# Patient Record
Sex: MALE | Race: WHITE | Employment: OTHER | URBAN - NONMETROPOLITAN AREA
[De-identification: names, ages, dates, MRNs, and addresses within clinical notes are randomized per-mention and may not be internally consistent; named-entity substitution may affect disease eponyms.]

---

## 2017-01-09 ENCOUNTER — ANTI-COAG VISIT (OUTPATIENT)
Dept: CARDIOLOGY | Age: 52
End: 2017-01-09
Payer: COMMERCIAL

## 2017-01-09 DIAGNOSIS — I48.0 PAROXYSMAL ATRIAL FIBRILLATION (HCC): Primary | ICD-10-CM

## 2017-01-09 LAB
INTERNATIONAL NORMALIZATION RATIO, POC: 3.6
PROTHROMBIN TIME, POC: NORMAL

## 2017-01-09 PROCEDURE — 85610 PROTHROMBIN TIME: CPT | Performed by: CLINICAL NURSE SPECIALIST

## 2017-01-23 ENCOUNTER — ANTI-COAG VISIT (OUTPATIENT)
Dept: CARDIOLOGY | Age: 52
End: 2017-01-23
Payer: COMMERCIAL

## 2017-01-23 DIAGNOSIS — I48.0 PAROXYSMAL ATRIAL FIBRILLATION (HCC): Primary | ICD-10-CM

## 2017-01-23 LAB
INTERNATIONAL NORMALIZATION RATIO, POC: 2.6
PROTHROMBIN TIME, POC: NORMAL

## 2017-01-23 PROCEDURE — 85610 PROTHROMBIN TIME: CPT | Performed by: CLINICAL NURSE SPECIALIST

## 2017-02-06 ENCOUNTER — ANTI-COAG VISIT (OUTPATIENT)
Dept: CARDIOLOGY | Age: 52
End: 2017-02-06
Payer: COMMERCIAL

## 2017-02-06 DIAGNOSIS — I48.0 PAROXYSMAL ATRIAL FIBRILLATION (HCC): Primary | ICD-10-CM

## 2017-02-06 LAB
INTERNATIONAL NORMALIZATION RATIO, POC: 2.2
PROTHROMBIN TIME, POC: NORMAL

## 2017-02-06 PROCEDURE — 85610 PROTHROMBIN TIME: CPT | Performed by: NURSE PRACTITIONER

## 2017-03-06 ENCOUNTER — ANTI-COAG VISIT (OUTPATIENT)
Dept: CARDIOLOGY | Age: 52
End: 2017-03-06
Payer: COMMERCIAL

## 2017-03-06 DIAGNOSIS — I48.0 PAROXYSMAL ATRIAL FIBRILLATION (HCC): Primary | ICD-10-CM

## 2017-03-06 LAB
INTERNATIONAL NORMALIZATION RATIO, POC: 1.6
PROTHROMBIN TIME, POC: NORMAL

## 2017-03-06 PROCEDURE — 85610 PROTHROMBIN TIME: CPT | Performed by: CLINICAL NURSE SPECIALIST

## 2017-03-13 ENCOUNTER — OFFICE VISIT (OUTPATIENT)
Dept: CARDIOLOGY | Age: 52
End: 2017-03-13
Payer: COMMERCIAL

## 2017-03-13 ENCOUNTER — SURG/PROC ORDERS (OUTPATIENT)
Dept: CARDIOLOGY | Age: 52
End: 2017-03-13

## 2017-03-13 VITALS
BODY MASS INDEX: 37.51 KG/M2 | WEIGHT: 262 LBS | SYSTOLIC BLOOD PRESSURE: 106 MMHG | DIASTOLIC BLOOD PRESSURE: 74 MMHG | HEIGHT: 70 IN | HEART RATE: 74 BPM

## 2017-03-13 DIAGNOSIS — Z01.810 PREOPERATIVE CARDIOVASCULAR EXAMINATION: ICD-10-CM

## 2017-03-13 DIAGNOSIS — I48.0 PAROXYSMAL ATRIAL FIBRILLATION (HCC): Primary | ICD-10-CM

## 2017-03-13 LAB
INTERNATIONAL NORMALIZATION RATIO, POC: 1.7
PROTHROMBIN TIME, POC: NORMAL

## 2017-03-13 PROCEDURE — 85610 PROTHROMBIN TIME: CPT | Performed by: INTERNAL MEDICINE

## 2017-03-13 PROCEDURE — 99214 OFFICE O/P EST MOD 30 MIN: CPT | Performed by: INTERNAL MEDICINE

## 2017-03-13 PROCEDURE — 93000 ELECTROCARDIOGRAM COMPLETE: CPT | Performed by: INTERNAL MEDICINE

## 2017-03-13 RX ORDER — FLECAINIDE ACETATE 100 MG/1
100 TABLET ORAL 2 TIMES DAILY
COMMUNITY
End: 2017-03-13 | Stop reason: SDUPTHER

## 2017-03-13 RX ORDER — SODIUM CHLORIDE 9 MG/ML
INJECTION, SOLUTION INTRAVENOUS CONTINUOUS
Status: CANCELLED | OUTPATIENT
Start: 2017-03-13

## 2017-03-14 RX ORDER — FLECAINIDE ACETATE 100 MG/1
100 TABLET ORAL 2 TIMES DAILY
Qty: 60 TABLET | Refills: 5 | Status: SHIPPED | OUTPATIENT
Start: 2017-03-14 | End: 2017-03-23 | Stop reason: SDUPTHER

## 2017-03-15 ENCOUNTER — HOSPITAL ENCOUNTER (OUTPATIENT)
Dept: CARDIAC CATH/INVASIVE PROCEDURES | Age: 52
Discharge: HOME | End: 2017-03-15
Payer: COMMERCIAL

## 2017-03-15 VITALS
HEART RATE: 70 BPM | BODY MASS INDEX: 37.51 KG/M2 | TEMPERATURE: 98.5 F | OXYGEN SATURATION: 96 % | SYSTOLIC BLOOD PRESSURE: 133 MMHG | WEIGHT: 262 LBS | HEIGHT: 70 IN | RESPIRATION RATE: 21 BRPM | DIASTOLIC BLOOD PRESSURE: 77 MMHG

## 2017-03-15 DIAGNOSIS — I48.0 PAROXYSMAL ATRIAL FIBRILLATION (HCC): ICD-10-CM

## 2017-03-15 LAB
INR BLD: 1.8 (ref 0.88–1.18)
PROTHROMBIN TIME: 20.5 SEC (ref 12–14.6)

## 2017-03-15 PROCEDURE — 92960 CARDIOVERSION ELECTRIC EXT: CPT | Performed by: INTERNAL MEDICINE

## 2017-03-15 PROCEDURE — 93005 ELECTROCARDIOGRAM TRACING: CPT

## 2017-03-15 PROCEDURE — 85610 PROTHROMBIN TIME: CPT

## 2017-03-15 PROCEDURE — 6360000002 HC RX W HCPCS

## 2017-03-15 PROCEDURE — 36415 COLL VENOUS BLD VENIPUNCTURE: CPT

## 2017-03-15 RX ORDER — SODIUM CHLORIDE 0.9 % (FLUSH) 0.9 %
10 SYRINGE (ML) INJECTION EVERY 12 HOURS SCHEDULED
Status: DISCONTINUED | OUTPATIENT
Start: 2017-03-15 | End: 2017-03-17 | Stop reason: HOSPADM

## 2017-03-15 RX ORDER — SODIUM CHLORIDE 0.9 % (FLUSH) 0.9 %
10 SYRINGE (ML) INJECTION PRN
Status: DISCONTINUED | OUTPATIENT
Start: 2017-03-15 | End: 2017-03-17 | Stop reason: HOSPADM

## 2017-03-15 RX ORDER — SODIUM CHLORIDE 9 MG/ML
INJECTION, SOLUTION INTRAVENOUS CONTINUOUS
Status: DISCONTINUED | OUTPATIENT
Start: 2017-03-15 | End: 2017-03-18 | Stop reason: HOSPADM

## 2017-03-16 LAB
EKG P AXIS: 62 DEGREES
EKG P AXIS: NORMAL DEGREES
EKG P-R INTERVAL: 214 MS
EKG P-R INTERVAL: NORMAL MS
EKG Q-T INTERVAL: 358 MS
EKG Q-T INTERVAL: 406 MS
EKG QRS DURATION: 100 MS
EKG QRS DURATION: 96 MS
EKG QTC CALCULATION (BAZETT): 412 MS
EKG QTC CALCULATION (BAZETT): 421 MS
EKG T AXIS: 19 DEGREES
EKG T AXIS: 31 DEGREES

## 2017-03-23 ENCOUNTER — OFFICE VISIT (OUTPATIENT)
Dept: CARDIOLOGY | Age: 52
End: 2017-03-23
Payer: COMMERCIAL

## 2017-03-23 VITALS
HEART RATE: 74 BPM | DIASTOLIC BLOOD PRESSURE: 84 MMHG | HEIGHT: 70 IN | BODY MASS INDEX: 38.37 KG/M2 | SYSTOLIC BLOOD PRESSURE: 114 MMHG | WEIGHT: 268 LBS

## 2017-03-23 DIAGNOSIS — I48.0 PAROXYSMAL ATRIAL FIBRILLATION (HCC): ICD-10-CM

## 2017-03-23 DIAGNOSIS — I48.0 PAROXYSMAL ATRIAL FIBRILLATION (HCC): Primary | ICD-10-CM

## 2017-03-23 DIAGNOSIS — I10 ESSENTIAL HYPERTENSION: ICD-10-CM

## 2017-03-23 LAB
ANION GAP SERPL CALCULATED.3IONS-SCNC: 17 MMOL/L (ref 7–19)
BUN BLDV-MCNC: 14 MG/DL (ref 6–20)
CALCIUM SERPL-MCNC: 9.1 MG/DL (ref 8.6–10)
CHLORIDE BLD-SCNC: 89 MMOL/L (ref 98–111)
CO2: 25 MMOL/L (ref 22–29)
CREAT SERPL-MCNC: 0.8 MG/DL (ref 0.5–1.2)
GFR NON-AFRICAN AMERICAN: >60
GLUCOSE BLD-MCNC: 490 MG/DL (ref 74–109)
INTERNATIONAL NORMALIZATION RATIO, POC: 3.2
POTASSIUM SERPL-SCNC: 4.5 MMOL/L (ref 3.5–5)
PROTHROMBIN TIME, POC: NORMAL
SODIUM BLD-SCNC: 131 MMOL/L (ref 136–145)

## 2017-03-23 PROCEDURE — 93000 ELECTROCARDIOGRAM COMPLETE: CPT | Performed by: CLINICAL NURSE SPECIALIST

## 2017-03-23 PROCEDURE — 99213 OFFICE O/P EST LOW 20 MIN: CPT | Performed by: CLINICAL NURSE SPECIALIST

## 2017-03-23 PROCEDURE — 85610 PROTHROMBIN TIME: CPT | Performed by: CLINICAL NURSE SPECIALIST

## 2017-03-23 RX ORDER — FLECAINIDE ACETATE 100 MG/1
150 TABLET ORAL 2 TIMES DAILY
Qty: 90 TABLET | Refills: 5 | Status: SHIPPED | OUTPATIENT
Start: 2017-03-23 | End: 2018-01-29 | Stop reason: SDUPTHER

## 2017-03-29 ENCOUNTER — HOSPITAL ENCOUNTER (OUTPATIENT)
Dept: CARDIAC CATH/INVASIVE PROCEDURES | Age: 52
Discharge: HOME | End: 2017-03-29
Payer: COMMERCIAL

## 2017-03-29 VITALS
BODY MASS INDEX: 38.37 KG/M2 | RESPIRATION RATE: 16 BRPM | HEART RATE: 64 BPM | DIASTOLIC BLOOD PRESSURE: 60 MMHG | SYSTOLIC BLOOD PRESSURE: 100 MMHG | TEMPERATURE: 98.1 F | OXYGEN SATURATION: 99 % | HEIGHT: 70 IN | WEIGHT: 268 LBS

## 2017-03-29 LAB
ANION GAP SERPL CALCULATED.3IONS-SCNC: 15 MMOL/L (ref 7–19)
BUN BLDV-MCNC: 15 MG/DL (ref 6–20)
CALCIUM SERPL-MCNC: 9.4 MG/DL (ref 8.6–10)
CHLORIDE BLD-SCNC: 96 MMOL/L (ref 98–111)
CO2: 24 MMOL/L (ref 22–29)
CREAT SERPL-MCNC: 0.8 MG/DL (ref 0.5–1.2)
GFR NON-AFRICAN AMERICAN: >60
GLUCOSE BLD-MCNC: 337 MG/DL (ref 74–109)
INR BLD: 2.74 (ref 0.88–1.18)
POTASSIUM SERPL-SCNC: 3.7 MMOL/L (ref 3.5–5)
PROTHROMBIN TIME: 28.2 SEC (ref 12–14.6)
SODIUM BLD-SCNC: 135 MMOL/L (ref 136–145)

## 2017-03-29 PROCEDURE — 85610 PROTHROMBIN TIME: CPT

## 2017-03-29 PROCEDURE — 80048 BASIC METABOLIC PNL TOTAL CA: CPT

## 2017-03-29 PROCEDURE — 92960 CARDIOVERSION ELECTRIC EXT: CPT | Performed by: INTERNAL MEDICINE

## 2017-03-29 PROCEDURE — 36415 COLL VENOUS BLD VENIPUNCTURE: CPT

## 2017-03-29 PROCEDURE — 93005 ELECTROCARDIOGRAM TRACING: CPT

## 2017-03-29 PROCEDURE — 2580000003 HC RX 258: Performed by: INTERNAL MEDICINE

## 2017-03-29 PROCEDURE — 6360000002 HC RX W HCPCS

## 2017-03-29 RX ORDER — SODIUM CHLORIDE 0.9 % (FLUSH) 0.9 %
10 SYRINGE (ML) INJECTION PRN
Status: DISCONTINUED | OUTPATIENT
Start: 2017-03-29 | End: 2017-03-31 | Stop reason: HOSPADM

## 2017-03-29 RX ORDER — SODIUM CHLORIDE 0.9 % (FLUSH) 0.9 %
10 SYRINGE (ML) INJECTION EVERY 12 HOURS SCHEDULED
Status: DISCONTINUED | OUTPATIENT
Start: 2017-03-29 | End: 2017-03-31 | Stop reason: HOSPADM

## 2017-03-29 RX ORDER — SODIUM CHLORIDE 9 MG/ML
INJECTION, SOLUTION INTRAVENOUS CONTINUOUS
Status: DISCONTINUED | OUTPATIENT
Start: 2017-03-29 | End: 2017-03-31 | Stop reason: HOSPADM

## 2017-03-29 RX ADMIN — SODIUM CHLORIDE: 9 INJECTION, SOLUTION INTRAVENOUS at 07:19

## 2017-04-10 ENCOUNTER — TELEPHONE (OUTPATIENT)
Dept: CARDIOLOGY | Age: 52
End: 2017-04-10

## 2017-04-11 LAB
EKG P AXIS: 66 DEGREES
EKG P AXIS: NORMAL DEGREES
EKG P-R INTERVAL: 244 MS
EKG P-R INTERVAL: NORMAL MS
EKG Q-T INTERVAL: 442 MS
EKG Q-T INTERVAL: 474 MS
EKG QRS DURATION: 108 MS
EKG QRS DURATION: 112 MS
EKG QTC CALCULATION (BAZETT): 442 MS
EKG QTC CALCULATION (BAZETT): 484 MS
EKG T AXIS: 29 DEGREES
EKG T AXIS: 47 DEGREES

## 2017-06-22 RX ORDER — WARFARIN SODIUM 10 MG/1
TABLET ORAL
Qty: 30 TABLET | Refills: 5 | Status: SHIPPED | OUTPATIENT
Start: 2017-06-22 | End: 2018-03-19 | Stop reason: SDUPTHER

## 2017-09-20 RX ORDER — LOSARTAN POTASSIUM 25 MG/1
TABLET ORAL
Qty: 90 TABLET | Refills: 2 | Status: SHIPPED | OUTPATIENT
Start: 2017-09-20 | End: 2018-06-19 | Stop reason: ALTCHOICE

## 2017-10-05 ENCOUNTER — TELEPHONE (OUTPATIENT)
Dept: CARDIOLOGY | Age: 52
End: 2017-10-05

## 2017-10-05 RX ORDER — WARFARIN SODIUM 10 MG/1
TABLET ORAL
Qty: 45 TABLET | Refills: 5 | Status: SHIPPED | OUTPATIENT
Start: 2017-10-05 | End: 2017-12-08 | Stop reason: ALTCHOICE

## 2017-10-06 ENCOUNTER — ANTI-COAG VISIT (OUTPATIENT)
Dept: CARDIOLOGY | Age: 52
End: 2017-10-06

## 2017-10-06 DIAGNOSIS — I48.0 PAROXYSMAL ATRIAL FIBRILLATION (HCC): Primary | ICD-10-CM

## 2017-10-06 LAB
INTERNATIONAL NORMALIZATION RATIO, POC: 1.1
INTERNATIONAL NORMALIZATION RATIO, POC: NORMAL
PROTHROMBIN TIME, POC: NORMAL
PROTHROMBIN TIME, POC: NORMAL

## 2017-10-06 PROCEDURE — 85610 PROTHROMBIN TIME: CPT | Performed by: CLINICAL NURSE SPECIALIST

## 2017-10-12 ENCOUNTER — TELEPHONE (OUTPATIENT)
Dept: CARDIOLOGY | Age: 52
End: 2017-10-12

## 2017-10-13 ENCOUNTER — ANTI-COAG VISIT (OUTPATIENT)
Dept: CARDIOLOGY | Age: 52
End: 2017-10-13

## 2017-10-13 DIAGNOSIS — I48.0 PAROXYSMAL ATRIAL FIBRILLATION (HCC): Primary | ICD-10-CM

## 2017-10-13 LAB
INTERNATIONAL NORMALIZATION RATIO, POC: 2.3
PROTHROMBIN TIME, POC: NORMAL

## 2017-10-13 PROCEDURE — 85610 PROTHROMBIN TIME: CPT | Performed by: CLINICAL NURSE SPECIALIST

## 2017-10-27 ENCOUNTER — ANTI-COAG VISIT (OUTPATIENT)
Dept: CARDIOLOGY | Age: 52
End: 2017-10-27

## 2017-10-27 DIAGNOSIS — I48.0 PAROXYSMAL ATRIAL FIBRILLATION (HCC): Primary | ICD-10-CM

## 2017-10-27 LAB
INTERNATIONAL NORMALIZATION RATIO, POC: 2.7
PROTHROMBIN TIME, POC: NORMAL

## 2017-10-27 PROCEDURE — 85610 PROTHROMBIN TIME: CPT | Performed by: CLINICAL NURSE SPECIALIST

## 2017-12-01 ENCOUNTER — ANTI-COAG VISIT (OUTPATIENT)
Dept: CARDIOLOGY | Age: 52
End: 2017-12-01

## 2017-12-01 DIAGNOSIS — I48.0 PAROXYSMAL ATRIAL FIBRILLATION (HCC): Primary | ICD-10-CM

## 2017-12-01 LAB
INTERNATIONAL NORMALIZATION RATIO, POC: 1.2
PROTHROMBIN TIME, POC: NORMAL

## 2017-12-01 PROCEDURE — 85610 PROTHROMBIN TIME: CPT | Performed by: CLINICAL NURSE SPECIALIST

## 2017-12-04 ENCOUNTER — ANTI-COAG VISIT (OUTPATIENT)
Dept: CARDIOLOGY | Age: 52
End: 2017-12-04

## 2017-12-04 DIAGNOSIS — I48.0 PAROXYSMAL ATRIAL FIBRILLATION (HCC): Primary | ICD-10-CM

## 2017-12-04 LAB
INTERNATIONAL NORMALIZATION RATIO, POC: 1.4
PROTHROMBIN TIME, POC: NORMAL

## 2017-12-04 PROCEDURE — 85610 PROTHROMBIN TIME: CPT | Performed by: CLINICAL NURSE SPECIALIST

## 2017-12-08 ENCOUNTER — ANTI-COAG VISIT (OUTPATIENT)
Dept: CARDIOLOGY | Age: 52
End: 2017-12-08

## 2017-12-08 DIAGNOSIS — I48.0 PAROXYSMAL ATRIAL FIBRILLATION (HCC): Primary | ICD-10-CM

## 2017-12-08 LAB
INTERNATIONAL NORMALIZATION RATIO, POC: 2.3
PROTHROMBIN TIME, POC: NORMAL

## 2017-12-08 PROCEDURE — 85610 PROTHROMBIN TIME: CPT | Performed by: CLINICAL NURSE SPECIALIST

## 2017-12-08 RX ORDER — WARFARIN SODIUM 5 MG/1
2.5 TABLET ORAL DAILY
Qty: 30 TABLET | Refills: 5 | Status: SHIPPED | OUTPATIENT
Start: 2017-12-08 | End: 2018-03-19 | Stop reason: SDUPTHER

## 2017-12-08 RX ORDER — WARFARIN SODIUM 5 MG/1
2.5 TABLET ORAL
COMMUNITY
End: 2017-12-08 | Stop reason: SDUPTHER

## 2017-12-14 ENCOUNTER — TELEPHONE (OUTPATIENT)
Dept: CARDIOLOGY | Age: 52
End: 2017-12-14

## 2017-12-15 ENCOUNTER — ANTI-COAG VISIT (OUTPATIENT)
Dept: CARDIOLOGY | Age: 52
End: 2017-12-15

## 2017-12-15 DIAGNOSIS — I48.0 PAROXYSMAL ATRIAL FIBRILLATION (HCC): Primary | ICD-10-CM

## 2017-12-15 LAB
INTERNATIONAL NORMALIZATION RATIO, POC: 3.1
PROTHROMBIN TIME, POC: NORMAL

## 2017-12-15 PROCEDURE — 85610 PROTHROMBIN TIME: CPT | Performed by: CLINICAL NURSE SPECIALIST

## 2017-12-19 ENCOUNTER — APPOINTMENT (OUTPATIENT)
Dept: CT IMAGING | Age: 52
End: 2017-12-19

## 2017-12-19 ENCOUNTER — HOSPITAL ENCOUNTER (EMERGENCY)
Age: 52
Discharge: ANOTHER HOSPITAL | End: 2017-12-19
Attending: EMERGENCY MEDICINE

## 2017-12-19 ENCOUNTER — HOSPITAL ENCOUNTER (INPATIENT)
Facility: HOSPITAL | Age: 52
LOS: 3 days | Discharge: HOME | End: 2017-12-22
Attending: FAMILY MEDICINE | Admitting: INTERNAL MEDICINE

## 2017-12-19 VITALS
OXYGEN SATURATION: 93 % | RESPIRATION RATE: 18 BRPM | WEIGHT: 235 LBS | DIASTOLIC BLOOD PRESSURE: 75 MMHG | SYSTOLIC BLOOD PRESSURE: 115 MMHG | HEIGHT: 70 IN | HEART RATE: 81 BPM | BODY MASS INDEX: 33.64 KG/M2 | TEMPERATURE: 98.1 F

## 2017-12-19 DIAGNOSIS — L02.01 FACIAL ABSCESS: Primary | ICD-10-CM

## 2017-12-19 DIAGNOSIS — L03.211 FACIAL CELLULITIS: ICD-10-CM

## 2017-12-19 DIAGNOSIS — E11.10 DIABETIC KETOACIDOSIS WITHOUT COMA ASSOCIATED WITH TYPE 2 DIABETES MELLITUS (HCC): Primary | ICD-10-CM

## 2017-12-19 LAB
ACETONE BLOOD: ABNORMAL
ALBUMIN SERPL-MCNC: 4.6 G/DL (ref 3.5–5.2)
ALP BLD-CCNC: 100 U/L (ref 40–130)
ALT SERPL-CCNC: 9 U/L (ref 5–41)
ANION GAP SERPL CALCULATED.3IONS-SCNC: 16 MMOL/L (ref 4–13)
ANION GAP SERPL CALCULATED.3IONS-SCNC: 24 MMOL/L (ref 7–19)
APTT: 47.1 SEC (ref 26–36.2)
ARTERIAL PATENCY WRIST A: POSITIVE
AST SERPL-CCNC: 9 U/L (ref 5–40)
ATMOSPHERIC PRESS: 752 MMHG
BASE EXCESS BLDA CALC-SCNC: -3.6 MMOL/L (ref 0–2)
BASE EXCESS VENOUS: -9 MMOL/L
BASOPHILS ABSOLUTE: 0.1 K/UL (ref 0–0.2)
BASOPHILS RELATIVE PERCENT: 0.5 % (ref 0–1)
BDY SITE: ABNORMAL
BILIRUB SERPL-MCNC: 0.6 MG/DL (ref 0.2–1.2)
BODY TEMPERATURE: 37 C
BUN BLD-MCNC: 16 MG/DL (ref 5–21)
BUN BLDV-MCNC: 16 MG/DL (ref 6–20)
BUN/CREAT SERPL: 17.6 (ref 7–25)
CALCIUM SERPL-MCNC: 9.2 MG/DL (ref 8.6–10)
CALCIUM SPEC-SCNC: 9 MG/DL (ref 8.4–10.4)
CARBOXYHEMOGLOBIN: 2.8 %
CHLORIDE BLD-SCNC: 87 MMOL/L (ref 98–111)
CHLORIDE SERPL-SCNC: 98 MMOL/L (ref 98–110)
CO2 SERPL-SCNC: 21 MMOL/L (ref 24–31)
CO2: 17 MMOL/L (ref 22–29)
COHGB MFR BLD: 1.3 % (ref 0–5)
CREAT BLD-MCNC: 0.91 MG/DL (ref 0.5–1.4)
CREAT SERPL-MCNC: 0.9 MG/DL (ref 0.5–1.2)
EOSINOPHILS ABSOLUTE: 0 K/UL (ref 0–0.6)
EOSINOPHILS RELATIVE PERCENT: 0.1 % (ref 0–5)
GFR NON-AFRICAN AMERICAN: >60
GFR SERPL CREATININE-BSD FRML MDRD: 87 ML/MIN/1.73
GLUCOSE BLD-MCNC: 279 MG/DL (ref 70–100)
GLUCOSE BLD-MCNC: 357 MG/DL (ref 70–99)
GLUCOSE BLD-MCNC: 384 MG/DL (ref 70–99)
GLUCOSE BLD-MCNC: 514 MG/DL
GLUCOSE BLD-MCNC: 514 MG/DL (ref 74–109)
GLUCOSE BLDC GLUCOMTR-MCNC: 180 MG/DL (ref 70–130)
GLUCOSE BLDC GLUCOMTR-MCNC: 194 MG/DL (ref 70–130)
GLUCOSE BLDC GLUCOMTR-MCNC: 211 MG/DL (ref 70–130)
GLUCOSE BLDC GLUCOMTR-MCNC: 315 MG/DL (ref 70–130)
GLUCOSE BLDC GLUCOMTR-MCNC: 77 MG/DL (ref 70–130)
GLUCOSE BLDC GLUCOMTR-MCNC: 95 MG/DL (ref 70–130)
HCO3 BLDA-SCNC: 21.1 MMOL/L (ref 20–26)
HCO3 VENOUS: 17 MMOL/L (ref 23–29)
HCT VFR BLD CALC: 45.1 % (ref 38–51)
HCT VFR BLD CALC: 45.6 % (ref 42–52)
HEMOGLOBIN: 16.1 G/DL (ref 14–18)
HGB BLDA-MCNC: 14.7 G/DL (ref 14–18)
INR BLD: 2.64 (ref 0.88–1.18)
INR PPP: 4.38 (ref 0.91–1.09)
LYMPHOCYTES ABSOLUTE: 1.3 K/UL (ref 1.1–4.5)
LYMPHOCYTES RELATIVE PERCENT: 9 % (ref 20–40)
Lab: ABNORMAL
MAGNESIUM SERPL-MCNC: 2.1 MG/DL (ref 1.4–2.2)
MCH RBC QN AUTO: 31.8 PG (ref 27–31)
MCHC RBC AUTO-ENTMCNC: 35.3 G/DL (ref 33–37)
MCV RBC AUTO: 89.9 FL (ref 80–94)
METHEMOGLOBIN VENOUS: 0.9 %
METHGB BLD QL: 0.9 % (ref 0–3)
MODALITY: ABNORMAL
MONOCYTES ABSOLUTE: 1.2 K/UL (ref 0–0.9)
MONOCYTES RELATIVE PERCENT: 8.2 % (ref 0–10)
NEUTROPHILS ABSOLUTE: 11.6 K/UL (ref 1.5–7.5)
NEUTROPHILS RELATIVE PERCENT: 81.7 % (ref 50–65)
O2 CONTENT, VEN: 17 ML/DL
O2 SAT, VEN: 73 %
OXYHGB MFR BLDV: 94.2 % (ref 94–99)
PCO2 BLDA: 36.7 MM HG (ref 35–45)
PCO2, VEN: 38 MMHG (ref 40–50)
PDW BLD-RTO: 12 % (ref 11.5–14.5)
PERFORMED ON: ABNORMAL
PERFORMED ON: ABNORMAL
PH BLDA: 7.37 PH UNITS (ref 7.35–7.45)
PH VENOUS: 7.26 (ref 7.35–7.45)
PHOSPHATE SERPL-MCNC: 3.9 MG/DL (ref 2.5–4.5)
PLATELET # BLD: 341 K/UL (ref 130–400)
PMV BLD AUTO: 10.7 FL (ref 9.4–12.4)
PO2 BLDA: 76.3 MM HG (ref 83–108)
PO2, VEN: 39 MMHG
POTASSIUM BLD-SCNC: 3.9 MMOL/L (ref 3.5–5.3)
POTASSIUM BLDA-SCNC: 3.8 MMOL/L (ref 3.5–5.2)
POTASSIUM SERPL-SCNC: 5.7 MMOL/L (ref 3.5–5)
PROTHROMBIN TIME: 28.5 SEC (ref 12–14.6)
PROTHROMBIN TIME: 43.6 SECONDS (ref 11.9–14.6)
RBC # BLD: 5.07 M/UL (ref 4.7–6.1)
SAO2 % BLDCOA: 96.4 % (ref 94–99)
SODIUM BLD-SCNC: 128 MMOL/L (ref 136–145)
SODIUM BLD-SCNC: 135 MMOL/L (ref 135–145)
SODIUM BLDA-SCNC: 134 MMOL/L (ref 136–145)
TOTAL PROTEIN: 8.2 G/DL (ref 6.6–8.7)
VENTILATOR MODE: ABNORMAL
WBC # BLD: 14.2 K/UL (ref 4.8–10.8)

## 2017-12-19 PROCEDURE — 87040 BLOOD CULTURE FOR BACTERIA: CPT

## 2017-12-19 PROCEDURE — 87186 SC STD MICRODIL/AGAR DIL: CPT | Performed by: OTOLARYNGOLOGY

## 2017-12-19 PROCEDURE — 36600 WITHDRAWAL OF ARTERIAL BLOOD: CPT

## 2017-12-19 PROCEDURE — 82948 REAGENT STRIP/BLOOD GLUCOSE: CPT

## 2017-12-19 PROCEDURE — 70487 CT MAXILLOFACIAL W/DYE: CPT | Performed by: EMERGENCY MEDICINE

## 2017-12-19 PROCEDURE — 25810000003 POTASSIUM CHLORIDE PER 2 MEQ: Performed by: INTERNAL MEDICINE

## 2017-12-19 PROCEDURE — 82375 ASSAY CARBOXYHB QUANT: CPT

## 2017-12-19 PROCEDURE — 25010000002 HYDROMORPHONE PER 4 MG: Performed by: INTERNAL MEDICINE

## 2017-12-19 PROCEDURE — 80048 BASIC METABOLIC PNL TOTAL CA: CPT | Performed by: INTERNAL MEDICINE

## 2017-12-19 PROCEDURE — 85025 COMPLETE CBC W/AUTO DIFF WBC: CPT

## 2017-12-19 PROCEDURE — 2580000003 HC RX 258: Performed by: NURSE PRACTITIONER

## 2017-12-19 PROCEDURE — 96365 THER/PROPH/DIAG IV INF INIT: CPT

## 2017-12-19 PROCEDURE — 83050 HGB METHEMOGLOBIN QUAN: CPT

## 2017-12-19 PROCEDURE — 6360000004 HC RX CONTRAST MEDICATION: Performed by: NURSE PRACTITIONER

## 2017-12-19 PROCEDURE — 96375 TX/PRO/DX INJ NEW DRUG ADDON: CPT

## 2017-12-19 PROCEDURE — 25010000002 INSULIN REGULAR HUMAN PER 5 UNITS: Performed by: INTERNAL MEDICINE

## 2017-12-19 PROCEDURE — 36415 COLL VENOUS BLD VENIPUNCTURE: CPT

## 2017-12-19 PROCEDURE — 85610 PROTHROMBIN TIME: CPT | Performed by: INTERNAL MEDICINE

## 2017-12-19 PROCEDURE — 2580000003 HC RX 258: Performed by: EMERGENCY MEDICINE

## 2017-12-19 PROCEDURE — 80053 COMPREHEN METABOLIC PANEL: CPT

## 2017-12-19 PROCEDURE — 87147 CULTURE TYPE IMMUNOLOGIC: CPT | Performed by: OTOLARYNGOLOGY

## 2017-12-19 PROCEDURE — 70487 CT MAXILLOFACIAL W/DYE: CPT

## 2017-12-19 PROCEDURE — 83735 ASSAY OF MAGNESIUM: CPT | Performed by: INTERNAL MEDICINE

## 2017-12-19 PROCEDURE — 84100 ASSAY OF PHOSPHORUS: CPT | Performed by: INTERNAL MEDICINE

## 2017-12-19 PROCEDURE — 99284 EMERGENCY DEPT VISIT MOD MDM: CPT

## 2017-12-19 PROCEDURE — 82803 BLOOD GASES ANY COMBINATION: CPT

## 2017-12-19 PROCEDURE — 6370000000 HC RX 637 (ALT 250 FOR IP): Performed by: EMERGENCY MEDICINE

## 2017-12-19 PROCEDURE — 87070 CULTURE OTHR SPECIMN AEROBIC: CPT | Performed by: OTOLARYNGOLOGY

## 2017-12-19 PROCEDURE — 6360000002 HC RX W HCPCS: Performed by: EMERGENCY MEDICINE

## 2017-12-19 PROCEDURE — 87205 SMEAR GRAM STAIN: CPT | Performed by: OTOLARYNGOLOGY

## 2017-12-19 PROCEDURE — 10061 I&D ABSCESS COMP/MULTIPLE: CPT | Performed by: OTOLARYNGOLOGY

## 2017-12-19 PROCEDURE — 99252 IP/OBS CONSLTJ NEW/EST SF 35: CPT | Performed by: OTOLARYNGOLOGY

## 2017-12-19 PROCEDURE — 87040 BLOOD CULTURE FOR BACTERIA: CPT | Performed by: INTERNAL MEDICINE

## 2017-12-19 PROCEDURE — 99285 EMERGENCY DEPT VISIT HI MDM: CPT | Performed by: EMERGENCY MEDICINE

## 2017-12-19 PROCEDURE — 85730 THROMBOPLASTIN TIME PARTIAL: CPT

## 2017-12-19 PROCEDURE — 82805 BLOOD GASES W/O2 SATURATION: CPT

## 2017-12-19 PROCEDURE — 82009 KETONE BODYS QUAL: CPT

## 2017-12-19 PROCEDURE — 82962 GLUCOSE BLOOD TEST: CPT

## 2017-12-19 PROCEDURE — 0W920ZZ DRAINAGE OF FACE, OPEN APPROACH: ICD-10-PCS | Performed by: OTOLARYNGOLOGY

## 2017-12-19 PROCEDURE — 85610 PROTHROMBIN TIME: CPT

## 2017-12-19 PROCEDURE — 6360000002 HC RX W HCPCS: Performed by: NURSE PRACTITIONER

## 2017-12-19 PROCEDURE — 87185 SC STD ENZYME DETCJ PER NZM: CPT | Performed by: OTOLARYNGOLOGY

## 2017-12-19 RX ORDER — NALOXONE HCL 0.4 MG/ML
0.4 VIAL (ML) INJECTION
Status: DISCONTINUED | OUTPATIENT
Start: 2017-12-19 | End: 2017-12-22 | Stop reason: HOSPADM

## 2017-12-19 RX ORDER — POTASSIUM CHLORIDE 750 MG/1
10 CAPSULE, EXTENDED RELEASE ORAL AS NEEDED
Status: DISCONTINUED | OUTPATIENT
Start: 2017-12-19 | End: 2017-12-20

## 2017-12-19 RX ORDER — MORPHINE SULFATE 10 MG/ML
4 INJECTION, SOLUTION INTRAMUSCULAR; INTRAVENOUS ONCE
Status: COMPLETED | OUTPATIENT
Start: 2017-12-19 | End: 2017-12-19

## 2017-12-19 RX ORDER — MORPHINE SULFATE 10 MG/ML
4 INJECTION, SOLUTION INTRAMUSCULAR; INTRAVENOUS ONCE
Status: DISCONTINUED | OUTPATIENT
Start: 2017-12-19 | End: 2017-12-19 | Stop reason: HOSPADM

## 2017-12-19 RX ORDER — POTASSIUM CHLORIDE 750 MG/1
20 CAPSULE, EXTENDED RELEASE ORAL AS NEEDED
Status: DISCONTINUED | OUTPATIENT
Start: 2017-12-19 | End: 2017-12-20

## 2017-12-19 RX ORDER — ONDANSETRON 2 MG/ML
4 INJECTION INTRAMUSCULAR; INTRAVENOUS EVERY 6 HOURS PRN
Status: DISCONTINUED | OUTPATIENT
Start: 2017-12-19 | End: 2017-12-22 | Stop reason: HOSPADM

## 2017-12-19 RX ORDER — GLIMEPIRIDE 4 MG/1
4 TABLET ORAL
COMMUNITY
End: 2017-12-22 | Stop reason: HOSPADM

## 2017-12-19 RX ORDER — WARFARIN SODIUM 10 MG/1
10 TABLET ORAL EVERY OTHER DAY
COMMUNITY

## 2017-12-19 RX ORDER — POTASSIUM CHLORIDE 1.5 G/1.77G
10 POWDER, FOR SOLUTION ORAL AS NEEDED
Status: DISCONTINUED | OUTPATIENT
Start: 2017-12-19 | End: 2017-12-20

## 2017-12-19 RX ORDER — HYDROMORPHONE HCL 110MG/55ML
0.5 PATIENT CONTROLLED ANALGESIA SYRINGE INTRAVENOUS
Status: DISCONTINUED | OUTPATIENT
Start: 2017-12-19 | End: 2017-12-22 | Stop reason: HOSPADM

## 2017-12-19 RX ORDER — CLINDAMYCIN PHOSPHATE 900 MG/50ML
900 INJECTION INTRAVENOUS EVERY 8 HOURS
Status: DISCONTINUED | OUTPATIENT
Start: 2017-12-19 | End: 2017-12-21

## 2017-12-19 RX ORDER — LOSARTAN POTASSIUM 25 MG/1
25 TABLET ORAL DAILY
COMMUNITY

## 2017-12-19 RX ORDER — POTASSIUM CHLORIDE 1.5 G/1.77G
20 POWDER, FOR SOLUTION ORAL AS NEEDED
Status: DISCONTINUED | OUTPATIENT
Start: 2017-12-19 | End: 2017-12-20

## 2017-12-19 RX ORDER — 0.9 % SODIUM CHLORIDE 0.9 %
1000 INTRAVENOUS SOLUTION INTRAVENOUS ONCE
Status: COMPLETED | OUTPATIENT
Start: 2017-12-19 | End: 2017-12-19

## 2017-12-19 RX ORDER — POTASSIUM CHLORIDE 7.46 G/1000ML
10 INJECTION, SOLUTION INTRAVENOUS
Status: DISCONTINUED | OUTPATIENT
Start: 2017-12-19 | End: 2017-12-20

## 2017-12-19 RX ORDER — SODIUM CHLORIDE AND POTASSIUM CHLORIDE 150; 450 MG/100ML; MG/100ML
250 INJECTION, SOLUTION INTRAVENOUS CONTINUOUS PRN
Status: DISCONTINUED | OUTPATIENT
Start: 2017-12-19 | End: 2017-12-20

## 2017-12-19 RX ORDER — HYDROCODONE BITARTRATE AND ACETAMINOPHEN 5; 325 MG/1; MG/1
1 TABLET ORAL EVERY 4 HOURS PRN
Status: DISCONTINUED | OUTPATIENT
Start: 2017-12-19 | End: 2017-12-22 | Stop reason: HOSPADM

## 2017-12-19 RX ORDER — VANCOMYCIN HYDROCHLORIDE 1 G/200ML
1000 INJECTION, SOLUTION INTRAVENOUS ONCE
Status: COMPLETED | OUTPATIENT
Start: 2017-12-19 | End: 2017-12-19

## 2017-12-19 RX ORDER — HYDROCODONE BITARTRATE AND ACETAMINOPHEN 10; 325 MG/1; MG/1
1 TABLET ORAL EVERY 4 HOURS PRN
Status: DISCONTINUED | OUTPATIENT
Start: 2017-12-19 | End: 2017-12-22 | Stop reason: HOSPADM

## 2017-12-19 RX ORDER — POTASSIUM CHLORIDE 750 MG/1
40 CAPSULE, EXTENDED RELEASE ORAL AS NEEDED
Status: DISCONTINUED | OUTPATIENT
Start: 2017-12-19 | End: 2017-12-20

## 2017-12-19 RX ORDER — SODIUM CHLORIDE 450 MG/100ML
250 INJECTION, SOLUTION INTRAVENOUS CONTINUOUS
Status: DISCONTINUED | OUTPATIENT
Start: 2017-12-19 | End: 2017-12-20

## 2017-12-19 RX ORDER — WARFARIN SODIUM 5 MG/1
12.5 TABLET ORAL EVERY OTHER DAY
COMMUNITY

## 2017-12-19 RX ORDER — DEXTROSE MONOHYDRATE 25 G/50ML
25-50 INJECTION, SOLUTION INTRAVENOUS
Status: DISCONTINUED | OUTPATIENT
Start: 2017-12-19 | End: 2017-12-22 | Stop reason: HOSPADM

## 2017-12-19 RX ORDER — POTASSIUM CHLORIDE 1.5 G/1.77G
40 POWDER, FOR SOLUTION ORAL AS NEEDED
Status: DISCONTINUED | OUTPATIENT
Start: 2017-12-19 | End: 2017-12-20

## 2017-12-19 RX ORDER — DEXTROSE MONOHYDRATE 25 G/50ML
12.5 INJECTION, SOLUTION INTRAVENOUS
Status: DISCONTINUED | OUTPATIENT
Start: 2017-12-19 | End: 2017-12-20

## 2017-12-19 RX ORDER — SODIUM CHLORIDE 0.9 % (FLUSH) 0.9 %
1-10 SYRINGE (ML) INJECTION AS NEEDED
Status: DISCONTINUED | OUTPATIENT
Start: 2017-12-19 | End: 2017-12-22 | Stop reason: HOSPADM

## 2017-12-19 RX ORDER — ONDANSETRON 2 MG/ML
4 INJECTION INTRAMUSCULAR; INTRAVENOUS ONCE
Status: COMPLETED | OUTPATIENT
Start: 2017-12-19 | End: 2017-12-19

## 2017-12-19 RX ORDER — ACETAMINOPHEN 325 MG/1
650 TABLET ORAL EVERY 4 HOURS PRN
Status: DISCONTINUED | OUTPATIENT
Start: 2017-12-19 | End: 2017-12-22 | Stop reason: HOSPADM

## 2017-12-19 RX ORDER — DEXTROSE, SODIUM CHLORIDE, AND POTASSIUM CHLORIDE 5; .45; .15 G/100ML; G/100ML; G/100ML
150 INJECTION INTRAVENOUS CONTINUOUS PRN
Status: DISCONTINUED | OUTPATIENT
Start: 2017-12-19 | End: 2017-12-20

## 2017-12-19 RX ORDER — HYDROCHLOROTHIAZIDE 25 MG/1
25 TABLET ORAL DAILY
COMMUNITY
End: 2021-04-29

## 2017-12-19 RX ORDER — DEXTROSE AND SODIUM CHLORIDE 5; .45 G/100ML; G/100ML
150 INJECTION, SOLUTION INTRAVENOUS CONTINUOUS PRN
Status: DISCONTINUED | OUTPATIENT
Start: 2017-12-19 | End: 2017-12-20

## 2017-12-19 RX ORDER — FLECAINIDE ACETATE 100 MG/1
150 TABLET ORAL EVERY 12 HOURS SCHEDULED
COMMUNITY
End: 2021-04-29

## 2017-12-19 RX ADMIN — VANCOMYCIN HYDROCHLORIDE 1000 MG: 1 INJECTION, SOLUTION INTRAVENOUS at 14:00

## 2017-12-19 RX ADMIN — IOPAMIDOL 90 ML: 755 INJECTION, SOLUTION INTRAVENOUS at 12:33

## 2017-12-19 RX ADMIN — HYDROMORPHONE HYDROCHLORIDE 0.5 MG: 2 INJECTION, SOLUTION INTRAMUSCULAR; INTRAVENOUS; SUBCUTANEOUS at 19:36

## 2017-12-19 RX ADMIN — SODIUM CHLORIDE 1000 ML: 9 INJECTION, SOLUTION INTRAVENOUS at 12:08

## 2017-12-19 RX ADMIN — SODIUM CHLORIDE 10 UNITS/HR: 9 INJECTION, SOLUTION INTRAVENOUS at 18:40

## 2017-12-19 RX ADMIN — CLINDAMYCIN PHOSPHATE 900 MG: 18 INJECTION, SOLUTION INTRAVENOUS at 18:34

## 2017-12-19 RX ADMIN — ONDANSETRON 4 MG: 2 INJECTION INTRAMUSCULAR; INTRAVENOUS at 12:09

## 2017-12-19 RX ADMIN — MUPIROCIN 1 APPLICATION: 20 OINTMENT TOPICAL at 22:36

## 2017-12-19 RX ADMIN — Medication 0.1 UNITS/KG/HR: at 14:00

## 2017-12-19 RX ADMIN — POTASSIUM CHLORIDE AND SODIUM CHLORIDE 250 ML/HR: 450; 150 INJECTION, SOLUTION INTRAVENOUS at 19:04

## 2017-12-19 RX ADMIN — SODIUM CHLORIDE 1000 ML: 9 INJECTION, SOLUTION INTRAVENOUS at 13:00

## 2017-12-19 RX ADMIN — MORPHINE SULFATE 4 MG: 10 INJECTION INTRAVENOUS at 12:09

## 2017-12-19 ASSESSMENT — ENCOUNTER SYMPTOMS
ABDOMINAL PAIN: 0
VOMITING: 0
NAUSEA: 0
FACIAL SWELLING: 1
SORE THROAT: 0
SHORTNESS OF BREATH: 0
EYE DISCHARGE: 0
DIARRHEA: 0
BACK PAIN: 0
COUGH: 0
WHEEZING: 0

## 2017-12-19 ASSESSMENT — PAIN DESCRIPTION - PAIN TYPE: TYPE: ACUTE PAIN

## 2017-12-19 ASSESSMENT — PAIN SCALES - GENERAL
PAINLEVEL_OUTOF10: 8
PAINLEVEL_OUTOF10: 7

## 2017-12-19 ASSESSMENT — PAIN DESCRIPTION - LOCATION: LOCATION: FACE

## 2017-12-19 NOTE — ED PROVIDER NOTES
backup. Contacted Dr. Lara Arndt who stated that he was out of town until next week and would be unable to provide surgical services. Decision made to transfer patient to [facility] for management of DKA and ENT surgical consultation and management. Accepting physician is Dr. Leena Llanes. FINAL IMPRESSION      1. Diabetic ketoacidosis without coma associated with type 2 diabetes mellitus ([facility] [address]    2.  Facial cellulitis          Angelica Lee MD  Attending Emergency Physician        Angelica Lee MD  12/19/17 1031

## 2017-12-19 NOTE — ED PROVIDER NOTES
Cardiovascular: Normal rate and regular rhythm. No murmur heard. Pulmonary/Chest: Effort normal and breath sounds normal. No respiratory distress. He has no wheezes. Abdominal: Soft. He exhibits no distension. There is no tenderness. Musculoskeletal: Normal range of motion. Neurological: He is alert and oriented to person, place, and time. No cranial nerve deficit. Coordination normal.   Skin: Skin is warm and dry. He is not diaphoretic. There is erythema. Psychiatric: He has a normal mood and affect. Nursing note and vitals reviewed. DIAGNOSTIC RESULTS     RADIOLOGY:   Non-plain film images such as CT, Ultrasound and MRI are read by the radiologist. Plain radiographic images are visualized and preliminarily interpreted by No att. providers found with the below findings:        Interpretation per the Radiologist below, if available at the time of this note:    CT FACIAL BONES W CONTRAST   Final Result   A poorly defined enhancing masslike area/density adjacent   and lateral to the alveolar process of the left side of the maxilla   and mandible measuring 3 cm x 2 cm x 3.5 cm. This may represent an   inflammatory process, a phlegmon/evolving abscess. The possibility of   a neoplastic process is not excluded. Further follow-up is   recommended. Bilateral maxillary sinusitis. The above findings are recorded on a digital voice clip in PACS.    Signed by Dr Travis Blanton on 12/19/2017 1:16 PM          LABS:  Labs Reviewed   CBC WITH AUTO DIFFERENTIAL - Abnormal; Notable for the following:        Result Value    WBC 14.2 (*)     MCH 31.8 (*)     Neutrophils % 81.7 (*)     Lymphocytes % 9.0 (*)     Neutrophils # 11.6 (*)     Monocytes # 1.20 (*)     All other components within normal limits   COMPREHENSIVE METABOLIC PANEL - Abnormal; Notable for the following:     Sodium 128 (*)     Potassium 5.7 (*)     Chloride 87 (*)     CO2 17 (*)     Anion Gap 24 (*)     Glucose 514 (*)     All other components within normal limits    Narrative:     CALL  Armas  KLED tel. ,  Chemistry results called to and read back by char contreras er, 12/19/2017 11:26,  by Conchita Najera - Abnormal; Notable for the following:     Protime 28.5 (*)     INR 2.64 (*)     All other components within normal limits   APTT - Abnormal; Notable for the following:     aPTT 47.1 (*)     All other components within normal limits   ACETONE - Abnormal; Notable for the following:     Acetone, Bld Small (*)     All other components within normal limits   VENOUS BLD GAS - Abnormal; Notable for the following:     pH, Swapnil 7.26 (*)     pCO2, Swapnil 38.0 (*)     HCO3, Venous 17 (*)     All other components within normal limits   POCT GLUCOSE - Abnormal; Notable for the following:     POC Glucose 384 (*)     All other components within normal limits   POCT GLUCOSE - Abnormal; Notable for the following:     POC Glucose 357 (*)     All other components within normal limits   POCT GLUCOSE - Normal   CULTURE BLOOD #1   CULTURE BLOOD #2       All other labs were within normal range or not returned as of this dictation. RE-ASSESSMENT          EMERGENCY DEPARTMENT COURSE and DIFFERENTIAL DIAGNOSIS/MDM:   Vitals:    Vitals:    12/19/17 0923 12/19/17 1415 12/19/17 1531 12/19/17 1532   BP: (!) 170/95 (!) 160/97 115/75    Pulse: 90 77 81 81   Resp: 18      Temp: 98.1 °F (36.7 °C)      TempSrc: Oral      SpO2: 93% 93% 92% 93%   Weight:       Height:               MDM    PROCEDURES:    Procedures      FINAL IMPRESSION      1. Diabetic ketoacidosis without coma associated with type 2 diabetes mellitus ([facility] [address]    2. Facial cellulitis          DISPOSITION/PLAN   DISPOSITION Decision to Transfer    PATIENT REFERRED TO:  No follow-up provider specified.     DISCHARGE MEDICATIONS:  Discharge Medication List as of 12/19/2017  4:04 PM          (Please note that portions of this note were completed with a voice recognition program.  Efforts were made to edit the dictations

## 2017-12-19 NOTE — H&P
[facility] Medicine Services  HISTORY AND PHYSICAL    Date of Admission: 12/19/2017  Primary Care Physician: Marlon Díaz DO    Subjective     Chief Complaint: facial swelling and pain     History of Present Illness  This is a 52-year-old  gentleman who resides in [place], [place].  He has is Dr. Díaz for primary care.  He follows with Dr. Castro at [facility] for history of atrial fibrillation.  He is chronically on flecainide and warfarin.  He also has a long-standing history of poorly controlled diabetes.    The patient began to notice pain and swelling accompanied by redness to the left side of his face on Saturday.  He believes he has also had some subjective fever.  No sweats or chills.  He gives a history of having an MRSA infection on his low back and also behind his left ear in the past that required I&D.  He tells me the one on his low back was done by Carolynn Basurto here.  The one behind his left ear was done in the emergency department at [facility].    This area continued to become more swollen and painful to the touch.  He also has had a poor appetite and unable to eat and drink very much secondary to pain with chewing and difficulty swallowing.  He went to the emergency department at [facility] today and was presumptively diagnosed with diabetic ketoacidosis.  He was referred for admission to their hospital service, however, the patient was declined there as there is no current ENT backup in case he needs surgical intervention.    Review of Systems   Otherwise complete ROS reviewed and negative except as mentioned in the HPI.    Past Medical History:   Past Medical History:   Diagnosis Date   • Atrial fibrillation    • Diabetes mellitus    • Hypertension      Past Surgical History:  1. T&A.   2. Cardioversion.    Social History:  reports that he has never smoked. He has never used smokeless tobacco. He reports that he does not drink  "alcohol.    Family History: Coronary disease, diabetes.     Allergies:  Allergies   Allergen Reactions   • Penicillins    • Neosporin [Neomycin-Bacitracin Zn-Polymyx] Rash     Medications:  1. Warfarin.   2. Losartan.   3. Flecainide.   4. Toujeo.   5. Lopressor.   6. Amaryl.   7. HCTZ.   8. Metformin.   9. Viagra.    Objective     Vital Signs: /69  Pulse 79  Temp 99.3 °F (37.4 °C) (Axillary)   Resp 20  Ht 177.8 cm (70\")  Wt 102 kg (224 lb 14.4 oz)  BMI 32.27 kg/m2  Physical Exam   Constitutional: He is oriented to person, place, and time. He appears well-developed and well-nourished.   Up in bed.  No distress.  Nontoxic.  Seen and discussed with his nurse, Ernestina.   HENT:   Head: Normocephalic and atraumatic.   He has a large area of induration and redness on his left cheek.  Significant pain with palpation.  No readily identified fluctuance.  This is also palpated on the buccal mucosa as well.  This does not appear to involve the left submandibular gland or the left parotid gland.  The patient believes that this area started as an infected hair follicle.   Eyes: Conjunctivae and EOM are normal. Pupils are equal, round, and reactive to light.   Neck: Neck supple. No JVD present.   Cardiovascular: Normal rate, regular rhythm, normal heart sounds and intact distal pulses.    Pulmonary/Chest: Effort normal.   Abdominal: Soft. Bowel sounds are normal. He exhibits no distension. There is no tenderness. There is no rebound and no guarding.   Musculoskeletal: Normal range of motion. He exhibits edema (trace). He exhibits no tenderness or deformity.   Neurological: He is alert and oriented to person, place, and time. He displays normal reflexes. No cranial nerve deficit. He exhibits normal muscle tone.   Skin: Skin is warm and dry. No rash noted.   Psychiatric: He has a normal mood and affect. His behavior is normal. Judgment and thought content normal.   Very pleasant and articulate.     Results Reviewed from " [facility]:  CBC Auto Differential (12/19/2017 10:50 AM)  CBC Auto Differential (12/19/2017 10:50 AM)   Component Value Ref Range   WBC 14.2 (H) 4.8 - 10.8 K/uL   RBC 5.07 4.70 - 6.10 M/uL   Hemoglobin 16.1 14.0 - 18.0 g/dL   Hematocrit 45.6 42.0 - 52.0 %   MCV 89.9 80.0 - 94.0 fL   MCH 31.8 (H) 27.0 - 31.0 pg   MCHC 35.3 33.0 - 37.0 g/dL   RDW 12.0 11.5 - 14.5 %   Platelets 341 130 - 400 K/uL   MPV 10.7 9.4 - 12.4 fL   Neutrophils % 81.7 (H) 50.0 - 65.0 %   Lymphocytes % 9.0 (L) 20.0 - 40.0 %   Monocytes % 8.2 0.0 - 10.0 %   Eosinophils % 0.1 0.0 - 5.0 %   Basophils % 0.5 0.0 - 1.0 %   Neutrophils # 11.6 (H) 1.5 - 7.5 K/uL   Lymphocytes # 1.3 1.1 - 4.5 K/uL   Monocytes # 1.20 (H) 0.00 - 0.90 K/uL   Eosinophils # 0.00 0.00 - 0.60 K/uL   Basophils # 0.10 0.00 - 0.20 K/uL     CBC Auto Differential (12/19/2017 10:50 AM)   Specimen Performing Laboratory   Blood [facility]    [address] [place] [address]     Back to top of Results  Acetone (12/19/2017 10:50 AM)  Acetone (12/19/2017 10:50 AM)   Component Value Ref Range   Acetone, Bld Small (A) Negative     Acetone (12/19/2017 10:50 AM)   Specimen Performing Laboratory   Blood [facility]    [address] [place] [address]     Back to top of Result    APTT (12/19/2017 10:50 AM)  APTT (12/19/2017 10:50 AM)   Component Value Ref Range   aPTT 47.1 (H) 26.0 - 36.2 sec     APTT (12/19/2017 10:50 AM)   Specimen Performing Laboratory   Blood [facility]    [address] [place] [address]     Back to top of Results    Protime-INR (12/19/2017 10:50 AM)  Protime-INR (12/19/2017 10:50 AM)   Component Value Ref Range   Protime 28.5 (H) 12.0 - 14.6 sec   INR 2.64 (H)  Comment:   INR  < or = 1.3  Normal  INR = 2.0 - 3.0  Therapeutic  INR = 2.5 - 3.5  Therapeutic for patients with mechanical  prosthetic heart valve & MI prophylaxis  INR  > or = 3.5  Abnormal/Elevated  INR  > or = 5.0  Critical  (requires immediate physician  notification)     0.88 - 1.18     Protime-INR (12/19/2017 10:50 AM)   Specimen Performing Laboratory   Blood [facility]    [address] [place] [address]     Back to top of Results    Comprehensive Metabolic Panel (12/19/2017 10:50 AM)  Comprehensive Metabolic Panel (12/19/2017 10:50 AM)   Component Value Ref Range   Sodium 128 (L) 136 - 145 mmol/L   Potassium 5.7 (H) 3.5 - 5.0 mmol/L   Chloride 87 (L) 98 - 111 mmol/L   CO2 17 (L) 22 - 29 mmol/L   Anion Gap 24 (H) 7 - 19 mmol/L   Glucose 514 (HH) 74 - 109 mg/dL   BUN 16 6 - 20 mg/dL   CREATININE 0.9 0.5 - 1.2 mg/dL   GFR Non-African American >60  Comment:   This calculation may be inaccurate for patients under the age of 18 years.  For ages 18 and older, a GFR >60 mL/min/1.73m2 (not corrected for weight) is  valid for stable renal function.     >60   Calcium 9.2 8.6 - 10.0 mg/dL   Total Protein 8.2 6.6 - 8.7 g/dL   Alb 4.6 3.5 - 5.2 g/dL   Total Bilirubin 0.6 0.2 - 1.2 mg/dL   Alkaline Phosphatase 100 40 - 130 U/L   ALT 9 5 - 41 U/L   AST 9 5 - 40 U/L     Comprehensive Metabolic Panel (12/19/2017 10:50 AM)   Specimen Performing Laboratory   Blood [facility]    [address] [place] [address]       CT Facial Bones with IV Contrast:   A poorly defined enhancing masslike area/density adjacent and lateral to the alveolar process of the left side of the maxilla and mandible measuring 3 cm x 2 cm x 3.5 cm. This may represent an inflammatory process, a phlegmon/evolving abscess. The possibility of  a neoplastic process is not excluded. Further follow-up is  recommended.  Bilateral maxillary sinusitis.      The above findings are recorded on a digital voice clip in PACS.    Signed by Dr Hu Garcia on 12/19/2017 1:16 PM        Results from last 7 days  Lab Units 12/19/17  1720   PH, ARTERIAL pH units 7.368   PO2 ART mm Hg 76.3*   PCO2, ARTERIAL mm Hg 36.7   HCO3 ART mmol/L 21.1      I have personally reviewed and interpreted the radiology studies and ECG obtained at time of admission.     Assessment / Plan     Assessment:   1.  Diabetic ketoacidosis versus hyperosmolar syndrome, either without coma.  2.  Uncontrolled type II diabetes mellitus.   3.  Pseudohyponatremia.  4.  Hyperkalemia.  5.  Left facial cellulitis and abscess (history of MRSA infection in the past).  6.  Leukocytosis.  7.  Atrial fibrillation on chronic anticoagulation with Coumadin.  8.  Systemic arterial hypertension.  9.  Hyperlipidemia.     Plan:   He is admitted to my service here at [facility].  He was transferred here from [facility] emergency department as they did not have otolaryngology on-call to assist surgically if needed for his facial cellulitis and abscess.  He is allergic to penicillins.  I would place him on clindamycin for now. He received 1000 mg of vancomycin at [facility] ER. Blood culture with OLY. NPO with ice chips and sips for now. NPO after MN in case ENT needs to do something tomorrow once his blood glucose is corrected. Pain control.     He was diagnosed with diabetic ketoacidosis and their facility.  An ABG was not done.  He had small ketones.  I will check an ABG here.  Treatment will basically be the same.  Continue insulin drip and hold nothing by mouth.  Continue IV fluids and add potassium later accordingly.  Change to D5 half-normal once blood sugar falls consistently below 250.  Transition back to his regular subcutaneous insulin and oral diabetic regimen when able.  Check hemoglobin A1c.  Diabetic and nutrition education.    Continue rate controlling medications.  Hold Coumadin for now.  Follow daily INR.    He is already anticoagulated and we will simply use SCDs for DVT prophylaxis in conjunction with his Coumadin.    Code Status: Full.      I discussed the patients findings and my recommendations with the patient and his nurse, Ernestina.     Estimated length of stay is  indeterminate at this point.     Francis Morejon,    12/19/17   5:07 PM

## 2017-12-19 NOTE — ED NOTES
[facility] called about a possible transfer to [facility]  12/19/17 3282

## 2017-12-20 LAB
ALBUMIN SERPL-MCNC: 3.5 G/DL (ref 3.5–5)
ALBUMIN/GLOB SERPL: 1.2 G/DL (ref 1.1–2.5)
ALP SERPL-CCNC: 67 U/L (ref 24–120)
ALT SERPL W P-5'-P-CCNC: 22 U/L (ref 0–54)
ANION GAP SERPL CALCULATED.3IONS-SCNC: 12 MMOL/L (ref 4–13)
ANION GAP SERPL CALCULATED.3IONS-SCNC: 12 MMOL/L (ref 4–13)
ARTICHOKE IGE QN: 148 MG/DL (ref 0–99)
AST SERPL-CCNC: 9 U/L (ref 7–45)
BILIRUB SERPL-MCNC: 0.5 MG/DL (ref 0.1–1)
BILIRUB UR QL STRIP: NEGATIVE
BUN BLD-MCNC: 17 MG/DL (ref 5–21)
BUN BLD-MCNC: 19 MG/DL (ref 5–21)
BUN/CREAT SERPL: 16.7 (ref 7–25)
BUN/CREAT SERPL: 19.5 (ref 7–25)
CALCIUM SPEC-SCNC: 8.6 MG/DL (ref 8.4–10.4)
CALCIUM SPEC-SCNC: 8.7 MG/DL (ref 8.4–10.4)
CHLORIDE SERPL-SCNC: 102 MMOL/L (ref 98–110)
CHLORIDE SERPL-SCNC: 103 MMOL/L (ref 98–110)
CHOLEST SERPL-MCNC: 192 MG/DL (ref 130–200)
CLARITY UR: CLEAR
CO2 SERPL-SCNC: 22 MMOL/L (ref 24–31)
CO2 SERPL-SCNC: 23 MMOL/L (ref 24–31)
COLOR UR: YELLOW
CREAT BLD-MCNC: 0.87 MG/DL (ref 0.5–1.4)
CREAT BLD-MCNC: 1.14 MG/DL (ref 0.5–1.4)
DEPRECATED RDW RBC AUTO: 38.7 FL (ref 40–54)
ERYTHROCYTE [DISTWIDTH] IN BLOOD BY AUTOMATED COUNT: 12.3 % (ref 12–15)
GFR SERPL CREATININE-BSD FRML MDRD: 67 ML/MIN/1.73
GFR SERPL CREATININE-BSD FRML MDRD: 91 ML/MIN/1.73
GLOBULIN UR ELPH-MCNC: 3 GM/DL
GLUCOSE BLD-MCNC: 203 MG/DL (ref 70–100)
GLUCOSE BLD-MCNC: 80 MG/DL (ref 70–100)
GLUCOSE BLDC GLUCOMTR-MCNC: 112 MG/DL (ref 70–130)
GLUCOSE BLDC GLUCOMTR-MCNC: 135 MG/DL (ref 70–130)
GLUCOSE BLDC GLUCOMTR-MCNC: 144 MG/DL (ref 70–130)
GLUCOSE BLDC GLUCOMTR-MCNC: 171 MG/DL (ref 70–130)
GLUCOSE BLDC GLUCOMTR-MCNC: 180 MG/DL (ref 70–130)
GLUCOSE BLDC GLUCOMTR-MCNC: 196 MG/DL (ref 70–130)
GLUCOSE BLDC GLUCOMTR-MCNC: 204 MG/DL (ref 70–130)
GLUCOSE BLDC GLUCOMTR-MCNC: 230 MG/DL (ref 70–130)
GLUCOSE BLDC GLUCOMTR-MCNC: 244 MG/DL (ref 70–130)
GLUCOSE BLDC GLUCOMTR-MCNC: 317 MG/DL (ref 70–130)
GLUCOSE BLDC GLUCOMTR-MCNC: 80 MG/DL (ref 70–130)
GLUCOSE UR STRIP-MCNC: ABNORMAL MG/DL
HBA1C MFR BLD: 14.4 %
HCT VFR BLD AUTO: 36.6 % (ref 40–52)
HDLC SERPL-MCNC: 36 MG/DL
HGB BLD-MCNC: 13.2 G/DL (ref 14–18)
HGB UR QL STRIP.AUTO: NEGATIVE
INR PPP: 2.51 (ref 0.91–1.09)
KETONES UR QL STRIP: ABNORMAL
LDLC/HDLC SERPL: 3.73 {RATIO}
LEUKOCYTE ESTERASE UR QL STRIP.AUTO: NEGATIVE
MAGNESIUM SERPL-MCNC: 1.9 MG/DL (ref 1.4–2.2)
MAGNESIUM SERPL-MCNC: 2 MG/DL (ref 1.4–2.2)
MCH RBC QN AUTO: 31.1 PG (ref 28–32)
MCHC RBC AUTO-ENTMCNC: 36.1 G/DL (ref 33–36)
MCV RBC AUTO: 86.3 FL (ref 82–95)
NITRITE UR QL STRIP: NEGATIVE
PH UR STRIP.AUTO: <=5 [PH] (ref 5–8)
PHOSPHATE SERPL-MCNC: 3.8 MG/DL (ref 2.5–4.5)
PHOSPHATE SERPL-MCNC: 4.1 MG/DL (ref 2.5–4.5)
PLATELET # BLD AUTO: 275 10*3/MM3 (ref 130–400)
PMV BLD AUTO: 10.6 FL (ref 6–12)
POTASSIUM BLD-SCNC: 4 MMOL/L (ref 3.5–5.3)
POTASSIUM BLD-SCNC: 4.1 MMOL/L (ref 3.5–5.3)
PROT SERPL-MCNC: 6.5 G/DL (ref 6.3–8.7)
PROT UR QL STRIP: NEGATIVE
PROTHROMBIN TIME: 28 SECONDS (ref 11.9–14.6)
RBC # BLD AUTO: 4.24 10*6/MM3 (ref 4.8–5.9)
SODIUM BLD-SCNC: 136 MMOL/L (ref 135–145)
SODIUM BLD-SCNC: 138 MMOL/L (ref 135–145)
SP GR UR STRIP: >1.03 (ref 1–1.03)
TRIGL SERPL-MCNC: 108 MG/DL (ref 0–149)
TSH SERPL DL<=0.05 MIU/L-ACNC: 0.87 MIU/ML (ref 0.47–4.68)
UROBILINOGEN UR QL STRIP: ABNORMAL
WBC NRBC COR # BLD: 9.98 10*3/MM3 (ref 4.8–10.8)

## 2017-12-20 PROCEDURE — 99024 POSTOP FOLLOW-UP VISIT: CPT | Performed by: OTOLARYNGOLOGY

## 2017-12-20 PROCEDURE — 63710000001 INSULIN DETEMIR PER 5 UNITS: Performed by: INTERNAL MEDICINE

## 2017-12-20 PROCEDURE — 25010000002 VANCOMYCIN 10 G RECONSTITUTED SOLUTION: Performed by: INTERNAL MEDICINE

## 2017-12-20 PROCEDURE — 25010000002 HYDROMORPHONE PER 4 MG: Performed by: INTERNAL MEDICINE

## 2017-12-20 PROCEDURE — 82962 GLUCOSE BLOOD TEST: CPT

## 2017-12-20 PROCEDURE — 80053 COMPREHEN METABOLIC PANEL: CPT | Performed by: INTERNAL MEDICINE

## 2017-12-20 PROCEDURE — 81003 URINALYSIS AUTO W/O SCOPE: CPT | Performed by: INTERNAL MEDICINE

## 2017-12-20 PROCEDURE — 84443 ASSAY THYROID STIM HORMONE: CPT | Performed by: INTERNAL MEDICINE

## 2017-12-20 PROCEDURE — 85027 COMPLETE CBC AUTOMATED: CPT | Performed by: INTERNAL MEDICINE

## 2017-12-20 PROCEDURE — 83735 ASSAY OF MAGNESIUM: CPT | Performed by: INTERNAL MEDICINE

## 2017-12-20 PROCEDURE — 83036 HEMOGLOBIN GLYCOSYLATED A1C: CPT | Performed by: INTERNAL MEDICINE

## 2017-12-20 PROCEDURE — 80061 LIPID PANEL: CPT | Performed by: INTERNAL MEDICINE

## 2017-12-20 PROCEDURE — 85610 PROTHROMBIN TIME: CPT | Performed by: INTERNAL MEDICINE

## 2017-12-20 PROCEDURE — 63710000001 INSULIN LISPRO (HUMAN) PER 5 UNITS: Performed by: INTERNAL MEDICINE

## 2017-12-20 PROCEDURE — 84100 ASSAY OF PHOSPHORUS: CPT | Performed by: INTERNAL MEDICINE

## 2017-12-20 RX ORDER — DEXTROSE MONOHYDRATE 25 G/50ML
25 INJECTION, SOLUTION INTRAVENOUS
Status: DISCONTINUED | OUTPATIENT
Start: 2017-12-20 | End: 2017-12-22 | Stop reason: HOSPADM

## 2017-12-20 RX ORDER — NICOTINE POLACRILEX 4 MG
15 LOZENGE BUCCAL
Status: DISCONTINUED | OUTPATIENT
Start: 2017-12-20 | End: 2017-12-22 | Stop reason: HOSPADM

## 2017-12-20 RX ORDER — LOSARTAN POTASSIUM 25 MG/1
25 TABLET ORAL DAILY
Status: DISCONTINUED | OUTPATIENT
Start: 2017-12-20 | End: 2017-12-22 | Stop reason: HOSPADM

## 2017-12-20 RX ORDER — WARFARIN SODIUM 10 MG/1
10 TABLET ORAL EVERY OTHER DAY
Status: DISCONTINUED | OUTPATIENT
Start: 2017-12-21 | End: 2017-12-22 | Stop reason: HOSPADM

## 2017-12-20 RX ORDER — HYDROCHLOROTHIAZIDE 25 MG/1
25 TABLET ORAL DAILY
Status: DISCONTINUED | OUTPATIENT
Start: 2017-12-20 | End: 2017-12-22 | Stop reason: HOSPADM

## 2017-12-20 RX ADMIN — CLINDAMYCIN PHOSPHATE 900 MG: 18 INJECTION, SOLUTION INTRAVENOUS at 17:23

## 2017-12-20 RX ADMIN — MUPIROCIN 1 APPLICATION: 20 OINTMENT TOPICAL at 22:05

## 2017-12-20 RX ADMIN — METOPROLOL TARTRATE 12.5 MG: 25 TABLET, FILM COATED ORAL at 22:02

## 2017-12-20 RX ADMIN — INSULIN LISPRO 5 UNITS: 100 INJECTION, SOLUTION INTRAVENOUS; SUBCUTANEOUS at 17:23

## 2017-12-20 RX ADMIN — CLINDAMYCIN PHOSPHATE 900 MG: 18 INJECTION, SOLUTION INTRAVENOUS at 10:26

## 2017-12-20 RX ADMIN — MUPIROCIN 1 APPLICATION: 20 OINTMENT TOPICAL at 08:04

## 2017-12-20 RX ADMIN — HYDROCODONE BITARTRATE AND ACETAMINOPHEN 1 TABLET: 10; 325 TABLET ORAL at 22:02

## 2017-12-20 RX ADMIN — INSULIN DETEMIR 40 UNITS: 100 INJECTION, SOLUTION SUBCUTANEOUS at 22:03

## 2017-12-20 RX ADMIN — INSULIN DETEMIR 30 UNITS: 100 INJECTION, SOLUTION SUBCUTANEOUS at 08:02

## 2017-12-20 RX ADMIN — POTASSIUM CHLORIDE, DEXTROSE MONOHYDRATE AND SODIUM CHLORIDE 150 ML/HR: 150; 5; 450 INJECTION, SOLUTION INTRAVENOUS at 00:10

## 2017-12-20 RX ADMIN — METOPROLOL TARTRATE 12.5 MG: 25 TABLET, FILM COATED ORAL at 08:20

## 2017-12-20 RX ADMIN — FLECAINIDE ACETATE 150 MG: 100 TABLET ORAL at 22:02

## 2017-12-20 RX ADMIN — POTASSIUM CHLORIDE, DEXTROSE MONOHYDRATE AND SODIUM CHLORIDE 150 ML/HR: 150; 5; 450 INJECTION, SOLUTION INTRAVENOUS at 06:53

## 2017-12-20 RX ADMIN — INSULIN LISPRO 5 UNITS: 100 INJECTION, SOLUTION INTRAVENOUS; SUBCUTANEOUS at 22:02

## 2017-12-20 RX ADMIN — WARFARIN 12.5 MG: 10 TABLET ORAL at 17:23

## 2017-12-20 RX ADMIN — HYDROMORPHONE HYDROCHLORIDE 0.5 MG: 2 INJECTION, SOLUTION INTRAMUSCULAR; INTRAVENOUS; SUBCUTANEOUS at 08:05

## 2017-12-20 RX ADMIN — CLINDAMYCIN PHOSPHATE 900 MG: 18 INJECTION, SOLUTION INTRAVENOUS at 02:05

## 2017-12-20 RX ADMIN — INSULIN LISPRO 10 UNITS: 100 INJECTION, SOLUTION INTRAVENOUS; SUBCUTANEOUS at 11:52

## 2017-12-20 RX ADMIN — VANCOMYCIN HYDROCHLORIDE 1500 MG: 100 INJECTION, POWDER, LYOPHILIZED, FOR SOLUTION INTRAVENOUS at 13:50

## 2017-12-20 RX ADMIN — HYDROCHLOROTHIAZIDE 25 MG: 25 TABLET ORAL at 08:17

## 2017-12-20 RX ADMIN — POTASSIUM CHLORIDE 20 MEQ: 750 CAPSULE, EXTENDED RELEASE ORAL at 04:00

## 2017-12-20 RX ADMIN — METFORMIN HYDROCHLORIDE 1000 MG: 500 TABLET ORAL at 08:03

## 2017-12-20 RX ADMIN — METFORMIN HYDROCHLORIDE 1000 MG: 500 TABLET ORAL at 17:23

## 2017-12-20 RX ADMIN — INSULIN LISPRO 3 UNITS: 100 INJECTION, SOLUTION INTRAVENOUS; SUBCUTANEOUS at 08:14

## 2017-12-20 RX ADMIN — LOSARTAN POTASSIUM 25 MG: 25 TABLET, FILM COATED ORAL at 08:03

## 2017-12-20 RX ADMIN — HYDROMORPHONE HYDROCHLORIDE 0.5 MG: 2 INJECTION, SOLUTION INTRAMUSCULAR; INTRAVENOUS; SUBCUTANEOUS at 23:04

## 2017-12-20 RX ADMIN — FLECAINIDE ACETATE 150 MG: 100 TABLET ORAL at 08:04

## 2017-12-20 NOTE — PROGRESS NOTES
LOS: 1 day   Patient Care Team:  Marlon Díaz DO as PCP - General (Internal Medicine)  Marlon Díaz DO as PCP - Family Medicine    Chief Complaint:  Facial pain    Subjective     Interval History:     Patient Complaints: left facial pain and swelling, maybe better from last night slightly  Patient Denies:  Airway issues  History taken from: patient    Review of Systems:    All systems were reviewed and negative except for:  Constitution:  positive for fevers  ENT:  positive for Facial pain    Objective     Vital Signs  Temp:  [97.3 °F (36.3 °C)-100.1 °F (37.8 °C)] 97.3 °F (36.3 °C)  Heart Rate:  [69-84] 80  Resp:  [12-24] 16  BP: ()/(57-82) 139/79    Physical Exam:   General Appearance alert, appears stated age and cooperative  Head Left facial swelling and erythema.  The induration may be slightly improved however continues.  It is  to the touch and the skin is erythematous  Nose nares normal, septum midline, mucosa normal and no drainage  Neck suppple and trachea midline     The packing was changed from the left facial wound.  There is appropriate drainage.  It was irrigated prior to placing fresh iodoform gauze packing     Results Review:     I reviewed the patient's new clinical results.  Cultures are still pending    Medication Review: The patient received a dose of vancomycin at [facility] prior to transfer.  He has been on clindamycin.    Assessment/Plan     Active Problems:    DKA (diabetic ketoacidoses)  Left facial abscess status post I&D    I would continue doing dressing changes twice a day.  I would continue the clindamycin and consider adding vancomycin.  I feel that he would improve sooner if steroids could be used but due to his diabetic ketoacidosis, I would not add them.      Francis Guido MD  12/20/17  8:28 AM

## 2017-12-20 NOTE — PROGRESS NOTES
"Pharmacy Dosing Service  Pharmacokinetics  Vancomycin Initial Evaluation    Assessment/Action/Plan:  Initiated Vancomycin 1500 mg IVPB every 12 hours. No levels ordered at this time.  Pharmacy will monitor renal function and adjust dose accordingly.     Subjective:  Monico Anne is a 52 y.o. male with a Vancomycin \"Pharmacy to Dose\" consult for the treatment of skin and soft tissue infection .    Objective:  Ht: 177.8 cm (70\"); Wt: 99.7 kg (219 lb 11.2 oz)  Estimated Creatinine Clearance: 114.9 mL/min (by C-G formula based on Cr of 0.87).   Lab Results   Component Value Date    CREATININE 0.87 12/20/2017    CREATININE 1.14 12/19/2017    CREATININE 0.91 12/19/2017      Lab Results   Component Value Date    WBC 9.98 12/20/2017      Baseline culture results:  Microbiology Results (last 10 days)       Procedure Component Value - Date/Time      Wound Culture - Drainage, Cheek, Left [[id number]]  (Abnormal) Collected:  12/19/17 1930    Lab Status:  Preliminary result Specimen:  Drainage from Cheek, Left Updated:  12/20/17 1136     Wound Culture --      Moderate growth (3+) Staphylococcus aureus (A)     BETA LACTAMASE Positive     Gram Stain Result Many (4+) WBCs seen      Many (4+) Gram positive cocci, intracellular and extracellular    Blood Culture With OLY - Blood, [[id number]]  (Normal) Collected:  12/19/17 1738    Lab Status:  Preliminary result Specimen:  Blood from Arm, Left Updated:  12/20/17 0601     Blood Culture No growth at less than 24 hours            Rony Jaffe [facility]  12/20/17 12:50 PM    "

## 2017-12-20 NOTE — PLAN OF CARE
Problem: Fall Risk (Adult)  Goal: Identify Related Risk Factors and Signs and Symptoms  Outcome: Ongoing (interventions implemented as appropriate)    Problem: Infection, Risk/Actual (Adult)  Goal: Identify Related Risk Factors and Signs and Symptoms  Outcome: Ongoing (interventions implemented as appropriate)    Problem: Patient Care Overview (Adult)  Goal: Plan of Care Review  Outcome: Ongoing (interventions implemented as appropriate)    12/19/17 1800   Coping/Psychosocial Response Interventions   Plan Of Care Reviewed With patient   Patient Care Overview   Progress improving   Outcome Evaluation   Outcome Summary/Follow up Plan Pt neuro intact, transfered from [facility] on insulin gtt for DKA, c/o pain from left cheek abcess that started few days ago. Abcess has been effecting his chewing abilities due to pain. Pt NSR and BP WNL. ENT consulted

## 2017-12-20 NOTE — PLAN OF CARE
Problem: Fall Risk (Adult)  Goal: Identify Related Risk Factors and Signs and Symptoms  Outcome: Ongoing (interventions implemented as appropriate)    Goal: Absence of Falls  Outcome: Ongoing (interventions implemented as appropriate)      Problem: Infection, Risk/Actual (Adult)  Goal: Identify Related Risk Factors and Signs and Symptoms  Outcome: Ongoing (interventions implemented as appropriate)    Goal: Infection Prevention/Resolution  Outcome: Ongoing (interventions implemented as appropriate)      Problem: Patient Care Overview (Adult)  Goal: Plan of Care Review  Outcome: Ongoing (interventions implemented as appropriate)   12/20/17 0703   Coping/Psychosocial Response Interventions   Plan Of Care Reviewed With patient   Patient Care Overview   Progress improving   Outcome Evaluation   Outcome Summary/Follow up Plan Neuro intact. Minimal complaints of discomfort after midnight. Rested well. VSS. Voided 1500 ml. No visible drainage from incision site.

## 2017-12-20 NOTE — PROGRESS NOTES
Discharge Planning Assessment  [facility]     Patient Name: Monico Anne  MRN: [id number]  Today's Date: 12/20/2017    Admit Date: 12/19/2017          Discharge Needs Assessment       12/20/17 1133    Living Environment    Lives With child(jason), adult    Living Arrangements house    Transportation Available car;family or friend will provide    Living Environment    Provides Primary Care For no one    Quality Of Family Relationships supportive    Able to Return to Prior Living Arrangements yes    Discharge Needs Assessment    Concerns To Be Addressed financial/insurance concerns    Readmission Within The Last 30 Days no previous admission in last 30 days    Anticipated Changes Related to Illness none    Current Discharge Risk financial support inadequate    Discharge Planning Comments Patient admitted from home.  Patient is independent and will return home at discharge.  Patient is self-pay.  Med Assist plans to see patient per Marylou Pringle [phone].  Will follow and assist with discharge needs/meds.            Discharge Plan     None        Discharge Placement     No information found                Demographic Summary     None            Functional Status     None            Psychosocial     None            Abuse/Neglect     None            Legal     None            Substance Abuse     None            Patient Forms     None          MONROE Huggins

## 2017-12-20 NOTE — PLAN OF CARE
Problem: Patient Care Overview (Adult)  Goal: Plan of Care Review  Outcome: Ongoing (interventions implemented as appropriate)   12/20/17 5349   Coping/Psychosocial Response Interventions   Plan Of Care Reviewed With patient   Patient Care Overview   Progress improving   Outcome Evaluation   Outcome Summary/Follow up Plan Soft diet, CCHO, oral intake 50% of one meals. Pt reports good appetite, cont to follow

## 2017-12-20 NOTE — PROGRESS NOTES
[facility] Medicine Services  INPATIENT PROGRESS NOTE    Length of Stay: 1  Date of Admission: 12/19/2017  Primary Care Physician: Marlon Díaz DO    Subjective   Chief Complaint: facial pain   HPI   He underwent a bedside I&D last night with Dr. Guido.  He feels much better.  His blood sugars improved on insulin drip and his labs normalized overnight.  He is hungry and wants to try and eat.    Review of Systems   All pertinent negatives and positives are as above. All other systems have been reviewed and are negative unless otherwise stated.     Objective    Temp:  [98.3 °F (36.8 °C)-100.1 °F (37.8 °C)] 98.7 °F (37.1 °C)  Heart Rate:  [69-84] 72  Resp:  [12-24] 15  BP: ()/(57-82) 134/75  Physical Exam  Constitutional: He is oriented to person, place, and time. He appears well-developed and well-nourished.   Up in bed.  No distress.  Nontoxic.  Seen and discussed with his nurse, Ernestina.   Head: Normocephalic and atraumatic.  The aftforementioned area on his left cheek has improved significantly after I&D.  Iodoform gauze packing in place.  Eyes: Conjunctivae and EOM are normal. Pupils are equal, round, and reactive to light.   Neck: Neck supple. No JVD present.   Cardiovascular: Normal rate, regular rhythm, normal heart sounds and intact distal pulses.    Pulmonary/Chest: Effort normal.   Abdominal: Soft. Bowel sounds are normal. He exhibits no distension. There is no tenderness. There is no rebound and no guarding.   Musculoskeletal: Normal range of motion. He exhibits edema (trace). He exhibits no tenderness or deformity.   Neurological: He is alert and oriented to person, place, and time. He displays normal reflexes. No cranial nerve deficit. He exhibits normal muscle tone.   Skin: Skin is warm and dry. No rash noted.   Psychiatric: He has a normal mood and affect. His behavior is normal. Judgment and thought content normal.   Very pleasant and articulate.      Results Review:  I have reviewed the labs, radiology results, and diagnostic studies.    Laboratory Data:     Results from last 7 days  Lab Units 12/20/17  0646   WBC 10*3/mm3 9.98   HEMOGLOBIN g/dL 13.2*   HEMATOCRIT % 36.6*   PLATELETS 10*3/mm3 275       Results from last 7 days  Lab Units 12/20/17  0646 12/19/17  1743   INR  2.51* 4.38*       Results from last 7 days  Lab Units 12/19/17  2356 12/19/17  1738 12/19/17  1720   SODIUM mmol/L 138 135  --    SODIUM, ARTERIAL mmol/L  --   --  134*   POTASSIUM mmol/L 4.0 3.9  --    CHLORIDE mmol/L 103 98  --    CO2 mmol/L 23.0* 21.0*  --    BUN mg/dL 19 16  --    CREATININE mg/dL 1.14 0.91  --    CALCIUM mg/dL 8.6 9.0  --    GLUCOSE mg/dL 80 279*  --      I have reviewed the patient current medications.     Assessment/Plan   Assessment:   1.  Diabetic ketoacidosis versus hyperosmolar syndrome, either without coma.  2.  Uncontrolled type II diabetes mellitus.   3.  Pseudohyponatremia, resolved.  4.  Hyperkalemia, resolved.  5.  Left facial cellulitis and abscess (history of MRSA infection in the past), status post I&D.  6.  Leukocytosis, resolved.  7.  Atrial fibrillation on chronic anticoagulation with Coumadin.  8.  Systemic arterial hypertension.  9.  Hyperlipidemia.     Plan:   I really appreciate Dr. Yip doing an I&D at bedside last night.  Cultures were sent.  Packing is in place.  Continue clindamycin.    I am still not convinced that he ever truly had a diabetic ketoacidosis.  This seemed more like hyperosmolar syndrome to me.  Nevertheless, he still has uncontrolled diabetes.  This was made worse by his infectious process.  New chemistries are pending, however, his gap had closed last night and his bicarbonate was normal.  We will work to transition to a diet and subcutaneous insulin.  Restart metformin, but hold glimepiride.    Restart rate controlling medications and Coumadin.  Follow daily INR.    Transfer to the floor later today.    Francis Morejon,  DO   12/20/17   7:23 AM

## 2017-12-20 NOTE — CONSULTS
Referring Provider: The hospitalists  Reason for Consultation: Facial abscess    Patient Care Team:  Marlon Díaz DO as PCP - General (Internal Medicine)  Marlon Díaz DO as PCP - Family Medicine    Chief complaint left facial pain    Subjective .     History of present illness:    I have been asked to consult on Monico Anne who is a 52 y.o. male with complaints of pain and swelling. The symptoms are localized to the left face. The patient has had moderate to severe symptoms. The symptoms have been increasing in amount for the last several days The symptoms are aggravated by  diabetes. He is admited in the ICU with the diagnosis of diabetic ketoacidosis. There have been no factors that have improved the symptoms.  He has a history of chronic atrial fibrillation is currently anticoagulated.  He sees Dr. Tyler over [facility].  He presented to the [facility] emergency room earlier this evening.  Evidently they do not have a surgeon that could drain this abscess so transfer was arranged through the hospitalist.    Review of Systems  Review of Systems   Constitutional: Positive for fever.   HENT: Negative for dental problem.         As per HPI   Eyes: Negative.    Respiratory: Negative.    Cardiovascular: Negative.    Gastrointestinal: Negative.    Skin: Negative.    Neurological: Positive for facial asymmetry (due to edema).   Hematological: Bruises/bleeds easily.   Psychiatric/Behavioral: Negative.        History  Past Medical History:   Diagnosis Date   • Atrial fibrillation    • Diabetes mellitus    • Hypertension      No past surgical history on file.  No family history on file.  Social History   Substance Use Topics   • Smoking status: Never Smoker   • Smokeless tobacco: Never Used   • Alcohol use No     Prescriptions Prior to Admission   Medication Sig Dispense Refill Last Dose   • flecainide (TAMBOCOR) 100 MG tablet Take 150 mg by mouth Every 12 (Twelve) Hours.   12/19/2017 at 0800   • glimepiride  (AMARYL) 4 MG tablet Take 4 mg by mouth 2 (Two) Times a Day Before Meals.   12/18/2017 at 0800   • hydrochlorothiazide (HYDRODIURIL) 25 MG tablet Take 25 mg by mouth Daily.   12/19/2017 at 0800   • losartan (COZAAR) 25 MG tablet Take 25 mg by mouth Daily.   12/19/2017 at 0800   • metFORMIN (GLUCOPHAGE) 850 MG tablet Take 850 mg by mouth Daily With Breakfast.   12/19/2017 at 0800   • metoprolol tartrate (LOPRESSOR) 25 MG tablet Take 12.5 mg by mouth Every 12 (Twelve) Hours.   12/19/2017 at 0800   • warfarin (COUMADIN) 10 MG tablet Take 10 mg by mouth Every Other Day. Alternate dose with 12.5mg every other day.   12/17/2017 at 2200   • warfarin (COUMADIN) 5 MG tablet Take 12.5 mg by mouth Every Other Day. Alternate dose with 10mg every other day.   12/18/2017 at 2200     Scheduled Meds:    clindamycin 900 mg Intravenous Q8H     Continuous Infusions:    dextrose 5 % and sodium chloride 0.45 % 150 mL/hr    dextrose 5 % and sodium chloride 0.45 % with KCl 20 mEq/L 150 mL/hr    insulin regular infusion 1 unit/mL 1-20 Units/hr Last Rate: 10 Units/hr (12/19/17 1840)   sodium chloride 250 mL/hr    sodium chloride 0.45 % with KCl 20 mEq 250 mL/hr Last Rate: 250 mL/hr (12/19/17 1904)     Allergies:  Penicillins and Neosporin [neomycin-bacitracin zn-polymyx]    Objective     Vital Signs   Temp:  [99.3 °F (37.4 °C)] 99.3 °F (37.4 °C)  Heart Rate:  [77-84] 84  Resp:  [14-24] 19  BP: (100-132)/(67-82) 117/80    Physical Exam:  CONSTITUTIONAL: well nourished, well-developed, alert, oriented, in no acute distress   COMMUNICATION AND VOICE: able to communicate normally, normal voice quality  HEAD: normocephalic, no lesions, atraumatic, no tenderness, no masses   FACE: moderate left lower cheek edema and induration with erythema of the overlying skin.  There is a small area of scabbing at the central portion of this process.  This is consistent with a cutaneous abscess, there is induration around the majority of the cheek for  approximately 3-4 cm around the area.  EYES: ocular motility normal, eyelids normal, orbits normal, no proptosis, conjunctiva normal , pupils equal, round  HEARING: response to conversational voice normal bilaterally   EXTERNAL EARS: auricles without lesions  EXTERNAL NOSE: structure normal, no tenderness on palpation, no nasal discharge, no lesions, no evidence of trauma, nostrils patent  LIPS: structure normal, no tenderness on palpation, no lesions, no evidence of trauma  TEETH: dentition within normal limits for age, no signs of dental abscess  GUMS: gingivae healthy  ORAL MUCOSA: oral mucosa normal, no mucosal lesions  FLOOR OF MOUTH: Warthin’s duct patent, mucosa normal  TONGUE: lingual mucosa normal without lesions, normal tongue mobility  PALATE: soft and hard palates with normal mucosa and structure  OROPHARYNX: oropharyngeal mucosa normal, tonsil fossa with normal appearance  NECK: neck appearance normal  LYMPH NODES: no lymphadenopathy  CHEST/RESPIRATORY: respiratory effort normal  CARDIOVASCULAR: extremities without cyanosis or edema, no overt jugulovenous distension present  NEUROLOGIC/PSYCHIATRIC: oriented appropriately for age, mood normal, affect appropriate, cranial nerves intact grossly unless specifically mentioned above     RESULTS REVIEW:    I have personally reviewed the patient's ct scan of the neck with contrast images.  There is findings consistent with a facial abscess       Assessment/Plan   ASSESSMENT:  1. Left facial abscess  2. Diabetes mellitus  3. Diabetic ketoacidosis  4. Anticoagulation  5. Atrial fibrillation (chronic)     PLAN:    Incision and drainage was performed at the bedside (see procedure note)  Will need IV antibiotics to cover staph species including MRSA.  Due to his diabetes and current ketoacidotic state, we will not be able to use steroids to improve the edema.  He will need to have iodoform gauze dressing changes twice a day.     I discussed the patients findings and  my recommendations and questions were answered    Francis Guido MD  12/19/17  7:49 PM

## 2017-12-21 LAB
ANION GAP SERPL CALCULATED.3IONS-SCNC: 11 MMOL/L (ref 4–13)
B-LACTAMASE USUAL SUSC ISLT: POSITIVE
BACTERIA SPEC AEROBE CULT: ABNORMAL
BACTERIA SPEC AEROBE CULT: ABNORMAL
BUN BLD-MCNC: 17 MG/DL (ref 5–21)
BUN/CREAT SERPL: 20 (ref 7–25)
CALCIUM SPEC-SCNC: 9 MG/DL (ref 8.4–10.4)
CHLORIDE SERPL-SCNC: 101 MMOL/L (ref 98–110)
CO2 SERPL-SCNC: 23 MMOL/L (ref 24–31)
CREAT BLD-MCNC: 0.85 MG/DL (ref 0.5–1.4)
DEPRECATED RDW RBC AUTO: 38.8 FL (ref 40–54)
ERYTHROCYTE [DISTWIDTH] IN BLOOD BY AUTOMATED COUNT: 12.1 % (ref 12–15)
GFR SERPL CREATININE-BSD FRML MDRD: 94 ML/MIN/1.73
GLUCOSE BLD-MCNC: 174 MG/DL (ref 70–100)
GLUCOSE BLDC GLUCOMTR-MCNC: 179 MG/DL (ref 70–130)
GLUCOSE BLDC GLUCOMTR-MCNC: 237 MG/DL (ref 70–130)
GLUCOSE BLDC GLUCOMTR-MCNC: 246 MG/DL (ref 70–130)
GLUCOSE BLDC GLUCOMTR-MCNC: 259 MG/DL (ref 70–130)
GRAM STN SPEC: ABNORMAL
GRAM STN SPEC: ABNORMAL
HCT VFR BLD AUTO: 35.8 % (ref 40–52)
HGB BLD-MCNC: 12.8 G/DL (ref 14–18)
INR PPP: 2.08 (ref 0.91–1.09)
MCH RBC QN AUTO: 31.3 PG (ref 28–32)
MCHC RBC AUTO-ENTMCNC: 35.8 G/DL (ref 33–36)
MCV RBC AUTO: 87.5 FL (ref 82–95)
PLATELET # BLD AUTO: 281 10*3/MM3 (ref 130–400)
PMV BLD AUTO: 10.6 FL (ref 6–12)
POTASSIUM BLD-SCNC: 4 MMOL/L (ref 3.5–5.3)
PROTHROMBIN TIME: 24.1 SECONDS (ref 11.9–14.6)
RBC # BLD AUTO: 4.09 10*6/MM3 (ref 4.8–5.9)
SODIUM BLD-SCNC: 135 MMOL/L (ref 135–145)
WBC NRBC COR # BLD: 6.58 10*3/MM3 (ref 4.8–10.8)

## 2017-12-21 PROCEDURE — 63710000001 INSULIN LISPRO (HUMAN) PER 5 UNITS: Performed by: INTERNAL MEDICINE

## 2017-12-21 PROCEDURE — 85610 PROTHROMBIN TIME: CPT | Performed by: INTERNAL MEDICINE

## 2017-12-21 PROCEDURE — 80048 BASIC METABOLIC PNL TOTAL CA: CPT | Performed by: INTERNAL MEDICINE

## 2017-12-21 PROCEDURE — 85027 COMPLETE CBC AUTOMATED: CPT | Performed by: INTERNAL MEDICINE

## 2017-12-21 PROCEDURE — 82962 GLUCOSE BLOOD TEST: CPT

## 2017-12-21 PROCEDURE — 63710000001 INSULIN ISOPHANE HUMAN PER 5 UNITS: Performed by: INTERNAL MEDICINE

## 2017-12-21 PROCEDURE — 99024 POSTOP FOLLOW-UP VISIT: CPT | Performed by: OTOLARYNGOLOGY

## 2017-12-21 PROCEDURE — 25010000002 VANCOMYCIN 10 G RECONSTITUTED SOLUTION: Performed by: INTERNAL MEDICINE

## 2017-12-21 PROCEDURE — 25010000002 HYDROMORPHONE PER 4 MG: Performed by: INTERNAL MEDICINE

## 2017-12-21 RX ORDER — DOXYCYCLINE 100 MG/1
100 TABLET ORAL EVERY 12 HOURS SCHEDULED
Status: DISCONTINUED | OUTPATIENT
Start: 2017-12-21 | End: 2017-12-21

## 2017-12-21 RX ORDER — CLINDAMYCIN PHOSPHATE 900 MG/50ML
900 INJECTION INTRAVENOUS EVERY 8 HOURS
Status: DISCONTINUED | OUTPATIENT
Start: 2017-12-21 | End: 2017-12-22 | Stop reason: HOSPADM

## 2017-12-21 RX ADMIN — INSULIN LISPRO 3 UNITS: 100 INJECTION, SOLUTION INTRAVENOUS; SUBCUTANEOUS at 10:08

## 2017-12-21 RX ADMIN — FLECAINIDE ACETATE 150 MG: 100 TABLET ORAL at 10:07

## 2017-12-21 RX ADMIN — INSULIN LISPRO 8 UNITS: 100 INJECTION, SOLUTION INTRAVENOUS; SUBCUTANEOUS at 22:01

## 2017-12-21 RX ADMIN — HYDROMORPHONE HYDROCHLORIDE 0.5 MG: 2 INJECTION, SOLUTION INTRAMUSCULAR; INTRAVENOUS; SUBCUTANEOUS at 06:18

## 2017-12-21 RX ADMIN — VANCOMYCIN HYDROCHLORIDE 1500 MG: 100 INJECTION, POWDER, LYOPHILIZED, FOR SOLUTION INTRAVENOUS at 01:25

## 2017-12-21 RX ADMIN — METFORMIN HYDROCHLORIDE 1000 MG: 500 TABLET ORAL at 17:44

## 2017-12-21 RX ADMIN — METOPROLOL TARTRATE 12.5 MG: 25 TABLET, FILM COATED ORAL at 10:07

## 2017-12-21 RX ADMIN — INSULIN HUMAN 30 UNITS: 100 INJECTION, SUSPENSION SUBCUTANEOUS at 17:45

## 2017-12-21 RX ADMIN — CLINDAMYCIN PHOSPHATE 900 MG: 18 INJECTION, SOLUTION INTRAVENOUS at 12:16

## 2017-12-21 RX ADMIN — CLINDAMYCIN PHOSPHATE 900 MG: 18 INJECTION, SOLUTION INTRAVENOUS at 03:29

## 2017-12-21 RX ADMIN — CLINDAMYCIN PHOSPHATE 900 MG: 18 INJECTION, SOLUTION INTRAVENOUS at 21:49

## 2017-12-21 RX ADMIN — METFORMIN HYDROCHLORIDE 1000 MG: 500 TABLET ORAL at 10:06

## 2017-12-21 RX ADMIN — WARFARIN 10 MG: 10 TABLET ORAL at 17:45

## 2017-12-21 RX ADMIN — FLECAINIDE ACETATE 150 MG: 100 TABLET ORAL at 21:50

## 2017-12-21 RX ADMIN — MUPIROCIN 1 APPLICATION: 20 OINTMENT TOPICAL at 21:52

## 2017-12-21 RX ADMIN — MUPIROCIN 1 APPLICATION: 20 OINTMENT TOPICAL at 12:04

## 2017-12-21 RX ADMIN — INSULIN LISPRO 5 UNITS: 100 INJECTION, SOLUTION INTRAVENOUS; SUBCUTANEOUS at 17:44

## 2017-12-21 RX ADMIN — INSULIN LISPRO 5 UNITS: 100 INJECTION, SOLUTION INTRAVENOUS; SUBCUTANEOUS at 12:16

## 2017-12-21 RX ADMIN — HYDROCHLOROTHIAZIDE 25 MG: 25 TABLET ORAL at 10:12

## 2017-12-21 RX ADMIN — LOSARTAN POTASSIUM 25 MG: 25 TABLET, FILM COATED ORAL at 10:08

## 2017-12-21 RX ADMIN — METOPROLOL TARTRATE 12.5 MG: 25 TABLET, FILM COATED ORAL at 21:50

## 2017-12-21 NOTE — PROGRESS NOTES
LOS: 2 days   Patient Care Team:  Marlon Díaz DO as PCP - General (Internal Medicine)  Marlon Díaz DO as PCP - Family Medicine    Chief Complaint:  Facial pain      Subjective     Interval History:     Patient Complaints: pain- improving left cheek  Patient Denies:  N&V, airway complaints  History taken from: patient    Review of Systems:    All systems were reviewed and negative except for:  ENT:  positive for facial pain and swelling    Objective     Vital Signs  Temp:  [97.5 °F (36.4 °C)-98.8 °F (37.1 °C)] 98.8 °F (37.1 °C)  Heart Rate:  [63-80] 67  Resp:  [15-22] 22  BP: (107-125)/(64-82) 124/70    Physical Exam:   General Appearance alert, appears stated age and cooperative  Head Left facial swelling and erythema is slowly improving.  The induration is  improved however continues to be tender especially with dressing change.  It is  to the touch and the skin is erythematous  Nose nares normal, septum midline, mucosa normal and no drainage  Neck suppple and trachea midline      Results Review:       Lab Results   Component Value Date    WOUNDCX Moderate growth (3+) Staphylococcus aureus (A) 12/19/2017      Lab Results   Component Value Date    BETALACTAMA Positive 12/19/2017        Medication Review: Clinda and Vanc    Assessment/Plan     Active Problems:    DKA (diabetic ketoacidoses)      Left facial abscess status post I&D    Continue IV antibiotics and dressing changes. I suspect he will need at least 24-48 hour more IV abx        Francis Guido MD  12/21/17  11:25 AM

## 2017-12-21 NOTE — PLAN OF CARE
Problem: Fall Risk (Adult)  Goal: Identify Related Risk Factors and Signs and Symptoms  Outcome: Ongoing (interventions implemented as appropriate)    Goal: Absence of Falls  Outcome: Ongoing (interventions implemented as appropriate)      Problem: Infection, Risk/Actual (Adult)  Goal: Identify Related Risk Factors and Signs and Symptoms  Outcome: Ongoing (interventions implemented as appropriate)   12/21/17 0452   Infection, Risk/Actual   Infection, Risk/Actual: Related Risk Factors skin integrity impairment;tissue perfusion altered   Signs and Symptoms (Infection, Risk/Actual) blood glucose changes;edema;pain

## 2017-12-21 NOTE — PROGRESS NOTES
Continued Stay Note   [facility]     Patient Name: Monico Anne  MRN: [id number]  Today's Date: 12/21/2017    Admit Date: 12/19/2017          Discharge Plan       12/21/17 1009    Case Management/Social Work Plan    Plan Home    Patient/Family In Agreement With Plan yes    Additional Comments Spoke with Marylou from [facility] and pt does not qualify for Medicaid. Informed Dr. Morejon.              Discharge Codes     None            MAYANK Perez

## 2017-12-21 NOTE — PROGRESS NOTES
Received request to speak with patient about [facility] transition visit. Patient is agreeable for a visit. Will follow for DC. Ashlie Mittal RN, [facility]

## 2017-12-21 NOTE — PROGRESS NOTES
[facility] Medicine Services  INPATIENT PROGRESS NOTE    Length of Stay: 2  Date of Admission: 12/19/2017  Primary Care Physician: Marlon Díaz DO    Subjective   Chief Complaint: facial discomfort  HPI   Continues to improve on a daily basis.  He does have less pain when he is just resting, however, significant pain when they have to pack his left cheek.    He does not qualify for [place] Medicaid according to case management.  He will be unable to afford basal insulin.  We will transition him to Novolin N.     Review of Systems   All pertinent negatives and positives are as above. All other systems have been reviewed and are negative unless otherwise stated.     Objective    Temp:  [97.5 °F (36.4 °C)-98.8 °F (37.1 °C)] 98.8 °F (37.1 °C)  Heart Rate:  [63-80] 67  Resp:  [13-22] 22  BP: ()/(60-82) 124/70  Physical Exam  Constitutional: He is oriented to person, place, and time. He appears well-developed and well-nourished.   Up in bed.  No distress.  Nontoxic.  Discussed with his nurse, Iqra.   Head: Normocephalic and atraumatic.  His left cheek has improved significantly after I&D.  Iodoform gauze packing in place.  Eyes: Conjunctivae and EOM are normal. Pupils are equal, round, and reactive to light.   Neck: Neck supple. No JVD present.   Cardiovascular: Normal rate, regular rhythm, normal heart sounds and intact distal pulses.    Pulmonary/Chest: Effort normal.   Abdominal: Soft. Bowel sounds are normal. He exhibits no distension. There is no tenderness. There is no rebound and no guarding.   Musculoskeletal: Normal range of motion. He exhibits edema (trace). He exhibits no tenderness or deformity.   Neurological: He is alert and oriented to person, place, and time. He displays normal reflexes. No cranial nerve deficit. He exhibits normal muscle tone.   Skin: Skin is warm and dry. No rash noted.   Psychiatric: He has a normal mood and affect. His behavior is normal.  Judgment and thought content normal.   Very pleasant and articulate.     Results Review:  I have reviewed the labs, radiology results, and diagnostic studies.    Laboratory Data:     Results from last 7 days  Lab Units 12/21/17  0552 12/20/17  0646   WBC 10*3/mm3 6.58 9.98   HEMOGLOBIN g/dL 12.8* 13.2*   HEMATOCRIT % 35.8* 36.6*   PLATELETS 10*3/mm3 281 275       Results from last 7 days  Lab Units 12/21/17  0552 12/20/17  0646 12/19/17  1743   INR  2.08* 2.51* 4.38*       Results from last 7 days  Lab Units 12/21/17  0552 12/20/17  0646 12/19/17  2356   SODIUM mmol/L 135 136 138   POTASSIUM mmol/L 4.0 4.1 4.0   CHLORIDE mmol/L 101 102 103   CO2 mmol/L 23.0* 22.0* 23.0*   BUN mg/dL 17 17 19   CREATININE mg/dL 0.85 0.87 1.14   CALCIUM mg/dL 9.0 8.7 8.6   BILIRUBIN mg/dL  --  0.5  --    ALK PHOS U/L  --  67  --    ALT (SGPT) U/L  --  22  --    AST (SGOT) U/L  --  9  --    GLUCOSE mg/dL 174* 203* 80     Culture Data:   Blood Culture   Date Value Ref Range Status   12/19/2017 No growth at 24 hours  Preliminary     Wound Culture   Date Value Ref Range Status   12/19/2017 Moderate growth (3+) Staphylococcus aureus (A)  Final     Susceptibility         Staphylococcus aureus         BRO         Clindamycin <=0.25  Susceptible         Erythromycin <=0.25  Susceptible         Gentamicin <=0.5  Susceptible         Inducible Clindamycin Resistance NEG  Negative         Levofloxacin 0.25  Susceptible 1         Oxacillin 0.5  Susceptible         Penicillin G >=0.5  Resistant         Tetracycline <=1  Susceptible         Trimethoprim + Sulfamethoxazole <=10  Susceptible         Vancomycin <=0.5  Susceptible                   1 Staphylococcus species may develop resistance during prolonged therapy with quinolones. Isolates that are initially susceptible may become resistant within three to four days after initiation of therapy. Testing of repeat isolates may be warranted.      I have reviewed the patient current medications.      Assessment/Plan   Assessment:   1.  Diabetic ketoacidosis versus hyperosmolar syndrome, either without coma.  2.  Uncontrolled type II diabetes mellitus with a hemoglobin A1c of 14.4.   3.  Pseudohyponatremia, resolved.  4.  Hyperkalemia, resolved.  5.  Left facial cellulitis and abscess, status post I&D, MSSA on culture.  6.  Leukocytosis, resolved.  7.  Atrial fibrillation on chronic anticoagulation with Coumadin.  8.  Systemic arterial hypertension.  9.  Hyperlipidemia.   10.  Medical noncompliance.     Plan:   Status post I&D with Dr. Guido on 12/19. Culture shows MSSA. Packing is in place.  Nursing to do teaching on packing and wound care today.  Continue IV clindamycin for now and finish a course of oral clindamycin as an outpatient.  Stop vancomycin.     I am still not convinced that he ever truly had a diabetic ketoacidosis.  This seemed more like hyperosmolar syndrome to me.  Nevertheless, he still has uncontrolled diabetes.  This was made worse by his infectious process.   I transitioned him to subcutaneous insulin from the drip on the morning of 12/20.  His blood sugars have improved with basal insulin thus far, however, he will not be able to afford this as an outpatient.  I will transition him to Novolin N now.  Continue metformin.  No plans to go back on glimepiride.     Restarted rate controlling medications and Coumadin.  Follow daily INR; therapeutic at present.    Discharge Planning: I expect the patient to be discharged home tomorrow.    Francis Morejon, DO   12/21/17   9:58 AM

## 2017-12-21 NOTE — PROGRESS NOTES
Continued Stay Note  [facility]     Patient Name: Monico Anne  MRN: [id number]  Today's Date: 12/21/2017    Admit Date: 12/19/2017          Discharge Plan       12/21/17 1140    Case Management/Social Work Plan    Plan Home    Patient/Family In Agreement With Plan yes    Additional Comments Pt would benefit from a transition visit from [facility]. Pt is okay with this and has spoken with Ashlie [phone] with [facility].      12/21/17 1009    Case Management/Social Work Plan    Plan Home    Patient/Family In Agreement With Plan yes    Additional Comments Spoke with Marylou from [phone] and pt does not qualify for Medicaid. Informed Dr. Morejon.              Discharge Codes     None            MAYANK Perez

## 2017-12-21 NOTE — PLAN OF CARE
Problem: Fall Risk (Adult)  Goal: Identify Related Risk Factors and Signs and Symptoms  Outcome: Ongoing (interventions implemented as appropriate)      Problem: Infection, Risk/Actual (Adult)  Goal: Identify Related Risk Factors and Signs and Symptoms  Outcome: Ongoing (interventions implemented as appropriate)    Goal: Infection Prevention/Resolution  Outcome: Ongoing (interventions implemented as appropriate)      Problem: Patient Care Overview (Adult)  Goal: Plan of Care Review  Outcome: Ongoing (interventions implemented as appropriate)   12/20/17 2005   Coping/Psychosocial Response Interventions   Plan Of Care Reviewed With patient   Patient Care Overview   Progress improving   Outcome Evaluation   Outcome Summary/Follow up Plan Pt transfered to the floor in stable condition per stretcher. Wound dressing BID @ 0800 and 2000. VSS, will continue to monitor.

## 2017-12-21 NOTE — NURSING NOTE
"Pt awake and alert.  A1c is 14.4% this admit.  Dx of DKA.  Pt states he has no insurance and no money.  He quit taking his Insulin before because \"I become immune to it and it stops working.  He tells me he takes Metformin and glipizide but has not been to a physician because he doesn't have a job or Insurance coverage. Explained in depth the action of Insulin and so on.  He did ask questions but is waiting on decision concerning his coverage.  Time left for questions.  "

## 2017-12-22 VITALS
RESPIRATION RATE: 17 BRPM | OXYGEN SATURATION: 97 % | SYSTOLIC BLOOD PRESSURE: 131 MMHG | TEMPERATURE: 97.4 F | WEIGHT: 219.8 LBS | BODY MASS INDEX: 31.47 KG/M2 | HEART RATE: 68 BPM | DIASTOLIC BLOOD PRESSURE: 75 MMHG | HEIGHT: 70 IN

## 2017-12-22 LAB
ANION GAP SERPL CALCULATED.3IONS-SCNC: 12 MMOL/L (ref 4–13)
BUN BLD-MCNC: 15 MG/DL (ref 5–21)
BUN/CREAT SERPL: 18.3 (ref 7–25)
CALCIUM SPEC-SCNC: 9.1 MG/DL (ref 8.4–10.4)
CHLORIDE SERPL-SCNC: 96 MMOL/L (ref 98–110)
CO2 SERPL-SCNC: 28 MMOL/L (ref 24–31)
CREAT BLD-MCNC: 0.82 MG/DL (ref 0.5–1.4)
GFR SERPL CREATININE-BSD FRML MDRD: 98 ML/MIN/1.73
GLUCOSE BLD-MCNC: 146 MG/DL (ref 70–100)
GLUCOSE BLDC GLUCOMTR-MCNC: 250 MG/DL (ref 70–130)
INR PPP: 1.92 (ref 0.91–1.09)
POTASSIUM BLD-SCNC: 3.6 MMOL/L (ref 3.5–5.3)
PROTHROMBIN TIME: 22.7 SECONDS (ref 11.9–14.6)
SODIUM BLD-SCNC: 136 MMOL/L (ref 135–145)

## 2017-12-22 PROCEDURE — 85610 PROTHROMBIN TIME: CPT | Performed by: INTERNAL MEDICINE

## 2017-12-22 PROCEDURE — 82962 GLUCOSE BLOOD TEST: CPT

## 2017-12-22 PROCEDURE — 99024 POSTOP FOLLOW-UP VISIT: CPT | Performed by: OTOLARYNGOLOGY

## 2017-12-22 PROCEDURE — 80048 BASIC METABOLIC PNL TOTAL CA: CPT | Performed by: INTERNAL MEDICINE

## 2017-12-22 PROCEDURE — 63710000001 INSULIN LISPRO (HUMAN) PER 5 UNITS: Performed by: INTERNAL MEDICINE

## 2017-12-22 RX ORDER — CLINDAMYCIN HYDROCHLORIDE 300 MG/1
300 CAPSULE ORAL 3 TIMES DAILY
Qty: 30 CAPSULE | Refills: 0 | Status: SHIPPED | OUTPATIENT
Start: 2017-12-22 | End: 2021-04-29

## 2017-12-22 RX ORDER — HYDROCODONE BITARTRATE AND ACETAMINOPHEN 10; 325 MG/1; MG/1
1 TABLET ORAL EVERY 6 HOURS PRN
Qty: 12 TABLET | Refills: 0 | Status: SHIPPED | OUTPATIENT
Start: 2017-12-22 | End: 2017-12-29

## 2017-12-22 RX ADMIN — INSULIN LISPRO 8 UNITS: 100 INJECTION, SOLUTION INTRAVENOUS; SUBCUTANEOUS at 09:40

## 2017-12-22 RX ADMIN — METFORMIN HYDROCHLORIDE 1000 MG: 500 TABLET ORAL at 09:17

## 2017-12-22 RX ADMIN — MUPIROCIN 1 APPLICATION: 20 OINTMENT TOPICAL at 09:19

## 2017-12-22 RX ADMIN — CLINDAMYCIN PHOSPHATE 900 MG: 18 INJECTION, SOLUTION INTRAVENOUS at 05:02

## 2017-12-22 RX ADMIN — METOPROLOL TARTRATE 12.5 MG: 25 TABLET, FILM COATED ORAL at 09:20

## 2017-12-22 RX ADMIN — HYDROCHLOROTHIAZIDE 25 MG: 25 TABLET ORAL at 09:19

## 2017-12-22 RX ADMIN — FLECAINIDE ACETATE 150 MG: 100 TABLET ORAL at 09:17

## 2017-12-22 RX ADMIN — INSULIN HUMAN 30 UNITS: 100 INJECTION, SUSPENSION SUBCUTANEOUS at 09:25

## 2017-12-22 RX ADMIN — LOSARTAN POTASSIUM 25 MG: 25 TABLET, FILM COATED ORAL at 09:21

## 2017-12-22 NOTE — DISCHARGE SUMMARY
[facility] Medicine Services  DISCHARGE SUMMARY       Date of Admission: 12/19/2017  Date of Discharge:  12/22/2017  Primary Care Physician: Marlon Díaz DO    Presenting Problem/History of Present Illness:  Facial pain and swelling, high blood sugar.     Final Discharge Diagnoses:  1.  Diabetic ketoacidosis versus hyperosmolar syndrome, either without coma.  2.  Uncontrolled type II diabetes mellitus with a hemoglobin A1c of 14.4.   3.  Pseudohyponatremia, resolved.  4.  Hyperkalemia, resolved.  5.  Left facial cellulitis and abscess, status post I&D, MSSA on culture.  6.  Leukocytosis, resolved.  7.  Atrial fibrillation on chronic anticoagulation with Coumadin.  8.  Systemic arterial hypertension.  9.  Hyperlipidemia.   10.  Medical noncompliance.      Consults: Dr. Guido with ENT.     Procedures Performed: Bedside I&D by Dr. Guido.     Pertinent Test Results:   CT Facial Bones with IV Contrast:   A poorly defined enhancing masslike area/density adjacent and lateral to the alveolar process of the left side of the maxilla and mandible measuring 3 cm x 2 cm x 3.5 cm. This may represent an inflammatory process, a phlegmon/evolving abscess. The possibility of  a neoplastic process is not excluded. Further follow-up is  recommended.  Bilateral maxillary sinusitis.       The above findings are recorded on a digital voice clip in PACS.    Signed by Dr Hu Garcia on 12/19/2017 1:16 PM       Lab Results (last 7 days)     Procedure Component Value Units Date/Time    Blood Gas, Arterial With Co-Ox [[id number]]  (Abnormal) Collected:  12/19/17 1720    Specimen:  Arterial Blood Updated:  12/19/17 1730     Site Right Radial     Kody's Test Positive     pH, Arterial 7.368 pH units      pCO2, Arterial 36.7 mm Hg      pO2, Arterial 76.3 (L) mm Hg      HCO3, Arterial 21.1 mmol/L      Base Excess, Arterial -3.6 (L) mmol/L      O2 Saturation, Arterial 96.4 %      Hemoglobin, Blood Gas  14.7 g/dL      Hematocrit, Blood Gas 45.1 %      Oxyhemoglobin 94.2 %      Methemoglobin 0.90 %      Carboxyhemoglobin 1.3 %      Temperature 37.0 C      Sodium, Arterial 134 (L) mmol/L      Potassium, Arterial 3.8 mmol/L      Barometric Pressure for Blood Gas 752 mmHg      Modality Room Air     Ventilator Mode NA     Collected by [id number]    Protime-INR [[id number]]  (Abnormal) Collected:  12/19/17 1743    Specimen:  Blood Updated:  12/19/17 1806     Protime 43.6 (H) Seconds      INR 4.38 (H)    Basic Metabolic Panel [[id number]]  (Abnormal) Collected:  12/19/17 1738    Specimen:  Blood Updated:  12/19/17 1807     Glucose 279 (H) mg/dL      BUN 16 mg/dL      Creatinine 0.91 mg/dL      Sodium 135 mmol/L      Potassium 3.9 mmol/L      Chloride 98 mmol/L      CO2 21.0 (L) mmol/L      Calcium 9.0 mg/dL      eGFR Non African Amer 87 mL/min/1.73      BUN/Creatinine Ratio 17.6     Anion Gap 16.0 (H) mmol/L     Narrative:       GFR Normal >60  Chronic Kidney Disease <60  Kidney Failure <15    Magnesium [[id number]]  (Normal) Collected:  12/19/17 1738    Specimen:  Blood Updated:  12/19/17 1810     Magnesium 2.1 mg/dL     Phosphorus [[id number]]  (Normal) Collected:  12/19/17 1738    Specimen:  Blood Updated:  12/19/17 1810     Phosphorus 3.9 mg/dL     Basic Metabolic Panel [[id number]]  (Abnormal) Collected:  12/19/17 2356    Specimen:  Blood Updated:  12/20/17 0028     Glucose 80 mg/dL      BUN 19 mg/dL      Creatinine 1.14 mg/dL      Sodium 138 mmol/L      Potassium 4.0 mmol/L      Chloride 103 mmol/L      CO2 23.0 (L) mmol/L      Calcium 8.6 mg/dL      eGFR Non African Amer 67 mL/min/1.73      BUN/Creatinine Ratio 16.7     Anion Gap 12.0 mmol/L     Narrative:       GFR Normal >60  Chronic Kidney Disease <60  Kidney Failure <15    Phosphorus [[id number]]  (Normal) Collected:  12/19/17 2356    Specimen:  Blood Updated:  12/20/17 0028     Phosphorus 4.1 mg/dL     Magnesium [[id number]]  (Normal) Collected:  12/19/17 0680     Specimen:  Blood Updated:  12/20/17 0028     Magnesium 2.0 mg/dL     Urinalysis With / Culture If Indicated - Urine, Clean Catch [[id number]]  (Abnormal) Collected:  12/20/17 0425    Specimen:  Urine from Urine, Clean Catch Updated:  12/20/17 0452     Color, UA Yellow     Appearance, UA Clear     pH, UA <=5.0     Specific Gravity, UA >1.030 (H)     Glucose, UA >=1000 mg/dL (3+) (A)     Ketones, UA 15 mg/dL (1+) (A)     Bilirubin, UA Negative     Blood, UA Negative     Protein, UA Negative     Leuk Esterase, UA Negative     Nitrite, UA Negative     Urobilinogen, UA 0.2 E.U./dL    Narrative:       Urine microscopic not indicated.    CBC (No Diff) [[id number]]  (Abnormal) Collected:  12/20/17 0646    Specimen:  Blood Updated:  12/20/17 0706     WBC 9.98 10*3/mm3      RBC 4.24 (L) 10*6/mm3      Hemoglobin 13.2 (L) g/dL      Hematocrit 36.6 (L) %      MCV 86.3 fL      MCH 31.1 pg      MCHC 36.1 (H) g/dL      RDW 12.3 %      RDW-SD 38.7 (L) fl      MPV 10.6 fL      Platelets 275 10*3/mm3     Protime-INR [[id number]]  (Abnormal) Collected:  12/20/17 0646    Specimen:  Blood Updated:  12/20/17 0724     Protime 28.0 (H) Seconds      INR 2.51 (H)    Phosphorus [[id number]]  (Normal) Collected:  12/20/17 0646    Specimen:  Blood Updated:  12/20/17 0726     Phosphorus 3.8 mg/dL     Comprehensive Metabolic Panel [[id number]]  (Abnormal) Collected:  12/20/17 0646    Specimen:  Blood Updated:  12/20/17 0726     Glucose 203 (H) mg/dL      BUN 17 mg/dL      Creatinine 0.87 mg/dL      Sodium 136 mmol/L      Potassium 4.1 mmol/L      Chloride 102 mmol/L      CO2 22.0 (L) mmol/L      Calcium 8.7 mg/dL      Total Protein 6.5 g/dL      Albumin 3.50 g/dL      ALT (SGPT) 22 U/L      AST (SGOT) 9 U/L      Alkaline Phosphatase 67 U/L      Total Bilirubin 0.5 mg/dL      eGFR Non African Amer 91 mL/min/1.73      Globulin 3.0 gm/dL      A/G Ratio 1.2 g/dL      BUN/Creatinine Ratio 19.5     Anion Gap 12.0 mmol/L     Magnesium [[id number]]  (Normal)  Collected:  12/20/17 0646    Specimen:  Blood Updated:  12/20/17 0726     Magnesium 1.9 mg/dL     Lipid Panel [[id number]]  (Abnormal) Collected:  12/20/17 0646    Specimen:  Blood Updated:  12/20/17 0737     Total Cholesterol 192 mg/dL      Triglycerides 108 mg/dL      HDL Cholesterol 36 (L) mg/dL      LDL Cholesterol  148 (H) mg/dL      LDL/HDL Ratio 3.73    Hemoglobin A1c [[id number]] Collected:  12/20/17 0646    Specimen:  Blood Updated:  12/20/17 0749     Hemoglobin A1C 14.4 %     Narrative:       Less than 6.0           Non-Diabetic Range  6.0-7.0                 ADA Therapeutic Target  Greater than 7.0        Action Suggested    TSH [[id number]]  (Normal) Collected:  12/20/17 0646    Specimen:  Blood Updated:  12/20/17 0905     TSH 0.873 mIU/mL     CBC (No Diff) [[id number]]  (Abnormal) Collected:  12/21/17 0552    Specimen:  Blood Updated:  12/21/17 0610     WBC 6.58 10*3/mm3      RBC 4.09 (L) 10*6/mm3      Hemoglobin 12.8 (L) g/dL      Hematocrit 35.8 (L) %      MCV 87.5 fL      MCH 31.3 pg      MCHC 35.8 g/dL      RDW 12.1 %      RDW-SD 38.8 (L) fl      MPV 10.6 fL      Platelets 281 10*3/mm3     Protime-INR [[id number]]  (Abnormal) Collected:  12/21/17 0552    Specimen:  Blood Updated:  12/21/17 0613     Protime 24.1 (H) Seconds      INR 2.08 (H)    Basic Metabolic Panel [[id number]]  (Abnormal) Collected:  12/21/17 0552    Specimen:  Blood Updated:  12/21/17 0620     Glucose 174 (H) mg/dL      BUN 17 mg/dL      Creatinine 0.85 mg/dL      Sodium 135 mmol/L      Potassium 4.0 mmol/L      Chloride 101 mmol/L      CO2 23.0 (L) mmol/L      Calcium 9.0 mg/dL      eGFR Non African Amer 94 mL/min/1.73      BUN/Creatinine Ratio 20.0     Anion Gap 11.0 mmol/L     Narrative:       GFR Normal >60  Chronic Kidney Disease <60  Kidney Failure <15    Wound Culture - Drainage, Cheek, Left [[id number]]  (Abnormal)  (Susceptibility) Collected:  12/19/17 1930    Specimen:  Drainage from Cheek, Left Updated:  12/21/17 0716      Wound Culture --      Moderate growth (3+) Staphylococcus aureus (A)     BETA LACTAMASE Positive     Gram Stain Result Many (4+) WBCs seen      Many (4+) Gram positive cocci, intracellular and extracellular    Susceptibility      Staphylococcus aureus     BRO     Clindamycin <=0.25 ug/ml Susceptible     Erythromycin <=0.25 ug/ml Susceptible     Gentamicin <=0.5 ug/ml Susceptible     Inducible Clindamycin Resistance NEG  Negative     Levofloxacin 0.25 ug/ml Susceptible  [1]      Oxacillin 0.5 ug/ml Susceptible     Penicillin G >=0.5 ug/ml Resistant     Tetracycline <=1 ug/ml Susceptible     Trimethoprim + Sulfamethoxazole <=10 ug/ml Susceptible     Vancomycin <=0.5 ug/ml Susceptible            [1]   Staphylococcus species may develop resistance during prolonged therapy with quinolones. Isolates that are initially susceptible may become resistant within three to four days after initiation of therapy. Testing of repeat isolates may be warranted.              Susceptibility Comments     Staphylococcus aureus      This isolate does not demonstrate inducible clindamycin resistance in vitro.               Protime-INR [[id number]]  (Abnormal) Collected:  12/22/17 0426    Specimen:  Blood Updated:  12/22/17 0502     Protime 22.7 (H) Seconds      INR 1.92 (H)    Basic Metabolic Panel [[id number]]  (Abnormal) Collected:  12/22/17 0426    Specimen:  Blood Updated:  12/22/17 0506     Glucose 146 (H) mg/dL      BUN 15 mg/dL      Creatinine 0.82 mg/dL      Sodium 136 mmol/L      Potassium 3.6 mmol/L      Chloride 96 (L) mmol/L      CO2 28.0 mmol/L      Calcium 9.1 mg/dL      eGFR Non African Amer 98 mL/min/1.73      BUN/Creatinine Ratio 18.3     Anion Gap 12.0 mmol/L     Narrative:       GFR Normal >60  Chronic Kidney Disease <60  Kidney Failure <15     Hospital Course:  Initial Visit:   This is a 52-year-old  gentleman who resides in [place], [place].  He has is Dr. Díaz for primary care.  He follows with Dr. Castro at  [facility] for history of atrial fibrillation.  He is chronically on flecainide and warfarin.  He also has a long-standing history of poorly controlled diabetes.     The patient began to notice pain and swelling accompanied by redness to the left side of his face on Saturday.  He believes he has also had some subjective fever.  No sweats or chills.  He gives a history of having an MRSA infection on his low back and also behind his left ear in the past that required I&D.  He tells me the one on his low back was done by Carolynn Basurto here.  The one behind his left ear was done in the emergency department at [facility].     This area continued to become more swollen and painful to the touch.  He also has had a poor appetite and unable to eat and drink very much secondary to pain with chewing and difficulty swallowing.  He went to the emergency department at [facility] today and was presumptively diagnosed with diabetic ketoacidosis.  He was referred for admission to their hospital service, however, the patient was declined there as there is no current ENT backup in case he needs surgical intervention.    Subsequently:   Status post I&D with Dr. Guido on 12/19. Culture shows MSSA. Packing is in place.  Nursing did teaching on packing and wound care yesterday and today.  Continued IV clindamycin in the hospital and finish a course of oral clindamycin as an outpatient.  Stopped vancomycin on 12/21. Bactroban.       I am still not convinced that he ever truly had a diabetic ketoacidosis.  This seemed more like hyperosmolar syndrome to me.  Nevertheless, he still has uncontrolled diabetes.  This was made worse by his infectious process.   I transitioned him to subcutaneous insulin from the drip on the morning of 12/20.  His blood sugars have improved with basal insulin thus far, however, he will not be able to afford this as an outpatient.  I transitioned him to Novolin N for now.   This will need to be titrated as an  "outpatient by primary care.  The patient has a glucometer at home but does not have stress strips.  I have written a prescription for these as well as needles and syringes.  Continue metformin.  No plans to go back on glimepiride.      Restarted rate controlling medications and Coumadin.  Followed daily INR; therapeutic at present.  He will manage his Coumadin and have INR checks as he has done in the past as an outpatient.    I discussed the case with Dr. Guido this morning.  He believes the patient has improved enough to go home.  He wants to see him back in clinic in one week.  He will see Dr. Díaz in his clinic next week.    Physical Exam on Discharge:  /75 (BP Location: Right arm, Patient Position: Lying)  Pulse 68  Temp 97.4 °F (36.3 °C) (Temporal Artery )   Resp 17  Ht 177.8 cm (70\")  Wt 99.7 kg (219 lb 12.8 oz)  SpO2 97%  BMI 31.54 kg/m2  Physical Exam  Constitutional: He is oriented to person, place, and time. He appears well-developed and well-nourished.   Up in bed.  No distress.  Nontoxic.  Seen and discussed with his nurse, Montse.   Head: Normocephalic and atraumatic.  His left cheek has improved significantly after I&D.  Iodoform gauze packing in place.  Eyes: Conjunctivae and EOM are normal. Pupils are equal, round, and reactive to light.   Neck: Neck supple. No JVD present.   Cardiovascular: Normal rate, regular rhythm, normal heart sounds and intact distal pulses.    Pulmonary/Chest: Effort normal.   Abdominal: Soft. Bowel sounds are normal. He exhibits no distension. There is no tenderness. There is no rebound and no guarding.   Musculoskeletal: Normal range of motion. He exhibits edema (trace). He exhibits no tenderness or deformity.   Neurological: He is alert and oriented to person, place, and time. He displays normal reflexes. No cranial nerve deficit. He exhibits normal muscle tone.   Skin: Skin is warm and dry. No rash noted.   Psychiatric: He has a normal mood and affect. His " "behavior is normal. Judgment and thought content normal.   Very pleasant and articulate.      Condition on Discharge: Good.     Discharge Disposition:  Home or Self Care    Discharge Medications:   Monico Anne   Home Medication Instructions ANGEL:[id number]    Printed on:12/22/17 5289   Medication Information                      clindamycin (CLEOCIN) 300 MG capsule  Take 1 capsule by mouth 3 (Three) Times a Day.             flecainide (TAMBOCOR) 100 MG tablet  Take 150 mg by mouth Every 12 (Twelve) Hours.             glucose blood test strip  Use as instructed, testing 3 times daily.             hydrochlorothiazide (HYDRODIURIL) 25 MG tablet  Take 25 mg by mouth Daily.             HYDROcodone-acetaminophen (NORCO)  MG per tablet  Take 1 tablet by mouth Every 6 (Six) Hours As Needed for Severe Pain  for up to 7 days.             insulin NPH (humuLIN N,novoLIN N) 100 UNIT/ML injection  Inject 35 Units under the skin 2 (Two) Times a Day Before Meals.             losartan (COZAAR) 25 MG tablet  Take 25 mg by mouth Daily.             metFORMIN (GLUCOPHAGE) 1000 MG tablet  Take 1 tablet by mouth 2 (Two) Times a Day With Meals.             metoprolol tartrate (LOPRESSOR) 25 MG tablet  Take 12.5 mg by mouth Every 12 (Twelve) Hours.             mupirocin (BACTROBAN) 2 % ointment  Apply 1 application topically Every 12 (Twelve) Hours. To the left face.             Needle, Disp, (BD DISP NEEDLES) 25G X 7/8\" misc  35 Units 2 (Two) Times a Day.             syringe, disposable, 5 ML  1 syringe 2 (Two) Times a Day.             warfarin (COUMADIN) 10 MG tablet  Take 10 mg by mouth Every Other Day. Alternate dose with 12.5mg every other day.             warfarin (COUMADIN) 5 MG tablet  Take 12.5 mg by mouth Every Other Day. Alternate dose with 10mg every other day.               Discharge Diet:   Diet Instructions     Diet: Consistent Carbohydrate; Thin       Discharge Diet:  Consistent Carbohydrate   Fluid Consistency:  " Thin               Activity at Discharge:   Activity Instructions     Activity as Tolerated                     Follow-up Appointments:   Marlon Díaz DO in 1 week.   Dr. Guido's office in 1 week.     Test Results Pending at Discharge: None.     Francis Morejon DO  12/22/17  9:44 AM    Time: 35 minutes.

## 2017-12-22 NOTE — PROGRESS NOTES
Continued Stay Note  [facility]     Patient Name: Monico Anne  MRN: [id number]  Today's Date: 12/22/2017    Admit Date: 12/19/2017          Discharge Plan       12/22/17 1105    Case Management/Social Work Plan    Plan Home    Final Note    Final Note Patient is being discharged home today. Dietician has provided information to patient for Meals on Wheels assistance for [employer], [place]. Patient will be seen by [facility] for transition visit as well.               Discharge Codes       12/22/17 1105    Discharge Codes    Discharge Codes 01  Discharge to home        Expected Discharge Date and Time     Expected Discharge Date Expected Discharge Time    Dec 22, 2017             MONROE Carlos

## 2017-12-22 NOTE — DISCHARGE INSTRUCTIONS
Continue iodoform gauze packing to the wound lightly twice a day.  Placed a light coating of Bactroban ointment around the wound before packing the wound.  You can clean the wound with peroxide as needed.  If there is any worsening of the pain or swelling around the wound call for sooner appointment were presented to the emergency room for reevaluation.

## 2017-12-22 NOTE — PROGRESS NOTES
"   LOS: 3 days   Patient Care Team:  Marlon Díaz DO as PCP - General (Internal Medicine)  Marlon Díaz DO as PCP - Family Medicine    Chief Complaint: Left facial pain    Subjective     History of Present Illness    Subjective:  Symptoms:  Improved.  No shortness of breath, malaise or chest pain.    Diet:  Adequate intake.    Activity level: Normal.    Pain:  He complains of pain that is mild.  He reports pain is improving.  Pain is well controlled.     he reports improvement of his symptoms of the left cheek.      History taken from: patient    Objective     Vital Signs  Temp:  [96.7 °F (35.9 °C)-98.6 °F (37 °C)] 97.3 °F (36.3 °C)  Heart Rate:  [60-72] 60  Resp:  [16-22] 16  BP: (110-136)/(60-82) 110/60    Objective:  General Appearance:  Comfortable.    Vital signs: (most recent): Blood pressure 110/60, pulse 60, temperature 97.3 °F (36.3 °C), temperature source Oral, resp. rate 16, height 177.8 cm (70\"), weight 99.7 kg (219 lb 12.8 oz), SpO2 97 %.  Vital signs are normal.    HEENT: (The induration and erythema of the left cheek is much improved.  There is still approximately a 1 cm to 1-1/2 cm area of induration around the wound.  The wound has been packed appropriately and has mild purulence on the gauze at the time of the dressing change.  Overall this is improving.)    Neurological: Patient is alert and oriented to person, place and time.    Skin:  Warm.              Results Review:     I reviewed the patient's new clinical results.     Lab Results   Component Value Date    WOUNDCX Moderate growth (3+) Staphylococcus aureus (A) 12/19/2017        Medication Review: On Vanc and clindamycin    Assessment/Plan     Active Problems:    DKA (diabetic ketoacidoses)      Assessment:    Condition: In stable condition.  Improving.   (Date of discharge to home with twice a day iodoform gauze packing changes).     Plan:   Discharge home.  Encourage ambulation.  (He came to be converted to oral antibiotics.  This is a " relatively pansensitive staff.  He needs to continue with twice a day iodoform gauze packing changes to have this heal from the inside out.  We need to make sure he is able to do this himself or we need to arrange for home health do it for him.  I will arrange for follow-up in 1 week with my practitioner.).       Francis Guido MD  12/22/17  8:49 AM

## 2017-12-22 NOTE — PLAN OF CARE
Problem: Patient Care Overview (Adult)  Goal: Plan of Care Review  Outcome: Ongoing (interventions implemented as appropriate)   12/22/17 1052   Coping/Psychosocial Response Interventions   Plan Of Care Reviewed With patient   Patient Care Overview   Progress improving   Outcome Evaluation   Outcome Summary/Follow up Plan Pt states his appetite is good. State has been told his wound is healing well. He is on a Soft texture/Whole foods diet (he denies difficulty chewing any of the foods so far) with consistent CHO diet restriction. RD provided Consistent CHO diet education, as well as provided Pt with phone numbers for [place] food pantries/food delacruz/food resources. Will continue to follow.

## 2017-12-22 NOTE — PLAN OF CARE
Problem: Fall Risk (Adult)  Goal: Identify Related Risk Factors and Signs and Symptoms  Outcome: Ongoing (interventions implemented as appropriate)      Problem: Infection, Risk/Actual (Adult)  Goal: Identify Related Risk Factors and Signs and Symptoms  Outcome: Ongoing (interventions implemented as appropriate)    Goal: Infection Prevention/Resolution  Outcome: Ongoing (interventions implemented as appropriate)      Problem: Patient Care Overview (Adult)  Goal: Plan of Care Review  Outcome: Ongoing (interventions implemented as appropriate)   12/20/17 2005 12/20/17 2010 12/21/17 7940   Coping/Psychosocial Response Interventions   Plan Of Care Reviewed With --  patient --    Patient Care Overview   Progress improving --  --    Outcome Evaluation   Outcome Summary/Follow up Plan --  --  Pt instructed on dressing change and allowed to perform with Rn supervision and instruction. Dressing changed successfully and tolerated well. VSS, IV ABX cont, will cont to monitor.

## 2017-12-24 LAB
BACTERIA SPEC AEROBE CULT: NORMAL
BLOOD CULTURE, ROUTINE: NORMAL
CULTURE, BLOOD 2: NORMAL

## 2017-12-28 ENCOUNTER — OFFICE VISIT (OUTPATIENT)
Dept: OTOLARYNGOLOGY | Facility: CLINIC | Age: 52
End: 2017-12-28

## 2017-12-28 VITALS
DIASTOLIC BLOOD PRESSURE: 96 MMHG | WEIGHT: 243 LBS | HEIGHT: 70 IN | TEMPERATURE: 98.7 F | HEART RATE: 98 BPM | SYSTOLIC BLOOD PRESSURE: 168 MMHG | RESPIRATION RATE: 18 BRPM | BODY MASS INDEX: 34.79 KG/M2

## 2017-12-28 DIAGNOSIS — Z09 POSTOP CHECK: ICD-10-CM

## 2017-12-28 DIAGNOSIS — L02.01 FACIAL ABSCESS: Primary | ICD-10-CM

## 2017-12-28 PROCEDURE — 99024 POSTOP FOLLOW-UP VISIT: CPT | Performed by: NURSE PRACTITIONER

## 2017-12-28 NOTE — PROGRESS NOTES
YOB: 1965  Location: [facility]  Location Address: [address] [place] [address], [facility], [address] [place] [address]  Location Phone: [phone]    Chief Complaint   Patient presents with   • Abscess     Facial       History of Present Illness  Monico Anne is a 52 y.o. male.  Monico Anne is here for follow up of ENT complaints. The patient has had problems with facial abscess.   The symptoms are localized to the left side. The patient has had improving symptoms. The symptoms have been present for the last several weeks The symptoms are aggravated by  no identifiable factors. The symptoms are improved by antibiotics.       Past Medical History:   Diagnosis Date   • Atrial fibrillation    • Diabetes mellitus    • Diabetic ketoacidosis    • Facial abscess    • Hypertension    • Inadequate anticoagulation        History reviewed. No pertinent surgical history.      Current Outpatient Prescriptions:   •  clindamycin (CLEOCIN) 300 MG capsule, Take 1 capsule by mouth 3 (Three) Times a Day., Disp: 30 capsule, Rfl: 0  •  flecainide (TAMBOCOR) 100 MG tablet, Take 150 mg by mouth Every 12 (Twelve) Hours., Disp: , Rfl:   •  glucose blood test strip, Use as instructed, testing 3 times daily., Disp: 100 each, Rfl: 1  •  hydrochlorothiazide (HYDRODIURIL) 25 MG tablet, Take 25 mg by mouth Daily., Disp: , Rfl:   •  losartan (COZAAR) 25 MG tablet, Take 25 mg by mouth Daily., Disp: , Rfl:   •  metFORMIN (GLUCOPHAGE) 1000 MG tablet, Take 1 tablet by mouth 2 (Two) Times a Day With Meals., Disp: 60 tablet, Rfl: 1  •  metoprolol tartrate (LOPRESSOR) 25 MG tablet, Take 12.5 mg by mouth Every 12 (Twelve) Hours., Disp: , Rfl:   •  mupirocin (BACTROBAN) 2 % ointment, Apply 1 application topically Every 12 (Twelve) Hours. To the left face., Disp: 30 g, Rfl: 0  •  warfarin (COUMADIN) 10 MG tablet, Take 10 mg by mouth Every Other Day. Alternate dose with 12.5mg every other day., Disp: , Rfl:   •  warfarin (COUMADIN) 5 MG tablet, Take  "12.5 mg by mouth Every Other Day. Alternate dose with 10mg every other day., Disp: , Rfl:   •  HYDROcodone-acetaminophen (NORCO)  MG per tablet, Take 1 tablet by mouth Every 6 (Six) Hours As Needed for Severe Pain  for up to 7 days., Disp: 12 tablet, Rfl: 0  •  insulin NPH (humuLIN N,novoLIN N) 100 UNIT/ML injection, Inject 35 Units under the skin 2 (Two) Times a Day Before Meals., Disp: 10 mL, Rfl: 1  •  Needle, Disp, (BD DISP NEEDLES) 25G X 7/8\" misc, 35 Units 2 (Two) Times a Day., Disp: 100 each, Rfl: 1  •  syringe, disposable, 5 ML, 1 syringe 2 (Two) Times a Day., Disp: 100 each, Rfl: 1    Penicillins and Neosporin [neomycin-bacitracin zn-polymyx]    History reviewed. No pertinent family history.    Social History     Social History   • Marital status: Single     Spouse name: N/A   • Number of children: N/A   • Years of education: N/A     Occupational History   • Not on file.     Social History Main Topics   • Smoking status: Never Smoker   • Smokeless tobacco: Never Used   • Alcohol use No   • Drug use: Not on file   • Sexual activity: Not on file     Other Topics Concern   • Not on file     Social History Narrative       Review of Systems   Constitutional: Negative.    HENT:        SEE HPI   Eyes: Negative.    Respiratory: Negative.    Cardiovascular: Negative.    Gastrointestinal: Negative.    Endocrine: Negative.    Genitourinary: Negative.    Musculoskeletal: Negative.    Skin: Negative.    Allergic/Immunologic: Negative.    Neurological: Negative.    Hematological: Negative.    Psychiatric/Behavioral: Negative.        Vitals:    12/28/17 1359   BP: 168/96   Pulse: 98   Resp: 18   Temp: 98.7 °F (37.1 °C)       Objective     Physical Exam  CONSTITUTIONAL: well nourished, alert, oriented, in no acute distress     COMMUNICATION AND VOICE: able to communicate normally, normal voice quality    HEAD: normocephalic, no lesions, atraumatic, no tenderness, no masses     FACE: left cheek wound approx 3 mm " opening packing removed - no purulence - healing well  No induration or fluctuance    SALIVARY GLANDS: parotid glands with no tenderness, no swelling, no masses, submandibular glands with normal size, nontender    EYES: ocular motility normal, eyelids normal, orbits normal, no proptosis, conjunctiva normal , pupils equal, round     EARS:  Hearing: response to conversational voice normal bilaterally   External Ears: auricles without lesions  Otoscopic: tympanic membrane appearance normal, no lesions, no perforation, normal mobility, no fluid    NOSE:  External Nose: structure normal, no tenderness on palpation, no nasal discharge, no lesions, no evidence of trauma, nostrils patent   Intranasal Exam: nasal mucosa normal, vestibule within normal limits, inferior turbinate normal, nasal septum midline     ORAL:  Lips: upper and lower lips without lesion   Teeth: dentition within normal limits for age   Gums: gingivae healthy   Oral Mucosa: oral mucosa normal, no mucosal lesions   Floor of Mouth: Warthin’s duct patent, mucosa normal  Tongue: lingual mucosa normal without lesions, normal tongue mobility   Palate: soft and hard palates with normal mucosa and structure  Oropharynx: oropharyngeal mucosa normal     NECK: neck appearance normal, no mass,  noted without erythema or tenderness    LYMPH NODES: no lymphadenopathy    CHEST/RESPIRATORY: respiratory effort normal,    CARDIOVASCULAR: extremities without cyanosis or edema      NEUROLOGIC/PSYCHIATRIC: oriented to time, place and person, mood normal, affect appropriate, CN II-XII intact grossly    Assessment/Plan   Monico was seen today for abscess.    Diagnoses and all orders for this visit:    Facial abscess    Postop check      * Surgery not found *  No orders of the defined types were placed in this encounter.    Return in about 3 weeks (around 1/18/2018).       Patient Instructions   Call for problems or worsening symptoms    Continue local wound  - pack less iodoform  daily

## 2017-12-29 ENCOUNTER — ANTI-COAG VISIT (OUTPATIENT)
Dept: CARDIOLOGY | Age: 52
End: 2017-12-29

## 2017-12-29 DIAGNOSIS — I48.0 PAROXYSMAL ATRIAL FIBRILLATION (HCC): Primary | ICD-10-CM

## 2017-12-29 LAB
INTERNATIONAL NORMALIZATION RATIO, POC: 3.4
PROTHROMBIN TIME, POC: NORMAL

## 2017-12-29 PROCEDURE — 85610 PROTHROMBIN TIME: CPT | Performed by: CLINICAL NURSE SPECIALIST

## 2018-01-05 ENCOUNTER — ANTI-COAG VISIT (OUTPATIENT)
Dept: CARDIOLOGY | Age: 53
End: 2018-01-05

## 2018-01-05 DIAGNOSIS — I48.0 PAROXYSMAL ATRIAL FIBRILLATION (HCC): Primary | ICD-10-CM

## 2018-01-05 LAB
INTERNATIONAL NORMALIZATION RATIO, POC: 2.7
PROTHROMBIN TIME, POC: NORMAL

## 2018-01-05 PROCEDURE — 85610 PROTHROMBIN TIME: CPT | Performed by: CLINICAL NURSE SPECIALIST

## 2018-01-19 ENCOUNTER — ANTI-COAG VISIT (OUTPATIENT)
Dept: CARDIOLOGY | Age: 53
End: 2018-01-19

## 2018-01-19 DIAGNOSIS — I48.0 PAROXYSMAL ATRIAL FIBRILLATION (HCC): Primary | ICD-10-CM

## 2018-01-19 LAB
INTERNATIONAL NORMALIZATION RATIO, POC: 2.9
PROTHROMBIN TIME, POC: NORMAL

## 2018-01-19 PROCEDURE — 85610 PROTHROMBIN TIME: CPT | Performed by: CLINICAL NURSE SPECIALIST

## 2018-01-29 DIAGNOSIS — I48.0 PAROXYSMAL ATRIAL FIBRILLATION (HCC): ICD-10-CM

## 2018-01-29 RX ORDER — FLECAINIDE ACETATE 100 MG/1
TABLET ORAL
Qty: 90 TABLET | Refills: 5 | Status: SHIPPED | OUTPATIENT
Start: 2018-01-29 | End: 2018-09-13 | Stop reason: SDUPTHER

## 2018-02-23 ENCOUNTER — ANTI-COAG VISIT (OUTPATIENT)
Dept: CARDIOLOGY | Age: 53
End: 2018-02-23

## 2018-02-23 DIAGNOSIS — I48.0 PAROXYSMAL ATRIAL FIBRILLATION (HCC): Primary | ICD-10-CM

## 2018-02-23 LAB
INTERNATIONAL NORMALIZATION RATIO, POC: 1.5
PROTHROMBIN TIME, POC: NORMAL

## 2018-02-23 PROCEDURE — 85610 PROTHROMBIN TIME: CPT | Performed by: CLINICAL NURSE SPECIALIST

## 2018-03-02 ENCOUNTER — ANTI-COAG VISIT (OUTPATIENT)
Dept: CARDIOLOGY | Age: 53
End: 2018-03-02

## 2018-03-02 DIAGNOSIS — I48.0 PAROXYSMAL ATRIAL FIBRILLATION (HCC): Primary | ICD-10-CM

## 2018-03-02 LAB
INTERNATIONAL NORMALIZATION RATIO, POC: 2.4
PROTHROMBIN TIME, POC: NORMAL

## 2018-03-02 PROCEDURE — 85610 PROTHROMBIN TIME: CPT | Performed by: CLINICAL NURSE SPECIALIST

## 2018-03-19 RX ORDER — WARFARIN SODIUM 10 MG/1
TABLET ORAL
Qty: 30 TABLET | Refills: 5 | Status: SHIPPED | OUTPATIENT
Start: 2018-03-19 | End: 2018-12-07 | Stop reason: SDUPTHER

## 2018-03-22 ENCOUNTER — OFFICE VISIT (OUTPATIENT)
Dept: CARDIOLOGY | Age: 53
End: 2018-03-22

## 2018-03-22 VITALS
SYSTOLIC BLOOD PRESSURE: 138 MMHG | HEIGHT: 70 IN | BODY MASS INDEX: 38.08 KG/M2 | DIASTOLIC BLOOD PRESSURE: 82 MMHG | HEART RATE: 72 BPM | WEIGHT: 266 LBS

## 2018-03-22 DIAGNOSIS — I48.0 PAROXYSMAL ATRIAL FIBRILLATION (HCC): ICD-10-CM

## 2018-03-22 DIAGNOSIS — I10 ESSENTIAL HYPERTENSION: Primary | ICD-10-CM

## 2018-03-22 LAB
INTERNATIONAL NORMALIZATION RATIO, POC: 1.9
PROTHROMBIN TIME, POC: ABNORMAL

## 2018-03-22 PROCEDURE — 99213 OFFICE O/P EST LOW 20 MIN: CPT | Performed by: CLINICAL NURSE SPECIALIST

## 2018-03-22 PROCEDURE — 85610 PROTHROMBIN TIME: CPT | Performed by: CLINICAL NURSE SPECIALIST

## 2018-03-22 PROCEDURE — 93000 ELECTROCARDIOGRAM COMPLETE: CPT | Performed by: CLINICAL NURSE SPECIALIST

## 2018-03-22 RX ORDER — HYDROCODONE BITARTRATE AND ACETAMINOPHEN 10; 325 MG/1; MG/1
1 TABLET ORAL EVERY 6 HOURS PRN
COMMUNITY
End: 2019-07-24

## 2018-03-22 RX ORDER — FEXOFENADINE HCL 180 MG/1
180 TABLET ORAL DAILY
Status: ON HOLD | COMMUNITY
End: 2019-03-29 | Stop reason: HOSPADM

## 2018-03-22 RX ORDER — ALBUTEROL SULFATE 90 UG/1
2 AEROSOL, METERED RESPIRATORY (INHALATION) EVERY 6 HOURS PRN
COMMUNITY
End: 2019-07-24

## 2018-03-22 NOTE — PROGRESS NOTES
[facility], [address] [place] [address], [address] [id number]  Phone: [phone]  Fax: [phone]    OFFICE VISIT:  3/22/2018    [address]: 1965    Reason For Visit:  Maine Bridges is a 45 y.o. male who is here for Follow-up (No complaints) and Atrial Fibrillation      Subjective  Maine Bridges denies exertional chest pain, shortness of breath, orthopnea, paroxysmal nocturnal dyspnea, syncope, presyncope, arrhythmia  Has stable BLE edema and fatigue. The patient denies numbness or weakness to suggest cerebrovascular accident or transient ischemic attack. His only concern is foot pain in trying to get on disability due to this. Follows with Dr. Frankie Holt for his foot   Patrick Magdaleno, DO is PCP and follows labs including lipids.   Alana Noe has the following history as recorded in [facility]:    Patient Active Problem List    Diagnosis Date Noted    Acalculous cholecystitis 12/29/2015    Essential hypertension 12/29/2015    Paroxysmal atrial fibrillation (HCC)     Shortness of breath 12/18/2014    Fatigue 12/18/2014    Atrial fibrillation ([address] 12/18/2014    HTN (hypertension)     Hyperlipidemia      Past Medical History:   Diagnosis Date    Allergic rhinitis, cause unspecified     Charcot's joint of right foot     Chicken pox     HTN (hypertension)     Hyperlipidemia     Impotence of organic origin     MRSA infection     Other dyspnea and respiratory abnormality     Other specified erythematous condition(695.89)     Paroxysmal atrial fibrillation ([address]     Personal history of other infectious and parasitic disease     Type II or unspecified type diabetes mellitus without mention of complication, uncontrolled 1994     Past Surgical History:   Procedure Laterality Date    CARDIOVERSION  03/2017    MALIGNANT SKIN LESION EXCISION      X 2    TONSILLECTOMY       Family History   Problem Relation Age of Onset    Stroke Maternal Grandmother     Cancer Maternal

## 2018-03-24 NOTE — PLAN OF CARE
Problem: Fall Risk (Adult)  Goal: Identify Related Risk Factors and Signs and Symptoms  Outcome: Ongoing (interventions implemented as appropriate)    Goal: Absence of Falls  Outcome: Ongoing (interventions implemented as appropriate)      Problem: Infection, Risk/Actual (Adult)  Goal: Identify Related Risk Factors and Signs and Symptoms  Outcome: Ongoing (interventions implemented as appropriate)    Goal: Infection Prevention/Resolution  Outcome: Ongoing (interventions implemented as appropriate)      Problem: Patient Care Overview (Adult)  Goal: Plan of Care Review  Outcome: Ongoing (interventions implemented as appropriate)   12/21/17 1854 12/22/17 0538   Coping/Psychosocial Response Interventions   Plan Of Care Reviewed With --  patient   Patient Care Overview   Progress --  improving   Outcome Evaluation   Outcome Summary/Follow up Plan Pt instructed on dressing change and allowed to perform with Rn supervision and instruction. Dressing changed successfully and tolerated well. VSS, IV ABX cont, will cont to monitor.  --      Goal: Adult Individualization and Mutuality  Outcome: Ongoing (interventions implemented as appropriate)    Goal: Discharge Needs Assessment  Outcome: Ongoing (interventions implemented as appropriate)         present and normal in 4 extremities

## 2018-04-20 ENCOUNTER — ANTI-COAG VISIT (OUTPATIENT)
Dept: CARDIOLOGY | Age: 53
End: 2018-04-20

## 2018-04-20 DIAGNOSIS — I48.0 PAROXYSMAL ATRIAL FIBRILLATION (HCC): Primary | ICD-10-CM

## 2018-04-20 LAB
INTERNATIONAL NORMALIZATION RATIO, POC: 1.8
PROTHROMBIN TIME, POC: ABNORMAL

## 2018-04-20 PROCEDURE — 85610 PROTHROMBIN TIME: CPT | Performed by: CLINICAL NURSE SPECIALIST

## 2018-05-25 ENCOUNTER — ANTI-COAG VISIT (OUTPATIENT)
Dept: CARDIOLOGY | Age: 53
End: 2018-05-25

## 2018-05-25 DIAGNOSIS — I48.0 PAROXYSMAL ATRIAL FIBRILLATION (HCC): Primary | ICD-10-CM

## 2018-05-25 LAB
INTERNATIONAL NORMALIZATION RATIO, POC: 1.5
PROTHROMBIN TIME, POC: ABNORMAL

## 2018-05-25 PROCEDURE — 85610 PROTHROMBIN TIME: CPT | Performed by: CLINICAL NURSE SPECIALIST

## 2018-06-01 ENCOUNTER — ANTI-COAG VISIT (OUTPATIENT)
Dept: CARDIOLOGY | Age: 53
End: 2018-06-01

## 2018-06-01 DIAGNOSIS — I48.0 PAROXYSMAL ATRIAL FIBRILLATION (HCC): Primary | ICD-10-CM

## 2018-06-01 LAB
INTERNATIONAL NORMALIZATION RATIO, POC: 3.3
PROTHROMBIN TIME, POC: NORMAL

## 2018-06-01 PROCEDURE — 85610 PROTHROMBIN TIME: CPT | Performed by: CLINICAL NURSE SPECIALIST

## 2018-06-19 ENCOUNTER — OFFICE VISIT (OUTPATIENT)
Dept: CARDIOLOGY | Age: 53
End: 2018-06-19

## 2018-06-19 VITALS
BODY MASS INDEX: 37.65 KG/M2 | HEIGHT: 70 IN | SYSTOLIC BLOOD PRESSURE: 140 MMHG | HEART RATE: 64 BPM | WEIGHT: 263 LBS | DIASTOLIC BLOOD PRESSURE: 90 MMHG

## 2018-06-19 DIAGNOSIS — I48.0 PAROXYSMAL ATRIAL FIBRILLATION (HCC): ICD-10-CM

## 2018-06-19 DIAGNOSIS — I10 ESSENTIAL HYPERTENSION: Primary | ICD-10-CM

## 2018-06-19 LAB
INTERNATIONAL NORMALIZATION RATIO, POC: 3.8
PROTHROMBIN TIME, POC: NORMAL

## 2018-06-19 PROCEDURE — 99213 OFFICE O/P EST LOW 20 MIN: CPT | Performed by: CLINICAL NURSE SPECIALIST

## 2018-06-19 PROCEDURE — 85610 PROTHROMBIN TIME: CPT | Performed by: CLINICAL NURSE SPECIALIST

## 2018-06-19 RX ORDER — LOSARTAN POTASSIUM 100 MG/1
100 TABLET ORAL DAILY
Qty: 30 TABLET | Refills: 5 | Status: SHIPPED | OUTPATIENT
Start: 2018-06-19 | End: 2018-12-27 | Stop reason: SDUPTHER

## 2018-06-19 ASSESSMENT — ENCOUNTER SYMPTOMS
BLURRED VISION: 0
VOMITING: 0
HEARTBURN: 0
ORTHOPNEA: 0
SHORTNESS OF BREATH: 0
NAUSEA: 0
COUGH: 0

## 2018-07-06 ENCOUNTER — ANTI-COAG VISIT (OUTPATIENT)
Dept: CARDIOLOGY | Age: 53
End: 2018-07-06

## 2018-07-06 DIAGNOSIS — I48.0 PAROXYSMAL ATRIAL FIBRILLATION (HCC): Primary | ICD-10-CM

## 2018-07-06 LAB
INTERNATIONAL NORMALIZATION RATIO, POC: 3.9
PROTHROMBIN TIME, POC: NORMAL

## 2018-07-06 PROCEDURE — 85610 PROTHROMBIN TIME: CPT | Performed by: CLINICAL NURSE SPECIALIST

## 2018-08-10 ENCOUNTER — ANTI-COAG VISIT (OUTPATIENT)
Dept: CARDIOLOGY | Age: 53
End: 2018-08-10

## 2018-08-10 DIAGNOSIS — I48.0 PAROXYSMAL ATRIAL FIBRILLATION (HCC): Primary | ICD-10-CM

## 2018-08-10 LAB
INTERNATIONAL NORMALIZATION RATIO, POC: 4.5
PROTHROMBIN TIME, POC: NORMAL

## 2018-08-10 PROCEDURE — 85610 PROTHROMBIN TIME: CPT | Performed by: CLINICAL NURSE SPECIALIST

## 2018-08-24 ENCOUNTER — ANTI-COAG VISIT (OUTPATIENT)
Dept: CARDIOLOGY | Age: 53
End: 2018-08-24

## 2018-08-24 DIAGNOSIS — I48.0 PAROXYSMAL ATRIAL FIBRILLATION (HCC): Primary | ICD-10-CM

## 2018-08-24 LAB
INTERNATIONAL NORMALIZATION RATIO, POC: 1.1
PROTHROMBIN TIME, POC: NORMAL

## 2018-08-24 PROCEDURE — 85610 PROTHROMBIN TIME: CPT | Performed by: CLINICAL NURSE SPECIALIST

## 2018-08-27 ENCOUNTER — ANTI-COAG VISIT (OUTPATIENT)
Dept: CARDIOLOGY | Age: 53
End: 2018-08-27

## 2018-08-27 DIAGNOSIS — I48.0 PAROXYSMAL ATRIAL FIBRILLATION (HCC): Primary | ICD-10-CM

## 2018-08-27 LAB
INTERNATIONAL NORMALIZATION RATIO, POC: 1.7
PROTHROMBIN TIME, POC: NORMAL

## 2018-08-27 PROCEDURE — 85610 PROTHROMBIN TIME: CPT | Performed by: NURSE PRACTITIONER

## 2018-09-13 ENCOUNTER — ANTI-COAG VISIT (OUTPATIENT)
Dept: CARDIOLOGY | Age: 53
End: 2018-09-13

## 2018-09-13 DIAGNOSIS — I48.0 PAROXYSMAL ATRIAL FIBRILLATION (HCC): ICD-10-CM

## 2018-09-13 DIAGNOSIS — I48.0 PAROXYSMAL ATRIAL FIBRILLATION (HCC): Primary | ICD-10-CM

## 2018-09-13 DIAGNOSIS — I10 ESSENTIAL HYPERTENSION: ICD-10-CM

## 2018-09-13 LAB
INTERNATIONAL NORMALIZATION RATIO, POC: 7.2
PROTHROMBIN TIME, POC: NORMAL

## 2018-09-13 PROCEDURE — 85610 PROTHROMBIN TIME: CPT | Performed by: CLINICAL NURSE SPECIALIST

## 2018-09-14 ENCOUNTER — ANTI-COAG VISIT (OUTPATIENT)
Dept: CARDIOLOGY | Age: 53
End: 2018-09-14

## 2018-09-14 DIAGNOSIS — I48.0 PAROXYSMAL ATRIAL FIBRILLATION (HCC): Primary | ICD-10-CM

## 2018-09-14 LAB
INTERNATIONAL NORMALIZATION RATIO, POC: 5.7
PROTHROMBIN TIME, POC: NORMAL

## 2018-09-14 PROCEDURE — 85610 PROTHROMBIN TIME: CPT | Performed by: CLINICAL NURSE SPECIALIST

## 2018-09-14 RX ORDER — FLECAINIDE ACETATE 100 MG/1
TABLET ORAL
Qty: 90 TABLET | Refills: 5 | Status: SHIPPED | OUTPATIENT
Start: 2018-09-14 | End: 2018-11-09 | Stop reason: ALTCHOICE

## 2018-09-14 RX ORDER — HYDROCHLOROTHIAZIDE 25 MG/1
25 TABLET ORAL DAILY
Qty: 30 TABLET | Refills: 5 | Status: ON HOLD | OUTPATIENT
Start: 2018-09-14 | End: 2019-03-29 | Stop reason: HOSPADM

## 2018-09-17 ENCOUNTER — ANTI-COAG VISIT (OUTPATIENT)
Dept: CARDIOLOGY | Age: 53
End: 2018-09-17

## 2018-09-17 DIAGNOSIS — I48.0 PAROXYSMAL ATRIAL FIBRILLATION (HCC): Primary | ICD-10-CM

## 2018-09-17 LAB
INTERNATIONAL NORMALIZATION RATIO, POC: 1.3
PROTHROMBIN TIME, POC: NORMAL

## 2018-09-17 PROCEDURE — 85610 PROTHROMBIN TIME: CPT | Performed by: INTERNAL MEDICINE

## 2018-09-24 DIAGNOSIS — I48.0 PAROXYSMAL ATRIAL FIBRILLATION (HCC): ICD-10-CM

## 2018-09-24 RX ORDER — FLECAINIDE ACETATE 100 MG/1
TABLET ORAL
Qty: 90 TABLET | Refills: 3 | Status: SHIPPED | OUTPATIENT
Start: 2018-09-24 | End: 2018-09-25 | Stop reason: SDUPTHER

## 2018-09-25 ENCOUNTER — OFFICE VISIT (OUTPATIENT)
Dept: CARDIOLOGY | Age: 53
End: 2018-09-25

## 2018-09-25 VITALS
HEART RATE: 78 BPM | HEIGHT: 70 IN | BODY MASS INDEX: 36.22 KG/M2 | WEIGHT: 253 LBS | SYSTOLIC BLOOD PRESSURE: 110 MMHG | DIASTOLIC BLOOD PRESSURE: 74 MMHG

## 2018-09-25 DIAGNOSIS — E11.8 TYPE 2 DIABETES MELLITUS WITH COMPLICATION, WITH LONG-TERM CURRENT USE OF INSULIN (HCC): ICD-10-CM

## 2018-09-25 DIAGNOSIS — R06.02 SHORTNESS OF BREATH: ICD-10-CM

## 2018-09-25 DIAGNOSIS — Z79.4 TYPE 2 DIABETES MELLITUS WITH COMPLICATION, WITH LONG-TERM CURRENT USE OF INSULIN (HCC): ICD-10-CM

## 2018-09-25 DIAGNOSIS — I48.0 PAROXYSMAL ATRIAL FIBRILLATION (HCC): Primary | ICD-10-CM

## 2018-09-25 DIAGNOSIS — I10 ESSENTIAL HYPERTENSION: ICD-10-CM

## 2018-09-25 LAB
INTERNATIONAL NORMALIZATION RATIO, POC: 2.5
PROTHROMBIN TIME, POC: NORMAL

## 2018-09-25 PROCEDURE — 99214 OFFICE O/P EST MOD 30 MIN: CPT | Performed by: CLINICAL NURSE SPECIALIST

## 2018-09-25 PROCEDURE — 85610 PROTHROMBIN TIME: CPT | Performed by: CLINICAL NURSE SPECIALIST

## 2018-09-25 PROCEDURE — 93000 ELECTROCARDIOGRAM COMPLETE: CPT | Performed by: CLINICAL NURSE SPECIALIST

## 2018-09-25 RX ORDER — WARFARIN SODIUM 5 MG/1
5 TABLET ORAL EVERY OTHER DAY
COMMUNITY
End: 2019-05-23

## 2018-09-25 NOTE — PROGRESS NOTES
[facility], [address], [address]  [address]  Phone: [phone]  Fax: [phone]    OFFICE VISIT:  2018    Felipe Oakes - : 1965    Reason For Visit:  All Oliveira is a 45 y.o. male who is here for 6 Month Follow-Up; Atrial Fibrillation; and Hypertension  Found to have paroxysmal atrial fibrillation in . At that time had negative stress echo. Has been anticoagulated on Coumadin. Rhythm controlled on flecainide. Subjective  All Oliveira denies exertional chest pain. Has some mild HSU but no resting shortness of breath, orthopnea, paroxysmal nocturnal dyspnea, syncope, presyncope. He states he does feel some fatigue and heart flutters from time to time. Usually resolve with rest. States he feels like he has arrhythmia much of the time    The patient denies numbness or weakness to suggest cerebrovascular accident or transient ischemic attack. Activity is limited due to charcot's joint on right foot. Lashonda Rand DO is PCP.   Felipe Oakes has the following history as recorded in [facility]:    Patient Active Problem List    Diagnosis Date Noted    Acalculous cholecystitis 2015    Essential hypertension 2015    Paroxysmal atrial fibrillation (HCC)     Shortness of breath 2014    Fatigue 2014    Atrial fibrillation [facility] 2014    HTN (hypertension)     Hyperlipidemia      Past Medical History:   Diagnosis Date    Allergic rhinitis, cause unspecified     Charcot's joint of right foot     Chicken pox     HTN (hypertension)     Hyperlipidemia     Impotence of organic origin     MRSA infection     Other dyspnea and respiratory abnormality     Other specified erythematous condition(095.89)     Paroxysmal atrial fibrillation ([address]     Personal history of other infectious and parasitic disease     Type II or unspecified type diabetes mellitus without mention of complication, uncontrolled      Past Surgical History: tablet Take 4 mg by mouth every morning (before breakfast)      ONE TOUCH ULTRA TEST strip 1 each daily. No current facility-administered medications for this visit. Allergies: Pcn [penicillins]; Neomycin-bacitracin zn-polymyx; and Neosporin [neomycin-polymyxin-gramicidin]    Review of Systems  Constitutional  no significant activity change, appetite change, or unexpected weight change. No fever, chills or diaphoresis. No fatigue. HEENT  no significant rhinorrhea or epistaxis. No tinnitus or significant hearing loss. Eyes  no sudden vision change or amaurosis. Respiratory  no significant wheezing, stridor, apnea or cough. No dyspnea on exertion or shortness of breath. Cardiovascular  no exertional chest pain, orthopnea or PND. +sensation of arrhythmia. No slow heart rate. No claudication or leg edema. Gastrointestinal  no abdominal swelling or pain. No blood in stool. No severe constipation, diarrhea, nausea, or vomiting. Genitourinary  no difficulty urinating, dysuria, frequency, or urgency. No flank pain or hematuria. Musculoskeletal + gait disturbance- uses cane. Skin  no color change or rash. No pallor. No new surgical incision. Neurologic  no speech difficulty, facial asymmetry or lateralizing weakness. No seizures, presyncope, syncope, or significant dizziness. Hematologic  no easy bruising or excessive bleeding. Psychiatric  no severe anxiety or insomnia. No confusion. All other review of systems are negative. Objective  Vital Signs - /74   Pulse 78   Ht 5' 10\" (1.778 m)   Wt 253 lb (114.8 kg)   BMI 36.30 kg/m²   General - Wilton Juarez is alert, cooperative, and pleasant. Well groomed. No acute distress. Body habitus is obese. HEENT  The head is normocephalic. No circumoral cyanosis. Dentition is normal.   EYES -  No Xanthelasma, no arcus senilis, no conjunctival hemorrhages or discharge.    Neck - Supple, without increased jugular venous

## 2018-11-07 ENCOUNTER — HOSPITAL ENCOUNTER (OUTPATIENT)
Dept: NON INVASIVE DIAGNOSTICS | Age: 53
Discharge: HOME | End: 2018-11-07

## 2018-11-07 VITALS
WEIGHT: 250 LBS | BODY MASS INDEX: 35.87 KG/M2 | DIASTOLIC BLOOD PRESSURE: 96 MMHG | HEART RATE: 88 BPM | SYSTOLIC BLOOD PRESSURE: 116 MMHG

## 2018-11-07 DIAGNOSIS — I10 ESSENTIAL HYPERTENSION: ICD-10-CM

## 2018-11-07 DIAGNOSIS — Z79.4 TYPE 2 DIABETES MELLITUS WITH COMPLICATION, WITH LONG-TERM CURRENT USE OF INSULIN (HCC): ICD-10-CM

## 2018-11-07 DIAGNOSIS — R06.02 SHORTNESS OF BREATH: ICD-10-CM

## 2018-11-07 DIAGNOSIS — E11.8 TYPE 2 DIABETES MELLITUS WITH COMPLICATION, WITH LONG-TERM CURRENT USE OF INSULIN (HCC): ICD-10-CM

## 2018-11-07 LAB
LV EF: 69 %
LVEF MODALITY: NORMAL

## 2018-11-07 PROCEDURE — 93306 TTE W/DOPPLER COMPLETE: CPT

## 2018-11-07 PROCEDURE — C8928 TTE W OR W/O FOL W/CON,STRES: HCPCS

## 2018-11-07 PROCEDURE — 2580000003 HC RX 258: Performed by: INTERNAL MEDICINE

## 2018-11-07 PROCEDURE — 6360000002 HC RX W HCPCS: Performed by: INTERNAL MEDICINE

## 2018-11-07 PROCEDURE — 6360000004 HC RX CONTRAST MEDICATION: Performed by: INTERNAL MEDICINE

## 2018-11-07 PROCEDURE — 2500000003 HC RX 250 WO HCPCS: Performed by: INTERNAL MEDICINE

## 2018-11-07 RX ORDER — METOPROLOL TARTRATE 5 MG/5ML
5 INJECTION INTRAVENOUS PRN
Status: ACTIVE | OUTPATIENT
Start: 2018-11-07 | End: 2018-11-08

## 2018-11-07 RX ORDER — DOBUTAMINE HYDROCHLORIDE 200 MG/100ML
10 INJECTION INTRAVENOUS CONTINUOUS PRN
Status: ACTIVE | OUTPATIENT
Start: 2018-11-07 | End: 2018-11-08

## 2018-11-07 RX ORDER — SODIUM CHLORIDE 9 MG/ML
INJECTION, SOLUTION INTRAVENOUS
Status: COMPLETED | OUTPATIENT
Start: 2018-11-07 | End: 2018-11-07

## 2018-11-07 RX ADMIN — SODIUM CHLORIDE: 9 INJECTION, SOLUTION INTRAVENOUS at 12:00

## 2018-11-07 RX ADMIN — PERFLUTREN 1.65 MG: 6.52 INJECTION, SUSPENSION INTRAVENOUS at 11:45

## 2018-11-07 RX ADMIN — METOPROLOL TARTRATE 1 MG: 1 INJECTION, SOLUTION INTRAVENOUS at 12:05

## 2018-11-07 RX ADMIN — DOBUTAMINE HYDROCHLORIDE 10 MCG/KG/MIN: 200 INJECTION INTRAVENOUS at 12:00

## 2018-11-09 ENCOUNTER — OFFICE VISIT (OUTPATIENT)
Dept: CARDIOLOGY | Age: 53
End: 2018-11-09

## 2018-11-09 VITALS
BODY MASS INDEX: 36.08 KG/M2 | DIASTOLIC BLOOD PRESSURE: 60 MMHG | HEIGHT: 70 IN | WEIGHT: 252 LBS | HEART RATE: 56 BPM | SYSTOLIC BLOOD PRESSURE: 122 MMHG

## 2018-11-09 DIAGNOSIS — I10 ESSENTIAL HYPERTENSION: ICD-10-CM

## 2018-11-09 DIAGNOSIS — I48.0 PAROXYSMAL ATRIAL FIBRILLATION (HCC): Primary | ICD-10-CM

## 2018-11-09 LAB
INTERNATIONAL NORMALIZATION RATIO, POC: 1.7
PROTHROMBIN TIME, POC: NORMAL

## 2018-11-09 PROCEDURE — 99214 OFFICE O/P EST MOD 30 MIN: CPT | Performed by: CLINICAL NURSE SPECIALIST

## 2018-11-09 PROCEDURE — 85610 PROTHROMBIN TIME: CPT | Performed by: CLINICAL NURSE SPECIALIST

## 2018-11-09 RX ORDER — PROPAFENONE HYDROCHLORIDE 150 MG/1
150 TABLET, FILM COATED ORAL EVERY 8 HOURS
Qty: 90 TABLET | Refills: 3 | Status: SHIPPED | OUTPATIENT
Start: 2018-11-09 | End: 2019-04-25 | Stop reason: ALTCHOICE

## 2018-11-09 NOTE — PATIENT INSTRUCTIONS
Plan  Coumadin- take extra today as discussed then continue regular dosing  Stop flecainide today  Start Rythmol 150 mg every 8 hours on Monday  Follow up Wednesday or Thursday of next week for EKG and INR  If still in A. fib will set up for outpatient cardioversion  Call with any questions or concerns  Follow up with Vivian Ped, DO for non cardiac problems  Report any new problems  Cardiovascular Fitness-Exercise as tolerated. Strive for 15 minutes of exercise most days of the week. Cardiac / Healthy Diet  Continue current medications as directed  Continue plan of treatment  It is always recommended that you bring your medications bottles with you to each visit - this is for your safety!

## 2018-11-09 NOTE — PROGRESS NOTES
 Personal history of other infectious and parasitic disease     Type II or unspecified type diabetes mellitus without mention of complication, uncontrolled 1994     Past Surgical History:   Procedure Laterality Date    CARDIOVERSION  03/2017    MALIGNANT SKIN LESION EXCISION      X 2    TONSILLECTOMY       Family History   Problem Relation Age of Onset    Stroke Maternal Grandmother     Cancer Maternal Grandmother     Stroke Paternal Grandmother     Diabetes Paternal Grandmother     Cancer Father     Heart Disease Father     Asthma Paternal Grandfather     Heart Disease Paternal Grandfather     Heart Disease Maternal Grandfather     Colon Cancer Neg Hx     Colon Polyps Neg Hx     Esophageal Cancer Neg Hx     Liver Cancer Neg Hx     Liver Disease Neg Hx     Rectal Cancer Neg Hx     Stomach Cancer Neg Hx      Social History   Substance Use Topics    Smoking status: Never Smoker    Smokeless tobacco: Never Used    Alcohol use No      Current Outpatient Prescriptions   Medication Sig Dispense Refill    propafenone (RYTHMOL) 150 MG tablet Take 1 tablet by mouth every 8 hours Stop flecainide today, start this medicine on Monday, November 12 90 tablet 3    warfarin (COUMADIN) 5 MG tablet Take 5 mg by mouth      metoprolol tartrate (LOPRESSOR) 25 MG tablet Take 0.5 tablets by mouth 2 times daily 30 tablet 5    hydrochlorothiazide (HYDRODIURIL) 25 MG tablet Take 1 tablet by mouth daily 30 tablet 5    losartan (COZAAR) 100 MG tablet Take 1 tablet by mouth daily 30 tablet 5    metFORMIN (GLUCOPHAGE) 1000 MG tablet Take 1,000 mg by mouth 2 times daily (with meals)      fexofenadine (ALLEGRA) 180 MG tablet Take 180 mg by mouth daily      HYDROcodone-acetaminophen (NORCO)  MG per tablet Take 1 tablet by mouth every 6 hours as needed for Pain.       albuterol sulfate  (90 Base) MCG/ACT inhaler Inhale 2 puffs into the lungs every 6 hours as needed for Wheezing      warfarin (COUMADIN)

## 2018-11-14 ENCOUNTER — TELEPHONE (OUTPATIENT)
Dept: CARDIOLOGY | Age: 53
End: 2018-11-14

## 2018-12-10 RX ORDER — WARFARIN SODIUM 10 MG/1
TABLET ORAL
Qty: 30 TABLET | Refills: 5 | Status: SHIPPED | OUTPATIENT
Start: 2018-12-10 | End: 2019-07-10 | Stop reason: SDUPTHER

## 2018-12-10 RX ORDER — WARFARIN SODIUM 5 MG/1
TABLET ORAL
Qty: 30 TABLET | Refills: 5 | Status: SHIPPED | OUTPATIENT
Start: 2018-12-10 | End: 2019-12-20

## 2018-12-27 RX ORDER — LOSARTAN POTASSIUM 100 MG/1
TABLET ORAL
Qty: 30 TABLET | Refills: 5 | Status: ON HOLD | OUTPATIENT
Start: 2018-12-27 | End: 2019-03-29 | Stop reason: HOSPADM

## 2019-03-21 ENCOUNTER — APPOINTMENT (OUTPATIENT)
Dept: GENERAL RADIOLOGY | Age: 54
DRG: 638 | End: 2019-03-21
Payer: MEDICARE

## 2019-03-21 ENCOUNTER — HOSPITAL ENCOUNTER (INPATIENT)
Age: 54
LOS: 11 days | Discharge: HOME | DRG: 638 | End: 2019-04-01
Attending: EMERGENCY MEDICINE | Admitting: HOSPITALIST
Payer: MEDICARE

## 2019-03-21 DIAGNOSIS — E11.10 DIABETIC KETOACIDOSIS WITHOUT COMA ASSOCIATED WITH TYPE 2 DIABETES MELLITUS (HCC): Primary | ICD-10-CM

## 2019-03-21 PROBLEM — N17.9 ACUTE KIDNEY INJURY (HCC): Status: ACTIVE | Noted: 2019-03-21

## 2019-03-21 PROBLEM — R77.8 ELEVATED TROPONIN: Status: ACTIVE | Noted: 2019-03-21

## 2019-03-21 PROBLEM — E87.1 HYPONATREMIA: Status: ACTIVE | Noted: 2019-03-21

## 2019-03-21 PROBLEM — I95.89 HYPOTENSION DUE TO HYPOVOLEMIA: Status: ACTIVE | Noted: 2019-03-21

## 2019-03-21 PROBLEM — E11.42 TYPE 2 DIABETES MELLITUS WITH DIABETIC POLYNEUROPATHY, WITH LONG-TERM CURRENT USE OF INSULIN (HCC): Status: ACTIVE | Noted: 2019-03-21

## 2019-03-21 PROBLEM — Z79.4 TYPE 2 DIABETES MELLITUS WITH DIABETIC POLYNEUROPATHY, WITH LONG-TERM CURRENT USE OF INSULIN (HCC): Status: ACTIVE | Noted: 2019-03-21

## 2019-03-21 PROBLEM — D68.9 COAGULOPATHY (HCC): Status: ACTIVE | Noted: 2019-03-21

## 2019-03-21 PROBLEM — R79.89 ELEVATED TROPONIN: Status: ACTIVE | Noted: 2019-03-21

## 2019-03-21 PROBLEM — E86.1 HYPOTENSION DUE TO HYPOVOLEMIA: Status: ACTIVE | Noted: 2019-03-21

## 2019-03-21 LAB
ACETONE BLOOD: ABNORMAL
ALBUMIN SERPL-MCNC: 3.8 G/DL (ref 3.5–5.2)
ALP BLD-CCNC: 97 U/L (ref 40–130)
ALT SERPL-CCNC: 8 U/L (ref 5–41)
ANION GAP SERPL CALCULATED.3IONS-SCNC: 16 MMOL/L (ref 7–19)
ANION GAP SERPL CALCULATED.3IONS-SCNC: 25 MMOL/L (ref 7–19)
ANION GAP SERPL CALCULATED.3IONS-SCNC: 28 MMOL/L (ref 7–19)
AST SERPL-CCNC: 11 U/L (ref 5–40)
BASE EXCESS ARTERIAL: -17.1 MMOL/L (ref -2–2)
BASOPHILS ABSOLUTE: 0 K/UL (ref 0–0.2)
BASOPHILS RELATIVE PERCENT: 0.2 % (ref 0–1)
BILIRUB SERPL-MCNC: 0.3 MG/DL (ref 0.2–1.2)
BILIRUBIN URINE: ABNORMAL
BLOOD, URINE: NEGATIVE
BUN BLDV-MCNC: 22 MG/DL (ref 6–20)
BUN BLDV-MCNC: 24 MG/DL (ref 6–20)
BUN BLDV-MCNC: 25 MG/DL (ref 6–20)
CALCIUM SERPL-MCNC: 8.3 MG/DL (ref 8.6–10)
CALCIUM SERPL-MCNC: 8.5 MG/DL (ref 8.6–10)
CALCIUM SERPL-MCNC: 9.3 MG/DL (ref 8.6–10)
CARBOXYHEMOGLOBIN ARTERIAL: 1.8 % (ref 0–5)
CASTS: ABNORMAL /LPF
CHLORIDE BLD-SCNC: 102 MMOL/L (ref 98–111)
CHLORIDE BLD-SCNC: 87 MMOL/L (ref 98–111)
CHLORIDE BLD-SCNC: 96 MMOL/L (ref 98–111)
CLARITY: ABNORMAL
CO2: 10 MMOL/L (ref 22–29)
CO2: 11 MMOL/L (ref 22–29)
CO2: 15 MMOL/L (ref 22–29)
COLOR: YELLOW
CREAT SERPL-MCNC: 1.5 MG/DL (ref 0.5–1.2)
CREAT SERPL-MCNC: 1.6 MG/DL (ref 0.5–1.2)
CREAT SERPL-MCNC: 1.8 MG/DL (ref 0.5–1.2)
EOSINOPHILS ABSOLUTE: 0 K/UL (ref 0–0.6)
EOSINOPHILS RELATIVE PERCENT: 0 % (ref 0–5)
GFR NON-AFRICAN AMERICAN: 39
GFR NON-AFRICAN AMERICAN: 45
GFR NON-AFRICAN AMERICAN: 49
GLUCOSE BLD-MCNC: 181 MG/DL (ref 74–109)
GLUCOSE BLD-MCNC: 183 MG/DL (ref 70–99)
GLUCOSE BLD-MCNC: 184 MG/DL (ref 70–99)
GLUCOSE BLD-MCNC: 186 MG/DL (ref 70–99)
GLUCOSE BLD-MCNC: 230 MG/DL (ref 70–99)
GLUCOSE BLD-MCNC: 263 MG/DL (ref 70–99)
GLUCOSE BLD-MCNC: 267 MG/DL (ref 70–99)
GLUCOSE BLD-MCNC: 297 MG/DL (ref 70–99)
GLUCOSE BLD-MCNC: 328 MG/DL
GLUCOSE BLD-MCNC: 328 MG/DL (ref 70–99)
GLUCOSE BLD-MCNC: 336 MG/DL (ref 70–99)
GLUCOSE BLD-MCNC: 338 MG/DL (ref 70–99)
GLUCOSE BLD-MCNC: 341 MG/DL (ref 74–109)
GLUCOSE BLD-MCNC: 349 MG/DL (ref 70–99)
GLUCOSE BLD-MCNC: 426 MG/DL (ref 70–99)
GLUCOSE BLD-MCNC: 478 MG/DL
GLUCOSE BLD-MCNC: 478 MG/DL (ref 74–109)
GLUCOSE URINE: >=1000 MG/DL
HBA1C MFR BLD: 17.1 % (ref 4–6)
HCO3 ARTERIAL: 7.5 MMOL/L (ref 22–26)
HCT VFR BLD CALC: 45.1 % (ref 42–52)
HEMOGLOBIN, ART, EXTENDED: 15.5 G/DL (ref 14–18)
HEMOGLOBIN: 15.4 G/DL (ref 14–18)
INR BLD: 4.34 (ref 0.88–1.18)
KETONES, URINE: 80 MG/DL
LEUKOCYTE ESTERASE, URINE: NEGATIVE
LYMPHOCYTES ABSOLUTE: 0.8 K/UL (ref 1.1–4.5)
LYMPHOCYTES RELATIVE PERCENT: 6.3 % (ref 20–40)
MAGNESIUM: 1.8 MG/DL (ref 1.6–2.6)
MAGNESIUM: 1.9 MG/DL (ref 1.6–2.6)
MCH RBC QN AUTO: 31.2 PG (ref 27–31)
MCHC RBC AUTO-ENTMCNC: 34.1 G/DL (ref 33–37)
MCV RBC AUTO: 91.3 FL (ref 80–94)
METHEMOGLOBIN ARTERIAL: 1.4 %
MONOCYTES ABSOLUTE: 1 K/UL (ref 0–0.9)
MONOCYTES RELATIVE PERCENT: 7.9 % (ref 0–10)
NEUTROPHILS ABSOLUTE: 10.6 K/UL (ref 1.5–7.5)
NEUTROPHILS RELATIVE PERCENT: 85 % (ref 50–65)
NITRITE, URINE: NEGATIVE
O2 CONTENT ARTERIAL: 21.1 ML/DL
O2 SAT, ARTERIAL: 96.2 %
O2 THERAPY: ABNORMAL
PCO2 ARTERIAL: 17 MMHG (ref 35–45)
PDW BLD-RTO: 11.9 % (ref 11.5–14.5)
PERFORMED ON: ABNORMAL
PH ARTERIAL: 7.25 (ref 7.35–7.45)
PH UA: 6 (ref 5–8)
PHOSPHORUS: 2.2 MG/DL (ref 2.5–4.5)
PHOSPHORUS: 3.7 MG/DL (ref 2.5–4.5)
PLATELET # BLD: 335 K/UL (ref 130–400)
PMV BLD AUTO: 11.7 FL (ref 9.4–12.4)
PO2 ARTERIAL: 108 MMHG (ref 80–100)
POTASSIUM REFLEX MAGNESIUM: 4.5 MMOL/L (ref 3.5–5)
POTASSIUM SERPL-SCNC: 3.4 MMOL/L (ref 3.5–5)
POTASSIUM SERPL-SCNC: 3.6 MMOL/L (ref 3.5–5)
POTASSIUM, WHOLE BLOOD: 4.3
PROTEIN UA: 30 MG/DL
PROTHROMBIN TIME: 40.8 SEC (ref 12–14.6)
RBC # BLD: 4.94 M/UL (ref 4.7–6.1)
RBC UA: ABNORMAL /HPF (ref 0–2)
SODIUM BLD-SCNC: 126 MMOL/L (ref 136–145)
SODIUM BLD-SCNC: 131 MMOL/L (ref 136–145)
SODIUM BLD-SCNC: 133 MMOL/L (ref 136–145)
SPECIFIC GRAVITY UA: 1.03 (ref 1–1.03)
TOTAL PROTEIN: 7.7 G/DL (ref 6.6–8.7)
TROPONIN: 0.08 NG/ML (ref 0–0.03)
URINE REFLEX TO CULTURE: ABNORMAL
UROBILINOGEN, URINE: 0.2 E.U./DL
WBC # BLD: 12.5 K/UL (ref 4.8–10.8)
YEAST: ABNORMAL /HPF

## 2019-03-21 PROCEDURE — 71045 X-RAY EXAM CHEST 1 VIEW: CPT

## 2019-03-21 PROCEDURE — 94640 AIRWAY INHALATION TREATMENT: CPT

## 2019-03-21 PROCEDURE — 51702 INSERT TEMP BLADDER CATH: CPT

## 2019-03-21 PROCEDURE — 2580000003 HC RX 258: Performed by: EMERGENCY MEDICINE

## 2019-03-21 PROCEDURE — 51798 US URINE CAPACITY MEASURE: CPT

## 2019-03-21 PROCEDURE — 82803 BLOOD GASES ANY COMBINATION: CPT

## 2019-03-21 PROCEDURE — 84100 ASSAY OF PHOSPHORUS: CPT

## 2019-03-21 PROCEDURE — 83036 HEMOGLOBIN GLYCOSYLATED A1C: CPT

## 2019-03-21 PROCEDURE — 94664 DEMO&/EVAL PT USE INHALER: CPT

## 2019-03-21 PROCEDURE — 2000000000 HC ICU R&B

## 2019-03-21 PROCEDURE — 85025 COMPLETE CBC W/AUTO DIFF WBC: CPT

## 2019-03-21 PROCEDURE — 6370000000 HC RX 637 (ALT 250 FOR IP): Performed by: FAMILY MEDICINE

## 2019-03-21 PROCEDURE — 96374 THER/PROPH/DIAG INJ IV PUSH: CPT

## 2019-03-21 PROCEDURE — 82009 KETONE BODYS QUAL: CPT

## 2019-03-21 PROCEDURE — 6370000000 HC RX 637 (ALT 250 FOR IP): Performed by: EMERGENCY MEDICINE

## 2019-03-21 PROCEDURE — 83735 ASSAY OF MAGNESIUM: CPT

## 2019-03-21 PROCEDURE — 82948 REAGENT STRIP/BLOOD GLUCOSE: CPT

## 2019-03-21 PROCEDURE — 81001 URINALYSIS AUTO W/SCOPE: CPT

## 2019-03-21 PROCEDURE — 36600 WITHDRAWAL OF ARTERIAL BLOOD: CPT

## 2019-03-21 PROCEDURE — 80053 COMPREHEN METABOLIC PANEL: CPT

## 2019-03-21 PROCEDURE — 93005 ELECTROCARDIOGRAM TRACING: CPT

## 2019-03-21 PROCEDURE — 36415 COLL VENOUS BLD VENIPUNCTURE: CPT

## 2019-03-21 PROCEDURE — 84484 ASSAY OF TROPONIN QUANT: CPT

## 2019-03-21 PROCEDURE — 2500000003 HC RX 250 WO HCPCS: Performed by: FAMILY MEDICINE

## 2019-03-21 PROCEDURE — 2580000003 HC RX 258: Performed by: FAMILY MEDICINE

## 2019-03-21 PROCEDURE — 99223 1ST HOSP IP/OBS HIGH 75: CPT | Performed by: FAMILY MEDICINE

## 2019-03-21 PROCEDURE — 84132 ASSAY OF SERUM POTASSIUM: CPT

## 2019-03-21 PROCEDURE — 6360000002 HC RX W HCPCS: Performed by: FAMILY MEDICINE

## 2019-03-21 PROCEDURE — 85610 PROTHROMBIN TIME: CPT

## 2019-03-21 PROCEDURE — 99285 EMERGENCY DEPT VISIT HI MDM: CPT | Performed by: EMERGENCY MEDICINE

## 2019-03-21 PROCEDURE — 99285 EMERGENCY DEPT VISIT HI MDM: CPT

## 2019-03-21 RX ORDER — SODIUM CHLORIDE 9 MG/ML
INJECTION, SOLUTION INTRAVENOUS CONTINUOUS
Status: DISCONTINUED | OUTPATIENT
Start: 2019-03-21 | End: 2019-03-22

## 2019-03-21 RX ORDER — DEXTROSE AND SODIUM CHLORIDE 5; .45 G/100ML; G/100ML
INJECTION, SOLUTION INTRAVENOUS CONTINUOUS PRN
Status: DISCONTINUED | OUTPATIENT
Start: 2019-03-21 | End: 2019-03-23

## 2019-03-21 RX ORDER — NICOTINE POLACRILEX 4 MG
15 LOZENGE BUCCAL PRN
Status: DISCONTINUED | OUTPATIENT
Start: 2019-03-21 | End: 2019-04-01 | Stop reason: HOSPADM

## 2019-03-21 RX ORDER — PROPAFENONE HYDROCHLORIDE 150 MG/1
150 TABLET, FILM COATED ORAL EVERY 8 HOURS
Status: DISCONTINUED | OUTPATIENT
Start: 2019-03-21 | End: 2019-04-01 | Stop reason: HOSPADM

## 2019-03-21 RX ORDER — 0.9 % SODIUM CHLORIDE 0.9 %
15 INTRAVENOUS SOLUTION INTRAVENOUS ONCE
Status: DISCONTINUED | OUTPATIENT
Start: 2019-03-21 | End: 2019-03-22

## 2019-03-21 RX ORDER — DEXTROSE MONOHYDRATE 50 MG/ML
100 INJECTION, SOLUTION INTRAVENOUS PRN
Status: DISCONTINUED | OUTPATIENT
Start: 2019-03-21 | End: 2019-04-01 | Stop reason: HOSPADM

## 2019-03-21 RX ORDER — DEXTROSE MONOHYDRATE 25 G/50ML
12.5 INJECTION, SOLUTION INTRAVENOUS PRN
Status: DISCONTINUED | OUTPATIENT
Start: 2019-03-21 | End: 2019-04-01 | Stop reason: HOSPADM

## 2019-03-21 RX ORDER — DEXTROSE MONOHYDRATE 25 G/50ML
12.5 INJECTION, SOLUTION INTRAVENOUS PRN
Status: DISCONTINUED | OUTPATIENT
Start: 2019-03-21 | End: 2019-03-26

## 2019-03-21 RX ORDER — MAGNESIUM SULFATE 1 G/100ML
1 INJECTION INTRAVENOUS PRN
Status: DISCONTINUED | OUTPATIENT
Start: 2019-03-21 | End: 2019-03-23 | Stop reason: SDUPTHER

## 2019-03-21 RX ORDER — 0.9 % SODIUM CHLORIDE 0.9 %
1000 INTRAVENOUS SOLUTION INTRAVENOUS ONCE
Status: COMPLETED | OUTPATIENT
Start: 2019-03-21 | End: 2019-03-21

## 2019-03-21 RX ORDER — HYDROCODONE BITARTRATE AND ACETAMINOPHEN 10; 325 MG/1; MG/1
1 TABLET ORAL EVERY 6 HOURS PRN
Status: DISCONTINUED | OUTPATIENT
Start: 2019-03-21 | End: 2019-04-01 | Stop reason: HOSPADM

## 2019-03-21 RX ORDER — IPRATROPIUM BROMIDE AND ALBUTEROL SULFATE 2.5; .5 MG/3ML; MG/3ML
1 SOLUTION RESPIRATORY (INHALATION)
Status: DISCONTINUED | OUTPATIENT
Start: 2019-03-21 | End: 2019-03-26

## 2019-03-21 RX ORDER — POTASSIUM CHLORIDE 7.45 MG/ML
10 INJECTION INTRAVENOUS PRN
Status: DISCONTINUED | OUTPATIENT
Start: 2019-03-21 | End: 2019-03-25

## 2019-03-21 RX ADMIN — SODIUM PHOSPHATE, MONOBASIC, MONOHYDRATE 15 MMOL: 276; 142 INJECTION, SOLUTION INTRAVENOUS at 23:00

## 2019-03-21 RX ADMIN — POTASSIUM CHLORIDE 10 MEQ: 7.46 INJECTION, SOLUTION INTRAVENOUS at 23:01

## 2019-03-21 RX ADMIN — SODIUM CHLORIDE 3.7 UNITS/HR: 9 INJECTION, SOLUTION INTRAVENOUS at 20:16

## 2019-03-21 RX ADMIN — IPRATROPIUM BROMIDE AND ALBUTEROL SULFATE 1 AMPULE: .5; 3 SOLUTION RESPIRATORY (INHALATION) at 18:57

## 2019-03-21 RX ADMIN — IPRATROPIUM BROMIDE AND ALBUTEROL SULFATE 1 AMPULE: .5; 3 SOLUTION RESPIRATORY (INHALATION) at 14:36

## 2019-03-21 RX ADMIN — INSULIN HUMAN 10 UNITS: 100 INJECTION, SOLUTION PARENTERAL at 11:52

## 2019-03-21 RX ADMIN — POTASSIUM CHLORIDE 10 MEQ: 7.46 INJECTION, SOLUTION INTRAVENOUS at 20:52

## 2019-03-21 RX ADMIN — POTASSIUM CHLORIDE 10 MEQ: 7.46 INJECTION, SOLUTION INTRAVENOUS at 18:41

## 2019-03-21 RX ADMIN — SODIUM CHLORIDE 0.5 UNITS: 9 INJECTION, SOLUTION INTRAVENOUS at 13:27

## 2019-03-21 RX ADMIN — POTASSIUM CHLORIDE 10 MEQ: 7.46 INJECTION, SOLUTION INTRAVENOUS at 21:57

## 2019-03-21 RX ADMIN — POTASSIUM CHLORIDE 10 MEQ: 7.46 INJECTION, SOLUTION INTRAVENOUS at 19:47

## 2019-03-21 RX ADMIN — SODIUM CHLORIDE 1000 ML: 9 INJECTION, SOLUTION INTRAVENOUS at 11:07

## 2019-03-21 RX ADMIN — PROPAFENONE HYDROCHLORIDE 150 MG: 150 TABLET, FILM COATED ORAL at 21:58

## 2019-03-21 RX ADMIN — DEXTROSE AND SODIUM CHLORIDE: 5; 450 INJECTION, SOLUTION INTRAVENOUS at 18:18

## 2019-03-21 ASSESSMENT — ENCOUNTER SYMPTOMS
SHORTNESS OF BREATH: 0
NAUSEA: 0
DIARRHEA: 0
COUGH: 0
VOMITING: 0
ABDOMINAL PAIN: 0

## 2019-03-21 NOTE — CARE COORDINATION
PT is now being admitted, will give him packets to take upstairs.  Electronically signed by Antony Cisneros on 3/21/2019 at 11:49 AM

## 2019-03-21 NOTE — PROGRESS NOTES
4 Eyes Skin Assessment    Nerissa Manning is being assessed upon: Admission    I agree that I, Rc Carter, along with Olena Morocho (either 2 RN's or 1 LPN and 1 RN) have performed a thorough Head to Toe Skin Assessment on the patient. ALL assessment sites listed below have been assessed. Areas assessed by both nurses:     [x]   Head, Face, and Ears   [x]   Shoulders, Back, and Chest  [x]   Arms, Elbows, and Hands   [x]   Coccyx, Sacrum, and Ischium  [x]   Legs, Feet, and Heels    Does the Patient have Skin Breakdown?  No  Redness on coccyx    Mark Prevention initiated: NA  Wound Care Orders initiated: NA    [facility] nurse consulted for Pressure Injury (Stage 3,4, Unstageable, DTI, NWPT, and Complex wounds) and New or Established Ostomies: NA        Primary Nurse eSignature: Rc Carter RN on 3/21/2019 at 6:22 PM      Co-Signer eSignature: Electronically signed by Vignesh Pedro RN on 3/21/19 at 6:23 PM

## 2019-03-21 NOTE — LETTER
Beneficiary Notification Letter     This [facility] Provider is Participating in an Innovative Payment and [address] [place] from Scot Bennett:   ETRESA LAKHANI is participating in a Medicare initiative called the [facility] for [facility]. You are receiving this letter because your health care provider has identified you as a patient who is receiving care through this initiative. Health care providers participating in the [facility], including TERESA LAKHANI, will work with Medicare to improve care for patients. Your Medicare rights have not been changed. You still have all the same Medicare rights and protections, including the right to choose which hospital, doctor, or other health care provider you see. However, because TERESA LAKHANI chose to participate in the [facility], all Medicare beneficiaries who meet the eligibility criteria of this initiative will receive care under the initiative. If you do not wish to receive care under the Bundled Payments  [facility], you must choose a health care provider that does not participate in this initiative for your care. Regardless of which health care provider you see, Medicare will continue to cover all of your medically necessary services. Bundled Payments for Care Improvement Advanced aims to help improve your care     The Bundled Payments  [facility] is an innovative Medicare initiative that encourages your doctors, hospitals, and other health care providers to work more closely together so you get better care during and following certain hospital stays.  In this initiative, doctors and hospitals may work closely with certain health care providers and suppliers that help patients recover after discharge from the hospital, · To find a different doctor, visit Medicare's Physician Compare website, HDTapes.co.nz, or call 1-800-MEDICARE (751 9954). TTY users should call 5-842.816.1601. · To find a different skilled nursing facility, visit [facility] Medico website, https://www.MightyText.myEDmatch/, or call 1-800-MEDICARE (1- 873.519.6664). TTY users should call 8-207.608.1876. · To find a different long term care hospital, visit [facility] Compare website, 9YoulogTEAM INTERVAL.be, or call 1-800- MEDICARE (516 4102). TTY users should call 1-964.336.4496. · To find a different inpatient rehabilitation facility, visit [facility] Compare website, www.medicare.gov/ inpatientrehabilitation facilitycompare, or call 1-800-MEDICARE (8-973.435.5038). TTY users should call 4- 461.503.1698. · To find a different home health agency, visit [address] website, www.medicare.gov/homehealthcompare, or call 1-800-MEDICARE (4-020- 106-2831). TTY users should call 9-633.241.2323.

## 2019-03-21 NOTE — PROGRESS NOTES
Contains critical data Blood Gas, Arterial   Status:  Final result    Ref Range & Units 03/21/19 1117   pH, Arterial 7.350 - 7.450 7.250Low Panic     pCO2, Arterial 35.0 - 45.0 mmHg 17. 0Low Panic     pO2, Arterial 80.0 - 100.0 mmHg 108. 0High     HCO3, Arterial 22.0 - 26.0 mmol/L 7.5Low     Base Excess, Arterial -2.0 - 2.0 mmol/L -17.1Low     Hemoglobin, Art, Extended 14.0 - 18.0 g/dL 15.5    O2 Sat, Arterial >92 % 96.2    Carboxyhgb, Arterial 0.0 - 5.0 % 1.8    Comment:      0.0-1.5   (Smokers 1.5-5.0)    Methemoglobin, Arterial <1.5 % 1.4    O2 Content, Arterial Not Established mL/dL 21.1    O2 Therapy  Unknown    Resulting Agency  [facility]   Narrative     CALL  [place] Trevor Link RN ER, 03/21/2019 11:19, by AICHA        Specimen Collected: 03/21/19 1117 Last Resulted: 03/21/19 1119 View Other Order Details        Pt on room air, RR 24 site RR at+

## 2019-03-21 NOTE — CARE COORDINATION
PT unable to afford insulin. Will attempt to get PMF and then have also collected information on [address] and [address]  Electronically signed by Carol Jimenez on 3/21/2019 at 11:48 AM

## 2019-03-21 NOTE — PROGRESS NOTES
Clinical Pharmacy Note    Brenda Tapia is a 52 y.o. male for whom pharmacy has been asked to manage warfarin therapy. Reason for Admission: diabetic ketoacidosis    Consulting Physician: Armas  Warfarin dose prior to admission: 10 mg alt. With 12.5 mg daily  Warfarin indication: atrial fibrillation  Target INR range: 2-3   Outpatient warfarin provider: Sean MOSHER    Past Medical History:   Diagnosis Date    Allergic rhinitis, cause unspecified     Charcot's joint of right foot     Chicken pox     HTN (hypertension)     Hyperlipidemia     Impotence of organic origin     MRSA infection     Other dyspnea and respiratory abnormality     Other specified erythematous condition(695.89)     Paroxysmal atrial fibrillation (HCC)     Personal history of other infectious and parasitic disease     Type II or unspecified type diabetes mellitus without mention of complication, uncontrolled 1994                Recent Labs     03/21/19  1105   INR 4.34*     Recent Labs     03/21/19  1105   HGB 15.4   HCT 45.1          Current warfarin drug-drug interactions: PROPAFENONE, SULFONYLUREAS, APAP        Date INR Warfarin Dose   03/21/19 4.34 Pt took 12.5 mg at home                                     Daily PT/INR until stable within therapeutic range. Thank you for the consult.      Electronically signed by Marcus Murillo [facility] on 3/21/2019 at 4:51 PM

## 2019-03-21 NOTE — ED PROVIDER NOTES
[facility] ICU  eMERGENCY dEPARTMENT eNCOUnter      Pt Name: Radha Berry  MRN: [id number]  [place] 1965  Date of evaluation: 3/21/2019  Provider: Stephanie Muro MD    CHIEF COMPLAINT       Chief Complaint   Patient presents with    Diabetic Ketoacidosis     pt presents to ED with c/o weakness and hyperglycemia         HISTORY OF PRESENT ILLNESS   (Location/Symptom, Timing/Onset,Context/Setting, Quality, Duration, Modifying Factors, Severity)  Note limiting factors. Radha Berry is a 52 y.o. male who presents to the emergency department      The history is provided by the patient and a relative. Fatigue   Severity:  Severe  Onset quality:  Sudden  Duration:  1 day  Timing:  Constant  Progression:  Unchanged  Chronicity:  Recurrent (\"DKA\", pt is Type 2)  Relieved by:  Nothing  Worsened by:  Nothing  Ineffective treatments:  None tried  Associated symptoms: anorexia, difficulty walking and lethargy    Associated symptoms: no abdominal pain, no arthralgias, no chest pain, no cough, no diarrhea, no dysuria, no fever, no headaches, no loss of consciousness, no myalgias, no nausea, no seizures, no shortness of breath, no stroke symptoms, no syncope and no vomiting    Associated symptoms comment:  No pain c/o  Risk factors comment:  No diabetes over 2 weeks      NursingNotes were reviewed. REVIEW OF SYSTEMS    (2-9 systems for level 4, 10 or more for level 5)     Review of Systems   Constitutional: Positive for fatigue. Negative for fever. Respiratory: Negative for cough and shortness of breath. Cardiovascular: Negative for chest pain and syncope. Gastrointestinal: Positive for anorexia. Negative for abdominal pain, diarrhea, nausea and vomiting. Genitourinary: Negative for dysuria. Musculoskeletal: Negative for arthralgias and myalgias. Neurological: Negative for seizures, loss of consciousness and headaches. All other systems reviewed and are negative.       Except as noted above the remainder of the review of systems was reviewed and negative.        PAST MEDICAL HISTORY     Past Medical History:   Diagnosis Date    Allergic rhinitis, cause unspecified     Charcot's joint of right foot     Chicken pox     HTN (hypertension)     Hyperlipidemia     Impotence of organic origin     MRSA infection     Other dyspnea and respiratory abnormality     Other specified erythematous condition(015.89)     Paroxysmal atrial fibrillation (HCC)     Personal history of other infectious and parasitic disease     Type II or unspecified type diabetes mellitus without mention of complication, uncontrolled 1994         SURGICALHISTORY       Past Surgical History:   Procedure Laterality Date    CARDIOVERSION  03/2017    MALIGNANT SKIN LESION EXCISION      X 2    TONSILLECTOMY           CURRENT MEDICATIONS       Current Discharge Medication List      CONTINUE these medications which have NOT CHANGED    Details   losartan (COZAAR) 100 MG tablet TAKE ONE TABLET DAILY  Qty: 30 tablet, Refills: 5      !! warfarin (COUMADIN) 10 MG tablet TAKE ONE TABLET DAILY AS DIRECTED  Qty: 30 tablet, Refills: 5      !! warfarin (COUMADIN) 5 MG tablet TAKE 1/2 TABLET DAILY AS DIRECTED BY YOUR DOCTOR  Qty: 30 tablet, Refills: 5      propafenone (RYTHMOL) 150 MG tablet Take 1 tablet by mouth every 8 hours Stop flecainide today, start this medicine on Monday, November 12  Qty: 90 tablet, Refills: 3      !! warfarin (COUMADIN) 5 MG tablet Take 5 mg by mouth      metoprolol tartrate (LOPRESSOR) 25 MG tablet Take 0.5 tablets by mouth 2 times daily  Qty: 30 tablet, Refills: 5    Associated Diagnoses: Essential hypertension      hydrochlorothiazide (HYDRODIURIL) 25 MG tablet Take 1 tablet by mouth daily  Qty: 30 tablet, Refills: 5      metFORMIN (GLUCOPHAGE) 1000 MG tablet Take 1,000 mg by mouth 2 times daily (with meals)      fexofenadine (ALLEGRA) 180 MG tablet Take 180 mg by mouth daily      HYDROcodone-acetaminophen (NORCO)  MG per week: Not on file     Minutes per session: Not on file    Stress: Not on file   Relationships    Social connections:     Talks on phone: Not on file     Gets together: Not on file     Attends Judaism service: Not on file     Active member of club or organization: Not on file     Attends meetings of clubs or organizations: Not on file     Relationship status: Not on file    Intimate partner violence:     Fear of current or ex partner: Not on file     Emotionally abused: Not on file     Physically abused: Not on file     Forced sexual activity: Not on file   Other Topics Concern    Not on file   Social History Narrative    Not on file       SCREENINGS   NIH Stroke Scale  Interval: Baseline  Level of Consciousness (1a. ): Alert  LOC Questions (1b. ): Answers both correctly  LOC Commands (1c. ): Obeys both correctly  Best Gaze (2. ): Normal  Visual (3. ): No visual loss  Facial Palsy (4. ): Normal  Motor Arm, Left (5a. ): No drift  Motor Arm, Right (5b. ): No drift  Motor Leg, Left (6a. ): No drift  Motor Leg, Right (6b. ): No drift  Limb Ataxia (7. ): Absent  Sensory (8. ): Normal  Best Language (9. ): No aphasia  Dysarthria (10. ): Normal  Extinction and Inattention (11): No neglect  Total: 0Glasgow Coma Scale  Eye Opening: Spontaneous  Best Verbal Response: Oriented  Best Motor Response: Obeys commands  [place] Coma Scale Score: 15 @FLOW([id number])@      PHYSICAL EXAM    (up to 7 for level 4, 8 or more for level 5)     ED Triage Vitals   BP Temp Temp src Pulse Resp SpO2 Height Weight   03/21/19 1059 03/21/19 1059 -- 03/21/19 1102 03/21/19 1059 03/21/19 1059 03/21/19 1059 03/21/19 1059   94/78 98.6 °F (37 °C)  91 24 98 % 5' 10\" (1.778 m) 258 lb (117 kg)       Physical Exam   Constitutional: He is oriented to person, place, and time. He appears well-developed and well-nourished. No distress. Keeps eyes closed unless asked to open, but answers questions appropriately   HENT:   Head: Normocephalic and atraumatic. within normal limits   TROPONIN - Abnormal; Notable for the following components:    Troponin 0.08 (*)     All other components within normal limits   PROTIME-INR - Abnormal; Notable for the following components:    Protime 40.8 (*)     INR 4.34 (*)     All other components within normal limits   ACETONE - Abnormal; Notable for the following components:    Acetone, Bld Moderate (*)     All other components within normal limits   BLOOD GAS, ARTERIAL - Abnormal; Notable for the following components:    pH, Arterial 7.250 (*)     pCO2, Arterial 17.0 (*)     pO2, Arterial 108.0 (*)     HCO3, Arterial 7.5 (*)     Base Excess, Arterial -17.1 (*)     All other components within normal limits    Narrative:     CALL  Ward Morton Homans RN ER, 03/21/2019 11:19, by Kimmy Dan   HEMOGLOBIN A1C - Abnormal; Notable for the following components:    Hemoglobin A1C 17.1 (*)     All other components within normal limits   POCT GLUCOSE - Abnormal; Notable for the following components:    POC Glucose 426 (*)     All other components within normal limits   POCT GLUCOSE - Abnormal; Notable for the following components:    POC Glucose 349 (*)     All other components within normal limits   POCT GLUCOSE - Abnormal; Notable for the following components:    POC Glucose 328 (*)     All other components within normal limits   POCT GLUCOSE - Abnormal; Notable for the following components:    POC Glucose 338 (*)     All other components within normal limits   POCT GLUCOSE - Normal   POCT GLUCOSE - Normal   POTASSIUM, WHOLE BLOOD   URINE RT REFLEX TO CULTURE   BASIC METABOLIC PANEL   BASIC METABOLIC PANEL   BASIC METABOLIC PANEL   MAGNESIUM   MAGNESIUM   MAGNESIUM   PHOSPHORUS   PHOSPHORUS   PHOSPHORUS   POCT GLUCOSE   POCT GLUCOSE   POCT GLUCOSE   POCT GLUCOSE   POCT GLUCOSE       All other labs were within normal range or not returned as of this dictation.     EMERGENCY DEPARTMENT COURSE and DIFFERENTIAL DIAGNOSIS/MDM:   Vitals:    Vitals: 03/21/19 1232 03/21/19 1300 03/21/19 1332 03/21/19 1402   BP: 88/67 101/79 90/65 93/61   Pulse: 93 97 86 84   Resp:       Temp:    98.1 °F (36.7 °C)   SpO2:  95% 95% 93%   Weight:       Height:               MDM  Number of Diagnoses or Management Options  Diagnosis management comments: Discussed with Dr. Efren Johnson - admit. Start insulin gtt. CRITICAL CARE TIME   Total Critical Care time was 0 minutes, excluding separately reportable procedures. There was a high probability of clinically significant/lifethreatening deterioration in the patient's condition which required my urgent intervention. CONSULTS:  PHARMACY TO DOSE WARFARIN  IP CONSULT TO SOCIAL WORK    PROCEDURES:  Unless otherwise noted below, none     Procedures    FINAL IMPRESSION      1.  Diabetic ketoacidosis without coma associated with type 2 diabetes mellitus ([facility] [address]          DISPOSITION/PLAN   DISPOSITION        PATIENT REFERRED TO:  Leticia Limon, DO  [address] [facility] [address] [facility] [address] [facility] [address]  [phone]            DISCHARGE MEDICATIONS:  Current Discharge Medication List             (Please note that portions of this note were completed with a voice recognition program.  Efforts were made to edit the dictations but occasionally words are mis-transcribed.)    Xiao Villegas MD (electronically signed)  Attending Emergency Physician          Xiao Villegas MD  03/21/19 4482

## 2019-03-21 NOTE — H&P
Hospital Medicine  History and Physical    Patient:  Jerod Dahl  MRN: [id number]    CHIEF COMPLAINT:  Fatigue, chills    History Obtained From: patient, chart    PCP: Raisa Olmos DO    HISTORY OF PRESENT ILLNESS:  52year old white male presents to the emergency department with complaint of fatigue, weakness, shaking chills for about 3 days, worse today. Patient is a diabetic dependent on insulin but states he has been unable to afford his insulin \"for several weeks. \" States he generally felt well prior to onset of symptoms without any localizing symptoms. Complains of chronic burning in his feet due to neuropathy and of polyuria and polydipsia for the past few days. The patient has atrial fibrillation, is on warfarin. INR is supratherapeutic. Patient denies any visible bleeding, nausea or vomiting, fever, cough, or congestion. REVIEW OF SYSTEMS:  14 systems reviewed and negative except as noted above. Past Medical History:      Diagnosis Date    Allergic rhinitis, cause unspecified     Charcot's joint of right foot     Chicken pox     HTN (hypertension)     Hyperlipidemia     Impotence of organic origin     MRSA infection     Other dyspnea and respiratory abnormality     Other specified erythematous condition(695.89)     Paroxysmal atrial fibrillation (HCC)     Personal history of other infectious and parasitic disease     Type II or unspecified type diabetes mellitus without mention of complication, uncontrolled 1994       Past Surgical History:      Procedure Laterality Date    CARDIOVERSION  03/2017    MALIGNANT SKIN LESION EXCISION      X 2    TONSILLECTOMY         Medications Prior to Admission:    Prior to Admission medications    Medication Sig Start Date End Date Taking?  Authorizing Provider   losartan (COZAAR) 100 MG tablet TAKE ONE TABLET DAILY 12/27/18   NOMI Loja   warfarin (COUMADIN) 10 MG tablet TAKE ONE TABLET DAILY AS DIRECTED 12/10/18   NOMI Loja   warfarin (COUMADIN) 5 MG tablet TAKE 1/2 TABLET DAILY AS DIRECTED BY YOUR DOCTOR 12/10/18   NOMI Jeronimo   propafenone (RYTHMOL) 150 MG tablet Take 1 tablet by mouth every 8 hours Stop flecainide today, start this medicine on Monday, November 12 11/9/18   NOMI Tracy   warfarin (COUMADIN) 5 MG tablet Take 5 mg by mouth    Historical Provider, MD   metoprolol tartrate (LOPRESSOR) 25 MG tablet Take 0.5 tablets by mouth 2 times daily 9/14/18   NOMI Tracy   hydrochlorothiazide (HYDRODIURIL) 25 MG tablet Take 1 tablet by mouth daily 9/14/18   NOMI Tracy   metFORMIN (GLUCOPHAGE) 1000 MG tablet Take 1,000 mg by mouth 2 times daily (with meals)    Historical Provider, MD   fexofenadine (ALLEGRA) 180 MG tablet Take 180 mg by mouth daily    Historical Provider, MD   HYDROcodone-acetaminophen (NORCO)  MG per tablet Take 1 tablet by mouth every 6 hours as needed for Pain. Historical Provider, MD   albuterol sulfate  (90 Base) MCG/ACT inhaler Inhale 2 puffs into the lungs every 6 hours as needed for Wheezing    Historical Provider, MD   insulin glargine (TOUJEO SOLOSTAR) 300 UNIT/ML injection pen Inject 120 Units into the skin nightly    Historical Provider, MD SANTOS ULTRAFINE III SHORT PEN 31G X 8 MM MISC  11/4/15   Historical Provider, MD   glimepiride (AMARYL) 4 MG tablet Take 4 mg by mouth every morning (before breakfast)    Historical Provider, MD   ONE TOUCH ULTRA TEST strip 1 each daily. 12/26/14   Historical Provider, MD       Allergies:  Pcn [penicillins]; Neomycin-bacitracin zn-polymyx; and Neosporin [neomycin-polymyxin-gramicidin]    Social History:   TOBACCO:   reports that he has never smoked. He has never used smokeless tobacco.  ETOH:   reports that he does not drink alcohol.     Family History:       Problem Relation Age of Onset    Stroke Maternal Grandmother     Cancer Maternal Grandmother     Stroke Paternal Grandmother     Diabetes Paternal Grandmother     Cancer hours.    URINALYSIS: pending  -----------------------------------------------------------------  PA/lat CXR: no acute process    EKG: a-fib, old RBBB with partial LAFB, no acute ischemia or infarction    Assessment and Plan   Principal Problem:    Diabetic ketoacidosis without coma associated with type 2 diabetes mellitus (HCC)  Active Problems:    Fatigue    Atrial fibrillation (HCC)    Hyponatremia    Hypotension due to hypovolemia    Acute kidney injury ([facility] [address]    Type 2 diabetes mellitus with diabetic polyneuropathy, with long-term current use of insulin ([facility])    Elevated troponin    Coagulopathy ([facility])  Resolved Problems:    * No resolved hospital problems. *    Admit to ICU. Ketoacidosis orders including weight-based insulin drip, IVF resuscitation, close monitoring of potassium and other serum electrolytes. Home medications reconciled. Will continue metoprolol for rate control, will hold other antihypertensives until hypotension resolves with fluid resuscitation. Pharmacy consult for warfarin, hold for now.     Rea Ziegler DO  Admitting Hospitalist

## 2019-03-22 LAB
ANION GAP SERPL CALCULATED.3IONS-SCNC: 10 MMOL/L (ref 7–19)
ANION GAP SERPL CALCULATED.3IONS-SCNC: 11 MMOL/L (ref 7–19)
ANION GAP SERPL CALCULATED.3IONS-SCNC: 12 MMOL/L (ref 7–19)
ANION GAP SERPL CALCULATED.3IONS-SCNC: 14 MMOL/L (ref 7–19)
BASE EXCESS ARTERIAL: -5.7 MMOL/L (ref -2–2)
BUN BLDV-MCNC: 14 MG/DL (ref 6–20)
BUN BLDV-MCNC: 14 MG/DL (ref 6–20)
BUN BLDV-MCNC: 15 MG/DL (ref 6–20)
BUN BLDV-MCNC: 16 MG/DL (ref 6–20)
BUN BLDV-MCNC: 19 MG/DL (ref 6–20)
BUN BLDV-MCNC: 20 MG/DL (ref 6–20)
C DIFFICILE TOXIN, EIA: NORMAL
CALCIUM SERPL-MCNC: 8.1 MG/DL (ref 8.6–10)
CALCIUM SERPL-MCNC: 8.2 MG/DL (ref 8.6–10)
CALCIUM SERPL-MCNC: 8.4 MG/DL (ref 8.6–10)
CARBOXYHEMOGLOBIN ARTERIAL: 1.9 % (ref 0–5)
CHLORIDE BLD-SCNC: 101 MMOL/L (ref 98–111)
CHLORIDE BLD-SCNC: 101 MMOL/L (ref 98–111)
CHLORIDE BLD-SCNC: 102 MMOL/L (ref 98–111)
CHLORIDE BLD-SCNC: 97 MMOL/L (ref 98–111)
CHLORIDE BLD-SCNC: 98 MMOL/L (ref 98–111)
CHLORIDE BLD-SCNC: 99 MMOL/L (ref 98–111)
CO2: 15 MMOL/L (ref 22–29)
CO2: 16 MMOL/L (ref 22–29)
CO2: 16 MMOL/L (ref 22–29)
CO2: 18 MMOL/L (ref 22–29)
CREAT SERPL-MCNC: 1.3 MG/DL (ref 0.5–1.2)
CREAT SERPL-MCNC: 1.4 MG/DL (ref 0.5–1.2)
CREAT SERPL-MCNC: 1.5 MG/DL (ref 0.5–1.2)
CREAT SERPL-MCNC: 1.6 MG/DL (ref 0.5–1.2)
EKG P AXIS: NORMAL DEGREES
EKG P-R INTERVAL: NORMAL MS
EKG Q-T INTERVAL: 352 MS
EKG QRS DURATION: 104 MS
EKG QTC CALCULATION (BAZETT): 399 MS
EKG T AXIS: 16 DEGREES
GFR NON-AFRICAN AMERICAN: 45
GFR NON-AFRICAN AMERICAN: 49
GFR NON-AFRICAN AMERICAN: 53
GFR NON-AFRICAN AMERICAN: 57
GLUCOSE BLD-MCNC: 105 MG/DL (ref 70–99)
GLUCOSE BLD-MCNC: 105 MG/DL (ref 74–109)
GLUCOSE BLD-MCNC: 106 MG/DL (ref 74–109)
GLUCOSE BLD-MCNC: 163 MG/DL (ref 70–99)
GLUCOSE BLD-MCNC: 164 MG/DL (ref 70–99)
GLUCOSE BLD-MCNC: 166 MG/DL (ref 70–99)
GLUCOSE BLD-MCNC: 176 MG/DL (ref 70–99)
GLUCOSE BLD-MCNC: 179 MG/DL (ref 70–99)
GLUCOSE BLD-MCNC: 180 MG/DL (ref 70–99)
GLUCOSE BLD-MCNC: 181 MG/DL (ref 70–99)
GLUCOSE BLD-MCNC: 186 MG/DL (ref 70–99)
GLUCOSE BLD-MCNC: 187 MG/DL (ref 70–99)
GLUCOSE BLD-MCNC: 193 MG/DL (ref 70–99)
GLUCOSE BLD-MCNC: 193 MG/DL (ref 70–99)
GLUCOSE BLD-MCNC: 196 MG/DL (ref 74–109)
GLUCOSE BLD-MCNC: 202 MG/DL (ref 74–109)
GLUCOSE BLD-MCNC: 216 MG/DL (ref 70–99)
GLUCOSE BLD-MCNC: 227 MG/DL (ref 70–99)
GLUCOSE BLD-MCNC: 230 MG/DL (ref 74–109)
GLUCOSE BLD-MCNC: 261 MG/DL (ref 70–99)
GLUCOSE BLD-MCNC: 284 MG/DL (ref 70–99)
GLUCOSE BLD-MCNC: 304 MG/DL (ref 74–109)
GLUCOSE BLD-MCNC: 86 MG/DL (ref 70–99)
HCO3 ARTERIAL: 18.3 MMOL/L (ref 22–26)
HCT VFR BLD CALC: 39 % (ref 42–52)
HEMOGLOBIN, ART, EXTENDED: 13.8 G/DL (ref 14–18)
HEMOGLOBIN: 13.9 G/DL (ref 14–18)
INR BLD: 4.77 (ref 0.88–1.18)
MAGNESIUM: 1.6 MG/DL (ref 1.6–2.6)
MAGNESIUM: 1.7 MG/DL (ref 1.6–2.6)
MAGNESIUM: 1.8 MG/DL (ref 1.6–2.6)
MAGNESIUM: 1.9 MG/DL (ref 1.6–2.6)
MAGNESIUM: 1.9 MG/DL (ref 1.6–2.6)
MAGNESIUM: 2 MG/DL (ref 1.6–2.6)
MCH RBC QN AUTO: 31.4 PG (ref 27–31)
MCHC RBC AUTO-ENTMCNC: 35.6 G/DL (ref 33–37)
MCV RBC AUTO: 88 FL (ref 80–94)
METHEMOGLOBIN ARTERIAL: 1.4 %
O2 CONTENT ARTERIAL: 18.1 ML/DL
O2 SAT, ARTERIAL: 93.3 %
O2 THERAPY: ABNORMAL
PCO2 ARTERIAL: 31 MMHG (ref 35–45)
PDW BLD-RTO: 11.7 % (ref 11.5–14.5)
PERFORMED ON: ABNORMAL
PERFORMED ON: NORMAL
PH ARTERIAL: 7.38 (ref 7.35–7.45)
PHOSPHORUS: 1.7 MG/DL (ref 2.5–4.5)
PHOSPHORUS: 2 MG/DL (ref 2.5–4.5)
PHOSPHORUS: 3.3 MG/DL (ref 2.5–4.5)
PHOSPHORUS: 3.9 MG/DL (ref 2.5–4.5)
PLATELET # BLD: 263 K/UL (ref 130–400)
PMV BLD AUTO: 11.3 FL (ref 9.4–12.4)
PO2 ARTERIAL: 63 MMHG (ref 80–100)
POTASSIUM SERPL-SCNC: 3.2 MMOL/L (ref 3.5–5)
POTASSIUM SERPL-SCNC: 3.4 MMOL/L (ref 3.5–5)
POTASSIUM SERPL-SCNC: 3.6 MMOL/L (ref 3.5–5)
POTASSIUM SERPL-SCNC: 3.7 MMOL/L (ref 3.5–5)
POTASSIUM SERPL-SCNC: 3.8 MMOL/L (ref 3.5–5)
POTASSIUM SERPL-SCNC: 4 MMOL/L (ref 3.5–5)
POTASSIUM, WHOLE BLOOD: 3.5
PROTHROMBIN TIME: 44 SEC (ref 12–14.6)
RBC # BLD: 4.43 M/UL (ref 4.7–6.1)
SODIUM BLD-SCNC: 124 MMOL/L (ref 136–145)
SODIUM BLD-SCNC: 128 MMOL/L (ref 136–145)
SODIUM BLD-SCNC: 129 MMOL/L (ref 136–145)
SODIUM BLD-SCNC: 131 MMOL/L (ref 136–145)
WBC # BLD: 7.8 K/UL (ref 4.8–10.8)

## 2019-03-22 PROCEDURE — 82803 BLOOD GASES ANY COMBINATION: CPT

## 2019-03-22 PROCEDURE — 90686 IIV4 VACC NO PRSV 0.5 ML IM: CPT | Performed by: HOSPITALIST

## 2019-03-22 PROCEDURE — 6370000000 HC RX 637 (ALT 250 FOR IP): Performed by: HOSPITALIST

## 2019-03-22 PROCEDURE — 2580000003 HC RX 258: Performed by: HOSPITALIST

## 2019-03-22 PROCEDURE — 87324 CLOSTRIDIUM AG IA: CPT

## 2019-03-22 PROCEDURE — 84100 ASSAY OF PHOSPHORUS: CPT

## 2019-03-22 PROCEDURE — 82948 REAGENT STRIP/BLOOD GLUCOSE: CPT

## 2019-03-22 PROCEDURE — 83735 ASSAY OF MAGNESIUM: CPT

## 2019-03-22 PROCEDURE — 6360000002 HC RX W HCPCS: Performed by: HOSPITALIST

## 2019-03-22 PROCEDURE — G0008 ADMIN INFLUENZA VIRUS VAC: HCPCS | Performed by: HOSPITALIST

## 2019-03-22 PROCEDURE — 80048 BASIC METABOLIC PNL TOTAL CA: CPT

## 2019-03-22 PROCEDURE — 2580000003 HC RX 258: Performed by: FAMILY MEDICINE

## 2019-03-22 PROCEDURE — 87040 BLOOD CULTURE FOR BACTERIA: CPT

## 2019-03-22 PROCEDURE — 6360000002 HC RX W HCPCS: Performed by: FAMILY MEDICINE

## 2019-03-22 PROCEDURE — 99233 SBSQ HOSP IP/OBS HIGH 50: CPT | Performed by: HOSPITALIST

## 2019-03-22 PROCEDURE — 94640 AIRWAY INHALATION TREATMENT: CPT

## 2019-03-22 PROCEDURE — 2500000003 HC RX 250 WO HCPCS: Performed by: FAMILY MEDICINE

## 2019-03-22 PROCEDURE — 87449 NOS EACH ORGANISM AG IA: CPT

## 2019-03-22 PROCEDURE — 6370000000 HC RX 637 (ALT 250 FOR IP): Performed by: FAMILY MEDICINE

## 2019-03-22 PROCEDURE — 2000000000 HC ICU R&B

## 2019-03-22 PROCEDURE — 85610 PROTHROMBIN TIME: CPT

## 2019-03-22 PROCEDURE — 85027 COMPLETE CBC AUTOMATED: CPT

## 2019-03-22 PROCEDURE — 82010 KETONE BODYS QUAN: CPT

## 2019-03-22 PROCEDURE — 84132 ASSAY OF SERUM POTASSIUM: CPT

## 2019-03-22 PROCEDURE — 36600 WITHDRAWAL OF ARTERIAL BLOOD: CPT

## 2019-03-22 PROCEDURE — 36415 COLL VENOUS BLD VENIPUNCTURE: CPT

## 2019-03-22 RX ORDER — ACETAMINOPHEN 325 MG/1
650 TABLET ORAL EVERY 6 HOURS PRN
Status: DISCONTINUED | OUTPATIENT
Start: 2019-03-22 | End: 2019-04-01 | Stop reason: HOSPADM

## 2019-03-22 RX ORDER — 0.9 % SODIUM CHLORIDE 0.9 %
1000 INTRAVENOUS SOLUTION INTRAVENOUS ONCE
Status: COMPLETED | OUTPATIENT
Start: 2019-03-22 | End: 2019-03-22

## 2019-03-22 RX ORDER — LANOLIN ALCOHOL/MO/W.PET/CERES
3 CREAM (GRAM) TOPICAL NIGHTLY PRN
Status: DISCONTINUED | OUTPATIENT
Start: 2019-03-22 | End: 2019-04-01 | Stop reason: HOSPADM

## 2019-03-22 RX ORDER — CETIRIZINE HYDROCHLORIDE 10 MG/1
10 TABLET ORAL NIGHTLY
Status: DISCONTINUED | OUTPATIENT
Start: 2019-03-23 | End: 2019-04-01 | Stop reason: HOSPADM

## 2019-03-22 RX ORDER — SODIUM CHLORIDE, SODIUM LACTATE, POTASSIUM CHLORIDE, CALCIUM CHLORIDE 600; 310; 30; 20 MG/100ML; MG/100ML; MG/100ML; MG/100ML
INJECTION, SOLUTION INTRAVENOUS CONTINUOUS
Status: DISCONTINUED | OUTPATIENT
Start: 2019-03-22 | End: 2019-03-23

## 2019-03-22 RX ORDER — INSULIN GLARGINE 100 [IU]/ML
96 INJECTION, SOLUTION SUBCUTANEOUS NIGHTLY
Status: DISCONTINUED | OUTPATIENT
Start: 2019-03-22 | End: 2019-03-23

## 2019-03-22 RX ADMIN — DEXTROSE AND SODIUM CHLORIDE: 5; 450 INJECTION, SOLUTION INTRAVENOUS at 01:39

## 2019-03-22 RX ADMIN — INSULIN LISPRO 4 UNITS: 100 INJECTION, SOLUTION INTRAVENOUS; SUBCUTANEOUS at 17:33

## 2019-03-22 RX ADMIN — IPRATROPIUM BROMIDE AND ALBUTEROL SULFATE 1 AMPULE: .5; 3 SOLUTION RESPIRATORY (INHALATION) at 10:43

## 2019-03-22 RX ADMIN — POTASSIUM CHLORIDE 10 MEQ: 7.46 INJECTION, SOLUTION INTRAVENOUS at 03:02

## 2019-03-22 RX ADMIN — CETIRIZINE HYDROCHLORIDE 10 MG: 10 TABLET, FILM COATED ORAL at 23:44

## 2019-03-22 RX ADMIN — POTASSIUM CHLORIDE 10 MEQ: 7.46 INJECTION, SOLUTION INTRAVENOUS at 14:52

## 2019-03-22 RX ADMIN — POTASSIUM CHLORIDE 10 MEQ: 7.46 INJECTION, SOLUTION INTRAVENOUS at 13:34

## 2019-03-22 RX ADMIN — SODIUM CHLORIDE 1000 ML: 9 INJECTION, SOLUTION INTRAVENOUS at 16:16

## 2019-03-22 RX ADMIN — PROPAFENONE HYDROCHLORIDE 150 MG: 150 TABLET, FILM COATED ORAL at 17:33

## 2019-03-22 RX ADMIN — POTASSIUM CHLORIDE 10 MEQ: 7.46 INJECTION, SOLUTION INTRAVENOUS at 00:02

## 2019-03-22 RX ADMIN — PROPAFENONE HYDROCHLORIDE 150 MG: 150 TABLET, FILM COATED ORAL at 07:09

## 2019-03-22 RX ADMIN — POTASSIUM CHLORIDE 10 MEQ: 7.46 INJECTION, SOLUTION INTRAVENOUS at 04:11

## 2019-03-22 RX ADMIN — METOPROLOL TARTRATE 12.5 MG: 25 TABLET ORAL at 08:16

## 2019-03-22 RX ADMIN — SODIUM PHOSPHATE, MONOBASIC, MONOHYDRATE 15 MMOL: 276; 142 INJECTION, SOLUTION INTRAVENOUS at 12:08

## 2019-03-22 RX ADMIN — POTASSIUM CHLORIDE 10 MEQ: 7.46 INJECTION, SOLUTION INTRAVENOUS at 08:29

## 2019-03-22 RX ADMIN — SODIUM PHOSPHATE, MONOBASIC, MONOHYDRATE 15 MMOL: 276; 142 INJECTION, SOLUTION INTRAVENOUS at 03:21

## 2019-03-22 RX ADMIN — DEXTROSE AND SODIUM CHLORIDE: 5; 450 INJECTION, SOLUTION INTRAVENOUS at 07:51

## 2019-03-22 RX ADMIN — INFLUENZA A VIRUS A/MICHIGAN/45/2015 X-275 (H1N1) ANTIGEN (FORMALDEHYDE INACTIVATED), INFLUENZA A VIRUS A/SINGAPORE/INFIMH-16-0019/2016 IVR-186 (H3N2) ANTIGEN (FORMALDEHYDE INACTIVATED), INFLUENZA B VIRUS B/PHUKET/3073/2013 ANTIGEN (FORMALDEHYDE INACTIVATED), AND INFLUENZA B VIRUS B/MARYLAND/15/2016 BX-69A ANTIGEN (FORMALDEHYDE INACTIVATED) 0.5 ML: 15; 15; 15; 15 INJECTION, SUSPENSION INTRAMUSCULAR at 08:18

## 2019-03-22 RX ADMIN — MAGNESIUM SULFATE HEPTAHYDRATE 1 G: 1 INJECTION, SOLUTION INTRAVENOUS at 07:08

## 2019-03-22 RX ADMIN — POTASSIUM CHLORIDE 10 MEQ: 7.46 INJECTION, SOLUTION INTRAVENOUS at 10:21

## 2019-03-22 RX ADMIN — METOPROLOL TARTRATE 12.5 MG: 25 TABLET ORAL at 21:03

## 2019-03-22 RX ADMIN — POTASSIUM CHLORIDE 10 MEQ: 7.46 INJECTION, SOLUTION INTRAVENOUS at 07:03

## 2019-03-22 RX ADMIN — IPRATROPIUM BROMIDE AND ALBUTEROL SULFATE 1 AMPULE: .5; 3 SOLUTION RESPIRATORY (INHALATION) at 06:43

## 2019-03-22 RX ADMIN — INSULIN LISPRO 2 UNITS: 100 INJECTION, SOLUTION INTRAVENOUS; SUBCUTANEOUS at 11:02

## 2019-03-22 RX ADMIN — MAGNESIUM SULFATE HEPTAHYDRATE 1 G: 1 INJECTION, SOLUTION INTRAVENOUS at 08:30

## 2019-03-22 RX ADMIN — PROPAFENONE HYDROCHLORIDE 150 MG: 150 TABLET, FILM COATED ORAL at 23:27

## 2019-03-22 RX ADMIN — IPRATROPIUM BROMIDE AND ALBUTEROL SULFATE 1 AMPULE: .5; 3 SOLUTION RESPIRATORY (INHALATION) at 19:22

## 2019-03-22 RX ADMIN — POTASSIUM CHLORIDE 10 MEQ: 7.46 INJECTION, SOLUTION INTRAVENOUS at 02:07

## 2019-03-22 RX ADMIN — POTASSIUM CHLORIDE 10 MEQ: 7.46 INJECTION, SOLUTION INTRAVENOUS at 16:05

## 2019-03-22 RX ADMIN — INSULIN LISPRO 3 UNITS: 100 INJECTION, SOLUTION INTRAVENOUS; SUBCUTANEOUS at 21:03

## 2019-03-22 RX ADMIN — INSULIN GLARGINE 96 UNITS: 100 INJECTION, SOLUTION SUBCUTANEOUS at 11:06

## 2019-03-22 RX ADMIN — SODIUM CHLORIDE, POTASSIUM CHLORIDE, SODIUM LACTATE AND CALCIUM CHLORIDE: 600; 310; 30; 20 INJECTION, SOLUTION INTRAVENOUS at 21:04

## 2019-03-22 ASSESSMENT — PAIN SCALES - GENERAL: PAINLEVEL_OUTOF10: 0

## 2019-03-22 NOTE — PROGRESS NOTES
Clinical Pharmacy Note    Warfarin consult follow-up    Recent Labs     03/22/19  0315   INR 4.77*     Recent Labs     03/21/19  1105 03/22/19  0315   HGB 15.4 13.9*   HCT 45.1 39.0*    263       Significant Drug-Drug Interactions:  New warfarin drug-drug interactions: none  Discontinued drug-drug interactions: none    Date INR Warfarin Dose   03/21/19 4.34 Pt took 12.5 mg at home    03/22/19 4.77  Held                                               Notes: Will hold warfarin today. Daily PT/INR until stable within therapeutic range.      Electronically signed by Alie Gutierrez, [address] [place] [address] on 3/22/2019 at 7:17 AM

## 2019-03-22 NOTE — PROGRESS NOTES
3/22/2019  4:46 AM - Bogdan Shell Incoming Lab Results From [employer] Value Ref Range & Units Status Collected Lab   pH, Arterial 7.380  7.350 - 7.450 Final 03/22/2019  4:46 AM [facility]   pCO2, Arterial 31. 0Low   35.0 - 45.0 mmHg Final 03/22/2019  4:46 AM [facility]   pO2, Arterial 63. 0Low   80.0 - 100.0 mmHg Final 03/22/2019  4:46 AM [facility]   HCO3, Arterial 18.3Low   22.0 - 26.0 mmol/L Final 03/22/2019  4:46 AM [facility] Excess, Arterial -5.7Low   -2.0 - 2.0 mmol/L Final 03/22/2019  4:46 AM [facility]   Hemoglobin, Art, Extended 13.8Low   14.0 - 18.0 g/dL Final 03/22/2019  4:46 AM [facility]   O2 Sat, Arterial 93.3  >92 % Final 03/22/2019  4:46 AM [facility]   Carboxyhgb, Arterial 1.9  0.0 - 5.0 % Final 03/22/2019  4:46 AM [facility]        0.0-1.5   (Smokers 1.5-5.0)    Methemoglobin, Arterial 1.4  <1.5 % Final 03/22/2019  4:46 AM [facility]   O2 Content, Arterial 18.1  Not Established mL/dL Final 03/22/2019  4:46 AM [facility]   O2 Therapy Unknown   Final 03/22/2019  4:46 AM [facility]   Testing Performed By     Adrian Gonzales Name Director Address Valid Date Range   [facility] - [address] LAB Sandra Muhammad  [place],[address]  [address] [address] 08/30/17 0733-Present       Right Radial, +MAT, ROOM AIR

## 2019-03-22 NOTE — PROGRESS NOTES
[facility]      Patient:  Elif Reyes  YOB: 1965  Date of Service: 3/22/2019  MRN: [id number]   Acct: [de-identified]   Primary Care Physician: Shad Parker DO  Advance Directive: Full Code  Admit Date: 3/21/2019       Hospital Day: 1    Chief Complaint:   Chief Complaint   Patient presents with    Diabetic Ketoacidosis     pt presents to ED with c/o weakness and hyperglycemia       Subjective: febrile    Objective:   VITALS:  /62   Pulse 140   Temp 100.8 °F (38.2 °C)   Resp 8   Ht 5' 10\" (1.778 m)   Wt 238 lb 4.8 oz (108.1 kg)   SpO2 94%   BMI 34.19 kg/m²   24HR INTAKE/OUTPUT:      Intake/Output Summary (Last 24 hours) at 3/22/2019 1104  Last data filed at 3/22/2019 0600  Gross per 24 hour   Intake 3262.45 ml   Output 1835 ml   Net 1427.45 ml     Constitutional: Oriented to person, place, and time. Well-developed and well-nourished. No distress. HENT:    Head: Normocephalic and atraumatic. Mouth/Throat: Oropharynx is clear and moist. No oropharyngeal exudate. Eyes: Conjunctivae and EOM are normal. Pupils are equal, round, and reactive to light. No scleral icterus. Neck: Normal range of motion. Neck supple. No JVD present. No tracheal deviation present. No thyromegaly present. Cardiovascular: tachycardic, irregularly irregular  Pulmonary/Chest: Effort normal and breath sounds normal. No stridor. No respiratory distress. No wheezes. No rales. Abdominal: Soft. Bowel sounds are normal. No distension and no mass. There is no tenderness. There is no rebound and no guarding. Musculoskeletal: Normal range of motion. There is no edema or tenderness. Extremities: No edema  Neurological: Alert and oriented to person, place, and time. No cranial nerve deficit. Skin: Skin is warm and dry. No rash noted. Not diaphoretic. No erythema. No pallor. Psychiatric: Normal mood and affect.  Behavior is normal. Judgment and thought content normal.     Medications:      dextrose      sodium chloride Stopped (03/21/19 1818)    dextrose 5 % and 0.45 % NaCl 150 mL/hr at 03/22/19 0751    insulin (HUMAN R) non-weight based infusion 9.54 Units/hr (03/22/19 1016)      insulin glargine  96 Units Subcutaneous Nightly    insulin lispro  0-12 Units Subcutaneous TID WC    insulin lispro  0-6 Units Subcutaneous Nightly    metoprolol tartrate  12.5 mg Oral BID    propafenone  150 mg Oral Q8H    sodium chloride  15 mL/kg Intravenous Once    ipratropium-albuterol  1 ampule Inhalation Q4H WA    warfarin (COUMADIN) daily dosing (placeholder)   Other RX Placeholder     acetaminophen, glucose, dextrose, glucagon (rDNA), dextrose, HYDROcodone-acetaminophen, dextrose, potassium chloride, magnesium sulfate, sodium phosphate IVPB **OR** sodium phosphate IVPB **OR** sodium phosphate IVPB, dextrose 5 % and 0.45 % NaCl  Diet NPO Effective Now         Lab and other Data:     Recent Labs     03/21/19  1105 03/22/19  0315   WBC 12.5* 7.8   HGB 15.4 13.9*    263     Recent Labs     03/21/19  2358 03/22/19  0315 03/22/19  0446 03/22/19  0838   * 129*  --  128*   K 3.7 3.6 3.5 3.4*    99  --  98   CO2 16* 16*  --  18*   BUN 20 19  --  15   CREATININE 1.4* 1.4*  --  1.3*   GLUCOSE 202* 230*  --  196*     Recent Labs     03/21/19  1105   AST 11   ALT 8   BILITOT 0.3   ALKPHOS 97     Troponin T:   Recent Labs     03/21/19  1105   TROPONINI 0.08*     Pro-BNP: No results for input(s): BNP in the last 72 hours.   INR:   Recent Labs     03/21/19  1105 03/22/19  0315   INR 4.34* 4.77*     ABGs:   Lab Results   Component Value Date    PHART 7.380 03/22/2019    PO2ART 63.0 03/22/2019    CTD6JHY 31.0 03/22/2019     UA:  Recent Labs     03/21/19  2135   COLORU YELLOW   PHUR 6.0   LABCAST 0-1 Hyaline*   RBCUA 0-1   YEAST Rare*   CLARITYU CLOUDY*   SPECGRAV 1.029   LEUKOCYTESUR Negative   UROBILINOGEN 0.2   BILIRUBINUR MODERATE*   BLOODU Negative   GLUCOSEU >=1000*       Imaging:   XR CHEST PORTABLE   Final Result 1. No acute disease. Signed by Dr Nydia Shrestha on 3/21/2019 11:47 AM        Micro:   Blood Culture, Routine   Date Value Ref Range Status   12/19/2017 No growth after 5 days of incubation. Final   , No components found for: LABURINE  Patient Active Problem List    Diagnosis Date Noted    Diabetic ketoacidosis without coma associated with type 2 diabetes mellitus ([address] 03/21/2019    Hyponatremia 03/21/2019    Hypotension due to hypovolemia 03/21/2019    Acute kidney injury ([address] 03/21/2019    Type 2 diabetes mellitus with diabetic polyneuropathy, with long-term current use of insulin ([address] 03/21/2019    Elevated troponin 03/21/2019    Coagulopathy ([address] 03/21/2019    Acalculous cholecystitis 12/29/2015    Essential hypertension 12/29/2015    Paroxysmal atrial fibrillation (HCC)     Shortness of breath 12/18/2014    Fatigue 12/18/2014    Atrial fibrillation ([address] 12/18/2014    HTN (hypertension)     Hyperlipidemia        Assessment/plan:   Principal Problem:    Diabetic ketoacidosis without coma associated with type 2 diabetes mellitus (HCC)  Active Problems:    Fatigue    Atrial fibrillation (HCC)    Hyponatremia    Hypotension due to hypovolemia    Acute kidney injury ([address]    Type 2 diabetes mellitus with diabetic polyneuropathy, with long-term current use of insulin (HCC)    Elevated troponin    Coagulopathy (HCC)  Resolved Problems:    * No resolved hospital problems. *      Plan:    On DKA protocol, will transition to lantus and SSI when CO2 hits 18 or above  Cont IVF  Currently in afib, cont home meds  Trend labs  Hold coumadin, check INR daily  Check blood cultures currently febrile, tylenol prn  Possibly transfer to floor today once weaned off of drip, await BMP results and clinical course    DVT Prophylaxis: on coumadin currently held      Anusha Johnson MD  Hospitalist Service  3/22/2019  11:04 AM

## 2019-03-23 LAB
ANION GAP SERPL CALCULATED.3IONS-SCNC: 11 MMOL/L (ref 7–19)
ANION GAP SERPL CALCULATED.3IONS-SCNC: 11 MMOL/L (ref 7–19)
ANION GAP SERPL CALCULATED.3IONS-SCNC: 12 MMOL/L (ref 7–19)
ANION GAP SERPL CALCULATED.3IONS-SCNC: 13 MMOL/L (ref 7–19)
ANION GAP SERPL CALCULATED.3IONS-SCNC: 14 MMOL/L (ref 7–19)
ANION GAP SERPL CALCULATED.3IONS-SCNC: 17 MMOL/L (ref 7–19)
BASE EXCESS ARTERIAL: -10 MMOL/L (ref -2–2)
BUN BLDV-MCNC: 17 MG/DL (ref 6–20)
BUN BLDV-MCNC: 18 MG/DL (ref 6–20)
CALCIUM SERPL-MCNC: 8.3 MG/DL (ref 8.6–10)
CALCIUM SERPL-MCNC: 8.5 MG/DL (ref 8.6–10)
CALCIUM SERPL-MCNC: 8.6 MG/DL (ref 8.6–10)
CALCIUM SERPL-MCNC: 8.8 MG/DL (ref 8.6–10)
CARBOXYHEMOGLOBIN ARTERIAL: 2.2 % (ref 0–5)
CHLORIDE BLD-SCNC: 100 MMOL/L (ref 98–111)
CHLORIDE BLD-SCNC: 101 MMOL/L (ref 98–111)
CHLORIDE BLD-SCNC: 102 MMOL/L (ref 98–111)
CHLORIDE BLD-SCNC: 105 MMOL/L (ref 98–111)
CHLORIDE BLD-SCNC: 97 MMOL/L (ref 98–111)
CHLORIDE BLD-SCNC: 98 MMOL/L (ref 98–111)
CO2: 14 MMOL/L (ref 22–29)
CO2: 15 MMOL/L (ref 22–29)
CO2: 15 MMOL/L (ref 22–29)
CO2: 16 MMOL/L (ref 22–29)
CO2: 16 MMOL/L (ref 22–29)
CO2: 18 MMOL/L (ref 22–29)
CREAT SERPL-MCNC: 1.8 MG/DL (ref 0.5–1.2)
CREAT SERPL-MCNC: 1.9 MG/DL (ref 0.5–1.2)
CREAT SERPL-MCNC: 2 MG/DL (ref 0.5–1.2)
CREAT SERPL-MCNC: 2.1 MG/DL (ref 0.5–1.2)
GFR NON-AFRICAN AMERICAN: 33
GFR NON-AFRICAN AMERICAN: 35
GFR NON-AFRICAN AMERICAN: 37
GFR NON-AFRICAN AMERICAN: 39
GLUCOSE BLD-MCNC: 143 MG/DL (ref 70–99)
GLUCOSE BLD-MCNC: 161 MG/DL (ref 74–109)
GLUCOSE BLD-MCNC: 166 MG/DL (ref 70–99)
GLUCOSE BLD-MCNC: 170 MG/DL (ref 70–99)
GLUCOSE BLD-MCNC: 180 MG/DL (ref 70–99)
GLUCOSE BLD-MCNC: 181 MG/DL (ref 70–99)
GLUCOSE BLD-MCNC: 194 MG/DL (ref 70–99)
GLUCOSE BLD-MCNC: 198 MG/DL (ref 70–99)
GLUCOSE BLD-MCNC: 199 MG/DL (ref 74–109)
GLUCOSE BLD-MCNC: 209 MG/DL (ref 70–99)
GLUCOSE BLD-MCNC: 209 MG/DL (ref 70–99)
GLUCOSE BLD-MCNC: 212 MG/DL (ref 70–99)
GLUCOSE BLD-MCNC: 218 MG/DL (ref 74–109)
GLUCOSE BLD-MCNC: 227 MG/DL (ref 70–99)
GLUCOSE BLD-MCNC: 239 MG/DL (ref 70–99)
GLUCOSE BLD-MCNC: 254 MG/DL (ref 70–99)
GLUCOSE BLD-MCNC: 269 MG/DL (ref 70–99)
GLUCOSE BLD-MCNC: 272 MG/DL (ref 74–109)
GLUCOSE BLD-MCNC: 274 MG/DL (ref 74–109)
GLUCOSE BLD-MCNC: 274 MG/DL (ref 74–109)
HCO3 ARTERIAL: 13.3 MMOL/L (ref 22–26)
HCT VFR BLD CALC: 36.8 % (ref 42–52)
HEMOGLOBIN, ART, EXTENDED: 13.7 G/DL (ref 14–18)
HEMOGLOBIN: 12.5 G/DL (ref 14–18)
INR BLD: 3.01 (ref 0.88–1.18)
MAGNESIUM: 1.8 MG/DL (ref 1.6–2.6)
MAGNESIUM: 1.8 MG/DL (ref 1.6–2.6)
MAGNESIUM: 1.9 MG/DL (ref 1.6–2.6)
MAGNESIUM: 1.9 MG/DL (ref 1.6–2.6)
MAGNESIUM: 2 MG/DL (ref 1.6–2.6)
MAGNESIUM: 2 MG/DL (ref 1.6–2.6)
MCH RBC QN AUTO: 31 PG (ref 27–31)
MCHC RBC AUTO-ENTMCNC: 34 G/DL (ref 33–37)
MCV RBC AUTO: 91.3 FL (ref 80–94)
METHEMOGLOBIN ARTERIAL: 0.7 %
O2 CONTENT ARTERIAL: 18.8 ML/DL
O2 SAT, ARTERIAL: 97 %
O2 THERAPY: ABNORMAL
PCO2 ARTERIAL: 23 MMHG (ref 35–45)
PDW BLD-RTO: 12.1 % (ref 11.5–14.5)
PERFORMED ON: ABNORMAL
PH ARTERIAL: 7.37 (ref 7.35–7.45)
PHOSPHORUS: 2.9 MG/DL (ref 2.5–4.5)
PHOSPHORUS: 3.1 MG/DL (ref 2.5–4.5)
PHOSPHORUS: 3.7 MG/DL (ref 2.5–4.5)
PHOSPHORUS: 3.7 MG/DL (ref 2.5–4.5)
PHOSPHORUS: 3.9 MG/DL (ref 2.5–4.5)
PHOSPHORUS: 4.1 MG/DL (ref 2.5–4.5)
PLATELET # BLD: 215 K/UL (ref 130–400)
PMV BLD AUTO: 10.8 FL (ref 9.4–12.4)
PO2 ARTERIAL: 111 MMHG (ref 80–100)
POTASSIUM SERPL-SCNC: 3.8 MMOL/L (ref 3.5–5)
POTASSIUM SERPL-SCNC: 4 MMOL/L (ref 3.5–5)
POTASSIUM SERPL-SCNC: 4 MMOL/L (ref 3.5–5)
POTASSIUM SERPL-SCNC: 4.2 MMOL/L (ref 3.5–5)
POTASSIUM SERPL-SCNC: 4.2 MMOL/L (ref 3.5–5)
POTASSIUM SERPL-SCNC: 4.3 MMOL/L (ref 3.5–5)
POTASSIUM, WHOLE BLOOD: 3.9
PROTHROMBIN TIME: 30.4 SEC (ref 12–14.6)
RBC # BLD: 4.03 M/UL (ref 4.7–6.1)
SODIUM BLD-SCNC: 126 MMOL/L (ref 136–145)
SODIUM BLD-SCNC: 128 MMOL/L (ref 136–145)
SODIUM BLD-SCNC: 129 MMOL/L (ref 136–145)
SODIUM BLD-SCNC: 130 MMOL/L (ref 136–145)
SODIUM BLD-SCNC: 131 MMOL/L (ref 136–145)
SODIUM BLD-SCNC: 131 MMOL/L (ref 136–145)
WBC # BLD: 6.1 K/UL (ref 4.8–10.8)

## 2019-03-23 PROCEDURE — 2000000000 HC ICU R&B

## 2019-03-23 PROCEDURE — 82803 BLOOD GASES ANY COMBINATION: CPT

## 2019-03-23 PROCEDURE — 83735 ASSAY OF MAGNESIUM: CPT

## 2019-03-23 PROCEDURE — 2580000003 HC RX 258: Performed by: HOSPITALIST

## 2019-03-23 PROCEDURE — 99233 SBSQ HOSP IP/OBS HIGH 50: CPT | Performed by: HOSPITALIST

## 2019-03-23 PROCEDURE — 6370000000 HC RX 637 (ALT 250 FOR IP): Performed by: HOSPITALIST

## 2019-03-23 PROCEDURE — 6370000000 HC RX 637 (ALT 250 FOR IP): Performed by: FAMILY MEDICINE

## 2019-03-23 PROCEDURE — 84100 ASSAY OF PHOSPHORUS: CPT

## 2019-03-23 PROCEDURE — 51702 INSERT TEMP BLADDER CATH: CPT

## 2019-03-23 PROCEDURE — 36415 COLL VENOUS BLD VENIPUNCTURE: CPT

## 2019-03-23 PROCEDURE — 82948 REAGENT STRIP/BLOOD GLUCOSE: CPT

## 2019-03-23 PROCEDURE — 85610 PROTHROMBIN TIME: CPT

## 2019-03-23 PROCEDURE — 80048 BASIC METABOLIC PNL TOTAL CA: CPT

## 2019-03-23 PROCEDURE — 94640 AIRWAY INHALATION TREATMENT: CPT

## 2019-03-23 PROCEDURE — 36600 WITHDRAWAL OF ARTERIAL BLOOD: CPT

## 2019-03-23 PROCEDURE — 2500000003 HC RX 250 WO HCPCS: Performed by: HOSPITALIST

## 2019-03-23 PROCEDURE — 85027 COMPLETE CBC AUTOMATED: CPT

## 2019-03-23 PROCEDURE — 6360000002 HC RX W HCPCS: Performed by: FAMILY MEDICINE

## 2019-03-23 PROCEDURE — 84132 ASSAY OF SERUM POTASSIUM: CPT

## 2019-03-23 RX ORDER — POTASSIUM CHLORIDE 7.45 MG/ML
10 INJECTION INTRAVENOUS PRN
Status: DISCONTINUED | OUTPATIENT
Start: 2019-03-23 | End: 2019-03-23

## 2019-03-23 RX ORDER — MAGNESIUM SULFATE 1 G/100ML
1 INJECTION INTRAVENOUS PRN
Status: DISCONTINUED | OUTPATIENT
Start: 2019-03-23 | End: 2019-04-01 | Stop reason: HOSPADM

## 2019-03-23 RX ORDER — SODIUM CHLORIDE 9 MG/ML
INJECTION, SOLUTION INTRAVENOUS CONTINUOUS
Status: DISCONTINUED | OUTPATIENT
Start: 2019-03-23 | End: 2019-03-25

## 2019-03-23 RX ORDER — DEXTROSE MONOHYDRATE 25 G/50ML
12.5 INJECTION, SOLUTION INTRAVENOUS PRN
Status: DISCONTINUED | OUTPATIENT
Start: 2019-03-23 | End: 2019-03-23

## 2019-03-23 RX ORDER — DEXTROSE, SODIUM CHLORIDE, AND POTASSIUM CHLORIDE 5; .45; .15 G/100ML; G/100ML; G/100ML
INJECTION INTRAVENOUS CONTINUOUS PRN
Status: DISCONTINUED | OUTPATIENT
Start: 2019-03-23 | End: 2019-03-25

## 2019-03-23 RX ADMIN — PROPAFENONE HYDROCHLORIDE 150 MG: 150 TABLET, FILM COATED ORAL at 05:58

## 2019-03-23 RX ADMIN — SODIUM CHLORIDE 0.1 UNITS/KG/HR: 9 INJECTION, SOLUTION INTRAVENOUS at 11:45

## 2019-03-23 RX ADMIN — Medication 3 MG: at 23:06

## 2019-03-23 RX ADMIN — ACETAMINOPHEN 650 MG: 325 TABLET, FILM COATED ORAL at 20:01

## 2019-03-23 RX ADMIN — IPRATROPIUM BROMIDE AND ALBUTEROL SULFATE 1 AMPULE: .5; 3 SOLUTION RESPIRATORY (INHALATION) at 18:25

## 2019-03-23 RX ADMIN — SODIUM CHLORIDE: 9 INJECTION, SOLUTION INTRAVENOUS at 10:03

## 2019-03-23 RX ADMIN — POTASSIUM CHLORIDE 10 MEQ: 7.46 INJECTION, SOLUTION INTRAVENOUS at 18:28

## 2019-03-23 RX ADMIN — SODIUM CHLORIDE 4.15 UNITS/HR: 9 INJECTION, SOLUTION INTRAVENOUS at 23:04

## 2019-03-23 RX ADMIN — PROPAFENONE HYDROCHLORIDE 150 MG: 150 TABLET, FILM COATED ORAL at 15:11

## 2019-03-23 RX ADMIN — DEXTROSE, SODIUM CHLORIDE, AND POTASSIUM CHLORIDE: 5; .45; .15 INJECTION INTRAVENOUS at 17:31

## 2019-03-23 RX ADMIN — POTASSIUM CHLORIDE 10 MEQ: 7.46 INJECTION, SOLUTION INTRAVENOUS at 21:06

## 2019-03-23 RX ADMIN — IPRATROPIUM BROMIDE AND ALBUTEROL SULFATE 1 AMPULE: .5; 3 SOLUTION RESPIRATORY (INHALATION) at 14:53

## 2019-03-23 RX ADMIN — IPRATROPIUM BROMIDE AND ALBUTEROL SULFATE 1 AMPULE: .5; 3 SOLUTION RESPIRATORY (INHALATION) at 07:21

## 2019-03-23 RX ADMIN — POTASSIUM CHLORIDE 10 MEQ: 7.46 INJECTION, SOLUTION INTRAVENOUS at 19:58

## 2019-03-23 RX ADMIN — DEXTROSE, SODIUM CHLORIDE, AND POTASSIUM CHLORIDE: 5; .45; .15 INJECTION INTRAVENOUS at 11:05

## 2019-03-23 RX ADMIN — SODIUM CHLORIDE, POTASSIUM CHLORIDE, SODIUM LACTATE AND CALCIUM CHLORIDE: 600; 310; 30; 20 INJECTION, SOLUTION INTRAVENOUS at 05:58

## 2019-03-23 RX ADMIN — ACETAMINOPHEN 650 MG: 325 TABLET, FILM COATED ORAL at 03:54

## 2019-03-23 RX ADMIN — METOPROLOL TARTRATE 12.5 MG: 25 TABLET ORAL at 20:01

## 2019-03-23 RX ADMIN — PROPAFENONE HYDROCHLORIDE 150 MG: 150 TABLET, FILM COATED ORAL at 22:59

## 2019-03-23 RX ADMIN — INSULIN LISPRO 6 UNITS: 100 INJECTION, SOLUTION INTRAVENOUS; SUBCUTANEOUS at 07:26

## 2019-03-23 RX ADMIN — WARFARIN SODIUM 7.5 MG: 5 TABLET ORAL at 17:59

## 2019-03-23 RX ADMIN — METOPROLOL TARTRATE 12.5 MG: 25 TABLET ORAL at 07:29

## 2019-03-23 RX ADMIN — CETIRIZINE HYDROCHLORIDE 10 MG: 10 TABLET, FILM COATED ORAL at 20:01

## 2019-03-23 ASSESSMENT — PAIN SCALES - GENERAL
PAINLEVEL_OUTOF10: 0
PAINLEVEL_OUTOF10: 3
PAINLEVEL_OUTOF10: 0

## 2019-03-23 NOTE — PROGRESS NOTES
Ref Range & Units Status Collected Lab    pH, Arterial 7.370  7.350 - 7.450 Final 03/23/2019  9:53 AM [facility]   pCO2, Arterial 23. 0Low   35.0 - 45.0 mmHg Final 03/23/2019  9:53 AM [facility]   pO2, Arterial 111.0High   80.0 - 100.0 mmHg Final 03/23/2019  9:53 AM [facility]   HCO3, Arterial 13.3Low   22.0 - 26.0 mmol/L Final 03/23/2019  9:53 AM [facility] Excess, Arterial -10.0Low   -2.0 - 2.0 mmol/L Final 03/23/2019  9:53 AM [facility]   Hemoglobin, Art, Extended 13.7Low   14.0 - 18.0 g/dL Final 03/23/2019  9:53 AM [facility]   O2 Sat, Arterial 97.0  >92 % Final 03/23/2019  9:53 AM [facility]   Carboxyhgb, Arterial 2.2  0.0 - 5.0 % Final 03/23/2019  9:53 AM  - Stephan Marisol 55   0.0-1.5   (Smokers 1.5-5.0)    Methemoglobin, Arterial 0.7  <1.5 % Final 03/23/2019  9:53 AM [facility]   O2 Content, Arterial 18.8  Not Established mL/dL Final 03/23/2019  9:53 AM [facility]   O2 Therapy Unknown   Final 03/23/2019  9:53 AM  - [address] Performed By     Adrian Gonzales Name Director Address Valid Date Range   [address] LAB Sandra Muhammad  [facility],[address]  [address] [facility] [place] [address] 08/30/17 0733-Present   Lab and Collection     Blood gas, arterial - 3/23/2019   Result History     Blood gas, arterial on 3/23/2019   Result Information     Flag: Abnormal Status: Final result (Collected: 3/23/2019 09:53) Provider Status: Ordered   Routing History     Priority Sent On From To Message Type    3/21/2019  2:09 PM Suleman, Kyin Incoming Lab Results From [facility], DO    Click to JORGE Bowles [employer] Info     Blood gas, arterial (Order #[id number]) on 3/23/19   Order Report   Pt on RA   RR+  Order Details

## 2019-03-23 NOTE — PROGRESS NOTES
Clinical Pharmacy Note    Warfarin consult follow-up    Recent Labs     03/23/19  0423   INR 3.01*     Recent Labs     03/21/19  1105 03/22/19  0315 03/23/19  0423   HGB 15.4 13.9* 12.5*   HCT 45.1 39.0* 36.8*    263 215       Significant Drug-Drug Interactions:  New warfarin drug-drug interactions: none  Discontinued drug-drug interactions: none    Date INR Warfarin Dose   03/21/19 4.34 Pt took 12.5 mg at home    03/22/19 4.77  Held    03/23/19 3.01  7.5 mg                                     Notes: Will give warfarin 7.5 mg tonight. Daily PT/INR until stable within therapeutic range.      Electronically signed by LISA Hagan [facility] on 3/23/2019 at 6:30 AM

## 2019-03-23 NOTE — PROGRESS NOTES
[facility]      Patient:  Og Torres  YOB: 1965  Date of Service: 3/23/2019  MRN: [id number]   Acct: [de-identified]   Primary Care Physician: Mahnaz Anderson DO  Advance Directive: Full Code  Admit Date: 3/21/2019       Hospital Day: 2    Chief Complaint:   Chief Complaint   Patient presents with    Diabetic Ketoacidosis     pt presents to ED with c/o weakness and hyperglycemia       Subjective: more awake today, looks better    Objective:   VITALS:  BP 99/78   Pulse 98   Temp 98.1 °F (36.7 °C) (Temporal)   Resp 16   Ht 5' 10\" (1.778 m)   Wt 243 lb 14.4 oz (110.6 kg)   SpO2 (!) 89%   BMI 35.00 kg/m²   24HR INTAKE/OUTPUT:      Intake/Output Summary (Last 24 hours) at 3/23/2019 1057  Last data filed at 3/23/2019 1000  Gross per 24 hour   Intake 6516.3 ml   Output 1185 ml   Net 5331.3 ml     Constitutional: Oriented to person, place, and time. Well-developed and well-nourished. No distress. HENT:    Head: Normocephalic and atraumatic. Mouth/Throat: Oropharynx is clear and moist. No oropharyngeal exudate. Eyes: Conjunctivae and EOM are normal. Pupils are equal, round, and reactive to light. No scleral icterus. Neck: Normal range of motion. Neck supple. No JVD present. No tracheal deviation present. No thyromegaly present. Cardiovascular: tachycardic, irregularly irregular  Pulmonary/Chest: Effort normal and breath sounds normal. No stridor. No respiratory distress. No wheezes. No rales. Abdominal: Soft. Bowel sounds are normal. No distension and no mass. There is no tenderness. There is no rebound and no guarding. Musculoskeletal: Normal range of motion. There is no edema or tenderness. Extremities: No edema  Neurological: Alert and oriented to person, place, and time. No cranial nerve deficit. Skin: Skin is warm and dry. No rash noted. Not diaphoretic. No erythema. No pallor. Psychiatric: Normal mood and affect.  Behavior is normal. Judgment and thought content normal. MODERATE*   BLOODU Negative   GLUCOSEU >=1000*       Imaging:   XR CHEST PORTABLE   Final Result   1. No acute disease. Signed by Dr Nydia Shrestha on 3/21/2019 11:47 AM        Micro:   Blood Culture, Routine   Date Value Ref Range Status   12/19/2017 No growth after 5 days of incubation. Final   , No components found for: LABURINE  Patient Active Problem List    Diagnosis Date Noted    Diabetic ketoacidosis without coma associated with type 2 diabetes mellitus ([address] 03/21/2019    Hyponatremia 03/21/2019    Hypotension due to hypovolemia 03/21/2019    Acute kidney injury ([address] 03/21/2019    Type 2 diabetes mellitus with diabetic polyneuropathy, with long-term current use of insulin ([address] 03/21/2019    Elevated troponin 03/21/2019    Coagulopathy ([address] 03/21/2019    Acalculous cholecystitis 12/29/2015    Essential hypertension 12/29/2015    Paroxysmal atrial fibrillation (HCC)     Shortness of breath 12/18/2014    Fatigue 12/18/2014    Atrial fibrillation ([address] 12/18/2014    HTN (hypertension)     Hyperlipidemia        Assessment/plan:   Principal Problem:    Diabetic ketoacidosis without coma associated with type 2 diabetes mellitus (HCC)  Active Problems:    Fatigue    Atrial fibrillation (HCC)    Hyponatremia    Hypotension due to hypovolemia    Acute kidney injury ([address]    Type 2 diabetes mellitus with diabetic polyneuropathy, with long-term current use of insulin (HCC)    Elevated troponin    Coagulopathy (HCC)  Resolved Problems:    * No resolved hospital problems.  *      Plan:   Went back into DKA, CO2 14 will place back on DKA protocol, Cont IVF  Currently in afib, cont home meds  Trend labs  Hold coumadin, check INR daily, trending down  F/U blood cultures currently febrile, tylenol prn    DVT Prophylaxis: on coumadin currently held      Anusha Johnson MD  Hospitalist Service  3/23/2019  10:57 AM

## 2019-03-23 NOTE — PLAN OF CARE
Problem: Falls - Risk of:  Goal: Will remain free from falls  Description  Will remain free from falls  Outcome: Ongoing  Goal: Absence of physical injury  Description  Absence of physical injury  Outcome: Ongoing     Problem: Discharge Planning:  Goal: Discharged to appropriate level of care  Description  Discharged to appropriate level of care  Outcome: Ongoing     Problem: Serum Glucose Level - Abnormal:  Goal: Ability to maintain appropriate glucose levels will improve  Description  Ability to maintain appropriate glucose levels will improve  Outcome: Ongoing     Problem: Sensory Perception - Impaired:  Goal: Ability to maintain a stable neurologic state will improve  Description  Ability to maintain a stable neurologic state will improve  Outcome: Ongoing

## 2019-03-24 LAB
ANION GAP SERPL CALCULATED.3IONS-SCNC: 10 MMOL/L (ref 7–19)
ANION GAP SERPL CALCULATED.3IONS-SCNC: 11 MMOL/L (ref 7–19)
ANION GAP SERPL CALCULATED.3IONS-SCNC: 12 MMOL/L (ref 7–19)
ANION GAP SERPL CALCULATED.3IONS-SCNC: 13 MMOL/L (ref 7–19)
ANION GAP SERPL CALCULATED.3IONS-SCNC: 13 MMOL/L (ref 7–19)
BETA-HYDROXYBUTYRATE: 11.5 MG/DL (ref 0–3)
BUN BLDV-MCNC: 16 MG/DL (ref 6–20)
BUN BLDV-MCNC: 16 MG/DL (ref 6–20)
BUN BLDV-MCNC: 17 MG/DL (ref 6–20)
BUN BLDV-MCNC: 18 MG/DL (ref 6–20)
BUN BLDV-MCNC: 18 MG/DL (ref 6–20)
CALCIUM SERPL-MCNC: 8.3 MG/DL (ref 8.6–10)
CALCIUM SERPL-MCNC: 8.3 MG/DL (ref 8.6–10)
CALCIUM SERPL-MCNC: 8.4 MG/DL (ref 8.6–10)
CALCIUM SERPL-MCNC: 8.6 MG/DL (ref 8.6–10)
CALCIUM SERPL-MCNC: 8.6 MG/DL (ref 8.6–10)
CHLORIDE BLD-SCNC: 100 MMOL/L (ref 98–111)
CHLORIDE BLD-SCNC: 100 MMOL/L (ref 98–111)
CHLORIDE BLD-SCNC: 101 MMOL/L (ref 98–111)
CHLORIDE BLD-SCNC: 101 MMOL/L (ref 98–111)
CHLORIDE BLD-SCNC: 104 MMOL/L (ref 98–111)
CO2: 14 MMOL/L (ref 22–29)
CREAT SERPL-MCNC: 1.7 MG/DL (ref 0.5–1.2)
CREAT SERPL-MCNC: 1.7 MG/DL (ref 0.5–1.2)
CREAT SERPL-MCNC: 1.8 MG/DL (ref 0.5–1.2)
CREAT SERPL-MCNC: 2.1 MG/DL (ref 0.5–1.2)
CREAT SERPL-MCNC: 2.1 MG/DL (ref 0.5–1.2)
GFR NON-AFRICAN AMERICAN: 33
GFR NON-AFRICAN AMERICAN: 33
GFR NON-AFRICAN AMERICAN: 39
GFR NON-AFRICAN AMERICAN: 42
GFR NON-AFRICAN AMERICAN: 42
GLUCOSE BLD-MCNC: 127 MG/DL (ref 70–99)
GLUCOSE BLD-MCNC: 139 MG/DL (ref 70–99)
GLUCOSE BLD-MCNC: 139 MG/DL (ref 70–99)
GLUCOSE BLD-MCNC: 140 MG/DL (ref 70–99)
GLUCOSE BLD-MCNC: 148 MG/DL (ref 70–99)
GLUCOSE BLD-MCNC: 149 MG/DL (ref 70–99)
GLUCOSE BLD-MCNC: 155 MG/DL (ref 74–109)
GLUCOSE BLD-MCNC: 160 MG/DL (ref 70–99)
GLUCOSE BLD-MCNC: 164 MG/DL (ref 70–99)
GLUCOSE BLD-MCNC: 164 MG/DL (ref 74–109)
GLUCOSE BLD-MCNC: 165 MG/DL (ref 70–99)
GLUCOSE BLD-MCNC: 167 MG/DL (ref 70–99)
GLUCOSE BLD-MCNC: 169 MG/DL (ref 70–99)
GLUCOSE BLD-MCNC: 172 MG/DL (ref 70–99)
GLUCOSE BLD-MCNC: 174 MG/DL (ref 70–99)
GLUCOSE BLD-MCNC: 177 MG/DL (ref 70–99)
GLUCOSE BLD-MCNC: 184 MG/DL (ref 70–99)
GLUCOSE BLD-MCNC: 189 MG/DL (ref 70–99)
GLUCOSE BLD-MCNC: 189 MG/DL (ref 70–99)
GLUCOSE BLD-MCNC: 189 MG/DL (ref 74–109)
GLUCOSE BLD-MCNC: 191 MG/DL (ref 70–99)
GLUCOSE BLD-MCNC: 198 MG/DL (ref 70–99)
GLUCOSE BLD-MCNC: 206 MG/DL (ref 70–99)
GLUCOSE BLD-MCNC: 209 MG/DL (ref 74–109)
GLUCOSE BLD-MCNC: 211 MG/DL (ref 70–99)
GLUCOSE BLD-MCNC: 216 MG/DL (ref 70–99)
GLUCOSE BLD-MCNC: 217 MG/DL (ref 70–99)
GLUCOSE BLD-MCNC: 223 MG/DL (ref 70–99)
GLUCOSE BLD-MCNC: 231 MG/DL (ref 74–109)
HCT VFR BLD CALC: 37.7 % (ref 42–52)
HEMOGLOBIN: 12.8 G/DL (ref 14–18)
INR BLD: 3.13 (ref 0.88–1.18)
MAGNESIUM: 1.7 MG/DL (ref 1.6–2.6)
MAGNESIUM: 1.7 MG/DL (ref 1.6–2.6)
MAGNESIUM: 1.8 MG/DL (ref 1.6–2.6)
MCH RBC QN AUTO: 31.2 PG (ref 27–31)
MCHC RBC AUTO-ENTMCNC: 34 G/DL (ref 33–37)
MCV RBC AUTO: 92 FL (ref 80–94)
PDW BLD-RTO: 12.2 % (ref 11.5–14.5)
PERFORMED ON: ABNORMAL
PHOSPHORUS: 2.8 MG/DL (ref 2.5–4.5)
PHOSPHORUS: 2.9 MG/DL (ref 2.5–4.5)
PHOSPHORUS: 3 MG/DL (ref 2.5–4.5)
PHOSPHORUS: 3.3 MG/DL (ref 2.5–4.5)
PHOSPHORUS: 3.5 MG/DL (ref 2.5–4.5)
PLATELET # BLD: 222 K/UL (ref 130–400)
PMV BLD AUTO: 11.5 FL (ref 9.4–12.4)
POTASSIUM SERPL-SCNC: 4.4 MMOL/L (ref 3.5–5)
POTASSIUM SERPL-SCNC: 4.7 MMOL/L (ref 3.5–5)
POTASSIUM SERPL-SCNC: 4.7 MMOL/L (ref 3.5–5)
POTASSIUM SERPL-SCNC: 4.8 MMOL/L (ref 3.5–5)
POTASSIUM SERPL-SCNC: 5 MMOL/L (ref 3.5–5)
PROTHROMBIN TIME: 31.4 SEC (ref 12–14.6)
RBC # BLD: 4.1 M/UL (ref 4.7–6.1)
SODIUM BLD-SCNC: 125 MMOL/L (ref 136–145)
SODIUM BLD-SCNC: 125 MMOL/L (ref 136–145)
SODIUM BLD-SCNC: 127 MMOL/L (ref 136–145)
SODIUM BLD-SCNC: 127 MMOL/L (ref 136–145)
SODIUM BLD-SCNC: 131 MMOL/L (ref 136–145)
WBC # BLD: 6.8 K/UL (ref 4.8–10.8)

## 2019-03-24 PROCEDURE — 2500000003 HC RX 250 WO HCPCS: Performed by: HOSPITALIST

## 2019-03-24 PROCEDURE — 6370000000 HC RX 637 (ALT 250 FOR IP): Performed by: HOSPITALIST

## 2019-03-24 PROCEDURE — 99233 SBSQ HOSP IP/OBS HIGH 50: CPT | Performed by: HOSPITALIST

## 2019-03-24 PROCEDURE — 82948 REAGENT STRIP/BLOOD GLUCOSE: CPT

## 2019-03-24 PROCEDURE — 6360000002 HC RX W HCPCS: Performed by: HOSPITALIST

## 2019-03-24 PROCEDURE — 83735 ASSAY OF MAGNESIUM: CPT

## 2019-03-24 PROCEDURE — 6370000000 HC RX 637 (ALT 250 FOR IP): Performed by: FAMILY MEDICINE

## 2019-03-24 PROCEDURE — 2000000000 HC ICU R&B

## 2019-03-24 PROCEDURE — 80048 BASIC METABOLIC PNL TOTAL CA: CPT

## 2019-03-24 PROCEDURE — 84100 ASSAY OF PHOSPHORUS: CPT

## 2019-03-24 PROCEDURE — 2580000003 HC RX 258: Performed by: HOSPITALIST

## 2019-03-24 PROCEDURE — 94640 AIRWAY INHALATION TREATMENT: CPT

## 2019-03-24 PROCEDURE — 85610 PROTHROMBIN TIME: CPT

## 2019-03-24 PROCEDURE — 85027 COMPLETE CBC AUTOMATED: CPT

## 2019-03-24 PROCEDURE — 36415 COLL VENOUS BLD VENIPUNCTURE: CPT

## 2019-03-24 PROCEDURE — 51702 INSERT TEMP BLADDER CATH: CPT

## 2019-03-24 PROCEDURE — 6360000002 HC RX W HCPCS: Performed by: FAMILY MEDICINE

## 2019-03-24 RX ORDER — ONDANSETRON 2 MG/ML
4 INJECTION INTRAMUSCULAR; INTRAVENOUS EVERY 6 HOURS PRN
Status: DISCONTINUED | OUTPATIENT
Start: 2019-03-24 | End: 2019-03-26

## 2019-03-24 RX ADMIN — ACETAMINOPHEN 650 MG: 325 TABLET, FILM COATED ORAL at 08:35

## 2019-03-24 RX ADMIN — DEXTROSE, SODIUM CHLORIDE, AND POTASSIUM CHLORIDE: 5; .45; .15 INJECTION INTRAVENOUS at 20:56

## 2019-03-24 RX ADMIN — POTASSIUM CHLORIDE 10 MEQ: 7.46 INJECTION, SOLUTION INTRAVENOUS at 21:39

## 2019-03-24 RX ADMIN — ONDANSETRON 4 MG: 2 INJECTION INTRAMUSCULAR; INTRAVENOUS at 18:11

## 2019-03-24 RX ADMIN — POTASSIUM CHLORIDE 10 MEQ: 7.46 INJECTION, SOLUTION INTRAVENOUS at 01:13

## 2019-03-24 RX ADMIN — METOPROLOL TARTRATE 12.5 MG: 25 TABLET ORAL at 20:48

## 2019-03-24 RX ADMIN — POTASSIUM CHLORIDE 10 MEQ: 7.46 INJECTION, SOLUTION INTRAVENOUS at 00:11

## 2019-03-24 RX ADMIN — POTASSIUM CHLORIDE 10 MEQ: 7.46 INJECTION, SOLUTION INTRAVENOUS at 22:39

## 2019-03-24 RX ADMIN — FAMOTIDINE 20 MG: 10 INJECTION INTRAVENOUS at 20:48

## 2019-03-24 RX ADMIN — METOPROLOL TARTRATE 12.5 MG: 25 TABLET ORAL at 08:28

## 2019-03-24 RX ADMIN — DEXTROSE, SODIUM CHLORIDE, AND POTASSIUM CHLORIDE: 5; .45; .15 INJECTION INTRAVENOUS at 00:20

## 2019-03-24 RX ADMIN — PROPAFENONE HYDROCHLORIDE 150 MG: 150 TABLET, FILM COATED ORAL at 06:13

## 2019-03-24 RX ADMIN — FAMOTIDINE 20 MG: 10 INJECTION INTRAVENOUS at 11:19

## 2019-03-24 RX ADMIN — IPRATROPIUM BROMIDE AND ALBUTEROL SULFATE 1 AMPULE: .5; 3 SOLUTION RESPIRATORY (INHALATION) at 06:35

## 2019-03-24 RX ADMIN — PROPAFENONE HYDROCHLORIDE 150 MG: 150 TABLET, FILM COATED ORAL at 13:11

## 2019-03-24 RX ADMIN — POTASSIUM CHLORIDE 10 MEQ: 7.46 INJECTION, SOLUTION INTRAVENOUS at 04:59

## 2019-03-24 RX ADMIN — CETIRIZINE HYDROCHLORIDE 10 MG: 10 TABLET, FILM COATED ORAL at 20:48

## 2019-03-24 RX ADMIN — IPRATROPIUM BROMIDE AND ALBUTEROL SULFATE 1 AMPULE: .5; 3 SOLUTION RESPIRATORY (INHALATION) at 18:44

## 2019-03-24 RX ADMIN — DEXTROSE, SODIUM CHLORIDE, AND POTASSIUM CHLORIDE: 5; .45; .15 INJECTION INTRAVENOUS at 07:07

## 2019-03-24 RX ADMIN — IPRATROPIUM BROMIDE AND ALBUTEROL SULFATE 1 AMPULE: .5; 3 SOLUTION RESPIRATORY (INHALATION) at 23:48

## 2019-03-24 RX ADMIN — POTASSIUM CHLORIDE 10 MEQ: 7.46 INJECTION, SOLUTION INTRAVENOUS at 18:36

## 2019-03-24 RX ADMIN — POTASSIUM CHLORIDE 10 MEQ: 7.46 INJECTION, SOLUTION INTRAVENOUS at 05:59

## 2019-03-24 RX ADMIN — POTASSIUM CHLORIDE 10 MEQ: 7.46 INJECTION, SOLUTION INTRAVENOUS at 18:35

## 2019-03-24 RX ADMIN — POTASSIUM CHLORIDE 10 MEQ: 7.46 INJECTION, SOLUTION INTRAVENOUS at 15:21

## 2019-03-24 RX ADMIN — POTASSIUM CHLORIDE 10 MEQ: 7.46 INJECTION, SOLUTION INTRAVENOUS at 15:19

## 2019-03-24 RX ADMIN — SODIUM CHLORIDE 3.16 UNITS/HR: 9 INJECTION, SOLUTION INTRAVENOUS at 17:24

## 2019-03-24 RX ADMIN — IPRATROPIUM BROMIDE AND ALBUTEROL SULFATE 1 AMPULE: .5; 3 SOLUTION RESPIRATORY (INHALATION) at 10:34

## 2019-03-24 RX ADMIN — POTASSIUM CHLORIDE 10 MEQ: 7.46 INJECTION, SOLUTION INTRAVENOUS at 11:25

## 2019-03-24 RX ADMIN — DEXTROSE, SODIUM CHLORIDE, AND POTASSIUM CHLORIDE: 5; .45; .15 INJECTION INTRAVENOUS at 13:11

## 2019-03-24 RX ADMIN — PROPAFENONE HYDROCHLORIDE 150 MG: 150 TABLET, FILM COATED ORAL at 20:47

## 2019-03-24 RX ADMIN — ACETAMINOPHEN 650 MG: 325 TABLET, FILM COATED ORAL at 03:09

## 2019-03-24 RX ADMIN — POTASSIUM CHLORIDE 10 MEQ: 7.46 INJECTION, SOLUTION INTRAVENOUS at 11:16

## 2019-03-24 ASSESSMENT — PAIN SCALES - GENERAL
PAINLEVEL_OUTOF10: 0
PAINLEVEL_OUTOF10: 0
PAINLEVEL_OUTOF10: 1
PAINLEVEL_OUTOF10: 0

## 2019-03-24 NOTE — PROGRESS NOTES
[id number] [facility]      Patient:  Hans Arnold  YOB: 1965  Date of Service: 3/24/2019  MRN: [id number]   Acct: [de-identified]   Primary Care Physician: Consuelo Huggins DO  Advance Directive: Full Code  Admit Date: 3/21/2019       Hospital Day: 3    Chief Complaint:   Chief Complaint   Patient presents with    Diabetic Ketoacidosis     pt presents to ED with c/o weakness and hyperglycemia       Subjective: feeling better    Objective:   VITALS:  /77   Pulse 107   Temp 100 °F (37.8 °C) (Temporal)   Resp 19   Ht 5' 10\" (1.778 m)   Wt 251 lb 12.8 oz (114.2 kg)   SpO2 95%   BMI 36.13 kg/m²   24HR INTAKE/OUTPUT:      Intake/Output Summary (Last 24 hours) at 3/24/2019 1037  Last data filed at 3/24/2019 0800  Gross per 24 hour   Intake 4232.09 ml   Output 1170 ml   Net 3062.09 ml     Constitutional: Oriented to person, place, and time. Well-developed and well-nourished. No distress. HENT:    Head: Normocephalic and atraumatic. Mouth/Throat: Oropharynx is clear and moist. No oropharyngeal exudate. Eyes: Conjunctivae and EOM are normal. Pupils are equal, round, and reactive to light. No scleral icterus. Neck: Normal range of motion. Neck supple. No JVD present. No tracheal deviation present. No thyromegaly present. Cardiovascular: tachycardic, irregularly irregular  Pulmonary/Chest: Effort normal and breath sounds normal. No stridor. No respiratory distress. No wheezes. No rales. Abdominal: Soft. Bowel sounds are normal. No distension and no mass. There is no tenderness. There is no rebound and no guarding. Musculoskeletal: Normal range of motion. There is no edema or tenderness. Extremities: No edema  Neurological: Alert and oriented to person, place, and time. No cranial nerve deficit. Skin: Skin is warm and dry. No rash noted. Not diaphoretic. No erythema. No pallor. Psychiatric: Normal mood and affect.  Behavior is normal. Judgment and thought content normal.     Medications:  dextrose 5% and 0.45% NaCl with KCl 20 mEq 150 mL/hr at 03/24/19 0707    sodium chloride Stopped (03/23/19 1107)    insulin (HUMAN R) non-weight based infusion 7.55 Units/hr (03/24/19 1011)    dextrose        cetirizine  10 mg Oral Nightly    metoprolol tartrate  12.5 mg Oral BID    propafenone  150 mg Oral Q8H    ipratropium-albuterol  1 ampule Inhalation Q4H WA    warfarin (COUMADIN) daily dosing (placeholder)   Other RX Placeholder     magnesium sulfate, sodium phosphate IVPB **OR** sodium phosphate IVPB **OR** sodium phosphate IVPB, dextrose 5% and 0.45% NaCl with KCl 20 mEq, acetaminophen, melatonin, glucose, dextrose, glucagon (rDNA), dextrose, HYDROcodone-acetaminophen, dextrose, potassium chloride  Diet NPO Effective Now         Lab and other Data:     Recent Labs     03/22/19  0315 03/23/19  0423 03/24/19  0303   WBC 7.8 6.1 6.8   HGB 13.9* 12.5* 12.8*    215 222     Recent Labs     03/23/19  2320 03/24/19  0303 03/24/19  0735   * 131* 127*   K 4.3 4.7 4.7    104 100   CO2 15* 14* 14*   BUN 17 18 18   CREATININE 2.1* 2.1* 2.1*   GLUCOSE 161* 164* 209*     Recent Labs     03/21/19  1105   AST 11   ALT 8   BILITOT 0.3   ALKPHOS 97     Troponin T:   Recent Labs     03/21/19  1105   TROPONINI 0.08*     Pro-BNP: No results for input(s): BNP in the last 72 hours. INR:   Recent Labs     03/22/19  0315 03/23/19  0423 03/24/19  0303   INR 4.77* 3.01* 3.13*     ABGs:   Lab Results   Component Value Date    PHART 7.370 03/23/2019    PO2ART 111.0 03/23/2019    BAP5DGY 23.0 03/23/2019     UA:  Recent Labs     03/21/19  2135   COLORU YELLOW   PHUR 6.0   LABCAST 0-1 Hyaline*   RBCUA 0-1   YEAST Rare*   CLARITYU CLOUDY*   SPECGRAV 1.029   LEUKOCYTESUR Negative   UROBILINOGEN 0.2   BILIRUBINUR MODERATE*   BLOODU Negative   GLUCOSEU >=1000*       Imaging:   XR CHEST PORTABLE   Final Result   1. No acute disease.    Signed by Dr Amy Riley on 3/21/2019 11:47 AM        Micro:   Blood Culture, Routine   Date Value Ref Range Status   03/22/2019   Preliminary    No Growth to date. Any change in status will be called.   , No components found for: LABURINE  Patient Active Problem List    Diagnosis Date Noted    Diabetic ketoacidosis without coma associated with type 2 diabetes mellitus ([address] 03/21/2019    Hyponatremia 03/21/2019    Hypotension due to hypovolemia 03/21/2019    Acute kidney injury ([address] 03/21/2019    Type 2 diabetes mellitus with diabetic polyneuropathy, with long-term current use of insulin ([address] 03/21/2019    Elevated troponin 03/21/2019    Coagulopathy ([address] 03/21/2019    Acalculous cholecystitis 12/29/2015    Essential hypertension 12/29/2015    Paroxysmal atrial fibrillation (HCC)     Shortness of breath 12/18/2014    Fatigue 12/18/2014    Atrial fibrillation ([address] 12/18/2014    HTN (hypertension)     Hyperlipidemia        Assessment/plan:   Principal Problem:    Diabetic ketoacidosis without coma associated with type 2 diabetes mellitus (HCC)  Active Problems:    Fatigue    Atrial fibrillation (HCC)    Hyponatremia    Hypotension due to hypovolemia    Acute kidney injury ([address]    Type 2 diabetes mellitus with diabetic polyneuropathy, with long-term current use of insulin (HCC)    Elevated troponin    Coagulopathy (HCC)  Resolved Problems:    * No resolved hospital problems.  *      Plan:   Cont with DKA protocol with IVF and insulin gtt  Was off of insulin for many months before admission  Replete electrolytes  Currently in afib, cont home meds  Trend labs  Hold coumadin, check INR daily, trending down   blood cultures NG, currently afebrile, tylenol prn    DVT Prophylaxis: on coumadin currently held      Anastacio Reed MD  Hospitalist Service  3/24/2019  10:37 AM

## 2019-03-24 NOTE — PROGRESS NOTES
Clinical Pharmacy Note    Warfarin consult follow-up    Recent Labs     03/24/19  0303   INR 3.13*     Recent Labs     03/22/19  0315 03/23/19  0423 03/24/19  0303   HGB 13.9* 12.5* 12.8*   HCT 39.0* 36.8* 37.7*    215 222       Significant Drug-Drug Interactions:  New warfarin drug-drug interactions: none  Discontinued drug-drug interactions: none    Date INR Warfarin Dose   03/21/19 4.34 Pt took 12.5 mg at home    03/22/19 4.77  Held    03/23/19 3.01  7.5 mg    03/24/19 3.13  Held                              Notes: Will hold warfarin today. Daily PT/INR until stable within therapeutic range.      Electronically signed by Makayla Tafoya, [facility] on 3/24/2019 at 6:36 AM

## 2019-03-24 NOTE — PLAN OF CARE
Problem: Falls - Risk of:  Goal: Will remain free from falls  Description  Will remain free from falls  Outcome: Not Met This Shift  Goal: Absence of physical injury  Description  Absence of physical injury  Outcome: Not Met This Shift     Problem: Discharge Planning:  Goal: Discharged to appropriate level of care  Description  Discharged to appropriate level of care  Outcome: Not Met This Shift     Problem: Serum Glucose Level - Abnormal:  Goal: Ability to maintain appropriate glucose levels will improve  Description  Ability to maintain appropriate glucose levels will improve  Outcome: Not Met This Shift     Problem: Sensory Perception - Impaired:  Goal: Ability to maintain a stable neurologic state will improve  Description  Ability to maintain a stable neurologic state will improve  Outcome: Not Met This Shift

## 2019-03-25 ENCOUNTER — APPOINTMENT (OUTPATIENT)
Dept: ULTRASOUND IMAGING | Age: 54
DRG: 638 | End: 2019-03-25
Payer: MEDICARE

## 2019-03-25 ENCOUNTER — APPOINTMENT (OUTPATIENT)
Dept: GENERAL RADIOLOGY | Age: 54
DRG: 638 | End: 2019-03-25
Payer: MEDICARE

## 2019-03-25 LAB
ANION GAP SERPL CALCULATED.3IONS-SCNC: 10 MMOL/L (ref 7–19)
ANION GAP SERPL CALCULATED.3IONS-SCNC: 10 MMOL/L (ref 7–19)
ANION GAP SERPL CALCULATED.3IONS-SCNC: 11 MMOL/L (ref 7–19)
ANION GAP SERPL CALCULATED.3IONS-SCNC: 18 MMOL/L (ref 7–19)
ANION GAP SERPL CALCULATED.3IONS-SCNC: 8 MMOL/L (ref 7–19)
BASE EXCESS ARTERIAL: -14.8 MMOL/L (ref -2–2)
BUN BLDV-MCNC: 16 MG/DL (ref 6–20)
BUN BLDV-MCNC: 17 MG/DL (ref 6–20)
BUN BLDV-MCNC: 17 MG/DL (ref 6–20)
BUN BLDV-MCNC: 18 MG/DL (ref 6–20)
BUN BLDV-MCNC: 19 MG/DL (ref 6–20)
CALCIUM SERPL-MCNC: 8.1 MG/DL (ref 8.6–10)
CALCIUM SERPL-MCNC: 8.2 MG/DL (ref 8.6–10)
CALCIUM SERPL-MCNC: 8.3 MG/DL (ref 8.6–10)
CALCIUM SERPL-MCNC: 8.5 MG/DL (ref 8.6–10)
CALCIUM SERPL-MCNC: 8.7 MG/DL (ref 8.6–10)
CARBOXYHEMOGLOBIN ARTERIAL: 1.7 % (ref 0–5)
CHLORIDE BLD-SCNC: 100 MMOL/L (ref 98–111)
CHLORIDE BLD-SCNC: 101 MMOL/L (ref 98–111)
CHLORIDE BLD-SCNC: 101 MMOL/L (ref 98–111)
CHLORIDE BLD-SCNC: 105 MMOL/L (ref 98–111)
CHLORIDE BLD-SCNC: 99 MMOL/L (ref 98–111)
CO2: 14 MMOL/L (ref 22–29)
CO2: 15 MMOL/L (ref 22–29)
CO2: 17 MMOL/L (ref 22–29)
CO2: 20 MMOL/L (ref 22–29)
CO2: 8 MMOL/L (ref 22–29)
CREAT SERPL-MCNC: 1.6 MG/DL (ref 0.5–1.2)
CREAT SERPL-MCNC: 1.7 MG/DL (ref 0.5–1.2)
CREATININE URINE: 66 MG/DL (ref 4.2–622)
GFR NON-AFRICAN AMERICAN: 42
GFR NON-AFRICAN AMERICAN: 45
GLUCOSE BLD-MCNC: 123 MG/DL (ref 70–99)
GLUCOSE BLD-MCNC: 128 MG/DL (ref 70–99)
GLUCOSE BLD-MCNC: 130 MG/DL (ref 74–109)
GLUCOSE BLD-MCNC: 141 MG/DL (ref 70–99)
GLUCOSE BLD-MCNC: 141 MG/DL (ref 70–99)
GLUCOSE BLD-MCNC: 149 MG/DL (ref 70–99)
GLUCOSE BLD-MCNC: 161 MG/DL (ref 70–99)
GLUCOSE BLD-MCNC: 186 MG/DL (ref 70–99)
GLUCOSE BLD-MCNC: 194 MG/DL (ref 70–99)
GLUCOSE BLD-MCNC: 198 MG/DL (ref 70–99)
GLUCOSE BLD-MCNC: 208 MG/DL (ref 70–99)
GLUCOSE BLD-MCNC: 209 MG/DL (ref 70–99)
GLUCOSE BLD-MCNC: 211 MG/DL (ref 74–109)
GLUCOSE BLD-MCNC: 216 MG/DL (ref 70–99)
GLUCOSE BLD-MCNC: 221 MG/DL (ref 74–109)
GLUCOSE BLD-MCNC: 227 MG/DL (ref 70–99)
GLUCOSE BLD-MCNC: 231 MG/DL (ref 70–99)
GLUCOSE BLD-MCNC: 232 MG/DL (ref 74–109)
GLUCOSE BLD-MCNC: 260 MG/DL (ref 74–109)
HCO3 ARTERIAL: 9.6 MMOL/L (ref 22–26)
HCT VFR BLD CALC: 38.2 % (ref 42–52)
HEMOGLOBIN, ART, EXTENDED: 13 G/DL (ref 14–18)
HEMOGLOBIN: 13.2 G/DL (ref 14–18)
INR BLD: 4.88 (ref 0.88–1.18)
LACTIC ACID: 1.6 MMOL/L (ref 0.5–1.9)
LACTOFERRIN, FECAL: POSITIVE
MAGNESIUM: 1.6 MG/DL (ref 1.6–2.6)
MAGNESIUM: 1.6 MG/DL (ref 1.6–2.6)
MAGNESIUM: 1.7 MG/DL (ref 1.6–2.6)
MAGNESIUM: 1.8 MG/DL (ref 1.6–2.6)
MAGNESIUM: 2.1 MG/DL (ref 1.6–2.6)
MCH RBC QN AUTO: 31.3 PG (ref 27–31)
MCHC RBC AUTO-ENTMCNC: 34.6 G/DL (ref 33–37)
MCV RBC AUTO: 90.5 FL (ref 80–94)
METHEMOGLOBIN ARTERIAL: 1.1 %
MICROALBUMIN UR-MCNC: 5.2 MG/DL (ref 0–19)
MICROALBUMIN/CREAT UR-RTO: 78.8 MG/G
O2 CONTENT ARTERIAL: 17.4 ML/DL
O2 SAT, ARTERIAL: 95.1 %
O2 THERAPY: ABNORMAL
PCO2 ARTERIAL: 20 MMHG (ref 35–45)
PDW BLD-RTO: 12.4 % (ref 11.5–14.5)
PERFORMED ON: ABNORMAL
PH ARTERIAL: 7.29 (ref 7.35–7.45)
PHOSPHORUS: 2.3 MG/DL (ref 2.5–4.5)
PHOSPHORUS: 3 MG/DL (ref 2.5–4.5)
PHOSPHORUS: 3.2 MG/DL (ref 2.5–4.5)
PHOSPHORUS: 3.3 MG/DL (ref 2.5–4.5)
PHOSPHORUS: 3.7 MG/DL (ref 2.5–4.5)
PLATELET # BLD: 205 K/UL (ref 130–400)
PMV BLD AUTO: 11.7 FL (ref 9.4–12.4)
PO2 ARTERIAL: 79 MMHG (ref 80–100)
POTASSIUM SERPL-SCNC: 4.3 MMOL/L (ref 3.5–5)
POTASSIUM SERPL-SCNC: 4.5 MMOL/L (ref 3.5–5)
POTASSIUM SERPL-SCNC: 5.4 MMOL/L (ref 3.5–5)
POTASSIUM SERPL-SCNC: 5.4 MMOL/L (ref 3.5–5)
POTASSIUM SERPL-SCNC: 6.4 MMOL/L (ref 3.5–5)
POTASSIUM, WHOLE BLOOD: 5.5
PROTHROMBIN TIME: 44.8 SEC (ref 12–14.6)
RBC # BLD: 4.22 M/UL (ref 4.7–6.1)
SODIUM BLD-SCNC: 126 MMOL/L (ref 136–145)
SODIUM BLD-SCNC: 127 MMOL/L (ref 136–145)
SODIUM BLD-SCNC: 127 MMOL/L (ref 136–145)
SODIUM BLD-SCNC: 128 MMOL/L (ref 136–145)
SODIUM BLD-SCNC: 129 MMOL/L (ref 136–145)
SODIUM URINE: 29 MMOL/L
WBC # BLD: 7.1 K/UL (ref 4.8–10.8)

## 2019-03-25 PROCEDURE — 85027 COMPLETE CBC AUTOMATED: CPT

## 2019-03-25 PROCEDURE — 36415 COLL VENOUS BLD VENIPUNCTURE: CPT

## 2019-03-25 PROCEDURE — 87427 SHIGA-LIKE TOXIN AG IA: CPT

## 2019-03-25 PROCEDURE — 2500000003 HC RX 250 WO HCPCS: Performed by: INTERNAL MEDICINE

## 2019-03-25 PROCEDURE — 84100 ASSAY OF PHOSPHORUS: CPT

## 2019-03-25 PROCEDURE — 82803 BLOOD GASES ANY COMBINATION: CPT

## 2019-03-25 PROCEDURE — 6370000000 HC RX 637 (ALT 250 FOR IP): Performed by: FAMILY MEDICINE

## 2019-03-25 PROCEDURE — 82043 UR ALBUMIN QUANTITATIVE: CPT

## 2019-03-25 PROCEDURE — 76937 US GUIDE VASCULAR ACCESS: CPT

## 2019-03-25 PROCEDURE — 02HV33Z INSERTION OF INFUSION DEVICE INTO SUPERIOR VENA CAVA, PERCUTANEOUS APPROACH: ICD-10-PCS | Performed by: HOSPITALIST

## 2019-03-25 PROCEDURE — 71045 X-RAY EXAM CHEST 1 VIEW: CPT

## 2019-03-25 PROCEDURE — 85610 PROTHROMBIN TIME: CPT

## 2019-03-25 PROCEDURE — 80048 BASIC METABOLIC PNL TOTAL CA: CPT

## 2019-03-25 PROCEDURE — 2500000003 HC RX 250 WO HCPCS: Performed by: HOSPITALIST

## 2019-03-25 PROCEDURE — 82948 REAGENT STRIP/BLOOD GLUCOSE: CPT

## 2019-03-25 PROCEDURE — 83735 ASSAY OF MAGNESIUM: CPT

## 2019-03-25 PROCEDURE — 83605 ASSAY OF LACTIC ACID: CPT

## 2019-03-25 PROCEDURE — 2580000003 HC RX 258: Performed by: HOSPITALIST

## 2019-03-25 PROCEDURE — 87045 FECES CULTURE AEROBIC BACT: CPT

## 2019-03-25 PROCEDURE — 87186 SC STD MICRODIL/AGAR DIL: CPT

## 2019-03-25 PROCEDURE — 97530 THERAPEUTIC ACTIVITIES: CPT

## 2019-03-25 PROCEDURE — 6370000000 HC RX 637 (ALT 250 FOR IP): Performed by: INTERNAL MEDICINE

## 2019-03-25 PROCEDURE — 99291 CRITICAL CARE FIRST HOUR: CPT | Performed by: INTERNAL MEDICINE

## 2019-03-25 PROCEDURE — 36600 WITHDRAWAL OF ARTERIAL BLOOD: CPT

## 2019-03-25 PROCEDURE — 87046 STOOL CULTR AEROBIC BACT EA: CPT

## 2019-03-25 PROCEDURE — 6360000002 HC RX W HCPCS: Performed by: HOSPITALIST

## 2019-03-25 PROCEDURE — 82570 ASSAY OF URINE CREATININE: CPT

## 2019-03-25 PROCEDURE — 76770 US EXAM ABDO BACK WALL COMP: CPT

## 2019-03-25 PROCEDURE — 6370000000 HC RX 637 (ALT 250 FOR IP): Performed by: HOSPITALIST

## 2019-03-25 PROCEDURE — C1751 CATH, INF, PER/CENT/MIDLINE: HCPCS

## 2019-03-25 PROCEDURE — 84300 ASSAY OF URINE SODIUM: CPT

## 2019-03-25 PROCEDURE — 83630 LACTOFERRIN FECAL (QUAL): CPT

## 2019-03-25 PROCEDURE — 87899 AGENT NOS ASSAY W/OPTIC: CPT

## 2019-03-25 PROCEDURE — 6360000002 HC RX W HCPCS: Performed by: INTERNAL MEDICINE

## 2019-03-25 PROCEDURE — 97162 PT EVAL MOD COMPLEX 30 MIN: CPT

## 2019-03-25 PROCEDURE — 93005 ELECTROCARDIOGRAM TRACING: CPT

## 2019-03-25 PROCEDURE — 2580000003 HC RX 258: Performed by: INTERNAL MEDICINE

## 2019-03-25 PROCEDURE — 87015 SPECIMEN INFECT AGNT CONCNTJ: CPT

## 2019-03-25 PROCEDURE — 84132 ASSAY OF SERUM POTASSIUM: CPT

## 2019-03-25 PROCEDURE — 36569 INSJ PICC 5 YR+ W/O IMAGING: CPT

## 2019-03-25 PROCEDURE — 2000000000 HC ICU R&B

## 2019-03-25 PROCEDURE — 94640 AIRWAY INHALATION TREATMENT: CPT

## 2019-03-25 RX ORDER — FUROSEMIDE 10 MG/ML
40 INJECTION INTRAMUSCULAR; INTRAVENOUS ONCE
Status: COMPLETED | OUTPATIENT
Start: 2019-03-25 | End: 2019-03-25

## 2019-03-25 RX ORDER — CIPROFLOXACIN 2 MG/ML
400 INJECTION, SOLUTION INTRAVENOUS EVERY 12 HOURS
Status: DISCONTINUED | OUTPATIENT
Start: 2019-03-25 | End: 2019-03-27

## 2019-03-25 RX ORDER — DEXTROSE, SODIUM CHLORIDE, SODIUM LACTATE, POTASSIUM CHLORIDE, AND CALCIUM CHLORIDE 5; .6; .31; .03; .02 G/100ML; G/100ML; G/100ML; G/100ML; G/100ML
INJECTION, SOLUTION INTRAVENOUS CONTINUOUS
Status: DISCONTINUED | OUTPATIENT
Start: 2019-03-25 | End: 2019-03-25

## 2019-03-25 RX ORDER — DEXTROSE AND SODIUM CHLORIDE 5; .9 G/100ML; G/100ML
INJECTION, SOLUTION INTRAVENOUS CONTINUOUS
Status: DISCONTINUED | OUTPATIENT
Start: 2019-03-25 | End: 2019-03-25

## 2019-03-25 RX ORDER — LIDOCAINE HYDROCHLORIDE 10 MG/ML
5 INJECTION, SOLUTION EPIDURAL; INFILTRATION; INTRACAUDAL; PERINEURAL ONCE
Status: DISCONTINUED | OUTPATIENT
Start: 2019-03-25 | End: 2019-04-01 | Stop reason: HOSPADM

## 2019-03-25 RX ORDER — SODIUM CHLORIDE 0.9 % (FLUSH) 0.9 %
10 SYRINGE (ML) INJECTION PRN
Status: DISCONTINUED | OUTPATIENT
Start: 2019-03-25 | End: 2019-04-01 | Stop reason: HOSPADM

## 2019-03-25 RX ORDER — SODIUM CHLORIDE, SODIUM LACTATE, POTASSIUM CHLORIDE, CALCIUM CHLORIDE 600; 310; 30; 20 MG/100ML; MG/100ML; MG/100ML; MG/100ML
INJECTION, SOLUTION INTRAVENOUS CONTINUOUS
Status: DISCONTINUED | OUTPATIENT
Start: 2019-03-25 | End: 2019-03-25

## 2019-03-25 RX ORDER — LOPERAMIDE HYDROCHLORIDE 2 MG/1
2 CAPSULE ORAL 4 TIMES DAILY PRN
Status: DISCONTINUED | OUTPATIENT
Start: 2019-03-25 | End: 2019-04-01 | Stop reason: HOSPADM

## 2019-03-25 RX ORDER — INSULIN GLARGINE 100 [IU]/ML
20 INJECTION, SOLUTION SUBCUTANEOUS NIGHTLY
Status: DISCONTINUED | OUTPATIENT
Start: 2019-03-25 | End: 2019-03-25

## 2019-03-25 RX ORDER — SODIUM CHLORIDE 0.9 % (FLUSH) 0.9 %
10 SYRINGE (ML) INJECTION EVERY 12 HOURS SCHEDULED
Status: DISCONTINUED | OUTPATIENT
Start: 2019-03-25 | End: 2019-04-01 | Stop reason: HOSPADM

## 2019-03-25 RX ORDER — BENZONATATE 100 MG/1
100 CAPSULE ORAL 3 TIMES DAILY PRN
Status: DISCONTINUED | OUTPATIENT
Start: 2019-03-25 | End: 2019-04-01 | Stop reason: HOSPADM

## 2019-03-25 RX ADMIN — SODIUM CHLORIDE 11.69 UNITS/HR: 9 INJECTION, SOLUTION INTRAVENOUS at 16:43

## 2019-03-25 RX ADMIN — IPRATROPIUM BROMIDE AND ALBUTEROL SULFATE 1 AMPULE: .5; 3 SOLUTION RESPIRATORY (INHALATION) at 19:21

## 2019-03-25 RX ADMIN — LOPERAMIDE HYDROCHLORIDE 2 MG: 2 CAPSULE ORAL at 09:46

## 2019-03-25 RX ADMIN — HYDROCODONE BITARTRATE AND ACETAMINOPHEN 1 TABLET: 10; 325 TABLET ORAL at 09:46

## 2019-03-25 RX ADMIN — CIPROFLOXACIN 400 MG: 2 INJECTION, SOLUTION INTRAVENOUS at 12:49

## 2019-03-25 RX ADMIN — Medication 10 ML: at 21:08

## 2019-03-25 RX ADMIN — PROPAFENONE HYDROCHLORIDE 150 MG: 150 TABLET, FILM COATED ORAL at 07:15

## 2019-03-25 RX ADMIN — SODIUM BICARBONATE 50 MEQ: 84 INJECTION INTRAVENOUS at 09:43

## 2019-03-25 RX ADMIN — SODIUM BICARBONATE: 84 INJECTION, SOLUTION INTRAVENOUS at 23:10

## 2019-03-25 RX ADMIN — IPRATROPIUM BROMIDE AND ALBUTEROL SULFATE 1 AMPULE: .5; 3 SOLUTION RESPIRATORY (INHALATION) at 10:55

## 2019-03-25 RX ADMIN — IPRATROPIUM BROMIDE AND ALBUTEROL SULFATE 1 AMPULE: .5; 3 SOLUTION RESPIRATORY (INHALATION) at 06:54

## 2019-03-25 RX ADMIN — METOPROLOL TARTRATE 12.5 MG: 25 TABLET ORAL at 07:15

## 2019-03-25 RX ADMIN — BENZONATATE 100 MG: 100 CAPSULE ORAL at 09:46

## 2019-03-25 RX ADMIN — METRONIDAZOLE 500 MG: 500 INJECTION, SOLUTION INTRAVENOUS at 14:05

## 2019-03-25 RX ADMIN — LOPERAMIDE HYDROCHLORIDE 2 MG: 2 CAPSULE ORAL at 18:27

## 2019-03-25 RX ADMIN — METOPROLOL TARTRATE 25 MG: 25 TABLET ORAL at 21:07

## 2019-03-25 RX ADMIN — MAGNESIUM SULFATE HEPTAHYDRATE 1 G: 1 INJECTION, SOLUTION INTRAVENOUS at 18:20

## 2019-03-25 RX ADMIN — METRONIDAZOLE 500 MG: 500 INJECTION, SOLUTION INTRAVENOUS at 21:07

## 2019-03-25 RX ADMIN — FAMOTIDINE 20 MG: 10 INJECTION INTRAVENOUS at 07:16

## 2019-03-25 RX ADMIN — DEXTROSE, SODIUM CHLORIDE, AND POTASSIUM CHLORIDE: 5; .45; .15 INJECTION INTRAVENOUS at 02:37

## 2019-03-25 RX ADMIN — FAMOTIDINE 20 MG: 10 INJECTION INTRAVENOUS at 21:07

## 2019-03-25 RX ADMIN — PROPAFENONE HYDROCHLORIDE 150 MG: 150 TABLET, FILM COATED ORAL at 21:07

## 2019-03-25 RX ADMIN — CETIRIZINE HYDROCHLORIDE 10 MG: 10 TABLET, FILM COATED ORAL at 21:07

## 2019-03-25 RX ADMIN — SODIUM PHOSPHATE, MONOBASIC, MONOHYDRATE 10 MMOL: 276; 142 INJECTION, SOLUTION INTRAVENOUS at 05:18

## 2019-03-25 RX ADMIN — FUROSEMIDE 40 MG: 10 INJECTION, SOLUTION INTRAMUSCULAR; INTRAVENOUS at 09:46

## 2019-03-25 RX ADMIN — SODIUM BICARBONATE: 84 INJECTION, SOLUTION INTRAVENOUS at 10:32

## 2019-03-25 RX ADMIN — IPRATROPIUM BROMIDE AND ALBUTEROL SULFATE 1 AMPULE: .5; 3 SOLUTION RESPIRATORY (INHALATION) at 14:47

## 2019-03-25 RX ADMIN — PROPAFENONE HYDROCHLORIDE 150 MG: 150 TABLET, FILM COATED ORAL at 15:39

## 2019-03-25 RX ADMIN — SODIUM BICARBONATE: 84 INJECTION, SOLUTION INTRAVENOUS at 22:20

## 2019-03-25 ASSESSMENT — PAIN SCALES - GENERAL
PAINLEVEL_OUTOF10: 0
PAINLEVEL_OUTOF10: 9
PAINLEVEL_OUTOF10: 0

## 2019-03-25 NOTE — PROGRESS NOTES
3/25/2019  9:33 AM - Bogdan Shell Incoming Lab Results From [facility]     Component Value Ref Range & Units Status Collected Lab   pH, Arterial 7.290Low Panic   7.350 - 7.450 Final 03/25/2019  9:31 AM [facility]   pCO2, Arterial 20. 0Low Panic   35.0 - 45.0 mmHg Final 03/25/2019  9:31 AM [facility]   pO2, Arterial 79. 0Low   80.0 - 100.0 mmHg Final 03/25/2019  9:31 AM [facility]   HCO3, Arterial 9.6Low   22.0 - 26.0 mmol/L Final 03/25/2019  9:31 AM [facility] Excess, Arterial -14. 8Low   -2.0 - 2.0 mmol/L Final 03/25/2019  9:31 AM [facility]   Hemoglobin, Art, Extended 13. 0Low   14.0 - 18.0 g/dL Final 03/25/2019  9:31 AM [facility]   O2 Sat, Arterial 95.1  >92 % Final 03/25/2019  9:31 AM [facility]   Carboxyhgb, Arterial 1.7  0.0 - 5.0 % Final 03/25/2019  9:31 AM [facility]        0.0-1.5   (Smokers 1.5-5.0)    Methemoglobin, Arterial 1.1  <1.5 % Final 03/25/2019  9:31 AM [facility]   O2 Content, Arterial 17.4  Not Established mL/dL Final 03/25/2019  9:31 AM [facility]   O2 Therapy Unknown   Final 03/25/2019  9:31 AM  - [address] Performed By     [id number]ra Gonzales Name Director Address Valid Date Range   [address] LAB Anjana Maria  [place],[address]  [address] [facility] [place] [address] 08/[birth year] 0733-Present   Narrative   Performed by: [facility]   CALL  Armas  [facility] ,  april catie contreras, 03/25/2019 09:33, by DELL     Pt on room air, rr, AT+

## 2019-03-25 NOTE — PROGRESS NOTES
Hospitalist Progress Note  3/25/2019 9:27 AM  Subjective:   Admit Date: 3/21/2019  PCP: Pablito Westfall DO    Chief Complaint: Fatigue    Subjective: Patient seen and examined. C/o general malaise and CP. States the pain is not new. No acute events reported by nursing. ROS: 14 point review of systems is negative except as specifically addressed above. DIET CARB CONTROL;     Intake/Output Summary (Last 24 hours) at 3/25/2019 1970  Last data filed at 3/25/2019 0600  Gross per 24 hour   Intake 4265.43 ml   Output 375 ml   Net 3890.43 ml     Medications:   lactated ringers      dextrose       Current Facility-Administered Medications   Medication Dose Route Frequency Provider Last Rate Last Dose    loperamide (IMODIUM) capsule 2 mg  2 mg Oral 4x Daily PRN Marilou Starkey MD        insulin glargine (LANTUS) injection vial 20 Units  20 Units Subcutaneous Nightly Marilou Starkey MD        insulin lispro (HUMALOG) injection vial 0-12 Units  0-12 Units Subcutaneous Q4H Marilou Starkey MD        benzonatate (TESSALON) capsule 100 mg  100 mg Oral TID PRN Marilou Starkey MD        lidocaine PF 1 % injection 5 mL  5 mL Intradermal Once Marilou Starkey MD        sodium chloride flush 0.9 % injection 10 mL  10 mL Intravenous 2 times per day Marilou Starkey MD        sodium chloride flush 0.9 % injection 10 mL  10 mL Intravenous PRN Marilou Starkey MD        furosemide (LASIX) injection 40 mg  40 mg Intravenous Once Marilou Starkey MD        lactated ringers infusion   Intravenous Continuous Marilou Starkey MD        sodium bicarbonate 8.4 % injection 50 mEq  50 mEq Intravenous Once Marilou Starkey MD        famotidine (PEPCID) injection 20 mg  20 mg Intravenous BID Bret Arciniega MD   20 mg at 03/25/19 0716    ondansetron (ZOFRAN) injection 4 mg  4 mg Intravenous Q6H PRN Bret Arciniega MD   4 mg at 03/24/19 1811    magnesium sulfate 1 g in dextrose 5% 100 mL IVPB  1 g Intravenous PRN Yohannes Rogers MD        sodium phosphate 10 mmol in dextrose 5 % 250 mL IVPB  10 mmol Intravenous PRN Yohannes Rogers MD   Stopped at 03/25/19 4469    Or    sodium phosphate 15 mmol in dextrose 5 % 250 mL IVPB  15 mmol Intravenous PRN Bret Smith MD        Or    sodium phosphate 20 mmol in dextrose 5 % 500 mL IVPB  20 mmol Intravenous PRN Bret Smith MD        acetaminophen (TYLENOL) tablet 650 mg  650 mg Oral Q6H PRN Yohannes Rogers MD   650 mg at 03/24/19 0835    melatonin tablet 3 mg  3 mg Oral Nightly PRN Terence Borrero MD   3 mg at 03/23/19 2306    cetirizine (ZYRTEC) tablet 10 mg  10 mg Oral Nightly Terence Borrero MD   10 mg at 03/24/19 2048    glucose (GLUTOSE) 40 % oral gel 15 g  15 g Oral PRN Juan Ruffin MD        dextrose 50 % solution 12.5 g  12.5 g Intravenous PRN Juan Ruffin MD        glucagon (rDNA) injection 1 mg  1 mg Intramuscular PRN Juan Ruffni MD        dextrose 5 % solution  100 mL/hr Intravenous PRN Juan Ruffin MD        HYDROcodone-acetaminophen [facility])  MG per tablet 1 tablet  1 tablet Oral Q6H PRN Jeffrey Beebe DO        metoprolol tartrate (LOPRESSOR) tablet 12.5 mg  12.5 mg Oral BID Jeffrey Beebe DO   12.5 mg at 03/25/19 0715    propafenone (RYTHMOL) tablet 150 mg  150 mg Oral Q8H [facility]   150 mg at 03/25/19 0715    dextrose 50 % solution 12.5 g  12.5 g Intravenous PRN Stephanie Lutz Armas, DO        potassium chloride 10 mEq/100 mL IVPB (Peripheral Line)  10 mEq Intravenous PRN Jeffrey Beebe  mL/hr at 03/24/19 2239 10 mEq at 03/24/19 2239    ipratropium-albuterol (DUONEB) nebulizer solution 1 ampule  1 ampule Inhalation Q4H WA Guevara Armas, DO   1 ampule at 03/25/19 0654    warfarin (COUMADIN) daily dosing (placeholder)   Other RX Placeholder Jeffrey Beebe DO            Labs:     Recent Labs     03/23/19  0423 03/24/19  0303 03/25/19  0820   WBC 6.1 6.8 7.1   RBC 4.03* 4.10* 4.22*   HGB 12.5* 12.8* 13.2*   HCT 36.8* 37.7* 38.2*   MCV 91.3 92.0 90.5   MCH 31.0 31.2* 31.3*   MCHC 34.0 34.0 34.6    222 205     Recent Labs     03/24/19 2014 03/25/19  0300 03/25/19  0820   * 127* 127*   K 5.0 5.4* 6.4*   ANIONGAP 11 8 18    105 101   CO2 14* 14* 8*   BUN 16 16 17   CREATININE 1.7* 1.7* 1.7*   GLUCOSE 231* 130* 232*   CALCIUM 8.6 8.5* 8.7     Recent Labs     03/24/19 2014 03/25/19  0300 03/25/19  0820   MG 1.7 1.7 1.8   PHOS 2.9 2.3* 3.7     No results for input(s): AST, ALT, ALB, BILITOT, ALKPHOS, ALB in the last 72 hours. ABGs:  Recent Labs     03/23/19  0953   PHART 7.370   HCZ9XRL 23.0*   PO2ART 111.0*   AGB8LBR 13.3*   BEART -10.0*   HGBAE 13.7*   B2CBFURC 97.0   CARBOXHGBART 2.2   02THERAPY Unknown     HgBA1c: No results for input(s): LABA1C in the last 72 hours. FLP:  No results found for: TRIG, HDL, LDLCALC, LDLDIRECT, LABVLDL  TSH:  No results found for: TSH  Troponin T: No results for input(s): TROPONINI in the last 72 hours.   INR:   Recent Labs     03/23/19  0423 03/24/19  0303   INR 3.01* 3.13*       Objective:   Vitals: /73   Pulse 129   Temp 98.8 °F (37.1 °C) (Temporal)   Resp 26   Ht 5' 10\" (1.778 m)   Wt 251 lb 12.8 oz (114.2 kg)   SpO2 97%   BMI 36.13 kg/m²   24HR INTAKE/OUTPUT:      Intake/Output Summary (Last 24 hours) at 3/25/2019 2196  Last data filed at 3/25/2019 0600  Gross per 24 hour   Intake 4265.43 ml   Output 375 ml   Net 3890.43 ml     General appearance: alert and cooperative with exam  HEENT: atraumatic, eyes with clear conjunctiva and normal lids, pupils and irises normal, external ears and nose are normal, lips normal.  Neck without masses, lympadenopathy, bruit, thyroid normal  Lungs: no increased work of breathing, diminished breath sounds bilaterally  Heart: irregularly irregular rhythm and S1, S2 normal  Abdomen: soft, non-tender; bowel sounds normal; no masses,  no organomegaly  Extremities: extremities normal, atraumatic, no cyanosis or edema  Neurologic: No focal neurologic

## 2019-03-25 NOTE — PROGRESS NOTES
Clinical Pharmacy Note    Warfarin consult follow-up    Recent Labs     03/25/19  1054   INR 4.88*     Recent Labs     03/23/19  0423 03/24/19  0303 03/25/19  0820   HGB 12.5* 12.8* 13.2*   HCT 36.8* 37.7* 38.2*    222 205       Significant Drug-Drug Interactions: Cipro, Flagyl and Rythmol    Warfarin Dose     03/21/19 4.34 Pt took 12.5 mg at home    03/22/19 4.77  Held    03/23/19 3.01  7.5 mg    03/24/19 3.13  Held     03/25/19  4.88  Held                      Notes: Will hold coumadin today due to elevated INR    Daily PT/INR until stable within therapeutic range.      Electronically signed by Isaiah Calvin RPh, BCPS, 3/25/2019,1:49 PM

## 2019-03-25 NOTE — PLAN OF CARE
Problem: Falls - Risk of:  Goal: Will remain free from falls  Description  Will remain free from falls  Outcome: Met This Shift  Goal: Absence of physical injury  Description  Absence of physical injury  Outcome: Met This Shift     Problem: Discharge Planning:  Goal: Discharged to appropriate level of care  Description  Discharged to appropriate level of care  Outcome: Met This Shift     Problem: Serum Glucose Level - Abnormal:  Goal: Ability to maintain appropriate glucose levels will improve  Description  Ability to maintain appropriate glucose levels will improve  Outcome: Met This Shift     Problem: Sensory Perception - Impaired:  Goal: Ability to maintain a stable neurologic state will improve  Description  Ability to maintain a stable neurologic state will improve  Outcome: Met This Shift     Problem: Risk for Impaired Skin Integrity  Goal: Tissue integrity - skin and mucous membranes  Description  Structural intactness and normal physiological function of skin and  mucous membranes.   Outcome: Met This Shift

## 2019-03-25 NOTE — PROCEDURES
PICC Line Insertion Procedure Note    Procedure: Insertion of #5 FR/18G PICC- Dual lumen    Indications:  Ins. Gtt/NaHco3/lab draws    Procedure Details   Informed consent was obtained for the procedure, including local anesthetic. Risks and benefits were discussed. #5 FR/18G PICC inserted to the R Basilic vein per hospital protocol. Blood return:  yes    Findings:  Catheter inserted to 46 cm, with 0 cm exposed. Catheter was flushed with 20 cc NS. Patient did tolerate procedure well. Recommendations:  CXR ordered to verify placement due to Afib. Tip confirmed at the cavoatrial junction. Okay to use. PICC Brochure given to patient with teaching instruction.

## 2019-03-25 NOTE — CONSULTS
Inpatient consult to Nephrology  Consult performed by: Phil Muse MD  Consult ordered by: Liam Foote MD  Assessment/Recommendations: Nephrology [facility]) Consult Note      Patient:  Brenda Tapia  YOB: 1965  Date of Service: 3/25/2019  MRN: [id number]   Acct: [de-identified]   Primary Care Physician: Shelley Barrow DO  Advance Directive: Full Code  Admit Date: 3/21/2019       Hospital Day: 4  Referring Provider: Liam Foote MD    Patient independently seen and examined, Chart, Consults, Notes, Operative notes, Labs, Cardiology, and Radiology studies reviewed as able. Chief complaint: Abnormal labs. Subjective:  Brenda Tapia is a 52 y.o. male  whom we were consulted for acute kidney injury/metabolic acidosis/chronic kidney disease. Patient denies any history of chronic kidney disease. He follows a primary care doctor in [facility]. He presented with increasing weakness, lack of energy and tiredness. He was diagnosed with diabetic ketoacidosis with high blood sugar and severe metabolic acidosis. Patient was treated with insulin drip as well as IV fluid, ketoacidosis has resolved. He still has metabolic acidosis which is non-anion gap, hyponatremia and chronic kidney disease. Nephrology is consulted. He is currently in ICU, receiving fluid with sodium bicarbonate. Allergies:  Pcn (penicillins);  Neomycin-bacitracin zn-polymyx; and Neosporin (neomycin-polymyxin-gramicidin)    Medicines:  Current Facility-Administered Medications:  loperamide (IMODIUM) capsule 2 mg, 2 mg, Oral, 4x Daily PRN, Liam Foote MD, 2 mg at 03/25/19 0946  benzonatate (TESSALON) capsule 100 mg, 100 mg, Oral, TID PRN, Liam Foote MD, 100 mg at 03/25/19 0946  lidocaine PF 1 % injection 5 mL, 5 mL, Intradermal, Once, Liam Foote MD  sodium chloride flush 0.9 % injection 10 mL, 10 mL, Intravenous, 2 times per day, Liam Foote MD  sodium chloride flush 0.9 % injection 10 mL, 10 mL, Intravenous, PRN, Raudel Saab MD  insulin regular (HUMULIN R;NOVOLIN R) 100 Units in sodium chloride 0.9 % 100 mL infusion, 0.5 Units/hr, Intravenous, Continuous, Raudel Saab MD, Last Rate: 8.55 mL/hr at 03/25/19 1240, 8.55 Units/hr at 03/25/19 1240  sodium bicarbonate 150 mEq in dextrose 5 % 1,000 mL infusion, , Intravenous, Continuous, Raudel Saab MD, Last Rate: 100 mL/hr at 03/25/19 1032  metoprolol tartrate (LOPRESSOR) tablet 25 mg, 25 mg, Oral, BID, Raudel Saab MD  ciprofloxacin (CIPRO) IVPB 400 mg, 400 mg, Intravenous, Q12H, Raudel Saab MD, Last Rate: 200 mL/hr at 03/25/19 1249, 400 mg at 03/25/19 1249  metronidazole (FLAGYL) 500 mg in NaCl 100 mL IVPB premix, 500 mg, Intravenous, Q8H, Raudel Saab MD  famotidine (PEPCID) injection 20 mg, 20 mg, Intravenous, BID, Nikky Nevarez MD, 20 mg at 03/25/19 0716  ondansetron (ZOFRAN) injection 4 mg, 4 mg, Intravenous, Q6H PRN, Nikky Nevarez MD, 4 mg at 03/24/19 1811  magnesium sulfate 1 g in dextrose 5% 100 mL IVPB, 1 g, Intravenous, PRN, Nikky Nevarez MD  sodium phosphate 10 mmol in dextrose 5 % 250 mL IVPB, 10 mmol, Intravenous, PRN, Nikky Nevarez MD, Stopped at 03/25/19 7099    Or  sodium phosphate 15 mmol in dextrose 5 % 250 mL IVPB, 15 mmol, Intravenous, PRN, Nikky Nevarez MD    Or  sodium phosphate 20 mmol in dextrose 5 % 500 mL IVPB, 20 mmol, Intravenous, PRN, Nikky Nevarez MD  acetaminophen (TYLENOL) tablet 650 mg, 650 mg, Oral, Q6H PRN, Nikky Nevarez MD, 650 mg at 03/24/19 0835  melatonin tablet 3 mg, 3 mg, Oral, Nightly PRN, Pooja Garcia MD, 3 mg at 03/23/19 2306  cetirizine (ZYRTEC) tablet 10 mg, 10 mg, Oral, Nightly, Pooja Garcia MD, 10 mg at 03/24/19 2048  glucose (GLUTOSE) 40 % oral gel 15 g, 15 g, Oral, PRN, Enoc Lorenzana MD  dextrose 50 % solution 12.5 g, 12.5 g, Intravenous, PRN, Enoc Lorenzana MD  glucagon (rDNA) injection 1 mg, 1 mg, Intramuscular, PRN, Enoc Lorenzana, MD  dextrose 5 % solution, 100 mL/hr, Intravenous, PRN, Lynda Machado MD  HYDROcodone-acetaminophen [facility])  MG per tablet 1 tablet, 1 tablet, Oral, Q6H PRN, Wu Sultana DO, 1 tablet at 03/25/19 0946  propafenone (RYTHMOL) tablet 150 mg, 150 mg, Oral, Q8H, Guevara Armas, DO, 150 mg at 03/25/19 0715  dextrose 50 % solution 12.5 g, 12.5 g, Intravenous, PRN, Wu Sultana DO  potassium chloride 10 mEq/100 mL IVPB (Peripheral Line), 10 mEq, Intravenous, PRN, Wu Sultana DO, Last Rate: 100 mL/hr at 03/24/19 2239, 10 mEq at 03/24/19 2239  ipratropium-albuterol (DUONEB) nebulizer solution 1 ampule, 1 ampule, Inhalation, Q4H WA, Wu Sultana DO, 1 ampule at 03/25/19 1055  warfarin (COUMADIN) daily dosing (placeholder), , Other, RX Placeholder, Wu Sultana DO        Past Medical History:  Past Medical History:  No date: Allergic rhinitis, cause unspecified  No date: Charcot's joint of right foot  No date: Chicken pox  No date: HTN (hypertension)  No date: Hyperlipidemia  No date: Impotence of organic origin  No date: MRSA infection  No date: Other dyspnea and respiratory abnormality  No date:  Other specified erythematous condition(281.56)  No date: Paroxysmal atrial fibrillation (HCC)  No date: Personal history of other infectious and parasitic disease  1994: Type II or unspecified type diabetes mellitus without mention   of complication, uncontrolled    Past Surgical History:  Past Surgical History:  03/2017: CARDIOVERSION  No date: MALIGNANT SKIN LESION EXCISION      Comment:  X 2  No date: TONSILLECTOMY    Family History  Review of patient's family history indicates:  Problem: Stroke      Relation: Maternal Grandmother          Age of Onset: (Not Specified)  Problem: Cancer      Relation: Maternal Grandmother          Age of Onset: (Not Specified)  Problem: Stroke      Relation: Paternal Grandmother          Age of Onset: (Not Specified)  Problem: Diabetes      Relation: Paternal Grandmother          Age of Onset: (Not phone: Not on file        Gets together: Not on file        Attends [employer] service: Not on file        Active member of club or organization: Not on file        Attends meetings of clubs or organizations: Not on file        Relationship status: Not on file      Intimate partner violence:        Fear of current or ex partner: Not on file        Emotionally abused: Not on file        Physically abused: Not on file        Forced sexual activity: Not on file    Other Topics      Concerns:        Not on file    Social History Narrative      Not on file        Review of Systems:  History obtained from chart review and the patient  General ROS: No fever or chills  Respiratory ROS: No cough, shortness of breath, wheezing  Cardiovascular ROS: No chest pain or palpitations  Gastrointestinal ROS: No abdominal pain or melena  Genito-Urinary ROS: positive for - dysuria  Musculoskeletal ROS: No joint pain or swelling   14 point ROS reviewed with the patient and negative except as noted above and in the HPI unless unable to obtain.     Objective:  Patient Vitals in the past 24 hrs:  03/25/19 1300, BP:91/64, Pulse:88, Resp:25, SpO2:98 %  03/25/19 1200, BP:(!) 90/59, Pulse:118, Resp:24, SpO2:98 %  03/25/19 1100, BP:108/88, Pulse:110, Resp:22, SpO2:100 %  03/25/19 1000, BP:95/75, Pulse:106, Resp:17, SpO2:98 %  03/25/19 0900, BP:101/76, Pulse:109, Resp:20, SpO2:97 %  03/25/19 0800, BP:109/73, Temp:98.8 °F (37.1 °C), Temp src:Temporal, Pulse:129, Resp:26, SpO2:97 %  03/25/19 0715, BP:114/69, Pulse:118  03/25/19 0700, BP:114/69, Temp:98.6 °F (37 °C), Pulse:101, Resp:23, SpO2:99 %  03/25/19 0627, Pulse:103, Resp:22, SpO2:97 %  03/25/19 0600, BP:118/77, Pulse:111, Resp:19, SpO2:98 %  03/25/19 0520, Pulse:120, Resp:19, SpO2:94 %  03/25/19 0502, BP:112/78, Pulse:121, Resp:20, SpO2:95 %  03/25/19 0500, Pulse:114, Resp:19, SpO2:94 %  03/25/19 0440, Pulse:109, Resp:20, SpO2:94 %  03/25/19 0402, BP:109/64, Pulse:123, Resp:23, SpO2:95 %  03/25/19 0400, Temp:98.5 °F (36.9 °C), Pulse:127, Resp:22, SpO2:95 %  03/25/19 0342, Pulse:125, Resp:22, SpO2:96 %  03/25/19 0300, BP:118/86, Pulse:114, Resp:19, SpO2:96 %  03/25/19 0234, Pulse:118, Resp:16, SpO2:95 %  03/25/19 0204, BP:103/76, Pulse:111, Resp:24, SpO2:94 %  03/25/19 0200, Pulse:117, Resp:23, SpO2:96 %  03/25/19 0130, SpO2:94 %  03/25/19 0105, BP:103/78, Pulse:135, Resp:19, SpO2:94 %  03/25/19 0100, Pulse:102, Resp:25, SpO2:94 %  03/25/19 0000, BP:102/74, Temp:100.1 °F (37.8 °C), Pulse:114, Resp:24, SpO2:94 %  03/24/19 2348, Resp:22, SpO2:97 %  03/24/19 2339, Pulse:107, Resp:28, SpO2:96 %  03/24/19 2338, Pulse:114, Resp:25, SpO2:96 %  03/24/19 2302, BP:109/70, Pulse:110, Resp:25, SpO2:95 %  03/24/19 2300, Pulse:107, Resp:20, SpO2:97 %  03/24/19 2240, Pulse:115, Resp:21, SpO2:95 %  03/24/19 2230, Pulse:122, Resp:20, SpO2:96 %  03/24/19 2202, BP:116/79, Pulse:121, Resp:(!) 33, SpO2:95 %  03/24/19 2200, Pulse:112, Resp:30, SpO2:93 %  03/24/19 2126, Pulse:119, Resp:30, SpO2:92 %  03/24/19 2102, BP:120/77, Pulse:128, Resp:22, SpO2:94 %  03/24/19 2100, Pulse:118, Resp:20, SpO2:94 %  03/24/19 2031, Pulse:109, Resp:25, SpO2:95 %  03/24/19 2000, BP:130/78, Temp:99.3 °F (37.4 °C), Temp src:Temporal, Pulse:105, Resp:20, SpO2:93 %  03/24/19 1900, BP:(!) 141/81, Pulse:120, Resp:22, SpO2:96 %  03/24/19 1846, Resp:19, SpO2:93 %  03/24/19 1800, BP:(!) 142/85, Pulse:112, Resp:15, SpO2:95 %  03/24/19 1700, BP:129/76, Pulse:98, Resp:25, SpO2:96 %  03/24/19 1600, BP:116/74, Temp:98.9 °F (37.2 °C), Temp src:Temporal, Pulse:106, Resp:24, SpO2:96 %  03/24/19 1502, BP:111/74, Pulse:104, Resp:18, SpO2:97 %  03/24/19 1400, BP:115/84, Pulse:101, Resp:26, SpO2:95 %  Intake/Output Summary (Last 24 hours) at 03/25/19 1338  Last data filed at 03/25/19 1200          Gross per 24 hour  Intake:          3905.29 ml  Output:              375 ml  Net   :          3530.29 ml  General: awake/alert   HN ENT: Normocephalic atraumatic head  Neck: Supple with no JVD or carotid bruits. Chest:  clear to auscultation bilaterally without respiratory distress  CVS: regular rate and rhythm  Abdominal: soft, nontender, normal bowel sounds  Extremities: no cyanosis or edema  Skin: warm and dry without rash      Labs:  BMP:                 03/25/19 03/25/19 03/25/19 03/25/19                       0300          0820          0931          1054          NA           127*         127*          --          126*          K            5.4*         6.4*         5.5          5.4*          CL           105          101           --          100           CO2          14*          8*            --          15*           PHOS         2.3*         3.7           --          3.3           BUN          16           17            --          17            CREATININE   1.7*         1.7*          --          1.7*          CALCIUM      8.5*         8.7           --          8.3*          CBC:                 03/23/19 03/24/19 03/25/19                       0423          0303          0820          WBC          6.1          6.8          7.1           HGB          12.5*        12.8*        13.2*         HCT          36.8*        37.7*        38.2*         MCV          91.3         92.0         90.5          PLT          215          222          205           LIVER PROFILE: No results for input(s): AST, ALT, LIPASE, BILIDIR, BILITOT, ALKPHOS in the last 72 hours. Invalid input(s): AMYLASE,  ALB  PT/INR:                 03/23/19 03/24/19 03/25/19                       0423          0303          1054          PROTIME      30.4*        31.4*        44.8*         INR          3.01*        3.13*        4.88*         APTT: No results for input(s): APTT in the last 72 hours. BNP:  No results for input(s): BNP in the last 72 hours. Ionized Calcium:No results for input(s): IONCA in the last 72 hours.   Magnesium:                03/25/19 03/25/19 opportunity to provide care to your patients. Feel free to contact me if I can be of any further assistance.       Lyndsay Carey MD  03/25/19  1:38 PM

## 2019-03-25 NOTE — PROGRESS NOTES
Physical Therapy    Facility/Department: [facility] ICU  Initial Assessment    NAME: Candice Love  : 1965  MRN: [id number]    Date of Service: 3/25/2019    Assessment   Body structures, Functions, Activity limitations: Decreased functional mobility ; Decreased endurance;Decreased strength;Decreased balance;Decreased sensation  Assessment: pt is at an increased risk for fall and would benefit from skilled PT services to address deficits listed in evaluation to improve pt safety. Treatment Diagnosis: difficulty ambulating  Prognosis: Good  Decision Making: Medium Complexity  REQUIRES PT FOLLOW UP: Yes  Activity Tolerance  Activity Tolerance: Patient limited by endurance       Patient Diagnosis(es): The encounter diagnosis was Diabetic ketoacidosis without coma associated with type 2 diabetes mellitus ([facility] [address] has a past medical history of Allergic rhinitis, cause unspecified, Charcot's joint of right foot, Chicken pox, HTN (hypertension), Hyperlipidemia, Impotence of organic origin, MRSA infection, Other dyspnea and respiratory abnormality, Other specified erythematous condition(695.89), Paroxysmal atrial fibrillation ([facility] [address] Personal history of other infectious and parasitic disease, and Type II or unspecified type diabetes mellitus without mention of complication, uncontrolled. has a past surgical history that includes Tonsillectomy; malignant skin lesion excision; and Cardioversion (2017).     Restrictions  Restrictions/Precautions  Restrictions/Precautions: Fall Risk  Vision/Hearing        Subjective  General  Chart Reviewed: Yes  Patient assessed for rehabilitation services?: Yes  Additional Pertinent Hx: L Charcot foot  Family / Caregiver Present: No  Diagnosis: Diabetic ketoacidosis with DM2  General Comment  Comments: pt just had a R PICC line put in  Subjective  Subjective: pt agreeable and willing to participate  Pain Screening  Patient Currently in Pain: Denies  Vital Signs  Patient Currently in Pain: Denies       Orientation  Orientation  Overall Orientation Status: Within Functional Limits  Social/Functional History  Social/Functional History  Lives With: Son  Type of Home: House  Home Equipment: Cane  ADL Assistance: Independent  Homemaking Assistance: Independent  Ambulation Assistance: Independent  Transfer Assistance: Independent  Cognition        Objective     AROM RLE (degrees)  RLE AROM: WFL  PROM LLE (degrees)  LLE PROM: WFL  AROM LLE (degrees)  LLE AROM : Exceptions  LLE General AROM: pt unable to flex right hip in sitting against gravity; pt knee/ankle AROM WFL  Strength RLE  Strength RLE: Exception  R Hip Flexion: 3+/5  R Knee Extension: 3+/5  R Ankle Dorsiflexion: 3+/5  Strength LLE  Strength LLE: Exception  L Hip Flexion: 2+/5  L Knee Extension: 3/5  L Ankle Dorsiflexion: 3/5     Sensation  Overall Sensation Status: Impaired  Light Touch: Severe deficits in the LLE;Partial deficits in the RLE  Bed mobility  Supine to Sit: Minimal assistance(pt need Jaswinder with raising trunk from bed and increased time getting BLE to EOB)  Transfers  Sit to Stand: Contact guard assistance(pt slightly unsteady coming to stand)  Stand to sit: Contact guard assistance  Ambulation  Ambulation?: Yes  WB Status: FWB  Ambulation 1  Surface: level tile  Device: Single point cane  Assistance: Contact guard assistance  Quality of Gait: wide CHARAN, decreased B foot clearance, slight lateral sway  Distance: 3' to bedside chair  Comments: pt demonstrated slight unsteady gait with increased fatigue.       Balance  Sitting - Static: Fair;+  Standing - Static: Fair;+        Plan   Plan  Times per week: 6-7  Times per day: Daily  Plan weeks: 2  Current Treatment Recommendations: Strengthening, Balance Training, Endurance Training, Transfer Training, Functional Mobility Training, Gait Training, Safety Education & Training, Patient/Caregiver Education & Training  Safety Devices  Type of devices: Left in chair, Nurse notified, Gait belt, Patient at risk for falls      Goals  Short term goals  Time Frame for Short term goals: 14 days  Short term goal 1: pt will improve supine<>sit to supervision  Short term goal 2: pt with improve sit<>stands to supervision  Short term goal 3: pt will ambulate 80' with [facility] and supervision       Nohemy Edmondson       Electronically signed by Nohemy Edmondson on 3/25/2019 at 12:14 PM

## 2019-03-26 LAB
ALBUMIN SERPL-MCNC: 2.7 G/DL (ref 3.5–5.2)
ALP BLD-CCNC: 183 U/L (ref 40–130)
ALT SERPL-CCNC: 31 U/L (ref 5–41)
ANION GAP SERPL CALCULATED.3IONS-SCNC: 11 MMOL/L (ref 7–19)
ANION GAP SERPL CALCULATED.3IONS-SCNC: 12 MMOL/L (ref 7–19)
AST SERPL-CCNC: 39 U/L (ref 5–40)
BILIRUB SERPL-MCNC: 0.6 MG/DL (ref 0.2–1.2)
BUN BLDV-MCNC: 19 MG/DL (ref 6–20)
BUN BLDV-MCNC: 20 MG/DL (ref 6–20)
CALCIUM SERPL-MCNC: 8.2 MG/DL (ref 8.6–10)
CALCIUM SERPL-MCNC: 8.3 MG/DL (ref 8.6–10)
CHLORIDE BLD-SCNC: 102 MMOL/L (ref 98–111)
CHLORIDE BLD-SCNC: 98 MMOL/L (ref 98–111)
CO2: 20 MMOL/L (ref 22–29)
CO2: 20 MMOL/L (ref 22–29)
CREAT SERPL-MCNC: 1.7 MG/DL (ref 0.5–1.2)
CREAT SERPL-MCNC: 1.9 MG/DL (ref 0.5–1.2)
EKG P AXIS: NORMAL DEGREES
EKG P-R INTERVAL: NORMAL MS
EKG Q-T INTERVAL: 318 MS
EKG QRS DURATION: 102 MS
EKG QTC CALCULATION (BAZETT): 393 MS
EKG T AXIS: 5 DEGREES
GFR NON-AFRICAN AMERICAN: 37
GFR NON-AFRICAN AMERICAN: 42
GLUCOSE BLD-MCNC: 111 MG/DL (ref 70–99)
GLUCOSE BLD-MCNC: 117 MG/DL (ref 74–109)
GLUCOSE BLD-MCNC: 119 MG/DL (ref 70–99)
GLUCOSE BLD-MCNC: 134 MG/DL (ref 70–99)
GLUCOSE BLD-MCNC: 178 MG/DL (ref 70–99)
GLUCOSE BLD-MCNC: 217 MG/DL (ref 74–109)
GLUCOSE BLD-MCNC: 246 MG/DL (ref 70–99)
GLUCOSE BLD-MCNC: 259 MG/DL (ref 70–99)
GLUCOSE BLD-MCNC: 303 MG/DL (ref 70–99)
HCT VFR BLD CALC: 33 % (ref 42–52)
HEMOGLOBIN: 11.2 G/DL (ref 14–18)
INR BLD: 2.86 (ref 0.88–1.18)
MCH RBC QN AUTO: 31.5 PG (ref 27–31)
MCHC RBC AUTO-ENTMCNC: 33.9 G/DL (ref 33–37)
MCV RBC AUTO: 93 FL (ref 80–94)
PARATHYROID HORMONE INTACT: 91.9 PG/ML (ref 15–65)
PDW BLD-RTO: 12.4 % (ref 11.5–14.5)
PERFORMED ON: ABNORMAL
PLATELET # BLD: 215 K/UL (ref 130–400)
PMV BLD AUTO: 10.7 FL (ref 9.4–12.4)
POTASSIUM REFLEX MAGNESIUM: 4.3 MMOL/L (ref 3.5–5)
POTASSIUM REFLEX MAGNESIUM: 4.8 MMOL/L (ref 3.5–5)
PROTHROMBIN TIME: 29.2 SEC (ref 12–14.6)
RBC # BLD: 3.55 M/UL (ref 4.7–6.1)
SODIUM BLD-SCNC: 130 MMOL/L (ref 136–145)
SODIUM BLD-SCNC: 133 MMOL/L (ref 136–145)
TOTAL PROTEIN: 5.8 G/DL (ref 6.6–8.7)
WBC # BLD: 5.9 K/UL (ref 4.8–10.8)

## 2019-03-26 PROCEDURE — 2500000003 HC RX 250 WO HCPCS: Performed by: HOSPITALIST

## 2019-03-26 PROCEDURE — 6370000000 HC RX 637 (ALT 250 FOR IP): Performed by: FAMILY MEDICINE

## 2019-03-26 PROCEDURE — 1210000000 HC MED SURG R&B

## 2019-03-26 PROCEDURE — 2580000003 HC RX 258: Performed by: INTERNAL MEDICINE

## 2019-03-26 PROCEDURE — 94640 AIRWAY INHALATION TREATMENT: CPT

## 2019-03-26 PROCEDURE — 97116 GAIT TRAINING THERAPY: CPT

## 2019-03-26 PROCEDURE — 83970 ASSAY OF PARATHORMONE: CPT

## 2019-03-26 PROCEDURE — 2500000003 HC RX 250 WO HCPCS: Performed by: INTERNAL MEDICINE

## 2019-03-26 PROCEDURE — 6360000002 HC RX W HCPCS: Performed by: INTERNAL MEDICINE

## 2019-03-26 PROCEDURE — 6360000002 HC RX W HCPCS: Performed by: HOSPITALIST

## 2019-03-26 PROCEDURE — 2580000003 HC RX 258: Performed by: HOSPITALIST

## 2019-03-26 PROCEDURE — 82948 REAGENT STRIP/BLOOD GLUCOSE: CPT

## 2019-03-26 PROCEDURE — 6370000000 HC RX 637 (ALT 250 FOR IP): Performed by: HOSPITALIST

## 2019-03-26 PROCEDURE — 85027 COMPLETE CBC AUTOMATED: CPT

## 2019-03-26 PROCEDURE — 85610 PROTHROMBIN TIME: CPT

## 2019-03-26 PROCEDURE — 6370000000 HC RX 637 (ALT 250 FOR IP): Performed by: INTERNAL MEDICINE

## 2019-03-26 PROCEDURE — 80053 COMPREHEN METABOLIC PANEL: CPT

## 2019-03-26 PROCEDURE — 99233 SBSQ HOSP IP/OBS HIGH 50: CPT | Performed by: HOSPITALIST

## 2019-03-26 PROCEDURE — 99291 CRITICAL CARE FIRST HOUR: CPT | Performed by: HOSPITALIST

## 2019-03-26 RX ORDER — INSULIN GLARGINE 100 [IU]/ML
15 INJECTION, SOLUTION SUBCUTANEOUS ONCE
Status: COMPLETED | OUTPATIENT
Start: 2019-03-26 | End: 2019-03-26

## 2019-03-26 RX ORDER — DEXTROSE MONOHYDRATE 25 G/50ML
12.5 INJECTION, SOLUTION INTRAVENOUS PRN
Status: DISCONTINUED | OUTPATIENT
Start: 2019-03-26 | End: 2019-03-26

## 2019-03-26 RX ORDER — NICOTINE POLACRILEX 4 MG
15 LOZENGE BUCCAL PRN
Status: DISCONTINUED | OUTPATIENT
Start: 2019-03-26 | End: 2019-03-26

## 2019-03-26 RX ORDER — DEXTROSE MONOHYDRATE 50 MG/ML
100 INJECTION, SOLUTION INTRAVENOUS PRN
Status: DISCONTINUED | OUTPATIENT
Start: 2019-03-26 | End: 2019-03-26

## 2019-03-26 RX ORDER — IPRATROPIUM BROMIDE AND ALBUTEROL SULFATE 2.5; .5 MG/3ML; MG/3ML
1 SOLUTION RESPIRATORY (INHALATION) EVERY 4 HOURS PRN
Status: DISCONTINUED | OUTPATIENT
Start: 2019-03-26 | End: 2019-04-01 | Stop reason: HOSPADM

## 2019-03-26 RX ORDER — 0.9 % SODIUM CHLORIDE 0.9 %
250 INTRAVENOUS SOLUTION INTRAVENOUS ONCE
Status: COMPLETED | OUTPATIENT
Start: 2019-03-26 | End: 2019-03-26

## 2019-03-26 RX ORDER — POTASSIUM CHLORIDE 20 MEQ/1
40 TABLET, EXTENDED RELEASE ORAL PRN
Status: DISCONTINUED | OUTPATIENT
Start: 2019-03-26 | End: 2019-04-01 | Stop reason: HOSPADM

## 2019-03-26 RX ORDER — WARFARIN SODIUM 2.5 MG/1
2.5 TABLET ORAL
Status: COMPLETED | OUTPATIENT
Start: 2019-03-26 | End: 2019-03-26

## 2019-03-26 RX ORDER — INSULIN GLARGINE 100 [IU]/ML
30 INJECTION, SOLUTION SUBCUTANEOUS NIGHTLY
Status: DISCONTINUED | OUTPATIENT
Start: 2019-03-26 | End: 2019-04-01 | Stop reason: HOSPADM

## 2019-03-26 RX ORDER — POTASSIUM CHLORIDE 7.45 MG/ML
10 INJECTION INTRAVENOUS PRN
Status: DISCONTINUED | OUTPATIENT
Start: 2019-03-26 | End: 2019-04-01 | Stop reason: HOSPADM

## 2019-03-26 RX ADMIN — BENZONATATE 100 MG: 100 CAPSULE ORAL at 23:10

## 2019-03-26 RX ADMIN — INSULIN LISPRO 4 UNITS: 100 INJECTION, SOLUTION INTRAVENOUS; SUBCUTANEOUS at 08:04

## 2019-03-26 RX ADMIN — INSULIN GLARGINE 30 UNITS: 100 INJECTION, SOLUTION SUBCUTANEOUS at 01:52

## 2019-03-26 RX ADMIN — METOPROLOL TARTRATE 25 MG: 25 TABLET ORAL at 23:11

## 2019-03-26 RX ADMIN — CIPROFLOXACIN 400 MG: 2 INJECTION, SOLUTION INTRAVENOUS at 11:07

## 2019-03-26 RX ADMIN — PROPAFENONE HYDROCHLORIDE 150 MG: 150 TABLET, FILM COATED ORAL at 06:41

## 2019-03-26 RX ADMIN — CIPROFLOXACIN 400 MG: 2 INJECTION, SOLUTION INTRAVENOUS at 23:12

## 2019-03-26 RX ADMIN — LOPERAMIDE HYDROCHLORIDE 2 MG: 2 CAPSULE ORAL at 23:11

## 2019-03-26 RX ADMIN — FAMOTIDINE 20 MG: 10 INJECTION INTRAVENOUS at 23:12

## 2019-03-26 RX ADMIN — IPRATROPIUM BROMIDE AND ALBUTEROL SULFATE 1 AMPULE: .5; 3 SOLUTION RESPIRATORY (INHALATION) at 10:55

## 2019-03-26 RX ADMIN — SODIUM BICARBONATE: 84 INJECTION, SOLUTION INTRAVENOUS at 11:07

## 2019-03-26 RX ADMIN — INSULIN LISPRO 2 UNITS: 100 INJECTION, SOLUTION INTRAVENOUS; SUBCUTANEOUS at 17:27

## 2019-03-26 RX ADMIN — INSULIN LISPRO 6 UNITS: 100 INJECTION, SOLUTION INTRAVENOUS; SUBCUTANEOUS at 11:08

## 2019-03-26 RX ADMIN — METRONIDAZOLE 500 MG: 500 INJECTION, SOLUTION INTRAVENOUS at 14:24

## 2019-03-26 RX ADMIN — METRONIDAZOLE 500 MG: 500 INJECTION, SOLUTION INTRAVENOUS at 23:12

## 2019-03-26 RX ADMIN — FAMOTIDINE 20 MG: 10 INJECTION INTRAVENOUS at 07:58

## 2019-03-26 RX ADMIN — METRONIDAZOLE 500 MG: 500 INJECTION, SOLUTION INTRAVENOUS at 06:41

## 2019-03-26 RX ADMIN — Medication 10 ML: at 23:12

## 2019-03-26 RX ADMIN — IPRATROPIUM BROMIDE AND ALBUTEROL SULFATE 1 AMPULE: .5; 3 SOLUTION RESPIRATORY (INHALATION) at 15:49

## 2019-03-26 RX ADMIN — BENZONATATE 100 MG: 100 CAPSULE ORAL at 07:58

## 2019-03-26 RX ADMIN — HYDROCODONE BITARTRATE AND ACETAMINOPHEN 1 TABLET: 10; 325 TABLET ORAL at 23:10

## 2019-03-26 RX ADMIN — INSULIN LISPRO 2 UNITS: 100 INJECTION, SOLUTION INTRAVENOUS; SUBCUTANEOUS at 23:11

## 2019-03-26 RX ADMIN — WARFARIN SODIUM 2.5 MG: 2.5 TABLET ORAL at 17:26

## 2019-03-26 RX ADMIN — CIPROFLOXACIN 400 MG: 2 INJECTION, SOLUTION INTRAVENOUS at 00:46

## 2019-03-26 RX ADMIN — PROPAFENONE HYDROCHLORIDE 150 MG: 150 TABLET, FILM COATED ORAL at 23:11

## 2019-03-26 RX ADMIN — INSULIN LISPRO 6 UNITS: 100 INJECTION, SOLUTION INTRAVENOUS; SUBCUTANEOUS at 14:22

## 2019-03-26 RX ADMIN — INSULIN GLARGINE 30 UNITS: 100 INJECTION, SOLUTION SUBCUTANEOUS at 23:11

## 2019-03-26 RX ADMIN — SODIUM BICARBONATE: 84 INJECTION, SOLUTION INTRAVENOUS at 23:12

## 2019-03-26 RX ADMIN — SODIUM CHLORIDE 5.92 UNITS/HR: 9 INJECTION, SOLUTION INTRAVENOUS at 00:45

## 2019-03-26 RX ADMIN — IPRATROPIUM BROMIDE AND ALBUTEROL SULFATE 1 AMPULE: .5; 3 SOLUTION RESPIRATORY (INHALATION) at 20:25

## 2019-03-26 RX ADMIN — METOPROLOL TARTRATE 25 MG: 25 TABLET ORAL at 07:57

## 2019-03-26 RX ADMIN — IPRATROPIUM BROMIDE AND ALBUTEROL SULFATE 1 AMPULE: .5; 3 SOLUTION RESPIRATORY (INHALATION) at 06:47

## 2019-03-26 RX ADMIN — LOPERAMIDE HYDROCHLORIDE 2 MG: 2 CAPSULE ORAL at 07:58

## 2019-03-26 RX ADMIN — CETIRIZINE HYDROCHLORIDE 10 MG: 10 TABLET, FILM COATED ORAL at 23:11

## 2019-03-26 RX ADMIN — PROPAFENONE HYDROCHLORIDE 150 MG: 150 TABLET, FILM COATED ORAL at 14:23

## 2019-03-26 RX ADMIN — INSULIN GLARGINE 15 UNITS: 100 INJECTION, SOLUTION SUBCUTANEOUS at 11:38

## 2019-03-26 RX ADMIN — HYDROCODONE BITARTRATE AND ACETAMINOPHEN 1 TABLET: 10; 325 TABLET ORAL at 07:57

## 2019-03-26 RX ADMIN — SODIUM CHLORIDE 250 ML: 9 INJECTION, SOLUTION INTRAVENOUS at 06:42

## 2019-03-26 ASSESSMENT — PAIN SCALES - GENERAL
PAINLEVEL_OUTOF10: 0
PAINLEVEL_OUTOF10: 0
PAINLEVEL_OUTOF10: 6
PAINLEVEL_OUTOF10: 0
PAINLEVEL_OUTOF10: 0
PAINLEVEL_OUTOF10: 7
PAINLEVEL_OUTOF10: 0
PAINLEVEL_OUTOF10: 7
PAINLEVEL_OUTOF10: 3

## 2019-03-26 ASSESSMENT — PAIN DESCRIPTION - PAIN TYPE: TYPE: CHRONIC PAIN

## 2019-03-26 ASSESSMENT — PAIN DESCRIPTION - LOCATION: LOCATION: BACK

## 2019-03-26 NOTE — PROGRESS NOTES
Maria Isabel Rae received from ICU to room # 316 . Mental Status: Patient is oriented and alert. Vitals:    03/26/19 1307   BP: 110/79   Pulse: 87   Resp: 18   Temp: 97.4 °F (36.3 °C)   SpO2: 99%     Placed on cardiac monitor: No.  Belongings: None location is not applicable . Family at bedside No.  Oriented Patient to room. Call light within reach. Yes. Transfer was: Well tolerated by patient. .    Electronically signed by Lauren Rogers RN on 3/26/2019 at 1:08 PM

## 2019-03-26 NOTE — PROGRESS NOTES
Physical Therapy  Talha Marcus  [id number]     03/26/19 1520   Subjective   Subjective Pt agrees to get up and try and walk    Bed Mobility   Supine to Sit Independent   Sit to Supine Independent   Transfers   Sit to Stand Stand by assistance;Contact guard assistance   Stand to sit Stand by assistance   Ambulation   Ambulation? Yes   WB Status FWB   Ambulation 1   Surface level tile   Device Single point cane   Assistance Contact guard assistance   Quality of Gait wide CHARAN, some lateral sway, wants to try RW tomorrow   Distance 80'   Comments pt fatiqued following amb. Short term goals   Time Frame for Short term goals 14 days   Short term goal 1 pt will improve supine<>sit to supervision   Short term goal 2 pt with improve sit<>stands to supervision   Short term goal 3 pt will ambulate 80' with [facility] and supervision   Conditions Requiring Skilled Therapeutic Intervention   Body structures, Functions, Activity limitations Decreased functional mobility ; Decreased endurance;Decreased strength;Decreased balance;Decreased sensation   Assessment Pt was able to increased amb distance    Activity Tolerance   Activity Tolerance Patient limited by fatigue;Patient limited by endurance   Electronically signed by Shaun Buckley PTA on 3/26/2019 at 3:23 PM

## 2019-03-26 NOTE — PROGRESS NOTES
Clinical Pharmacy Note    Warfarin consult follow-up    Recent Labs     03/26/19  0628   INR 2.86*     Recent Labs     03/24/19  0303 03/25/19  0820 03/26/19  0628   HGB 12.8* 13.2* 11.2*   HCT 37.7* 38.2* 33.0*    205 215       Significant Drug-Drug Interactions: Cipro, Flagyl and Rythmol    Date INR Warfarin Dose   03/21/19 4.34 Pt took 12.5 mg at home    03/22/19 4.77  Held    03/23/19 3.01  7.5 mg    03/24/19 3.13  Held     03/25/19 4.88   Held    03/26/19  2.86  2.5                      Notes: Will give Coumadin 2.5 mg po x 1 dose today         Daily PT/INR until stable within therapeutic range.      Electronically signed by Kj Bella RPh, BCPS, 3/26/2019,11:36 AM

## 2019-03-26 NOTE — PROGRESS NOTES
4 Eyes Skin Assessment    Sara Tsai is being assessed upon: Transfer to New Unit    I agree that Aden Arias, along with Cristina Rodriguez (either 2 RN's or 1 LPN and 1 RN) have performed a thorough Head to Toe Skin Assessment on the patient. ALL assessment sites listed below have been assessed. Areas assessed by both nurses:     [x]   Head, Face, and Ears   [x]   Shoulders, Back, and Chest  [x]   Arms, Elbows, and Hands   [x]   Coccyx, Sacrum, and Ischium  [x]   Legs, Feet, and Heels    Does the Patient have Skin Breakdown?  No    Mark Prevention initiated: Yes  Wound Care Orders initiated: No    WOC nurse consulted for Pressure Injury (Stage 3,4, Unstageable, DTI, NWPT, and Complex wounds) and New or Established Ostomies: No        Primary Nurse eSignature: Concha Iyer RN on 3/26/2019 at 2:36 PM      Co-Signer eSignature: Electronically signed by Cristina Rodriguez RN on 3/26/19 at 2:50 PM

## 2019-03-26 NOTE — PROGRESS NOTES
Nephrology ([address]) progress Note      Patient:  Almita Crowley  YOB: 1965  Date of Service: 3/26/2019  MRN: [id number]   Acct: [de-identified]   Primary Care Physician: Itzel Mcrae DO  Advance Directive: Full Code  Admit Date: 3/21/2019       Hospital Day: 5  Referring Provider: Shane Dimas MD    Patient independently seen and examined, Chart, Consults, Notes, Operative notes, Labs, Cardiology, and Radiology studies reviewed as able. Chief complaint: Abnormal lab. Subjective:  Almita Crowley is a 52 y.o. male  whom we were consulted for acute kidney injury/chronic kidney disease/metabolic acidosis. Patient has long-term history of insulin-dependent diabetes. He presented initially with weakness and was found to have diabetic ketoacidosis. He was initially treated with insulin and IV fluid. Ketones were resolved in his bloodstream but he still had metabolic acidosis. Meanwhile patient has developed severe diarrhea which contributed towards non-anion gap metabolic acidosis. He is currently being treated with IV fluid with sodium bicarbonate. This morning he is still complaining of diarrhea. Allergies:  Pcn [penicillins];  Neomycin-bacitracin zn-polymyx; and Neosporin [neomycin-polymyxin-gramicidin]    Medicines:  Current Facility-Administered Medications   Medication Dose Route Frequency Provider Last Rate Last Dose    insulin glargine (LANTUS) injection vial 30 Units  30 Units Subcutaneous Nightly Jalil Padilla MD   30 Units at 03/26/19 0152    glucose (GLUTOSE) 40 % oral gel 15 g  15 g Oral PRN Jalil Padilla MD        dextrose 50 % solution 12.5 g  12.5 g Intravenous PRN Jalil Padilla MD        glucagon (rDNA) injection 1 mg  1 mg Intramuscular PRN Jalil Padilla MD        dextrose 5 % solution  100 mL/hr Intravenous PRN Jalil Padilla MD        insulin lispro (HUMALOG) injection vial 0-12 Units  0-12 Units Subcutaneous Q4H Nathaniel Quezada MD 4 Units at 03/26/19 0804    loperamide (IMODIUM) capsule 2 mg  2 mg Oral 4x Daily PRN Jan Rawls MD   2 mg at 03/26/19 0596    benzonatate (TESSALON) capsule 100 mg  100 mg Oral TID PRN Jan Rawls MD   100 mg at 03/26/19 0758    lidocaine PF 1 % injection 5 mL  5 mL Intradermal Once Jan Rawls MD        sodium chloride flush 0.9 % injection 10 mL  10 mL Intravenous 2 times per day Jan Rawls MD   10 mL at 03/25/19 2108    sodium chloride flush 0.9 % injection 10 mL  10 mL Intravenous PRN Jan Rawls MD        metoprolol tartrate (LOPRESSOR) tablet 25 mg  25 mg Oral BID Nathaniel Quezada MD   25 mg at 03/26/19 0757    ciprofloxacin (CIPRO) IVPB 400 mg  400 mg Intravenous Q12H Jan Rawls MD   Stopped at 03/26/19 0146    metronidazole (FLAGYL) 500 mg in NaCl 100 mL IVPB premix  500 mg Intravenous Q8H Jan Rawls MD   Stopped at 03/26/19 0741    sodium bicarbonate 150 mEq in sterile water infusion   Intravenous Continuous Nathaniel Quezada MD 50 mL/hr at 03/25/19 2310      famotidine (PEPCID) injection 20 mg  20 mg Intravenous BID Bret Pace MD   20 mg at 03/26/19 0758    magnesium sulfate 1 g in dextrose 5% 100 mL IVPB  1 g Intravenous PRN Sommer Blackburn MD   Stopped at 03/25/19 1920    sodium phosphate 10 mmol in dextrose 5 % 250 mL IVPB  10 mmol Intravenous PRN Sommer Blackburn MD   Stopped at 03/25/19 1541    Or    sodium phosphate 15 mmol in dextrose 5 % 250 mL IVPB  15 mmol Intravenous PRN Bret Pace MD        Or    sodium phosphate 20 mmol in dextrose 5 % 500 mL IVPB  20 mmol Intravenous PRN Bret Pace MD        acetaminophen (TYLENOL) tablet 650 mg  650 mg Oral Q6H PRN Sommer Blackburn MD   650 mg at 03/24/19 0835    melatonin tablet 3 mg  3 mg Oral Nightly PRN Shane Dimas MD   3 mg at 03/23/19 2306    cetirizine (ZYRTEC) tablet 10 mg  10 mg Oral Nightly Shane Dimas MD   10 mg at 03/25/19 2107    glucose (GLUTOSE) 40 % oral gel 15 g  15 g Oral PRN Carine Augustine MD        dextrose 50 % solution 12.5 g  12.5 g Intravenous PRN Carine Augustine MD        glucagon (rDNA) injection 1 mg  1 mg Intramuscular PRN Carine Augustine MD        dextrose 5 % solution  100 mL/hr Intravenous PRN Carine Augustine MD        HYDROcodone-acetaminophen [facility])  MG per tablet 1 tablet  1 tablet Oral Q6H PRN Jacinta Crocker, DO   1 tablet at 03/26/19 0757    propafenone (RYTHMOL) tablet 150 mg  150 mg Oral Q8H Geuvara Armas, DO   150 mg at 03/26/19 0641    dextrose 50 % solution 12.5 g  12.5 g Intravenous PRN Jacinta Crocker DO        ipratropium-albuterol (DUONEB) nebulizer solution 1 ampule  1 ampule Inhalation Q4H [place] Guevara Armas, DO   1 ampule at 03/26/19 4310    warfarin (COUMADIN) daily dosing (placeholder)   Other RX Placeholder Jacinta Crocker DO           Past Medical History:  Past Medical History:   Diagnosis Date    Allergic rhinitis, cause unspecified     Charcot's joint of right foot     Chicken pox     HTN (hypertension)     Hyperlipidemia     Impotence of organic origin     MRSA infection     Other dyspnea and respiratory abnormality     Other specified erythematous condition(125.89)     Paroxysmal atrial fibrillation (HCC)     Personal history of other infectious and parasitic disease     Type II or unspecified type diabetes mellitus without mention of complication, uncontrolled 1994       Past Surgical History:  Past Surgical History:   Procedure Laterality Date    CARDIOVERSION  03/2017    MALIGNANT SKIN LESION EXCISION      X 2    TONSILLECTOMY         Family History  Family History   Problem Relation Age of Onset    Stroke Maternal Grandmother     Cancer Maternal Grandmother     Stroke Paternal Grandmother     Diabetes Paternal Grandmother     Cancer Father     Heart Disease Father     Asthma Paternal Grandfather     Heart Disease Paternal Grandfather     Heart Disease Maternal Grandfather     Colon Cancer Neg Hx     Colon Polyps Neg Hx     Esophageal Cancer Neg Hx     Liver Cancer Neg Hx     Liver Disease Neg Hx     Rectal Cancer Neg Hx     Stomach Cancer Neg Hx        Social History  Social History     Socioeconomic History    Marital status:      Spouse name: Not on file    Number of children: Not on file    Years of education: Not on file    Highest education level: Not on file   Occupational History    Not on file   Social Needs    Financial resource strain: Not on file    Food insecurity:     Worry: Not on file     Inability: Not on file    Transportation needs:     Medical: Not on file     Non-medical: Not on file   Tobacco Use    Smoking status: Never Smoker    Smokeless tobacco: Never Used   Substance and Sexual Activity    Alcohol use: No    Drug use: No    Sexual activity: Not on file   Lifestyle    Physical activity:     Days per week: Not on file     Minutes per session: Not on file    Stress: Not on file   Relationships    Social connections:     Talks on phone: Not on file     Gets together: Not on file     Attends Mu-ism service: Not on file     Active member of club or organization: Not on file     Attends meetings of clubs or organizations: Not on file     Relationship status: Not on file    Intimate partner violence:     Fear of current or ex partner: Not on file     Emotionally abused: Not on file     Physically abused: Not on file     Forced sexual activity: Not on file   Other Topics Concern    Not on file   Social History Narrative    Not on file         Review of Systems:  History obtained from chart review and the patient  General ROS: No fever or chills  Respiratory ROS: No cough, shortness of breath, wheezing  Cardiovascular ROS: No chest pain or palpitations  Gastrointestinal ROS: positive for - diarrhea  Genito-Urinary ROS: No dysuria or hematuria  Musculoskeletal ROS: No joint pain or swelling   14 point ROS reviewed with the patient and negative except as noted above and in the HPI unless unable to obtain. Objective:  Patient Vitals for the past 24 hrs:   BP Temp Temp src Pulse Resp SpO2 Height Weight   03/26/19 0800 119/82 99 °F (37.2 °C) -- 122 16 97 % -- --   03/26/19 0700 109/88 -- -- 103 16 99 % -- --   03/26/19 0600 115/76 -- -- 96 15 96 % -- --   03/26/19 0500 96/64 -- -- 101 17 93 % -- --   03/26/19 0400 104/71 99.1 °F (37.3 °C) -- 117 15 96 % 5' 10\" (1.778 m) 264 lb (119.7 kg)   03/26/19 0300 102/72 -- -- 103 16 98 % -- --   03/26/19 0200 105/74 -- -- 94 19 97 % -- --   03/26/19 0100 -- -- -- 119 16 97 % -- --   03/26/19 0000 99/69 99.1 °F (37.3 °C) -- 83 12 98 % -- --   03/25/19 2301 92/71 -- -- 94 15 97 % -- --   03/25/19 2300 -- -- -- 90 14 97 % -- --   03/25/19 2211 -- -- -- 100 12 97 % -- --   03/25/19 2200 94/67 -- -- 107 13 97 % -- --   03/25/19 2107 109/82 -- -- 102 (!) 32 97 % -- --   03/25/19 2100 -- -- -- 103 13 96 % -- --   03/25/19 2000 110/71 98.3 °F (36.8 °C) Temporal 105 13 97 % -- --   03/25/19 1943 -- -- -- 99 15 98 % -- --   03/25/19 1900 105/73 -- -- 102 15 96 % -- --   03/25/19 1800 (!) 132/91 -- -- 119 23 95 % -- --   03/25/19 1700 (!) 121/98 -- -- 118 16 96 % -- --   03/25/19 1600 98/63 98.7 °F (37.1 °C) Temporal 109 13 95 % -- --   03/25/19 1500 103/76 -- -- 100 18 100 % -- --   03/25/19 1400 112/79 -- -- 113 18 97 % -- --   03/25/19 1300 91/64 -- -- 88 25 98 % -- --   03/25/19 1200 (!) 90/59 99 °F (37.2 °C) Temporal 118 24 98 % -- --   03/25/19 1100 108/88 -- -- 110 22 100 % -- --   03/25/19 1000 95/75 -- -- 106 17 98 % -- --       Intake/Output Summary (Last 24 hours) at 3/26/2019 0920  Last data filed at 3/26/2019 0741  Gross per 24 hour   Intake 3385.94 ml   Output 1775 ml   Net 1610.94 ml     General: awake/alert   HEENT: Normocephalic atraumatic head  Neck: Supple with no JVD or goiter bruits.   Chest:  clear to auscultation bilaterally without respiratory distress  CVS: regular rate and rhythm  Abdominal: soft, nontender, reports as per the Radiologist  Radiology: Xr Chest Portable    Result Date: 3/21/2019  XR CHEST PORTABLE 3/21/2019 11:47 AM History: Generalized weakness. Portable chest x-ray compared with 5/6/2015. The heart size is normal. The mediastinum is within normal limits. The lungs are normally expanded with no pneumonia or pneumothorax. No congestive failure changes. 1. No acute disease. Signed by Dr Hunter Ro on 3/21/2019 11:47 AM       Assessment   1. Acute kidney injury/volume depletion. 2. Possible chronic kidney disease baseline. 3. Diabetic ketoacidosis now resolved. 4. Non-anion gap metabolic acidosis related to diarrhea. 5. Poorly controlled type II diabetes  6. Right renal nodule. 7. Severe diarrhea    Plan:  1. Change IV fluid  2. Increase fluid rate. 3. Baseline iron studies.   4. CT scan of the kidney once renal function stable       Arun Huerta MD  03/26/19  9:20 AM

## 2019-03-26 NOTE — H&P
ADMITTED: 21MAR19    PCP:  Yuli Mack DO    CODE STATUS: Full Code        SUBJECTIVE:    [id number]:   He states that he feels well. He states that he has had no fever or chills. He has been having diarrhea. [id number]:   \"Patient seen and examined. C/o general malaise and CP. States the pain is not new. No acute events reported by nursing. \"  88PEQ12:   \"feeing better\"  [id number]:   \"more awake today, looks better\"  [id number]: (copied from Dr. Mel Valerio note)  \"febrile\"  [id number]: (copied from Dr. Andrew Bowers note)  \"52 year old white male presents to the emergency department with complaint of fatigue, weakness, shaking chills for about 3 days, worse today. Patient is a diabetic dependent on insulin but states he has been unable to afford his insulin \"for several weeks. \" States he generally felt well prior to onset of symptoms without any localizing symptoms. Complains of chronic burning in his feet due to neuropathy and of polyuria and polydipsia for the past few days. The patient has atrial fibrillation, is on warfarin. INR is supratherapeutic. Patient denies any visible bleeding, nausea or vomiting, fever, cough, or congestion. \"    Allergies   Allergen Reactions    Pcn [Penicillins]     Neomycin-Bacitracin Zn-Polymyx Rash    Neosporin [Neomycin-Polymyxin-Gramicidin] Rash     Current Facility-Administered Medications   Medication Dose Route Frequency Provider Last Rate Last Dose    insulin glargine (LANTUS) injection vial 30 Units  30 Units Subcutaneous Nightly Jalil Padilla MD   30 Units at 03/26/19 0152    glucose (GLUTOSE) 40 % oral gel 15 g  15 g Oral PRN Jalil Padilla MD        dextrose 50 % solution 12.5 g  12.5 g Intravenous PRN Jalil Padilla MD        glucagon (rDNA) injection 1 mg  1 mg Intramuscular PRN Jalil Padilla MD        dextrose 5 % solution  100 mL/hr Intravenous PRN Jalil Padilla MD        insulin lispro (HUMALOG) injection vial 0-12 Units  0-12 Units Subcutaneous Q4H Jalil SUMMERS MD Valerie   4 Units at 03/26/19 0804    sodium bicarbonate 75 mEq in sodium chloride 0.45 % 1,000 mL infusion   Intravenous Continuous Ronna Harris MD        loperamide (IMODIUM) capsule 2 mg  2 mg Oral 4x Daily PRN Raudel Saab MD   2 mg at 03/26/19 0758    benzonatate (TESSALON) capsule 100 mg  100 mg Oral TID PRN Raudel Saab MD   100 mg at 03/26/19 0758    lidocaine PF 1 % injection 5 mL  5 mL Intradermal Once Raudel Saab MD        sodium chloride flush 0.9 % injection 10 mL  10 mL Intravenous 2 times per day Raudel Saab MD   10 mL at 03/25/19 2108    sodium chloride flush 0.9 % injection 10 mL  10 mL Intravenous PRN Raudel Saab MD        metoprolol tartrate (LOPRESSOR) tablet 25 mg  25 mg Oral BID Iveth Crandall MD   25 mg at 03/26/19 0757    ciprofloxacin (CIPRO) IVPB 400 mg  400 mg Intravenous Q12H Raudel Saab MD   Stopped at 03/26/19 0146    metronidazole (FLAGYL) 500 mg in NaCl 100 mL IVPB premix  500 mg Intravenous Q8H Raudel Saab MD   Stopped at 03/26/19 0741    famotidine (PEPCID) injection 20 mg  20 mg Intravenous BID Bret Pierce MD   20 mg at 03/26/19 0758    magnesium sulfate 1 g in dextrose 5% 100 mL IVPB  1 g Intravenous PRN Nikky Nevarez MD   Stopped at 03/25/19 1920    sodium phosphate 10 mmol in dextrose 5 % 250 mL IVPB  10 mmol Intravenous PRN Nikky Nevarez MD   Stopped at 03/25/19 1486    Or    sodium phosphate 15 mmol in dextrose 5 % 250 mL IVPB  15 mmol Intravenous PRN Bret Pierce MD        Or    sodium phosphate 20 mmol in dextrose 5 % 500 mL IVPB  20 mmol Intravenous PRN Bret Pierce MD        acetaminophen (TYLENOL) tablet 650 mg  650 mg Oral Q6H PRN Nikky Nevarez MD   650 mg at 03/24/19 0835    melatonin tablet 3 mg  3 mg Oral Nightly PRN Pooja Garcia MD   3 mg at 03/23/19 2306    cetirizine (ZYRTEC) tablet 10 mg  10 mg Oral Nightly Pooja Garcia MD   10 mg at 03/25/19 2107    glucose (GLUTOSE) 40 % oral gel 15 g  15 g Oral PRN Negro Rivera MD        dextrose 50 % solution 12.5 g  12.5 g Intravenous PRN Negro Rivera MD        glucagon (rDNA) injection 1 mg  1 mg Intramuscular PRN Negro Rivera MD        dextrose 5 % solution  100 mL/hr Intravenous PRN Negro Rivera MD        HYDROcodone-acetaminophen [facility])  MG per tablet 1 tablet  1 tablet Oral Q6H PRN Kassy Hidalgo, DO   1 tablet at 03/26/19 0757    propafenone (RYTHMOL) tablet 150 mg  150 mg Oral Q8H [place] Armas, DO   150 mg at 03/26/19 0641    dextrose 50 % solution 12.5 g  12.5 g Intravenous PRN Kassy Hidalgo, DO        ipratropium-albuterol (DUONEB) nebulizer solution 1 ampule  1 ampule Inhalation Q4H [facility], DO   1 ampule at 03/26/19 6580    warfarin (COUMADIN) daily dosing (placeholder)   Other RX Placeholder [facility], DO             OBJECTIVE:    Vitals:    03/26/19 0900   BP: 111/74   Pulse: 99   Resp: 24   Temp:    SpO2: 96%     Physical Exam   Constitutional: He is oriented to person, place, and time. He appears well-developed and well-nourished. No distress. HENT:   Head: Normocephalic and atraumatic. Eyes: Right eye exhibits no discharge. Left eye exhibits no discharge. No scleral icterus. Neck: Normal range of motion. Neck supple. No tracheal deviation present. Cardiovascular: Normal rate, regular rhythm, normal heart sounds and intact distal pulses. Exam reveals no gallop and no friction rub. No murmur heard. Pulmonary/Chest: Effort normal and breath sounds normal. No stridor. No respiratory distress. He has no wheezes. He has no rales. Abdominal: Soft. Bowel sounds are normal. He exhibits no distension. There is no tenderness. There is no rebound and no guarding. Musculoskeletal: He exhibits no edema, tenderness or deformity. Lymphadenopathy:     He has no cervical adenopathy. Neurological: He is alert and oriented to person, place, and time. Skin: Skin is warm and dry. He is not diaphoretic. Psychiatric: He has a normal mood and affect. His behavior is normal.     LABS:  Results for Rea Romero (MRN [id number]) as of 3/26/2019 10:52   Ref. Range 3/26/2019 06:28   Sodium Latest Ref Range: 136 - 145 mmol/L 130 (L)   Potassium Latest Ref Range: 3.5 - 5.0 mmol/L 4.8   Chloride Latest Ref Range: 98 - 111 mmol/L 98   CO2 Latest Ref Range: 22 - 29 mmol/L 20 (L)   BUN Latest Ref Range: 6 - 20 mg/dL 20   Creatinine Latest Ref Range: 0.5 - 1.2 mg/dL 1.9 (H)   Anion Gap Latest Ref Range: 7 - 19 mmol/L 12   GFR Non- Latest Ref Range: >60  37 (A)   Glucose Latest Ref Range: 74 - 109 mg/dL 217 (H)   Calcium Latest Ref Range: 8.6 - 10.0 mg/dL 8.2 (L)   Total Protein Latest Ref Range: 6.6 - 8.7 g/dL 5.8 (L)         ASESSMENTS & PLANS:    LIAN on CKD 3A versus all LIAN:  0.8 in 2017  1.3-1.4 apparent baseline in March of this year  Increased from 1.7 just after midnight ot 1.9 on last labs  Nephrology following case  On NaBicarb GGT at 50cc/h    Diarrhea:   On cipro and flagyl empirically since 25MAR19  C Diff was ruled out     A Fib rate controlled:  continue coumadin  daily INR    Supportive and Prophylactic Txx:  DVT prophylaxis: Coumadin  GI (PUD) PPx: not indicated  PT: would be indicated if he were going to remain in the MICU      Diabetic ketoacidosis without coma, in underlying DM type 2: (DKA RESOLVED)  On SQ insulin  Starting back on ISS medium dose  Long acting as ordered from tonight  Needs long acting dose of insulin before leaving for floor  hypoglycemia protocol       Hyperkalemia: (RESOLVED)     [place] metabolic acidosis (RESOLVED)      Patient Active Problem List   Diagnosis    Shortness of breath    HTN (hypertension)    Hyperlipidemia    Fatigue    Atrial fibrillation (HCC)    Paroxysmal atrial fibrillation ([address]    Acalculous cholecystitis    Essential hypertension    Diabetic ketoacidosis without coma associated with type 2 diabetes mellitus ([address]    Hyponatremia    Hypotension due to hypovolemia    Acute kidney [facility] ([facility]    Type 2 diabetes mellitus with diabetic polyneuropathy, with long-term current use of insulin (HCC)    Elevated troponin    Coagulopathy (HCC)       CC time of >30 minutes

## 2019-03-27 LAB
ALBUMIN SERPL-MCNC: 2.8 G/DL (ref 3.5–5.2)
ALP BLD-CCNC: 338 U/L (ref 40–130)
ALT SERPL-CCNC: 60 U/L (ref 5–41)
ANION GAP SERPL CALCULATED.3IONS-SCNC: 9 MMOL/L (ref 7–19)
AST SERPL-CCNC: 110 U/L (ref 5–40)
BILIRUB SERPL-MCNC: 0.6 MG/DL (ref 0.2–1.2)
BLOOD CULTURE, ROUTINE: NORMAL
BUN BLDV-MCNC: 24 MG/DL (ref 6–20)
CALCIUM SERPL-MCNC: 8.1 MG/DL (ref 8.6–10)
CHLORIDE BLD-SCNC: 99 MMOL/L (ref 98–111)
CO2: 22 MMOL/L (ref 22–29)
CREAT SERPL-MCNC: 1.9 MG/DL (ref 0.5–1.2)
CULTURE, BLOOD 2: NORMAL
GFR NON-AFRICAN AMERICAN: 37
GLUCOSE BLD-MCNC: 189 MG/DL (ref 70–99)
GLUCOSE BLD-MCNC: 248 MG/DL (ref 70–99)
GLUCOSE BLD-MCNC: 253 MG/DL (ref 70–99)
GLUCOSE BLD-MCNC: 273 MG/DL (ref 74–109)
GLUCOSE BLD-MCNC: 279 MG/DL (ref 70–99)
HCT VFR BLD CALC: 31.4 % (ref 42–52)
HEMOGLOBIN: 10.3 G/DL (ref 14–18)
INR BLD: 1.66 (ref 0.88–1.18)
MAGNESIUM: 1.8 MG/DL (ref 1.6–2.6)
MCH RBC QN AUTO: 30.5 PG (ref 27–31)
MCHC RBC AUTO-ENTMCNC: 32.8 G/DL (ref 33–37)
MCV RBC AUTO: 92.9 FL (ref 80–94)
PDW BLD-RTO: 12.4 % (ref 11.5–14.5)
PERFORMED ON: ABNORMAL
PLATELET # BLD: 216 K/UL (ref 130–400)
PMV BLD AUTO: 11 FL (ref 9.4–12.4)
POTASSIUM SERPL-SCNC: 5 MMOL/L (ref 3.5–5)
PROTHROMBIN TIME: 18.9 SEC (ref 12–14.6)
RBC # BLD: 3.38 M/UL (ref 4.7–6.1)
SODIUM BLD-SCNC: 130 MMOL/L (ref 136–145)
TOTAL PROTEIN: 5.7 G/DL (ref 6.6–8.7)
WBC # BLD: 5 K/UL (ref 4.8–10.8)

## 2019-03-27 PROCEDURE — 6370000000 HC RX 637 (ALT 250 FOR IP): Performed by: HOSPITALIST

## 2019-03-27 PROCEDURE — 6360000002 HC RX W HCPCS: Performed by: HOSPITALIST

## 2019-03-27 PROCEDURE — 2580000003 HC RX 258: Performed by: INTERNAL MEDICINE

## 2019-03-27 PROCEDURE — 83735 ASSAY OF MAGNESIUM: CPT

## 2019-03-27 PROCEDURE — 85027 COMPLETE CBC AUTOMATED: CPT

## 2019-03-27 PROCEDURE — 99233 SBSQ HOSP IP/OBS HIGH 50: CPT | Performed by: HOSPITALIST

## 2019-03-27 PROCEDURE — 2580000003 HC RX 258: Performed by: HOSPITALIST

## 2019-03-27 PROCEDURE — 6370000000 HC RX 637 (ALT 250 FOR IP): Performed by: FAMILY MEDICINE

## 2019-03-27 PROCEDURE — 80053 COMPREHEN METABOLIC PANEL: CPT

## 2019-03-27 PROCEDURE — 1210000000 HC MED SURG R&B

## 2019-03-27 PROCEDURE — 82948 REAGENT STRIP/BLOOD GLUCOSE: CPT

## 2019-03-27 PROCEDURE — 6370000000 HC RX 637 (ALT 250 FOR IP): Performed by: INTERNAL MEDICINE

## 2019-03-27 PROCEDURE — 2500000003 HC RX 250 WO HCPCS: Performed by: HOSPITALIST

## 2019-03-27 PROCEDURE — 97116 GAIT TRAINING THERAPY: CPT

## 2019-03-27 PROCEDURE — 36592 COLLECT BLOOD FROM PICC: CPT

## 2019-03-27 PROCEDURE — 85610 PROTHROMBIN TIME: CPT

## 2019-03-27 RX ORDER — SODIUM CHLORIDE 9 MG/ML
INJECTION, SOLUTION INTRAVENOUS CONTINUOUS
Status: DISCONTINUED | OUTPATIENT
Start: 2019-03-27 | End: 2019-03-29

## 2019-03-27 RX ORDER — CALCIUM CARBONATE 200(500)MG
500 TABLET,CHEWABLE ORAL 3 TIMES DAILY PRN
Status: DISCONTINUED | OUTPATIENT
Start: 2019-03-27 | End: 2019-04-01 | Stop reason: HOSPADM

## 2019-03-27 RX ORDER — WARFARIN SODIUM 5 MG/1
10 TABLET ORAL
Status: COMPLETED | OUTPATIENT
Start: 2019-03-27 | End: 2019-03-27

## 2019-03-27 RX ADMIN — SODIUM CHLORIDE: 9 INJECTION, SOLUTION INTRAVENOUS at 09:51

## 2019-03-27 RX ADMIN — Medication 10 ML: at 09:05

## 2019-03-27 RX ADMIN — Medication 10 ML: at 19:11

## 2019-03-27 RX ADMIN — METRONIDAZOLE 500 MG: 500 INJECTION, SOLUTION INTRAVENOUS at 13:30

## 2019-03-27 RX ADMIN — LOPERAMIDE HYDROCHLORIDE 2 MG: 2 CAPSULE ORAL at 19:10

## 2019-03-27 RX ADMIN — SODIUM CHLORIDE: 9 INJECTION, SOLUTION INTRAVENOUS at 22:47

## 2019-03-27 RX ADMIN — PROPAFENONE HYDROCHLORIDE 150 MG: 150 TABLET, FILM COATED ORAL at 22:47

## 2019-03-27 RX ADMIN — METOPROLOL TARTRATE 25 MG: 25 TABLET ORAL at 19:10

## 2019-03-27 RX ADMIN — WARFARIN SODIUM 10 MG: 5 TABLET ORAL at 17:37

## 2019-03-27 RX ADMIN — METRONIDAZOLE 500 MG: 500 INJECTION, SOLUTION INTRAVENOUS at 06:30

## 2019-03-27 RX ADMIN — CIPROFLOXACIN 400 MG: 2 INJECTION, SOLUTION INTRAVENOUS at 11:44

## 2019-03-27 RX ADMIN — CALCIUM CARBONATE 500 MG: 500 TABLET, CHEWABLE ORAL at 19:10

## 2019-03-27 RX ADMIN — INSULIN LISPRO 6 UNITS: 100 INJECTION, SOLUTION INTRAVENOUS; SUBCUTANEOUS at 17:37

## 2019-03-27 RX ADMIN — INSULIN GLARGINE 30 UNITS: 100 INJECTION, SOLUTION SUBCUTANEOUS at 19:11

## 2019-03-27 RX ADMIN — CETIRIZINE HYDROCHLORIDE 10 MG: 10 TABLET, FILM COATED ORAL at 19:10

## 2019-03-27 RX ADMIN — FAMOTIDINE 20 MG: 10 INJECTION INTRAVENOUS at 09:01

## 2019-03-27 RX ADMIN — INSULIN LISPRO 3 UNITS: 100 INJECTION, SOLUTION INTRAVENOUS; SUBCUTANEOUS at 22:47

## 2019-03-27 RX ADMIN — PROPAFENONE HYDROCHLORIDE 150 MG: 150 TABLET, FILM COATED ORAL at 14:51

## 2019-03-27 RX ADMIN — INSULIN LISPRO 4 UNITS: 100 INJECTION, SOLUTION INTRAVENOUS; SUBCUTANEOUS at 11:44

## 2019-03-27 RX ADMIN — INSULIN LISPRO 2 UNITS: 100 INJECTION, SOLUTION INTRAVENOUS; SUBCUTANEOUS at 09:02

## 2019-03-27 RX ADMIN — METOPROLOL TARTRATE 25 MG: 25 TABLET ORAL at 09:01

## 2019-03-27 RX ADMIN — FAMOTIDINE 20 MG: 10 INJECTION INTRAVENOUS at 19:10

## 2019-03-27 RX ADMIN — PROPAFENONE HYDROCHLORIDE 150 MG: 150 TABLET, FILM COATED ORAL at 06:30

## 2019-03-27 ASSESSMENT — PAIN SCALES - GENERAL: PAINLEVEL_OUTOF10: 0

## 2019-03-27 NOTE — PROGRESS NOTES
Clinical Pharmacy Note    Warfarin consult follow-up    Recent Labs     03/27/19  0141   INR 1.66*     Recent Labs     03/25/19  0820 03/26/19  0628 03/27/19  0141   HGB 13.2* 11.2* 10.3*   HCT 38.2* 33.0* 31.4*    215 216       Significant Drug-Drug Interactions:  Current warfarin drug-drug interactions: Cipro / Flagyl  Discontinued drug-drug interactions: Rythmol    Date INR Warfarin Dose   03/21/19 4.34 Pt took 12.5 mg at home    03/22/19 4.77  Held    03/23/19 3.01  7.5 mg    03/24/19 3.13  Held     03/25/19 4.88   Held    03/26/19  2.86  2.5   03/27/19   1.66                       Notes: Give coumadin 10 mg po x 1 tonight                     Daily PT/INR until stable within therapeutic range.      Electronically signed by LISA Burger [facility] on 3/27/2019 at 11:40 AM

## 2019-03-27 NOTE — PROGRESS NOTES
Nephrology ([facility]) progress Note      Patient:  Radha Berry  YOB: 1965  Date of Service: 3/27/2019  MRN: [id number]   Acct: [de-identified]   Primary Care Physician: Yuli Mack DO  Advance Directive: Full Code  Admit Date: 3/21/2019       Hospital Day: 6  Referring Provider: Kirstie Singletary MD    Patient independently seen and examined, Chart, Consults, Notes, Operative notes, Labs, Cardiology, and Radiology studies reviewed as able. Chief complaint: Abnormal lab. Subjective:  Radha Berry is a 52 y.o. male  whom we were consulted for acute kidney injury/chronic kidney disease/metabolic acidosis. Patient has long-term history of insulin-dependent diabetes. He presented initially with weakness and was found to have diabetic ketoacidosis. He was initially treated with insulin and IV fluid. Ketones were resolved in his bloodstream but he still had metabolic acidosis. Meanwhile patient has developed severe diarrhea which contributed towards non-anion gap metabolic acidosis. He is currently being treated with IV fluid with sodium bicarbonate. This morning he is sitting up and eating breakfast and feeling much better. He denies any diarrhea now. Allergies:  Pcn [penicillins];  Neomycin-bacitracin zn-polymyx; and Neosporin [neomycin-polymyxin-gramicidin]    Medicines:  Current Facility-Administered Medications   Medication Dose Route Frequency Provider Last Rate Last Dose    insulin glargine (LANTUS) injection vial 30 Units  30 Units Subcutaneous Nightly Sonia Malagon MD   30 Units at 03/26/19 2311    sodium bicarbonate 75 mEq in sodium chloride 0.45 % 1,000 mL infusion   Intravenous Continuous Sonia Malagon  mL/hr at 03/26/19 2312      insulin lispro (HUMALOG) injection vial 0-12 Units  0-12 Units Subcutaneous TID WC Sonia Malagon MD   2 Units at 03/27/19 0902    insulin lispro (HUMALOG) injection vial 0-6 Units  0-6 Units Subcutaneous Nightly Payton Bass Cierra Santana MD   2 Units at 03/26/19 2311    potassium chloride (KLOR-CON M) extended release tablet 40 mEq  40 mEq Oral PRN Tristan Chris MD        Or    potassium bicarb-citric acid (EFFER-K) effervescent tablet 40 mEq  40 mEq Oral PRN Tristan Chris MD        Or    potassium chloride 10 mEq/100 mL IVPB (Peripheral Line)  10 mEq Intravenous PRN Tristan Chris MD        ipratropium-albuterol (DUONEB) nebulizer solution 1 ampule  1 ampule Inhalation Q4H PRN Howard Clinton MD        loperamide (IMODIUM) capsule 2 mg  2 mg Oral 4x Daily PRN Tristan Chris MD   2 mg at 03/26/19 2311    benzonatate (TESSALON) capsule 100 mg  100 mg Oral TID PRN Tristan Chris MD   100 mg at 03/26/19 2310    lidocaine PF 1 % injection 5 mL  5 mL Intradermal Once Tristan Chris MD        sodium chloride flush 0.9 % injection 10 mL  10 mL Intravenous 2 times per day Tristan Chris MD   10 mL at 03/27/19 0905    sodium chloride flush 0.9 % injection 10 mL  10 mL Intravenous PRN Tristan Chris MD        metoprolol tartrate (LOPRESSOR) tablet 25 mg  25 mg Oral BID Tristan Chris MD   25 mg at 03/27/19 0901    ciprofloxacin (CIPRO) IVPB 400 mg  400 mg Intravenous Q12H Tristan Chris MD   Stopped at 03/27/19 0012    metronidazole (FLAGYL) 500 mg in NaCl 100 mL IVPB premix  500 mg Intravenous Q8H Tristan Chris MD   Stopped at 03/27/19 0730    famotidine (PEPCID) injection 20 mg  20 mg Intravenous BID Tristan Chris MD   20 mg at 03/27/19 0901    magnesium sulfate 1 g in dextrose 5% 100 mL IVPB  1 g Intravenous PRN Tristan Chris MD   Stopped at 03/25/19 1920    sodium phosphate 10 mmol in dextrose 5 % 250 mL IVPB  10 mmol Intravenous PRN Tristan Chris MD   Stopped at 03/25/19 5105    Or    sodium phosphate 15 mmol in dextrose 5 % 250 mL IVPB  15 mmol Intravenous PRN Tristan Chris MD        Or    sodium phosphate 20 mmol in dextrose 5 % 500 mL IVPB  20 mmol Intravenous PRN Tristan Chris MD        acetaminophen (TYLENOL) tablet 650 mg  650 mg Oral Q6H PRN Trace Alston MD   650 mg at 03/24/19 0835    melatonin tablet 3 mg  3 mg Oral Nightly PRN Trace Alston MD   3 mg at 03/23/19 2306    cetirizine (ZYRTEC) tablet 10 mg  10 mg Oral Nightly Trace Alston MD   10 mg at 03/26/19 2311    glucose (GLUTOSE) 40 % oral gel 15 g  15 g Oral PRN Trace Alston MD        dextrose 50 % solution 12.5 g  12.5 g Intravenous PRN Trace Alston MD        glucagon (rDNA) injection 1 mg  1 mg Intramuscular PRN Trace Alston MD        dextrose 5 % solution  100 mL/hr Intravenous DANYELL Alston MD        HY[place]one[place]acetaminophen [facility]  MG per tablet 1 tablet  1 tablet Oral Q6H PRN Trace Alston MD   1 tablet at 03/26/19 2310    propafenone (RYTHMOL) tablet 150 mg  150 mg Oral Q8H Trace Alston MD   150 mg at 03/27/19 0630    warfarin (COUMADIN) daily dosing (placeholder)   Other Chandana Dc MD           Past Medical History:  Past Medical History:   Diagnosis Date    Allergic rhinitis, cause unspecified     Charcot's joint of right foot     Chicken pox     HTN (hypertension)     Hyperlipidemia     Impotence of organic origin     MRSA infection     Other dyspnea and respiratory abnormality     Other specified erythematous condition(695.89)     Paroxysmal atrial fibrillation ([facility] [address]     Personal history of other infectious and parasitic disease     Type II or unspecified type diabetes mellitus without mention of complication, uncontrolled 1994       Past Surgical History:  Past Surgical History:   Procedure Laterality Date    CARDIOVERSION  03/2017    MALIGNANT SKIN LESION EXCISION      X 2    TONSILLECTOMY         Family History  Family History   Problem Relation Age of Onset    Stroke Maternal Grandmother     Cancer Maternal Grandmother     Stroke Paternal Grandmother     Diabetes Paternal Grandmother     Cancer Father     Heart Disease Father     Asthma Paternal Grandfather     Heart Disease Paternal Grandfather     Heart Disease Maternal Grandfather     Colon Cancer Neg Hx     Colon Polyps Neg Hx     Esophageal Cancer Neg Hx     Liver Cancer Neg Hx     Liver Disease Neg Hx     Rectal Cancer Neg Hx     Stomach Cancer Neg Hx        Social History  Social History     Socioeconomic History    Marital status:      Spouse name: Not on file    Number of children: Not on file    Years of education: Not on file    Highest education level: Not on file   Occupational History    Not on file   Social Needs    Financial resource strain: Not on file    Food insecurity:     Worry: Not on file     Inability: Not on file    Transportation needs:     Medical: Not on file     Non-medical: Not on file   Tobacco Use    Smoking status: Never Smoker    Smokeless tobacco: Never Used   Substance and Sexual Activity    Alcohol use: No    Drug use: No    Sexual activity: Not on file   Lifestyle    Physical activity:     Days per week: Not on file     Minutes per session: Not on file    Stress: Not on file   Relationships    Social connections:     Talks on phone: Not on file     Gets together: Not on file     Attends Baptism service: Not on file     Active member of club or organization: Not on file     Attends meetings of clubs or organizations: Not on file     Relationship status: Not on file    Intimate partner violence:     Fear of current or ex partner: Not on file     Emotionally abused: Not on file     Physically abused: Not on file     Forced sexual activity: Not on file   Other Topics Concern    Not on file   Social History Narrative    Not on file         Review of Systems:  History obtained from chart review and the patient  General ROS: No fever or chills  Respiratory ROS: No cough, shortness of breath, wheezing  Cardiovascular ROS: No chest pain or palpitations  Gastrointestinal ROS: positive for - diarrhea  Genito-Urinary ROS: No dysuria or hematuria  Musculoskeletal ROS: No joint pain or swelling   14 point ROS reviewed with the patient and negative except as noted above and in the HPI unless unable to obtain. Objective:  Patient Vitals for the past 24 hrs:   BP Temp Temp src Pulse Resp SpO2 Height Weight   03/27/19 0701 100/68 99.5 °F (37.5 °C) Temporal 96 16 97 % -- --   03/27/19 0514 -- -- -- -- -- -- -- 266 lb 8 oz (120.9 kg)   03/26/19 2026 -- -- -- -- -- 96 % -- --   03/26/19 1850 113/78 98 °F (36.7 °C) Temporal 80 16 98 % -- --   03/26/19 1311 -- -- -- -- -- -- 5' 10\" (1.778 m) 265 lb 2 oz (120.3 kg)   03/26/19 1307 110/79 97.4 °F (36.3 °C) Temporal 87 18 99 % -- --   03/26/19 1200 97/68 99.1 °F (37.3 °C) Temporal 89 21 99 % -- --   03/26/19 1100 98/70 -- -- 89 18 100 % -- --   03/26/19 1000 107/72 -- -- 93 17 97 % -- --       Intake/Output Summary (Last 24 hours) at 3/27/2019 0930  Last data filed at 3/27/2019 5348  Gross per 24 hour   Intake 6175.05 ml   Output 850 ml   Net 5325.05 ml     General: awake/alert   HEENT: Normocephalic atraumatic head  Neck: Supple with no JVD or goiter bruits.   Chest:  clear to auscultation bilaterally without respiratory distress  CVS: regular rate and rhythm  Abdominal: soft, nontender, normal bowel sounds  Extremities: no cyanosis or edema  Skin: warm and dry without rash      Labs:  BMP:   Recent Labs     03/25/19  1054 03/25/19  1545 03/25/19  2030 03/26/19  0040 03/26/19  0628 03/27/19  0141   * 128* 129* 133* 130* 130*   K 5.4* 4.3 4.5 4.3 4.8 5.0    101 99 102 98 99   CO2 15* 17* 20* 20* 20* 22   PHOS 3.3 3.0 3.2  --   --   --    BUN 17 18 19 19 20 24*   CREATININE 1.7* 1.6* 1.7* 1.7* 1.9* 1.9*   CALCIUM 8.3* 8.2* 8.1* 8.3* 8.2* 8.1*     CBC:   Recent Labs     03/25/19  0820 03/26/19  0628 03/27/19  0141   WBC 7.1 5.9 5.0   HGB 13.2* 11.2* 10.3*   HCT 38.2* 33.0* 31.4*   MCV 90.5 93.0 92.9    215 216     LIVER PROFILE:   Recent Labs     03/26/19 0628 03/27/19  0141   AST 39 110*   ALT 31 60*   BILITOT 0.6 0.6 ALKPHOS 183* 338*     PT/INR:   Recent Labs     03/25/19  1054 03/26/19  0628 03/27/19  0141   PROTIME 44.8* 29.2* 18.9*   INR 4.88* 2.86* 1.66*     APTT: No results for input(s): APTT in the last 72 hours. BNP:  No results for input(s): BNP in the last 72 hours. Ionized Calcium:No results for input(s): IONCA in the last 72 hours. Magnesium:  Recent Labs     03/25/19  1545 03/25/19 2030 03/27/19  0141   MG 1.6 2.1 1.8     Phosphorus:  Recent Labs     03/25/19  1054 03/25/19  1545 03/25/19 2030   PHOS 3.3 3.0 3.2     HgbA1C: No results for input(s): LABA1C in the last 72 hours. Hepatic:   Recent Labs     03/26/19  0628 03/27/19  0141   ALKPHOS 183* 338*   ALT 31 60*   AST 39 110*   PROT 5.8* 5.7*   BILITOT 0.6 0.6   LABALBU 2.7* 2.8*     Lactic Acid:   Recent Labs     03/25/19  1054   LACTA 1.6     Troponin: No results for input(s): CKTOTAL, CKMB, TROPONINT in the last 72 hours. ABGs: No results for input(s): PH, PCO2, PO2, HCO3, O2SAT in the last 72 hours. CRP:  No results for input(s): CRP in the last 72 hours. Sed Rate:  No results for input(s): SEDRATE in the last 72 hours. Cultures:   No results for input(s): CULTURE in the last 72 hours. No results for input(s): BC, Horald Emmanuel in the last 72 hours. No results for input(s): CXSURG in the last 72 hours. Radiology reports as per the Radiologist  Radiology: Xr Chest Portable    Result Date: 3/21/2019  XR CHEST PORTABLE 3/21/2019 11:47 AM History: Generalized weakness. Portable chest x-ray compared with 5/6/2015. The heart size is normal. The mediastinum is within normal limits. The lungs are normally expanded with no pneumonia or pneumothorax. No congestive failure changes. 1. No acute disease. Signed by Dr Nas Mart on 3/21/2019 11:47 AM       Assessment   1. Acute kidney injury/volume depletion. 2. Possible chronic kidney disease baseline.   3. Diabetic ketoacidosis now resolved. 4. Non-anion gap metabolic acidosis related to diarrhea. 5. Poorly controlled type II diabetes  6. Right renal nodule. 7. Severe diarrhea    Plan:  1. Continue IV fluid. 2. Continue to monitor renal recovery. 3. Baseline iron studies.   4. CT scan of the kidney once renal function stable       Rosa Roldan MD  03/27/19  9:30 AM

## 2019-03-27 NOTE — PROGRESS NOTES
ADMITTED: 21MAR19    PCP:  Mathew Jordan DO    CODE STATUS: Full Code        SUBJECTIVE:  Pt seen and examined  Doing well in no acute distress, BS seems to better controlled. . Denies CP or SOB    \"febrile\"  21MAR19: (copied from Dr. Deandre Harrison note)  \"52 year old white male presents to the emergency department with complaint of fatigue, weakness, shaking chills for about 3 days, worse today. Patient is a diabetic dependent on insulin but states he has been unable to afford his insulin \"for several weeks. \" States he generally felt well prior to onset of symptoms without any localizing symptoms. Complains of chronic burning in his feet due to neuropathy and of polyuria and polydipsia for the past few days. The patient has atrial fibrillation, is on warfarin. INR is supratherapeutic. Patient denies any visible bleeding, nausea or vomiting, fever, cough, or congestion. \"    Allergies   Allergen Reactions    Pcn [Penicillins]     Neomycin-Bacitracin Zn-Polymyx Rash    Neosporin [Neomycin-Polymyxin-Gramicidin] Rash     Current Facility-Administered Medications   Medication Dose Route Frequency Provider Last Rate Last Dose    0.9 % sodium chloride infusion   Intravenous Continuous Juma Zee  mL/hr at 03/27/19 0951      warfarin (COUMADIN) tablet 10 mg  10 mg Oral Once Dearl Dave, DO        insulin glargine (LANTUS) injection vial 30 Units  30 Units Subcutaneous Nightly Cornelio Concepcion MD   30 Units at 03/26/19 2311    insulin lispro (HUMALOG) injection vial 0-12 Units  0-12 Units Subcutaneous TID WC Cornelio Concepcion MD   4 Units at 03/27/19 1144    insulin lispro (HUMALOG) injection vial 0-6 Units  0-6 Units Subcutaneous Nightly Cornelio Concepcion MD   2 Units at 03/26/19 2311    potassium chloride (KLOR-CON M) extended release tablet 40 mEq  40 mEq Oral PRN Cornelio Concepcion MD        Or    potassium bicarb-citric acid (EFFER-K) effervescent tablet 40 mEq  40 mEq Oral PRN Cornelio Concepcion MD        Or    potassium chloride 10 mEq/100 mL IVPB (Peripheral Line)  10 mEq Intravenous PRN Macie Anaya MD        ipratropium-albuterol (DUONEB) nebulizer solution 1 ampule  1 ampule Inhalation Q4H PRN Marilou Starkey MD        loperamide (IMODIUM) capsule 2 mg  2 mg Oral 4x Daily PRN Macie Anaya MD   2 mg at 03/26/19 2311    benzonatate (TESSALON) capsule 100 mg  100 mg Oral TID PRN Macie Anaya MD   100 mg at 03/26/19 2310    lidocaine PF 1 % injection 5 mL  5 mL Intradermal Once Macie Anaya MD        sodium chloride flush 0.9 % injection 10 mL  10 mL Intravenous 2 times per day Macie Anaya MD   10 mL at 03/27/19 0905    sodium chloride flush 0.9 % injection 10 mL  10 mL Intravenous PRN Macie Anaya MD        metoprolol tartrate (LOPRESSOR) tablet 25 mg  25 mg Oral BID Macie Anaya MD   25 mg at 03/27/19 0901    ciprofloxacin (CIPRO) IVPB 400 mg  400 mg Intravenous Q12H Macie Anaya MD   Stopped at 03/27/19 1244    metronidazole (FLAGYL) 500 mg in NaCl 100 mL IVPB premix  500 mg Intravenous Q8H Macie Anaya MD   Stopped at 03/27/19 1430    famotidine (PEPCID) injection 20 mg  20 mg Intravenous BID Macie Anaya MD   20 mg at 03/27/19 0901    magnesium sulfate 1 g in dextrose 5% 100 mL IVPB  1 g Intravenous PRN Macie Anaya MD   Stopped at 03/25/19 1920    sodium phosphate 10 mmol in dextrose 5 % 250 mL IVPB  10 mmol Intravenous PRN Macie Anaya MD   Stopped at 03/25/19 5737    Or    sodium phosphate 15 mmol in dextrose 5 % 250 mL IVPB  15 mmol Intravenous PRN Macie Anaya MD        Or    sodium phosphate 20 mmol in dextrose 5 % 500 mL IVPB  20 mmol Intravenous PRN Macie Anaya MD        acetaminophen (TYLENOL) tablet 650 mg  650 mg Oral Q6H PRN Macie Anaya MD   650 mg at 03/24/19 0835    melatonin tablet 3 mg  3 mg Oral Nightly PRN Macie Anaya MD   3 mg at 03/23/19 2306    cetirizine (ZYRTEC) tablet 10 mg  10 mg Oral Nightly Macie Anaya MD   10 mg at 03/26/19 2311    glucose NaBicarb GGT at 50cc/h  1. Indeterminate 17 mm hypoechoic right renal mass. Follow-up  Suggested. Will need CT kidneys once renal function improves  2. Otherwise negative bilateral renal ultrasound. Diarrhea:   On cipro and flagyl empirically since [id number]  C Diff was ruled out  Stool cx + salmonella- will continue with cipro     A Fib rate controlled:  continue coumadin  daily INR    Diabetic ketoacidosis without coma, in underlying DM type 2: (DKA RESOLVED)  On SQ insulin  Starting back on ISS medium dose  Long acting as ordered from tonight  Needs long acting dose of insulin before leaving for floor  hypoglycemia protocol  a1c : 16     Hyperkalemia: (RESOLVED)     [place] metabolic acidosis (RESOLVED)          Patient Active Problem List   Diagnosis    Shortness of breath    HTN (hypertension)    Hyperlipidemia    Fatigue    Atrial fibrillation (HCC)    Paroxysmal atrial fibrillation (HCC)    Acalculous cholecystitis    Essential hypertension    Diabetic ketoacidosis without coma associated with type 2 diabetes mellitus (HCC)    Hyponatremia    Hypotension due to hypovolemia    Acute kidney injury ([address]    Type 2 diabetes mellitus with diabetic polyneuropathy, with long-term current use of insulin (HCC)    Elevated troponin    Coagulopathy (HCC)

## 2019-03-27 NOTE — PROGRESS NOTES
Nutrition Assessment    Type and Reason for Visit: Consult    Nutrition Recommendations: complete diet education    Nutrition Assessment: Received consult for DM and Coumadin education. Pt states \"even just a little bit of vit K throws off my Coumadin. \"  Educational materal for foods containing vit K+ and diabetic exchange list modified to avoid all foods with vitamin K given to pt. Pt very sleepy at time of visit. Will read over tonight and will check back tomorrow with explanations etc.    Malnutrition Assessment:  · Malnutrition Status: No malnutrition  · Context: Acute illness or injury  · Findings of the 6 clinical characteristics of malnutrition (Minimum of 2 out of 6 clinical characteristics is required to make the diagnosis of moderate or severe Protein Calorie Malnutrition based on AND/ASPEN Guidelines):  1. Energy Intake- , Unable to assess    2. Weight Loss-No significant weight loss,    3. Fat Loss-No significant subcutaneous fat loss,    4. Muscle Loss-No significant muscle mass loss,    5. Fluid Accumulation-No significant fluid accumulation,    6.  Strength-Not measured    Nutrition Risk Level:  Moderate    Nutrition Diagnosis:   · Problem: Altered nutrition-related lab values  · Etiology: related to (related to dietary noncompliance, misinformation)     Signs and symptoms:  as evidenced by Patient report of, Lab values    Objective Information:  · Nutrition-Focused Physical Findings: well nourished appearing gentleman  · Wound Type: None  · Current Nutrition Therapies:  · Oral Diet Orders: Carb Control 4 Carbs/Meal, Fluid Restriction   · Oral Diet intake: %, 51-75%  · Oral Nutrition Supplement (ONS) Orders: None    · Anthropometric Measures:  · Ht: 5' 10\" (177.8 cm)   · Current Body Wt: 266 lb 8 oz (120.9 kg)  · Admission Body Wt: 258 lb (117 kg)(stated)  · Ideal Body Wt: 166 lb (75.3 kg),   · BMI Classification: BMI 35.0 - 39.9 Obese Class II    Nutrition Interventions:   Continue current diet  Continued Inpatient Monitoring, Education Initiated    Nutrition Evaluation:   · Evaluation: Goals set   · Goals: po intake 50% or greater. Weight stable.   Accuchek's 150 or less    · Monitoring: Meal Intake, Diet Tolerance, Skin Integrity, Weight, Pertinent Labs, Patient/Family Education      Electronically signed by Sage Islas MS, RD, LD on 3/27/19 at 2:30 PM    Contact Number: [phone]

## 2019-03-28 ENCOUNTER — APPOINTMENT (OUTPATIENT)
Dept: CT IMAGING | Age: 54
DRG: 638 | End: 2019-03-28
Payer: MEDICARE

## 2019-03-28 LAB
ALBUMIN SERPL-MCNC: 3 G/DL (ref 3.5–5.2)
ALP BLD-CCNC: 447 U/L (ref 40–130)
ALT SERPL-CCNC: 71 U/L (ref 5–41)
ANION GAP SERPL CALCULATED.3IONS-SCNC: 9 MMOL/L (ref 7–19)
AST SERPL-CCNC: 63 U/L (ref 5–40)
BILIRUB SERPL-MCNC: 0.6 MG/DL (ref 0.2–1.2)
BUN BLDV-MCNC: 21 MG/DL (ref 6–20)
CALCIUM SERPL-MCNC: 8.4 MG/DL (ref 8.6–10)
CHLORIDE BLD-SCNC: 98 MMOL/L (ref 98–111)
CO2: 24 MMOL/L (ref 22–29)
CREAT SERPL-MCNC: 1.3 MG/DL (ref 0.5–1.2)
GFR NON-AFRICAN AMERICAN: 57
GLUCOSE BLD-MCNC: 179 MG/DL (ref 70–99)
GLUCOSE BLD-MCNC: 193 MG/DL (ref 74–109)
GLUCOSE BLD-MCNC: 233 MG/DL (ref 70–99)
GLUCOSE BLD-MCNC: 249 MG/DL (ref 70–99)
GLUCOSE BLD-MCNC: 280 MG/DL (ref 70–99)
HCT VFR BLD CALC: 33.6 % (ref 42–52)
HEMOGLOBIN: 10.9 G/DL (ref 14–18)
INR BLD: 1.43 (ref 0.88–1.18)
MCH RBC QN AUTO: 30.7 PG (ref 27–31)
MCHC RBC AUTO-ENTMCNC: 32.4 G/DL (ref 33–37)
MCV RBC AUTO: 94.6 FL (ref 80–94)
PDW BLD-RTO: 12.3 % (ref 11.5–14.5)
PERFORMED ON: ABNORMAL
PLATELET # BLD: 282 K/UL (ref 130–400)
PMV BLD AUTO: 10.6 FL (ref 9.4–12.4)
POTASSIUM SERPL-SCNC: 4.3 MMOL/L (ref 3.5–5)
PROTHROMBIN TIME: 16.8 SEC (ref 12–14.6)
RBC # BLD: 3.55 M/UL (ref 4.7–6.1)
SODIUM BLD-SCNC: 131 MMOL/L (ref 136–145)
TOTAL PROTEIN: 5.9 G/DL (ref 6.6–8.7)
WBC # BLD: 5 K/UL (ref 4.8–10.8)

## 2019-03-28 PROCEDURE — 6370000000 HC RX 637 (ALT 250 FOR IP): Performed by: HOSPITALIST

## 2019-03-28 PROCEDURE — 1210000000 HC MED SURG R&B

## 2019-03-28 PROCEDURE — 82948 REAGENT STRIP/BLOOD GLUCOSE: CPT

## 2019-03-28 PROCEDURE — 74177 CT ABD & PELVIS W/CONTRAST: CPT

## 2019-03-28 PROCEDURE — 85610 PROTHROMBIN TIME: CPT

## 2019-03-28 PROCEDURE — 99233 SBSQ HOSP IP/OBS HIGH 50: CPT | Performed by: HOSPITALIST

## 2019-03-28 PROCEDURE — 6360000002 HC RX W HCPCS: Performed by: HOSPITALIST

## 2019-03-28 PROCEDURE — 2500000003 HC RX 250 WO HCPCS: Performed by: HOSPITALIST

## 2019-03-28 PROCEDURE — 85027 COMPLETE CBC AUTOMATED: CPT

## 2019-03-28 PROCEDURE — 80053 COMPREHEN METABOLIC PANEL: CPT

## 2019-03-28 PROCEDURE — 6370000000 HC RX 637 (ALT 250 FOR IP): Performed by: FAMILY MEDICINE

## 2019-03-28 PROCEDURE — 6360000004 HC RX CONTRAST MEDICATION: Performed by: INTERNAL MEDICINE

## 2019-03-28 PROCEDURE — 2580000003 HC RX 258: Performed by: HOSPITALIST

## 2019-03-28 RX ORDER — CIPROFLOXACIN 2 MG/ML
400 INJECTION, SOLUTION INTRAVENOUS EVERY 12 HOURS
Status: DISCONTINUED | OUTPATIENT
Start: 2019-03-28 | End: 2019-04-01 | Stop reason: HOSPADM

## 2019-03-28 RX ORDER — FLUCONAZOLE 100 MG/1
200 TABLET ORAL DAILY
Status: DISCONTINUED | OUTPATIENT
Start: 2019-03-28 | End: 2019-03-30

## 2019-03-28 RX ADMIN — METOPROLOL TARTRATE 25 MG: 25 TABLET ORAL at 08:46

## 2019-03-28 RX ADMIN — Medication 10 ML: at 08:49

## 2019-03-28 RX ADMIN — INSULIN LISPRO 6 UNITS: 100 INJECTION, SOLUTION INTRAVENOUS; SUBCUTANEOUS at 18:02

## 2019-03-28 RX ADMIN — FLUCONAZOLE 200 MG: 100 TABLET ORAL at 20:23

## 2019-03-28 RX ADMIN — METOPROLOL TARTRATE 25 MG: 25 TABLET ORAL at 20:24

## 2019-03-28 RX ADMIN — INSULIN GLARGINE 30 UNITS: 100 INJECTION, SOLUTION SUBCUTANEOUS at 20:24

## 2019-03-28 RX ADMIN — PROPAFENONE HYDROCHLORIDE 150 MG: 150 TABLET, FILM COATED ORAL at 05:42

## 2019-03-28 RX ADMIN — INSULIN LISPRO 2 UNITS: 100 INJECTION, SOLUTION INTRAVENOUS; SUBCUTANEOUS at 09:01

## 2019-03-28 RX ADMIN — FAMOTIDINE 20 MG: 10 INJECTION INTRAVENOUS at 20:24

## 2019-03-28 RX ADMIN — WARFARIN SODIUM 12.5 MG: 7.5 TABLET ORAL at 18:01

## 2019-03-28 RX ADMIN — CETIRIZINE HYDROCHLORIDE 10 MG: 10 TABLET, FILM COATED ORAL at 20:24

## 2019-03-28 RX ADMIN — FAMOTIDINE 20 MG: 10 INJECTION INTRAVENOUS at 08:48

## 2019-03-28 RX ADMIN — CIPROFLOXACIN 400 MG: 2 INJECTION, SOLUTION INTRAVENOUS at 20:23

## 2019-03-28 RX ADMIN — IOPAMIDOL 90 ML: 755 INJECTION, SOLUTION INTRAVENOUS at 13:25

## 2019-03-28 RX ADMIN — INSULIN LISPRO 2 UNITS: 100 INJECTION, SOLUTION INTRAVENOUS; SUBCUTANEOUS at 23:04

## 2019-03-28 RX ADMIN — PROPAFENONE HYDROCHLORIDE 150 MG: 150 TABLET, FILM COATED ORAL at 18:01

## 2019-03-28 RX ADMIN — Medication 10 ML: at 20:24

## 2019-03-28 RX ADMIN — CIPROFLOXACIN 400 MG: 2 INJECTION, SOLUTION INTRAVENOUS at 08:45

## 2019-03-28 RX ADMIN — INSULIN LISPRO 4 UNITS: 100 INJECTION, SOLUTION INTRAVENOUS; SUBCUTANEOUS at 12:06

## 2019-03-28 NOTE — PROGRESS NOTES
Patient c/o feeling like he needed to urinate but not being able to void. Bladder scan showed 704ml. Spoke with Dr. John Larsen, ordered I&O cath. Educated patient that he needed to start using his urinal so we could keep up with his output. Patient stated it was difficult for him to use urinal. Patient stated he would try. 600ml steph urine returned from cath.

## 2019-03-28 NOTE — PROGRESS NOTES
Clinical Pharmacy Note    Warfarin consult follow-up    Recent Labs     03/28/19  0625   INR 1.43*     Recent Labs     03/26/19  0628 03/27/19  0141 03/28/19  0625   HGB 11.2* 10.3* 10.9*   HCT 33.0* 31.4* 33.6*    216 282       Significant Drug-Drug Interactions:  Current  warfarin drug-drug interactions: Rythmol,Cipro  Discontinued drug-drug interactions: Flagyl    Date INR Warfarin Dose   03/21/19 4.34 Pt took 12.5 mg at home    03/22/19 4.77  Held    03/23/19 3.01  7.5 mg    03/24/19 3.13  Held     03/25/19 4.88   Held    03/26/19  2.86  2.5   03/27/19   1.66 10 mg    03/28/19  1.43  12.5 mg              Notes: Coumadin 12.5 mg po X1 tonight. Daily PT/INR until stable within therapeutic range.      Electronically signed by Asher Kramer, [address] [facility] on 3/28/2019 at 11:16 AM

## 2019-03-28 NOTE — PROGRESS NOTES
Nutrition Assessment    Type and Reason for Visit: Reassess    Nutrition Recommendations: continue to follow for questions and encouragement to adhear to the meal plan    Nutrition Assessment: Pt had not read any of information left esterday. Still went over all the information, explaining all parts of diabetic diet and why foods where cross off because of vit K level. Pt still sleepy this a.m. but did ask questions and participate in education    Malnutrition Assessment:  · Malnutrition Status: No malnutrition  · Context: Acute illness or injury  · Findings of the 6 clinical characteristics of malnutrition (Minimum of 2 out of 6 clinical characteristics is required to make the diagnosis of moderate or severe Protein Calorie Malnutrition based on AND/ASPEN Guidelines):  1. Energy Intake- , Unable to assess    2. Weight Loss-No significant weight loss,    3. Fat Loss-No significant subcutaneous fat loss,    4. Muscle Loss-No significant muscle mass loss,    5. Fluid Accumulation-No significant fluid accumulation, Generalized  6.   Strength-Not measured    Nutrition Risk Level: Low    Nutrition Diagnosis:   · Problem: Altered nutrition-related lab values  · Etiology: related to (related to dietary noncompliance, misinformation)     Signs and symptoms:  as evidenced by Patient report of, Lab values    Objective Information:  · Nutrition-Focused Physical Findings: well nourished appearing gentleman  · Wound Type: None  · Current Nutrition Therapies:  · Oral Diet Orders: Carb Control 4 Carbs/Meal, Fluid Restriction   · Oral Diet intake: %  · Oral Nutrition Supplement (ONS) Orders: None     · Anthropometric Measures:  · Ht: 5' 10\" (177.8 cm)   · Current Body Wt: 265 lb (120.2 kg)  · Admission Body Wt: 258 lb (117 kg)(stated)  · Ideal Body Wt: 166 lb (75.3 kg),   · BMI Classification: BMI 35.0 - 39.9 Obese Class II    Nutrition Interventions:   Continue current diet  Continued Inpatient Monitoring, Education Initiated, Education Completed    Nutrition Evaluation:   · Evaluation: Progressing toward goals   · Goals: po intake 50% or greater. Weight stable.   Accuchek's 150 or less    · Monitoring: Meal Intake, Diet Tolerance, Skin Integrity, Weight, Pertinent Labs, Patient/Family Education      Electronically signed by Mario Alberto Lazar MS, RD, LD on 3/28/19 at 12:44 PM    Contact Number: [phone]

## 2019-03-28 NOTE — CONSULTS
CONSULTATION NOTE :ID       Patient - Oliver Haro,  Age - 52 y.o.    - 1965      Room Number - 0316/316-01   N -  [id number]   Acct # - [de-identified]  Date of Admission -  3/21/2019 10:58 AM  Patient's PCP: ePtrona Sims DO     Requesting Physician: Geovani Broussard MD    REASON FOR CONSULTATION      Salmonella    HISTORY OF PRESENT ILLNESS       This is a very pleasant 52 y.o. male who was admitted to the hospital with a chief complaints of  \"diabetic ketoacidosis\". Per the admission history and physical he chief complaint was that of \"fatigue and chills\". When asked the patient if he was having any illness prior to admission he became somewhat frustrated and said he had \"diabetic ketoacidosis because I didn't have insulin\"    On further questioning he states he did not have diarrhea until he came to the hospital. He reports she did have a bowel movement today that was loose. He does not recall having any abdominal pain and thinks he may have had some fevers. Reviewing the temperature curve since the . He had a 2 days with temperatures to 101.8 and 102. Temperatures have improved since then. Stool culture obtained on  was positive for salmonella species this was sent to the [facility] lab for confirmation. The patient has been on ciprofloxacin. Blood cultures have been negative.      PAST MEDICAL AND SURGICAL HISTORY       Past Medical History:   Diagnosis Date    Allergic rhinitis, cause unspecified     Charcot's joint of right foot     Chicken pox     HTN (hypertension)     Hyperlipidemia     Impotence of organic origin     MRSA infection     Other dyspnea and respiratory abnormality     Other specified erythematous condition(695.89)     Paroxysmal atrial fibrillation (HCC)     Personal history of other infectious and parasitic disease     Type II or unspecified type diabetes mellitus without mention of complication, uncontrolled  Past Surgical History:   Procedure Laterality Date    CARDIOVERSION  03/2017    MALIGNANT SKIN LESION EXCISION      X 2    TONSILLECTOMY           MEDICATIONS :       Scheduled Meds:   ciprofloxacin  400 mg Intravenous Q12H    warfarin  12.5 mg Oral Once    insulin glargine  30 Units Subcutaneous Nightly    insulin lispro  0-12 Units Subcutaneous TID WC    insulin lispro  0-6 Units Subcutaneous Nightly    lidocaine 1 % injection  5 mL Intradermal Once    sodium chloride flush  10 mL Intravenous 2 times per day    metoprolol tartrate  25 mg Oral BID    famotidine (PEPCID) injection  20 mg Intravenous BID    cetirizine  10 mg Oral Nightly    propafenone  150 mg Oral Q8H    warfarin (COUMADIN) daily dosing (placeholder)   Other RX Placeholder     Continuous Infusions:   sodium chloride 75 mL/hr at 03/28/19 1101    dextrose       PRN Meds:calcium carbonate, potassium chloride **OR** potassium alternative oral replacement **OR** potassium chloride, ipratropium-albuterol, loperamide, benzonatate, sodium chloride flush, magnesium sulfate, sodium phosphate IVPB **OR** sodium phosphate IVPB **OR** sodium phosphate IVPB, acetaminophen, melatonin, glucose, dextrose, glucagon (rDNA), dextrose, HYDROcodone-acetaminophen    ALLERGIES:      Pcn [penicillins]; Neomycin-bacitracin zn-polymyx; and Neosporin [neomycin-polymyxin-gramicidin]      SOCIAL HISTORY:     TOBACCO:   reports that he has never smoked. He has never used smokeless tobacco.     ETOH:   reports that he does not drink alcohol.   Patient currently lives at home with his son    FAMILY HISTORY:         Problem Relation Age of Onset    Stroke Maternal Grandmother     Cancer Maternal Grandmother     Stroke Paternal Grandmother     Diabetes Paternal Grandmother     Cancer Father     Heart Disease Father     Asthma Paternal Grandfather     Heart Disease Paternal Grandfather     Heart Disease Maternal Grandfather     Colon Cancer Neg Hx     Colon Polyps Neg Hx     Esophageal Cancer Neg Hx     Liver Cancer Neg Hx     Liver Disease Neg Hx     Rectal Cancer Neg Hx     Stomach Cancer Neg Hx        REVIEW OF SYSTEMS:     Constitutional:Fever, fatigue  Mouth/throat: no dysphagia  Lungs: no cough  no shortness of breath  CVS: no palpitation, no chest pain, Known A. fib   GI: no abdominal pain, no nausea, had  diarrhea  RISHI:Denied  dysuria, frequency and urgency  Musculoskeletal: nofocal  joint  swelling   Neuro: no headache, no numbness, no tingling  Endocrine: no polyuria, polydipsia  Hematology: easy brusing or bleeding-On warfarin for A. fib         PHYSICAL EXAM:     BP (!) 155/113   Pulse 83   Temp 98.7 °F (37.1 °C) (Temporal)   Resp 16   Ht 5' 10\" (1.778 m)   Wt 265 lb 7 oz (120.4 kg)   SpO2 97%   BMI 38.09 kg/m²  Temp (24hrs), Av.4 °F (36.9 °C), Min:98.2 °F (36.8 °C), Max:98.7 °F (37.1 °C)    General:  Awake, alert, not in distress, lying in bed   HEENT: pink conjunctiva, anicteric sclera   Neck: Supplet:  Lungs without crackles, ronchi or wheezing  Cardiovascular: Irregularly irregular  Abdomen: Obese  Soft, non tender to palpation, BS positive  Extremities:Trace  pretibial edema  Skin:  Warm and dry. CNS: Moves all extremities, speech clear, follows commands  PSYCH: alert,initially agitated with line of questioning that subsequently cooperative.   IV access:  PICC upper arm right, condition patent and no redness         LABS:     CBC with DIFF:   Recent Labs     19  0628 19  0141 19  0625   WBC 5.9 5.0 5.0   RBC 3.55* 3.38* 3.55*   HGB 11.2* 10.3* 10.9*   HCT 33.0* 31.4* 33.6*   MCV 93.0 92.9 94.6*   MCH 31.5* 30.5 30.7   MCHC 33.9 32.8* 32.4*   RDW 12.4 12.4 12.3    216 282   MPV 10.7 11.0 10.6       CMP/BMP:  Recent Labs     19  0628 19  0141 19  0625   * 130* 131*   K 4.8 5.0 4.3   CL 98 99 98   CO2 20* 22 24   ANIONGAP 12 9 9   GLUCOSE 217* 273* 193*   BUN 20 24* 21*   CREATININE 1. 9* 1.9* 1.3*   LABGLOM 37* 37* 57*   CALCIUM 8.2* 8.1* 8.4*   PROT 5.8* 5.7* 5.9*   LABALBU 2.7* 2.8* 3.0*   BILITOT 0.6 0.6 0.6   ALKPHOS 183* 338* 447*   ALT 31 60* 71*   AST 39 110* 63*          Culture: No results for input(s): BC, BLOODCULT2, ORG in the last 72 hours. Culture Stool [[id number]] (Abnormal) Collected: 03/25/19 1040 Order Status: Completed Specimen: Stool Updated: 03/28/19 0755 Culture, Stool --Normal enteric samantha   No Shigella, Campylobacter, E. coli 0157 isolated   E coli, Shiga toxin Assay -- Negative- No Shiga toxins 1 and 2 detected   Normal Range: Not detected   Organism Salmonella speciesCulture, Stool -- Moderate growth   Sending to [address] [place] [address] for confirmation   Narrative: ORDER#: [id number] ORDERED BY: Dorothy Buckley  SOURCE: Stool COLLECTED: 03/25/19 10:40  ANTIBIOTICS AT DEMI.: RECEIVED : 03/25/19 11:12  CALL Karmen Calvin tel. [phone],  Microbiology results called to and read back by jourdan pelayo rn 3rd,  03/28/2019 07:54, by Jyothi Hanley        IMAGING:   Xr Chest Portable    Result Date: 3/21/2019  XR CHEST PORTABLE 3/21/2019 11:47 AM History: Generalized weakness. Portable chest x-ray compared with 5/6/2015. The heart size is normal. The mediastinum is within normal limits. The lungs are normally expanded with no pneumonia or pneumothorax. No congestive failure changes. 1. No acute disease.  Signed by Dr Nas Mart on 3/21/2019 11:47 AM          ASSESSMENT AND PLAN     Patient Active Problem List   Diagnosis    Shortness of breath    HTN (hypertension)    Hyperlipidemia    Fatigue    Atrial fibrillation (HCC)    Paroxysmal atrial fibrillation (HCC)    Acalculous cholecystitis    Essential hypertension    Diabetic ketoacidosis without coma associated with type 2 diabetes mellitus ([facility]    Hyponatremia    Hypotension due to hypovolemia    Acute kidney injury ([facility]    Type 2 diabetes mellitus with diabetic polyneuropathy, with long-term current use of insulin (HCC)    Elevated troponin    Coagulopathy (HCC)     Salmonella enteritis with no bacteremia based on blood cultures obtained March 22    Continue ciprofloxacin.  Will need to follow for improvement in his symptoms however I would consider this patient immunocompromised given his poorly controlled diabetes and would expect him to need 14 days of oral antifungal therapy if not longer    Diabetes with diabetic ketoacidosis-ketoacidosis resolved    Atrial fibrillation    Acute kidney injury-improving    Thank you Cheri Adam MD for allowing me to participate in this patient's care  Electronically signed by Jena Shannon MD on 3/28/2019 at 2:27 PM

## 2019-03-28 NOTE — PROGRESS NOTES
ADMITTED: 21MAR19    PCP:  Neha Gonsalez DO    CODE STATUS: Full Code        SUBJECTIVE:  Pt seen and examined  Doing well in no acute distress, BS seems to better controlled. . Denies CP or SOB    \"febrile\"  21MAR19: (copied from Dr. Braulio Strickland note)  \"52 year old white male presents to the emergency department with complaint of fatigue, weakness, shaking chills for about 3 days, worse today. Patient is a diabetic dependent on insulin but states he has been unable to afford his insulin \"for several weeks. \" States he generally felt well prior to onset of symptoms without any localizing symptoms. Complains of chronic burning in his feet due to neuropathy and of polyuria and polydipsia for the past few days. The patient has atrial fibrillation, is on warfarin. INR is supratherapeutic. Patient denies any visible bleeding, nausea or vomiting, fever, cough, or congestion. \"    Allergies   Allergen Reactions    Pcn [Penicillins]     Neomycin-Bacitracin Zn-Polymyx Rash    Neosporin [Neomycin-Polymyxin-Gramicidin] Rash     Current Facility-Administered Medications   Medication Dose Route Frequency Provider Last Rate Last Dose    ciprofloxacin (CIPRO) IVPB 400 mg  400 mg Intravenous Q12H Jessie Duong MD   Stopped at 03/28/19 0945    warfarin (COUMADIN) tablet 12.5 mg  12.5 mg Oral Once Jeffrey Beebe DO        0.9 % sodium chloride infusion   Intravenous Continuous Lianet Reddy MD 75 mL/hr at 03/28/19 1101      calcium carbonate (TUMS) chewable tablet 500 mg  500 mg Oral TID PRN Cornelio Chacko MD   500 mg at 03/27/19 1910    insulin glargine (LANTUS) injection vial 30 Units  30 Units Subcutaneous Nightly Terence Borrero MD   30 Units at 03/27/19 1911    insulin lispro (HUMALOG) injection vial 0-12 Units  0-12 Units Subcutaneous TID  Terence Borrero MD   4 Units at 03/28/19 1206    insulin lispro (HUMALOG) injection vial 0-6 Units  0-6 Units Subcutaneous Nightly Terence Borrero MD   3 Units at 03/27/19 2247    potassium right renal mass. Follow-up  Suggested. Will need CT kidneys once renal function improves  2. Otherwise negative bilateral renal ultrasound. Diarrhea:   On cipro and flagyl empirically since 17DEH27  C Diff was ruled out  Stool cx + salmonella- will continue with cipro  ID consulted reccomends c/w cipro and + fluconazole x 14 days     A Fib rate controlled:  continue coumadin  daily INR    Diabetic ketoacidosis without coma, in underlying DM type 2: (DKA RESOLVED)  On SQ insulin  Starting back on ISS medium dose  Long acting as ordered from tonight  Needs long acting dose of insulin before leaving for floor  hypoglycemia protocol  a1c : 16     Hyperkalemia: (RESOLVED)     [place] metabolic acidosis (RESOLVED)          Patient Active Problem List   Diagnosis    Shortness of breath    HTN (hypertension)    Hyperlipidemia    Fatigue    Atrial fibrillation (HCC)    Paroxysmal atrial fibrillation (HCC)    Acalculous cholecystitis    Essential hypertension    Diabetic ketoacidosis without coma associated with type 2 diabetes mellitus (HCC)    Hyponatremia    Hypotension due to hypovolemia    Acute kidney injury ([address]    Type 2 diabetes mellitus with diabetic polyneuropathy, with long-term current use of insulin (HCC)    Elevated troponin    Coagulopathy (HCC)

## 2019-03-28 NOTE — CARE COORDINATION
[address] Transitions Interview     3/28/2019    Patient: Jerod Dahl Patient : 1965   MRN: [id number]  Reason for Admission: renal failure  RARS: Readmission Risk Score: 32         Spoke with: Jerod Dahl and Mom      Readmission Risk  Patient Active Problem List   Diagnosis    Shortness of breath    HTN (hypertension)    Hyperlipidemia    Fatigue    Atrial fibrillation ([address]    Paroxysmal atrial fibrillation ([address]    Acalculous cholecystitis    Essential hypertension    Diabetic ketoacidosis without coma associated with type 2 diabetes mellitus ([address]    Hyponatremia    Hypotension due to hypovolemia    Acute kidney injury ([facility] [address] [facility]    Type 2 diabetes mellitus with diabetic polyneuropathy, with long-term current use of insulin (HCC)    Elevated troponin    Coagulopathy [facility])       Inpatient Assessment  Care Transitions Summary    Care Transitions Inpatient Review  Medication Review  Are you able to afford your medications?:  No  How often do you have difficulty taking your medications?:  Sometimes I take them as prescribed. Housing Review  Who do you live with?:  Child  Are you an active caregiver in your home?:  No  Social Support  Do you have a ?:  No  Do you have a [address] [place] [address]?:  No  Durable Medical Equipment  Patient DME:  Straight cane  Functional Review  Ability to seek help/take action for Emergent/Urgent situations i.e. fire, crime, inclement weather or health crisis. :  Independent  Ability handle personal hygiene needs (bathing/dressing/grooming): Independent  Ability to manage medications: Independent  Ability to prepare food:  Needs Assistance  Ability to maintain home (clean home, laundry):  Needs Assistance  Ability to drive and/or has transportation:  Independent  Ability to do shopping:  Independent  Ability to manage finances:   Independent  Is patient able to live independently?:  Yes  Hearing and Vision  Visual Impairment: None  Hearing Impairment:  None  Care Transitions Interventions         Follow Up : Met at bedside with Mr. Goldstein, discussed BPCI-A process and presented the CMS Beneficiary Letter. Contact information given. hBavna Hart is agreeable with appropriate follow up after discharge. Patient states he lives at home [place] in [place]. He says his son is moving out in the spring. He has trouble affording his insulins, counseled about [facility] [place]. Patient has a cane in the home but no other DME. He does drive, but is unable to cook or clean his home. He is able to shower and dress. Did speak to SW on 3rd floor regarding patient request for home care, and if he qualified. She states they will look into that for patient. Will follow as inpatient and at discharge. No future appointments.     Health Maintenance  Health Maintenance Due   Topic Date Due    Lipid screen  11/03/1973       Timothy Wakefeild RN

## 2019-03-28 NOTE — PROGRESS NOTES
Nephrology ([facility]) progress Note      Patient:  Jerod Dahl  YOB: 1965  Date of Service: 3/28/2019  MRN: [id number]   Acct: [de-identified]   Primary Care Physician: Raisa Olmos DO  Advance Directive: Full Code  Admit Date: 3/21/2019       Hospital Day: 7  Referring Provider: Steven Luna MD    Patient independently seen and examined, Chart, Consults, Notes, Operative notes, Labs, Cardiology, and Radiology studies reviewed as able. Chief complaint: Abnormal lab. Subjective:  Jerod Dahl is a 52 y.o. male  whom we were consulted for acute kidney injury/chronic kidney disease/metabolic acidosis. Patient has long-term history of insulin-dependent diabetes. He presented initially with weakness and was found to have diabetic ketoacidosis. He was initially treated with insulin and IV fluid. Ketones were resolved in his bloodstream but he still had metabolic acidosis. Meanwhile patient has developed severe diarrhea which contributed towards non-anion gap metabolic acidosis. He is currently being treated with IV fluid with sodium bicarbonate. Patient has responded very well with IV fluid and his diarrhea has resolved. He has shown some improvement in renal function. Allergies:  Pcn [penicillins];  Neomycin-bacitracin zn-polymyx; and Neosporin [neomycin-polymyxin-gramicidin]    Medicines:  Current Facility-Administered Medications   Medication Dose Route Frequency Provider Last Rate Last Dose    ciprofloxacin (CIPRO) IVPB 400 mg  400 mg Intravenous Q12H Steven Luna MD   Stopped at 03/28/19 0945    0.9 % sodium chloride infusion   Intravenous Continuous Rosalina Mancera  mL/hr at 03/27/19 2247      calcium carbonate (TUMS) chewable tablet 500 mg  500 mg Oral TID PRN Bo Aldana MD   500 mg at 03/27/19 1910    insulin glargine (LANTUS) injection vial 30 Units  30 Units Subcutaneous Nightly Trace Alston MD   30 Units at 03/27/19 1911    insulin lispro Shlomo Lyons MD   650 mg at 03/24/19 0835    melatonin tablet 3 mg  3 mg Oral Nightly PRN Shlomo Lyons MD   3 mg at 03/23/19 2306    cetirizine (ZYRTEC) tablet 10 mg  10 mg Oral Nightly Shlomo Lyons MD   10 mg at 03/27/19 1910    glucose (GLUTOSE) 40 % oral gel 15 g  15 g Oral PRN Shlomo Lyons MD        dextrose 50 % solution 12.5 g  12.5 g Intravenous PRN Shlomo Lyons MD        glucagon (rDNA) injection 1 mg  1 mg Intramuscular PRN Shlomo Lyons MD        dextrose 5 % solution  100 mL/hr Intravenous PRN Shlomo Lyons MD        HYDROcodone-acetaminophen [facility])  MG per tablet 1 tablet  1 tablet Oral Q6H PRN Shlomo Lyons MD   1 tablet at 03/26/19 2310    propafenone (RYTHMOL) tablet 150 mg  150 mg Oral Q8H Shlomo Lyons MD   150 mg at 03/28/19 0542    warfarin (COUMADIN) daily dosing (placeholder)   Other Alina Orona MD           Past Medical History:  Past Medical History:   Diagnosis Date    Allergic rhinitis, cause unspecified     Charcot's joint of right foot     Chicken pox     HTN (hypertension)     Hyperlipidemia     Impotence of organic origin     MRSA infection     Other dyspnea and respiratory abnormality     Other specified erythematous condition(695.89)     Paroxysmal [facility] fibrillation ([facility]     Personal history of other infectious and parasitic disease     Type II or unspecified type diabetes mellitus without mention of complication, uncontrolled 1994       Past Surgical History:  Past Surgical History:   Procedure Laterality Date    CARDIOVERSION  03/2017    MALIGNANT SKIN LESION EXCISION      X 2    TONSILLECTOMY         Family History  Family History   Problem Relation Age of Onset    Stroke Maternal Grandmother     Cancer Maternal Grandmother     Stroke Paternal Grandmother     Diabetes Paternal Grandmother     Cancer Father     Heart Disease Father     Asthma Paternal Grandfather     Heart Disease Paternal Elif Fuentes Heart Disease Maternal Grandfather     Colon Cancer Neg Hx     Colon Polyps Neg Hx     Esophageal Cancer Neg Hx     Liver Cancer Neg Hx     Liver Disease Neg Hx     Rectal Cancer Neg Hx     Stomach Cancer Neg Hx        Social History  Social History     Socioeconomic History    Marital status:      Spouse name: Not on file    Number of children: Not on file    Years of education: Not on file    Highest education level: Not on file   Occupational History    Not on file   Social Needs    Financial resource strain: Not on file    Food insecurity:     Worry: Not on file     Inability: Not on file    Transportation needs:     Medical: Not on file     Non-medical: Not on file   Tobacco Use    Smoking status: Never Smoker    Smokeless tobacco: Never Used   Substance and Sexual Activity    Alcohol use: No    Drug use: No    Sexual activity: Not on file   Lifestyle    Physical activity:     Days per week: Not on file     Minutes per session: Not on file    Stress: Not on file   Relationships    Social connections:     Talks on phone: Not on file     Gets together: Not on file     Attends [employer] service: Not on file     Active member of club or organization: Not on file     Attends meetings of clubs or organizations: Not on file     Relationship status: Not on file    Intimate partner violence:     Fear of current or ex partner: Not on file     Emotionally abused: Not on file     Physically abused: Not on file     Forced sexual activity: Not on file   Other Topics Concern    Not on file   Social History Narrative    Not on file         Review of Systems:  History obtained from chart review and the patient  General ROS: No fever or chills  Respiratory ROS: No cough, shortness of breath, wheezing  Cardiovascular ROS: No chest pain or palpitations  Gastrointestinal ROS: positive for - diarrhea  Genito-Urinary ROS: No dysuria or hematuria  Musculoskeletal ROS: No joint pain or swelling   14 point ROS reviewed with the patient and negative except as noted above and in the HPI unless unable to obtain. Objective:  Patient Vitals for the past 24 hrs:   BP Temp Temp src Pulse Resp SpO2 Weight   03/28/19 0815 (!) 155/113 98.7 °F (37.1 °C) Temporal 83 16 97 % --   03/28/19 0721 (!) 155/96 98.3 °F (36.8 °C) Temporal 77 18 97 % --   03/28/19 0214 -- -- -- -- -- -- 265 lb 7 oz (120.4 kg)   03/27/19 1843 122/85 98.2 °F (36.8 °C) Temporal 82 16 99 % --       Intake/Output Summary (Last 24 hours) at 3/28/2019 1023  Last data filed at 3/28/2019 8427  Gross per 24 hour   Intake 2144 ml   Output --   Net 2144 ml     General: awake/alert   HEENT: Normocephalic atraumatic head  Neck: Supple with no JVD or goiter bruits. Chest:  clear to auscultation bilaterally without respiratory distress  CVS: regular rate and rhythm  Abdominal: soft, nontender, normal bowel sounds  Extremities: no cyanosis or edema  Skin: warm and dry without rash      Labs:  BMP:   Recent Labs     03/25/19  1054 03/25/19  1545 03/25/19  2030  03/26/19 0628 03/27/19  0141 03/28/19  0625   * 128* 129*   < > 130* 130* 131*   K 5.4* 4.3 4.5   < > 4.8 5.0 4.3    101 99   < > 98 99 98   CO2 15* 17* 20*   < > 20* 22 24   PHOS 3.3 3.0 3.2  --   --   --   --    BUN 17 18 19   < > 20 24* 21*   CREATININE 1.7* 1.6* 1.7*   < > 1.9* 1.9* 1.3*   CALCIUM 8.3* 8.2* 8.1*   < > 8.2* 8.1* 8.4*    < > = values in this interval not displayed. CBC:   Recent Labs     03/26/19 0628 03/27/19  0141 03/28/19  0625   WBC 5.9 5.0 5.0   HGB 11.2* 10.3* 10.9*   HCT 33.0* 31.4* 33.6*   MCV 93.0 92.9 94.6*    216 282     LIVER PROFILE:   Recent Labs     03/26/19  0628 03/27/19  0141 03/28/19  0625   AST 39 110* 63*   ALT 31 60* 71*   BILITOT 0.6 0.6 0.6   ALKPHOS 183* 338* 447*     PT/INR:   Recent Labs     03/26/19  0628 03/27/19  0141 03/28/19  0625   PROTIME 29.2* 18.9* 16.8*   INR 2.86* 1.66* 1.43*     APTT: No results for input(s):  APTT in the last diarrhea    Plan:  1. Decrease IV fluid. 2. Continue to monitor renal recovery. 3. CT scan of the abdomen with IV contrast for right renal nodule.       Lyndsay Carey MD  03/28/19  10:23 AM

## 2019-03-29 LAB
ALBUMIN SERPL-MCNC: 2.7 G/DL (ref 3.5–5.2)
ALP BLD-CCNC: 425 U/L (ref 40–130)
ALT SERPL-CCNC: 54 U/L (ref 5–41)
ANION GAP SERPL CALCULATED.3IONS-SCNC: 9 MMOL/L (ref 7–19)
AST SERPL-CCNC: 32 U/L (ref 5–40)
BILIRUB SERPL-MCNC: 0.4 MG/DL (ref 0.2–1.2)
BUN BLDV-MCNC: 14 MG/DL (ref 6–20)
CALCIUM SERPL-MCNC: 8.3 MG/DL (ref 8.6–10)
CHLORIDE BLD-SCNC: 101 MMOL/L (ref 98–111)
CO2: 27 MMOL/L (ref 22–29)
CREAT SERPL-MCNC: 1.1 MG/DL (ref 0.5–1.2)
GFR NON-AFRICAN AMERICAN: >60
GLUCOSE BLD-MCNC: 142 MG/DL (ref 70–99)
GLUCOSE BLD-MCNC: 147 MG/DL (ref 74–109)
GLUCOSE BLD-MCNC: 169 MG/DL (ref 70–99)
GLUCOSE BLD-MCNC: 293 MG/DL (ref 70–99)
HCT VFR BLD CALC: 32.8 % (ref 42–52)
HEMOGLOBIN: 10.8 G/DL (ref 14–18)
INR BLD: 1.73 (ref 0.88–1.18)
MCH RBC QN AUTO: 31.7 PG (ref 27–31)
MCHC RBC AUTO-ENTMCNC: 32.9 G/DL (ref 33–37)
MCV RBC AUTO: 96.2 FL (ref 80–94)
PDW BLD-RTO: 12.3 % (ref 11.5–14.5)
PERFORMED ON: ABNORMAL
PLATELET # BLD: 313 K/UL (ref 130–400)
PMV BLD AUTO: 10.3 FL (ref 9.4–12.4)
POTASSIUM SERPL-SCNC: 3.9 MMOL/L (ref 3.5–5)
PROTHROMBIN TIME: 19.5 SEC (ref 12–14.6)
RBC # BLD: 3.41 M/UL (ref 4.7–6.1)
SODIUM BLD-SCNC: 137 MMOL/L (ref 136–145)
TOTAL PROTEIN: 5.8 G/DL (ref 6.6–8.7)
WBC # BLD: 6.2 K/UL (ref 4.8–10.8)

## 2019-03-29 PROCEDURE — 1210000000 HC MED SURG R&B

## 2019-03-29 PROCEDURE — 85027 COMPLETE CBC AUTOMATED: CPT

## 2019-03-29 PROCEDURE — 99233 SBSQ HOSP IP/OBS HIGH 50: CPT | Performed by: HOSPITALIST

## 2019-03-29 PROCEDURE — 85610 PROTHROMBIN TIME: CPT

## 2019-03-29 PROCEDURE — 2580000003 HC RX 258: Performed by: HOSPITALIST

## 2019-03-29 PROCEDURE — 2500000003 HC RX 250 WO HCPCS: Performed by: HOSPITALIST

## 2019-03-29 PROCEDURE — 6370000000 HC RX 637 (ALT 250 FOR IP): Performed by: INTERNAL MEDICINE

## 2019-03-29 PROCEDURE — 6370000000 HC RX 637 (ALT 250 FOR IP): Performed by: HOSPITALIST

## 2019-03-29 PROCEDURE — 80053 COMPREHEN METABOLIC PANEL: CPT

## 2019-03-29 PROCEDURE — 82948 REAGENT STRIP/BLOOD GLUCOSE: CPT

## 2019-03-29 PROCEDURE — 6360000002 HC RX W HCPCS: Performed by: HOSPITALIST

## 2019-03-29 RX ORDER — WARFARIN SODIUM 5 MG/1
5 TABLET ORAL
Status: COMPLETED | OUTPATIENT
Start: 2019-03-29 | End: 2019-03-29

## 2019-03-29 RX ORDER — BUMETANIDE 1 MG/1
1 TABLET ORAL 2 TIMES DAILY
Status: DISCONTINUED | OUTPATIENT
Start: 2019-03-29 | End: 2019-04-01 | Stop reason: HOSPADM

## 2019-03-29 RX ORDER — INSULIN GLARGINE 100 [IU]/ML
30 INJECTION, SOLUTION SUBCUTANEOUS NIGHTLY
Qty: 1 VIAL | Refills: 0 | Status: SHIPPED | OUTPATIENT
Start: 2019-03-29 | End: 2019-05-09 | Stop reason: SDUPTHER

## 2019-03-29 RX ORDER — BUMETANIDE 1 MG/1
1 TABLET ORAL 2 TIMES DAILY
Qty: 30 TABLET | Refills: 0 | Status: SHIPPED | OUTPATIENT
Start: 2019-03-29 | End: 2019-05-22 | Stop reason: SDUPTHER

## 2019-03-29 RX ORDER — FLUCONAZOLE 200 MG/1
200 TABLET ORAL DAILY
Qty: 12 TABLET | Refills: 0 | Status: SHIPPED | OUTPATIENT
Start: 2019-03-30 | End: 2019-03-30 | Stop reason: HOSPADM

## 2019-03-29 RX ADMIN — BUMETANIDE 1 MG: 1 TABLET ORAL at 12:36

## 2019-03-29 RX ADMIN — CETIRIZINE HYDROCHLORIDE 10 MG: 10 TABLET, FILM COATED ORAL at 21:20

## 2019-03-29 RX ADMIN — Medication 10 ML: at 08:33

## 2019-03-29 RX ADMIN — CIPROFLOXACIN 400 MG: 2 INJECTION, SOLUTION INTRAVENOUS at 21:20

## 2019-03-29 RX ADMIN — PROPAFENONE HYDROCHLORIDE 150 MG: 150 TABLET, FILM COATED ORAL at 15:36

## 2019-03-29 RX ADMIN — PROPAFENONE HYDROCHLORIDE 150 MG: 150 TABLET, FILM COATED ORAL at 21:20

## 2019-03-29 RX ADMIN — CIPROFLOXACIN 400 MG: 2 INJECTION, SOLUTION INTRAVENOUS at 08:31

## 2019-03-29 RX ADMIN — METOPROLOL TARTRATE 25 MG: 25 TABLET ORAL at 21:20

## 2019-03-29 RX ADMIN — INSULIN LISPRO 3 UNITS: 100 INJECTION, SOLUTION INTRAVENOUS; SUBCUTANEOUS at 21:21

## 2019-03-29 RX ADMIN — FAMOTIDINE 20 MG: 10 INJECTION INTRAVENOUS at 08:31

## 2019-03-29 RX ADMIN — PROPAFENONE HYDROCHLORIDE 150 MG: 150 TABLET, FILM COATED ORAL at 07:17

## 2019-03-29 RX ADMIN — BUMETANIDE 1 MG: 1 TABLET ORAL at 21:20

## 2019-03-29 RX ADMIN — METOPROLOL TARTRATE 25 MG: 25 TABLET ORAL at 08:30

## 2019-03-29 RX ADMIN — WARFARIN SODIUM 5 MG: 5 TABLET ORAL at 18:20

## 2019-03-29 RX ADMIN — Medication 10 ML: at 21:22

## 2019-03-29 RX ADMIN — FLUCONAZOLE 200 MG: 100 TABLET ORAL at 08:31

## 2019-03-29 RX ADMIN — PROPAFENONE HYDROCHLORIDE 150 MG: 150 TABLET, FILM COATED ORAL at 00:53

## 2019-03-29 RX ADMIN — FAMOTIDINE 20 MG: 10 INJECTION INTRAVENOUS at 21:21

## 2019-03-29 RX ADMIN — INSULIN LISPRO 2 UNITS: 100 INJECTION, SOLUTION INTRAVENOUS; SUBCUTANEOUS at 12:37

## 2019-03-29 RX ADMIN — INSULIN LISPRO 2 UNITS: 100 INJECTION, SOLUTION INTRAVENOUS; SUBCUTANEOUS at 08:33

## 2019-03-29 RX ADMIN — INSULIN GLARGINE 30 UNITS: 100 INJECTION, SOLUTION SUBCUTANEOUS at 21:25

## 2019-03-29 NOTE — PROGRESS NOTES
mg at 03/27/19 1910    insulin glargine (LANTUS) injection vial 30 Units  30 Units Subcutaneous Nightly Catherine Lux MD   30 Units at 03/28/19 2024    insulin lispro (HUMALOG) injection vial 0-12 Units  0-12 Units Subcutaneous TID WC Catherine Lux MD   2 Units at 03/29/19 8513    insulin lispro (HUMALOG) injection vial 0-6 Units  0-6 Units Subcutaneous Nightly Catherine Lux MD   2 Units at 03/28/19 2304    potassium chloride (KLOR-CON M) extended release tablet 40 mEq  40 mEq Oral PRN Catherine Lux MD        Or    potassium bicarb-citric acid (EFFER-K) effervescent tablet 40 mEq  40 mEq Oral PRN Catherine Lux MD        Or    potassium chloride 10 mEq/100 mL IVPB (Peripheral Line)  10 mEq Intravenous PRN Catherine Lux MD        ipratropium-albuterol (DUONEB) nebulizer solution 1 ampule  1 ampule Inhalation Q4H PRN Jeramy Romero MD        loperamide (IMODIUM) capsule 2 mg  2 mg Oral 4x Daily PRN Catherine Lux MD   2 mg at 03/27/19 1910    benzonatate (TESSALON) capsule 100 mg  100 mg Oral TID PRN Catehrine Lux MD   100 mg at 03/26/19 2310    lidocaine PF 1 % injection 5 mL  5 mL Intradermal Once Catherine Lux MD        sodium chloride flush 0.9 % injection 10 mL  10 mL Intravenous 2 times per day Catherine Lux MD   10 mL at 03/29/19 0833    sodium chloride flush 0.9 % injection 10 mL  10 mL Intravenous PRN Catherine Lux MD        metoprolol tartrate (LOPRESSOR) tablet 25 mg  25 mg Oral BID Catherine Lux MD   25 mg at 03/29/19 0830    famotidine (PEPCID) injection 20 mg  20 mg Intravenous BID Catherine Lux MD   20 mg at 03/29/19 0831    magnesium sulfate 1 g in dextrose 5% 100 mL IVPB  1 g Intravenous PRN Catherine Lux MD   Stopped at 03/25/19 1920    sodium phosphate 10 mmol in dextrose 5 % 250 mL IVPB  10 mmol Intravenous PRN Catherine Lux MD   Stopped at 03/25/19 6826    Or    sodium phosphate 15 mmol in dextrose 5 % 250 mL IVPB  15 mmol Intravenous PRN Catherine Lux MD Or    sodium phosphate 20 mmol in dextrose 5 % 500 mL IVPB  20 mmol Intravenous PRN Tabitha Galeazzi, MD        acetaminophen (TYLENOL) tablet 650 mg  650 mg Oral Q6H PRN Tabitha Galeazzi, MD   650 mg at 03/24/19 0835    melatonin tablet 3 mg  3 mg Oral Nightly PRN Tabitha Galeazzi, MD   3 mg at 03/23/19 2306    cetirizine (ZYRTEC) tablet 10 mg  10 mg Oral Nightly Tabitha Galeazzi, MD   10 mg at 03/28/19 2024    glucose (GLUTOSE) 40 % oral gel 15 g  15 g Oral PRN Tabitha Galeazzi, MD        dextrose 50 % solution 12.5 g  12.5 g Intravenous PRN Tabitha Galeazzi, MD        glucagon (rDNA) injection 1 mg  1 mg Intramuscular PRN Tabitha Galeazzi, MD        dextrose 5 % solution  100 mL/hr Intravenous PRN Tabitha Galeazzi, MD        HYDROcodone-acetaminophen [facility])  MG per tablet 1 tablet  1 tablet Oral Q6H PRN Tabitha Galeazzi, MD   1 tablet at 03/26/19 2310    propafenone (RYTHMOL) tablet 150 mg  150 mg Oral Q8H Tabitha Galeazzi, MD   150 mg at 03/29/19 7056    warfarin (COUMADIN) daily dosing (placeholder)   Other Arsen Medrano MD           Past Medical History:  Past Medical History:   Diagnosis Date    Allergic rhinitis, cause unspecified     Charcot's joint of right foot     Chicken pox     HTN (hypertension)     Hyperlipidemia     Impotence of organic origin     MRSA infection     Other dyspnea and respiratory abnormality     Other specified erythematous condition(275.89)     Paroxysmal atrial fibrillation (HCC)     Personal history of other infectious and parasitic disease     Type II or unspecified type diabetes mellitus without mention of complication, uncontrolled 1994       Past Surgical History:  Past Surgical History:   Procedure Laterality Date    CARDIOVERSION  03/2017    MALIGNANT SKIN LESION EXCISION      X 2    TONSILLECTOMY         Family History  Family History   Problem Relation Age of Onset    Stroke Maternal Grandmother     Cancer Maternal Grandmother     Stroke Paternal Grandmother     Diabetes Paternal Grandmother     Cancer Father     Heart Disease Father     Asthma Paternal Grandfather     Heart Disease Paternal Grandfather     Heart Disease Maternal Grandfather     Colon Cancer Neg Hx     Colon Polyps Neg Hx     Esophageal Cancer Neg Hx     Liver Cancer Neg Hx     Liver Disease Neg Hx     Rectal Cancer Neg Hx     Stomach Cancer Neg Hx        Social History  Social History     Socioeconomic History    Marital status:      Spouse name: Not on file    Number of children: Not on file    Years of education: Not on file    Highest education level: Not on file   Occupational History    Not on file   Social Needs    Financial resource strain: Not on file    Food insecurity:     Worry: Not on file     Inability: Not on file    Transportation needs:     Medical: Not on file     Non-medical: Not on file   Tobacco Use    Smoking status: Never Smoker    Smokeless tobacco: Never Used   Substance and Sexual Activity    Alcohol use: No    Drug use: No    Sexual activity: Not on file   Lifestyle    Physical activity:     Days per week: Not on file     Minutes per session: Not on file    Stress: Not on file   Relationships    Social connections:     Talks on phone: Not on file     Gets together: Not on file     Attends Voodoo service: Not on file     Active member of club or organization: Not on file     Attends meetings of clubs or organizations: Not on file     Relationship status: Not on file    Intimate partner violence:     Fear of current or ex partner: Not on file     Emotionally abused: Not on file     Physically abused: Not on file     Forced sexual activity: Not on file   Other Topics Concern    Not on file   Social History Narrative    Not on file         Review of Systems:  History obtained from chart review and the patient  General ROS: No fever or chills  Respiratory ROS: No cough, shortness of breath, wheezing  Cardiovascular ROS: No chest pain or palpitations  Gastrointestinal ROS: positive for - diarrhea  Genito-Urinary ROS: No dysuria or hematuria  Musculoskeletal ROS: No joint pain or swelling   14 point ROS reviewed with the patient and negative except as noted above and in the HPI unless unable to obtain. Objective:  Patient Vitals for the past 24 hrs:   BP Temp Temp src Pulse Resp SpO2 Weight   03/29/19 0740 (!) 142/94 98.1 °F (36.7 °C) Temporal 76 16 96 % --   03/29/19 0319 -- -- -- -- -- -- 269 lb 6 oz (122.2 kg)   03/28/19 2351 (!) 143/97 98.3 °F (36.8 °C) Temporal 79 16 97 % --   03/28/19 1804 (!) 160/96 -- -- -- -- -- --   03/28/19 1801 (!) 160/106 98.2 °F (36.8 °C) Temporal 82 16 96 % --       Intake/Output Summary (Last 24 hours) at 3/29/2019 1001  Last data filed at 3/29/2019 6975  Gross per 24 hour   Intake 100 ml   Output 1575 ml   Net -1475 ml     General: awake/alert   HEENT: Normocephalic atraumatic head  Neck: Supple with no JVD or goiter bruits. Chest:  clear to auscultation bilaterally without respiratory distress  CVS: regular rate and rhythm  Abdominal: soft, nontender, normal bowel sounds  Extremities: 2+ pedal edema  Skin: warm and dry without rash      Labs:  BMP:   Recent Labs     03/27/19 0141 03/28/19 0625 03/29/19 0722   * 131* 137   K 5.0 4.3 3.9   CL 99 98 101   CO2 22 24 27   BUN 24* 21* 14   CREATININE 1.9* 1.3* 1.1   CALCIUM 8.1* 8.4* 8.3*     CBC:   Recent Labs     03/27/19 0141 03/28/19 0625 03/29/19 0722   WBC 5.0 5.0 6.2   HGB 10.3* 10.9* 10.8*   HCT 31.4* 33.6* 32.8*   MCV 92.9 94.6* 96.2*    282 313     LIVER PROFILE:   Recent Labs     03/27/19 0141 03/28/19  0625 03/29/19  0722   * 63* 32   ALT 60* 71* 54*   BILITOT 0.6 0.6 0.4   ALKPHOS 338* 447* 425*     PT/INR:   Recent Labs     03/27/19  0141 03/28/19  0625 03/29/19  0722   PROTIME 18.9* 16.8* 19.5*   INR 1.66* 1.43* 1.73*     APTT: No results for input(s): APTT in the last 72 hours.   BNP:  No results for input(s): BNP in the last 72 hours. Ionized Calcium:No results for input(s): IONCA in the last 72 hours. Magnesium:  Recent Labs     03/27/19  0141   MG 1.8     Phosphorus:  No results for input(s): PHOS in the last 72 hours. HgbA1C: No results for input(s): LABA1C in the last 72 hours. Hepatic:   Recent Labs     03/27/19  0141 03/28/19  0625 03/29/19  0722   ALKPHOS 338* 447* 425*   ALT 60* 71* 54*   * 63* 32   PROT 5.7* 5.9* 5.8*   BILITOT 0.6 0.6 0.4   LABALBU 2.8* 3.0* 2.7*     Lactic Acid:   No results for input(s): LACTA in the last 72 hours. Troponin: No results for input(s): CKTOTAL, CKMB, TROPONINT in the last 72 hours. ABGs: No results for input(s): PH, PCO2, PO2, HCO3, O2SAT in the last 72 hours. CRP:  No results for input(s): CRP in the last 72 hours. Sed Rate:  No results for input(s): SEDRATE in the last 72 hours. Cultures:   No results for input(s): CULTURE in the last 72 hours. No results for input(s): BC, Larry Mires in the last 72 hours. No results for input(s): CXSURG in the last 72 hours. Radiology reports as per the Radiologist  Radiology: Xr Chest Portable    Result Date: 3/21/2019  XR CHEST PORTABLE 3/21/2019 11:47 AM History: Generalized weakness. Portable chest x-ray compared with 5/6/2015. The heart size is normal. The mediastinum is within normal limits. The lungs are normally expanded with no pneumonia or pneumothorax. No congestive failure changes. 1. No acute disease. Signed by Dr Sherry Marshall on 3/21/2019 11:47 AM       Assessment   1. Acute kidney injury/volume depletion/resolved. 2. Baseline mild renal insufficiency. 3. Diabetic ketoacidosis now resolved. 4. Non-anion gap metabolic acidosis related to diarrhea. 5. Poorly controlled type II diabetes  6. Right renal nodule/renal cyst as appeared on CT scan. 7. Increasing edema. Plan:  1. Discontinue IV fluid.   2. Resume by mouth diuretics. 3. Plan was discussed with the patient.       Jena Galan MD  03/29/19  10:01 AM

## 2019-03-29 NOTE — PROGRESS NOTES
Clinical Pharmacy Note    Warfarin consult follow-up    Recent Labs     03/29/19  0722   INR 1.73*     Recent Labs     03/27/19  0141 03/28/19  0625 03/29/19  0722   HGB 10.3* 10.9* 10.8*   HCT 31.4* 33.6* 32.8*    282 313       Significant Drug-Drug Interactions:  New warfarin drug-drug interactions: Cipro  Discontinued drug-drug interactions: None    Date INR Warfarin Dose   03/21/19 4.34 Pt took 12.5 mg at home    03/22/19 4.77  Held    03/23/19 3.01  7.5 mg    03/24/19 3.13  Held     03/25/19 4.88   Held    03/26/19  2.86  2.5   03/27/19   1.66 10 mg    03/28/19  1.43  12.5 mg     03/29/19 1.73  5 mg     Notes:  Give Coumadin 5 mg x 1 dose tonight                 Daily PT/INR until stable within therapeutic range.      Electronically signed by LISA [facility] on 3/29/2019 at 2:57 PM

## 2019-03-29 NOTE — PROGRESS NOTES
ADMITTED: 21MAR19    PCP:  Shelley Barrow DO    CODE STATUS: Full Code        SUBJECTIVE:  Pt seen and examined  Doing well in no acute distress, BS seems to better controlled. . Denies CP or SOB  C/o Scrotal swelling  Otherwise doing better, had diarreha last night but none this morning. \"febrile\"  [id number]: (copied from Dr. Miramontes Mask note)  \"52 year old white male presents to the emergency department with complaint of fatigue, weakness, shaking chills for about 3 days, worse today. Patient is a diabetic dependent on insulin but states he has been unable to afford his insulin \"for several weeks. \" States he generally felt well prior to onset of symptoms without any localizing symptoms. Complains of chronic burning in his feet due to neuropathy and of polyuria and polydipsia for the past few days. The patient has atrial fibrillation, is on warfarin. INR is supratherapeutic. Patient denies any visible bleeding, nausea or vomiting, fever, cough, or congestion. \"    Allergies   Allergen Reactions    Pcn [Penicillins]     Neomycin-Bacitracin Zn-Polymyx Rash    Neosporin [Neomycin-Polymyxin-Gramicidin] Rash     Current Facility-Administered Medications   Medication Dose Route Frequency Provider Last Rate Last Dose    bumetanide (BUMEX) tablet 1 mg  1 mg Oral BID Phil Muse MD        ciprofloxacin (CIPRO) IVPB 400 mg  400 mg Intravenous Q12H Crystal Bush MD   Stopped at 03/29/19 0931    fluconazole (DIFLUCAN) tablet 200 mg  200 mg Oral Daily Crystal Bush MD   200 mg at 03/29/19 0831    calcium carbonate (TUMS) chewable tablet 500 mg  500 mg Oral TID PRN Liam Foote MD   500 mg at 03/27/19 1910    insulin glargine (LANTUS) injection vial 30 Units  30 Units Subcutaneous Nightly Eulalio Solano MD   30 Units at 03/28/19 2024    insulin lispro (HUMALOG) injection vial 0-12 Units  0-12 Units Subcutaneous TID WC Eulalio Solano MD   2 Units at 03/29/19 0833    insulin lispro (HUMALOG) injection vial 0-6 Units 0-6 Units Subcutaneous Nightly Eulalio Solano MD   2 Units at 03/28/19 2304    potassium chloride (KLOR-CON M) extended release tablet 40 mEq  40 mEq Oral PRN Eulalio Solano MD        Or    potassium bicarb-citric acid (EFFER-K) effervescent tablet 40 mEq  40 mEq Oral PRN Eulalio Solano MD        Or    potassium chloride 10 mEq/100 mL IVPB (Peripheral Line)  10 mEq Intravenous PRN Eulalio Solano MD        ipratropium-albuterol (DUONEB) nebulizer solution 1 ampule  1 ampule Inhalation Q4H PRN Liam Foote MD        loperamide (IMODIUM) capsule 2 mg  2 mg Oral 4x Daily PRN Eulalio Solano MD   2 mg at 03/27/19 1910    benzonatate (TESSALON) capsule 100 mg  100 mg Oral TID PRN Eulalio Solano MD   100 mg at 03/26/19 2310    lidocaine PF 1 % injection 5 mL  5 mL Intradermal Once Eulalio Solano MD        sodium chloride flush 0.9 % injection 10 mL  10 mL Intravenous 2 times per day Eulalio Solano MD   10 mL at 03/29/19 0833    sodium chloride flush 0.9 % injection 10 mL  10 mL Intravenous PRN Eulalio Solano MD        metoprolol tartrate (LOPRESSOR) tablet 25 mg  25 mg Oral BID Eulalio Solano MD   25 mg at 03/29/19 0830    famotidine (PEPCID) injection 20 mg  20 mg Intravenous BID Eulalio Solano MD   20 mg at 03/29/19 0831    magnesium sulfate 1 g in dextrose 5% 100 mL IVPB  1 g Intravenous PRN Eulalio Solano MD   Stopped at 03/25/19 1920    sodium phosphate 10 mmol in dextrose 5 % 250 mL IVPB  10 mmol Intravenous PRN Eulalio Solano MD   Stopped at 03/25/19 1507    Or    sodium phosphate 15 mmol in dextrose 5 % 250 mL IVPB  15 mmol Intravenous PRN Eulalio Solano MD        Or    sodium phosphate 20 mmol in dextrose 5 % 500 mL IVPB  20 mmol Intravenous PRN Eulalio Solano MD        acetaminophen (TYLENOL) tablet 650 mg  650 mg Oral Q6H PRN Eulalio Solano MD   650 mg at 03/24/19 0835    melatonin tablet 3 mg  3 mg Oral Nightly PRN Eulalio Solano MD   3 mg at 03/23/19 2306    cetirizine (ZYRTEC) tablet 10 mg  10 mg Oral Nightly Trace Alston MD   10 mg at 03/28/19 2024    glucose (GLUTOSE) 40 % oral gel 15 g  15 g Oral PRN Trace Alston MD        dextrose 50 % solution 12.5 g  12.5 g Intravenous PRN Trace Alston MD        glucagon (rDNA) injection 1 mg  1 mg Intramuscular PRN Tarce Alston MD        dextrose 5 % solution  100 mL/hr Intravenous PRN Trace Alston MD        HYDROcodone-acetaminophen [facility])  MG per tablet 1 tablet  1 tablet Oral Q6H PRN Trace Alston MD   1 tablet at 03/26/19 2310    propafenone (RYTHMOL) tablet 150 mg  150 mg Oral Q8H Trace Alston MD   150 mg at 03/29/19 0502    warfarin (COUMADIN) daily dosing (placeholder)   Other Chandana Dc MD         General: awake/alert   HEENT: Normocephalic atraumatic head  Neck: Supple with no JVD or goiter bruits. Chest:  clear to auscultation bilaterally without respiratory distress  CVS: regular rate and rhythm  Abdominal: soft, nontender, normal bowel sounds  Extremities: no cyanosis or edema  Skin: warm and dry without rash          OBJECTIVE:    Vitals:    03/29/19 0740   BP: (!) 142/94   Pulse: 76   Resp: 16   Temp: 98.1 °F (36.7 °C)   SpO2: 96%       LABS:  Results for Chey Mayer (MRN [id number]) as of 3/26/2019 10:52   Ref.  Range 3/26/2019 06:28   Sodium Latest Ref Range: 136 - 145 mmol/L 130 (L)   Potassium Latest Ref Range: 3.5 - 5.0 mmol/L 4.8   Chloride Latest Ref Range: 98 - 111 mmol/L 98   CO2 Latest Ref Range: 22 - 29 mmol/L 20 (L)   BUN Latest Ref Range: 6 - 20 mg/dL 20   Creatinine Latest Ref Range: 0.5 - 1.2 mg/dL 1.9 (H)   Anion Gap Latest Ref Range: 7 - 19 mmol/L 12   GFR Non- Latest Ref Range: >60  37 (A)   Glucose Latest Ref Range: 74 - 109 mg/dL 217 (H)   Calcium Latest Ref Range: 8.6 - 10.0 mg/dL 8.2 (L)   Total Protein Latest Ref Range: 6.6 - 8.7 g/dL 5.8 (L)         ASESSMENTS & PLANS:    LIAN on CKD 3  0.8 in 2017  1.3-1.4 apparent baseline in March of this year currently 1.9  Nephrology following case  On NaBicarb GGT at 50cc/h  1. Indeterminate 17 mm hypoechoic right renal mass. Follow-up  Suggested. Will need CT kidneys once renal function improves  2. Otherwise negative bilateral renal ultrasound. Diarrhea: On cipro and flagyl empirically since [id number]  C Diff was ruled out  Stool cx + salmonella- will continue with cipro  ID consulted reccomends c/w cipro and + fluconazole x 14 days  D#2     A Fib rate controlled:  continue coumadin  daily INR    Diabetic ketoacidosis without coma, in underlying DM type 2: (DKA RESOLVED)  On SQ insulin  Starting back on ISS medium dose  Long acting as ordered from tonight  Needs long acting dose of insulin before leaving for floor  hypoglycemia protocol  a1c : 16        Hyperkalemia: (RESOLVED)     [place] metabolic acidosis (RESOLVED)    DISPO: SW consulted to help setup insulin for discharge; C/w  ID reccomendations. ,       Patient Active Problem List   Diagnosis    Shortness of breath    HTN (hypertension)    Hyperlipidemia    Fatigue    Atrial fibrillation (HCC)    Paroxysmal atrial fibrillation (HCC)    Acalculous cholecystitis    Essential hypertension    Diabetic ketoacidosis without coma associated with type 2 diabetes mellitus ([address]    Hyponatremia    Hypotension due to hypovolemia    Acute kidney injury ([address]    Type 2 diabetes mellitus with diabetic polyneuropathy, with long-term current use of insulin (HCC)    Elevated troponin    Coagulopathy ([address]

## 2019-03-29 NOTE — PROGRESS NOTES
Nutrition Assessment    Type and Reason for Visit: Reassess    Nutrition Recommendations: continue to follow for questions on diet    Nutrition Assessment: No questions re: diet. Appetite remains good %    Malnutrition Assessment:  · Malnutrition Status: No malnutrition  · Context: Acute illness or injury  · Findings of the 6 clinical characteristics of malnutrition (Minimum of 2 out of 6 clinical characteristics is required to make the diagnosis of moderate or severe Protein Calorie Malnutrition based on AND/ASPEN Guidelines):  1. Energy Intake- , Unable to assess    2. Weight Loss-No significant weight loss,    3. Fat Loss-No significant subcutaneous fat loss,    4. Muscle Loss-No significant muscle mass loss,    5. Fluid Accumulation-Mild fluid accumulation, Generalized  6.  Strength-Not measured    Nutrition Risk Level: Low    Nutrition Diagnosis:   · Problem: Altered nutrition-related lab values  · Etiology: related to (related to dietary noncompliance, misinformation)     Signs and symptoms:  as evidenced by Patient report of, Lab values    Objective Information:  · Nutrition-Focused Physical Findings: well nourished appearing gentleman  · Wound Type: None  · Current Nutrition Therapies:  · Oral Diet Orders: Carb Control 4 Carbs/Meal, Fluid Restriction   · Oral Diet intake: %  · Oral Nutrition Supplement (ONS) Orders: None    · Anthropometric Measures:  · Ht: 5' 10\" (177.8 cm)   · Current Body Wt: 269 lb 6 oz (122.2 kg)  · Admission Body Wt: 258 lb (117 kg)(stated)  · Ideal Body Wt: 166 lb (75.3 kg),   · BMI Classification: BMI 35.0 - 39.9 Obese Class II    Nutrition Interventions:   Continue current diet  Continued Inpatient Monitoring, Education Initiated, Education Completed    Nutrition Evaluation:   · Evaluation: Progressing toward goals   · Goals: po intake 50% or greater. Weight stable.   Accuchek's 150 or less    · Monitoring: Meal Intake, Diet Tolerance, Skin Integrity, Weight, Pertinent Labs, Patient/Family Education      Electronically signed by Pedrito Ashton MS, RD, LD on 3/29/19 at 2:19 PM    Contact Number: [phone]

## 2019-03-29 NOTE — CARE COORDINATION
TYLER placed call to [facility] pharmacy to request pricing through [employer] as an option for Pt's diabetic medications; lantus (1 vial) 96.98 and will last 33d; humalog (1 vial) 64.98 and will last 23d; the glucagon as PRN would be a kit which would be approx 280.00-TYLER will attempt finding an option with [facility] or [facility] [place];     TYLER placed call to Dr. Saundra Alejandre office ([phone]) and left VM for his nurse to see if they have any way to give samples or assist with Pt's medications. TYLER placed call to [facility] pharmacy ([phone]) spoke with pharmacist, he indicated that if Pt sees one of their physicians at their clinic location, he would be eligible to receive lower pricing on his diabetic medications. Ie: lantus for 12.00/box; etc.,     TYLER placed call to [facility] , Loreto Grewal ([phone]) and scheduled Pt a f/u appt re: DM and his medications; they will also notify their DM educator to see him as well. Pt will only be d/c'd on Lantus, thus will only need 1 vial through [employer] at d/c for 96.98 which will last 33d. Additionally, according to [employer] in financial services, Pt has been screened for Medicaid and is over the income limit. Reji Jacobo MD  Go on 4/5/2019  re: DM medications; 4/5/19 at 2:15PM with Dr. Ivelisse Tolentino at [address]  [phone]     TYLER relayed info to Pt.      Electronically signed by JERAMY Amador on 3/29/2019 at 12:13 PM

## 2019-03-29 NOTE — PROGRESS NOTES
03/29/19 1100   Subjective   Subjective I just don't feel like walking now   Physical Therapy    Electronically signed by Jt Ospina PTA on 3/29/2019 at 11:02 AM

## 2019-03-30 LAB
ALBUMIN SERPL-MCNC: 3.1 G/DL (ref 3.5–5.2)
ALP BLD-CCNC: 444 U/L (ref 40–130)
ALT SERPL-CCNC: 45 U/L (ref 5–41)
ANION GAP SERPL CALCULATED.3IONS-SCNC: 13 MMOL/L (ref 7–19)
AST SERPL-CCNC: 21 U/L (ref 5–40)
BILIRUB SERPL-MCNC: 0.4 MG/DL (ref 0.2–1.2)
BUN BLDV-MCNC: 12 MG/DL (ref 6–20)
CALCIUM SERPL-MCNC: 8.7 MG/DL (ref 8.6–10)
CHLORIDE BLD-SCNC: 93 MMOL/L (ref 98–111)
CO2: 28 MMOL/L (ref 22–29)
CREAT SERPL-MCNC: 1.1 MG/DL (ref 0.5–1.2)
GFR NON-AFRICAN AMERICAN: >60
GLUCOSE BLD-MCNC: 180 MG/DL (ref 70–99)
GLUCOSE BLD-MCNC: 207 MG/DL (ref 74–109)
GLUCOSE BLD-MCNC: 225 MG/DL (ref 70–99)
GLUCOSE BLD-MCNC: 239 MG/DL (ref 70–99)
GLUCOSE BLD-MCNC: 254 MG/DL (ref 70–99)
HCT VFR BLD CALC: 34.3 % (ref 42–52)
HEMOGLOBIN: 11.2 G/DL (ref 14–18)
INR BLD: 2.06 (ref 0.88–1.18)
MCH RBC QN AUTO: 31.1 PG (ref 27–31)
MCHC RBC AUTO-ENTMCNC: 32.7 G/DL (ref 33–37)
MCV RBC AUTO: 95.3 FL (ref 80–94)
PDW BLD-RTO: 11.9 % (ref 11.5–14.5)
PERFORMED ON: ABNORMAL
PLATELET # BLD: 280 K/UL (ref 130–400)
PMV BLD AUTO: 10.9 FL (ref 9.4–12.4)
POTASSIUM SERPL-SCNC: 3.7 MMOL/L (ref 3.5–5)
PROTHROMBIN TIME: 22.5 SEC (ref 12–14.6)
RBC # BLD: 3.6 M/UL (ref 4.7–6.1)
SODIUM BLD-SCNC: 134 MMOL/L (ref 136–145)
TOTAL PROTEIN: 6.3 G/DL (ref 6.6–8.7)
WBC # BLD: 7.3 K/UL (ref 4.8–10.8)

## 2019-03-30 PROCEDURE — 2500000003 HC RX 250 WO HCPCS: Performed by: HOSPITALIST

## 2019-03-30 PROCEDURE — 6370000000 HC RX 637 (ALT 250 FOR IP): Performed by: HOSPITALIST

## 2019-03-30 PROCEDURE — 80053 COMPREHEN METABOLIC PANEL: CPT

## 2019-03-30 PROCEDURE — 99232 SBSQ HOSP IP/OBS MODERATE 35: CPT | Performed by: HOSPITALIST

## 2019-03-30 PROCEDURE — 85610 PROTHROMBIN TIME: CPT

## 2019-03-30 PROCEDURE — 6360000002 HC RX W HCPCS: Performed by: HOSPITALIST

## 2019-03-30 PROCEDURE — 82948 REAGENT STRIP/BLOOD GLUCOSE: CPT

## 2019-03-30 PROCEDURE — 85027 COMPLETE CBC AUTOMATED: CPT

## 2019-03-30 PROCEDURE — 2580000003 HC RX 258: Performed by: HOSPITALIST

## 2019-03-30 PROCEDURE — 1210000000 HC MED SURG R&B

## 2019-03-30 PROCEDURE — 97116 GAIT TRAINING THERAPY: CPT

## 2019-03-30 PROCEDURE — 6370000000 HC RX 637 (ALT 250 FOR IP): Performed by: FAMILY MEDICINE

## 2019-03-30 PROCEDURE — 6370000000 HC RX 637 (ALT 250 FOR IP): Performed by: INTERNAL MEDICINE

## 2019-03-30 RX ORDER — WARFARIN SODIUM 5 MG/1
5 TABLET ORAL
Status: COMPLETED | OUTPATIENT
Start: 2019-03-30 | End: 2019-03-30

## 2019-03-30 RX ORDER — CIPROFLOXACIN 500 MG/1
500 TABLET, FILM COATED ORAL 2 TIMES DAILY
Qty: 28 TABLET | Refills: 0 | Status: SHIPPED | OUTPATIENT
Start: 2019-03-30 | End: 2019-04-13

## 2019-03-30 RX ADMIN — FLUCONAZOLE 200 MG: 100 TABLET ORAL at 08:51

## 2019-03-30 RX ADMIN — INSULIN LISPRO 3 UNITS: 100 INJECTION, SOLUTION INTRAVENOUS; SUBCUTANEOUS at 20:37

## 2019-03-30 RX ADMIN — Medication 10 ML: at 08:51

## 2019-03-30 RX ADMIN — BUMETANIDE 1 MG: 1 TABLET ORAL at 20:36

## 2019-03-30 RX ADMIN — INSULIN LISPRO 2 UNITS: 100 INJECTION, SOLUTION INTRAVENOUS; SUBCUTANEOUS at 12:52

## 2019-03-30 RX ADMIN — CIPROFLOXACIN 400 MG: 2 INJECTION, SOLUTION INTRAVENOUS at 08:50

## 2019-03-30 RX ADMIN — PROPAFENONE HYDROCHLORIDE 150 MG: 150 TABLET, FILM COATED ORAL at 06:28

## 2019-03-30 RX ADMIN — Medication 10 ML: at 20:38

## 2019-03-30 RX ADMIN — CETIRIZINE HYDROCHLORIDE 10 MG: 10 TABLET, FILM COATED ORAL at 20:36

## 2019-03-30 RX ADMIN — PROPAFENONE HYDROCHLORIDE 150 MG: 150 TABLET, FILM COATED ORAL at 23:42

## 2019-03-30 RX ADMIN — WARFARIN SODIUM 5 MG: 5 TABLET ORAL at 17:09

## 2019-03-30 RX ADMIN — METOPROLOL TARTRATE 25 MG: 25 TABLET ORAL at 20:36

## 2019-03-30 RX ADMIN — INSULIN LISPRO 4 UNITS: 100 INJECTION, SOLUTION INTRAVENOUS; SUBCUTANEOUS at 17:09

## 2019-03-30 RX ADMIN — FAMOTIDINE 20 MG: 10 INJECTION INTRAVENOUS at 20:36

## 2019-03-30 RX ADMIN — CIPROFLOXACIN 400 MG: 2 INJECTION, SOLUTION INTRAVENOUS at 20:36

## 2019-03-30 RX ADMIN — INSULIN GLARGINE 30 UNITS: 100 INJECTION, SOLUTION SUBCUTANEOUS at 20:37

## 2019-03-30 RX ADMIN — BUMETANIDE 1 MG: 1 TABLET ORAL at 08:51

## 2019-03-30 RX ADMIN — PROPAFENONE HYDROCHLORIDE 150 MG: 150 TABLET, FILM COATED ORAL at 15:58

## 2019-03-30 RX ADMIN — METOPROLOL TARTRATE 25 MG: 25 TABLET ORAL at 08:51

## 2019-03-30 RX ADMIN — BENZONATATE 100 MG: 100 CAPSULE ORAL at 12:58

## 2019-03-30 RX ADMIN — INSULIN LISPRO 2 UNITS: 100 INJECTION, SOLUTION INTRAVENOUS; SUBCUTANEOUS at 08:51

## 2019-03-30 RX ADMIN — FAMOTIDINE 20 MG: 10 INJECTION INTRAVENOUS at 08:51

## 2019-03-30 ASSESSMENT — PAIN SCALES - GENERAL
PAINLEVEL_OUTOF10: 0

## 2019-03-30 NOTE — PROGRESS NOTES
PROGRESS NOTE :ID       Patient - Silvia Fischer,  Age - 52 y.o.    - 1965      Room Number - 0316/316-01   N -  [id number]   Acct # - [de-identified]  Date of Admission -  3/21/2019 10:58 AM  Patient's PCP: Bird Gayle DO     Requesting Physician: Kena Champion MD    REASON FOR CONSULTATION      Salmonella    HISTORY      Stool culture obtained on  was positive for salmonella species that was sent to the [facility] lab for confirmation. The patient has been on ciprofloxacin. Blood cultures have been negative. 2019  Patient reports his kidney function better. States 2 very small stools with \" sediment\"      MEDICATIONS :       Scheduled Meds:   bumetanide  1 mg Oral BID    ciprofloxacin  400 mg Intravenous Q12H    fluconazole  200 mg Oral Daily    insulin glargine  30 Units Subcutaneous Nightly    insulin lispro  0-12 Units Subcutaneous TID WC    insulin lispro  0-6 Units Subcutaneous Nightly    lidocaine 1 % injection  5 mL Intradermal Once    sodium chloride flush  10 mL Intravenous 2 times per day    metoprolol tartrate  25 mg Oral BID    famotidine (PEPCID) injection  20 mg Intravenous BID    cetirizine  10 mg Oral Nightly    propafenone  150 mg Oral Q8H    warfarin (COUMADIN) daily dosing (placeholder)   Other RX Placeholder     Continuous Infusions:   dextrose       PRN Meds:calcium carbonate, potassium chloride **OR** potassium alternative oral replacement **OR** potassium chloride, ipratropium-albuterol, loperamide, benzonatate, sodium chloride flush, magnesium sulfate, sodium phosphate IVPB **OR** sodium phosphate IVPB **OR** sodium phosphate IVPB, acetaminophen, melatonin, glucose, dextrose, glucagon (rDNA), dextrose, HYDROcodone-acetaminophen    ALLERGIES:      Pcn [penicillins]; Neomycin-bacitracin zn-polymyx; and Neosporin [neomycin-polymyxin-gramicidin]      SOCIAL HISTORY:     TOBACCO:   reports that he has never smoked.  He has never used smokeless tobacco.     ETOH:   reports that he does not drink alcohol. Patient currently lives at home with his son    FAMILY HISTORY:         Problem Relation Age of Onset    Stroke Maternal Grandmother     Cancer Maternal Grandmother     Stroke Paternal Grandmother     Diabetes Paternal Grandmother     Cancer Father     Heart Disease Father     Asthma Paternal Grandfather     Heart Disease Paternal Grandfather     Heart Disease Maternal Grandfather     Colon Cancer Neg Hx     Colon Polyps Neg Hx     Esophageal Cancer Neg Hx     Liver Cancer Neg Hx     Liver Disease Neg Hx     Rectal Cancer Neg Hx     Stomach Cancer Neg Hx        REVIEW OF SYSTEMS:     Constitutional: no fever , appetite better   GI: per HPI      PHYSICAL EXAM:     BP (!) 145/90   Pulse 76   Temp 97.4 °F (36.3 °C) (Temporal)   Resp 16   Ht 5' 10\" (1.778 m)   Wt 269 lb 6 oz (122.2 kg)   SpO2 97%   BMI 38.65 kg/m²  Temp (24hrs), Av.9 °F (36.6 °C), Min:97.4 °F (36.3 °C), Max:98.3 °F (36.8 °C)    General:  Awake, alert, not in distress, lying in bed   HEENT: pink conjunctiva, anicteric sclera   Neck: Supple:  RESP: effort even and unlabored  Abdomen: Obese  Soft, non tender to palpation, BS positive  Extremities:Trace  pretibial edema  Skin:  Warm and dry. CNS: Moves all extremities, speech clear, follows commands  PSYCH: pleasant, cooperative.   IV access:  PICC upper arm right, condition patent and no redness         LABS:     CBC with DIFF:   Recent Labs     19  [id number] 19  0722   WBC 5.0 5.0 6.2   RBC 3.38* 3.55* 3.41*   HGB 10.3* 10.9* 10.8*   HCT 31.4* 33.6* 32.8*   MCV 92.9 94.6* 96.2*   MCH 30.5 30.7 31.7*   MCHC 32.8* 32.4* 32.9*   RDW 12.4 12.3 12.3    282 313   MPV 11.0 10.6 10.3       CMP/BMP:  Recent Labs     19  01419  0625 19  0722   * 131* 137   K 5.0 4.3 3.9   CL 99 98 101   CO2 22 24 27   ANIONGAP 9 9 9   GLUCOSE 273* 193* 147*   BUN 24* 21* 14   CREATININE 1.9* 1.3* 1.1   LABGLOM 37* 57* >60   CALCIUM 8.1* 8.4* 8.3*   PROT 5.7* 5.9* 5.8*   LABALBU 2.8* 3.0* 2.7*   BILITOT 0.6 0.6 0.4   ALKPHOS 338* 447* 425*   ALT 60* 71* 54*   * 63* 32          Culture: No results for input(s): BC, BLOODCULT2, ORG in the last 72 hours. Culture Stool [[id number]] (Abnormal) Collected: 03/25/19 1040 Order Status: Completed Specimen: Stool Updated: 03/28/19 6965 Culture, Stool --Normal enteric samantha   No Shigella, Campylobacter, E. coli 0157 isolated   E coli, Shiga toxin Assay -- Negative- No Shiga toxins 1 and 2 detected   Normal Range: Not detected   Organism Salmonella speciesCulture, Stool -- Moderate growth   Sending to [facility] for confirmation     Salmonella species (1)     Antibiotic Interpretation ADRI Status    ampicillin Sensitive <=2 mcg/mL     trimethoprim-sulfamethoxazole Sensitive <=20 mcg/mL             IMAGING:   Xr Chest Portable    Result Date: 3/21/2019  XR CHEST PORTABLE 3/21/2019 11:47 AM History: Generalized weakness. Portable chest x-ray compared with 5/6/2015. The heart size is normal. The mediastinum is within normal limits. The lungs are normally expanded with no pneumonia or pneumothorax. No congestive failure changes. 1. No acute disease.  Signed by Dr Hunter Ro on 3/21/2019 11:47 AM          ASSESSMENT AND PLAN     Patient Active Problem List   Diagnosis    Shortness of breath    HTN (hypertension)    Hyperlipidemia    Fatigue    Atrial fibrillation (HCC)    Paroxysmal atrial fibrillation (HCC)    Acalculous cholecystitis    Essential hypertension    Diabetic ketoacidosis without coma associated with type 2 diabetes mellitus ([address]    Hyponatremia    Hypotension due to hypovolemia    Acute kidney injury ([address]    Type 2 diabetes mellitus with diabetic polyneuropathy, with long-term current use of insulin (HCC)    Elevated troponin    Coagulopathy (HCC)     Salmonella enteritis with no bacteremia based on blood cultures obtained March 22      Continue ciprofloxacin. I would consider this patient immunocompromised given his poorly controlled diabetes and would expect him to need 14 days of oral fluoroquinolone  therapy if not longer - can switch to po upn dc    DOES NOT NEED fluconazole from my standpoint - dictation error as it transcribed as antifungal instead of fluorquinolone.      I will dc fluconazole    Diabetes with diabetic ketoacidosis-ketoacidosis resolved    Atrial fibrillation    Acute kidney injury-improving    Thank you Mike Madera MD for allowing me to participate in this patient's care  Electronically signed by Yudi Goff MD on 3/29/2019 at 7:26 PM

## 2019-03-30 NOTE — PROGRESS NOTES
Physical Therapy  Name: Dez Stone  MRN:  [id number]  Date of service:  3/30/2019     03/30/19 1012   Subjective   Subjective patient willing to walk   Bed Mobility   Rolling Independent   Supine to Sit Independent   Sit to Supine Independent   Transfers   Sit to Stand Stand by assistance;Supervision   Stand to sit Stand by assistance;Supervision   Ambulation   Ambulation? Yes   WB Status FWB   Ambulation 1   Surface level tile   Device Single point cane   Other Apparatus   (iv)   Assistance Stand by assistance   Distance 175'   Short term goals   Time Frame for Short term goals 14 days   Short term goal 1 pt will improve supine<>sit to supervision   Short term goal 2 pt with improve sit<>stands to supervision   Short term goal 3 pt will ambulate 80' with [address] and supervision   Conditions Requiring Skilled Therapeutic Intervention   Body structures, Functions, Activity limitations Decreased functional mobility ; Decreased endurance;Decreased strength;Decreased balance;Decreased sensation   Assessment Pt was able to increase amb distance    Treatment Diagnosis difficulty ambulating   Activity Tolerance   Activity Tolerance Patient Tolerated treatment well   Plan   Times per week 6-7   Times per day Daily   Plan weeks 2   Current Treatment Recommendations Strengthening;Balance Training; Endurance Training;Transfer Training;Functional Mobility Training;Gait Training; Safety Education & Training;Patient/Caregiver Education & Training   Safety Devices   Type of devices Left in bed;Call light within reach         Electronically signed by Paulino Connors PTA on 3/30/2019 at 10:15 AM

## 2019-03-30 NOTE — DISCHARGE SUMMARY
Hospitalist   Discharge Summary    Ofelia Cardenas  :  1965  MRN:  [id number]    Admit date:  3/21/2019  Discharge date:  2019    Admitting Physician:  Jeanna Arreola MD    Advance Directive: Full Code    Consults: ID and nephrology    Primary Care Physician:  Mathew Jordan DO    Discharge Diagnoses:  Principal Problem:    Diabetic ketoacidosis without coma associated with type 2 diabetes mellitus ([facility]  Active Problems:    Fatigue    Atrial fibrillation ([facility]    Hyponatremia    Hypotension due to hypovolemia    Acute kidney injury ([facility]    Type 2 diabetes mellitus with diabetic polyneuropathy, with long-term current use of insulin (HCC)    Elevated troponin    Coagulopathy (HCC)  Resolved Problems:    * No resolved hospital problems. *      Significant Diagnostic Studies:   Xr Chest Portable    Result Date: 3/21/2019  XR CHEST PORTABLE 3/21/2019 11:47 AM History: Generalized weakness. Portable chest x-ray compared with 2015. The heart size is normal. The mediastinum is within normal limits. The lungs are normally expanded with no pneumonia or pneumothorax. No congestive failure changes. 1. No acute disease. Signed by Dr Kathy David on 3/21/2019 11:47 AM      Labs:    CBC:   Recent Labs     19  034   WBC 7.3   HGB 11.2*        BMP:    Recent Labs     19  0345 19  1610 19  0550   * 134* 137   K 3.7 4.2 3.7   CL 93* 91* 93*   CO2 28 33* 32*   BUN 12 10 11   CREATININE 1.1 1.4* 1.2   GLUCOSE 207* 300* 246*     Hepatic:   Recent Labs     19  0345   AST 21   ALT 45*   BILITOT 0.4   ALKPHOS 444*     Troponin: No results for input(s): TROPONINI in the last 72 hours. BNP: No results for input(s): BNP in the last 72 hours. Lipids: No results for input(s): CHOL, HDL in the last 72 hours.     Invalid input(s): LDLCALCU  INR:   Recent Labs     19  0345 19  0338 19  0550   INR 2.06* 2.19* 2.26*     ABG: No results for input(s): PHART, IME4HSH, PO2ART, DHR2XBZ, O9ANISXK, BEART in the last 72 hours. 30 Day lookback of cultures:    Blood Culture Recent:   Recent Labs     03/22/19  0914   BC No growth after 5 days of incubation. Gram Stain Recent: No results for input(s): LABGRAM in the last 720 hours. Resp Culture Recent: No results for input(s): CULTRESP in the last 720 hours. Body Fluid Recent : No results for input(s): BFCX in the last 720 hours. MRSA Recent : No results for input(s): [address] in the last 720 hours. Urine Culture Recent : No results for input(s): LABURIN in the last 720 hours. Organism Recent :   Recent Labs     03/25/19  1040   ORG Salmonella species*        Hospital Course: Lucio Rodriguez is a 52 y.o. male whom we were consulted for acute kidney injury/metabolic acidosis/chronic kidney disease. Patient denies any history of chronic kidney disease. He follows a primary care doctor in [facility]. He presented with increasing weakness, lack of energy and tiredness. He was diagnosed with diabetic ketoacidosis with high blood sugar and severe metabolic acidosis. Patient was treated with insulin drip as well as IV fluid, ketoacidosis has resolved. He still has metabolic acidosis which is non-anion gap, hyponatremia and chronic kidney disease. Nephrology is consulted. He is currently in ICU, receiving fluid with sodium bicarbonate. ID was consulted:     He does not recall having any abdominal pain and thinks he may have had some fevers. Reviewing the temperature curve since the 21st. He had a 2 days with temperatures to 101.8 and 102. Temperatures have improved since then. Stool culture obtained on March 25 was positive for salmonella species this was sent to the [facility] state lab for confirmation. The patient has been on ciprofloxacin. Blood cultures have been negative.    His stool Cx grew  Salmonella enteritis with no bacteremia based on blood cultures obtained March 22    Continue ciprofloxacin. Will need to follow for improvement in his symptoms however I would consider this patient immunocompromised given his poorly controlled diabetes and would expect him to need 14 days of oral cipro. Physical Exam:    Vitals: /84   Pulse 77   Temp 98 °F (36.7 °C) (Temporal)   Resp 16   Ht 5' 10\" (1.778 m)   Wt 254 lb 6 oz (115.4 kg)   SpO2 97%   BMI 36.50 kg/m²   General:  Awake, alert, not in distress, lying in bed   HEENT: pink conjunctiva, anicteric sclera   Neck: Supple:  RESP: effort even and unlabored  Abdomen: Obese  Soft, non tender to palpation, BS positive  Extremities:Trace  pretibial edema  Skin:  Warm and dry. CNS: Moves all extremities, speech clear, follows commands  PSYCH: pleasant, cooperative. Discharge Medications:       Chasity Fuentes   Home Medication Instructions MDC:[id number]    Printed on:04/01/19 0970   Medication Information                      albuterol sulfate  (90 Base) MCG/ACT inhaler  Inhale 2 puffs into the lungs every 6 hours as needed for Wheezing             B-D ULTRAFINE III SHORT PEN 31G X 8 MM MISC               bumetanide (BUMEX) 1 MG tablet  Take 1 tablet by mouth 2 times daily             ciprofloxacin (CIPRO) 500 MG tablet  Take 1 tablet by mouth 2 times daily for 14 days             glimepiride (AMARYL) 4 MG tablet  Take 4 mg by mouth every morning (before breakfast)             HYDROcodone-acetaminophen (NORCO)  MG per tablet  Take 1 tablet by mouth every 6 hours as needed for Pain.              insulin glargine (LANTUS) 100 UNIT/ML injection vial  Inject 30 Units into the skin nightly             insulin lispro (HUMALOG) 100 UNIT/ML injection vial  Inject 0-12 Units into the skin 3 times daily (with meals) **Medium Dose Corrective Algorithm**  Glucose: Dose:  If <139             No Insulin  140-199 2 Units  200-249 4 Units  250-299 6 Units  300-349 8 Units  350-400 10 Units  Above 400

## 2019-03-30 NOTE — PROGRESS NOTES
ADMITTED: 21MAR19    PCP:  Patrick Gupta DO    CODE STATUS: Full Code        SUBJECTIVE:  Pt seen and examined  Doing well in no acute distress, BS seems to better controlled. . Denies CP or SOB  Diarreahea improved. \"febrile\"  [id number]: (copied from Dr. Emma Fiore note)  \"52 year old white male presents to the emergency department with complaint of fatigue, weakness, shaking chills for about 3 days, worse today. Patient is a diabetic dependent on insulin but states he has been unable to afford his insulin \"for several weeks. \" States he generally felt well prior to onset of symptoms without any localizing symptoms. Complains of chronic burning in his feet due to neuropathy and of polyuria and polydipsia for the past few days. The patient has atrial fibrillation, is on warfarin. INR is supratherapeutic. Patient denies any visible bleeding, nausea or vomiting, fever, cough, or congestion. \"    Allergies   Allergen Reactions    Pcn [Penicillins]     Neomycin-Bacitracin Zn-Polymyx Rash    Neosporin [Neomycin-Polymyxin-Gramicidin] Rash     Current Facility-Administered Medications   Medication Dose Route Frequency Provider Last Rate Last Dose    warfarin (COUMADIN) tablet 5 mg  5 mg Oral Once Edman Denver Ward,         bumetanide (BUMEX) tablet 1 mg  1 mg Oral BID Lester Lea MD   1 mg at 03/30/19 0851    ciprofloxacin (CIPRO) IVPB 400 mg  400 mg Intravenous Q12H Piyush Otero MD   Stopped at 03/30/19 1026    calcium carbonate (TUMS) chewable tablet 500 mg  500 mg Oral TID PRN Howard Clinton MD   500 mg at 03/27/19 1910    insulin glargine (LANTUS) injection vial 30 Units  30 Units Subcutaneous Nightly Tristan Chris MD   30 Units at 03/29/19 2125    insulin lispro (HUMALOG) injection vial 0-12 Units  0-12 Units Subcutaneous TID  Tristan Chris MD   2 Units at 03/30/19 1252    insulin lispro (HUMALOG) injection vial 0-6 Units  0-6 Units Subcutaneous Nightly Tristan Chris MD   3 Units at 03/29/19 2121    potassium chloride (KLOR-CON M) extended release tablet 40 mEq  40 mEq Oral PRN Promise Berg MD        Or    potassium bicarb-citric acid (EFFER-K) effervescent tablet 40 mEq  40 mEq Oral PRN Promise Berg MD        Or    potassium chloride 10 mEq/100 mL IVPB (Peripheral Line)  10 mEq Intravenous PRN Promise Berg MD        ipratropium-albuterol (DUONEB) nebulizer solution 1 ampule  1 ampule Inhalation Q4H PRN Lynda Santiago MD        loperamide (IMODIUM) capsule 2 mg  2 mg Oral 4x Daily PRN Promise Berg MD   2 mg at 03/27/19 1910    benzonatate (TESSALON) capsule 100 mg  100 mg Oral TID PRN Promise Berg MD   100 mg at 03/30/19 1258    lidocaine PF 1 % injection 5 mL  5 mL Intradermal Once Promise Berg MD        sodium chloride flush 0.9 % injection 10 mL  10 mL Intravenous 2 times per day Promise Berg MD   10 mL at 03/30/19 0851    sodium chloride flush 0.9 % injection 10 mL  10 mL Intravenous PRN Promise Berg MD        metoprolol tartrate (LOPRESSOR) tablet 25 mg  25 mg Oral BID Promise Berg MD   25 mg at 03/30/19 0851    famotidine (PEPCID) injection 20 mg  20 mg Intravenous BID Promise Berg MD   20 mg at 03/30/19 0851    magnesium sulfate 1 g in dextrose 5% 100 mL IVPB  1 g Intravenous PRN Promise Berg MD   Stopped at 03/25/19 1920    sodium phosphate 10 mmol in dextrose 5 % 250 mL IVPB  10 mmol Intravenous PRN Promise Berg MD   Stopped at 03/25/19 1970    Or    sodium phosphate 15 mmol in dextrose 5 % 250 mL IVPB  15 mmol Intravenous PRN Promise Berg MD        Or    sodium phosphate 20 mmol in dextrose 5 % 500 mL IVPB  20 mmol Intravenous PRN Promise Berg MD        acetaminophen (TYLENOL) tablet 650 mg  650 mg Oral Q6H PRN Promise Berg MD   650 mg at 03/24/19 0835    melatonin tablet 3 mg  3 mg Oral Nightly PRN Promise Berg MD   3 mg at 03/23/19 2306    cetirizine (ZYRTEC) tablet 10 mg  10 mg Oral Nightly Promise Berg MD   10 mg at 03/29/19 2120    glucose (GLUTOSE) 40 % oral gel 15 g  15 g Oral PRN Mary Chaudhari MD        dextrose 50 % solution 12.5 g  12.5 g Intravenous PRN Mary Chaudhari MD        glucagon (rDNA) injection 1 mg  1 mg Intramuscular PRN Mary Chaudhari MD        dextrose 5 % solution  100 mL/hr Intravenous PRN Mary Chaudhari MD        HYDROcodone-acetaminophen [facility])  MG per tablet 1 tablet  1 tablet Oral Q6H PRN Mary Chaudhari MD   1 tablet at 03/26/19 2310    propafenone (RYTHMOL) tablet 150 mg  150 mg Oral Q8H Mary Chaudhari MD   150 mg at 03/30/19 1708    warfarin (COUMADIN) daily dosing (placeholder)   Other Vernon Ochoa MD         General: awake/alert   HEENT: Normocephalic atraumatic head  Neck: Supple with no JVD or goiter bruits. Chest:  clear to auscultation bilaterally without respiratory distress  CVS: regular rate and rhythm  Abdominal: soft, nontender, normal bowel sounds  Extremities: no cyanosis or edema  Skin: warm and dry without rash          OBJECTIVE:    Vitals:    03/29/19 1854   BP: (!) 145/90   Pulse: 76   Resp: 16   Temp: 97.4 °F (36.3 °C)   SpO2: 97%       LABS:  Results for Bal Montenegro (MRN [id number]) as of 3/26/2019 10:52   Ref. Range 3/26/2019 06:28   Sodium Latest Ref Range: 136 - 145 mmol/L 130 (L)   Potassium Latest Ref Range: 3.5 - 5.0 mmol/L 4.8   Chloride Latest Ref Range: 98 - 111 mmol/L 98   CO2 Latest Ref Range: 22 - 29 mmol/L 20 (L)   BUN Latest Ref Range: 6 - 20 mg/dL 20   Creatinine Latest Ref Range: 0.5 - 1.2 mg/dL 1.9 (H)   Anion Gap Latest Ref Range: 7 - 19 mmol/L 12   GFR Non- Latest Ref Range: >60  37 (A)   Glucose Latest Ref Range: 74 - 109 mg/dL 217 (H)   Calcium Latest Ref Range: 8.6 - 10.0 mg/dL 8.2 (L)   Total Protein Latest Ref Range: 6.6 - 8.7 g/dL 5.8 (L)         ASESSMENTS & PLANS:    LIAN on CKD 3  0.8 in 2017  1.3-1.4 apparent baseline in March of this year currently 1.9  Nephrology following case  On NaBicarb GGT at 50cc/h  1.  Indeterminate 17 mm hypoechoic right renal mass. Follow-up  Suggested. Will need CT kidneys once renal function improves  2. Otherwise negative bilateral renal ultrasound. Diarrhea: On cipro and flagyl empirically since [id number]  C Diff was ruled out  Stool cx + salmonella- will continue with cipro  ID consulted reccomends c/w cipro and + fluconazole x 14 days  D#2     A Fib rate controlled:  continue coumadin  daily INR    Diabetic ketoacidosis without coma, in underlying DM type 2: (DKA RESOLVED)  On SQ insulin  Starting back on ISS medium dose  Long acting as ordered from tonight  Needs long acting dose of insulin before leaving for floor  hypoglycemia protocol  a1c : 16        Hyperkalemia: (RESOLVED)     [place] metabolic acidosis (RESOLVED)    DISPO: SW consulted to help setup insulin for discharge; C/w  ID reccomendations. , Likely in 1-2 days      Patient Active Problem List   Diagnosis    Shortness of breath    HTN (hypertension)    Hyperlipidemia    Fatigue    Atrial fibrillation (HCC)    Paroxysmal atrial fibrillation (HCC)    Acalculous cholecystitis    Essential hypertension    Diabetic ketoacidosis without coma associated with type 2 diabetes mellitus (HCC)    Hyponatremia    Hypotension due to hypovolemia    Acute kidney injury ([address]    Type 2 diabetes mellitus with diabetic polyneuropathy, with long-term current use of insulin (HCC)    Elevated troponin    Coagulopathy (HCC)

## 2019-03-30 NOTE — PLAN OF CARE
Problem: Falls - Risk of:  Goal: Will remain free from falls  Description  Will remain free from falls  3/30/2019 0223 by Keely Miller RN  Outcome: Ongoing  3/29/2019 1626 by Lino Gottron, RN  Outcome: Ongoing  Goal: Absence of physical injury  Description  Absence of physical injury  3/30/2019 0223 by Keely Miller RN  Outcome: Ongoing  3/29/2019 1626 by Lino Gottron, RN  Outcome: Ongoing     Problem: Discharge Planning:  Goal: Discharged to appropriate level of care  Description  Discharged to appropriate level of care  3/30/2019 0223 by Keely Miller RN  Outcome: Ongoing  3/29/2019 1626 by Lino Gottron, RN  Outcome: Ongoing     Problem: Serum Glucose Level - Abnormal:  Goal: Ability to maintain appropriate glucose levels will improve  Description  Ability to maintain appropriate glucose levels will improve  3/30/2019 0223 by Keely Miller RN  Outcome: Ongoing  3/29/2019 1626 by Lino Gottron, RN  Outcome: Ongoing     Problem: Sensory Perception - Impaired:  Goal: Ability to maintain a stable neurologic state will improve  Description  Ability to maintain a stable neurologic state will improve  3/30/2019 0223 by Keely Miller RN  Outcome: Met This Shift  3/29/2019 1626 by Lino Gottron, RN  Outcome: Ongoing

## 2019-03-30 NOTE — PROGRESS NOTES
[facility] returned call:[place] does not have access to the [facility] Dr Summer Galan requesting. Out lab will call the [facility] on Monday, they are closed over weekend.

## 2019-03-30 NOTE — PROGRESS NOTES
Spoke with Eddie Desai in [employer] about order to report Cipro /Levofloxin ADRI for Salmonella . States unable to see order on the lab side and that normally day shift would be the one to complete this . Will pass on to the on coming nurse on dayshift here to follow up with lab .

## 2019-03-30 NOTE — PROGRESS NOTES
PROGRESS NOTE :ID       Patient - Anusha Newton,  Age - 52 y.o.    - 1965      Room Number - 0316/316-01   N -  [id number]   Acct # - [de-identified]  Date of Admission -  3/21/2019 10:58 AM  Patient's PCP: Darya Clayton DO     Requesting Physician: Chasidy Calabrese MD    REASON FOR CONSULTATION      Salmonella    HISTORY      Stool culture obtained on  was positive for salmonella species that was sent to the [facility] lab for confirmation. The patient has been on ciprofloxacin. Blood cultures have been negative. 2019  Patient reports his kidney function better. States 2 very small stools with \" sediment\"    2019  Patient reports he is having semisolid stools now. Karlee Mohr RN states plans are for the patient be discharged home today.  She states  is going to work on getting the patient ciprofloxacin as she has no insurance    MEDICATIONS :       Scheduled Meds:   warfarin  5 mg Oral Once    bumetanide  1 mg Oral BID    ciprofloxacin  400 mg Intravenous Q12H    fluconazole  200 mg Oral Daily    insulin glargine  30 Units Subcutaneous Nightly    insulin lispro  0-12 Units Subcutaneous TID WC    insulin lispro  0-6 Units Subcutaneous Nightly    lidocaine 1 % injection  5 mL Intradermal Once    sodium chloride flush  10 mL Intravenous 2 times per day    metoprolol tartrate  25 mg Oral BID    famotidine (PEPCID) injection  20 mg Intravenous BID    cetirizine  10 mg Oral Nightly    propafenone  150 mg Oral Q8H    warfarin (COUMADIN) daily dosing (placeholder)   Other RX Placeholder     Continuous Infusions:   dextrose       PRN Meds:calcium carbonate, potassium chloride **OR** potassium alternative oral replacement **OR** potassium chloride, ipratropium-albuterol, loperamide, benzonatate, sodium chloride flush, magnesium sulfate, sodium phosphate IVPB **OR** sodium phosphate IVPB **OR** sodium phosphate IVPB, acetaminophen, melatonin, glucose, dextrose, glucagon (rDNA), dextrose, HYDROcodone-acetaminophen    ALLERGIES:      Pcn [penicillins]; Neomycin-bacitracin zn-polymyx; and Neosporin [neomycin-polymyxin-gramicidin]            REVIEW OF SYSTEMS:     Constitutional: no fever , appetite better   GI: Still is now semisolid      PHYSICAL EXAM:     BP (!) 145/90   Pulse 76   Temp 97.4 °F (36.3 °C) (Temporal)   Resp 16   Ht 5' 10\" (1.778 m)   Wt 264 lb 1 oz (119.8 kg)   SpO2 97%   BMI 37.89 kg/m²  Temp (24hrs), Av.4 °F (36.3 °C), Min:97.4 °F (36.3 °C), Max:97.4 °F (36.3 °C)    General:  Awake, alert, not in distress, sitting upright in bed  HEENT: pink conjunctiva, anicteric sclera   Neck: Supple:  RESP: effort even and unlabored  Abdomen: Obese  Soft, non tender to palpation, BS positive  Extremities:Trace  pretibial edema  Skin:  Warm and dry. CNS: Moves all extremities, speech clear, follows commands  PSYCH: pleasant, cooperative. IV access:  PICC upper arm right, condition patent and no redness         LABS:     CBC with DIFF:   Recent Labs     19  0345   WBC 5.0 6.2 7.3   RBC 3.55* 3.41* 3.60*   HGB 10.9* 10.8* 11.2*   HCT 33.6* 32.8* 34.3*   MCV 94.6* 96.2* 95.3*   MCH 30.7 31.7* 31.1*   MCHC 32.4* 32.9* 32.7*   RDW 12.3 12.3 11.9    313 280   MPV 10.6 10.3 10.9       CMP/BMP:  Recent Labs     19  0345   * 137 134*   K 4.3 3.9 3.7   CL 98 101 93*   CO2 24 27 28   ANIONGAP 9 9 13   GLUCOSE 193* 147* 207*   BUN 21* 14 12   CREATININE 1.3* 1.1 1.1   LABGLOM 57* >60 >60   CALCIUM 8.4* 8.3* 8.7   PROT 5.9* 5.8* 6.3*   LABALBU 3.0* 2.7* 3.1*   BILITOT 0.6 0.4 0.4   ALKPHOS 447* 425* 444*   ALT 71* 54* 45*   AST 63* 32 21          Culture: No results for input(s): BC, BLOODCULT2, ORG in the last 72 hours.     Culture Stool [[id number]] (Abnormal) Collected: 19 1040 Order Status: Completed Specimen: Stool Updated: 19 0755 Culture, Stool --Normal enteric samantha   No Shigella, Campylobacter, E. coli 0157 isolated   E coli, Shiga toxin Assay -- Negative- No Shiga toxins 1 and 2 detected   Normal Range: Not detected   Organism Salmonella speciesCulture, Stool -- Moderate growth   Sending to [facility] for confirmation     Salmonella species (1)     Antibiotic Interpretation ADRI Status    ampicillin Sensitive <=2 mcg/mL     trimethoprim-sulfamethoxazole Sensitive <=20 mcg/mL             IMAGING:   Xr Chest Portable    Result Date: 3/21/2019  XR CHEST PORTABLE 3/21/2019 11:47 AM History: Generalized weakness. Portable chest x-ray compared with 5/6/2015. The heart size is normal. The mediastinum is within normal limits. The lungs are normally expanded with no pneumonia or pneumothorax. No congestive failure changes. 1. No acute disease. Signed by Dr Susie Sarmiento on 3/21/2019 11:47 AM          ASSESSMENT AND PLAN     Patient Active Problem List   Diagnosis    Shortness of breath    HTN (hypertension)    Hyperlipidemia    Fatigue    Atrial fibrillation (HCC)    Paroxysmal atrial fibrillation (HCC)    Acalculous cholecystitis    Essential hypertension    Diabetic ketoacidosis without coma associated with type 2 diabetes mellitus ([address]    Hyponatremia    Hypotension due to hypovolemia    Acute kidney injury ([facility] [address]    Type 2 diabetes mellitus with diabetic polyneuropathy, with long-term current use of insulin (HCC)    Elevated troponin    Coagulopathy (HCC)     Salmonella enteritis with no bacteremia based on blood cultures obtained March 22     Continue ciprofloxacin. I would consider this patient immunocompromised given his poorly controlled diabetes and would expect him to need 14 days of oral fluoroquinolone therapy . He is improved significantly as having semisolid stools will do not expect him to need any prolonged therapy.  - can switch to po upn dc    DOES NOT NEED fluconazole from my standpoint - dictation error as it transcribed as antifungal instead of fluorquinolone.      I will dc fluconazole    Diabetes with diabetic ketoacidosis-ketoacidosis resolved    Atrial fibrillation    Acute kidney injury-improving    Thank you Amos Simms MD for allowing me to participate in this patient's care  Electronically signed by Dara Miranda MD on 3/30/2019 at 12:20 PM

## 2019-03-31 LAB
ANION GAP SERPL CALCULATED.3IONS-SCNC: 10 MMOL/L (ref 7–19)
BUN BLDV-MCNC: 10 MG/DL (ref 6–20)
CALCIUM SERPL-MCNC: 8.8 MG/DL (ref 8.6–10)
CHLORIDE BLD-SCNC: 91 MMOL/L (ref 98–111)
CO2: 33 MMOL/L (ref 22–29)
CREAT SERPL-MCNC: 1.4 MG/DL (ref 0.5–1.2)
GFR NON-AFRICAN AMERICAN: 53
GLUCOSE BLD-MCNC: 196 MG/DL (ref 70–99)
GLUCOSE BLD-MCNC: 220 MG/DL (ref 70–99)
GLUCOSE BLD-MCNC: 288 MG/DL (ref 70–99)
GLUCOSE BLD-MCNC: 300 MG/DL (ref 74–109)
GLUCOSE BLD-MCNC: 360 MG/DL (ref 70–99)
INR BLD: 2.19 (ref 0.88–1.18)
PERFORMED ON: ABNORMAL
POTASSIUM SERPL-SCNC: 4.2 MMOL/L (ref 3.5–5)
PROTHROMBIN TIME: 23.6 SEC (ref 12–14.6)
SODIUM BLD-SCNC: 134 MMOL/L (ref 136–145)

## 2019-03-31 PROCEDURE — 6370000000 HC RX 637 (ALT 250 FOR IP): Performed by: FAMILY MEDICINE

## 2019-03-31 PROCEDURE — 1210000000 HC MED SURG R&B

## 2019-03-31 PROCEDURE — 2500000003 HC RX 250 WO HCPCS: Performed by: HOSPITALIST

## 2019-03-31 PROCEDURE — 6360000002 HC RX W HCPCS: Performed by: HOSPITALIST

## 2019-03-31 PROCEDURE — 6370000000 HC RX 637 (ALT 250 FOR IP): Performed by: INTERNAL MEDICINE

## 2019-03-31 PROCEDURE — 82948 REAGENT STRIP/BLOOD GLUCOSE: CPT

## 2019-03-31 PROCEDURE — 6370000000 HC RX 637 (ALT 250 FOR IP): Performed by: HOSPITALIST

## 2019-03-31 PROCEDURE — 80048 BASIC METABOLIC PNL TOTAL CA: CPT

## 2019-03-31 PROCEDURE — 85610 PROTHROMBIN TIME: CPT

## 2019-03-31 PROCEDURE — 99232 SBSQ HOSP IP/OBS MODERATE 35: CPT | Performed by: HOSPITALIST

## 2019-03-31 PROCEDURE — 97116 GAIT TRAINING THERAPY: CPT

## 2019-03-31 PROCEDURE — 2580000003 HC RX 258: Performed by: HOSPITALIST

## 2019-03-31 RX ORDER — WARFARIN SODIUM 5 MG/1
5 TABLET ORAL
Status: COMPLETED | OUTPATIENT
Start: 2019-03-31 | End: 2019-03-31

## 2019-03-31 RX ADMIN — INSULIN LISPRO 5 UNITS: 100 INJECTION, SOLUTION INTRAVENOUS; SUBCUTANEOUS at 22:29

## 2019-03-31 RX ADMIN — INSULIN LISPRO 2 UNITS: 100 INJECTION, SOLUTION INTRAVENOUS; SUBCUTANEOUS at 09:51

## 2019-03-31 RX ADMIN — FAMOTIDINE 20 MG: 10 INJECTION INTRAVENOUS at 22:29

## 2019-03-31 RX ADMIN — CETIRIZINE HYDROCHLORIDE 10 MG: 10 TABLET, FILM COATED ORAL at 22:30

## 2019-03-31 RX ADMIN — INSULIN LISPRO 6 UNITS: 100 INJECTION, SOLUTION INTRAVENOUS; SUBCUTANEOUS at 17:13

## 2019-03-31 RX ADMIN — PROPAFENONE HYDROCHLORIDE 150 MG: 150 TABLET, FILM COATED ORAL at 22:30

## 2019-03-31 RX ADMIN — PROPAFENONE HYDROCHLORIDE 150 MG: 150 TABLET, FILM COATED ORAL at 13:33

## 2019-03-31 RX ADMIN — METOPROLOL TARTRATE 25 MG: 25 TABLET ORAL at 08:45

## 2019-03-31 RX ADMIN — WARFARIN SODIUM 5 MG: 5 TABLET ORAL at 18:34

## 2019-03-31 RX ADMIN — Medication 10 ML: at 22:31

## 2019-03-31 RX ADMIN — BUMETANIDE 1 MG: 1 TABLET ORAL at 22:30

## 2019-03-31 RX ADMIN — CIPROFLOXACIN 400 MG: 2 INJECTION, SOLUTION INTRAVENOUS at 08:45

## 2019-03-31 RX ADMIN — INSULIN GLARGINE 30 UNITS: 100 INJECTION, SOLUTION SUBCUTANEOUS at 22:29

## 2019-03-31 RX ADMIN — PROPAFENONE HYDROCHLORIDE 150 MG: 150 TABLET, FILM COATED ORAL at 06:24

## 2019-03-31 RX ADMIN — CIPROFLOXACIN 400 MG: 2 INJECTION, SOLUTION INTRAVENOUS at 22:29

## 2019-03-31 RX ADMIN — METOPROLOL TARTRATE 25 MG: 25 TABLET ORAL at 22:30

## 2019-03-31 RX ADMIN — BUMETANIDE 1 MG: 1 TABLET ORAL at 08:45

## 2019-03-31 RX ADMIN — FAMOTIDINE 20 MG: 10 INJECTION INTRAVENOUS at 08:45

## 2019-03-31 RX ADMIN — Medication 10 ML: at 08:45

## 2019-03-31 RX ADMIN — INSULIN LISPRO 4 UNITS: 100 INJECTION, SOLUTION INTRAVENOUS; SUBCUTANEOUS at 13:33

## 2019-03-31 ASSESSMENT — PAIN SCALES - GENERAL
PAINLEVEL_OUTOF10: 0

## 2019-03-31 NOTE — PROGRESS NOTES
Spoke with Dr. Viola Ortiz regarding D/C. Patient to be D/C tomorrow not today. Still pending SW (Insulins Long/Short acting), and ID to determine ABX therapy for home use.

## 2019-03-31 NOTE — PROGRESS NOTES
Physical Therapy  Name: Maria Isabel Rae  MRN:  [id number]  Date of service:  3/31/2019     03/31/19 0959   Subjective   Subjective patient willing to walk   Bed Mobility   Rolling Independent   Supine to Sit Independent   Sit to Supine Independent   Transfers   Sit to Stand Stand by assistance;Supervision   Stand to sit Stand by assistance;Supervision   Ambulation   Ambulation? Yes   WB Status FWB   Ambulation 1   Surface level tile   Device Single point cane   Other Apparatus   (iv)   Assistance Stand by assistance   Distance 175'   Short term goals   Time Frame for Short term goals 14 days   Short term goal 1 pt will improve supine<>sit to supervision   Short term goal 2 pt with improve sit<>stands to supervision   Short term goal 3 pt will ambulate 80' with [facility] and supervision   Conditions Requiring Skilled Therapeutic Intervention   Body structures, Functions, Activity limitations Decreased functional mobility ; Decreased endurance;Decreased strength;Decreased balance;Decreased sensation   Treatment Diagnosis difficulty ambulating   Activity Tolerance   Activity Tolerance Patient Tolerated treatment well   Plan   Times per week 6-7   Times per day Daily   Plan weeks 2   Current Treatment Recommendations Strengthening;Balance Training; Endurance Training;Transfer Training;Functional Mobility Training;Gait Training; Safety Education & Training;Patient/Caregiver Education & Training   Safety Devices   Type of devices Left in bed;Call light within reach         Electronically signed by Poonam Metzger PTA on 3/31/2019 at 10:01 AM

## 2019-03-31 NOTE — PROGRESS NOTES
Nephrology ([address] [facility]) Progress Note    Patient:  Joie Bonner  YOB: 1965  Date of Service: 3/30/2019  MRN: [id number]   Acct: [de-identified]   Primary Care Physician: Arlen Machado DO  Advance Directive: Full Code  Admit Date: 3/21/2019       Hospital Day: 9  Referring Provider: Cheri Adam MD    Patient independently seen and examined, Chart, Consults, Notes, Operative notes, Labs, Cardiology, and Radiology studies reviewed as able. Subjective:  Joie Bonner is a 52 y.o. male  whom we were consulted for acute kidney injury/chronic kidney disease/metabolic acidosis. Patient has long-term history of insulin-dependent diabetes. He presented initially with weakness and was found to have diabetic ketoacidosis. He was initially treated with insulin and IV fluid. Ketones were resolved in his bloodstream but he still had metabolic acidosis. Meanwhile patient had developed severe diarrhea which contributed towards non-anion gap metabolic acidosis. He is currently being treated with IV fluid with sodium bicarbonate.     Today, no new events. +edema. No n/v/d. Allergies:  Pcn [penicillins];  Neomycin-bacitracin zn-polymyx; and Neosporin [neomycin-polymyxin-gramicidin]    Medicines:  Current Facility-Administered Medications   Medication Dose Route Frequency Provider Last Rate Last Dose    bumetanide (BUMEX) tablet 1 mg  1 mg Oral BID Shaun Olivier MD   1 mg at 03/30/19 2036    ciprofloxacin (CIPRO) IVPB 400 mg  400 mg Intravenous Q12H Cheri Adam MD   Stopped at 03/30/19 2136    calcium carbonate (TUMS) chewable tablet 500 mg  500 mg Oral TID PRN Bakari Smith MD   500 mg at 03/27/19 1910    insulin glargine (LANTUS) injection vial 30 Units  30 Units Subcutaneous Nightly Marry Garcia MD   30 Units at 03/30/19 2037    insulin lispro (HUMALOG) injection vial 0-12 Units  0-12 Units Subcutaneous TID WC Marry Garcia MD   4 Units at 03/30/19 1709    insulin lispro (HUMALOG) injection vial 0-6 Units  0-6 Units Subcutaneous Nightly Khari Ferrer MD   3 Units at 03/30/19 2037    potassium chloride (KLOR-CON M) extended release tablet 40 mEq  40 mEq Oral PRN Khari Ferrer MD        Or    potassium bicarb-citric acid (EFFER-K) effervescent tablet 40 mEq  40 mEq Oral PRN Khari Ferrer MD        Or    potassium chloride 10 mEq/100 mL IVPB (Peripheral Line)  10 mEq Intravenous PRN Khari Ferrer MD        ipratropium-albuterol (DUONEB) nebulizer solution 1 ampule  1 ampule Inhalation Q4H PRN Janusz Choudhary MD        loperamide (IMODIUM) capsule 2 mg  2 mg Oral 4x Daily PRN Khari Ferrer MD   2 mg at 03/27/19 1910    benzonatate (TESSALON) capsule 100 mg  100 mg Oral TID PRN Khari Ferrer MD   100 mg at 03/30/19 1258    lidocaine PF 1 % injection 5 mL  5 mL Intradermal Once Khari Ferrer MD        sodium chloride flush 0.9 % injection 10 mL  10 mL Intravenous 2 times per day Khari Ferrer MD   10 mL at 03/30/19 2038    sodium chloride flush 0.9 % injection 10 mL  10 mL Intravenous PRN Khari Ferrer MD        metoprolol tartrate (LOPRESSOR) tablet 25 mg  25 mg Oral BID Khari Ferrer MD   25 mg at 03/30/19 2036    famotidine (PEPCID) injection 20 mg  20 mg Intravenous BID Khari Ferrer MD   20 mg at 03/30/19 2036    magnesium sulfate 1 g in dextrose 5% 100 mL IVPB  1 g Intravenous PRN Khari Ferrer MD   Stopped at 03/25/19 1920    sodium phosphate 10 mmol in dextrose 5 % 250 mL IVPB  10 mmol Intravenous PRN Khari Ferrer MD   Stopped at 03/25/19 7061    Or    sodium phosphate 15 mmol in dextrose 5 % 250 mL IVPB  15 mmol Intravenous PRN Khari Ferrer MD        Or    sodium phosphate 20 mmol in dextrose 5 % 500 mL IVPB  20 mmol Intravenous PRN Khari Ferrer MD        acetaminophen (TYLENOL) tablet 650 mg  650 mg Oral Q6H PRN Khari Ferrer MD   650 mg at 03/24/19 0835    melatonin tablet 3 mg  3 mg Oral Nightly PRN Khari Ferrer MD   3 mg at 03/23/19 2306    cetirizine (ZYRTEC) tablet 10 mg  10 mg Oral Nightly Gene Pierre MD   10 mg at 03/30/19 2036    glucose (GLUTOSE) 40 % oral gel 15 g  15 g Oral PRN Gene Pierre MD        dextrose 50 % solution 12.5 g  12.5 g Intravenous PRN Gene Pierre MD        glucagon (rDNA) injection 1 mg  1 mg Intramuscular PRN Gene Pierre MD        dextrose 5 % solution  100 mL/hr Intravenous PRN Gene Pierre MD        HYDROcodone-acetaminophen [facility])  MG per tablet 1 tablet  1 tablet Oral Q6H PRN Gene Pierre MD   1 tablet at 03/26/19 2310    propafenone (RYTHMOL) tablet 150 mg  150 mg Oral Q8H Gene Pierre MD   150 mg at 03/30/19 1558    warfarin (COUMADIN) daily dosing (placeholder)   Other Stephanie Kaye MD           Past Medical History:  Past Medical History:   Diagnosis Date    Allergic rhinitis, cause unspecified     Charcot's joint of right foot     Chicken pox     HTN (hypertension)     Hyperlipidemia     Impotence of organic origin     MRSA infection     Other dyspnea and respiratory abnormality     Other specified erythematous condition(695.89)     Paroxysmal atrial fibrillation (HCC)     Personal history of other infectious and parasitic disease     Type II or unspecified type diabetes mellitus without mention of complication, uncontrolled 1994       Past Surgical History:  Past Surgical History:   Procedure Laterality Date    CARDIOVERSION  03/2017    MALIGNANT SKIN LESION EXCISION      X 2    TONSILLECTOMY         Family History  Family History   Problem Relation Age of Onset    Stroke Maternal Grandmother     Cancer Maternal Grandmother     Stroke Paternal Grandmother     Diabetes Paternal Grandmother     Cancer Father     Heart Disease Father     Asthma Paternal Grandfather     Heart Disease Paternal Grandfather     Heart Disease Maternal Grandfather     Colon Cancer Neg Hx     Colon Polyps Neg Hx     Esophageal Cancer Neg Hx     Liver Cancer Neg Hx     Liver Disease Neg Hx     Rectal Cancer Neg Hx     Stomach Cancer Neg Hx        Social History  Social History     Socioeconomic History    Marital status:      Spouse name: Not on file    Number of children: Not on file    Years of education: Not on file    Highest education level: Not on file   Occupational History    Not on file   Social Needs    Financial resource strain: Not on file    Food insecurity:     Worry: Not on file     Inability: Not on file    Transportation needs:     Medical: Not on file     Non-medical: Not on file   Tobacco Use    Smoking status: Never Smoker    Smokeless tobacco: Never Used   Substance and Sexual Activity    Alcohol use: No    Drug use: No    Sexual activity: Not on file   Lifestyle    Physical activity:     Days per week: Not on file     Minutes per session: Not on file    Stress: Not on file   Relationships    Social connections:     Talks on phone: Not on file     Gets together: Not on file     Attends Islam service: Not on file     Active member of club or organization: Not on file     Attends meetings of clubs or organizations: Not on file     Relationship status: Not on file    Intimate partner violence:     Fear of current or ex partner: Not on file     Emotionally abused: Not on file     Physically abused: Not on file     Forced sexual activity: Not on file   Other Topics Concern    Not on file   Social History Narrative    Not on file         Review of Systems:  History obtained from chart review and the patient  General ROS: No fever or chills  Respiratory ROS: No cough, shortness of breath, wheezing  Cardiovascular ROS: No chest pain or palpitations  Gastrointestinal ROS: No abdominal pain or melena  Genito-Urinary ROS: No dysuria or hematuria  Musculoskeletal ROS: No joint pain or swelling         Objective:  Patient Vitals for the past 24 hrs:   BP Temp Temp src Pulse Resp SpO2 Weight   03/30/19 1922 134/81 97.2 °F (36.2 °C) Temporal 69 18 98 % --   03/30/19 0535 -- -- -- -- -- -- 264 lb 1 oz (119.8 kg)       Intake/Output Summary (Last 24 hours) at 3/30/2019 2241  Last data filed at 3/30/2019 1106  Gross per 24 hour   Intake --   Output 1950 ml   Net -1950 ml     General: awake/alert   Chest:  clear to auscultation bilaterally without respiratory distress  CVS: regular rate and rhythm  Abdominal: soft, nontender, normal bowel sounds  Extremities: no cyanosis, ble edema  Skin: warm and dry without rash    Labs:  BMP:   Recent Labs     03/28/19  0625 03/29/19  0722 03/30/19  0345   * 137 134*   K 4.3 3.9 3.7   CL 98 101 93*   CO2 24 27 28   BUN 21* 14 12   CREATININE 1.3* 1.1 1.1   CALCIUM 8.4* 8.3* 8.7     CBC:   Recent Labs     03/28/19  0625 03/29/19  0722 03/30/19  0345   WBC 5.0 6.2 7.3   HGB 10.9* 10.8* 11.2*   HCT 33.6* 32.8* 34.3*   MCV 94.6* 96.2* 95.3*    313 280     LIVER PROFILE:   Recent Labs     03/28/19  0625 03/29/19  0722 03/30/19  0345   AST 63* 32 21   ALT 71* 54* 45*   BILITOT 0.6 0.4 0.4   ALKPHOS 447* 425* 444*     PT/INR:   Recent Labs     03/28/19 0625 03/29/19  0722 03/30/19  0345   PROTIME 16.8* 19.5* 22.5*   INR 1.43* 1.73* 2.06*     APTT: No results for input(s): APTT in the last 72 hours. BNP:  No results for input(s): BNP in the last 72 hours. Ionized Calcium:No results for input(s): IONCA in the last 72 hours. Magnesium:No results for input(s): MG in the last 72 hours. Phosphorus:No results for input(s): PHOS in the last 72 hours. HgbA1C: No results for input(s): LABA1C in the last 72 hours. Hepatic:   Recent Labs     03/28/19  0625 03/29/19  0722 03/30/19  0345   ALKPHOS 447* 425* 444*   ALT 71* 54* 45*   AST 63* 32 21   PROT 5.9* 5.8* 6.3*   BILITOT 0.6 0.4 0.4   LABALBU 3.0* 2.7* 3.1*     Lactic Acid: No results for input(s): LACTA in the last 72 hours. Troponin: No results for input(s): CKTOTAL, CKMB, TROPONINT in the last 72 hours.   ABGs:   Lab Results   Component Value Date this CT exam, one or more of the following dose reduction techniques was employed: -automated exposure control -mA and/or kVp adjustment for patient size -iterative reconstruction DLP 2879 mGy-cm Findings Chest Incidental scanning through the lower chest demonstrates a very small dependent right pleural effusion. Heart is mildly enlarged. Catheter tip in the right atrium. Abdomen Liver is mildly heterogeneous but no focal masses. No cirrhotic morphology. Hepatic vasculature is patent. There is slight gallbladder wall thickening likely related to the small amount of ascites in the upper abdomen. Normal caliber abdominal aorta. There is a tiny 5 mm cyst of the posterior cortex left kidney. There is a 15 mm cyst of the lateral cortex upper right renal pole (well circumscribed and water attenuating). This correlates with ultrasound finding. No solid renal masses. No hydronephrosis. Normal caliber abdominal aorta. Few tiny calcifications throughout the pancreas are sequela of prior episodes of pancreatitis. No pancreatic mass or main ductal dilatation. No biliary dilatation. No bowel obstruction or wall thickening. Moderate to large amount of stool throughout the colon. Normal appendix. No suspicious lymphadenopathy. Pelvis Urinary bladder is distended at time of scan. There is a small amount of gas in the bladder lumen presumably from recent catheterization. There is trace pelvic ascites. Diffuse body wall edema indicates anasarca. 1. Small right renal cortical cyst. Tiny left renal cortical cyst. No solid renal mass. 2. Small volume ascites, anasarca, and small right pleural effusion. Signed by Dr Checo Whitehead on 3/28/2019 1:40 PM    Xr Chest Portable    Result Date: 3/25/2019  XR CHEST PORTABLE 3/25/2019 10:46 AM History: Atrial fibrillation. PICC line placement. Portable chest x-ray compared with 3/21/2019. The right PICC line is in good position with the tip in the upper atrium or at the cavoatrial junction.  The lungs are fully expanded and clear. Heart size is normal.    1. Well-positioned right PICC line Signed by Dr Blas Kelly on 3/25/2019 10:46 AM    Xr Chest Portable    Result Date: 3/21/2019  XR CHEST PORTABLE 3/21/2019 11:47 AM History: Generalized weakness. Portable chest x-ray compared with 5/6/2015. The heart size is normal. The mediastinum is within normal limits. The lungs are normally expanded with no pneumonia or pneumothorax. No congestive failure changes. 1. No acute disease. Signed by Dr Blas Kelly on 3/21/2019 11:47 AM       Assessment   Acute kidney injury/volume depletion/resolved. CKD 2  Diabetic ketoacidosis - resolved. Non-anion gap metabolic acidosis related to diarrhea. Poorly controlled type II diabetes  Right renal nodule/renal cyst as appeared on CT scan. Increasing edema.     Plan:  Continue diuretics  Monitor labs  abx per ID      Felice Anderson MD  03/30/19  10:41 PM

## 2019-03-31 NOTE — PROGRESS NOTES
Clinical Pharmacy Note    Warfarin consult follow-up    Recent Labs     03/31/19  0338   INR 2.19*     Recent Labs     03/29/19  0722 03/30/19  0345   HGB 10.8* 11.2*   HCT 32.8* 34.3*    280       Significant warfarin drug-drug interactions: cipro     Date INR Warfarin Dose   03/21/19 4.34 Pt took 12.5 mg at home    03/22/19 4.77  Held    03/23/19 3.01  7.5 mg    03/24/19 3.13  Held     03/25/19 4.88   Held    03/26/19  2.86  2.5   03/27/19   1.66 10 mg    03/28/19  1.43  12.5 mg     03/29/19 1.73  5 mg   03/30/19 2.06 5 mg   03/31/19  2.19  5 mg                     Notes:                   Give Coumadin 5mg x 1 dose today. Daily PT/INR until stable within therapeutic range.      SHUBHAM GOODE D, 3/31/2019, 9:21 AM

## 2019-03-31 NOTE — PROGRESS NOTES
BMP sent to lab. Patient pending discharged tomorrow. Lantus stored in pharmacy for patients home use.    working on short acting insulin

## 2019-03-31 NOTE — PROGRESS NOTES
Subcutaneous TID WC Tod Gallego MD   2 Units at 03/31/19 8432    insulin lispro (HUMALOG) injection vial 0-6 Units  0-6 Units Subcutaneous Nightly Tod Gallego MD   3 Units at 03/30/19 2037    potassium chloride (KLOR-CON M) extended release tablet 40 mEq  40 mEq Oral PRN Tod Gallego MD        Or    potassium bicarb-citric acid (EFFER-K) effervescent tablet 40 mEq  40 mEq Oral PRN Tod Gallego MD        Or    potassium chloride 10 mEq/100 mL IVPB (Peripheral Line)  10 mEq Intravenous PRN Tod Gallego MD        ipratropium-albuterol (DUONEB) nebulizer solution 1 ampule  1 ampule Inhalation Q4H PRN Oma Gillespie MD        loperamide (IMODIUM) capsule 2 mg  2 mg Oral 4x Daily PRN Tod Gallego MD   2 mg at 03/27/19 1910    benzonatate (TESSALON) capsule 100 mg  100 mg Oral TID PRN Tod Gallego MD   100 mg at 03/30/19 1258    lidocaine PF 1 % injection 5 mL  5 mL Intradermal Once Tod Gallego MD        sodium chloride flush 0.9 % injection 10 mL  10 mL Intravenous 2 times per day Tod Gallego MD   10 mL at 03/31/19 0845    sodium chloride flush 0.9 % injection 10 mL  10 mL Intravenous PRN Tod Gallego MD        metoprolol tartrate (LOPRESSOR) tablet 25 mg  25 mg Oral BID Tod Gallego MD   25 mg at 03/31/19 0845    famotidine (PEPCID) injection 20 mg  20 mg Intravenous BID Tod Gallego MD   20 mg at 03/31/19 0845    magnesium sulfate 1 g in dextrose 5% 100 mL IVPB  1 g Intravenous PRN Tod Gallego MD   Stopped at 03/25/19 1920    sodium phosphate 10 mmol in dextrose 5 % 250 mL IVPB  10 mmol Intravenous PRN Tod Gallego MD   Stopped at 03/25/19 8351    Or    sodium phosphate 15 mmol in dextrose 5 % 250 mL IVPB  15 mmol Intravenous PRN Tod Gallego MD        Or    sodium phosphate 20 mmol in dextrose 5 % 500 mL IVPB  20 mmol Intravenous PRN Tod Gallego MD        acetaminophen (TYLENOL) tablet 650 mg  650 mg Oral Q6H PRN Tod Gallego MD   650 mg at 03/24/19 4738    melatonin tablet 3 mg  3 mg Oral Nightly PRN Marry Garcia MD   3 mg at 03/23/19 2306    cetirizine (ZYRTEC) tablet 10 mg  10 mg Oral Nightly Marry Garcia MD   10 mg at 03/30/19 2036    glucose (GLUTOSE) 40 % oral gel 15 g  15 g Oral PRN Marry Garcia MD        dextrose 50 % solution 12.5 g  12.5 g Intravenous PRN Marry Garcia MD        glucagon (rDNA) injection 1 mg  1 mg Intramuscular PRN Marry Garcia MD        dextrose 5 % solution  100 mL/hr Intravenous PRN Marry Garcia MD        HYDROcodone-acetaminophen [facility])  MG per tablet 1 tablet  1 tablet Oral Q6H PRN Marry Garcia MD   1 tablet at 03/26/19 2310    propafenone (RYTHMOL) tablet 150 mg  150 mg Oral Q8H Marry Garcia MD   150 mg at 03/31/19 1634    warfarin (COUMADIN) daily dosing (placeholder)   Other Latonia Saleem MD           Past Medical History:  Past Medical History:   Diagnosis Date    Allergic rhinitis, cause unspecified     Charcot's joint of right foot     Chicken pox     HTN (hypertension)     Hyperlipidemia     Impotence of organic origin     MRSA infection     Other dyspnea and respiratory abnormality     Other specified erythematous condition(695.89)     Paroxysmal atrial fibrillation ([facility] [address]     Personal history of other infectious and parasitic disease     Type II or unspecified type diabetes mellitus without mention of complication, uncontrolled 1994       Past Surgical History:  Past Surgical History:   Procedure Laterality Date    CARDIOVERSION  03/2017    MALIGNANT SKIN LESION EXCISION      X 2    TONSILLECTOMY         Family History  Family History   Problem Relation Age of Onset    Stroke Maternal Grandmother     Cancer Maternal Grandmother     Stroke Paternal Grandmother     Diabetes Paternal Grandmother     Cancer Father     Heart Disease Father     Asthma Paternal Grandfather     Heart Disease Paternal Grandfather     Heart Disease Maternal Grandfather     Colon Cancer Neg Hx     Colon Polyps Neg Hx     Esophageal Cancer Neg Hx     Liver Cancer Neg Hx     Liver Disease Neg Hx     Rectal Cancer Neg Hx     Stomach Cancer Neg Hx        Social History  Social History     Socioeconomic History    Marital status:      Spouse name: Not on file    Number of children: Not on file    Years of education: Not on file    Highest education level: Not on file   Occupational History    Not on file   Social Needs    Financial resource strain: Not on file    Food insecurity:     Worry: Not on file     Inability: Not on file    Transportation needs:     Medical: Not on file     Non-medical: Not on file   Tobacco Use    Smoking status: Never Smoker    Smokeless tobacco: Never Used   Substance and Sexual Activity    Alcohol use: No    Drug use: No    Sexual activity: Not on file   Lifestyle    Physical activity:     Days per week: Not on file     Minutes per session: Not on file    Stress: Not on file   Relationships    Social connections:     Talks on phone: Not on file     Gets together: Not on file     Attends Jewish service: Not on file     Active member of club or organization: Not on file     Attends meetings of clubs or organizations: Not on file     Relationship status: Not on file    Intimate partner violence:     Fear of current or ex partner: Not on file     Emotionally abused: Not on file     Physically abused: Not on file     Forced sexual activity: Not on file   Other Topics Concern    Not on file   Social History Narrative    Not on file         Review of Systems:  History obtained from chart review and the patient  General ROS: No fever or chills  Respiratory ROS: No cough, shortness of breath, wheezing  Cardiovascular ROS: No chest pain or palpitations  Gastrointestinal ROS: No abdominal pain or melena  Genito-Urinary ROS: No dysuria or hematuria  Musculoskeletal ROS: No joint pain or swelling         Objective:  Patient Vitals for the past 24 hrs:   BP Temp Temp src Pulse Resp SpO2 Weight   03/31/19 1009 -- 98.7 °F (37.1 °C) -- 80 17 -- --   03/31/19 0700 134/84 98.7 °F (37.1 °C) Temporal 80 17 95 % --   03/31/19 0538 -- -- -- -- -- -- 260 lb 3 oz (118 kg)   03/30/19 1922 134/81 97.2 °F (36.2 °C) Temporal 69 18 98 % --       Intake/Output Summary (Last 24 hours) at 3/31/2019 1156  Last data filed at 3/31/2019 0835  Gross per 24 hour   Intake 300 ml   Output 700 ml   Net -400 ml     General: awake/alert   Chest:  clear to auscultation bilaterally without respiratory distress  CVS: regular rate and rhythm  Abdominal: soft, nontender, normal bowel sounds  Extremities: no cyanosis, ble edema  Skin: warm and dry without rash    Labs:  BMP:   Recent Labs     03/29/19  0722 03/30/19 0345    134*   K 3.9 3.7    93*   CO2 27 28   BUN 14 12   CREATININE 1.1 1.1   CALCIUM 8.3* 8.7     CBC:   Recent Labs     03/29/19  0722 03/30/19  0345   WBC 6.2 7.3   HGB 10.8* 11.2*   HCT 32.8* 34.3*   MCV 96.2* 95.3*    280     LIVER PROFILE:   Recent Labs     03/29/19  0722 03/30/19  0345   AST 32 21   ALT 54* 45*   BILITOT 0.4 0.4   ALKPHOS 425* 444*     PT/INR:   Recent Labs     03/29/19  0722 03/30/19  0345 03/31/19  0338   PROTIME 19.5* 22.5* 23.6*   INR 1.73* 2.06* 2.19*     APTT: No results for input(s): APTT in the last 72 hours. BNP:  No results for input(s): BNP in the last 72 hours. Ionized Calcium:No results for input(s): IONCA in the last 72 hours. Magnesium:No results for input(s): MG in the last 72 hours. Phosphorus:No results for input(s): PHOS in the last 72 hours. HgbA1C: No results for input(s): LABA1C in the last 72 hours. Hepatic:   Recent Labs     03/29/19  0722 03/30/19  0345   ALKPHOS 425* 444*   ALT 54* 45*   AST 32 21   PROT 5.8* 6.3*   BILITOT 0.4 0.4   LABALBU 2.7* 3.1*     Lactic Acid: No results for input(s): LACTA in the last 72 hours.   Troponin: No results for input(s): CKTOTAL, CKMB, TROPONINT in the last 72 hours.  ABGs:   Lab Results   Component Value Date    PHART 7.290 03/25/2019    PO2ART 79.0 03/25/2019    KJG1TTC 20.0 03/25/2019     CRP:  No results for input(s): CRP in the last 72 hours. Sed Rate:  No results for input(s): SEDRATE in the last 72 hours. Culture Results:   Blood Culture Recent:   Recent Labs     03/22/19  0914   BC No growth after 5 days of incubation. Urine Culture Recent : No results for input(s): LABURIN in the last 720 hours. Radiology reports as per the Radiologist  Radiology: Us Renal Complete    Result Date: 3/25/2019  EXAMINATION:  US RENAL COMPLETE  3/25/2019 2:57 PM HISTORY: Renal failure, acute kidney injury COMPARISON: Right upper quadrant ultrasound dated 12/21/2015 TECHNIQUE: Bilateral renal ultrasound was performed. FINDINGS: The right kidney measures 11.6 cm in evlu-ji-bhob length. Left kidney measures 12.4 cm in amfj-kr-nnnv length. There is a pedunculated 17 mm hypoechoic lesion arising from the right kidney interpolar region medially. Ultrasound characteristics are not compatible simple cyst. Complex cyst considered or a simple cyst poorly characterized in this larger patient. A solid mass not excluded. Further imaging characterization with CT renal mass protocol suggested when clinically feasible. Normal echogenicity renal cortex is observed without additional masses. There is no pelvic caliectasis to indicate hydronephrosis or obstruction. There are no perinephric abnormalities. Urinary bladder is mildly distended without focal bladder wall abnormality. 1. Indeterminate 17 mm hypoechoic right renal mass. Follow-up suggested. 2. Otherwise negative bilateral renal ultrasound. Signed by Dr Edith Doe on 3/25/2019 3:02 PM    Ct Abdomen Pelvis W Iv Contrast Additional Contrast? Oral    Result Date: 3/28/2019  History: 57-year-old with right renal nodule. Reference: Renal ultrasound March 25, 2019.  Technique Contrast-enhanced CT abdomen/pelvis was performed with coronal and sagittal reformatted images provided. For this CT exam, one or more of the following dose reduction techniques was employed: -automated exposure control -mA and/or kVp adjustment for patient size -iterative reconstruction DLP 2879 mGy-cm Findings Chest Incidental scanning through the lower chest demonstrates a very small dependent right pleural effusion. Heart is mildly enlarged. Catheter tip in the right atrium. Abdomen Liver is mildly heterogeneous but no focal masses. No cirrhotic morphology. Hepatic vasculature is patent. There is slight gallbladder wall thickening likely related to the small amount of ascites in the upper abdomen. Normal caliber abdominal aorta. There is a tiny 5 mm cyst of the posterior cortex left kidney. There is a 15 mm cyst of the lateral cortex upper right renal pole (well circumscribed and water attenuating). This correlates with ultrasound finding. No solid renal masses. No hydronephrosis. Normal caliber abdominal aorta. Few tiny calcifications throughout the pancreas are sequela of prior episodes of pancreatitis. No pancreatic mass or main ductal dilatation. No biliary dilatation. No bowel obstruction or wall thickening. Moderate to large amount of stool throughout the colon. Normal appendix. No suspicious lymphadenopathy. Pelvis Urinary bladder is distended at time of scan. There is a small amount of gas in the bladder lumen presumably from recent catheterization. There is trace pelvic ascites. Diffuse body wall edema indicates anasarca. 1. Small right renal cortical cyst. Tiny left renal cortical cyst. No solid renal mass. 2. Small volume ascites, anasarca, and small right pleural effusion. Signed by Dr Dawood Whitehead on 3/28/2019 1:40 PM    Xr Chest Portable    Result Date: 3/25/2019  XR CHEST PORTABLE 3/25/2019 10:46 AM History: Atrial fibrillation. PICC line placement. Portable chest x-ray compared with 3/21/2019.  The right PICC line is in good position with the tip in

## 2019-03-31 NOTE — PLAN OF CARE
Ongoing     Problem: Health Behavior:  Goal: Ability to identify and utilize available resources and services will improve  Description  Ability to identify and utilize available resources and services will improve  Outcome: Ongoing  Goal: Ability to manage health-related needs will improve  Description  Ability to manage health-related needs will improve  Outcome: Ongoing     Problem: Metabolic:  Goal: Ability to maintain appropriate glucose levels will improve  Description  Ability to maintain appropriate glucose levels will improve  Outcome: Ongoing     Problem: Nutritional:  Goal: Maintenance of adequate nutrition will improve  Description  Maintenance of adequate nutrition will improve  Outcome: Ongoing  Goal: Progress toward achieving an optimal weight will improve  Description  Progress toward achieving an optimal weight will improve  Outcome: Ongoing     Problem: Physical Regulation:  Goal: Complications related to the disease process, condition or treatment will be avoided or minimized  Description  Complications related to the disease process, condition or treatment will be avoided or minimized  Outcome: Ongoing  Goal: Diagnostic test results will improve  Description  Diagnostic test results will improve  Outcome: Ongoing  Goal: Ability to maintain clinical measurements within normal limits will improve  Description  Ability to maintain clinical measurements within normal limits will improve  Outcome: Ongoing  Goal: Will show no signs and symptoms of electrolyte imbalance  Description  Will show no signs and symptoms of electrolyte imbalance  Outcome: Ongoing     Problem: Skin Integrity:  Goal: Risk for impaired skin integrity will decrease  Description  Risk for impaired skin integrity will decrease  Outcome: Ongoing  Goal: Will show no infection signs and symptoms  Description  Will show no infection signs and symptoms  Outcome: Ongoing  Goal: Absence of new skin breakdown  Description  Absence of new skin breakdown  Outcome: Ongoing     Problem: Tissue Perfusion:  Goal: Adequacy of tissue perfusion will improve  Description  Adequacy of tissue perfusion will improve  Outcome: Ongoing     Problem: Infection:  Goal: Will remain free from infection  Description  Will remain free from infection  Outcome: Ongoing     Problem: Safety:  Goal: Free from accidental physical injury  Description  Free from accidental physical injury  Outcome: Ongoing  Goal: Free from intentional harm  Description  Free from intentional harm  Outcome: Ongoing     Problem: Daily Care:  Goal: Daily care needs are met  Description  Daily care needs are met  Outcome: Ongoing     Problem: Pain:  Goal: Patient's pain/discomfort is manageable  Description  Patient's pain/discomfort is manageable  Outcome: Ongoing     Problem: Skin Integrity:  Goal: Skin integrity will stabilize  Description  Skin integrity will stabilize  Outcome: Ongoing     Problem: Discharge Planning:  Goal: Patients continuum of care needs are met  Description  Patients continuum of care needs are met  Outcome: Ongoing

## 2019-04-01 VITALS
HEIGHT: 70 IN | RESPIRATION RATE: 17 BRPM | HEART RATE: 74 BPM | WEIGHT: 254.38 LBS | BODY MASS INDEX: 36.42 KG/M2 | TEMPERATURE: 98 F | SYSTOLIC BLOOD PRESSURE: 132 MMHG | DIASTOLIC BLOOD PRESSURE: 84 MMHG | OXYGEN SATURATION: 97 %

## 2019-04-01 LAB
ANION GAP SERPL CALCULATED.3IONS-SCNC: 12 MMOL/L (ref 7–19)
BUN BLDV-MCNC: 11 MG/DL (ref 6–20)
CALCIUM SERPL-MCNC: 8.6 MG/DL (ref 8.6–10)
CHLORIDE BLD-SCNC: 93 MMOL/L (ref 98–111)
CO2: 32 MMOL/L (ref 22–29)
CREAT SERPL-MCNC: 1.2 MG/DL (ref 0.5–1.2)
GFR NON-AFRICAN AMERICAN: >60
GLUCOSE BLD-MCNC: 198 MG/DL (ref 70–99)
GLUCOSE BLD-MCNC: 233 MG/DL (ref 70–99)
GLUCOSE BLD-MCNC: 246 MG/DL (ref 74–109)
GLUCOSE BLD-MCNC: 261 MG/DL (ref 70–99)
INR BLD: 2.26 (ref 0.88–1.18)
PERFORMED ON: ABNORMAL
POTASSIUM SERPL-SCNC: 3.7 MMOL/L (ref 3.5–5)
PROTHROMBIN TIME: 24.2 SEC (ref 12–14.6)
SODIUM BLD-SCNC: 137 MMOL/L (ref 136–145)

## 2019-04-01 PROCEDURE — 85610 PROTHROMBIN TIME: CPT

## 2019-04-01 PROCEDURE — 6370000000 HC RX 637 (ALT 250 FOR IP): Performed by: HOSPITALIST

## 2019-04-01 PROCEDURE — 2500000003 HC RX 250 WO HCPCS: Performed by: HOSPITALIST

## 2019-04-01 PROCEDURE — 2580000003 HC RX 258: Performed by: HOSPITALIST

## 2019-04-01 PROCEDURE — 6370000000 HC RX 637 (ALT 250 FOR IP): Performed by: INTERNAL MEDICINE

## 2019-04-01 PROCEDURE — 80048 BASIC METABOLIC PNL TOTAL CA: CPT

## 2019-04-01 PROCEDURE — 82948 REAGENT STRIP/BLOOD GLUCOSE: CPT

## 2019-04-01 PROCEDURE — 6360000002 HC RX W HCPCS: Performed by: HOSPITALIST

## 2019-04-01 RX ORDER — WARFARIN SODIUM 5 MG/1
5 TABLET ORAL
Status: DISCONTINUED | OUTPATIENT
Start: 2019-04-01 | End: 2019-04-01 | Stop reason: HOSPADM

## 2019-04-01 RX ADMIN — CIPROFLOXACIN 400 MG: 2 INJECTION, SOLUTION INTRAVENOUS at 09:27

## 2019-04-01 RX ADMIN — INSULIN LISPRO 6 UNITS: 100 INJECTION, SOLUTION INTRAVENOUS; SUBCUTANEOUS at 12:37

## 2019-04-01 RX ADMIN — FAMOTIDINE 20 MG: 10 INJECTION INTRAVENOUS at 09:21

## 2019-04-01 RX ADMIN — Medication 10 ML: at 09:30

## 2019-04-01 RX ADMIN — BUMETANIDE 1 MG: 1 TABLET ORAL at 09:17

## 2019-04-01 RX ADMIN — METOPROLOL TARTRATE 25 MG: 25 TABLET ORAL at 09:17

## 2019-04-01 RX ADMIN — PROPAFENONE HYDROCHLORIDE 150 MG: 150 TABLET, FILM COATED ORAL at 09:16

## 2019-04-01 RX ADMIN — INSULIN LISPRO 2 UNITS: 100 INJECTION, SOLUTION INTRAVENOUS; SUBCUTANEOUS at 09:19

## 2019-04-01 NOTE — CARE COORDINATION
SW placed call to employee pharmacy; spoke with Ramon Reilly; re: Humalog vial and Cipro 500mg BID x14d, plus half a box of syringes. Total cost would be 96.94    Will print voucher for [facility] and take scripts to pharmacy for filling.     Electronically signed by JERAMY Vivar on 4/1/2019 at 1:21 PM

## 2019-04-01 NOTE — PROGRESS NOTES
Clinical Pharmacy Note    Warfarin consult follow-up    Recent Labs     04/01/19  0550   INR 2.26*     Recent Labs     03/30/19  0345   HGB 11.2*   HCT 34.3*          Significant Drug-Drug Interactions:  Cipro    Date INR Warfarin Dose   03/21/19 4.34 Pt took 12.5 mg at home    03/22/19 4.77  Held    03/23/19 3.01  7.5 mg    03/24/19 3.13  Held     03/25/19 4.88   Held    03/26/19  2.86  2.5   03/27/19   1.66 10 mg    03/28/19  1.43  12.5 mg     03/29/19 1.73  5 mg   03/30/19 2.06 5 mg   03/31/19  2.19  5 mg     04/01/19   2.26  5 mg       Notes:    Give Coumadin 5 mg x 1 today                 Daily PT/INR until stable within therapeutic range.      Electronically signed by Partha Esquivel, [address] [facility] on 4/1/2019 at 10:44 AM

## 2019-04-01 NOTE — PROGRESS NOTES
Nephrology ([facility]) Progress Note    Patient:  Ofelia Cardenas  YOB: 1965  Date of Service: 4/1/2019  MRN: [id number]   Acct: [de-identified]   Primary Care Physician: Mathew Jordan DO  Advance Directive: Full Code  Admit Date: 3/21/2019       Hospital Day: 11  Referring Provider: Jeanna Arreola MD    Patient independently seen and examined, Chart, Consults, Notes, Operative notes, Labs, Cardiology, and Radiology studies reviewed as able. Subjective:  Ofelia Cardenas is a 52 y.o. male  whom we were consulted for acute kidney injury/chronic kidney disease/metabolic acidosis. Patient has long-term history of insulin-dependent diabetes. He presented initially with weakness and was found to have diabetic ketoacidosis. He was initially treated with insulin and IV fluid. Ketones were resolved in his bloodstream but he still had metabolic acidosis. Meanwhile patient had developed severe diarrhea which contributed towards non-anion gap metabolic acidosis. He is currently being treated with IV fluid with sodium bicarbonate.     Today, no new events. +improving edema. No n/v/d. Allergies:  Pcn [penicillins];  Neomycin-bacitracin zn-polymyx; and Neosporin [neomycin-polymyxin-gramicidin]    Medicines:  Current Facility-Administered Medications   Medication Dose Route Frequency Provider Last Rate Last Dose    warfarin (COUMADIN) tablet 5 mg  5 mg Oral Once Kirill Armas DO        bumetanide (BUMEX) tablet 1 mg  1 mg Oral BID Juma Zee MD   1 mg at 04/01/19 0917    ciprofloxacin (CIPRO) IVPB 400 mg  400 mg Intravenous Q12H Jeanna Arreola MD   Stopped at 04/01/19 1027    calcium carbonate (TUMS) chewable tablet 500 mg  500 mg Oral TID PRN Marce Lauren MD   500 mg at 03/27/19 1910    insulin glargine (LANTUS) injection vial 30 Units  30 Units Subcutaneous Nightly Cornelio Concepcion MD   30 Units at 03/31/19 2229    insulin lispro (HUMALOG) injection vial 0-12 Units  0-12 Units Subcutaneous TID WC Nghia Jesus MD   6 Units at 04/01/19 1237    insulin lispro (HUMALOG) injection vial 0-6 Units  0-6 Units Subcutaneous Nightly Nghia Jesus MD   5 Units at 03/31/19 2229    potassium chloride (KLOR-CON M) extended release tablet 40 mEq  40 mEq Oral PRN Nghai Jesus MD        Or    potassium bicarb-citric acid (EFFER-K) effervescent tablet 40 mEq  40 mEq Oral PRN Nghia Jesus MD        Or    potassium chloride 10 mEq/100 mL IVPB (Peripheral Line)  10 mEq Intravenous PRN Nghia Jesus MD        ipratropium-albuterol (DUONEB) nebulizer solution 1 ampule  1 ampule Inhalation Q4H PRN Wesley Bauer MD        loperamide (IMODIUM) capsule 2 mg  2 mg Oral 4x Daily PRN Nghia Jesus MD   2 mg at 03/27/19 1910    benzonatate (TESSALON) capsule 100 mg  100 mg Oral TID PRN Nghia Jesus MD   100 mg at 03/30/19 1258    lidocaine PF 1 % injection 5 mL  5 mL Intradermal Once Nghia Jesus MD        sodium chloride flush 0.9 % injection 10 mL  10 mL Intravenous 2 times per day Nghia Jesus MD   10 mL at 04/01/19 0930    sodium chloride flush 0.9 % injection 10 mL  10 mL Intravenous PRN Nghia Jesus MD        metoprolol tartrate (LOPRESSOR) tablet 25 mg  25 mg Oral BID Nghia Jesus MD   25 mg at 04/01/19 0917    famotidine (PEPCID) injection 20 mg  20 mg Intravenous BID Nghia Jesus MD   20 mg at 04/01/19 8426    magnesium sulfate 1 g in dextrose 5% 100 mL IVPB  1 g Intravenous PRN Nghia Jesus MD   Stopped at 03/25/19 1920    sodium phosphate 10 mmol in dextrose 5 % 250 mL IVPB  10 mmol Intravenous PRN Nghia Jesus MD   Stopped at 03/25/19 5714    Or    sodium phosphate 15 mmol in dextrose 5 % 250 mL IVPB  15 mmol Intravenous PRN Nghia Jesus MD        Or    sodium phosphate 20 mmol in dextrose 5 % 500 mL IVPB  20 mmol Intravenous PRN Nghia Jesus MD        acetaminophen (TYLENOL) tablet 650 mg  650 mg Oral Q6H PRN Nghia Jesus MD   650 mg at 03/24/19 9161    melatonin tablet 3 mg  3 mg Oral Nightly PRN Trace Alston MD   3 mg at 03/23/19 2306    cetirizine (ZYRTEC) tablet 10 mg  10 mg Oral Nightly Trace Alston MD   10 mg at 03/31/19 2230    glucose (GLUTOSE) 40 % oral gel 15 g  15 g Oral PRN Trace Alston MD        dextrose 50 % solution 12.5 g  12.5 g Intravenous PRN Trace Alston MD        glucagon (rDNA) injection 1 mg  1 mg Intramuscular PRN Trace Alston MD        dextrose 5 % solution  100 mL/hr Intravenous PRN Trace Alston MD        HYDROcodone-acetaminophen [facility])  MG per tablet 1 tablet  1 tablet Oral Q6H PRN Trace Alston MD   1 tablet at 03/26/19 2310    propafenone (RYTHMOL) tablet 150 mg  150 mg Oral Q8H Trace Alston MD   150 mg at 04/01/19 0674    warfarin (COUMADIN) daily dosing (placeholder)   Other Chandana Dc MD           Past Medical History:  Past Medical History:   Diagnosis Date    Allergic rhinitis, cause unspecified     Charcot's joint of right foot     Chicken pox     HTN (hypertension)     Hyperlipidemia     Impotence of organic origin     MRSA infection     Other dyspnea and respiratory abnormality     Other specified erythematous condition(695.89)     Paroxysmal atrial fibrillation ([address]     Personal history of other infectious and parasitic disease     Type II or unspecified type diabetes mellitus without mention of complication, uncontrolled 1994       Past Surgical History:  Past Surgical History:   Procedure Laterality Date    CARDIOVERSION  03/2017    MALIGNANT SKIN LESION EXCISION      X 2    TONSILLECTOMY         Family History  Family History   Problem Relation Age of Onset    Stroke Maternal Grandmother     Cancer Maternal Grandmother     Stroke Paternal Grandmother     Diabetes Paternal Grandmother     Cancer Father     Heart Disease Father     Asthma Paternal Grandfather     Heart Disease Paternal Grandfather     Heart Disease Maternal Grandfather     Colon Cancer Neg Hx     Colon Polyps Neg Hx     Esophageal Cancer Neg Hx     Liver Cancer Neg Hx     Liver Disease Neg Hx     Rectal Cancer Neg Hx     Stomach Cancer Neg Hx        Social History  Social History     Socioeconomic History    Marital status:      Spouse name: Not on file    Number of children: Not on file    Years of education: Not on file    Highest education level: Not on file   Occupational History    Not on file   Social Needs    Financial resource strain: Not on file    Food insecurity:     Worry: Not on file     Inability: Not on file    Transportation needs:     Medical: Not on file     Non-medical: Not on file   Tobacco Use    Smoking status: Never Smoker    Smokeless tobacco: Never Used   Substance and Sexual Activity    Alcohol use: No    Drug use: No    Sexual activity: Not on file   Lifestyle    Physical activity:     Days per week: Not on file     Minutes per session: Not on file    Stress: Not on file   Relationships    Social connections:     Talks on phone: Not on file     Gets together: Not on file     Attends Sikh service: Not on file     Active member of club or organization: Not on file     Attends meetings of clubs or organizations: Not on file     Relationship status: Not on file    Intimate partner violence:     Fear of current or ex partner: Not on file     Emotionally abused: Not on file     Physically abused: Not on file     Forced sexual activity: Not on file   Other Topics Concern    Not on file   Social History Narrative    Not on file         Review of Systems:  History obtained from chart review and the patient  General ROS: No fever or chills  Respiratory ROS: No cough, shortness of breath, wheezing  Cardiovascular ROS: No chest pain or palpitations  Gastrointestinal ROS: No abdominal pain or melena  Genito-Urinary ROS: No dysuria or hematuria  Musculoskeletal ROS: No joint pain or swelling         Objective:  Patient Vitals for the past 24 hrs:   BP Temp Temp src Pulse Resp SpO2 Weight   04/01/19 1046 -- -- -- 74 17 -- --   04/01/19 0700 132/84 98 °F (36.7 °C) Temporal 77 16 97 % --   04/01/19 0454 -- -- -- -- -- -- 254 lb 6 oz (115.4 kg)   03/31/19 1924 (!) 149/88 97.4 °F (36.3 °C) Temporal 72 16 96 % --       Intake/Output Summary (Last 24 hours) at 4/1/2019 1720  Last data filed at 4/1/2019 0848  Gross per 24 hour   Intake 710 ml   Output 800 ml   Net -90 ml     General: awake/alert   Chest:  clear to auscultation bilaterally without respiratory distress  CVS: regular rate and rhythm  Abdominal: soft, nontender, normal bowel sounds  Extremities: no cyanosis, ble edema  Skin: warm and dry without rash    Labs:  BMP:   Recent Labs     03/30/19  0345 03/31/19  1610 04/01/19  0550   * 134* 137   K 3.7 4.2 3.7   CL 93* 91* 93*   CO2 28 33* 32*   BUN 12 10 11   CREATININE 1.1 1.4* 1.2   CALCIUM 8.7 8.8 8.6     CBC:   Recent Labs     03/30/19  0345   WBC 7.3   HGB 11.2*   HCT 34.3*   MCV 95.3*        LIVER PROFILE:   Recent Labs     03/30/19  0345   AST 21   ALT 45*   BILITOT 0.4   ALKPHOS 444*     PT/INR:   Recent Labs     03/30/19  0345 03/31/19  0338 04/01/19  0550   PROTIME 22.5* 23.6* 24.2*   INR 2.06* 2.19* 2.26*     APTT: No results for input(s): APTT in the last 72 hours. BNP:  No results for input(s): BNP in the last 72 hours. Ionized Calcium:No results for input(s): IONCA in the last 72 hours. Magnesium:No results for input(s): MG in the last 72 hours. Phosphorus:No results for input(s): PHOS in the last 72 hours. HgbA1C: No results for input(s): LABA1C in the last 72 hours. Hepatic:   Recent Labs     03/30/19  0345   ALKPHOS 444*   ALT 45*   AST 21   PROT 6.3*   BILITOT 0.4   LABALBU 3.1*     Lactic Acid: No results for input(s): LACTA in the last 72 hours. Troponin: No results for input(s): CKTOTAL, CKMB, TROPONINT in the last 72 hours.   ABGs:   Lab Results   Component Value Date    PHART 7.290 03/25/2019    PO2ART 79.0 03/25/2019    BAZ4MLV 20.0 03/25/2019     CRP:  No results for input(s): CRP in the last 72 hours. Sed Rate:  No results for input(s): SEDRATE in the last 72 hours. Culture Results:   Blood Culture Recent:   Recent Labs     03/22/19  0914   BC No growth after 5 days of incubation. Urine Culture Recent : No results for input(s): LABURIN in the last 720 hours. Radiology reports as per the Radiologist  Radiology: Us Renal Complete    Result Date: 3/25/2019  EXAMINATION:  US RENAL COMPLETE  3/25/2019 2:57 PM HISTORY: Renal failure, acute kidney injury COMPARISON: Right upper quadrant ultrasound dated 12/21/2015 TECHNIQUE: Bilateral renal ultrasound was performed. FINDINGS: The right kidney measures 11.6 cm in cnkk-gr-iwve length. Left kidney measures 12.4 cm in ncvb-yt-kmuz length. There is a pedunculated 17 mm hypoechoic lesion arising from the right kidney interpolar region medially. Ultrasound characteristics are not compatible simple cyst. Complex cyst considered or a simple cyst poorly characterized in this larger patient. A solid mass not excluded. Further imaging characterization with CT renal mass protocol suggested when clinically feasible. Normal echogenicity renal cortex is observed without additional masses. There is no pelvic caliectasis to indicate hydronephrosis or obstruction. There are no perinephric abnormalities. Urinary bladder is mildly distended without focal bladder wall abnormality. 1. Indeterminate 17 mm hypoechoic right renal mass. Follow-up suggested. 2. Otherwise negative bilateral renal ultrasound. Signed by Dr Bacilio Lemos on 3/25/2019 3:02 PM    Ct Abdomen Pelvis W Iv Contrast Additional Contrast? Oral    Result Date: 3/28/2019  History: 47-year-old with right renal nodule. Reference: Renal ultrasound March 25, 2019. Technique Contrast-enhanced CT abdomen/pelvis was performed with coronal and sagittal reformatted images provided.  For this CT exam, one or more of the following dose reduction techniques was employed: -automated exposure control -mA and/or kVp adjustment for patient size -iterative reconstruction DLP 7803 mGy-cm Findings Chest Incidental scanning through the lower chest demonstrates a very small dependent right pleural effusion. Heart is mildly enlarged. Catheter tip in the right atrium. Abdomen Liver is mildly heterogeneous but no focal masses. No cirrhotic morphology. Hepatic vasculature is patent. There is slight gallbladder wall thickening likely related to the small amount of ascites in the upper abdomen. Normal caliber abdominal aorta. There is a tiny 5 mm cyst of the posterior cortex left kidney. There is a 15 mm cyst of the lateral cortex upper right renal pole (well circumscribed and water attenuating). This correlates with ultrasound finding. No solid renal masses. No hydronephrosis. Normal caliber abdominal aorta. Few tiny calcifications throughout the pancreas are sequela of prior episodes of pancreatitis. No pancreatic mass or main ductal dilatation. No biliary dilatation. No bowel obstruction or wall thickening. Moderate to large amount of stool throughout the colon. Normal appendix. No suspicious lymphadenopathy. Pelvis Urinary bladder is distended at time of scan. There is a small amount of gas in the bladder lumen presumably from recent catheterization. There is trace pelvic ascites. Diffuse body wall edema indicates anasarca. 1. Small right renal cortical cyst. Tiny left renal cortical cyst. No solid renal mass. 2. Small volume ascites, anasarca, and small right pleural effusion. Signed by Dr Rima Turcios on 3/28/2019 1:40 PM    Xr Chest Portable    Result Date: 3/25/2019  XR CHEST PORTABLE 3/25/2019 10:46 AM History: Atrial fibrillation. PICC line placement. Portable chest x-ray compared with 3/21/2019. The right PICC line is in good position with the tip in the upper atrium or at the cavoatrial junction. The lungs are fully expanded and clear. Heart size is normal.    1. Well-positioned right PICC line Signed by Dr Joselito Fabian on 3/25/2019 10:46 AM    Xr Chest Portable    Result Date: 3/21/2019  XR CHEST PORTABLE 3/21/2019 11:47 AM History: Generalized weakness. Portable chest x-ray compared with 5/6/2015. The heart size is normal. The mediastinum is within normal limits. The lungs are normally expanded with no pneumonia or pneumothorax. No congestive failure changes. 1. No acute disease. Signed by Dr Joselito Fabian on 3/21/2019 11:47 AM       Assessment   Acute kidney injury/volume depletion/resolved. CKD 2  Diabetic ketoacidosis - resolved. Non-anion gap metabolic acidosis related to diarrhea. Poorly controlled type II diabetes  Right renal nodule/renal cyst as appeared on CT scan. Increasing edema.     Plan:  Continue diuretics  Labs improved  bp controlled  abx per SURI Dooley MD  04/01/19  5:20 PM

## 2019-04-10 LAB
CULTURE, STOOL: ABNORMAL
CULTURE, STOOL: ABNORMAL
E COLI SHIGA TOXIN ASSAY: ABNORMAL
ORGANISM: ABNORMAL

## 2019-04-20 PROBLEM — R79.89 ELEVATED TROPONIN: Status: RESOLVED | Noted: 2019-03-21 | Resolved: 2019-04-20

## 2019-04-20 PROBLEM — R77.8 ELEVATED TROPONIN: Status: RESOLVED | Noted: 2019-03-21 | Resolved: 2019-04-20

## 2019-04-25 ENCOUNTER — OFFICE VISIT (OUTPATIENT)
Dept: CARDIOLOGY | Age: 54
End: 2019-04-25
Payer: MEDICARE

## 2019-04-25 VITALS
SYSTOLIC BLOOD PRESSURE: 138 MMHG | DIASTOLIC BLOOD PRESSURE: 82 MMHG | HEIGHT: 70 IN | WEIGHT: 257 LBS | BODY MASS INDEX: 36.79 KG/M2

## 2019-04-25 DIAGNOSIS — I10 ESSENTIAL HYPERTENSION: ICD-10-CM

## 2019-04-25 DIAGNOSIS — I48.20 CHRONIC ATRIAL FIBRILLATION (HCC): Primary | ICD-10-CM

## 2019-04-25 DIAGNOSIS — Z91.199 NON-COMPLIANCE: ICD-10-CM

## 2019-04-25 DIAGNOSIS — R33.9 URINARY RETENTION: ICD-10-CM

## 2019-04-25 LAB
INTERNATIONAL NORMALIZATION RATIO, POC: 1.7
PROTHROMBIN TIME, POC: ABNORMAL

## 2019-04-25 PROCEDURE — 85610 PROTHROMBIN TIME: CPT | Performed by: CLINICAL NURSE SPECIALIST

## 2019-04-25 PROCEDURE — 1036F TOBACCO NON-USER: CPT | Performed by: CLINICAL NURSE SPECIALIST

## 2019-04-25 PROCEDURE — 1111F DSCHRG MED/CURRENT MED MERGE: CPT | Performed by: CLINICAL NURSE SPECIALIST

## 2019-04-25 PROCEDURE — 3017F COLORECTAL CA SCREEN DOC REV: CPT | Performed by: CLINICAL NURSE SPECIALIST

## 2019-04-25 PROCEDURE — 99214 OFFICE O/P EST MOD 30 MIN: CPT | Performed by: CLINICAL NURSE SPECIALIST

## 2019-04-25 PROCEDURE — G8417 CALC BMI ABV UP PARAM F/U: HCPCS | Performed by: CLINICAL NURSE SPECIALIST

## 2019-04-25 PROCEDURE — G8427 DOCREV CUR MEDS BY ELIG CLIN: HCPCS | Performed by: CLINICAL NURSE SPECIALIST

## 2019-04-25 NOTE — PROGRESS NOTES
[address] [facility]  [facility] [address]  [address]  Phone: [phone]  Fax: [phone]    OFFICE VISIT:  2019    Hugo Xiong - : 1965    Reason For Visit:  Jose Mcneil is a 52 y.o. male who is here for 6 Month Follow-Up (patient has soa  was in the hospital for diabetes keto acidosis and salmonella); Atrial Fibrillation; and Hypertension  History of paroxysmal atrial fibrillation. Was seen in office in November with recurrent arrhythmia. Antiarrhythmic switched to Rythmol as an outpatient. Patient was instructed to follow-up the following week for EKG. Patient has not been seen since that time. He is anticoagulated on Coumadin but does not check his INRs    Returns today for routine follow-up. He was recently admitted last month for diabetic ketoacidosis with, along with acute kidney injury  Tested positive for Salmonella- treated with Cipro  He has multiple complaints. States his heart is always out of rhtyhm  New medicaiton did not help  States did not come back to office due to bad weather and noone called him to reschedule. He has cough- non productive but cannot lay down without coughing. Has been going on for a few days  No fever or sinus drainage. He states he has swelling  Not been taking his fluid pill because when he takes his he cannot urinate. His stomach will bloat and he is miserable. He states he only urinate 3 times a day. Had to have a catheter in the hospital because of this. Subjective    Jose Mcneil denies exertional chest pain  He has HSU. Sometimes resting  shortness of breath  No orthopnea but coughs when lying flat. No  paroxysmal nocturnal dyspnea, syncope, presyncope  He has unchanged arrhythmia  He has generalized edema and fatigue. The patient denies numbness or weakness to suggest cerebrovascular accident or transient ischemic attack. Daniela Robin DO has been PCP  He states that he will not see him anymore.   He needs new PCP  He went to [facility] yesterday and he may go there.   .  Hall Burkitt has the following history as recorded in [facility]:    Patient Active Problem List    Diagnosis Date Noted    Diabetic ketoacidosis without coma associated with type 2 diabetes mellitus ([facility] 03/21/2019    Hyponatremia 03/21/2019    Hypotension due to hypovolemia 03/21/2019    Acute kidney injury ([address] 03/21/2019    Type 2 diabetes mellitus with diabetic polyneuropathy, with long-term current use of insulin ([address] 03/21/2019    Coagulopathy ([address] 03/21/2019    Acalculous cholecystitis 12/29/2015    Essential hypertension 12/29/2015    Paroxysmal atrial fibrillation (HCC)     Shortness of breath 12/18/2014    Fatigue 12/18/2014    Atrial fibrillation ([facility] 12/18/2014    HTN (hypertension)     Hyperlipidemia      Past Medical History:   Diagnosis Date    Allergic rhinitis, cause unspecified     Charcot's joint of right foot     Chicken pox     HTN (hypertension)     Hyperlipidemia     Impotence of organic origin     MRSA infection     Other dyspnea and respiratory abnormality     Other specified erythematous condition(695.89)     Paroxysmal atrial fibrillation (HCC)     Personal history of other infectious and parasitic disease     Type II or unspecified type diabetes mellitus without mention of complication, uncontrolled 1994     Past Surgical History:   Procedure Laterality Date    CARDIOVERSION  03/2017    MALIGNANT SKIN LESION EXCISION      X 2    TONSILLECTOMY       Family History   Problem Relation Age of Onset    Stroke Maternal Grandmother     Cancer Maternal Grandmother     Stroke Paternal Grandmother     Diabetes Paternal Grandmother     Cancer Father     Heart Disease Father     Asthma Paternal Grandfather     Heart Disease Paternal Grandfather     Heart Disease Maternal Grandfather     Colon Cancer Neg Hx     Colon Polyps Neg Hx     Esophageal Cancer Neg Hx     Liver Cancer Neg Hx     Liver Disease Neg Hx     Rectal Cancer Neg Hx     Stomach Cancer Neg Hx      Social History     Tobacco Use    Smoking status: Never Smoker    Smokeless tobacco: Never Used   Substance Use Topics    Alcohol use: No      Current Outpatient Medications   Medication Sig Dispense Refill    insulin lispro (HUMALOG) 100 UNIT/ML injection vial Inject 0-12 Units into the skin 3 times daily (with meals) **Medium Dose Corrective Algorithm**  Glucose: Dose:  If <139             No Insulin  140-199 2 Units  200-249 4 Units  250-299 6 Units  300-349 8 Units  350-400 10 Units  Above 400       12 Units 1 vial 0    metoprolol tartrate (LOPRESSOR) 25 MG tablet Take 1 tablet by mouth 2 times daily 60 tablet 0    warfarin (COUMADIN) 10 MG tablet TAKE ONE TABLET DAILY AS DIRECTED (Patient taking differently: TAKE EVERY OTHER DAY) 30 tablet 5    warfarin (COUMADIN) 5 MG tablet TAKE 1/2 TABLET DAILY AS DIRECTED BY YOUR DOCTOR 30 tablet 5    warfarin (COUMADIN) 5 MG tablet Take 5 mg by mouth every other day       B-D ULTRAFINE III SHORT PEN 31G X 8 MM MISC       glimepiride (AMARYL) 4 MG tablet Take 4 mg by mouth every morning (before breakfast)      ONE TOUCH ULTRA TEST strip 1 each daily.  bumetanide (BUMEX) 1 MG tablet Take 1 tablet by mouth 2 times daily 30 tablet 0    insulin glargine (LANTUS) 100 UNIT/ML injection vial Inject 30 Units into the skin nightly 1 vial 0    HYDROcodone-acetaminophen (NORCO)  MG per tablet Take 1 tablet by mouth every 6 hours as needed for Pain.  albuterol sulfate  (90 Base) MCG/ACT inhaler Inhale 2 puffs into the lungs every 6 hours as needed for Wheezing       No current facility-administered medications for this visit. Allergies: Pcn [penicillins]; Neomycin-bacitracin zn-polymyx; and Neosporin [neomycin-polymyxin-gramicidin]    Review of Systems  Constitutional - no significant activity change, appetite change, or unexpected weight change. No fever, chills or diaphoresis.   + clubbing . No Osler's nodes. Gait normal .  No kyphosis or scoliosis. Skin -  no statis ulcers or dermatitis. Neurological - No focal signs are identified. Oriented to person, place and time. Psychiatric -  Appropriate affect and mood. Assessment:     Diagnosis Orders   1. Chronic atrial fibrillation ([address]     2. Urinary retention  [facility], [place]   3. Essential hypertension     4. Non-compliance       Data:  BP Readings from Last 3 Encounters:   04/25/19 138/82   04/01/19 132/84   11/09/18 122/60    Pulse Readings from Last 3 Encounters:   04/01/19 74   11/09/18 56   11/07/18 88        Wt Readings from Last 3 Encounters:   04/25/19 257 lb (116.6 kg)   04/01/19 254 lb 6 oz (115.4 kg)   11/09/18 252 lb (114.3 kg)     Negative dobutamine stress echo in November  2-D echo showed what appeared to be normal systolic function but difficult to assess due to abnormal rhythm. LV size is normal. No significant valve disease    Appears to have had some recurrent atrial fibrillation with recent hospitalization- he never came back to office after switching him to [address] [place] [address]. He has no interest in [employer]. Will stoop his rythmol and maintain rate control. Unlikely to obtain NSR with length of afib. History of non compliance  Has been noncompliant with his INRs today 1.7 in office    Will have him take extra today and recheck in 2 weeks. Stressed importance of maintaining therapeutic INRs with his afib. May switch him to NOAC in near future if agreeable    He is not taking diuretic regularly due to difficulty urinating  Will refer to nephology. Reportedly had same trouble in hospital.      Encouraged him to get established with PCP for his DM. Having some heart burn and headaches- may take OTC and further discuss with PCP     Plan    Can take Tylenol for headaches  Can take OTC zantac or prilosec for heart burn  Take 10mg today only of your coumadin then recheck in 2 weeks.    Referred to urology for urinary complaints  Stop the Rhythmol   Continue other med  Take your bumex daily  Follow up in 4 weeks   Call with any questions or concerns  Follow up with Daniela Robin, DO for non cardiac problems  Report any new problems  Cardiovascular Fitness-Exercise as tolerated. Strive for 30 minutes of exercise most days of the week. Cardiac / Healthy Diet  Continue current medications as directed  Continue plan of treatment  It is always recommended that you bring your medications bottles with you to each visit - this is for your safety! NOMI Reynaga dragon/transcription disclaimer: Much of this encounter note is electronic transcription/translation of spoken language to printed tach. Electronic translation of spoken language may be erroneous, or at times, nonsensical words or phrases may be inadvertently transcribed.  Although, I have reviewed the note for such errors, some may still exist.

## 2019-04-25 NOTE — PATIENT INSTRUCTIONS
Can take Tylenol for headaches  Can take OTC zantac or prilosec for heart burn  Take 10mg today only of your coumadin then recheck in 2 weeks. Referred to urology for urinary complaints  Stop the Rhythmol   Continue other med  Take your bumex daily  Follow up in 4 weeks   Call with any questions or concerns  Follow up with Marilyn Flores DO for non cardiac problems  Report any new problems  Cardiovascular Fitness-Exercise as tolerated. Strive for 30 minutes of exercise most days of the week. Cardiac / Healthy Diet  Continue current medications as directed  Continue plan of treatment  It is always recommended that you bring your medications bottles with you to each visit - this is for your safety!

## 2019-04-29 ENCOUNTER — OFFICE VISIT (OUTPATIENT)
Dept: UROLOGY | Age: 54
End: 2019-04-29
Payer: MEDICARE

## 2019-04-29 VITALS
BODY MASS INDEX: 34.36 KG/M2 | RESPIRATION RATE: 18 BRPM | TEMPERATURE: 98.4 F | SYSTOLIC BLOOD PRESSURE: 166 MMHG | HEIGHT: 70 IN | WEIGHT: 240 LBS | DIASTOLIC BLOOD PRESSURE: 103 MMHG

## 2019-04-29 DIAGNOSIS — N13.8 BPH WITH URINARY OBSTRUCTION: Primary | ICD-10-CM

## 2019-04-29 DIAGNOSIS — N40.1 BPH WITH URINARY OBSTRUCTION: Primary | ICD-10-CM

## 2019-04-29 DIAGNOSIS — Z12.5 SCREENING FOR PROSTATE CANCER: ICD-10-CM

## 2019-04-29 DIAGNOSIS — R33.9 RETENTION OF URINE: ICD-10-CM

## 2019-04-29 LAB
BILIRUBIN, POC: 0
BLOOD URINE, POC: NORMAL
CLARITY, POC: CLEAR
COLOR, POC: YELLOW
GLUCOSE URINE, POC: POSITIVE
KETONES, POC: POSITIVE
LEUKOCYTE EST, POC: NORMAL
NITRITE, POC: NORMAL
PH, POC: 5.5
PROTEIN, POC: POSITIVE
SPECIFIC GRAVITY, POC: 1.01
UROBILINOGEN, POC: 0.2

## 2019-04-29 PROCEDURE — 81003 URINALYSIS AUTO W/O SCOPE: CPT | Performed by: NURSE PRACTITIONER

## 2019-04-29 PROCEDURE — G8417 CALC BMI ABV UP PARAM F/U: HCPCS | Performed by: NURSE PRACTITIONER

## 2019-04-29 PROCEDURE — 51798 US URINE CAPACITY MEASURE: CPT | Performed by: NURSE PRACTITIONER

## 2019-04-29 PROCEDURE — G8427 DOCREV CUR MEDS BY ELIG CLIN: HCPCS | Performed by: NURSE PRACTITIONER

## 2019-04-29 PROCEDURE — 1111F DSCHRG MED/CURRENT MED MERGE: CPT | Performed by: NURSE PRACTITIONER

## 2019-04-29 PROCEDURE — 99213 OFFICE O/P EST LOW 20 MIN: CPT | Performed by: NURSE PRACTITIONER

## 2019-04-29 PROCEDURE — 1036F TOBACCO NON-USER: CPT | Performed by: NURSE PRACTITIONER

## 2019-04-29 PROCEDURE — 3017F COLORECTAL CA SCREEN DOC REV: CPT | Performed by: NURSE PRACTITIONER

## 2019-04-29 RX ORDER — LOSARTAN POTASSIUM 100 MG/1
100 TABLET ORAL DAILY
COMMUNITY
End: 2020-01-28

## 2019-04-29 RX ORDER — TAMSULOSIN HYDROCHLORIDE 0.4 MG/1
0.4 CAPSULE ORAL DAILY
Qty: 30 CAPSULE | Refills: 3 | Status: SHIPPED | OUTPATIENT
Start: 2019-04-29 | End: 2019-07-24

## 2019-04-29 RX ORDER — FAMOTIDINE 20 MG/1
20 TABLET, FILM COATED ORAL 2 TIMES DAILY
COMMUNITY
End: 2020-07-30

## 2019-04-29 NOTE — PROGRESS NOTES
Lee Terry is a 52 y.o. male who presents today   Chief Complaint   Patient presents with    New Patient     ref for urinary retention           HPI:       Lee Terry presents for evaluation of urinary retention. He explains that he was hospitalized around 3 weeks ago for E. Coli and DKA. He states that last week he was only urinating around one ounce per day, at this time he admits to severe constipation. After having a large a BM he has urinated close to normally. He explains that he has been experiencing a weak stream and some straining, he experiences occasional hesitancy and intermittency. He does get up 2-3 times per night to urinate. He is unsure of how long these symptoms have been present. He has never had a rectal exam and has no PSA on file in [facility] [employer].     Past Medical History:   Diagnosis Date    Allergic rhinitis, cause unspecified     Charcot's joint of right foot     Chicken pox     HTN (hypertension)     Hyperlipidemia     Impotence of organic origin     MRSA infection     Other dyspnea and respiratory abnormality     Other specified erythematous condition(695.89)     Paroxysmal atrial fibrillation (HCC)     Personal history of other infectious and parasitic disease     Type II or unspecified type diabetes mellitus without mention of complication, uncontrolled 1994      Past Surgical History:   Procedure Laterality Date    CARDIOVERSION  03/2017    MALIGNANT SKIN LESION EXCISION      X 2    TONSILLECTOMY         Family History   Problem Relation Age of Onset    Stroke Maternal Grandmother     Cancer Maternal Grandmother     Stroke Paternal Grandmother     Diabetes Paternal Grandmother     Cancer Father     Heart Disease Father     Asthma Paternal Grandfather     Heart Disease Paternal Grandfather     Heart Disease Maternal Grandfather     Colon Cancer Neg Hx     Colon Polyps Neg Hx     Esophageal Cancer Neg Hx     Liver Cancer Neg Hx     Liver Disease Neg Hx     Rectal Cancer Neg Hx     Stomach Cancer Neg Hx        Social History     Tobacco Use    Smoking status: Never Smoker    Smokeless tobacco: Never Used   Substance Use Topics    Alcohol use: No      Current Outpatient Medications   Medication Sig Dispense Refill    famotidine (PEPCID) 20 MG tablet Take 20 mg by mouth 2 times daily      metoprolol tartrate (LOPRESSOR) 25 MG tablet Take 12.5 mg by mouth      losartan (COZAAR) 100 MG tablet Take 100 mg by mouth daily      tamsulosin (FLOMAX) 0.4 MG capsule Take 1 capsule by mouth daily 30 capsule 3    insulin lispro (HUMALOG) 100 UNIT/ML injection vial Inject 0-12 Units into the skin 3 times daily (with meals) **Medium Dose Corrective Algorithm**  Glucose: Dose:  If <139             No Insulin  140-199 2 Units  200-249 4 Units  250-299 6 Units  300-349 8 Units  350-400 10 Units  Above 400       12 Units 1 vial 0    bumetanide (BUMEX) 1 MG tablet Take 1 tablet by mouth 2 times daily 30 tablet 0    metoprolol tartrate (LOPRESSOR) 25 MG tablet Take 1 tablet by mouth 2 times daily 60 tablet 0    insulin glargine (LANTUS) 100 UNIT/ML injection vial Inject 30 Units into the skin nightly 1 vial 0    warfarin (COUMADIN) 10 MG tablet TAKE ONE TABLET DAILY AS DIRECTED (Patient taking differently: TAKE EVERY OTHER DAY) 30 tablet 5    warfarin (COUMADIN) 5 MG tablet TAKE 1/2 TABLET DAILY AS DIRECTED BY YOUR DOCTOR 30 tablet 5    warfarin (COUMADIN) 5 MG tablet Take 5 mg by mouth every other day       HYDROcodone-acetaminophen (NORCO)  MG per tablet Take 1 tablet by mouth every 6 hours as needed for Pain.  B-D ULTRAFINE III SHORT PEN 31G X 8 MM MISC       glimepiride (AMARYL) 4 MG tablet Take 4 mg by mouth every morning (before breakfast)      ONE TOUCH ULTRA TEST strip 1 each daily.       albuterol sulfate  (90 Base) MCG/ACT inhaler Inhale 2 puffs into the lungs every 6 hours as needed for Wheezing       No current facility-administered medications for this visit. Allergies   Allergen Reactions    Pcn [Penicillins]     Neomycin-Bacitracin Zn-Polymyx Rash    Neosporin [Neomycin-Polymyxin-Gramicidin] Rash         Subjective:      Review of Systems   Constitutional: Negative for chills and fever. Genitourinary: Positive for difficulty urinating and frequency. Negative for discharge, dysuria, flank pain, hematuria, penile pain, penile swelling and scrotal swelling. All other systems reviewed and are negative. Objective:     Physical Exam   Constitutional: He is oriented to person, place, and time. He appears well-developed and well-nourished. No distress. HENT:   Head: Normocephalic and atraumatic. Eyes: Pupils are equal, round, and reactive to light. Conjunctivae are normal.   Neck: Normal range of motion. Neck supple. Cardiovascular: Normal rate. Pulmonary/Chest: Effort normal. No respiratory distress. Abdominal: Soft. Genitourinary: Prostate is not tender. Genitourinary Comments: Prostate soft, around 30 g, no nodules, nontender. Musculoskeletal: Normal range of motion. Neurological: He is alert and oriented to person, place, and time. Skin: Skin is warm and dry. Psychiatric: He has a normal mood and affect. His behavior is normal. Judgment and thought content normal.   Vitals reviewed. BP (!) 166/103   Temp 98.4 °F (36.9 °C) (Temporal)   Resp 18   Ht 5' 10\" (1.778 m)   Wt 240 lb (108.9 kg)   BMI 34.44 kg/m²       DATA:    Results for orders placed or performed in visit on 04/29/19   POCT Urinalysis No Micro (Auto)   Result Value Ref Range    Color, UA yellow     Clarity, UA clear     Glucose, UA POC positive     Bilirubin, UA 0     Ketones, UA positive     Spec Grav, UA 1.010     Blood, UA POC neg     pH, UA 5.5     Protein, UA POC positive     Urobilinogen, UA 0.2     Leukocytes, UA neg     Nitrite, UA neg        Assessment/ Plan       Diagnosis Orders   1.  Retention of urine  POCT Urinalysis No Micro (Auto)    MD Measure, post-void residual, US, non-imaging   2. Screening for prostate cancer  PSA Screening   Postvoid residual in office is around 300 mL. Discussed with patient the options for Coyle catheter placement oriented and out catheterization. Patient does not wish to try either at this time he wishes to be placed on Flomax and follow-up in around 1 week. Risks of increased postvoid residual discussed with patient. Patient will be placed on Flomax today, CRISTOFER is benign, he will follow-up in around 1 week for recheck of postvoid residual.  PSA prior to appointment. Discussed use, benefit, and side effects of prescribed medications. All patient questions answered. Pt voiced understanding. Patient agreedwith treatment plan.        Electronically signed by NOMI Machado on 4/29/2019 at 11:11 AM

## 2019-05-09 ENCOUNTER — ANTI-COAG VISIT (OUTPATIENT)
Dept: CARDIOLOGY | Age: 54
End: 2019-05-09
Payer: MEDICARE

## 2019-05-09 ENCOUNTER — TELEPHONE (OUTPATIENT)
Dept: INTERNAL MEDICINE | Age: 54
End: 2019-05-09

## 2019-05-09 DIAGNOSIS — Z12.5 SCREENING FOR PROSTATE CANCER: ICD-10-CM

## 2019-05-09 DIAGNOSIS — I48.20 CHRONIC ATRIAL FIBRILLATION (HCC): Primary | ICD-10-CM

## 2019-05-09 DIAGNOSIS — E11.10 DIABETIC KETOACIDOSIS WITHOUT COMA ASSOCIATED WITH TYPE 2 DIABETES MELLITUS (HCC): Primary | ICD-10-CM

## 2019-05-09 LAB
INTERNATIONAL NORMALIZATION RATIO, POC: 1.8
PROSTATE SPECIFIC ANTIGEN: 0.47 NG/ML (ref 0–4)
PROTHROMBIN TIME, POC: NORMAL

## 2019-05-09 PROCEDURE — 85610 PROTHROMBIN TIME: CPT | Performed by: NURSE PRACTITIONER

## 2019-05-09 PROCEDURE — 99999 PR OFFICE/OUTPT VISIT,PROCEDURE ONLY: CPT | Performed by: NURSE PRACTITIONER

## 2019-05-09 RX ORDER — INSULIN GLARGINE 100 [IU]/ML
30 INJECTION, SOLUTION SUBCUTANEOUS NIGHTLY
Qty: 1 VIAL | Refills: 0 | Status: SHIPPED | OUTPATIENT
Start: 2019-05-09 | End: 2019-07-29 | Stop reason: SDUPTHER

## 2019-05-09 NOTE — TELEPHONE ENCOUNTER
Patient called he is going to  the insulin because he is out but he said the cost was $170 and he wanted to know if there was a way we can find something cheaper. He is on medicare.

## 2019-05-09 NOTE — TELEPHONE ENCOUNTER
Spoke with pt, he is set to establish care with Dr. Adwoa Rainey on the 23rd. He is out of Humalog and Lantus, can this be sent to the pharmacy in the mean time?

## 2019-05-13 ENCOUNTER — OFFICE VISIT (OUTPATIENT)
Dept: UROLOGY | Age: 54
End: 2019-05-13
Payer: MEDICARE

## 2019-05-13 ENCOUNTER — TELEPHONE (OUTPATIENT)
Dept: UROLOGY | Age: 54
End: 2019-05-13

## 2019-05-13 VITALS
BODY MASS INDEX: 33.18 KG/M2 | DIASTOLIC BLOOD PRESSURE: 85 MMHG | HEIGHT: 72 IN | TEMPERATURE: 96.2 F | WEIGHT: 245 LBS | SYSTOLIC BLOOD PRESSURE: 137 MMHG

## 2019-05-13 DIAGNOSIS — R33.9 INCOMPLETE BLADDER EMPTYING: Primary | ICD-10-CM

## 2019-05-13 PROCEDURE — 99214 OFFICE O/P EST MOD 30 MIN: CPT | Performed by: PHYSICIAN ASSISTANT

## 2019-05-13 PROCEDURE — 51798 US URINE CAPACITY MEASURE: CPT | Performed by: PHYSICIAN ASSISTANT

## 2019-05-13 PROCEDURE — G8417 CALC BMI ABV UP PARAM F/U: HCPCS | Performed by: PHYSICIAN ASSISTANT

## 2019-05-13 PROCEDURE — G8427 DOCREV CUR MEDS BY ELIG CLIN: HCPCS | Performed by: PHYSICIAN ASSISTANT

## 2019-05-13 PROCEDURE — 1036F TOBACCO NON-USER: CPT | Performed by: PHYSICIAN ASSISTANT

## 2019-05-13 PROCEDURE — 3017F COLORECTAL CA SCREEN DOC REV: CPT | Performed by: PHYSICIAN ASSISTANT

## 2019-05-13 RX ORDER — TAMSULOSIN HYDROCHLORIDE 0.4 MG/1
CAPSULE ORAL
Qty: 60 CAPSULE | Refills: 1 | Status: SHIPPED | OUTPATIENT
Start: 2019-05-13 | End: 2019-05-23

## 2019-05-13 ASSESSMENT — ENCOUNTER SYMPTOMS
BACK PAIN: 0
SHORTNESS OF BREATH: 0
SINUS PAIN: 0
ABDOMINAL PAIN: 0
WHEEZING: 0
VOMITING: 0
EYE PAIN: 0

## 2019-05-13 NOTE — PROGRESS NOTES
Jessica Mayorga is a 52 y.o. male who presents today   Chief Complaint   Patient presents with    Follow-up     11 day follow up   pvr recheck      Retention/incomplete bladder emptying:  Patient here for 2 week follow-up for history of urinary retention. Hospitalized for E. coli and DKA. He also had severe constipation which had improved. He was placed on tamsulosin his previous postvoid residual when he saw Eliza Burkett 04/29/19 was 303 ml. His urine is clear. He stets he feels some pressure in the bladder area and voided prior to his appointment today. Past Medical History:   Diagnosis Date    Allergic rhinitis, cause unspecified     Charcot's joint of right foot     Chicken pox     HTN (hypertension)     Hyperlipidemia     Impotence of organic origin     MRSA infection     Other dyspnea and respiratory abnormality     Other specified erythematous condition(285.89)     Paroxysmal atrial fibrillation (HCC)     Personal history of other infectious and parasitic disease     Type II or unspecified type diabetes mellitus without mention of complication, uncontrolled 1994       Past Surgical History:   Procedure Laterality Date    CARDIOVERSION  03/2017    MALIGNANT SKIN LESION EXCISION      X 2    TONSILLECTOMY         Current Outpatient Medications   Medication Sig Dispense Refill    tamsulosin (FLOMAX) 0.4 MG capsule Take two 0.4 mg capsules daily.  60 capsule 1    insulin glargine (LANTUS) 100 UNIT/ML injection vial Inject 30 Units into the skin nightly 1 vial 0    insulin lispro (HUMALOG) 100 UNIT/ML injection vial Inject 0-12 Units into the skin 3 times daily (with meals) **Medium Dose Corrective Algorithm**  Glucose: Dose:  If <139             No Insulin  140-199 2 Units  200-249 4 Units  250-299 6 Units  300-349 8 Units  350-400 10 Units  Above 400       12 Units 1 vial 0    famotidine (PEPCID) 20 MG tablet Take 20 mg by mouth 2 times daily      losartan (COZAAR) 100 MG tablet Take 100 mg by mouth daily      tamsulosin (FLOMAX) 0.4 MG capsule Take 1 capsule by mouth daily 30 capsule 3    bumetanide (BUMEX) 1 MG tablet Take 1 tablet by mouth 2 times daily 30 tablet 0    metoprolol tartrate (LOPRESSOR) 25 MG tablet Take 1 tablet by mouth 2 times daily 60 tablet 0    warfarin (COUMADIN) 10 MG tablet TAKE ONE TABLET DAILY AS DIRECTED (Patient taking differently: TAKE EVERY OTHER DAY) 30 tablet 5    HYDROcodone-acetaminophen (NORCO)  MG per tablet Take 1 tablet by mouth every 6 hours as needed for Pain.  albuterol sulfate  (90 Base) MCG/ACT inhaler Inhale 2 puffs into the lungs every 6 hours as needed for Wheezing      B-D ULTRAFINE III SHORT PEN 31G X 8 MM MISC       glimepiride (AMARYL) 4 MG tablet Take 4 mg by mouth every morning (before breakfast)      ONE TOUCH ULTRA TEST strip 1 each daily.  metoprolol tartrate (LOPRESSOR) 25 MG tablet Take 12.5 mg by mouth      warfarin (COUMADIN) 5 MG tablet TAKE 1/2 TABLET DAILY AS DIRECTED BY YOUR DOCTOR 30 tablet 5    warfarin (COUMADIN) 5 MG tablet Take 5 mg by mouth every other day        No current facility-administered medications for this visit.         Allergies   Allergen Reactions    Pcn [Penicillins]     Neomycin-Bacitracin Zn-Polymyx Rash    Neosporin [Neomycin-Polymyxin-Gramicidin] Rash          Social History     Socioeconomic History    Marital status:      Spouse name: None    Number of children: None    Years of education: None    Highest education level: None   Occupational History    None   Social Needs    Financial resource strain: None    Food insecurity:     Worry: None     Inability: None    Transportation needs:     Medical: None     Non-medical: None   Tobacco Use    Smoking status: Never Smoker    Smokeless tobacco: Never Used   Substance and Sexual Activity    Alcohol use: No    Drug use: No    Sexual activity: None   Lifestyle    Physical activity:     Days per week: None

## 2019-05-13 NOTE — TELEPHONE ENCOUNTER
Reminded PT of past due balance. He paid $25 on balance. He was given a financial help form and a card with information to call for payments along with balance due. PT understands he owes the balance.

## 2019-05-15 DIAGNOSIS — I10 ESSENTIAL HYPERTENSION: ICD-10-CM

## 2019-05-21 ENCOUNTER — TELEPHONE (OUTPATIENT)
Dept: CARDIOLOGY | Age: 54
End: 2019-05-21

## 2019-05-22 ENCOUNTER — OFFICE VISIT (OUTPATIENT)
Dept: CARDIOLOGY | Age: 54
End: 2019-05-22
Payer: MEDICARE

## 2019-05-22 VITALS
WEIGHT: 254 LBS | DIASTOLIC BLOOD PRESSURE: 74 MMHG | BODY MASS INDEX: 34.4 KG/M2 | HEART RATE: 68 BPM | SYSTOLIC BLOOD PRESSURE: 132 MMHG | HEIGHT: 72 IN

## 2019-05-22 DIAGNOSIS — R33.9 URINARY RETENTION: ICD-10-CM

## 2019-05-22 DIAGNOSIS — I48.0 PAROXYSMAL ATRIAL FIBRILLATION (HCC): ICD-10-CM

## 2019-05-22 DIAGNOSIS — I10 ESSENTIAL HYPERTENSION: Primary | ICD-10-CM

## 2019-05-22 DIAGNOSIS — R60.0 LOCALIZED EDEMA: ICD-10-CM

## 2019-05-22 DIAGNOSIS — R06.02 SHORTNESS OF BREATH: ICD-10-CM

## 2019-05-22 LAB
INTERNATIONAL NORMALIZATION RATIO, POC: 1.6
PROTHROMBIN TIME, POC: ABNORMAL

## 2019-05-22 PROCEDURE — 85610 PROTHROMBIN TIME: CPT | Performed by: CLINICAL NURSE SPECIALIST

## 2019-05-22 PROCEDURE — 99214 OFFICE O/P EST MOD 30 MIN: CPT | Performed by: CLINICAL NURSE SPECIALIST

## 2019-05-22 PROCEDURE — G8427 DOCREV CUR MEDS BY ELIG CLIN: HCPCS | Performed by: CLINICAL NURSE SPECIALIST

## 2019-05-22 PROCEDURE — 3017F COLORECTAL CA SCREEN DOC REV: CPT | Performed by: CLINICAL NURSE SPECIALIST

## 2019-05-22 PROCEDURE — 1036F TOBACCO NON-USER: CPT | Performed by: CLINICAL NURSE SPECIALIST

## 2019-05-22 PROCEDURE — G8417 CALC BMI ABV UP PARAM F/U: HCPCS | Performed by: CLINICAL NURSE SPECIALIST

## 2019-05-22 RX ORDER — BUMETANIDE 1 MG/1
1 TABLET ORAL DAILY
Qty: 60 TABLET | Refills: 5 | Status: SHIPPED | OUTPATIENT
Start: 2019-05-22 | End: 2019-12-06 | Stop reason: SDUPTHER

## 2019-05-22 NOTE — PROGRESS NOTES
injury ([facility] 03/21/2019    Type 2 diabetes mellitus with diabetic polyneuropathy, with long-term current use of insulin ([facility] 03/21/2019    Coagulopathy ([facility] 03/21/2019    Acalculous cholecystitis 12/29/2015    Essential hypertension 12/29/2015    Paroxysmal atrial fibrillation (HCC)     Shortness of breath 12/18/2014    Fatigue 12/18/2014    Atrial fibrillation ([facility] 12/18/2014    HTN (hypertension)     Hyperlipidemia      Past Medical History:   Diagnosis Date    Allergic rhinitis, cause unspecified     Charcot's joint of right foot     Chicken pox     HTN (hypertension)     Hyperlipidemia     Impotence of organic origin     MRSA infection     Other dyspnea and respiratory abnormality     Other specified erythematous condition(695.89)     Paroxysmal atrial fibrillation (HCC)     Personal history of other infectious and parasitic disease     Type II or unspecified type diabetes mellitus without mention of complication, uncontrolled 1994     Past Surgical History:   Procedure Laterality Date    CARDIOVERSION  03/2017    MALIGNANT SKIN LESION EXCISION      X 2    TONSILLECTOMY       Family History   Problem Relation Age of Onset    Stroke Maternal Grandmother     Cancer Maternal Grandmother     Stroke Paternal Grandmother     Diabetes Paternal Grandmother     Cancer Father     Heart Disease Father     Asthma Paternal Grandfather     Heart Disease Paternal Grandfather     Heart Disease Maternal Grandfather     Colon Cancer Neg Hx     Colon Polyps Neg Hx     Esophageal Cancer Neg Hx     Liver Cancer Neg Hx     Liver Disease Neg Hx     Rectal Cancer Neg Hx     Stomach Cancer Neg Hx      Social History     Tobacco Use    Smoking status: Never Smoker    Smokeless tobacco: Never Used   Substance Use Topics    Alcohol use: No      Current Outpatient Medications   Medication Sig Dispense Refill    bumetanide (BUMEX) 1 MG tablet Take 1 tablet by mouth daily Can take extra as + pedal pulses and no varicosities. Abdominal -  Soft, nontender, nondistended. Bowel sounds are intact. Extremities - No clubbing, cyanosis, or  edema. Musculoskeletal -  No clubbing . No Osler's nodes. Gait normal .  No kyphosis or scoliosis. Skin -  no statis ulcers or dermatitis. Neurological - No focal signs are identified. Oriented to person, place and time. Psychiatric -  Appropriate affect and mood. Assessment:     Diagnosis Orders   1. Essential hypertension     2. Paroxysmal atrial fibrillation (HCC)  POCT INR   3. Shortness of breath     4. Localized edema     5. Urinary retention       Data:  BP Readings from Last 3 Encounters:   05/22/19 132/74   05/13/19 137/85   04/29/19 (!) 166/103    Pulse Readings from Last 3 Encounters:   05/22/19 68   04/01/19 74   11/09/18 56        Wt Readings from Last 3 Encounters:   05/22/19 254 lb (115.2 kg)   05/13/19 245 lb (111.1 kg)   04/29/19 240 lb (108.9 kg)       Anticoagulated on Coumadin- remain subtherapeutic  Lab Results   Component Value Date    INR 1.6 05/22/2019    INR 1.8 05/09/2019    INR 1.7 04/25/2019    PROTIME 24.2 (H) 04/01/2019    PROTIME 23.6 (H) 03/31/2019    PROTIME 22.5 (H) 03/30/2019     Will increase weekly Coumadin dose and recheck in 2 weeks    10 pound weight gain- has been out  of his diuretic for a few weeks. We'll get that restarted some of his orthopnea and cough may be very fluid related    Establishing care with new PCP tomorrow. Discussed further workup with her. He does have complaints of constipation. Only has bowel movement every few days. This may be continued intra-abdominal bleeding and urinary retention. Recommend him taking regular fiber or stool softener to help with regularity    Has persistent atrial fibrillation. Rate is well controlled. History of noncompliance and difficulty trying to obtain normal sinus rhythm.  We'll continue with rate control    Plan  Increase Coumadin to 7.5 mg every day

## 2019-05-22 NOTE — PATIENT INSTRUCTIONS
Increase Coumadin to 7.5 mg every day except 5 mg on Tuesday and Thursday. Recheck in 2 weeks  Restart your bumex daily - if you gain extra fluid weight can take extra  Take Fibercon or metamucil daily for constipation  Follow up in 2 weeks for INR  Follow up in 2-3 mos  Call with any questions or concerns  Follow up with Lazarus Ruddle, MD for non cardiac problems and labs   Report any new problems  Cardiovascular Fitness-Exercise as tolerated. Strive for 30 minutes of exercise most days of the week. Cardiac / Healthy Diet  Continue current medications as directed  Continue plan of treatment  It is always recommended that you bring your medications bottles with you to each visit - this is for your safety!

## 2019-05-23 ENCOUNTER — OFFICE VISIT (OUTPATIENT)
Dept: INTERNAL MEDICINE | Age: 54
End: 2019-05-23
Payer: MEDICARE

## 2019-05-23 ENCOUNTER — CARE COORDINATION (OUTPATIENT)
Dept: CARE COORDINATION | Age: 54
End: 2019-05-23

## 2019-05-23 ENCOUNTER — TELEPHONE (OUTPATIENT)
Dept: INTERNAL MEDICINE | Age: 54
End: 2019-05-23

## 2019-05-23 VITALS
BODY MASS INDEX: 33.56 KG/M2 | DIASTOLIC BLOOD PRESSURE: 90 MMHG | WEIGHT: 247.8 LBS | OXYGEN SATURATION: 98 % | SYSTOLIC BLOOD PRESSURE: 130 MMHG | HEART RATE: 76 BPM | HEIGHT: 72 IN

## 2019-05-23 DIAGNOSIS — N13.8 BPH WITH URINARY OBSTRUCTION: ICD-10-CM

## 2019-05-23 DIAGNOSIS — N40.1 BPH WITH URINARY OBSTRUCTION: ICD-10-CM

## 2019-05-23 DIAGNOSIS — R33.9 RETENTION OF URINE: ICD-10-CM

## 2019-05-23 DIAGNOSIS — E11.610 CHARCOT FOOT DUE TO DIABETES MELLITUS (HCC): ICD-10-CM

## 2019-05-23 DIAGNOSIS — I10 ESSENTIAL HYPERTENSION: Primary | ICD-10-CM

## 2019-05-23 DIAGNOSIS — E11.42 TYPE 2 DIABETES MELLITUS WITH DIABETIC POLYNEUROPATHY, WITH LONG-TERM CURRENT USE OF INSULIN (HCC): ICD-10-CM

## 2019-05-23 DIAGNOSIS — Z79.4 TYPE 2 DIABETES MELLITUS WITH DIABETIC POLYNEUROPATHY, WITH LONG-TERM CURRENT USE OF INSULIN (HCC): ICD-10-CM

## 2019-05-23 DIAGNOSIS — I48.0 PAROXYSMAL ATRIAL FIBRILLATION (HCC): ICD-10-CM

## 2019-05-23 PROCEDURE — 99214 OFFICE O/P EST MOD 30 MIN: CPT | Performed by: INTERNAL MEDICINE

## 2019-05-23 SDOH — HEALTH STABILITY: PHYSICAL HEALTH: ON AVERAGE, HOW MANY DAYS PER WEEK DO YOU ENGAGE IN MODERATE TO STRENUOUS EXERCISE (LIKE A BRISK WALK)?: 0 DAYS

## 2019-05-23 SDOH — HEALTH STABILITY: PHYSICAL HEALTH: ON AVERAGE, HOW MANY MINUTES DO YOU ENGAGE IN EXERCISE AT THIS LEVEL?: 0 MIN

## 2019-05-23 SDOH — HEALTH STABILITY: MENTAL HEALTH
STRESS IS WHEN SOMEONE FEELS TENSE, NERVOUS, ANXIOUS, OR CAN'T SLEEP AT NIGHT BECAUSE THEIR MIND IS TROUBLED. HOW STRESSED ARE YOU?: TO SOME EXTENT

## 2019-05-23 ASSESSMENT — PATIENT HEALTH QUESTIONNAIRE - PHQ9
SUM OF ALL RESPONSES TO PHQ9 QUESTIONS 1 & 2: 0
SUM OF ALL RESPONSES TO PHQ QUESTIONS 1-9: 0
SUM OF ALL RESPONSES TO PHQ QUESTIONS 1-9: 0
1. LITTLE INTEREST OR PLEASURE IN DOING THINGS: 0
2. FEELING DOWN, DEPRESSED OR HOPELESS: 0

## 2019-05-23 ASSESSMENT — ENCOUNTER SYMPTOMS
SHORTNESS OF BREATH: 1
RHINORRHEA: 0
BLOOD IN STOOL: 0
SINUS PAIN: 0
COUGH: 1
ABDOMINAL DISTENTION: 1
TROUBLE SWALLOWING: 0
SINUS PRESSURE: 0
ABDOMINAL PAIN: 0

## 2019-05-23 NOTE — CARE COORDINATION
Met with Lolita Hernández RN [facility]. Patient stated he recently received coverage through Medicare. He told Lolita Hernández he was told he could not sign-up for Medicare Part D. As of today, he does not have prescription coverage. Patient cannot afford meds. Lolita Hernández stated she told the patient this CHW will call him next week to address his need. This CHW added patient to her [place] Calendar to call on Tuesday, 05.28.2019. During call, discuss:    - Monthly income (per Lolita Hernández, patient told her he is receiving $1,100 per month. He has been taking money out of his [facility]. He had worked for [employer] for 22 years. They let him go due to his disability. )     - Apply for Extra Help. - Refer patient to RITIKA Carty.       Submitted by Aedlso/MARILEE

## 2019-05-23 NOTE — PROGRESS NOTES
2019    Sara Tsai (:  1965) is a 52 y.o. male, here for evaluation of the following medical concerns:    HPI:  See below subjective  DM X 20 years, having financial issues, not up-to-date with ophthalmology evaluation and having issues with cost of health care as he has no part D coverage  RRcently seen for Salmonelosis due to improperly cooked chicken, dveloped acidosis, patient was discharged and had improved  Patient was recently seen by vascular and urology he had been having urinary problems and was noted to have ascites and was given Bumex since 1 dose of Bumex patient has lost 7 pounds  Patient has paroxysmal atrial fibrillation and currently is on warfarin which is managed by cardiology  Patient has Charcot foot was seeing vascular no podiatry exams  Patient has fasting sugars of 200 Form the Lantus      Review of Systems   Constitutional: Positive for activity change and fatigue. Negative for chills, diaphoresis and fever. HENT: Negative for dental problem, postnasal drip, rhinorrhea, sinus pressure, sinus pain and trouble swallowing. Hearing loss: ringing. Eyes: Negative for visual disturbance. Respiratory: Positive for cough and shortness of breath. Cardiovascular: Negative for chest pain, palpitations and leg swelling. Gastrointestinal: Positive for abdominal distention (noted 1 month). Negative for abdominal pain and blood in stool. Endocrine: Negative for polydipsia, polyphagia and polyuria. Musculoskeletal: Negative for joint swelling. Allergic/Immunologic: Negative for environmental allergies. Neurological: Positive for numbness. Seizures: 4 years. Hematological: Negative. Psychiatric/Behavioral: Negative for decreased concentration. Prior to Visit Medications    Medication Sig Taking?  Authorizing Provider   Insulin Degludec 100 UNIT/ML SOLN Inject 32 Units into the skin daily Yes Gabbi Bess MD   bumetanide (BUMEX) 1 MG tablet Take 1 unspecified     Charcot's joint of right foot     Chicken pox     HTN (hypertension)     Hyperlipidemia     Impotence of organic origin     MRSA infection     Other dyspnea and respiratory abnormality     Other specified erythematous condition(695.89)     Paroxysmal atrial fibrillation (HCC)     Personal history of other infectious and parasitic disease     Type II or unspecified type diabetes mellitus without mention of complication, uncontrolled 1994       Past Surgical History:   Procedure Laterality Date    CARDIOVERSION  03/2017    MALIGNANT SKIN LESION EXCISION      X 2    TONSILLECTOMY         Social History     Socioeconomic History    Marital status:      Spouse name: Not on file    Number of children: Not on file    Years of education: Not on file    Highest education level: Not on file   Occupational History    Not on file   Social Needs    Financial resource strain: Not on file    Food insecurity:     Worry: Not on file     Inability: Not on file    Transportation needs:     Medical: Not on file     Non-medical: Not on file   Tobacco Use    Smoking status: Never Smoker    Smokeless tobacco: Never Used   Substance and Sexual Activity    Alcohol use: No    Drug use: No    Sexual activity: Not on file   Lifestyle    Physical activity:     Days per week: 0 days     Minutes per session: 0 min    Stress:  To some extent   Relationships    Social connections:     Talks on phone: Not on file     Gets together: Not on file     Attends Hinduism service: Not on file     Active member of club or organization: Not on file     Attends meetings of clubs or organizations: Not on file     Relationship status: Not on file    Intimate partner violence:     Fear of current or ex partner: Not on file     Emotionally abused: Not on file     Physically abused: Not on file     Forced sexual activity: Not on file   Other Topics Concern    Not on file   Social History Narrative    Not on file        Family History   Problem Relation Age of Onset    Stroke Maternal Grandmother     Cancer Maternal Grandmother     Stroke Paternal Grandmother     Diabetes Paternal Grandmother     Cancer Father     Heart Disease Father     Lung Cancer Father     Asthma Paternal Grandfather     Heart Disease Paternal Grandfather     Heart Disease Maternal Grandfather     Colon Cancer Neg Hx     Colon Polyps Neg Hx     Esophageal Cancer Neg Hx     Liver Cancer Neg Hx     Liver Disease Neg Hx     Rectal Cancer Neg Hx     Stomach Cancer Neg Hx        Vitals:    05/23/19 0755 05/23/19 0758 05/23/19 0828   BP: (!) 150/90 (!) 150/90 (!) 130/90   Site: Right Upper Arm  Left Upper Arm   Position: Sitting  Sitting   Cuff Size: Medium Adult     Pulse: 76     SpO2: 98%     Weight: 247 lb 12.8 oz (112.4 kg)     Height: 6' (1.829 m)       Estimated body mass index is 33.61 kg/m² as calculated from the following:    Height as of this encounter: 6' (1.829 m). Weight as of this encounter: 247 lb 12.8 oz (112.4 kg). Physical Exam   Constitutional: He appears well-developed and well-nourished. HENT:   Head: Normocephalic. Right Ear: External ear normal.   Left Ear: External ear normal.   Nose: Nose normal.   Mouth/Throat: Oropharynx is clear and moist.   Eyes: Pupils are equal, round, and reactive to light. Conjunctivae and EOM are normal.   Neck: Normal range of motion. Neck supple. No JVD present. No tracheal deviation present. No thyromegaly present. Cardiovascular: An irregularly irregular rhythm present. Exam reveals decreased pulses. Murmur: LUSB. Pulses:       Dorsalis pedis pulses are 0 on the right side, and 0 on the left side. Posterior tibial pulses are 0 on the right side, and 0 on the left side. Pulmonary/Chest: Effort normal and breath sounds normal. No respiratory distress. Abdominal: Soft. Normal appearance and bowel sounds are normal. There is no hepatosplenomegaly.  There is no tenderness. No hernia. Musculoskeletal: Normal range of motion. Right foot: There is normal range of motion and no deformity. Left foot: There is normal range of motion and no deformity. Feet:   Right Foot:   Protective Sensation: 7 sites tested. 7 sites sensed. Skin Integrity: Negative for ulcer, blister, skin breakdown, erythema, warmth, callus or dry skin. Left Foot:   Protective Sensation: 7 sites tested. 7 sites sensed. Skin Integrity: Negative for ulcer, blister, skin breakdown, erythema, warmth, callus or dry skin. Neurological: He is alert. A sensory deficit is present. No cranial nerve deficit. Skin: Skin is warm. Psychiatric: He has a normal mood and affect. His speech is normal and behavior is normal. Judgment and thought content normal. Cognition and memory are normal.       ASSESSMENT/PLAN:   Diagnosis Orders   1. Essential hypertension   Patient did not take medications today since he was rushing advice will recheck during follow-up visit     2. Paroxysmal atrial fibrillation (HCC)   Compliant with Coumadin denies any palpitations and he is managed by cardiology     3. Retention of urine   Patient was seen by urology was started on Flomax 0.8 daily and was given bumetanide and patient is diuresing and has lost 7 pounds   Patient advised to decrease Bumex to 0.5 mg's of 0.5 mg daily monitor weight and a repeat chem 8 will be performed in 1 week     4. BPH with urinary obstruction     5. Type 2 diabetes mellitus with diabetic polyneuropathy, with long-term current use of insulin (HCC)   Patient has peripheral neuropathy Charcot foot and has onychomycosis would benefit from podiatry due to cost issues patient was informed and given information about foot care advised not to wear me barefoot good  Basic Metabolic Panel    CBC    Amb External Referral To Ophthalmology    Hemoglobin A1C    Hepatic Function Panel    Lipid Panel   6.  Charcot foot due to diabetes mellitus ([address] Return in about 1 month (around 6/23/2019). An  electronic signature was used to authenticate this note. --Tea Ga MD on 5/23/2019 at 11:58 AM         Subjective:      Markus Mauro is here for follow up of elevated cholesterol, elevated blood pressure, and diabetes. Compliance with treatment has been poor. Patient denies muscle pain associated with his medications. He is not exercising and is not adherent to a low-salt diet. Blood pressure is not well controlled at home. Cardiac symptoms: irregular heart beat. Patient denies: irregular heart beat. Cardiovascular risk factors: diabetes mellitus. Use of agents associated with hypertension: none. History of target organ damage: none. Current diabetic symptoms include: paresthesia of the feet. Patient denies paresthesia of the feet. Evaluation to date has included: fasting blood sugar and hemoglobin A1C. Home sugars: BGs consistently in an acceptable range. Current treatments: more intensive attention to diet which has been No. Last dilated eye exam never. Objective:         Lab Review  No results found for: CHOL, HDL, LDLDIRECT      Assessment:      Dyslipidemia under inadequate control. Hypertension, stage 1 . Evidence of target organ damage: none. Diabetes related complications: autonomic neuropathy and peripheral neuropathy        Plan:      1. Continue dietary measures. 2. Continue regular exercise. 3. Lipid-lowering medications: None at present. Will consider if LDL > 70 on recheck. 4. [employer] Activation Code sent to patient. 5. Follow up in 1 month. 1. Small right renal cortical cyst. Tiny left renal cortical cyst. No   solid renal mass. 2. Small volume ascites, anasarca, and small right pleural effusion.      Lab Results   Component Value Date    LABA1C 17.1 (H) 03/21/2019     Lab Results   Component Value Date    LABMICR 5.20 03/25/2019    CREATININE 1.2 04/01/2019     Lab Results   Component Value Date    ALT 45 (H) 03/30/2019    AST 21 03/30/2019     No results found for: CHOL, TRIG, HDL, LDLCALC, LDLDIRECT

## 2019-05-23 NOTE — PATIENT INSTRUCTIONS
The medication list included in this document is our record of what you are currently taking, including any changes that were made at today's visit.  If you find any differences when compared to your medications at home, or have any questions that were not answered at your visit, please contact the office. Patient Education        Charcot Foot: Care Instructions  Your Care Instructions    Charcot foot (pronounced \"antonio-KO\") is a problem that can happen in people who have nerve damage from diabetes. In rare cases, it is caused by other health problems. Nerve damage in the foot or ankle leads to numbness, pain, redness, and swelling. The sore foot may feel hotter than the other foot. This increases the chance that you could break bones in your foot or have another injury and not feel it. If your foot gets injured a lot, the joints can break down, and the foot can become deformed. If you have severe Charcot foot, you may not be able to walk normally. The problem also increases the risk of foot ulcers and infections. You can protect your feet as much as possible to keep them from being injured. If you already have Charcot foot, you may wear a cast--or a series of casts--for several months to help your foot heal.  Follow-up care is a key part of your treatment and safety. Be sure to make and go to all appointments, and call your doctor if you are having problems. It's also a good idea to know your test results and keep a list of the medicines you take. How can you care for yourself at home? · Follow your doctor's directions if you are told to wear a plaster or fiberglass cast. The cast can help ease pressure and prevent further damage to your foot. · Keep your blood sugar in your target range by eating healthy foods, getting regular exercise, and taking insulin or other medicine as prescribed. Check your blood sugar as often as your doctor recommends.   Taking care of your feet  · Inspect your feet daily for blisters, cuts, cracks, or sores. If you cannot see well, use a mirror or have someone help you. · Take care of your feet:  ? Wash your feet every day. Use warm (not hot) water. Check the water temperature with your wrists, not your feet. ? Dry your feet well. Pat them dry; do not rub the skin on your feet too hard. Dry well between your toes. If the skin on your feet stays moist, bacteria or a fungus can grow, which can lead to infection. ? Keep your skin soft. Use skin cream to prevent calluses and cracks. However, do not put the cream between your toes, and stop using any cream that causes a rash. ? Clean underneath your toenails carefully. Do not use a sharp object to clean underneath your toenails. Use the blunt end of a nail file or other rounded tool. ? Trim and file your toenails straight across to prevent ingrown toenails. Use a nail clipper, not scissors. Use an emery board to smooth the edges. · Change socks daily. Socks should be thick and cushioned and fit loosely around your feet. Socks without seams are best because seams often rub the feet. Do not wear stockings, socks, or garters that come up to the thigh or knee unless your doctor tells you to. These can decrease blood flow. You can find socks from specialty catalogs for people with diabetes who have problem feet. · Look inside your shoes every day for things such as gravel or torn linings, which could cause blisters or sores. · Buy shoes that fit well. Shop for shoes in the evening when your feet are more likely to be swollen. Look for shoes that have plenty of space around the toes. This helps prevent bunions and blisters. Try on shoes with the kind of socks you will usually wear with those shoes. Break in new shoes slowly by wearing them for no more than an hour a day for several days. Avoid plastic shoes. They may rub your feet and cause blisters.  Good shoes should be made of materials that are flexible and breathable, such as leather or cloth. · Do not go barefoot. Do not wear sandals, and do not wear shoes with very thin soles. Thin soles are easy to puncture. They also do not protect your feet from hot pavement or cold weather. · Have your doctor check your feet during each visit. If you have a foot problem, see your doctor. Do not try to treat an early foot problem at home. Home remedies or treatments that you can buy without a prescription (such as corn removers) can be harmful. · Always get early treatment for foot problems. A minor irritation can lead to a major problem if not properly cared for early. · Do not smoke. Smoking affects blood flow and can make foot problems worse. If you need help quitting, talk to your doctor about stop-smoking programs and medicines. These can increase your chances of quitting for good. When should you call for help? Call your doctor now or seek immediate medical care if:    · You have symptoms of infection, such as:  ? Increased pain, swelling, warmth, or redness. ? Red streaks leading from the area. ? Pus draining from the area. ? A fever.    Watch closely for changes in your health, and be sure to contact your doctor if:    · You have a new problem with your feet, such as:  ? A new sore or ulcer. ? A break in the skin that is not healing after several days. ? Bleeding corns or calluses. ? An ingrown toenail.     · You do not get better as expected. Where can you learn more? Go to https://AB GrouppeZANK.mobi.NumberPicture. org and sign in to your [facility] account. Enter C247 in the [facility] box to learn more about \"Charcot Foot: Care Instructions. \"     If you do not have an account, please click on the \"Sign Up Now\" link. Current as of: July 25, 2018  Content Version: 12.0  © 5799-9616 Healthwise, Incorporated. Care instructions adapted under license by [facility] [employer] [facility] ([facility]).  If you have questions about a medical condition or this instruction, always ask your healthcare professional. [employer] [place] disclaims any warranty or liability for your use of this information. Patient Education        Diabetes Foot Health: Care Instructions  Your Care Instructions    When you have diabetes, your feet need extra care and attention. Diabetes can damage the nerve endings and blood vessels in your feet, making you less likely to notice when your feet are injured. Diabetes also limits your body's ability to fight infection and get blood to areas that need it. If you get a minor foot injury, it could become an ulcer or a serious infection. With good foot care, you can prevent most of these problems. Caring for your feet can be quick and easy. Most of the care can be done when you are bathing or getting ready for bed. Follow-up care is a key part of your treatment and safety. Be sure to make and go to all appointments, and call your doctor if you are having problems. It's also a good idea to know your test results and keep a list of the medicines you take. How can you care for yourself at home? · Keep your blood sugar close to normal by watching what and how much you eat, monitoring blood sugar, taking medicines if prescribed, and getting regular exercise. · Do not smoke. Smoking affects blood flow and can make foot problems worse. If you need help quitting, talk to your doctor about stop-smoking programs and medicines. These can increase your chances of quitting for good. · Eat a diet that is low in fats. High fat intake can cause fat to build up in your blood vessels and decrease blood flow. · Inspect your feet daily for blisters, cuts, cracks, or sores. If you cannot see well, use a mirror or have someone help you. · Take care of your feet:  ? Wash your feet every day. Use warm (not hot) water. Check the water temperature with your wrists or other part of your body, not your feet. ? Dry your feet well. Pat them dry. Do not rub the skin on your feet too hard.  Dry well between your toes. If the skin on your feet stays moist, bacteria or a fungus can grow, which can lead to infection. ? Keep your skin soft. Use moisturizing skin cream to keep the skin on your feet soft and prevent calluses and cracks. But do not put the cream between your toes, and stop using any cream that causes a rash. ? Clean underneath your toenails carefully. Do not use a sharp object to clean underneath your toenails. Use the blunt end of a nail file or other rounded tool. ? Trim and file your toenails straight across to prevent ingrown toenails. Use a nail clipper, not scissors. Use an emery board to smooth the edges. · Change socks daily. Socks without seams are best, because seams often rub the feet. You can find socks for people with diabetes from specialty catalogs. · Look inside your shoes every day for things like gravel or torn linings, which could cause blisters or sores. · Buy shoes that fit well:  ? Look for shoes that have plenty of space around the toes. This helps prevent bunions and blisters. ? Try on shoes while wearing the kind of socks you will usually wear with the shoes. ? Avoid plastic shoes. They may rub your feet and cause blisters. Good shoes should be made of materials that are flexible and breathable, such as leather or cloth. ? Break in new shoes slowly by wearing them for no more than an hour a day for several days. Take extra time to check your feet for red areas, blisters, or other problems after you wear new shoes. · Do not go barefoot. Do not wear sandals, and do not wear shoes with very thin soles. Thin soles are easy to puncture. They also do not protect your feet from hot pavement or cold weather. · Have your doctor check your feet during each visit. If you have a foot problem, see your doctor. Do not try to treat an early foot problem at home. Home remedies or treatments that you can buy without a prescription (such as corn removers) can be harmful.   · Always get early treatment for foot problems. A minor irritation can lead to a major problem if not properly cared for early. When should you call for help? Call your doctor now or seek immediate medical care if:    · You have a foot sore, an ulcer or break in the skin that is not healing after 4 days, bleeding corns or calluses, or an ingrown toenail.     · You have blue or black areas, which can mean bruising or blood flow problems.     · You have peeling skin or tiny blisters between your toes or cracking or oozing of the skin.     · You have a fever for more than 24 hours and a foot sore.     · You have new numbness or tingling in your feet that does not go away after you move your feet or change positions.     · You have unexplained or unusual swelling of the foot or ankle.    Watch closely for changes in your health, and be sure to contact your doctor if:    · You cannot do proper foot care. Where can you learn more? Go to https://Body Central.MitrAssist. org and sign in to your [employer] account. Enter A739 in the [employer] box to learn more about \"Diabetes Foot Health: Care Instructions. \"     If you do not have an account, please click on the \"Sign Up Now\" link. Current as of: July 25, 2018  Content Version: 12.0  © 3140-0085 Healthwise, Incorporated. Care instructions adapted under license by [facility] [employer] [facility] ([facility]). If you have questions about a medical condition or this instruction, always ask your healthcare professional. [place] [address] any warranty or liability for your use of this information.

## 2019-05-29 ENCOUNTER — CARE COORDINATION (OUTPATIENT)
Dept: CARE COORDINATION | Age: 54
End: 2019-05-29

## 2019-05-29 ENCOUNTER — HOSPITAL ENCOUNTER (OUTPATIENT)
Dept: GENERAL RADIOLOGY | Age: 54
Discharge: HOME | End: 2019-05-29
Payer: MEDICARE

## 2019-05-29 ENCOUNTER — OFFICE VISIT (OUTPATIENT)
Dept: URGENT CARE | Age: 54
End: 2019-05-29
Payer: MEDICARE

## 2019-05-29 VITALS
TEMPERATURE: 99.4 F | OXYGEN SATURATION: 97 % | HEIGHT: 70 IN | WEIGHT: 247.6 LBS | BODY MASS INDEX: 35.45 KG/M2 | DIASTOLIC BLOOD PRESSURE: 86 MMHG | HEART RATE: 91 BPM | SYSTOLIC BLOOD PRESSURE: 150 MMHG | RESPIRATION RATE: 18 BRPM

## 2019-05-29 DIAGNOSIS — E11.42 TYPE 2 DIABETES MELLITUS WITH DIABETIC POLYNEUROPATHY, WITH LONG-TERM CURRENT USE OF INSULIN (HCC): ICD-10-CM

## 2019-05-29 DIAGNOSIS — R11.2 NAUSEA AND VOMITING, INTRACTABILITY OF VOMITING NOT SPECIFIED, UNSPECIFIED VOMITING TYPE: Primary | ICD-10-CM

## 2019-05-29 DIAGNOSIS — Z79.4 TYPE 2 DIABETES MELLITUS WITH DIABETIC POLYNEUROPATHY, WITH LONG-TERM CURRENT USE OF INSULIN (HCC): ICD-10-CM

## 2019-05-29 DIAGNOSIS — R11.2 NAUSEA AND VOMITING, INTRACTABILITY OF VOMITING NOT SPECIFIED, UNSPECIFIED VOMITING TYPE: ICD-10-CM

## 2019-05-29 PROCEDURE — 99213 OFFICE O/P EST LOW 20 MIN: CPT | Performed by: NURSE PRACTITIONER

## 2019-05-29 PROCEDURE — 1036F TOBACCO NON-USER: CPT | Performed by: NURSE PRACTITIONER

## 2019-05-29 PROCEDURE — 3017F COLORECTAL CA SCREEN DOC REV: CPT | Performed by: NURSE PRACTITIONER

## 2019-05-29 PROCEDURE — 74018 RADEX ABDOMEN 1 VIEW: CPT

## 2019-05-29 PROCEDURE — 3046F HEMOGLOBIN A1C LEVEL >9.0%: CPT | Performed by: NURSE PRACTITIONER

## 2019-05-29 PROCEDURE — 2022F DILAT RTA XM EVC RTNOPTHY: CPT | Performed by: NURSE PRACTITIONER

## 2019-05-29 PROCEDURE — G8427 DOCREV CUR MEDS BY ELIG CLIN: HCPCS | Performed by: NURSE PRACTITIONER

## 2019-05-29 PROCEDURE — G8417 CALC BMI ABV UP PARAM F/U: HCPCS | Performed by: NURSE PRACTITIONER

## 2019-05-29 PROCEDURE — 82962 GLUCOSE BLOOD TEST: CPT | Performed by: NURSE PRACTITIONER

## 2019-05-29 RX ORDER — ONDANSETRON 4 MG/1
4 TABLET, ORALLY DISINTEGRATING ORAL EVERY 8 HOURS PRN
Qty: 15 TABLET | Refills: 0 | Status: SHIPPED | OUTPATIENT
Start: 2019-05-29 | End: 2019-07-24

## 2019-05-29 ASSESSMENT — ENCOUNTER SYMPTOMS
VOMITING: 1
ABDOMINAL PAIN: 1
SHORTNESS OF BREATH: 0
SORE THROAT: 0
CONSTIPATION: 0
DIARRHEA: 0
COUGH: 0
RHINORRHEA: 0
NAUSEA: 0

## 2019-05-29 NOTE — LETTER
[address], [address] [place] [address] Meera Gonzalez    Phone [phone]        [address] Worker for Emelia Khanna MD        May 29, 2019    [address] [place] [address]      Dear Negro Lopez:    It was a pleasure speaking with you this afternoon. I have enclosed a copy of the Medicare Extra Help application we completed. As I stated, you will receive a letter in, approximately, three weeks indicating whether you have qualified or not for the program.  Please call me when you receive the letter. In the meantime, please call [facility] for medication assistance. I have enclosed one of their brochures for your review. If you have any questions or concerns, please don't hesitate to call. My direct office number is [phone].      Sincerely,        Chalo Becerra

## 2019-05-29 NOTE — PROGRESS NOTES
[address] [facility] E CARE  [address] [facility] [place] Nascimento [id number]  Dept: [phone]  Loc: [phone]    Annabella Felty is a 52 y.o. male who presents today for his medical conditions/complaintsas noted below. Annabella Felty is c/o of Emesis (Started this morning at 3 am)        HPI:     Emesis    This is a new problem. The current episode started today. Episode frequency: twice. The problem has been gradually improving. The emesis has an appearance of stomach contents. There has been no fever. Associated symptoms include abdominal pain. Pertinent negatives include no chills, coughing, diarrhea, dizziness, fever, headaches, myalgias or sweats. Risk factors include suspect food intake. Treatments tried: pepto. The treatment provided no relief.        Past Medical History:   Diagnosis Date    Allergic rhinitis, cause unspecified     Charcot's joint of right foot     Chicken pox     HTN (hypertension)     Hyperlipidemia     Impotence of organic origin     MRSA infection     Other dyspnea and respiratory abnormality     Other specified erythematous condition(945.89)     Paroxysmal atrial fibrillation (HCC)     Personal history of other infectious and parasitic disease     Type II or unspecified type diabetes mellitus without mention of complication, uncontrolled 1994     Past Surgical History:   Procedure Laterality Date    CARDIOVERSION  03/2017    MALIGNANT SKIN LESION EXCISION      X 2    TONSILLECTOMY         Family History   Problem Relation Age of Onset    Stroke Maternal Grandmother     Cancer Maternal Grandmother     Stroke Paternal Grandmother     Diabetes Paternal Grandmother     Cancer Father     Heart Disease Father     Lung Cancer Father     Asthma Paternal Grandfather     Heart Disease Paternal Grandfather     Heart Disease Maternal Grandfather     Colon Cancer Neg Hx     Colon Polyps Neg Hx     Esophageal Cancer Neg Hx     Liver Cancer Neg Hx     Liver Disease Neg Hx     Rectal Cancer Neg Hx     Stomach Cancer Neg Hx        Social History     Tobacco Use    Smoking status: Never Smoker    Smokeless tobacco: Never Used   Substance Use Topics    Alcohol use: No      Current Outpatient Medications   Medication Sig Dispense Refill    ondansetron (ZOFRAN ODT) 4 MG disintegrating tablet Take 1 tablet by mouth every 8 hours as needed for Nausea or Vomiting 15 tablet 0    Insulin Degludec 100 UNIT/ML SOLN Inject 32 Units into the skin daily      bumetanide (BUMEX) 1 MG tablet Take 1 tablet by mouth daily Can take extra as directed 60 tablet 5    metoprolol tartrate (LOPRESSOR) 25 MG tablet Take 1 tablet by mouth 2 times daily 60 tablet 5    insulin glargine (LANTUS) 100 UNIT/ML injection vial Inject 30 Units into the skin nightly 1 vial 0    insulin lispro (HUMALOG) 100 UNIT/ML injection vial Inject 0-12 Units into the skin 3 times daily (with meals) **Medium Dose Corrective Algorithm**  Glucose: Dose:  If <139             No Insulin  140-199 2 Units  200-249 4 Units  250-299 6 Units  300-349 8 Units  350-400 10 Units  Above 400       12 Units 1 vial 0    famotidine (PEPCID) 20 MG tablet Take 20 mg by mouth 2 times daily      losartan (COZAAR) 100 MG tablet Take 100 mg by mouth daily      tamsulosin (FLOMAX) 0.4 MG capsule Take 1 capsule by mouth daily 30 capsule 3    warfarin (COUMADIN) 10 MG tablet TAKE ONE TABLET DAILY AS DIRECTED (Patient taking differently: TAKE EVERY OTHER DAY) 30 tablet 5    warfarin (COUMADIN) 5 MG tablet TAKE 1/2 TABLET DAILY AS DIRECTED BY YOUR DOCTOR 30 tablet 5    B-D ULTRAFINE III SHORT PEN 31G X 8 MM MISC       glimepiride (AMARYL) 4 MG tablet Take 4 mg by mouth every morning (before breakfast)      ONE TOUCH ULTRA TEST strip 1 each daily.  HYDROcodone-acetaminophen (NORCO)  MG per tablet Take 1 tablet by mouth every 6 hours as needed for Pain.       albuterol sulfate  (90 Base) MCG/ACT inhaler Inhale 2 puffs into the lungs every 6 hours as needed for Wheezing       No current facility-administered medications for this visit. Allergies   Allergen Reactions    Pcn [Penicillins]     Neomycin-Bacitracin Zn-Polymyx Rash    Neosporin [Neomycin-Polymyxin-Gramicidin] Rash       Health Maintenance   Topic Date Due    Hepatitis C screen  1965    Pneumococcal 0-64 years Vaccine (1 of 1 - PPSV23) 11/03/1969    Diabetic foot exam  11/03/1973    Diabetic retinal exam  11/03/1973    Lipid screen  11/03/1973    HIV screen  11/03/1978    Hepatitis B Vaccine (1 of 3 - Risk 3-dose series) 11/03/1982    DTaP/Tdap/Td vaccine (1 - Tdap) 11/03/1982    Shingles Vaccine (1 of 2) 11/03/2013    Colon cancer screen colonoscopy  11/03/2013    A1C test (Diabetic or Prediabetic)  06/21/2019    Diabetic microalbuminuria test  03/25/2020    Potassium monitoring  04/01/2020    Creatinine monitoring  04/01/2020    Flu vaccine  Completed       Subjective:     Review of Systems   Constitutional: Negative for chills and fever. HENT: Negative for congestion, rhinorrhea and sore throat. Respiratory: Negative for cough and shortness of breath. Gastrointestinal: Positive for abdominal pain and vomiting. Negative for constipation (has history. Last BM Monday. ), diarrhea and nausea. Musculoskeletal: Negative for myalgias. Skin: Negative for rash. Neurological: Negative for dizziness and headaches. All other systems reviewed and are negative.      :Objective      Physical Exam   Constitutional: He is oriented to person, place, and time. He appears well-developed and well-nourished. No distress. HENT:   Head: Normocephalic and atraumatic.    Right Ear: Hearing, tympanic membrane, external ear and ear canal normal.   Left Ear: Hearing, tympanic membrane, external ear and ear canal normal.   Nose: Nose normal.   Mouth/Throat: Mucous membranes are normal.   Eyes: Pupils are equal, round, and reactive to light. Cardiovascular: Normal rate, regular rhythm and normal heart sounds. No murmur heard. Pulmonary/Chest: Effort normal and breath sounds normal. No respiratory distress. He has no wheezes. Abdominal: Soft. Normal appearance. Bowel sounds are increased. There is tenderness in the right lower quadrant, suprapubic area, left upper quadrant and left lower quadrant. Neurological: He is alert and oriented to person, place, and time. Skin: Skin is warm and dry. No rash noted. He is not diaphoretic. Psychiatric: He has a normal mood and affect. His behavior is normal.   Nursing note and vitals reviewed. BP (!) 150/86   Pulse 91   Temp 99.4 °F (37.4 °C) (Temporal)   Resp 18   Ht 5' 10\" (1.778 m)   Wt 247 lb 9.6 oz (112.3 kg)   SpO2 97%   BMI 35.53 kg/m²     :Assessment       Diagnosis Orders   1. Nausea and vomiting, intractability of vomiting not specified, unspecified vomiting type  XR ABDOMEN (KUB) (SINGLE AP VIEW)    ondansetron (ZOFRAN ODT) 4 MG disintegrating tablet   2. Type 2 diabetes mellitus with diabetic polyneuropathy, with long-term current use of insulin ([facility])  POCT Glucose       :Plan   KUB:    No acute findings. 1. Take zofran as needed for vomiting  2. Take clear liquids until no more vomiting for 4-6 hours  3. Advance to BRAT (bananas, rice, applesauce and toast) as tolerated. 4. If patient is not improving or developing any new/worsening symptoms then return to clinic as needed or follow up with PCP. If severe symptoms go to ER. Orders Placed This Encounter   Procedures    XR ABDOMEN (KUB) (SINGLE AP VIEW)     Standing Status:   Future     Number of Occurrences:   1     Standing Expiration Date:   5/29/2020     Order Specific Question:   Reason for exam:     Answer:   vomiting and abdominal pain. Has hx of constipation.  POCT Glucose       No follow-ups on file.     Orders Placed This Encounter   Medications    ondansetron (ZOFRAN ODT) 4 MG disintegrating tablet     Sig: Take 1 tablet by mouth every 8 hours as needed for Nausea or Vomiting     Dispense:  15 tablet     Refill:  0       Patient given educational materials- see patient instructions. Discussed use, benefit, and side effects of prescribedmedications. All patient questions answered. Pt voiced understanding. Patient Instructions       Patient Education        Nausea and Vomiting: Care Instructions  Your Care Instructions    When you are nauseated, you may feel weak and sweaty and notice a lot of saliva in your mouth. Nausea often leads to vomiting. Most of the time you do not need to worry about nausea and vomiting, but they can be signs of other illnesses. Two common causes of nausea and vomiting are stomach flu and food poisoning. Nausea and vomiting from viral stomach flu will usually start to improve within 24 hours. Nausea and vomiting from food poisoning may last from 12 to 48 hours. The doctor has checked you carefully, but problems can develop later. If you notice any problems or new symptoms, get medical treatment right away. Follow-up care is a key part of your treatment and safety. Be sure to make and go to all appointments, and call your doctor if you are having problems. It's also a good idea to know your test results and keep a list of the medicines you take. How can you care for yourself at home? · To prevent dehydration, drink plenty of fluids, enough so that your urine is light yellow or clear like water. Choose water and other caffeine-free clear liquids until you feel better. If you have kidney, heart, or liver disease and have to limit fluids, talk with your doctor before you increase the amount of fluids you drink. · Rest in bed until you feel better. · When you are able to eat, try clear soups, mild foods, and liquids until all symptoms are gone for 12 to 48 hours. Other good choices include dry toast, crackers, cooked cereal, and gelatin dessert, such as Jell-O.   When should you call for help? Call 911 anytime you think you may need emergency care. For example, call if:    · You passed out (lost consciousness).    Call your doctor now or seek immediate medical care if:    · You have symptoms of dehydration, such as:  ? Dry eyes and a dry mouth. ? Passing only a little dark urine. ? Feeling thirstier than usual.     · You have new or worsening belly pain.     · You have a new or higher fever.     · You vomit blood or what looks like coffee grounds.    Watch closely for changes in your health, and be sure to contact your doctor if:    · You have ongoing nausea and vomiting.     · Your vomiting is getting worse.     · Your vomiting lasts longer than 2 days.     · You are not getting better as expected. Where can you learn more? Go to [email or web address]://[email or web address]. org and sign in to your Punctil account. Enter 25 [id number] in the [employer] box to learn more about \"Nausea and Vomiting: Care Instructions. \"     If you do not have an account, please click on the \"Sign Up Now\" link. Current as of: September 23, 2018  Content Version: 12.0  © 8460-8724 Healthwise, Incorporated. Care instructions adapted under license by [facility] ([facility]). If you have questions about a medical condition or this instruction, always ask your healthcare professional. [address] any warranty or liability for your use of this information. 1. Take zofran as needed for vomiting  2. Take clear liquids until no more vomiting for 4-6 hours  3. Advance to BRAT (bananas, rice, applesauce and toast) as tolerated. 4. If patient is not improving or developing any new/worsening symptoms then return to clinic as needed or follow up with PCP. If severe symptoms go to ER.              Electronically signed by NOMI Augustin on 5/29/2019 at 12:35 PM

## 2019-05-29 NOTE — PATIENT INSTRUCTIONS
Patient Education        Nausea and Vomiting: Care Instructions  Your Care Instructions    When you are nauseated, you may feel weak and sweaty and notice a lot of saliva in your mouth. Nausea often leads to vomiting. Most of the time you do not need to worry about nausea and vomiting, but they can be signs of other illnesses. Two common causes of nausea and vomiting are stomach flu and food poisoning. Nausea and vomiting from viral stomach flu will usually start to improve within 24 hours. Nausea and vomiting from food poisoning may last from 12 to 48 hours. The doctor has checked you carefully, but problems can develop later. If you notice any problems or new symptoms, get medical treatment right away. Follow-up care is a key part of your treatment and safety. Be sure to make and go to all appointments, and call your doctor if you are having problems. It's also a good idea to know your test results and keep a list of the medicines you take. How can you care for yourself at home? · To prevent dehydration, drink plenty of fluids, enough so that your urine is light yellow or clear like water. Choose water and other caffeine-free clear liquids until you feel better. If you have kidney, heart, or liver disease and have to limit fluids, talk with your doctor before you increase the amount of fluids you drink. · Rest in bed until you feel better. · When you are able to eat, try clear soups, mild foods, and liquids until all symptoms are gone for 12 to 48 hours. Other good choices include dry toast, crackers, cooked cereal, and gelatin dessert, such as Jell-O. When should you call for help? Call 911 anytime you think you may need emergency care. For example, call if:    · You passed out (lost consciousness).    Call your doctor now or seek immediate medical care if:    · You have symptoms of dehydration, such as:  ? Dry eyes and a dry mouth. ? Passing only a little dark urine. ?  Feeling thirstier than usual.   · You have new or worsening belly pain.     · You have a new or higher fever.     · You vomit blood or what looks like coffee grounds.    Watch closely for changes in your health, and be sure to contact your doctor if:    · You have ongoing nausea and vomiting.     · Your vomiting is getting worse.     · Your vomiting lasts longer than 2 days.     · You are not getting better as expected. Where can you learn more? Go to https://[employer]. org and sign in to your interspireSubmit account. Enter 25 [id number] in the [employer] box to learn more about \"Nausea and Vomiting: Care Instructions. \"     If you do not have an account, please click on the \"Sign Up Now\" link. Current as of: September 23, 2018  Content Version: 12.0  © 4059-5638 Healthwise, Incorporated. Care instructions adapted under license by [facility] ([facility]). If you have questions about a medical condition or this instruction, always ask your healthcare professional. [address] any warranty or liability for your use of this information. 1. Take zofran as needed for vomiting  2. Take clear liquids until no more vomiting for 4-6 hours  3. Advance to BRAT (bananas, rice, applesauce and toast) as tolerated. 4. If patient is not improving or developing any new/worsening symptoms then return to clinic as needed or follow up with PCP. If severe symptoms go to ER.

## 2019-05-30 LAB
CHP ED QC CHECK: ABNORMAL
GLUCOSE BLD-MCNC: 320 MG/DL

## 2019-06-06 ENCOUNTER — ANTI-COAG VISIT (OUTPATIENT)
Dept: CARDIOLOGY | Age: 54
End: 2019-06-06
Payer: MEDICARE

## 2019-06-06 DIAGNOSIS — I48.0 PAROXYSMAL ATRIAL FIBRILLATION (HCC): Primary | ICD-10-CM

## 2019-06-06 LAB
INTERNATIONAL NORMALIZATION RATIO, POC: 1.5
PROTHROMBIN TIME, POC: NORMAL

## 2019-06-06 PROCEDURE — 85610 PROTHROMBIN TIME: CPT | Performed by: NURSE PRACTITIONER

## 2019-06-12 ENCOUNTER — TELEPHONE (OUTPATIENT)
Dept: CARDIOLOGY | Age: 54
End: 2019-06-12

## 2019-06-13 ENCOUNTER — ANTI-COAG VISIT (OUTPATIENT)
Dept: CARDIOLOGY | Age: 54
End: 2019-06-13
Payer: MEDICARE

## 2019-06-13 ENCOUNTER — OFFICE VISIT (OUTPATIENT)
Dept: UROLOGY | Age: 54
End: 2019-06-13
Payer: MEDICARE

## 2019-06-13 VITALS — WEIGHT: 238 LBS | HEIGHT: 70 IN | TEMPERATURE: 97 F | BODY MASS INDEX: 34.07 KG/M2

## 2019-06-13 DIAGNOSIS — R33.9 INCOMPLETE BLADDER EMPTYING: Primary | ICD-10-CM

## 2019-06-13 DIAGNOSIS — I48.0 PAROXYSMAL ATRIAL FIBRILLATION (HCC): Primary | ICD-10-CM

## 2019-06-13 LAB
INTERNATIONAL NORMALIZATION RATIO, POC: 1.7
PROTHROMBIN TIME, POC: NORMAL

## 2019-06-13 PROCEDURE — 1036F TOBACCO NON-USER: CPT | Performed by: PHYSICIAN ASSISTANT

## 2019-06-13 PROCEDURE — 3017F COLORECTAL CA SCREEN DOC REV: CPT | Performed by: PHYSICIAN ASSISTANT

## 2019-06-13 PROCEDURE — 51798 US URINE CAPACITY MEASURE: CPT | Performed by: PHYSICIAN ASSISTANT

## 2019-06-13 PROCEDURE — 99214 OFFICE O/P EST MOD 30 MIN: CPT | Performed by: PHYSICIAN ASSISTANT

## 2019-06-13 PROCEDURE — G8417 CALC BMI ABV UP PARAM F/U: HCPCS | Performed by: PHYSICIAN ASSISTANT

## 2019-06-13 PROCEDURE — G8427 DOCREV CUR MEDS BY ELIG CLIN: HCPCS | Performed by: PHYSICIAN ASSISTANT

## 2019-06-13 PROCEDURE — 85610 PROTHROMBIN TIME: CPT | Performed by: NURSE PRACTITIONER

## 2019-06-13 ASSESSMENT — ENCOUNTER SYMPTOMS
SINUS PAIN: 0
VOMITING: 0
BACK PAIN: 0
SHORTNESS OF BREATH: 0
ABDOMINAL PAIN: 0
WHEEZING: 0
EYE PAIN: 0

## 2019-06-13 NOTE — PROGRESS NOTES
Nabil Limon is a 52 y.o. male who presents today   Chief Complaint   Patient presents with    Follow-up     1 month follow up  incomplete emptying      Incomplete bladder emptying:  Patient is a 64-year-old gentleman with history of urinary retention when he was hospitalized. Catheter was removed he is now on 2 Flomax daily started 05/13/2019. Time he had a residual of 457 mL. He states feels he is emptying better and his symptoms overall have improved.      Past Medical History:   Diagnosis Date    Allergic rhinitis, cause unspecified     Charcot's joint of right foot     Chicken pox     HTN (hypertension)     Hyperlipidemia     Impotence of organic origin     MRSA infection     Other dyspnea and respiratory abnormality     Other specified erythematous condition(695.89)     Paroxysmal atrial fibrillation (HCC)     Personal history of other infectious and parasitic disease     Type II or unspecified type diabetes mellitus without mention of complication, uncontrolled 1994       Past Surgical History:   Procedure Laterality Date    CARDIOVERSION  03/2017    MALIGNANT SKIN LESION EXCISION      X 2    TONSILLECTOMY         Current Outpatient Medications   Medication Sig Dispense Refill    ondansetron (ZOFRAN ODT) 4 MG disintegrating tablet Take 1 tablet by mouth every 8 hours as needed for Nausea or Vomiting 15 tablet 0    Insulin Degludec 100 UNIT/ML SOLN Inject 32 Units into the skin daily      bumetanide (BUMEX) 1 MG tablet Take 1 tablet by mouth daily Can take extra as directed 60 tablet 5    metoprolol tartrate (LOPRESSOR) 25 MG tablet Take 1 tablet by mouth 2 times daily 60 tablet 5    insulin glargine (LANTUS) 100 UNIT/ML injection vial Inject 30 Units into the skin nightly 1 vial 0    insulin lispro (HUMALOG) 100 UNIT/ML injection vial Inject 0-12 Units into the skin 3 times daily (with meals) **Medium Dose Corrective Algorithm**  Glucose: Dose:  If <139             No Insulin  140-199 2 Units  200-249 4 Units  250-299 6 Units  300-349 8 Units  350-400 10 Units  Above 400       12 Units 1 vial 0    famotidine (PEPCID) 20 MG tablet Take 20 mg by mouth 2 times daily      losartan (COZAAR) 100 MG tablet Take 100 mg by mouth daily      tamsulosin (FLOMAX) 0.4 MG capsule Take 1 capsule by mouth daily 30 capsule 3    warfarin (COUMADIN) 10 MG tablet TAKE ONE TABLET DAILY AS DIRECTED (Patient taking differently: TAKE EVERY OTHER DAY) 30 tablet 5    warfarin (COUMADIN) 5 MG tablet TAKE 1/2 TABLET DAILY AS DIRECTED BY YOUR DOCTOR 30 tablet 5    HYDROcodone-acetaminophen (NORCO)  MG per tablet Take 1 tablet by mouth every 6 hours as needed for Pain.  albuterol sulfate  (90 Base) MCG/ACT inhaler Inhale 2 puffs into the lungs every 6 hours as needed for Wheezing      B-D ULTRAFINE III SHORT PEN 31G X 8 MM MISC       glimepiride (AMARYL) 4 MG tablet Take 4 mg by mouth every morning (before breakfast)      ONE TOUCH ULTRA TEST strip 1 each daily. No current facility-administered medications for this visit. Allergies   Allergen Reactions    Pcn [Penicillins]     Neomycin-Bacitracin Zn-Polymyx Rash    Neosporin [Neomycin-Polymyxin-Gramicidin] Rash          Social History     Socioeconomic History    Marital status:      Spouse name: None    Number of children: None    Years of education: None    Highest education level: None   Occupational History    None   Social Needs    Financial resource strain: None    Food insecurity:     Worry: None     Inability: None    Transportation needs:     Medical: None     Non-medical: None   Tobacco Use    Smoking status: Never Smoker    Smokeless tobacco: Never Used   Substance and Sexual Activity    Alcohol use: No    Drug use: No    Sexual activity: None   Lifestyle    Physical activity:     Days per week: 0 days     Minutes per session: 0 min    Stress:  To some extent   Relationships    Social connections:     Talks on phone: None     Gets together: None     Attends Yazidi service: None     Active member of club or organization: None     Attends meetings of clubs or organizations: None     Relationship status: None    Intimate partner violence:     Fear of current or ex partner: None     Emotionally abused: None     Physically abused: None     Forced sexual activity: None   Other Topics Concern    None   Social History Narrative    None       Family History   Problem Relation Age of Onset    Stroke Maternal Grandmother     Cancer Maternal Grandmother     Stroke Paternal Grandmother     Diabetes Paternal Grandmother     Cancer Father     Heart Disease Father     Lung Cancer Father     Asthma Paternal Grandfather     Heart Disease Paternal Grandfather     Heart Disease Maternal Grandfather     Colon Cancer Neg Hx     Colon Polyps Neg Hx     Esophageal Cancer Neg Hx     Liver Cancer Neg Hx     Liver Disease Neg Hx     Rectal Cancer Neg Hx     Stomach Cancer Neg Hx        REVIEW OF SYSTEMS:  Review of Systems   HENT: Negative for nosebleeds and sinus pain. Eyes: Negative for pain. Respiratory: Negative for shortness of breath and wheezing. Cardiovascular: Negative for palpitations and leg swelling. Gastrointestinal: Negative for abdominal pain and vomiting. Endocrine: Negative for polydipsia. Genitourinary: Positive for frequency. Negative for dysuria, flank pain, hematuria and urgency. Musculoskeletal: Negative for back pain and myalgias. Skin: Negative for rash. Allergic/Immunologic: Negative for environmental allergies. Neurological: Negative for tremors. Psychiatric/Behavioral: Negative for hallucinations. The patient is not nervous/anxious. PHYSICAL EXAM:  Temp 97 °F (36.1 °C) (Temporal)   Ht 5' 10\" (1.778 m)   Wt 238 lb (108 kg)   BMI 34.15 kg/m²   Physical Exam   Constitutional: No distress. HENT:   Mouth/Throat: No oropharyngeal exudate. Eyes: Left eye exhibits no discharge. No scleral icterus. Neck: No JVD present. No tracheal deviation present. No thyromegaly present. Cardiovascular: Exam reveals no gallop and no friction rub. Pulmonary/Chest: No stridor. He has no rales. Abdominal: He exhibits no distension and no mass. There is no rebound and no guarding. Musculoskeletal: He exhibits no edema. Neurological: No cranial nerve deficit. He exhibits normal muscle tone. Coordination normal.   Skin: He is not diaphoretic. No pallor. Imaging:  Bladder scan 218 ml.     1. Incomplete bladder emptying  Since taking the 2 Flomax patient can tell a improvement in symptoms he is also emptying better his residual today was 218 mL compared to 457 last visit. Had discussed if his residual do not improve to that he would probably require in and out catheterization and/or cystoscopy. For now we will hold off on this plan and follow-up in 3 months. - SD MEASUREMENT,POST-VOID RESIDUAL VOLUME BY US,NON-IMAGING      Orders Placed This Encounter   Procedures    SD MEASUREMENT,POST-VOID RESIDUAL VOLUME BY US,NON-IMAGING     No orders of the defined types were placed in this encounter.     Plan:  Follow up in 3 months

## 2019-06-23 NOTE — PROGRESS NOTES
2019    Izabella Del Cid (:  1965) is a 52 y.o. male, here for evaluation of the following medical concerns:    HPI:  52 t o male,  HX of Type 2 Diabetes Mellitus hemoglobin A1c of greater than 17 patient could not afford his medications and has been referred to care management for assistance  Patient was seen recently in urgent care due to recurrent vomiting he was given antiemetic and discharge  Having urinary retention and was seen by urology was advised to use Flomax and discharged to follow-up  Started using Tresiba recently, fasting  this morning, using Tresiba 40 units, if still elevated will increase by 3 units , continue coverage      ROS:  Negative to all systems    Prior to Visit Medications    Medication Sig Taking?  Authorizing Provider   Insulin Degludec 100 UNIT/ML SOLN Inject 40 Units into the skin daily  Yes Gabbi Bess MD   bumetanide (BUMEX) 1 MG tablet Take 1 tablet by mouth daily Can take extra as directed Yes NOMI Alegria   metoprolol tartrate (LOPRESSOR) 25 MG tablet Take 1 tablet by mouth 2 times daily Yes NOMI Suggs   insulin lispro (HUMALOG) 100 UNIT/ML injection vial Inject 0-12 Units into the skin 3 times daily (with meals) **Medium Dose Corrective Algorithm**  Glucose: Dose:  If <139             No Insulin  140-199 2 Units  200-249 4 Units  250-299 6 Units  300-349 8 Units  350-400 10 Units  Above 400       12 Units Yes Gabbi Bess MD   famotidine (PEPCID) 20 MG tablet Take 20 mg by mouth 2 times daily Yes Historical Provider, MD   losartan (COZAAR) 100 MG tablet Take 100 mg by mouth daily Yes Historical Provider, MD   tamsulosin (FLOMAX) 0.4 MG capsule Take 1 capsule by mouth daily Yes NOMI Phan   warfarin (COUMADIN) 10 MG tablet TAKE ONE TABLET DAILY AS DIRECTED  Patient taking differently: TAKE EVERY OTHER DAY Yes NOMI Lorenzo   warfarin (COUMADIN) 5 MG tablet TAKE 1/2 TABLET DAILY AS DIRECTED BY YOUR DOCTOR Yes NOMI Gonzalez   B-D ULTRAFINE III SHORT PEN 31G X 8 MM MISC  Yes Historical Provider, MD   glimepiride (AMARYL) 4 MG tablet Take 4 mg by mouth every morning (before breakfast) Yes Historical Provider, MD   ONE TOUCH ULTRA TEST strip 1 each daily. Yes Historical Provider, MD   ondansetron (ZOFRAN ODT) 4 MG disintegrating tablet Take 1 tablet by mouth every 8 hours as needed for Nausea or Vomiting  NOMI Guillaume   insulin glargine (LANTUS) 100 UNIT/ML injection vial Inject 30 Units into the skin nightly  Gabbi Bess MD   HYDROcodone-acetaminophen (NORCO)  MG per tablet Take 1 tablet by mouth every 6 hours as needed for Pain.   Historical Provider, MD   albuterol sulfate  (90 Base) MCG/ACT inhaler Inhale 2 puffs into the lungs every 6 hours as needed for Wheezing  Historical Provider, MD        Allergies   Allergen Reactions    Pcn [Penicillins]     Neomycin-Bacitracin Zn-Polymyx Rash    Neosporin [Neomycin-Polymyxin-Gramicidin] Rash       Past Medical History:   Diagnosis Date    Allergic rhinitis, cause unspecified     Charcot's joint of right foot     Chicken pox     HTN (hypertension)     Hyperlipidemia     Impotence of organic origin     MRSA infection     Other dyspnea and respiratory abnormality     Other specified erythematous condition(695.89)     Paroxysmal atrial fibrillation ([facility] [address]     Personal history of other infectious and parasitic disease     Type II or unspecified type diabetes mellitus without mention of complication, uncontrolled 1994       Past Surgical History:   Procedure Laterality Date    CARDIOVERSION  03/2017    MALIGNANT SKIN LESION EXCISION      X 2    TONSILLECTOMY         Social History     Socioeconomic History    Marital status:      Spouse name: Not on file    Number of children: Not on file    Years of education: Not on file    Highest education level: Not on file   Occupational History    Not on file   Social Needs    Financial resource strain: Not on file    Food insecurity:     Worry: Not on file     Inability: Not on file    Transportation needs:     Medical: Not on file     Non-medical: Not on file   Tobacco Use    Smoking status: Never Smoker    Smokeless tobacco: Never Used   Substance and Sexual Activity    Alcohol use: No    Drug use: No    Sexual activity: Not on file   Lifestyle    Physical activity:     Days per week: 0 days     Minutes per session: 0 min    Stress: To some extent   Relationships    Social connections:     Talks on phone: Not on file     Gets together: Not on file     Attends Sabianism service: Not on file     Active member of club or organization: Not on file     Attends meetings of clubs or organizations: Not on file     Relationship status: Not on file    Intimate partner violence:     Fear of current or ex partner: Not on file     Emotionally abused: Not on file     Physically abused: Not on file     Forced sexual activity: Not on file   Other Topics Concern    Not on file   Social History Narrative    Not on file        Family History   Problem Relation Age of Onset    Stroke Maternal Grandmother     Cancer Maternal Grandmother     Stroke Paternal Grandmother     Diabetes Paternal Grandmother     Cancer Father     Heart Disease Father     Lung Cancer Father     Asthma Paternal Grandfather     Heart Disease Paternal Grandfather     Heart Disease Maternal Grandfather     Colon Cancer Neg Hx     Colon Polyps Neg Hx     Esophageal Cancer Neg Hx     Liver Cancer Neg Hx     Liver Disease Neg Hx     Rectal Cancer Neg Hx     Stomach Cancer Neg Hx        Vitals:    06/24/19 0929   BP: 138/88   Site: Right Upper Arm   Position: Sitting   Cuff Size: Medium Adult   Pulse: 78   SpO2: 94%   Weight: 247 lb 6.4 oz (112.2 kg)   Height: 5' 10\" (1.778 m)     Estimated body mass index is 35.5 kg/m² as calculated from the following:    Height as of this encounter: 5' 10\" (1.778 m). Weight as of this encounter: 247 lb 6.4 oz (112.2 kg). Physical Exam   Constitutional: He appears well-developed and well-nourished. HENT:   Mouth/Throat: Oropharynx is clear and moist and mucous membranes are normal.   Cardiovascular: An irregularly irregular rhythm present. Pulmonary/Chest: Effort normal and breath sounds normal.   Abdominal: Soft. Normal appearance and normal aorta. There is no tenderness. Neurological: He is alert. He has normal strength. No cranial nerve deficit. ASSESSMENT/PLAN:   Diagnosis Orders   1. Shortness of breath   Much improved, cont Bumex CBC   2. Essential hypertension   BP in good control CBC   3. Mixed hyperlipidemia   LDL pending labs CBC    Hepatic Function Panel    Lipid Panel   4. Chronic fatigue  CBC   5. Chronic atrial fibrillation (HCC)  CBC   6. Type 2 diabetes mellitus with diabetic polyneuropathy, with long-term current use of insulin (HCC)   Last A1c 17, , will continue to increase Tresiba 3 units every days, also discussed using [employer] [facility] which is cheaper Hemoglobin A1C    CBC    Basic Metabolic Panel           Return in about 3 months (around 9/24/2019). An  electronic signature was used to authenticate this note.     --Sarah Beth Maldonado MD on 6/24/2019 at 12:47 PM

## 2019-06-24 ENCOUNTER — OFFICE VISIT (OUTPATIENT)
Dept: INTERNAL MEDICINE | Age: 54
End: 2019-06-24
Payer: MEDICARE

## 2019-06-24 VITALS
OXYGEN SATURATION: 94 % | BODY MASS INDEX: 35.42 KG/M2 | HEART RATE: 78 BPM | SYSTOLIC BLOOD PRESSURE: 138 MMHG | DIASTOLIC BLOOD PRESSURE: 88 MMHG | WEIGHT: 247.4 LBS | HEIGHT: 70 IN

## 2019-06-24 DIAGNOSIS — E78.2 MIXED HYPERLIPIDEMIA: ICD-10-CM

## 2019-06-24 DIAGNOSIS — I10 ESSENTIAL HYPERTENSION: ICD-10-CM

## 2019-06-24 DIAGNOSIS — R53.82 CHRONIC FATIGUE: ICD-10-CM

## 2019-06-24 DIAGNOSIS — E11.42 TYPE 2 DIABETES MELLITUS WITH DIABETIC POLYNEUROPATHY, WITH LONG-TERM CURRENT USE OF INSULIN (HCC): ICD-10-CM

## 2019-06-24 DIAGNOSIS — R06.02 SHORTNESS OF BREATH: Primary | ICD-10-CM

## 2019-06-24 DIAGNOSIS — Z79.4 TYPE 2 DIABETES MELLITUS WITH DIABETIC POLYNEUROPATHY, WITH LONG-TERM CURRENT USE OF INSULIN (HCC): ICD-10-CM

## 2019-06-24 DIAGNOSIS — I48.20 CHRONIC ATRIAL FIBRILLATION (HCC): ICD-10-CM

## 2019-06-24 PROCEDURE — 99213 OFFICE O/P EST LOW 20 MIN: CPT | Performed by: INTERNAL MEDICINE

## 2019-06-27 ENCOUNTER — CARE COORDINATION (OUTPATIENT)
Dept: CARE COORDINATION | Age: 54
End: 2019-06-27

## 2019-06-27 NOTE — CARE COORDINATION
Telephoned patient to follow-upon Extra Help application. Phone busy.       Submitted by Adelso/MARILEE

## 2019-06-27 NOTE — LETTER
[address], [address] [place] [address]  Meera Rincon    Phone [phone]      [address] Worker for [place] MD        July 1, 2019    Ángela Mcnally [address]  [address]      Dear Fatou Model:    I have tried to contact you several times, but have been unable to reach you. I would like to know if you have received feedback from the [address] [place] [address] about the Extra Help Application we completed in May. I am worried your savings of $11,000 may have stopped you from being approved. Will you please call me to let me know? My direct line is [phone]. Thank you.         Rose Patino

## 2019-06-28 ENCOUNTER — ANTI-COAG VISIT (OUTPATIENT)
Dept: CARDIOLOGY | Age: 54
End: 2019-06-28
Payer: MEDICARE

## 2019-06-28 DIAGNOSIS — I48.0 PAROXYSMAL ATRIAL FIBRILLATION (HCC): Primary | ICD-10-CM

## 2019-06-28 LAB
INTERNATIONAL NORMALIZATION RATIO, POC: 2.4
PROTHROMBIN TIME, POC: NORMAL

## 2019-06-28 PROCEDURE — 85610 PROTHROMBIN TIME: CPT | Performed by: CLINICAL NURSE SPECIALIST

## 2019-07-01 NOTE — CARE COORDINATION
Telephoned the patient. No answer. No voice messaging. Submitted by Adelso/MARILEE    Mailing patient a letter asking him to call this CHW with outcome of Medicare Extra Help application.     Submitted by Adelso/MARILEE

## 2019-07-02 NOTE — TELEPHONE ENCOUNTER
Spoke with the patient. He asked to visit me tomorrow (Wednesday, 07.03.2019) to discuss Extra Help application. Asked him to come between 0800 and 1430.   Yuliana Lee

## 2019-07-02 NOTE — CARE COORDINATION
Telephoned the patient. Asked if he received letter from [employer] about his Extra Help application. He said he received it but could not tell me what it said. He was currently driving. Letter is at home. This CHW asked if the patient could call her back when he gets home. Patient stated he would prefer to bring the letter to this CHW's office for review. Patient asked if he could bring the letter to the office tomorrow (Wednesday, 07.03.2019). This CHW told him he could between 08:00 to 14:30. This CHW stated she is located in [address]. He verbalized understanding.      Submitted by Adelso/MARILEE

## 2019-07-03 ENCOUNTER — CARE COORDINATION (OUTPATIENT)
Dept: CARE COORDINATION | Age: 54
End: 2019-07-03

## 2019-07-10 ENCOUNTER — CARE COORDINATION (OUTPATIENT)
Dept: CARE COORDINATION | Age: 54
End: 2019-07-10

## 2019-07-10 DIAGNOSIS — R33.9 INCOMPLETE BLADDER EMPTYING: ICD-10-CM

## 2019-07-10 RX ORDER — TAMSULOSIN HYDROCHLORIDE 0.4 MG/1
CAPSULE ORAL
Qty: 60 CAPSULE | Refills: 1 | Status: SHIPPED | OUTPATIENT
Start: 2019-07-10 | End: 2019-07-24

## 2019-07-10 NOTE — CARE COORDINATION
NOMI Gamboa [facility]   9/13/2019 11:00 AM Elizabeth Lacy PA-C [facility]   9/24/2019 10:30 AM Gabbi Licea MD [facility]

## 2019-07-11 RX ORDER — LOSARTAN POTASSIUM 100 MG/1
TABLET ORAL
Qty: 30 TABLET | Refills: 5 | Status: SHIPPED | OUTPATIENT
Start: 2019-07-11 | End: 2019-07-24 | Stop reason: SDUPTHER

## 2019-07-11 RX ORDER — WARFARIN SODIUM 10 MG/1
TABLET ORAL
Qty: 30 TABLET | Refills: 5 | Status: SHIPPED | OUTPATIENT
Start: 2019-07-11 | End: 2020-03-30

## 2019-07-24 ENCOUNTER — OFFICE VISIT (OUTPATIENT)
Dept: CARDIOLOGY | Age: 54
End: 2019-07-24
Payer: MEDICARE

## 2019-07-24 VITALS
WEIGHT: 250 LBS | SYSTOLIC BLOOD PRESSURE: 122 MMHG | BODY MASS INDEX: 35.79 KG/M2 | DIASTOLIC BLOOD PRESSURE: 78 MMHG | HEIGHT: 70 IN

## 2019-07-24 DIAGNOSIS — I48.20 CHRONIC ATRIAL FIBRILLATION (HCC): Primary | ICD-10-CM

## 2019-07-24 DIAGNOSIS — I10 ESSENTIAL HYPERTENSION: ICD-10-CM

## 2019-07-24 LAB
INTERNATIONAL NORMALIZATION RATIO, POC: 2.8
PROTHROMBIN TIME, POC: ABNORMAL

## 2019-07-24 PROCEDURE — G8417 CALC BMI ABV UP PARAM F/U: HCPCS | Performed by: CLINICAL NURSE SPECIALIST

## 2019-07-24 PROCEDURE — 1036F TOBACCO NON-USER: CPT | Performed by: CLINICAL NURSE SPECIALIST

## 2019-07-24 PROCEDURE — 85610 PROTHROMBIN TIME: CPT | Performed by: CLINICAL NURSE SPECIALIST

## 2019-07-24 PROCEDURE — G8427 DOCREV CUR MEDS BY ELIG CLIN: HCPCS | Performed by: CLINICAL NURSE SPECIALIST

## 2019-07-24 PROCEDURE — 3017F COLORECTAL CA SCREEN DOC REV: CPT | Performed by: CLINICAL NURSE SPECIALIST

## 2019-07-24 PROCEDURE — 99213 OFFICE O/P EST LOW 20 MIN: CPT | Performed by: CLINICAL NURSE SPECIALIST

## 2019-07-24 RX ORDER — TAMSULOSIN HYDROCHLORIDE 0.4 MG/1
0.8 CAPSULE ORAL DAILY
COMMUNITY
End: 2019-09-19 | Stop reason: ALTCHOICE

## 2019-07-29 ENCOUNTER — TELEPHONE (OUTPATIENT)
Dept: INTERNAL MEDICINE | Age: 54
End: 2019-07-29

## 2019-07-29 DIAGNOSIS — E11.10 DIABETIC KETOACIDOSIS WITHOUT COMA ASSOCIATED WITH TYPE 2 DIABETES MELLITUS (HCC): ICD-10-CM

## 2019-07-29 RX ORDER — INSULIN GLARGINE 100 [IU]/ML
30 INJECTION, SOLUTION SUBCUTANEOUS NIGHTLY
Qty: 1 VIAL | Refills: 0 | Status: SHIPPED | OUTPATIENT
Start: 2019-07-29 | End: 2019-09-16 | Stop reason: SDUPTHER

## 2019-07-29 NOTE — TELEPHONE ENCOUNTER
Elizabeth Davalos called requesting a refill of the below medication which has been pended for you:     Requested Prescriptions     Pending Prescriptions Disp Refills    insulin glargine (LANTUS) 100 UNIT/ML injection vial 1 vial 0     Sig: Inject 30 Units into the skin nightly    insulin lispro (HUMALOG) 100 UNIT/ML injection vial 1 vial 0     Sig: Inject 0-12 Units into the skin 3 times daily (with meals) **Medium Dose Corrective Algorithm**  Glucose: Dose:  If <139             No Insulin  140-199 2 Units  200-249 4 Units  250-299 6 Units  300-349 8 Units  350-400 10 Units  Above 400       12 Units       Last Appointment Date: 6/24/2019  Next Appointment Date: 9/24/2019    Allergies   Allergen Reactions    Pcn [Penicillins]     Neomycin-Bacitracin Zn-Polymyx Rash    Neosporin [Neomycin-Polymyxin-Gramicidin] Rash

## 2019-08-14 ENCOUNTER — TELEPHONE (OUTPATIENT)
Dept: INTERNAL MEDICINE | Age: 54
End: 2019-08-14

## 2019-08-15 DIAGNOSIS — E11.42 TYPE 2 DIABETES MELLITUS WITH DIABETIC POLYNEUROPATHY, WITH LONG-TERM CURRENT USE OF INSULIN (HCC): ICD-10-CM

## 2019-08-15 DIAGNOSIS — Z79.4 TYPE 2 DIABETES MELLITUS WITH DIABETIC POLYNEUROPATHY, WITH LONG-TERM CURRENT USE OF INSULIN (HCC): ICD-10-CM

## 2019-08-15 LAB
ALBUMIN SERPL-MCNC: 4.4 G/DL (ref 3.5–5.2)
ALP BLD-CCNC: 103 U/L (ref 40–130)
ALT SERPL-CCNC: 18 U/L (ref 5–41)
ANION GAP SERPL CALCULATED.3IONS-SCNC: 13 MMOL/L (ref 7–19)
AST SERPL-CCNC: 14 U/L (ref 5–40)
BILIRUB SERPL-MCNC: 0.6 MG/DL (ref 0.2–1.2)
BILIRUBIN DIRECT: 0.2 MG/DL (ref 0–0.3)
BILIRUBIN, INDIRECT: 0.4 MG/DL (ref 0.1–1)
BUN BLDV-MCNC: 17 MG/DL (ref 6–20)
CALCIUM SERPL-MCNC: 9.6 MG/DL (ref 8.6–10)
CHLORIDE BLD-SCNC: 97 MMOL/L (ref 98–111)
CHOLESTEROL, TOTAL: 202 MG/DL (ref 160–199)
CO2: 29 MMOL/L (ref 22–29)
CREAT SERPL-MCNC: 1 MG/DL (ref 0.5–1.2)
GFR NON-AFRICAN AMERICAN: >60
GLUCOSE BLD-MCNC: 165 MG/DL (ref 74–109)
HBA1C MFR BLD: 13.8 % (ref 4–6)
HCT VFR BLD CALC: 45.1 % (ref 42–52)
HDLC SERPL-MCNC: 34 MG/DL (ref 55–121)
HEMOGLOBIN: 15.7 G/DL (ref 14–18)
LDL CHOLESTEROL CALCULATED: 143 MG/DL
MCH RBC QN AUTO: 30.3 PG (ref 27–31)
MCHC RBC AUTO-ENTMCNC: 34.8 G/DL (ref 33–37)
MCV RBC AUTO: 87.1 FL (ref 80–94)
PDW BLD-RTO: 12.2 % (ref 11.5–14.5)
PLATELET # BLD: 291 K/UL (ref 130–400)
PMV BLD AUTO: 11.3 FL (ref 9.4–12.4)
POTASSIUM SERPL-SCNC: 3.8 MMOL/L (ref 3.5–5)
RBC # BLD: 5.18 M/UL (ref 4.7–6.1)
SODIUM BLD-SCNC: 139 MMOL/L (ref 136–145)
TOTAL PROTEIN: 8 G/DL (ref 6.6–8.7)
TRIGL SERPL-MCNC: 124 MG/DL (ref 0–149)
WBC # BLD: 7 K/UL (ref 4.8–10.8)

## 2019-08-20 ENCOUNTER — CARE COORDINATION (OUTPATIENT)
Dept: CARE COORDINATION | Age: 54
End: 2019-08-20

## 2019-08-20 NOTE — CARE COORDINATION
Received a copy of the patient's Social Security Administration's Extra Help acceptance letter. Scanned it in the patient's chart. Patient left a message with the [employer] that he does not understand what the letter states. Telephoned the patient. No answer. No voice messaging. Mailing the patient a letter of explanation.     Submitted by Adelso/MARILEE

## 2019-08-22 ENCOUNTER — TELEPHONE (OUTPATIENT)
Dept: INTERNAL MEDICINE | Age: 54
End: 2019-08-22

## 2019-08-22 NOTE — TELEPHONE ENCOUNTER
----- Message from Jared Berry sent at 8/22/2019  4:02 PM CDT -----  Patient has been approved for Partial Extra Help. He can choose a Part D plan at this time. I have been trying to contact him to get him enrolled. Eli.

## 2019-08-23 ENCOUNTER — ANTI-COAG VISIT (OUTPATIENT)
Dept: CARDIOLOGY | Age: 54
End: 2019-08-23
Payer: MEDICARE

## 2019-08-23 DIAGNOSIS — I48.20 CHRONIC ATRIAL FIBRILLATION (HCC): Primary | ICD-10-CM

## 2019-08-23 LAB
INTERNATIONAL NORMALIZATION RATIO, POC: 2.4
PROTHROMBIN TIME, POC: NORMAL

## 2019-08-23 PROCEDURE — 85610 PROTHROMBIN TIME: CPT | Performed by: NURSE PRACTITIONER

## 2019-09-04 ENCOUNTER — CARE COORDINATION (OUTPATIENT)
Dept: CARE COORDINATION | Age: 54
End: 2019-09-04

## 2019-09-13 ENCOUNTER — OFFICE VISIT (OUTPATIENT)
Dept: UROLOGY | Age: 54
End: 2019-09-13
Payer: MEDICARE

## 2019-09-13 VITALS — HEIGHT: 70 IN | TEMPERATURE: 97.2 F | WEIGHT: 259 LBS | BODY MASS INDEX: 37.08 KG/M2

## 2019-09-13 DIAGNOSIS — R33.9 INCOMPLETE BLADDER EMPTYING: Primary | ICD-10-CM

## 2019-09-13 PROCEDURE — 1036F TOBACCO NON-USER: CPT | Performed by: PHYSICIAN ASSISTANT

## 2019-09-13 PROCEDURE — G8427 DOCREV CUR MEDS BY ELIG CLIN: HCPCS | Performed by: PHYSICIAN ASSISTANT

## 2019-09-13 PROCEDURE — 3017F COLORECTAL CA SCREEN DOC REV: CPT | Performed by: PHYSICIAN ASSISTANT

## 2019-09-13 PROCEDURE — G8417 CALC BMI ABV UP PARAM F/U: HCPCS | Performed by: PHYSICIAN ASSISTANT

## 2019-09-13 PROCEDURE — 99214 OFFICE O/P EST MOD 30 MIN: CPT | Performed by: PHYSICIAN ASSISTANT

## 2019-09-13 RX ORDER — TAMSULOSIN HYDROCHLORIDE 0.4 MG/1
CAPSULE ORAL
Qty: 180 CAPSULE | Refills: 3 | Status: SHIPPED | OUTPATIENT
Start: 2019-09-13 | End: 2020-03-02

## 2019-09-13 ASSESSMENT — ENCOUNTER SYMPTOMS
SINUS PAIN: 0
ABDOMINAL PAIN: 0
BACK PAIN: 0
WHEEZING: 0
VOMITING: 0
EYE PAIN: 0
SHORTNESS OF BREATH: 0

## 2019-09-16 DIAGNOSIS — E11.10 DIABETIC KETOACIDOSIS WITHOUT COMA ASSOCIATED WITH TYPE 2 DIABETES MELLITUS (HCC): ICD-10-CM

## 2019-09-16 RX ORDER — PEN NEEDLE, DIABETIC 31 GX5/16"
1 NEEDLE, DISPOSABLE MISCELLANEOUS 3 TIMES DAILY
Qty: 100 EACH | Refills: 5 | Status: ON HOLD | OUTPATIENT
Start: 2019-09-16 | End: 2021-04-19 | Stop reason: HOSPADM

## 2019-09-16 RX ORDER — INSULIN GLARGINE 100 [IU]/ML
30 INJECTION, SOLUTION SUBCUTANEOUS NIGHTLY
Qty: 1 VIAL | Refills: 0 | Status: SHIPPED | OUTPATIENT
Start: 2019-09-16 | End: 2019-10-11 | Stop reason: SDUPTHER

## 2019-09-17 ENCOUNTER — TELEPHONE (OUTPATIENT)
Dept: UROLOGY | Age: 54
End: 2019-09-17

## 2019-09-20 ENCOUNTER — ANTI-COAG VISIT (OUTPATIENT)
Dept: CARDIOLOGY | Age: 54
End: 2019-09-20
Payer: MEDICARE

## 2019-09-20 DIAGNOSIS — I48.20 CHRONIC ATRIAL FIBRILLATION (HCC): Primary | ICD-10-CM

## 2019-09-20 LAB
INTERNATIONAL NORMALIZATION RATIO, POC: 2.7
PROTHROMBIN TIME, POC: NORMAL

## 2019-09-20 PROCEDURE — 85610 PROTHROMBIN TIME: CPT | Performed by: CLINICAL NURSE SPECIALIST

## 2019-09-23 PROBLEM — E11.10 DIABETIC KETOACIDOSIS WITHOUT COMA ASSOCIATED WITH TYPE 2 DIABETES MELLITUS (HCC): Status: RESOLVED | Noted: 2019-03-21 | Resolved: 2019-09-23

## 2019-09-23 PROBLEM — N17.9 ACUTE KIDNEY INJURY (HCC): Status: RESOLVED | Noted: 2019-03-21 | Resolved: 2019-09-23

## 2019-09-24 ENCOUNTER — OFFICE VISIT (OUTPATIENT)
Dept: INTERNAL MEDICINE | Age: 54
End: 2019-09-24
Payer: MEDICARE

## 2019-09-24 VITALS
BODY MASS INDEX: 36.48 KG/M2 | DIASTOLIC BLOOD PRESSURE: 70 MMHG | WEIGHT: 254.8 LBS | OXYGEN SATURATION: 98 % | SYSTOLIC BLOOD PRESSURE: 124 MMHG | HEIGHT: 70 IN | HEART RATE: 102 BPM

## 2019-09-24 DIAGNOSIS — I10 HYPERTENSION, UNSPECIFIED TYPE: ICD-10-CM

## 2019-09-24 DIAGNOSIS — I48.0 PAROXYSMAL ATRIAL FIBRILLATION (HCC): Primary | ICD-10-CM

## 2019-09-24 DIAGNOSIS — Z79.4 TYPE 2 DIABETES MELLITUS WITH DIABETIC POLYNEUROPATHY, WITH LONG-TERM CURRENT USE OF INSULIN (HCC): ICD-10-CM

## 2019-09-24 DIAGNOSIS — Z23 NEEDS FLU SHOT: ICD-10-CM

## 2019-09-24 DIAGNOSIS — E78.2 MIXED HYPERLIPIDEMIA: ICD-10-CM

## 2019-09-24 DIAGNOSIS — E11.42 TYPE 2 DIABETES MELLITUS WITH DIABETIC POLYNEUROPATHY, WITH LONG-TERM CURRENT USE OF INSULIN (HCC): ICD-10-CM

## 2019-09-24 PROCEDURE — 90756 CCIIV4 VACC ABX FREE IM: CPT | Performed by: INTERNAL MEDICINE

## 2019-09-24 PROCEDURE — 99214 OFFICE O/P EST MOD 30 MIN: CPT | Performed by: INTERNAL MEDICINE

## 2019-09-24 PROCEDURE — G0008 ADMIN INFLUENZA VIRUS VAC: HCPCS | Performed by: INTERNAL MEDICINE

## 2019-09-24 RX ORDER — LORATADINE 10 MG/1
10 TABLET ORAL DAILY
COMMUNITY
End: 2020-02-11

## 2019-09-24 RX ORDER — FEXOFENADINE HCL 180 MG/1
180 TABLET ORAL DAILY
COMMUNITY
End: 2020-08-18

## 2019-09-24 RX ORDER — ONDANSETRON 4 MG/1
4 TABLET, FILM COATED ORAL EVERY 8 HOURS PRN
COMMUNITY
End: 2020-07-30

## 2019-09-24 RX ORDER — ATORVASTATIN CALCIUM 20 MG/1
20 TABLET, FILM COATED ORAL DAILY
Qty: 90 TABLET | Refills: 3 | Status: SHIPPED | OUTPATIENT
Start: 2019-09-24 | End: 2020-07-09

## 2019-09-24 ASSESSMENT — ENCOUNTER SYMPTOMS
WHEEZING: 0
SORE THROAT: 0
BLOOD IN STOOL: 0
BACK PAIN: 0
ABDOMINAL PAIN: 0
TROUBLE SWALLOWING: 0
COUGH: 0
CONSTIPATION: 0
STRIDOR: 0
DIARRHEA: 0
COLOR CHANGE: 0
SHORTNESS OF BREATH: 0

## 2019-09-24 NOTE — PROGRESS NOTES
After obtaining consent, and per orders of Dr. Christiano Benson, injection of Flu Quad given in Left deltoid by Fidelia Gonzalez. Patient instructed to remain in clinic for 20 minutes afterwards, and to report any adverse reaction to me immediately.

## 2019-10-11 ENCOUNTER — TELEPHONE (OUTPATIENT)
Dept: INTERNAL MEDICINE | Age: 54
End: 2019-10-11

## 2019-10-11 DIAGNOSIS — E11.10 DIABETIC KETOACIDOSIS WITHOUT COMA ASSOCIATED WITH TYPE 2 DIABETES MELLITUS (HCC): ICD-10-CM

## 2019-10-11 RX ORDER — INSULIN GLARGINE 100 [IU]/ML
70 INJECTION, SOLUTION SUBCUTANEOUS NIGHTLY
Qty: 6300 UNITS | Refills: 1 | Status: SHIPPED | OUTPATIENT
Start: 2019-10-11 | End: 2019-10-14 | Stop reason: SDUPTHER

## 2019-10-14 ENCOUNTER — TELEPHONE (OUTPATIENT)
Dept: INTERNAL MEDICINE | Age: 54
End: 2019-10-14

## 2019-10-14 DIAGNOSIS — E11.10 DIABETIC KETOACIDOSIS WITHOUT COMA ASSOCIATED WITH TYPE 2 DIABETES MELLITUS (HCC): ICD-10-CM

## 2019-10-14 RX ORDER — INSULIN GLARGINE 100 [IU]/ML
70 INJECTION, SOLUTION SUBCUTANEOUS NIGHTLY
Qty: 6300 UNITS | Refills: 1 | Status: SHIPPED | OUTPATIENT
Start: 2019-10-14 | End: 2020-03-10

## 2019-10-16 RX ORDER — GLIMEPIRIDE 4 MG/1
4 TABLET ORAL
Qty: 30 TABLET | Refills: 3 | Status: SHIPPED | OUTPATIENT
Start: 2019-10-16 | End: 2020-03-24

## 2019-10-18 ENCOUNTER — ANTI-COAG VISIT (OUTPATIENT)
Dept: CARDIOLOGY | Age: 54
End: 2019-10-18
Payer: MEDICARE

## 2019-10-18 DIAGNOSIS — I48.20 CHRONIC ATRIAL FIBRILLATION (HCC): Primary | ICD-10-CM

## 2019-10-18 LAB
INTERNATIONAL NORMALIZATION RATIO, POC: 4.3
PROTHROMBIN TIME, POC: NORMAL

## 2019-10-18 PROCEDURE — 85610 PROTHROMBIN TIME: CPT | Performed by: CLINICAL NURSE SPECIALIST

## 2019-10-23 ENCOUNTER — TELEPHONE (OUTPATIENT)
Dept: INTERNAL MEDICINE | Age: 54
End: 2019-10-23

## 2019-10-30 ENCOUNTER — CARE COORDINATION (OUTPATIENT)
Dept: CARE COORDINATION | Age: 54
End: 2019-10-30

## 2019-11-01 ENCOUNTER — ANTI-COAG VISIT (OUTPATIENT)
Dept: CARDIOLOGY | Age: 54
End: 2019-11-01
Payer: MEDICARE

## 2019-11-01 DIAGNOSIS — I48.20 CHRONIC ATRIAL FIBRILLATION (HCC): Primary | ICD-10-CM

## 2019-11-01 LAB
INTERNATIONAL NORMALIZATION RATIO, POC: 2.7
PROTHROMBIN TIME, POC: NORMAL

## 2019-11-01 PROCEDURE — 85610 PROTHROMBIN TIME: CPT | Performed by: CLINICAL NURSE SPECIALIST

## 2019-11-15 DIAGNOSIS — Z79.4 TYPE 2 DIABETES MELLITUS WITH DIABETIC POLYNEUROPATHY, WITH LONG-TERM CURRENT USE OF INSULIN (HCC): ICD-10-CM

## 2019-11-15 DIAGNOSIS — I10 HYPERTENSION, UNSPECIFIED TYPE: ICD-10-CM

## 2019-11-15 DIAGNOSIS — E78.2 MIXED HYPERLIPIDEMIA: ICD-10-CM

## 2019-11-15 DIAGNOSIS — E11.42 TYPE 2 DIABETES MELLITUS WITH DIABETIC POLYNEUROPATHY, WITH LONG-TERM CURRENT USE OF INSULIN (HCC): ICD-10-CM

## 2019-11-15 LAB
ALBUMIN SERPL-MCNC: 4.6 G/DL (ref 3.5–5.2)
ALP BLD-CCNC: 140 U/L (ref 40–130)
ALT SERPL-CCNC: 25 U/L (ref 5–41)
ANION GAP SERPL CALCULATED.3IONS-SCNC: 14 MMOL/L (ref 7–19)
AST SERPL-CCNC: 22 U/L (ref 5–40)
BILIRUB SERPL-MCNC: 0.5 MG/DL (ref 0.2–1.2)
BILIRUBIN DIRECT: 0.2 MG/DL (ref 0–0.3)
BILIRUBIN, INDIRECT: 0.3 MG/DL (ref 0.1–1)
BUN BLDV-MCNC: 14 MG/DL (ref 6–20)
CALCIUM SERPL-MCNC: 9.8 MG/DL (ref 8.6–10)
CHLORIDE BLD-SCNC: 100 MMOL/L (ref 98–111)
CHOLESTEROL, TOTAL: 132 MG/DL (ref 160–199)
CO2: 27 MMOL/L (ref 22–29)
CREAT SERPL-MCNC: 1 MG/DL (ref 0.5–1.2)
GFR NON-AFRICAN AMERICAN: >60
GLUCOSE BLD-MCNC: 200 MG/DL (ref 74–109)
HBA1C MFR BLD: 10.3 % (ref 4–6)
HCT VFR BLD CALC: 47.8 % (ref 42–52)
HDLC SERPL-MCNC: 37 MG/DL (ref 55–121)
HEMOGLOBIN: 16.1 G/DL (ref 14–18)
LDL CHOLESTEROL CALCULATED: 69 MG/DL
MCH RBC QN AUTO: 31.4 PG (ref 27–31)
MCHC RBC AUTO-ENTMCNC: 33.7 G/DL (ref 33–37)
MCV RBC AUTO: 93.4 FL (ref 80–94)
PDW BLD-RTO: 13.2 % (ref 11.5–14.5)
PLATELET # BLD: 439 K/UL (ref 130–400)
PMV BLD AUTO: 10.4 FL (ref 9.4–12.4)
POTASSIUM SERPL-SCNC: 3.8 MMOL/L (ref 3.5–5)
RBC # BLD: 5.12 M/UL (ref 4.7–6.1)
SODIUM BLD-SCNC: 141 MMOL/L (ref 136–145)
TOTAL PROTEIN: 8.4 G/DL (ref 6.6–8.7)
TRIGL SERPL-MCNC: 129 MG/DL (ref 0–149)
WBC # BLD: 7.6 K/UL (ref 4.8–10.8)

## 2019-11-18 ENCOUNTER — TELEPHONE (OUTPATIENT)
Dept: INTERNAL MEDICINE | Age: 54
End: 2019-11-18

## 2019-11-18 DIAGNOSIS — E11.42 TYPE 2 DIABETES MELLITUS WITH DIABETIC POLYNEUROPATHY, WITH LONG-TERM CURRENT USE OF INSULIN (HCC): Primary | ICD-10-CM

## 2019-11-18 DIAGNOSIS — Z79.4 TYPE 2 DIABETES MELLITUS WITH DIABETIC POLYNEUROPATHY, WITH LONG-TERM CURRENT USE OF INSULIN (HCC): Primary | ICD-10-CM

## 2019-11-21 ENCOUNTER — OFFICE VISIT (OUTPATIENT)
Dept: INTERNAL MEDICINE | Age: 54
End: 2019-11-21
Payer: MEDICARE

## 2019-11-21 VITALS
DIASTOLIC BLOOD PRESSURE: 80 MMHG | SYSTOLIC BLOOD PRESSURE: 122 MMHG | WEIGHT: 258 LBS | OXYGEN SATURATION: 98 % | BODY MASS INDEX: 37.02 KG/M2 | HEART RATE: 93 BPM

## 2019-11-21 DIAGNOSIS — E11.42 TYPE 2 DIABETES MELLITUS WITH DIABETIC POLYNEUROPATHY, WITH LONG-TERM CURRENT USE OF INSULIN (HCC): Primary | ICD-10-CM

## 2019-11-21 DIAGNOSIS — L01.00 IMPETIGO: Primary | ICD-10-CM

## 2019-11-21 DIAGNOSIS — Z79.4 TYPE 2 DIABETES MELLITUS WITH DIABETIC POLYNEUROPATHY, WITH LONG-TERM CURRENT USE OF INSULIN (HCC): Primary | ICD-10-CM

## 2019-11-21 PROCEDURE — 99213 OFFICE O/P EST LOW 20 MIN: CPT | Performed by: INTERNAL MEDICINE

## 2019-11-21 RX ORDER — DOXYCYCLINE HYCLATE 100 MG
100 TABLET ORAL 2 TIMES DAILY
Qty: 20 TABLET | Refills: 0 | Status: SHIPPED | OUTPATIENT
Start: 2019-11-21 | End: 2019-12-01

## 2019-11-21 RX ORDER — LANCETS 30 GAUGE
1 EACH MISCELLANEOUS 4 TIMES DAILY
Qty: 300 EACH | Refills: 1 | Status: ON HOLD | OUTPATIENT
Start: 2019-11-21 | End: 2021-04-19 | Stop reason: HOSPADM

## 2019-11-21 ASSESSMENT — ENCOUNTER SYMPTOMS
EYE PAIN: 0
DIARRHEA: 0
SORE THROAT: 0
SHORTNESS OF BREATH: 0
RHINORRHEA: 0
NAIL CHANGES: 0
VOMITING: 0
COUGH: 0

## 2019-12-06 RX ORDER — BUMETANIDE 1 MG/1
TABLET ORAL
Qty: 60 TABLET | Refills: 5 | Status: SHIPPED | OUTPATIENT
Start: 2019-12-06 | End: 2020-05-27

## 2019-12-20 DIAGNOSIS — I10 ESSENTIAL HYPERTENSION: ICD-10-CM

## 2019-12-20 RX ORDER — WARFARIN SODIUM 5 MG/1
TABLET ORAL
Qty: 30 TABLET | Refills: 5 | Status: SHIPPED | OUTPATIENT
Start: 2019-12-20 | End: 2020-01-29 | Stop reason: SDUPTHER

## 2020-01-17 ENCOUNTER — CARE COORDINATION (OUTPATIENT)
Dept: CARE COORDINATION | Age: 55
End: 2020-01-17

## 2020-01-28 RX ORDER — LOSARTAN POTASSIUM 100 MG/1
TABLET ORAL
Qty: 30 TABLET | Refills: 5 | Status: SHIPPED | OUTPATIENT
Start: 2020-01-28 | End: 2020-01-29 | Stop reason: SDUPTHER

## 2020-01-29 ENCOUNTER — OFFICE VISIT (OUTPATIENT)
Dept: CARDIOLOGY | Age: 55
End: 2020-01-29
Payer: MEDICARE

## 2020-01-29 VITALS
HEART RATE: 91 BPM | BODY MASS INDEX: 36.22 KG/M2 | DIASTOLIC BLOOD PRESSURE: 92 MMHG | SYSTOLIC BLOOD PRESSURE: 142 MMHG | HEIGHT: 70 IN | WEIGHT: 253 LBS

## 2020-01-29 LAB
INTERNATIONAL NORMALIZATION RATIO, POC: 1.9
PROTHROMBIN TIME, POC: NORMAL

## 2020-01-29 PROCEDURE — 2022F DILAT RTA XM EVC RTNOPTHY: CPT | Performed by: CLINICAL NURSE SPECIALIST

## 2020-01-29 PROCEDURE — 1036F TOBACCO NON-USER: CPT | Performed by: CLINICAL NURSE SPECIALIST

## 2020-01-29 PROCEDURE — 85610 PROTHROMBIN TIME: CPT | Performed by: CLINICAL NURSE SPECIALIST

## 2020-01-29 PROCEDURE — G8427 DOCREV CUR MEDS BY ELIG CLIN: HCPCS | Performed by: CLINICAL NURSE SPECIALIST

## 2020-01-29 PROCEDURE — 3046F HEMOGLOBIN A1C LEVEL >9.0%: CPT | Performed by: CLINICAL NURSE SPECIALIST

## 2020-01-29 PROCEDURE — 93000 ELECTROCARDIOGRAM COMPLETE: CPT | Performed by: CLINICAL NURSE SPECIALIST

## 2020-01-29 PROCEDURE — G8417 CALC BMI ABV UP PARAM F/U: HCPCS | Performed by: CLINICAL NURSE SPECIALIST

## 2020-01-29 PROCEDURE — 99213 OFFICE O/P EST LOW 20 MIN: CPT | Performed by: CLINICAL NURSE SPECIALIST

## 2020-01-29 PROCEDURE — 3017F COLORECTAL CA SCREEN DOC REV: CPT | Performed by: CLINICAL NURSE SPECIALIST

## 2020-01-29 PROCEDURE — G8482 FLU IMMUNIZE ORDER/ADMIN: HCPCS | Performed by: CLINICAL NURSE SPECIALIST

## 2020-01-29 RX ORDER — DIPHENHYDRAMINE HCL 25 MG
25 TABLET ORAL EVERY 6 HOURS PRN
COMMUNITY

## 2020-01-29 RX ORDER — LOSARTAN POTASSIUM 100 MG/1
TABLET ORAL
Qty: 30 TABLET | Refills: 5 | Status: SHIPPED
Start: 2020-01-29 | End: 2020-02-11

## 2020-01-29 RX ORDER — WARFARIN SODIUM 5 MG/1
TABLET ORAL
Qty: 30 TABLET | Refills: 5 | Status: ON HOLD | OUTPATIENT
Start: 2020-01-29 | End: 2021-04-19 | Stop reason: HOSPADM

## 2020-01-29 NOTE — PATIENT INSTRUCTIONS
Is to Coumadin or Xarelto 20 mg once a day or Eliquis 5 mg twice a day. Please check with your pharmacist what your out-of-pocket cost would be    Follow up in 6 mos   Call with any questions or concerns  Follow up with Lilia Duvall MD for non cardiac problems  Report any new problems  Cardiovascular Fitness-Exercise as tolerated. Strive for 30 minutes of exercise most days of the week. Cardiac / Healthy Diet  Continue current medications as directed  Continue plan of treatment  It is always recommended that you bring your medications bottles with you to each visit - this is for your safety!

## 2020-01-29 NOTE — PROGRESS NOTES
cause unspecified     Charcot's joint of right foot     Chicken pox     HTN (hypertension)     Hyperlipidemia     Impotence of organic origin     MRSA infection     Other dyspnea and respiratory abnormality     Other specified erythematous condition(695.89)     Paroxysmal atrial fibrillation (HCC)     Personal history of other infectious and parasitic disease     Type II or unspecified type diabetes mellitus without mention of complication, uncontrolled 1994     Past Surgical History:   Procedure Laterality Date    CARDIOVERSION  03/2017    MALIGNANT SKIN LESION EXCISION      X 2    TONSILLECTOMY       Family History   Problem Relation Age of Onset    Stroke Maternal Grandmother     Cancer Maternal Grandmother     Stroke Paternal Grandmother     Diabetes Paternal Grandmother     Cancer Father     Heart Disease Father     Lung Cancer Father     Asthma Paternal Grandfather     Heart Disease Paternal Grandfather     Heart Disease Maternal Grandfather     Colon Cancer Neg Hx     Colon Polyps Neg Hx     Esophageal Cancer Neg Hx     Liver Cancer Neg Hx     Liver Disease Neg Hx     Rectal Cancer Neg Hx     Stomach Cancer Neg Hx      Social History     Tobacco Use    Smoking status: Never Smoker    Smokeless tobacco: Never Used   Substance Use Topics    Alcohol use: No      Current Outpatient Medications   Medication Sig Dispense Refill    warfarin (COUMADIN) 5 MG tablet TAKE AS DIRECTED BY YOUR DOCTOR 30 tablet 5    diphenhydrAMINE (BENADRYL) 25 MG tablet Take 25 mg by mouth every 6 hours as needed for Itching      losartan (COZAAR) 100 MG tablet TAKE ONE TABLET DAILY 30 tablet 5    metoprolol tartrate (LOPRESSOR) 25 MG tablet TAKE ONE TABLET TWICE DAILY 60 tablet 5    bumetanide (BUMEX) 1 MG tablet TAKE ONE TABLET DAILY CAN TAKE EXTRA  AS DIRECTED 60 tablet 5    blood glucose test strips (CONTOUR NEXT TEST) strip 1 each by In Vitro route 4 times daily As needed.  200 each 3    Lancets MISC 1 each by Does not apply route 4 times daily 300 each 1    glimepiride (AMARYL) 4 MG tablet Take 1 tablet by mouth every morning (before breakfast) 30 tablet 3    atorvastatin (LIPITOR) 20 MG tablet Take 1 tablet by mouth daily 90 tablet 3    ondansetron (ZOFRAN) 4 MG tablet Take 4 mg by mouth every 8 hours as needed for Nausea or Vomiting      fexofenadine (ALLEGRA) 180 MG tablet Take 180 mg by mouth daily      loratadine (CLARITIN) 10 MG tablet Take 10 mg by mouth daily      Insulin Syringe-Needle U-100 31G X 15/64\" 1 ML MISC 1 Syringe by Does not apply route 4 times daily 100 each 3    insulin lispro (HUMALOG) 100 UNIT/ML injection vial Inject 0-12 Units into the skin 3 times daily (with meals) **Medium Dose Corrective Algorithm**  Glucose: Dose:  If <139             No Insulin  140-199 2 Units  200-249 4 Units  250-299 6 Units  300-349 8 Units  350-400 10 Units  Above 400       12 Units 1 vial 0    B-D ULTRAFINE III SHORT PEN 31G X 8 MM MISC Inject 1 each as directed 3 times daily 100 each 5    tamsulosin (FLOMAX) 0.4 MG capsule Take two 0.4 mg tamsulosin daily. 180 capsule 3    warfarin (COUMADIN) 10 MG tablet TAKE EVERY OTHER DAY 30 tablet 5    famotidine (PEPCID) 20 MG tablet Take 20 mg by mouth 2 times daily      insulin glargine (LANTUS) 100 UNIT/ML injection vial Inject 70 Units into the skin nightly 6300 Units 1    empagliflozin (JARDIANCE) 25 MG tablet Take 25 mg by mouth daily (Patient not taking: Reported on 1/29/2020) 30 tablet 3     No current facility-administered medications for this visit. Allergies: Pcn [penicillins]; Neomycin-bacitracin zn-polymyx; and Neosporin [neomycin-polymyxin-gramicidin]    Review of Systems  Constitutional - no significant activity change, appetite change, or unexpected weight change. No fever, chills or diaphoresis. No fatigue. HEENT - no significant rhinorrhea or epistaxis. No tinnitus or significant hearing loss.    Eyes - no sudden ulcers or dermatitis. Neurological - No focal signs are identified. Oriented to person, place and time. Psychiatric -  Appropriate affect and mood. Assessment:     Diagnosis Orders   1. Chronic atrial fibrillation  POCT INR    EKG 12 lead   2. Essential hypertension     3. Type 2 diabetes mellitus with diabetic polyneuropathy, with long-term current use of insulin ([facility] [address]       Data:  BP Readings from Last 3 Encounters:   01/29/20 (!) 142/92   11/21/19 122/80   09/24/19 124/70    Pulse Readings from Last 3 Encounters:   01/29/20 91   11/21/19 93   09/24/19 102        Wt Readings from Last 3 Encounters:   01/29/20 253 lb (114.8 kg)   11/21/19 258 lb (117 kg)   09/24/19 254 lb 12.8 oz (115.6 kg)     Lab Results   Component Value Date    INR 1.9 01/29/2020    INR 2.7 11/01/2019    INR 4.3 10/18/2019    PROTIME 24.2 (H) 04/01/2019    PROTIME 23.6 (H) 03/31/2019    PROTIME 22.5 (H) 03/30/2019     EKG today shows chronic atrial fibrillation with a rate of 89. Old anterior infarct. Unchanged EKG  INR subtherapeutic. Will adjust dosing. Blood pressure is elevated today but is been out of his losartan for couple days. We will get that refilled. Doing well on beta-blocker and statin. Is working getting his blood sugars better controlled. Has follow-up with primary care soon    We had a long discussion regarding NOAC's. We will have him check on his out-of-pocket cost for either Xarelto or Eliquis. That way, he would be more open to healthy diet choices. He says he feels like he is very limited due to his Coumadin      Stable cardiovascular status. No evidence of overt heart failure, angina or dysrhythmia. Plan  Check on out-of-pocket cost for Eliquis or Xarelto. Follow up in 6 mos   Call with any questions or concerns  Follow up with Deyanira Zamorano MD for non cardiac problems  Report any new problems  Cardiovascular Fitness-Exercise as tolerated.   Strive for 30 minutes of exercise most days of the week. Cardiac / Healthy Diet  Continue current medications as directed  Continue plan of treatment  It is always recommended that you bring your medications bottles with you to each visit - this is for your safety! NOMI Bruno dragon/transcription disclaimer: Much of this encounter note is electronic transcription/translation of spoken language to printed tach. Electronic translation of spoken language may be erroneous, or at times, nonsensical words or phrases may be inadvertently transcribed.  Although, I have reviewed the note for such errors, some may still exist.

## 2020-02-06 DIAGNOSIS — I48.20 CHRONIC ATRIAL FIBRILLATION (HCC): ICD-10-CM

## 2020-02-06 DIAGNOSIS — R53.82 CHRONIC FATIGUE: ICD-10-CM

## 2020-02-06 DIAGNOSIS — I10 ESSENTIAL HYPERTENSION: ICD-10-CM

## 2020-02-06 DIAGNOSIS — R06.02 SHORTNESS OF BREATH: ICD-10-CM

## 2020-02-06 DIAGNOSIS — E11.42 TYPE 2 DIABETES MELLITUS WITH DIABETIC POLYNEUROPATHY, WITH LONG-TERM CURRENT USE OF INSULIN (HCC): ICD-10-CM

## 2020-02-06 DIAGNOSIS — Z79.4 TYPE 2 DIABETES MELLITUS WITH DIABETIC POLYNEUROPATHY, WITH LONG-TERM CURRENT USE OF INSULIN (HCC): ICD-10-CM

## 2020-02-06 DIAGNOSIS — E78.2 MIXED HYPERLIPIDEMIA: ICD-10-CM

## 2020-02-06 LAB
ALBUMIN SERPL-MCNC: 4.4 G/DL (ref 3.5–5.2)
ALP BLD-CCNC: 137 U/L (ref 40–130)
ALT SERPL-CCNC: 20 U/L (ref 5–41)
ANION GAP SERPL CALCULATED.3IONS-SCNC: 19 MMOL/L (ref 7–19)
AST SERPL-CCNC: 16 U/L (ref 5–40)
BILIRUB SERPL-MCNC: 0.4 MG/DL (ref 0.2–1.2)
BILIRUBIN DIRECT: 0.1 MG/DL (ref 0–0.3)
BILIRUBIN, INDIRECT: 0.3 MG/DL (ref 0.1–1)
BUN BLDV-MCNC: 18 MG/DL (ref 6–20)
CALCIUM SERPL-MCNC: 9.8 MG/DL (ref 8.6–10)
CHLORIDE BLD-SCNC: 96 MMOL/L (ref 98–111)
CHOLESTEROL, TOTAL: 155 MG/DL (ref 160–199)
CO2: 24 MMOL/L (ref 22–29)
CREAT SERPL-MCNC: 1 MG/DL (ref 0.5–1.2)
GFR NON-AFRICAN AMERICAN: >60
GLUCOSE BLD-MCNC: 337 MG/DL (ref 74–109)
HBA1C MFR BLD: 12.2 % (ref 4–6)
HCT VFR BLD CALC: 44.6 % (ref 42–52)
HDLC SERPL-MCNC: 32 MG/DL (ref 55–121)
HEMOGLOBIN: 15.7 G/DL (ref 14–18)
LDL CHOLESTEROL CALCULATED: 90 MG/DL
MCH RBC QN AUTO: 30.9 PG (ref 27–31)
MCHC RBC AUTO-ENTMCNC: 35.2 G/DL (ref 33–37)
MCV RBC AUTO: 87.8 FL (ref 80–94)
PDW BLD-RTO: 11.9 % (ref 11.5–14.5)
PLATELET # BLD: 298 K/UL (ref 130–400)
PMV BLD AUTO: 12.3 FL (ref 9.4–12.4)
POTASSIUM SERPL-SCNC: 4.3 MMOL/L (ref 3.5–5)
RBC # BLD: 5.08 M/UL (ref 4.7–6.1)
SODIUM BLD-SCNC: 139 MMOL/L (ref 136–145)
TOTAL PROTEIN: 7.6 G/DL (ref 6.6–8.7)
TRIGL SERPL-MCNC: 166 MG/DL (ref 0–149)
WBC # BLD: 7.2 K/UL (ref 4.8–10.8)

## 2020-02-09 NOTE — PROGRESS NOTES
30 tablet 5    diphenhydrAMINE (BENADRYL) 25 MG tablet Take 25 mg by mouth every 6 hours as needed for Itching      losartan (COZAAR) 100 MG tablet TAKE ONE TABLET DAILY 30 tablet 5    metoprolol tartrate (LOPRESSOR) 25 MG tablet TAKE ONE TABLET TWICE DAILY 60 tablet 5    bumetanide (BUMEX) 1 MG tablet TAKE ONE TABLET DAILY CAN TAKE EXTRA  AS DIRECTED 60 tablet 5    Lancets MISC 1 each by Does not apply route 4 times daily 300 each 1    glimepiride (AMARYL) 4 MG tablet Take 1 tablet by mouth every morning (before breakfast) 30 tablet 3    insulin glargine (LANTUS) 100 UNIT/ML injection vial Inject 70 Units into the skin nightly 6300 Units 1    ondansetron (ZOFRAN) 4 MG tablet Take 4 mg by mouth every 8 hours as needed for Nausea or Vomiting      fexofenadine (ALLEGRA) 180 MG tablet Take 180 mg by mouth daily      loratadine (CLARITIN) 10 MG tablet Take 10 mg by mouth daily      Insulin Syringe-Needle U-100 31G X 15/64\" 1 ML MISC 1 Syringe by Does not apply route 4 times daily 100 each 3    insulin lispro (HUMALOG) 100 UNIT/ML injection vial Inject 0-12 Units into the skin 3 times daily (with meals) **Medium Dose Corrective Algorithm**  Glucose: Dose:  If <139             No Insulin  140-199 2 Units  200-249 4 Units  250-299 6 Units  300-349 8 Units  350-400 10 Units  Above 400       12 Units 1 vial 0    tamsulosin (FLOMAX) 0.4 MG capsule Take two 0.4 mg tamsulosin daily. 180 capsule 3    warfarin (COUMADIN) 10 MG tablet TAKE EVERY OTHER DAY 30 tablet 5    famotidine (PEPCID) 20 MG tablet Take 20 mg by mouth 2 times daily       No current facility-administered medications for this visit.         Patient Active Problem List   Diagnosis    Shortness of breath    HTN (hypertension)    Hyperlipidemia    Fatigue    Paroxysmal atrial fibrillation (HCC)    Acalculous cholecystitis    Hyponatremia    Hypotension due to hypovolemia    Type 2 diabetes mellitus with diabetic polyneuropathy, with long-term MG/DL=OPITIMAL    100-129 MG/DL=DESIRABLE    130-159 MG/DL BORDERLINE=INCREASED RISK OF ATHEROSCLEROTIC  CARDIOVASCULAR DISEASE    > OR = 160 MG/DL=ASSOCIATED WITH AN INCREASE RISK OF  ATHEROSCLEROTIC CARDIOVASCULAR DISEASE      Total Protein 02/06/2020 7.6  6.6 - 8.7 g/dL Final    Alb 02/06/2020 4.4  3.5 - 5.2 g/dL Final    Alkaline Phosphatase 02/06/2020 137* 40 - 130 U/L Final    ALT 02/06/2020 20  5 - 41 U/L Final    AST 02/06/2020 16  5 - 40 U/L Final    Total Bilirubin 02/06/2020 0.4  0.2 - 1.2 mg/dL Final    Bilirubin, Direct 02/06/2020 0.1  0.0 - 0.3 mg/dL Final    Bilirubin, Indirect 02/06/2020 0.3  0.1 - 1.0 mg/dL Final    Sodium 02/06/2020 139  136 - 145 mmol/L Final    Potassium 02/06/2020 4.3  3.5 - 5.0 mmol/L Final    Chloride 02/06/2020 96* 98 - 111 mmol/L Final    CO2 02/06/2020 24  22 - 29 mmol/L Final    Anion Gap 02/06/2020 19  7 - 19 mmol/L Final    Glucose 02/06/2020 337* 74 - 109 mg/dL Final    BUN 02/06/2020 18  6 - 20 mg/dL Final    CREATININE 02/06/2020 1.0  0.5 - 1.2 mg/dL Final    GFR Non- 02/06/2020 >60  >60 Final    Comment: This calculation may be inaccurate for patients under the age of 25 years. For ages 25 and older, a GFR >60 mL/min/1.73m2 (not corrected for weight) is  valid for stable renal function.       Calcium 02/06/2020 9.8  8.6 - 10.0 mg/dL Final    WBC 02/06/2020 7.2  4.8 - 10.8 K/uL Final    RBC 02/06/2020 5.08  4.70 - 6.10 M/uL Final    Hemoglobin 02/06/2020 15.7  14.0 - 18.0 g/dL Final    Hematocrit 02/06/2020 44.6  42.0 - 52.0 % Final    MCV 02/06/2020 87.8  80.0 - 94.0 fL Final    MCH 02/06/2020 30.9  27.0 - 31.0 pg Final    MCHC 02/06/2020 35.2  33.0 - 37.0 g/dL Final    RDW 02/06/2020 11.9  11.5 - 14.5 % Final    Platelets 55/04/9451 298  130 - 400 K/uL Final    MPV 02/06/2020 12.3  9.4 - 12.4 fL Final    Hemoglobin A1C 02/06/2020 12.2* 4.0 - 6.0 % Final    Comment: HbA1c levels >6% are an indication of hyperglycemia

## 2020-02-10 ENCOUNTER — OFFICE VISIT (OUTPATIENT)
Dept: INTERNAL MEDICINE | Age: 55
End: 2020-02-10
Payer: MEDICARE

## 2020-02-10 VITALS
SYSTOLIC BLOOD PRESSURE: 136 MMHG | WEIGHT: 253.4 LBS | DIASTOLIC BLOOD PRESSURE: 70 MMHG | HEART RATE: 80 BPM | HEIGHT: 70 IN | BODY MASS INDEX: 36.28 KG/M2 | OXYGEN SATURATION: 96 %

## 2020-02-10 PROCEDURE — 1036F TOBACCO NON-USER: CPT | Performed by: INTERNAL MEDICINE

## 2020-02-10 PROCEDURE — 3046F HEMOGLOBIN A1C LEVEL >9.0%: CPT | Performed by: INTERNAL MEDICINE

## 2020-02-10 PROCEDURE — 3017F COLORECTAL CA SCREEN DOC REV: CPT | Performed by: INTERNAL MEDICINE

## 2020-02-10 PROCEDURE — G8417 CALC BMI ABV UP PARAM F/U: HCPCS | Performed by: INTERNAL MEDICINE

## 2020-02-10 PROCEDURE — 2022F DILAT RTA XM EVC RTNOPTHY: CPT | Performed by: INTERNAL MEDICINE

## 2020-02-10 PROCEDURE — G8427 DOCREV CUR MEDS BY ELIG CLIN: HCPCS | Performed by: INTERNAL MEDICINE

## 2020-02-10 PROCEDURE — 99214 OFFICE O/P EST MOD 30 MIN: CPT | Performed by: INTERNAL MEDICINE

## 2020-02-10 PROCEDURE — G8482 FLU IMMUNIZE ORDER/ADMIN: HCPCS | Performed by: INTERNAL MEDICINE

## 2020-02-10 ASSESSMENT — PATIENT HEALTH QUESTIONNAIRE - PHQ9
1. LITTLE INTEREST OR PLEASURE IN DOING THINGS: 0
SUM OF ALL RESPONSES TO PHQ9 QUESTIONS 1 & 2: 0
2. FEELING DOWN, DEPRESSED OR HOPELESS: 0
SUM OF ALL RESPONSES TO PHQ QUESTIONS 1-9: 0
SUM OF ALL RESPONSES TO PHQ QUESTIONS 1-9: 0

## 2020-02-10 ASSESSMENT — ENCOUNTER SYMPTOMS
CONSTIPATION: 0
COUGH: 0
SORE THROAT: 0
TROUBLE SWALLOWING: 0
WHEEZING: 0
DIARRHEA: 0
COLOR CHANGE: 0
STRIDOR: 0
ABDOMINAL PAIN: 0
SHORTNESS OF BREATH: 0
BLOOD IN STOOL: 0
BACK PAIN: 0

## 2020-02-10 NOTE — PATIENT INSTRUCTIONS
include spinach, carrots, peaches, and berries. · Keep carrots, celery, and other veggies handy for snacks. Buy fruit that is in season and store it where you can see it so that you will be tempted to eat it. · Cook dishes that have a lot of veggies in them, such as stir-fries and soups. Limit saturated and trans fat  · Read food labels, and try to avoid saturated and trans fats. They increase your risk of heart disease. Trans fat is found in many processed foods such as cookies and crackers. · Use olive or canola oil when you cook. Try cholesterol-lowering spreads, such as Benecol or Take Control. · Bake, broil, grill, or steam foods instead of frying them. · Choose lean meats instead of high-fat meats such as hot dogs and sausages. Cut off all visible fat when you prepare meat. · Eat fish, skinless poultry, and meat alternatives such as soy products instead of high-fat meats. Soy products, such as tofu, may be especially good for your heart. · Choose low-fat or fat-free milk and dairy products. Eat foods high in fiber  · Eat a variety of grain products every day. Include whole-grain foods that have lots of fiber and nutrients. Examples of whole-grain foods include oats, whole wheat bread, and brown rice. · Buy whole-grain breads and cereals, instead of white bread or pastries. Limit salt and sodium  · Limit how much salt and sodium you eat to help lower your blood pressure. · Taste food before you salt it. Add only a little salt when you think you need it. With time, your taste buds will adjust to less salt. · Eat fewer snack items, fast foods, and other high-salt, processed foods. Check food labels for the amount of sodium in packaged foods. · Choose low-sodium versions of canned goods (such as soups, vegetables, and beans). Limit sugar  · Limit drinks and foods with added sugar. These include candy, desserts, and soda pop.   Limit alcohol  · Limit alcohol to no more than 2 drinks a day for men and vegetables. ? Eat a healthy amount of food. A 3-ounce serving of meat, for example, is about the size of a deck of cards. Fill the rest of your plate with vegetables and whole grains. ? Limit the amount of soda and sports drinks you have every day. Drink more water when you are thirsty. ? Eat at least 5 servings of fruits and vegetables every day. It may seem like a lot, but it is not hard to reach this goal. A serving or helping is 1 piece of fruit, 1 cup of vegetables, or 2 cups of leafy, raw vegetables. Have an apple or some carrot sticks as an afternoon snack instead of a candy bar. Try to have fruits and/or vegetables at every meal.  · Make exercise part of your daily routine. You may want to start with simple activities, such as walking, bicycling, or slow swimming. Try to be active 30 to 60 minutes every day. You do not need to do all 30 to 60 minutes all at once. For example, you can exercise 3 times a day for 10 or 20 minutes. Moderate exercise is safe for most people, but it is always a good idea to talk to your doctor before starting an exercise program.  · Keep moving. Ethyl Massiel the lawn, work in the garden, or viblast. Take the stairs instead of the elevator at work. · If you smoke, quit. People who smoke have an increased risk for heart attack, stroke, cancer, and other lung illnesses. Quitting is hard, but there are ways to boost your chance of quitting tobacco for good. ? Use nicotine gum, patches, or lozenges. ? Ask your doctor about stop-smoking programs and medicines. ? Keep trying. In addition to reducing your risk of diseases in the future, you will notice some benefits soon after you stop using tobacco. If you have shortness of breath or asthma symptoms, they will likely get better within a few weeks after you quit. · Limit how much alcohol you drink. Moderate amounts of alcohol (up to 2 drinks a day for men, 1 drink a day for women) are okay.  But drinking too much can lead to liver problems, high blood pressure, and other health problems. Family health  If you have a family, there are many things you can do together to improve your health. · Eat meals together as a family as often as possible. · Eat healthy foods. This includes fruits, vegetables, lean meats and dairy, and whole grains. · Include your family in your fitness plan. Most people think of activities such as jogging or tennis as the way to fitness, but there are many ways you and your family can be more active. Anything that makes you breathe hard and gets your heart pumping is exercise. Here are some tips:  ? Walk to do errands or to take your child to school or the bus.  ? Go for a family bike ride after dinner instead of watching TV. Where can you learn more? Go to https://[employer]. org and sign in to your [employer] account. Enter L227 in the [employer] box to learn more about \"A Healthy Lifestyle: Care Instructions. \"     If you do not have an account, please click on the \"Sign Up Now\" link. Current as of: May 28, 2019  Content Version: 12.3  © 2286-5770 Healthwise, Incorporated. Care instructions adapted under license by [facility] ([facility]). If you have questions about a medical condition or this instruction, always ask your healthcare professional. [address] any warranty or liability for your use of this information.

## 2020-02-11 ENCOUNTER — OFFICE VISIT (OUTPATIENT)
Dept: GASTROENTEROLOGY | Age: 55
End: 2020-02-11

## 2020-02-11 VITALS
BODY MASS INDEX: 36.36 KG/M2 | SYSTOLIC BLOOD PRESSURE: 125 MMHG | OXYGEN SATURATION: 98 % | HEART RATE: 108 BPM | WEIGHT: 254 LBS | HEIGHT: 70 IN | DIASTOLIC BLOOD PRESSURE: 80 MMHG

## 2020-02-11 PROCEDURE — G8427 DOCREV CUR MEDS BY ELIG CLIN: HCPCS | Performed by: NURSE PRACTITIONER

## 2020-02-11 PROCEDURE — 3017F COLORECTAL CA SCREEN DOC REV: CPT | Performed by: NURSE PRACTITIONER

## 2020-02-11 PROCEDURE — G8482 FLU IMMUNIZE ORDER/ADMIN: HCPCS | Performed by: NURSE PRACTITIONER

## 2020-02-11 PROCEDURE — G8417 CALC BMI ABV UP PARAM F/U: HCPCS | Performed by: NURSE PRACTITIONER

## 2020-02-11 PROCEDURE — 99999 PR OFFICE/OUTPT VISIT,PROCEDURE ONLY: CPT | Performed by: NURSE PRACTITIONER

## 2020-02-11 PROCEDURE — 1036F TOBACCO NON-USER: CPT | Performed by: NURSE PRACTITIONER

## 2020-02-11 ASSESSMENT — ENCOUNTER SYMPTOMS
CONSTIPATION: 0
RECTAL PAIN: 0
VOICE CHANGE: 0
CHEST TIGHTNESS: 0
VOMITING: 0
SHORTNESS OF BREATH: 0
ABDOMINAL PAIN: 0
ABDOMINAL DISTENTION: 0
NAUSEA: 0
COUGH: 0
DIARRHEA: 0
BACK PAIN: 0
BLOOD IN STOOL: 0
SORE THROAT: 0

## 2020-02-11 NOTE — PROGRESS NOTES
Subjective:      Patient ID: Aaron Jones is a 62 y.o. male  MD Brenna Moore MD    [facility]  Chief Complaint   Patient presents with    Colonoscopy         Patient referred for screening colonoscopy. No previous c-scope. The patient denies abdominal or flank pain, anorexia, nausea or vomiting, dysphagia, change in bowel habits or black or bloody stools or weight loss. Assessment:      1. Screening for colon cancer      Co-morbidities: HTN, a-fib. Stable  T2DM, uncontrolled. Plan:      Schedule colonoscopy    Clearance for discontinuing anticoagulants needed before scheduling procedure. Increased r isks may be associated with discontinuation of this medication including cva, tia, cardiac event. I have discussed this with patient. Patient voices understanding and agrees to proceed with scheduling. Instruct on bowel prep. Nothing to eat or drink after midnight the day of the exam.  Unable to drive for 24 hours after the procedure. No aspirin or nonsteroidal anti-inflammatories for 5 days before procedure. I have discussed the benefits, alternatives, and risks (including bleeding, perforation and death)  for pursuing Endoscopy (EGD/Colonscopy/EUS/ERCP) with the patient and they are willing to continue. We also discussed the need for anesthesia, IV access, proper dietary changes, medication changes if necessary, and need for bowel prep (if ordered) prior to their Endoscopic procedure. They are aware they must have someone accompany them to their scheduled procedure to drive them home - they agree to the above and are willing to continue. Plan for anesthesia: MAC    Consider cologuard if unable to stop coumadin.          Family History   Problem Relation Age of Onset    Stroke Maternal Grandmother     Cancer Maternal Grandmother     Stroke Paternal Grandmother     Diabetes Paternal Grandmother     Cancer Father     Heart Disease Father     Lung Cancer Father     Asthma Paternal Grandfather     Heart Disease Paternal Grandfather     Heart Disease Maternal Grandfather     Colon Cancer Neg Hx     Colon Polyps Neg Hx     Esophageal Cancer Neg Hx     Liver Cancer Neg Hx     Liver Disease Neg Hx     Rectal Cancer Neg Hx     Stomach Cancer Neg Hx        Past Medical History:   Diagnosis Date    Allergic rhinitis, cause unspecified     Atrial fibrillation, chronic     Charcot's joint of right foot     Chicken pox     History of MRSA infection     HTN (hypertension)     Hyperlipidemia     Impotence of organic origin     Other dyspnea and respiratory abnormality     Other specified erythematous condition(695.89)     Personal history of other infectious and parasitic disease     Type II or unspecified type diabetes mellitus without mention of complication, uncontrolled 1994       Past Surgical History:   Procedure Laterality Date    CARDIOVERSION  03/2017    MALIGNANT SKIN LESION EXCISION      X 2    TONSILLECTOMY         Current Outpatient Medications   Medication Sig Dispense Refill    warfarin (COUMADIN) 5 MG tablet TAKE AS DIRECTED BY YOUR DOCTOR 30 tablet 5    diphenhydrAMINE (BENADRYL) 25 MG tablet Take 25 mg by mouth every 6 hours as needed for Itching      metoprolol tartrate (LOPRESSOR) 25 MG tablet TAKE ONE TABLET TWICE DAILY 60 tablet 5    bumetanide (BUMEX) 1 MG tablet TAKE ONE TABLET DAILY CAN TAKE EXTRA  AS DIRECTED 60 tablet 5    blood glucose test strips (CONTOUR NEXT TEST) strip 1 each by In Vitro route 4 times daily As needed.  200 each 3    Lancets MISC 1 each by Does not apply route 4 times daily 300 each 1    glimepiride (AMARYL) 4 MG tablet Take 1 tablet by mouth every morning (before breakfast) 30 tablet 3    insulin glargine (LANTUS) 100 UNIT/ML injection vial Inject 70 Units into the skin nightly 6300 Units 1    atorvastatin (LIPITOR) 20 MG tablet Take 1 tablet by mouth daily 90 tablet 3    ondansetron (ZOFRAN) 4 MG tablet Take 4 mg by mouth every 8 hours as needed for Nausea or Vomiting      fexofenadine (ALLEGRA) 180 MG tablet Take 180 mg by mouth daily      Insulin Syringe-Needle U-100 31G X 15/64\" 1 ML MISC 1 Syringe by Does not apply route 4 times daily 100 each 3    insulin lispro (HUMALOG) 100 UNIT/ML injection vial Inject 0-12 Units into the skin 3 times daily (with meals) **Medium Dose Corrective Algorithm**  Glucose: Dose:  If <139             No Insulin  140-199 2 Units  200-249 4 Units  250-299 6 Units  300-349 8 Units  350-400 10 Units  Above 400       12 Units 1 vial 0    B-D ULTRAFINE III SHORT PEN 31G X 8 MM MISC Inject 1 each as directed 3 times daily 100 each 5    tamsulosin (FLOMAX) 0.4 MG capsule Take two 0.4 mg tamsulosin daily. 180 capsule 3    warfarin (COUMADIN) 10 MG tablet TAKE EVERY OTHER DAY 30 tablet 5    famotidine (PEPCID) 20 MG tablet Take 20 mg by mouth 2 times daily       No current facility-administered medications for this visit. Allergies   Allergen Reactions    Pcn [Penicillins]     Neomycin-Bacitracin Zn-Polymyx Rash    Neosporin [Neomycin-Polymyxin-Gramicidin] Rash        reports that he has never smoked. He has never used smokeless tobacco. He reports that he does not drink alcohol or use drugs. Review of Systems   Constitutional: Negative for appetite change, fever and unexpected weight change. HENT: Negative for sore throat and voice change. Respiratory: Negative for cough, chest tightness and shortness of breath. Cardiovascular: Positive for palpitations. Negative for chest pain and leg swelling. Gastrointestinal: Negative for abdominal distention, abdominal pain, blood in stool, constipation, diarrhea, nausea, rectal pain and vomiting. Musculoskeletal: Positive for arthralgias. Negative for back pain and gait problem. Skin: Negative for pallor, rash and wound. Neurological: Negative for dizziness, weakness and light-headedness.    Hematological: Negative for adenopathy. Does not bruise/bleed easily. All other systems reviewed and are negative. Objective:   Physical Exam  Constitutional:       General: He is not in acute distress. Appearance: Normal appearance. He is well-developed. Comments: /80   Pulse 108   Ht 5' 10\" (1.778 m)   Wt 254 lb (115.2 kg)   SpO2 98%   BMI 36.45 kg/m²      Eyes:      General: No scleral icterus. Conjunctiva/sclera: Conjunctivae normal.   Neck:      Trachea: No tracheal deviation. Cardiovascular:      Rate and Rhythm: Normal rate and regular rhythm. Heart sounds: No murmur. No friction rub. No gallop. Pulmonary:      Effort: Pulmonary effort is normal. No respiratory distress. Breath sounds: Normal breath sounds. No wheezing, rhonchi or rales. Abdominal:      General: Bowel sounds are normal. There is no distension. Palpations: Abdomen is soft. There is no hepatomegaly or mass. Tenderness: There is no abdominal tenderness. There is no guarding or rebound. Skin:     General: Skin is warm and dry. Coloration: Skin is not pale. Neurological:      Mental Status: He is alert and oriented to person, place, and time. Psychiatric:         Behavior: Behavior normal.         Thought Content:  Thought content normal.

## 2020-02-11 NOTE — PATIENT INSTRUCTIONS
Schedule colonoscopy. No aspirin, ibuprofen, naproxen, fish oil or vitamin E for 5 days before procedure. Do not eat or drink after midnight the day of the procedure. Allowed medications can be taken with a small sip of water. Please review your prep instructions for allowed medications. You will not be able to drive for 24 hours after the procedure due to sedation. Bring a  with you the day of the procedure. If you are on blood thinners, clearance from the prescribing physician will be obtained before your procedure is scheduled. If it is determined it is not safe to hold these medications for a short time an alternative procedure for evaluation may be recommended. Risks of colonoscopy include, but are not limited to, perforation, bleeding, and infection, Risk of perforation and bleeding are increased if there is a polyp removed. Anesthesia risks will be reviewed with you before the procedure by a member of the anesthesia department. Your physician may also schedule a follow up appointment with the nurse practitioner to discuss pathology, symptoms or to check if you have had any problems related to your procedure. If you prefer not to return to the office after your procedure please discuss this with your physician on the day of your colonoscopy. The physician will talk with you and/or your family after the procedure is completed. Final recommendations are based on the pathologist report if biopsies or specimens are taken. For Colonoscopy: You will be given specific directions regarding restrictions to diet and bowel prep instructions including laxatives. Please read these instructions one week prior to your scheduled procedure to ensure that you are prepared. If you have any questions regarding these instructions please call our office Mon through Fri from 8:00 am to 4:00 pm.     Follow prep instructions provided for bowel prep. Take all of the bowel prep as directed.  If you are having problems with nausea, stop your prep for 30-45 min to allow the nausea to subside before resuming your prep. It is important to drink plenty of fluids throughout the day before taking your laxatives. This will help to protect your kidneys, prevent dehydration and maximize the effect of the bowel prep. If polyps are removed during the procedure they will be sent to a pathologist for analysis. Unless you have a follow up appointment scheduled, you will be notified by mail of the pathology results within 4 weeks. If you have not received results after 4 weeks you may call the office to obtain this information. Your diet before a colonoscopy bowel preparation is very important to ensure a successful colon exam. It is recommended to consider certain changes to your diet three to four days prior to the procedure. Remember that your bowels need to be empty for the exam.    What foods are good to eat? Cut down on heavy solid foods three to four days before the procedure and start introducing lighter meals to your diet. The following food suggestions are a good part of your diet before a colonoscopy bowel preparation. Light meat that is easily digestible such as chicken (without the skin)   Potatoes without skin   Cheese   Eggs   A light meal of steamed white fish   Light clear soups    Foods and drinks to avoid  Avoid foods that contain too much fiber. Stay clear of dark colored beverages. They can stick to the walls of the digestive tract and make it difficult to differentiate from blood. Some of these foods are:  Red meat, rice, nuts and vegetables   Milk, other milk based fluids and cream   Most fruit and puddings   Whole grain pasta   Cereals, bran and seeds   Colored beverages, especially those that are red or purple in color   Red colored Jell-O   On the day before the colonoscopy, continue to drink plenty of clear fluids.  It is important   to keep yourself hydrated before the exam. Please follow all instructions as provided for cleansing the bowel. Failure to have an adequately prepped colon may cause you to have incomplete exam with further testing required.      [email or web address]

## 2020-02-12 ENCOUNTER — TELEPHONE (OUTPATIENT)
Dept: CARDIOLOGY | Age: 55
End: 2020-02-12

## 2020-02-12 NOTE — TELEPHONE ENCOUNTER
Date of Surgery: TBD    Cardiologist: Duane MOSHER    Procedure: colonoscopy    Surgeon: Dr. Marco Dolan    Last Office Visit: 1-29-20  Reason for office visit and medical concerns addressed at this office visit: A-Fib, HTN, DM    Testing Performed and Date of Service:  Ekg-1-29-20 11-7-18 Stress  Had a negative dobutamine stress echo November 2018. 2D echo shows normal LV systolic function with EF 60%. Mild concentric LVH. Mild MR    Does the patient have a stent? If so, what type? Not within in last year. Current Medications: Coumadin, lopressor, bumex, amaryl, insulin, lipitor, allegra, flomax, pepcid    Is the patient currently taking an anticoagulant? If so, what is the diagnosis the patient has been given to warrant the need for the anticoagulant? Coumadin --A-FIB      Additional Notes: Med Hold for coumadin x 5 days.

## 2020-02-14 ENCOUNTER — TELEPHONE (OUTPATIENT)
Dept: GASTROENTEROLOGY | Age: 55
End: 2020-02-14

## 2020-02-14 NOTE — TELEPHONE ENCOUNTER
Patient: Ana Irizarry    YOB: 1965      Clearance for Endoscopy / Colonoscopy was received on February 14, 2020 . Patient may discontinue the use of COUMADIN for five days prior to the procedure. AFTER CLN DOUBLE FIRST DOSE OF COUMADIN & RESUME COUMADIN NORMAL DOSE AFTER 1ST DOSE. PER CLEARANCE PT NEEDS INR THE FOLLOWING WEEK    There is NOT a Lovenox requirement.      CLEARANCE SCANNED INTO CHART & PATIENT NOTIFIED

## 2020-02-24 ENCOUNTER — ANESTHESIA (OUTPATIENT)
Dept: OPERATING ROOM | Age: 55
End: 2020-02-24

## 2020-02-24 ENCOUNTER — HOSPITAL ENCOUNTER (OUTPATIENT)
Age: 55
Setting detail: SPECIMEN
Discharge: HOME | End: 2020-02-24
Payer: MEDICARE

## 2020-02-24 ENCOUNTER — APPOINTMENT (OUTPATIENT)
Dept: OPERATING ROOM | Age: 55
End: 2020-02-24

## 2020-02-24 ENCOUNTER — HOSPITAL ENCOUNTER (OUTPATIENT)
Age: 55
Setting detail: OUTPATIENT SURGERY
Discharge: HOME | End: 2020-02-24
Attending: INTERNAL MEDICINE | Admitting: INTERNAL MEDICINE
Payer: MEDICARE

## 2020-02-24 ENCOUNTER — ANESTHESIA EVENT (OUTPATIENT)
Dept: OPERATING ROOM | Age: 55
End: 2020-02-24

## 2020-02-24 VITALS — OXYGEN SATURATION: 98 % | DIASTOLIC BLOOD PRESSURE: 80 MMHG | SYSTOLIC BLOOD PRESSURE: 114 MMHG

## 2020-02-24 VITALS
HEIGHT: 70 IN | RESPIRATION RATE: 18 BRPM | BODY MASS INDEX: 35.79 KG/M2 | HEART RATE: 72 BPM | DIASTOLIC BLOOD PRESSURE: 70 MMHG | OXYGEN SATURATION: 97 % | SYSTOLIC BLOOD PRESSURE: 109 MMHG | WEIGHT: 250 LBS

## 2020-02-24 PROCEDURE — 88305 TISSUE EXAM BY PATHOLOGIST: CPT

## 2020-02-24 PROCEDURE — 45385 COLONOSCOPY W/LESION REMOVAL: CPT

## 2020-02-24 PROCEDURE — G8918 PT W/O PREOP ORDER IV AB PRO: HCPCS

## 2020-02-24 PROCEDURE — G8907 PT DOC NO EVENTS ON DISCHARG: HCPCS

## 2020-02-24 PROCEDURE — 45385 COLONOSCOPY W/LESION REMOVAL: CPT | Performed by: INTERNAL MEDICINE

## 2020-02-24 RX ORDER — PROPOFOL 10 MG/ML
INJECTION, EMULSION INTRAVENOUS PRN
Status: DISCONTINUED | OUTPATIENT
Start: 2020-02-24 | End: 2020-02-24 | Stop reason: SDUPTHER

## 2020-02-24 RX ORDER — SODIUM CHLORIDE 9 MG/ML
INJECTION, SOLUTION INTRAVENOUS CONTINUOUS
Status: DISCONTINUED | OUTPATIENT
Start: 2020-02-24 | End: 2020-02-24 | Stop reason: HOSPADM

## 2020-02-24 RX ADMIN — PROPOFOL 120 MG: 10 INJECTION, EMULSION INTRAVENOUS at 08:39

## 2020-02-24 RX ADMIN — PROPOFOL 50 MG: 10 INJECTION, EMULSION INTRAVENOUS at 08:45

## 2020-02-24 RX ADMIN — SODIUM CHLORIDE: 9 INJECTION, SOLUTION INTRAVENOUS at 08:27

## 2020-02-24 RX ADMIN — PROPOFOL 30 MG: 10 INJECTION, EMULSION INTRAVENOUS at 08:43

## 2020-02-24 NOTE — OP NOTE
possible from the bowel. The colonoscope was removed from the patient, and the procedure was terminated. Findings are listed below. Findings: The mucosa appeared normal throughout the entire examined colon. There was melanosis coli throughout the entire colon. In the sigmoid, a 6 mm sessile polyp was removed completely with cold snare polypectomy. Retroflexion in the rectum was normal and revealed no further abnormalities. Recommendations:  1. Repeat colonoscopy: pending pathology - 5 years  2. Await biopsy results-you will receive a letter with your results. Findings and recommendations were discussed w/ the patient. A copy of the images was provided. Sarahi Hughes am scribing for and in the presence of Dr. Kofi Ortiz MD.  Electronically signed by Emmie West RN on 2/24/2020 at 8:10 AM    I personally performed the services described in this documentation as scribed by Lu Ashton, and it appears accurate and complete.      Kofi Ortiz MD  2/24/2020

## 2020-02-24 NOTE — H&P
Patient Name: Bonnetta Cheadle  : 1965  MRN: [id number]  DATE: 20    Allergies: Allergies   Allergen Reactions    Pcn [Penicillins]     Neomycin-Bacitracin Zn-Polymyx Rash    Neosporin [Neomycin-Polymyxin-Gramicidin] Rash        ENDOSCOPY  History and Physical    Procedure:    [] Diagnostic Colonoscopy       [x] Screening Colonoscopy  [] EGD      [] ERCP      [] EUS       [] Other    [x] Previous office notes/History and Physical reviewed from the patients chart. Please see EMR for further details of HPI. I have examined the patient's status immediately prior to the procedure and:      Indications/HPI:       [x] Screening              [] History of Polyps      []Fhx of colon CA/polyps       Anesthesia:   [x] MAC [] Moderate Sedation   [] General   [] None     ROS: 12 pt review of systems was negative unless stated above    Medications:   Prior to Admission medications    Medication Sig Start Date End Date Taking? Authorizing Provider   warfarin (COUMADIN) 5 MG tablet TAKE AS DIRECTED BY YOUR DOCTOR 20   NOMI Verduzco   diphenhydrAMINE (BENADRYL) 25 MG tablet Take 25 mg by mouth every 6 hours as needed for Itching    Historical Provider, MD   metoprolol tartrate (LOPRESSOR) 25 MG tablet TAKE ONE TABLET TWICE DAILY 19   NOMI Verduzco   bumetanide (BUMEX) 1 MG tablet TAKE ONE TABLET DAILY CAN TAKE EXTRA  AS DIRECTED 19   NOMI Cardozo - CNP   blood glucose test strips (CONTOUR NEXT TEST) strip 1 each by In Vitro route 4 times daily As needed.  19   Gabbi Bess MD   Lancets MISC 1 each by Does not apply route 4 times daily 19   Gabbi Bess MD   glimepiride (AMARYL) 4 MG tablet Take 1 tablet by mouth every morning (before breakfast) 10/16/19   Gabbi Bess MD   insulin glargine (LANTUS) 100 UNIT/ML injection vial Inject 70 Units into the skin nightly 10/14/19 2/11/20  Gabbi Bess MD   atorvastatin (LIPITOR) 20 MG tablet

## 2020-02-26 PROCEDURE — 45385 COLONOSCOPY W/LESION REMOVAL: CPT

## 2020-03-02 ENCOUNTER — ANTI-COAG VISIT (OUTPATIENT)
Dept: CARDIOLOGY | Age: 55
End: 2020-03-02
Payer: MEDICARE

## 2020-03-02 LAB
INTERNATIONAL NORMALIZATION RATIO, POC: 2
PROTHROMBIN TIME, POC: NORMAL

## 2020-03-02 PROCEDURE — 85610 PROTHROMBIN TIME: CPT | Performed by: NURSE PRACTITIONER

## 2020-03-02 RX ORDER — TAMSULOSIN HYDROCHLORIDE 0.4 MG/1
CAPSULE ORAL
Qty: 60 CAPSULE | Refills: 3 | Status: SHIPPED | OUTPATIENT
Start: 2020-03-02 | End: 2020-06-23

## 2020-03-10 RX ORDER — INSULIN GLARGINE 100 [IU]/ML
INJECTION, SOLUTION SUBCUTANEOUS
Qty: 1 VIAL | Refills: 3 | Status: SHIPPED | OUTPATIENT
Start: 2020-03-10 | End: 2020-07-30

## 2020-03-12 ENCOUNTER — OFFICE VISIT (OUTPATIENT)
Dept: UROLOGY | Age: 55
End: 2020-03-12
Payer: MEDICARE

## 2020-03-12 VITALS — HEIGHT: 70 IN | TEMPERATURE: 96.3 F | BODY MASS INDEX: 37.94 KG/M2 | WEIGHT: 265 LBS

## 2020-03-12 LAB
BACTERIA URINE, POC: ABNORMAL
BILIRUBIN URINE: 0 MG/DL
BLOOD, URINE: POSITIVE
CASTS URINE, POC: ABNORMAL
CLARITY: CLEAR
COLOR: YELLOW
CRYSTALS URINE, POC: ABNORMAL
EPI CELLS URINE, POC: ABNORMAL
GLUCOSE URINE: ABNORMAL
KETONES, URINE: NEGATIVE
LEUKOCYTE EST, POC: ABNORMAL
NITRITE, URINE: NEGATIVE
PH UA: 5.5 (ref 4.5–8)
PROTEIN UA: POSITIVE
RBC URINE, POC: ABNORMAL
SPECIFIC GRAVITY UA: 1.01 (ref 1–1.03)
UROBILINOGEN, URINE: NORMAL
WBC URINE, POC: ABNORMAL
YEAST URINE, POC: ABNORMAL

## 2020-03-12 PROCEDURE — 51798 US URINE CAPACITY MEASURE: CPT | Performed by: PHYSICIAN ASSISTANT

## 2020-03-12 PROCEDURE — G8417 CALC BMI ABV UP PARAM F/U: HCPCS | Performed by: PHYSICIAN ASSISTANT

## 2020-03-12 PROCEDURE — G8427 DOCREV CUR MEDS BY ELIG CLIN: HCPCS | Performed by: PHYSICIAN ASSISTANT

## 2020-03-12 PROCEDURE — 3017F COLORECTAL CA SCREEN DOC REV: CPT | Performed by: PHYSICIAN ASSISTANT

## 2020-03-12 PROCEDURE — G8482 FLU IMMUNIZE ORDER/ADMIN: HCPCS | Performed by: PHYSICIAN ASSISTANT

## 2020-03-12 PROCEDURE — 81001 URINALYSIS AUTO W/SCOPE: CPT | Performed by: PHYSICIAN ASSISTANT

## 2020-03-12 PROCEDURE — 99213 OFFICE O/P EST LOW 20 MIN: CPT | Performed by: PHYSICIAN ASSISTANT

## 2020-03-12 PROCEDURE — 1036F TOBACCO NON-USER: CPT | Performed by: PHYSICIAN ASSISTANT

## 2020-03-12 RX ORDER — LOSARTAN POTASSIUM 100 MG/1
TABLET ORAL
COMMUNITY
Start: 2020-02-29 | End: 2020-07-09

## 2020-03-12 ASSESSMENT — ENCOUNTER SYMPTOMS
COUGH: 0
BACK PAIN: 0
EYE DISCHARGE: 0
EYE REDNESS: 0
WHEEZING: 0
SHORTNESS OF BREATH: 0
CONSTIPATION: 0
DIARRHEA: 0
ABDOMINAL PAIN: 0

## 2020-03-12 NOTE — PROGRESS NOTES
Gerry Starkey is a 62 y.o. male who presents today   Chief Complaint   Patient presents with    Follow-up     I am here today for my 6 month follow up for incomplete bladder emptying      Incomplete bladder emptying:  Patient with history of urinary retention and history of incomplete bladder emptying. When I saw patient 09/13/2019 after we increased him to 2 tamsulosin daily he was doing much better and his overall urine symptoms had improved. Patient continues to have good response to taking 2 tamsulosin daily. He states that if he runs out for a day or 2 of the medication he notices his symptoms worsening. Denies any recent history of gross hematuria urinary tract infections or any history of kidney stones. Patient has not had to have a catheter placed since he was last seen.       Past Medical History:   Diagnosis Date    Allergic rhinitis, cause unspecified     Atrial fibrillation, chronic     Charcot's joint of right foot     Chicken pox     History of MRSA infection     HTN (hypertension)     Hyperlipidemia     Impotence of organic origin     Other dyspnea and respiratory abnormality     Other specified erythematous condition(695.89)     Personal history of other infectious and parasitic disease     Type II or unspecified type diabetes mellitus without mention of complication, uncontrolled 1994       Past Surgical History:   Procedure Laterality Date    CARDIOVERSION  03/2017    COLONOSCOPY  02/24/2020    Dr Johana Fraire-Melanosis coli throughout the entire colon-AP, 5 yr recall    MALIGNANT SKIN LESION EXCISION      X 2    TONSILLECTOMY         Current Outpatient Medications   Medication Sig Dispense Refill    losartan (COZAAR) 100 MG tablet       LANTUS 100 UNIT/ML injection vial INJECT 30 UNITS INTO THE SKIN NIGHTLY 1 vial 3    tamsulosin (FLOMAX) 0.4 MG capsule TAKE TWO CAPSULES DAILY 60 capsule 3    warfarin (COUMADIN) 5 MG tablet TAKE AS DIRECTED BY YOUR DOCTOR 30 tablet 5    None   Social Needs    Financial resource strain: None    Food insecurity     Worry: None     Inability: None    Transportation needs     Medical: None     Non-medical: None   Tobacco Use    Smoking status: Never Smoker    Smokeless tobacco: Never Used   Substance and Sexual Activity    Alcohol use: No    Drug use: No    Sexual activity: None   Lifestyle    Physical activity     Days per week: 0 days     Minutes per session: 0 min    Stress: To some extent   Relationships    Social connections     Talks on phone: None     Gets together: None     Attends Hinduism service: None     Active member of club or organization: None     Attends meetings of clubs or organizations: None     Relationship status: None    Intimate partner violence     Fear of current or ex partner: None     Emotionally abused: None     Physically abused: None     Forced sexual activity: None   Other Topics Concern    None   Social History Narrative    None       Family History   Problem Relation Age of Onset    Stroke Maternal Grandmother     Cancer Maternal Grandmother     Stroke Paternal Grandmother     Diabetes Paternal Grandmother     Cancer Father     Heart Disease Father     Lung Cancer Father     Asthma Paternal Grandfather     Heart Disease Paternal Grandfather     Heart Disease Maternal Grandfather     Colon Cancer Neg Hx     Colon Polyps Neg Hx     Esophageal Cancer Neg Hx     Liver Cancer Neg Hx     Liver Disease Neg Hx     Rectal Cancer Neg Hx     Stomach Cancer Neg Hx        REVIEW OF SYSTEMS:  Review of Systems   Constitutional: Negative for chills and fever. HENT: Negative for congestion and hearing loss. Eyes: Negative for discharge and redness. Respiratory: Negative for cough, shortness of breath and wheezing. Cardiovascular: Negative for leg swelling. Gastrointestinal: Negative for abdominal pain, constipation and diarrhea.    Endocrine: Negative for cold intolerance and heat intolerance. Genitourinary: Negative for decreased urine volume, difficulty urinating, dysuria, enuresis, flank pain, frequency, genital sores, hematuria and urgency. Musculoskeletal: Negative for back pain and gait problem. Skin: Negative for rash. Allergic/Immunologic: Negative for environmental allergies. Neurological: Negative for dizziness, weakness and headaches. Hematological: Does not bruise/bleed easily. Psychiatric/Behavioral: Negative for behavioral problems, confusion and sleep disturbance. I have reviewed and agree with the documentation provided by the medical assistant on this patient for today's visit. PHYSICAL EXAM:  Temp 96.3 °F (35.7 °C) (Temporal)   Ht 5' 10\" (1.778 m)   Wt 265 lb (120.2 kg)   BMI 38.02 kg/m²   Physical Exam  Vitals signs and nursing note reviewed. Constitutional:       General: He is not in acute distress. Appearance: Normal appearance. He is not ill-appearing. HENT:      Head: Normocephalic. Nose: Nose normal.   Eyes:      Conjunctiva/sclera: Conjunctivae normal.   Pulmonary:      Effort: Pulmonary effort is normal.   Abdominal:      General: There is no distension. Palpations: Abdomen is soft. There is no mass. Tenderness: There is abdominal tenderness. Comments: Some mild left lower quadrant tenderness. Skin:     General: Skin is warm and dry. Neurological:      Mental Status: He is alert and oriented to person, place, and time.    Psychiatric:         Mood and Affect: Mood normal.         Behavior: Behavior normal.             DATA:  U/A:    Lab Results   Component Value Date    NITRITE neg 04/29/2019    COLORU Yellow 03/12/2020    PROTEINU Positive 03/12/2020    PHUR 5.5 03/12/2020    LABCAST 0-1 Hyaline 03/21/2019    RBCUA 0-1 03/21/2019    YEAST Rare 03/21/2019    CLARITYU Clear 03/12/2020    SPECGRAV 1.015 03/12/2020    LEUKOCYTESUR neg 03/12/2020    LEUKOCYTESUR Negative 03/21/2019    UROBILINOGEN Normal 03/12/2020 BILIRUBINUR 0 03/12/2020    BILIRUBINUR 0 04/29/2019    BLOODU Positive 03/12/2020    GLUCOSEU 1000mg/dL 03/12/2020          Imaging:  His postvoid residual bladder scan was 32 mL. 1. Incomplete bladder emptying  Patient continues to do well on 2 tamsulosin daily which she will continue bladder scan today was 32 mL follow-up 1 year. - WV Measure, post-void residual, US, non-imaging  - POCT Urinalysis Dipstick w/ Micro (Auto)      Orders Placed This Encounter   Procedures    POCT Urinalysis Dipstick w/ Micro (Auto)    WV Measure, post-void residual, US, non-imaging     32ml     No orders of the defined types were placed in this encounter. Plan:  Patient will follow-up in 1 year.

## 2020-03-24 RX ORDER — GLIMEPIRIDE 4 MG/1
TABLET ORAL
Qty: 90 TABLET | Refills: 3 | Status: SHIPPED | OUTPATIENT
Start: 2020-03-24 | End: 2021-04-06

## 2020-03-24 NOTE — TELEPHONE ENCOUNTER
George Speaks called requesting a refill of the below medication which has been pended for you:     Requested Prescriptions     Pending Prescriptions Disp Refills    glimepiride (AMARYL) 4 MG tablet [Pharmacy Med Name: GLIMEPIRIDE 4MG TABLET] 90 tablet 3     Sig: TAKE ONE TABLET EVERY MORNING BEFORE BREAKFAST       Last Appointment Date: 2/10/2020  Next Appointment Date: 6/11/2020    Allergies   Allergen Reactions    Pcn [Penicillins]     Neomycin-Bacitracin Zn-Polymyx Rash    Neosporin [Neomycin-Polymyxin-Gramicidin] Rash

## 2020-03-30 ENCOUNTER — TELEPHONE (OUTPATIENT)
Dept: INTERNAL MEDICINE | Age: 55
End: 2020-03-30

## 2020-03-30 RX ORDER — WARFARIN SODIUM 10 MG/1
TABLET ORAL
Qty: 30 TABLET | Refills: 5 | Status: SHIPPED | OUTPATIENT
Start: 2020-03-30 | End: 2020-05-01 | Stop reason: SDUPTHER

## 2020-04-16 ENCOUNTER — TELEPHONE (OUTPATIENT)
Dept: INTERNAL MEDICINE | Age: 55
End: 2020-04-16

## 2020-04-16 NOTE — TELEPHONE ENCOUNTER
Spoke to the patient he did cancel the apt with endocrinology in [facility] and will go to the one in [address] [place] [address] instead.

## 2020-05-01 RX ORDER — WARFARIN SODIUM 10 MG/1
10 TABLET ORAL DAILY
Qty: 30 TABLET | Refills: 5 | Status: SHIPPED | OUTPATIENT
Start: 2020-05-01 | End: 2020-05-07 | Stop reason: SDUPTHER

## 2020-05-07 ENCOUNTER — ANTI-COAG VISIT (OUTPATIENT)
Dept: CARDIOLOGY | Age: 55
End: 2020-05-07
Payer: MEDICARE

## 2020-05-07 LAB
INTERNATIONAL NORMALIZATION RATIO, POC: 1.8
PROTHROMBIN TIME, POC: NORMAL

## 2020-05-07 PROCEDURE — 85610 PROTHROMBIN TIME: CPT | Performed by: CLINICAL NURSE SPECIALIST

## 2020-05-07 RX ORDER — WARFARIN SODIUM 10 MG/1
10 TABLET ORAL DAILY
Qty: 45 TABLET | Refills: 5 | Status: SHIPPED | OUTPATIENT
Start: 2020-05-07 | End: 2021-04-06

## 2020-05-15 RX ORDER — PEN NEEDLE, DIABETIC 29 G X1/2"
NEEDLE, DISPOSABLE MISCELLANEOUS
Qty: 100 EACH | Refills: 3 | Status: ON HOLD | OUTPATIENT
Start: 2020-05-15 | End: 2021-04-19 | Stop reason: HOSPADM

## 2020-05-27 RX ORDER — BUMETANIDE 1 MG/1
TABLET ORAL
Qty: 60 TABLET | Refills: 5 | Status: SHIPPED | OUTPATIENT
Start: 2020-05-27 | End: 2020-08-18

## 2020-06-04 ENCOUNTER — TELEPHONE (OUTPATIENT)
Dept: INTERNAL MEDICINE | Age: 55
End: 2020-06-04

## 2020-06-04 DIAGNOSIS — Z11.4 SCREENING FOR HIV WITHOUT PRESENCE OF RISK FACTORS: ICD-10-CM

## 2020-06-04 DIAGNOSIS — E11.42 TYPE 2 DIABETES MELLITUS WITH DIABETIC POLYNEUROPATHY, WITH LONG-TERM CURRENT USE OF INSULIN (HCC): ICD-10-CM

## 2020-06-04 DIAGNOSIS — Z79.4 TYPE 2 DIABETES MELLITUS WITH DIABETIC POLYNEUROPATHY, WITH LONG-TERM CURRENT USE OF INSULIN (HCC): ICD-10-CM

## 2020-06-04 DIAGNOSIS — Z11.59 ENCOUNTER FOR HEPATITIS C SCREENING TEST FOR LOW RISK PATIENT: ICD-10-CM

## 2020-06-04 LAB
ALBUMIN SERPL-MCNC: 4.3 G/DL (ref 3.5–5.2)
ALP BLD-CCNC: 110 U/L (ref 40–130)
ALT SERPL-CCNC: 27 U/L (ref 5–41)
ANION GAP SERPL CALCULATED.3IONS-SCNC: 14 MMOL/L (ref 7–19)
AST SERPL-CCNC: 21 U/L (ref 5–40)
BILIRUB SERPL-MCNC: 0.5 MG/DL (ref 0.2–1.2)
BILIRUBIN DIRECT: 0.2 MG/DL (ref 0–0.3)
BILIRUBIN, INDIRECT: 0.3 MG/DL (ref 0.1–1)
BUN BLDV-MCNC: 11 MG/DL (ref 6–20)
CALCIUM SERPL-MCNC: 9.2 MG/DL (ref 8.6–10)
CHLORIDE BLD-SCNC: 103 MMOL/L (ref 98–111)
CHOLESTEROL, TOTAL: 124 MG/DL (ref 160–199)
CO2: 26 MMOL/L (ref 22–29)
CREAT SERPL-MCNC: 1.1 MG/DL (ref 0.5–1.2)
GFR NON-AFRICAN AMERICAN: >60
GLUCOSE BLD-MCNC: 104 MG/DL (ref 74–109)
HBA1C MFR BLD: 12.3 % (ref 4–6)
HCT VFR BLD CALC: 42.1 % (ref 42–52)
HDLC SERPL-MCNC: 50 MG/DL (ref 55–121)
HEMOGLOBIN: 14.6 G/DL (ref 14–18)
HEPATITIS C ANTIBODY INTERPRETATION: NORMAL
HIV-1 P24 AG: NORMAL
LDL CHOLESTEROL CALCULATED: 63 MG/DL
MCH RBC QN AUTO: 31.7 PG (ref 27–31)
MCHC RBC AUTO-ENTMCNC: 34.7 G/DL (ref 33–37)
MCV RBC AUTO: 91.5 FL (ref 80–94)
PDW BLD-RTO: 12.5 % (ref 11.5–14.5)
PLATELET # BLD: 250 K/UL (ref 130–400)
PMV BLD AUTO: 11.4 FL (ref 9.4–12.4)
POTASSIUM SERPL-SCNC: 3.5 MMOL/L (ref 3.5–5)
RAPID HIV 1&2: NORMAL
RBC # BLD: 4.6 M/UL (ref 4.7–6.1)
SODIUM BLD-SCNC: 143 MMOL/L (ref 136–145)
TOTAL PROTEIN: 7.1 G/DL (ref 6.6–8.7)
TRIGL SERPL-MCNC: 53 MG/DL (ref 0–149)
WBC # BLD: 6.3 K/UL (ref 4.8–10.8)

## 2020-06-04 NOTE — TELEPHONE ENCOUNTER
----- Message from Pura Lynn MD sent at 6/4/2020  2:06 PM CDT -----  Regarding: Rapid acting Insulin use  Please ask the patient if he has been covering his sugars before meals as we agreed, He may need to start looking for another provider, he is not adherent to his plan of his care

## 2020-06-11 ENCOUNTER — OFFICE VISIT (OUTPATIENT)
Dept: INTERNAL MEDICINE | Age: 55
End: 2020-06-11
Payer: MEDICARE

## 2020-06-11 ENCOUNTER — ANTI-COAG VISIT (OUTPATIENT)
Dept: CARDIOLOGY | Age: 55
End: 2020-06-11
Payer: MEDICARE

## 2020-06-11 VITALS
HEIGHT: 70 IN | SYSTOLIC BLOOD PRESSURE: 130 MMHG | BODY MASS INDEX: 38.37 KG/M2 | HEART RATE: 71 BPM | WEIGHT: 268 LBS | OXYGEN SATURATION: 97 % | DIASTOLIC BLOOD PRESSURE: 70 MMHG

## 2020-06-11 LAB
INTERNATIONAL NORMALIZATION RATIO, POC: 3.8
PROTHROMBIN TIME, POC: NORMAL

## 2020-06-11 PROCEDURE — 3017F COLORECTAL CA SCREEN DOC REV: CPT | Performed by: INTERNAL MEDICINE

## 2020-06-11 PROCEDURE — 99214 OFFICE O/P EST MOD 30 MIN: CPT | Performed by: INTERNAL MEDICINE

## 2020-06-11 PROCEDURE — 1036F TOBACCO NON-USER: CPT | Performed by: INTERNAL MEDICINE

## 2020-06-11 PROCEDURE — G8417 CALC BMI ABV UP PARAM F/U: HCPCS | Performed by: INTERNAL MEDICINE

## 2020-06-11 PROCEDURE — 85610 PROTHROMBIN TIME: CPT | Performed by: NURSE PRACTITIONER

## 2020-06-11 PROCEDURE — 3046F HEMOGLOBIN A1C LEVEL >9.0%: CPT | Performed by: INTERNAL MEDICINE

## 2020-06-11 PROCEDURE — G8427 DOCREV CUR MEDS BY ELIG CLIN: HCPCS | Performed by: INTERNAL MEDICINE

## 2020-06-11 PROCEDURE — 2022F DILAT RTA XM EVC RTNOPTHY: CPT | Performed by: INTERNAL MEDICINE

## 2020-06-11 NOTE — PATIENT INSTRUCTIONS
Patient Education        Learning About the Mediterranean Diet  What is the [address]? The Mediterranean diet is a style of eating rather than a diet plan. It features foods eaten in [place], [place], [place] and [place], and other countries along the [facility]. It emphasizes eating foods like fish, fruits, vegetables, beans, high-fiber breads and whole grains, nuts, and olive oil. This style of eating includes limited red meat, cheese, and sweets. Why choose the Mediterranean diet? A Mediterranean-style diet may improve heart health. It contains more fat than other heart-healthy diets. But the fats are mainly from nuts, unsaturated oils (such as fish oils and olive oil), and certain nut or seed oils (such as canola, soybean, or flaxseed oil). These fats may help protect the heart and blood vessels. How can you get started on the Mediterranean diet? Here are some things you can do to switch to a more Mediterranean way of eating. What to eat  · Eat a variety of fruits and vegetables each day, such as grapes, blueberries, tomatoes, broccoli, peppers, figs, olives, spinach, eggplant, beans, lentils, and chickpeas. · Eat a variety of whole-grain foods each day, such as oats, brown rice, and whole wheat bread, pasta, and couscous. · Eat fish at least 2 times a week. Try tuna, salmon, mackerel, lake trout, herring, or sardines. · Eat moderate amounts of low-fat dairy products, such as milk, cheese, or yogurt. · Eat moderate amounts of poultry and eggs. · Choose healthy (unsaturated) fats, such as nuts, olive oil, and certain nut or seed oils like canola, soybean, and flaxseed. · Limit unhealthy (saturated) fats, such as butter, palm oil, and coconut oil. And limit fats found in animal products, such as meat and dairy products made with whole milk. Try to eat red meat only a few times a month in very small amounts. · Limit sweets and desserts to only a few times a week.  This includes sugar-sweetened drinks like soda. The Mediterranean diet may also include red wine with your meal--1 glass each day for women and up to 2 glasses a day for men. Tips for eating at home  · Use herbs, spices, garlic, lemon zest, and citrus juice instead of salt to add flavor to foods. · Add avocado slices to your sandwich instead of pillai. · Have fish for lunch or dinner instead of red meat. Brush the fish with olive oil, and broil or grill it. · Sprinkle your salad with seeds or nuts instead of cheese. · Cook with olive or canola oil instead of butter or oils that are high in saturated fat. · Switch from 2% milk or whole milk to 1% or fat-free milk. · Dip raw vegetables in a vinaigrette dressing or hummus instead of dips made from mayonnaise or sour cream.  · Have a piece of fruit for dessert instead of a piece of cake. Try baked apples, or have some dried fruit. Tips for eating out  · Try broiled, grilled, baked, or poached fish instead of having it fried or breaded. · Ask your  to have your meals prepared with olive oil instead of butter. · Order dishes made with marinara sauce or sauces made from olive oil. Avoid sauces made from cream or mayonnaise. · Choose whole-grain breads, whole wheat pasta and pizza crust, brown rice, beans, and lentils. · Cut back on butter or margarine on bread. Instead, you can dip your bread in a small amount of olive oil. · Ask for a side salad or grilled vegetables instead of french fries or chips. Where can you learn more? Go to [email or web address] and sign in to your [employer] account. Enter [phone] in the [facility] box to learn more about \"Learning About the Mediterranean Diet. \"     If you do not have an account, please click on the \"Sign Up Now\" link. Current as of: August 22, 2019               Content Version: 12.5  © 3987-8750 Healthwise, Incorporated. Care instructions adapted under license by [facility] ([facility]).  If you have

## 2020-06-12 PROBLEM — E66.01 MORBIDLY OBESE (HCC): Status: ACTIVE | Noted: 2020-06-12

## 2020-06-12 ASSESSMENT — ENCOUNTER SYMPTOMS
DIARRHEA: 0
SORE THROAT: 0
BACK PAIN: 0
SHORTNESS OF BREATH: 0
WHEEZING: 0
CONSTIPATION: 0
COUGH: 0
COLOR CHANGE: 0
STRIDOR: 0
BLOOD IN STOOL: 0
TROUBLE SWALLOWING: 0
ABDOMINAL PAIN: 0

## 2020-06-12 NOTE — PROGRESS NOTES
[facility]  [address] [address] [place] [address]  Phone: [phone]  Fax: [phone]    Visit Date:  6/11/2020    SUBJECTIVE:     Ana Russell is a 62 y.o. male presents today in the office for:    Chief Complaint   Patient presents with    Diabetes       HPI  62year old male   HX of atrial Fibrillation,managed by Cardiology  HTN, faily well controlled, Echo showed normal EF, diastollic dysfunction, remain asymptomatic  HL, LDL 63, no myalgias, eats a lot of meat, because of his Coumadin  Poorly Controlled DM, non compliant to insulin regimen, will be seeing an Endocrinologist in [facility]    Past Medical History:   Diagnosis Date    Allergic rhinitis, cause unspecified     Atrial fibrillation, chronic     Charcot's joint of right foot     Chicken pox     History of MRSA infection     HTN (hypertension)     Hyperlipidemia     Impotence of organic origin     Other dyspnea and respiratory abnormality     Other specified erythematous condition(635.89)     Personal history of other infectious and parasitic disease     Type II or unspecified type diabetes mellitus without mention of complication, uncontrolled 1994       Past Surgical History:   Procedure Laterality Date    CARDIOVERSION  03/2017    COLONOSCOPY  02/24/2020    Dr Shemar Fraire-Melanosis coli throughout the entire colon-AP, 5 yr recall    MALIGNANT SKIN LESION EXCISION      X 2    TONSILLECTOMY         Social History     Socioeconomic History    Marital status:      Spouse name: Not on file    Number of children: Not on file    Years of education: Not on file    Highest education level: Not on file   Occupational History    Not on file   Social Needs    Financial resource strain: Not on file    Food insecurity     Worry: Not on file     Inability: Not on file    Transportation needs     Medical: Not on file     Non-medical: Not on file   Tobacco Use    Smoking status: Never Smoker    Smokeless tobacco: Never Used   Substance and Sexual Activity    Alcohol use: No    Drug use: No    Sexual activity: Not on file   Lifestyle    Physical activity     Days per week: 0 days     Minutes per session: 0 min    Stress:  To some extent   Relationships    Social connections     Talks on phone: Not on file     Gets together: Not on file     Attends Anabaptist service: Not on file     Active member of club or organization: Not on file     Attends meetings of clubs or organizations: Not on file     Relationship status: Not on file    Intimate partner violence     Fear of current or ex partner: Not on file     Emotionally abused: Not on file     Physically abused: Not on file     Forced sexual activity: Not on file   Other Topics Concern    Not on file   Social History Narrative    Not on file       Family History   Problem Relation Age of Onset    Stroke Maternal Grandmother     Cancer Maternal Grandmother     Stroke Paternal Grandmother     Diabetes Paternal Grandmother     Cancer Father     Heart Disease Father     Lung Cancer Father     Asthma Paternal Grandfather     Heart Disease Paternal Grandfather     Heart Disease Maternal Grandfather     Colon Cancer Neg Hx     Colon Polyps Neg Hx     Esophageal Cancer Neg Hx     Liver Cancer Neg Hx     Liver Disease Neg Hx     Rectal Cancer Neg Hx     Stomach Cancer Neg Hx        Allergies   Allergen Reactions    Pcn [Penicillins]     Neomycin-Bacitracin Zn-Polymyx Rash    Neosporin [Neomycin-Polymyxin-Gramicidin] Rash       Current Outpatient Medications   Medication Sig Dispense Refill    bumetanide (BUMEX) 1 MG tablet TAKE ONE TABLET DAILY CAN TAKE EXTRA  AS DIRECTED 60 tablet 5    BD INSULIN SYRINGE U/F 31G X 5/16\" 1 ML MISC USE FOUR TIMES DAILY 100 each 3    warfarin (COUMADIN) 10 MG tablet Take 1 tablet by mouth daily 45 tablet 5    glimepiride (AMARYL) 4 MG tablet TAKE ONE TABLET EVERY MORNING BEFORE BREAKFAST 90 tablet 3    for activity change, appetite change, chills, diaphoresis, fatigue and fever. HENT: Negative for congestion, hearing loss, postnasal drip, sore throat, tinnitus and trouble swallowing. Eyes: Negative for visual disturbance. Respiratory: Negative for cough, shortness of breath, wheezing and stridor. Cardiovascular: Negative for chest pain, palpitations and leg swelling. Gastrointestinal: Negative for abdominal pain, blood in stool, constipation and diarrhea. Endocrine: Negative for cold intolerance. Genitourinary: Negative for frequency. Musculoskeletal: Negative for arthralgias, back pain and joint swelling. Skin: Negative for color change and wound. Allergic/Immunologic: Negative for environmental allergies. Neurological: Negative for dizziness, light-headedness and headaches. Hematological: Negative for adenopathy. Does not bruise/bleed easily. Psychiatric/Behavioral: Negative for decreased concentration, dysphoric mood and sleep disturbance. The patient is not nervous/anxious. OBJECTIVE:     Physical Exam:    Physical Exam  Constitutional:       Appearance: He is obese. HENT:      Head: Normocephalic. Right Ear: Hearing normal.      Left Ear: Hearing normal.      Mouth/Throat:      Mouth: Mucous membranes are moist.      Pharynx: Oropharynx is clear. Uvula midline. Eyes:      General: Lids are normal.      Extraocular Movements: Extraocular movements intact. Neck:      Musculoskeletal: Full passive range of motion without pain. Cardiovascular:      Rate and Rhythm: Normal rate. Rhythm irregular. Pulses: Decreased pulses. Pulmonary:      Effort: Pulmonary effort is normal.      Breath sounds: Normal breath sounds and air entry. Abdominal:      General: Abdomen is protuberant. Bowel sounds are normal. There is no distension. Palpations: Abdomen is soft. Musculoskeletal:      Right ankle: He exhibits deformity. Right lower leg: No edema.       Left lower leg: No edema. Skin:     General: Skin is warm. Neurological:      General: No focal deficit present. Mental Status: He is alert and oriented to person, place, and time. Cranial Nerves: Cranial nerves are intact. Sensory: Sensation is intact. Motor: Motor function is intact. Coordination: Coordination is intact. Psychiatric:         Attention and Perception: Attention normal.         Mood and Affect: Mood normal.         Speech: Speech normal.         Behavior: Behavior normal. Behavior is cooperative. Thought Content:  Thought content normal.         Cognition and Memory: Cognition normal.       Orders Only on 06/04/2020   Component Date Value Ref Range Status    Hep C Ab Interp 06/04/2020 Non-Reactive   Final    Cholesterol, Total 06/04/2020 124* 160 - 199 mg/dL Final    Comment: <160 MG/DL=OPTIMAL    160-199 MG/DL= DESIRABLE    200-239 MG/DL=BORDERLINE-INCREASED RISK OF ATHEROSCLEROTIC  CARDIOVASCULAR DISEASE    > OR = 240 MG/DL-ASSOCIATED WITH AN INCREASED RISK OF  ATHROSCLEROTIC CARDIOVASCULAR DISEASE      Triglycerides 06/04/2020 53  0 - 149 mg/dL Final    HDL 06/04/2020 50* 55 - 121 mg/dL Final    Comment: VALUES>60 MG/DL ARE ASSOCIATED WITH A DECREASED RISK OF  ATHEROSCLEROTIC CARDIOVASCULAR DISEASE      LDL Calculated 06/04/2020 63  <100 mg/dL Final    Comment: <100 MG/DL=OPITIMAL    100-129 MG/DL=DESIRABLE    130-159 MG/DL BORDERLINE=INCREASED RISK OF ATHEROSCLEROTIC  CARDIOVASCULAR DISEASE    > OR = 160 MG/DL=ASSOCIATED WITH AN INCREASE RISK OF  ATHEROSCLEROTIC CARDIOVASCULAR DISEASE      Total Protein 06/04/2020 7.1  6.6 - 8.7 g/dL Final    Alb 06/04/2020 4.3  3.5 - 5.2 g/dL Final    Alkaline Phosphatase 06/04/2020 110  40 - 130 U/L Final    ALT 06/04/2020 27  5 - 41 U/L Final    AST 06/04/2020 21  5 - 40 U/L Final    Total Bilirubin 06/04/2020 0.5  0.2 - 1.2 mg/dL Final    Bilirubin, Direct 06/04/2020 0.2  0.0 - 0.3 mg/dL Final    Bilirubin,

## 2020-07-09 RX ORDER — ATORVASTATIN CALCIUM 20 MG/1
TABLET, FILM COATED ORAL
Qty: 90 TABLET | Refills: 3 | Status: SHIPPED | OUTPATIENT
Start: 2020-07-09 | End: 2021-09-29

## 2020-07-09 RX ORDER — LOSARTAN POTASSIUM 100 MG/1
TABLET ORAL
Qty: 30 TABLET | Refills: 3 | Status: SHIPPED | OUTPATIENT
Start: 2020-07-09 | End: 2021-01-11

## 2020-07-09 NOTE — TELEPHONE ENCOUNTER
Doris Sinha called requesting a refill of the below medication which has been pended for you:     Requested Prescriptions     Pending Prescriptions Disp Refills    atorvastatin (LIPITOR) 20 MG tablet [Pharmacy Med Name: ATORVASTATIN 20MG GEN/LIPITOR * TABLET] 90 tablet 3     Sig: TAKE ONE TABLET DAILY       Last Appointment Date: 6/11/2020  Next Appointment Date: 12/11/2020    Allergies   Allergen Reactions    Pcn [Penicillins]     Neomycin-Bacitracin Zn-Polymyx Rash    Neosporin [Neomycin-Polymyxin-Gramicidin] Rash

## 2020-07-30 ENCOUNTER — OFFICE VISIT (OUTPATIENT)
Dept: CARDIOLOGY | Age: 55
End: 2020-07-30
Payer: MEDICARE

## 2020-07-30 VITALS — HEIGHT: 70 IN | WEIGHT: 271 LBS | BODY MASS INDEX: 38.8 KG/M2

## 2020-07-30 LAB
INTERNATIONAL NORMALIZATION RATIO, POC: 2.8
PROTHROMBIN TIME, POC: NORMAL

## 2020-07-30 PROCEDURE — 1036F TOBACCO NON-USER: CPT | Performed by: CLINICAL NURSE SPECIALIST

## 2020-07-30 PROCEDURE — 3017F COLORECTAL CA SCREEN DOC REV: CPT | Performed by: CLINICAL NURSE SPECIALIST

## 2020-07-30 PROCEDURE — 85610 PROTHROMBIN TIME: CPT | Performed by: CLINICAL NURSE SPECIALIST

## 2020-07-30 PROCEDURE — 99214 OFFICE O/P EST MOD 30 MIN: CPT | Performed by: CLINICAL NURSE SPECIALIST

## 2020-07-30 PROCEDURE — 2022F DILAT RTA XM EVC RTNOPTHY: CPT | Performed by: CLINICAL NURSE SPECIALIST

## 2020-07-30 PROCEDURE — G8417 CALC BMI ABV UP PARAM F/U: HCPCS | Performed by: CLINICAL NURSE SPECIALIST

## 2020-07-30 PROCEDURE — 3046F HEMOGLOBIN A1C LEVEL >9.0%: CPT | Performed by: CLINICAL NURSE SPECIALIST

## 2020-07-30 PROCEDURE — G8427 DOCREV CUR MEDS BY ELIG CLIN: HCPCS | Performed by: CLINICAL NURSE SPECIALIST

## 2020-07-30 RX ORDER — DAPAGLIFLOZIN AND METFORMIN HYDROCHLORIDE 10; 1000 MG/1; MG/1
10 TABLET, FILM COATED, EXTENDED RELEASE ORAL DAILY
COMMUNITY
Start: 2020-06-30 | End: 2020-08-18

## 2020-07-30 NOTE — PROGRESS NOTES
12/29/2015    Shortness of breath 12/18/2014    Fatigue 12/18/2014    Paroxysmal atrial fibrillation (HCC) 12/18/2014    HTN (hypertension)     Hyperlipidemia      Past Medical History:   Diagnosis Date    Allergic rhinitis, cause unspecified     Atrial fibrillation, chronic     Charcot's joint of right foot     Chicken pox     History of MRSA infection     HTN (hypertension)     Hyperlipidemia     Impotence of organic origin     Other dyspnea and respiratory abnormality     Other specified erythematous condition(695.89)     Personal history of other infectious and parasitic disease     Type II or unspecified type diabetes mellitus without mention of complication, uncontrolled 1994     Past Surgical History:   Procedure Laterality Date    CARDIOVERSION  03/2017    COLONOSCOPY  02/24/2020    Dr Mort Essex Jones-Melanosis coli throughout the entire colon-AP, 5 yr recall    MALIGNANT SKIN LESION EXCISION      X 2    TONSILLECTOMY       Family History   Problem Relation Age of Onset    Stroke Maternal Grandmother     Cancer Maternal Grandmother     Stroke Paternal Grandmother     Diabetes Paternal Grandmother     Cancer Father     Heart Disease Father     Lung Cancer Father     Asthma Paternal Grandfather     Heart Disease Paternal Grandfather     Heart Disease Maternal Grandfather     Colon Cancer Neg Hx     Colon Polyps Neg Hx     Esophageal Cancer Neg Hx     Liver Cancer Neg Hx     Liver Disease Neg Hx     Rectal Cancer Neg Hx     Stomach Cancer Neg Hx      Social History     Tobacco Use    Smoking status: Never Smoker    Smokeless tobacco: Never Used   Substance Use Topics    Alcohol use: No      Current Outpatient Medications   Medication Sig Dispense Refill    XIGDUO XR  MG TB24 10 mg daily      losartan (COZAAR) 100 MG tablet TAKE ONE TABLET DAILY 30 tablet 3    atorvastatin (LIPITOR) 20 MG tablet TAKE ONE TABLET DAILY 90 tablet 3    tamsulosin (FLOMAX) 0.4 MG capsule TAKE TWO CAPSULES DAILY 60 capsule 11    BD INSULIN SYRINGE U/F 31G X 5/16\" 1 ML MISC USE FOUR TIMES DAILY 100 each 3    warfarin (COUMADIN) 10 MG tablet Take 1 tablet by mouth daily 45 tablet 5    glimepiride (AMARYL) 4 MG tablet TAKE ONE TABLET EVERY MORNING BEFORE BREAKFAST 90 tablet 3    warfarin (COUMADIN) 5 MG tablet TAKE AS DIRECTED BY YOUR DOCTOR 30 tablet 5    diphenhydrAMINE (BENADRYL) 25 MG tablet Take 25 mg by mouth every 6 hours as needed for Itching      metoprolol tartrate (LOPRESSOR) 25 MG tablet TAKE ONE TABLET TWICE DAILY 60 tablet 5    blood glucose test strips (CONTOUR NEXT TEST) strip 1 each by In Vitro route 4 times daily As needed. 200 each 3    Lancets MISC 1 each by Does not apply route 4 times daily 300 each 1    B-D ULTRAFINE III SHORT PEN 31G X 8 MM MISC Inject 1 each as directed 3 times daily 100 each 5    bumetanide (BUMEX) 1 MG tablet TAKE ONE TABLET DAILY CAN TAKE EXTRA  AS DIRECTED (Patient not taking: Reported on 7/30/2020) 60 tablet 5    fexofenadine (ALLEGRA) 180 MG tablet Take 180 mg by mouth daily       No current facility-administered medications for this visit. Allergies: Pcn [penicillins]; Neomycin-bacitracin zn-polymyx; and Neosporin [neomycin-polymyxin-gramicidin]    Review of Systems  Constitutional - no significant activity change, appetite change, or unexpected weight change. No fever, chills or diaphoresis. No fatigue. HEENT - no significant rhinorrhea or epistaxis. No tinnitus or significant hearing loss. Eyes - no sudden vision change or amaurosis. Respiratory - no significant wheezing, stridor, apnea or cough. + dyspnea on exertion  No resting  shortness of breath. Cardiovascular - no exertional chest pain, orthopnea or PND. No sensation of arrhythmia or slow heart rate. No claudication + leg edema. Gastrointestinal - no abdominal swelling or pain. No blood in stool. No severe constipation, diarrhea, nausea, or vomiting.    Genitourinary - no difficulty urinating, dysuria, frequency, or urgency. No flank pain or hematuria. Musculoskeletal - no back pain, gait- uses cane for unsteady gain. No myalgia. Skin - no color change or rash. No pallor. No new surgical incision. Neurologic - no speech difficulty, facial asymmetry or lateralizing weakness. No seizures, presyncope, syncope, or significant dizziness. Hematologic - no easy bruising or excessive bleeding. Psychiatric - no severe anxiety or insomnia. No confusion. All other review of systems are negative. Objective  Vital Signs - Ht 5' 10\" (1.778 m)   Wt 271 lb (122.9 kg)   BMI 38.88 kg/m²   General - Fadia Damon is alert, cooperative, and pleasant. Well groomed. No acute distress. Body habitus is . HEENT - The head is normocephalic. No circumoral cyanosis. Dentition is normal.   EYES -  No Xanthelasma, no arcus senilis, no conjunctival hemorrhages or discharge. Neck - Supple, without increased jugular venous pressures. No carotid bruits. No mass. Respiratory - Lungs are clear bilaterally. No wheezes or rales. Normal effort without use of accessory muscles. Cardiovascular - Heart has irregular rhythm and controlled rate. No murmurs, rubs or gallops. + pedal pulses and no varicosities. Abdominal -  Soft, nontender, nondistended. Bowel sounds are intact. Extremities - No clubbing, cyanosis, 1 + BLE  edema. Musculoskeletal -  No clubbing . No Osler's nodes. Gait -using cane . No kyphosis or scoliosis. Skin -  no statis ulcers or dermatitis. Neurological - No focal signs are identified. Oriented to person, place and time. Psychiatric -  Appropriate affect and mood. Assessment:     Diagnosis Orders   1. Chronic atrial fibrillation  POCT INR   2. Essential hypertension     3.  Type 2 diabetes mellitus with diabetic polyneuropathy, with long-term current use of insulin ([facility] [address]       Data:  BP Readings from Last 3 Encounters:   06/11/20 130/70 02/24/20 109/70   02/24/20 114/80    Pulse Readings from Last 3 Encounters:   06/11/20 71   02/24/20 72   02/11/20 108        Wt Readings from Last 3 Encounters:   07/30/20 271 lb (122.9 kg)   06/11/20 268 lb (121.6 kg)   03/12/20 265 lb (120.2 kg)     Lab Results   Component Value Date    INR 2.8 07/30/2020    INR 3.8 06/11/2020    INR 1.8 05/07/2020    PROTIME 24.2 (H) 04/01/2019    PROTIME 23.6 (H) 03/31/2019    PROTIME 22.5 (H) 03/30/2019   INR therapeutic. Chronic atrial fibrillation. On beta-blocker for rate control. Blood pressures controlled also on ARB. He is a diabetic and following with endocrinology  Viewed recent labs. Stable with the exception of very elevated hemoglobin A1c at 12.3  Lipids controlled  Had stable echo and negative stress test in 2018. We discussed the importance of getting blood sugar better controlled. Encouraged him to increase activity. He states he is limited due to his balance issues. Encouraged him to find some kind of activity he can participate in. We discussed diet briefly. He is working closely with his endocrinologist     States taking medications as prescribed  Stable cardiovascular status. No evidence of overt heart failure, angina     Plan  Continue same coumadin dose and recheck in 1 mos  Follow up in 6 months   Call with any questions or concerns  Follow up with Franki Hollingsworth MD for non cardiac problems  Continue to work with endocrinologist to get blood sugar better controlled  Report any new problems  Cardiovascular Fitness-Exercise as tolerated. Strive for 30 minutes of exercise most days of the week. Cardiac / Healthy Diet  Continue current medications as directed  Continue plan of treatment  It is always recommended that you bring your medications bottles with you to each visit - this is for your safety!        NOMI Salinas    EMR dragon/transcription disclaimer: Much of this encounter note is electronic transcription/translation of spoken language to printed tach. Electronic translation of spoken language may be erroneous, or at times, nonsensical words or phrases may be inadvertently transcribed.  Although, I have reviewed the note for such errors, some may still exist.

## 2020-08-17 PROCEDURE — 99285 EMERGENCY DEPT VISIT HI MDM: CPT

## 2020-08-17 PROCEDURE — 96374 THER/PROPH/DIAG INJ IV PUSH: CPT

## 2020-08-17 PROCEDURE — 93005 ELECTROCARDIOGRAM TRACING: CPT | Performed by: PEDIATRICS

## 2020-08-17 PROCEDURE — 99284 EMERGENCY DEPT VISIT MOD MDM: CPT

## 2020-08-18 ENCOUNTER — APPOINTMENT (OUTPATIENT)
Dept: GENERAL RADIOLOGY | Age: 55
End: 2020-08-18
Payer: MEDICARE

## 2020-08-18 ENCOUNTER — HOSPITAL ENCOUNTER (EMERGENCY)
Age: 55
Discharge: HOME | End: 2020-08-18
Attending: PEDIATRICS
Payer: MEDICARE

## 2020-08-18 VITALS
BODY MASS INDEX: 38.65 KG/M2 | RESPIRATION RATE: 16 BRPM | WEIGHT: 270 LBS | HEART RATE: 85 BPM | TEMPERATURE: 98.3 F | DIASTOLIC BLOOD PRESSURE: 100 MMHG | SYSTOLIC BLOOD PRESSURE: 172 MMHG | HEIGHT: 70 IN | OXYGEN SATURATION: 96 %

## 2020-08-18 LAB
ALBUMIN SERPL-MCNC: 4.1 G/DL (ref 3.5–5.2)
ALP BLD-CCNC: 163 U/L (ref 40–130)
ALT SERPL-CCNC: 39 U/L (ref 5–41)
ANION GAP SERPL CALCULATED.3IONS-SCNC: 11 MMOL/L (ref 7–19)
AST SERPL-CCNC: 23 U/L (ref 5–40)
BASOPHILS ABSOLUTE: 0.1 K/UL (ref 0–0.2)
BASOPHILS RELATIVE PERCENT: 0.9 % (ref 0–1)
BILIRUB SERPL-MCNC: 0.9 MG/DL (ref 0.2–1.2)
BUN BLDV-MCNC: 12 MG/DL (ref 6–20)
CALCIUM SERPL-MCNC: 9 MG/DL (ref 8.6–10)
CHLORIDE BLD-SCNC: 98 MMOL/L (ref 98–111)
CO2: 24 MMOL/L (ref 22–29)
CREAT SERPL-MCNC: 0.8 MG/DL (ref 0.5–1.2)
EKG P AXIS: NORMAL DEGREES
EKG P AXIS: NORMAL DEGREES
EKG P-R INTERVAL: NORMAL MS
EKG P-R INTERVAL: NORMAL MS
EKG Q-T INTERVAL: 348 MS
EKG Q-T INTERVAL: 366 MS
EKG QRS DURATION: 100 MS
EKG QRS DURATION: 98 MS
EKG QTC CALCULATION (BAZETT): 404 MS
EKG QTC CALCULATION (BAZETT): 414 MS
EKG T AXIS: 34 DEGREES
EKG T AXIS: 45 DEGREES
EOSINOPHILS ABSOLUTE: 0.1 K/UL (ref 0–0.6)
EOSINOPHILS RELATIVE PERCENT: 0.7 % (ref 0–5)
GFR AFRICAN AMERICAN: >59
GFR NON-AFRICAN AMERICAN: >60
GLUCOSE BLD-MCNC: 218 MG/DL (ref 74–109)
HCT VFR BLD CALC: 40.3 % (ref 42–52)
HEMOGLOBIN: 13.9 G/DL (ref 14–18)
IMMATURE GRANULOCYTES #: 0 K/UL
INR BLD: 1.82 (ref 0.88–1.18)
LACTIC ACID: 1.2 MMOL/L (ref 0.5–1.9)
LV EF: 58 %
LVEF MODALITY: NORMAL
LYMPHOCYTES ABSOLUTE: 1 K/UL (ref 1.1–4.5)
LYMPHOCYTES RELATIVE PERCENT: 12.1 % (ref 20–40)
MCH RBC QN AUTO: 31.2 PG (ref 27–31)
MCHC RBC AUTO-ENTMCNC: 34.5 G/DL (ref 33–37)
MCV RBC AUTO: 90.6 FL (ref 80–94)
MONOCYTES ABSOLUTE: 0.9 K/UL (ref 0–0.9)
MONOCYTES RELATIVE PERCENT: 10.8 % (ref 0–10)
NEUTROPHILS ABSOLUTE: 6.1 K/UL (ref 1.5–7.5)
NEUTROPHILS RELATIVE PERCENT: 75.4 % (ref 50–65)
PDW BLD-RTO: 13.1 % (ref 11.5–14.5)
PLATELET # BLD: 258 K/UL (ref 130–400)
PMV BLD AUTO: 11.6 FL (ref 9.4–12.4)
POTASSIUM REFLEX MAGNESIUM: 4 MMOL/L (ref 3.5–5)
PRO-BNP: 1581 PG/ML (ref 0–900)
PROTHROMBIN TIME: 21.3 SEC (ref 12–14.6)
RBC # BLD: 4.45 M/UL (ref 4.7–6.1)
SARS-COV-2, PCR: NOT DETECTED
SODIUM BLD-SCNC: 133 MMOL/L (ref 136–145)
TOTAL PROTEIN: 7.5 G/DL (ref 6.6–8.7)
TROPONIN: <0.01 NG/ML (ref 0–0.03)
TROPONIN: <0.01 NG/ML (ref 0–0.03)
WBC # BLD: 8 K/UL (ref 4.8–10.8)

## 2020-08-18 PROCEDURE — 87040 BLOOD CULTURE FOR BACTERIA: CPT

## 2020-08-18 PROCEDURE — 83605 ASSAY OF LACTIC ACID: CPT

## 2020-08-18 PROCEDURE — 85025 COMPLETE CBC W/AUTO DIFF WBC: CPT

## 2020-08-18 PROCEDURE — 93010 ELECTROCARDIOGRAM REPORT: CPT | Performed by: INTERNAL MEDICINE

## 2020-08-18 PROCEDURE — 6360000004 HC RX CONTRAST MEDICATION: Performed by: PEDIATRICS

## 2020-08-18 PROCEDURE — 80053 COMPREHEN METABOLIC PANEL: CPT

## 2020-08-18 PROCEDURE — 85610 PROTHROMBIN TIME: CPT

## 2020-08-18 PROCEDURE — 93306 TTE W/DOPPLER COMPLETE: CPT

## 2020-08-18 PROCEDURE — 36415 COLL VENOUS BLD VENIPUNCTURE: CPT

## 2020-08-18 PROCEDURE — 83880 ASSAY OF NATRIURETIC PEPTIDE: CPT

## 2020-08-18 PROCEDURE — 6370000000 HC RX 637 (ALT 250 FOR IP): Performed by: PEDIATRICS

## 2020-08-18 PROCEDURE — 84484 ASSAY OF TROPONIN QUANT: CPT

## 2020-08-18 PROCEDURE — 93005 ELECTROCARDIOGRAM TRACING: CPT | Performed by: PEDIATRICS

## 2020-08-18 PROCEDURE — 2580000003 HC RX 258: Performed by: PEDIATRICS

## 2020-08-18 PROCEDURE — U0003 INFECTIOUS AGENT DETECTION BY NUCLEIC ACID (DNA OR RNA); SEVERE ACUTE RESPIRATORY SYNDROME CORONAVIRUS 2 (SARS-COV-2) (CORONAVIRUS DISEASE [COVID-19]), AMPLIFIED PROBE TECHNIQUE, MAKING USE OF HIGH THROUGHPUT TECHNOLOGIES AS DESCRIBED BY CMS-2020-01-R: HCPCS

## 2020-08-18 PROCEDURE — 71045 X-RAY EXAM CHEST 1 VIEW: CPT

## 2020-08-18 RX ORDER — CLONIDINE HYDROCHLORIDE 0.1 MG/1
0.1 TABLET ORAL ONCE
Status: COMPLETED | OUTPATIENT
Start: 2020-08-18 | End: 2020-08-18

## 2020-08-18 RX ORDER — 0.9 % SODIUM CHLORIDE 0.9 %
1000 INTRAVENOUS SOLUTION INTRAVENOUS ONCE
Status: COMPLETED | OUTPATIENT
Start: 2020-08-18 | End: 2020-08-18

## 2020-08-18 RX ORDER — AZITHROMYCIN 250 MG/1
TABLET, FILM COATED ORAL
Qty: 1 PACKET | Refills: 0 | Status: SHIPPED | OUTPATIENT
Start: 2020-08-18 | End: 2020-08-22

## 2020-08-18 RX ORDER — BENZONATATE 100 MG/1
100 CAPSULE ORAL 3 TIMES DAILY PRN
Qty: 30 CAPSULE | Refills: 0 | Status: SHIPPED | OUTPATIENT
Start: 2020-08-18 | End: 2020-08-25

## 2020-08-18 RX ORDER — METHYLPREDNISOLONE 4 MG/1
TABLET ORAL
Qty: 1 KIT | Refills: 0 | Status: SHIPPED | OUTPATIENT
Start: 2020-08-18 | End: 2020-08-18 | Stop reason: SINTOL

## 2020-08-18 RX ORDER — ALBUTEROL SULFATE 90 UG/1
2 AEROSOL, METERED RESPIRATORY (INHALATION) 4 TIMES DAILY PRN
Qty: 1 INHALER | Refills: 0 | Status: SHIPPED | OUTPATIENT
Start: 2020-08-18 | End: 2020-08-25

## 2020-08-18 RX ORDER — INSULIN DEGLUDEC INJECTION 100 U/ML
80 INJECTION, SOLUTION SUBCUTANEOUS DAILY
COMMUNITY
End: 2020-11-12 | Stop reason: CLARIF

## 2020-08-18 RX ADMIN — PERFLUTREN 2.2 MG: 6.52 INJECTION, SUSPENSION INTRAVENOUS at 11:00

## 2020-08-18 RX ADMIN — CLONIDINE HYDROCHLORIDE 0.1 MG: 0.1 TABLET ORAL at 07:12

## 2020-08-18 RX ADMIN — SODIUM CHLORIDE 1000 ML: 9 INJECTION, SOLUTION INTRAVENOUS at 04:15

## 2020-08-18 ASSESSMENT — ENCOUNTER SYMPTOMS
BACK PAIN: 0
RHINORRHEA: 0
COLOR CHANGE: 0
EYE DISCHARGE: 0
SHORTNESS OF BREATH: 1
ABDOMINAL PAIN: 0
COUGH: 1
NAUSEA: 0
VOMITING: 0

## 2020-08-18 ASSESSMENT — PAIN SCALES - GENERAL: PAINLEVEL_OUTOF10: 3

## 2020-08-18 ASSESSMENT — PAIN DESCRIPTION - LOCATION: LOCATION: CHEST

## 2020-08-18 NOTE — ED PROVIDER NOTES
Negative for color change and pallor. Neurological: Negative for syncope and light-headedness. Psychiatric/Behavioral: Negative for agitation and confusion. All other systems reviewed and are negative. PAST MEDICALHISTORY     Past Medical History:   Diagnosis Date    Allergic rhinitis, cause unspecified     Atrial fibrillation, chronic     Charcot's joint of right foot     Chicken pox     History of MRSA infection     HTN (hypertension)     Hyperlipidemia     Impotence of organic origin     Other dyspnea and respiratory abnormality     Other specified erythematous condition(695.89)     Personal history of other infectious and parasitic disease     Type II or unspecified type diabetes mellitus without mention of complication, uncontrolled 1994         SURGICAL HISTORY       Past Surgical History:   Procedure Laterality Date    CARDIOVERSION  03/2017    COLONOSCOPY  02/24/2020    Dr Jermaine Fraire-Melanosis coli throughout the entire colon-AP, 5 yr recall    MALIGNANT SKIN LESION EXCISION      X 2    TONSILLECTOMY           CURRENT MEDICATIONS     Previous Medications    ATORVASTATIN (LIPITOR) 20 MG TABLET    TAKE ONE TABLET DAILY    B-D ULTRAFINE III SHORT PEN 31G X 8 MM MISC    Inject 1 each as directed 3 times daily    BD INSULIN SYRINGE U/F 31G X 5/16\" 1 ML MISC    USE FOUR TIMES DAILY    BLOOD GLUCOSE TEST STRIPS (CONTOUR NEXT TEST) STRIP    1 each by In Vitro route 4 times daily As needed.     DIPHENHYDRAMINE (BENADRYL) 25 MG TABLET    Take 25 mg by mouth every 6 hours as needed for Itching    GLIMEPIRIDE (AMARYL) 4 MG TABLET    TAKE ONE TABLET EVERY MORNING BEFORE BREAKFAST    INSULIN DEGLUDEC (TRESIBA) 100 UNIT/ML SOLN    Inject 80 Units/day into the skin    LANCETS MISC    1 each by Does not apply route 4 times daily    LOSARTAN (COZAAR) 100 MG TABLET    TAKE ONE TABLET DAILY    METOPROLOL TARTRATE (LOPRESSOR) 25 MG TABLET    TAKE ONE TABLET TWICE DAILY    TAMSULOSIN (FLOMAX) 0.4 MG CAPSULE    TAKE TWO CAPSULES DAILY    WARFARIN (COUMADIN) 10 MG TABLET    Take 1 tablet by mouth daily    WARFARIN (COUMADIN) 5 MG TABLET    TAKE AS DIRECTED BY YOUR DOCTOR       ALLERGIES     Pcn [penicillins]; Neomycin-bacitracin zn-polymyx; and Neosporin [neomycin-polymyxin-gramicidin]    FAMILY HISTORY       Family History   Problem Relation Age of Onset    Stroke Maternal Grandmother     Cancer Maternal Grandmother     Stroke Paternal Grandmother     Diabetes Paternal Grandmother     Cancer Father     Heart Disease Father     Lung Cancer Father     Asthma Paternal Grandfather     Heart Disease Paternal Grandfather     Heart Disease Maternal Grandfather     Colon Cancer Neg Hx     Colon Polyps Neg Hx     Esophageal Cancer Neg Hx     Liver Cancer Neg Hx     Liver Disease Neg Hx     Rectal Cancer Neg Hx     Stomach Cancer Neg Hx           SOCIAL HISTORY       Social History     Socioeconomic History    Marital status:      Spouse name: Not on file    Number of children: Not on file    Years of education: Not on file    Highest education level: Not on file   Occupational History    Not on file   Social Needs    Financial resource strain: Not on file    Food insecurity     Worry: Not on file     Inability: Not on file    Transportation needs     Medical: Not on file     Non-medical: Not on file   Tobacco Use    Smoking status: Never Smoker    Smokeless tobacco: Never Used   Substance and Sexual Activity    Alcohol use: No    Drug use: No    Sexual activity: Not on file   Lifestyle    Physical activity     Days per week: 0 days     Minutes per session: 0 min    Stress:  To some extent   Relationships    Social connections     Talks on phone: Not on file     Gets together: Not on file     Attends Mandaen service: Not on file     Active member of club or organization: Not on file     Attends meetings of clubs or organizations: Not on file     Relationship status: Not on file  Intimate partner violence     Fear of current or ex partner: Not on file     Emotionally abused: Not on file     Physically abused: Not on file     Forced sexual activity: Not on file   Other Topics Concern    Not on file   Social History Narrative    Not on file       SCREENINGS    Rossy Coma Scale  Eye Opening: Spontaneous  Best Verbal Response: Oriented  Best Motor Response: Obeys commands  Rossy Coma Scale Score: 15        PHYSICAL EXAM    (up to 7 for level 4, 8 or more for level 5)     ED Triage Vitals [08/17/20 2248]   BP Temp Temp src Pulse Resp SpO2 Height Weight   (!) 179/114 98.7 °F (37.1 °C) -- 88 19 96 % 5' 10\" (1.778 m) 270 lb (122.5 kg)       Physical Exam  Vitals signs and nursing note reviewed. Constitutional:       General: He is not in acute distress. Appearance: Normal appearance. He is obese. HENT:      Head: Normocephalic and atraumatic. Right Ear: External ear normal.      Left Ear: External ear normal.      Nose: Nose normal.      Mouth/Throat:      Mouth: Mucous membranes are moist.      Pharynx: Oropharynx is clear. No oropharyngeal exudate. Eyes:      General: No scleral icterus. Conjunctiva/sclera: Conjunctivae normal.      Pupils: Pupils are equal, round, and reactive to light. Neck:      Musculoskeletal: Neck supple. No neck rigidity. Cardiovascular:      Rate and Rhythm: Tachycardia present. Rhythm irregular. Pulses: Normal pulses. Heart sounds: Normal heart sounds. Pulmonary:      Effort: Pulmonary effort is normal.      Breath sounds: Normal breath sounds. Abdominal:      General: Bowel sounds are normal.      Palpations: Abdomen is soft. Tenderness: There is no abdominal tenderness. There is no guarding. Musculoskeletal:         General: No tenderness or deformity. Right lower leg: Edema present. Left lower leg: Edema present. Skin:     General: Skin is warm and dry.       Capillary Refill: Capillary refill takes less than 2 seconds. Coloration: Skin is not jaundiced. Neurological:      General: No focal deficit present. Mental Status: He is alert and oriented to person, place, and time. Mental status is at baseline. Coordination: Coordination normal.   Psychiatric:         Mood and Affect: Mood normal.         Behavior: Behavior normal.         DIAGNOSTIC RESULTS     EKG: All EKG's areinterpreted by the Emergency Department Physician who either signs or Co-signs this chart in the absence of a cardiologist.    EKG dated 8/18/2020 at 0 4:59 AM: Atrial fibrillation, rate 100. Left axis deviation. RSR prime in V2.    RADIOLOGY:  Non-plain film images such as CT, Ultrasound and MRI are read by the radiologist. Plain radiographic images are visualized and preliminarily interpreted bythe emergency physician with the below findings:      XR CHEST PORTABLE   Final Result   The poor lung expansion but no active cardiopulmonary   disease. A moderate cardiomegaly.    Signed by Dr Bert Todd on 8/18/2020 7:46 AM              LABS:  Labs Reviewed   CBC WITH AUTO DIFFERENTIAL - Abnormal; Notable for the following components:       Result Value    RBC 4.45 (*)     Hemoglobin 13.9 (*)     Hematocrit 40.3 (*)     MCH 31.2 (*)     Neutrophils % 75.4 (*)     Lymphocytes % 12.1 (*)     Monocytes % 10.8 (*)     Lymphocytes Absolute 1.0 (*)     All other components within normal limits   COMPREHENSIVE METABOLIC PANEL W/ REFLEX TO MG FOR LOW K - Abnormal; Notable for the following components:    Sodium 133 (*)     Glucose 218 (*)     Alkaline Phosphatase 163 (*)     All other components within normal limits   BRAIN NATRIURETIC PEPTIDE - Abnormal; Notable for the following components:    Pro-BNP 1,581 (*)     All other components within normal limits   CULTURE, BLOOD 1   CULTURE, BLOOD 2   TROPONIN   LACTIC ACID, PLASMA   COVID-19   URINE RT REFLEX TO CULTURE   TROPONIN       All other labs were within normal range or not returned as of this dictation. EMERGENCY DEPARTMENT COURSE and DIFFERENTIAL DIAGNOSIS/MDM:   Vitals:    Vitals:    08/18/20 0433 08/18/20 0533 08/18/20 0633 08/18/20 0732   BP: (!) 175/94 (!) 182/109 (!) 184/103 (!) 171/81   Pulse: 91 98 90 102   Resp: 21 22 19 16   Temp:       SpO2: 94% 94% 95% 94%   Weight:       Height:           MDM  78-year-old male presents with chest pain and subjective fever at home. Patient's temperature here at the emergency department is 98.7 degrees. Patient has intermittent cough that is nonproductive. Patient has pitting pedal edema. COVID test is negative. Discussed chest pain with Dr. Sanchez Morrow, on-call cardiologist.  He recommends  Obtaining an echocardiogram.  Discussed/signed out with Dr. Charanjit Sarmiento, emergency physician, due to end of shift. CONSULTS:  None    PROCEDURES:  Unless otherwise noted below, none     Procedures    FINAL IMPRESSION      1. Chest pain, unspecified type    2. Congestive heart failure, unspecified HF chronicity, unspecified heart failure type ([facility] [address]          DISPOSITION/PLAN   DISPOSITION pending      PATIENT REFERRED TO:  No follow-up provider specified.     DISCHARGE MEDICATIONS:  New Prescriptions    No medications on file          (Please note that portions of this note were completed with a voice recognition program.  Efforts were made to edit thedictations but occasionally words are mis-transcribed.)    Garrett Alvarenga MD (electronically signed)  Attending Emergency Physician          Garrett Alvarenga MD  08/18/20 0720

## 2020-08-18 NOTE — ED NOTES
Bed: 20  Expected date:   Expected time:   Means of arrival:   Comments:     Meliton Merlos RN  08/18/20 7866

## 2020-08-18 NOTE — ED NOTES
PT is still unable to provide a urine sample, Dr. Baez Flair notified.       Brianna Heaton RN  08/18/20 4620

## 2020-08-18 NOTE — ED PROVIDER NOTES
[facility] EMERGENCY DEPT  eMERGENCYdEPARTMENT eNCOUnter      Pt Name: Arsalan Hewitt  MRN: [id number]  [place] 1965  Date of evaluation: 8/17/2020  Lotus Zamorano MD    Emergency Department care of this patient was assumed at 0900 from Dr. Ladan French. We have discussed the case and the plan of care. I have seen and evaluated patient and reviewed ED course. CHIEF COMPLAINT       Chief Complaint   Patient presents with    Fever     pt reports temp of 100.4 at home; temp here 98.7    Chest Pain     center of chest \"all day\"         PHYSICAL EXAM    (up to 7 for level 4, 8 or more for level 5)     ED Triage Vitals [08/17/20 2248]   BP Temp Temp src Pulse Resp SpO2 Height Weight   (!) 179/114 98.7 °F (37.1 °C) -- 88 19 96 % 5' 10\" (1.778 m) 270 lb (122.5 kg)       Physical Exam  Vitals signs and nursing note reviewed. Constitutional:       Appearance: Normal appearance. He is not ill-appearing or diaphoretic. Comments: Intermittently coughing   HENT:      Head: Normocephalic and atraumatic. Nose: Nose normal.      Mouth/Throat:      Mouth: Mucous membranes are moist.   Eyes:      Extraocular Movements: Extraocular movements intact. Conjunctiva/sclera: Conjunctivae normal.   Cardiovascular:      Rate and Rhythm: Normal rate. Pulses: Normal pulses. Pulmonary:      Effort: Pulmonary effort is normal. No respiratory distress. Breath sounds: Examination of the right-lower field reveals decreased breath sounds. Examination of the left-lower field reveals decreased breath sounds. Decreased breath sounds present. Abdominal:      General: Abdomen is flat. Neurological:      Mental Status: He is alert and oriented to person, place, and time.          DIAGNOSTIC RESULTS     EKG: All EKG's are interpreted by the Emergency Department Physician who either signs or Co-signs this chart inthe absence of a cardiologist.      RADIOLOGY:   Non-plain film imagessuch as CT, Ultrasound and MRI are read by the radiologist. Mojgan Bevel radiographic images are visualized and preliminarily interpreted by the emergency physician with the below findings:        XR CHEST PORTABLE   Final Result   The poor lung expansion but no active cardiopulmonary   disease. A moderate cardiomegaly. Signed by Dr Mateusz Preciado on 8/18/2020 7:46 AM              LABS:  Labs Reviewed   CBC WITH AUTO DIFFERENTIAL - Abnormal; Notable for the following components:       Result Value    RBC 4.45 (*)     Hemoglobin 13.9 (*)     Hematocrit 40.3 (*)     MCH 31.2 (*)     Neutrophils % 75.4 (*)     Lymphocytes % 12.1 (*)     Monocytes % 10.8 (*)     Lymphocytes Absolute 1.0 (*)     All other components within normal limits   COMPREHENSIVE METABOLIC PANEL W/ REFLEX TO MG FOR LOW K - Abnormal; Notable for the following components:    Sodium 133 (*)     Glucose 218 (*)     Alkaline Phosphatase 163 (*)     All other components within normal limits   BRAIN NATRIURETIC PEPTIDE - Abnormal; Notable for the following components:    Pro-BNP 1,581 (*)     All other components within normal limits   CULTURE, BLOOD 1   CULTURE, BLOOD 2   TROPONIN   LACTIC ACID, PLASMA   COVID-19   TROPONIN   URINE RT REFLEX TO CULTURE   PROTIME-INR       All other labs were within normal range or not returned as of this dictation.     EMERGENCY DEPARTMENT COURSE and DIFFERENTIAL DIAGNOSIS/MDM:   Vitals:    Vitals:    08/18/20 0732 08/18/20 0902 08/18/20 1002 08/18/20 1345   BP: (!) 171/81 (!) 155/99 (!) 158/85 (!) 172/100   Pulse: 102 87 83 85   Resp: 16      Temp:    98.3 °F (36.8 °C)   SpO2: 94% 96% 95% 96%   Weight:       Height:           MDM    Labs completed, Dr. Paulette Dennis spoke with Dr. Jovana Romero request 2d echo and then can plan to follow up, pt had some chest pain and seems atypical trop negx2,  but his main complaint to me is his cough, pt requesting something to help him with cough, no steroids given on coumadin and DM, spoke with Dr. Jovana Romero in regards to echo and given verbal report over phone and ok to DC, pt understands follow up and return precautions, pt on coumadin and no INR drawn, have sent blood and will follow up, pt ready to leave and understandably after long ER stay    CONSULTS:  None    PROCEDURES:  Unless otherwise noted below, none     Procedures    FINAL IMPRESSION      1. Chest pain, unspecified type    2. Congestive heart failure, unspecified HF chronicity, unspecified heart failure type ([address]    3. Cough          DISPOSITION/PLAN   DISPOSITION Decision To Discharge    PATIENT REFERRED TO:  Emili Gramajo MD  [facility] [address] [id number] [phone]    Schedule an appointment as soon as possible for a visit in 1 week        DISCHARGE MEDICATIONS:  New Prescriptions    ALBUTEROL SULFATE HFA (VENTOLIN HFA) 108 (90 BASE) MCG/ACT INHALER    Inhale 2 puffs into the lungs 4 times daily as needed for Wheezing    AZITHROMYCIN (ZITHROMAX Z-JIMMIE) 250 MG TABLET    Take 2 tablets (500 mg) on Day 1, and then take 1 tablet (250 mg) on days 2 through 5.     BENZONATATE (TESSALON) 100 MG CAPSULE    Take 1 capsule by mouth 3 times daily as needed for Cough          (Please note that portions ofthis note were completed with a voice recognition program.  Efforts were made to edit the dictations but occasionally words are mis-transcribed.)    Fanta Pope MD(electronically signed)  Attending Emergency Physician         Crispin Sparks MD  08/18/20 [address], [place]  08/18/20 0580

## 2020-08-19 ENCOUNTER — CARE COORDINATION (OUTPATIENT)
Dept: CARE COORDINATION | Age: 55
End: 2020-08-19

## 2020-08-19 NOTE — CARE COORDINATION
Patient contacted regarding recent discharge and COVID-19 risk. Discussed COVID-19 related testing which was available at this time. Test results were negative. Patient informed of results, if available? Yes     Care Transition Nurse/ Ambulatory Care Manager contacted the patient by telephone to perform post discharge assessment. Verified name and  with patient as identifiers. Patient has following risk factors of: heart failure and diabetes. CTN/ACM reviewed discharge instructions, medical action plan and red flags related to discharge diagnosis. Reviewed and educated them on any new and changed medications related to discharge diagnosis. Advised obtaining a 90-day supply of all daily and as-needed medications. Angel Singh said he is not coughing as much today and is taking his Zpak, using the [employer]. He could not afford the inhaler. Pt has his routine medications and is compliant. ACM offered to work on getting an inhaler for pt but he declined, stated he doesn't think he needs it. Pt checked his temperature and has no fever per home check during call. ACM contacted [facility] while on call with patient - Veldon Goldmann assisted patient to schedule a follow up appt: Future Appointments   Date Time Provider Chris Camarena   2020  8:00 AM NOMI Kasper [facility]   2020  9:45 AM SCHEDULE, [facility] CARDIOLOGY PRO TIME [facility] Card[facility]   2020  9:45 AM Milagros Elgin Sandifer, MD [facility]   2021  1:30 PM Karie Musa MD [facility] Cardio Yuliene Gitelman was encouraged to drink plenty of fluids and stay well hydrated. He is aware of COVID precautions and is practicing CDC recommended steps to reduce exposure risk. He agreed to f/u call with ACM as indicated. Education provided regarding infection prevention, and signs and symptoms of COVID-19 and when to seek medical attention with patient who verbalized understanding.  Discussed exposure protocols and quarantine from [facility] Guidelines Are you at higher risk for severe illness 2019 and given an opportunity for questions and concerns. The patient agrees to contact the COVID-19 hotline 819-586-6068 or PCP office for questions related to their healthcare. CTN/ACM provided contact information for future reference. From CDC: Are you at higher risk for severe illness?  Wash your hands often.  Avoid close contact (6 feet, which is about two arm lengths) with people who are sick.  Put distance between yourself and other people if COVID-19 is spreading in your community.  Clean and disinfect frequently touched surfaces.  Avoid all cruise travel and non-essential air travel.  Call your healthcare professional if you have concerns about COVID-19 and your underlying condition or if you are sick. For more information on steps you can take to protect yourself, see CDC's How to Stacie for follow up call in 3 weeks based on severity of symptoms and risk factors.

## 2020-08-23 LAB
BLOOD CULTURE, ROUTINE: NORMAL
CULTURE, BLOOD 2: NORMAL

## 2020-08-25 ENCOUNTER — OFFICE VISIT (OUTPATIENT)
Dept: CARDIOLOGY | Age: 55
End: 2020-08-25
Payer: MEDICARE

## 2020-08-25 VITALS
DIASTOLIC BLOOD PRESSURE: 84 MMHG | SYSTOLIC BLOOD PRESSURE: 142 MMHG | WEIGHT: 275 LBS | BODY MASS INDEX: 39.37 KG/M2 | HEART RATE: 105 BPM | HEIGHT: 70 IN

## 2020-08-25 LAB
INTERNATIONAL NORMALIZATION RATIO, POC: 2.6
PROTHROMBIN TIME, POC: NORMAL

## 2020-08-25 PROCEDURE — 99214 OFFICE O/P EST MOD 30 MIN: CPT | Performed by: CLINICAL NURSE SPECIALIST

## 2020-08-25 PROCEDURE — G8427 DOCREV CUR MEDS BY ELIG CLIN: HCPCS | Performed by: CLINICAL NURSE SPECIALIST

## 2020-08-25 PROCEDURE — 93000 ELECTROCARDIOGRAM COMPLETE: CPT | Performed by: CLINICAL NURSE SPECIALIST

## 2020-08-25 PROCEDURE — 85610 PROTHROMBIN TIME: CPT | Performed by: CLINICAL NURSE SPECIALIST

## 2020-08-25 PROCEDURE — G8417 CALC BMI ABV UP PARAM F/U: HCPCS | Performed by: CLINICAL NURSE SPECIALIST

## 2020-08-25 PROCEDURE — 3017F COLORECTAL CA SCREEN DOC REV: CPT | Performed by: CLINICAL NURSE SPECIALIST

## 2020-08-25 PROCEDURE — 1036F TOBACCO NON-USER: CPT | Performed by: CLINICAL NURSE SPECIALIST

## 2020-08-25 RX ORDER — FUROSEMIDE 20 MG/1
20 TABLET ORAL DAILY PRN
Qty: 30 TABLET | Refills: 1 | Status: SHIPPED | OUTPATIENT
Start: 2020-08-25 | End: 2020-12-11 | Stop reason: SDUPTHER

## 2020-08-25 NOTE — PROGRESS NOTES
[address] [place], [address] [address]  Phone: [phone]  Fax: [phone]    OFFICE VISIT:  2020    Nanette Colette - : 1965    Reason For Visit:  Stephane Salinas is a 62 y.o. male who is here for Follow-up (patient has edema  ); Atrial Fibrillation; and Hypertension  History of atrial fibrillation that has become persistent. Historically had been noncompliant with medications but has been doing well over the last year. Remains anticoagulated on Coumadin. Had negative dobutamine stress echo 2018 and 2D echo   2D echo shows normal LV systolic function with EF 60%.  Mild concentric LVH. [address] MR  Patient was seen for routine visit 2020. Stable HSU but uncontrolled diabetic. States he is working with endocrinologist we discussed the importance of increasing activity. This is limited due to balance issues. Patient presented to the emergency room 2020 with persistent chest pain, intermittent coughing and fever. Chest pain was thought to be atypical  Negative troponins. Elevated BNP 1581  2D echo 2020   Structurally normal mitral valve. Mild mitral regurgitation is present. Mitral annular calcification is present. Structurally normal aortic valve. Tricuspid valve is structurally normal.   Mild tricuspid regurgitation. Normal size left atrium. Normal intact intra-atrial septum was noted with no evidence of   significant intra-atrial communications by color flow Doppler or by   agitated saline study. Normal left ventricular size with preserved LV function and an estimated   ejection fraction of approximately 55-60%. Moderate concentric left ventricular hypertrophy. No regional wall motion abnormalities. no    Is here today in follow-up. He states he urinated a lot after receiving antibiotic. He thinks he needs another antibiotic. He finished the Z-Tyrese.   Continues to have a dry productive cough but is taking the cough medicine  Swelling in legs. No improvement but significant with elevation. Subjective  Yevoawildae Diony denies exertional chest pain, has HSU but no resting shortness of breath, orthopnea, paroxysmal nocturnal dyspnea, syncope, presyncope, arrhythmia, edema and fatigue. The patient denies numbness or weakness to suggest cerebrovascular accident or transient ischemic attack. Jake Yates MD is PCP and follows labs.   Following with endocrinologist  Spencer Mendoza has the following history as recorded in [facility]:    Patient Active Problem List    Diagnosis Date Noted    Morbidly obese ([address] 06/12/2020    Hyponatremia 03/21/2019    Hypotension due to hypovolemia 03/21/2019    Type 2 diabetes mellitus with diabetic polyneuropathy, with long-term current use of insulin ([address] 03/21/2019    Coagulopathy ([facility] [address] 03/21/2019    Acalculous cholecystitis 12/29/2015    Shortness of breath 12/18/2014    Fatigue 12/18/2014    Paroxysmal atrial fibrillation ([address] 12/18/2014    HTN (hypertension)     Hyperlipidemia      Past Medical History:   Diagnosis Date    Allergic rhinitis, cause unspecified     Atrial fibrillation, chronic     Charcot's joint of right foot     Chicken pox     History of MRSA infection     HTN (hypertension)     Hyperlipidemia     Impotence of organic origin     Other dyspnea and respiratory abnormality     Other specified erythematous condition(695.89)     Personal history of other infectious and parasitic disease     Type II or unspecified type diabetes mellitus without mention of complication, uncontrolled 1994     Past Surgical History:   Procedure Laterality Date    CARDIOVERSION  03/2017    COLONOSCOPY  02/24/2020    Dr Melanie Fraire-Melanosis coli throughout the entire colon-AP, 5 yr recall    MALIGNANT SKIN LESION EXCISION      X 2    TONSILLECTOMY       Family History   Problem Relation Age of Onset    Stroke Maternal Grandmother     Cancer Maternal Grandmother     Stroke Paternal Grandmother     Diabetes Paternal Grandmother     Cancer Father     Heart Disease Father     Lung Cancer Father     Asthma Paternal Grandfather     Heart Disease Paternal Grandfather     Heart Disease Maternal Grandfather     Colon Cancer Neg Hx     Colon Polyps Neg Hx     Esophageal Cancer Neg Hx     Liver Cancer Neg Hx     Liver Disease Neg Hx     Rectal Cancer Neg Hx     Stomach Cancer Neg Hx      Social History     Tobacco Use    Smoking status: Never Smoker    Smokeless tobacco: Never Used   Substance Use Topics    Alcohol use: No      Current Outpatient Medications   Medication Sig Dispense Refill    furosemide (LASIX) 20 MG tablet Take 1 tablet by mouth daily as needed (edema) 30 tablet 1    Insulin Degludec (TRESIBA) 100 UNIT/ML SOLN Inject 80 Units/day into the skin      benzonatate (TESSALON) 100 MG capsule Take 1 capsule by mouth 3 times daily as needed for Cough 30 capsule 0    losartan (COZAAR) 100 MG tablet TAKE ONE TABLET DAILY 30 tablet 3    atorvastatin (LIPITOR) 20 MG tablet TAKE ONE TABLET DAILY 90 tablet 3    tamsulosin (FLOMAX) 0.4 MG capsule TAKE TWO CAPSULES DAILY 60 capsule 11    BD INSULIN SYRINGE U/F 31G X 5/16\" 1 ML MISC USE FOUR TIMES DAILY 100 each 3    warfarin (COUMADIN) 10 MG tablet Take 1 tablet by mouth daily 45 tablet 5    glimepiride (AMARYL) 4 MG tablet TAKE ONE TABLET EVERY MORNING BEFORE BREAKFAST 90 tablet 3    warfarin (COUMADIN) 5 MG tablet TAKE AS DIRECTED BY YOUR DOCTOR 30 tablet 5    diphenhydrAMINE (BENADRYL) 25 MG tablet Take 25 mg by mouth every 6 hours as needed for Itching      metoprolol tartrate (LOPRESSOR) 25 MG tablet TAKE ONE TABLET TWICE DAILY (Patient taking differently: daily ) 60 tablet 5    blood glucose test strips (CONTOUR NEXT TEST) strip 1 each by In Vitro route 4 times daily As needed.  200 each 3    Lancets MISC 1 each by Does not apply route 4 times daily 300 each 1    B-D ULTRAFINE III SHORT PEN 31G X 8 MM MISC Inject 1 each as directed 3 times daily 100 each 5     No current facility-administered medications for this visit. Allergies: Pcn [penicillins]; Neomycin-bacitracin zn-polymyx; and Neosporin [neomycin-polymyxin-gramicidin]    Review of Systems  Constitutional - no significant activity change, appetite change, or unexpected weight change. No fever, chills or diaphoresis. + fatigue. HEENT - no significant rhinorrhea or epistaxis. No tinnitus or significant hearing loss. Eyes - no sudden vision change or amaurosis. Respiratory - no significant wheezing, stridor, apnea + cough. + dyspnea on exertion + shortness of breath. Cardiovascular - no exertional chest pain, orthopnea or PND. + sensation of arrhythmia  No  slow heart rate. No claudication + leg edema. Gastrointestinal - no abdominal swelling or pain. No blood in stool. No severe constipation, diarrhea, nausea, or vomiting. Genitourinary - +difficulty urinating, no  dysuria, frequency, or urgency. No flank pain or hematuria. Musculoskeletal -+ unsteady gait, uses cane no recent falls  Skin - no color change or rash. No pallor. No new surgical incision. Neurologic - no speech difficulty, facial asymmetry or lateralizing weakness. No seizures, presyncope, syncope, or significant dizziness. Hematologic - no easy bruising or excessive bleeding. Psychiatric - no severe anxiety or insomnia. No confusion. All other review of systems are negative. Objective  Vital Signs - BP (!) 142/84   Pulse 105   Ht 5' 10\" (1.778 m)   Wt 275 lb (124.7 kg)   BMI 39.46 kg/m²   General - Yevonne Ion is alert, cooperative, and pleasant. Well groomed. No acute distress. Body habitus is obese. HEENT - The head is normocephalic. No circumoral cyanosis. Dentition is normal.   EYES -  No Xanthelasma, no arcus senilis, no conjunctival hemorrhages or discharge. Neck - Supple, without increased jugular venous pressures. No carotid bruits. No mass. Respiratory - Lungs are clear bilaterally. No wheezes or rales. Normal effort without use of accessory muscles. Cardiovascular - Heart has irregular rhythm and tachycardic rate. No murmurs, rubs or gallops. + pedal pulses and no varicosities. Abdominal -  Soft, nontender, nondistended. Bowel sounds are intact. Extremities - No clubbing, cyanosis, 2+ pitting BLE edema. Musculoskeletal -  No clubbing . No Osler's nodes. Gait -uses cane. No kyphosis or scoliosis. Skin -  no statis ulcers or dermatitis. Neurological - No focal signs are identified. Oriented to person, place and time. Psychiatric -  Appropriate affect and mood. Assessment:     Diagnosis Orders   1. Chronic atrial fibrillation  EKG 12 lead    POCT INR   2. Essential hypertension  EKG 12 lead   3. Localized edema     4. Shortness of breath       Data:  BP Readings from Last 3 Encounters:   08/25/20 (!) 142/84   08/18/20 (!) 172/100   06/11/20 130/70    Pulse Readings from Last 3 Encounters:   08/25/20 105   08/18/20 85   06/11/20 71        Wt Readings from Last 3 Encounters:   08/25/20 275 lb (124.7 kg)   08/17/20 270 lb (122.5 kg)   07/30/20 271 lb (122.9 kg)     Lab Results   Component Value Date    INR 2.6 08/25/2020    INR 1.82 (H) 08/18/2020    INR 2.8 07/30/2020    PROTIME 21.3 (H) 08/18/2020    PROTIME 24.2 (H) 04/01/2019    PROTIME 23.6 (H) 03/31/2019   EKG today shows atrial fibrillation with a rate of 109  Blood pressure and heart rate elevated. Recent bronchitis  Has only been taking his Lopressor once a day. We will make sure he starts taking it twice a day-should help blood pressure and heart rate  Recent echo was stable  He is holding on some fluid in his legs with slightly elevated BNP. We will give him some Lasix to take for couple days. Has had history of difficulty with urination.   Has followed with urology in the past, is on Flomax    Overall reassurance given that symptoms most likely from bronchitis. Will take a while for cough to resolve. Discussed monitoring for fluid retention and using diuretic as needed      Plan    Take your Lopressor twice a day  Continue same coumadin dose and recheck in 1 mo  Take lasix 20mg for 2-3 days for swelling then just take as needed  Elevated feet when siting  Follow up in Feb as planned  Call with any questions or concerns  Follow up with Paxton Sanchez MD for non cardiac problems  Report any new problems  Cardiovascular Fitness-Exercise as tolerated. Strive for 30 minutes of exercise most days of the week. Cardiac / Healthy Diet- low salt diet   Continue current medications as directed  Continue plan of treatment  It is always recommended that you bring your medications bottles with you to each visit - this is for your safety! NOMI Astorga dragon/transcription disclaimer: Much of this encounter note is electronic transcription/translation of spoken language to printed tach. Electronic translation of spoken language may be erroneous, or at times, nonsensical words or phrases may be inadvertently transcribed.  Although, I have reviewed the note for such errors, some may still exist.

## 2020-08-25 NOTE — PATIENT INSTRUCTIONS
Take your Lopressor twice a day  Continue same coumadin dose and recheck in 1 mo  Take lasix 20mg for 2-3 days for swelling then just take as needed  Elevated feet when siting  Follow up in Feb as planned  Call with any questions or concerns  Follow up with Shama Rodriguez MD for non cardiac problems  Report any new problems  Cardiovascular Fitness-Exercise as tolerated. Strive for 30 minutes of exercise most days of the week. Cardiac / Healthy Diet- low salt diet   Continue current medications as directed  Continue plan of treatment  It is always recommended that you bring your medications bottles with you to each visit - this is for your safety!

## 2020-09-06 ENCOUNTER — APPOINTMENT (OUTPATIENT)
Dept: GENERAL RADIOLOGY | Age: 55
End: 2020-09-06
Payer: MEDICARE

## 2020-09-06 ENCOUNTER — APPOINTMENT (OUTPATIENT)
Dept: CT IMAGING | Age: 55
End: 2020-09-06
Payer: MEDICARE

## 2020-09-06 ENCOUNTER — HOSPITAL ENCOUNTER (EMERGENCY)
Age: 55
Discharge: HOME | End: 2020-09-06
Payer: MEDICARE

## 2020-09-06 VITALS
DIASTOLIC BLOOD PRESSURE: 88 MMHG | WEIGHT: 271 LBS | OXYGEN SATURATION: 95 % | TEMPERATURE: 98.5 F | BODY MASS INDEX: 38.8 KG/M2 | HEIGHT: 70 IN | RESPIRATION RATE: 18 BRPM | SYSTOLIC BLOOD PRESSURE: 150 MMHG | HEART RATE: 85 BPM

## 2020-09-06 LAB
ALBUMIN SERPL-MCNC: 3.9 G/DL (ref 3.5–5.2)
ALP BLD-CCNC: 210 U/L (ref 40–130)
ALT SERPL-CCNC: 43 U/L (ref 5–41)
ANION GAP SERPL CALCULATED.3IONS-SCNC: 12 MMOL/L (ref 7–19)
APTT: 36.2 SEC (ref 26–36.2)
AST SERPL-CCNC: 28 U/L (ref 5–40)
BASOPHILS ABSOLUTE: 0.1 K/UL (ref 0–0.2)
BASOPHILS RELATIVE PERCENT: 0.4 % (ref 0–1)
BILIRUB SERPL-MCNC: 1.4 MG/DL (ref 0.2–1.2)
BUN BLDV-MCNC: 14 MG/DL (ref 6–20)
CALCIUM SERPL-MCNC: 8.8 MG/DL (ref 8.6–10)
CHLORIDE BLD-SCNC: 95 MMOL/L (ref 98–111)
CO2: 24 MMOL/L (ref 22–29)
CREAT SERPL-MCNC: 0.7 MG/DL (ref 0.5–1.2)
EOSINOPHILS ABSOLUTE: 0 K/UL (ref 0–0.6)
EOSINOPHILS RELATIVE PERCENT: 0.1 % (ref 0–5)
GFR AFRICAN AMERICAN: >59
GFR NON-AFRICAN AMERICAN: >60
GLUCOSE BLD-MCNC: 335 MG/DL (ref 74–109)
HCT VFR BLD CALC: 39.4 % (ref 42–52)
HEMOGLOBIN: 13.3 G/DL (ref 14–18)
IMMATURE GRANULOCYTES #: 0 K/UL
INR BLD: 2.14 (ref 0.88–1.18)
LYMPHOCYTES ABSOLUTE: 1.3 K/UL (ref 1.1–4.5)
LYMPHOCYTES RELATIVE PERCENT: 10.6 % (ref 20–40)
MCH RBC QN AUTO: 31.1 PG (ref 27–31)
MCHC RBC AUTO-ENTMCNC: 33.8 G/DL (ref 33–37)
MCV RBC AUTO: 92.3 FL (ref 80–94)
MONOCYTES ABSOLUTE: 0.9 K/UL (ref 0–0.9)
MONOCYTES RELATIVE PERCENT: 7.5 % (ref 0–10)
NEUTROPHILS ABSOLUTE: 10 K/UL (ref 1.5–7.5)
NEUTROPHILS RELATIVE PERCENT: 81.2 % (ref 50–65)
PDW BLD-RTO: 13.1 % (ref 11.5–14.5)
PLATELET # BLD: 311 K/UL (ref 130–400)
PMV BLD AUTO: 11.1 FL (ref 9.4–12.4)
POTASSIUM REFLEX MAGNESIUM: 4.6 MMOL/L (ref 3.5–5)
PROTHROMBIN TIME: 24.3 SEC (ref 12–14.6)
RBC # BLD: 4.27 M/UL (ref 4.7–6.1)
SODIUM BLD-SCNC: 131 MMOL/L (ref 136–145)
TOTAL PROTEIN: 6.9 G/DL (ref 6.6–8.7)
WBC # BLD: 12.3 K/UL (ref 4.8–10.8)

## 2020-09-06 PROCEDURE — 29105 APPLICATION LONG ARM SPLINT: CPT

## 2020-09-06 PROCEDURE — 73080 X-RAY EXAM OF ELBOW: CPT

## 2020-09-06 PROCEDURE — 96375 TX/PRO/DX INJ NEW DRUG ADDON: CPT

## 2020-09-06 PROCEDURE — 99283 EMERGENCY DEPT VISIT LOW MDM: CPT

## 2020-09-06 PROCEDURE — 36415 COLL VENOUS BLD VENIPUNCTURE: CPT

## 2020-09-06 PROCEDURE — 96374 THER/PROPH/DIAG INJ IV PUSH: CPT

## 2020-09-06 PROCEDURE — 12011 RPR F/E/E/N/L/M 2.5 CM/<: CPT

## 2020-09-06 PROCEDURE — 70486 CT MAXILLOFACIAL W/O DYE: CPT

## 2020-09-06 PROCEDURE — 2500000003 HC RX 250 WO HCPCS: Performed by: PHYSICIAN ASSISTANT

## 2020-09-06 PROCEDURE — 85730 THROMBOPLASTIN TIME PARTIAL: CPT

## 2020-09-06 PROCEDURE — 96376 TX/PRO/DX INJ SAME DRUG ADON: CPT

## 2020-09-06 PROCEDURE — 73060 X-RAY EXAM OF HUMERUS: CPT

## 2020-09-06 PROCEDURE — 73200 CT UPPER EXTREMITY W/O DYE: CPT

## 2020-09-06 PROCEDURE — 85025 COMPLETE CBC W/AUTO DIFF WBC: CPT

## 2020-09-06 PROCEDURE — 80053 COMPREHEN METABOLIC PANEL: CPT

## 2020-09-06 PROCEDURE — 6360000002 HC RX W HCPCS: Performed by: PHYSICIAN ASSISTANT

## 2020-09-06 PROCEDURE — 72125 CT NECK SPINE W/O DYE: CPT

## 2020-09-06 PROCEDURE — 93005 ELECTROCARDIOGRAM TRACING: CPT | Performed by: PHYSICIAN ASSISTANT

## 2020-09-06 PROCEDURE — 70450 CT HEAD/BRAIN W/O DYE: CPT

## 2020-09-06 PROCEDURE — 85610 PROTHROMBIN TIME: CPT

## 2020-09-06 PROCEDURE — 99282 EMERGENCY DEPT VISIT SF MDM: CPT

## 2020-09-06 PROCEDURE — 71045 X-RAY EXAM CHEST 1 VIEW: CPT

## 2020-09-06 RX ORDER — MORPHINE SULFATE 4 MG/ML
4 INJECTION, SOLUTION INTRAMUSCULAR; INTRAVENOUS ONCE
Status: COMPLETED | OUTPATIENT
Start: 2020-09-06 | End: 2020-09-06

## 2020-09-06 RX ORDER — HYDROCODONE BITARTRATE AND ACETAMINOPHEN 5; 325 MG/1; MG/1
1 TABLET ORAL EVERY 6 HOURS PRN
Qty: 10 TABLET | Refills: 0 | Status: SHIPPED | OUTPATIENT
Start: 2020-09-06 | End: 2020-09-09

## 2020-09-06 RX ORDER — MORPHINE SULFATE 10 MG/ML
6 INJECTION, SOLUTION INTRAMUSCULAR; INTRAVENOUS ONCE
Status: COMPLETED | OUTPATIENT
Start: 2020-09-06 | End: 2020-09-06

## 2020-09-06 RX ORDER — BUPIVACAINE HYDROCHLORIDE 5 MG/ML
30 INJECTION, SOLUTION EPIDURAL; INTRACAUDAL ONCE
Status: COMPLETED | OUTPATIENT
Start: 2020-09-06 | End: 2020-09-06

## 2020-09-06 RX ORDER — ONDANSETRON 2 MG/ML
4 INJECTION INTRAMUSCULAR; INTRAVENOUS ONCE
Status: COMPLETED | OUTPATIENT
Start: 2020-09-06 | End: 2020-09-06

## 2020-09-06 RX ADMIN — ONDANSETRON 4 MG: 2 INJECTION INTRAMUSCULAR; INTRAVENOUS at 13:29

## 2020-09-06 RX ADMIN — MORPHINE SULFATE 6 MG: 10 INJECTION INTRAVENOUS at 15:00

## 2020-09-06 RX ADMIN — BUPIVACAINE HYDROCHLORIDE 150 MG: 5 INJECTION, SOLUTION EPIDURAL; INTRACAUDAL; PERINEURAL at 16:41

## 2020-09-06 RX ADMIN — MORPHINE SULFATE 4 MG: 4 INJECTION, SOLUTION INTRAMUSCULAR; INTRAVENOUS at 13:29

## 2020-09-06 ASSESSMENT — PAIN SCALES - GENERAL
PAINLEVEL_OUTOF10: 6
PAINLEVEL_OUTOF10: 6

## 2020-09-07 ASSESSMENT — ENCOUNTER SYMPTOMS
EYE ITCHING: 0
BACK PAIN: 0
APNEA: 0
COLOR CHANGE: 0
SHORTNESS OF BREATH: 0
PHOTOPHOBIA: 0
COUGH: 0
EYE DISCHARGE: 0

## 2020-09-07 NOTE — ED PROVIDER NOTES
[facility] [place] [facility] EMERGENCY DEPT  eMERGENCYdEPARTMENT eNCOUnter      Pt Name: Richard Acuna  MRN: [id number]  [place] 1965  Date of evaluation: 9/6/2020  Provider:MANOLO Cannon    CHIEF COMPLAINT       Chief Complaint   Patient presents with    Arm Injury     Zoey Romero this morning 0130 this AM         HISTORY OF PRESENT ILLNESS  (Location/Symptom, Timing/Onset, Context/Setting, Quality, Duration, Modifying Factors, Severity.)   Richard Acuna is a 62 y.o. male who presents to the emergency department with complaints of syncope collapse this a.m. going to get a drink he admits to starting to cough walking to his kitchen unsure if he vasovagal he states that he was only out for a few seconds his son is with him. His son states that he heard his dad fall. He came to and has acted normal as far as mentation is concerned. The patient's main concern at this time is swelling to his lower lip there is concern for laceration internally. Patient is on blood thinners. Has history of paroxysmal A. fib. Patient has significant swelling and pain to his left elbow he is holding it denies pain going to his shoulder but has pain about his humeral shaft as well into his forearm. He has no neurovascular deficits going past the elbow into his left wrist or hand no other arthralgias complaints he is ambulating here in the ER without issue. He denies any headache or vision changes. He denies any cervical pain or any pain in T,L spine. HPI    Nursing Notes were reviewed and I agree. REVIEW OF SYSTEMS    (2-9 systems for level 4, 10 or more for level 5)     Review of Systems   Constitutional: Negative for activity change, appetite change, chills and fever. HENT: Negative for congestion and dental problem. Eyes: Negative for photophobia, discharge and itching. Respiratory: Negative for apnea, cough and shortness of breath. Cardiovascular: Negative for chest pain. Musculoskeletal: Positive for arthralgias and joint swelling. Negative for back pain, gait problem, myalgias and neck pain. Skin: Positive for wound. Negative for color change, pallor and rash. Neurological: Positive for syncope. Negative for dizziness and seizures. Psychiatric/Behavioral: Negative for agitation. The patient is not nervous/anxious. Except as noted above the remainder of the review of systems was reviewed and negative.        PAST MEDICAL HISTORY     Past Medical History:   Diagnosis Date    Allergic rhinitis, cause unspecified     Atrial fibrillation, chronic     Charcot's joint of right foot     Chicken pox     History of MRSA infection     HTN (hypertension)     Hyperlipidemia     Impotence of organic origin     Other dyspnea and respiratory abnormality     Other specified erythematous condition(695.89)     Personal history of other infectious and parasitic disease     Type II or unspecified type diabetes mellitus without mention of complication, uncontrolled 1994         SURGICAL HISTORY       Past Surgical History:   Procedure Laterality Date    CARDIOVERSION  03/2017    COLONOSCOPY  02/24/2020    Dr Sunita Fraire-Melanosis coli throughout the entire colon-AP, 5 yr recall    MALIGNANT SKIN LESION EXCISION      X 2    TONSILLECTOMY           CURRENT MEDICATIONS       Discharge Medication List as of 9/6/2020  5:27 PM      CONTINUE these medications which have NOT CHANGED    Details   furosemide (LASIX) 20 MG tablet Take 1 tablet by mouth daily as needed (edema), Disp-30 tablet,R-1Normal      Insulin Degludec (TRESIBA) 100 UNIT/ML SOLN Inject 80 Units/day into the skinHistorical Med      losartan (COZAAR) 100 MG tablet TAKE ONE TABLET DAILY, Disp-30 tablet,R-3Normal      atorvastatin (LIPITOR) 20 MG tablet TAKE ONE TABLET DAILY, Disp-90 tablet,R-3Normal      tamsulosin (FLOMAX) 0.4 MG capsule TAKE TWO CAPSULES DAILY, Disp-60 capsule,R-11Normal      !! warfarin (COUMADIN) 10 MG tablet Take 1 tablet by mouth daily, Disp-45 tablet, Food insecurity     Worry: None     Inability: None    Transportation needs     Medical: None     Non-medical: None   Tobacco Use    Smoking status: Never Smoker    Smokeless tobacco: Never Used   Substance and Sexual Activity    Alcohol use: No    Drug use: No    Sexual activity: None   Lifestyle    Physical activity     Days per week: 0 days     Minutes per session: 0 min    Stress: To some extent   Relationships    Social connections     Talks on phone: None     Gets together: None     Attends Druze service: None     Active member of club or organization: None     Attends meetings of clubs or organizations: None     Relationship status: None    Intimate partner violence     Fear of current or ex partner: None     Emotionally abused: None     Physically abused: None     Forced sexual activity: None   Other Topics Concern    None   Social History Narrative    None       SCREENINGS           PHYSICAL EXAM    (up to 7 forlevel 4, 8 or more for level 5)     ED Triage Vitals [09/06/20 1247]   BP Temp Temp Source Pulse Resp SpO2 Height Weight   (!) 186/107 98.5 °F (36.9 °C) Oral 85 18 94 % 5' 10\" (1.778 m) 271 lb (122.9 kg)       Physical Exam  Vitals signs and nursing note reviewed. Constitutional:       General: He is not in acute distress. Appearance: Normal appearance. He is well-developed. He is not diaphoretic. HENT:      Head: Normocephalic. Right Ear: Tympanic membrane, ear canal and external ear normal.      Left Ear: Tympanic membrane, ear canal and external ear normal.      Nose: Nose normal.      Mouth/Throat:      Mouth: Mucous membranes are moist.     Eyes:      Pupils: Pupils are equal, round, and reactive to light. Neck:      Musculoskeletal: Normal range of motion and neck supple. Trachea: No tracheal deviation. Cardiovascular:      Rate and Rhythm: Normal rate and regular rhythm. Pulses: Normal pulses. Heart sounds: Normal heart sounds. No murmur. Signed by Dr Saul Sevilla on 9/6/2020 2:25 PM      XR CHEST PORTABLE   Final Result   Impression:   No acute cardiopulmonary disease. Signed by Dr Saul Sevilla on 9/6/2020 1:22 PM      XR ELBOW LEFT (MIN 3 VIEWS)   Final Result   Findings and impression:   1. Lucency and cortical disruption involving the distal femur, most   concerning for acute mildly comminuted displaced intercondylar   fracture. 2.  Irregularity of the radial head, concerning for a nondisplaced   fracture. Signed by Dr Saul Sevilla on 9/6/2020 1:31 PM  ADDENDUM #1      Intercondylar fracture involves the distal left humerus. Signed by Dr Rajiv Ramirez on 9/6/2020 7:41 PM        LABS:  Labs Reviewed   PROTIME-INR - Abnormal; Notable for the following components:       Result Value    Protime 24.3 (*)     INR 2.14 (*)     All other components within normal limits   CBC WITH AUTO DIFFERENTIAL - Abnormal; Notable for the following components:    WBC 12.3 (*)     RBC 4.27 (*)     Hemoglobin 13.3 (*)     Hematocrit 39.4 (*)     MCH 31.1 (*)     Neutrophils % 81.2 (*)     Lymphocytes % 10.6 (*)     Neutrophils Absolute 10.0 (*)     All other components within normal limits   COMPREHENSIVE METABOLIC PANEL W/ REFLEX TO MG FOR LOW K - Abnormal; Notable for the following components:    Sodium 131 (*)     Chloride 95 (*)     Glucose 335 (*)     Total Bilirubin 1.4 (*)     Alkaline Phosphatase 210 (*)     ALT 43 (*)     All other components within normal limits   APTT       All other labs were within normal range or notreturned as of this dictation.     RE-ASSESSMENT        EMERGENCY DEPARTMENT COURSE and DIFFERENTIAL DIAGNOSIS/MDM:   Vitals:    Vitals:    09/06/20 1247 09/06/20 1336 09/06/20 1600 09/06/20 1742   BP: (!) 186/107 (!) 169/92  (!) 150/88   Pulse: 85      Resp: 18      Temp: 98.5 °F (36.9 °C)      TempSrc: Oral      SpO2: 94% 94% 95%    Weight: 271 lb (122.9 kg)      Height: 5' 10\" (1.778 m)          MDM  Per Dr. Dania Bateman on call ortho he requests CT elbow. There was concern for possible humeral shaft fracture per Ct tech I have ordered humerus imaging which is benign. No findings on his forearm there is a subtle radial head irregularity which Ortho is made aware of. I placed him in oral a posterior splint and a sling. Patient to go home with narcotics follow-up closely with orthopedic for surgical intervention. He tolerates the suturing of his lip well there is no vermilion involvement and does not fully penetrate. All other imaging is negative today. PROCEDURES:    Splint Application    Date/Time: 9/7/2020 4:18 PM  Performed by: MANOLO Up  Authorized by: MANOLO Up     Consent:     Consent obtained:  Verbal    Consent given by:  Patient    Risks discussed:  Pain  Pre-procedure details:     Sensation:  Normal  Procedure details:     Laterality:  Left    Location:  Elbow    Elbow:  L elbow    Strapping: yes      Cast type:  Long arm    Splint type: posterior. Supplies:  Ortho-Glass  Post-procedure details:     Pain:  Improved    Sensation:  Normal    Patient tolerance of procedure: Tolerated well, no immediate complications  Lac Repair    Date/Time: 9/7/2020 4:18 PM  Performed by: MANOLO Up  Authorized by: MANOLO Up     Consent:     Consent obtained:  Verbal    Consent given by:  Patient    Risks discussed:  Pain and infection  Anesthesia (see MAR for exact dosages):      Anesthesia method:  Local infiltration    Local anesthetic:  Bupivacaine 0.5% w/o epi  Laceration details:     Location:  Lip    Lip location:  Lower interior lip    Length (cm):  1  Repair type:     Repair type:  Simple  Exploration:     Wound exploration: wound explored through full range of motion      Contaminated: no    Treatment:     Area cleansed with:  Saline    Amount of cleaning:  Standard    Visualized foreign bodies/material removed: no    Skin repair:     Repair method:  Sutures    Suture material:  Fast-absorbing gut    Suture

## 2020-09-08 LAB
EKG P AXIS: NORMAL DEGREES
EKG P-R INTERVAL: NORMAL MS
EKG Q-T INTERVAL: 400 MS
EKG QRS DURATION: 96 MS
EKG QTC CALCULATION (BAZETT): 417 MS
EKG T AXIS: 24 DEGREES

## 2020-09-08 PROCEDURE — 93010 ELECTROCARDIOGRAM REPORT: CPT | Performed by: INTERNAL MEDICINE

## 2020-09-09 ENCOUNTER — CARE COORDINATION (OUTPATIENT)
Dept: CARE COORDINATION | Age: 55
End: 2020-09-09

## 2020-09-09 NOTE — CARE COORDINATION
COVID-19 Screening Follow-up Note    Patient contacted regarding COVID-19 risk and screening. Care Transition Nurse/ Ambulatory Care Manager contacted the patient by telephone to perform follow-up assessment. Verified name and  with patient as identifiers. Patient has following risk factors of: Diabetes and heart failure        Symptoms reviewed with patient who verbalized the following symptoms: Pt reported arm pain secondary to a fall and fracture. Due to recent fall and arm fracture encounter has already been routed to provider for escalation. Fadia Damon has recovered from his ER visit on . No further CP or fever. Pt did return to ER on  after a fall at home. He has a fractured arm and is scheduled to see an orthopedic provider on 9/10. Pt has Norco for pain mgmt and is using this. He said he has his routine medications and needs to get a refill of his [place]. Pt said his OOP prescription cost is difficult to meet on his income. He is interested in any support that may be available. [facility] placed call to RITIKA Allred [address], left [facility] with Sancho Buckley to call back regarding whether [employer] resources are available to him as an [facility] [facility] resident. [facility] will notify pt after call back from [facility]. He vu.     Education provided regarding infection prevention, and signs and symptoms of COVID-19 and when to seek medical attention with patient who verbalized understanding. Discussed exposure protocols and quarantine from [address] you at higher risk for severe illness  and given an opportunity for questions and concerns. The patient agrees to contact the COVID-19 hotline [phone] or PCP office for questions related to their healthcare. CTN/ACM provided contact information for future reference. From CDC: Are you at higher risk for severe illness?  Wash your hands often.  Avoid close contact (6 feet, which is about two arm lengths) with people who are sick.    Put distance between yourself and other people if COVID-19 is spreading in your community.  Clean and disinfect frequently touched surfaces.  Avoid all cruise travel and non-essential air travel.  Call your healthcare professional if you have concerns about COVID-19 and your underlying condition or if you are sick.     For more information on steps you can take to protect yourself, see CDC's How to [place] for follow-up call in 1-2 days based on severity of symptoms and risk factors

## 2020-09-10 ENCOUNTER — CARE COORDINATION (OUTPATIENT)
Dept: CARE COORDINATION | Age: 55
End: 2020-09-10

## 2020-09-10 ENCOUNTER — TELEPHONE (OUTPATIENT)
Dept: CARDIOLOGY | Age: 55
End: 2020-09-10

## 2020-09-10 NOTE — TELEPHONE ENCOUNTER
Tahir Foote [place] with [facility] called to report that patient took himself off his coumadin on 9/5. He was in ED on 9/6/20 for lip laceration. They have patient schedule for surgery on the 17th. Advised they need to send a clearance request but I would let you know that patient has stopped his coumadin on his own. He voiced understanding.

## 2020-09-11 ENCOUNTER — TELEPHONE (OUTPATIENT)
Dept: INTERNAL MEDICINE | Age: 55
End: 2020-09-11

## 2020-09-14 ENCOUNTER — HOSPITAL ENCOUNTER (OUTPATIENT)
Dept: WOUND CARE | Age: 55
Discharge: HOME | End: 2020-09-14
Payer: MEDICARE

## 2020-09-14 ENCOUNTER — TELEPHONE (OUTPATIENT)
Dept: CARDIOLOGY | Age: 55
End: 2020-09-14

## 2020-09-14 ENCOUNTER — HOSPITAL ENCOUNTER (OUTPATIENT)
Dept: PREADMISSION TESTING | Age: 55
Discharge: HOME | End: 2020-09-18
Payer: MEDICARE

## 2020-09-14 VITALS — BODY MASS INDEX: 38.65 KG/M2 | WEIGHT: 270 LBS | HEIGHT: 70 IN

## 2020-09-14 VITALS
WEIGHT: 270 LBS | TEMPERATURE: 98.4 F | SYSTOLIC BLOOD PRESSURE: 154 MMHG | RESPIRATION RATE: 18 BRPM | HEIGHT: 70 IN | BODY MASS INDEX: 38.65 KG/M2 | DIASTOLIC BLOOD PRESSURE: 90 MMHG | HEART RATE: 80 BPM

## 2020-09-14 PROBLEM — E11.621 DIABETIC ULCER OF RIGHT MIDFOOT ASSOCIATED WITH TYPE 2 DIABETES MELLITUS, WITH FAT LAYER EXPOSED (HCC): Status: ACTIVE | Noted: 2020-09-14

## 2020-09-14 PROBLEM — L97.412 DIABETIC ULCER OF RIGHT MIDFOOT ASSOCIATED WITH TYPE 2 DIABETES MELLITUS, WITH FAT LAYER EXPOSED (HCC): Status: ACTIVE | Noted: 2020-09-14

## 2020-09-14 PROCEDURE — 99213 OFFICE O/P EST LOW 20 MIN: CPT | Performed by: NURSE PRACTITIONER

## 2020-09-14 PROCEDURE — 99213 OFFICE O/P EST LOW 20 MIN: CPT

## 2020-09-14 PROCEDURE — U0003 INFECTIOUS AGENT DETECTION BY NUCLEIC ACID (DNA OR RNA); SEVERE ACUTE RESPIRATORY SYNDROME CORONAVIRUS 2 (SARS-COV-2) (CORONAVIRUS DISEASE [COVID-19]), AMPLIFIED PROBE TECHNIQUE, MAKING USE OF HIGH THROUGHPUT TECHNOLOGIES AS DESCRIBED BY CMS-2020-01-R: HCPCS

## 2020-09-14 PROCEDURE — 97597 DBRDMT OPN WND 1ST 20 CM/<: CPT

## 2020-09-14 PROCEDURE — 87081 CULTURE SCREEN ONLY: CPT

## 2020-09-14 PROCEDURE — 97597 DBRDMT OPN WND 1ST 20 CM/<: CPT | Performed by: NURSE PRACTITIONER

## 2020-09-14 ASSESSMENT — ENCOUNTER SYMPTOMS: COLOR CHANGE: 1

## 2020-09-14 ASSESSMENT — PAIN SCALES - GENERAL: PAINLEVEL_OUTOF10: 6

## 2020-09-14 ASSESSMENT — PAIN DESCRIPTION - LOCATION: LOCATION: ARM

## 2020-09-14 ASSESSMENT — VISUAL ACUITY: OU: 1

## 2020-09-14 NOTE — TELEPHONE ENCOUNTER
Date: 9-17-20    Cardiologist: Jason Bowers    Procedure: left elbow ORIF    Surgeon: Dr. David Harrison    Last Office Visit: 8-25-20  Reason for office visit and medical concerns addressed at this office visit: PAF, HTN, DM, Hyperlipidemia    Testing Performed and Date of Service:echo 8-18-20  EKG-9-6-20      RCRI = 1 pt, low, 0.9%  METs 4    Current Medications: Warfarin, lasix, insulin, cozaar, lipitor, flomax, amaryl, lopressor,     Is the patient currently taking an anticoagulant? If so, what is the diagnosis the patient has been given to warrant the need for the anticoagulant?  Warfarin for PAF    Additional Notes: Cardiac Risk Request and med hold on Warfarin x 3 days

## 2020-09-14 NOTE — PROGRESS NOTES
taking differently: Take 100 mg by mouth daily ) 30 tablet 3    atorvastatin (LIPITOR) 20 MG tablet TAKE ONE TABLET DAILY (Patient taking differently: Take 20 mg by mouth daily ) 90 tablet 3    tamsulosin (FLOMAX) 0.4 MG capsule TAKE TWO CAPSULES DAILY (Patient taking differently: Take 0.8 mg by mouth daily TAKE TWO CAPSULES DAILY) 60 capsule 11    BD INSULIN SYRINGE U/F 31G X 5/16\" 1 ML MISC USE FOUR TIMES DAILY 100 each 3    warfarin (COUMADIN) 10 MG tablet Take 1 tablet by mouth daily 45 tablet 5    glimepiride (AMARYL) 4 MG tablet TAKE ONE TABLET EVERY MORNING BEFORE BREAKFAST (Patient taking differently: Take 4 mg by mouth every morning (before breakfast) ) 90 tablet 3    warfarin (COUMADIN) 5 MG tablet TAKE AS DIRECTED BY YOUR DOCTOR (Patient taking differently: Take by mouth daily TAKE AS DIRECTED BY YOUR DOCTOR-keeps 5mg on hand if dose varies) 30 tablet 5    diphenhydrAMINE (BENADRYL) 25 MG tablet Take 25 mg by mouth every 6 hours as needed for Itching      metoprolol tartrate (LOPRESSOR) 25 MG tablet TAKE ONE TABLET TWICE DAILY (Patient taking differently: Take 25 mg by mouth 2 times daily ) 60 tablet 5    blood glucose test strips (CONTOUR NEXT TEST) strip 1 each by In Vitro route 4 times daily As needed. 200 each 3    Lancets MISC 1 each by Does not apply route 4 times daily 300 each 1    B-D ULTRAFINE III SHORT PEN 31G X 8 MM MISC Inject 1 each as directed 3 times daily 100 each 5     No current facility-administered medications for this encounter. Allergies: Pcn [penicillins];  Neomycin-bacitracin zn-polymyx; and Neosporin [neomycin-polymyxin-gramicidin]  Past Medical History:   Diagnosis Date    Allergic rhinitis, cause unspecified     Atrial fibrillation, chronic     [facility]    Charcot's joint of right foot     Chicken pox     Closed supracondylar fracture of elbow     fall    History of MRSA infection     scalp/facial and on back    HTN (hypertension)     Hyperlipidemia     Impotence of organic origin     MDRO (multiple drug resistant organisms) resistance     Other dyspnea and respiratory abnormality     Other specified erythematous condition(695.89)     Personal history of other infectious and parasitic disease     Type II or unspecified type diabetes mellitus without mention of complication, uncontrolled 1994       Past Surgical History:   Procedure Laterality Date    CARDIOVERSION  03/2017    COLONOSCOPY  02/24/2020    Dr Ivan Fraire-Melanosis coli throughout the entire colon-AP, 5 yr recall    MALIGNANT SKIN LESION EXCISION      X 2    TONSILLECTOMY       Family History   Problem Relation Age of Onset    Stroke Maternal Grandmother     Cancer Maternal Grandmother     Stroke Paternal Grandmother     Diabetes Paternal Grandmother     Cancer Father     Heart Disease Father     Lung Cancer Father     Asthma Paternal Grandfather     Heart Disease Paternal Grandfather     Heart Disease Maternal Grandfather     Colon Cancer Neg Hx     Colon Polyps Neg Hx     Esophageal Cancer Neg Hx     Liver Cancer Neg Hx     Liver Disease Neg Hx     Rectal Cancer Neg Hx     Stomach Cancer Neg Hx      Social History     Tobacco Use    Smoking status: Never Smoker    Smokeless tobacco: Never Used   Substance Use Topics    Alcohol use: No         Review of Systems    Review of Systems   Skin: Positive for color change and wound. All other systems reviewed and are negative. All other review of systems are negative. Physical Exam    BP (!) 154/90   Pulse 80   Temp 98.4 °F (36.9 °C) (Temporal)   Resp 18   Ht 5' 10\" (1.778 m)   Wt 270 lb (122.5 kg)   BMI 38.74 kg/m²     Physical Exam  Vitals signs reviewed. Constitutional:       Appearance: Normal appearance. He is obese. HENT:      Head: Normocephalic and atraumatic.       Right Ear: External ear normal.      Left Ear: External ear normal.   Eyes:      General: Lids are normal. Lids are everted, no foreign bodies appreciated. Vision grossly intact. Gaze aligned appropriately. Neck:      Musculoskeletal: Normal range of motion. Cardiovascular:      Rate and Rhythm: Normal rate and regular rhythm. Pulses: Normal pulses. Heart sounds: Normal heart sounds. Pulmonary:      Effort: Pulmonary effort is normal.      Breath sounds: Normal breath sounds. Abdominal:      General: Bowel sounds are normal.   Musculoskeletal:         General: Swelling present. Right lower leg: Edema present. Left lower leg: Edema present. Feet:    Skin:     General: Skin is warm and dry. Capillary Refill: Capillary refill takes 2 to 3 seconds. Neurological:      Mental Status: He is alert and oriented to person, place, and time. Post Debridement Measurements and Assessment:    The patientspain isPain Level: 6  . Wound is has improved. Please refer to nursing measurements and assessment regarding wound pre and postdebridement. Wound 09/14/20 Foot Right;Plantar WOUND 1 RIGHT PLANTAR (Active)   Wound Image    09/14/20 1436   Wound Diabetic Kat 1 09/14/20 1436   Dressing Status Old drainage; Intact 09/14/20 1436   Wound Cleansed Rinsed/Irrigated with saline 09/14/20 1436   Wound Length (cm) 1.2 cm 09/14/20 1436   Wound Width (cm) 1.5 cm 09/14/20 1436   Wound Depth (cm) 0.7 cm 09/14/20 1436   Wound Surface Area (cm^2) 1.8 cm^2 09/14/20 1436   Wound Volume (cm^3) 1.26 cm^3 09/14/20 1436   Post-Procedure Length (cm) 1.5 cm 09/14/20 1436   Post-Procedure Width (cm) 1.8 cm 09/14/20 1436   Post-Procedure Depth (cm) 1 cm 09/14/20 1436   Post-Procedure Surface Area (cm^2) 2.7 cm^2 09/14/20 1436   Post-Procedure Volume (cm^3) 2.7 cm^3 09/14/20 1436   Wound Assessment Pink;Red;Slough; Yellow 09/14/20 1436   Drainage Amount Moderate 09/14/20 1436   Drainage Description Serosanguinous 09/14/20 1436   Odor None 09/14/20 1436   Margins Attached edges 09/14/20 1436   Yolis-wound Assessment answered, and the patient understands the discharge plan. Discussed appropriate home care of this wound. Wound redressed. Patient instructions were given.   Recommend no smoking  Offloading instructions given

## 2020-09-14 NOTE — HOME CARE
[address]:     [address], [place] p: [phone] f: [phone]     Ordering Center:     [address] [place] [address],[address]  [address], [place] [address] [id number] [phone]  WOUND CARE Dept: [address] [facility] [phone]    Patient Information:      Case Snow  [address]   [place] [address]   [phone]   : 1965  AGE: 62 y.o. GENDER: male   EPISODE DATE: 2020    Insurance:      PRIMARY INSURANCE:  Plan: MEDICARE PART A AND B  Coverage: MEDICARE  Effective Date: 2019  Group Number: [unfilled]  Subscriber Number: [id number] - (Medicare)    SECONDARY INSURANCE:  Plan:   Coverage:   Effective Date:   [unfilled]    [unfilled]   [unfilled]     Patient Wound Information:      Problem List Items Addressed This Visit     * (Principal) Diabetic ulcer of right midfoot associated with type 2 diabetes mellitus, with fat layer exposed ([address] - Primary    Relevant Medications    Lidocaine 2 % GEL (Start on 2020  4:00 PM)    Other Relevant Orders    VL LOWER EXTREMITY ARTERIAL SEGMENTAL PRESSURES W PPG    C-Reactive Protein    Prealbumin    Sedimentation Rate    Supply: Wound Cleanser    Supply: Yolis Wound    Supply: Wound Dressings    Supply: Pack Wound    Supply: Cover and Secure          WOUNDS REQUIRING DRESSING SUPPLIES:     Wound 20 Foot Right;Plantar WOUND 1 RIGHT PLANTAR (Active)   Wound Image    20 1436   Wound Diabetic Kat 1 20 1436   Dressing Status Old drainage; Intact 20 1436   Wound Cleansed Rinsed/Irrigated with saline 20 1436   Wound Length (cm) 1.2 cm 20 1436   Wound Width (cm) 1.5 cm 20 1436   Wound Depth (cm) 0.7 cm 20 1436   Wound Surface Area (cm^2) 1.8 cm^2 20 1436   Wound Volume (cm^3) 1.26 cm^3 20 1436   Post-Procedure Length (cm) 1.5 cm 20 1436   Post-Procedure Width (cm) 1.8 cm 20 1436 Post-Procedure Depth (cm) 1 cm 09/14/20 1436   Post-Procedure Surface Area (cm^2) 2.7 cm^2 09/14/20 1436   Post-Procedure Volume (cm^3) 2.7 cm^3 09/14/20 1436   Wound Assessment Pink;Red;Slough; Yellow 09/14/20 1436   Drainage Amount Moderate 09/14/20 1436   Drainage Description Serosanguinous 09/14/20 1436   Odor None 09/14/20 1436   Margins Attached edges 09/14/20 1436   Yolis-wound Assessment Dry; Intact 09/14/20 1436   Non-staged Wound Description Not applicable 87/15/82 0852   [place]%Wound Bed 45 09/14/20 1436   Red%Wound Bed 45 09/14/20 1436   Yellow%Wound Bed 10 09/14/20 1436   Number of days: 0          Supplies Requested :      WOUND #: 1   PRIMARY DRESSING:  Other: xeroform   Cover and Secure with: 2X2 gauze pad  Cast padding      FREQUENCY OF DRESSING CHANGES:  Daily       ADDITIONAL ITEMS:  [] Gloves Small  [] Gloves Medium [x] Gloves Large [] Gloves XLarge  [] Tape 1\" [] Tape 2\" [] Tape 3\"  [x] Medipore Tape  [x] Saline  [] Skin Prep   [] Adhesive Remover   [] Cotton Tip Applicators   [] Other:    Patient Wound(s) Debrided: [x] Yes if yes please add date 9/14/20   [] No    Debribement Type: Excisional/Sharp    Patient currently being seen by Home Health: [] Yes   [x] No    Duration for needed supplies:  []15  [x]30  []60  []90 Days    Electronically signed by NOMI Escobedo CNP on 9/14/2020 at 3:53 PM     Provider Information:      PROVIDER'S NAME: Dominique MOSHER    NPI: [id number]

## 2020-09-15 ENCOUNTER — TELEPHONE (OUTPATIENT)
Dept: CARDIOLOGY | Age: 55
End: 2020-09-15

## 2020-09-15 LAB
MRSA CULTURE ONLY: ABNORMAL
ORGANISM: ABNORMAL
REPORT: NORMAL
SARS-COV-2: NOT DETECTED
THIS TEST SENT TO: NORMAL

## 2020-09-15 NOTE — TELEPHONE ENCOUNTER
Pt called in to state he is having left arm (elbow)surgery thursday 09/17, here at [facility].

## 2020-09-16 ENCOUNTER — CARE COORDINATION (OUTPATIENT)
Dept: CARE COORDINATION | Age: 55
End: 2020-09-16

## 2020-09-16 PROBLEM — S42.422A: Status: ACTIVE | Noted: 2020-09-16

## 2020-09-16 NOTE — OP NOTE
Patient Name: Ellen Valentin  MRN: [id number]  : 1965      [address] of SURGERY: 2020    SURGEON: Dylon Lu. MD Jose    ASSISTANT: NONE     PREOPERATIVE DIAGNOSIS  Acute traumatic displaced supracondylar fracture of the left humerus with intra-articular extension, initial encounter for closed fracture. POSTOPERATIVE DIAGNOSIS  Acute traumatic displaced supracondylar fracture of the left humerus with intra-articular extension, initial encounter for closed fracture. PROCEDURE  Open reduction and internal fixation left supracondylar humerus fracture with intercondylar extension     IMPLANT  Synthes 2.7 mm locking medial and lateral distal humerus plates     ANESTHESIA  General endotracheal anesthesia. OPERATIVE INDICATIONS    The patient is a 62 y.o. right-hand dominant male who fell at home after passing out on 20. The patient was seen in the clinic where radiographs confirmed a displaced supracondylar fracture for which I recommended surgical treatment. Risks included, but are not limited to, that of anesthesia, bleeding, infection, pain, damage to local structures, need for further surgery, malunion, nonunion, prominent hardware, loss of range of motion, loss of function. The patient is morbidly obese, severe diabetic, and has a history of MRSA infections - all of these increase his risk of postoperative complications. He has a history of A-fib and stopped his coumadin on his own a week ago. ESTIMATED BLOOD LOSS  Less than 20 mL. SPECIMENS  None. DRAINS  None. COMPLICATIONS  None. PROCEDURE IN DETAIL  The patient was seen in the preoperative holding room. Once again the informed consent form was reviewed with the patient and the family, and signed. The site of surgery was marked with their agreement. The patient was transported to the operating room where a time-out was performed, identifying the correct patient, as well as the operative site.  Kefzol 1 g IV was given as perioperative antibiotics. The patient was placed in the prone position, and all bony prominences were well-padded. Tourniquet was placed about his left brachium, inflated to 200 mmHg, and total tourniquet time was less than 1 hour. A slightly curved incision was made along the posterior aspect of the elbow, and soft tissue was dissected to the medial and lateral aspects of the distal humerus. A triceps-sparing approach was performed. The ulnar nerve was isolated and protected throughout the surgery. A soft tissue plane was created laterally and the fracture was identified, clear of soft tissue, and reduced. A Synthes posterolateral locking plate was used and multiple locking screws were placed distally and proximally. Soft tissue was carefully dissected medially. The ulnar nerve was protected. A direct medial plate was placed surrounding the medial epicondyle and multiple locking and cortical screws were placed to solidify fixation. Due to the location of the plate, an ulnar nerve transposition was not needed. C-arm images were used in multiple planes showing safe hardware placement, a near anatomic reduction of his fracture with stable range of motion. The incision was irrigated and followed by closure in layers. The skin was closed with nylon sutures. Sterile dressing was placed, followed by a well-padded long-arm splint with his elbow in approximately 90 degrees of flexion and neutral form rotation. The patient was awakened from anesthesia, transported to the recovery room in stable condition.      POSTOPERATIVE PLAN  Admit for observation given his medical history, d/c home once pain is controlled, follow up in 2 weeks for a clinical check, to begin range of motion activities as well as suture removal.      Electronically signed by Wesly Dyer MD on 9/17/2020 at 2:48 PM

## 2020-09-17 ENCOUNTER — HOSPITAL ENCOUNTER (OUTPATIENT)
Age: 55
Setting detail: OBSERVATION
Discharge: HOME | End: 2020-09-18
Attending: ORTHOPAEDIC SURGERY | Admitting: ORTHOPAEDIC SURGERY
Payer: MEDICARE

## 2020-09-17 ENCOUNTER — ANESTHESIA (OUTPATIENT)
Dept: OPERATING ROOM | Age: 55
End: 2020-09-17
Payer: MEDICARE

## 2020-09-17 ENCOUNTER — ANESTHESIA EVENT (OUTPATIENT)
Dept: OPERATING ROOM | Age: 55
End: 2020-09-17
Payer: MEDICARE

## 2020-09-17 ENCOUNTER — APPOINTMENT (OUTPATIENT)
Dept: GENERAL RADIOLOGY | Age: 55
End: 2020-09-17
Attending: ORTHOPAEDIC SURGERY
Payer: MEDICARE

## 2020-09-17 VITALS
OXYGEN SATURATION: 98 % | SYSTOLIC BLOOD PRESSURE: 108 MMHG | TEMPERATURE: 98.8 F | RESPIRATION RATE: 12 BRPM | DIASTOLIC BLOOD PRESSURE: 71 MMHG

## 2020-09-17 PROBLEM — S42.412A LEFT SUPRACONDYLAR HUMERUS FRACTURE, CLOSED, INITIAL ENCOUNTER: Status: ACTIVE | Noted: 2020-09-17

## 2020-09-17 LAB
GLUCOSE BLD-MCNC: 171 MG/DL (ref 70–99)
GLUCOSE BLD-MCNC: 392 MG/DL (ref 70–99)
HBA1C MFR BLD: 10.1 % (ref 4–6)
INR BLD: 1.1 (ref 0.88–1.18)
PERFORMED ON: ABNORMAL
PERFORMED ON: ABNORMAL
PROTHROMBIN TIME: 14.2 SEC (ref 12–14.6)

## 2020-09-17 PROCEDURE — 2709999900 HC NON-CHARGEABLE SUPPLY: Performed by: ORTHOPAEDIC SURGERY

## 2020-09-17 PROCEDURE — 83036 HEMOGLOBIN GLYCOSYLATED A1C: CPT

## 2020-09-17 PROCEDURE — 6360000002 HC RX W HCPCS: Performed by: ORTHOPAEDIC SURGERY

## 2020-09-17 PROCEDURE — 2500000003 HC RX 250 WO HCPCS: Performed by: NURSE ANESTHETIST, CERTIFIED REGISTERED

## 2020-09-17 PROCEDURE — 6360000002 HC RX W HCPCS: Performed by: ANESTHESIOLOGY

## 2020-09-17 PROCEDURE — 82947 ASSAY GLUCOSE BLOOD QUANT: CPT

## 2020-09-17 PROCEDURE — 7100000000 HC PACU RECOVERY - FIRST 15 MIN: Performed by: ORTHOPAEDIC SURGERY

## 2020-09-17 PROCEDURE — 6360000002 HC RX W HCPCS: Performed by: NURSE ANESTHETIST, CERTIFIED REGISTERED

## 2020-09-17 PROCEDURE — 3600000014 HC SURGERY LEVEL 4 ADDTL 15MIN: Performed by: ORTHOPAEDIC SURGERY

## 2020-09-17 PROCEDURE — 3700000001 HC ADD 15 MINUTES (ANESTHESIA): Performed by: ORTHOPAEDIC SURGERY

## 2020-09-17 PROCEDURE — G0378 HOSPITAL OBSERVATION PER HR: HCPCS

## 2020-09-17 PROCEDURE — 36415 COLL VENOUS BLD VENIPUNCTURE: CPT

## 2020-09-17 PROCEDURE — C1713 ANCHOR/SCREW BN/BN,TIS/BN: HCPCS | Performed by: ORTHOPAEDIC SURGERY

## 2020-09-17 PROCEDURE — 85610 PROTHROMBIN TIME: CPT

## 2020-09-17 PROCEDURE — 2700000000 HC OXYGEN THERAPY PER DAY

## 2020-09-17 PROCEDURE — 64415 NJX AA&/STRD BRCH PLXS IMG: CPT | Performed by: NURSE ANESTHETIST, CERTIFIED REGISTERED

## 2020-09-17 PROCEDURE — 3209999900 FLUORO FOR SURGICAL PROCEDURES

## 2020-09-17 PROCEDURE — 2580000003 HC RX 258: Performed by: ANESTHESIOLOGY

## 2020-09-17 PROCEDURE — 73070 X-RAY EXAM OF ELBOW: CPT

## 2020-09-17 PROCEDURE — 7100000001 HC PACU RECOVERY - ADDTL 15 MIN: Performed by: ORTHOPAEDIC SURGERY

## 2020-09-17 PROCEDURE — 6370000000 HC RX 637 (ALT 250 FOR IP): Performed by: ORTHOPAEDIC SURGERY

## 2020-09-17 PROCEDURE — 2580000003 HC RX 258: Performed by: ORTHOPAEDIC SURGERY

## 2020-09-17 PROCEDURE — 3700000000 HC ANESTHESIA ATTENDED CARE: Performed by: ORTHOPAEDIC SURGERY

## 2020-09-17 PROCEDURE — 3600000004 HC SURGERY LEVEL 4 BASE: Performed by: ORTHOPAEDIC SURGERY

## 2020-09-17 PROCEDURE — 2720000010 HC SURG SUPPLY STERILE: Performed by: ORTHOPAEDIC SURGERY

## 2020-09-17 DEVICE — SCREW BNE L22MM DIA3.5MM CORT S STL ST NONCANNULATED LOK: Type: IMPLANTABLE DEVICE | Site: ELBOW | Status: FUNCTIONAL

## 2020-09-17 DEVICE — SCREW BNE L44MM DIA2.7MM ANK S STL ST VAR ANG LOK FULL THRD: Type: IMPLANTABLE DEVICE | Site: ELBOW | Status: FUNCTIONAL

## 2020-09-17 DEVICE — SCREW BNE L20MM DIA3.5MM CORT S STL ST LOK FULL THRD: Type: IMPLANTABLE DEVICE | Site: ELBOW | Status: FUNCTIONAL

## 2020-09-17 DEVICE — SCREW BNE L22MM DIA2.7MM ANK S STL ST VAR ANG LOK FULL THRD: Type: IMPLANTABLE DEVICE | Site: ELBOW | Status: FUNCTIONAL

## 2020-09-17 DEVICE — IMPLANTABLE DEVICE: Type: IMPLANTABLE DEVICE | Site: ELBOW | Status: FUNCTIONAL

## 2020-09-17 DEVICE — SCREW BNE L24MM DIA2.7MM ANK S STL ST VAR ANG LOK FULL THRD: Type: IMPLANTABLE DEVICE | Site: ELBOW | Status: FUNCTIONAL

## 2020-09-17 DEVICE — SCREW BNE L12MM DIA2.7MM ANK S STL ST VAR ANG LOK FULL THRD: Type: IMPLANTABLE DEVICE | Site: ELBOW | Status: FUNCTIONAL

## 2020-09-17 DEVICE — SCREW BNE L26MM DIA2.7MM CORT S STL ST FULL THRD FOR SM: Type: IMPLANTABLE DEVICE | Site: ELBOW | Status: FUNCTIONAL

## 2020-09-17 DEVICE — SCREW BNE L16MM DIA2.7MM ANK S STL ST VAR ANG LOK FULL THRD: Type: IMPLANTABLE DEVICE | Site: ELBOW | Status: FUNCTIONAL

## 2020-09-17 DEVICE — SCREW BNE L20MM DIA2.7MM ANK S STL ST VAR ANG LOK FULL THRD: Type: IMPLANTABLE DEVICE | Site: ELBOW | Status: FUNCTIONAL

## 2020-09-17 RX ORDER — FENTANYL CITRATE 50 UG/ML
50 INJECTION, SOLUTION INTRAMUSCULAR; INTRAVENOUS
Status: DISCONTINUED | OUTPATIENT
Start: 2020-09-17 | End: 2020-09-17 | Stop reason: HOSPADM

## 2020-09-17 RX ORDER — MORPHINE SULFATE 4 MG/ML
4 INJECTION, SOLUTION INTRAMUSCULAR; INTRAVENOUS
Status: DISCONTINUED | OUTPATIENT
Start: 2020-09-17 | End: 2020-09-18 | Stop reason: HOSPADM

## 2020-09-17 RX ORDER — DEXTROSE MONOHYDRATE 50 MG/ML
100 INJECTION, SOLUTION INTRAVENOUS PRN
Status: DISCONTINUED | OUTPATIENT
Start: 2020-09-17 | End: 2020-09-18 | Stop reason: HOSPADM

## 2020-09-17 RX ORDER — NALOXONE HYDROCHLORIDE 0.4 MG/ML
0.4 INJECTION, SOLUTION INTRAMUSCULAR; INTRAVENOUS; SUBCUTANEOUS PRN
Status: DISCONTINUED | OUTPATIENT
Start: 2020-09-17 | End: 2020-09-18 | Stop reason: HOSPADM

## 2020-09-17 RX ORDER — HYDROMORPHONE HYDROCHLORIDE 1 MG/ML
0.5 INJECTION, SOLUTION INTRAMUSCULAR; INTRAVENOUS; SUBCUTANEOUS EVERY 5 MIN PRN
Status: DISCONTINUED | OUTPATIENT
Start: 2020-09-17 | End: 2020-09-17 | Stop reason: HOSPADM

## 2020-09-17 RX ORDER — MIDAZOLAM HYDROCHLORIDE 1 MG/ML
2 INJECTION INTRAMUSCULAR; INTRAVENOUS
Status: COMPLETED | OUTPATIENT
Start: 2020-09-17 | End: 2020-09-17

## 2020-09-17 RX ORDER — PROMETHAZINE HYDROCHLORIDE 25 MG/ML
6.25 INJECTION, SOLUTION INTRAMUSCULAR; INTRAVENOUS
Status: DISCONTINUED | OUTPATIENT
Start: 2020-09-17 | End: 2020-09-17 | Stop reason: HOSPADM

## 2020-09-17 RX ORDER — SODIUM CHLORIDE 0.9 % (FLUSH) 0.9 %
10 SYRINGE (ML) INJECTION EVERY 12 HOURS SCHEDULED
Status: DISCONTINUED | OUTPATIENT
Start: 2020-09-17 | End: 2020-09-17 | Stop reason: HOSPADM

## 2020-09-17 RX ORDER — ONDANSETRON 2 MG/ML
4 INJECTION INTRAMUSCULAR; INTRAVENOUS EVERY 6 HOURS PRN
Status: DISCONTINUED | OUTPATIENT
Start: 2020-09-17 | End: 2020-09-18 | Stop reason: HOSPADM

## 2020-09-17 RX ORDER — DIPHENHYDRAMINE HCL 25 MG
25 TABLET ORAL EVERY 6 HOURS PRN
Status: DISCONTINUED | OUTPATIENT
Start: 2020-09-17 | End: 2020-09-18 | Stop reason: HOSPADM

## 2020-09-17 RX ORDER — HYDRALAZINE HYDROCHLORIDE 20 MG/ML
5 INJECTION INTRAMUSCULAR; INTRAVENOUS EVERY 10 MIN PRN
Status: DISCONTINUED | OUTPATIENT
Start: 2020-09-17 | End: 2020-09-17 | Stop reason: HOSPADM

## 2020-09-17 RX ORDER — ONDANSETRON 2 MG/ML
INJECTION INTRAMUSCULAR; INTRAVENOUS PRN
Status: DISCONTINUED | OUTPATIENT
Start: 2020-09-17 | End: 2020-09-17 | Stop reason: SDUPTHER

## 2020-09-17 RX ORDER — MORPHINE SULFATE 4 MG/ML
2 INJECTION, SOLUTION INTRAMUSCULAR; INTRAVENOUS
Status: DISCONTINUED | OUTPATIENT
Start: 2020-09-17 | End: 2020-09-18 | Stop reason: HOSPADM

## 2020-09-17 RX ORDER — TAMSULOSIN HYDROCHLORIDE 0.4 MG/1
0.8 CAPSULE ORAL DAILY
Status: DISCONTINUED | OUTPATIENT
Start: 2020-09-17 | End: 2020-09-18 | Stop reason: HOSPADM

## 2020-09-17 RX ORDER — LIDOCAINE HYDROCHLORIDE 10 MG/ML
1 INJECTION, SOLUTION EPIDURAL; INFILTRATION; INTRACAUDAL; PERINEURAL
Status: DISCONTINUED | OUTPATIENT
Start: 2020-09-17 | End: 2020-09-17 | Stop reason: HOSPADM

## 2020-09-17 RX ORDER — SCOLOPAMINE TRANSDERMAL SYSTEM 1 MG/1
1 PATCH, EXTENDED RELEASE TRANSDERMAL ONCE
Status: DISCONTINUED | OUTPATIENT
Start: 2020-09-17 | End: 2020-09-17 | Stop reason: HOSPADM

## 2020-09-17 RX ORDER — DIPHENHYDRAMINE HYDROCHLORIDE 50 MG/ML
12.5 INJECTION INTRAMUSCULAR; INTRAVENOUS
Status: DISCONTINUED | OUTPATIENT
Start: 2020-09-17 | End: 2020-09-17 | Stop reason: HOSPADM

## 2020-09-17 RX ORDER — LABETALOL HYDROCHLORIDE 5 MG/ML
5 INJECTION, SOLUTION INTRAVENOUS EVERY 10 MIN PRN
Status: DISCONTINUED | OUTPATIENT
Start: 2020-09-17 | End: 2020-09-17 | Stop reason: HOSPADM

## 2020-09-17 RX ORDER — ATORVASTATIN CALCIUM 20 MG/1
20 TABLET, FILM COATED ORAL DAILY
Status: DISCONTINUED | OUTPATIENT
Start: 2020-09-17 | End: 2020-09-18 | Stop reason: HOSPADM

## 2020-09-17 RX ORDER — PROPOFOL 10 MG/ML
INJECTION, EMULSION INTRAVENOUS PRN
Status: DISCONTINUED | OUTPATIENT
Start: 2020-09-17 | End: 2020-09-17 | Stop reason: SDUPTHER

## 2020-09-17 RX ORDER — LOSARTAN POTASSIUM 100 MG/1
100 TABLET ORAL DAILY
Status: DISCONTINUED | OUTPATIENT
Start: 2020-09-17 | End: 2020-09-18 | Stop reason: HOSPADM

## 2020-09-17 RX ORDER — SODIUM CHLORIDE 0.9 % (FLUSH) 0.9 %
10 SYRINGE (ML) INJECTION EVERY 12 HOURS SCHEDULED
Status: DISCONTINUED | OUTPATIENT
Start: 2020-09-17 | End: 2020-09-18 | Stop reason: HOSPADM

## 2020-09-17 RX ORDER — ROCURONIUM BROMIDE 10 MG/ML
INJECTION, SOLUTION INTRAVENOUS PRN
Status: DISCONTINUED | OUTPATIENT
Start: 2020-09-17 | End: 2020-09-17 | Stop reason: SDUPTHER

## 2020-09-17 RX ORDER — WARFARIN SODIUM 5 MG/1
5 TABLET ORAL DAILY
Status: DISCONTINUED | OUTPATIENT
Start: 2020-09-17 | End: 2020-09-17 | Stop reason: DRUGHIGH

## 2020-09-17 RX ORDER — HYDROMORPHONE HYDROCHLORIDE 1 MG/ML
0.25 INJECTION, SOLUTION INTRAMUSCULAR; INTRAVENOUS; SUBCUTANEOUS EVERY 5 MIN PRN
Status: DISCONTINUED | OUTPATIENT
Start: 2020-09-17 | End: 2020-09-17 | Stop reason: HOSPADM

## 2020-09-17 RX ORDER — OXYCODONE HCL 20 MG/1
20 TABLET, FILM COATED, EXTENDED RELEASE ORAL EVERY 12 HOURS PRN
Status: DISCONTINUED | OUTPATIENT
Start: 2020-09-17 | End: 2020-09-18 | Stop reason: HOSPADM

## 2020-09-17 RX ORDER — OXYCODONE HYDROCHLORIDE 5 MG/1
5 TABLET ORAL EVERY 4 HOURS PRN
Status: DISCONTINUED | OUTPATIENT
Start: 2020-09-17 | End: 2020-09-18 | Stop reason: HOSPADM

## 2020-09-17 RX ORDER — SODIUM CHLORIDE, SODIUM LACTATE, POTASSIUM CHLORIDE, CALCIUM CHLORIDE 600; 310; 30; 20 MG/100ML; MG/100ML; MG/100ML; MG/100ML
INJECTION, SOLUTION INTRAVENOUS CONTINUOUS
Status: DISCONTINUED | OUTPATIENT
Start: 2020-09-17 | End: 2020-09-17

## 2020-09-17 RX ORDER — SODIUM CHLORIDE, SODIUM LACTATE, POTASSIUM CHLORIDE, CALCIUM CHLORIDE 600; 310; 30; 20 MG/100ML; MG/100ML; MG/100ML; MG/100ML
INJECTION, SOLUTION INTRAVENOUS CONTINUOUS
Status: DISCONTINUED | OUTPATIENT
Start: 2020-09-17 | End: 2020-09-18 | Stop reason: HOSPADM

## 2020-09-17 RX ORDER — GLIMEPIRIDE 4 MG/1
4 TABLET ORAL
Status: DISCONTINUED | OUTPATIENT
Start: 2020-09-18 | End: 2020-09-18 | Stop reason: HOSPADM

## 2020-09-17 RX ORDER — MEPERIDINE HYDROCHLORIDE 50 MG/ML
12.5 INJECTION INTRAMUSCULAR; INTRAVENOUS; SUBCUTANEOUS EVERY 5 MIN PRN
Status: DISCONTINUED | OUTPATIENT
Start: 2020-09-17 | End: 2020-09-17 | Stop reason: HOSPADM

## 2020-09-17 RX ORDER — DIAZEPAM 5 MG/1
5 TABLET ORAL EVERY 6 HOURS PRN
Status: DISCONTINUED | OUTPATIENT
Start: 2020-09-17 | End: 2020-09-18 | Stop reason: HOSPADM

## 2020-09-17 RX ORDER — PROMETHAZINE HYDROCHLORIDE 12.5 MG/1
12.5 TABLET ORAL EVERY 6 HOURS PRN
Status: DISCONTINUED | OUTPATIENT
Start: 2020-09-17 | End: 2020-09-18 | Stop reason: HOSPADM

## 2020-09-17 RX ORDER — MORPHINE SULFATE 4 MG/ML
2 INJECTION, SOLUTION INTRAMUSCULAR; INTRAVENOUS EVERY 5 MIN PRN
Status: DISCONTINUED | OUTPATIENT
Start: 2020-09-17 | End: 2020-09-17 | Stop reason: HOSPADM

## 2020-09-17 RX ORDER — FUROSEMIDE 20 MG/1
20 TABLET ORAL DAILY PRN
Status: DISCONTINUED | OUTPATIENT
Start: 2020-09-17 | End: 2020-09-18 | Stop reason: HOSPADM

## 2020-09-17 RX ORDER — OXYCODONE HYDROCHLORIDE 5 MG/1
10 TABLET ORAL EVERY 4 HOURS PRN
Status: DISCONTINUED | OUTPATIENT
Start: 2020-09-17 | End: 2020-09-18 | Stop reason: HOSPADM

## 2020-09-17 RX ORDER — CYCLOBENZAPRINE HCL 10 MG
10 TABLET ORAL 3 TIMES DAILY PRN
Status: DISCONTINUED | OUTPATIENT
Start: 2020-09-17 | End: 2020-09-18 | Stop reason: HOSPADM

## 2020-09-17 RX ORDER — WARFARIN SODIUM 5 MG/1
10 TABLET ORAL DAILY
Status: DISCONTINUED | OUTPATIENT
Start: 2020-09-17 | End: 2020-09-17 | Stop reason: DRUGHIGH

## 2020-09-17 RX ORDER — DEXAMETHASONE SODIUM PHOSPHATE 10 MG/ML
INJECTION, SOLUTION INTRAMUSCULAR; INTRAVENOUS PRN
Status: DISCONTINUED | OUTPATIENT
Start: 2020-09-17 | End: 2020-09-17 | Stop reason: SDUPTHER

## 2020-09-17 RX ORDER — MORPHINE SULFATE 4 MG/ML
4 INJECTION, SOLUTION INTRAMUSCULAR; INTRAVENOUS EVERY 5 MIN PRN
Status: DISCONTINUED | OUTPATIENT
Start: 2020-09-17 | End: 2020-09-17 | Stop reason: HOSPADM

## 2020-09-17 RX ORDER — SODIUM CHLORIDE 0.9 % (FLUSH) 0.9 %
10 SYRINGE (ML) INJECTION PRN
Status: DISCONTINUED | OUTPATIENT
Start: 2020-09-17 | End: 2020-09-18 | Stop reason: HOSPADM

## 2020-09-17 RX ORDER — METOCLOPRAMIDE HYDROCHLORIDE 5 MG/ML
10 INJECTION INTRAMUSCULAR; INTRAVENOUS
Status: DISCONTINUED | OUTPATIENT
Start: 2020-09-17 | End: 2020-09-17 | Stop reason: HOSPADM

## 2020-09-17 RX ORDER — MORPHINE SULFATE 4 MG/ML
4 INJECTION, SOLUTION INTRAMUSCULAR; INTRAVENOUS
Status: DISCONTINUED | OUTPATIENT
Start: 2020-09-17 | End: 2020-09-17 | Stop reason: HOSPADM

## 2020-09-17 RX ORDER — INSULIN GLARGINE 100 [IU]/ML
0.25 INJECTION, SOLUTION SUBCUTANEOUS NIGHTLY
Status: DISCONTINUED | OUTPATIENT
Start: 2020-09-17 | End: 2020-09-18 | Stop reason: HOSPADM

## 2020-09-17 RX ORDER — VANCOMYCIN HYDROCHLORIDE 1 G/20ML
INJECTION, POWDER, LYOPHILIZED, FOR SOLUTION INTRAVENOUS PRN
Status: DISCONTINUED | OUTPATIENT
Start: 2020-09-17 | End: 2020-09-17 | Stop reason: SDUPTHER

## 2020-09-17 RX ORDER — SODIUM CHLORIDE 0.9 % (FLUSH) 0.9 %
10 SYRINGE (ML) INJECTION PRN
Status: DISCONTINUED | OUTPATIENT
Start: 2020-09-17 | End: 2020-09-17 | Stop reason: HOSPADM

## 2020-09-17 RX ORDER — DEXTROSE MONOHYDRATE 25 G/50ML
12.5 INJECTION, SOLUTION INTRAVENOUS PRN
Status: DISCONTINUED | OUTPATIENT
Start: 2020-09-17 | End: 2020-09-18 | Stop reason: HOSPADM

## 2020-09-17 RX ORDER — NICOTINE POLACRILEX 4 MG
15 LOZENGE BUCCAL PRN
Status: DISCONTINUED | OUTPATIENT
Start: 2020-09-17 | End: 2020-09-18 | Stop reason: HOSPADM

## 2020-09-17 RX ORDER — ROPIVACAINE HYDROCHLORIDE 5 MG/ML
INJECTION, SOLUTION EPIDURAL; INFILTRATION; PERINEURAL
Status: COMPLETED | OUTPATIENT
Start: 2020-09-17 | End: 2020-09-17

## 2020-09-17 RX ORDER — SENNA AND DOCUSATE SODIUM 50; 8.6 MG/1; MG/1
1 TABLET, FILM COATED ORAL 2 TIMES DAILY
Status: DISCONTINUED | OUTPATIENT
Start: 2020-09-17 | End: 2020-09-18 | Stop reason: HOSPADM

## 2020-09-17 RX ORDER — LIDOCAINE HYDROCHLORIDE 10 MG/ML
INJECTION, SOLUTION INFILTRATION; PERINEURAL PRN
Status: DISCONTINUED | OUTPATIENT
Start: 2020-09-17 | End: 2020-09-17 | Stop reason: SDUPTHER

## 2020-09-17 RX ORDER — MIDAZOLAM HYDROCHLORIDE 1 MG/ML
2 INJECTION INTRAMUSCULAR; INTRAVENOUS
Status: DISCONTINUED | OUTPATIENT
Start: 2020-09-17 | End: 2020-09-17 | Stop reason: HOSPADM

## 2020-09-17 RX ADMIN — LOSARTAN POTASSIUM 100 MG: 100 TABLET, FILM COATED ORAL at 16:42

## 2020-09-17 RX ADMIN — SODIUM CHLORIDE, SODIUM LACTATE, POTASSIUM CHLORIDE, AND CALCIUM CHLORIDE: 600; 310; 30; 20 INJECTION, SOLUTION INTRAVENOUS at 11:49

## 2020-09-17 RX ADMIN — DOCUSATE SODIUM 50MG AND SENNOSIDES 8.6MG 1 TABLET: 8.6; 5 TABLET, FILM COATED ORAL at 20:41

## 2020-09-17 RX ADMIN — TAMSULOSIN HYDROCHLORIDE 0.8 MG: 0.4 CAPSULE ORAL at 18:11

## 2020-09-17 RX ADMIN — VANCOMYCIN HYDROCHLORIDE 1000 MG: 1 INJECTION, POWDER, LYOPHILIZED, FOR SOLUTION INTRAVENOUS at 12:42

## 2020-09-17 RX ADMIN — INSULIN GLARGINE 31 UNITS: 100 INJECTION, SOLUTION SUBCUTANEOUS at 20:44

## 2020-09-17 RX ADMIN — ONDANSETRON HYDROCHLORIDE 4 MG: 2 INJECTION, SOLUTION INTRAMUSCULAR; INTRAVENOUS at 14:07

## 2020-09-17 RX ADMIN — FUROSEMIDE 20 MG: 20 TABLET ORAL at 18:11

## 2020-09-17 RX ADMIN — PROPOFOL 150 MG: 10 INJECTION, EMULSION INTRAVENOUS at 12:11

## 2020-09-17 RX ADMIN — ROCURONIUM BROMIDE 50 MG: 10 INJECTION, SOLUTION INTRAVENOUS at 12:11

## 2020-09-17 RX ADMIN — WARFARIN SODIUM 12.5 MG: 7.5 TABLET ORAL at 20:41

## 2020-09-17 RX ADMIN — ATORVASTATIN CALCIUM 20 MG: 20 TABLET, FILM COATED ORAL at 18:11

## 2020-09-17 RX ADMIN — SUGAMMADEX 250 MG: 100 INJECTION, SOLUTION INTRAVENOUS at 14:14

## 2020-09-17 RX ADMIN — ROPIVACAINE HYDROCHLORIDE 30 ML: 5 INJECTION, SOLUTION EPIDURAL; INFILTRATION; PERINEURAL at 11:46

## 2020-09-17 RX ADMIN — LIDOCAINE HYDROCHLORIDE 50 MG: 10 INJECTION, SOLUTION INFILTRATION; PERINEURAL at 12:11

## 2020-09-17 RX ADMIN — CEFAZOLIN SODIUM 3 G: 10 INJECTION, POWDER, FOR SOLUTION INTRAVENOUS at 12:17

## 2020-09-17 RX ADMIN — HYDROMORPHONE HYDROCHLORIDE 1 MG: 1 INJECTION, SOLUTION INTRAMUSCULAR; INTRAVENOUS; SUBCUTANEOUS at 12:11

## 2020-09-17 RX ADMIN — MIDAZOLAM HYDROCHLORIDE 2 MG: 2 INJECTION, SOLUTION INTRAMUSCULAR; INTRAVENOUS at 11:43

## 2020-09-17 RX ADMIN — METOPROLOL TARTRATE 25 MG: 25 TABLET, FILM COATED ORAL at 20:42

## 2020-09-17 RX ADMIN — DEXAMETHASONE SODIUM PHOSPHATE 10 MG: 10 INJECTION, SOLUTION INTRAMUSCULAR; INTRAVENOUS at 12:24

## 2020-09-17 RX ADMIN — INSULIN LISPRO 7 UNITS: 100 INJECTION, SOLUTION INTRAVENOUS; SUBCUTANEOUS at 20:45

## 2020-09-17 ASSESSMENT — ENCOUNTER SYMPTOMS: SHORTNESS OF BREATH: 0

## 2020-09-17 ASSESSMENT — LIFESTYLE VARIABLES: SMOKING_STATUS: 0

## 2020-09-17 ASSESSMENT — PAIN SCALES - GENERAL: PAINLEVEL_OUTOF10: 0

## 2020-09-17 NOTE — ANESTHESIA POSTPROCEDURE EVALUATION
Department of Anesthesiology  Postprocedure Note    Patient: Nathaly Colon  MRN: [id number]  YOB: 1965  Date of evaluation: 9/17/2020  Time:  2:38 PM     Procedure Summary     Date:  09/17/20 Room / Location:  [address]    Anesthesia Start:  8089 Anesthesia Stop:  6948    Procedure:  OPEN REDUCTION INTERNAL FIXATION LEFT SUPRACONDYLAR FRACTURE (Left Elbow) Diagnosis:  (N67.830O)    Surgeon:  Coleman Lee MD Responsible Provider:  NOMI Hamilton CRNA    Anesthesia Type:  general, regional ASA Status:  3          Anesthesia Type: general, regional    Jessica Phase I: Jessica Score: 10    Jessica Phase II:      Last vitals: Reviewed and per EMR flowsheets.        Anesthesia Post Evaluation    Patient location during evaluation: PACU  Patient participation: complete - patient participated  Level of consciousness: awake and alert  Pain score: 0  Airway patency: patent  Nausea & Vomiting: no nausea and no vomiting  Complications: no  Cardiovascular status: hemodynamically stable  Respiratory status: acceptable  Hydration status: euvolemic

## 2020-09-17 NOTE — ANESTHESIA PRE PROCEDURE
Department of Anesthesiology  Preprocedure Note       Name:  Bharti Wilkerson   Age:  62 y.o.  :  1965                                          MRN:  [id number]         Date:  2020      Surgeon: Marino Bonilla):  Sarah Lopez MD    Procedure: Procedure(s):  OPEN REDUCTION INTERNAL FIXATION LEFT SUPRACONDYLAR FRACTURE    Medications prior to admission:   Prior to Admission medications    Medication Sig Start Date End Date Taking? Authorizing Provider   furosemide (LASIX) 20 MG tablet Take 1 tablet by mouth daily as needed (edema) 20  Yes NOMI Gallego   Insulin Degludec (TRESIBA) 100 UNIT/ML SOLN Inject 80 Units/day into the skin daily    Yes Historical Provider, MD   losartan (COZAAR) 100 MG tablet TAKE ONE TABLET DAILY  Patient taking differently: Take 100 mg by mouth daily  20  Yes NOMI Quintero - CNP   atorvastatin (LIPITOR) 20 MG tablet TAKE ONE TABLET DAILY  Patient taking differently: Take 20 mg by mouth daily  20  Yes Gabbi Bess MD   tamsulosin (FLOMAX) 0.4 MG capsule TAKE TWO CAPSULES DAILY  Patient taking differently: Take 0.8 mg by mouth daily TAKE TWO CAPSULES DAILY 20  Yes Renu Betancur PA-C   BD INSULIN SYRINGE U/F 31G X \" 1 ML MISC USE FOUR TIMES DAILY 5/15/20  Yes Agapito Bess MD   glimepiride (AMARYL) 4 MG tablet [facility] BREAKFAST  Patient taking differently: Take 4 mg by mouth every morning (before breakfast)  3/24/20  Yes Gabbi Bess MD   metoprolol tartrate (LOPRESSOR) 25 MG tablet TAKE ONE TABLET TWICE DAILY  Patient taking differently: Take 25 mg by mouth 2 times daily  19  Yes NOMI Gallego   blood glucose test strips (CONTOUR NEXT TEST) strip 1 each by In Vitro route 4 times daily As needed.  19  Yes Gabbi Bess MD   Lancets MISC 1 each by Does not apply route 4 times daily 19  Yes Gabbi Bess MD   B-D ULTRAFINE III SHORT PEN 31G X 8 MM MISC Inject 1 each as directed 3 times daily 9/16/19  Yes Gabbi Bess MD   warfarin (COUMADIN) 10 MG tablet Take 1 tablet by mouth daily 5/7/20   NOMI Ospina   warfarin (COUMADIN) 5 MG tablet TAKE AS DIRECTED BY YOUR DOCTOR  Patient taking differently: Take by mouth daily TAKE AS DIRECTED BY YOUR DOCTOR-keeps 5mg on hand if dose varies 1/29/20   NOMI Ponce   diphenhydrAMINE (BENADRYL) 25 MG tablet Take 25 mg by mouth every 6 hours as needed for Itching    Historical Provider, MD       Current medications:    Current Facility-Administered Medications   Medication Dose Route Frequency Provider Last Rate Last Dose    ceFAZolin (ANCEF) 3 g in dextrose 5 % 100 mL IVPB  3 g Intravenous Once Maria Isabel Scott MD        midazolam (VERSED) injection 2 mg  2 mg Intravenous Once PRN Devin Gonzalez MD           Allergies:     Allergies   Allergen Reactions    Pcn [Penicillins]     Neomycin-Bacitracin Zn-Polymyx Rash    Neosporin [Neomycin-Polymyxin-Gramicidin] Rash       Problem List:    Patient Active Problem List   Diagnosis Code    Shortness of breath R06.02    HTN (hypertension) I10    Hyperlipidemia E78.5    Fatigue R53.83    Paroxysmal atrial fibrillation (HCC) I48.0    Acalculous cholecystitis K81.9    Hyponatremia E87.1    Hypotension due to hypovolemia I95.89, E86.1    Type 2 diabetes mellitus with diabetic polyneuropathy, with long-term current use of insulin (HCC) E11.42, Z79.4    Coagulopathy ([facility] [address] D68.9    Morbidly obese ([facility]) E66.01    Diabetic ulcer of right midfoot associated with type 2 diabetes mellitus, with fat layer exposed ([address] E11.621, L97.412    Displaced comminuted supracondylar fracture without intercondylar fracture of left humerus, initial encounter for closed fracture L46.076K       Past Medical History:        Diagnosis Date    Allergic rhinitis, cause unspecified     Atrial fibrillation, chronic     sees [facility]ology    Charcot's joint of right foot      09/06/2020       CMP:   Lab Results   Component Value Date     09/06/2020    K 4.6 09/06/2020    CL 95 09/06/2020    CO2 24 09/06/2020    BUN 14 09/06/2020    CREATININE 0.7 09/06/2020    GFRAA >59 09/06/2020    LABGLOM >60 09/06/2020    GLUCOSE 335 09/06/2020    PROT 6.9 09/06/2020    CALCIUM 8.8 09/06/2020    BILITOT 1.4 09/06/2020    ALKPHOS 210 09/06/2020    AST 28 09/06/2020    ALT 43 09/06/2020       POC Tests: No results for input(s): POCGLU, POCNA, POCK, POCCL, POCBUN, POCHEMO, POCHCT in the last 72 hours. Coags:   Lab Results   Component Value Date    PROTIME 24.3 09/06/2020    INR 2.14 09/06/2020    APTT 36.2 09/06/2020       HCG (If Applicable): No results found for: PREGTESTUR, PREGSERUM, HCG, HCGQUANT     ABGs:   Lab Results   Component Value Date    PHART 7.290 03/25/2019    PO2ART 79.0 03/25/2019    AAE7PPP 20.0 03/25/2019    JOO9XYV 9.6 03/25/2019    BEART -14.8 03/25/2019    W1ICSUJK 95.1 03/25/2019        Type & Screen (If Applicable):  No results found for: LABABO, [address]    Drug/Infectious Status (If Applicable):  No results found for: HIV, HEPCAB    COVID-19 Screening (If Applicable):   Lab Results   Component Value Date    COVID19 Not Detected 09/14/2020    COVID19 Not Detected 08/18/2020         Anesthesia Evaluation  Patient summary reviewed and Nursing notes reviewed no history of anesthetic complications:   Airway: Mallampati: II  TM distance: >3 FB   Neck ROM: full  Mouth opening: > = 3 FB Dental: normal exam         Pulmonary:Negative Pulmonary ROS and normal exam  breath sounds clear to auscultation      (-) shortness of breath and not a current smoker          Patient did not smoke on day of surgery.                  Cardiovascular:    (+) hypertension:, dysrhythmias: atrial fibrillation,     (-) CAD,  angina and  CHF    NYHA Classification: I  ECG reviewed  Rhythm: irregular  Rate: normal           Beta Blocker:  Not on Beta Blocker      ROS comment: Summary Structurally normal mitral valve. Mild mitral regurgitation is present. Mitral annular calcification is present. Structurally normal aortic valve. Tricuspid valve is structurally normal.   Mild tricuspid regurgitation. Normal size left atrium. Normal intact intra-atrial septum was noted with no evidence of   significant intra-atrial communications by color flow Doppler or by   agitated saline study. Normal left ventricular size with preserved LV function and an estimated   ejection fraction of approximately 55-60%. Moderate concentric left ventricular hypertrophy. No regional wall motion abnormalities. no      Signature      ----------------------------------------------------------------   Electronically signed by Hever Winslow MD(Interpreting   physician) on 08/18/2020 07:52      Neuro/Psych:   Negative Neuro/Psych ROS     (-) seizures, CVA and depression/anxiety            GI/Hepatic/Renal: Neg GI/Hepatic/Renal ROS  (+) morbid obesity     (-) hiatal hernia and GERD       Endo/Other: Negative Endo/Other ROS   (+) DiabetesType II DM, , blood dyscrasia: anticoagulation therapy:., .          Pt had PAT visit. Abdominal:   (+) obese,     Abdomen: soft. Vascular:                                      Anesthesia Plan      general and regional     ASA 3     (Iv zofran within 30 min of closing   Last dose coumadin 1.5 week ago )  Induction: intravenous. BIS  MIPS: Postoperative opioids intended and Prophylactic antiemetics administered. Anesthetic plan and risks discussed with patient. Use of blood products discussed with patient whom. Plan discussed with CRNA.     Attending anesthesiologist reviewed and agrees with Pre Eval content            Devin Gonzalez MD   9/17/2020

## 2020-09-17 NOTE — BRIEF OP NOTE
Brief Postoperative Note      Patient: Melba Emerson  YOB: 1965  MRN: [id number]    Date of Procedure: 9/17/2020    Pre-Op Diagnosis: S42.412A    Post-Op Diagnosis: Same       Procedure(s):  OPEN REDUCTION INTERNAL FIXATION LEFT SUPRACONDYLAR FRACTURE    Surgeon(s):  Kierra Starr MD    Assistant:  * No surgical staff found *    Anesthesia: General    Estimated Blood Loss (mL): less than 50     Complications: None    Specimens:   * No specimens in log *    Implants:  Implant Name Type Inv. Item Serial No.  Lot No. LRB No. Used Action   PLATE DIST HUM 4.0[facility]8. [facility]ate/Nail/Jossue PLATE DIST HUM 0.8/8. 5MM [facility]  [employer]TH[employer]  Left 1 Implanted   PLATE HUM 9.7/0.3DP EXT T ST Screw/Plate/Nail/Jossue PLATE HUM 8.5/7.1FB EXT T ST  SYNTHES  Left 1 Implanted   SCREW VARI-ANGL LK 2.7X12MM Screw/Plate/Nail/Jossue SCREW VARI-ANGL LK 2.7X12MM  [employer]  Left 3 Implanted   SCREW LK 2.7X20MM VA Screw/Plate/Nail/Jossue SCREW LK 2.7X20MM VA  [employer]  Left 1 Implanted   SCREW LK SLFT W/STARDRIVE RECESS [id number] Screw/Plate/Nail/Jossue SCREW LK SLFT W/STARDRIVE RECESS 9.0N95AI  [employer]  Left 3 Implanted   SCREW 2.7MM X 24MM VA Screw/Plate/Nail/Jossue SCREW 2.7MM X 24MM VA  [employer]  Left 1 Implanted   SCREW VARI-ANGL LK 2.7X22MM Screw/Plate/Nail/Jossue SCREW VARI-ANGL LK 2.7X22MM  [employer]  Left 1 Implanted   SCREW VARI-ANGL LK 2.7X44MM Screw/Plate/Nail/Jossue SCREW VARI-ANGL LK 2.7X44MM  [employer]  Left 1 Implanted   SCREW CORTCL SLFTP FTHRD 2.7X26MM Screw/Plate/Nail/Jossue SCREW CORTCL SLFTP FTHRD 2.7X26MM  [facility]  Left 1 Implanted   SCREW CORTX SLFTP FTHRD 3.5X22MM Screw/Plate/Nail/Jossue SCREW CORTX SLFTP FTHRD 3.5X22MM  [facility]  Left 1 Implanted   SCREW LK SLFTP W/ 3.5X20MM Screw/Plate/Nail/Jossue SCREW LK SLFTP W/ 3.5X20MM  [employer]  Left 2 Implanted         Drains: * No LDAs found *    Findings: see op note    Electronically signed by Kierra Starr MD on 9/17/2020 at 2:43 PM

## 2020-09-17 NOTE — ANESTHESIA PROCEDURE NOTES
Peripheral Block    Patient location during procedure: holding area  Start time: 9/17/2020 11:46 AM  End time: 9/17/2020 11:46 AM  Staffing  Anesthesiologist: Milton Banegas MD  Performed: anesthesiologist   Preanesthetic Checklist  Completed: patient identified, site marked, surgical consent, pre-op evaluation, timeout performed, IV checked, risks and benefits discussed, monitors and equipment checked, anesthesia consent given, oxygen available and patient being monitored  Peripheral Block  Patient position: supine  Prep: ChloraPrep  Patient monitoring: cardiac monitor, continuous pulse ox and frequent blood pressure checks  Block type: Brachial plexus  Laterality: left  Injection technique: single-shot  Procedures: ultrasound guided  Supraclavicular  Provider prep: mask and sterile gloves  Needle  Needle type: short-bevel   Needle gauge: 20 G  Needle length: 5 cm  Needle localization: anatomical landmarks and ultrasound guidance  Test dose: negative  Assessment  Injection assessment: negative aspiration for heme, no paresthesia on injection and local visualized surrounding nerve on ultrasound  Paresthesia pain: immediately resolved  Slow fractionated injection: yes  Hemodynamics: stable  Additional Notes  The needle was introduce above The mid- clavicle and imaged in a inplane technique. The needle tip was advanced under direct vision under the plexus coming to rest on the first rib. The plexus was then \"peeled\" off the first rib by hydrodissection with local anesthetic. Care was given to avoid the vascular structures as well as the pleura. A total dose of 30cc of 0.5%Ropivicaine was used in divided doses.    Medications Administered  Ropivacaine (NAROPIN) injection 0.5%, 30 mL  Reason for block: post-op pain management

## 2020-09-18 VITALS
BODY MASS INDEX: 38.65 KG/M2 | RESPIRATION RATE: 20 BRPM | SYSTOLIC BLOOD PRESSURE: 140 MMHG | WEIGHT: 270 LBS | DIASTOLIC BLOOD PRESSURE: 85 MMHG | TEMPERATURE: 97.2 F | OXYGEN SATURATION: 97 % | HEIGHT: 70 IN | HEART RATE: 67 BPM

## 2020-09-18 LAB
ANION GAP SERPL CALCULATED.3IONS-SCNC: 14 MMOL/L (ref 7–19)
BUN BLDV-MCNC: 19 MG/DL (ref 6–20)
CALCIUM SERPL-MCNC: 9.3 MG/DL (ref 8.6–10)
CHLORIDE BLD-SCNC: 98 MMOL/L (ref 98–111)
CO2: 24 MMOL/L (ref 22–29)
CREAT SERPL-MCNC: 1 MG/DL (ref 0.5–1.2)
GFR AFRICAN AMERICAN: >59
GFR NON-AFRICAN AMERICAN: >60
GLUCOSE BLD-MCNC: 333 MG/DL (ref 70–99)
GLUCOSE BLD-MCNC: 365 MG/DL (ref 74–109)
HCT VFR BLD CALC: 40.6 % (ref 42–52)
HEMOGLOBIN: 13.9 G/DL (ref 14–18)
INR BLD: 1.15 (ref 0.88–1.18)
MCH RBC QN AUTO: 31.3 PG (ref 27–31)
MCHC RBC AUTO-ENTMCNC: 34.2 G/DL (ref 33–37)
MCV RBC AUTO: 91.4 FL (ref 80–94)
PDW BLD-RTO: 12.9 % (ref 11.5–14.5)
PERFORMED ON: ABNORMAL
PLATELET # BLD: 348 K/UL (ref 130–400)
PMV BLD AUTO: 10.5 FL (ref 9.4–12.4)
POTASSIUM SERPL-SCNC: 4.7 MMOL/L (ref 3.5–5)
PROTHROMBIN TIME: 14.6 SEC (ref 12–14.6)
RBC # BLD: 4.44 M/UL (ref 4.7–6.1)
SODIUM BLD-SCNC: 136 MMOL/L (ref 136–145)
WBC # BLD: 10.6 K/UL (ref 4.8–10.8)

## 2020-09-18 PROCEDURE — 2580000003 HC RX 258: Performed by: ORTHOPAEDIC SURGERY

## 2020-09-18 PROCEDURE — 97161 PT EVAL LOW COMPLEX 20 MIN: CPT

## 2020-09-18 PROCEDURE — 97110 THERAPEUTIC EXERCISES: CPT

## 2020-09-18 PROCEDURE — G0378 HOSPITAL OBSERVATION PER HR: HCPCS

## 2020-09-18 PROCEDURE — 80048 BASIC METABOLIC PNL TOTAL CA: CPT

## 2020-09-18 PROCEDURE — 6360000002 HC RX W HCPCS: Performed by: ORTHOPAEDIC SURGERY

## 2020-09-18 PROCEDURE — 6370000000 HC RX 637 (ALT 250 FOR IP): Performed by: ORTHOPAEDIC SURGERY

## 2020-09-18 PROCEDURE — 82947 ASSAY GLUCOSE BLOOD QUANT: CPT

## 2020-09-18 PROCEDURE — 36415 COLL VENOUS BLD VENIPUNCTURE: CPT

## 2020-09-18 PROCEDURE — 85027 COMPLETE CBC AUTOMATED: CPT

## 2020-09-18 PROCEDURE — 97165 OT EVAL LOW COMPLEX 30 MIN: CPT

## 2020-09-18 PROCEDURE — 85610 PROTHROMBIN TIME: CPT

## 2020-09-18 RX ORDER — DOCUSATE SODIUM 250 MG
250 CAPSULE ORAL 2 TIMES DAILY
Qty: 60 CAPSULE | Refills: 0 | Status: SHIPPED | OUTPATIENT
Start: 2020-09-18 | End: 2021-05-11 | Stop reason: ALTCHOICE

## 2020-09-18 RX ORDER — OXYCODONE AND ACETAMINOPHEN 10; 325 MG/1; MG/1
1 TABLET ORAL EVERY 6 HOURS PRN
Qty: 25 TABLET | Refills: 0 | Status: SHIPPED | OUTPATIENT
Start: 2020-09-18 | End: 2020-09-23

## 2020-09-18 RX ADMIN — INSULIN LISPRO 12 UNITS: 100 INJECTION, SOLUTION INTRAVENOUS; SUBCUTANEOUS at 09:05

## 2020-09-18 RX ADMIN — DOCUSATE SODIUM 50MG AND SENNOSIDES 8.6MG 1 TABLET: 8.6; 5 TABLET, FILM COATED ORAL at 09:06

## 2020-09-18 RX ADMIN — VANCOMYCIN HYDROCHLORIDE 2000 MG: 1 INJECTION, POWDER, LYOPHILIZED, FOR SOLUTION INTRAVENOUS at 00:25

## 2020-09-18 RX ADMIN — GLIMEPIRIDE 4 MG: 4 TABLET ORAL at 06:07

## 2020-09-18 RX ADMIN — TAMSULOSIN HYDROCHLORIDE 0.8 MG: 0.4 CAPSULE ORAL at 09:06

## 2020-09-18 RX ADMIN — METOPROLOL TARTRATE 25 MG: 25 TABLET, FILM COATED ORAL at 09:06

## 2020-09-18 RX ADMIN — ATORVASTATIN CALCIUM 20 MG: 20 TABLET, FILM COATED ORAL at 09:06

## 2020-09-18 RX ADMIN — LOSARTAN POTASSIUM 100 MG: 100 TABLET, FILM COATED ORAL at 09:06

## 2020-09-18 NOTE — PROGRESS NOTES
Occupational Therapy   Occupational Therapy Initial Assessment  Date: 2020   Patient Name: Kit Zazueta  MRN: [id number]     : 1965    Date of Service: 2020    Discharge Recommendations:          Assessment   Performance deficits / Impairments: Decreased ROM; Decreased strength  Assessment: Pt. has experience in doing ADLS with only RUE for 2 weeks prior to surgery. OT eval only  Treatment Diagnosis: L humerus s/p ORIF  Prognosis: Good  Decision Making: Low Complexity  REQUIRES OT FOLLOW UP: No  Activity Tolerance  Activity Tolerance: Patient Tolerated treatment well           Patient Diagnosis(es): The encounter diagnosis was Left supracondylar humerus fracture, closed, initial encounter. has a past medical history of Allergic rhinitis, cause unspecified, Atrial fibrillation, chronic, Charcot's joint of right foot, Chicken pox, Closed supracondylar fracture of elbow, History of MRSA infection, HTN (hypertension), Hyperlipidemia, Impotence of organic origin, MDRO (multiple drug resistant organisms) resistance, Other dyspnea and respiratory abnormality, Other specified erythematous condition(695.89), Personal history of other infectious and parasitic disease, and Type II or unspecified type diabetes mellitus without mention of complication, uncontrolled. has a past surgical history that includes Tonsillectomy; malignant skin lesion excision; Cardioversion (2017); Colonoscopy (2020); and Humerus fracture surgery (Left, 2020).     Treatment Diagnosis: L humerus s/p ORIF      Restrictions  Restrictions/Precautions  Restrictions/Precautions: Weight Bearing, Surgical Protocols, Contact Precautions  Required Braces or Orthoses?: Yes  Upper Extremity Weight Bearing Restrictions  Left Upper Extremity Weight Bearing: Non Weight Bearing  Required Braces or Orthoses  Left Upper Extremity Brace/Splint: Sling  Position Activity Restriction  Other position/activity restrictions: R charcot foot, MRSA    Subjective   General  Chart Reviewed: Yes  Patient assessed for rehabilitation services?: Yes  Family / Caregiver Present: No  Diagnosis: L humerus fx. s/p ORIF  Patient Currently in Pain: Denies  Vital Signs  Level of Consciousness: Alert  Patient Currently in Pain: Denies  Social/Functional History  Social/Functional History  Lives With: Alone(Simultaneous filing. User may not have seen previous data.)  Type of Home: House(Simultaneous filing. User may not have seen previous data.)  Home Layout: One level(Simultaneous filing. User may not have seen previous data.)  Bathroom Shower/Tub: Tub/Shower unit(States he will likely sponge bathe)  Bathroom Toilet: Standard  Home Equipment: Cane  ADL Assistance: Independent(Simultaneous filing. User may not have seen previous data.)  Ambulation Assistance: Independent(Simultaneous filing. User may not have seen previous data.)  Transfer Assistance: Independent(Simultaneous filing. User may not have seen previous data.)  Active : (can drive, but not now)  Additional Comments: goes to wound care for wound on R foot       Objective        Orientation  Overall Orientation Status: Within Normal Limits  Observation/Palpation  Posture: Good  Observation: sling LUE, R foot wrapped with kerlix and with surgical booty over it. ADL  Feeding: Modified independent   Grooming: Modified independent   UE Bathing: Supervision  LE Bathing: Supervision  UE Dressing: Supervision  LE Dressing: Supervision  Toileting: Supervision  Additional Comments: Can only use RUE as LUE in sling and above elbow soft cast.        Bed mobility  Supine to Sit: Independent  Transfers  Stand Step Transfers: Independent  Sit to stand: Independent     Cognition  Overall Cognitive Status: WFL        Sensation  Overall Sensation Status: Impaired(neuropathy B feet)        LUE AROM (degrees)  LUE AROM : Exceptions  LUE General AROM: elbow immobilized.   Shoulder AROM to 90 deg elevation  RUE AROM

## 2020-09-18 NOTE — DISCHARGE SUMMARY
NAME: Mike Espinoza  : 1965  MRN: [id number]      Admission Date: 2020    Discharge Date: 2020    Final Diagnoses: S42.412ALeft supracondylar humerus fracture, closed, initial encounter [S42.412A]    Procedures: ORIF L suprcondylar    Consultations: Dr. Veronika Cronin    Reason for Admission: The patient is a 62 y.o. right-hand dominant male who fell at home after passing out on 20. The patient was seen in the clinic where radiographs confirmed a displaced supracondylar fracture for which I recommended surgical treatment. Risks included, but are not limited to, that of anesthesia, bleeding, infection, pain, damage to local structures, need for further surgery, malunion, nonunion, prominent hardware, loss of range of motion, loss of function. The patient is morbidly obese, severe diabetic, and has a history of MRSA infections - all of these increase his risk of postoperative complications. He has a history of A-fib and stopped his coumadin on his own a week ago. Hospital Course: The patient was admitted with the above named diagnosis, surgery was performed and tolerated well. At the time of discharge, the patient was afebrile, vitals stable, pain was controlled with oral medication, they were tolerating a by mouth diet, and voiding appropriately. Physical therapy and occupational therapy were consulted. Given these findings they were deemed suitable to be discharged. Disposition: Home    Activity: Non-weight bearing left upper    Wound Instructions: see postop instructions    Diet: regular diet    Resume home meds:   Prior to Admission medications    Medication Sig Start Date End Date Taking? Authorizing Provider   oxyCODONE-acetaminophen (PERCOCET)  MG per tablet Take 1 tablet by mouth every 6 hours as needed for Pain for up to 5 days.  20 Yes Saul Espino MD   docusate sodium (COLACE) 250 MG capsule Take 1 capsule by mouth 2 times daily 20  Yes Saul Espino MD furosemide (LASIX) 20 MG tablet Take 1 tablet by mouth daily as needed (edema) 8/25/20  Yes NOMI Sanders   Insulin Degludec (TRESIBA) 100 UNIT/ML SOLN Inject 80 Units/day into the skin daily    Yes Historical Provider, MD   losartan (COZAAR) 100 MG tablet TAKE ONE TABLET DAILY  Patient taking differently: Take 100 mg by mouth daily  7/9/20  Yes NOMI Qiuntero - CNP   atorvastatin (LIPITOR) 20 MG tablet TAKE ONE TABLET DAILY  Patient taking differently: Take 20 mg by mouth daily  7/9/20  Yes Gabbi Bess MD   tamsulosin (FLOMAX) 0.4 MG capsule TAKE TWO CAPSULES DAILY  Patient taking differently: Take 0.8 mg by mouth daily TAKE TWO CAPSULES DAILY 6/23/20  Yes Neo Pritchard PA-C   BD INSULIN SYRINGE U/F 31G X 5/16\" 1 ML MISC USE FOUR TIMES DAILY 5/15/20  Yes Narayan Bess MD   glimepiride (AMARYL) 4 MG tablet [facility] BREAKFAST  Patient taking differently: Take 4 mg by mouth every morning (before breakfast)  3/24/20  Yes Gabbi Bess MD   metoprolol tartrate (LOPRESSOR) 25 MG tablet TAKE ONE TABLET TWICE DAILY  Patient taking differently: Take 25 mg by mouth 2 times daily  12/20/19  Yes NOMI Sanders   blood glucose test strips (CONTOUR NEXT TEST) strip 1 each by In Vitro route 4 times daily As needed.  11/21/19  Yes Gabbi Bess MD   Lancets MISC 1 each by Does not apply route 4 times daily 11/21/19  Yes Gabbi Bess MD   B-D ULTRAFINE III SHORT PEN 31G X 8 MM MISC Inject 1 each as directed 3 times daily 9/16/19  Yes Gabbi Bess MD   warfarin (COUMADIN) 10 MG tablet Take 1 tablet by mouth daily 5/7/20   NOMI Hooks   warfarin (COUMADIN) 5 MG tablet TAKE AS DIRECTED BY YOUR DOCTOR  Patient taking differently: Take by mouth daily TAKE AS DIRECTED BY YOUR DOCTOR-keeps 5mg on hand if dose varies 1/29/20   Shelby Concha, APRN   diphenhydrAMINE (BENADRYL) 25 MG tablet Take 25 mg by mouth every 6 hours as needed

## 2020-09-18 NOTE — CONSULTS
Referring Provider:  Dr. Michael Huang  Reason for Consultation: Medical management    Patient Care Team:  Franki Hollingsworth MD as PCP - General (Internal Medicine)  Franki Hollingsworth MD as PCP - [facility]ILIT[facility]  Shae Vanegas as Referring Physician (Physician Assistant)  NOMI Mcintyre as Nurse Practitioner (Family Nurse Practitioner)  NOMI Ponce as Nurse Practitioner (Certified Nurse Specialist)    Chief complaint left humeral fracture    Subjective . History of present illness: The patient presents to the orthopedic service for Open reduction and internal fixation left supracondylar humerus fracture with intercondylar extension. They have failed outpatient conservative treatment of NSAIDS, muscle relaxer, physical therapy and opioid pain meds. The pain is affecting their activities of daily living and they have chosen to undergo surgical correction. We have been asked to provide medical consultation by the attending physician since their primary care provider does not attend here at [address] [place] [address] in their healthcare during the perioperative phase was discussed with the patient. All questions were encouraged and answered to the best of our ability. The postoperative pain is as expected. There are no other participating or relieving factors noted. Patient is followed by endocrinology in [place] [address].. States he is inconsistent with taking his insulin. He is on chronic Coumadin. He fell down fracturing his distal humerus after a coughing fit. Postoperative pain is controlled. I reviewed his elevated blood glucose levels and need for tight glycemic control for postop healing and also long-term goals of diabetes mellitus with A1c goals of less than or equal to 7. Prevention of micro-and macrovascular changes associated with poorly controlled diabetes.     REVIEW OF SYSTEMS:    CONSTITUTIONAL:  Negative for anorexia, chills, fevers, night sweats and weight loss  EYES:  negative for eye dryness, icterus and redness  HEENT:   negative for dental problems, epistaxis, facial trauma and thrush  RESPIRATORY:  negative for chest tightness, cough, dyspnea on exertion, pneumonia and sputum  CARDIOVASCULAR: negative for chest pain, dyspnea, exertional chest pressure/discomfort, irregular heart beat, palpitations, paroxysmal nocturnal dyspnea and syncope  GASTROINTESTINAL:  negative for abdominal pain, hematemesis, jaundice, melena and rectal bleeding  MUSCULOSKELETAL:  negative for muscle weakness, myalgias and neck pain, chronic joint pain noted  NEUROLOGICAL:   negative for dizziness, headaches, seizures, speech problems, tremors and vertigo  INTEGUMENT: negative for pruritus, rash, skin color change and skin lesion(s)   A Full 14 point review of systems is negative outside those listed above and in the HPI      History    Past Medical History:   Diagnosis Date    Allergic rhinitis, cause unspecified     Atrial fibrillation, chronic     se[facility]    Charcot's joint of right foot     Chicken pox     Closed supracondylar fracture of elbow     fall    History of MRSA infection     scalp/facial and on back    HTN (hypertension)     Hyperlipidemia     Impotence of organic origin     MDRO (multiple drug resistant organisms) resistance     Other dyspnea and respiratory abnormality     Other specified erythematous condition(695.89)     Personal history of other infectious and parasitic disease     Type II or unspecified type diabetes mellitus without mention of complication, uncontrolled 1994     Past Surgical History:   Procedure Laterality Date    CARDIOVERSION  03/2017    COLONOSCOPY  02/24/2020    Dr Gilmer Fraire-Melanosis coli throughout the entire colon-AP, 5 yr recall    MALIGNANT SKIN LESION EXCISION      X 2    TONSILLECTOMY       Family History   Problem Relation Age of Onset    Stroke Maternal Grandmother     Cancer Maternal Grandmother     Stroke Paternal Grandmother     Diabetes Paternal Grandmother     Cancer Father     Heart Disease Father     Lung Cancer Father     Asthma Paternal Grandfather     Heart Disease Paternal Grandfather     Heart Disease Maternal Grandfather     Colon Cancer Neg Hx     Colon Polyps Neg Hx     Esophageal Cancer Neg Hx     Liver Cancer Neg Hx     Liver Disease Neg Hx     Rectal Cancer Neg Hx     Stomach Cancer Neg Hx      Social History     Tobacco Use    Smoking status: Never Smoker    Smokeless tobacco: Never Used   Substance Use Topics    Alcohol use: No    Drug use: No     Medications Prior to Admission: furosemide (LASIX) 20 MG tablet, Take 1 tablet by mouth daily as needed (edema)  Insulin Degludec (TRESIBA) 100 UNIT/ML SOLN, Inject 80 Units/day into the skin daily   losartan (COZAAR) 100 MG tablet, TAKE ONE TABLET DAILY (Patient taking differently: Take 100 mg by mouth daily )  atorvastatin (LIPITOR) 20 MG tablet, TAKE ONE TABLET DAILY (Patient taking differently: Take 20 mg by mouth daily )  tamsulosin (FLOMAX) 0.4 MG capsule, TAKE TWO CAPSULES DAILY (Patient taking differently: Take 0.8 mg by mouth daily TAKE TWO CAPSULES DAILY)  BD INSULIN SYRINGE U/F 31G X 5/16\" 1 ML MISC, USE FOUR TIMES DAILY  glimepiride (AMARYL) 4 MG tablet, TAKE ONE TABLET EVERY MORNING BEFORE BREAKFAST (Patient taking differently: Take 4 mg by mouth every morning (before breakfast) )  metoprolol tartrate (LOPRESSOR) 25 MG tablet, TAKE ONE TABLET TWICE DAILY (Patient taking differently: Take 25 mg by mouth 2 times daily )  blood glucose test strips (CONTOUR NEXT TEST) strip, 1 each by In Vitro route 4 times daily As needed.   Lancets MISC, 1 each by Does not apply route 4 times daily  B-D ULTRAFINE III SHORT PEN 31G X 8 MM MISC, Inject 1 each as directed 3 times daily  warfarin (COUMADIN) 10 MG tablet, Take 1 tablet by mouth daily  warfarin (COUMADIN) 5 MG tablet, TAKE AS DIRECTED BY YOUR DOCTOR (Patient taking differently: Take by mouth daily TAKE AS DIRECTED BY YOUR DOCTOR-keeps 5mg on hand if dose varies)  diphenhydrAMINE (BENADRYL) 25 MG tablet, Take 25 mg by mouth every 6 hours as needed for Itching  Pcn [penicillins]; Neomycin-bacitracin zn-polymyx; and Neosporin [neomycin-polymyxin-gramicidin]  Objective     Vital Signs   All pre-op and post op vitals reviewed           Physical Exam:  Constitutional: oriented to person, place, and time. appears well-developed. HEENT:   Head: Normocephalic and atraumatic. Eyes: Pupils are equal, round, and reactive to light. Neck: Neck supple. Cardiovascular regular irregular  Pulmonary/Chest: Effort normal and breath sounds normal. CTAB. No labored breating. Abdominal: Soft. Bowel sounds are normal. There is no appreciable  distension. There is no point tenderness. no rebounding or guarding. Musculoskeletal: Normal range of motion on other than surgically repaired joint . no edema or tenderness other than surgical area. Post-op changes noted  Neurological:  alert and oriented to person, place, and time. normal reflexes. No focal deficits  Skin: Diabetic foot ulcer right plantar aspect    Results Review:   I reviewed the patient's new imaging results and agree with the interpretation. Principal Problem:    Displaced comminuted supracondylar fracture without intercondylar fracture of left humerus, initial encounter for closed fracture  Active Problems:    Left supracondylar humerus fracture, closed, initial encounter  Diabetic ulcer right midfoot  Diabetes mellitus type 2 with hyperglycemia  Morbid obesity  Paroxysmal atrial fibrillation  Essential hypertension with hypertensive urgency    Postop day #1 ORIF left supracondylar fracture    Anemia post-op expected-check iron, B12,and folate if indicated. Replenish substrates as needed. Transfuse at acceptable levels depending on clinical judgement and comorbidities. Recent hospital policy recommends 1 unit at a time.  Recheck

## 2020-09-18 NOTE — PROGRESS NOTES
Physical Therapy    Facility/Department: [facility] SURG SERVICES  Initial Assessment    NAME: Richard Acuna  : 1965  MRN: [id number]    Date of Service: 2020    Discharge Recommendations:  Home with assist PRN        Assessment   Assessment: Pt. tolerated mobility well, no need for skilled PT at this time. Pt. safe to d/c home on his own. Treatment Diagnosis: SBA/I mobility  Prognosis: Good  Decision Making: Low Complexity  PT Education: PT Role;Weight-bearing Education  Barriers to Learning: none noted  No Skilled PT: Safe to return home  REQUIRES PT FOLLOW UP: Yes  Activity Tolerance  Activity Tolerance: Patient Tolerated treatment well       Patient Diagnosis(es): The encounter diagnosis was Left supracondylar humerus fracture, closed, initial encounter. has a past medical history of Allergic rhinitis, cause unspecified, Atrial fibrillation, chronic, Charcot's joint of right foot, Chicken pox, Closed supracondylar fracture of elbow, History of MRSA infection, HTN (hypertension), Hyperlipidemia, Impotence of organic origin, MDRO (multiple drug resistant organisms) resistance, Other dyspnea and respiratory abnormality, Other specified erythematous condition(695.89), Personal history of other infectious and parasitic disease, and Type II or unspecified type diabetes mellitus without mention of complication, uncontrolled. has a past surgical history that includes Tonsillectomy; malignant skin lesion excision; Cardioversion (2017); Colonoscopy (2020); and Humerus fracture surgery (Left, 2020).     Restrictions  Restrictions/Precautions  Restrictions/Precautions: Weight Bearing, Surgical Protocols, Contact Precautions  Required Braces or Orthoses?: Yes  Upper Extremity Weight Bearing Restrictions  Left Upper Extremity Weight Bearing: Non Weight Bearing  Required Braces or Orthoses  Left Upper Extremity Brace/Splint: Sling  Position Activity Restriction  Other position/activity restrictions: R charcot foot, MRSA  Vision/Hearing  Vision: Within Functional Limits  Hearing: Within functional limits     Subjective  General  Chart Reviewed: Yes  Patient assessed for rehabilitation services?: Yes  Response To Previous Treatment: Not applicable  Family / Caregiver Present: No  Referring Practitioner: Dr. Roxanne Gonzalez  Referral Date : 09/17/20  Diagnosis: Acute traumatic displaced supracondylar fracture of the left humerus with intra-articular extension  General Comment  Comments: 9/17 ORIF left supracondylar humerus fracture with intercondylar extension  Subjective  Subjective: Pt. willing to work with therapy. Thinks he will go home today. Reports multiple injuries this year. Pain Screening  Patient Currently in Pain: Denies  Vital Signs  Patient Currently in Pain: Denies  Pre Treatment Pain Screening  Intervention List: Patient able to continue with treatment    Orientation  Orientation  Overall Orientation Status: Within Functional Limits  Social/Functional History  Social/Functional History  Lives With: Alone(Simultaneous filing. User may not have seen previous data.)  Type of Home: House(Simultaneous filing. User may not have seen previous data.)  Home Layout: One level(Simultaneous filing. User may not have seen previous data.)  Bathroom Shower/Tub: Tub/Shower unit(States he will likely sponge bathe)  Bathroom Toilet: Standard  Home Equipment: Cane  ADL Assistance: Independent(Simultaneous filing. User may not have seen previous data.)  Ambulation Assistance: Independent(Simultaneous filing. User may not have seen previous data.)  Transfer Assistance: Independent(Simultaneous filing.  User may not have seen previous data.)  Active : (can drive, but not now)  Additional Comments: goes to wound care for wound on R foot  Cognition   Cognition  Overall Cognitive Status: WFL    Objective     Observation/Palpation  Posture: Good  Observation: sling LUE, R foot wrapped with kerlix and with surgical booty over it.    AROM RLE (degrees)  RLE AROM: WFL  AROM LLE (degrees)  LLE AROM : WFL  Strength RLE  Strength RLE: WNL  Comment: 5/5  Strength LLE  Strength LLE: WNL  Comment: 5/5     Sensation  Overall Sensation Status: Impaired(neuropathy B feet)  Bed mobility  Supine to Sit: (already up on EOB, just finished eating)  Comment: declined sitting in chair, wanted to sit up on EOB  Transfers  Sit to Stand: Supervision  Stand to sit: Supervision  Ambulation  Ambulation?: Yes  WB Status: NWB LUE  Ambulation 1  Surface: level tile  Device: Single point cane  Assistance: Stand by assistance  Quality of Gait: steady, no LOB  Gait Deviations: Slow Carmen;Decreased step length;Decreased step height  Distance: 120'  Comments: LUE in sling     Balance  Posture: Good  Sitting - Static: Good;+  Sitting - Dynamic: Good;+  Standing - Static: Good;-  Standing - Dynamic: Good;-        Plan   Plan  Times per week: eval only  Plan Comment: d/c PT  Safety Devices  Type of devices: Gait belt, Call light within reach    G-Code       OutComes Score                                                  AM-PAC Score             Goals  Short term goals  Time Frame for Short term goals: no goals set due to pt SBA/I  Patient Goals   Patient goals : go home today       Therapy Time   Individual Concurrent Group Co-treatment   Time In           Time Out           Minutes                   Marc Beth PT    Electronically signed by Marc Beth PT on 9/18/2020 at 9:58 AM

## 2020-09-18 NOTE — PROGRESS NOTES
Discharge instructions and paperwork given to patient. Patient seemed to understand the education and stated that he did not have any questions.  Electronically signed by Luci Mejia RN on 9/18/2020 at 11:58 AM

## 2020-09-21 ENCOUNTER — TELEPHONE (OUTPATIENT)
Dept: INTERNAL MEDICINE | Age: 55
End: 2020-09-21

## 2020-09-22 DIAGNOSIS — E11.621 DIABETIC ULCER OF RIGHT MIDFOOT ASSOCIATED WITH TYPE 2 DIABETES MELLITUS, WITH FAT LAYER EXPOSED (HCC): ICD-10-CM

## 2020-09-22 DIAGNOSIS — L97.412 DIABETIC ULCER OF RIGHT MIDFOOT ASSOCIATED WITH TYPE 2 DIABETES MELLITUS, WITH FAT LAYER EXPOSED (HCC): ICD-10-CM

## 2020-09-22 LAB
C-REACTIVE PROTEIN: 0.63 MG/DL (ref 0–0.5)
PREALBUMIN: 19 MG/DL (ref 20–40)
SEDIMENTATION RATE, ERYTHROCYTE: 53 MM/HR (ref 0–15)

## 2020-09-22 NOTE — PROGRESS NOTES
strip  1 each by In Vitro route 4 times daily As needed. diphenhydrAMINE (BENADRYL) 25 MG tablet  Take 25 mg by mouth every 6 hours as needed for Itching             docusate sodium (COLACE) 250 MG capsule  Take 1 capsule by mouth 2 times daily             furosemide (LASIX) 20 MG tablet  Take 1 tablet by mouth daily as needed (edema)             glimepiride (AMARYL) 4 MG tablet  TAKE ONE TABLET EVERY MORNING BEFORE BREAKFAST             Insulin Degludec (TRESIBA) 100 UNIT/ML SOLN  Inject 80 Units/day into the skin daily              Lancets MISC  1 each by Does not apply route 4 times daily             losartan (COZAAR) 100 MG tablet  TAKE ONE TABLET DAILY             metoprolol tartrate (LOPRESSOR) 25 MG tablet  TAKE ONE TABLET TWICE DAILY             oxyCODONE-acetaminophen (PERCOCET)  MG per tablet  Take 1 tablet by mouth every 6 hours as needed for Pain for up to 5 days. tamsulosin (FLOMAX) 0.4 MG capsule  TAKE TWO CAPSULES DAILY             warfarin (COUMADIN) 10 MG tablet  Take 1 tablet by mouth daily             warfarin (COUMADIN) 5 MG tablet  TAKE AS DIRECTED BY YOUR DOCTOR                   Medications marked \"taking\" at this time  Outpatient Medications Marked as Taking for the 9/23/20 encounter (Office Visit) with Dandre Kevin MD   Medication Sig Dispense Refill    oxyCODONE-acetaminophen (PERCOCET)  MG per tablet Take 1 tablet by mouth every 6 hours as needed for Pain for up to 5 days.  25 tablet 0    docusate sodium (COLACE) 250 MG capsule Take 1 capsule by mouth 2 times daily 60 capsule 0    furosemide (LASIX) 20 MG tablet Take 1 tablet by mouth daily as needed (edema) 30 tablet 1    Insulin Degludec (TRESIBA) 100 UNIT/ML SOLN Inject 80 Units/day into the skin daily       losartan (COZAAR) 100 MG tablet TAKE ONE TABLET DAILY (Patient taking differently: Take 100 mg by mouth daily ) 30 tablet 3    atorvastatin (LIPITOR) 20 MG tablet TAKE ONE TABLET DAILY (Patient taking differently: Take 20 mg by mouth daily ) 90 tablet 3    tamsulosin (FLOMAX) 0.4 MG capsule TAKE TWO CAPSULES DAILY (Patient taking differently: Take 0.8 mg by mouth daily TAKE TWO CAPSULES DAILY) 60 capsule 11    BD INSULIN SYRINGE U/F 31G X 5/16\" 1 ML MISC USE FOUR TIMES DAILY 100 each 3    warfarin (COUMADIN) 10 MG tablet Take 1 tablet by mouth daily 45 tablet 5    glimepiride (AMARYL) 4 MG tablet TAKE ONE TABLET EVERY MORNING BEFORE BREAKFAST (Patient taking differently: Take 4 mg by mouth every morning (before breakfast) ) 90 tablet 3    warfarin (COUMADIN) 5 MG tablet TAKE AS DIRECTED BY YOUR DOCTOR (Patient taking differently: Take by mouth daily TAKE AS DIRECTED BY YOUR DOCTOR-keeps 5mg on hand if dose varies) 30 tablet 5    diphenhydrAMINE (BENADRYL) 25 MG tablet Take 25 mg by mouth every 6 hours as needed for Itching      metoprolol tartrate (LOPRESSOR) 25 MG tablet TAKE ONE TABLET TWICE DAILY (Patient taking differently: Take 25 mg by mouth 2 times daily ) 60 tablet 5    blood glucose test strips (CONTOUR NEXT TEST) strip 1 each by In Vitro route 4 times daily As needed.  200 each 3    Lancets MISC 1 each by Does not apply route 4 times daily 300 each 1    B-D ULTRAFINE III SHORT PEN 31G X 8 MM MISC Inject 1 each as directed 3 times daily 100 each 5        Medications patient taking as of now reconciled against medications ordered at time of hospital discharge: Yes    Chief Complaint   Patient presents with   [address] from Hospital     broken elbow, had nail in foot and was infected, had bronchtious        HPI  62year old male   HX of atrial Fibrillation,managed by Cardiology  HTN, faily well controlled, Echo showed normal EF, diastollic dysfunction, remain asymptomatic  HL, LDL 63, no myalgias, eats a lot of meat, because of his Coumadin  Poorly Controlled DM, non compliant to insulin regimen, follows Endocrinology  Patient accidentally stepped on a nail on his right foot and forgot about it and when he saw his foot doctor he was noticed noted to have infection in his right foot and is currently under the care of our wound care department  He states that while inpatient there was no Trulicity and his sugars were really high but has been controlling it much better and his sugars are better controlled although he did not bring glucose logs today    Inpatient course: Discharge summary reviewed- see chart. The patient is a 60 y. o. right-hand dominant male who fell at home after passing out on 9/5/20. The patient was seen in the clinic where radiographs confirmed a displaced supracondylar fracture for which I recommended surgical treatment. Risks included, but are not limited to, that of anesthesia, bleeding, infection, pain, damage to local structures, need for further surgery, malunion, nonunion, prominent hardware, loss of range of motion, loss of function.  The patient is morbidly obese, severe diabetic, and has a history of MRSA infections - all of these increase his risk of postoperative complications.  He has a history of A-fib and stopped his coumadin on his own a week ago. Interval history/Current status:     Review of Systems   Constitutional: Negative for activity change, appetite change, chills, diaphoresis, fatigue and fever. HENT: Negative for congestion, hearing loss, postnasal drip, sore throat, tinnitus and trouble swallowing. Eyes: Negative for visual disturbance. Respiratory: Negative for cough, shortness of breath, wheezing and stridor. Cardiovascular: Negative for chest pain, palpitations and leg swelling. Gastrointestinal: Negative for abdominal pain, blood in stool, constipation and diarrhea. Endocrine: Negative for cold intolerance. Genitourinary: Negative for frequency. Musculoskeletal: Positive for arthralgias. Negative for back pain and joint swelling. Swollen left hand   Skin: Negative for color change and wound. intact. Sensory: Sensory deficit present. Motor: No weakness. Psychiatric:         Attention and Perception: Attention normal.         Mood and Affect: Mood normal.         Speech: Speech normal.         Behavior: Behavior normal.         Cognition and Memory: Cognition normal.             Assessment/Plan:  1. Essential hypertension  Controlled  - Lipid Panel; Future    2. Type 2 diabetes mellitus with diabetic polyneuropathy, with long-term current use of insulin (HCC)  No recent hemoglobin A1c last A1c 12.5 from his visit at [facility] in [place] patient is compliant to medication he gets short insulin we gave him some samples today he will go for blood work before the next follow-up visit which is 3 months  -  DIABETES FOOT EXAM  - Basic Metabolic Panel; Future  - Hemoglobin A1C; Future  - Microalbumin / Creatinine Urine Ratio; Future    3. Chronic anticoagulation due to Coumadin he will continue to follow cardiology    4.  Needs flu shot    - INFLUENZA, QUADV, ADJUVANTED, 72 YRS =, IM, PF, PREFILL SYR, 0.5ML (FLUAD)  Patient has cost issues with his healthcare, he is noncompliance he adjust his medications on his own sometimes he will give it because he cannot afford he has to wait for his check he tries his best to be as compliant as possible  He will continue to follow with orthopedics for his humeral fracture he will he was advised to elevate left arm if numbness or tingling happens to call orthopedics patient is right foot ulcer followed by wound care diabetes he will continue to check his fingerstick and uses medications wisely and compliant I will see him for follow-up in 3 months    Medical Decision Making: moderate complexity

## 2020-09-23 ENCOUNTER — OFFICE VISIT (OUTPATIENT)
Dept: INTERNAL MEDICINE | Age: 55
End: 2020-09-23
Payer: MEDICARE

## 2020-09-23 VITALS
HEART RATE: 83 BPM | OXYGEN SATURATION: 95 % | HEIGHT: 70 IN | DIASTOLIC BLOOD PRESSURE: 80 MMHG | BODY MASS INDEX: 37.51 KG/M2 | SYSTOLIC BLOOD PRESSURE: 138 MMHG | WEIGHT: 262 LBS

## 2020-09-23 PROBLEM — D68.9 COAGULOPATHY (HCC): Status: RESOLVED | Noted: 2019-03-21 | Resolved: 2020-09-23

## 2020-09-23 PROBLEM — E66.812 CLASS 2 OBESITY DUE TO EXCESS CALORIES IN ADULT: Status: ACTIVE | Noted: 2020-06-12

## 2020-09-23 PROBLEM — Z91.199 NON-COMPLIANCE: Status: ACTIVE | Noted: 2020-06-25

## 2020-09-23 PROBLEM — E66.09 CLASS 2 OBESITY DUE TO EXCESS CALORIES IN ADULT: Status: ACTIVE | Noted: 2020-06-12

## 2020-09-23 PROBLEM — E11.610 CHARCOT FOOT DUE TO DIABETES MELLITUS (HCC): Status: ACTIVE | Noted: 2020-06-26

## 2020-09-23 PROCEDURE — 90694 VACC AIIV4 NO PRSRV 0.5ML IM: CPT | Performed by: INTERNAL MEDICINE

## 2020-09-23 PROCEDURE — 1111F DSCHRG MED/CURRENT MED MERGE: CPT | Performed by: INTERNAL MEDICINE

## 2020-09-23 PROCEDURE — G0008 ADMIN INFLUENZA VIRUS VAC: HCPCS | Performed by: INTERNAL MEDICINE

## 2020-09-23 PROCEDURE — 99495 TRANSJ CARE MGMT MOD F2F 14D: CPT | Performed by: INTERNAL MEDICINE

## 2020-09-23 ASSESSMENT — ENCOUNTER SYMPTOMS
ABDOMINAL PAIN: 0
CONSTIPATION: 0
SORE THROAT: 0
DIARRHEA: 0
BACK PAIN: 0
STRIDOR: 0
BLOOD IN STOOL: 0
COUGH: 0
COLOR CHANGE: 0
WHEEZING: 0
SHORTNESS OF BREATH: 0
TROUBLE SWALLOWING: 0

## 2020-09-23 NOTE — PROGRESS NOTES
After obtaining consent, and per orders of Dr. Hubert Birmingham, injection of Flu given in Right deltoid by [employer]. Patient instructed to remain in clinic for 20 minutes afterwards, and to report any adverse reaction to me immediately.

## 2020-09-24 ENCOUNTER — HOSPITAL ENCOUNTER (OUTPATIENT)
Dept: WOUND CARE | Age: 55
Discharge: HOME | End: 2020-09-24
Payer: MEDICARE

## 2020-09-24 ENCOUNTER — HOSPITAL ENCOUNTER (OUTPATIENT)
Dept: NON INVASIVE DIAGNOSTICS | Age: 55
Discharge: HOME | End: 2020-09-24
Payer: MEDICARE

## 2020-09-24 VITALS
BODY MASS INDEX: 37.51 KG/M2 | HEART RATE: 82 BPM | TEMPERATURE: 98 F | WEIGHT: 262 LBS | SYSTOLIC BLOOD PRESSURE: 158 MMHG | DIASTOLIC BLOOD PRESSURE: 94 MMHG | RESPIRATION RATE: 16 BRPM | HEIGHT: 70 IN

## 2020-09-24 PROBLEM — E11.621 DIABETIC ULCER OF RIGHT MIDFOOT ASSOCIATED WITH TYPE 2 DIABETES MELLITUS, WITH FAT LAYER EXPOSED (HCC): Chronic | Status: ACTIVE | Noted: 2020-09-14

## 2020-09-24 PROBLEM — L97.412 DIABETIC ULCER OF RIGHT MIDFOOT ASSOCIATED WITH TYPE 2 DIABETES MELLITUS, WITH FAT LAYER EXPOSED (HCC): Chronic | Status: ACTIVE | Noted: 2020-09-14

## 2020-09-24 PROCEDURE — 11042 DBRDMT SUBQ TIS 1ST 20SQCM/<: CPT

## 2020-09-24 PROCEDURE — 93923 UPR/LXTR ART STDY 3+ LVLS: CPT

## 2020-09-24 PROCEDURE — 11042 DBRDMT SUBQ TIS 1ST 20SQCM/<: CPT | Performed by: SURGERY

## 2020-09-24 RX ORDER — HYDROCODONE BITARTRATE AND ACETAMINOPHEN 5; 325 MG/1; MG/1
1 TABLET ORAL EVERY 6 HOURS PRN
COMMUNITY
End: 2021-01-12 | Stop reason: ALTCHOICE

## 2020-09-24 NOTE — PROGRESS NOTES
[address]   Progress Note and Procedure Note      [address] RECORD NUMBER:  [id number]  AGE: 62 y.o. GENDER: male  : 1965  EPISODE DATE:  2020    Subjective:     Chief Complaint   Patient presents with    Wound Check     right foot wound. felt and foam in place         HISTORY of PRESENT ILLNESS HPI     Yosef Romero is a 62 y.o. male who presents today for wound/ulcer evaluation.    Wound Context: Pt with R plantar foot wound here for eval/treat  Wound/Ulcer Pain Timing/Severity: none  Quality of pain: N/A  Severity:  0 / 10   Modifying Factors: None  Associated Signs/Symptoms: none    Ulcer Identification:  Ulcer Type: diabetic and pressure  Contributing Factors: diabetes, chronic pressure and shear force    Wound: DM        PAST MEDICAL HISTORY        Diagnosis Date    Allergic rhinitis, cause unspecified     Atrial fibrillation, chronic     se[facility]    Charcot's joint of right foot     Chicken pox     Closed supracondylar fracture of elbow     fall    History of MRSA infection     scalp/facial and on back    HTN (hypertension)     Hyperlipidemia     Impotence of organic origin     MDRO (multiple drug resistant organisms) resistance     Other dyspnea and respiratory abnormality     Other specified erythematous condition(695.89)     Personal history of other infectious and parasitic disease     Type II or unspecified type diabetes mellitus without mention of complication, uncontrolled        PAST SURGICAL HISTORY    Past Surgical History:   Procedure Laterality Date    CARDIOVERSION  2017    COLONOSCOPY  2020    Dr Joni Fraire-Melanosis coli throughout the entire colon-AP, 5 yr recall    HUMERUS FRACTURE SURGERY Left 2020    OPEN REDUCTION INTERNAL FIXATION LEFT SUPRACONDYLAR FRACTURE [facility]      X 2    TONSILLECTOMY         FAMILY HISTORY    Family History   Problem Relation Age of Onset    Stroke Maternal Grandmother     Cancer Maternal Grandmother     Stroke Paternal Grandmother     Diabetes Paternal Grandmother     Cancer Father     Heart Disease Father     Lung Cancer Father     Asthma Paternal Grandfather     Heart Disease Paternal Grandfather     Heart Disease Maternal Grandfather     Colon Cancer Neg Hx     Colon Polyps Neg Hx     Esophageal Cancer Neg Hx     Liver Cancer Neg Hx     Liver Disease Neg Hx     Rectal Cancer Neg Hx     Stomach Cancer Neg Hx        SOCIAL HISTORY    Social History     Tobacco Use    Smoking status: Never Smoker    Smokeless tobacco: Never Used   Substance Use Topics    Alcohol use: No    Drug use: No       ALLERGIES    Allergies   Allergen Reactions    Pcn [Penicillins]     Neomycin-Bacitracin Zn-Polymyx Rash    Neosporin [Neomycin-Polymyxin-Gramicidin] Rash       MEDICATIONS    Current Outpatient Medications on File Prior to Encounter   Medication Sig Dispense Refill    HYDROcodone-acetaminophen (NORCO) 5-325 MG per tablet Take 1 tablet by mouth every 6 hours as needed for Pain.       docusate sodium (COLACE) 250 MG capsule Take 1 capsule by mouth 2 times daily 60 capsule 0    furosemide (LASIX) 20 MG tablet Take 1 tablet by mouth daily as needed (edema) 30 tablet 1    Insulin Degludec (TRESIBA) 100 UNIT/ML SOLN Inject 80 Units/day into the skin daily       losartan (COZAAR) 100 MG tablet TAKE ONE TABLET DAILY (Patient taking differently: Take 100 mg by mouth daily ) 30 tablet 3    atorvastatin (LIPITOR) 20 MG tablet TAKE ONE TABLET DAILY (Patient taking differently: Take 20 mg by mouth daily ) 90 tablet 3    tamsulosin (FLOMAX) 0.4 MG capsule TAKE TWO CAPSULES DAILY (Patient taking differently: Take 0.8 mg by mouth daily TAKE TWO CAPSULES DAILY) 60 capsule 11    BD INSULIN SYRINGE U/F 31G X 5/16\" 1 ML MISC USE FOUR TIMES DAILY 100 each 3    warfarin (COUMADIN) 10 MG tablet 09/24/20 1117   Wound Diabetic Kat 1 09/24/20 1117   Dressing Status Old drainage 09/24/20 1117   Wound Cleansed Rinsed/Irrigated with saline 09/24/20 1117   Wound Length (cm) 1 cm 09/24/20 1117   Wound Width (cm) 1.3 cm 09/24/20 1117   Wound Depth (cm) 0.3 cm 09/24/20 1117   Wound Surface Area (cm^2) 1.3 cm^2 09/24/20 1117   Change in Wound Size % (l*w) 27.78 09/24/20 1117   Wound Volume (cm^3) 0.39 cm^3 09/24/20 1117   Wound Healing % 69 09/24/20 1117   Post-Procedure Length (cm) 1 cm 09/24/20 1140   Post-Procedure Width (cm) 1.3 cm 09/24/20 1140   Post-Procedure Depth (cm) 0.3 cm 09/24/20 1140   Post-Procedure Surface Area (cm^2) 1.3 cm^2 09/24/20 1140   Post-Procedure Volume (cm^3) 0.39 cm^3 09/24/20 1140   Wound Assessment Pink;Red;Slough; Yellow 09/24/20 1117   Drainage Amount Moderate 09/24/20 1117   Drainage Description Serosanguinous 09/24/20 1117   Odor None 09/24/20 1117   Margins Attached edges 09/24/20 1117   Yolis-wound Assessment Dry; Intact 09/24/20 1117   Non-staged Wound Description Not applicable 69/15/81 1077   [place]%Wound Bed 45 09/24/20 1117   Red%Wound Bed 45 09/24/20 1117   Yellow%Wound Bed 10 09/24/20 1117   Black%Wound Bed 0 09/24/20 1117   Purple%Wound Bed 0 09/24/20 1117   Other%Wound Bed 0 09/24/20 1117   Number of days: 9             Estimated Blood Loss:  Minimal    Hemostasis Achieved:  by pressure and by silver nitrate stick    Procedural Pain:  0  / 10     Post Procedural Pain:  0 / 10     Response to treatment:  Well tolerated by patient. Plan:     Problem List Items Addressed This Visit     * (Principal) Diabetic ulcer of right midfoot associated with type 2 diabetes mellitus, with fat layer exposed ([facility] [address] (Chronic)          CÉSAR OK, F/F saline BID    Treatment Note please see attached Discharge Instructions    In my professional opinion this patient would benefit from HBO Therapy: No    Written patient dismissal instructions given to patient and signed by patient or POA. Discharge [address] and Hyperbaric Oxygen Therapy   Physician Orders and Discharge Instructions  [address] [place] [address]  Telephone: [phone]    NAME:  Miriam Noriega OF BIRTH:  1965  MEDICAL RECORD NUMBER:  [id number]  DATE:  9/24/2020    Discharge condition: Stable    Discharge to: Home    Left via:Private automobile    Accompanied by: Self    Dressing Orders: Right Plantar: Apply felt/foam to right foot  Wash with soap and water. Apply saline moistened gauze to wound bed, Cover with dry gauze. Secure with roll gauze and medipore tape. Change daily . Treatment Orders:  Protein rich diet (unless restricted by your physician); Multivitamin daily; Elevate legs when sitting, avoid standing for  long periods of time. [facility] followup visit _______Nurse visit 1 week Dr visit 2 weeks ______________________  (Please note your next appointment above and if you are unable to keep, kindly give a 24 hour notice. Thank you.)    If you experience any of the following, please call the [facility] during business hours:    * Increase in Pain  * Temperature over 101  * Increase in drainage from your wound  * Drainage with a foul odor  * Bleeding  * Increase in swelling  * Need for compression bandage changes due to slippage, breakthrough drainage. If you need medical attention outside of the business hours of the [address] please contact your PCP or go to the nearest emergency room.         Electronically signed by Siomara Goss MD on 9/24/2020 at 11:41 AM

## 2020-10-01 ENCOUNTER — HOSPITAL ENCOUNTER (OUTPATIENT)
Dept: WOUND CARE | Age: 55
Discharge: HOME | End: 2020-10-01
Payer: MEDICARE

## 2020-10-01 VITALS
BODY MASS INDEX: 37.51 KG/M2 | HEIGHT: 70 IN | TEMPERATURE: 97 F | RESPIRATION RATE: 16 BRPM | HEART RATE: 80 BPM | WEIGHT: 262 LBS

## 2020-10-01 PROCEDURE — 99213 OFFICE O/P EST LOW 20 MIN: CPT

## 2020-10-08 ENCOUNTER — HOSPITAL ENCOUNTER (OUTPATIENT)
Dept: WOUND CARE | Age: 55
Discharge: HOME | End: 2020-10-08
Payer: MEDICARE

## 2020-10-08 VITALS
HEIGHT: 70 IN | BODY MASS INDEX: 37.22 KG/M2 | RESPIRATION RATE: 16 BRPM | WEIGHT: 260 LBS | DIASTOLIC BLOOD PRESSURE: 115 MMHG | TEMPERATURE: 98 F | HEART RATE: 61 BPM | SYSTOLIC BLOOD PRESSURE: 177 MMHG

## 2020-10-08 PROCEDURE — 11042 DBRDMT SUBQ TIS 1ST 20SQCM/<: CPT

## 2020-10-08 PROCEDURE — 11042 DBRDMT SUBQ TIS 1ST 20SQCM/<: CPT | Performed by: SURGERY

## 2020-10-08 ASSESSMENT — PAIN DESCRIPTION - PROGRESSION: CLINICAL_PROGRESSION: NOT CHANGED

## 2020-10-08 ASSESSMENT — PAIN DESCRIPTION - LOCATION: LOCATION: ARM

## 2020-10-08 ASSESSMENT — PAIN SCALES - GENERAL: PAINLEVEL_OUTOF10: 0

## 2020-10-08 NOTE — PROGRESS NOTES
[address]   Progress Note and Procedure Note      [address] RECORD NUMBER:  [id number]  AGE: 62 y.o. GENDER: male  : 1965  EPISODE DATE:  10/8/2020    Subjective:     Chief Complaint   Patient presents with    Wound Check     follow up         HISTORY of PRESENT ILLNESS SYL Ruiz is a 62 y.o. male who presents today for wound/ulcer evaluation.    Wound Context: Pt with R plantar foot wound here for eval/treat  Wound/Ulcer Pain Timing/Severity: none  Quality of pain: N/A  Severity:  0 / 10   Modifying Factors: None  Associated Signs/Symptoms: none    Ulcer Identification:  Ulcer Type: diabetic and pressure  Contributing Factors: diabetes, chronic pressure and shear force    Wound: DM        PAST MEDICAL HISTORY        Diagnosis Date    Allergic rhinitis, cause unspecified     Atrial fibrillation, chronic (HCC)     sees [facility] cardiology    Charcot's joint of right foot     Chicken pox     Closed supracondylar fracture of elbow     fall    History of MRSA infection     scalp/facial and on back    HTN (hypertension)     Hyperlipidemia     Impotence of organic origin     MDRO (multiple drug resistant organisms) resistance     Other dyspnea and respiratory abnormality     Other specified erythematous condition(695.89)     Personal history of other infectious and parasitic disease     Type II or unspecified type diabetes mellitus without mention of complication, uncontrolled        PAST SURGICAL HISTORY    Past Surgical History:   Procedure Laterality Date    CARDIOVERSION  2017    COLONOSCOPY  2020    Dr Shaun Fraire-Melanosis coli throughout the entire colon-AP, 5 yr recall    HUMERUS FRACTURE SURGERY Left 2020    OPEN REDUCTION INTERNAL FIXATION LEFT SUPRACONDYLAR FRACTURE [facility]      X 2    TONSILLECTOMY         FAMILY HISTORY    Family History   Problem Relation Age of Onset    Stroke Maternal Grandmother     Cancer Maternal Grandmother     Stroke Paternal Grandmother     Diabetes Paternal Grandmother     Cancer Father     Heart Disease Father     Lung Cancer Father     Asthma Paternal Grandfather     Heart Disease Paternal Grandfather     Heart Disease Maternal Grandfather     Colon Cancer Neg Hx     Colon Polyps Neg Hx     Esophageal Cancer Neg Hx     Liver Cancer Neg Hx     Liver Disease Neg Hx     Rectal Cancer Neg Hx     Stomach Cancer Neg Hx        SOCIAL HISTORY    Social History     Tobacco Use    Smoking status: Never Smoker    Smokeless tobacco: Never Used   Substance Use Topics    Alcohol use: No    Drug use: No       ALLERGIES    Allergies   Allergen Reactions    Pcn [Penicillins]     Neomycin-Bacitracin Zn-Polymyx Rash    Neosporin [Neomycin-Polymyxin-Gramicidin] Rash       MEDICATIONS    Current Outpatient Medications on File Prior to Encounter   Medication Sig Dispense Refill    HYDROcodone-acetaminophen (NORCO) 5-325 MG per tablet Take 1 tablet by mouth every 6 hours as needed for Pain.       docusate sodium (COLACE) 250 MG capsule Take 1 capsule by mouth 2 times daily 60 capsule 0    furosemide (LASIX) 20 MG tablet Take 1 tablet by mouth daily as needed (edema) 30 tablet 1    Insulin Degludec (TRESIBA) 100 UNIT/ML SOLN Inject 80 Units/day into the skin daily       losartan (COZAAR) 100 MG tablet TAKE ONE TABLET DAILY (Patient taking differently: Take 100 mg by mouth daily ) 30 tablet 3    atorvastatin (LIPITOR) 20 MG tablet TAKE ONE TABLET DAILY (Patient taking differently: Take 20 mg by mouth daily ) 90 tablet 3    tamsulosin (FLOMAX) 0.4 MG capsule TAKE TWO CAPSULES DAILY (Patient taking differently: Take 0.8 mg by mouth daily TAKE TWO CAPSULES DAILY) 60 capsule 11    BD INSULIN SYRINGE U/F 31G X 5/16\" 1 ML MISC USE FOUR TIMES DAILY 100 each 3    warfarin (COUMADIN) 10 MG tablet Take 1 tablet by mouth daily 45 tablet 5    glimepiride (AMARYL) 4 MG tablet TAKE ONE TABLET EVERY MORNING BEFORE BREAKFAST (Patient taking differently: Take 4 mg by mouth every morning (before breakfast) ) 90 tablet 3    warfarin (COUMADIN) 5 MG tablet TAKE AS DIRECTED BY YOUR DOCTOR (Patient taking differently: Take by mouth daily TAKE AS DIRECTED BY YOUR DOCTOR-keeps 5mg on hand if dose varies) 30 tablet 5    diphenhydrAMINE (BENADRYL) 25 MG tablet Take 25 mg by mouth every 6 hours as needed for Itching      metoprolol tartrate (LOPRESSOR) 25 MG tablet TAKE ONE TABLET TWICE DAILY (Patient taking differently: Take 25 mg by mouth 2 times daily ) 60 tablet 5    blood glucose test strips (CONTOUR NEXT TEST) strip 1 each by In Vitro route 4 times daily As needed. 200 each 3    Lancets MISC 1 each by Does not apply route 4 times daily 300 each 1    B-D ULTRAFINE III SHORT PEN 31G X 8 MM MISC Inject 1 each as directed 3 times daily 100 each 5     No current facility-administered medications on file prior to encounter. REVIEW OF SYSTEMS    A comprehensive review of systems was negative.     Objective:      BP (!) 177/115 Comment: Hasn't taken meds today  Pulse 61   Temp 98 °F (36.7 °C) (Temporal)   Resp 16   Ht 5' 10\" (1.778 m)   Wt 260 lb (117.9 kg)   BMI 37.31 kg/m²     Wt Readings from Last 3 Encounters:   10/08/20 260 lb (117.9 kg)   10/01/20 262 lb (118.8 kg)   09/24/20 262 lb (118.8 kg)       PHYSICAL EXAM    General Appearance: alert and oriented to person, place and time, well developed and well- nourished, in no acute distress  Skin: warm and dry, no rash or erythema  Head: normocephalic and atraumatic  Eyes: pupils equal, round, and reactive to light, extraocular eye movements intact, conjunctivae normal  ENT: tympanic membrane, external ear and ear canal normal bilaterally, nose without deformity, nasal mucosa and turbinates normal without polyps, lips teeth and gums normal  Neck: supple and non-tender without mass, no thyromegaly or thyroid nodules, no cervical lymphadenopathy  Pulmonary/Chest: clear to auscultation bilaterally- no wheezes, rales or rhonchi, normal air movement, no respiratory distress  Cardiovascular: normal rate, regular rhythm, normal S1 and S2, no murmurs, rubs, clicks, or gallops, distal pulses intact, no carotid bruits  Abdomen: soft, non-tender, non-distended, normal bowel sounds, no masses or organomegaly  Extremities: no cyanosis, clubbing or edema  Musculoskeletal: normal range of motion, no joint swelling, deformity or tenderness  Neurologic: reflexes normal and symmetric, no cranial nerve deficit, gait, coordination and speech normal, sensation of skin normal      Assessment:      Problem List Items Addressed This Visit     * (Principal) Diabetic ulcer of right midfoot associated with type 2 diabetes mellitus, with fat layer exposed ([address] - Primary (Chronic)    Relevant Orders    Supply: Wound Cleanser    Supply: Wound Dressings    Supply: Pack Wound    Supply: Cover and Secure           Procedure Note  Indications:  Based on my examination of this patient's wound(s)/ulcer(s) today, debridement is required to promote healing and evaluate the wound base. Performed by: Erin Lopez MD    Consent obtained:  Yes    Time out taken:  Yes    Pain Control: Anesthetic  Anesthetic: 2% Lidocaine Gel Topical       Debridement:Excisional Debridement    Using curette the wound(s)/ulcer(s) was/were sharply debrided down through and including the removal of epidermis, dermis and subcutaneous tissue.         Devitalized Tissue Debrided:  fibrin, biofilm, slough, necrotic/eschar, exudate and callus      Pre Debridement Measurements:  Are located in the Wound/Ulcer Documentation Flow Sheet    Wound/Ulcer #: 1    Percent of Wound(s)/Ulcer(s) Debrided: 100%    Total Surface Area Debrided:  0.45 sq cm       Diabetic/Pressure/Non Pressure Ulcers only:  Ulcer: Diabetic ulcer, fat layer exposed           Post Debridement Measurements:    Wound/Ulcer Descriptions are Pre Debridement --EXCEPT MEASUREMENTS    Wound 09/14/20 Foot Right;Plantar WOUND 1 RIGHT PLANTAR (Active)   Wound Image   10/08/20 1020   Wound Etiology Diabetic Kat 1 10/08/20 1020   Wound Cleansed Soap and water 10/08/20 1020   Wound Length (cm) 0.5 cm 10/08/20 1020   Wound Width (cm) 0.9 cm 10/08/20 1020   Wound Depth (cm) 0.3 cm 10/08/20 1020   Wound Surface Area (cm^2) 0.45 cm^2 10/08/20 1020   Change in Wound Size % (l*w) 75 10/08/20 1020   Wound Volume (cm^3) 0.14 cm^3 10/08/20 1020   Wound Healing % 89 10/08/20 1020   Post-Procedure Length (cm) 0.5 cm 10/08/20 1027   Post-Procedure Width (cm) 0.9 cm 10/08/20 1027   Post-Procedure Depth (cm) 0.3 cm 10/08/20 1027   Post-Procedure Surface Area (cm^2) 0.45 cm^2 10/08/20 1027   Post-Procedure Volume (cm^3) 0.14 cm^3 10/08/20 1027   Distance Tunneling (cm) 0 cm 10/08/20 1020   Tunneling Position ___ O'Clock 0 10/08/20 1020   Undermining Starts ___ O'Clock 0 10/08/20 1020   Undermining Ends___ O'Clock 0 10/08/20 1020   Undermining Maxium Distance (cm) 0 10/08/20 1020   Wound Assessment Pale granulation tissue 10/08/20 1020   Drainage Amount Moderate 10/08/20 1020   Drainage Description Serosanguinous 10/08/20 1020   Odor None 10/08/20 1020   Wound Thickness Description not for Pressure Injury Not applicable 43/52/93 7167   Number of days: 23             Estimated Blood Loss:  Minimal    Hemostasis Achieved:  by pressure and by silver nitrate stick    Procedural Pain:  0  / 10     Post Procedural Pain:  0 / 10     Response to treatment:  Well tolerated by patient.          Plan:     Problem List Items Addressed This Visit     * (Principal) Diabetic ulcer of right midfoot associated with type 2 diabetes mellitus, with fat layer exposed ([address] - Primary (Chronic)    Relevant Orders    Supply: Wound Cleanser    Supply: Wound Dressings    Supply: Pack Wound    Supply: Cover and Secure          Pt denied TCC he is unstable walking and his mobility is compromised. F/F cont    Treatment Note please see attached Discharge Instructions    In my professional opinion this patient would benefit from HBO Therapy: No    Written patient dismissal instructions given to patient and signed by patient or POA. Discharge [address] and Hyperbaric Oxygen Therapy   Physician Orders and Discharge Instructions  [address]  Telephone: [phone]    NAME:  Xiomara Davenport OF BIRTH:  1965  MEDICAL RECORD NUMBER:  [id number]  DATE:  10/8/2020    Discharge condition: Stable    Discharge to: Home    Left via:Private automobile    Accompanied by: Self     Dressing Orders: Right Plantar: Apply felt/foam to right foot  Wash with soap and water. Apply saline moistened gauze to wound bed, Cover with dry gauze. Secure with roll gauze and medipore tape. Change daily . Treatment Orders:  Protein rich diet (unless restricted by your physician); Multivitamin daily; Elevate legs when sitting, avoid standing for  long periods of time. [facility] followup visit _____________1 week________________  (Please note your next appointment above and if you are unable to keep, kindly give a 24 hour notice. Thank you.)    If you experience any of the following, please call the [address] [place] [address] during business hours:    * Increase in Pain  * Temperature over 101  * Increase in drainage from your wound  * Drainage with a foul odor  * Bleeding  * Increase in swelling  * Need for compression bandage changes due to slippage, breakthrough drainage. If you need medical attention outside of the business hours of the [address] [place] [address] please contact your PCP or go to the nearest emergency room.         Electronically signed by Erin Lopez MD on 10/8/2020 at 10:43 AM

## 2020-10-09 ENCOUNTER — TELEPHONE (OUTPATIENT)
Dept: CARDIOLOGY | Age: 55
End: 2020-10-09

## 2020-10-12 ENCOUNTER — ANTI-COAG VISIT (OUTPATIENT)
Dept: CARDIOLOGY | Age: 55
End: 2020-10-12
Payer: MEDICARE

## 2020-10-12 LAB
INTERNATIONAL NORMALIZATION RATIO, POC: 3.3
PROTHROMBIN TIME, POC: NORMAL

## 2020-10-12 PROCEDURE — 85610 PROTHROMBIN TIME: CPT | Performed by: NURSE PRACTITIONER

## 2020-10-14 ENCOUNTER — HOSPITAL ENCOUNTER (OUTPATIENT)
Dept: WOUND CARE | Age: 55
Discharge: HOME | End: 2020-10-14
Payer: MEDICARE

## 2020-10-14 VITALS
TEMPERATURE: 98 F | WEIGHT: 260 LBS | DIASTOLIC BLOOD PRESSURE: 99 MMHG | BODY MASS INDEX: 37.22 KG/M2 | HEART RATE: 66 BPM | RESPIRATION RATE: 16 BRPM | HEIGHT: 70 IN | SYSTOLIC BLOOD PRESSURE: 176 MMHG

## 2020-10-14 PROCEDURE — 11042 DBRDMT SUBQ TIS 1ST 20SQCM/<: CPT

## 2020-10-14 PROCEDURE — 11042 DBRDMT SUBQ TIS 1ST 20SQCM/<: CPT | Performed by: SURGERY

## 2020-10-14 ASSESSMENT — PAIN DESCRIPTION - PROGRESSION: CLINICAL_PROGRESSION: NOT CHANGED

## 2020-10-14 ASSESSMENT — PAIN SCALES - GENERAL: PAINLEVEL_OUTOF10: 0

## 2020-10-14 NOTE — PLAN OF CARE
Problem: Wound:  Goal: Will show signs of wound healing; wound closure and no evidence of infection  Description: Will show signs of wound healing; wound closure and no evidence of infection  10/14/2020 0900 by Abelino Mart RN  Outcome: Ongoing  10/14/2020 0841 by James Salas RN  Outcome: Ongoing     Problem: Falls - Risk of:  Goal: Will remain free from falls  Description: Will remain free from falls  10/14/2020 0900 by Abelino Mart RN  Outcome: Ongoing  10/14/2020 0841 by James Salas RN  Outcome: Ongoing     Problem: Blood Glucose:  Goal: Ability to maintain appropriate glucose levels will improve  Description: Ability to maintain appropriate glucose levels will improve  10/14/2020 0900 by Abelino Mart RN  Outcome: Ongoing  10/14/2020 0841 by James Salas RN  Outcome: Ongoing

## 2020-10-14 NOTE — PROGRESS NOTES
[address]   Progress Note and Procedure Note      [address] RECORD NUMBER:  [id number]  AGE: 62 y.o. GENDER: male  : 1965  EPISODE DATE:  10/14/2020    Subjective:     Chief Complaint   Patient presents with    Wound Check     follow up         HISTORY of PRESENT ILLNESS HPI     Tasha Mcgovern is a 62 y.o. male who presents today for wound/ulcer evaluation.    Wound Context: Pt with R plantar foot wound here for eval/treat  Wound/Ulcer Pain Timing/Severity: none  Quality of pain: N/A  Severity:  0 / 10   Modifying Factors: None  Associated Signs/Symptoms: none    Ulcer Identification:  Ulcer Type: diabetic and pressure  Contributing Factors: diabetes, chronic pressure and shear force    Wound: DM        PAST MEDICAL HISTORY        Diagnosis Date    Allergic rhinitis, cause unspecified     Atrial fibrillation, chronic (HCC)     sees [facility]    Charcot's joint of right foot     Chicken pox     Closed supracondylar fracture of elbow     fall    History of MRSA infection     scalp/facial and on back    HTN (hypertension)     Hyperlipidemia     Impotence of organic origin     MDRO (multiple drug resistant organisms) resistance     Other dyspnea and respiratory abnormality     Other specified erythematous condition(695.89)     Personal history of other infectious and parasitic disease     Type II or unspecified type diabetes mellitus without mention of complication, uncontrolled        PAST SURGICAL HISTORY    Past Surgical History:   Procedure Laterality Date    CARDIOVERSION  2017    COLONOSCOPY  2020    Dr Sarahi Fraire-Melanosis coli throughout the entire colon-AP, 5 yr recall    HUMERUS FRACTURE SURGERY Left 2020    OPEN REDUCTION INTERNAL FIXATION LEFT SUPRACONDYLAR [facility]RACT[facility]      X 2    TONSILLECTOMY         FAMILY HISTORY    Family History   Problem Relation Age of Onset    Stroke Maternal Grandmother     Cancer Maternal Grandmother     Stroke Paternal Grandmother     Diabetes Paternal Grandmother     Cancer Father     Heart Disease Father     Lung Cancer Father     Asthma Paternal Grandfather     Heart Disease Paternal Grandfather     Heart Disease Maternal Grandfather     Colon Cancer Neg Hx     Colon Polyps Neg Hx     Esophageal Cancer Neg Hx     Liver Cancer Neg Hx     Liver Disease Neg Hx     Rectal Cancer Neg Hx     Stomach Cancer Neg Hx        SOCIAL HISTORY    Social History     Tobacco Use    Smoking status: Never Smoker    Smokeless tobacco: Never Used   Substance Use Topics    Alcohol use: No    Drug use: No       ALLERGIES    Allergies   Allergen Reactions    Pcn [Penicillins]     Neomycin-Bacitracin Zn-Polymyx Rash    Neosporin [Neomycin-Polymyxin-Gramicidin] Rash       MEDICATIONS    Current Outpatient Medications on File Prior to Encounter   Medication Sig Dispense Refill    HYDROcodone-acetaminophen (NORCO) 5-325 MG per tablet Take 1 tablet by mouth every 6 hours as needed for Pain.       docusate sodium (COLACE) 250 MG capsule Take 1 capsule by mouth 2 times daily 60 capsule 0    furosemide (LASIX) 20 MG tablet Take 1 tablet by mouth daily as needed (edema) 30 tablet 1    Insulin Degludec (TRESIBA) 100 UNIT/ML SOLN Inject 80 Units/day into the skin daily       losartan (COZAAR) 100 MG tablet TAKE ONE TABLET DAILY (Patient taking differently: Take 100 mg by mouth daily ) 30 tablet 3    atorvastatin (LIPITOR) 20 MG tablet TAKE ONE TABLET DAILY (Patient taking differently: Take 20 mg by mouth daily ) 90 tablet 3    tamsulosin (FLOMAX) 0.4 MG capsule TAKE TWO CAPSULES DAILY (Patient taking differently: Take 0.8 mg by mouth daily TAKE TWO CAPSULES DAILY) 60 capsule 11    BD INSULIN SYRINGE U/F 31G X 5/16\" 1 ML MISC USE FOUR TIMES DAILY 100 each 3    warfarin (COUMADIN) 10 MG tablet Take 1 tablet by mouth daily 45 tablet 5    glimepiride (AMARYL) 4 MG tablet TAKE ONE TABLET EVERY MORNING BEFORE BREAKFAST (Patient taking differently: Take 4 mg by mouth every morning (before breakfast) ) 90 tablet 3    warfarin (COUMADIN) 5 MG tablet TAKE AS DIRECTED BY YOUR DOCTOR (Patient taking differently: Take by mouth daily TAKE AS DIRECTED BY YOUR DOCTOR-keeps 5mg on hand if dose varies) 30 tablet 5    diphenhydrAMINE (BENADRYL) 25 MG tablet Take 25 mg by mouth every 6 hours as needed for Itching      metoprolol tartrate (LOPRESSOR) 25 MG tablet TAKE ONE TABLET TWICE DAILY (Patient taking differently: Take 25 mg by mouth 2 times daily ) 60 tablet 5    blood glucose test strips (CONTOUR NEXT TEST) strip 1 each by In Vitro route 4 times daily As needed. 200 each 3    Lancets MISC 1 each by Does not apply route 4 times daily 300 each 1    B-D ULTRAFINE III SHORT PEN 31G X 8 MM MISC Inject 1 each as directed 3 times daily 100 each 5     No current facility-administered medications on file prior to encounter. REVIEW OF SYSTEMS    A comprehensive review of systems was negative.     Objective:      BP (!) 176/99   Pulse 66   Temp 98 °F (36.7 °C) (Temporal)   Resp 16   Ht 5' 10\" (1.778 m)   Wt 260 lb (117.9 kg)   BMI 37.31 kg/m²     Wt Readings from Last 3 Encounters:   10/14/20 260 lb (117.9 kg)   10/08/20 260 lb (117.9 kg)   10/01/20 262 lb (118.8 kg)       PHYSICAL EXAM    General Appearance: alert and oriented to person, place and time, well developed and well- nourished, in no acute distress  Skin: warm and dry, no rash or erythema  Head: normocephalic and atraumatic  Eyes: pupils equal, round, and reactive to light, extraocular eye movements intact, conjunctivae normal  ENT: tympanic membrane, external ear and ear canal normal bilaterally, nose without deformity, nasal mucosa and turbinates normal without polyps, lips teeth and gums normal  Neck: supple and non-tender without mass, no thyromegaly or thyroid nodules, no cervical lymphadenopathy  Pulmonary/Chest: clear to auscultation bilaterally- no wheezes, rales or rhonchi, normal air movement, no respiratory distress  Cardiovascular: normal rate, regular rhythm, normal S1 and S2, no murmurs, rubs, clicks, or gallops, distal pulses intact, no carotid bruits  Abdomen: soft, non-tender, non-distended, normal bowel sounds, no masses or organomegaly  Extremities: no cyanosis, clubbing or edema  Musculoskeletal: normal range of motion, no joint swelling, deformity or tenderness  Neurologic: reflexes normal and symmetric, no cranial nerve deficit, gait, coordination and speech normal, sensation of skin normal      Assessment:      Problem List Items Addressed This Visit     * (Principal) Diabetic ulcer of right midfoot associated with type 2 diabetes mellitus, with fat layer exposed ([address] - Primary (Chronic)    Relevant Orders    Supply: Wound Cleanser    Supply: Wound Dressings    Supply: Pack Wound    Supply: Cover and Secure           Procedure Note  Indications:  Based on my examination of this patient's wound(s)/ulcer(s) today, debridement is required to promote healing and evaluate the wound base. Performed by: Jane Deluca MD    Consent obtained:  Yes    Time out taken:  Yes    Pain Control: Anesthetic  Anesthetic: 2% Lidocaine Gel Topical       Debridement:Excisional Debridement    Using curette the wound(s)/ulcer(s) was/were sharply debrided down through and including the removal of epidermis, dermis and subcutaneous tissue.         Devitalized Tissue Debrided:  fibrin, biofilm, slough, necrotic/eschar, exudate and callus      Pre Debridement Measurements:  Are located in the Wound/Ulcer Documentation Flow Sheet    Wound/Ulcer #: 1    Percent of Wound(s)/Ulcer(s) Debrided: 100%    Total Surface Area Debrided:  0.54 sq cm       Diabetic/Pressure/Non Pressure Ulcers only:  Ulcer: Diabetic ulcer, fat layer exposed           Post Debridement Measurements:    Wound/Ulcer layer exposed ([address] - Primary (Chronic)    Relevant Orders    Supply: Wound Cleanser    Supply: Wound Dressings    Supply: Pack Wound    Supply: Cover and Secure          Cont F/F    Treatment Note please see attached Discharge Instructions    In my professional opinion this patient would benefit from HBO Therapy: No    Written patient dismissal instructions given to patient and signed by patient or POA. Discharge [address] and Hyperbaric Oxygen Therapy   Physician Orders and Discharge Instructions  [address] [place] [address]  Telephone: [phone]    NAME:  Tisha Ramirez OF BIRTH:  1965  MEDICAL RECORD NUMBER:  [id number]  DATE:  10/14/2020    Discharge condition: Stable    Discharge to: Home    Left via:Private automobile    Accompanied by: Self     Dressing Orders: Right Plantar: Apply felt/foam to right foot  Wash with soap and water. Apply saline moistened gauze to wound bed, Cover with dry gauze. Secure with roll gauze and medipore tape. Change daily .     Treatment Orders:  Protein rich diet (unless restricted by your physician); Multivitamin daily; Elevate legs when sitting, avoid standing for  long periods of time. [address] [place] [address],[address] followup visit _____________1 week________________  (Please note your next appointment above and if you are unable to keep, kindly give a 24 hour notice. Thank you.)    If you experience any of the following, please call the [address] [place] [address] [address] during business hours:    * Increase in Pain  * Temperature over 101  * Increase in drainage from your wound  * Drainage with a foul odor  * Bleeding  * Increase in swelling  * Need for compression bandage changes due to slippage, breakthrough drainage. If you need medical attention outside of the business hours of the [address] [place] [address] [address] please contact your PCP or go to the nearest emergency room.         Electronically signed by Nancy Tali Ratliff MD on 10/14/2020 at 9:06 AM

## 2020-10-21 ENCOUNTER — HOSPITAL ENCOUNTER (OUTPATIENT)
Dept: WOUND CARE | Age: 55
Discharge: HOME | End: 2020-10-21
Payer: MEDICARE

## 2020-10-21 VITALS
DIASTOLIC BLOOD PRESSURE: 76 MMHG | HEART RATE: 76 BPM | SYSTOLIC BLOOD PRESSURE: 126 MMHG | TEMPERATURE: 98.1 F | BODY MASS INDEX: 37.22 KG/M2 | RESPIRATION RATE: 16 BRPM | WEIGHT: 260 LBS | HEIGHT: 70 IN

## 2020-10-21 PROCEDURE — 11042 DBRDMT SUBQ TIS 1ST 20SQCM/<: CPT | Performed by: SURGERY

## 2020-10-21 PROCEDURE — 11042 DBRDMT SUBQ TIS 1ST 20SQCM/<: CPT

## 2020-10-21 PROCEDURE — 97597 DBRDMT OPN WND 1ST 20 CM/<: CPT

## 2020-10-21 NOTE — PLAN OF CARE
Problem: Wound:  Goal: Will show signs of wound healing; wound closure and no evidence of infection  Description: Will show signs of wound healing; wound closure and no evidence of infection  10/21/2020 0850 by Sarah Ornelas RN  Outcome: Ongoing  10/21/2020 0850 by Sarah Ornelas RN  Outcome: Ongoing  10/21/2020 0849 by Sarah Ornelas RN  Outcome: Ongoing     Problem: Falls - Risk of:  Goal: Will remain free from falls  Description: Will remain free from falls  10/21/2020 0850 by Sarah Ornelas RN  Outcome: Ongoing  10/21/2020 0850 by Sarah Ornelas RN  Outcome: Ongoing  10/21/2020 0849 by Sarah Ornelas RN  Outcome: Ongoing     Problem: Blood Glucose:  Goal: Ability to maintain appropriate glucose levels will improve  Description: Ability to maintain appropriate glucose levels will improve  10/21/2020 0850 by Sarah Ornelas RN  Outcome: Ongoing  10/21/2020 0850 by Sarah Ornelas RN  Outcome: Ongoing  10/21/2020 0849 by Sarah Ornelas RN  Outcome: Ongoing

## 2020-10-21 NOTE — PROGRESS NOTES
[address]   Progress Note and Procedure Note      [address] RECORD NUMBER:  [id number]  AGE: 62 y.o. GENDER: male  : 1965  EPISODE DATE:  10/21/2020    Subjective:     Chief Complaint   Patient presents with    Wound Check     right plantar foot         HISTORY of PRESENT ILLNESS HPI     Marjorie Perla is a 62 y.o. male who presents today for wound/ulcer evaluation.    Wound Context: Pt with R plantar foot wound here for eval/treat  Wound/Ulcer Pain Timing/Severity: none  Quality of pain: N/A  Severity:  0 / 10   Modifying Factors: None  Associated Signs/Symptoms: none    Ulcer Identification:  Ulcer Type: diabetic and pressure  Contributing Factors: diabetes, chronic pressure and shear force    Wound: DM        PAST MEDICAL HISTORY        Diagnosis Date    Allergic rhinitis, cause unspecified     Atrial fibrillation, chronic (HCC)     sees [facility]ology    Charcot's joint of right foot     Chicken pox     Closed supracondylar fracture of elbow     fall    History of MRSA infection     scalp/facial and on back    HTN (hypertension)     Hyperlipidemia     Impotence of organic origin     MDRO (multiple drug resistant organisms) resistance     Other dyspnea and respiratory abnormality     Other specified erythematous condition(695.89)     Personal history of other infectious and parasitic disease     Type II or unspecified type diabetes mellitus without mention of complication, uncontrolled        PAST SURGICAL HISTORY    Past Surgical History:   Procedure Laterality Date    CARDIOVERSION  2017    COLONOSCOPY  2020    Dr Apurva Fraire-Melanosis coli throughout the entire colon-AP, 5 yr recall    HUMERUS FRACTURE SURGERY Left 2020    OPEN REDUCTION INTERNAL FIXATION LEFT SUPRACONDYLAR FRACTURE performed by Elida Rai MD at [facility] OR    MALIGNANT SKIN LESION EXCISION      X 2    TONSILLECTOMY         FAMILY HISTORY    Family History daily 45 tablet 5    glimepiride (AMARYL) 4 MG tablet TAKE ONE TABLET EVERY MORNING BEFORE BREAKFAST (Patient taking differently: Take 4 mg by mouth every morning (before breakfast) ) 90 tablet 3    warfarin (COUMADIN) 5 MG tablet TAKE AS DIRECTED BY YOUR DOCTOR (Patient taking differently: Take by mouth daily TAKE AS DIRECTED BY YOUR DOCTOR-keeps 5mg on hand if dose varies) 30 tablet 5    diphenhydrAMINE (BENADRYL) 25 MG tablet Take 25 mg by mouth every 6 hours as needed for Itching      metoprolol tartrate (LOPRESSOR) 25 MG tablet TAKE ONE TABLET TWICE DAILY (Patient taking differently: Take 25 mg by mouth 2 times daily ) 60 tablet 5    blood glucose test strips (CONTOUR NEXT TEST) strip 1 each by In Vitro route 4 times daily As needed. 200 each 3    Lancets MISC 1 each by Does not apply route 4 times daily 300 each 1    B-D ULTRAFINE III SHORT PEN 31G X 8 MM MISC Inject 1 each as directed 3 times daily 100 each 5     No current facility-administered medications on file prior to encounter. REVIEW OF SYSTEMS    A comprehensive review of systems was negative.     Objective:      /76   Pulse 76   Temp 98.1 °F (36.7 °C) (Temporal)   Resp 16   Ht 5' 10\" (1.778 m)   Wt 260 lb (117.9 kg)   BMI 37.31 kg/m²     Wt Readings from Last 3 Encounters:   10/21/20 260 lb (117.9 kg)   10/14/20 260 lb (117.9 kg)   10/08/20 260 lb (117.9 kg)       PHYSICAL EXAM    General Appearance: alert and oriented to person, place and time, well developed and well- nourished, in no acute distress  Skin: warm and dry, no rash or erythema  Head: normocephalic and atraumatic  Eyes: pupils equal, round, and reactive to light, extraocular eye movements intact, conjunctivae normal  ENT: tympanic membrane, external ear and ear canal normal bilaterally, nose without deformity, nasal mucosa and turbinates normal without polyps, lips teeth and gums normal  Neck: supple and non-tender without mass, no thyromegaly or thyroid nodules, no cervical lymphadenopathy  Pulmonary/Chest: clear to auscultation bilaterally- no wheezes, rales or rhonchi, normal air movement, no respiratory distress  Cardiovascular: normal rate, regular rhythm, normal S1 and S2, no murmurs, rubs, clicks, or gallops, distal pulses intact, no carotid bruits  Abdomen: soft, non-tender, non-distended, normal bowel sounds, no masses or organomegaly  Extremities: no cyanosis, clubbing or edema  Musculoskeletal: normal range of motion, no joint swelling, deformity or tenderness  Neurologic: reflexes normal and symmetric, no cranial nerve deficit, gait, coordination and speech normal, sensation of skin normal      Assessment:      Problem List Items Addressed This Visit     * (Principal) Diabetic ulcer of right midfoot associated with type 2 diabetes mellitus, with fat layer exposed ([address] (Chronic)           Procedure Note  Indications:  Based on my examination of this patient's wound(s)/ulcer(s) today, debridement is required to promote healing and evaluate the wound base. Performed by: Sarah Mena MD    Consent obtained:  Yes    Time out taken:  Yes    Pain Control:         Debridement:Excisional Debridement    Using curette the wound(s)/ulcer(s) was/were sharply debrided down through and including the removal of epidermis, dermis and subcutaneous tissue.         Devitalized Tissue Debrided:  fibrin, biofilm, slough, necrotic/eschar, exudate and callus      Pre Debridement Measurements:  Are located in the Wound/Ulcer Documentation Flow Sheet    Wound/Ulcer #: 1    Percent of Wound(s)/Ulcer(s) Debrided: 100%    Total Surface Area Debrided:  0.25 sq cm       Diabetic/Pressure/Non Pressure Ulcers only:  Ulcer: Diabetic ulcer, fat layer exposed           Post Debridement Measurements:    Wound/Ulcer Descriptions are Pre Debridement --EXCEPT MEASUREMENTS    Wound 09/14/20 Foot Right;Plantar WOUND 1 RIGHT PLANTAR (Active)   Wound Image   10/21/20 0827   Wound Etiology Diabetic Kat 1 10/21/20 0827   Dressing Status Old drainage noted 10/21/20 0827   Wound Cleansed Soap and water 10/21/20 0827   Dressing/Treatment Moist to moist;Moisten with saline;Gauze dressing/dressing sponge;Roll gauze;Tape/Soft cloth adhesive tape 10/08/20 1138   Offloading for Diabetic Foot Ulcers Felt or foam 10/21/20 0827   Wound Length (cm) 0.5 cm 10/21/20 0827   Wound Width (cm) 0.5 cm 10/21/20 0827   Wound Depth (cm) 0.5 cm 10/21/20 0827   Wound Surface Area (cm^2) 0.25 cm^2 10/21/20 0827   Change in Wound Size % (l*w) 86.11 10/21/20 0827   Wound Volume (cm^3) 0.12 cm^3 10/21/20 0827   Wound Healing % 90 10/21/20 0827   Post-Procedure Length (cm) 0.5 cm 10/21/20 0847   Post-Procedure Width (cm) 0.5 cm 10/21/20 0847   Post-Procedure Depth (cm) 0.5 cm 10/21/20 0847   Post-Procedure Surface Area (cm^2) 0.25 cm^2 10/21/20 0847   Post-Procedure Volume (cm^3) 0.12 cm^3 10/21/20 0847   Distance Tunneling (cm) 0 cm 10/21/20 0827   Tunneling Position ___ O'Clock 0 10/21/20 0827   Undermining Starts ___ O'Clock 0 10/21/20 0827   Undermining Ends___ O'Clock 0 10/21/20 0827   Undermining Maxium Distance (cm) 0 10/21/20 0827   Wound Assessment Granulation tissue 10/21/20 0827   Drainage Amount Moderate 10/21/20 0827   Drainage Description Serosanguinous 10/21/20 0827   Odor None 10/21/20 0827   Yolis-wound Assessment Intact 10/21/20 0827   Margins Attached edges 10/21/20 0827   Wound Thickness Description not for Pressure Injury Not applicable 40/39/83 8594   Number of days: 36             Estimated Blood Loss:  Minimal    Hemostasis Achieved:  by pressure and by silver nitrate stick    Procedural Pain:  0  / 10     Post Procedural Pain:  0 / 10     Response to treatment:  Well tolerated by patient.          Plan:     Problem List Items Addressed This Visit     * (Principal) Diabetic ulcer of right midfoot associated with type 2 diabetes mellitus, with fat layer exposed ([address] (Chronic)          Cont F/F    Treatment

## 2020-10-21 NOTE — PLAN OF CARE
Problem: Wound:  Goal: Will show signs of wound healing; wound closure and no evidence of infection  Description: Will show signs of wound healing; wound closure and no evidence of infection  10/21/2020 0850 by Jonel Dillard RN  Outcome: Ongoing  10/21/2020 0849 by Jonel Dillard RN  Outcome: Ongoing     Problem: Blood Glucose:  Goal: Ability to maintain appropriate glucose levels will improve  Description: Ability to maintain appropriate glucose levels will improve  10/21/2020 0850 by Jonel Dillard RN  Outcome: Ongoing  10/21/2020 0849 by Jonel Dillard RN  Outcome: Ongoing     Problem: Falls - Risk of:  Goal: Will remain free from falls  Description: Will remain free from falls  10/21/2020 0850 by Jonel Dillard RN  Outcome: Ongoing  10/21/2020 0849 by Jonel Dillard RN  Outcome: Ongoing

## 2020-10-28 ENCOUNTER — HOSPITAL ENCOUNTER (OUTPATIENT)
Dept: WOUND CARE | Age: 55
Discharge: HOME | End: 2020-10-28
Payer: MEDICARE

## 2020-10-28 ENCOUNTER — HOSPITAL ENCOUNTER (OUTPATIENT)
Dept: GENERAL RADIOLOGY | Age: 55
Discharge: HOME | End: 2020-10-28
Payer: MEDICARE

## 2020-10-28 VITALS
SYSTOLIC BLOOD PRESSURE: 183 MMHG | HEART RATE: 89 BPM | TEMPERATURE: 97.5 F | DIASTOLIC BLOOD PRESSURE: 98 MMHG | WEIGHT: 260 LBS | HEIGHT: 70 IN | RESPIRATION RATE: 18 BRPM | BODY MASS INDEX: 37.22 KG/M2

## 2020-10-28 PROCEDURE — 73630 X-RAY EXAM OF FOOT: CPT

## 2020-10-28 PROCEDURE — 97597 DBRDMT OPN WND 1ST 20 CM/<: CPT

## 2020-10-28 PROCEDURE — 11042 DBRDMT SUBQ TIS 1ST 20SQCM/<: CPT | Performed by: SURGERY

## 2020-10-28 NOTE — PROGRESS NOTES
[address]   Progress Note and Procedure Note      [address] RECORD NUMBER:  [id number]  AGE: 62 y.o. GENDER: male  : 1965  EPISODE DATE:  10/28/2020    Subjective:     Chief Complaint   Patient presents with    Wound Check     Pt mom is doing wound care for patient, doing the wound care one time daily         HISTORY of PRESENT ILLNESS HPI     Bill Batista is a 62 y.o. male who presents today for wound/ulcer evaluation.    Wound Context: Pt with L plantar foot here for eval/treat  Wound/Ulcer Pain Timing/Severity: none  Quality of pain: N/A  Severity:  0 / 10   Modifying Factors: None  Associated Signs/Symptoms: none    Ulcer Identification:  Ulcer Type: diabetic and pressure  Contributing Factors: diabetes, chronic pressure and shear force    Wound: DM         PAST MEDICAL HISTORY        Diagnosis Date    Allergic rhinitis, cause unspecified     Atrial fibrillation, chronic (HCC)     sees [facility] cardiology    Charcot's joint of right foot     Chicken pox     Closed supracondylar fracture of elbow     fall    History of MRSA infection     scalp/facial and on back    HTN (hypertension)     Hyperlipidemia     Impotence of organic origin     MDRO (multiple drug resistant organisms) resistance     Other dyspnea and respiratory abnormality     Other specified erythematous condition(695.89)     Personal history of other infectious and parasitic disease     Type II or unspecified type diabetes mellitus without mention of complication, uncontrolled        PAST SURGICAL HISTORY    Past Surgical History:   Procedure Laterality Date    CARDIOVERSION  2017    COLONOSCOPY  2020    Dr Monet Fraire-Melanosis coli throughout the entire colon-AP, 5 yr recall    HUMERUS FRACTURE SURGERY Left 2020    OPEN REDUCTION INTERNAL FIXATION LEFT SUPRACONDYLAR FRACTURE performed by Nicole Lane MD at [facility] OR    MALIGNANT SKIN LESION EXCISION      X 2    TONSILLECTOMY         FAMILY HISTORY    Family History   Problem Relation Age of Onset    Stroke Maternal Grandmother     Cancer Maternal Grandmother     Stroke Paternal Grandmother     Diabetes Paternal Grandmother     Cancer Father     Heart Disease Father     Lung Cancer Father     Asthma Paternal Grandfather     Heart Disease Paternal Grandfather     Heart Disease Maternal Grandfather     Colon Cancer Neg Hx     Colon Polyps Neg Hx     Esophageal Cancer Neg Hx     Liver Cancer Neg Hx     Liver Disease Neg Hx     Rectal Cancer Neg Hx     Stomach Cancer Neg Hx        SOCIAL HISTORY    Social History     Tobacco Use    Smoking status: Never Smoker    Smokeless tobacco: Never Used   Substance Use Topics    Alcohol use: No    Drug use: No       ALLERGIES    Allergies   Allergen Reactions    Pcn [Penicillins]     Neomycin-Bacitracin Zn-Polymyx Rash    Neosporin [Neomycin-Polymyxin-Gramicidin] Rash       MEDICATIONS    Current Outpatient Medications on File Prior to Encounter   Medication Sig Dispense Refill    VITAMIN D PO Take 1 tablet by mouth daily      docusate sodium (COLACE) 250 MG capsule Take 1 capsule by mouth 2 times daily 60 capsule 0    losartan (COZAAR) 100 MG tablet TAKE ONE TABLET DAILY (Patient taking differently: Take 100 mg by mouth daily ) 30 tablet 3    atorvastatin (LIPITOR) 20 MG tablet TAKE ONE TABLET DAILY (Patient taking differently: Take 20 mg by mouth daily ) 90 tablet 3    tamsulosin (FLOMAX) 0.4 MG capsule TAKE TWO CAPSULES DAILY (Patient taking differently: Take 0.8 mg by mouth daily TAKE TWO CAPSULES DAILY) 60 capsule 11    warfarin (COUMADIN) 10 MG tablet Take 1 tablet by mouth daily 45 tablet 5    glimepiride (AMARYL) 4 MG tablet TAKE ONE TABLET EVERY MORNING BEFORE BREAKFAST (Patient taking differently: Take 4 mg by mouth every morning (before breakfast) ) 90 tablet 3    metoprolol tartrate (LOPRESSOR) 25 MG tablet TAKE ONE TABLET TWICE DAILY (Patient taking differently: Take 25 mg by mouth 2 times daily ) 60 tablet 5    HYDROcodone-acetaminophen (NORCO) 5-325 MG per tablet Take 1 tablet by mouth every 6 hours as needed for Pain.  furosemide (LASIX) 20 MG tablet Take 1 tablet by mouth daily as needed (edema) 30 tablet 1    Insulin Degludec (TRESIBA) 100 UNIT/ML SOLN Inject 80 Units/day into the skin daily       BD INSULIN SYRINGE U/F 31G X 5/16\" 1 ML MISC USE FOUR TIMES DAILY 100 each 3    warfarin (COUMADIN) 5 MG tablet TAKE AS DIRECTED BY YOUR DOCTOR (Patient taking differently: Take by mouth daily TAKE AS DIRECTED BY YOUR DOCTOR-keeps 5mg on hand if dose varies) 30 tablet 5    diphenhydrAMINE (BENADRYL) 25 MG tablet Take 25 mg by mouth every 6 hours as needed for Itching      blood glucose test strips (CONTOUR NEXT TEST) strip 1 each by In Vitro route 4 times daily As needed. 200 each 3    Lancets MISC 1 each by Does not apply route 4 times daily 300 each 1    B-D ULTRAFINE III SHORT PEN 31G X 8 MM MISC Inject 1 each as directed 3 times daily 100 each 5     No current facility-administered medications on file prior to encounter. REVIEW OF SYSTEMS    A comprehensive review of systems was negative.     Objective:      BP (!) 183/98 Comment: Just took am meds 10 minutes ago  Pulse 89   Temp 97.5 °F (36.4 °C) (Temporal)   Resp 18   Ht 5' 10\" (1.778 m)   Wt 260 lb (117.9 kg)   BMI 37.31 kg/m²     Wt Readings from Last 3 Encounters:   10/28/20 260 lb (117.9 kg)   10/21/20 260 lb (117.9 kg)   10/14/20 260 lb (117.9 kg)       PHYSICAL EXAM    General Appearance: alert and oriented to person, place and time, well developed and well- nourished, in no acute distress  Skin: warm and dry, no rash or erythema  Head: normocephalic and atraumatic  Eyes: pupils equal, round, and reactive to light, extraocular eye movements intact, conjunctivae normal  ENT: tympanic membrane, external ear and ear canal normal bilaterally, nose without deformity, nasal mucosa and turbinates normal without polyps, lips teeth and gums normal  Neck: supple and non-tender without mass, no thyromegaly or thyroid nodules, no cervical lymphadenopathy  Pulmonary/Chest: clear to auscultation bilaterally- no wheezes, rales or rhonchi, normal air movement, no respiratory distress  Cardiovascular: normal rate, regular rhythm, normal S1 and S2, no murmurs, rubs, clicks, or gallops, distal pulses intact, no carotid bruits  Abdomen: soft, non-tender, non-distended, normal bowel sounds, no masses or organomegaly  Extremities: no cyanosis, clubbing or edema  Musculoskeletal: normal range of motion, no joint swelling, deformity or tenderness  Neurologic: reflexes normal and symmetric, no cranial nerve deficit, gait, coordination and speech normal, sensation of skin normal      Assessment:      Problem List Items Addressed This Visit     * (Principal) Diabetic ulcer of right midfoot associated with type 2 diabetes mellitus, with fat layer exposed ([address] - Primary (Chronic)    Relevant Orders    Supply: Wound Cleanser    Supply: Yolis Wound    Supply: Wound Dressings    Supply: Pack Wound    Supply: Cover and Secure    X-RAY FOOT 3+VW           Procedure Note  Indications:  Based on my examination of this patient's wound(s)/ulcer(s) today, debridement is required to promote healing and evaluate the wound base. Performed by: Toshia Bravo MD    Consent obtained:  Yes    Time out taken:  Yes    Pain Control:         Debridement:Excisional Debridement    Using curette and forceps the wound(s)/ulcer(s) was/were sharply debrided down through and including the removal of epidermis, dermis and subcutaneous tissue.         Devitalized Tissue Debrided:  fibrin, biofilm, slough, necrotic/eschar, exudate and callus      Pre Debridement Measurements:  Are located in the Wound/Ulcer Documentation Flow Sheet    Wound/Ulcer #: 1    Percent of Wound(s)/Ulcer(s) Debrided: 100%    Total Surface Area Debrided: 0.12 sq cm       Diabetic/Pressure/Non Pressure Ulcers only:  Ulcer: Diabetic ulcer, fat layer exposed           Post Debridement Measurements:    Wound/Ulcer Descriptions are Pre Debridement --EXCEPT MEASUREMENTS    Wound 09/14/20 Foot Right;Plantar WOUND 1 RIGHT PLANTAR (Active)   Wound Image    10/28/20 0816   Wound Etiology Diabetic Kat 1 10/28/20 0816   Dressing Status Old drainage noted 10/28/20 0816   Wound Cleansed Soap and water 10/28/20 0816   Dressing/Treatment Moist to moist;Moisten with saline;Gauze dressing/dressing sponge;Roll gauze;Tape/Soft cloth adhesive tape 10/21/20 0912   Offloading for Diabetic Foot Ulcers Felt or foam 10/28/20 0816   Wound Length (cm) 0.2 cm 10/28/20 0816   Wound Width (cm) 0.6 cm 10/28/20 0816   Wound Depth (cm) 0.4 cm 10/28/20 0816   Wound Surface Area (cm^2) 0.12 cm^2 10/28/20 0816   Change in Wound Size % (l*w) 93.33 10/28/20 0816   Wound Volume (cm^3) 0.05 cm^3 10/28/20 0816   Wound Healing % 96 10/28/20 0816   Post-Procedure Length (cm) 0.2 cm 10/28/20 0828   Post-Procedure Width (cm) 0.6 cm 10/28/20 0828   Post-Procedure Depth (cm) 0.4 cm 10/28/20 0828   Post-Procedure Surface Area (cm^2) 0.12 cm^2 10/28/20 0828   Post-Procedure Volume (cm^3) 0.05 cm^3 10/28/20 0828   Distance Tunneling (cm) 0 cm 10/28/20 0816   Tunneling Position ___ O'Clock 0 10/28/20 0816   Undermining Starts ___ O'Clock 0 10/28/20 0816   Undermining Ends___ O'Clock 0 10/28/20 0816   Undermining Maxium Distance (cm) 0 10/28/20 0816   Wound Assessment Granulation tissue;Slough 10/28/20 0816   Drainage Amount Small 10/28/20 0816   Drainage Description Serosanguinous 10/28/20 0816   Odor None 10/28/20 0816   Yolis-wound Assessment Hyperkeratosis (callous) 10/28/20 0816   Margins Attached edges 10/28/20 0816   Wound Thickness Description not for Pressure Injury Not applicable 80/75/38 0337   Number of days: 43             Estimated Blood Loss:  Minimal    Hemostasis Achieved:  by pressure and by silver nitrate stick    Procedural Pain:  0  / 10     Post Procedural Pain:  0 / 10     Response to treatment:  Well tolerated by patient. Plan:     Problem List Items Addressed This Visit     * (Principal) Diabetic ulcer of right midfoot associated with type 2 diabetes mellitus, with fat layer exposed ([address] - Primary (Chronic)    Relevant Orders    Supply: Wound Cleanser    Supply: Yolis Wound    Supply: Wound Dressings    Supply: Pack Wound    Supply: Cover and Secure    X-RAY FOOT 3+VW          Aquacel Ag+    Treatment Note please see attached Discharge Instructions    In my professional opinion this patient would benefit from HBO Therapy: No    Written patient dismissal instructions given to patient and signed by patient or POA. Discharge [address] and Hyperbaric Oxygen Therapy   Physician Orders and Discharge Instructions  [address]  Telephone: [phone]    NAME:  Hermelinda Heading OF BIRTH:  1965  MEDICAL RECORD NUMBER:  [id number]  DATE:  10/28/2020    Discharge condition: Stable    Discharge to: Home    Left via:Private automobile    Accompanied by: Self     Dressing Orders: Right Plantar: Apply felt/foam to right foot  Wash with soap and water. Apply saline moistened gauze to wound bed, Cover with dry gauze. Secure with roll gauze and medipore tape. Change daily . Repeat x-ray today in [address]    Treatment Orders:  Protein rich diet (unless restricted by your physician); Multivitamin daily; Elevate legs when sitting, avoid standing for  long periods of time. [address] [facility] followup visit _____________1 week________________  (Please note your next appointment above and if you are unable to keep, kindly give a 24 hour notice.  Thank you.)    If you experience any of the following, please call the [facility] during business hours:    * Increase in Pain  * Temperature over 101  * Increase in drainage from your wound  * Drainage with a foul odor  * Bleeding  * Increase in swelling  * Need for compression bandage changes due to slippage, breakthrough drainage. If you need medical attention outside of the business hours of the [address] [place] [address] Road please contact your PCP or go to the nearest emergency room.         Electronically signed by Anne Marie Machado MD on 10/28/2020 at 8:38 AM

## 2020-11-04 ENCOUNTER — HOSPITAL ENCOUNTER (OUTPATIENT)
Dept: WOUND CARE | Age: 55
Discharge: HOME | End: 2020-11-04
Payer: MEDICARE

## 2020-11-04 VITALS
HEART RATE: 79 BPM | SYSTOLIC BLOOD PRESSURE: 170 MMHG | BODY MASS INDEX: 37.22 KG/M2 | TEMPERATURE: 97.7 F | HEIGHT: 70 IN | WEIGHT: 260 LBS | DIASTOLIC BLOOD PRESSURE: 104 MMHG

## 2020-11-04 PROCEDURE — 29445 APPL RIGID TOT CNTC LEG CAST: CPT | Performed by: SURGERY

## 2020-11-04 PROCEDURE — 29445 APPL RIGID TOT CNTC LEG CAST: CPT

## 2020-11-04 NOTE — PROGRESS NOTES
[address]   Progress Note and Procedure Note      [address] RECORD NUMBER:  [id number]  AGE: 60 y.o. GENDER: male  : 1965  EPISODE DATE:  2020    Subjective:     No chief complaint on file. HISTORY of PRESENT ILLNESS HPI     Eb Neil is a 60 y.o. male who presents today for wound/ulcer evaluation.    History of Wound Context: Pt with R plantar foot wound here for eval/treat  Wound/Ulcer Pain Timing/Severity: none  Quality of pain: N/A  Severity:  0 / 10   Modifying Factors: None  Associated Signs/Symptoms: none    Ulcer Identification:  Ulcer Type: diabetic and pressure  Contributing Factors: diabetes, chronic pressure and shear force    Wound: DM        PAST MEDICAL HISTORY        Diagnosis Date    Allergic rhinitis, cause unspecified     Atrial fibrillation, chronic (HCC)     se[facility] cardiology    Charcot's joint of right foot     Chicken pox     Closed supracondylar fracture of elbow     fall    History of MRSA infection     scalp/facial and on back    HTN (hypertension)     Hyperlipidemia     Impotence of organic origin     MDRO (multiple drug resistant organisms) resistance     Other dyspnea and respiratory abnormality     Other specified erythematous condition(695.89)     Personal history of other infectious and parasitic disease     Type II or unspecified type diabetes mellitus without mention of complication, uncontrolled        PAST SURGICAL HISTORY    Past Surgical History:   Procedure Laterality Date    CARDIOVERSION  2017    COLONOSCOPY  2020    Dr Jojo Fraire-Melanosis coli throughout the entire colon-AP, 5 yr recall    HUMERUS FRACTURE SURGERY Left 2020    OPEN REDUCTION INTERNAL FIXATION LEFT SUPRACONDYLAR FRACTURE performed by [facility]ame [facility] at [facility]    MALIGNANT SKIN LESION EXCISION      X 2    TONSILLECTOMY         FAMILY HISTORY    Family History   Problem Relation Age of Onset    Stroke Maternal Grandmother     Cancer Maternal Grandmother     Stroke Paternal Grandmother     Diabetes Paternal Grandmother     Cancer Father     Heart Disease Father     Lung Cancer Father     Asthma Paternal Grandfather     Heart Disease Paternal Grandfather     Heart Disease Maternal Grandfather     Colon Cancer Neg Hx     Colon Polyps Neg Hx     Esophageal Cancer Neg Hx     Liver Cancer Neg Hx     Liver Disease Neg Hx     Rectal Cancer Neg Hx     Stomach Cancer Neg Hx        SOCIAL HISTORY    Social History     Tobacco Use    Smoking status: Never Smoker    Smokeless tobacco: Never Used   Substance Use Topics    Alcohol use: No    Drug use: No       ALLERGIES    Allergies   Allergen Reactions    Pcn [Penicillins]     Neomycin-Bacitracin Zn-Polymyx Rash    Neosporin [Neomycin-Polymyxin-Gramicidin] Rash       MEDICATIONS    Current Outpatient Medications on File Prior to Encounter   Medication Sig Dispense Refill    VITAMIN D PO Take 1 tablet by mouth daily      HYDROcodone-acetaminophen (NORCO) 5-325 MG per tablet Take 1 tablet by mouth every 6 hours as needed for Pain.       docusate sodium (COLACE) 250 MG capsule Take 1 capsule by mouth 2 times daily 60 capsule 0    Insulin Degludec (TRESIBA) 100 UNIT/ML SOLN Inject 80 Units/day into the skin daily       losartan (COZAAR) 100 MG tablet TAKE ONE TABLET DAILY (Patient taking differently: Take 100 mg by mouth daily ) 30 tablet 3    atorvastatin (LIPITOR) 20 MG tablet TAKE ONE TABLET DAILY (Patient taking differently: Take 20 mg by mouth daily ) 90 tablet 3    tamsulosin (FLOMAX) 0.4 MG capsule TAKE TWO CAPSULES DAILY (Patient taking differently: Take 0.8 mg by mouth daily TAKE TWO CAPSULES DAILY) 60 capsule 11    warfarin (COUMADIN) 10 MG tablet Take 1 tablet by mouth daily 45 tablet 5    glimepiride (AMARYL) 4 MG tablet TAKE ONE TABLET EVERY MORNING BEFORE BREAKFAST (Patient taking differently: Take 4 mg by mouth every morning (before breakfast) ) 90 tablet 3    metoprolol tartrate (LOPRESSOR) 25 MG tablet TAKE ONE TABLET TWICE DAILY (Patient taking differently: Take 25 mg by mouth 2 times daily ) 60 tablet 5    furosemide (LASIX) 20 MG tablet Take 1 tablet by mouth daily as needed (edema) 30 tablet 1    BD INSULIN SYRINGE U/F 31G X 5/16\" 1 ML MISC USE FOUR TIMES DAILY 100 each 3    warfarin (COUMADIN) 5 MG tablet TAKE AS DIRECTED BY YOUR DOCTOR (Patient taking differently: Take by mouth daily TAKE AS DIRECTED BY YOUR DOCTOR-keeps 5mg on hand if dose varies) 30 tablet 5    diphenhydrAMINE (BENADRYL) 25 MG tablet Take 25 mg by mouth every 6 hours as needed for Itching      blood glucose test strips (CONTOUR NEXT TEST) strip 1 each by In Vitro route 4 times daily As needed. 200 each 3    Lancets MISC 1 each by Does not apply route 4 times daily 300 each 1    B-D ULTRAFINE III SHORT PEN 31G X 8 MM MISC Inject 1 each as directed 3 times daily 100 each 5     No current facility-administered medications on file prior to encounter. REVIEW OF SYSTEMS    A comprehensive review of systems was negative.     Objective:      BP (!) 170/104   Pulse 79   Temp 97.7 °F (36.5 °C) (Temporal)   Ht 5' 10\" (1.778 m)   Wt 260 lb (117.9 kg)   BMI 37.31 kg/m²     Wt Readings from Last 3 Encounters:   11/04/20 260 lb (117.9 kg)   10/28/20 260 lb (117.9 kg)   10/21/20 260 lb (117.9 kg)       PHYSICAL EXAM    General Appearance: alert and oriented to person, place and time, well developed and well- nourished, in no acute distress  Skin: warm and dry, no rash or erythema  Head: normocephalic and atraumatic  Eyes: pupils equal, round, and reactive to light, extraocular eye movements intact, conjunctivae normal  ENT: tympanic membrane, external ear and ear canal normal bilaterally, nose without deformity, nasal mucosa and turbinates normal without polyps, lips teeth and gums normal  Neck: supple and non-tender without mass, no thyromegaly or thyroid nodules, no cervical lymphadenopathy  Pulmonary/Chest: clear to auscultation bilaterally- no wheezes, rales or rhonchi, normal air movement, no respiratory distress  Cardiovascular: normal rate, regular rhythm, normal S1 and S2, no murmurs, rubs, clicks, or gallops, distal pulses intact, no carotid bruits  Abdomen: soft, non-tender, non-distended, normal bowel sounds, no masses or organomegaly  Extremities: no cyanosis, clubbing or edema  Musculoskeletal: normal range of motion, no joint swelling, deformity or tenderness  Neurologic: reflexes normal and symmetric, no cranial nerve deficit, gait, coordination and speech normal, sensation of skin normal      Assessment:      Patient Active Problem List   Diagnosis Code    Shortness of breath R06.02    HTN (hypertension) I10    Hyperlipidemia E78.5    Fatigue R53.83    Paroxysmal atrial fibrillation (HCC) I48.0    Acalculous cholecystitis K81.9    Hyponatremia E87.1    Hypotension due to hypovolemia I95.89, E86.1    Type 2 diabetes mellitus with diabetic polyneuropathy, with long-term current use of insulin ([facility]) E11.42, Z79.4    Class 2 obesity due to excess calories in adult E66.09    Diabetic ulcer of right midfoot associated with type 2 diabetes mellitus, with fat layer exposed ([address] E11.621, L97.412    Displaced comminuted supracondylar fracture without intercondylar fracture of left humerus, initial encounter for closed fracture S42.422A    Left supracondylar humerus fracture, closed, initial encounter S39.56A    Charcot foot due to diabetes mellitus ([facility] [address] E11.610    Non-compliance Z91.19             Wound 09/14/20 Foot Right;Plantar WOUND 1 RIGHT PLANTAR (Active)   Wound Image    11/04/20 0809   Wound Etiology Diabetic Kat 1 11/04/20 0809   Dressing Status Old drainage noted 11/04/20 0809   Wound Cleansed Soap and water 11/04/20 0809   Dressing/Treatment Alginate with Ag;Gauze dressing/dressing sponge;Roll gauze;Tape/Soft cloth adhesive tape 10/28/20 6021   Offloading for Diabetic Foot Ulcers Felt or foam 20 0809   Wound Length (cm) 0.3 cm 20 08   Wound Width (cm) 0.5 cm 20 08   Wound Depth (cm) 0.2 cm 20 08   Wound Surface Area (cm^2) 0.15 cm^2 20 08   Change in Wound Size % (l*w) 91.67 20 08   Wound Volume (cm^3) 0.03 cm^3 20 08   Wound Healing % 98 20 08   Post-Procedure Length (cm) 0.3 cm 20 08   Post-Procedure Width (cm) 0.5 cm 20 08   Post-Procedure Depth (cm) 0.2 cm 20 08   Post-Procedure Surface Area (cm^2) 0.15 cm^2 20 0853   Post-Procedure Volume (cm^3) 0.03 cm^3 20 0853   Distance Tunneling (cm) 0 cm 20 08   Tunneling Position ___ O'Clock 0 20 0809   Undermining Starts ___ O'Clock 0 20 0809   Undermining Ends___ O'Clock 0 20 0809   Undermining Maxium Distance (cm) 0 20 08   Wound Assessment Granulation tissue; Subcutaneous 20 08   Drainage Amount Moderate 20 08   Drainage Description Serosanguinous 20 0809   Odor None 20 08   Yolis-wound Assessment Hyperkeratosis (callous) 20 0809   Margins Attached edges 20 08   Wound Thickness Description not for Pressure Injury Not applicable 98//58 [id number]   Number of days: 50             Plan:     Problem List Items Addressed This Visit     * (Principal) Diabetic ulcer of right midfoot associated with type 2 diabetes mellitus, with fat layer exposed ([address] - Primary (Chronic)    Relevant Orders    Supply: Wound Cleanser    Supply: Yolis Wound    Supply: Wound Dressings    Supply: Pack Wound    Supply: Cover and Secure        TCC APPLICATION NOTE    Thiago Goldstein  AGE: 60 y.o. GENDER: male  : 1965  TODAY'S DATE:  2020    Subjective:        HISTORY of PRESENT ILLNESS:     Kenyatta Perez is a 60 y.o. male who presents today for wound evaluation.     Wound Type:diabetic and pressure  Wound Location:right foot/feet: plantar  Modifying factors:edema, diabetes and chronic pressure    Patient Active Problem List   Diagnosis Code    Shortness of breath R06.02    HTN (hypertension) I10    Hyperlipidemia E78.5    Fatigue R53.83    Paroxysmal atrial fibrillation (HCC) I48.0    Acalculous cholecystitis K81.9    Hyponatremia E87.1    Hypotension due to hypovolemia I95.89, E86.1    Type 2 diabetes mellitus with diabetic polyneuropathy, with long-term current use of insulin ([facility]) E11.42, Z79.4    Class 2 obesity due to excess calories in adult E66.09    Diabetic ulcer of right midfoot associated with type 2 diabetes mellitus, with fat layer exposed ([facility] [address] E11.621, L97.412    Displaced comminuted supracondylar fracture without intercondylar fracture of left humerus, initial encounter for closed fracture S42.422A    Left supracondylar humerus fracture, closed, initial encounter S39.56A    Charcot foot due to diabetes mellitus ([facility]) E11.610    Non-compliance Z91.19       ALLERGIES    Allergies   Allergen Reactions    Pcn [Penicillins]     Neomycin-Bacitracin Zn-Polymyx Rash    Neosporin [Neomycin-Polymyxin-Gramicidin] Rash         Objective:      BP (!) 170/104   Pulse 79   Temp 97.7 °F (36.5 °C) (Temporal)   Ht 5' 10\" (1.778 m)   Wt 260 lb (117.9 kg)   BMI 37.31 kg/m²     Procedure Note:    After risks benefits were discussed with the patient and consent was obtained total contact casting was performed. The patients wound was prepared for casting. The total contact cast was applied according to the manufacture recommendations. After the allotted drying time was given, the patient cast walking boot was applied and the patient was allowed to be discharged from the office. Response to treatment:  Well tolerated by patient. Plan:     Plan for wound - Dress per physician order  Treatment:     Compression : No   Offloading : Yes   Dressing : TCC#1   Additional Therapy : 0     1. Discussed appropriate home care of this wound.  Wound redressed. 2. Patient instructions were given in AVS.  3. Follow up: Friday for TCC#2. Discharge [address] and Hyperbaric Oxygen Therapy   Physician Orders and Discharge Instructions  [address] [place]  [place]  Telephone: [phone]    NAME:  Jessie Casas OF BIRTH:  1965  MEDICAL RECORD NUMBER:  [id number]  DATE:  11/4/2020    Discharge condition: Stable    Discharge to: Home    Left via:Private automobile    Accompanied by: Self     Dressing Orders: Right Plantar: Apply felt/foam to right foot    Contact Cast      NAME:  Jessie Casas OF BIRTH:  1965  MEDICAL RECORD NUMBER:  [id number]  DATE:  11/4/2020    Goal:  Patient will maintain integrity of cast, avoid mobility hazards, and report complications that may occur (foul odor, pain, numbness, cracked cast).  [] Removed old contact cast if indicated and wash extremity with soap and water.  [x] Applied ordered dressing. Promogra, Aquacel Ag     Applied per Policy     Applied to Right extremity   [x] Allow cast to dry.  [x] Instructed patient to report to health care provider, including wound care center, any back pain, hip pain, or leg pain, numbness of toes, or any odor  coming from the cast.    [x] Instructed patient not to stick any foreign objects down into cast.    [x] Instructed patient to utilize assistive devices(crutches, cane or walker) as ordered    [x] Instructed patient to continue offloading as directed. Applied cast per  Guidelines    Electronically signed by Arnulfo Blizzard, RN on 11/4/2020 at 8:53 AM       Treatment Orders:  Protein rich diet (unless restricted by your physician); Multivitamin daily; Elevate legs when sitting, avoid standing for  long periods of time.     [facility] followup visit _____________Friday________________  (Please note your next appointment above and if you are unable to keep, kindly give a 24 hour notice. Thank you.)    If you experience any of the following, please call the [facility] during business hours:    * Increase in Pain  * Temperature over 101  * Increase in drainage from your wound  * Drainage with a foul odor  * Bleeding  * Increase in swelling  * Need for compression bandage changes due to slippage, breakthrough drainage. If you need medical attention outside of the business hours of the [facility] please contact your PCP or go to the nearest emergency room. Electronically signed by Erin Lopez MD on 11/4/2020 at 8:55 AM        Treatment Note please see attached Discharge Instructions    In my professional opinion this patient would benefit from HBO Therapy: No    Written patient dismissal instructions given to patient and signed by patient or POA. Discharge [address] and Hyperbaric Oxygen Therapy   Physician Orders and Discharge Instructions  [address]  Telephone: [phone]    NAME:  Xiomara Davenport OF BIRTH:  1965  MEDICAL RECORD NUMBER:  [id number]  DATE:  11/4/2020    Discharge condition: Stable    Discharge to: Home    Left via:Private automobile    Accompanied by: Self     Dressing Orders: Right Plantar: Apply felt/foam to right foot  Wash with soap and water. Apply saline moistened gauze to wound bed, Cover with dry gauze. Secure with roll gauze and medipore tape. Change daily . Repeat Labs today in [address]     Treatment Orders:  Protein rich diet (unless restricted by your physician); Multivitamin daily; Elevate legs when sitting, avoid standing for  long periods of time. [facility] followup visit _____________1 week________________  (Please note your next appointment above and if you are unable to keep, kindly give a 24 hour notice.  Thank you.)    If you experience any of the following, please call the [address] [facility] during business hours:    * Increase in Pain  * Temperature over 101  * Increase in drainage from your wound  * Drainage with a foul odor  * Bleeding  * Increase in swelling  * Need for compression bandage changes due to slippage, breakthrough drainage. If you need medical attention outside of the business hours of the [address] [facility] please contact your PCP or go to the nearest emergency room.         Electronically signed by Joey Espinoza MD on 11/4/2020 at 8:54 AM

## 2020-11-06 ENCOUNTER — HOSPITAL ENCOUNTER (OUTPATIENT)
Dept: WOUND CARE | Age: 55
Discharge: HOME | End: 2020-11-06
Payer: MEDICARE

## 2020-11-06 VITALS
RESPIRATION RATE: 18 BRPM | DIASTOLIC BLOOD PRESSURE: 94 MMHG | TEMPERATURE: 97.9 F | SYSTOLIC BLOOD PRESSURE: 167 MMHG | HEIGHT: 70 IN | HEART RATE: 85 BPM | WEIGHT: 260 LBS | BODY MASS INDEX: 37.22 KG/M2

## 2020-11-06 PROCEDURE — 29445 APPL RIGID TOT CNTC LEG CAST: CPT

## 2020-11-06 PROCEDURE — 29445 APPL RIGID TOT CNTC LEG CAST: CPT | Performed by: SURGERY

## 2020-11-06 ASSESSMENT — PAIN SCALES - GENERAL: PAINLEVEL_OUTOF10: 0

## 2020-11-06 NOTE — PROGRESS NOTES
[address]   Progress Note and Procedure Note      [address] RECORD NUMBER:  [id number]  AGE: 60 y.o. GENDER: male  : 1965  EPISODE DATE:  2020    Subjective:     Chief Complaint   Patient presents with    Wound Check     Right Foot wound; Patient denies increased pain at wound site         HISTORY of PRESENT ILLNESS HPI     Konrad Randle is a 60 y.o. male who presents today for wound/ulcer evaluation.    History of Wound Context: Pt with R foot wound here for eval/treat  Wound/Ulcer Pain Timing/Severity: none  Quality of pain: N/A  Severity:  0 / 10   Modifying Factors: None  Associated Signs/Symptoms: none    Ulcer Identification:  Ulcer Type: diabetic and pressure  Contributing Factors: diabetes and chronic pressure    Wound: DM        PAST MEDICAL HISTORY        Diagnosis Date    Allergic rhinitis, cause unspecified     Atrial fibrillation, chronic (HCC)     sees [facility] cardiology    Charcot's joint of right foot     Chicken pox     Closed supracondylar fracture of elbow     fall    History of MRSA infection     scalp/facial and on back    HTN (hypertension)     Hyperlipidemia     Impotence of organic origin     MDRO (multiple drug resistant organisms) resistance     Other dyspnea and respiratory abnormality     Other specified erythematous condition(695.89)     Personal history of other infectious and parasitic disease     Type II or unspecified type diabetes mellitus without mention of complication, uncontrolled        PAST SURGICAL HISTORY    Past Surgical History:   Procedure Laterality Date    CARDIOVERSION  2017    COLONOSCOPY  2020    Dr Lazarus Kempf Jones-Melanosis coli throughout the entire colon-AP, 5 yr recall    HUMERUS FRACTURE SURGERY Left 2020    OPEN REDUCTION INTERNAL FIXATION LEFT SUPRACONDYLAR FRACTURE performed by Naeem Frye MD at [address]      X 2    TONSILLECTOMY FAMILY HISTORY    Family History   Problem Relation Age of Onset    Stroke Maternal Grandmother     Cancer Maternal Grandmother     Stroke Paternal Grandmother     Diabetes Paternal Grandmother     Cancer Father     Heart Disease Father     Lung Cancer Father     Asthma Paternal Grandfather     Heart Disease Paternal Grandfather     Heart Disease Maternal Grandfather     Colon Cancer Neg Hx     Colon Polyps Neg Hx     Esophageal Cancer Neg Hx     Liver Cancer Neg Hx     Liver Disease Neg Hx     Rectal Cancer Neg Hx     Stomach Cancer Neg Hx        SOCIAL HISTORY    Social History     Tobacco Use    Smoking status: Never Smoker    Smokeless tobacco: Never Used   Substance Use Topics    Alcohol use: No    Drug use: No       ALLERGIES    Allergies   Allergen Reactions    Pcn [Penicillins]     Neomycin-Bacitracin Zn-Polymyx Rash    Neosporin [Neomycin-Polymyxin-Gramicidin] Rash       MEDICATIONS    Current Outpatient Medications on File Prior to Encounter   Medication Sig Dispense Refill    VITAMIN D PO Take 1 tablet by mouth daily      HYDROcodone-acetaminophen (NORCO) 5-325 MG per tablet Take 1 tablet by mouth every 6 hours as needed for Pain.       docusate sodium (COLACE) 250 MG capsule Take 1 capsule by mouth 2 times daily 60 capsule 0    Insulin Degludec (TRESIBA) 100 UNIT/ML SOLN Inject 80 Units/day into the skin daily       losartan (COZAAR) 100 MG tablet TAKE ONE TABLET DAILY (Patient taking differently: Take 100 mg by mouth daily ) 30 tablet 3    atorvastatin (LIPITOR) 20 MG tablet TAKE ONE TABLET DAILY (Patient taking differently: Take 20 mg by mouth daily ) 90 tablet 3    tamsulosin (FLOMAX) 0.4 MG capsule TAKE TWO CAPSULES DAILY (Patient taking differently: Take 0.8 mg by mouth daily TAKE TWO CAPSULES DAILY) 60 capsule 11    BD INSULIN SYRINGE U/F 31G X 5/16\" 1 ML MISC USE FOUR TIMES DAILY 100 each 3    warfarin (COUMADIN) 10 MG tablet Take 1 tablet by mouth daily 45 tablet 5    glimepiride (AMARYL) 4 MG tablet TAKE ONE TABLET EVERY MORNING BEFORE BREAKFAST (Patient taking differently: Take 4 mg by mouth every morning (before breakfast) ) 90 tablet 3    warfarin (COUMADIN) 5 MG tablet TAKE AS DIRECTED BY YOUR DOCTOR (Patient taking differently: Take by mouth daily TAKE AS DIRECTED BY YOUR DOCTOR-keeps 5mg on hand if dose varies) 30 tablet 5    diphenhydrAMINE (BENADRYL) 25 MG tablet Take 25 mg by mouth every 6 hours as needed for Itching      metoprolol tartrate (LOPRESSOR) 25 MG tablet TAKE ONE TABLET TWICE DAILY (Patient taking differently: Take 25 mg by mouth 2 times daily ) 60 tablet 5    blood glucose test strips (CONTOUR NEXT TEST) strip 1 each by In Vitro route 4 times daily As needed. 200 each 3    Lancets MISC 1 each by Does not apply route 4 times daily 300 each 1    B-D ULTRAFINE III SHORT PEN 31G X 8 MM MISC Inject 1 each as directed 3 times daily 100 each 5    furosemide (LASIX) 20 MG tablet Take 1 tablet by mouth daily as needed (edema) 30 tablet 1     No current facility-administered medications on file prior to encounter. REVIEW OF SYSTEMS    A comprehensive review of systems was negative.     Objective:      BP (!) 167/94   Pulse 85   Temp 97.9 °F (36.6 °C) (Temporal)   Resp 18   Ht 5' 10\" (1.778 m)   Wt 260 lb (117.9 kg)   BMI 37.31 kg/m²     Wt Readings from Last 3 Encounters:   11/06/20 260 lb (117.9 kg)   11/04/20 260 lb (117.9 kg)   10/28/20 260 lb (117.9 kg)       PHYSICAL EXAM    General Appearance: alert and oriented to person, place and time, well developed and well- nourished, in no acute distress  Skin: warm and dry, no rash or erythema  Head: normocephalic and atraumatic  Eyes: pupils equal, round, and reactive to light, extraocular eye movements intact, conjunctivae normal  ENT: tympanic membrane, external ear and ear canal normal bilaterally, nose without deformity, nasal mucosa and turbinates normal without polyps, lips teeth Hyperlipidemia E78.5    Fatigue R53.83    Paroxysmal atrial fibrillation (HCC) I48.0    Acalculous cholecystitis K81.9    Hyponatremia E87.1    Hypotension due to hypovolemia I95.89, E86.1    Type 2 diabetes mellitus with diabetic polyneuropathy, with long-term current use of insulin ([facility]) E11.42, Z79.4    Class 2 obesity due to excess calories in adult E66.09    Diabetic ulcer of right midfoot associated with type 2 diabetes mellitus, with fat layer exposed ([facility] [address] E11.621, L97.412    Displaced comminuted supracondylar fracture without intercondylar fracture of left humerus, initial encounter for closed fracture S42.422A    Left supracondylar humerus fracture, closed, initial encounter S39.56A    Charcot foot due to diabetes mellitus (HCC) E11.610    Non-compliance Z91.19       ALLERGIES    Allergies   Allergen Reactions    Pcn [Penicillins]     Neomycin-Bacitracin Zn-Polymyx Rash    Neosporin [Neomycin-Polymyxin-Gramicidin] Rash         Objective:      BP (!) 167/94   Pulse 85   Temp 97.9 °F (36.6 °C) (Temporal)   Resp 18   Ht 5' 10\" (1.778 m)   Wt 260 lb (117.9 kg)   BMI 37.31 kg/m²     Procedure Note:    After risks benefits were discussed with the patient and consent was obtained total contact casting was performed. The patients wound was prepared for casting. The total contact cast was applied according to the manufacture recommendations. After the allotted drying time was given, the patient cast walking boot was applied and the patient was allowed to be discharged from the office. Response to treatment:  Well tolerated by patient. Plan:     Plan for wound - Dress per physician order  Treatment:     Compression : No   Offloading : Yes   Dressing : TCC#2   Additional Therapy : 0     1. Discussed appropriate home care of this wound. Wound redressed.   2. Patient instructions were given in AVS.  3. Follow up: TCC#3 in 1 week     Discharge [address] [place] [address] Wound Care and Hyperbaric Oxygen Therapy   Physician Orders and Discharge Instructions  [address] [facility]  [place], [address]  Telephone: [phone]    NAME:  Ariane Benoit  YOB: 1965  MEDICAL RECORD NUMBER:  [id number]  DATE:  11/6/2020    Discharge condition: Stable    Discharge to: Home    Left via:Private automobile    Accompanied by: Self    Contact Cast    NAME:  Damon Calzada OF BIRTH:  1965  MEDICAL RECORD NUMBER:  [id number]  DATE:  11/6/2020    Goal:  Patient will maintain integrity of cast, avoid mobility hazards, and report complications that may occur (foul odor, pain, numbness, cracked cast).  [x] Removed old contact cast if indicated and wash extremity with soap and water.  [x] Applied ordered dressing.    Applied per [employer] to Right extremity   [x] Allow cast to dry.  [x] Instructed patient to report to health care provider, including wound care center, any back pain, hip pain, or leg pain, numbness of toes, or any odor  coming from the cast.    [x] Instructed patient not to stick any foreign objects down into cast.    [x] Instructed patient to utilize assistive devices(crutches, cane or walker) as ordered    [x] Instructed patient to continue offloading as directed. Applied cast per  Guidelines    Electronically signed by Prasanna Latham RN on 11/6/2020 at 10:36 AM      [facility] follow up visit ____________1 week_________________  (Please note your next appointment above and if you are unable to keep, kindly give a 24 hour notice. Thank you.)    If you experience any of the following, please call the [facility] [address] during business hours:    * Increase in Pain  * Temperature over 101  * Increase in drainage from your wound  * Drainage with a foul odor  * Bleeding  * Increase in swelling  * Need for compression bandage changes due to slippage, breakthrough drainage.     If you need medical

## 2020-11-12 ENCOUNTER — HOSPITAL ENCOUNTER (OUTPATIENT)
Dept: WOUND CARE | Age: 55
Discharge: HOME | End: 2020-11-12
Payer: MEDICARE

## 2020-11-12 ENCOUNTER — ANTI-COAG VISIT (OUTPATIENT)
Dept: CARDIOLOGY | Age: 55
End: 2020-11-12
Payer: MEDICARE

## 2020-11-12 VITALS
DIASTOLIC BLOOD PRESSURE: 95 MMHG | SYSTOLIC BLOOD PRESSURE: 160 MMHG | HEIGHT: 70 IN | TEMPERATURE: 97.7 F | HEART RATE: 75 BPM | BODY MASS INDEX: 37.22 KG/M2 | WEIGHT: 260 LBS | RESPIRATION RATE: 18 BRPM

## 2020-11-12 LAB
INTERNATIONAL NORMALIZATION RATIO, POC: 1.6
PROTHROMBIN TIME, POC: NORMAL

## 2020-11-12 PROCEDURE — 85610 PROTHROMBIN TIME: CPT | Performed by: CLINICAL NURSE SPECIALIST

## 2020-11-12 PROCEDURE — 29445 APPL RIGID TOT CNTC LEG CAST: CPT | Performed by: SURGERY

## 2020-11-12 PROCEDURE — 29445 APPL RIGID TOT CNTC LEG CAST: CPT

## 2020-11-12 ASSESSMENT — PAIN SCALES - GENERAL: PAINLEVEL_OUTOF10: 0

## 2020-11-12 NOTE — PROGRESS NOTES
Maternal Grandmother     Cancer Maternal Grandmother     Stroke Paternal Grandmother     Diabetes Paternal Grandmother     Cancer Father     Heart Disease Father     Lung Cancer Father     Asthma Paternal Grandfather     Heart Disease Paternal Grandfather     Heart Disease Maternal Grandfather     Colon Cancer Neg Hx     Colon Polyps Neg Hx     Esophageal Cancer Neg Hx     Liver Cancer Neg Hx     Liver Disease Neg Hx     Rectal Cancer Neg Hx     Stomach Cancer Neg Hx        SOCIAL HISTORY    Social History     Tobacco Use    Smoking status: Never Smoker    Smokeless tobacco: Never Used   Substance Use Topics    Alcohol use: No    Drug use: No       ALLERGIES    Allergies   Allergen Reactions    Pcn [Penicillins]     Neomycin-Bacitracin Zn-Polymyx Rash    Neosporin [Neomycin-Polymyxin-Gramicidin] Rash       MEDICATIONS    Current Outpatient Medications on File Prior to Encounter   Medication Sig Dispense Refill    Insulin Glargine (TOUJEO SOLOSTAR SC) Inject 80 Units into the skin daily      VITAMIN D PO Take 1 tablet by mouth daily      docusate sodium (COLACE) 250 MG capsule Take 1 capsule by mouth 2 times daily 60 capsule 0    losartan (COZAAR) 100 MG tablet TAKE ONE TABLET DAILY (Patient taking differently: Take 100 mg by mouth daily ) 30 tablet 3    atorvastatin (LIPITOR) 20 MG tablet TAKE ONE TABLET DAILY (Patient taking differently: Take 20 mg by mouth daily ) 90 tablet 3    tamsulosin (FLOMAX) 0.4 MG capsule TAKE TWO CAPSULES DAILY (Patient taking differently: Take 0.8 mg by mouth daily TAKE TWO CAPSULES DAILY) 60 capsule 11    warfarin (COUMADIN) 10 MG tablet Take 1 tablet by mouth daily 45 tablet 5    glimepiride (AMARYL) 4 MG tablet TAKE ONE TABLET EVERY MORNING BEFORE BREAKFAST (Patient taking differently: Take 4 mg by mouth every morning (before breakfast) ) 90 tablet 3    metoprolol tartrate (LOPRESSOR) 25 MG tablet TAKE ONE TABLET TWICE DAILY (Patient taking differently: Take 25 mg by mouth 2 times daily ) 60 tablet 5    HYDROcodone-acetaminophen (NORCO) 5-325 MG per tablet Take 1 tablet by mouth every 6 hours as needed for Pain.  furosemide (LASIX) 20 MG tablet Take 1 tablet by mouth daily as needed (edema) 30 tablet 1    BD INSULIN SYRINGE U/F 31G X 5/16\" 1 ML MISC USE FOUR TIMES DAILY 100 each 3    warfarin (COUMADIN) 5 MG tablet TAKE AS DIRECTED BY YOUR DOCTOR (Patient taking differently: Take by mouth daily TAKE AS DIRECTED BY YOUR DOCTOR-keeps 5mg on hand if dose varies) 30 tablet 5    diphenhydrAMINE (BENADRYL) 25 MG tablet Take 25 mg by mouth every 6 hours as needed for Itching      blood glucose test strips (CONTOUR NEXT TEST) strip 1 each by In Vitro route 4 times daily As needed. 200 each 3    Lancets MISC 1 each by Does not apply route 4 times daily 300 each 1    B-D ULTRAFINE III SHORT PEN 31G X 8 MM MISC Inject 1 each as directed 3 times daily 100 each 5     No current facility-administered medications on file prior to encounter. REVIEW OF SYSTEMS    A comprehensive review of systems was negative.     Objective:      BP (!) 160/95   Pulse 75   Temp 97.7 °F (36.5 °C) (Temporal)   Resp 18   Ht 5' 10\" (1.778 m)   Wt 260 lb (117.9 kg)   BMI 37.31 kg/m²     Wt Readings from Last 3 Encounters:   11/12/20 260 lb (117.9 kg)   11/06/20 260 lb (117.9 kg)   11/04/20 260 lb (117.9 kg)       PHYSICAL EXAM    General Appearance: alert and oriented to person, place and time, well developed and well- nourished, in no acute distress  Skin: warm and dry, no rash or erythema  Head: normocephalic and atraumatic  Eyes: pupils equal, round, and reactive to light, extraocular eye movements intact, conjunctivae normal  ENT: tympanic membrane, external ear and ear canal normal bilaterally, nose without deformity, nasal mucosa and turbinates normal without polyps, lips teeth and gums normal  Neck: supple and non-tender without mass, no thyromegaly or thyroid nodules, no cervical lymphadenopathy  Pulmonary/Chest: clear to auscultation bilaterally- no wheezes, rales or rhonchi, normal air movement, no respiratory distress  Cardiovascular: normal rate, regular rhythm, normal S1 and S2, no murmurs, rubs, clicks, or gallops, distal pulses intact, no carotid bruits  Abdomen: soft, non-tender, non-distended, normal bowel sounds, no masses or organomegaly  Extremities: no cyanosis, clubbing or edema  Musculoskeletal: normal range of motion, no joint swelling, deformity or tenderness  Neurologic: reflexes normal and symmetric, no cranial nerve deficit, gait, coordination and speech normal, sensation of skin normal      Assessment:      Patient Active Problem List   Diagnosis Code    Shortness of breath R06.02    HTN (hypertension) I10    Hyperlipidemia E78.5    Fatigue R53.83    Paroxysmal atrial fibrillation (HCC) I48.0    Acalculous cholecystitis K81.9    Hyponatremia E87.1    Hypotension due to hypovolemia I95.89, E86.1    Type 2 diabetes mellitus with diabetic polyneuropathy, with long-term current use of insulin ([facility]) E11.42, Z79.4    Class 2 obesity due to excess calories in adult E66.09    Diabetic ulcer of right midfoot associated with type 2 diabetes mellitus, with fat layer exposed ([address] E11.621, L97.412    Displaced comminuted supracondylar fracture without intercondylar fracture of left humerus, initial encounter for closed fracture S42.422A    Left supracondylar humerus fracture, closed, initial encounter S39.56A    Charcot foot due to diabetes mellitus ([facility] [address] E11.610    Non-compliance Z91.19             Wound 09/14/20 Foot Right;Plantar WOUND 1 RIGHT PLANTAR (Active)   Wound Image     11/12/20 0810   Wound Etiology Diabetic Kat 1 11/12/20 0810   Dressing Status Dry 11/12/20 0810   Wound Cleansed Soap and water 11/06/20 0957   Dressing/Treatment [place] with Ag 11/06/20 1038   Offloading for Diabetic Foot Ulcers Total contact cast 11/12/20 0810   Wound Length force    Patient Active Problem List   Diagnosis Code    Shortness of breath R06.02    HTN (hypertension) I10    Hyperlipidemia E78.5    Fatigue R53.83    Paroxysmal atrial fibrillation (HCC) I48.0    Acalculous cholecystitis K81.9    Hyponatremia E87.1    Hypotension due to hypovolemia I95.89, E86.1    Type 2 diabetes mellitus with diabetic polyneuropathy, with long-term current use of insulin ([facility]) E11.42, Z79.4    Class 2 obesity due to excess calories in adult E66.09    Diabetic ulcer of right midfoot associated with type 2 diabetes mellitus, with fat layer exposed ([facility] [address] E11.621, L97.412    Displaced comminuted supracondylar fracture without intercondylar fracture of left humerus, initial encounter for closed fracture S42.422A    Left supracondylar humerus fracture, closed, initial encounter S39.56A    Charcot foot due to diabetes mellitus (HCC) E11.610    Non-compliance Z91.19       ALLERGIES    Allergies   Allergen Reactions    Pcn [Penicillins]     Neomycin-Bacitracin Zn-Polymyx Rash    Neosporin [Neomycin-Polymyxin-Gramicidin] Rash         Objective:      BP (!) 160/95   Pulse 75   Temp 97.7 °F (36.5 °C) (Temporal)   Resp 18   Ht 5' 10\" (1.778 m)   Wt 260 lb (117.9 kg)   BMI 37.31 kg/m²     Procedure Note:    After risks benefits were discussed with the patient and consent was obtained total contact casting was performed. The patients wound was prepared for casting. The total contact cast was applied according to the manufacture recommendations. After the allotted drying time was given, the patient cast walking boot was applied and the patient was allowed to be discharged from the office. Response to treatment:  Well tolerated by patient. Plan:     Plan for wound - Dress per physician order  Treatment:     Compression : No   Offloading : Yes   Dressing : TCC#3   Additional Therapy : 0     1. Discussed appropriate home care of this wound. Wound redressed.   2. Patient odor  * Bleeding  * Increase in swelling  * Need for compression bandage changes due to slippage, breakthrough drainage. If you need medical attention outside of the business hours of the [address] [place] [address] Road please contact your PCP or go to the nearest emergency room. Electronically signed by Kirill Ross MD on 11/12/2020 at 8:47 AM        Treatment Note please see attached Discharge Instructions    In my professional opinion this patient would benefit from HBO Therapy: No    Written patient dismissal instructions given to patient and signed by patient or POA. Discharge [address] and Hyperbaric Oxygen Therapy   Physician Orders and Discharge Instructions  [address] [address]  Telephone: [phone]    NAME:  Ling Gibbs OF BIRTH:  1965  MEDICAL RECORD NUMBER:  [id number]  DATE:  11/12/2020    Discharge condition: Stable    Discharge to: Home    Left via:Private automobile    Accompanied by: Self    Right Plantar Foot Wound      Contact Cast    NAME:  Marjorie Perla  YOB: 1965  MEDICAL RECORD NUMBER:  [id number]  DATE:  11/12/2020    Goal:  Patient will maintain integrity of cast, avoid mobility hazards, and report complications that may occur (foul odor, pain, numbness, cracked cast).  [x] Removed old contact cast if indicated and wash extremity with soap and water.  [x] Applied ordered dressing.    Applied per [employer] to Right extremity   [x] Allow cast to dry.     [x] Instructed patient to report to health care provider, including wound care center, any back pain, hip pain, or leg pain, numbness of toes, or any odor coming from the cast.    [x] Instructed patient not to stick any foreign objects down into cast.    [x] Instructed patient to utilize assistive devices(crutches, cane or walker) as ordered    [x] Instructed patient to continue

## 2020-11-12 NOTE — PLAN OF CARE
Problem: Wound:  Goal: Will show signs of wound healing; wound closure and no evidence of infection  Description: Will show signs of wound healing; wound closure and no evidence of infection  11/12/2020 0840 by Alissa Purvis RN  Outcome: Ongoing  11/12/2020 0815 by Alissa Purvis RN  Outcome: Ongoing     Problem: Falls - Risk of:  Goal: Will remain free from falls  Description: Will remain free from falls  11/12/2020 0840 by Alissa Purvis RN  Outcome: Ongoing  11/12/2020 0815 by Alissa Purvis RN  Outcome: Ongoing     Problem: Blood Glucose:  Goal: Ability to maintain appropriate glucose levels will improve  Description: Ability to maintain appropriate glucose levels will improve  11/12/2020 0840 by Alissa Purvis RN  Outcome: Ongoing  11/12/2020 0815 by Alissa Purvis RN  Outcome: Ongoing

## 2020-11-19 ENCOUNTER — HOSPITAL ENCOUNTER (OUTPATIENT)
Dept: WOUND CARE | Age: 55
Discharge: HOME | End: 2020-11-19
Payer: MEDICARE

## 2020-11-19 VITALS — HEIGHT: 70 IN | BODY MASS INDEX: 37.22 KG/M2 | WEIGHT: 260 LBS

## 2020-11-19 PROCEDURE — 99212 OFFICE O/P EST SF 10 MIN: CPT

## 2020-11-19 PROCEDURE — 99212 OFFICE O/P EST SF 10 MIN: CPT | Performed by: SURGERY

## 2020-11-19 ASSESSMENT — PAIN SCALES - GENERAL: PAINLEVEL_OUTOF10: 0

## 2020-11-19 ASSESSMENT — PAIN DESCRIPTION - PROGRESSION: CLINICAL_PROGRESSION: NOT CHANGED

## 2020-11-19 NOTE — PROGRESS NOTES
[address]   Progress Note and Procedure Note      [address] RECORD NUMBER:  [id number]  AGE: 60 y.o. GENDER: male  : 1965  EPISODE DATE:  2020    Subjective:     Chief Complaint   Patient presents with    Wound Check     follow up         HISTORY of PRESENT ILLNESS HPI     Brazos Care is a 60 y.o. male who presents today for wound/ulcer evaluation.    History of Wound Context: Pt with R foot wound here for eval/treat  Wound/Ulcer Pain Timing/Severity: none  Quality of pain: N/A  Severity:  0 / 10   Modifying Factors: None  Associated Signs/Symptoms: none    Ulcer Identification:  Ulcer Type: diabetic and pressure  Contributing Factors: diabetes and chronic pressure    Wound: DM        PAST MEDICAL HISTORY        Diagnosis Date    Allergic rhinitis, cause unspecified     Atrial fibrillation, chronic (HCC)     sees [facility] cardiology    Charcot's joint of right foot     Chicken pox     Closed supracondylar fracture of elbow     fall    History of MRSA infection     scalp/facial and on back    HTN (hypertension)     Hyperlipidemia     Impotence of organic origin     MDRO (multiple drug resistant organisms) resistance     Other dyspnea and respiratory abnormality     Other specified erythematous condition(695.89)     Personal history of other infectious and parasitic disease     Type II or unspecified type diabetes mellitus without mention of complication, uncontrolled        PAST SURGICAL HISTORY    Past Surgical History:   Procedure Laterality Date    CARDIOVERSION  2017    COLONOSCOPY  2020    Dr Cipriano Fraire-Melanosis coli throughout the entire colon-AP, 5 yr recall    HUMERUS FRACTURE SURGERY Left 2020    OPEN REDUCTION INTERNAL FIXATION LEFT SUPRACONDYLAR FRACTURE performed by Christie Phillips [facility]    MALIGNANT SKIN LESION EXCISION      X 2    TONSILLECTOMY         FAMILY HISTORY    Family History   Problem Relation Age of Onset    Stroke Maternal Grandmother     Cancer Maternal Grandmother     Stroke Paternal Grandmother     Diabetes Paternal Grandmother     Cancer Father     Heart Disease Father     Lung Cancer Father     Asthma Paternal Grandfather     Heart Disease Paternal Grandfather     Heart Disease Maternal Grandfather     Colon Cancer Neg Hx     Colon Polyps Neg Hx     Esophageal Cancer Neg Hx     Liver Cancer Neg Hx     Liver Disease Neg Hx     Rectal Cancer Neg Hx     Stomach Cancer Neg Hx        SOCIAL HISTORY    Social History     Tobacco Use    Smoking status: Never Smoker    Smokeless tobacco: Never Used   Substance Use Topics    Alcohol use: No    Drug use: No       ALLERGIES    Allergies   Allergen Reactions    Pcn [Penicillins]     Neomycin-Bacitracin Zn-Polymyx Rash    Neosporin [Neomycin-Polymyxin-Gramicidin] Rash       MEDICATIONS    Current Outpatient Medications on File Prior to Encounter   Medication Sig Dispense Refill    metoprolol tartrate (LOPRESSOR) 25 MG tablet TAKE ONE TABLET TWICE DAILY 60 tablet 2    Insulin Glargine (TOUJEO SOLOSTAR SC) Inject 80 Units into the skin daily      VITAMIN D PO Take 1 tablet by mouth daily      HYDROcodone-acetaminophen (NORCO) 5-325 MG per tablet Take 1 tablet by mouth every 6 hours as needed for Pain.       docusate sodium (COLACE) 250 MG capsule Take 1 capsule by mouth 2 times daily 60 capsule 0    furosemide (LASIX) 20 MG tablet Take 1 tablet by mouth daily as needed (edema) 30 tablet 1    losartan (COZAAR) 100 MG tablet TAKE ONE TABLET DAILY (Patient taking differently: Take 100 mg by mouth daily ) 30 tablet 3    atorvastatin (LIPITOR) 20 MG tablet TAKE ONE TABLET DAILY (Patient taking differently: Take 20 mg by mouth daily ) 90 tablet 3    tamsulosin (FLOMAX) 0.4 MG capsule TAKE TWO CAPSULES DAILY (Patient taking differently: Take 0.8 mg by mouth daily TAKE TWO CAPSULES DAILY) 60 capsule 11    BD INSULIN SYRINGE U/F 31G X 5/16\" 1 ML MISC USE FOUR TIMES DAILY 100 each 3    warfarin (COUMADIN) 10 MG tablet Take 1 tablet by mouth daily 45 tablet 5    glimepiride (AMARYL) 4 MG tablet TAKE ONE TABLET EVERY MORNING BEFORE BREAKFAST (Patient taking differently: Take 4 mg by mouth every morning (before breakfast) ) 90 tablet 3    warfarin (COUMADIN) 5 MG tablet TAKE AS DIRECTED BY YOUR DOCTOR (Patient taking differently: Take by mouth daily TAKE AS DIRECTED BY YOUR DOCTOR-keeps 5mg on hand if dose varies) 30 tablet 5    diphenhydrAMINE (BENADRYL) 25 MG tablet Take 25 mg by mouth every 6 hours as needed for Itching      blood glucose test strips (CONTOUR NEXT TEST) strip 1 each by In Vitro route 4 times daily As needed. 200 each 3    Lancets MISC 1 each by Does not apply route 4 times daily 300 each 1    B-D ULTRAFINE III SHORT PEN 31G X 8 MM MISC Inject 1 each as directed 3 times daily 100 each 5     No current facility-administered medications on file prior to encounter. REVIEW OF SYSTEMS    A comprehensive review of systems was negative.     Objective:      Ht 5' 10\" (1.778 m)   Wt 260 lb (117.9 kg)   BMI 37.31 kg/m²     Wt Readings from Last 3 Encounters:   11/19/20 260 lb (117.9 kg)   11/12/20 260 lb (117.9 kg)   11/06/20 260 lb (117.9 kg)       PHYSICAL EXAM    General Appearance: alert and oriented to person, place and time, well developed and well- nourished, in no acute distress  Skin: warm and dry, no rash or erythema  Head: normocephalic and atraumatic  Eyes: pupils equal, round, and reactive to light, extraocular eye movements intact, conjunctivae normal  ENT: tympanic membrane, external ear and ear canal normal bilaterally, nose without deformity, nasal mucosa and turbinates normal without polyps, lips teeth and gums normal  Neck: supple and non-tender without mass, no thyromegaly or thyroid nodules, no cervical lymphadenopathy  Pulmonary/Chest: clear to auscultation bilaterally- no wheezes, rales or rhonchi, normal air movement, no respiratory distress  Cardiovascular: normal rate, regular rhythm, normal S1 and S2, no murmurs, rubs, clicks, or gallops, distal pulses intact, no carotid bruits  Abdomen: soft, non-tender, non-distended, normal bowel sounds, no masses or organomegaly  Extremities: no cyanosis, clubbing or edema  Musculoskeletal: normal range of motion, no joint swelling, deformity or tenderness  Neurologic: reflexes normal and symmetric, no cranial nerve deficit, gait, coordination and speech normal, sensation of skin normal      Assessment:      Patient Active Problem List   Diagnosis Code    Shortness of breath R06.02    HTN (hypertension) I10    Hyperlipidemia E78.5    Fatigue R53.83    Paroxysmal atrial fibrillation (HCC) I48.0    Acalculous cholecystitis K81.9    Hyponatremia E87.1    Hypotension due to hypovolemia I95.89, E86.1    Type 2 diabetes mellitus with diabetic polyneuropathy, with long-term current use of insulin ([facility]) E11.42, Z79.4    Class 2 obesity due to excess calories in adult E66.09    Diabetic ulcer of right midfoot associated with type 2 diabetes mellitus, with fat layer exposed ([facility] [address] E11.621, L97.412    Displaced comminuted supracondylar fracture without intercondylar fracture of left humerus, initial encounter for closed fracture S42.422A    Left supracondylar humerus fracture, closed, initial encounter S39.56A    Charcot foot due to diabetes mellitus ([facility] [address] E11.610    Non-compliance Z91.19             Wound 09/14/20 Foot Right;Plantar WOUND 1 RIGHT PLANTAR (Active)   Wound Image   11/19/20 0934   Wound Etiology Diabetic Kat 1 11/19/20 0934   Dressing Status Dry 11/19/20 0934   Wound Cleansed Cleansed with saline 11/19/20 0934   Dressing/Treatment Alginate with Ag 11/06/20 1038   Offloading for Diabetic Foot Ulcers Total contact cast 11/12/20 0810   Wound Length (cm) 0 cm 11/19/20 0934   Wound Width (cm) 0 cm 11/19/20 0934   Wound Depth (cm) 0 cm 11/19/20 0934   Wound Surface Area (cm^2) 0 cm^2 11/19/20 0934   Change in Wound Size % (l*w) 100 11/19/20 0934   Wound Volume (cm^3) 0 cm^3 11/19/20 0934   Wound Healing % 100 11/19/20 0934   Post-Procedure Length (cm) 0.3 cm 11/06/20 1032   Post-Procedure Width (cm) 0.5 cm 11/06/20 1032   Post-Procedure Depth (cm) 0.2 cm 11/06/20 1032   Post-Procedure Surface Area (cm^2) 0.15 cm^2 11/06/20 1032   Post-Procedure Volume (cm^3) 0.03 cm^3 11/06/20 1032   Distance Tunneling (cm) 0 cm 11/19/20 0934   Tunneling Position ___ O'Clock 0 11/19/20 0934   Undermining Starts ___ O'Clock 0 11/19/20 0934   Undermining Ends___ O'Clock 0 11/19/20 0934   Undermining Maxium Distance (cm) 0 11/19/20 0934   Wound Assessment Dry 11/19/20 0934   Drainage Amount None 11/19/20 0934   Drainage Description Serosanguinous 11/06/20 0957   Odor None 11/19/20 0934   Yolis-wound Assessment Hyperkeratosis (callous) 11/12/20 0810   Margins Attached edges 11/06/20 0957   Wound Thickness Description not for Pressure Injury Full thickness 11/06/20 0957   Number of days: 65             Plan:     Problem List Items Addressed This Visit     * (Principal) Diabetic ulcer of right midfoot associated with type 2 diabetes mellitus, with fat layer exposed ([address] (Chronic)        Wound healed !!  Script for shoes given. RTO 3 weeks for eval      Treatment Note please see attached Discharge Instructions    In my professional opinion this patient would benefit from HBO Therapy: No    Written patient dismissal instructions given to patient and signed by patient or POA.          Discharge [address] and Hyperbaric Oxygen Therapy   Physician Orders and Discharge Instructions  [address] [facility] [address]  Telephone: [phone]    NAME:  Damon Calzada OF BIRTH:  1965  MEDICAL RECORD NUMBER:  [id number]  DATE:  11/19/2020    Discharge condition: Stable    Discharge to: Home    Left via:Private automobile    Accompanied by: Self    Dressing Orders  Right Plantar Foot Wound-Healed, Moisturize and Protect    Diabetic Foot Care    Diabetes can lead to many different types of foot complications, including athlete's foot (a fungal infection), calluses, bunions and other foot deformities, or ulcers that can range from a surface wound to a deep infection. You will need to check your feet twice daily and give them special care and attention. 1) Wash with lukewarm water and a mild soap. Dry well between your toes. Do not soak your feet unless instructed to do so by a physician. Moisturize your feet with a good thick cream like Eucerin Cream, Aquaphor or Cocoa Butter (in a jar) at least twice daily. Do not apply moisturizer between toes. 2)  Protect your feet and never go barefoot. Wear slippers around the house. Treat  even minor wounds and skin cracks as an urgent matter when you have diabetes. Remember early treatment is essential to avoid more advanced problems. 3) Check your feet daily or have someone else check them if your eyesight is limited. If you live alone try using a handheld mirror. Watch for a red spot that persists or callus formation. 4)  Look and feel inside your shoes before putting them on. Inspect the soles for nails or screws. 5)  If you form callus or thickened skin on your feet use Flexitol Heel Balm once daily alternating with your regular moisturizer. Hernan Quinteromillieana maria is available at [employer] ($12) and other pharmacies. 6) If you form callus this is  a sign that this area is getting too much pressure or rubbing in your shoe. You may use a pumice stone gently to any callus after a shower 3 times weekly. Be very careful as you will not feel it if you rub too hard. 7) Try wearing two pairs of white cotton socks. Wear a thin pair inside out so the seam is not on your toes. Wear the other pair over the thin pair.   This will sometimes stop the friction and rubbing. 8)  If you get a callus you will need your inserts or your shoe adjusted by the orthotist where your shoes were made. Take them back immediately, do not wait. It is best to get your shoes from a company that specializes in [facility] [employer]. These companies have an Orthotist on staff who can work on your shoes when needed. Just because they are Diabetic shoes does not mean they will be perfect when you get them. They may need to be stretched or need the insoles reworked and you need a company that offers this (on site) after the sale of your shoe. 9) Callus is never a good thing on a diabetic or neuropathic foot. This is where wounds come from. The callus can dry and crack letting bacteria enter your foot leading to an infection of the area and ulceration. 10) Diabetics should have new shoes yearly. (This prescription can be obtained from your primary care physician who does your foot exams)  You will also get 3 pairs of insoles with your shoes. As your feet change through the course of the year you may need to get your insoles or even your shoe adjusted by the orthotist.  Please take advantage of their expertise and keep your footwear in good shape. Break in new shoes slowly, just a few hours a day. Watch for red spots, areas that rub or callus. 11) As a diabetic you also need to see a Podiatrist every two months for a toenail trim. Your insurance should pay for this. Avoid trimming your toenails yourself. If you must, they should be trimmed straight across. 12)  Keeping your blood sugar in control will help prevent future ulcerations. If you smoke, STOP. Diabetes and smoking are a bad combination that increases your risk of complications from Diabetes.      13)  Avoid activities that can injure the feet -- Some activities increase the risk of foot injury and are not recommended, including walking barefoot, using a heating pad or hot water bottle on the feet, and stepping into the bathtub before testing the temperature. 14)  Ask for foot exams -- Screening for foot complications should be a routine part of most medical visits but is sometimes overlooked. Don't hesitate to ask the health care provider for a foot check at least once a year and more frequently if there are foot changes. [address] [place] [address],[address] follow up visit _____________3 weeks______________  (Please note your next appointment above and if you are unable to keep, kindly give a 24 hour notice. Thank you.)    If you experience any of the following, please call the [address] during business hours:    * Increase in Pain  * Temperature over 101  * Increase in drainage from your wound  * Drainage with a foul odor  * Bleeding  * Increase in swelling  * Need for compression bandage changes due to slippage, breakthrough drainage. If you need medical attention outside of the business hours of the [address] please contact your PCP or go to the nearest emergency room.         Electronically signed by Chalino Castelan MD on 11/19/2020 at 10:00 AM

## 2020-11-20 ENCOUNTER — ANTI-COAG VISIT (OUTPATIENT)
Dept: CARDIOLOGY | Age: 55
End: 2020-11-20
Payer: MEDICARE

## 2020-11-20 LAB
INTERNATIONAL NORMALIZATION RATIO, POC: 2.1
PROTHROMBIN TIME, POC: NORMAL

## 2020-11-20 PROCEDURE — 85610 PROTHROMBIN TIME: CPT | Performed by: NURSE PRACTITIONER

## 2020-12-03 DIAGNOSIS — Z79.4 TYPE 2 DIABETES MELLITUS WITH DIABETIC POLYNEUROPATHY, WITH LONG-TERM CURRENT USE OF INSULIN (HCC): ICD-10-CM

## 2020-12-03 DIAGNOSIS — I48.0 PAROXYSMAL ATRIAL FIBRILLATION (HCC): ICD-10-CM

## 2020-12-03 DIAGNOSIS — I10 ESSENTIAL HYPERTENSION: ICD-10-CM

## 2020-12-03 DIAGNOSIS — E11.42 TYPE 2 DIABETES MELLITUS WITH DIABETIC POLYNEUROPATHY, WITH LONG-TERM CURRENT USE OF INSULIN (HCC): ICD-10-CM

## 2020-12-03 DIAGNOSIS — E78.2 MIXED HYPERLIPIDEMIA: ICD-10-CM

## 2020-12-03 DIAGNOSIS — I10 HYPERTENSION, UNSPECIFIED TYPE: ICD-10-CM

## 2020-12-03 LAB
ALBUMIN SERPL-MCNC: 4.1 G/DL (ref 3.5–5.2)
ALP BLD-CCNC: 157 U/L (ref 40–130)
ALT SERPL-CCNC: 31 U/L (ref 5–41)
ANION GAP SERPL CALCULATED.3IONS-SCNC: 14 MMOL/L (ref 7–19)
AST SERPL-CCNC: 19 U/L (ref 5–40)
BASOPHILS ABSOLUTE: 0.1 K/UL (ref 0–0.2)
BASOPHILS RELATIVE PERCENT: 1 % (ref 0–1)
BILIRUB SERPL-MCNC: 1.1 MG/DL (ref 0.2–1.2)
BUN BLDV-MCNC: 12 MG/DL (ref 6–20)
CALCIUM SERPL-MCNC: 9.6 MG/DL (ref 8.6–10)
CHLORIDE BLD-SCNC: 100 MMOL/L (ref 98–111)
CHOLESTEROL, TOTAL: 148 MG/DL (ref 160–199)
CO2: 22 MMOL/L (ref 22–29)
CREAT SERPL-MCNC: 0.8 MG/DL (ref 0.5–1.2)
EOSINOPHILS ABSOLUTE: 0.2 K/UL (ref 0–0.6)
EOSINOPHILS RELATIVE PERCENT: 2.1 % (ref 0–5)
GFR AFRICAN AMERICAN: >59
GFR NON-AFRICAN AMERICAN: >60
GLUCOSE BLD-MCNC: 255 MG/DL (ref 74–109)
HBA1C MFR BLD: 11.4 % (ref 4–6)
HCT VFR BLD CALC: 45 % (ref 42–52)
HDLC SERPL-MCNC: 49 MG/DL (ref 55–121)
HEMOGLOBIN: 15.2 G/DL (ref 14–18)
IMMATURE GRANULOCYTES #: 0 K/UL
LDL CHOLESTEROL CALCULATED: 86 MG/DL
LYMPHOCYTES ABSOLUTE: 1.6 K/UL (ref 1.1–4.5)
LYMPHOCYTES RELATIVE PERCENT: 17.5 % (ref 20–40)
MCH RBC QN AUTO: 30.8 PG (ref 27–31)
MCHC RBC AUTO-ENTMCNC: 33.8 G/DL (ref 33–37)
MCV RBC AUTO: 91.1 FL (ref 80–94)
MONOCYTES ABSOLUTE: 0.8 K/UL (ref 0–0.9)
MONOCYTES RELATIVE PERCENT: 8.6 % (ref 0–10)
NEUTROPHILS ABSOLUTE: 6.4 K/UL (ref 1.5–7.5)
NEUTROPHILS RELATIVE PERCENT: 70.6 % (ref 50–65)
PDW BLD-RTO: 13.1 % (ref 11.5–14.5)
PLATELET # BLD: 274 K/UL (ref 130–400)
PMV BLD AUTO: 11.9 FL (ref 9.4–12.4)
POTASSIUM SERPL-SCNC: 4.1 MMOL/L (ref 3.5–5)
RBC # BLD: 4.94 M/UL (ref 4.7–6.1)
SODIUM BLD-SCNC: 136 MMOL/L (ref 136–145)
T4 FREE: 1.17 NG/DL (ref 0.93–1.7)
TOTAL PROTEIN: 7.7 G/DL (ref 6.6–8.7)
TRIGL SERPL-MCNC: 64 MG/DL (ref 0–149)
TSH SERPL DL<=0.05 MIU/L-ACNC: 2.29 UIU/ML (ref 0.27–4.2)
WBC # BLD: 9 K/UL (ref 4.8–10.8)

## 2020-12-10 ENCOUNTER — HOSPITAL ENCOUNTER (OUTPATIENT)
Dept: WOUND CARE | Age: 55
Discharge: HOME | End: 2020-12-10
Payer: MEDICARE

## 2020-12-10 ENCOUNTER — TELEPHONE (OUTPATIENT)
Dept: CARDIOLOGY | Age: 55
End: 2020-12-10

## 2020-12-10 VITALS
WEIGHT: 260 LBS | HEIGHT: 70 IN | SYSTOLIC BLOOD PRESSURE: 175 MMHG | RESPIRATION RATE: 18 BRPM | DIASTOLIC BLOOD PRESSURE: 102 MMHG | HEART RATE: 72 BPM | BODY MASS INDEX: 37.22 KG/M2 | TEMPERATURE: 98 F

## 2020-12-10 PROBLEM — I95.89 HYPOTENSION DUE TO HYPOVOLEMIA: Status: RESOLVED | Noted: 2019-03-21 | Resolved: 2020-12-10

## 2020-12-10 PROBLEM — E86.1 HYPOTENSION DUE TO HYPOVOLEMIA: Status: RESOLVED | Noted: 2019-03-21 | Resolved: 2020-12-10

## 2020-12-10 PROCEDURE — 11042 DBRDMT SUBQ TIS 1ST 20SQCM/<: CPT | Performed by: SURGERY

## 2020-12-10 PROCEDURE — 11042 DBRDMT SUBQ TIS 1ST 20SQCM/<: CPT

## 2020-12-10 PROCEDURE — 97597 DBRDMT OPN WND 1ST 20 CM/<: CPT

## 2020-12-10 RX ORDER — INSULIN DEGLUDEC 200 U/ML
84 INJECTION, SOLUTION SUBCUTANEOUS DAILY
Status: ON HOLD | COMMUNITY
Start: 2020-10-01 | End: 2021-04-19 | Stop reason: HOSPADM

## 2020-12-10 ASSESSMENT — PAIN DESCRIPTION - PROGRESSION: CLINICAL_PROGRESSION: GRADUALLY IMPROVING

## 2020-12-10 ASSESSMENT — PAIN SCALES - GENERAL: PAINLEVEL_OUTOF10: 0

## 2020-12-10 NOTE — PLAN OF CARE
Problem: Wound:  Goal: Will show signs of wound healing; wound closure and no evidence of infection  Description: Will show signs of wound healing; wound closure and no evidence of infection  12/10/2020 1009 by Lynne Wagoner RN  Outcome: Ongoing  12/10/2020 0956 by Emma Boone RN  Outcome: Ongoing     Problem: Falls - Risk of:  Goal: Will remain free from falls  Description: Will remain free from falls  12/10/2020 1009 by Lynne Wagoner RN  Outcome: Ongoing  12/10/2020 0956 by Emma Boone RN  Outcome: Ongoing     Problem: Blood Glucose:  Goal: Ability to maintain appropriate glucose levels will improve  Description: Ability to maintain appropriate glucose levels will improve  12/10/2020 1009 by Lynne Wagoner RN  Outcome: Ongoing  12/10/2020 0956 by Emma Boone RN  Outcome: Ongoing

## 2020-12-10 NOTE — PROGRESS NOTES
[address]   Progress Note and Procedure Note      [address] RECORD NUMBER:  [id number]  AGE: 60 y.o. GENDER: male  : 1965  EPISODE DATE:  12/10/2020    Subjective:     Chief Complaint   Patient presents with    Wound Check     follow up         HISTORY of PRESENT ILLNESS HPI     Citrus Care is a 60 y.o. male who presents today for wound/ulcer evaluation. Wound Context: Pt with R plantar wound here for eval/treat.   Wound/Ulcer Pain Timing/Severity: none  Quality of pain: N/A  Severity:  0 / 10   Modifying Factors: None  Associated Signs/Symptoms: none    Ulcer Identification:  Ulcer Type: diabetic and pressure  Contributing Factors: diabetes, chronic pressure and shear force    Wound: DM        PAST MEDICAL HISTORY        Diagnosis Date    Allergic rhinitis, cause unspecified     Atrial fibrillation, chronic (HCC)     sees [facility] cardiology    Charcot's joint of right foot     Chicken pox     Closed supracondylar fracture of elbow     fall    History of MRSA infection     scalp/facial and on back    HTN (hypertension)     Hyperlipidemia     Impotence of organic origin     MDRO (multiple drug resistant organisms) resistance     Other dyspnea and respiratory abnormality     Other specified erythematous condition(695.89)     Personal history of other infectious and parasitic disease     Type II or unspecified type diabetes mellitus without mention of complication, uncontrolled        PAST SURGICAL HISTORY    Past Surgical History:   Procedure Laterality Date    CARDIOVERSION  2017    COLONOSCOPY  2020    Dr Cipriano Fraire-Melanosis coli throughout the entire colon-AP, 5 yr recall    HUMERUS FRACTURE SURGERY Left 2020    OPEN REDUCTION INTERNAL FIXATION LEFT SUPRACONDYLAR FRACTURE performed by Christie Phillips MD at [facility] MALIGNANT SKIN LESION EXCISION      X 2    TONSILLECTOMY         FAMILY HISTORY    Family History   Problem Relation Age of Onset    Stroke Maternal Grandmother     Cancer Maternal Grandmother     Stroke Paternal Grandmother     Diabetes Paternal Grandmother     Cancer Father     Heart Disease Father     Lung Cancer Father     Asthma Paternal Grandfather     Heart Disease Paternal Grandfather     Heart Disease Maternal Grandfather     Colon Cancer Neg Hx     Colon Polyps Neg Hx     Esophageal Cancer Neg Hx     Liver Cancer Neg Hx     Liver Disease Neg Hx     Rectal Cancer Neg Hx     Stomach Cancer Neg Hx        SOCIAL HISTORY    Social History     Tobacco Use    Smoking status: Never Smoker    Smokeless tobacco: Never Used   Substance Use Topics    Alcohol use: No    Drug use: No       ALLERGIES    Allergies   Allergen Reactions    Pcn [Penicillins]     Neomycin-Bacitracin Zn-Polymyx Rash    Neosporin [Neomycin-Polymyxin-Gramicidin] Rash       MEDICATIONS    Current Outpatient Medications on File Prior to Encounter   Medication Sig Dispense Refill    metoprolol tartrate (LOPRESSOR) 25 MG tablet TAKE ONE TABLET TWICE DAILY 60 tablet 2    Insulin Glargine (TOUJEO SOLOSTAR SC) Inject 80 Units into the skin daily      VITAMIN D PO Take 1 tablet by mouth daily      HYDROcodone-acetaminophen (NORCO) 5-325 MG per tablet Take 1 tablet by mouth every 6 hours as needed for Pain.       docusate sodium (COLACE) 250 MG capsule Take 1 capsule by mouth 2 times daily 60 capsule 0    furosemide (LASIX) 20 MG tablet Take 1 tablet by mouth daily as needed (edema) 30 tablet 1    losartan (COZAAR) 100 MG tablet TAKE ONE TABLET DAILY (Patient taking differently: Take 100 mg by mouth daily ) 30 tablet 3    atorvastatin (LIPITOR) 20 MG tablet TAKE ONE TABLET DAILY (Patient taking differently: Take 20 mg by mouth daily ) 90 tablet 3    tamsulosin (FLOMAX) 0.4 MG capsule TAKE TWO CAPSULES DAILY (Patient taking differently: Take 0.8 mg by mouth daily TAKE TWO CAPSULES DAILY) 60 capsule 11    BD INSULIN SYRINGE U/F 31G X 5/16\" 1 ML MISC USE FOUR TIMES DAILY 100 each 3    warfarin (COUMADIN) 10 MG tablet Take 1 tablet by mouth daily 45 tablet 5    glimepiride (AMARYL) 4 MG tablet TAKE ONE TABLET EVERY MORNING BEFORE BREAKFAST (Patient taking differently: Take 4 mg by mouth every morning (before breakfast) ) 90 tablet 3    warfarin (COUMADIN) 5 MG tablet TAKE AS DIRECTED BY YOUR DOCTOR (Patient taking differently: Take by mouth daily TAKE AS DIRECTED BY YOUR DOCTOR-keeps 5mg on hand if dose varies) 30 tablet 5    diphenhydrAMINE (BENADRYL) 25 MG tablet Take 25 mg by mouth every 6 hours as needed for Itching      blood glucose test strips (CONTOUR NEXT TEST) strip 1 each by In Vitro route 4 times daily As needed. 200 each 3    Lancets MISC 1 each by Does not apply route 4 times daily 300 each 1    B-D ULTRAFINE III SHORT PEN 31G X 8 MM MISC Inject 1 each as directed 3 times daily 100 each 5     No current facility-administered medications on file prior to encounter. REVIEW OF SYSTEMS    A comprehensive review of systems was negative.     Objective:      BP (!) 175/102 Comment: Hasn't taken B/P medications today  Pulse 72   Temp 98 °F (36.7 °C) (Temporal)   Resp 18   Ht 5' 10\" (1.778 m)   Wt 260 lb (117.9 kg)   BMI 37.31 kg/m²     Wt Readings from Last 3 Encounters:   12/10/20 260 lb (117.9 kg)   11/19/20 260 lb (117.9 kg)   11/12/20 260 lb (117.9 kg)       PHYSICAL EXAM    General Appearance: alert and oriented to person, place and time, well developed and well- nourished, in no acute distress  Skin: warm and dry, no rash or erythema  Head: normocephalic and atraumatic  Eyes: pupils equal, round, and reactive to light, extraocular eye movements intact, conjunctivae normal  ENT: tympanic membrane, external ear and ear canal normal bilaterally, nose without deformity, nasal mucosa and turbinates normal without polyps, lips teeth and gums normal  Neck: supple and non-tender without mass, no thyromegaly or thyroid nodules, no cervical lymphadenopathy  Pulmonary/Chest: clear to auscultation bilaterally- no wheezes, rales or rhonchi, normal air movement, no respiratory distress  Cardiovascular: normal rate, regular rhythm, normal S1 and S2, no murmurs, rubs, clicks, or gallops, distal pulses intact, no carotid bruits  Abdomen: soft, non-tender, non-distended, normal bowel sounds, no masses or organomegaly  Extremities: no cyanosis, clubbing or edema  Musculoskeletal: normal range of motion, no joint swelling, deformity or tenderness  Neurologic: reflexes normal and symmetric, no cranial nerve deficit, gait, coordination and speech normal, sensation of skin normal      Assessment:      Problem List Items Addressed This Visit     * (Principal) Diabetic ulcer of right midfoot associated with type 2 diabetes mellitus, with fat layer exposed ([address] - Primary (Chronic)    Relevant Orders    Supply: Wound Cleanser    Supply: Yolis Wound    Supply: Wound Dressings    Supply: Pack Wound    Supply: Cover and Secure    Supply: Edema Control           Procedure Note  Indications:  Based on my examination of this patient's wound(s)/ulcer(s) today, debridement is required to promote healing and evaluate the wound base. Performed by: Taniya Lock MD    Consent obtained:  Yes    Time out taken:  Yes    Pain Control:         Debridement:Excisional Debridement    Using curette the wound(s)/ulcer(s) was/were sharply debrided down through and including the removal of epidermis, dermis and subcutaneous tissue.         Devitalized Tissue Debrided:  fibrin, biofilm, slough, necrotic/eschar, exudate and callus      Pre Debridement Measurements:  Are located in the Wound/Ulcer Documentation Flow Sheet    Wound/Ulcer #: 1    Percent of Wound(s)/Ulcer(s) Debrided: 100%    Total Surface Area Debrided:  0.05 sq cm       Diabetic/Pressure/Non Pressure Ulcers only:  Ulcer: Diabetic ulcer, fat layer exposed           Post Debridement Measurements:    Wound/Ulcer Descriptions are Pre Debridement --EXCEPT MEASUREMENTS    Wound 09/14/20 Foot Right;Plantar WOUND 1 RIGHT PLANTAR (Active)   Wound Image   12/10/20 0958   Wound Etiology Diabetic Kat 1 12/10/20 0958   Dressing Status Old drainage noted 12/10/20 0958   Wound Cleansed Cleansed with saline 12/10/20 0958   Dressing/Treatment Alginate with Ag 11/06/20 1038   Offloading for Diabetic Foot Ulcers Total contact cast 11/12/20 0810   Wound Length (cm) 0.1 cm 12/10/20 0958   Wound Width (cm) 0.5 cm 12/10/20 0958   Wound Depth (cm) 0.1 cm 12/10/20 0958   Wound Surface Area (cm^2) 0.05 cm^2 12/10/20 0958   Change in Wound Size % (l*w) 97.22 12/10/20 0958   Wound Volume (cm^3) 0 cm^3 12/10/20 0958   Wound Healing % 100 12/10/20 0958   Post-Procedure Length (cm) 0.1 cm 12/10/20 1005   Post-Procedure Width (cm) 0.5 cm 12/10/20 1005   Post-Procedure Depth (cm) 0.1 cm 12/10/20 1005   Post-Procedure Surface Area (cm^2) 0.05 cm^2 12/10/20 1005   Post-Procedure Volume (cm^3) 0 cm^3 12/10/20 1005   Distance Tunneling (cm) 0 cm 12/10/20 0958   Tunneling Position ___ O'Clock 0 12/10/20 0958   Undermining Starts ___ O'Clock 0 12/10/20 0958   Undermining Ends___ O'Clock 0 12/10/20 0958   Undermining Maxium Distance (cm) 0 12/10/20 0958   Wound Assessment Pink/red 12/10/20 0958   Drainage Amount Scant 12/10/20 0958   Drainage Description Serosanguinous 11/06/20 0957   Odor None 12/10/20 0958   Yolis-wound Assessment Hyperkeratosis (callous) 12/10/20 0958   Margins Attached edges 11/06/20 0957   Wound Thickness Description not for Pressure Injury Full thickness 11/06/20 0957   Number of days: 86             Estimated Blood Loss:  Minimal    Hemostasis Achieved:  by pressure    Procedural Pain:  0  / 10     Post Procedural Pain:  0 / 10     Response to treatment:  Well tolerated by patient.          Plan:     Problem List Items Addressed This Visit     * (Principal) Diabetic ulcer of right midfoot associated with type 2 diabetes mellitus, with fat layer exposed ([facility] [address] - Primary (Chronic)    Relevant Orders    Supply: Wound Cleanser    Supply: Yolis Wound    Supply: Wound Dressings    Supply: Pack Wound    Supply: Cover and Secure    Supply: Edema Control          F/F xeroform. Appropriate shoe wear. Pt was healed but was walking in NON-OFFLOADING SHOES/BOOTS    Treatment Note please see attached Discharge Instructions    In my professional opinion this patient would benefit from HBO Therapy: No    Written patient dismissal instructions given to patient and signed by patient or POA. Discharge [address] and Hyperbaric Oxygen Therapy   Physician Orders and Discharge Instructions  [address] [address]  Telephone: [phone]    NAME:  Elli Roberson OF BIRTH:  1965  MEDICAL RECORD NUMBER:  [id number]  DATE:  12/10/2020    Discharge condition: Stable    Discharge to: Home    Left via:Private automobile    Accompanied by: Self     Dressing Orders: Right Plantar: Apply felt/foam to right foot  Wash with soap and water. Xeroform to open area, Cover with dry gauze. Secure with roll gauze and medipore tape. Change daily . Felt and Foam with cutout for the wound    Treatment Orders:  Protein rich diet (unless restricted by your physician); Multivitamin daily; Elevate legs when sitting, avoid standing for  long periods of time.     [facility] follow up visit ______________1 week_______________  (Please note your next appointment above and if you are unable to keep, kindly give a 24 hour notice.  Thank you.)    If you experience any of the following, please call the [address] [place] [address] Road during business hours:    * Increase in Pain  * Temperature over 101  * Increase in drainage from your wound  * Drainage with a foul odor  * Bleeding  * Increase in swelling  * Need for compression bandage changes due to slippage, breakthrough drainage. If you need medical attention outside of the business hours of the [address] [place] [address] Road please contact your PCP or go to the nearest emergency room.         Electronically signed by Taniya Lock MD on 12/10/2020 at 10:17 AM

## 2020-12-11 ENCOUNTER — CARE COORDINATION (OUTPATIENT)
Dept: CARE COORDINATION | Age: 55
End: 2020-12-11

## 2020-12-11 ENCOUNTER — OFFICE VISIT (OUTPATIENT)
Dept: INTERNAL MEDICINE | Age: 55
End: 2020-12-11
Payer: MEDICARE

## 2020-12-11 ENCOUNTER — ANTI-COAG VISIT (OUTPATIENT)
Dept: CARDIOLOGY | Age: 55
End: 2020-12-11
Payer: MEDICARE

## 2020-12-11 VITALS
WEIGHT: 263.8 LBS | SYSTOLIC BLOOD PRESSURE: 138 MMHG | DIASTOLIC BLOOD PRESSURE: 80 MMHG | OXYGEN SATURATION: 98 % | HEIGHT: 70 IN | HEART RATE: 95 BPM | BODY MASS INDEX: 37.77 KG/M2

## 2020-12-11 LAB
INTERNATIONAL NORMALIZATION RATIO, POC: 1.8
PROTHROMBIN TIME, POC: NORMAL

## 2020-12-11 PROCEDURE — 1036F TOBACCO NON-USER: CPT | Performed by: INTERNAL MEDICINE

## 2020-12-11 PROCEDURE — G0439 PPPS, SUBSEQ VISIT: HCPCS | Performed by: INTERNAL MEDICINE

## 2020-12-11 PROCEDURE — G8427 DOCREV CUR MEDS BY ELIG CLIN: HCPCS | Performed by: INTERNAL MEDICINE

## 2020-12-11 PROCEDURE — 99214 OFFICE O/P EST MOD 30 MIN: CPT | Performed by: INTERNAL MEDICINE

## 2020-12-11 PROCEDURE — 2022F DILAT RTA XM EVC RTNOPTHY: CPT | Performed by: INTERNAL MEDICINE

## 2020-12-11 PROCEDURE — 3046F HEMOGLOBIN A1C LEVEL >9.0%: CPT | Performed by: INTERNAL MEDICINE

## 2020-12-11 PROCEDURE — G8482 FLU IMMUNIZE ORDER/ADMIN: HCPCS | Performed by: INTERNAL MEDICINE

## 2020-12-11 PROCEDURE — 85610 PROTHROMBIN TIME: CPT | Performed by: NURSE PRACTITIONER

## 2020-12-11 PROCEDURE — 3017F COLORECTAL CA SCREEN DOC REV: CPT | Performed by: INTERNAL MEDICINE

## 2020-12-11 PROCEDURE — G8417 CALC BMI ABV UP PARAM F/U: HCPCS | Performed by: INTERNAL MEDICINE

## 2020-12-11 PROCEDURE — G0447 BEHAVIOR COUNSEL OBESITY 15M: HCPCS | Performed by: INTERNAL MEDICINE

## 2020-12-11 PROCEDURE — G0446 INTENS BEHAVE THER CARDIO DX: HCPCS | Performed by: INTERNAL MEDICINE

## 2020-12-11 RX ORDER — FUROSEMIDE 20 MG/1
20 TABLET ORAL DAILY PRN
Qty: 30 TABLET | Refills: 1 | Status: SHIPPED | OUTPATIENT
Start: 2020-12-11 | End: 2021-03-05

## 2020-12-11 SDOH — ECONOMIC STABILITY: TRANSPORTATION INSECURITY
IN THE PAST 12 MONTHS, HAS THE LACK OF TRANSPORTATION KEPT YOU FROM MEDICAL APPOINTMENTS OR FROM GETTING MEDICATIONS?: NO

## 2020-12-11 SDOH — ECONOMIC STABILITY: FOOD INSECURITY: WITHIN THE PAST 12 MONTHS, YOU WORRIED THAT YOUR FOOD WOULD RUN OUT BEFORE YOU GOT MONEY TO BUY MORE.: SOMETIMES TRUE

## 2020-12-11 SDOH — ECONOMIC STABILITY: INCOME INSECURITY: HOW HARD IS IT FOR YOU TO PAY FOR THE VERY BASICS LIKE FOOD, HOUSING, MEDICAL CARE, AND HEATING?: HARD

## 2020-12-11 SDOH — ECONOMIC STABILITY: FOOD INSECURITY: WITHIN THE PAST 12 MONTHS, THE FOOD YOU BOUGHT JUST DIDN'T LAST AND YOU DIDN'T HAVE MONEY TO GET MORE.: SOMETIMES TRUE

## 2020-12-11 SDOH — ECONOMIC STABILITY: TRANSPORTATION INSECURITY
IN THE PAST 12 MONTHS, HAS LACK OF TRANSPORTATION KEPT YOU FROM MEETINGS, WORK, OR FROM GETTING THINGS NEEDED FOR DAILY LIVING?: NO

## 2020-12-11 ASSESSMENT — ENCOUNTER SYMPTOMS
SORE THROAT: 0
SHORTNESS OF BREATH: 0
BLOOD IN STOOL: 0
DIARRHEA: 0
TROUBLE SWALLOWING: 0
COLOR CHANGE: 0
CONSTIPATION: 0
BACK PAIN: 0
COUGH: 0
ABDOMINAL PAIN: 0
STRIDOR: 0
WHEEZING: 0

## 2020-12-11 ASSESSMENT — PATIENT HEALTH QUESTIONNAIRE - PHQ9
SUM OF ALL RESPONSES TO PHQ QUESTIONS 1-9: 0
1. LITTLE INTEREST OR PLEASURE IN DOING THINGS: 0
SUM OF ALL RESPONSES TO PHQ9 QUESTIONS 1 & 2: 0
SUM OF ALL RESPONSES TO PHQ QUESTIONS 1-9: 0
SUM OF ALL RESPONSES TO PHQ QUESTIONS 1-9: 0
2. FEELING DOWN, DEPRESSED OR HOPELESS: 0

## 2020-12-11 ASSESSMENT — LIFESTYLE VARIABLES: HOW OFTEN DO YOU HAVE A DRINK CONTAINING ALCOHOL: 0

## 2020-12-11 NOTE — CARE COORDINATION
Telephoned Joanna Steve to share some resources with him. Told him is he is too young for [employer] ([employer]). Kimberly Saldana I was able to collect some possible nutritional resources in his region. I asked if he could write them down. He said he could not. I asked if he had email. He said he does not. Kimberly Saldana I will mail him resources in a letter. He verbalized understanding. Submitted by Adelso/MARILEE    Creating a letter to have DIANA ReyesR for Dr Adri Alejandro, to mail to him certified with returned receipt.     Submitted by Adelso/MARILEE

## 2020-12-11 NOTE — PROGRESS NOTES
[facility] INTERNAL MEDICINE  [address] [facility], [place] [address]  Phone: [phone]  Fax: [phone]    Visit Date:  12/11/2020    SUBJECTIVE:     Constance Beth is a 60 y.o. male presents today in the office for:    Chief Complaint   Patient presents with    Medicare AW       HPI  66-year-old male who presents for follow-up visit/annual wellness visit  Disabled male who lives alone  and has financial issues sometimes not able to eat 3 meals a day  History of a calculus cholecystitis  History of a fall with injury to the left elbow and doing well at this time chronic atrial fibrillation on anticoagulation and rate controlled managed by cardiology denies any palpitations or loss of consciousness chest pain  Diabetes poorly controlled patient is not compliant to insulin and diet he does not get enough money to get insulin and he does not cover with short acting insulin  Hyperlipidemia compliant with atorvastatin no myalgias  Due for diabetic eye exam but states he cannot afford the ophthalmology bill  Currently managed by [facility] wound clinic due to a chronic ulcer in the base of his right foot, this healed but patient has been ambulating too much and and it reopened patient has Charcot foot of both feet due to chronic diabetic neuropathy          Past Medical History:   Diagnosis Date    Acalculous cholecystitis 12/29/2015    Allergic rhinitis, cause unspecified     Atrial fibrillation, chronic ([address]     sees [facility]ology    Charcot's joint of right foot     Chicken pox     Closed supracondylar fracture of elbow     fall    History of MRSA infection     scalp/facial and on back    HTN (hypertension)     Hyperlipidemia     Impotence of organic origin     MDRO (multiple drug resistant organisms) resistance     Other dyspnea and respiratory abnormality     Other specified erythematous condition(999.89)     Personal history of other infectious and parasitic disease  Type II or unspecified type diabetes mellitus without mention of complication, uncontrolled 1994       Past Surgical History:   Procedure Laterality Date    CARDIOVERSION  03/2017    COLONOSCOPY  02/24/2020    Dr Cipriano Fraire-Melanosis coli throughout the entire colon-AP, 5 yr recall    HUMERUS FRACTURE SURGERY Left 9/17/2020    OPEN REDUCTION INTERNAL FIXATION LEFT SUPRACONDYLAR FRACTURE performed by Christie Phillips MD at [facility]      X 2    TONSILLECTOMY         Social History     Socioeconomic History    Marital status:      Spouse name: Not on file    Number of children: Not on file    Years of education: Not on file    Highest education level: Not on file   Occupational History    Not on file   Social Needs    Financial resource strain: Hard    Food insecurity     Worry: Sometimes true     Inability: Sometimes true    Transportation needs     Medical: No     Non-medical: No   Tobacco Use    Smoking status: Never Smoker    Smokeless tobacco: Never Used   Substance and Sexual Activity    Alcohol use: No    Drug use: No    Sexual activity: Not on file   Lifestyle    Physical activity     Days per week: 0 days     Minutes per session: 0 min    Stress:  To some extent   Relationships    Social connections     Talks on phone: Not on file     Gets together: Not on file     Attends Muslim service: Not on file     Active member of club or organization: Not on file     Attends meetings of clubs or organizations: Not on file     Relationship status: Not on file    Intimate partner violence     Fear of current or ex partner: Not on file     Emotionally abused: Not on file     Physically abused: Not on file     Forced sexual activity: Not on file   Other Topics Concern    Not on file   Social History Narrative    Referred to  for assistance with Meals on Wheels       Family History   Problem Relation Age of Onset    Stroke Maternal Grandmother  Cancer Maternal Grandmother     Stroke Paternal Grandmother     Diabetes Paternal Grandmother     Cancer Father     Heart Disease Father     Lung Cancer Father     Asthma Paternal Grandfather     Heart Disease Paternal Grandfather     Heart Disease Maternal Grandfather     Colon Cancer Neg Hx     Colon Polyps Neg Hx     Esophageal Cancer Neg Hx     Liver Cancer Neg Hx     Liver Disease Neg Hx     Rectal Cancer Neg Hx     Stomach Cancer Neg Hx        Allergies   Allergen Reactions    Pcn [Penicillins]     Neomycin-Bacitracin Zn-Polymyx Rash    Neosporin [Neomycin-Polymyxin-Gramicidin] Rash       Current Outpatient Medications   Medication Sig Dispense Refill    furosemide (LASIX) 20 MG tablet Take 1 tablet by mouth daily as needed (edema) 30 tablet 1    Insulin Degludec (TRESIBA FLEXTOUCH) 200 UNIT/ML SOPN Inject 80 Units into the skin daily      metoprolol tartrate (LOPRESSOR) 25 MG tablet TAKE ONE TABLET TWICE DAILY 60 tablet 2    Insulin Glargine (TOUJEO SOLOSTAR SC) Inject 80 Units into the skin daily      VITAMIN D PO Take 1 tablet by mouth daily      HYDROcodone-acetaminophen (NORCO) 5-325 MG per tablet Take 1 tablet by mouth every 6 hours as needed for Pain.       docusate sodium (COLACE) 250 MG capsule Take 1 capsule by mouth 2 times daily 60 capsule 0    losartan (COZAAR) 100 MG tablet TAKE ONE TABLET DAILY (Patient taking differently: Take 100 mg by mouth daily ) 30 tablet 3    atorvastatin (LIPITOR) 20 MG tablet TAKE ONE TABLET DAILY (Patient taking differently: Take 20 mg by mouth daily ) 90 tablet 3    tamsulosin (FLOMAX) 0.4 MG capsule TAKE TWO CAPSULES DAILY (Patient taking differently: Take 0.8 mg by mouth daily TAKE TWO CAPSULES DAILY) 60 capsule 11    BD INSULIN SYRINGE U/F 31G X 5/16\" 1 ML MISC USE FOUR TIMES DAILY 100 each 3    warfarin (COUMADIN) 10 MG tablet Take 1 tablet by mouth daily 45 tablet 5    glimepiride (AMARYL) 4 MG tablet TAKE ONE TABLET EVERY MORNING BEFORE BREAKFAST (Patient taking differently: Take 4 mg by mouth every morning (before breakfast) ) 90 tablet 3    warfarin (COUMADIN) 5 MG tablet TAKE AS DIRECTED BY YOUR DOCTOR (Patient taking differently: Take by mouth daily TAKE AS DIRECTED BY YOUR DOCTOR-keeps 5mg on hand if dose varies) 30 tablet 5    diphenhydrAMINE (BENADRYL) 25 MG tablet Take 25 mg by mouth every 6 hours as needed for Itching      blood glucose test strips (CONTOUR NEXT TEST) strip 1 each by In Vitro route 4 times daily As needed. 200 each 3    Lancets MISC 1 each by Does not apply route 4 times daily 300 each 1    B-D ULTRAFINE III SHORT PEN 31G X 8 MM MISC Inject 1 each as directed 3 times daily 100 each 5     No current facility-administered medications for this visit. Patient Active Problem List   Diagnosis    Shortness of breath    HTN (hypertension)    Hyperlipidemia    Fatigue    Paroxysmal atrial fibrillation (HCC)    Hyponatremia    Type 2 diabetes mellitus with diabetic polyneuropathy, with long-term current use of insulin (HCC)    Class 2 obesity due to excess calories in adult    Diabetic ulcer of right midfoot associated with type 2 diabetes mellitus, with fat layer exposed ([address]    Displaced comminuted supracondylar fracture without intercondylar fracture of left humerus, initial encounter for closed fracture    Left supracondylar humerus fracture, closed, initial encounter    Charcot foot due to diabetes mellitus ([address]    Non-compliance             Review of Systems:   Review of Systems   Constitutional: Negative for activity change, appetite change, chills, diaphoresis, fatigue and fever. HENT: Negative for congestion, hearing loss, postnasal drip, sore throat, tinnitus and trouble swallowing. Eyes: Negative for visual disturbance. Respiratory: Negative for cough, shortness of breath, wheezing and stridor. Cardiovascular: Positive for leg swelling.  Negative for chest pain and palpitations. Gastrointestinal: Negative for abdominal pain, blood in stool, constipation and diarrhea. Endocrine: Negative for cold intolerance. Genitourinary: Negative for frequency. Musculoskeletal: Negative for arthralgias, back pain and joint swelling. Skin: Positive for wound. Negative for color change. Allergic/Immunologic: Negative for environmental allergies. Neurological: Positive for numbness. Negative for dizziness, light-headedness and headaches. Hematological: Negative for adenopathy. Does not bruise/bleed easily. Psychiatric/Behavioral: Negative for decreased concentration, dysphoric mood and sleep disturbance. The patient is not nervous/anxious. OBJECTIVE:   @  BP Readings from Last 3 Encounters:   12/11/20 138/80   12/10/20 (!) 175/102   11/12/20 (!) 160/95        @  Lab Results   Component Value Date    LABA1C 11.4 (H) 12/03/2020    LABA1C 10.1 (H) 09/17/2020    LABA1C 12.3 (H) 06/04/2020     Lab Results   Component Value Date    LABMICR 5.20 03/25/2019    LDLCALC 86 12/03/2020    CREATININE 0.8 12/03/2020        Physical Exam:    Physical Exam  Abdominal:      General: Abdomen is protuberant. Bowel sounds are normal.      Palpations: Abdomen is soft. Tenderness: There is no abdominal tenderness. Musculoskeletal:        Feet:    Skin:     Findings: Abrasion present. Neurological:      General: No focal deficit present. Mental Status: He is oriented to person, place, and time. Cranial Nerves: Cranial nerves are intact. Sensory: Sensory deficit present. Motor: No weakness or tremor. Coordination: Coordination is intact. Gait: Gait is intact. Psychiatric:         Attention and Perception: Attention normal.         Mood and Affect: Mood normal.         Speech: Speech normal.         Behavior: Behavior normal.         Thought Content:  Thought content normal.       Orders Only on 12/03/2020   Component Date Value Ref Range Status Continue Losartan 100mg daily  Lasix 20 mg daily prn         Relevant Medications    metoprolol tartrate (LOPRESSOR) 25 MG tablet    Other Relevant Orders    Basic Metabolic Panel    OR Behavior  obesity 15m [] (Completed)    OR Intens behave ther cardio dx, 15 minutes [] (Completed)    OR OFFICE OUTPATIENT VISIT 25 MINUTES (Completed)    Paroxysmal atrial fibrillation (HCC)     Continue Coumadin 10 mg daily  Metoprolol Tartarte 25 mg BID         Relevant Orders    OR Behavior  obesity 15m [] (Completed)    OR Intens behave ther cardio dx, 15 minutes [] (Completed)    OR OFFICE OUTPATIENT VISIT 25 MINUTES (Completed)       Endocrine    Charcot foot due to diabetes mellitus ([facility] [address] - Primary    Relevant Medications    glimepiride (AMARYL) 4 MG tablet    HYDROcodone-acetaminophen (NORCO) 5-325 MG per tablet    Insulin Glargine (TOUJEO SOLOSTAR SC)    Insulin Degludec (TRESIBA FLEXTOUCH) 200 UNIT/ML SOPN    Other Relevant Orders    CBC    OR Behavior  obesity 15m [] (Completed)    OR Intens behave ther cardio dx, 15 minutes [] (Completed)    OR OFFICE OUTPATIENT VISIT 25 MINUTES (Completed)    Diabetic ulcer of right midfoot associated with type 2 diabetes mellitus, with fat layer exposed (HCC) (Chronic)     Managed by wound care, he has Charcot foot, and didn't feel anything and he stepped on a sharp object         Relevant Medications    glimepiride (AMARYL) 4 MG tablet    Insulin Glargine (TOUJEO SOLOSTAR SC)    Insulin Degludec (TRESIBA FLEXTOUCH) 200 UNIT/ML SOPN    Other Relevant Orders    Amb External Referral To Ophthalmology    Hemoglobin A1C    Microalbumin / Creatinine Urine Ratio    Basic Metabolic Panel    [facility]    OR Behavior  obesity 15m [] (Completed)    OR Intens behave ther cardio dx, 15 minutes [] (Completed)    OR OFFICE OUTPATIENT VISIT 25 MINUTES (Completed)    Type 2 diabetes mellitus with diabetic polyneuropathy, with long-term current use of insulin [facility])     Patient was attending Endocrinology clinic at [facility] is on Glimepiride 4 mg daily  Toujeo 80 units daily         Relevant Medications    blood glucose test strips (CONTOUR NEXT TEST) strip    Lancets MISC    glimepiride (AMARYL) 4 MG tablet    Insulin Glargine (TOUJEO SOLOSTAR SC)    Insulin Degludec (TRESIBA FLEXTOUCH) 200 UNIT/ML SOPN    Other Relevant Orders    Amb External Referral To Ophthalmology    Hemoglobin A1C    Microalbumin / Creatinine Urine Ratio    Basic Metabolic Panel    NV Behavior  obesity 15m [] (Completed)    NV Intens behave ther cardio dx, 15 minutes [] (Completed)    NV OFFICE OUTPATIENT VISIT 25 MINUTES (Completed)       Other    Hyperlipidemia     CONtinue Atorvastatin 20 mg daily         Relevant Medications    warfarin (COUMADIN) 5 MG tablet    warfarin (COUMADIN) 10 MG tablet    losartan (COZAAR) 100 MG tablet    atorvastatin (LIPITOR) 20 MG tablet    metoprolol tartrate (LOPRESSOR) 25 MG tablet    furosemide (LASIX) 20 MG tablet    Other Relevant Orders    Lipid Panel    Hepatic Function Panel    NV Behavior  obesity 15m [] (Completed)    NV Intens behave ther cardio dx, 15 minutes [] (Completed)    NV OFFICE OUTPATIENT VISIT 25 MINUTES (Completed)           PLAN:      Food insecurity is referred to  for possible assistance with Meals on Wheels  Medications Ordered:  Orders Placed This Encounter   Medications    furosemide (LASIX) 20 MG tablet     Sig: Take 1 tablet by mouth daily as needed (edema)     Dispense:  30 tablet     Refill:  1       Other Orders Placed:  Orders Placed This Encounter   Procedures    [facility]     Order Specific Question:   Enter the notification frequency in days     Answer:   7     Order Specific Question:   What is the lowest normal glucose value for this patient?      Answer:   61     Order Specific Question:   What is the highest normal glucose value for this patient? Answer:   300    Hemoglobin A1C     Standing Status:   Future     Standing Expiration Date:   6/10/2022    Microalbumin / Creatinine Urine Ratio     Standing Status:   Future     Standing Expiration Date:   6/10/2022    Lipid Panel     Standing Status:   Future     Standing Expiration Date:   6/10/2022     Order Specific Question:   Is Patient Fasting?/# of Hours     Answer:   10    Hepatic Function Panel     Standing Status:   Future     Standing Expiration Date:   6/10/2022    Basic Metabolic Panel     Standing Status:   Future     Standing Expiration Date:   6/10/2022    CBC     Standing Status:   Future     Standing Expiration Date:   6/10/2022    Amb External Referral To Ophthalmology     Referral Priority:   Routine     Referral Type:   Consult for Advice and Opinion     Referral Reason:   Specialty Services Required     Requested Specialty:   Ophthalmology     Number of Visits Requested:   1    OK Behavior  obesity 15m []    OK Intens behave ther cardio dx, 15 minutes []    OK OFFICE OUTPATIENT VISIT 25 MINUTES       Return in 4 months (on 2021) for Medicare Annual Wellness Visit in 1 year. Electronically signed by Enoc Urias MD on 2020 at 10:20 AM  Medicare Annual Wellness Visit  Name: Aung Johnston Date: 2020   MRN: [id number] Sex: Male   Age: 60 y.o. Ethnicity: Non-/Non    : 1965 Race: Nate Kingston is here for Medicare AWV    Screenings for behavioral, psychosocial and functional/safety risks, and cognitive dysfunction are all negative except as indicated below. These results, as well as other patient data from the [address] form, are documented in Flowsheets linked to this Encounter.     Allergies   Allergen Reactions    Pcn [Penicillins]     Neomycin-Bacitracin Zn-Polymyx Rash    Neosporin [Neomycin-Polymyxin-Gramicidin] Rash         Prior to Visit Medications    Medication Sig Taking? Authorizing Provider   furosemide (LASIX) 20 MG tablet Take 1 tablet by mouth daily as needed (edema) Yes Gabbi Bess MD   Insulin Degludec (TRESIBA FLEXTOUCH) 200 UNIT/ML SOPN Inject 80 Units into the skin daily Yes Historical Provider, MD   metoprolol tartrate (LOPRESSOR) 25 MG tablet TAKE ONE TABLET TWICE DAILY Yes NOMI Quintero CNP   Insulin Glargine (TOUJEO SOLOSTAR SC) Inject 80 Units into the skin daily Yes Historical Provider, MD   VITAMIN D PO Take 1 tablet by mouth daily Yes Historical Provider, MD   HYDROcodone-acetaminophen (NORCO) 5-325 MG per tablet Take 1 tablet by mouth every 6 hours as needed for Pain.  Yes Historical Provider, MD   docusate sodium (COLACE) 250 MG capsule Take 1 capsule by mouth 2 times daily Yes Christie Phillips MD   losartan (COZAAR) 100 MG tablet TAKE ONE TABLET DAILY  Patient taking differently: Take 100 mg by mouth daily  Yes NOMI Quintero CNP   atorvastatin (LIPITOR) 20 MG tablet TAKE ONE TABLET DAILY  Patient taking differently: Take 20 mg by mouth daily  Yes Gabbi Bess MD   tamsulosin (FLOMAX) 0.4 MG capsule TAKE TWO CAPSULES DAILY  Patient taking differently: Take 0.8 mg by mouth daily TAKE TWO CAPSULES DAILY Yes Sim Gomez PA-C   BD INSULIN SYRINGE U/F 31G X 5/16\" 1 ML MISC USE FOUR TIMES DAILY Yes Jairon Bess MD   warfarin (COUMADIN) 10 MG tablet Take 1 tablet by mouth daily Yes NOMI Bonilla   glimepiride (AMARYL) 4 MG tablet TAKE ONE TABLET EVERY MORNING BEFORE BREAKFAST  Patient taking differently: Take 4 mg by mouth every morning (before breakfast)  Yes Gabbi Bess MD   warfarin (COUMADIN) 5 MG tablet TAKE AS DIRECTED BY YOUR DOCTOR  Patient taking differently: Take by mouth daily TAKE AS DIRECTED BY YOUR DOCTOR-keeps 5mg on hand if dose varies Yes NOMI Centeno   diphenhydrAMINE (BENADRYL) 25 MG tablet Take 25 mg by mouth every 6 hours as Neg Hx     Esophageal Cancer Neg Hx     Liver Cancer Neg Hx     Liver Disease Neg Hx     Rectal Cancer Neg Hx     Stomach Cancer Neg Hx        CareTeam (Including outside providers/suppliers regularly involved in providing care):   Patient Care Team:  Ronal Vargas MD as PCP - General (Internal Medicine)  Ronal Vargas [facility] as PCP - [facility] [place] [place] as Referring Physician (Physician Assistant)  NOMI Biswas as Nurse Practitioner (Family Nurse Practitioner)  NOMI Boswell as Nurse Practitioner (Certified Nurse Specialist)    Wt Readings from Last 3 Encounters:   12/11/20 263 lb 12.8 oz (119.7 kg)   12/10/20 260 lb (117.9 kg)   11/19/20 260 lb (117.9 kg)     Vitals:    12/11/20 0947   BP: 138/80   Site: Left Upper Arm   Position: Sitting   Cuff Size: Medium Adult   Pulse: 95   SpO2: 98%   Weight: 263 lb 12.8 oz (119.7 kg)   Height: 5' 10\" (1.778 m)     Body mass index is 37.85 kg/m². Based upon direct observation of the patient, evaluation of cognition reveals recent and remote memory intact. Patient's complete Health Risk Assessment and screening values have been reviewed and are found in Flowsheets. The following problems were reviewed today and where indicated follow up appointments were made and/or referrals ordered. Positive Risk Factor Screenings with Interventions:     Fall Risk:  Timed Up and Go Test > 12 seconds? (Complete if either Fall Risk answers are Yes): no  2 or more falls in past year?: (!) yes  Fall with injury in past year?: (!) yes  Fall Risk Interventions:    · Home safety tips provided    General Health and ACP:  General  In general, how would you say your health is?: (!) Poor  In the past 7 days, have you experienced any of the following?  New or Increased Pain, New or Increased Fatigue, Loneliness, Social Isolation, Stress or Anger?: None of These  Do you get the social and emotional support that you need?: Yes  Do you have a Living Will?: Yes  Advance Directives     Power of [address] ACP-Advance Directive ACP-Power of     Not on File Not on File Filed [address] [place] Risk Interventions:  · Poor self-assessment of health status: patient declines any further evaluation/treatment for this issue    Health Habits/Nutrition:  Health Habits/Nutrition  Do you exercise for at least 20 minutes 2-3 times per week?: (!) No  Have you lost any weight without trying in the past 3 months?: No  Do you eat fewer than 2 meals per day?: (!) Yes  Have you seen a dentist within the past year?: (!) No  Body mass index: (!) 37.85  Health Habits/Nutrition Interventions:  · Inadequate physical activity:  patient is not ready to increase his/her physical activity level at this time    Hearing/Vision:  No exam data present  Hearing/Vision  Do you or your family notice any trouble with your hearing?: No  Do you have difficulty driving, watching TV, or doing any of your daily activities because of your eyesight?: No  Have you had an eye exam within the past year?: (!) No  Hearing/Vision Interventions:  · none    Safety:  Safety  Do you have working smoke detectors?: Yes  Have all throw rugs been removed or fastened?: Yes  Do you have non-slip mats or surfaces in all bathtubs/showers?: Yes  Do all of your stairways have a railing or banister?: (!) No  Are your doorways, halls and stairs free of clutter?: Yes  Do you always fasten your seatbelt when you are in a car?: Yes  Safety Interventions:  · Home safety tips provided    Personalized Preventive Plan   Current Health Maintenance Status  Immunization History   Administered Date(s) Administered    Hepatitis A/Hepatitis B (Twinrix) 06/01/2012    Influenza Whole 10/14/2015    Influenza, High Dose (Fluzone 65 yrs and older) 09/23/2020    Influenza, MDCK Quadv, with preserv IM (Flucelvax 4 yrs and older) 09/24/2019    Influenza, Quadv, 6 mo and older, IM, PF (Flulaval, Fluarix) 03/22/2019    Influenza, Quadv, adjuvanted, 65 yrs +, IM, PF (Fluad) 09/23/2020        Health Maintenance   Topic Date Due    Annual Wellness Visit (AWV)  05/29/2019    Diabetic microalbuminuria test  03/25/2020    Diabetic retinal exam  09/30/2020    Hepatitis B vaccine (2 of 3 - Hep B Twinrix risk 3-dose series) 09/23/2021 (Originally 6/29/2012)    DTaP/Tdap/Td vaccine (1 - Tdap) 12/11/2021 (Originally 11/3/1982)    Shingles Vaccine (1 of 2) 12/11/2021 (Originally 11/3/2013)    A1C test (Diabetic or Prediabetic)  03/03/2021    Diabetic foot exam  09/23/2021    Lipid screen  12/03/2021    Potassium monitoring  12/03/2021    Creatinine monitoring  12/03/2021    Colon cancer screen colonoscopy  02/24/2025    Flu vaccine  Completed    Hepatitis C screen  Completed    HIV screen  Completed    Hepatitis A vaccine  Aged Out    Hib vaccine  Aged Out    Meningococcal (ACWY) vaccine  Aged Out    Pneumococcal 0-64 years Vaccine  Aged Out     Recommendations for [employer] Due: see orders and patient instructions/AVS.  . Recommended screening schedule for the next 5-10 years is provided to the patient in written form: see Patient Lena Nava was seen today for medicare awv. Diagnoses and all orders for this visit:    Charcot foot due to diabetes mellitus ([facility] [address]  -     CBC; Future  -     NM Behavior  obesity 15m []  -     NM Intens behave ther cardio dx, 15 minutes []  -     NM OFFICE OUTPATIENT VISIT 25 MINUTES    Diabetic ulcer of right midfoot associated with type 2 diabetes mellitus, with fat layer exposed ([facility] [address]  -     Amb External Referral To Ophthalmology  -     Hemoglobin A1C; Future  -     Microalbumin / Creatinine Urine Ratio; Future  -     Basic Metabolic Panel;  Future  -     MyChart Glucose Flowsheet  -     NM Behavior  obesity 15m []  -     NM Intens behave ther cardio dx, 15 minutes []  -     NM OFFICE OUTPATIENT VISIT 25 MINUTES    Mixed hyperlipidemia  -     Lipid Panel; Future  -     Hepatic Function Panel; Future  -     NM Behavior  obesity 15m []  -     NM Intens behave ther cardio dx, 15 minutes []  -     NM OFFICE OUTPATIENT VISIT 25 MINUTES    Paroxysmal atrial fibrillation (HCC)  -     NM Behavior  obesity 15m []  -     NM Intens behave ther cardio dx, 15 minutes []  -     NM OFFICE OUTPATIENT VISIT 25 MINUTES    Essential hypertension  -     Basic Metabolic Panel; Future  -     NM Behavior  obesity 15m []  -     NM Intens behave ther cardio dx, 15 minutes []  -     NM OFFICE OUTPATIENT VISIT 25 MINUTES    Type 2 diabetes mellitus with diabetic polyneuropathy, with long-term current use of insulin (HCC)  -     Amb External Referral To Ophthalmology  -     Hemoglobin A1C; Future  -     Microalbumin / Creatinine Urine Ratio; Future  -     Basic Metabolic Panel; Future  -     NM Behavior  obesity 15m []  -     NM Intens behave ther cardio dx, 15 minutes []  -     NM OFFICE OUTPATIENT VISIT 22 MINUTES    Screening for cardiovascular condition  -     NM Behavior  obesity 15m []  -     NM Intens behave ther cardio dx, 15 minutes []  -     NM OFFICE OUTPATIENT VISIT 25 MINUTES    Routine general medical examination at a health care facility  -     NM Behavior  obesity 15m []  -     NM Intens behave ther cardio dx, 15 minutes []  -     NM OFFICE OUTPATIENT VISIT 25 MINUTES    Other orders  -     furosemide (LASIX) 20 MG tablet; Take 1 tablet by mouth daily as needed (edema)                  Obesity Counseling: Assessed behavioral health risks and factors affecting choice of behavior. Suggested weight control approaches, including dietary changes behavioral modification and follow up plan. Provided educational and support documentation.   Time spent (minutes): 5    Obesity Counseling: Patient completed 6 months of weight loss consulting. We discussed readiness to continue with the program for additional 6 months. Time spent (minutes): 5    Cardiovascular Disease Risk Counseling: Assessed the patient's risk to develop cardiovascular disease and reviewed main risk factors. Reviewed steps to reduce disease risk including:   · Quitting tobacco use, reducing amount smoked, or not starting the habit  · Making healthy food choices  · Being physically active and gradualy increasing activity levels   · Reduce weight and determine a healthy BMI goal  · Monitor blood pressure and treat if higher than 140/90 mmHg  · Maintain blood total cholesterol levels under 5 mmol/l or 190 mg/dl  · Maintain LDL cholesterol levels under 3.0 mmol/l or 115 mg/dl   · Control blood glucose levels  · Consider taking aspirin (75 mg daily), once blood pressure is controlled   Provided a follow up plan.   Time spent (minutes): 5

## 2020-12-11 NOTE — ASSESSMENT & PLAN NOTE
Continue Coumadin 10 mg daily  Metoprolol Tartarte 25 mg BID
Continue Losartan 100mg daily  Lasix 20 mg daily prn
Managed by wound care, he has Charcot foot, and didn't feel anything and he stepped on a sharp object
Patient was attending Endocrinology clinic at [facility] is on Glimepiride 4 mg daily  Toujeo 80 units daily
Patient

## 2020-12-11 NOTE — LETTER
[address], [address] [place] [address] Meera Chavez     Phone 570 [facility] Worker for Bert Stewrat MD        December 11, 2020    [address] [place] [address]      Dear Sugey Schmitt:    Per our conversation, I am providing you with possible nutritional support resources. Please call these organizations to ask what nutritional services they can provide you:    1. [facility]    2. [address]. [phone]    3. [facility]. [phone]    Also, please ask about your local Meals-on-Wheels program to see if they make an exception to the general rule of needing to be 60 years and older. If you have any questions or concerns, please don't hesitate to call. I am working from home during the pandemic. My work cell is [phone].     Sincerely,    Amparo Caldera (Electronically Signed)

## 2020-12-11 NOTE — PATIENT INSTRUCTIONS
Eating Healthy Foods: Care Instructions  Your Care Instructions     Eating healthy foods can help lower your risk for disease. Healthy food gives you energy and keeps your heart strong, your brain active, your muscles working, and your bones strong. A healthy diet includes a variety of foods from the basic food groups: grains, vegetables, fruits, milk and milk products, and meat and beans. Some people may eat more of their favorite foods from only one food group and, as a result, miss getting the nutrients they need. So, it is important to pay attention not only to what you eat but also to what you are missing from your diet. You can eat a healthy, balanced diet by making a few small changes. Follow-up care is a key part of your treatment and safety. Be sure to make and go to all appointments, and call your doctor if you are having problems. It's also a good idea to know your test results and keep a list of the medicines you take. How can you care for yourself at home? Look at what you eat  · Keep a food diary for a week or two and record everything you eat or drink. Track the number of servings you eat from each food group. · For a balanced diet every day, eat a variety of:  ? 6 or more ounce-equivalents of grains, such as cereals, breads, crackers, rice, or pasta, every day. An ounce-equivalent is 1 slice of bread, 1 cup of ready-to-eat cereal, or ½ cup of cooked rice, cooked pasta, or cooked cereal.  ? 2½ cups of vegetables, especially:  § Dark-green vegetables such as broccoli and spinach. § Orange vegetables such as carrots and sweet potatoes. § Dry beans (such as liao and kidney beans) and peas (such as lentils). ? 2 cups of fresh, frozen, or canned fruit. A small apple or 1 banana or orange equals 1 cup. ? 3 cups of nonfat or low-fat milk, yogurt, or other milk products. ? 5½ ounces of meat and beans, such as chicken, fish, lean meat, beans, nuts, and seeds.  One egg, 1 tablespoon of peanut butter, ½ ounce nuts or seeds, or ¼ cup of cooked beans equals 1 ounce of meat. · Learn how to read food labels for serving sizes and ingredients. Fast-food and convenience-food meals often contain few or no fruits or vegetables. Make sure you eat some fruits and vegetables to make the meal more nutritious. · Look at your food diary. For each food group, add up what you have eaten and then divide the total by the number of days. This will give you an idea of how much you are eating from each food group. See if you can find some ways to change your diet to make it more healthy. Start small  · Do not try to make dramatic changes to your diet all at once. You might feel that you are missing out on your favorite foods and then be more likely to fail. · Start slowly, and gradually change your habits. Try some of the following:  ? Use whole wheat bread instead of white bread. ? Use nonfat or low-fat milk instead of whole milk. ? Eat brown rice instead of white rice, and eat whole wheat pasta instead of white-flour pasta. ? Try low-fat cheeses and low-fat yogurt. ? Add more fruits and vegetables to meals and have them for snacks. ? Add lettuce, tomato, cucumber, and onion to sandwiches. ? Add fruit to yogurt and cereal.  Enjoy food  · You can still eat your favorite foods. You just may need to eat less of them. If your favorite foods are high in fat, salt, and sugar, limit how often you eat them, but do not cut them out entirely. · Eat a wide variety of foods. Make healthy choices when eating out  · The type of restaurant you choose can help you make healthy choices. Even fast-food chains are now offering more low-fat or healthier choices on the menu. · Choose smaller portions, or take half of your meal home. · When eating out, try:  ? A veggie pizza with a whole wheat crust or grilled chicken (instead of sausage or pepperoni).   ? Pasta with roasted vegetables, grilled chicken, or marinara sauce instead of cream sauce. ? A vegetable wrap or grilled chicken wrap. ? Broiled or poached food instead of fried or breaded items. Make healthy choices easy  · Buy packaged, prewashed, ready-to-eat fresh vegetables and fruits, such as baby carrots, salad mixes, and chopped or shredded broccoli and cauliflower. · Buy packaged, presliced fruits, such as melon or pineapple. · Choose 100% fruit or vegetable juice instead of soda. Limit juice intake to 4 to 6 oz (½ to ¾ cup) a day. · Blend low-fat yogurt, fruit juice, and canned or frozen fruit to make a smoothie for breakfast or a snack. Where can you learn more? Go to https://[employer].Tattoodo. org and sign in to your [employer] account. Enter B548 in the [employer] box to learn more about \"Eating Healthy Foods: Care Instructions. \"     If you do not have an account, please click on the \"Sign Up Now\" link. Current as of: August 22, 2019               Content Version: 12.6  © 3254-9054 [employer]. Care instructions adapted under license by [facility] ([facility]). If you have questions about a medical condition or this instruction, always ask your healthcare professional. [address] any warranty or liability for your use of this information. Personalized Preventive Plan for Marielena Curiel - 12/11/2020  Medicare offers a range of preventive health benefits. Some of the tests and screenings are paid in full while other may be subject to a deductible, co-insurance, and/or copay. Some of these benefits include a comprehensive review of your medical history including lifestyle, illnesses that may run in your family, and various assessments and screenings as appropriate. After reviewing your medical record and screening and assessments performed today your provider may have ordered immunizations, labs, imaging, and/or referrals for you.   A list of these orders (if applicable) as well as your Preventive Care list are included within your After Visit Summary for your review. Other Preventive Recommendations:    · A preventive eye exam performed by an eye specialist is recommended every 1-2 years to screen for glaucoma; cataracts, macular degeneration, and other eye disorders. · A preventive dental visit is recommended every 6 months. · Try to get at least 150 minutes of exercise per week or 10,000 steps per day on a pedometer . · Order or download the FREE \"Exercise & Physical Activity: Your Everyday Guide\" from [employer]. Call 7-505.709.6531 or search [employer] online. · You need 8649-2880 mg of calcium and 4401-6064 IU of vitamin D per day. It is possible to meet your calcium requirement with diet alone, but a vitamin D supplement is usually necessary to meet this goal.  · When exposed to the sun, use a sunscreen that protects against both UVA and UVB radiation with an SPF of 30 or greater. Reapply every 2 to 3 hours or after sweating, drying off with a towel, or swimming. · Always wear a seat belt when traveling in a car. Always wear a helmet when riding a bicycle or motorcycle.

## 2020-12-11 NOTE — CARE COORDINATION
RE: meals on wheels   Received: Today   Message Contents   MD Sara Mares               Thank you, he is a [employer] and can only afford to eat 2X/day, I appreciate your help, Thi Denson    Previous Messages     ----- Message -----   From: Sara Brower   Sent: 12/11/2020  11:11 AM CST   To: Francisca Wellington MD   Subject: RE: meals on wheels                               [facility] - He is too young for meals on wheels.  Has to be 60 years or older. Mnoet Trimble will call him to discuss his situation. Mallika Francoors you.   ----- Message -----   From: Francisca Wellington MD   Sent: 12/11/2020  10:09 AM CST   To: Иван Jasso MA   Subject: meals on wheels                                   Patient needs help with meals on wheels, TY           Submitted by Adelso/AMANW    Telephoned Traci Pickett with the [facility] regarding their Meals-on-Wheels (MOW) program.  She stated I needed to call [facility] in [address] [place] [address] to address. Submitted by NSMiiron/W    Telephoned Taniya at [facility]. She stated I need to speak with [facility]. Submitted by Adelso/W    Telephoned [facility] (tiara Darling I). No answer. Left a voice message asking for a call back. - What is criteria for their MOW program?  - What Soup Mozella Rena are available in the [facility]? - What food pantries are available?     Submitted by Adelso/CHW

## 2020-12-16 ENCOUNTER — HOSPITAL ENCOUNTER (OUTPATIENT)
Dept: WOUND CARE | Age: 55
Discharge: HOME | End: 2020-12-16
Payer: MEDICARE

## 2020-12-16 ENCOUNTER — HOSPITAL ENCOUNTER (OUTPATIENT)
Dept: GENERAL RADIOLOGY | Age: 55
Discharge: HOME | End: 2020-12-16
Payer: MEDICARE

## 2020-12-16 VITALS
HEART RATE: 87 BPM | WEIGHT: 264 LBS | DIASTOLIC BLOOD PRESSURE: 97 MMHG | HEIGHT: 70 IN | TEMPERATURE: 97.4 F | SYSTOLIC BLOOD PRESSURE: 167 MMHG | RESPIRATION RATE: 18 BRPM | BODY MASS INDEX: 37.8 KG/M2

## 2020-12-16 PROCEDURE — 73620 X-RAY EXAM OF FOOT: CPT

## 2020-12-16 PROCEDURE — 11042 DBRDMT SUBQ TIS 1ST 20SQCM/<: CPT | Performed by: SURGERY

## 2020-12-16 PROCEDURE — 11042 DBRDMT SUBQ TIS 1ST 20SQCM/<: CPT

## 2020-12-16 ASSESSMENT — PAIN DESCRIPTION - PROGRESSION: CLINICAL_PROGRESSION: GRADUALLY IMPROVING

## 2020-12-16 ASSESSMENT — PAIN DESCRIPTION - LOCATION: LOCATION: ARM

## 2020-12-16 ASSESSMENT — PAIN SCALES - GENERAL: PAINLEVEL_OUTOF10: 0

## 2020-12-16 NOTE — PROGRESS NOTES
FAMILY HISTORY    Family History   Problem Relation Age of Onset    Stroke Maternal Grandmother     Cancer Maternal Grandmother     Stroke Paternal Grandmother     Diabetes Paternal Grandmother     Cancer Father     Heart Disease Father     Lung Cancer Father     Asthma Paternal Grandfather     Heart Disease Paternal Grandfather     Heart Disease Maternal Grandfather     Colon Cancer Neg Hx     Colon Polyps Neg Hx     Esophageal Cancer Neg Hx     Liver Cancer Neg Hx     Liver Disease Neg Hx     Rectal Cancer Neg Hx     Stomach Cancer Neg Hx        SOCIAL HISTORY    Social History     Tobacco Use    Smoking status: Never Smoker    Smokeless tobacco: Never Used   Substance Use Topics    Alcohol use: No    Drug use: No       ALLERGIES    Allergies   Allergen Reactions    Pcn [Penicillins]     Neomycin-Bacitracin Zn-Polymyx Rash    Neosporin [Neomycin-Polymyxin-Gramicidin] Rash       MEDICATIONS    Current Outpatient Medications on File Prior to Encounter   Medication Sig Dispense Refill    furosemide (LASIX) 20 MG tablet Take 1 tablet by mouth daily as needed (edema) 30 tablet 1    Insulin Degludec (TRESIBA FLEXTOUCH) 200 UNIT/ML SOPN Inject 80 Units into the skin daily      metoprolol tartrate (LOPRESSOR) 25 MG tablet TAKE ONE TABLET TWICE DAILY 60 tablet 2    Insulin Glargine (TOUJEO SOLOSTAR SC) Inject 80 Units into the skin daily      VITAMIN D PO Take 1 tablet by mouth daily      HYDROcodone-acetaminophen (NORCO) 5-325 MG per tablet Take 1 tablet by mouth every 6 hours as needed for Pain.       docusate sodium (COLACE) 250 MG capsule Take 1 capsule by mouth 2 times daily 60 capsule 0    losartan (COZAAR) 100 MG tablet TAKE ONE TABLET DAILY (Patient taking differently: Take 100 mg by mouth daily ) 30 tablet 3    atorvastatin (LIPITOR) 20 MG tablet TAKE ONE TABLET DAILY (Patient taking differently: Take 20 mg by mouth daily ) 90 tablet 3 ENT: tympanic membrane, external ear and ear canal normal bilaterally, nose without deformity, nasal mucosa and turbinates normal without polyps, lips teeth and gums normal  Neck: supple and non-tender without mass, no thyromegaly or thyroid nodules, no cervical lymphadenopathy  Pulmonary/Chest: clear to auscultation bilaterally- no wheezes, rales or rhonchi, normal air movement, no respiratory distress  Cardiovascular: normal rate, regular rhythm, normal S1 and S2, no murmurs, rubs, clicks, or gallops, distal pulses intact, no carotid bruits  Abdomen: soft, non-tender, non-distended, normal bowel sounds, no masses or organomegaly  Extremities: no cyanosis, clubbing or edema  Musculoskeletal: normal range of motion, no joint swelling, deformity or tenderness  Neurologic: reflexes normal and symmetric, no cranial nerve deficit, gait, coordination and speech normal, sensation of skin normal      Assessment:      Problem List Items Addressed This Visit     * (Principal) Diabetic ulcer of right midfoot associated with type 2 diabetes mellitus, with fat layer exposed ([address] - Primary (Chronic)    Relevant Orders    Supply: Wound Cleanser    Supply: Yolis Wound    Supply: Wound Dressings    Supply: Pack Wound    Supply: Cover and Secure    Supply: Edema Control    XR FOOT RIGHT (2 VIEWS)           Procedure Note  Indications:  Based on my examination of this patient's wound(s)/ulcer(s) today, debridement is required to promote healing and evaluate the wound base. Performed by: Scott Olea MD    Consent obtained:  Yes    Time out taken:  Yes    Pain Control: Anesthetic  Anesthetic: 2% Lidocaine Gel Topical       Debridement:Excisional Debridement    Using curette the wound(s)/ulcer(s) was/were sharply debrided down through and including the removal of epidermis, dermis and subcutaneous tissue.         Devitalized Tissue Debrided:  fibrin, biofilm, slough, necrotic/eschar, exudate and callus Number of days: 92             Estimated Blood Loss:  Minimal    Hemostasis Achieved:  by pressure    Procedural Pain:  0  / 10     Post Procedural Pain:  0 / 10     Response to treatment:  Well tolerated by patient. Plan:     Problem List Items Addressed This Visit     * (Principal) Diabetic ulcer of right midfoot associated with type 2 diabetes mellitus, with fat layer exposed ([address] - Primary (Chronic)    Relevant Orders    Supply: Wound Cleanser    Supply: Yolis Wound    Supply: Wound Dressings    Supply: Pack Wound    Supply: Cover and Secure    Supply: Edema Control    XR FOOT RIGHT (2 VIEWS)          Cont F/F possible apligraf ? TCC    Treatment Note please see attached Discharge Instructions    In my professional opinion this patient would benefit from HBO Therapy: No    Written patient dismissal instructions given to patient and signed by patient or POA. Discharge [address] and Hyperbaric Oxygen Therapy   Physician Orders and Discharge Instructions  [address] [place] [address]  Telephone: [phone]    NAME:  Filomena Hammans OF BIRTH:  1965  MEDICAL RECORD NUMBER:  [id number]  DATE:  12/16/2020    Discharge condition: Stable    Discharge to: Home    Left via:Private automobile    Accompanied by: Self     Dressing Orders: Right Plantar: Apply felt/foam to right foot  Wash with soap and water. Xeroform to open area, Cover with dry gauze. Secure with roll gauze and medipore tape. Change daily . Felt and Foam with cutout for the wound  X-ray of Right foot today    Treatment Orders:  Protein rich diet (unless restricted by your physician); Multivitamin daily;  Elevate legs when sitting, avoid standing for  long periods of time.     [facility] follow up visit ____________1 week_________________ (Please note your next appointment above and if you are unable to keep, kindly give a 24 hour notice. Thank you.)    If you experience any of the following, please call the [address] during business hours:    * Increase in Pain  * Temperature over 101  * Increase in drainage from your wound  * Drainage with a foul odor  * Bleeding  * Increase in swelling  * Need for compression bandage changes due to slippage, breakthrough drainage. If you need medical attention outside of the business hours of the [address] please contact your PCP or go to the nearest emergency room.         Electronically signed by Mark Conklin MD on 12/16/2020 at 9:59 AM

## 2020-12-16 NOTE — PLAN OF CARE
Problem: Wound:  Goal: Will show signs of wound healing; wound closure and no evidence of infection  Description: Will show signs of wound healing; wound closure and no evidence of infection  12/16/2020 0958 by Loli Perez RN  Outcome: Ongoing  12/16/2020 0943 by Hernesto Christy RN  Outcome: Ongoing     Problem: Falls - Risk of:  Goal: Will remain free from falls  Description: Will remain free from falls  12/16/2020 0958 by Loli Perez RN  Outcome: Ongoing  12/16/2020 0943 by Hernesto Christy RN  Outcome: Ongoing     Problem: Blood Glucose:  Goal: Ability to maintain appropriate glucose levels will improve  Description: Ability to maintain appropriate glucose levels will improve  12/16/2020 0958 by Loli Perez RN  Outcome: Ongoing  12/16/2020 0943 by Hernesto Christy RN  Outcome: Ongoing

## 2020-12-17 ENCOUNTER — CARE COORDINATION (OUTPATIENT)
Dept: CARE COORDINATION | Age: 55
End: 2020-12-17

## 2020-12-17 NOTE — CARE COORDINATION
Allison Kaur with [employer] in [facility], returned my call. She stated Reed Fairchild is too young for [address] ([address] [facility]) but she thinks she can get food delivered to him if he is able to cook. I told her I thought he was. She is going to speak with Kaylynn Todd with [employer] in [facility]. [employer] delivers food boxes to Seniors. She thinks Kaylynn Todd will be able to provide Reed Fairchild with a food box if he is disabled and his monthly income meets their guidelines. Provided Allison Kaur with Thiago's phone number. She is going to follow-up with him. Submitted by dAelso/MARILEE    "[employer] Told him Allison Kaur will be calling. He verbalized understanding.      Submitted by Adelso/MARILEE

## 2020-12-18 ENCOUNTER — ANTI-COAG VISIT (OUTPATIENT)
Dept: CARDIOLOGY | Age: 55
End: 2020-12-18
Payer: MEDICARE

## 2020-12-18 LAB
INTERNATIONAL NORMALIZATION RATIO, POC: 1.3
PROTHROMBIN TIME, POC: NORMAL

## 2020-12-18 PROCEDURE — 85610 PROTHROMBIN TIME: CPT | Performed by: CLINICAL NURSE SPECIALIST

## 2020-12-23 ENCOUNTER — HOSPITAL ENCOUNTER (OUTPATIENT)
Dept: WOUND CARE | Age: 55
Discharge: HOME | End: 2020-12-23
Payer: MEDICARE

## 2020-12-23 ENCOUNTER — ANTI-COAG VISIT (OUTPATIENT)
Dept: CARDIOLOGY | Age: 55
End: 2020-12-23
Payer: MEDICARE

## 2020-12-23 VITALS
SYSTOLIC BLOOD PRESSURE: 143 MMHG | HEIGHT: 70 IN | BODY MASS INDEX: 37.8 KG/M2 | DIASTOLIC BLOOD PRESSURE: 76 MMHG | HEART RATE: 74 BPM | RESPIRATION RATE: 18 BRPM | TEMPERATURE: 98 F | WEIGHT: 264 LBS

## 2020-12-23 PROBLEM — Z79.4 TYPE 2 DIABETES MELLITUS WITH DIABETIC POLYNEUROPATHY, WITH LONG-TERM CURRENT USE OF INSULIN (HCC): Chronic | Status: ACTIVE | Noted: 2019-03-21

## 2020-12-23 PROBLEM — E11.42 TYPE 2 DIABETES MELLITUS WITH DIABETIC POLYNEUROPATHY, WITH LONG-TERM CURRENT USE OF INSULIN (HCC): Chronic | Status: ACTIVE | Noted: 2019-03-21

## 2020-12-23 LAB
INTERNATIONAL NORMALIZATION RATIO, POC: 1.1
PROTHROMBIN TIME, POC: NORMAL

## 2020-12-23 PROCEDURE — 97597 DBRDMT OPN WND 1ST 20 CM/<: CPT

## 2020-12-23 PROCEDURE — 97597 DBRDMT OPN WND 1ST 20 CM/<: CPT | Performed by: SURGERY

## 2020-12-23 PROCEDURE — 85610 PROTHROMBIN TIME: CPT | Performed by: NURSE PRACTITIONER

## 2020-12-23 ASSESSMENT — PAIN DESCRIPTION - LOCATION: LOCATION: ARM

## 2020-12-23 ASSESSMENT — PAIN DESCRIPTION - PROGRESSION: CLINICAL_PROGRESSION: GRADUALLY IMPROVING

## 2020-12-23 ASSESSMENT — PAIN SCALES - GENERAL: PAINLEVEL_OUTOF10: 0

## 2020-12-23 NOTE — PROGRESS NOTES
[address]   Progress Note and Procedure Note      [address] RECORD NUMBER:  [id number]  AGE: 60 y.o. GENDER: male  : 1965  EPISODE DATE:  2020    Subjective:     Chief Complaint   Patient presents with    Wound Check     follow up         HISTORY of PRESENT ILLNESS HPI     Tasha Mcgovern is a 60 y.o. male who presents today for wound/ulcer evaluation.    Wound Context: Pt with R foot wound here for eval/treat  Wound/Ulcer Pain Timing/Severity: none  Quality of pain: N/A  Severity:  0 / 10   Modifying Factors: None  Associated Signs/Symptoms: none    Ulcer Identification:  Ulcer Type: diabetic and pressure  Contributing Factors: diabetes, chronic pressure and shear force    Wound: DM        PAST MEDICAL HISTORY        Diagnosis Date    Acalculous cholecystitis 2015    Allergic rhinitis, cause unspecified     Atrial fibrillation, chronic (HCC)     sees [facility] cardiology    Charcot's joint of right foot     Chicken pox     Closed supracondylar fracture of elbow     fall    History of MRSA infection     scalp/facial and on back    HTN (hypertension)     Hyperlipidemia     Impotence of organic origin     MDRO (multiple drug resistant organisms) resistance     Other dyspnea and respiratory abnormality     Other specified erythematous condition(695.89)     Personal history of other infectious and parasitic disease     Type II or unspecified type diabetes mellitus without mention of complication, uncontrolled        PAST SURGICAL HISTORY    Past Surgical History:   Procedure Laterality Date    CARDIOVERSION  2017    COLONOSCOPY  2020    Dr Sarahi Fraire-Melanosis coli throughout the entire colon-AP, 5 yr recall    HUMERUS FRACTURE SURGERY Left 2020    OPEN REDUCTION INTERNAL FIXATION LEFT SUPR[facility]AR [facility]RA[facility]      [facility]    TONSILLECTOMY FAMILY HISTORY    Family History   Problem Relation Age of Onset    Stroke Maternal Grandmother     Cancer Maternal Grandmother     Stroke Paternal Grandmother     Diabetes Paternal Grandmother     Cancer Father     Heart Disease Father     Lung Cancer Father     Asthma Paternal Grandfather     Heart Disease Paternal Grandfather     Heart Disease Maternal Grandfather     Colon Cancer Neg Hx     Colon Polyps Neg Hx     Esophageal Cancer Neg Hx     Liver Cancer Neg Hx     Liver Disease Neg Hx     Rectal Cancer Neg Hx     Stomach Cancer Neg Hx        SOCIAL HISTORY    Social History     Tobacco Use    Smoking status: Never Smoker    Smokeless tobacco: Never Used   Substance Use Topics    Alcohol use: No    Drug use: No       ALLERGIES    Allergies   Allergen Reactions    Pcn [Penicillins]     Neomycin-Bacitracin Zn-Polymyx Rash    Neosporin [Neomycin-Polymyxin-Gramicidin] Rash       MEDICATIONS    Current Outpatient Medications on File Prior to Encounter   Medication Sig Dispense Refill    furosemide (LASIX) 20 MG tablet Take 1 tablet by mouth daily as needed (edema) 30 tablet 1    Insulin Degludec (TRESIBA FLEXTOUCH) 200 UNIT/ML SOPN Inject 80 Units into the skin daily      metoprolol tartrate (LOPRESSOR) 25 MG tablet TAKE ONE TABLET TWICE DAILY 60 tablet 2    Insulin Glargine (TOUJEO SOLOSTAR SC) Inject 80 Units into the skin daily      VITAMIN D PO Take 1 tablet by mouth daily      HYDROcodone-acetaminophen (NORCO) 5-325 MG per tablet Take 1 tablet by mouth every 6 hours as needed for Pain.       docusate sodium (COLACE) 250 MG capsule Take 1 capsule by mouth 2 times daily 60 capsule 0    losartan (COZAAR) 100 MG tablet TAKE ONE TABLET DAILY (Patient taking differently: Take 100 mg by mouth daily ) 30 tablet 3    atorvastatin (LIPITOR) 20 MG tablet TAKE ONE TABLET DAILY (Patient taking differently: Take 20 mg by mouth daily ) 90 tablet 3  tamsulosin (FLOMAX) 0.4 MG capsule TAKE TWO CAPSULES DAILY (Patient taking differently: Take 0.8 mg by mouth daily TAKE TWO CAPSULES DAILY) 60 capsule 11    warfarin (COUMADIN) 10 MG tablet Take 1 tablet by mouth daily 45 tablet 5    glimepiride (AMARYL) 4 MG tablet TAKE ONE TABLET EVERY MORNING BEFORE BREAKFAST (Patient taking differently: Take 4 mg by mouth every morning (before breakfast) ) 90 tablet 3    warfarin (COUMADIN) 5 MG tablet TAKE AS DIRECTED BY YOUR DOCTOR (Patient taking differently: Take by mouth daily TAKE AS DIRECTED BY YOUR DOCTOR-keeps 5mg on hand if dose varies) 30 tablet 5    diphenhydrAMINE (BENADRYL) 25 MG tablet Take 25 mg by mouth every 6 hours as needed for Itching      BD INSULIN SYRINGE U/F 31G X 5/16\" 1 ML MISC USE FOUR TIMES DAILY 100 each 3    blood glucose test strips (CONTOUR NEXT TEST) strip 1 each by In Vitro route 4 times daily As needed. 200 each 3    Lancets MISC 1 each by Does not apply route 4 times daily 300 each 1    B-D ULTRAFINE III SHORT PEN 31G X 8 MM MISC Inject 1 each as directed 3 times daily 100 each 5     No current facility-administered medications on file prior to encounter. REVIEW OF SYSTEMS    A comprehensive review of systems was negative.     Objective:      BP (!) 143/76   Pulse 74   Temp 98 °F (36.7 °C) (Temporal)   Resp 18   Ht 5' 10\" (1.778 m)   Wt 264 lb (119.7 kg)   BMI 37.88 kg/m²     Wt Readings from Last 3 Encounters:   12/23/20 264 lb (119.7 kg)   12/16/20 264 lb (119.7 kg)   12/11/20 263 lb 12.8 oz (119.7 kg)       PHYSICAL EXAM    General Appearance: alert and oriented to person, place and time, well developed and well- nourished, in no acute distress  Skin: warm and dry, no rash or erythema  Head: normocephalic and atraumatic  Eyes: pupils equal, round, and reactive to light, extraocular eye movements intact, conjunctivae normal Pre Debridement Measurements:  Are located in the Wound/Ulcer Documentation Flow Sheet    Wound/Ulcer #: 1    Percent of Wound(s)/Ulcer(s) Debrided: 100%    Total Surface Area Debrided:  0.01 sq cm       Diabetic/Pressure/Non Pressure Ulcers only:  Ulcer: Diabetic ulcer, fat layer exposed           Post Debridement Measurements:    Wound/Ulcer Descriptions are Pre Debridement --EXCEPT MEASUREMENTS    Wound 09/14/20 Foot Right;Plantar WOUND 1 RIGHT PLANTAR (Active)   Wound Image   12/23/20 0949   Wound Etiology Diabetic Kat 1 12/23/20 0949   Dressing Status Old drainage noted 12/23/20 0949   Wound Cleansed Cleansed with saline 12/23/20 0949   Dressing/Treatment Xeroform 12/23/20 1005   Offloading for Diabetic Foot Ulcers Felt or foam 12/23/20 0949   Wound Length (cm) 0.1 cm 12/23/20 0949   Wound Width (cm) 0.1 cm 12/23/20 0949   Wound Depth (cm) 0.1 cm 12/23/20 0949   Wound Surface Area (cm^2) 0.01 cm^2 12/23/20 0949   Change in Wound Size % (l*w) 99.44 12/23/20 0949   Wound Volume (cm^3) 0 cm^3 12/23/20 0949   Wound Healing % 100 12/23/20 0949   Post-Procedure Length (cm) 0.1 cm 12/23/20 0952   Post-Procedure Width (cm) 0.1 cm 12/23/20 0952   Post-Procedure Depth (cm) 0.1 cm 12/23/20 0952   Post-Procedure Surface Area (cm^2) 0.01 cm^2 12/23/20 0952   Post-Procedure Volume (cm^3) 0 cm^3 12/23/20 0952   Distance Tunneling (cm) 0 cm 12/23/20 0949   Tunneling Position ___ O'Clock 0 12/23/20 0949   Undermining Starts ___ O'Clock 0 12/23/20 0949   Undermining Ends___ O'Clock 0 12/23/20 0949   Undermining Maxium Distance (cm) 0 12/23/20 0949   Wound Assessment Bleeding 12/23/20 0949   Drainage Amount Scant 12/23/20 0949   Drainage Description Serosanguinous 11/06/20 0957   Odor None 12/23/20 0949   Yolis-wound Assessment Hyperkeratosis (callous) 12/23/20 0949   Margins Attached edges 11/06/20 0957   Wound Thickness Description not for Pressure Injury Full thickness 11/06/20 0957   Number of days: 99 Estimated Blood Loss:  Minimal    Hemostasis Achieved:  by pressure    Procedural Pain:  0  / 10     Post Procedural Pain:  0 / 10     Response to treatment:  Well tolerated by patient. Plan:     Problem List Items Addressed This Visit     Type 2 diabetes mellitus with diabetic polyneuropathy, with long-term current use of insulin (HCC) (Chronic)    * (Principal) Diabetic ulcer of right midfoot associated with type 2 diabetes mellitus, with fat layer exposed ([address] - Primary (Chronic)    Relevant Orders    Supply: Wound Cleanser    Supply: Yolis Wound    Supply: Wound Dressings    Supply: Cover and Secure    Supply: Edema Control          Cont metatarsal pad    Treatment Note please see attached Discharge Instructions    In my professional opinion this patient would benefit from HBO Therapy: No    Written patient dismissal instructions given to patient and signed by patient or POA. Discharge [address] and Hyperbaric Oxygen Therapy   Physician Orders and Discharge Instructions  [address] [address]  Telephone: [phone]    NAME:  Destini Lewis OF BIRTH:  1965  MEDICAL RECORD NUMBER:  [id number]  DATE:  12/23/2020    Discharge condition: Stable    Discharge to: Home    Left via:Private automobile    Accompanied by: Self     Dressing Orders: Right Plantar: Apply felt/foam to right foot  Wash with soap and water. Xeroform to open area, Cover with dry gauze. Secure with roll gauze and medipore tape. Change daily . Wear off load shoe with Met pad      Treatment Orders:  Protein rich diet (unless restricted by your physician); Multivitamin daily; Elevate legs when sitting, avoid standing for  long periods of time. [facility] follow up visit _____________2 weeks________________  (Please note your next appointment above and if you are unable to keep, kindly give a 24 hour notice.  Thank you.)

## 2021-01-06 ENCOUNTER — HOSPITAL ENCOUNTER (OUTPATIENT)
Dept: WOUND CARE | Age: 56
Discharge: HOME | End: 2021-01-06
Payer: MEDICARE

## 2021-01-06 VITALS
HEART RATE: 76 BPM | BODY MASS INDEX: 37.8 KG/M2 | HEIGHT: 70 IN | WEIGHT: 264 LBS | TEMPERATURE: 98.1 F | RESPIRATION RATE: 18 BRPM | SYSTOLIC BLOOD PRESSURE: 152 MMHG | DIASTOLIC BLOOD PRESSURE: 82 MMHG

## 2021-01-06 DIAGNOSIS — E11.621 DIABETIC ULCER OF RIGHT MIDFOOT ASSOCIATED WITH TYPE 2 DIABETES MELLITUS, WITH FAT LAYER EXPOSED (HCC): ICD-10-CM

## 2021-01-06 DIAGNOSIS — E11.42 TYPE 2 DIABETES MELLITUS WITH DIABETIC POLYNEUROPATHY, WITH LONG-TERM CURRENT USE OF INSULIN (HCC): Primary | Chronic | ICD-10-CM

## 2021-01-06 DIAGNOSIS — Z79.4 TYPE 2 DIABETES MELLITUS WITH DIABETIC POLYNEUROPATHY, WITH LONG-TERM CURRENT USE OF INSULIN (HCC): Primary | Chronic | ICD-10-CM

## 2021-01-06 DIAGNOSIS — L97.412 DIABETIC ULCER OF RIGHT MIDFOOT ASSOCIATED WITH TYPE 2 DIABETES MELLITUS, WITH FAT LAYER EXPOSED (HCC): ICD-10-CM

## 2021-01-06 PROCEDURE — 11042 DBRDMT SUBQ TIS 1ST 20SQCM/<: CPT | Performed by: SURGERY

## 2021-01-06 PROCEDURE — 11042 DBRDMT SUBQ TIS 1ST 20SQCM/<: CPT

## 2021-01-06 NOTE — PROGRESS NOTES
[address]   Progress Note and Procedure Note      [address] RECORD NUMBER:  [id number]  AGE: 60 y.o. GENDER: male  : 1965  EPISODE DATE:  2021    Subjective:     Chief Complaint   Patient presents with    Wound Check     Pt hopes it is better not sure he states         HISTORY of PRESENT ILLNESS HPI     Tereso Hansen is a 60 y.o. male who presents today for wound/ulcer evaluation.    Wound Context: Pt with R foot wound here for eval/treat  Wound/Ulcer Pain Timing/Severity: none  Quality of pain: N/A  Severity:  0 / 10   Modifying Factors: None  Associated Signs/Symptoms: none    Ulcer Identification:  Ulcer Type: diabetic and pressure  Contributing Factors: diabetes, chronic pressure and shear force    Wound: DM        PAST MEDICAL HISTORY        Diagnosis Date    Acalculous cholecystitis 2015    Allergic rhinitis, cause unspecified     Atrial fibrillation, chronic (HCC)     se[facility]    Charcot's joint of right foot     Chicken pox     Closed supracondylar fracture of elbow     fall    History of MRSA infection     scalp/facial and on back    HTN (hypertension)     Hyperlipidemia     Impotence of organic origin     MDRO (multiple drug resistant organisms) resistance     Other dyspnea and respiratory abnormality     Other specified erythematous condition(695.89)     Personal history of other infectious and parasitic disease     Type II or unspecified type diabetes mellitus without mention of complication, uncontrolled        PAST SURGICAL HISTORY    Past Surgical History:   Procedure Laterality Date    CARDIOVERSION  2017    COLONOSCOPY  2020    Dr Ivonne Fraire-Melanosis coli throughout the entire colon-AP, 5 yr recall    HUMERUS FRACTURE SURGERY Left 2020    OPEN REDUCTION INTERNAL FIXATION LEFT SUPRACONDYLAR FRACTURE performed by Rachel Sotomayor MD at [address]      X 2  TONSILLECTOMY         FAMILY HISTORY    Family History   Problem Relation Age of Onset    Stroke Maternal Grandmother     Cancer Maternal Grandmother     Stroke Paternal Grandmother     Diabetes Paternal Grandmother     Cancer Father     Heart Disease Father     Lung Cancer Father     Asthma Paternal Grandfather     Heart Disease Paternal Grandfather     Heart Disease Maternal Grandfather     Colon Cancer Neg Hx     Colon Polyps Neg Hx     Esophageal Cancer Neg Hx     Liver Cancer Neg Hx     Liver Disease Neg Hx     Rectal Cancer Neg Hx     Stomach Cancer Neg Hx        SOCIAL HISTORY    Social History     Tobacco Use    Smoking status: Never Smoker    Smokeless tobacco: Never Used   Substance Use Topics    Alcohol use: No    Drug use: No       ALLERGIES    Allergies   Allergen Reactions    Pcn [Penicillins]     Neomycin-Bacitracin Zn-Polymyx Rash    Neosporin [Neomycin-Polymyxin-Gramicidin] Rash       MEDICATIONS    Current Outpatient Medications on File Prior to Encounter   Medication Sig Dispense Refill    furosemide (LASIX) 20 MG tablet Take 1 tablet by mouth daily as needed (edema) 30 tablet 1    Insulin Degludec (TRESIBA FLEXTOUCH) 200 UNIT/ML SOPN Inject 80 Units into the skin daily      metoprolol tartrate (LOPRESSOR) 25 MG tablet TAKE ONE TABLET TWICE DAILY 60 tablet 2    VITAMIN D PO Take 1 tablet by mouth daily      docusate sodium (COLACE) 250 MG capsule Take 1 capsule by mouth 2 times daily 60 capsule 0    atorvastatin (LIPITOR) 20 MG tablet TAKE ONE TABLET DAILY (Patient taking differently: Take 20 mg by mouth daily ) 90 tablet 3    tamsulosin (FLOMAX) 0.4 MG capsule TAKE TWO CAPSULES DAILY (Patient taking differently: Take 0.8 mg by mouth daily TAKE TWO CAPSULES DAILY) 60 capsule 11    warfarin (COUMADIN) 10 MG tablet Take 1 tablet by mouth daily 45 tablet 5  glimepiride (AMARYL) 4 MG tablet TAKE ONE TABLET EVERY MORNING BEFORE BREAKFAST (Patient taking differently: Take 4 mg by mouth every morning (before breakfast) ) 90 tablet 3    HYDROcodone-acetaminophen (NORCO) 5-325 MG per tablet Take 1 tablet by mouth every 6 hours as needed for Pain.  losartan (COZAAR) 100 MG tablet TAKE ONE TABLET DAILY (Patient taking differently: Take 100 mg by mouth daily ) 30 tablet 3    BD INSULIN SYRINGE U/F 31G X 5/16\" 1 ML MISC USE FOUR TIMES DAILY 100 each 3    warfarin (COUMADIN) 5 MG tablet TAKE AS DIRECTED BY YOUR DOCTOR (Patient taking differently: Take by mouth daily TAKE AS DIRECTED BY YOUR DOCTOR-keeps 5mg on hand if dose varies) 30 tablet 5    diphenhydrAMINE (BENADRYL) 25 MG tablet Take 25 mg by mouth every 6 hours as needed for Itching      blood glucose test strips (CONTOUR NEXT TEST) strip 1 each by In Vitro route 4 times daily As needed. 200 each 3    Lancets MISC 1 each by Does not apply route 4 times daily 300 each 1    B-D ULTRAFINE III SHORT PEN 31G X 8 MM MISC Inject 1 each as directed 3 times daily 100 each 5     No current facility-administered medications on file prior to encounter. REVIEW OF SYSTEMS    A comprehensive review of systems was negative.     Objective:      BP (!) 152/82   Pulse 76   Temp 98.1 °F (36.7 °C) (Temporal)   Resp 18   Ht 5' 10\" (1.778 m)   Wt 264 lb (119.7 kg)   BMI 37.88 kg/m²     Wt Readings from Last 3 Encounters:   01/06/21 264 lb (119.7 kg)   12/23/20 264 lb (119.7 kg)   12/16/20 264 lb (119.7 kg)       PHYSICAL EXAM    General Appearance: alert and oriented to person, place and time, well developed and well- nourished, in no acute distress  Skin: warm and dry, no rash or erythema  Head: normocephalic and atraumatic  Eyes: pupils equal, round, and reactive to light, extraocular eye movements intact, conjunctivae normal ENT: tympanic membrane, external ear and ear canal normal bilaterally, nose without deformity, nasal mucosa and turbinates normal without polyps, lips teeth and gums normal  Neck: supple and non-tender without mass, no thyromegaly or thyroid nodules, no cervical lymphadenopathy  Pulmonary/Chest: clear to auscultation bilaterally- no wheezes, rales or rhonchi, normal air movement, no respiratory distress  Cardiovascular: normal rate, regular rhythm, normal S1 and S2, no murmurs, rubs, clicks, or gallops, distal pulses intact, no carotid bruits  Abdomen: soft, non-tender, non-distended, normal bowel sounds, no masses or organomegaly  Extremities: no cyanosis, clubbing or edema  Musculoskeletal: normal range of motion, no joint swelling, deformity or tenderness  Neurologic: reflexes normal and symmetric, no cranial nerve deficit, gait, coordination and speech normal, sensation of skin normal      Assessment:      Problem List Items Addressed This Visit     Type 2 diabetes mellitus with diabetic polyneuropathy, with long-term current use of insulin (HCC) - Primary (Chronic)    * (Principal) Diabetic ulcer of right midfoot associated with type 2 diabetes mellitus, with fat layer exposed (HCC) (Chronic)           Procedure Note  Indications:  Based on my examination of this patient's wound(s)/ulcer(s) today, debridement is required to promote healing and evaluate the wound base. Performed by: Luma Belcher MD    Consent obtained:  Yes    Time out taken:  Yes    Pain Control:         Debridement:Excisional Debridement    Using curette, #15 blade scalpel and forceps the wound(s)/ulcer(s) was/were sharply debrided down through and including the removal of epidermis, dermis and subcutaneous tissue.         Devitalized Tissue Debrided:  fibrin, biofilm, slough, necrotic/eschar, exudate and callus      Pre Debridement Measurements:  Are located in the [place]  Documentation Flow Sheet    Wound/Ulcer #: 1 Percent of Wound(s)/Ulcer(s) Debrided: 100%    Total Surface Area Debrided:  2 sq cm       Diabetic/Pressure/Non Pressure Ulcers only:  Ulcer: Diabetic ulcer, fat layer exposed           Post Debridement Measurements:    Wound/Ulcer Descriptions are Pre Debridement --EXCEPT MEASUREMENTS    Wound 09/14/20 Foot Right;Plantar WOUND 1 RIGHT PLANTAR (Active)   Wound Image    01/06/21 0935   Wound Etiology Diabetic Kat 1 01/06/21 0935   Dressing Status Old drainage noted 01/06/21 0935   Wound Cleansed Soap and water 01/06/21 0935   Dressing/Treatment Xeroform 12/23/20 1005   Offloading for Diabetic Foot Ulcers Felt or foam 01/06/21 0935   Wound Length (cm) 0.2 cm 01/06/21 0935   Wound Width (cm) 0.4 cm 01/06/21 0935   Wound Depth (cm) 0.5 cm 01/06/21 0935   Wound Surface Area (cm^2) 0.08 cm^2 01/06/21 0935   Change in Wound Size % (l*w) 95.56 01/06/21 0935   Wound Volume (cm^3) 0.04 cm^3 01/06/21 0935   Wound Healing % 97 01/06/21 0935   Post-Procedure Length (cm) 2 cm 01/06/21 1008   Post-Procedure Width (cm) 1 cm 01/06/21 1008   Post-Procedure Depth (cm) 0.5 cm 01/06/21 1008   Post-Procedure Surface Area (cm^2) 2 cm^2 01/06/21 1008   Post-Procedure Volume (cm^3) 1 cm^3 01/06/21 1008   Distance Tunneling (cm) 0 cm 01/06/21 0935   Tunneling Position ___ O'Clock 0 01/06/21 0935   Undermining Starts ___ O'Clock 12 01/06/21 0935   Undermining Ends___ O'Clock 12 01/06/21 0935   Undermining Maxium Distance (cm) 1.0 01/06/21 0935   Wound Assessment Pink/red;Slough 01/06/21 0935   Drainage Amount Moderate 01/06/21 0935   Drainage Description Serosanguinous 01/06/21 0935   Odor None 01/06/21 0935   Yolis-wound Assessment Maceration 01/06/21 0935   Margins Unattached edges 01/06/21 0935   Wound Thickness Description not for Pressure Injury Full thickness 11/06/20 0957   Number of days: 113             Estimated Blood Loss:  Minimal    Hemostasis Achieved:  by pressure    Procedural Pain:  0  / 10 Post Procedural Pain:  0 / 10     Response to treatment:  Well tolerated by patient. Plan:     Problem List Items Addressed This Visit     Type 2 diabetes mellitus with diabetic polyneuropathy, with long-term current use of insulin ([address] - Primary (Chronic)    * (Principal) Diabetic ulcer of right midfoot associated with type 2 diabetes mellitus, with fat layer exposed ([address] (Chronic)          Possible TCC if doesn't improve    Treatment Note please see attached Discharge Instructions    In my professional opinion this patient would benefit from HBO Therapy: No    Written patient dismissal instructions given to patient and signed by patient or POA. Discharge [address] and Hyperbaric Oxygen Therapy   Physician Orders and Discharge Instructions  [address] [place] [address]  Telephone: [phone]    NAME:  Primus Elders OF BIRTH:  1965  MEDICAL RECORD NUMBER:  [id number]  DATE:  1/6/2021    Discharge condition: Stable    Discharge to: Home    Left via:Private automobile    Accompanied by: Self     Dressing Orders: Right Plantar: Apply felt/foam to right foot  Wash with soap and water. Aquacel Ag to open area, Cover with dry gauze. Secure with roll gauze and medipore tape. Change once daily . Wear off load shoe at all times   Possible TCC next week      Treatment Orders:  Protein rich diet (unless restricted by your physician); Multivitamin daily; Elevate legs when sitting, avoid standing for  long periods of time. [address] [facility] follow up visit ______________1 week_______________  (Please note your next appointment above and if you are unable to keep, kindly give a 24 hour notice.  Thank you.)    If you experience any of the following, please call the [address] [facility] [address] during business hours:    * Increase in Pain  * Temperature over 101  * Increase in drainage from your wound  * Drainage with a foul odor * Bleeding  * Increase in swelling  * Need for compression bandage changes due to slippage, breakthrough drainage. If you need medical attention outside of the business hours of the [address] [place] [address] Road please contact your PCP or go to the nearest emergency room.         Electronically signed by Amy Singleton MD on 1/6/2021 at 10:10 AM

## 2021-01-11 RX ORDER — LOSARTAN POTASSIUM 100 MG/1
TABLET ORAL
Qty: 90 TABLET | Refills: 3 | Status: SHIPPED | OUTPATIENT
Start: 2021-01-11 | End: 2021-11-03

## 2021-01-12 ENCOUNTER — HOSPITAL ENCOUNTER (OUTPATIENT)
Dept: WOUND CARE | Age: 56
Discharge: HOME | End: 2021-01-12
Payer: MEDICARE

## 2021-01-12 VITALS
WEIGHT: 264 LBS | DIASTOLIC BLOOD PRESSURE: 114 MMHG | HEART RATE: 74 BPM | HEIGHT: 70 IN | SYSTOLIC BLOOD PRESSURE: 188 MMHG | BODY MASS INDEX: 37.8 KG/M2 | RESPIRATION RATE: 20 BRPM | TEMPERATURE: 97.9 F

## 2021-01-12 DIAGNOSIS — E11.621 DIABETIC ULCER OF RIGHT MIDFOOT ASSOCIATED WITH TYPE 2 DIABETES MELLITUS, WITH FAT LAYER EXPOSED (HCC): Primary | ICD-10-CM

## 2021-01-12 DIAGNOSIS — Z79.4 TYPE 2 DIABETES MELLITUS WITH DIABETIC POLYNEUROPATHY, WITH LONG-TERM CURRENT USE OF INSULIN (HCC): Chronic | ICD-10-CM

## 2021-01-12 DIAGNOSIS — L97.412 DIABETIC ULCER OF RIGHT MIDFOOT ASSOCIATED WITH TYPE 2 DIABETES MELLITUS, WITH FAT LAYER EXPOSED (HCC): Primary | ICD-10-CM

## 2021-01-12 DIAGNOSIS — E11.42 TYPE 2 DIABETES MELLITUS WITH DIABETIC POLYNEUROPATHY, WITH LONG-TERM CURRENT USE OF INSULIN (HCC): Chronic | ICD-10-CM

## 2021-01-12 PROCEDURE — 11042 DBRDMT SUBQ TIS 1ST 20SQCM/<: CPT | Performed by: SURGERY

## 2021-01-12 PROCEDURE — 11042 DBRDMT SUBQ TIS 1ST 20SQCM/<: CPT

## 2021-01-12 NOTE — PROGRESS NOTES
[address]   Progress Note and Procedure Note      [address] RECORD NUMBER:  [id number]  AGE: 60 y.o. GENDER: male  : 1965  EPISODE DATE:  2021    Subjective:     Chief Complaint   Patient presents with    Wound Check     Patient unsure if wound is better         HISTORY of PRESENT ILLNESS HPI     Angelica Matters is a 60 y.o. male who presents today for wound/ulcer evaluation.    Wound Context: Pt with R plntar foot wound here for eval/treat  Wound/Ulcer Pain Timing/Severity: none  Quality of pain: N/A  Severity:  0 / 10   Modifying Factors: None  Associated Signs/Symptoms: none    Ulcer Identification:  Ulcer Type: diabetic and pressure  Contributing Factors: diabetes, chronic pressure and shear force    Wound: DM        PAST MEDICAL HISTORY        Diagnosis Date    Acalculous cholecystitis 2015    Allergic rhinitis, cause unspecified     Atrial fibrillation, chronic (HCC)     sees [facility] cardiology    Charcot's joint of right foot     Chicken pox     Closed supracondylar fracture of elbow     fall    History of MRSA infection     scalp/facial and on back    HTN (hypertension)     Hyperlipidemia     Impotence of organic origin     MDRO (multiple drug resistant organisms) resistance     Other dyspnea and respiratory abnormality     Other specified erythematous condition(695.89)     Personal history of other infectious and parasitic disease     Type II or unspecified type diabetes mellitus without mention of complication, uncontrolled        PAST SURGICAL HISTORY    Past Surgical History:   Procedure Laterality Date    CARDIOVERSION  2017    COLONOSCOPY  2020    Dr Stiven Fraire-Melanosis coli throughout the entire colon-AP, 5 yr recall    HUMERUS FRACTURE SURGERY Left 2020    OPEN REDUCTION INTERNAL FIXATION LEFT SUPRACONDYLAR FRACTURE performed by Sharda Olivarez MD at [address]      X 2  TONSILLECTOMY         FAMILY HISTORY    Family History   Problem Relation Age of Onset    Stroke Maternal Grandmother     Cancer Maternal Grandmother     Stroke Paternal Grandmother     Diabetes Paternal Grandmother     Cancer Father     Heart Disease Father     Lung Cancer Father     Asthma Paternal Grandfather     Heart Disease Paternal Grandfather     Heart Disease Maternal Grandfather     Colon Cancer Neg Hx     Colon Polyps Neg Hx     Esophageal Cancer Neg Hx     Liver Cancer Neg Hx     Liver Disease Neg Hx     Rectal Cancer Neg Hx     Stomach Cancer Neg Hx        SOCIAL HISTORY    Social History     Tobacco Use    Smoking status: Never Smoker    Smokeless tobacco: Never Used   Substance Use Topics    Alcohol use: No    Drug use: No       ALLERGIES    Allergies   Allergen Reactions    Pcn [Penicillins]     Neomycin-Bacitracin Zn-Polymyx Rash    Neosporin [Neomycin-Polymyxin-Gramicidin] Rash       MEDICATIONS    Current Outpatient Medications on File Prior to Encounter   Medication Sig Dispense Refill    losartan (COZAAR) 100 MG tablet TAKE ONE TABLET DAILY 90 tablet 3    furosemide (LASIX) 20 MG tablet Take 1 tablet by mouth daily as needed (edema) 30 tablet 1    Insulin Degludec (TRESIBA FLEXTOUCH) 200 UNIT/ML SOPN Inject 80 Units into the skin daily      metoprolol tartrate (LOPRESSOR) 25 MG tablet TAKE ONE TABLET TWICE DAILY 60 tablet 2    VITAMIN D PO Take 1 tablet by mouth daily      docusate sodium (COLACE) 250 MG capsule Take 1 capsule by mouth 2 times daily 60 capsule 0    atorvastatin (LIPITOR) 20 MG tablet TAKE ONE TABLET DAILY (Patient taking differently: Take 20 mg by mouth daily ) 90 tablet 3    tamsulosin (FLOMAX) 0.4 MG capsule TAKE TWO CAPSULES DAILY (Patient taking differently: Take 0.8 mg by mouth daily TAKE TWO CAPSULES DAILY) 60 capsule 11    warfarin (COUMADIN) 10 MG tablet Take 1 tablet by mouth daily 45 tablet 5  glimepiride (AMARYL) 4 MG tablet TAKE ONE TABLET EVERY MORNING BEFORE BREAKFAST (Patient taking differently: Take 4 mg by mouth every morning (before breakfast) ) 90 tablet 3    diphenhydrAMINE (BENADRYL) 25 MG tablet Take 25 mg by mouth every 6 hours as needed for Itching      BD INSULIN SYRINGE U/F 31G X 5/16\" 1 ML MISC USE FOUR TIMES DAILY 100 each 3    warfarin (COUMADIN) 5 MG tablet TAKE AS DIRECTED BY YOUR DOCTOR (Patient taking differently: Take by mouth daily TAKE AS DIRECTED BY YOUR DOCTOR-keeps 5mg on hand if dose varies) 30 tablet 5    blood glucose test strips (CONTOUR NEXT TEST) strip 1 each by In Vitro route 4 times daily As needed. 200 each 3    Lancets MISC 1 each by Does not apply route 4 times daily 300 each 1    B-D ULTRAFINE III SHORT PEN 31G X 8 MM MISC Inject 1 each as directed 3 times daily 100 each 5     No current facility-administered medications on file prior to encounter. REVIEW OF SYSTEMS    A comprehensive review of systems was negative.     Objective:      BP (!) 188/114 Comment: ran out of medication 3 days ago, plans to get today  Pulse 74   Temp 97.9 °F (36.6 °C) (Temporal)   Resp 20   Ht 5' 10\" (1.778 m)   Wt 264 lb (119.7 kg)   BMI 37.88 kg/m²     Wt Readings from Last 3 Encounters:   01/12/21 264 lb (119.7 kg)   01/06/21 264 lb (119.7 kg)   12/23/20 264 lb (119.7 kg)       PHYSICAL EXAM    General Appearance: alert and oriented to person, place and time, well developed and well- nourished, in no acute distress  Skin: warm and dry, no rash or erythema  Head: normocephalic and atraumatic  Eyes: pupils equal, round, and reactive to light, extraocular eye movements intact, conjunctivae normal  ENT: tympanic membrane, external ear and ear canal normal bilaterally, nose without deformity, nasal mucosa and turbinates normal without polyps, lips teeth and gums normal Neck: supple and non-tender without mass, no thyromegaly or thyroid nodules, no cervical lymphadenopathy  Pulmonary/Chest: clear to auscultation bilaterally- no wheezes, rales or rhonchi, normal air movement, no respiratory distress  Cardiovascular: normal rate, regular rhythm, normal S1 and S2, no murmurs, rubs, clicks, or gallops, distal pulses intact, no carotid bruits  Abdomen: soft, non-tender, non-distended, normal bowel sounds, no masses or organomegaly  Extremities: no cyanosis, clubbing or edema  Musculoskeletal: normal range of motion, no joint swelling, deformity or tenderness  Neurologic: reflexes normal and symmetric, no cranial nerve deficit, gait, coordination and speech normal, sensation of skin normal      Assessment:      Problem List Items Addressed This Visit     Type 2 diabetes mellitus with diabetic polyneuropathy, with long-term current use of insulin (HCC) (Chronic)    * (Principal) Diabetic ulcer of right midfoot associated with type 2 diabetes mellitus, with fat layer exposed (HCC) - Primary (Chronic)    Relevant Orders    Supply: Wound Cleanser           Procedure Note  Indications:  Based on my examination of this patient's wound(s)/ulcer(s) today, debridement is required to promote healing and evaluate the wound base. Performed by: Ivanna Dan MD    Consent obtained:  Yes    Time out taken:  Yes    Pain Control: Anesthetic  Anesthetic: 2% Lidocaine Gel Topical       Debridement:Excisional Debridement    Using curette the wound(s)/ulcer(s) was/were sharply debrided down through and including the removal of epidermis, dermis and subcutaneous tissue.         Devitalized Tissue Debrided:  fibrin, biofilm, slough, necrotic/eschar and exudate      Pre Debridement Measurements:  Are located in the Wound/Ulcer Documentation Flow Sheet    Wound/Ulcer #: 1    Percent of Wound(s)/Ulcer(s) Debrided: 100%    Total Surface Area Debrided:  0.6 sq cm Diabetic/Pressure/Non Pressure Ulcers only:  Ulcer: Diabetic ulcer, fat layer exposed           Post Debridement Measurements:    Wound/Ulcer Descriptions are Pre Debridement --EXCEPT MEASUREMENTS    Wound 09/14/20 Foot Right;Plantar WOUND 1 RIGHT PLANTAR (Active)   Wound Image    01/12/21 0953   Wound Etiology Diabetic Kat 1 01/12/21 0953   Dressing Status Old drainage noted 01/12/21 0953   Wound Cleansed Soap and water 01/12/21 0953   Dressing/Treatment Alginate with Ag 01/06/21 1008   Offloading for Diabetic Foot Ulcers Forefoot offloading shoe 01/12/21 0953   Wound Length (cm) 0.6 cm 01/12/21 0953   Wound Width (cm) 1 cm 01/12/21 0953   Wound Depth (cm) 0.3 cm 01/12/21 0953   Wound Surface Area (cm^2) 0.6 cm^2 01/12/21 0953   Change in Wound Size % (l*w) 66.67 01/12/21 0953   Wound Volume (cm^3) 0.18 cm^3 01/12/21 0953   Wound Healing % 86 01/12/21 0953   Post-Procedure Length (cm) 0.6 cm 01/12/21 1012   Post-Procedure Width (cm) 1 cm 01/12/21 1012   Post-Procedure Depth (cm) 0.3 cm 01/12/21 1012   Post-Procedure Surface Area (cm^2) 0.6 cm^2 01/12/21 1012   Post-Procedure Volume (cm^3) 0.18 cm^3 01/12/21 1012   Distance Tunneling (cm) 0 cm 01/12/21 0953   Tunneling Position ___ O'Clock 0 01/12/21 0953   Undermining Starts ___ O'Clock 0 01/12/21 0953   Undermining Ends___ O'Clock 0 01/12/21 0953   Undermining Maxium Distance (cm) 0 01/12/21 0953   Wound Assessment Pink/red;Slough 01/12/21 0953   Drainage Amount Small 01/12/21 0953   Drainage Description Serosanguinous 01/12/21 0953   Odor None 01/12/21 0953   Yolis-wound Assessment Hyperkeratosis (callous) 01/12/21 0953   Margins Attached edges 01/12/21 0953   Wound Thickness Description not for Pressure Injury Full thickness 11/06/20 0957   Number of days: 119             Estimated Blood Loss:  Minimal    Hemostasis Achieved:  by pressure    Procedural Pain:  0  / 10     Post Procedural Pain:  0 / 10     Response to treatment:  Well tolerated by patient. Plan:     Problem List Items Addressed This Visit     Type 2 diabetes mellitus with diabetic polyneuropathy, with long-term current use of insulin (HCC) (Chronic)    * (Principal) Diabetic ulcer of right midfoot associated with type 2 diabetes mellitus, with fat layer exposed ([address] - Primary (Chronic)    Relevant Orders    Supply: Wound Cleanser          Cont shoe and current care. RTO 1 week    Treatment Note please see attached Discharge Instructions    In my professional opinion this patient would benefit from HBO Therapy: No    Written patient dismissal instructions given to patient and signed by patient or POA. Discharge [address] and Hyperbaric Oxygen Therapy   Physician Orders and Discharge Instructions  [address] [facility] [place]  [place], [address]  Telephone: [phone]    NAME:  Hedy Johnson OF BIRTH:  1965  MEDICAL RECORD NUMBER:  [id number]  DATE:  1/12/2021    Discharge condition: Stable    Discharge to: Home    Left via:Private automobile    Accompanied by: Self     Dressing Orders: Right Plantar: Apply felt/foam to right foot  Wash with soap and water. Aquacel Ag to open area, Cover with dry gauze. Secure with roll gauze and medipore tape. Change once daily . Wear off load shoe at all times       Treatment Orders:  Protein rich diet (unless restricted by your physician); Multivitamin daily; Elevate legs when sitting, avoid standing for  long periods of time. [address] [place] [address],[address] follow up visit _____________1 week________________  (Please note your next appointment above and if you are unable to keep, kindly give a 24 hour notice.  Thank you.)    If you experience any of the following, please call the [address] [place] [address] Road during business hours:    * Increase in Pain  * Temperature over 101  * Increase in drainage from your wound  * Drainage with a foul odor  * Bleeding  * Increase in swelling * Need for compression bandage changes due to slippage, breakthrough drainage. If you need medical attention outside of the business hours of the [address] [place] [address] Road please contact your PCP or go to the nearest emergency room.         Electronically signed by Luma Belcher MD on 1/12/2021 at 10:13 AM

## 2021-01-19 ENCOUNTER — HOSPITAL ENCOUNTER (OUTPATIENT)
Dept: WOUND CARE | Age: 56
Discharge: HOME | End: 2021-01-19
Payer: MEDICARE

## 2021-01-19 VITALS
WEIGHT: 264 LBS | HEIGHT: 70 IN | HEART RATE: 81 BPM | SYSTOLIC BLOOD PRESSURE: 184 MMHG | BODY MASS INDEX: 37.8 KG/M2 | TEMPERATURE: 96.8 F | RESPIRATION RATE: 18 BRPM | DIASTOLIC BLOOD PRESSURE: 91 MMHG

## 2021-01-19 DIAGNOSIS — E11.42 TYPE 2 DIABETES MELLITUS WITH DIABETIC POLYNEUROPATHY, WITH LONG-TERM CURRENT USE OF INSULIN (HCC): Primary | Chronic | ICD-10-CM

## 2021-01-19 DIAGNOSIS — Z79.4 TYPE 2 DIABETES MELLITUS WITH DIABETIC POLYNEUROPATHY, WITH LONG-TERM CURRENT USE OF INSULIN (HCC): Primary | Chronic | ICD-10-CM

## 2021-01-19 DIAGNOSIS — L97.412 DIABETIC ULCER OF RIGHT MIDFOOT ASSOCIATED WITH TYPE 2 DIABETES MELLITUS, WITH FAT LAYER EXPOSED (HCC): ICD-10-CM

## 2021-01-19 DIAGNOSIS — E11.621 DIABETIC ULCER OF RIGHT MIDFOOT ASSOCIATED WITH TYPE 2 DIABETES MELLITUS, WITH FAT LAYER EXPOSED (HCC): ICD-10-CM

## 2021-01-19 PROCEDURE — 11042 DBRDMT SUBQ TIS 1ST 20SQCM/<: CPT

## 2021-01-19 PROCEDURE — 11042 DBRDMT SUBQ TIS 1ST 20SQCM/<: CPT | Performed by: SURGERY

## 2021-01-19 NOTE — PROGRESS NOTES
[address]   Progress Note and Procedure Note      [address] RECORD NUMBER:  [id number]  AGE: 60 y.o. GENDER: male  : 1965  EPISODE DATE:  2021    Subjective:     Chief Complaint   Patient presents with    Wound Check     Right foot wound; Patient denies increased pain at wound site         HISTORY of PRESENT ILLNESS HPI     Lianna Bermudez is a 60 y.o. male who presents today for wound/ulcer evaluation.    Wound Context: Pt with R plantar foot wound here for eval/treat  Wound/Ulcer Pain Timing/Severity: none  Quality of pain: N/A  Severity:  0 / 10   Modifying Factors: None  Associated Signs/Symptoms: none    Ulcer Identification:  Ulcer Type: diabetic and pressure  Contributing Factors: diabetes, chronic pressure and shear force    Wound: DM        PAST MEDICAL HISTORY        Diagnosis Date    Acalculous cholecystitis 2015    Allergic rhinitis, cause unspecified     Atrial fibrillation, chronic (HCC)     sees [facility] cardiology    Charcot's joint of right foot     Chicken pox     Closed supracondylar fracture of elbow     fall    History of MRSA infection     scalp/facial and on back    HTN (hypertension)     Hyperlipidemia     Impotence of organic origin     MDRO (multiple drug resistant organisms) resistance     Other dyspnea and respiratory abnormality     Other specified erythematous condition(935.89)     Personal history of other infectious and parasitic disease     Type II or unspecified type diabetes mellitus without mention of complication, uncontrolled        PAST SURGICAL HISTORY    Past Surgical History:   Procedure Laterality Date    CARDIOVERSION  2017    COLONOSCOPY  2020    Dr Brisa Fraire-Melanosis coli throughout the entire colon-AP, 5 yr recall    HUMERUS FRACTURE SURGERY Left 2020    OPEN REDUCTION INTERNAL FIXATION LEFT SUPRACONDYLAR FRACTURE performed by Arie Gomes MD at [address] [place] [address]  BD INSULIN SYRINGE U/F 31G X 5/16\" 1 ML MISC USE FOUR TIMES DAILY 100 each 3    warfarin (COUMADIN) 10 MG tablet Take 1 tablet by mouth daily 45 tablet 5    glimepiride (AMARYL) 4 MG tablet TAKE ONE TABLET EVERY MORNING BEFORE BREAKFAST (Patient taking differently: Take 4 mg by mouth every morning (before breakfast) ) 90 tablet 3    warfarin (COUMADIN) 5 MG tablet TAKE AS DIRECTED BY YOUR DOCTOR (Patient taking differently: Take by mouth daily TAKE AS DIRECTED BY YOUR DOCTOR-keeps 5mg on hand if dose varies) 30 tablet 5    diphenhydrAMINE (BENADRYL) 25 MG tablet Take 25 mg by mouth every 6 hours as needed for Itching      blood glucose test strips (CONTOUR NEXT TEST) strip 1 each by In Vitro route 4 times daily As needed. 200 each 3    Lancets MISC 1 each by Does not apply route 4 times daily 300 each 1    B-D ULTRAFINE III SHORT PEN 31G X 8 MM MISC Inject 1 each as directed 3 times daily 100 each 5     No current facility-administered medications on file prior to encounter. REVIEW OF SYSTEMS    A comprehensive review of systems was negative.     Objective:      BP (!) 184/91   Pulse 81   Temp 96.8 °F (36 °C) (Temporal)   Resp 18   Ht 5' 10\" (1.778 m)   Wt 264 lb (119.7 kg)   BMI 37.88 kg/m²     Wt Readings from Last 3 Encounters:   01/19/21 264 lb (119.7 kg)   01/12/21 264 lb (119.7 kg)   01/06/21 264 lb (119.7 kg)       PHYSICAL EXAM    General Appearance: alert and oriented to person, place and time, well developed and well- nourished, in no acute distress  Skin: warm and dry, no rash or erythema  Head: normocephalic and atraumatic  Eyes: pupils equal, round, and reactive to light, extraocular eye movements intact, conjunctivae normal  ENT: tympanic membrane, external ear and ear canal normal bilaterally, nose without deformity, nasal mucosa and turbinates normal without polyps, lips teeth and gums normal Neck: supple and non-tender without mass, no thyromegaly or thyroid nodules, no cervical lymphadenopathy  Pulmonary/Chest: clear to auscultation bilaterally- no wheezes, rales or rhonchi, normal air movement, no respiratory distress  Cardiovascular: normal rate, regular rhythm, normal S1 and S2, no murmurs, rubs, clicks, or gallops, distal pulses intact, no carotid bruits  Abdomen: soft, non-tender, non-distended, normal bowel sounds, no masses or organomegaly  Extremities: no cyanosis, clubbing or edema  Musculoskeletal: normal range of motion, no joint swelling, deformity or tenderness  Neurologic: reflexes normal and symmetric, no cranial nerve deficit, gait, coordination and speech normal, sensation of skin normal      Assessment:      Problem List Items Addressed This Visit     Type 2 diabetes mellitus with diabetic polyneuropathy, with long-term current use of insulin (HCC) - Primary (Chronic)    * (Principal) Diabetic ulcer of right midfoot associated with type 2 diabetes mellitus, with fat layer exposed (HCC) (Chronic)           Procedure Note  Indications:  Based on my examination of this patient's wound(s)/ulcer(s) today, debridement is required to promote healing and evaluate the wound base. Performed by: Chinedu Valles MD    Consent obtained:  Yes    Time out taken:  Yes    Pain Control:         Debridement:Excisional Debridement    Using curette the wound(s)/ulcer(s) was/were sharply debrided down through and including the removal of epidermis, dermis and subcutaneous tissue.         Devitalized Tissue Debrided:  fibrin, biofilm, slough, necrotic/eschar and exudate      Pre Debridement Measurements:  Are located in the Wound/Ulcer Documentation Flow Sheet    Wound/Ulcer #: 1    Percent of Wound(s)/Ulcer(s) Debrided: 100%    Total Surface Area Debrided:  0.35 sq cm       Diabetic/Pressure/Non Pressure Ulcers only:  Ulcer: Diabetic ulcer, fat layer exposed           Post Debridement Measurements: Problem List Items Addressed This Visit     Type 2 diabetes mellitus with diabetic polyneuropathy, with long-term current use of insulin ([address] - Primary (Chronic)    * (Principal) Diabetic ulcer of right midfoot associated with type 2 diabetes mellitus, with fat layer exposed ([address] (Chronic)          Cont offload shoe and current care. RTO 1 week    Treatment Note please see attached Discharge Instructions    In my professional opinion this patient would benefit from HBO Therapy: No    Written patient dismissal instructions given to patient and signed by patient or POA. Discharge [address] and Hyperbaric Oxygen Therapy   Physician Orders and Discharge Instructions  [address] [facility]  [place]  Telephone: [phone]    NAME:  Wendy Hewitt OF BIRTH:  1965  MEDICAL RECORD NUMBER:  [id number]  DATE:  1/19/2021    Discharge condition: Stable    Discharge to: Home    Left via:Private automobile    Accompanied by: Self     Dressing Orders: Right Plantar:  Wash with soap and water. Aquacel Ag to open area, Cover with dry gauze. Secure with roll gauze and medipore tape. Change once daily . Wear off load shoe at all times       Treatment Orders:  Protein rich diet (unless restricted by your physician); Multivitamin daily; Elevate legs when sitting, avoid standing for  long periods of time.     [facility] follow up visit ____________1 week_________________  (Please note your next appointment above and if you are unable to keep, kindly give a 24 hour notice. Thank you.)    If you experience any of the following, please call the [address] [place] [address] Road during business hours:    * Increase in Pain  * Temperature over 101  * Increase in drainage from your wound  * Drainage with a foul odor  * Bleeding  * Increase in swelling  * Need for compression bandage changes due to slippage, breakthrough drainage. If you need medical attention outside of the business hours of the [address] [place] [address] Road please contact your PCP or go to the nearest emergency room.         Electronically signed by Eduardo Olivarez MD on 1/19/2021 at 11:58 AM

## 2021-01-22 LAB
ALBUMIN SERPL-MCNC: 3.8 G/DL
ALP BLD-CCNC: 119 U/L
ALT SERPL-CCNC: 24 U/L
ANION GAP SERPL CALCULATED.3IONS-SCNC: 10 MMOL/L
AST SERPL-CCNC: 22 U/L
BILIRUB SERPL-MCNC: 0.6 MG/DL (ref 0.1–1.4)
BUN BLDV-MCNC: 14 MG/DL
CALCIUM SERPL-MCNC: 9.7 MG/DL
CHLORIDE BLD-SCNC: 97 MMOL/L
CHOLESTEROL, TOTAL: 169 MG/DL
CHOLESTEROL/HDL RATIO: 4.33
CO2: 26 MMOL/L
CREAT SERPL-MCNC: 1.01 MG/DL
CREATININE, URINE: 1960
GFR CALCULATED: 76
GLUCOSE BLD-MCNC: 253 MG/DL
HDLC SERPL-MCNC: 39 MG/DL (ref 35–70)
LDL CHOLESTEROL CALCULATED: 103 MG/DL (ref 0–160)
MICROALBUMIN/CREAT 24H UR: 1791.8 MG/G{CREAT}
MICROALBUMIN/CREAT UR-RTO: 91.4
NONHDLC SERPL-MCNC: 130 MG/DL
POTASSIUM SERPL-SCNC: 4.1 MMOL/L
SODIUM BLD-SCNC: 133 MMOL/L
TOTAL PROTEIN: 7.4
TRIGL SERPL-MCNC: 136 MG/DL
VLDLC SERPL CALC-MCNC: 27 MG/DL

## 2021-01-28 ENCOUNTER — CARE COORDINATION (OUTPATIENT)
Dept: CARE COORDINATION | Age: 56
End: 2021-01-28

## 2021-01-28 ENCOUNTER — TELEPHONE (OUTPATIENT)
Dept: INTERNAL MEDICINE | Age: 56
End: 2021-01-28

## 2021-01-28 ENCOUNTER — HOSPITAL ENCOUNTER (OUTPATIENT)
Dept: WOUND CARE | Age: 56
Discharge: HOME | End: 2021-01-28
Payer: MEDICARE

## 2021-01-28 VITALS
SYSTOLIC BLOOD PRESSURE: 173 MMHG | DIASTOLIC BLOOD PRESSURE: 101 MMHG | HEART RATE: 80 BPM | BODY MASS INDEX: 37.8 KG/M2 | HEIGHT: 70 IN | TEMPERATURE: 98 F | WEIGHT: 264 LBS | RESPIRATION RATE: 18 BRPM

## 2021-01-28 DIAGNOSIS — E11.42 TYPE 2 DIABETES MELLITUS WITH DIABETIC POLYNEUROPATHY, WITH LONG-TERM CURRENT USE OF INSULIN (HCC): Primary | Chronic | ICD-10-CM

## 2021-01-28 DIAGNOSIS — L97.412 DIABETIC ULCER OF RIGHT MIDFOOT ASSOCIATED WITH TYPE 2 DIABETES MELLITUS, WITH FAT LAYER EXPOSED (HCC): ICD-10-CM

## 2021-01-28 DIAGNOSIS — E11.621 DIABETIC ULCER OF RIGHT MIDFOOT ASSOCIATED WITH TYPE 2 DIABETES MELLITUS, WITH FAT LAYER EXPOSED (HCC): ICD-10-CM

## 2021-01-28 DIAGNOSIS — Z79.4 TYPE 2 DIABETES MELLITUS WITH DIABETIC POLYNEUROPATHY, WITH LONG-TERM CURRENT USE OF INSULIN (HCC): Primary | Chronic | ICD-10-CM

## 2021-01-28 PROCEDURE — 11042 DBRDMT SUBQ TIS 1ST 20SQCM/<: CPT

## 2021-01-28 PROCEDURE — 11042 DBRDMT SUBQ TIS 1ST 20SQCM/<: CPT | Performed by: SURGERY

## 2021-01-28 RX ORDER — INSULIN LISPRO 100 [IU]/ML
10 INJECTION, SOLUTION INTRAVENOUS; SUBCUTANEOUS
Qty: 4 PEN | Refills: 0 | Status: ON HOLD | COMMUNITY
Start: 2021-01-28 | End: 2021-04-19 | Stop reason: HOSPADM

## 2021-01-28 ASSESSMENT — PAIN SCALES - GENERAL: PAINLEVEL_OUTOF10: 0

## 2021-01-28 NOTE — PLAN OF CARE
Problem: Wound:  Goal: Will show signs of wound healing; wound closure and no evidence of infection  Description: Will show signs of wound healing; wound closure and no evidence of infection  1/28/2021 1048 by José Miguel Patel RN  Outcome: Ongoing  1/28/2021 1029 by José Miguel Patel RN  Outcome: Ongoing     Problem: Falls - Risk of:  Goal: Will remain free from falls  Description: Will remain free from falls  1/28/2021 1048 by José Miguel Patel RN  Outcome: Ongoing  1/28/2021 1029 by José Miguel Patel RN  Outcome: Ongoing     Problem: Blood Glucose:  Goal: Ability to maintain appropriate glucose levels will improve  Description: Ability to maintain appropriate glucose levels will improve  1/28/2021 1048 by José Miguel aPtel RN  Outcome: Ongoing  1/28/2021 1029 by José Miguel Patel RN  Outcome: Ongoing

## 2021-01-28 NOTE — PROGRESS NOTES
[address]   Progress Note and Procedure Note      [address] RECORD NUMBER:  [id number]  AGE: 60 y.o. GENDER: male  : 1965  EPISODE DATE:  2021    Subjective:     No chief complaint on file. HISTORY of PRESENT ILLNESS HPI     Linda Cummins is a 60 y.o. male who presents today for wound/ulcer evaluation.    Wound Context: Pt with R plantar foot wound here for eval/treat  Wound/Ulcer Pain Timing/Severity: none  Quality of pain: N/A  Severity:  0 / 10   Modifying Factors: None  Associated Signs/Symptoms: none    Ulcer Identification:  Ulcer Type: diabetic and pressure  Contributing Factors: diabetes, chronic pressure and shear force    Wound: DM        PAST MEDICAL HISTORY        Diagnosis Date    Acalculous cholecystitis 2015    Allergic rhinitis, cause unspecified     Atrial fibrillation, chronic (HCC)     sees [facility] cardiology    Charcot's joint of right foot     Chicken pox     Closed supracondylar fracture of elbow     fall    History of MRSA infection     scalp/facial and on back    HTN (hypertension)     Hyperlipidemia     Impotence of organic origin     MDRO (multiple drug resistant organisms) resistance     Other dyspnea and respiratory abnormality     Other specified erythematous condition(695.89)     Personal history of other infectious and parasitic disease     Type II or unspecified type diabetes mellitus without mention of complication, uncontrolled        PAST SURGICAL HISTORY    Past Surgical History:   Procedure Laterality Date    CARDIOVERSION  2017    COLONOSCOPY  2020    Dr Dominique Fraire-Melanosis coli throughout the entire colon-AP, 5 yr recall    HUMERUS FRACTURE SURGERY Left 2020    OPEN REDUCTION INTERNAL FIXATION LEFT SUPRACONDYLAR FRACTURE performed by Gabriela Bowden MD at [facility] MALIGNANT SKIN LESION EXCISION      X 2    TONSILLECTOMY         FAMILY HISTORY    Family History Problem Relation Age of Onset    Stroke Maternal Grandmother     Cancer Maternal Grandmother     Stroke Paternal Grandmother     Diabetes Paternal Grandmother     Cancer Father     Heart Disease Father     Lung Cancer Father     Asthma Paternal Grandfather     Heart Disease Paternal Grandfather     Heart Disease Maternal Grandfather     Colon Cancer Neg Hx     Colon Polyps Neg Hx     Esophageal Cancer Neg Hx     Liver Cancer Neg Hx     Liver Disease Neg Hx     Rectal Cancer Neg Hx     Stomach Cancer Neg Hx        SOCIAL HISTORY    Social History     Tobacco Use    Smoking status: Never Smoker    Smokeless tobacco: Never Used   Substance Use Topics    Alcohol use: No    Drug use: No       ALLERGIES    Allergies   Allergen Reactions    Pcn [Penicillins]     Neomycin-Bacitracin Zn-Polymyx Rash    Neosporin [Neomycin-Polymyxin-Gramicidin] Rash       MEDICATIONS    Current Outpatient Medications on File Prior to Encounter   Medication Sig Dispense Refill    losartan (COZAAR) 100 MG tablet TAKE ONE TABLET DAILY 90 tablet 3    furosemide (LASIX) 20 MG tablet Take 1 tablet by mouth daily as needed (edema) 30 tablet 1    Insulin Degludec (TRESIBA FLEXTOUCH) 200 UNIT/ML SOPN Inject 80 Units into the skin daily      metoprolol tartrate (LOPRESSOR) 25 MG tablet TAKE ONE TABLET TWICE DAILY 60 tablet 2    VITAMIN D PO Take 1 tablet by mouth daily      atorvastatin (LIPITOR) 20 MG tablet TAKE ONE TABLET DAILY (Patient taking differently: Take 20 mg by mouth daily ) 90 tablet 3    tamsulosin (FLOMAX) 0.4 MG capsule TAKE TWO CAPSULES DAILY (Patient taking differently: Take 0.8 mg by mouth daily TAKE TWO CAPSULES DAILY) 60 capsule 11    warfarin (COUMADIN) 10 MG tablet Take 1 tablet by mouth daily 45 tablet 5    glimepiride (AMARYL) 4 MG tablet TAKE ONE TABLET EVERY MORNING BEFORE BREAKFAST (Patient taking differently: Take 4 mg by mouth every morning (before breakfast) ) 90 tablet 3  docusate sodium (COLACE) 250 MG capsule Take 1 capsule by mouth 2 times daily 60 capsule 0    BD INSULIN SYRINGE U/F 31G X 5/16\" 1 ML MISC USE FOUR TIMES DAILY 100 each 3    warfarin (COUMADIN) 5 MG tablet TAKE AS DIRECTED BY YOUR DOCTOR (Patient taking differently: Take by mouth daily TAKE AS DIRECTED BY YOUR DOCTOR-keeps 5mg on hand if dose varies) 30 tablet 5    diphenhydrAMINE (BENADRYL) 25 MG tablet Take 25 mg by mouth every 6 hours as needed for Itching      blood glucose test strips (CONTOUR NEXT TEST) strip 1 each by In Vitro route 4 times daily As needed. 200 each 3    Lancets MISC 1 each by Does not apply route 4 times daily 300 each 1    B-D ULTRAFINE III SHORT PEN 31G X 8 MM MISC Inject 1 each as directed 3 times daily 100 each 5     No current facility-administered medications on file prior to encounter. REVIEW OF SYSTEMS    A comprehensive review of systems was negative.     Objective:      BP (!) 173/101   Pulse 80   Temp 98 °F (36.7 °C) (Temporal)   Resp 18   Ht 5' 10\" (1.778 m)   Wt 264 lb (119.7 kg)   BMI 37.88 kg/m²     Wt Readings from Last 3 Encounters:   01/28/21 264 lb (119.7 kg)   01/19/21 264 lb (119.7 kg)   01/12/21 264 lb (119.7 kg)       PHYSICAL EXAM    General Appearance: alert and oriented to person, place and time, well developed and well- nourished, in no acute distress  Skin: warm and dry, no rash or erythema  Head: normocephalic and atraumatic  Eyes: pupils equal, round, and reactive to light, extraocular eye movements intact, conjunctivae normal  ENT: tympanic membrane, external ear and ear canal normal bilaterally, nose without deformity, nasal mucosa and turbinates normal without polyps, lips teeth and gums normal  Neck: supple and non-tender without mass, no thyromegaly or thyroid nodules, no cervical lymphadenopathy  Pulmonary/Chest: clear to auscultation bilaterally- no wheezes, rales or rhonchi, normal air movement, no respiratory distress Cardiovascular: normal rate, regular rhythm, normal S1 and S2, no murmurs, rubs, clicks, or gallops, distal pulses intact, no carotid bruits  Abdomen: soft, non-tender, non-distended, normal bowel sounds, no masses or organomegaly  Extremities: no cyanosis, clubbing or edema  Musculoskeletal: normal range of motion, no joint swelling, deformity or tenderness  Neurologic: reflexes normal and symmetric, no cranial nerve deficit, gait, coordination and speech normal, sensation of skin normal      Assessment:      Problem List Items Addressed This Visit     Type 2 diabetes mellitus with diabetic polyneuropathy, with long-term current use of insulin (HCC) - Primary (Chronic)    * (Principal) Diabetic ulcer of right midfoot associated with type 2 diabetes mellitus, with fat layer exposed (HCC) (Chronic)    Relevant Orders    Supply: Wound Cleanser    Supply: Yolis Wound    Supply: Wound Dressings    Supply: Pack Wound           Procedure Note  Indications:  Based on my examination of this patient's wound(s)/ulcer(s) today, debridement is required to promote healing and evaluate the wound base. Performed by: Mark Moeller MD    Consent obtained:  Yes    Time out taken:  Yes    Pain Control: Anesthetic  Anesthetic: None       Debridement:Excisional Debridement    Using curette the wound(s)/ulcer(s) was/were sharply debrided down through and including the removal of epidermis, dermis and subcutaneous tissue.         Devitalized Tissue Debrided:  fibrin, biofilm, slough, necrotic/eschar, exudate and callus      Pre Debridement Measurements:  Are located in the Wound/Ulcer Documentation Flow Sheet    Wound/Ulcer #: 1    Percent of Wound(s)/Ulcer(s) Debrided: 100%    Total Surface Area Debrided:  0.24 sq cm       Diabetic/Pressure/Non Pressure Ulcers only:  Ulcer: Diabetic ulcer, fat layer exposed           Post Debridement Measurements:    Wound/Ulcer Descriptions are Pre Debridement --EXCEPT MEASUREMENTS Wound 09/14/20 Foot Right;Plantar WOUND 1 RIGHT PLANTAR (Active)   Wound Image   01/28/21 1030   Wound Etiology Diabetic Kat 1 01/28/21 1030   Dressing Status New dressing applied 01/28/21 1049   Wound Cleansed Soap and water 01/28/21 1030   Dressing/Treatment Alginate with Ag 01/28/21 1049   Offloading for Diabetic Foot Ulcers Forefoot offloading shoe 01/28/21 1030   Wound Length (cm) 0.4 cm 01/28/21 1030   Wound Width (cm) 0.6 cm 01/28/21 1030   Wound Depth (cm) 0.3 cm 01/28/21 1030   Wound Surface Area (cm^2) 0.24 cm^2 01/28/21 1030   Change in Wound Size % (l*w) 86.67 01/28/21 1030   Wound Volume (cm^3) 0.07 cm^3 01/28/21 1030   Wound Healing % 94 01/28/21 1030   Post-Procedure Length (cm) 0.4 cm 01/28/21 1049   Post-Procedure Width (cm) 0.6 cm 01/28/21 1049   Post-Procedure Depth (cm) 0.3 cm 01/28/21 1049   Post-Procedure Surface Area (cm^2) 0.24 cm^2 01/28/21 1049   Post-Procedure Volume (cm^3) 0.07 cm^3 01/28/21 1049   Distance Tunneling (cm) 0 cm 01/12/21 0953   Tunneling Position ___ O'Clock 0 01/12/21 0953   Undermining Starts ___ O'Clock 12 01/19/21 1010   Undermining Ends___ O'Clock 2 01/19/21 1010   Undermining Maxium Distance (cm) 0.3 01/19/21 1010   Wound Assessment Slough 01/28/21 1030   Drainage Amount Moderate 01/28/21 1030   Drainage Description Serous 01/28/21 1030   Odor None 01/28/21 1030   Yolis-wound Assessment Hyperkeratosis (callous) 01/28/21 1030   Margins Attached edges 01/28/21 1030   Wound Thickness Description not for Pressure Injury Full thickness 11/06/20 0957   Number of days: 135             Estimated Blood Loss:  Minimal    Hemostasis Achieved:  by pressure    Procedural Pain:  0  / 10     Post Procedural Pain:  0 / 10     Response to treatment:  Well tolerated by patient.          Plan:     Problem List Items Addressed This Visit     Type 2 diabetes mellitus with diabetic polyneuropathy, with long-term current use of insulin ([facility] - Primary (Chronic) * (Principal) Diabetic ulcer of right midfoot associated with type 2 diabetes mellitus, with fat layer exposed ([address] (Chronic)    Relevant Orders    Supply: Wound Cleanser    Supply: Yolis Wound    Supply: Wound Dressings    Supply: Pack Wound          Cont offload shoe and aquacel Ag+    Treatment Note please see attached Discharge Instructions    In my professional opinion this patient would benefit from HBO Therapy: No    Written patient dismissal instructions given to patient and signed by patient or POA. Discharge [address] and Hyperbaric Oxygen Therapy   Physician Orders and Discharge Instructions  [address] [place] [address]  Telephone: [phone]    NAME:  Aziza Handing OF BIRTH:  1965  MEDICAL RECORD NUMBER:  [id number]  DATE:  1/28/2021    Discharge condition: Stable    Discharge to: Home    Left via:Private automobile    Accompanied by: Self     Dressing Orders: Right Plantar:  Wash with soap and water. Aquacel Ag to open area, Cover with dry gauze. Secure with roll gauze and medipore tape. Change once daily . Wear off load shoe at all times       Treatment Orders:  Protein rich diet (unless restricted by your physician); Multivitamin daily; Elevate legs when sitting, avoid standing for  long periods of time.     [facility] follow up visit ____________1 week_________________  (Please note your next appointment above and if you are unable to keep, kindly give a 24 hour notice. Thank you.)    If you experience any of the following, please call the [facility] [address] during business hours:    * Increase in Pain  * Temperature over 101  * Increase in drainage from your wound  * Drainage with a foul odor  * Bleeding  * Increase in swelling  * Need for compression bandage changes due to slippage, breakthrough drainage. If you need medical attention outside of the business hours of the [address] [place] [address] Road please contact your PCP or go to the nearest emergency room.         Electronically signed by Tameka Sanchez MD on 1/28/2021 at 10:57 AM

## 2021-01-28 NOTE — CARE COORDINATION
Other    Radha Sanchez MA; Danya Osborn MD Just now (11:45 AM)        Not sure why the patient cannot afford his medication. I helped him apply for Medicare Extra Help. He was approved. Med copayments will be no more than $3.70 for generics and no more than $9.20 for brand-name. As for food, I have provided him with resources. I sent him a letter certified with returned receipt to confirm he received the information. He is too young for Meals-on-Wheels. I will call him for follow-up. I am wondering if this is a monthly budget problem. Will address all with the patient. Thank you for the referral.  Marisela Ornelas MA routed conversation to You 12 minutes ago (11:33 AM)      Aston Rahman MA 12 minutes ago (11:33 AM)        Pt came by the office he is a an uncontrolled diabetic that can not afford his diabetic medications. He has checked on some of the programs for financial help he said that he was told he did not qualify. Dr Feliz Rashid is asking if we can ref this patient to the [place] [facility] for help. He is also not eating well and is not able to afford much.          Documentation      Submitted by Adelso/MARILEE

## 2021-01-28 NOTE — TELEPHONE ENCOUNTER
Pt came by the office to  more samples of the Baslagar I gave him 3 boxes of this the exp is 8/1/2021 and the Lot number is [id number]. When I gave this to the pt he stated that his sugar is not staying under control and the endocrinologist wants him on [place] which he can not afford. I asked if they had samples to give him and he said no they didn't. He also has a non healing wound on his foot that he is seeing wound care for. He said that it is not healing well and that his A1C had gone up to 11. I spoke to Dr Elizabeth Iqbal she said for us to get him Humalog 10units tid. Lot number is [id number] exp 7/1/2022. Pt was given 4 boxes. He was given a sheet to record his sugars on 3 times a day and we made him an apt to come back in about 2 weeks to follow up he understood that he must bring his sugar log with him.

## 2021-01-28 NOTE — TELEPHONE ENCOUNTER
Not sure why the patient cannot afford his medication. I helped him apply for Medicare Extra Help. He was approved. Med copayments will be no more than $3.70 for generics and no more than $9.20 for brand-name. As for food, I have provided him with resources. I sent him a letter certified with returned receipt to confirm he received the information. He is too young for Meals-on-Wheels. I will call him for follow-up. I am wondering if this is a monthly budget problem. Will address all with the patient.   Thank you for the referral.  Eun Lara

## 2021-01-28 NOTE — TELEPHONE ENCOUNTER
Pt came by the office he is a an uncontrolled diabetic that can not afford his diabetic medications. He has checked on some of the programs for financial help he said that he was told he did not qualify. Dr Fanta Ambrose is asking if we can ref this patient to the [facility] for help. He is also not eating well and is not able to afford much.

## 2021-02-01 NOTE — CARE COORDINATION
Telephoned the patient to ask about food and medication assistance. Collected and discussed the following:    - I asked Anuel Zuluaga how much he is paying for medication at the pharmacy. He was approved for Medicare Extra Help in 2019. He should only be paying between $3.70 for Generic Meds and $9.20 for some Brand-Name Meds. Anuel Zuluaga stated he is paying more than the amounts mentioned. - I asked Anuel Zuluaga if he has provided Social Security with updated information for an annual review. He said he did not know anything about that. - Reviewed www.medicare.gov. Created an account for Anuel Zuluaga:   - User Name:  Anuel Almodovar    Password:    [id number] [facility]  [place]  - Reapplied for Medicare Extra Help:    The Application For Extra Help With Medicare Prescription Drug Plan Costs was received by Social Security on February 1, 2021, 1:57:00 PM.   Successful Submission   About You   You  Name:LIZ ALMODOVAR   Social Security Number:* * * - * * - [id number]   YOB: 1965   About You   You  Have you worked in 2020 or 2021? No   Mailing Address:[address], [facility]   I have not changed my address within the last three months  Telephone [phone]   Do you have combined savings, investments, and real estate worth more than $14,790? No or Not Sure   Medicare Savings Programs: Interested   If you would prefer that we contact someone else if we have additional questions, please provide the person's name and daytime phone number: None Provided   About Your Living Situation   For this question, a relative is someone related to you by blood, adoption, or marriage. How many relatives live with you and depend on you for at least one-half of their financial support? 0   Resources   Do you have bank accounts (checking, savings and certificates of deposit)?  Yes, I have $150.00   Do you have stocks, bonds, savings bonds, mutual funds, Individual residential Accounts (IRAs) or other similar investments? Yes, I have $2,000.00   Do you have any other cash at home or anywhere else? No   Will some money from any of these sources be used to pay for your  or burial expenses? Yes   Other than your home and the property on which it is located, do you own any real estate? No   Income Other Than Wages And Earnings   Do you receive income from Social Security benefits? Yes, $1,293.00 per month   Do you receive income from  [facility] benefits? No   Do you receive income from [place] benefits? No   Do you receive income from other pensions and annuities? No   Do you receive other income not listed above, including alimony, net rental income, workers' compensation, unemployment, private or State disability payments, etc.? No   Has any of the income from these sources decreased in the last two years? No    Told Emily Crook he will hear from [facility]) in approximately 3 weeks but it may be longer due to the pandemic. I asked him to call me when he receives his letter from [employer]. He stated he would let me know of the outcome of the application. Discussed food:    - Emily Crook is not receiving [employer] [place]. Min Hanna does not receive [place]. Told him the application for Medicare Extra Help will be sent to [facility] for evaluation of possible Medicaid coverage.  - Emily Crook stated he spoke with the resources I had sent him previously. He did not qualify for help. - Emily Crook is using a food bank at his [facility]. - We discussed watching carb intake due to his diabetes. He verbalized understanding. Emily Crook shared that he has wound on his food from stepping on a nail. It has not infected the bone. He is being treated by the [address] [place] [address]. Discussed being home during the pandemic. Patient stated his following CDC guidelines. Patient his Endocrinologist has him scheduled to get his first COVID vaccine.     Ganesh Schroeder I will ask the office to mail him a copy of his application for Medicare Extra Help. He verbalized understanding. Submitted by Adelso/MARILEE    Sending Delmy Meyers, an email asking her to print the attachment and mail to the patient.     Submitted by Adelso/MARILEE

## 2021-02-05 ENCOUNTER — HOSPITAL ENCOUNTER (OUTPATIENT)
Dept: WOUND CARE | Age: 56
Discharge: HOME | End: 2021-02-05
Payer: MEDICARE

## 2021-02-05 VITALS
WEIGHT: 264 LBS | RESPIRATION RATE: 18 BRPM | SYSTOLIC BLOOD PRESSURE: 146 MMHG | HEART RATE: 93 BPM | TEMPERATURE: 97.5 F | DIASTOLIC BLOOD PRESSURE: 90 MMHG | BODY MASS INDEX: 37.8 KG/M2 | HEIGHT: 70 IN

## 2021-02-05 DIAGNOSIS — E11.621 DIABETIC ULCER OF RIGHT MIDFOOT ASSOCIATED WITH TYPE 2 DIABETES MELLITUS, WITH FAT LAYER EXPOSED (HCC): Primary | ICD-10-CM

## 2021-02-05 DIAGNOSIS — L97.412 DIABETIC ULCER OF RIGHT MIDFOOT ASSOCIATED WITH TYPE 2 DIABETES MELLITUS, WITH FAT LAYER EXPOSED (HCC): Primary | ICD-10-CM

## 2021-02-05 DIAGNOSIS — Z79.4 TYPE 2 DIABETES MELLITUS WITH DIABETIC POLYNEUROPATHY, WITH LONG-TERM CURRENT USE OF INSULIN (HCC): Chronic | ICD-10-CM

## 2021-02-05 DIAGNOSIS — E11.42 TYPE 2 DIABETES MELLITUS WITH DIABETIC POLYNEUROPATHY, WITH LONG-TERM CURRENT USE OF INSULIN (HCC): Chronic | ICD-10-CM

## 2021-02-05 PROCEDURE — 11042 DBRDMT SUBQ TIS 1ST 20SQCM/<: CPT

## 2021-02-05 PROCEDURE — 11042 DBRDMT SUBQ TIS 1ST 20SQCM/<: CPT | Performed by: SURGERY

## 2021-02-05 ASSESSMENT — PAIN SCALES - GENERAL: PAINLEVEL_OUTOF10: 0

## 2021-02-06 NOTE — PROGRESS NOTES
[address]   Progress Note and Procedure Note      [address] RECORD NUMBER:  [id number]  AGE: 60 y.o. GENDER: male  : 1965  EPISODE DATE:  2021    Subjective:     Chief Complaint   Patient presents with    Wound Check     follow up         HISTORY of PRESENT ILLNESS HPI     Elieser Chicas is a 60 y.o. male who presents today for wound/ulcer evaluation.    Wound Context: Pt with R plantar foot wound here for eval/treat  Wound/Ulcer Pain Timing/Severity: none  Quality of pain: N/A  Severity:  0 / 10   Modifying Factors: None  Associated Signs/Symptoms: none    Ulcer Identification:  Ulcer Type: diabetic and pressure  Contributing Factors: diabetes and chronic pressure    Wound: DM        PAST MEDICAL HISTORY        Diagnosis Date    Acalculous cholecystitis 2015    Allergic rhinitis, cause unspecified     Atrial fibrillation, chronic (HCC)     sees [facility] cardiology    Charcot's joint of right foot     Chicken pox     Closed supracondylar fracture of elbow     fall    History of MRSA infection     scalp/facial and on back    HTN (hypertension)     Hyperlipidemia     Impotence of organic origin     MDRO (multiple drug resistant organisms) resistance     Other dyspnea and respiratory abnormality     Other specified erythematous condition(695.89)     Personal history of other infectious and parasitic disease     Type II or unspecified type diabetes mellitus without mention of complication, uncontrolled        PAST SURGICAL HISTORY    Past Surgical History:   Procedure Laterality Date    CARDIOVERSION  2017    COLONOSCOPY  2020    Dr Nikko Fraire-Melanosis coli throughout the entire colon-AP, 5 yr recall    HUMERUS FRACTURE SURGERY Left 2020    OPEN REDUCTION INTERNAL FIXATION LEFT SUPRACONDYLAR FRACTURE performed by Robbie Christy MD at [address]      X 2    TONSILLECTOMY         FAMILY HISTORY Family History   Problem Relation Age of Onset    Stroke Maternal Grandmother     Cancer Maternal Grandmother     Stroke Paternal Grandmother     Diabetes Paternal Grandmother     Cancer Father     Heart Disease Father     Lung Cancer Father     Asthma Paternal Grandfather     Heart Disease Paternal Grandfather     Heart Disease Maternal Grandfather     Colon Cancer Neg Hx     Colon Polyps Neg Hx     Esophageal Cancer Neg Hx     Liver Cancer Neg Hx     Liver Disease Neg Hx     Rectal Cancer Neg Hx     Stomach Cancer Neg Hx        SOCIAL HISTORY    Social History     Tobacco Use    Smoking status: Never Smoker    Smokeless tobacco: Never Used   Substance Use Topics    Alcohol use: No    Drug use: No       ALLERGIES    Allergies   Allergen Reactions    Pcn [Penicillins]     Neomycin-Bacitracin Zn-Polymyx Rash    Neosporin [Neomycin-Polymyxin-Gramicidin] Rash       MEDICATIONS    Current Outpatient Medications on File Prior to Encounter   Medication Sig Dispense Refill    insulin lispro, 1 Unit Dial, (HUMALOG KWIKPEN) 100 UNIT/ML SOPN Inject 10 Units into the skin 3 times daily (before meals) (Patient taking differently: Inject 30 Units into the skin 3 times daily (before meals) ) 4 pen 0    losartan (COZAAR) 100 MG tablet TAKE ONE TABLET DAILY 90 tablet 3    furosemide (LASIX) 20 MG tablet Take 1 tablet by mouth daily as needed (edema) 30 tablet 1    Insulin Degludec (TRESIBA FLEXTOUCH) 200 UNIT/ML SOPN Inject 80 Units into the skin daily      metoprolol tartrate (LOPRESSOR) 25 MG tablet TAKE ONE TABLET TWICE DAILY 60 tablet 2    VITAMIN D PO Take 1 tablet by mouth daily      docusate sodium (COLACE) 250 MG capsule Take 1 capsule by mouth 2 times daily 60 capsule 0    atorvastatin (LIPITOR) 20 MG tablet TAKE ONE TABLET DAILY (Patient taking differently: Take 20 mg by mouth daily ) 90 tablet 3  tamsulosin (FLOMAX) 0.4 MG capsule TAKE TWO CAPSULES DAILY (Patient taking differently: Take 0.8 mg by mouth daily TAKE TWO CAPSULES DAILY) 60 capsule 11    warfarin (COUMADIN) 10 MG tablet Take 1 tablet by mouth daily 45 tablet 5    glimepiride (AMARYL) 4 MG tablet TAKE ONE TABLET EVERY MORNING BEFORE BREAKFAST (Patient taking differently: Take 4 mg by mouth every morning (before breakfast) ) 90 tablet 3    diphenhydrAMINE (BENADRYL) 25 MG tablet Take 25 mg by mouth every 6 hours as needed for Itching      BD INSULIN SYRINGE U/F 31G X 5/16\" 1 ML MISC USE FOUR TIMES DAILY 100 each 3    warfarin (COUMADIN) 5 MG tablet TAKE AS DIRECTED BY YOUR DOCTOR (Patient taking differently: Take by mouth daily TAKE AS DIRECTED BY YOUR DOCTOR-keeps 5mg on hand if dose varies) 30 tablet 5    blood glucose test strips (CONTOUR NEXT TEST) strip 1 each by In Vitro route 4 times daily As needed. 200 each 3    Lancets MISC 1 each by Does not apply route 4 times daily 300 each 1    B-D ULTRAFINE III SHORT PEN 31G X 8 MM MISC Inject 1 each as directed 3 times daily 100 each 5     No current facility-administered medications on file prior to encounter. REVIEW OF SYSTEMS    A comprehensive review of systems was negative.     Objective:      BP (!) 146/90   Pulse 93   Temp 97.5 °F (36.4 °C) (Temporal)   Resp 18   Ht 5' 10\" (1.778 m)   Wt 264 lb (119.7 kg)   BMI 37.88 kg/m²     Wt Readings from Last 3 Encounters:   02/05/21 264 lb (119.7 kg)   01/28/21 264 lb (119.7 kg)   01/19/21 264 lb (119.7 kg)       PHYSICAL EXAM    General Appearance: alert and oriented to person, place and time, well developed and well- nourished, in no acute distress  Skin: warm and dry, no rash or erythema  Head: normocephalic and atraumatic  Eyes: pupils equal, round, and reactive to light, extraocular eye movements intact, conjunctivae normal Devitalized Tissue Debrided:  fibrin, biofilm, slough, necrotic/eschar and exudate      Pre Debridement Measurements:  Are located in the Wound/Ulcer Documentation Flow Sheet    Wound/Ulcer #: 1    Percent of Wound(s)/Ulcer(s) Debrided: 100%    Total Surface Area Debrided:  0.35 sq cm       Diabetic/Pressure/Non Pressure Ulcers only:  Ulcer: Diabetic ulcer, fat layer exposed           Post Debridement Measurements:    Wound/Ulcer Descriptions are Pre Debridement --EXCEPT MEASUREMENTS    Wound 09/14/20 Foot Right;Plantar WOUND 1 RIGHT PLANTAR (Active)   Wound Image   02/05/21 1118   Wound Etiology Diabetic Kat 1 02/05/21 1118   Dressing Status New dressing applied 02/05/21 1219   Wound Cleansed Soap and water 02/05/21 1118   Dressing/Treatment Alginate with Ag;Gauze dressing/dressing sponge;Tape/Soft cloth adhesive tape 02/05/21 1219   Offloading for Diabetic Foot Ulcers Forefoot offloading shoe 01/28/21 1030   Wound Length (cm) 0.5 cm 02/05/21 1118   Wound Width (cm) 0.7 cm 02/05/21 1118   Wound Depth (cm) 0.4 cm 02/05/21 1118   Wound Surface Area (cm^2) 0.35 cm^2 02/05/21 1118   Change in Wound Size % (l*w) 80.56 02/05/21 1118   Wound Volume (cm^3) 0.14 cm^3 02/05/21 1118   Wound Healing % 89 02/05/21 1118   Post-Procedure Length (cm) 0.5 cm 02/05/21 1120   Post-Procedure Width (cm) 0.7 cm 02/05/21 1120   Post-Procedure Depth (cm) 0.4 cm 02/05/21 1120   Post-Procedure Surface Area (cm^2) 0.35 cm^2 02/05/21 1120   Post-Procedure Volume (cm^3) 0.14 cm^3 02/05/21 1120   Distance Tunneling (cm) 0 cm 02/05/21 1118   Tunneling Position ___ O'Clock 0 02/05/21 1118   Undermining Starts ___ O'Clock 0 02/05/21 1118   Undermining Ends___ O'Clock 0 02/05/21 1118   Undermining Maxium Distance (cm) 0 02/05/21 1118   Wound Assessment Slough 02/05/21 1118   Drainage Amount Moderate 02/05/21 1118   Drainage Description Serous 02/05/21 1118   Odor None 02/05/21 1118   Yolis-wound Assessment Hyperkeratosis (callous) 02/05/21 1118 Margins Attached edges 02/05/21 1118   Wound Thickness Description not for Pressure Injury Full thickness 11/06/20 0957   Number of days: 144             Estimated Blood Loss:  Minimal    Hemostasis Achieved:  by pressure    Procedural Pain:  0  / 10     Post Procedural Pain:  0 / 10     Response to treatment:  Well tolerated by patient. Plan:     Problem List Items Addressed This Visit     Type 2 diabetes mellitus with diabetic polyneuropathy, with long-term current use of insulin (HCC) (Chronic)    * (Principal) Diabetic ulcer of right midfoot associated with type 2 diabetes mellitus, with fat layer exposed ([address] - Primary (Chronic)    Relevant Orders    Supply: Wound Cleanser    Supply: Yolis Wound    Supply: Wound Dressings    Supply: Pack Wound    Supply: Cover and Secure    Supply: Edema Control          Cont current care    Treatment Note please see attached Discharge Instructions    In my professional opinion this patient would benefit from HBO Therapy: No    Written patient dismissal instructions given to patient and signed by patient or POA. Discharge [address] and Hyperbaric Oxygen Therapy   Physician Orders and Discharge Instructions  [address] [place] [address]  Telephone: [phone]    NAME:  Wendy Hewitt OF BIRTH:  1965  MEDICAL RECORD NUMBER:  [id number]  DATE:  2/5/2021    Discharge condition: Stable    Discharge to: Home    Left via:Private automobile    Accompanied by: Self     Dressing Orders: Right Plantar:  Wash with soap and water. Aquacel Ag to open area, Cover with dry gauze. Secure with roll gauze and medipore tape. Change once daily . Wear off load shoe at all times       Treatment Orders:  Protein rich diet (unless restricted by your physician); Multivitamin daily; Elevate legs when sitting, avoid standing for  long periods of time. [address] [place] [address],[address] follow up visit __________1 week___________________  (Please note your next appointment above and if you are unable to keep, kindly give a 24 hour notice. Thank you.)    If you experience any of the following, please call the [facility] during business hours:    * Increase in Pain  * Temperature over 101  * Increase in drainage from your wound  * Drainage with a foul odor  * Bleeding  * Increase in swelling  * Need for compression bandage changes due to slippage, breakthrough drainage. If you need medical attention outside of the business hours of the [address] please contact your PCP or go to the nearest emergency room.         Electronically signed by Alexander Quintana MD on 2/5/2021 at 8:49 PM

## 2021-02-17 ENCOUNTER — HOSPITAL ENCOUNTER (OUTPATIENT)
Dept: WOUND CARE | Age: 56
Discharge: HOME | End: 2021-02-17
Payer: MEDICARE

## 2021-02-17 VITALS
HEIGHT: 70 IN | RESPIRATION RATE: 18 BRPM | SYSTOLIC BLOOD PRESSURE: 140 MMHG | HEART RATE: 92 BPM | WEIGHT: 264 LBS | TEMPERATURE: 98 F | BODY MASS INDEX: 37.8 KG/M2 | DIASTOLIC BLOOD PRESSURE: 90 MMHG

## 2021-02-17 DIAGNOSIS — E11.621 DIABETIC ULCER OF RIGHT MIDFOOT ASSOCIATED WITH TYPE 2 DIABETES MELLITUS, WITH FAT LAYER EXPOSED (HCC): ICD-10-CM

## 2021-02-17 DIAGNOSIS — L97.412 DIABETIC ULCER OF RIGHT MIDFOOT ASSOCIATED WITH TYPE 2 DIABETES MELLITUS, WITH FAT LAYER EXPOSED (HCC): ICD-10-CM

## 2021-02-17 DIAGNOSIS — Z79.4 TYPE 2 DIABETES MELLITUS WITH DIABETIC POLYNEUROPATHY, WITH LONG-TERM CURRENT USE OF INSULIN (HCC): Primary | Chronic | ICD-10-CM

## 2021-02-17 DIAGNOSIS — E11.42 TYPE 2 DIABETES MELLITUS WITH DIABETIC POLYNEUROPATHY, WITH LONG-TERM CURRENT USE OF INSULIN (HCC): Primary | Chronic | ICD-10-CM

## 2021-02-17 PROCEDURE — 97597 DBRDMT OPN WND 1ST 20 CM/<: CPT

## 2021-02-17 PROCEDURE — 11042 DBRDMT SUBQ TIS 1ST 20SQCM/<: CPT | Performed by: SURGERY

## 2021-02-17 PROCEDURE — 11042 DBRDMT SUBQ TIS 1ST 20SQCM/<: CPT

## 2021-02-17 ASSESSMENT — PAIN SCALES - GENERAL: PAINLEVEL_OUTOF10: 0

## 2021-02-17 ASSESSMENT — PAIN DESCRIPTION - PROGRESSION: CLINICAL_PROGRESSION: GRADUALLY IMPROVING

## 2021-02-17 NOTE — PROGRESS NOTES
[address]   Progress Note and Procedure Note      [address] RECORD NUMBER:  [id number]  AGE: 60 y.o. GENDER: male  : 1965  EPISODE DATE:  2021    Subjective:     Chief Complaint   Patient presents with    Wound Check     follow up         HISTORY of PRESENT ILLNESS HPI     Katherine Josue is a 60 y.o. male who presents today for wound/ulcer evaluation.    Wound Context: Pt with R plantar foot wound here for eval/treat  Wound/Ulcer Pain Timing/Severity: none  Quality of pain: N/A  Severity:  0 / 10   Modifying Factors: None  Associated Signs/Symptoms: none    Ulcer Identification:  Ulcer Type: diabetic and pressure  Contributing Factors: diabetes, chronic pressure and shear force    Wound: DM        PAST MEDICAL HISTORY        Diagnosis Date    Acalculous cholecystitis 2015    Allergic rhinitis, cause unspecified     Atrial fibrillation, chronic (HCC)     sees [facility]    Charcot's joint of right foot     Chicken pox     Closed supracondylar fracture of elbow     fall    History of MRSA infection     scalp/facial and on back    HTN (hypertension)     Hyperlipidemia     Impotence of organic origin     MDRO (multiple drug resistant organisms) resistance     Other dyspnea and respiratory abnormality     Other specified erythematous condition(695.89)     Personal history of other infectious and parasitic disease     Type II or unspecified type diabetes mellitus without mention of complication, uncontrolled        PAST SURGICAL HISTORY    Past Surgical History:   Procedure Laterality Date    CARDIOVERSION  2017    COLONOSCOPY  2020    Dr Bisi Fraire-Melanosis coli throughout the entire colon-AP, 5 yr recall    HUMERUS FRACTURE SURGERY Left 2020    OPEN REDUCTION INTERNAL FIXATION LEFT SUPRACONDYLAR FRACTURE performed by Star Sibley MD at [address]      X 2    TONSILLECTOMY FAMILY HISTORY    Family History   Problem Relation Age of Onset    Stroke Maternal Grandmother     Cancer Maternal Grandmother     Stroke Paternal Grandmother     Diabetes Paternal Grandmother     Cancer Father     Heart Disease Father     Lung Cancer Father     Asthma Paternal Grandfather     Heart Disease Paternal Grandfather     Heart Disease Maternal Grandfather     Colon Cancer Neg Hx     Colon Polyps Neg Hx     Esophageal Cancer Neg Hx     Liver Cancer Neg Hx     Liver Disease Neg Hx     Rectal Cancer Neg Hx     Stomach Cancer Neg Hx        SOCIAL HISTORY    Social History     Tobacco Use    Smoking status: Never Smoker    Smokeless tobacco: Never Used   Substance Use Topics    Alcohol use: No    Drug use: No       ALLERGIES    Allergies   Allergen Reactions    Pcn [Penicillins]     Neomycin-Bacitracin Zn-Polymyx Rash    Neosporin [Neomycin-Polymyxin-Gramicidin] Rash       MEDICATIONS    Current Outpatient Medications on File Prior to Encounter   Medication Sig Dispense Refill    insulin lispro, 1 Unit Dial, (HUMALOG KWIKPEN) 100 UNIT/ML SOPN Inject 10 Units into the skin 3 times daily (before meals) (Patient taking differently: Inject 30 Units into the skin 3 times daily (before meals) ) 4 pen 0    losartan (COZAAR) 100 MG tablet TAKE ONE TABLET DAILY 90 tablet 3    furosemide (LASIX) 20 MG tablet Take 1 tablet by mouth daily as needed (edema) 30 tablet 1    Insulin Degludec (TRESIBA FLEXTOUCH) 200 UNIT/ML SOPN Inject 80 Units into the skin daily      metoprolol tartrate (LOPRESSOR) 25 MG tablet TAKE ONE TABLET TWICE DAILY 60 tablet 2    VITAMIN D PO Take 1 tablet by mouth daily      docusate sodium (COLACE) 250 MG capsule Take 1 capsule by mouth 2 times daily 60 capsule 0    atorvastatin (LIPITOR) 20 MG tablet TAKE ONE TABLET DAILY (Patient taking differently: Take 20 mg by mouth daily ) 90 tablet 3  tamsulosin (FLOMAX) 0.4 MG capsule TAKE TWO CAPSULES DAILY (Patient taking differently: Take 0.8 mg by mouth daily TAKE TWO CAPSULES DAILY) 60 capsule 11    BD INSULIN SYRINGE U/F 31G X 5/16\" 1 ML MISC USE FOUR TIMES DAILY 100 each 3    warfarin (COUMADIN) 10 MG tablet Take 1 tablet by mouth daily 45 tablet 5    glimepiride (AMARYL) 4 MG tablet TAKE ONE TABLET EVERY MORNING BEFORE BREAKFAST (Patient taking differently: Take 4 mg by mouth every morning (before breakfast) ) 90 tablet 3    warfarin (COUMADIN) 5 MG tablet TAKE AS DIRECTED BY YOUR DOCTOR (Patient taking differently: Take by mouth daily TAKE AS DIRECTED BY YOUR DOCTOR-keeps 5mg on hand if dose varies) 30 tablet 5    diphenhydrAMINE (BENADRYL) 25 MG tablet Take 25 mg by mouth every 6 hours as needed for Itching      B-D ULTRAFINE III SHORT PEN 31G X 8 MM MISC Inject 1 each as directed 3 times daily 100 each 5    blood glucose test strips (CONTOUR NEXT TEST) strip 1 each by In Vitro route 4 times daily As needed. 200 each 3    Lancets MISC 1 each by Does not apply route 4 times daily 300 each 1     No current facility-administered medications on file prior to encounter. REVIEW OF SYSTEMS    A comprehensive review of systems was negative.     Objective:      BP (!) 140/90   Pulse 92   Temp 98 °F (36.7 °C) (Temporal)   Resp 18   Ht 5' 10\" (1.778 m)   Wt 264 lb (119.7 kg)   BMI 37.88 kg/m²     Wt Readings from Last 3 Encounters:   02/17/21 264 lb (119.7 kg)   02/05/21 264 lb (119.7 kg)   01/28/21 264 lb (119.7 kg)       PHYSICAL EXAM    General Appearance: alert and oriented to person, place and time, well developed and well- nourished, in no acute distress  Skin: warm and dry, no rash or erythema  Head: normocephalic and atraumatic  Eyes: pupils equal, round, and reactive to light, extraocular eye movements intact, conjunctivae normal ENT: tympanic membrane, external ear and ear canal normal bilaterally, nose without deformity, nasal mucosa and turbinates normal without polyps, lips teeth and gums normal  Neck: supple and non-tender without mass, no thyromegaly or thyroid nodules, no cervical lymphadenopathy  Pulmonary/Chest: clear to auscultation bilaterally- no wheezes, rales or rhonchi, normal air movement, no respiratory distress  Cardiovascular: normal rate, regular rhythm, normal S1 and S2, no murmurs, rubs, clicks, or gallops, distal pulses intact, no carotid bruits  Abdomen: soft, non-tender, non-distended, normal bowel sounds, no masses or organomegaly  Extremities: no cyanosis, clubbing or edema  Musculoskeletal: normal range of motion, no joint swelling, deformity or tenderness  Neurologic: reflexes normal and symmetric, no cranial nerve deficit, gait, coordination and speech normal, sensation of skin normal      Assessment:      Problem List Items Addressed This Visit     Type 2 diabetes mellitus with diabetic polyneuropathy, with long-term current use of insulin (HCC) - Primary (Chronic)    * (Principal) Diabetic ulcer of right midfoot associated with type 2 diabetes mellitus, with fat layer exposed (HCC) (Chronic)           Procedure Note  Indications:  Based on my examination of this patient's wound(s)/ulcer(s) today, debridement is required to promote healing and evaluate the wound base. Performed by: Ileana Nogueira MD    Consent obtained:  Yes    Time out taken:  Yes    Pain Control:         Debridement:Excisional Debridement    Using curette the wound(s)/ulcer(s) was/were sharply debrided down through and including the removal of epidermis, dermis and subcutaneous tissue.         Devitalized Tissue Debrided:  fibrin, biofilm, slough, necrotic/eschar, exudate and callus      Pre Debridement Measurements:  Are located in the [facility]  Documentation Flow Sheet    Wound/Ulcer #: 1 Percent of Wound(s)/Ulcer(s) Debrided: 100%    Total Surface Area Debrided:  0.35 sq cm       Diabetic/Pressure/Non Pressure Ulcers only:  Ulcer: Diabetic ulcer, fat layer exposed             Post Debridement Measurements:    Wound/Ulcer Descriptions are Pre Debridement --EXCEPT MEASUREMENTS    Wound 09/14/20 Foot Right;Plantar WOUND 1 RIGHT PLANTAR (Active)   Wound Image   02/17/21 1424   Wound Etiology Diabetic Kat 1 02/17/21 1424   Dressing Status New drainage noted 02/17/21 1424   Wound Cleansed Soap and water 02/17/21 1424   Dressing/Treatment Alginate with Ag 02/17/21 1455   Offloading for Diabetic Foot Ulcers Forefoot offloading shoe 02/17/21 1424   Wound Length (cm) 0.5 cm 02/17/21 1424   Wound Width (cm) 0.7 cm 02/17/21 1424   Wound Depth (cm) 0.4 cm 02/17/21 1424   Wound Surface Area (cm^2) 0.35 cm^2 02/17/21 1424   Change in Wound Size % (l*w) 80.56 02/17/21 1424   Wound Volume (cm^3) 0.14 cm^3 02/17/21 1424   Wound Healing % 89 02/17/21 1424   Post-Procedure Length (cm) 0.5 cm 02/17/21 1455   Post-Procedure Width (cm) 0.7 cm 02/17/21 1455   Post-Procedure Depth (cm) 0.3 cm 02/17/21 1455   Post-Procedure Surface Area (cm^2) 0.35 cm^2 02/17/21 1455   Post-Procedure Volume (cm^3) 0.1 cm^3 02/17/21 1455   Distance Tunneling (cm) 0 cm 02/17/21 1424   Tunneling Position ___ O'Clock 0 02/17/21 1424   Undermining Starts ___ O'Clock 0 02/17/21 1424   Undermining Ends___ O'Clock 0 02/17/21 1424   Undermining Maxium Distance (cm) 0 02/17/21 1424   Wound Assessment Slough 02/17/21 1424   Drainage Amount Moderate 02/17/21 1424   Drainage Description Serous 02/17/21 1424   Odor None 02/17/21 1424   Yolis-wound Assessment Hyperkeratosis (callous) 02/17/21 1424   Margins Attached edges 02/17/21 1424   Wound Thickness Description not for Pressure Injury Full thickness 11/06/20 0957   Number of days: 156             Estimated Blood Loss:  Minimal    Hemostasis Achieved:  by pressure    Procedural Pain:  0  / 10 Post Procedural Pain:  0 / 10     Response to treatment:  Well tolerated by patient. Plan:     Problem List Items Addressed This Visit     Type 2 diabetes mellitus with diabetic polyneuropathy, with long-term current use of insulin ([address] - Primary (Chronic)    * (Principal) Diabetic ulcer of right midfoot associated with type 2 diabetes mellitus, with fat layer exposed ([address] (Chronic)          May need TCC but pt doesn't want to   RTO 1 week    Treatment Note please see attached Discharge Instructions    In my professional opinion this patient would benefit from HBO Therapy: No    Written patient dismissal instructions given to patient and signed by patient or POA. Discharge [address] and Hyperbaric Oxygen Therapy   Physician Orders and Discharge Instructions  [address] [facility]  [place], [address]  Telephone: [phone]    NAME:  Wendy Hewitt OF BIRTH:  1965  MEDICAL RECORD NUMBER:  [id number]  DATE:  2/17/2021    Discharge condition: Stable    Discharge to: Home    Left via:Private automobile    Accompanied by: Self     Dressing Orders: Right Plantar:  Wash with soap and water. Aquacel Ag to open area, Cover with dry gauze. Secure with roll gauze and medipore tape. Change once daily . Wear off load shoe at all times       Treatment Orders:  Protein rich diet (unless restricted by your physician); Multivitamin daily; Elevate legs when sitting, avoid standing for  long periods of time. [address] [place] [address],[address] follow up visit ___________1 week__________________  (Please note your next appointment above and if you are unable to keep, kindly give a 24 hour notice.  Thank you.)    If you experience any of the following, please call the [address] during business hours:    * Increase in Pain  * Temperature over 101  * Increase in drainage from your wound  * Drainage with a foul odor  * Bleeding  * Increase in swelling * Need for compression bandage changes due to slippage, breakthrough drainage. If you need medical attention outside of the business hours of the [address] [place] [address] Road please contact your PCP or go to the nearest emergency room.         Electronically signed by Reynold Ortiz MD on 2/17/2021 at 2:59 PM

## 2021-02-18 ENCOUNTER — OFFICE VISIT (OUTPATIENT)
Dept: CARDIOLOGY CLINIC | Age: 56
End: 2021-02-18
Payer: MEDICARE

## 2021-02-18 VITALS
HEIGHT: 70 IN | HEART RATE: 93 BPM | BODY MASS INDEX: 38.51 KG/M2 | OXYGEN SATURATION: 98 % | DIASTOLIC BLOOD PRESSURE: 82 MMHG | SYSTOLIC BLOOD PRESSURE: 144 MMHG | WEIGHT: 269 LBS

## 2021-02-18 DIAGNOSIS — I48.21 PERMANENT ATRIAL FIBRILLATION (HCC): Primary | ICD-10-CM

## 2021-02-18 DIAGNOSIS — I10 ESSENTIAL HYPERTENSION: ICD-10-CM

## 2021-02-18 DIAGNOSIS — E11.42 TYPE 2 DIABETES MELLITUS WITH DIABETIC POLYNEUROPATHY, WITH LONG-TERM CURRENT USE OF INSULIN (HCC): Chronic | ICD-10-CM

## 2021-02-18 DIAGNOSIS — Z79.4 TYPE 2 DIABETES MELLITUS WITH DIABETIC POLYNEUROPATHY, WITH LONG-TERM CURRENT USE OF INSULIN (HCC): Chronic | ICD-10-CM

## 2021-02-18 DIAGNOSIS — E78.2 MIXED HYPERLIPIDEMIA: ICD-10-CM

## 2021-02-18 LAB
INTERNATIONAL NORMALIZATION RATIO, POC: 3
PROTHROMBIN TIME, POC: 32.8

## 2021-02-18 PROCEDURE — 3017F COLORECTAL CA SCREEN DOC REV: CPT | Performed by: INTERNAL MEDICINE

## 2021-02-18 PROCEDURE — G8482 FLU IMMUNIZE ORDER/ADMIN: HCPCS | Performed by: INTERNAL MEDICINE

## 2021-02-18 PROCEDURE — 2022F DILAT RTA XM EVC RTNOPTHY: CPT | Performed by: INTERNAL MEDICINE

## 2021-02-18 PROCEDURE — G8417 CALC BMI ABV UP PARAM F/U: HCPCS | Performed by: INTERNAL MEDICINE

## 2021-02-18 PROCEDURE — 99213 OFFICE O/P EST LOW 20 MIN: CPT | Performed by: INTERNAL MEDICINE

## 2021-02-18 PROCEDURE — 3046F HEMOGLOBIN A1C LEVEL >9.0%: CPT | Performed by: INTERNAL MEDICINE

## 2021-02-18 PROCEDURE — 1036F TOBACCO NON-USER: CPT | Performed by: INTERNAL MEDICINE

## 2021-02-18 PROCEDURE — G8427 DOCREV CUR MEDS BY ELIG CLIN: HCPCS | Performed by: INTERNAL MEDICINE

## 2021-02-18 PROCEDURE — 85610 PROTHROMBIN TIME: CPT | Performed by: INTERNAL MEDICINE

## 2021-02-18 ASSESSMENT — ENCOUNTER SYMPTOMS
COUGH: 0
SHORTNESS OF BREATH: 0
BLOOD IN STOOL: 0
ANAL BLEEDING: 0

## 2021-02-18 NOTE — PROGRESS NOTES
Office Visit  Coit Jeans is a 60 y.o. male; who present today for 6 Month Follow-Up (NTP; No Cardiac Sx )      HPI  I am seeing this 66-year-old white male in routine follow-up who was seen in regular office visits because of permanent atrial fibrillation. He had a negative pharmacologic echo study in November 2018 and his last echocardiogram in August 2020 demonstrated normal LV systolic function with moderate LVH and mild mitral and tricuspid regurgitation. Both atria were reported as being normal size. From a cardiac perspective he has been doing well. He has been dealing with wound care because of an infection in his right foot that he states is almost completely healed but he uses a cane to ambulate. He denies any chest discomfort and states that his breathing is doing well. He feels palpitations frequently but they are fairly minor. He denies any presyncope or syncope. He has never had stroke or TIA and no symptoms such as loss of vision, motor weakness or slurred speech and he does take his warfarin regularly, his INR monitored through this office and it was 3.0 today.   Current Outpatient Medications   Medication Sig Dispense Refill    insulin lispro, 1 Unit Dial, (HUMALOG KWIKPEN) 100 UNIT/ML SOPN Inject 10 Units into the skin 3 times daily (before meals) (Patient taking differently: Inject 30 Units into the skin 3 times daily (before meals) ) 4 pen 0    losartan (COZAAR) 100 MG tablet TAKE ONE TABLET DAILY 90 tablet 3    furosemide (LASIX) 20 MG tablet Take 1 tablet by mouth daily as needed (edema) 30 tablet 1    Insulin Degludec (TRESIBA FLEXTOUCH) 200 UNIT/ML SOPN Inject 80 Units into the skin daily      metoprolol tartrate (LOPRESSOR) 25 MG tablet TAKE ONE TABLET TWICE DAILY 60 tablet 2    VITAMIN D PO Take 1 tablet by mouth daily      docusate sodium (COLACE) 250 MG capsule Take 1 capsule by mouth 2 times daily 60 capsule 0  atorvastatin (LIPITOR) 20 MG tablet TAKE ONE TABLET DAILY (Patient taking differently: Take 20 mg by mouth daily ) 90 tablet 3    tamsulosin (FLOMAX) 0.4 MG capsule TAKE TWO CAPSULES DAILY (Patient taking differently: Take 0.8 mg by mouth daily TAKE TWO CAPSULES DAILY) 60 capsule 11    BD INSULIN SYRINGE U/F 31G X 5/16\" 1 ML MISC USE FOUR TIMES DAILY 100 each 3    warfarin (COUMADIN) 10 MG tablet Take 1 tablet by mouth daily 45 tablet 5    glimepiride (AMARYL) 4 MG tablet TAKE ONE TABLET EVERY MORNING BEFORE BREAKFAST (Patient taking differently: Take 4 mg by mouth every morning (before breakfast) ) 90 tablet 3    warfarin (COUMADIN) 5 MG tablet TAKE AS DIRECTED BY YOUR DOCTOR (Patient taking differently: Take by mouth daily TAKE AS DIRECTED BY YOUR DOCTOR-keeps 5mg on hand if dose varies) 30 tablet 5    diphenhydrAMINE (BENADRYL) 25 MG tablet Take 25 mg by mouth every 6 hours as needed for Itching      blood glucose test strips (CONTOUR NEXT TEST) strip 1 each by In Vitro route 4 times daily As needed. 200 each 3    Lancets MISC 1 each by Does not apply route 4 times daily 300 each 1    B-D ULTRAFINE III SHORT PEN 31G X 8 MM MISC Inject 1 each as directed 3 times daily 100 each 5     No current facility-administered medications for this visit. There are no discontinued medications.      Allergies   Allergen Reactions    Pcn [Penicillins]     Neomycin-Bacitracin Zn-Polymyx Rash    Neosporin [Neomycin-Polymyxin-Gramicidin] Rash       Past Medical History:   Diagnosis Date    Acalculous cholecystitis 12/29/2015    Allergic rhinitis, cause unspecified     Atrial fibrillation, chronic ([facility]     sees [facility] cardiology    Charcot's joint of right foot     Chicken pox     Closed supracondylar fracture of elbow     fall    History of MRSA infection     scalp/facial and on back    HTN (hypertension)     Hyperlipidemia     Impotence of organic origin BP (!) 144/82   Pulse 93   Ht 5' 10\" (1.778 m)   Wt 269 lb (122 kg)   SpO2 98%   BMI 38.60 kg/m²    Physical Exam  Constitutional:       Appearance: Normal appearance. He is obese. Cardiovascular:      Rate and Rhythm: Normal rate. Rhythm irregularly irregular. Heart sounds: Normal heart sounds. No gallop. Pulmonary:      Effort: Pulmonary effort is normal.      Breath sounds: Normal breath sounds. Abdominal:      General: Abdomen is flat. Bowel sounds are normal.      Palpations: Abdomen is soft. Musculoskeletal:         General: No swelling. Skin:     General: Skin is warm and dry. Neurological:      Mental Status: He is alert. Assessment/Plan    EKG Findings:  Not performed today    Problem List Items Addressed This Visit        Cardiology Problems    HTN (hypertension)    Hyperlipidemia    Permanent atrial fibrillation (HCC) - Primary       Other    Type 2 diabetes mellitus with diabetic polyneuropathy, with long-term current use of insulin (HCC) (Chronic)           Diagnosis Orders   1. Permanent atrial fibrillation ([facility]     2. Type 2 diabetes mellitus with diabetic polyneuropathy, with long-term current use of insulin ([facility] [address]     3. Essential hypertension     4. Mixed hyperlipidemia         Recommendations:   Diet: ADA, low-sodium, low-fat, calorie reduced diet  Activity: Symptom limited activities  Medication Changes: No medication change, continue to check INR on a routine basis through this office, about monthly or as directed. No orders of the defined types were placed in this encounter. Orders Placed This Encounter   Procedures    POCT INR       No follow-ups on file.

## 2021-02-21 PROBLEM — I50.30 (HFPEF) HEART FAILURE WITH PRESERVED EJECTION FRACTION (HCC): Status: ACTIVE | Noted: 2021-02-21

## 2021-02-21 PROBLEM — I50.9 CONGESTIVE HEART FAILURE (HCC): Status: ACTIVE | Noted: 2021-02-21

## 2021-02-21 RX ORDER — GLIMEPIRIDE 2 MG/1
2 TABLET ORAL
Qty: 90 TABLET | Refills: 1 | Status: CANCELLED | OUTPATIENT
Start: 2021-02-21 | End: 2021-05-22

## 2021-02-21 NOTE — PROGRESS NOTES
Emilia Cartagena ( 1965) is a 60 y.o. male, Established , here for evaluation of the following chief complaint(s). Diabetes (follow up on high sugar)        ASSESSMENT/PLAN:  Problem List        Circulatory    (HFpEF) heart failure with preserved ejection fraction ([facility] [address]     Managed BY Cardiology         Relevant Orders    Hepatic Function Panel    Lipid Panel    Permanent atrial fibrillation ([facility] [address]     managed by Cardiology         Relevant Orders    Hemoglobin A1C    Lipid Panel       Endocrine    Diabetic ulcer of right midfoot associated with type 2 diabetes mellitus, with fat layer exposed ([facility] [address] (Chronic)     Patient has peripheral neuropathy and is managed by Wound clinic         Relevant Medications    glimepiride (AMARYL) 4 MG tablet    Insulin Degludec (TRESIBA FLEXTOUCH) 200 UNIT/ML SOPN    insulin lispro, 1 Unit Dial, (HUMALOG KWIKPEN) 100 UNIT/ML SOPN    Other Relevant Orders    CBC    Type 2 diabetes mellitus with diabetic polyneuropathy, with long-term current use of insulin (HCC) (Chronic)     Patient cannot afford his Insulin and sometimes cannot afford to purchase his food. Relevant Medications    blood glucose test strips (CONTOUR NEXT TEST) strip    Lancets MISC    glimepiride (AMARYL) 4 MG tablet    Insulin Degludec (TRESIBA FLEXTOUCH) 200 UNIT/ML SOPN    insulin lispro, 1 Unit Dial, (HUMALOG KWIKPEN) 100 UNIT/ML SOPN    Other Relevant Orders    Basic Metabolic Panel    Hemoglobin A1C    Lipid Panel          Results for orders placed or performed in visit on 21   POCT INR   Result Value Ref Range    INR 3.0     Protime 32.8        No follow-ups on file.     HPI  64-year-old male who presents for follow-up visit/annual wellness visit  Disabled male who lives alone  and has financial issues sometimes not able to eat 3 meals a day  History of a calculus cholecystitis History of a fall with injury to the left elbow and doing well at this time chronic atrial fibrillation on anticoagulation and rate controlled managed by cardiology denies any palpitations or loss of consciousness chest pain  Diabetes poorly controlled patient is not compliant to insulin and diet he does not get enough money to get insulin and he does not cover with short acting insulin  Hyperlipidemia compliant with atorvastatin no myalgias  Due for diabetic eye exam but states he cannot afford the ophthalmology bill  Currently managed by [facility] wound clinic due to a chronic ulcer in the base of his right foot, this healed but patient has been ambulating too much and and it reopened patient has Charcot foot of both feet due to chronic diabetic neuropathy       Review of Systems   Constitutional: Negative for activity change, appetite change, chills, diaphoresis, fatigue and fever. HENT: Negative for congestion, hearing loss, postnasal drip, sore throat, tinnitus and trouble swallowing. Eyes: Negative for visual disturbance. Respiratory: Negative for cough, shortness of breath, wheezing and stridor. Cardiovascular: Positive for palpitations and leg swelling. Negative for chest pain. Gastrointestinal: Negative for abdominal pain, blood in stool, constipation and diarrhea. Endocrine: Negative for cold intolerance. Genitourinary: Negative for frequency. Musculoskeletal: Negative for arthralgias, back pain and joint swelling. Skin: Positive for wound (sole of R foot). Negative for color change. Allergic/Immunologic: Negative for environmental allergies. Neurological: Negative for dizziness, light-headedness and headaches. Hematological: Negative for adenopathy. Does not bruise/bleed easily. Psychiatric/Behavioral: Negative for decreased concentration, dysphoric mood and sleep disturbance. The patient is not nervous/anxious. Physical Exam  Vitals signs and nursing note reviewed. Constitutional:       General: He is not in acute distress. Appearance: He is well-developed. He is obese. HENT:      Head: Normocephalic and atraumatic. Right Ear: External ear normal.      Left Ear: External ear normal.      Nose: Nose normal.   Eyes:      General: No scleral icterus. Conjunctiva/sclera: Conjunctivae normal.      Pupils: Pupils are equal, round, and reactive to light. Neck:      Musculoskeletal: Normal range of motion and neck supple. Thyroid: No thyromegaly. Cardiovascular:      Rate and Rhythm: Tachycardia present. Rhythm irregular. Heart sounds: Normal heart sounds. No murmur. Pulmonary:      Effort: Pulmonary effort is normal.      Breath sounds: Normal breath sounds. No wheezing or rales. Abdominal:      General: Abdomen is protuberant. Bowel sounds are normal. There is distension. Palpations: Abdomen is soft. There is no mass. Tenderness: There is no abdominal tenderness. There is no rebound. Musculoskeletal: Normal range of motion. General: No tenderness. Lymphadenopathy:      Cervical: No cervical adenopathy. Skin:     General: Skin is warm and dry. Findings: No rash. Neurological:      Mental Status: He is alert and oriented to person, place, and time. Cranial Nerves: No cranial nerve deficit. Coordination: Coordination is intact. Gait: Gait abnormal.      Deep Tendon Reflexes: Reflexes normal.   Psychiatric:         Behavior: Behavior normal.         Thought Content:  Thought content normal.         Judgment: Judgment normal.           (Time Documentation Optional [id number])    An electronic signaturewaas used to authenticate this note  -Minna Patrick MD on 2/22/2021 at 2:09 PM

## 2021-02-22 ENCOUNTER — OFFICE VISIT (OUTPATIENT)
Dept: INTERNAL MEDICINE | Age: 56
End: 2021-02-22
Payer: MEDICARE

## 2021-02-22 VITALS
BODY MASS INDEX: 38.51 KG/M2 | SYSTOLIC BLOOD PRESSURE: 140 MMHG | OXYGEN SATURATION: 96 % | HEART RATE: 74 BPM | HEIGHT: 70 IN | DIASTOLIC BLOOD PRESSURE: 90 MMHG | WEIGHT: 269 LBS

## 2021-02-22 DIAGNOSIS — I50.30 HEART FAILURE WITH PRESERVED EJECTION FRACTION, UNSPECIFIED HF CHRONICITY (HCC): ICD-10-CM

## 2021-02-22 DIAGNOSIS — Z79.4 TYPE 2 DIABETES MELLITUS WITH DIABETIC POLYNEUROPATHY, WITH LONG-TERM CURRENT USE OF INSULIN (HCC): Chronic | ICD-10-CM

## 2021-02-22 DIAGNOSIS — E11.42 TYPE 2 DIABETES MELLITUS WITH DIABETIC POLYNEUROPATHY, WITH LONG-TERM CURRENT USE OF INSULIN (HCC): Chronic | ICD-10-CM

## 2021-02-22 DIAGNOSIS — L97.412 DIABETIC ULCER OF RIGHT MIDFOOT ASSOCIATED WITH TYPE 2 DIABETES MELLITUS, WITH FAT LAYER EXPOSED (HCC): Chronic | ICD-10-CM

## 2021-02-22 DIAGNOSIS — I48.21 PERMANENT ATRIAL FIBRILLATION (HCC): ICD-10-CM

## 2021-02-22 DIAGNOSIS — E11.621 DIABETIC ULCER OF RIGHT MIDFOOT ASSOCIATED WITH TYPE 2 DIABETES MELLITUS, WITH FAT LAYER EXPOSED (HCC): Chronic | ICD-10-CM

## 2021-02-22 PROCEDURE — G8427 DOCREV CUR MEDS BY ELIG CLIN: HCPCS | Performed by: INTERNAL MEDICINE

## 2021-02-22 PROCEDURE — 99214 OFFICE O/P EST MOD 30 MIN: CPT | Performed by: INTERNAL MEDICINE

## 2021-02-22 PROCEDURE — G8417 CALC BMI ABV UP PARAM F/U: HCPCS | Performed by: INTERNAL MEDICINE

## 2021-02-22 PROCEDURE — 3017F COLORECTAL CA SCREEN DOC REV: CPT | Performed by: INTERNAL MEDICINE

## 2021-02-22 PROCEDURE — G8482 FLU IMMUNIZE ORDER/ADMIN: HCPCS | Performed by: INTERNAL MEDICINE

## 2021-02-22 PROCEDURE — 3046F HEMOGLOBIN A1C LEVEL >9.0%: CPT | Performed by: INTERNAL MEDICINE

## 2021-02-22 PROCEDURE — 1036F TOBACCO NON-USER: CPT | Performed by: INTERNAL MEDICINE

## 2021-02-22 PROCEDURE — 2022F DILAT RTA XM EVC RTNOPTHY: CPT | Performed by: INTERNAL MEDICINE

## 2021-02-22 ASSESSMENT — ENCOUNTER SYMPTOMS
COLOR CHANGE: 0
CONSTIPATION: 0
SHORTNESS OF BREATH: 0
DIARRHEA: 0
STRIDOR: 0
BLOOD IN STOOL: 0
COUGH: 0
SORE THROAT: 0
TROUBLE SWALLOWING: 0
WHEEZING: 0
BACK PAIN: 0
ABDOMINAL PAIN: 0

## 2021-02-22 ASSESSMENT — PATIENT HEALTH QUESTIONNAIRE - PHQ9
SUM OF ALL RESPONSES TO PHQ QUESTIONS 1-9: 0
SUM OF ALL RESPONSES TO PHQ9 QUESTIONS 1 & 2: 0
SUM OF ALL RESPONSES TO PHQ QUESTIONS 1-9: 0
1. LITTLE INTEREST OR PLEASURE IN DOING THINGS: 0
2. FEELING DOWN, DEPRESSED OR HOPELESS: 0

## 2021-02-24 ENCOUNTER — HOSPITAL ENCOUNTER (OUTPATIENT)
Dept: WOUND CARE | Age: 56
Discharge: HOME | End: 2021-02-24
Payer: MEDICARE

## 2021-02-24 VITALS
DIASTOLIC BLOOD PRESSURE: 97 MMHG | BODY MASS INDEX: 37.65 KG/M2 | HEART RATE: 69 BPM | RESPIRATION RATE: 18 BRPM | TEMPERATURE: 98.1 F | WEIGHT: 263 LBS | SYSTOLIC BLOOD PRESSURE: 161 MMHG | HEIGHT: 70 IN

## 2021-02-24 DIAGNOSIS — E11.621 DIABETIC ULCER OF RIGHT MIDFOOT ASSOCIATED WITH TYPE 2 DIABETES MELLITUS, WITH FAT LAYER EXPOSED (HCC): ICD-10-CM

## 2021-02-24 DIAGNOSIS — Z79.4 TYPE 2 DIABETES MELLITUS WITH DIABETIC POLYNEUROPATHY, WITH LONG-TERM CURRENT USE OF INSULIN (HCC): Primary | Chronic | ICD-10-CM

## 2021-02-24 DIAGNOSIS — E11.42 TYPE 2 DIABETES MELLITUS WITH DIABETIC POLYNEUROPATHY, WITH LONG-TERM CURRENT USE OF INSULIN (HCC): Primary | Chronic | ICD-10-CM

## 2021-02-24 DIAGNOSIS — L97.412 DIABETIC ULCER OF RIGHT MIDFOOT ASSOCIATED WITH TYPE 2 DIABETES MELLITUS, WITH FAT LAYER EXPOSED (HCC): ICD-10-CM

## 2021-02-24 PROCEDURE — 11042 DBRDMT SUBQ TIS 1ST 20SQCM/<: CPT

## 2021-02-24 PROCEDURE — 11042 DBRDMT SUBQ TIS 1ST 20SQCM/<: CPT | Performed by: SURGERY

## 2021-02-24 PROCEDURE — 97597 DBRDMT OPN WND 1ST 20 CM/<: CPT

## 2021-02-24 ASSESSMENT — PAIN SCALES - GENERAL: PAINLEVEL_OUTOF10: 0

## 2021-02-24 NOTE — PROGRESS NOTES
[address]   Progress Note and Procedure Note      [address] RECORD NUMBER:  [id number]  AGE: 60 y.o. GENDER: male  : 1965  EPISODE DATE:  2021    Subjective:     Chief Complaint   Patient presents with    Wound Check     Wound Check         HISTORY of PRESENT ILLNESS HPI     Jazmine Estrada is a 60 y.o. male who presents today for wound/ulcer evaluation.    Wound Context: Pt with R foot wound here for eval/treat  Wound/Ulcer Pain Timing/Severity: none  Quality of pain: N/A  Severity:  0 / 10   Modifying Factors: None  Associated Signs/Symptoms: none    Ulcer Identification:  Ulcer Type: diabetic and pressure  Contributing Factors: diabetes, chronic pressure and shear force    Wound: DM        PAST MEDICAL HISTORY        Diagnosis Date    Acalculous cholecystitis 2015    Allergic rhinitis, cause unspecified     Atrial fibrillation, chronic (HCC)     sees [facility]ology    Charcot's joint of right foot     Chicken pox     Closed supracondylar fracture of elbow     fall    History of MRSA infection     scalp/facial and on back    HTN (hypertension)     Hyperlipidemia     Impotence of organic origin     MDRO (multiple drug resistant organisms) resistance     Other dyspnea and respiratory abnormality     Other specified erythematous condition(695.89)     Personal history of other infectious and parasitic disease     Type II or unspecified type diabetes mellitus without mention of complication, uncontrolled        PAST SURGICAL HISTORY    Past Surgical History:   Procedure Laterality Date    CARDIOVERSION  2017    COLONOSCOPY  2020    Dr Joaquina Fraire-Melanosis coli throughout the entire colon-AP, 5 yr recall    HUMERUS FRACTURE SURGERY Left 2020    OPEN REDUCTION INTERNAL FIXATION LEFT SUPRACONDYLAR FRACTURE performed by Farnaz Goodman MD at [address]      X 2    TONSILLECTOMY FAMILY HISTORY    Family History   Problem Relation Age of Onset    Stroke Maternal Grandmother     Cancer Maternal Grandmother     Stroke Paternal Grandmother     Diabetes Paternal Grandmother     Cancer Father     Heart Disease Father     Lung Cancer Father     Asthma Paternal Grandfather     Heart Disease Paternal Grandfather     Heart Disease Maternal Grandfather     Colon Cancer Neg Hx     Colon Polyps Neg Hx     Esophageal Cancer Neg Hx     Liver Cancer Neg Hx     Liver Disease Neg Hx     Rectal Cancer Neg Hx     Stomach Cancer Neg Hx        SOCIAL HISTORY    Social History     Tobacco Use    Smoking status: Never Smoker    Smokeless tobacco: Never Used   Substance Use Topics    Alcohol use: No    Drug use: No       ALLERGIES    Allergies   Allergen Reactions    Pcn [Penicillins]     Neomycin-Bacitracin Zn-Polymyx Rash    Neosporin [Neomycin-Polymyxin-Gramicidin] Rash       MEDICATIONS    Current Outpatient Medications on File Prior to Encounter   Medication Sig Dispense Refill    insulin lispro, 1 Unit Dial, (HUMALOG KWIKPEN) 100 UNIT/ML SOPN Inject 10 Units into the skin 3 times daily (before meals) (Patient taking differently: Inject 30 Units into the skin 3 times daily (before meals) ) 4 pen 0    losartan (COZAAR) 100 MG tablet TAKE ONE TABLET DAILY 90 tablet 3    furosemide (LASIX) 20 MG tablet Take 1 tablet by mouth daily as needed (edema) 30 tablet 1    Insulin Degludec (TRESIBA FLEXTOUCH) 200 UNIT/ML SOPN Inject 80 Units into the skin daily      metoprolol tartrate (LOPRESSOR) 25 MG tablet TAKE ONE TABLET TWICE DAILY 60 tablet 2    VITAMIN D PO Take 1 tablet by mouth daily      docusate sodium (COLACE) 250 MG capsule Take 1 capsule by mouth 2 times daily 60 capsule 0    atorvastatin (LIPITOR) 20 MG tablet TAKE ONE TABLET DAILY (Patient taking differently: Take 20 mg by mouth daily ) 90 tablet 3  tamsulosin (FLOMAX) 0.4 MG capsule TAKE TWO CAPSULES DAILY (Patient taking differently: Take 0.8 mg by mouth daily TAKE TWO CAPSULES DAILY) 60 capsule 11    BD INSULIN SYRINGE U/F 31G X 5/16\" 1 ML MISC USE FOUR TIMES DAILY 100 each 3    warfarin (COUMADIN) 10 MG tablet Take 1 tablet by mouth daily 45 tablet 5    glimepiride (AMARYL) 4 MG tablet TAKE ONE TABLET EVERY MORNING BEFORE BREAKFAST (Patient taking differently: Take 4 mg by mouth every morning (before breakfast) ) 90 tablet 3    warfarin (COUMADIN) 5 MG tablet TAKE AS DIRECTED BY YOUR DOCTOR (Patient taking differently: Take by mouth daily TAKE AS DIRECTED BY YOUR DOCTOR-keeps 5mg on hand if dose varies) 30 tablet 5    diphenhydrAMINE (BENADRYL) 25 MG tablet Take 25 mg by mouth every 6 hours as needed for Itching      blood glucose test strips (CONTOUR NEXT TEST) strip 1 each by In Vitro route 4 times daily As needed. 200 each 3    Lancets MISC 1 each by Does not apply route 4 times daily 300 each 1    B-D ULTRAFINE III SHORT PEN 31G X 8 MM MISC Inject 1 each as directed 3 times daily 100 each 5     No current facility-administered medications on file prior to encounter. REVIEW OF SYSTEMS    A comprehensive review of systems was negative.     Objective:      BP (!) 161/97   Pulse 69   Temp 98.1 °F (36.7 °C) (Temporal)   Resp 18   Ht 5' 10\" (1.778 m)   Wt 263 lb (119.3 kg)   BMI 37.74 kg/m²     Wt Readings from Last 3 Encounters:   02/24/21 263 lb (119.3 kg)   02/22/21 269 lb (122 kg)   02/18/21 269 lb (122 kg)       PHYSICAL EXAM    General Appearance: alert and oriented to person, place and time, well developed and well- nourished, in no acute distress  Skin: warm and dry, no rash or erythema  Head: normocephalic and atraumatic  Eyes: pupils equal, round, and reactive to light, extraocular eye movements intact, conjunctivae normal Percent of Wound(s)/Ulcer(s) Debrided: 100%    Total Surface Area Debrided:  0.24 sq cm       Diabetic/Pressure/Non Pressure Ulcers only:  Ulcer: Diabetic ulcer, fat layer exposed             Post Debridement Measurements:    Wound/Ulcer Descriptions are Pre Debridement --EXCEPT MEASUREMENTS    Wound 09/14/20 Foot Right;Plantar WOUND 1 RIGHT PLANTAR (Active)   Wound Image   02/24/21 1311   Wound Etiology Diabetic Kat 1 02/24/21 1311   Dressing Status Old drainage noted 02/24/21 1311   Wound Cleansed Not Cleansed 02/24/21 1311   Dressing/Treatment Alginate with Ag 02/24/21 1331   Offloading for Diabetic Foot Ulcers Yes (type) 02/24/21 1311   Wound Length (cm) 0.4 cm 02/24/21 1311   Wound Width (cm) 0.6 cm 02/24/21 1311   Wound Depth (cm) 0.4 cm 02/24/21 1311   Wound Surface Area (cm^2) 0.24 cm^2 02/24/21 1311   Change in Wound Size % (l*w) 86.67 02/24/21 1311   Wound Volume (cm^3) 0.1 cm^3 02/24/21 1311   Wound Healing % 92 02/24/21 1311   Post-Procedure Length (cm) 0.4 cm 02/24/21 1324   Post-Procedure Width (cm) 0.6 cm 02/24/21 1324   Post-Procedure Depth (cm) 0.4 cm 02/24/21 1324   Post-Procedure Surface Area (cm^2) 0.24 cm^2 02/24/21 1324   Post-Procedure Volume (cm^3) 0.1 cm^3 02/24/21 1324   Distance Tunneling (cm) 0 cm 02/17/21 1424   Tunneling Position ___ O'Clock 0 02/17/21 1424   Undermining Starts ___ O'Clock 0 02/17/21 1424   Undermining Ends___ O'Clock 0 02/17/21 1424   Undermining Maxium Distance (cm) 0 02/17/21 1424   Wound Assessment Pink/red 02/24/21 1311   Drainage Amount Small 02/24/21 1311   Drainage Description Serosanguinous 02/24/21 1311   Odor None 02/24/21 1311   Yolis-wound Assessment Hyperkeratosis (callous) 02/24/21 1311   Margins Attached edges 02/24/21 1311   Wound Thickness Description not for Pressure Injury Full thickness 11/06/20 0957   Number of days: 163             Estimated Blood Loss:  Minimal    Hemostasis Achieved:  by pressure    Procedural Pain:  0  / 10 (Please note your next appointment above and if you are unable to keep, kindly give a 24 hour notice. Thank you.)    If you experience any of the following, please call the [address] during business hours:    * Increase in Pain  * Temperature over 101  * Increase in drainage from your wound  * Drainage with a foul odor  * Bleeding  * Increase in swelling  * Need for compression bandage changes due to slippage, breakthrough drainage. If you need medical attention outside of the business hours of the [address] please contact your PCP or go to the nearest emergency room.         Electronically signed by Elizabeth Sharma MD on 2/24/2021 at 1:39 PM

## 2021-03-03 ENCOUNTER — HOSPITAL ENCOUNTER (OUTPATIENT)
Dept: WOUND CARE | Age: 56
Discharge: HOME | End: 2021-03-03
Payer: MEDICARE

## 2021-03-03 VITALS
RESPIRATION RATE: 16 BRPM | DIASTOLIC BLOOD PRESSURE: 98 MMHG | HEART RATE: 70 BPM | TEMPERATURE: 98 F | HEIGHT: 70 IN | SYSTOLIC BLOOD PRESSURE: 157 MMHG | WEIGHT: 263 LBS | BODY MASS INDEX: 37.65 KG/M2

## 2021-03-03 DIAGNOSIS — L97.412 DIABETIC ULCER OF RIGHT MIDFOOT ASSOCIATED WITH TYPE 2 DIABETES MELLITUS, WITH FAT LAYER EXPOSED (HCC): Primary | Chronic | ICD-10-CM

## 2021-03-03 DIAGNOSIS — E11.42 TYPE 2 DIABETES MELLITUS WITH DIABETIC POLYNEUROPATHY, WITH LONG-TERM CURRENT USE OF INSULIN (HCC): Chronic | ICD-10-CM

## 2021-03-03 DIAGNOSIS — Z79.4 TYPE 2 DIABETES MELLITUS WITH DIABETIC POLYNEUROPATHY, WITH LONG-TERM CURRENT USE OF INSULIN (HCC): Chronic | ICD-10-CM

## 2021-03-03 DIAGNOSIS — E11.621 DIABETIC ULCER OF RIGHT MIDFOOT ASSOCIATED WITH TYPE 2 DIABETES MELLITUS, WITH FAT LAYER EXPOSED (HCC): Primary | Chronic | ICD-10-CM

## 2021-03-03 PROCEDURE — 99212 OFFICE O/P EST SF 10 MIN: CPT | Performed by: SURGERY

## 2021-03-03 PROCEDURE — 99212 OFFICE O/P EST SF 10 MIN: CPT

## 2021-03-03 ASSESSMENT — PAIN SCALES - GENERAL: PAINLEVEL_OUTOF10: 0

## 2021-03-03 NOTE — PROGRESS NOTES
[address]   Progress Note and Procedure Note      [address] RECORD NUMBER:  [id number]  AGE: 60 y.o. GENDER: male  : 1965  EPISODE DATE:  3/3/2021    Subjective:     Chief Complaint   Patient presents with    Wound Check     Wound Check         HISTORY of PRESENT ILLNESS HPI     Emilia Cartagena is a 60 y.o. male who presents today for wound/ulcer evaluation.    History of Wound Context: Pt with R metatarsal head wound here for eval/treat  Wound/Ulcer Pain Timing/Severity: none  Quality of pain: N/A  Severity:  0 / 10   Modifying Factors: None  Associated Signs/Symptoms: none    Ulcer Identification:  Ulcer Type: diabetic and pressure  Contributing Factors: diabetes, chronic pressure and shear force    Wound: pressure        PAST MEDICAL HISTORY        Diagnosis Date    Acalculous cholecystitis 2015    Allergic rhinitis, cause unspecified     Atrial fibrillation, chronic (HCC)     sees [facility] cardiology    Charcot's joint of right foot     Chicken pox     Closed supracondylar fracture of elbow     fall    History of MRSA infection     scalp/facial and on back    HTN (hypertension)     Hyperlipidemia     Impotence of organic origin     MDRO (multiple drug resistant organisms) resistance     Other dyspnea and respiratory abnormality     Other specified erythematous condition(695.89)     Personal history of other infectious and parasitic disease     Type II or unspecified type diabetes mellitus without mention of complication, uncontrolled        PAST SURGICAL HISTORY    Past Surgical History:   Procedure Laterality Date    CARDIOVERSION  2017    COLONOSCOPY  2020    Dr Alfredo Fraire-Melanosis coli throughout the entire colon-AP, 5 yr recall    HUMERUS FRACTURE SURGERY Left 2020    OPEN REDUCTION INTERNAL FIXATION LEFT SUPRACONDYLAR FRACTURE performed by Starr Tristan MD at [address]      X 2  TONSILLECTOMY         FAMILY HISTORY    Family History   Problem Relation Age of Onset    Stroke Maternal Grandmother     Cancer Maternal Grandmother     Stroke Paternal Grandmother     Diabetes Paternal Grandmother     Cancer Father     Heart Disease Father     Lung Cancer Father     Asthma Paternal Grandfather     Heart Disease Paternal Grandfather     Heart Disease Maternal Grandfather     Colon Cancer Neg Hx     Colon Polyps Neg Hx     Esophageal Cancer Neg Hx     Liver Cancer Neg Hx     Liver Disease Neg Hx     Rectal Cancer Neg Hx     Stomach Cancer Neg Hx        SOCIAL HISTORY    Social History     Tobacco Use    Smoking status: Never Smoker    Smokeless tobacco: Never Used   Substance Use Topics    Alcohol use: No    Drug use: No       ALLERGIES    Allergies   Allergen Reactions    Pcn [Penicillins]     Neomycin-Bacitracin Zn-Polymyx Rash    Neosporin [Neomycin-Polymyxin-Gramicidin] Rash       MEDICATIONS    Current Outpatient Medications on File Prior to Encounter   Medication Sig Dispense Refill    insulin lispro, 1 Unit Dial, (HUMALOG KWIKPEN) 100 UNIT/ML SOPN Inject 10 Units into the skin 3 times daily (before meals) (Patient taking differently: Inject 30 Units into the skin 3 times daily (before meals) ) 4 pen 0    losartan (COZAAR) 100 MG tablet TAKE ONE TABLET DAILY 90 tablet 3    furosemide (LASIX) 20 MG tablet Take 1 tablet by mouth daily as needed (edema) 30 tablet 1    Insulin Degludec (TRESIBA FLEXTOUCH) 200 UNIT/ML SOPN Inject 80 Units into the skin daily      metoprolol tartrate (LOPRESSOR) 25 MG tablet TAKE ONE TABLET TWICE DAILY 60 tablet 2    VITAMIN D PO Take 1 tablet by mouth daily      docusate sodium (COLACE) 250 MG capsule Take 1 capsule by mouth 2 times daily 60 capsule 0    atorvastatin (LIPITOR) 20 MG tablet TAKE ONE TABLET DAILY (Patient taking differently: Take 20 mg by mouth daily ) 90 tablet 3  tamsulosin (FLOMAX) 0.4 MG capsule TAKE TWO CAPSULES DAILY (Patient taking differently: Take 0.8 mg by mouth daily TAKE TWO CAPSULES DAILY) 60 capsule 11    BD INSULIN SYRINGE U/F 31G X 5/16\" 1 ML MISC USE FOUR TIMES DAILY 100 each 3    warfarin (COUMADIN) 10 MG tablet Take 1 tablet by mouth daily 45 tablet 5    glimepiride (AMARYL) 4 MG tablet TAKE ONE TABLET EVERY MORNING BEFORE BREAKFAST (Patient taking differently: Take 4 mg by mouth every morning (before breakfast) ) 90 tablet 3    warfarin (COUMADIN) 5 MG tablet TAKE AS DIRECTED BY YOUR DOCTOR (Patient taking differently: Take by mouth daily TAKE AS DIRECTED BY YOUR DOCTOR-keeps 5mg on hand if dose varies) 30 tablet 5    diphenhydrAMINE (BENADRYL) 25 MG tablet Take 25 mg by mouth every 6 hours as needed for Itching      blood glucose test strips (CONTOUR NEXT TEST) strip 1 each by In Vitro route 4 times daily As needed. 200 each 3    Lancets MISC 1 each by Does not apply route 4 times daily 300 each 1    B-D ULTRAFINE III SHORT PEN 31G X 8 MM MISC Inject 1 each as directed 3 times daily 100 each 5     No current facility-administered medications on file prior to encounter. REVIEW OF SYSTEMS    A comprehensive review of systems was negative.     Objective:      BP (!) 157/98   Pulse 70   Temp 98 °F (36.7 °C) (Temporal)   Resp 16   Ht 5' 10\" (1.778 m)   Wt 263 lb (119.3 kg)   BMI 37.74 kg/m²     Wt Readings from Last 3 Encounters:   03/03/21 263 lb (119.3 kg)   02/24/21 263 lb (119.3 kg)   02/22/21 269 lb (122 kg)       PHYSICAL EXAM    General Appearance: alert and oriented to person, place and time, well developed and well- nourished, in no acute distress  Skin: warm and dry, no rash or erythema  Head: normocephalic and atraumatic  Eyes: pupils equal, round, and reactive to light, extraocular eye movements intact, conjunctivae normal ENT: tympanic membrane, external ear and ear canal normal bilaterally, nose without deformity, nasal mucosa and turbinates normal without polyps, lips teeth and gums normal  Neck: supple and non-tender without mass, no thyromegaly or thyroid nodules, no cervical lymphadenopathy  Pulmonary/Chest: clear to auscultation bilaterally- no wheezes, rales or rhonchi, normal air movement, no respiratory distress  Cardiovascular: normal rate, regular rhythm, normal S1 and S2, no murmurs, rubs, clicks, or gallops, distal pulses intact, no carotid bruits  Abdomen: soft, non-tender, non-distended, normal bowel sounds, no masses or organomegaly  Extremities: no cyanosis, clubbing or edema  Musculoskeletal: normal range of motion, no joint swelling, deformity or tenderness  Neurologic: reflexes normal and symmetric, no cranial nerve deficit, gait, coordination and speech normal, sensation of skin normal      Assessment:      Patient Active Problem List   Diagnosis Code    Shortness of breath R06.02    HTN (hypertension) I10    Hyperlipidemia E78.5    Fatigue R53.83    Hyponatremia E87.1    Type 2 diabetes mellitus with diabetic polyneuropathy, with long-term current use of insulin ([facility]) E11.42, Z79.4    Class 2 obesity due to excess calories in adult E66.09    Diabetic ulcer of right midfoot associated with type 2 diabetes mellitus, with fat layer exposed ([facility]) E11.621, L97.412    Displaced comminuted supracondylar fracture without intercondylar fracture of left humerus, initial encounter for closed fracture S42.422A    Left supracondylar humerus fracture, closed, initial encounter S39.56A    Charcot foot due to diabetes mellitus ([facility] E11.610    Non-compliance Z91.19    Permanent atrial fibrillation ([facility]) I48.21    (HFpEF) heart failure with preserved ejection fraction ([facility]) I50.30             Wound 09/14/20 Foot Right;Plantar WOUND 1 RIGHT PLANTAR (Active)   Wound Image   02/24/21 1311 Wound Etiology Diabetic Kat 1 03/03/21 1323   Dressing Status Old drainage noted 02/24/21 1311   Wound Cleansed Not Cleansed 03/03/21 1323   Dressing/Treatment Alginate with Ag 02/24/21 1331   Offloading for Diabetic Foot Ulcers Yes (type) 02/24/21 1311   Wound Length (cm) 0 cm 03/03/21 1323   Wound Width (cm) 0 cm 03/03/21 1323   Wound Depth (cm) 0 cm 03/03/21 1323   Wound Surface Area (cm^2) 0 cm^2 03/03/21 1323   Change in Wound Size % (l*w) 100 03/03/21 1323   Wound Volume (cm^3) 0 cm^3 03/03/21 1323   Wound Healing % 100 03/03/21 1323   Post-Procedure Length (cm) 0 cm 03/03/21 1323   Post-Procedure Width (cm) 0 cm 03/03/21 1323   Post-Procedure Depth (cm) 0 cm 03/03/21 1323   Post-Procedure Surface Area (cm^2) 0 cm^2 03/03/21 1323   Post-Procedure Volume (cm^3) 0 cm^3 03/03/21 1323   Distance Tunneling (cm) 0 cm 02/17/21 1424   Tunneling Position ___ O'Clock 0 02/17/21 1424   Undermining Starts ___ O'Clock 0 02/17/21 1424   Undermining Ends___ O'Clock 0 02/17/21 1424   Undermining Maxium Distance (cm) 0 02/17/21 1424   Wound Assessment Pink/red 02/24/21 1311   Drainage Amount Small 02/24/21 1311   Drainage Description Serosanguinous 02/24/21 1311   Odor None 03/03/21 1323   Yolis-wound Assessment Hyperkeratosis (callous) 02/24/21 1311   Margins Attached edges 02/24/21 1311   Wound Thickness Description not for Pressure Injury Full thickness 11/06/20 0957   Number of days: 170             Plan:     Problem List Items Addressed This Visit     Type 2 diabetes mellitus with diabetic polyneuropathy, with long-term current use of insulin (HCC) (Chronic)    * (Principal) Diabetic ulcer of right midfoot associated with type 2 diabetes mellitus, with fat layer exposed ([address] - Primary (Chronic) Pt has tried multiple modalities to off load plantar 2nd toe but nothing has worked. I feel he needs a R 2nd metatarsal head resection so his pressure will be relieved permanently. Pt sees Dr. Dayna Coleman for charcot foot anyway and will make referral to him. RTO 1 week. Pt in agreement.         Treatment Note please see attached Discharge Instructions    In my professional opinion this patient would benefit from HBO Therapy: No    Written patient dismissal instructions given to patient and signed by patient or POA.          Discharge [address] and Hyperbaric Oxygen Therapy   Physician Orders and Discharge Instructions  [address] [place] [address]  Telephone: [phone]    NAME:  Hedy Johnson OF BIRTH:  1965  MEDICAL RECORD NUMBER:  [id number]  DATE:  3/3/2021    Discharge condition: Stable    Discharge to: Home    Left via:Private automobile    Accompanied by: Self     Dressing Orders: Right Plantar:  Healed, Moisturize and Protect,   Wear off load shoe at all times    Possible Dr Dayna Coleman        Electronically signed by Aneglica Prince MD on 3/3/2021 at 1:37 PM

## 2021-03-05 RX ORDER — FUROSEMIDE 20 MG/1
TABLET ORAL
Qty: 30 TABLET | Refills: 1 | Status: SHIPPED | OUTPATIENT
Start: 2021-03-05 | End: 2021-06-14 | Stop reason: SDUPTHER

## 2021-03-05 NOTE — TELEPHONE ENCOUNTER
Lisa Medrano called requesting a refill of the below medication which has been pended for you:     Requested Prescriptions     Pending Prescriptions Disp Refills    furosemide (LASIX) 20 MG tablet [Pharmacy Med Name: FUROSEMIDE 20MG TAB   * TABLET] 30 tablet 1     Sig: TAKE ONE TABLET DAILY AS NEEDED       Last Appointment Date: 2/22/2021  Next Appointment Date: 6/1/2021    Allergies   Allergen Reactions    Pcn [Penicillins]     Neomycin-Bacitracin Zn-Polymyx Rash    Neosporin [Neomycin-Polymyxin-Gramicidin] Rash

## 2021-03-10 ENCOUNTER — TELEPHONE (OUTPATIENT)
Dept: INTERNAL MEDICINE | Age: 56
End: 2021-03-10

## 2021-03-10 NOTE — TELEPHONE ENCOUNTER
Pt called states that he is having blood in his stool. He stated he thinks that it is because he is constipated and he is having to go to the bathroom a lot. He said that this just started and he thinks he is just raw in the area. I trid to get him an apt for today and tomorrow and he said no that it should be healed up by then. I also suggested to go to urgent care or ER if he could not come in or if he got worse. Be VU and declined to come in at this time he wants to wait and see if it gets better on its own.

## 2021-03-15 ENCOUNTER — OFFICE VISIT (OUTPATIENT)
Dept: UROLOGY | Age: 56
End: 2021-03-15
Payer: MEDICARE

## 2021-03-15 VITALS — WEIGHT: 278.8 LBS | TEMPERATURE: 97.5 F | HEIGHT: 70 IN | BODY MASS INDEX: 39.91 KG/M2

## 2021-03-15 DIAGNOSIS — N52.9 ERECTILE DYSFUNCTION, UNSPECIFIED ERECTILE DYSFUNCTION TYPE: ICD-10-CM

## 2021-03-15 DIAGNOSIS — N40.1 BENIGN PROSTATIC HYPERPLASIA WITH INCOMPLETE BLADDER EMPTYING: ICD-10-CM

## 2021-03-15 DIAGNOSIS — R33.9 INCOMPLETE BLADDER EMPTYING: Primary | ICD-10-CM

## 2021-03-15 DIAGNOSIS — R39.14 BENIGN PROSTATIC HYPERPLASIA WITH INCOMPLETE BLADDER EMPTYING: ICD-10-CM

## 2021-03-15 LAB
ANION GAP SERPL CALCULATED.3IONS-SCNC: 11 MMOL/L (ref 7–19)
BACTERIA URINE, POC: ABNORMAL
BILIRUBIN URINE: 0 MG/DL
BLOOD, URINE: POSITIVE
BUN BLDV-MCNC: 17 MG/DL (ref 6–20)
CALCIUM SERPL-MCNC: 9.1 MG/DL (ref 8.6–10)
CASTS URINE, POC: ABNORMAL
CHLORIDE BLD-SCNC: 100 MMOL/L (ref 98–111)
CLARITY: CLEAR
CO2: 28 MMOL/L (ref 22–29)
COLOR: YELLOW
CREAT SERPL-MCNC: 1 MG/DL (ref 0.5–1.2)
CRYSTALS URINE, POC: ABNORMAL
EPI CELLS URINE, POC: ABNORMAL
GFR AFRICAN AMERICAN: >59
GFR NON-AFRICAN AMERICAN: >60
GLUCOSE BLD-MCNC: 98 MG/DL (ref 74–109)
GLUCOSE URINE: ABNORMAL
KETONES, URINE: NEGATIVE
LEUKOCYTE EST, POC: ABNORMAL
NITRITE, URINE: NEGATIVE
PH UA: 6.5 (ref 4.5–8)
POTASSIUM SERPL-SCNC: 4.1 MMOL/L (ref 3.5–5)
PROSTATE SPECIFIC ANTIGEN: 0.24 NG/ML (ref 0–4)
PROTEIN UA: POSITIVE
RBC URINE, POC: ABNORMAL
SODIUM BLD-SCNC: 139 MMOL/L (ref 136–145)
SPECIFIC GRAVITY UA: 1.02 (ref 1–1.03)
UROBILINOGEN, URINE: NORMAL
WBC URINE, POC: ABNORMAL
YEAST URINE, POC: ABNORMAL

## 2021-03-15 PROCEDURE — 81000 URINALYSIS NONAUTO W/SCOPE: CPT | Performed by: NURSE PRACTITIONER

## 2021-03-15 PROCEDURE — 99215 OFFICE O/P EST HI 40 MIN: CPT | Performed by: NURSE PRACTITIONER

## 2021-03-15 NOTE — PROGRESS NOTES
Coit Jeans is a 60 y.o. male who presents today   Chief Complaint   Patient presents with    Follow-up     I am here today for my 1 yr follow up for incomplete bladder emptying       Benign Prostatic Hypertrophy  Patient complains of lower urinary tract symptoms. He reports incomplete emptying, straining and weak stream. He denies frequency, intermittency, nocturia one time a night and urgency. Patient states symptoms are of moderate severity. Onset of symptoms was several years ago and was gradual in onset. His AUA Symptom Score is, 11/35 manifested as obstructive symptoms including incomplete emptying, weak stream, straining. He has no personal history and no family history of prostate cancer. He reports a history of no complicating symptoms. He denies flank pain, gross hematuria, kidney stones and recurrent UTI. Currently on tamsulosin 0.8 mg/day. History of constipation, states he is unable to void when he is constipated. History of uncontrolled diabetes last A1c obtained 12/3/2020 is 11.4. Creatinine obtained 3/15/2021 is 1.0, GFR >60. History of hospitalization approximately 2 years ago with DKA. History of erectile dysfunction for the past 10 years. Unable to obtain an erection or keep one.        Past Medical History:   Diagnosis Date    Acalculous cholecystitis 12/29/2015    Allergic rhinitis, cause unspecified     Atrial fibrillation, chronic (HCC)     sees [facility] cardiology    Charcot's joint of right foot     Chicken pox     Closed supracondylar fracture of elbow     fall    History of MRSA infection     scalp/facial and on back    HTN (hypertension)     Hyperlipidemia     Impotence of organic origin     MDRO (multiple drug resistant organisms) resistance     Other dyspnea and respiratory abnormality     Other specified erythematous condition(345.89)     Personal history of other infectious and parasitic disease     Salmonella     Type II or unspecified type diabetes mellitus without mention of complication, uncontrolled 1994       Past Surgical History:   Procedure Laterality Date    CARDIOVERSION  03/2017    COLONOSCOPY  02/24/2020    Dr Ivis Fraire-Melanosis coli throughout the entire colon-AP, 5 yr recall    HUMERUS FRACTURE SURGERY Left 9/17/2020    OPEN REDUCTION INTERNAL FIXATION LEFT SUPRACONDYLAR FRACTURE performed by Jesus Wolf MD at [address]      X 2    TONSILLECTOMY         Current Outpatient Medications   Medication Sig Dispense Refill    furosemide (LASIX) 20 MG tablet TAKE ONE TABLET DAILY AS NEEDED 30 tablet 1    insulin lispro, 1 Unit Dial, (HUMALOG KWIKPEN) 100 UNIT/ML SOPN Inject 10 Units into the skin 3 times daily (before meals) (Patient taking differently: Inject 30 Units into the skin 3 times daily (before meals) ) 4 pen 0    losartan (COZAAR) 100 MG tablet TAKE ONE TABLET DAILY 90 tablet 3    Insulin Degludec (TRESIBA FLEXTOUCH) 200 UNIT/ML SOPN Inject 80 Units into the skin daily      metoprolol tartrate (LOPRESSOR) 25 MG tablet TAKE ONE TABLET TWICE DAILY 60 tablet 2    VITAMIN D PO Take 1 tablet by mouth daily      docusate sodium (COLACE) 250 MG capsule Take 1 capsule by mouth 2 times daily 60 capsule 0    atorvastatin (LIPITOR) 20 MG tablet TAKE ONE TABLET DAILY (Patient taking differently: Take 20 mg by mouth daily ) 90 tablet 3    tamsulosin (FLOMAX) 0.4 MG capsule TAKE TWO CAPSULES DAILY (Patient taking differently: Take 0.8 mg by mouth daily TAKE TWO CAPSULES DAILY) 60 capsule 11    BD INSULIN SYRINGE U/F 31G X 5/16\" 1 ML MISC USE FOUR TIMES DAILY 100 each 3    warfarin (COUMADIN) 10 MG tablet Take 1 tablet by mouth daily 45 tablet 5    glimepiride (AMARYL) 4 MG tablet TAKE ONE TABLET EVERY MORNING BEFORE BREAKFAST (Patient taking differently: Take 4 mg by mouth every morning (before breakfast) ) 90 tablet 3    warfarin (COUMADIN) 5 MG tablet TAKE AS DIRECTED BY YOUR DOCTOR (Patient taking differently: There is no distension. Palpations: Abdomen is soft. Tenderness: There is no abdominal tenderness. There is no right CVA tenderness or left CVA tenderness. Genitourinary:     Prostate: Enlarged (40-45g smooth). Not tender and no nodules present. Musculoskeletal: Normal range of motion. Skin:     General: Skin is warm and dry. Neurological:      Mental Status: He is alert and oriented to person, place, and time. Motor: No weakness.       Gait: Gait normal.   Psychiatric:         Mood and Affect: Mood normal.         Behavior: Behavior normal.         DATA:  CBC with Differential:    Lab Results   Component Value Date    WBC 9.0 12/03/2020    RBC 4.94 12/03/2020    HGB 15.2 12/03/2020    HCT 45.0 12/03/2020     12/03/2020    MCV 91.1 12/03/2020    MCH 30.8 12/03/2020    MCHC 33.8 12/03/2020    RDW 13.1 12/03/2020    LYMPHOPCT 17.5 12/03/2020    MONOPCT 8.6 12/03/2020    BASOPCT 1.0 12/03/2020    MONOSABS 0.80 12/03/2020    LYMPHSABS 1.6 12/03/2020    EOSABS 0.20 12/03/2020    BASOSABS 0.10 12/03/2020     BMP:    Lab Results   Component Value Date     03/15/2021    K 4.1 03/15/2021    K 4.6 09/06/2020     03/15/2021    CO2 28 03/15/2021    BUN 17 03/15/2021    LABALBU 3.8 01/22/2021    CREATININE 1.0 03/15/2021    CALCIUM 9.1 03/15/2021    GFRAA >59 03/15/2021    LABGLOM >60 03/15/2021    GLUCOSE 98 03/15/2021     HgBA1c:    Lab Results   Component Value Date    LABA1C 11.4 12/03/2020     PSA:   Lab Results   Component Value Date    PSA 0.24 03/15/2021     Results for orders placed or performed in visit on 03/15/21   POC URINE with Microscopic   Result Value Ref Range    Color, UA Yellow     Clarity, UA Clear Clear    Glucose, Ur NEG     Bilirubin Urine 0 mg/dL    Ketones, Urine Negative     Specific Gravity, UA 1.025 1.005 - 1.030    Blood, Urine Positive     pH, UA 6.5 4.5 - 8.0    Protein, UA Positive (A) Negative    Nitrite, Urine Negative     Leukocytes, UA neg Urobilinogen, Urine Normal     rbc urine, poc 0-1 (A)     wbc urine, poc      bacteria urine, poc      yeast urine, poc      casts urine, poc      epi cells urine, poc      crystals urine, poc       Lab Results   Component Value Date    PSA 0.24 03/15/2021    PSA 0.47 05/09/2019     Bladder Scan interpretation  Estimation of residual urine via abdominal ultrasound  Residual Urine: 170 ml  Indication: incomplete emptying  Position: Supine  Examination: Incremental scanning of the suprapubic area using 3 MHz transducer using copious amounts of acoustic gel. Findings: An anechoic area was demonstrated which represented the bladder, with measurement of residual urine as noted. Patient did not empty his bladder prior to obtaining scan      1. Incomplete bladder emptying  History of incomplete bladder emptying. Currently on 0.8 mg tamsulosin daily. Bladder scan today revealed 170 mL PVR. Patient also states that he did not empty his bladder prior to the scan. Slightly enlarged prostate 40 to 45 g. AUA symptom score 11/35. Patient may have an atonic bladder due to uncontrolled diabetes. - POC URINE with Microscopic    2. Benign prostatic hyperplasia with incomplete bladder emptying  Mildly enlarged prostate on CRISTOFER today. PSA within normal limits. Discussed Rezum but given mild symptoms we will continue to monitor with medical management. I did discuss the risk of having to utilize in and out catheterization if PVRs remain high. We will follow-up in 6 months with bladder scan after emptying bladder  - PSA, Diagnostic; Future  - PSA, Diagnostic; Future  - Basic Metabolic Panel; Future  - POC URINE with Microscopic    3. Erectile dysfunction, unspecified erectile dysfunction type  History of erectile dysfunction. Discussed alternative measures for treatment including PDE 5, vacuum assist device, penile injections, penile gel. Patient would like to hold off with any treatment currently.   We will follow-up in 6 months   - POC URINE with Microscopic      Orders Placed This Encounter   Procedures    PSA, Diagnostic     In 1 year prior to the next visit     Standing Status:   Future     Standing Expiration Date:   9/15/2022    PSA, Diagnostic     PSA today     Standing Status:   Future     Number of Occurrences:   1     Standing Expiration Date:   3/15/2022    Basic Metabolic Panel     Standing Status:   Future     Number of Occurrences:   1     Standing Expiration Date:   3/15/2022    POC URINE with Microscopic        Return in about 6 months (around 9/15/2021). NOMI Johnson - CNP    All information inputted into the note by the MA to include chief complaint, past medical history, past surgical history, medications, allergies, social and family history and review of systems has been reviewed and updated as needed by me. EMR Dragon/transcription disclaimer: Much of this documentt is electronic  transcription/translation of spoken language to printed text. The  electronic translation of spoken language may be erroneous, or at times,  nonsensical words or phrases may be inadvertently transcribed.  Although I  have reviewed the document for such errors, some may still exist.

## 2021-03-16 ASSESSMENT — ENCOUNTER SYMPTOMS
NAUSEA: 0
ABDOMINAL DISTENTION: 0
COLOR CHANGE: 0
SHORTNESS OF BREATH: 0
CONSTIPATION: 0
WHEEZING: 0
ABDOMINAL PAIN: 0
VOMITING: 0

## 2021-03-18 ENCOUNTER — TELEPHONE (OUTPATIENT)
Dept: UROLOGY | Age: 56
End: 2021-03-18

## 2021-03-18 NOTE — TELEPHONE ENCOUNTER
Sherry Due, was patient needing rx? I do not see anything in the note and patient should have enough tamsulosin refills until June.

## 2021-04-01 ENCOUNTER — TELEPHONE (OUTPATIENT)
Dept: CARDIOLOGY CLINIC | Age: 56
End: 2021-04-01

## 2021-04-01 NOTE — TELEPHONE ENCOUNTER
Patient called stating he has gained 13 lbs in the past month. He states he feels like a \"beached whale\" and \"i'm waddling\". He does have SOB and swelling to mainly abdomen, \"maybe a little in legs\". PCP orders lasix 20 mg QD and he said he is taking everything his med list shows. He does not weight daily. Encouraged him to get in the habit of daily weights so that he can catch weight gain much sooner.

## 2021-04-06 RX ORDER — GLIMEPIRIDE 4 MG/1
TABLET ORAL
Qty: 90 TABLET | Refills: 3 | Status: ON HOLD | OUTPATIENT
Start: 2021-04-06 | End: 2021-04-19 | Stop reason: HOSPADM

## 2021-04-06 RX ORDER — WARFARIN SODIUM 10 MG/1
TABLET ORAL
Qty: 45 TABLET | Refills: 0 | Status: ON HOLD | OUTPATIENT
Start: 2021-04-06 | End: 2021-04-19 | Stop reason: HOSPADM

## 2021-04-06 NOTE — TELEPHONE ENCOUNTER
Yosef Love called requesting a refill of the below medication which has been pended for you:     Requested Prescriptions     Pending Prescriptions Disp Refills    glimepiride (AMARYL) 4 MG tablet [Pharmacy Med Name: GLIMEPIRIDE 4MG TABLET] 90 tablet 3     Sig: TAKE ONE TABLET EVERY MORNING BEFORE BREAKFAST       Last Appointment Date: 2/22/2021  Next Appointment Date: 6/1/2021    Allergies   Allergen Reactions    Pcn [Penicillins]     Neomycin-Bacitracin Zn-Polymyx Rash    Neosporin [Neomycin-Polymyxin-Gramicidin] Rash

## 2021-04-11 ENCOUNTER — HOSPITAL ENCOUNTER (EMERGENCY)
Age: 56
Discharge: HOME | End: 2021-04-11
Payer: MEDICARE

## 2021-04-11 ENCOUNTER — APPOINTMENT (OUTPATIENT)
Dept: GENERAL RADIOLOGY | Age: 56
End: 2021-04-11
Payer: MEDICARE

## 2021-04-11 VITALS
DIASTOLIC BLOOD PRESSURE: 111 MMHG | SYSTOLIC BLOOD PRESSURE: 172 MMHG | RESPIRATION RATE: 13 BRPM | WEIGHT: 278 LBS | HEART RATE: 60 BPM | TEMPERATURE: 98.2 F | BODY MASS INDEX: 39.8 KG/M2 | HEIGHT: 70 IN | OXYGEN SATURATION: 98 %

## 2021-04-11 DIAGNOSIS — Z79.01 ANTICOAGULATED: ICD-10-CM

## 2021-04-11 DIAGNOSIS — R06.00 DYSPNEA, UNSPECIFIED TYPE: ICD-10-CM

## 2021-04-11 DIAGNOSIS — I50.32 CHRONIC HEART FAILURE WITH PRESERVED EJECTION FRACTION (HCC): Primary | ICD-10-CM

## 2021-04-11 DIAGNOSIS — R73.9 ELEVATED BLOOD SUGAR: ICD-10-CM

## 2021-04-11 LAB
ADENOVIRUS BY PCR: NOT DETECTED
ALBUMIN SERPL-MCNC: 3.6 G/DL (ref 3.5–5.2)
ALP BLD-CCNC: 311 U/L (ref 40–130)
ALT SERPL-CCNC: 59 U/L (ref 5–41)
ANION GAP SERPL CALCULATED.3IONS-SCNC: 8 MMOL/L (ref 7–19)
AST SERPL-CCNC: 57 U/L (ref 5–40)
BACTERIA: NEGATIVE /HPF
BASOPHILS ABSOLUTE: 0.1 K/UL (ref 0–0.2)
BASOPHILS RELATIVE PERCENT: 1.1 % (ref 0–1)
BILIRUB SERPL-MCNC: 1.2 MG/DL (ref 0.2–1.2)
BILIRUBIN URINE: NEGATIVE
BLOOD, URINE: ABNORMAL
BORDETELLA PARAPERTUSSIS BY PCR: NOT DETECTED
BORDETELLA PERTUSSIS BY PCR: NOT DETECTED
BUN BLDV-MCNC: 19 MG/DL (ref 6–20)
CALCIUM SERPL-MCNC: 8.6 MG/DL (ref 8.6–10)
CHLAMYDOPHILIA PNEUMONIAE BY PCR: NOT DETECTED
CHLORIDE BLD-SCNC: 98 MMOL/L (ref 98–111)
CLARITY: CLEAR
CO2: 27 MMOL/L (ref 22–29)
COLOR: YELLOW
CORONAVIRUS 229E BY PCR: NOT DETECTED
CORONAVIRUS HKU1 BY PCR: NOT DETECTED
CORONAVIRUS NL63 BY PCR: NOT DETECTED
CORONAVIRUS OC43 BY PCR: NOT DETECTED
CREAT SERPL-MCNC: 1.1 MG/DL (ref 0.5–1.2)
CRYSTALS, UA: ABNORMAL /HPF
EOSINOPHILS ABSOLUTE: 0.1 K/UL (ref 0–0.6)
EOSINOPHILS RELATIVE PERCENT: 1.9 % (ref 0–5)
EPITHELIAL CELLS, UA: 0 /HPF (ref 0–5)
GFR AFRICAN AMERICAN: >59
GFR NON-AFRICAN AMERICAN: >60
GLUCOSE BLD-MCNC: 320 MG/DL (ref 74–109)
GLUCOSE URINE: 500 MG/DL
HCT VFR BLD CALC: 41.7 % (ref 42–52)
HEMOGLOBIN: 13.3 G/DL (ref 14–18)
HUMAN METAPNEUMOVIRUS BY PCR: NOT DETECTED
HUMAN RHINOVIRUS/ENTEROVIRUS BY PCR: NOT DETECTED
HYALINE CASTS: 0 /HPF (ref 0–8)
IMMATURE GRANULOCYTES #: 0 K/UL
INFLUENZA A BY PCR: NOT DETECTED
INFLUENZA B BY PCR: NOT DETECTED
INR BLD: 2.38 (ref 0.88–1.18)
KETONES, URINE: NEGATIVE MG/DL
LEUKOCYTE ESTERASE, URINE: NEGATIVE
LYMPHOCYTES ABSOLUTE: 0.8 K/UL (ref 1.1–4.5)
LYMPHOCYTES RELATIVE PERCENT: 17.4 % (ref 20–40)
MCH RBC QN AUTO: 31.7 PG (ref 27–31)
MCHC RBC AUTO-ENTMCNC: 31.9 G/DL (ref 33–37)
MCV RBC AUTO: 99.5 FL (ref 80–94)
MONOCYTES ABSOLUTE: 0.5 K/UL (ref 0–0.9)
MONOCYTES RELATIVE PERCENT: 9.9 % (ref 0–10)
MYCOPLASMA PNEUMONIAE BY PCR: NOT DETECTED
NEUTROPHILS ABSOLUTE: 3.2 K/UL (ref 1.5–7.5)
NEUTROPHILS RELATIVE PERCENT: 69.5 % (ref 50–65)
NITRITE, URINE: NEGATIVE
PARAINFLUENZA VIRUS 1 BY PCR: NOT DETECTED
PARAINFLUENZA VIRUS 2 BY PCR: NOT DETECTED
PARAINFLUENZA VIRUS 3 BY PCR: NOT DETECTED
PARAINFLUENZA VIRUS 4 BY PCR: NOT DETECTED
PDW BLD-RTO: 13.9 % (ref 11.5–14.5)
PH UA: 6.5 (ref 5–8)
PLATELET # BLD: 230 K/UL (ref 130–400)
PMV BLD AUTO: 10.9 FL (ref 9.4–12.4)
POTASSIUM REFLEX MAGNESIUM: 4.4 MMOL/L (ref 3.5–5)
PRO-BNP: 1383 PG/ML (ref 0–900)
PROTEIN UA: 100 MG/DL
PROTHROMBIN TIME: 26.5 SEC (ref 12–14.6)
RBC # BLD: 4.19 M/UL (ref 4.7–6.1)
RBC UA: 2 /HPF (ref 0–4)
RESPIRATORY SYNCYTIAL VIRUS BY PCR: NOT DETECTED
SARS-COV-2, PCR: NOT DETECTED
SODIUM BLD-SCNC: 133 MMOL/L (ref 136–145)
SPECIFIC GRAVITY UA: 1.01 (ref 1–1.03)
TOTAL PROTEIN: 6.9 G/DL (ref 6.6–8.7)
TROPONIN: <0.01 NG/ML (ref 0–0.03)
UROBILINOGEN, URINE: 1 E.U./DL
WBC # BLD: 4.7 K/UL (ref 4.8–10.8)
WBC UA: 0 /HPF (ref 0–5)

## 2021-04-11 PROCEDURE — 93005 ELECTROCARDIOGRAM TRACING: CPT | Performed by: NURSE PRACTITIONER

## 2021-04-11 PROCEDURE — 81001 URINALYSIS AUTO W/SCOPE: CPT

## 2021-04-11 PROCEDURE — 80053 COMPREHEN METABOLIC PANEL: CPT

## 2021-04-11 PROCEDURE — 6360000002 HC RX W HCPCS: Performed by: NURSE PRACTITIONER

## 2021-04-11 PROCEDURE — 85610 PROTHROMBIN TIME: CPT

## 2021-04-11 PROCEDURE — 99281 EMR DPT VST MAYX REQ PHY/QHP: CPT

## 2021-04-11 PROCEDURE — 84484 ASSAY OF TROPONIN QUANT: CPT

## 2021-04-11 PROCEDURE — 36415 COLL VENOUS BLD VENIPUNCTURE: CPT

## 2021-04-11 PROCEDURE — 96374 THER/PROPH/DIAG INJ IV PUSH: CPT

## 2021-04-11 PROCEDURE — 85025 COMPLETE CBC W/AUTO DIFF WBC: CPT

## 2021-04-11 PROCEDURE — 71045 X-RAY EXAM CHEST 1 VIEW: CPT

## 2021-04-11 PROCEDURE — 0202U NFCT DS 22 TRGT SARS-COV-2: CPT

## 2021-04-11 PROCEDURE — 83880 ASSAY OF NATRIURETIC PEPTIDE: CPT

## 2021-04-11 RX ORDER — FUROSEMIDE 10 MG/ML
40 INJECTION INTRAMUSCULAR; INTRAVENOUS ONCE
Status: COMPLETED | OUTPATIENT
Start: 2021-04-11 | End: 2021-04-11

## 2021-04-11 RX ADMIN — FUROSEMIDE 40 MG: 10 INJECTION, SOLUTION INTRAMUSCULAR; INTRAVENOUS at 14:13

## 2021-04-11 ASSESSMENT — ENCOUNTER SYMPTOMS
COUGH: 1
ABDOMINAL PAIN: 0
SHORTNESS OF BREATH: 1

## 2021-04-11 NOTE — ED PROVIDER NOTES
[address] [facility]  eMERGENCY dEPARTMENT eNCOUnter      Pt Name: Ulysses Duverney  MRN: [id number]  [place] 1965  Date of evaluation: 4/11/2021  Provider: NOMI Giles    CHIEF COMPLAINT       Chief Complaint   Patient presents with    Shortness of Breath     X 3 WEEKS RETAINING FLUID         HISTORY OF PRESENT ILLNESS   (Location/Symptom, Timing/Onset,Context/Setting, Quality, Duration, Modifying Factors, Severity)  Note limiting factors. Yuriy Lambert a 60 y.o. male who presents to the emergency department for evaluation of shortness of breath. Pt tells me that he has had worsening shortness of breath associated with cough over past 3 weeks. He tells me that he has had recent increased dose of lasix. He has had no fevers or abdominal pain. He has had no chest pain or syncope. He tells me that he has had increased edema of bilateral lower extremities and abdomen over past few weeks. He tells me that he is concerned that he has lung cancer or covid. He tells me that he does not smoke cigarettes. He does not require supplemental oxygen at home. He tells me that he has had covid immunization having received two injections. He has history of chronic atrial fibrillation on coumadin. He tells me that he has had decreased urination after salmonella infection. HPI    Nursing Notes were reviewed. REVIEW OF SYSTEMS    (2-9 systems for level 4, 10 or more for level 5)     Review of Systems   Constitutional: Negative for fever. Respiratory: Positive for cough and shortness of breath. Cardiovascular: Negative for chest pain. Gastrointestinal: Negative for abdominal pain. All other systems reviewed and are negative. A complete review of systems was performed and is negative except as noted above in the HPI.        PAST MEDICAL HISTORY     Past Medical History:   Diagnosis Date    Acalculous cholecystitis 12/29/2015    Allergic rhinitis, cause unspecified     Atrial fibrillation, chronic ([facility] [address] sees [facility] cardiology    Charcot's joint of right foot     Chicken pox     Closed supracondylar fracture of elbow     fall    History of MRSA infection     scalp/facial and on back    HTN (hypertension)     Hyperlipidemia     Impotence of organic origin     MDRO (multiple drug resistant organisms) resistance     Other dyspnea and respiratory abnormality     Other specified erythematous condition(695.89)     Personal history of other infectious and parasitic disease     Salmonella     Type II or unspecified type diabetes mellitus without mention of complication, uncontrolled 1994         SURGICAL HISTORY       Past Surgical History:   Procedure Laterality Date    CARDIOVERSION  03/2017    COLONOSCOPY  02/24/2020    Dr Oliver Fraire-Melanosis coli throughout the entire colon-AP, 5 yr recall    HUMERUS FRACTURE SURGERY Left 9/17/2020    OPEN REDUCTION INTERNAL FIXATION LEFT SUPRACONDYLAR FRACTURE [facility]      X 2    TONSILLECTOMY           CURRENT MEDICATIONS       Discharge Medication List as of 4/11/2021  4:15 PM      CONTINUE these medications which have NOT CHANGED    Details   glimepiride (AMARYL) 4 MG tablet TAKE ONE TABLET EVERY MORNING BEFORE BREAKFAST, Disp-90 tablet, R-3Normal      !! warfarin (COUMADIN) 10 MG tablet TAKE ONE TABLET DAILY, Disp-45 tablet, R-0Normal      furosemide (LASIX) 20 MG tablet TAKE ONE TABLET DAILY AS NEEDED, Disp-30 tablet, R-1Normal      insulin lispro, 1 Unit Dial, (HUMALOG KWIKPEN) 100 UNIT/ML SOPN Inject 10 Units into the skin 3 times daily (before meals), Disp-4 pen, R-0Sample      losartan (COZAAR) 100 MG tablet TAKE ONE TABLET DAILY, Disp-90 tablet, R-3Normal      Insulin Degludec (TRESIBA FLEXTOUCH) 200 UNIT/ML SOPN Inject 80 Units into the skin dailyHistorical Med      metoprolol tartrate (LOPRESSOR) 25 MG tablet TAKE ONE TABLET TWICE DAILY, Disp-60 tablet,R-2Normal      VITAMIN D PO Take 1 tablet by mouth dailyHistorical Med      docusate sodium (COLACE) 250 MG capsule Take 1 capsule by mouth 2 times daily, Disp-60 capsule,R-0Print      atorvastatin (LIPITOR) 20 MG tablet TAKE ONE TABLET DAILY, Disp-90 tablet,R-3Normal      tamsulosin (FLOMAX) 0.4 MG capsule TAKE TWO CAPSULES DAILY, Disp-60 capsule,R-11Normal      BD INSULIN SYRINGE U/F 31G X 5/16\" 1 ML MISC USE FOUR TIMES DAILY, Disp-100 each, R-3Normal      !! warfarin (COUMADIN) 5 MG tablet TAKE AS DIRECTED BY YOUR DOCTOR, Disp-30 tablet, R-5Normal      diphenhydrAMINE (BENADRYL) 25 MG tablet Take 25 mg by mouth every 6 hours as needed for ItchingHistorical Med      blood glucose test strips (CONTOUR NEXT TEST) strip 4 TIMES DAILY Starting Thu 11/21/2019, Disp-200 each, R-3, NormalAs needed. Lancets MISC 4 TIMES DAILY Starting Thu 11/21/2019, Disp-300 each, R-1, Normal      B-D ULTRAFINE III SHORT PEN 31G X 8 MM MISC 3 TIMES DAILY Starting Mon 9/16/2019, Disp-100 each, R-5, [place], Normal       !! - Potential duplicate medications found. Please discuss with provider.           ALLERGIES     Pcn [penicillins], Neomycin-bacitracin zn-polymyx, and Neosporin [neomycin-polymyxin-gramicidin]    FAMILY HISTORY       Family History   Problem Relation Age of Onset    Stroke Maternal Grandmother     Cancer Maternal Grandmother     Stroke Paternal Grandmother     Diabetes Paternal Grandmother     Cancer Father     Heart Disease Father     Lung Cancer Father     Asthma Paternal Grandfather     Heart Disease Paternal Grandfather     Heart Disease Maternal Grandfather     Colon Cancer Neg Hx     Colon Polyps Neg Hx     Esophageal Cancer Neg Hx     Liver Cancer Neg Hx     Liver Disease Neg Hx     Rectal Cancer Neg Hx     Stomach Cancer Neg Hx           SOCIAL HISTORY       Social History     Socioeconomic History    Marital status:      Spouse name: None    Number of children: None    Years of education: None    Highest education Glucose 320 (*)     Alkaline Phosphatase 311 (*)     ALT 59 (*)     AST 57 (*)     All other components within normal limits   BRAIN NATRIURETIC PEPTIDE - Abnormal; Notable for the following components:    Pro-BNP 1,383 (*)     All other components within normal limits   URINE RT REFLEX TO CULTURE - Abnormal; Notable for the following components:    Glucose, Ur 500 (*)     Blood, Urine TRACE (*)     Protein,  (*)     All other components within normal limits   PROTIME-INR - Abnormal; Notable for the following components:    Protime 26.5 (*)     INR 2.38 (*)     All other components within normal limits   MICROSCOPIC URINALYSIS - Abnormal; Notable for the following components:    Bacteria, UA NEGATIVE (*)     Crystals, UA NEG (*)     All other components within normal limits   RESPIRATORY PANEL, MOLECULAR, WITH COVID-19   TROPONIN       All other labs were within normal range or not returned as of this dictation. RE-ASSESSMENT     Offered patient admission, he tells me that he is feeling better and wants to go home. EMERGENCY DEPARTMENT COURSE and DIFFERENTIALDIAGNOSIS/MDM:   Vitals:    Vitals:    04/11/21 1401 04/11/21 1431 04/11/21 1501 04/11/21 1531   BP: (!) 142/89 (!) 165/109 (!) 154/102 (!) 172/111   Pulse: 69 64 56 60   Resp: 19 21 16 13   Temp:       TempSrc:       SpO2: 97% 96% 97% 98%   Weight:       Height:           MDM      CONSULTS:  None    PROCEDURES:  Unless otherwise notedbelow, none     Procedures    FINAL IMPRESSION     1. Chronic heart failure with preserved ejection fraction (HCC)    2. Elevated blood sugar    3. Dyspnea, unspecified type    4.  Anticoagulated          DISPOSITION/PLAN   DISPOSITION        PATIENT REFERRED TO:  Bailey Hicks MD  [address] [address]    Schedule an appointment as soon as possible for a visit         DISCHARGE MEDICATIONS:       Discharge Medication List as of 4/11/2021  4:15 PM (Pleasenote that portions of this note were completed with a voice recognition program.  Efforts were made to edit the dictations but occasionally words are mis-transcribed.)              Vivian Zaragoza, NOMI  04/11/21 2913

## 2021-04-12 ENCOUNTER — CARE COORDINATION (OUTPATIENT)
Dept: CARE COORDINATION | Age: 56
End: 2021-04-12

## 2021-04-12 LAB
EKG P AXIS: NORMAL DEGREES
EKG P-R INTERVAL: NORMAL MS
EKG Q-T INTERVAL: 420 MS
EKG QRS DURATION: 96 MS
EKG QTC CALCULATION (BAZETT): 428 MS
EKG T AXIS: 19 DEGREES

## 2021-04-12 PROCEDURE — 93010 ELECTROCARDIOGRAM REPORT: CPT | Performed by: INTERNAL MEDICINE

## 2021-04-13 ENCOUNTER — CARE COORDINATION (OUTPATIENT)
Dept: CARE COORDINATION | Age: 56
End: 2021-04-13

## 2021-04-13 NOTE — CARE COORDINATION
level and will call PCP for sooner appointment. Patient will monitor symptoms and will notify PCP of any new or worsening symptoms. ACM will follow up with patient in 1-2 weeks for ACM outreach. Vinod Gallagher RN,[facility]  Ambulatory Care Manager      Ambulatory Care Coordination Assessment    Care Coordination Protocol  Program Enrollment: Complex Care  Referral from Primary Care Provider: No  Week 1 - Initial Assessment     Do you have all of your prescriptions and are they filled?: Yes  Barriers to medication adherence: Other  Are you able to afford your medications?: Yes  How often do you have trouble taking your medications the way you have been told to take them?: I always take them as prescribed. Do you have Home O2 Therapy?: No      Ability to seek help/take action for Emergent Urgent situations i.e. fire, crime, inclement weather or health crisis. : Independent  Ability to ambulate to restroom: Independent  Ability handle personal hygeine needs (bathing/dressing/grooming): Independent  Ability to manage Medications: Independent  Ability to prepare Food Preparation: Needs Assistance  Ability to maintain home (clean home, laundry): Needs Assistance  Ability to drive and/or has transportation: Independent  Ability to do shopping: Independent  Ability to manage finances: Independent  Is patient able to live independently?: Yes     Current Housing: Private Residence        Per the Fall Risk Screening, did the patient have 2 or more falls or 1 fall with injury in the past year?: Yes  How often do you think you are about to fall and you do NOT fall?  For example, you grab something to stabilize yourself or hold onto a wall/furniture?: Occasionally  Use of a Mobility Aid: Yes (Comment: cane)  Difficulty walking/impaired gait: Yes  Issues with feet or shoes like numbness, edema, shoes not fitting: Yes  Changes in vision, poor vision or poor lighting in environment: No  Dizziness: Yes     Frequent urination at night?: No  Do you use rails/bars?: Yes  Do you have a non-slip tub mat?: Yes     Are you experiencing loss of meaning?: No  Are you experiencing loss of hope and peace?: No     Suggested Interventions and Community Resources  Diabetes Education: In Process (Comment: 4/12/2021  Discussed healthy diet choices by avoiding carbohydrates and concentrated sweets.)   Fall Risk Prevention: In Process Meals on Wheels: Not Started   Other Services or Interventions: Patient using food nicole to receive his meals. Medication Assistance Program: Declined   Transportation Services: Declined         Set up/Review an Education Plan, Set up/Review Goals              Prior to Admission medications    Medication Sig Start Date End Date Taking?  Authorizing Provider   glimepiride (AMARYL) 4 MG tablet TAKE ONE TABLET EVERY MORNING BEFORE BREAKFAST 4/6/21   Gabbi Bess MD   warfarin (COUMADIN) 10 MG tablet TAKE ONE TABLET DAILY 4/6/21   NOMI Pinto   furosemide (LASIX) 20 MG tablet TAKE ONE TABLET DAILY AS NEEDED 3/5/21   Gabbi Bess MD   insulin lispro, 1 Unit Dial, (HUMALOG KWIKPEN) 100 UNIT/ML SOPN Inject 10 Units into the skin 3 times daily (before meals)  Patient taking differently: Inject 30 Units into the skin 3 times daily (before meals)  1/28/21   Gabbi Bess MD   losartan (COZAAR) 100 MG tablet TAKE ONE TABLET DAILY 1/11/21   NOMI Pinto   Insulin Degludec (TRESIBA FLEXTOUCH) 200 UNIT/ML SOPN Inject 80 Units into the skin daily 10/1/20   Historical Provider, MD   metoprolol tartrate (LOPRESSOR) 25 MG tablet TAKE ONE TABLET TWICE DAILY 11/13/20   NOMI Mendoza - CNP   VITAMIN D PO Take 1 tablet by mouth daily    Historical Provider, MD   docusate sodium (COLACE) 250 MG capsule Take 1 capsule by mouth 2 times daily 9/18/20   July Perez MD   atorvastatin (LIPITOR) 20 MG tablet TAKE ONE TABLET DAILY  Patient taking differently: Take 20 mg by mouth daily  7/9/20 Gabbi Rodrigues MD   tamsulosin (FLOMAX) 0.4 MG capsule TAKE TWO CAPSULES DAILY  Patient taking differently: Take 0.8 mg by mouth daily TAKE TWO CAPSULES DAILY 6/23/20   Ofelia Lacy PA-C   BD INSULIN SYRINGE U/F 31G X 5/16\" 1 ML MISC USE FOUR TIMES DAILY 5/15/20   Gabbi Bess MD   warfarin (COUMADIN) 5 MG tablet TAKE AS DIRECTED BY YOUR DOCTOR  Patient taking differently: Take by mouth daily TAKE AS DIRECTED BY YOUR DOCTOR-keeps 5mg on hand if dose varies 1/29/20   NOMI Mata   diphenhydrAMINE (BENADRYL) 25 MG tablet Take 25 mg by mouth every 6 hours as needed for Itching    Historical Provider, MD   blood glucose test strips (CONTOUR NEXT TEST) strip 1 each by In Vitro route 4 times daily As needed. 11/21/19   Gabbi Bess MD   Lancets MISC 1 each by Does not apply route 4 times daily 11/21/19   Gabbi Bess MD   B-D ULTRAFINE III SHORT PEN 31G X 8 MM MISC Inject 1 each as directed 3 times daily 9/16/19   Carvel Meigs, MD       Future Appointments   Date Time Provider Chris Camarena   6/1/2021 10:30 AM Carvel Meigs, MD [facility]   8/26/2021 11:30 AM DO MAGNUS Trejo [facility] Cardio [facility]   9/15/2021  2:00 PM NOMI Jones - CNP N [facility] URO [facility]     ,   Diabetes Assessment    Medic Alert ID: No  Meal Planning: None   How often do you test your blood sugar?: Other (Comment: BID)   Do you have barriers with adherence to non-pharmacologic self-management interventions?  (Nutrition/Exercise/Self-Monitoring): No   Have you ever had to go to the ED for symptoms of low blood sugar?: No       Increase or Decrease trend in Blood Sugars   Do you have hyperglycemia symptoms?: No   Do you have hypoglycemia symptoms?: No   Last Blood Sugar Value: 335   Blood Sugar Monitoring Regimen: Morning Fasting, At Bedtime   Blood Sugar Trends: Steady Increase       and   Congestive Heart Failure Assessment    Do you understand a low sodium diet?: No  How

## 2021-04-13 NOTE — CARE COORDINATION
Patient contacted regarding recent discharge and COVID-19 risk. Discussed COVID-19 related testing which was available at this time. Test results were negative. Patient informed of results, if available? Yes     Care Transition Nurse/ Ambulatory Care Manager contacted the patient by telephone to perform post discharge assessment. Verified name and  with patient as identifiers. Patient has following risk factors of: diabetes. CTN/ACM reviewed discharge instructions, medical action plan and red flags related to discharge diagnosis. Reviewed and educated them on any new and changed medications related to discharge diagnosis. Advised obtaining a 90-day supply of all daily and as-needed medications. Education provided regarding infection prevention, and signs and symptoms of COVID-19 and when to seek medical attention with patient who verbalized understanding. Discussed exposure protocols and quarantine from [address] you at higher risk for severe illness  and given an opportunity for questions and concerns. The patient agrees to contact PCP office for questions related to their healthcare. CTN/ACM provided contact information for future reference. From CDC: Are you at higher risk for severe illness?  Wash your hands often.  Avoid close contact (6 feet, which is about two arm lengths) with people who are sick.  Put distance between yourself and other people if COVID-19 is spreading in your community.  Clean and disinfect frequently touched surfaces.  Avoid all cruise travel and non-essential air travel.  Call your healthcare professional if you have concerns about COVID-19 and your underlying condition or if you are sick. For more information on steps you can take to protect yourself, see CDC's How to Stacie for follow-up call in 7-14 days based on severity of symptoms and risk factors. Patient states that he is some better today.   Patient continues with shortness of breath, cough, and bilateral lower extremity edema, however his symptoms have improved. Patient denies fever. Patient agrees to make ER follow up appointment with PCP. Patient agrees to enroll in Care Management services. Patient agrees to call PCP if experiencing new or worsening symptoms or will call 911 for severe or life threatening symptoms. Patient agrees to follow COVID-19 precautions.

## 2021-04-14 ENCOUNTER — APPOINTMENT (OUTPATIENT)
Dept: CT IMAGING | Age: 56
DRG: 638 | End: 2021-04-14
Payer: MEDICARE

## 2021-04-14 ENCOUNTER — APPOINTMENT (OUTPATIENT)
Dept: MRI IMAGING | Age: 56
DRG: 638 | End: 2021-04-14
Payer: MEDICARE

## 2021-04-14 ENCOUNTER — CARE COORDINATION (OUTPATIENT)
Dept: CARE COORDINATION | Age: 56
End: 2021-04-14

## 2021-04-14 ENCOUNTER — HOSPITAL ENCOUNTER (INPATIENT)
Age: 56
LOS: 3 days | Discharge: NURSING, REHAB OR LONG-TERM CARE | DRG: 638 | End: 2021-04-19
Attending: EMERGENCY MEDICINE | Admitting: HOSPITALIST
Payer: MEDICARE

## 2021-04-14 ENCOUNTER — APPOINTMENT (OUTPATIENT)
Dept: GENERAL RADIOLOGY | Age: 56
DRG: 638 | End: 2021-04-14
Payer: MEDICARE

## 2021-04-14 DIAGNOSIS — R29.90 STROKE-LIKE SYMPTOMS: Primary | ICD-10-CM

## 2021-04-14 DIAGNOSIS — R41.82 ALTERED MENTAL STATUS, UNSPECIFIED ALTERED MENTAL STATUS TYPE: ICD-10-CM

## 2021-04-14 PROBLEM — R53.1 LEFT-SIDED WEAKNESS: Status: ACTIVE | Noted: 2021-04-14

## 2021-04-14 PROBLEM — R79.1 SUPRATHERAPEUTIC INR: Status: ACTIVE | Noted: 2021-04-14

## 2021-04-14 LAB
ACETAMINOPHEN LEVEL: <15 UG/ML
ALBUMIN SERPL-MCNC: 3.8 G/DL (ref 3.5–5.2)
ALP BLD-CCNC: 285 U/L (ref 40–130)
ALT SERPL-CCNC: 49 U/L (ref 5–41)
AMPHETAMINE SCREEN, URINE: NEGATIVE
ANION GAP SERPL CALCULATED.3IONS-SCNC: 11 MMOL/L (ref 7–19)
APTT: 43 SEC (ref 26–36.2)
AST SERPL-CCNC: 52 U/L (ref 5–40)
BACTERIA: NEGATIVE /HPF
BARBITURATE SCREEN URINE: NEGATIVE
BASOPHILS ABSOLUTE: 0 K/UL (ref 0–0.2)
BASOPHILS RELATIVE PERCENT: 0.1 % (ref 0–1)
BENZODIAZEPINE SCREEN, URINE: NEGATIVE
BILIRUB SERPL-MCNC: 1.6 MG/DL (ref 0.2–1.2)
BILIRUBIN URINE: NEGATIVE
BLOOD, URINE: ABNORMAL
BUN BLDV-MCNC: 14 MG/DL (ref 6–20)
CALCIUM SERPL-MCNC: 8.7 MG/DL (ref 8.6–10)
CANNABINOID SCREEN URINE: NEGATIVE
CHLORIDE BLD-SCNC: 99 MMOL/L (ref 98–111)
CLARITY: CLEAR
CO2: 26 MMOL/L (ref 22–29)
COCAINE METABOLITE SCREEN URINE: NEGATIVE
COLOR: YELLOW
CREAT SERPL-MCNC: 0.8 MG/DL (ref 0.5–1.2)
CRYSTALS, UA: ABNORMAL /HPF
EKG P AXIS: NORMAL DEGREES
EKG P-R INTERVAL: NORMAL MS
EKG Q-T INTERVAL: 382 MS
EKG QRS DURATION: 94 MS
EKG QTC CALCULATION (BAZETT): 445 MS
EKG T AXIS: 11 DEGREES
EOSINOPHILS ABSOLUTE: 0 K/UL (ref 0–0.6)
EOSINOPHILS RELATIVE PERCENT: 0 % (ref 0–5)
EPITHELIAL CELLS, UA: 2 /HPF (ref 0–5)
ETHANOL: <10 MG/DL (ref 0–0.08)
GFR AFRICAN AMERICAN: >59
GFR NON-AFRICAN AMERICAN: >60
GLUCOSE BLD-MCNC: 53 MG/DL (ref 70–99)
GLUCOSE BLD-MCNC: 87 MG/DL (ref 70–99)
GLUCOSE BLD-MCNC: 88 MG/DL (ref 74–109)
GLUCOSE BLD-MCNC: 89 MG/DL (ref 70–99)
GLUCOSE BLD-MCNC: 98 MG/DL (ref 70–99)
GLUCOSE URINE: NEGATIVE MG/DL
HCT VFR BLD CALC: 43.6 % (ref 42–52)
HEMOGLOBIN: 14.3 G/DL (ref 14–18)
HYALINE CASTS: 1 /HPF (ref 0–8)
IMMATURE GRANULOCYTES #: 0 K/UL
INR BLD: 3.67 (ref 0.88–1.18)
KETONES, URINE: ABNORMAL MG/DL
LEUKOCYTE ESTERASE, URINE: NEGATIVE
LYMPHOCYTES ABSOLUTE: 0.5 K/UL (ref 1.1–4.5)
LYMPHOCYTES RELATIVE PERCENT: 4.7 % (ref 20–40)
Lab: NORMAL
MCH RBC QN AUTO: 31.6 PG (ref 27–31)
MCHC RBC AUTO-ENTMCNC: 32.8 G/DL (ref 33–37)
MCV RBC AUTO: 96.2 FL (ref 80–94)
MONOCYTES ABSOLUTE: 0.6 K/UL (ref 0–0.9)
MONOCYTES RELATIVE PERCENT: 6 % (ref 0–10)
NEUTROPHILS ABSOLUTE: 9.5 K/UL (ref 1.5–7.5)
NEUTROPHILS RELATIVE PERCENT: 88.8 % (ref 50–65)
NITRITE, URINE: NEGATIVE
OPIATE SCREEN URINE: NEGATIVE
PDW BLD-RTO: 13.9 % (ref 11.5–14.5)
PERFORMED ON: ABNORMAL
PERFORMED ON: NORMAL
PH UA: 7 (ref 5–8)
PLATELET # BLD: 268 K/UL (ref 130–400)
PMV BLD AUTO: 10.8 FL (ref 9.4–12.4)
POTASSIUM REFLEX MAGNESIUM: 4.5 MMOL/L (ref 3.5–5)
PROTEIN UA: 300 MG/DL
PROTHROMBIN TIME: 37.7 SEC (ref 12–14.6)
RBC # BLD: 4.53 M/UL (ref 4.7–6.1)
RBC UA: 25 /HPF (ref 0–4)
SALICYLATE, SERUM: <3 MG/DL (ref 3–10)
SARS-COV-2, NAAT: NOT DETECTED
SODIUM BLD-SCNC: 136 MMOL/L (ref 136–145)
SPECIFIC GRAVITY UA: 1.01 (ref 1–1.03)
TOTAL PROTEIN: 7.3 G/DL (ref 6.6–8.7)
TROPONIN: 0.02 NG/ML (ref 0–0.03)
TSH REFLEX FT4: 1 UIU/ML (ref 0.35–5.5)
UROBILINOGEN, URINE: 1 E.U./DL
VITAMIN B-12: 612 PG/ML (ref 211–946)
VITAMIN D 25-HYDROXY: 26.4 NG/ML
WBC # BLD: 10.7 K/UL (ref 4.8–10.8)
WBC UA: 1 /HPF (ref 0–5)

## 2021-04-14 PROCEDURE — A9577 INJ MULTIHANCE: HCPCS | Performed by: EMERGENCY MEDICINE

## 2021-04-14 PROCEDURE — 2580000003 HC RX 258: Performed by: EMERGENCY MEDICINE

## 2021-04-14 PROCEDURE — 96375 TX/PRO/DX INJ NEW DRUG ADDON: CPT

## 2021-04-14 PROCEDURE — 96374 THER/PROPH/DIAG INJ IV PUSH: CPT

## 2021-04-14 PROCEDURE — 80143 DRUG ASSAY ACETAMINOPHEN: CPT

## 2021-04-14 PROCEDURE — G0378 HOSPITAL OBSERVATION PER HR: HCPCS

## 2021-04-14 PROCEDURE — 80053 COMPREHEN METABOLIC PANEL: CPT

## 2021-04-14 PROCEDURE — 2580000003 HC RX 258

## 2021-04-14 PROCEDURE — 85730 THROMBOPLASTIN TIME PARTIAL: CPT

## 2021-04-14 PROCEDURE — 84443 ASSAY THYROID STIM HORMONE: CPT

## 2021-04-14 PROCEDURE — 99285 EMERGENCY DEPT VISIT HI MDM: CPT

## 2021-04-14 PROCEDURE — 99223 1ST HOSP IP/OBS HIGH 75: CPT | Performed by: PSYCHIATRY & NEUROLOGY

## 2021-04-14 PROCEDURE — 87635 SARS-COV-2 COVID-19 AMP PRB: CPT

## 2021-04-14 PROCEDURE — 84484 ASSAY OF TROPONIN QUANT: CPT

## 2021-04-14 PROCEDURE — 70498 CT ANGIOGRAPHY NECK: CPT

## 2021-04-14 PROCEDURE — 93005 ELECTROCARDIOGRAM TRACING: CPT | Performed by: EMERGENCY MEDICINE

## 2021-04-14 PROCEDURE — 96376 TX/PRO/DX INJ SAME DRUG ADON: CPT

## 2021-04-14 PROCEDURE — 36415 COLL VENOUS BLD VENIPUNCTURE: CPT

## 2021-04-14 PROCEDURE — 82607 VITAMIN B-12: CPT

## 2021-04-14 PROCEDURE — 81001 URINALYSIS AUTO W/SCOPE: CPT

## 2021-04-14 PROCEDURE — 85610 PROTHROMBIN TIME: CPT

## 2021-04-14 PROCEDURE — 82077 ASSAY SPEC XCP UR&BREATH IA: CPT

## 2021-04-14 PROCEDURE — 6370000000 HC RX 637 (ALT 250 FOR IP): Performed by: HOSPITALIST

## 2021-04-14 PROCEDURE — 80307 DRUG TEST PRSMV CHEM ANLYZR: CPT

## 2021-04-14 PROCEDURE — 82306 VITAMIN D 25 HYDROXY: CPT

## 2021-04-14 PROCEDURE — 80179 DRUG ASSAY SALICYLATE: CPT

## 2021-04-14 PROCEDURE — 82947 ASSAY GLUCOSE BLOOD QUANT: CPT

## 2021-04-14 PROCEDURE — 6360000004 HC RX CONTRAST MEDICATION: Performed by: EMERGENCY MEDICINE

## 2021-04-14 PROCEDURE — 6360000002 HC RX W HCPCS

## 2021-04-14 PROCEDURE — 85025 COMPLETE CBC W/AUTO DIFF WBC: CPT

## 2021-04-14 PROCEDURE — 70553 MRI BRAIN STEM W/O & W/DYE: CPT

## 2021-04-14 PROCEDURE — 71045 X-RAY EXAM CHEST 1 VIEW: CPT

## 2021-04-14 PROCEDURE — 70450 CT HEAD/BRAIN W/O DYE: CPT

## 2021-04-14 PROCEDURE — 70496 CT ANGIOGRAPHY HEAD: CPT

## 2021-04-14 RX ORDER — POLYETHYLENE GLYCOL 3350 17 G/17G
17 POWDER, FOR SOLUTION ORAL DAILY PRN
Status: DISCONTINUED | OUTPATIENT
Start: 2021-04-14 | End: 2021-04-19 | Stop reason: HOSPADM

## 2021-04-14 RX ORDER — PROMETHAZINE HYDROCHLORIDE 12.5 MG/1
12.5 TABLET ORAL EVERY 6 HOURS PRN
Status: DISCONTINUED | OUTPATIENT
Start: 2021-04-14 | End: 2021-04-19 | Stop reason: HOSPADM

## 2021-04-14 RX ORDER — LORAZEPAM 2 MG/ML
INJECTION INTRAMUSCULAR
Status: COMPLETED
Start: 2021-04-14 | End: 2021-04-14

## 2021-04-14 RX ORDER — ASPIRIN 81 MG/1
81 TABLET ORAL DAILY
Status: DISCONTINUED | OUTPATIENT
Start: 2021-04-14 | End: 2021-04-19 | Stop reason: HOSPADM

## 2021-04-14 RX ORDER — SODIUM CHLORIDE 0.9 % (FLUSH) 0.9 %
10 SYRINGE (ML) INJECTION PRN
Status: DISCONTINUED | OUTPATIENT
Start: 2021-04-14 | End: 2021-04-19 | Stop reason: HOSPADM

## 2021-04-14 RX ORDER — SODIUM CHLORIDE 0.9 % (FLUSH) 0.9 %
10 SYRINGE (ML) INJECTION EVERY 12 HOURS SCHEDULED
Status: DISCONTINUED | OUTPATIENT
Start: 2021-04-14 | End: 2021-04-19 | Stop reason: HOSPADM

## 2021-04-14 RX ORDER — ASPIRIN 300 MG/1
300 SUPPOSITORY RECTAL DAILY
Status: DISCONTINUED | OUTPATIENT
Start: 2021-04-14 | End: 2021-04-19 | Stop reason: HOSPADM

## 2021-04-14 RX ORDER — TAMSULOSIN HYDROCHLORIDE 0.4 MG/1
0.8 CAPSULE ORAL DAILY
Status: DISCONTINUED | OUTPATIENT
Start: 2021-04-15 | End: 2021-04-19 | Stop reason: HOSPADM

## 2021-04-14 RX ORDER — ONDANSETRON 2 MG/ML
4 INJECTION INTRAMUSCULAR; INTRAVENOUS EVERY 6 HOURS PRN
Status: DISCONTINUED | OUTPATIENT
Start: 2021-04-14 | End: 2021-04-19 | Stop reason: HOSPADM

## 2021-04-14 RX ORDER — ACETAMINOPHEN 650 MG/1
650 SUPPOSITORY RECTAL EVERY 4 HOURS PRN
Status: DISCONTINUED | OUTPATIENT
Start: 2021-04-14 | End: 2021-04-19 | Stop reason: HOSPADM

## 2021-04-14 RX ORDER — DOCUSATE SODIUM 250 MG
250 CAPSULE ORAL 2 TIMES DAILY
Status: DISCONTINUED | OUTPATIENT
Start: 2021-04-14 | End: 2021-04-15 | Stop reason: ALTCHOICE

## 2021-04-14 RX ORDER — DEXTROSE MONOHYDRATE 25 G/50ML
12.5 INJECTION, SOLUTION INTRAVENOUS ONCE
Status: COMPLETED | OUTPATIENT
Start: 2021-04-14 | End: 2021-04-14

## 2021-04-14 RX ORDER — ATORVASTATIN CALCIUM 20 MG/1
20 TABLET, FILM COATED ORAL DAILY
Status: DISCONTINUED | OUTPATIENT
Start: 2021-04-14 | End: 2021-04-19 | Stop reason: HOSPADM

## 2021-04-14 RX ORDER — DEXTROSE MONOHYDRATE 25 G/50ML
25 INJECTION, SOLUTION INTRAVENOUS ONCE
Status: COMPLETED | OUTPATIENT
Start: 2021-04-14 | End: 2021-04-14

## 2021-04-14 RX ORDER — LOSARTAN POTASSIUM 100 MG/1
100 TABLET ORAL DAILY
Status: DISCONTINUED | OUTPATIENT
Start: 2021-04-14 | End: 2021-04-19 | Stop reason: HOSPADM

## 2021-04-14 RX ORDER — DIPHENHYDRAMINE HCL 25 MG
25 TABLET ORAL EVERY 6 HOURS PRN
Status: DISCONTINUED | OUTPATIENT
Start: 2021-04-14 | End: 2021-04-19 | Stop reason: HOSPADM

## 2021-04-14 RX ORDER — LORAZEPAM 2 MG/ML
1 INJECTION INTRAMUSCULAR ONCE
Status: COMPLETED | OUTPATIENT
Start: 2021-04-14 | End: 2021-04-14

## 2021-04-14 RX ORDER — ACETAMINOPHEN 325 MG/1
650 TABLET ORAL EVERY 4 HOURS PRN
Status: DISCONTINUED | OUTPATIENT
Start: 2021-04-14 | End: 2021-04-19 | Stop reason: HOSPADM

## 2021-04-14 RX ORDER — VITAMIN B COMPLEX
1000 TABLET ORAL DAILY
Status: DISCONTINUED | OUTPATIENT
Start: 2021-04-14 | End: 2021-04-18 | Stop reason: ALTCHOICE

## 2021-04-14 RX ORDER — DEXTROSE MONOHYDRATE 25 G/50ML
INJECTION, SOLUTION INTRAVENOUS
Status: COMPLETED
Start: 2021-04-14 | End: 2021-04-14

## 2021-04-14 RX ORDER — SODIUM CHLORIDE 9 MG/ML
25 INJECTION, SOLUTION INTRAVENOUS PRN
Status: DISCONTINUED | OUTPATIENT
Start: 2021-04-14 | End: 2021-04-19 | Stop reason: HOSPADM

## 2021-04-14 RX ADMIN — LORAZEPAM 1 MG: 2 INJECTION, SOLUTION INTRAMUSCULAR; INTRAVENOUS at 18:20

## 2021-04-14 RX ADMIN — IOPAMIDOL 90 ML: 755 INJECTION, SOLUTION INTRAVENOUS at 15:06

## 2021-04-14 RX ADMIN — DEXTROSE MONOHYDRATE 25 G: 25 INJECTION, SOLUTION INTRAVENOUS at 15:16

## 2021-04-14 RX ADMIN — LORAZEPAM 1 MG: 2 INJECTION INTRAMUSCULAR at 18:20

## 2021-04-14 RX ADMIN — DEXTROSE MONOHYDRATE 12.5 G: 25 INJECTION, SOLUTION INTRAVENOUS at 21:02

## 2021-04-14 RX ADMIN — GADOBENATE DIMEGLUMINE 20 ML: 529 INJECTION, SOLUTION INTRAVENOUS at 18:01

## 2021-04-14 RX ADMIN — ASPIRIN 300 MG: 300 SUPPOSITORY RECTAL at 21:37

## 2021-04-14 RX ADMIN — DEXTROSE MONOHYDRATE 12.5 G: 500 INJECTION PARENTERAL at 21:02

## 2021-04-14 NOTE — CONSULTS
[address], [address]  [place], [place]  Phone [phone]     Neurology Consultation     Date of Admission: 2021  2:39 PM  Date of Consultation: 21    Attending Provider: Adriana Resendiz MD  Consulting Provider: Merwyn Baumgarten, M.D. Patient: True Higgins  :  1965  Age:  60 y.o. MRN:  [id number]    CHIEF COMPLAINT:  Stroke    History Source: History obtained from chart review and the patient. PCP: Shonna Flores MD    HISTORY OF PRESENT ILLNESS:   True Higgins is a 60y.o. year old man with a history of chronic atrial fibrillation and anticoagulation who presented with altered mental status and hypoglycemia. He was his usual self when he woke this am.  A family member called him around 8am and he was confused. His mother went to his house where he was unconscious. His glucose was 38. EMS was called and he was given D10. When he arrived in the ER, he was very groggy. He gradually became alter but has remained confused. His blood sugar has remained good but he has required additional D10.   Labs were otherwise unremarkable and CT head was normal.      PAST MEDICAL HISTORY:    Medical History:      Diagnosis Date    Acalculous cholecystitis 2015    Allergic rhinitis, cause unspecified     Atrial fibrillation, chronic (HCC)     sees [facility] cardiology    Charcot's joint of right foot     Chicken pox     Closed supracondylar fracture of elbow     fall    History of MRSA infection     scalp/facial and on back    HTN (hypertension)     Hyperlipidemia     Impotence of organic origin     MDRO (multiple drug resistant organisms) resistance     Other dyspnea and respiratory abnormality     Other specified erythematous condition(255.89)     Personal history of other infectious and parasitic disease     Salmonella     Type II or unspecified type diabetes mellitus without mention of complication, uncontrolled        Surgical History: Procedure Laterality Date    CARDIOVERSION  03/2017    COLONOSCOPY  02/24/2020    Dr RITIKA Fraire-Melanosis coli throughout the entire colon-AP, 5 yr recall    HUMERUS FRACTURE SURGERY Left 9/17/2020    OPEN REDUCTION INTERNAL FIXATION LEFT SUPRACONDYLAR FRACTURE performed by Calin Valentin MD at [address]      X 2    TONSILLECTOMY         Medications Prior to Admission:    Prior to Admission medications    Medication Sig Start Date End Date Taking?  Authorizing Provider   glimepiride (AMARYL) 4 MG tablet TAKE ONE TABLET EVERY MORNING BEFORE BREAKFAST 4/6/21   Gabbi Bess MD   warfarin (COUMADIN) 10 MG tablet TAKE ONE TABLET DAILY 4/6/21   NOMI Soler   furosemide (LASIX) 20 MG tablet TAKE ONE TABLET DAILY AS NEEDED 3/5/21   Gabbi Bess MD   insulin lispro, 1 Unit Dial, (HUMALOG KWIKPEN) 100 UNIT/ML SOPN Inject 10 Units into the skin 3 times daily (before meals)  Patient taking differently: Inject 30 Units into the skin 3 times daily (before meals)  1/28/21   Gabbi Bess MD   losartan (COZAAR) 100 MG tablet TAKE ONE TABLET DAILY 1/11/21   NOMI Soler   Insulin Degludec (TRESIBA FLEXTOUCH) 200 UNIT/ML SOPN Inject 80 Units into the skin daily 10/1/20   Historical Provider, MD   metoprolol tartrate (LOPRESSOR) 25 MG tablet TAKE ONE TABLET TWICE DAILY 11/13/20   NOMI Alva - CNP   VITAMIN D PO Take 1 tablet by mouth daily    Historical Provider, MD   docusate sodium (COLACE) 250 MG capsule Take 1 capsule by mouth 2 times daily 9/18/20   Calin Valentin MD   atorvastatin (LIPITOR) 20 MG tablet TAKE ONE TABLET DAILY  Patient taking differently: Take 20 mg by mouth daily  7/9/20   Gabbi Bess MD   tamsulosin (FLOMAX) 0.4 MG capsule TAKE TWO CAPSULES DAILY  Patient taking differently: Take 0.8 mg by mouth daily TAKE TWO CAPSULES DAILY 6/23/20   Dallis Spurling, PA-C   BD INSULIN SYRINGE U/F 31G X 5/16\" 1 ML MISC USE FOUR TIMES DAILY 5/15/20   Gabbi Bess MD   warfarin (COUMADIN) 5 MG tablet TAKE AS DIRECTED BY YOUR DOCTOR  Patient taking differently: Take by mouth daily TAKE AS DIRECTED BY YOUR DOCTOR-keeps 5mg on hand if dose varies 1/29/20   Terra Osgood, APRN   diphenhydrAMINE (BENADRYL) 25 MG tablet Take 25 mg by mouth every 6 hours as needed for Itching    Historical Provider, MD   blood glucose test strips (CONTOUR NEXT TEST) strip 1 each by In Vitro route 4 times daily As needed. 11/21/19   Gabbi Bess MD   Lancets MISC 1 each by Does not apply route 4 times daily 11/21/19   Gabbi Bess MD   B-D ULTRAFINE III SHORT PEN 31G X 8 MM MISC Inject 1 each as directed 3 times daily 9/16/19   Burak Lee MD       Current Medications:  No current facility-administered medications for this encounter. Allergies:  Pcn [penicillins], Neomycin-bacitracin zn-polymyx, and Neosporin [neomycin-polymyxin-gramicidin]    Habits:  TOBACCO:   reports that he has never smoked. He has never used smokeless tobacco.  ETOH:   reports no history of alcohol use. DRUGS:    Social History     Substance and Sexual Activity   Drug Use No       SOCIAL HISTORY:   Ruby Franco is , lives in [place], [place], and is disabled.     FAMILY HISTORY:       Problem Relation Age of Onset    Stroke Maternal Grandmother     Cancer Maternal Grandmother     Stroke Paternal Grandmother     Diabetes Paternal Grandmother     Cancer Father     Heart Disease Father     Lung Cancer Father     Asthma Paternal Grandfather     Heart Disease Paternal Grandfather     Heart Disease Maternal Grandfather     Colon Cancer Neg Hx     Colon Polyps Neg Hx     Esophageal Cancer Neg Hx     Liver Cancer Neg Hx     Liver Disease Neg Hx     Rectal Cancer Neg Hx     Stomach Cancer Neg Hx        REVIEW OF SYSTEMS:  Review of Systems   Unable to perform ROS: Mental status change       PHYSICAL EXAMINATION:  Vitals: BP (!) 151/105   Pulse 101 Resp 22   Ht 6' (1.829 m)   Wt 278 lb (126.1 kg)   SpO2 97%   BMI 37.70 kg/m²   General appearance: He is awake, alert, giving a thumbs up with right hand, telegraphic speech, appears anxious  Skin: Skin color, texture, turgor normal. No rashes or lesions  HEENT: Head: Normal, normocephalic, atraumatic. Neck:no adenopathy, no carotid bruit, no JVD, supple, symmetrical, trachea midline and thyroid not enlarged, symmetric, no tenderness/mass/nodules  Lungs: clear to auscultation bilaterally  Heart:  Irregularly irregular rate and rhythm  Abdomen: soft, non-tender; bowel sounds normal; no masses,  no organomegaly  Extremities: moderate peripheral edema, chronic venous stasis dermatitis. NEUROLOGIC EXAMINATION:  Neurologic Exam     Mental Status   Unable to name object. Unable to repeat. He is awake and alert. He can follow some simple commands. He says single words, sometimes repetitively - cold, pee all while giving a thumbs up sign on the right hand. He moves the left extremities little. Cranial Nerves   PERRL. EOM appear full. No gaze preference noted. He blinks to threat. No facial asymmetry. No dysarthria with his limited speech. Motor Exam   Muscle bulk: normal  Overall muscle tone: normal  Right arm pronator drift: absent  Left arm pronator drift: present  He has moderate left arm and leg weakness, perhaps some right leg weakness. He uses the RUE well without gross weakness or ataxia.      Gait, Coordination, and Reflexes     Tremor   Resting tremor: absent  Intention tremor: absent  Action tremor: absent    Reflexes   Right brachioradialis: 0  Left brachioradialis: 0  Right biceps: 0  Left biceps: 0  Right triceps: 0  Left triceps: 0  Right patellar: 0  Left patellar: 0  Right achilles: 0  Left achilles: 0  Right plantar: normal  Left plantar: normal  Right Yanez: absent  Left Yanez: absent  Right ankle clonus: absent  Left ankle clonus: absent      ADDITIONAL REVIEW:  CBC: patent with no definite plaque or   stenosis. The left vertebral artery is dominant. RIGHT CAROTID SYSTEM: There is very minimal plaque at the right   carotid bifurcation that does not cause any luminal stenosis. LEFT CAROTID SYSTEM: There is very minimal plaque at the left carotid   bifurcation that does not cause any luminal stenosis. OTHER FINDINGS: There is motion artifact. The parotid and   submandibular glands demonstrate no focal abnormality. The thyroid   gland is unremarkable. There are no enlarged lymph nodes in the neck. There is no airway asymmetry or soft tissue mass. There are   degenerative changes of the visualized spine. There may be multiple   small lymph nodes in the mediastinum. Lymphadenopathy in the chest   cannot be excluded.       Impression   1. Minimal plaque in the carotid bifurcations bilaterally. No   stenosis. 2. Vertebral arteries patent with no plaque or stenosis. 3. Possible lymphadenopathy in the mediastinum. 4. The study is degraded by motion artifact. Results called to Dr. Marjorie Chu at 4:01 PM.   Signed by Dr Geoffrey Monte on 4/14/2021 4:04 PM     Narrative      EXAMINATION: [address] [place] [address]  4/14/2021 3:46 PM   HISTORY: Altered mental status. Left-sided weakness. Rule out stroke. Code stroke. TECHNIQUE: Spiral CT angiography was performed of the brain with   contrast. Sagittal, coronal and 3-D images were reconstructed. DLP: 377 mGy-cm. Automated exposure control was utilized. COMPARISON: No comparison study. FINDINGS: The intracranial vertebral arteries are patent bilaterally. The left vertebral artery is dominant. There are patent bilateral   anterior inferior cerebellar arteries that provide blood flow to both   PICA vessels. The superior cerebellar and posterior cerebral arteries   are patent. The basilar artery is patent. The internal carotid   arteries are patent.  There is minimal plaque in the cavernous carotid   on the left side with no flow limiting stenosis. The anterior and   middle cerebral arteries are patent bilaterally. The visualized venous   sinuses appear to be patent. There is less than optimal opacification   of the venous sinuses.       Impression   1. No major branch occlusion or aneurysm. 2. Minimal plaque in the cavernous carotid on the left that does not   cause any flow limiting stenosis. Results called to Dr. Anne Marie Wise at 3:54 PM.   Signed by Dr Hedy De La O on 4/14/2021 3:56 PM     IMPRESSION:  1. Encephalopathy, possibly stroke but odd symptoms of telegraphic speech and left sided weakness. 2. Hypertension  3. Hyperlipidemia  4. Diabetes  5. Chronic atrial fibrillation    PLAN:  1. MRI head  2. Echo, telemetry  3. Continue coumadin, statin  4. Labs  5.  EEG    Carol Mancuso M.D.

## 2021-04-14 NOTE — ED NOTES
Code Stroke called by Dr Asya Carey    Paged Dr Yamilet Erickson with Neurology @ [phone]    Paged the Stroke Coordinator @ [phone]     Alysha Burgos  04/14/21 8028

## 2021-04-14 NOTE — Clinical Note
Patient Class: Observation [104]   REQUIRED: Diagnosis: Stroke-like symptoms [370291]   Estimated Length of Stay: Estimated stay of less than 2 midnights   Future Attending Provider: Gilma Yadav [[id number]]   Admitting Provider: Gilma Yadav [[id number]]   Telemetry Bed Required?: Yes

## 2021-04-14 NOTE — PROGRESS NOTES
Pharmacy [address] KRISTOPHER Lazo is a 60 y.o. male for whom pharmacy has been consulted; The pharmacist will review this patient's medication profile to evaluate IV medications and change all base solutions to 0.9% sodium chloride if possible based on compatibility and product availability. This profile review will include an assessment of total IV fluids being administered to the patient. If a current order exists for continuous infusion of non-hypertonic plain IV fluid, the pharmacist will contact the prescriber to recommend the discontinuation of the IV fluid order. Patient Active Problem List   Diagnosis    Shortness of breath    HTN (hypertension)    Hyperlipidemia    Fatigue    Hyponatremia    Type 2 diabetes mellitus with diabetic polyneuropathy, with long-term current use of insulin ([facility])    Class 2 obesity due to excess calories in adult    Diabetic ulcer of right midfoot associated with type 2 diabetes mellitus, with fat layer exposed ([facility] [address]    Displaced comminuted supracondylar fracture without intercondylar fracture of left humerus, initial encounter for closed fracture    Left supracondylar humerus fracture, closed, initial encounter    Charcot foot due to diabetes mellitus ([facility] [address]    Non-compliance    Permanent atrial fibrillation (HCC)    (HFpEF) heart failure with preserved ejection fraction (HCC)       Allergies:  Pcn [penicillins], Neomycin-bacitracin zn-polymyx, and Neosporin [neomycin-polymyxin-gramicidin]     Recent Labs     04/14/21  1504   CREATININE 0.8       Ht/Wt:   Ht Readings from Last 1 Encounters:   04/14/21 6' (1.829 m)        Wt Readings from Last 1 Encounters:   04/14/21 278 lb (126.1 kg)         Estimated Creatinine Clearance: 140 mL/min (based on SCr of 0.8 mg/dL). Assessment/Plan:    No IV medications currently on patient's profile. Thank you for the consult. Will continue to follow.     Electronically signed by Neville Jorge [facility] on 4/14/2021 at 3:58 PM

## 2021-04-14 NOTE — ED NOTES
Bed: 09  Expected date:   Expected time:   Means of arrival:   Comments:  Hypoglycemia Javier Koyanagi, JACOB  04/14/21 2148

## 2021-04-14 NOTE — H&P
[place], [place], [address]    DEPARTMENT OF HOSPITALIST MEDICINE      HISTORY & PHYSICAL:          REASON FOR ADMISSION:  Chief Complaint   Patient presents with    Altered Mental Status        HISTORY OF PRESENT ILLNESS:  Orlan Bamberger is an 60 y.o. male. He is a very pleasantly confused, unfortunate obese male with multiple chronic medical issues who lives alone, his mother lives near by and they talk multiple times a day. She called him at 54 yesterday and he was in his normal state of mind. They talked again at around 8 AM and ana appeared to be very confused. When the mother went to his house, patient she found him passed out in the house. EMT was called who came and evaluated the patient and his blood sugar was 38! He was given 250 cc of D10 and his blood sugar levels were 98 upon evaluation in the ER. He seemed pleasantly confused, was not back to his baseline as per the mother and his left side feeled weaker as per ER physician. He is on Coumadin and his INR was 3.67 in er. There was a consideration of hemorrhagic stroke, hence CAT scan of the head ordered which ruled out any infarction or hemorrhage. Neurology was consulted and we ordered an MRI of the brain with and without contrast.  I saw and examined the patient at the bedside and spot check blood sugar was 87. He is on high doses of insulin and p.o. diabetic meds at home which I am going to stop. He also complains of urinary retention and was seen in the ER couple days ago and responded well to in and out catheterization which has been ordered today as well. He is being admitted for medical management, close monitoring, neurochecks, neurology evaluation and work-up as per stroke/TIA protocol.         PAST MEDICAL HISTORY:  Past Medical History:   Diagnosis Date    Acalculous cholecystitis 12/29/2015    Allergic rhinitis, cause unspecified     Atrial fibrillation, chronic [facility])     sees [facility]ology    [facility]'s joint of right foot     Chicken pox     Closed supracondylar fracture of elbow     fall    History of MRSA infection     scalp/facial and on back    HTN (hypertension)     Hyperlipidemia     Impotence of organic origin     MDRO (multiple drug resistant organisms) resistance     Other dyspnea and respiratory abnormality     Other specified erythematous condition(695.89)     Personal history of other infectious and parasitic disease     Salmonella     Type II or unspecified type diabetes mellitus without mention of complication, uncontrolled 1994         PAST SURGICAL HISTORY:  Past Surgical History:   Procedure Laterality Date    CARDIOVERSION  03/2017    COLONOSCOPY  02/24/2020    Dr Helio Fraire-Melanosis coli throughout the entire colon-AP, 5 yr recall    HUMERUS FRACTURE SURGERY Left 9/17/2020    OPEN REDUCTION INTERNAL FIXATION LEFT SUPRACONDYLAR FRACTURE performed by Mónica Dacosta [facility] at [facility] OR    MALIGNANT SKIN LESION EXCISION      X 2    TONSILLECTOMY          SOCIAL HISTORY:  Social History     Socioeconomic History    Marital status:      Spouse name: Not on file    Number of children: Not on file    Years of education: Not on file    Highest education level: Not on file   Occupational History    Not on file   Social Needs    Financial resource strain: Hard    Food insecurity     Worry: Sometimes true     Inability: Sometimes true    Transportation needs     Medical: No     Non-medical: No   Tobacco Use    Smoking status: Never Smoker    Smokeless tobacco: Never Used   Substance and Sexual Activity    Alcohol use: No    Drug use: No    Sexual activity: Not on file   Lifestyle    Physical activity     Days per week: 0 days     Minutes per session: 0 min    Stress:  To some extent   Relationships    Social connections     Talks on phone: Not on file     Gets together: Not on file     Attends Christian service: Not on file     Active member of club or organization: Not on file Attends meetings of clubs or organizations: Not on file     Relationship status: Not on file    Intimate partner violence     Fear of current or ex partner: Not on file     Emotionally abused: Not on file     Physically abused: Not on file     Forced sexual activity: Not on file   Other Topics Concern    Not on file   Social History Narrative    Referred to  for assistance with Meals on Wheels        FAMILY HISTORY:  Family History   Problem Relation Age of Onset    Stroke Maternal Grandmother     Cancer Maternal Grandmother     Stroke Paternal Grandmother     Diabetes Paternal Grandmother     Cancer Father     Heart Disease Father     Lung Cancer Father     Asthma Paternal Grandfather     Heart Disease Paternal Grandfather     Heart Disease Maternal Grandfather     Colon Cancer Neg Hx     Colon Polyps Neg Hx     Esophageal Cancer Neg Hx     Liver Cancer Neg Hx     Liver Disease Neg Hx     Rectal Cancer Neg Hx     Stomach Cancer Neg Hx          ALLERGIES:  Allergies   Allergen Reactions    Pcn [Penicillins]     Neomycin-Bacitracin Zn-Polymyx Rash    Neosporin [Neomycin-Polymyxin-Gramicidin] Rash        PRIOR TO ADMISSION MEDS:  Not in a hospital admission. REVIEW OF SYSTEMS:    Unable to be obtained . .. Patient is pleasantly confused! PHYSICAL EXAM:  BP (!) 151/105   Pulse 101   Resp 22   Ht 6' (1.829 m)   Wt 278 lb (126.1 kg)   SpO2 97%   BMI 37.70 kg/m²   No intake/output data recorded.       PHYSICAL EXAMINATION:    Vital Signs: Please see the chart   MATTY:   Patient is pleasantly confused, appears tired and fatigued   Head/Eyes:  Normocephalic, atraumatic, EOMI and PERRLA bilaterally   ENT: Moist mucous membranes, nasal passages clear   Neck: Supple, range of motion, no carotid bruit, trachea midline   Respiratory:   Bilateral decreased air entry in both lung fields, mild B/L crackles, symmetric expansion of chest   Cardiovascular:  Regular rate and rhythm, S1+S2+0, no murmurs/rubs   Urology: No bilateral CVA tenderness, no suprapubic tenderness   Abdomen:   Soft, non-tender, bowel sounds +ve, no organomegaly   Muscle/Joints: Moves all, decreased range of motion, no muscle spasms   Extremities: No clubbing, no cyanosis, no calf tenderness, no edema   Pulses: 2+ bilaterally, symmetrical   Skin: Warm, dry, no pallor/cyanosis/jaundice, no rashes/lesions   Neurologic:  Unable to be obtained . .. Patient is pleasantly confused! Psychiatric:  Unable to be obtained . .. Patient is pleasantly confused! LABORATORY DATA:    CBC:  Recent Labs     04/14/21  1504   WBC 10.7   HGB 14.3   HCT 43.6        BMP:  Recent Labs     04/14/21  1504      K 4.5   CL 99   CO2 26   BUN 14   CREATININE 0.8   CALCIUM 8.7     Recent Labs     04/14/21  1504   AST 52*   ALT 49*   BILITOT 1.6*   ALKPHOS 285*     Coag Panel:   Recent Labs     04/14/21  1504   INR 3.67*   PROTIME 37.7*   APTT 43.0*     Cardiac Enzymes:   Recent Labs     04/14/21  1504   TROPONINI 0.02     ABGs:  Lab Results   Component Value Date    PHART 7.290 03/25/2019    PO2ART 79.0 03/25/2019    VMT7WMS 20.0 03/25/2019     Urinalysis:  Lab Results   Component Value Date    NITRU Negative 04/14/2021    WBCUA 1 04/14/2021    BACTERIA NEGATIVE 04/14/2021    RBCUA 25 04/14/2021    BLOODU MODERATE 04/14/2021    SPECGRAV 1.014 04/14/2021    GLUCOSEU Negative 04/14/2021     A1C: No results for input(s): LABA1C in the last 72 hours. ABG:No results for input(s): PHART, FIQ1IBK, PO2ART, VWG4FQX, BEART, HGBAE, F5KWUDIS, CARBOXHGBART in the last 72 hours. EKG:   Please see chart      IMAGING:  Ct Head Wo Contrast    Result Date: 4/14/2021  No acute intracranial abnormality. Chronic ischemic and atrophic changes. Signed by Dr Lesia Cushing on 4/14/2021 3:02 PM    Xr Chest Portable    Result Date: 4/14/2021  1. Hypoventilation with vascular crowding. 2. No focal infiltrate or effusion.  Signed by Dr Jose De Jesus Lange on 4/14/2021 3:29 PM    Cta Neck W Contrast    Result Date: 4/14/2021  1. Minimal plaque in the carotid bifurcations bilaterally. No stenosis. 2. Vertebral arteries patent with no plaque or stenosis. 3. Possible lymphadenopathy in the mediastinum. 4. The study is degraded by motion artifact. Results called to Dr. Jerilyn Waggoner at 4:01 PM. Signed by Dr Samir House on 4/14/2021 4:04 PM    Cta Head W Contrast    Addendum Date: 4/14/2021    ADDENDUM: After further discussion with Dr. Jerilyn Waggoner, there may be relative paucity of right middle cerebral artery branches in the sylvian fissure region although a direct occlusion is not visualized. MRI is suggested to further evaluate for right hemispheric stroke. Signed by Dr Samir House on 4/14/2021 4:06 PM    Result Date: 4/14/2021  1. No major branch occlusion or aneurysm. 2. Minimal plaque in the cavernous carotid on the left that does not cause any flow limiting stenosis. Results called to Dr. Jerilyn Waggoner at 3:54 PM. Signed by Dr Samir House on 4/14/2021 3:56 PM ... ADDENDUM #1 . .. Assessment and Plan:    Principal Problem:    Stroke-like symptoms  Active Problems:    HTN (hypertension)    Hyperlipidemia    Fatigue    Type 2 diabetes mellitus with diabetic polyneuropathy, with long-term current use of insulin ([facility])    Class 2 obesity due to excess calories in adult    Diabetic ulcer of right midfoot associated with type 2 diabetes mellitus, with fat layer exposed ([address]    Charcot foot due to diabetes mellitus ([address]    Personal history of noncompliance with medical treatment and regimen    Permanent atrial fibrillation (HCC)    (HFpEF) heart failure with preserved ejection fraction (HCC)    Supratherapeutic INR    Left-sided weakness  Resolved Problems:    * No resolved hospital problems.  *       Admit patient to medical unit under observation status  Keep patient NPO except meds  ASA given after CT Scan Head ruled out intracranial hemorrhage  Continuous cardiac telemetry monitoring  Monitor cardiac enzymes ×3  2-D echo ordered to evaluate cardiac structure and function  Check TSH, vitamin D and vitamin B12 levels  Bilateral carotid artery duplex ordered  MRI of the brain with and without without contrast ordered today while patient was in the ER  Neurovascular checks ordered every 4 hours as per TIA/stroke protocol  PT/ST/OT evaluation ordered as per TIA/stroke protocol  Neurology consult for evaluation and further treatment recommendations  Hold off on Coumadin  Pharmacy consult for Coumadin management to keep INR between 2 and 3  Hold off on all diabetic meds including insulin and oral meds  Patient started on D5 half-normal saline at 50 cc an hour for 5 hours  Accu-Cheks every 4 hours without any insulin coverage to monitor patient's blood sugar levels  Continue with in and out catheterization  Consider urology evaluation or Coyle insertion if patient continues to have issues with his urination        Chronic medical issues . .. Continue with home meds. Monitor patient closely while admitted. Advised very close f/u with patient's PCP as an outpatient to address chronic medical issues. Personal history of noncompliance with medical treatment and regimen  STRICTLY advised patient to be compliant with meds and medical recommendations  Advised patient to get established with a PCP upon discharge, take care of meds, diet and bring patient's self and life back on track      Repeat labs in a.m. Electrolyte replacement as per protocol. Patient will be monitored very closely on the floor. Further recommendations as per the hospital course. Patient's management will be taken over by our [facility] Hospitalist Team in am.    Patient  is on DVT prophylaxis  Current medications reviewed  Lab work reviewed  Radiology/Chest x-ray films reviewed  Discussed with the nurse and addressed all questions/concerns  Discussed with Patient and/or Family at the bedside in detail . .. they verbalize understanding and agree with the management plan. Zurdo Kapadia MD  6:14 PM 4/14/2021      DISCLAIMER: This note was created with electronic voice recognition which does have occasional errors. If you have any questions regarding the content within the note please do not hesitate to contact me. .. Thanks.

## 2021-04-14 NOTE — CARE COORDINATION
Date/Time:  4/14/2021 11:02 AM  Attempted to reach patient by telephone. Recording states that voice mail has not been set up yet. Will attempt to reach patient again.

## 2021-04-14 NOTE — ED NOTES
After pt is done with MRI, pt will go to room 537 via transport. Report given to Azalia.       Lauren Murrell RN  04/14/21 5040

## 2021-04-14 NOTE — ED PROVIDER NOTES
History:   Procedure Laterality Date    CARDIOVERSION  03/2017    COLONOSCOPY  02/24/2020    Dr Lubna Fraire-Melanosis coli throughout the entire colon-AP, 5 yr recall    HUMERUS FRACTURE SURGERY Left 9/17/2020    OPEN REDUCTION INTERNAL FIXATION LEFT SUPRACONDYLAR FRACTURE [facility]      X 2    TONSILLECTOMY           CURRENT MEDICATIONS     Current Discharge Medication List      CONTINUE these medications which have NOT CHANGED    Details   glimepiride (AMARYL) 4 MG tablet TAKE ONE TABLET EVERY MORNING BEFORE BREAKFAST  Qty: 90 tablet, Refills: 3      !! warfarin (COUMADIN) 10 MG tablet TAKE ONE TABLET DAILY  Qty: 45 tablet, Refills: 0      furosemide (LASIX) 20 MG tablet TAKE ONE TABLET DAILY AS NEEDED  Qty: 30 tablet, Refills: 1      insulin lispro, 1 Unit Dial, (HUMALOG KWIKPEN) 100 UNIT/ML SOPN Inject 10 Units into the skin 3 times daily (before meals)  Qty: 4 pen, Refills: 0      losartan (COZAAR) 100 MG tablet TAKE ONE TABLET DAILY  Qty: 90 tablet, Refills: 3      Insulin Degludec (TRESIBA FLEXTOUCH) 200 UNIT/ML SOPN Inject 80 Units into the skin daily      metoprolol tartrate (LOPRESSOR) 25 MG tablet TAKE ONE TABLET TWICE DAILY  Qty: 60 tablet, Refills: 2    Associated Diagnoses: Essential hypertension      VITAMIN D PO Take 1 tablet by mouth daily      docusate sodium (COLACE) 250 MG capsule Take 1 capsule by mouth 2 times daily  Qty: 60 capsule, Refills: 0      atorvastatin (LIPITOR) 20 MG tablet TAKE ONE TABLET DAILY  Qty: 90 tablet, Refills: 3      tamsulosin (FLOMAX) 0.4 MG capsule TAKE TWO CAPSULES DAILY  Qty: 60 capsule, Refills: 11      BD INSULIN SYRINGE U/F 31G X 5/16\" 1 ML MISC USE FOUR TIMES DAILY  Qty: 100 each, Refills: 3      !! warfarin (COUMADIN) 5 MG tablet TAKE AS DIRECTED BY YOUR DOCTOR  Qty: 30 tablet, Refills: 5      diphenhydrAMINE (BENADRYL) 25 MG tablet Take 25 mg by mouth every 6 hours as needed for Itching blood glucose test strips (CONTOUR NEXT TEST) strip 1 each by In Vitro route 4 times daily As needed. Qty: 200 each, Refills: 3    Associated Diagnoses: Type 2 diabetes mellitus with diabetic polyneuropathy, with long-term current use of insulin (HCC)      Lancets MISC 1 each by Does not apply route 4 times daily  Qty: 300 each, Refills: 1    Associated Diagnoses: Type 2 diabetes mellitus with diabetic polyneuropathy, with long-term current use of insulin ([facility])      B-D ULTRAFINE III SHORT PEN 31G X 8 MM MISC Inject 1 each as directed 3 times daily  Qty: 100 each, Refills: 5       !! - Potential duplicate medications found. Please discuss with provider.           ALLERGIES     Pcn [penicillins], Neomycin-bacitracin zn-polymyx, and Neosporin [neomycin-polymyxin-gramicidin]    FAMILY HISTORY       Family History   Problem Relation Age of Onset    Stroke Maternal Grandmother     Cancer Maternal Grandmother     Stroke Paternal Grandmother     Diabetes Paternal Grandmother     Cancer Father     Heart Disease Father     Lung Cancer Father     Asthma Paternal Grandfather     Heart Disease Paternal Grandfather     Heart Disease Maternal Grandfather     Colon Cancer Neg Hx     Colon Polyps Neg Hx     Esophageal Cancer Neg Hx     Liver Cancer Neg Hx     Liver Disease Neg Hx     Rectal Cancer Neg Hx     Stomach Cancer Neg Hx           SOCIAL HISTORY       Social History     Socioeconomic History    Marital status:      Spouse name: Not on file    Number of children: Not on file    Years of education: Not on file    Highest education level: Not on file   Occupational History    Not on file   Social Needs    Financial resource strain: Hard    Food insecurity     Worry: Sometimes true     Inability: Sometimes true    Transportation needs     Medical: No     Non-medical: No   Tobacco Use    Smoking status: Never Smoker    Smokeless tobacco: Never Used   Substance and Sexual Activity    Alcohol use: No    Drug use: No    Sexual activity: Not on file   Lifestyle    Physical activity     Days per week: 0 days     Minutes per session: 0 min    Stress: To some extent   Relationships    Social connections     Talks on phone: Not on file     Gets together: Not on file     Attends Latter-[employer] service: Not on file     Active member of club or organization: Not on file     Attends meetings of clubs or organizations: Not on file     Relationship status: Not on file    Intimate partner violence     Fear of current or ex partner: Not on file     Emotionally abused: Not on file     Physically abused: Not on file     Forced sexual activity: Not on file   Other Topics Concern    Not on file   Social History Narrative    Referred to  for assistance with Meals on Wheels       SCREENINGS   NIH Stroke Scale  Interval: Baseline  Level of Consciousness (1a. ): Alert  LOC Questions (1b. ):  Answers neither question correctly  LOC Commands (1c. ): Performs both tasks correctly  Best Gaze (2. ): Normal  Visual (3. ): No visual loss  Facial Palsy (4. ): Normal symmetrical movement  Motor Arm, Left (5a. ): No movement  Motor Arm, Right (5b. ): No drift  Motor Leg, Left (6a. ): No movement  Motor Leg, Right (6b. ): No drift  Limb Ataxia (7. ): Absent  Sensory (8. ): (!) Mild to Moderate  Best Language (9. ): Mild to moderate aphasia  Dysarthria (10. ): Mild to moderate dysarthria  Extinction and Inattention (11): No abnormality  Total: 13Glasgow Coma Scale  Eye Opening: Spontaneous  Best Verbal Response: Confused  Best Motor Response: Obeys commands  [place] Coma Scale Score: 14        PHYSICAL EXAM    (up to 7 for level 4, 8 or more for level 5)     ED Triage Vitals   BP Temp Temp src Pulse Resp SpO2 Height Weight   04/14/21 1438 -- -- 04/14/21 1438 04/14/21 1438 04/14/21 1441 04/14/21 1438 04/14/21 1438   (!) 162/124   102 21 94 % 6' (1.829 m) 278 lb (126.1 kg)       Physical Exam  Vitals signs and nursing note reviewed. Constitutional:       Appearance: He is well-developed. He is ill-appearing. HENT:      Head: Normocephalic and atraumatic. Nose: Nose normal.      Mouth/Throat:      Mouth: Mucous membranes are moist.   Eyes:      Extraocular Movements: Extraocular movements intact. Conjunctiva/sclera: Conjunctivae normal.      Pupils: Pupils are equal, round, and reactive to light. Neck:      Musculoskeletal: Normal range of motion and neck supple. Trachea: No tracheal deviation. Cardiovascular:      Rate and Rhythm: Normal rate and regular rhythm. Heart sounds: Normal heart sounds. No murmur. Pulmonary:      Breath sounds: Normal breath sounds. No wheezing or rales. Abdominal:      Palpations: Abdomen is soft. There is no mass. Tenderness: There is no abdominal tenderness. Musculoskeletal: Normal range of motion. Skin:     General: Skin is warm and dry. Neurological:      Mental Status: He is alert. GCS: GCS eye subscore is 4. GCS verbal subscore is 4. GCS motor subscore is 6. Cranial Nerves: Dysarthria present. No facial asymmetry. Motor: Abnormal muscle tone present. Comments: Patient seems to be quite dysarthric he is able to tell me his name and states that he is at a hospital, he intermittently holds his right arm out giving a thumbs up, he can move his left leg and left arm against gravity but he seems quite uncoordinated         DIAGNOSTIC RESULTS     EKG: All EKG's areinterpreted by the Emergency Department Physician who either signs or Co-signs this chart in the absence of a cardiologist.    101 atrial fibrillation, no obvious ST changes nondiagnostic EKG    RADIOLOGY:  Non-plain film images such as CT, Ultrasound and MRI are read by the radiologist. Plain radiographic images are visualized and preliminarily interpreted bythe emergency physician with the below findings:          MRI BRAIN W WO CONTRAST   Final Result   Impression:    1.  There is no Temp:    97.9 °F (36.6 °C)   TempSrc:    Temporal   SpO2:  97% 97% 95%   Weight:       Height:           MDM  Number of Diagnoses or Management Options     Amount and/or Complexity of Data Reviewed  Clinical lab tests: ordered and reviewed  Tests in the radiology section of CPT®: ordered and reviewed  Discuss the patient with other providers: yes  Independent visualization of images, tracings, or specimens: yes      vss but presents with ams, ?left sided weakness and dysarthria, BS wnl here and no improvement, acute stroke initiated with no obvoius acute findings, family arrived and was able to confirm yesterday 0600 wnl, hx of intermittent confusion, somewhat odd presentation for cva, ?seizure but also seems atypical, d/w Dr. Pj Wisdom for consult, d/w Dr. Jakub Ross for admission    CONSULTS:  IP CONSULT TO [address]:  Unless otherwise noted below, none     Procedures    FINAL IMPRESSION      1. Stroke-like symptoms    2. Altered mental status, unspecified altered mental status type          DISPOSITION/PLAN   DISPOSITION Decision To Admit 04/14/2021 10:46:05 PM      PATIENT REFERRED TO:  No follow-up provider specified.     DISCHARGE MEDICATIONS:  Current Discharge Medication List             (Please note that portions of this note were completed with a voice recognition program.  Efforts were made to edit thedictations but occasionally words are mis-transcribed.)    Jacek Peck MD (electronically signed)  Attending Emergency Physician        Fernandez Wen MD  04/14/21 0169

## 2021-04-15 LAB
ALBUMIN SERPL-MCNC: 3.6 G/DL (ref 3.5–5.2)
ALP BLD-CCNC: 267 U/L (ref 40–130)
ALT SERPL-CCNC: 44 U/L (ref 5–41)
ANION GAP SERPL CALCULATED.3IONS-SCNC: 9 MMOL/L (ref 7–19)
AST SERPL-CCNC: 47 U/L (ref 5–40)
BASOPHILS ABSOLUTE: 0.1 K/UL (ref 0–0.2)
BASOPHILS RELATIVE PERCENT: 0.6 % (ref 0–1)
BILIRUB SERPL-MCNC: 1.2 MG/DL (ref 0.2–1.2)
BUN BLDV-MCNC: 18 MG/DL (ref 6–20)
CALCIUM SERPL-MCNC: 8.8 MG/DL (ref 8.6–10)
CHLORIDE BLD-SCNC: 100 MMOL/L (ref 98–111)
CHOLESTEROL, TOTAL: 132 MG/DL (ref 160–199)
CO2: 27 MMOL/L (ref 22–29)
CREAT SERPL-MCNC: 1 MG/DL (ref 0.5–1.2)
EOSINOPHILS ABSOLUTE: 0 K/UL (ref 0–0.6)
EOSINOPHILS RELATIVE PERCENT: 0.2 % (ref 0–5)
GFR AFRICAN AMERICAN: >59
GFR NON-AFRICAN AMERICAN: >60
GLUCOSE BLD-MCNC: 100 MG/DL (ref 70–99)
GLUCOSE BLD-MCNC: 110 MG/DL (ref 70–99)
GLUCOSE BLD-MCNC: 113 MG/DL (ref 70–99)
GLUCOSE BLD-MCNC: 120 MG/DL (ref 70–99)
GLUCOSE BLD-MCNC: 121 MG/DL (ref 70–99)
GLUCOSE BLD-MCNC: 126 MG/DL (ref 70–99)
GLUCOSE BLD-MCNC: 131 MG/DL (ref 70–99)
GLUCOSE BLD-MCNC: 150 MG/DL (ref 70–99)
GLUCOSE BLD-MCNC: 35 MG/DL (ref 70–99)
GLUCOSE BLD-MCNC: 45 MG/DL (ref 70–99)
GLUCOSE BLD-MCNC: 55 MG/DL (ref 70–99)
GLUCOSE BLD-MCNC: 57 MG/DL (ref 70–99)
GLUCOSE BLD-MCNC: 65 MG/DL (ref 70–99)
GLUCOSE BLD-MCNC: 65 MG/DL (ref 70–99)
GLUCOSE BLD-MCNC: 66 MG/DL (ref 70–99)
GLUCOSE BLD-MCNC: 85 MG/DL (ref 74–109)
HBA1C MFR BLD: 9 % (ref 4–6)
HCT VFR BLD CALC: 40.9 % (ref 42–52)
HDLC SERPL-MCNC: 44 MG/DL (ref 55–121)
HEMOGLOBIN: 13.2 G/DL (ref 14–18)
IMMATURE GRANULOCYTES #: 0 K/UL
INR BLD: 4.59 (ref 0.88–1.18)
LDL CHOLESTEROL CALCULATED: 75 MG/DL
LV EF: 48 %
LVEF MODALITY: NORMAL
LYMPHOCYTES ABSOLUTE: 1.3 K/UL (ref 1.1–4.5)
LYMPHOCYTES RELATIVE PERCENT: 15.2 % (ref 20–40)
MAGNESIUM: 2.2 MG/DL (ref 1.6–2.6)
MCH RBC QN AUTO: 31.7 PG (ref 27–31)
MCHC RBC AUTO-ENTMCNC: 32.3 G/DL (ref 33–37)
MCV RBC AUTO: 98.3 FL (ref 80–94)
MONOCYTES ABSOLUTE: 1 K/UL (ref 0–0.9)
MONOCYTES RELATIVE PERCENT: 11.5 % (ref 0–10)
NEUTROPHILS ABSOLUTE: 6.3 K/UL (ref 1.5–7.5)
NEUTROPHILS RELATIVE PERCENT: 72.2 % (ref 50–65)
PDW BLD-RTO: 14.2 % (ref 11.5–14.5)
PERFORMED ON: ABNORMAL
PHOSPHORUS: 4 MG/DL (ref 2.5–4.5)
PLATELET # BLD: 262 K/UL (ref 130–400)
PMV BLD AUTO: 10.8 FL (ref 9.4–12.4)
POTASSIUM SERPL-SCNC: 4.2 MMOL/L (ref 3.5–5)
PROTHROMBIN TIME: 45.2 SEC (ref 12–14.6)
RBC # BLD: 4.16 M/UL (ref 4.7–6.1)
SODIUM BLD-SCNC: 136 MMOL/L (ref 136–145)
TOTAL PROTEIN: 6.3 G/DL (ref 6.6–8.7)
TRIGL SERPL-MCNC: 67 MG/DL (ref 0–149)
TROPONIN: 0.02 NG/ML (ref 0–0.03)
WBC # BLD: 8.7 K/UL (ref 4.8–10.8)

## 2021-04-15 PROCEDURE — 92610 EVALUATE SWALLOWING FUNCTION: CPT

## 2021-04-15 PROCEDURE — 84100 ASSAY OF PHOSPHORUS: CPT

## 2021-04-15 PROCEDURE — 6370000000 HC RX 637 (ALT 250 FOR IP): Performed by: HOSPITALIST

## 2021-04-15 PROCEDURE — 93880 EXTRACRANIAL BILAT STUDY: CPT

## 2021-04-15 PROCEDURE — 99233 SBSQ HOSP IP/OBS HIGH 50: CPT | Performed by: PSYCHIATRY & NEUROLOGY

## 2021-04-15 PROCEDURE — 82947 ASSAY GLUCOSE BLOOD QUANT: CPT

## 2021-04-15 PROCEDURE — 83036 HEMOGLOBIN GLYCOSYLATED A1C: CPT

## 2021-04-15 PROCEDURE — 6360000004 HC RX CONTRAST MEDICATION: Performed by: HOSPITALIST

## 2021-04-15 PROCEDURE — 80061 LIPID PANEL: CPT

## 2021-04-15 PROCEDURE — 95816 EEG AWAKE AND DROWSY: CPT

## 2021-04-15 PROCEDURE — 83735 ASSAY OF MAGNESIUM: CPT

## 2021-04-15 PROCEDURE — 80053 COMPREHEN METABOLIC PANEL: CPT

## 2021-04-15 PROCEDURE — G0378 HOSPITAL OBSERVATION PER HR: HCPCS

## 2021-04-15 PROCEDURE — 85025 COMPLETE CBC W/AUTO DIFF WBC: CPT

## 2021-04-15 PROCEDURE — 2580000003 HC RX 258: Performed by: HOSPITALIST

## 2021-04-15 PROCEDURE — 51798 US URINE CAPACITY MEASURE: CPT

## 2021-04-15 PROCEDURE — 96376 TX/PRO/DX INJ SAME DRUG ADON: CPT

## 2021-04-15 PROCEDURE — 92523 SPEECH SOUND LANG COMPREHEN: CPT

## 2021-04-15 PROCEDURE — 36415 COLL VENOUS BLD VENIPUNCTURE: CPT

## 2021-04-15 PROCEDURE — 2580000003 HC RX 258

## 2021-04-15 PROCEDURE — 85610 PROTHROMBIN TIME: CPT

## 2021-04-15 PROCEDURE — 95816 EEG AWAKE AND DROWSY: CPT | Performed by: PSYCHIATRY & NEUROLOGY

## 2021-04-15 PROCEDURE — 84484 ASSAY OF TROPONIN QUANT: CPT

## 2021-04-15 PROCEDURE — C8929 TTE W OR WO FOL WCON,DOPPLER: HCPCS

## 2021-04-15 RX ORDER — DEXTROSE MONOHYDRATE 25 G/50ML
INJECTION, SOLUTION INTRAVENOUS
Status: COMPLETED
Start: 2021-04-15 | End: 2021-04-15

## 2021-04-15 RX ORDER — DEXTROSE MONOHYDRATE 100 MG/ML
INJECTION, SOLUTION INTRAVENOUS CONTINUOUS
Status: DISCONTINUED | OUTPATIENT
Start: 2021-04-15 | End: 2021-04-17

## 2021-04-15 RX ORDER — DEXTROSE MONOHYDRATE 25 G/50ML
12.5 INJECTION, SOLUTION INTRAVENOUS PRN
Status: DISCONTINUED | OUTPATIENT
Start: 2021-04-15 | End: 2021-04-19 | Stop reason: HOSPADM

## 2021-04-15 RX ORDER — DOCUSATE SODIUM 100 MG/1
100 CAPSULE, LIQUID FILLED ORAL 2 TIMES DAILY
Status: DISCONTINUED | OUTPATIENT
Start: 2021-04-15 | End: 2021-04-19 | Stop reason: HOSPADM

## 2021-04-15 RX ORDER — DEXTROSE MONOHYDRATE 50 MG/ML
100 INJECTION, SOLUTION INTRAVENOUS PRN
Status: DISCONTINUED | OUTPATIENT
Start: 2021-04-15 | End: 2021-04-19 | Stop reason: HOSPADM

## 2021-04-15 RX ORDER — ERGOCALCIFEROL 1.25 MG/1
50000 CAPSULE ORAL WEEKLY
COMMUNITY
End: 2022-05-02 | Stop reason: SDUPTHER

## 2021-04-15 RX ORDER — NICOTINE POLACRILEX 4 MG
15 LOZENGE BUCCAL PRN
Status: DISCONTINUED | OUTPATIENT
Start: 2021-04-15 | End: 2021-04-19 | Stop reason: HOSPADM

## 2021-04-15 RX ADMIN — Medication 1000 UNITS: at 09:29

## 2021-04-15 RX ADMIN — LOSARTAN POTASSIUM 100 MG: 100 TABLET, FILM COATED ORAL at 09:29

## 2021-04-15 RX ADMIN — DEXTROSE MONOHYDRATE: 500 INJECTION PARENTERAL at 01:30

## 2021-04-15 RX ADMIN — DEXTROSE MONOHYDRATE 12.5 G: 25 INJECTION, SOLUTION INTRAVENOUS at 13:11

## 2021-04-15 RX ADMIN — ASPIRIN 81 MG: 81 TABLET, COATED ORAL at 09:29

## 2021-04-15 RX ADMIN — DEXTROSE MONOHYDRATE 12.5 G: 25 INJECTION, SOLUTION INTRAVENOUS at 01:00

## 2021-04-15 RX ADMIN — DOCUSATE SODIUM 100 MG: 100 CAPSULE, LIQUID FILLED ORAL at 09:28

## 2021-04-15 RX ADMIN — ATORVASTATIN CALCIUM 20 MG: 20 TABLET, FILM COATED ORAL at 09:29

## 2021-04-15 RX ADMIN — METOPROLOL TARTRATE 25 MG: 25 TABLET, FILM COATED ORAL at 20:03

## 2021-04-15 RX ADMIN — TAMSULOSIN HYDROCHLORIDE 0.8 MG: 0.4 CAPSULE ORAL at 09:29

## 2021-04-15 RX ADMIN — DEXTROSE MONOHYDRATE 12.5 G: 25 INJECTION, SOLUTION INTRAVENOUS at 09:45

## 2021-04-15 RX ADMIN — PERFLUTREN 1.65 MG: 6.52 INJECTION, SUSPENSION INTRAVENOUS at 14:47

## 2021-04-15 RX ADMIN — DEXTROSE MONOHYDRATE 12.5 G: 500 INJECTION PARENTERAL at 01:00

## 2021-04-15 RX ADMIN — METOPROLOL TARTRATE 25 MG: 25 TABLET, FILM COATED ORAL at 09:29

## 2021-04-15 RX ADMIN — DEXTROSE MONOHYDRATE: 10 INJECTION, SOLUTION INTRAVENOUS at 18:29

## 2021-04-15 RX ADMIN — DEXTROSE MONOHYDRATE: 10 INJECTION, SOLUTION INTRAVENOUS at 04:15

## 2021-04-15 NOTE — CARE COORDINATION
Attempted to meet with pt to discuss dc plans, pt not in his room. SW will follow.    Electronically signed by Cristiane Cahcon on 4/15/2021 at 1:45 PM

## 2021-04-15 NOTE — PROGRESS NOTES
Clinical Pharmacy Note    Warfarin consult follow-up    Recent Labs     04/15/21  1324   INR 4.59*     Recent Labs     04/14/21  1504 04/15/21  0451   HGB 14.3 13.2*   HCT 43.6 40.9*    262       Current warfarin drug-drug interactions:   Aspirin: Patients receiving vitamin K antagonists should not take salicylate-containing medicines on an as-needed basis. Nonacetylated salicylates might be safer than aspirin. Acetaminophen (<1.3 g/day for <1 week) may be an acceptable antipyretic and analgesic choice for patients taking vitamin K antagonists; caution appears warranted with higher acetaminophen doses. Aspirin (80 to 325 mg/day) and warfarin are used together, in selected cases and with careful monitoring, for the prevention of cardiovascular events. Date INR Warfarin Dose   04/14/21 3.67 Hold   04/15/21   4.59 Hold                                               Notes:                   Hold coumadin tonight. Daily PT/INR until stable within therapeutic range.      LILLIAN BRO, PHARM D, 4/15/2021, 2:04 PM

## 2021-04-15 NOTE — PROGRESS NOTES
Patient home medication list verified with pharmacy and updated on soft chart.  Dr. Edgar Goss notified  Electronically signed by Sandra Singh RN on 4/15/2021 at 11:38 AM

## 2021-04-15 NOTE — PROGRESS NOTES
Physical Therapy    PT eval attempt x2 - pt in Echo lab. Will cont to follow up.     Electronically signed by Eleonora Rolle on 4/15/2021 at 1:50 PM

## 2021-04-15 NOTE — PROGRESS NOTES
Patient blood glucose continues to be <70. D10 infusion rate increased to 100 mL/hr and 12.5g D50 administered will reassess.   Patient successfully ate 25% of lunch, and drank 150 cc of honey thickened liquids  Electronically signed by Siva Guerrero RN on 4/15/2021 at 1:16 PM

## 2021-04-15 NOTE — PROGRESS NOTES
Patient home pharmacy is [facility].  Pharmacy called at this time requesting updated medication list. Will update patient's home meds when available  Electronically signed by Candice Martinez RN on 4/15/2021 at 10:07 AM

## 2021-04-15 NOTE — PROGRESS NOTES
consistencies, and sluggish, inconsistently mild-moderately decreased laryngeal elevation for swallow airway protection. Even so, no outward S/S penetration/aspiration was observed with any ice chip trial, puree consistency trial, regular solid consistency trial, moderately thick/honey thick liquid trial, mildly thick/nectar thick liquid trial, or thin H2O trial presented during assessment this date. At this time, would trial soft and bite sized consistency and moderately thick/honey thick liquids. Recommend meds crushed in pudding/applesauce. If patient receives mouth care prior to intake, okay for ice chips IN BETWEEN MEALS for comfort. Will continue to follow. Thank you for this consult. Goals:  Short-term Goals  Timeframe for Short-term Goals: 1-2x/day for 3 days  Goal 1: Patient will tolerate soft and bite sized consistency and moderately thick/honey thick liquids with min S/S penetration/aspiration during PO intake. Goal 2: Patient staff will follow swallow safety recommendations to decreased risk of penetration/aspiration during PO intake. Goal 3: Re-assessment of swallow function for potential diet upgrade. Goal 4: Monitor [id number] functioning. Subjective:  General  Chart Reviewed: Yes  Patient assessed for rehabilitation services?: Yes  Family / Caregiver Present: No     Objective: Auditory Comprehension  Comprehension:  (Delays were noted and mild break down was observed during simple yes/no questions regarding immediate environment and current state of being at independent level. While delayed, patient demonstrated ability to follow simple 1 step commands independently.  Delays were noted and moderate break down was observed during yes/no questions of increased complexity and during complex 1 and simple 2 step commands.)     Expression  Primary Mode of Expression:  (Confrontation naming, structured responsive speech, and responses in natural conversation were all considered to be delayed and telegraphic with moderate perseveration of previously spoken information noted.)     Motor Speech:  (Patient exhibited slowed, decreased lingual movements during verbalizations. SLP ranked functional intelligibility for unfamiliar listeners at 80-90% in utterances with background noise present.)     Overall Orientation Status:  (Patient demonstrated ability to verbalize name and birthday at independent level. Patient did not verbalize any additional biographical, temporal, spatial, or situational information independently.)     Attention:  (Patient demonstrated decreased select and sustained attention during assessment. No adverse behaviors were noted with provision of redirections.)     Memory:  (Patient demonstrated decreased immediate memory with sequences of unrelated numbers/words set up to 4 items with repetitions provided. Patient demonstrated decreased short-term/working memory with less than 5 minute delay+distractions present.)     Problem Solving:  (Patient did not verbalize PCP at independent level. Patient verbalized pharmacy independently. Patient did not verbalize appropriate simple solutions to situations that could occur during activities of daily living at independent level.)    Additional Assessment:   Assessed patient's swallowing function. Oral transit of ice chip trials, presented by SLP, primarily measured 1-2 seconds in length. Oral transit of puree consistency trials, presented by SLP, primarily measured 1-2 seconds in length and no oral cavity residue was noted post swallows. With regular solid consistency trials presented by SLP, patient exhibited decreased rotary jaw movement during oral prep. Oral transit of regular solid consistency primarily measured 1-2 seconds in length and min oral cavity residue was observed post swallows; residue cleared from the mouth with additional dry swallows.  Oral transit honey thick liquid trials, presented independently via cup, primarily

## 2021-04-15 NOTE — PROGRESS NOTES
Occupational Therapy  RE: Bisi Ambrose  MRN: [id number]    OT attempted evaluation again this afternoon. Pt not found in room/at ECHO. OT plans to follow-up at a later time.      - MADISON Douglass  Electronically signed by MADISON Douglass on 4/15/2021 at 1:51 PM

## 2021-04-15 NOTE — PROGRESS NOTES
Patient son Manny Pedersen called and stated that a visitor named Lacey Garza would be coming to visit during visiting hours. Son states that he is allowed to visit but should be asked to leave if patient becomes agitated. Son states there is a tense relationship between Lacey Garza and patient.

## 2021-04-15 NOTE — PROGRESS NOTES
Orders for sitter at bedside. Patient is confused, agitated, yelling, and trying to pull lines and tubes.  Sitter placed for patient safety  Electronically signed by Maryam Hou RN on 4/15/2021 at 6:42 PM

## 2021-04-15 NOTE — PROGRESS NOTES
Patient becoming increasingly agitation, c/o pain in lower abdomen and penis. External catheter assessed, no urine in bag. Bladder scan obtained, 999 mL noted in bladder. Dr. Breana Kaminski notified, indwelling penn place, with instant relief.  Will continue to monitor  Electronically signed by Remy Madsen RN on 4/15/2021 at 10:26 AM

## 2021-04-15 NOTE — PROGRESS NOTES
Physical Therapy    Att eval this am but pt being taken out of room for vascular lab. Will follow up in the afternoon.     Electronically signed by Dipak Alcocer on 4/15/2021 at 11:06 AM

## 2021-04-15 NOTE — PROGRESS NOTES
Occupational Therapy    OT attempted evaluation this afternoon. Pt was in transport for vascular lab.    Will follow-up later.     -MADISON Douglass  Electronically signed by MADISON Douglass on 4/15/2021 at 11:07 AM

## 2021-04-16 PROBLEM — R29.90 STROKE-LIKE SYMPTOMS: Status: RESOLVED | Noted: 2021-04-14 | Resolved: 2021-04-16

## 2021-04-16 PROBLEM — R41.82 ALTERED MENTAL STATUS: Status: ACTIVE | Noted: 2021-04-16

## 2021-04-16 LAB
ALBUMIN SERPL-MCNC: 3.3 G/DL (ref 3.5–5.2)
ALP BLD-CCNC: 249 U/L (ref 40–130)
ALT SERPL-CCNC: 41 U/L (ref 5–41)
ANION GAP SERPL CALCULATED.3IONS-SCNC: 10 MMOL/L (ref 7–19)
AST SERPL-CCNC: 39 U/L (ref 5–40)
BILIRUB SERPL-MCNC: 1.4 MG/DL (ref 0.2–1.2)
BUN BLDV-MCNC: 17 MG/DL (ref 6–20)
CALCIUM SERPL-MCNC: 8.6 MG/DL (ref 8.6–10)
CHLORIDE BLD-SCNC: 99 MMOL/L (ref 98–111)
CO2: 27 MMOL/L (ref 22–29)
CREAT SERPL-MCNC: 1 MG/DL (ref 0.5–1.2)
GFR AFRICAN AMERICAN: >59
GFR NON-AFRICAN AMERICAN: >60
GLUCOSE BLD-MCNC: 153 MG/DL (ref 70–99)
GLUCOSE BLD-MCNC: 160 MG/DL (ref 70–99)
GLUCOSE BLD-MCNC: 170 MG/DL (ref 70–99)
GLUCOSE BLD-MCNC: 173 MG/DL (ref 70–99)
GLUCOSE BLD-MCNC: 177 MG/DL (ref 70–99)
GLUCOSE BLD-MCNC: 192 MG/DL (ref 74–109)
GLUCOSE BLD-MCNC: 262 MG/DL (ref 70–99)
GLUCOSE BLD-MCNC: 287 MG/DL (ref 70–99)
INR BLD: 3.67 (ref 0.88–1.18)
PERFORMED ON: ABNORMAL
POTASSIUM SERPL-SCNC: 4 MMOL/L (ref 3.5–5)
PROTHROMBIN TIME: 37.7 SEC (ref 12–14.6)
SODIUM BLD-SCNC: 136 MMOL/L (ref 136–145)
TOTAL PROTEIN: 6.1 G/DL (ref 6.6–8.7)
TROPONIN: 0.03 NG/ML (ref 0–0.03)

## 2021-04-16 PROCEDURE — 1210000000 HC MED SURG R&B

## 2021-04-16 PROCEDURE — 96376 TX/PRO/DX INJ SAME DRUG ADON: CPT

## 2021-04-16 PROCEDURE — 80053 COMPREHEN METABOLIC PANEL: CPT

## 2021-04-16 PROCEDURE — 2580000003 HC RX 258: Performed by: HOSPITALIST

## 2021-04-16 PROCEDURE — 97530 THERAPEUTIC ACTIVITIES: CPT

## 2021-04-16 PROCEDURE — 92526 ORAL FUNCTION THERAPY: CPT

## 2021-04-16 PROCEDURE — 85610 PROTHROMBIN TIME: CPT

## 2021-04-16 PROCEDURE — 97166 OT EVAL MOD COMPLEX 45 MIN: CPT

## 2021-04-16 PROCEDURE — 97535 SELF CARE MNGMENT TRAINING: CPT

## 2021-04-16 PROCEDURE — 36415 COLL VENOUS BLD VENIPUNCTURE: CPT

## 2021-04-16 PROCEDURE — 6370000000 HC RX 637 (ALT 250 FOR IP): Performed by: HOSPITALIST

## 2021-04-16 PROCEDURE — 97162 PT EVAL MOD COMPLEX 30 MIN: CPT

## 2021-04-16 PROCEDURE — 97129 THER IVNTJ 1ST 15 MIN: CPT

## 2021-04-16 PROCEDURE — 82947 ASSAY GLUCOSE BLOOD QUANT: CPT

## 2021-04-16 PROCEDURE — 84484 ASSAY OF TROPONIN QUANT: CPT

## 2021-04-16 PROCEDURE — 99232 SBSQ HOSP IP/OBS MODERATE 35: CPT | Performed by: PSYCHIATRY & NEUROLOGY

## 2021-04-16 RX ADMIN — SODIUM CHLORIDE, PRESERVATIVE FREE 10 ML: 5 INJECTION INTRAVENOUS at 10:09

## 2021-04-16 RX ADMIN — DOCUSATE SODIUM 100 MG: 100 CAPSULE, LIQUID FILLED ORAL at 20:57

## 2021-04-16 RX ADMIN — ASPIRIN 81 MG: 81 TABLET, COATED ORAL at 10:08

## 2021-04-16 RX ADMIN — METOPROLOL TARTRATE 25 MG: 25 TABLET, FILM COATED ORAL at 20:57

## 2021-04-16 RX ADMIN — Medication 1000 UNITS: at 10:08

## 2021-04-16 RX ADMIN — DEXTROSE MONOHYDRATE: 10 INJECTION, SOLUTION INTRAVENOUS at 04:27

## 2021-04-16 RX ADMIN — DOCUSATE SODIUM 100 MG: 100 CAPSULE, LIQUID FILLED ORAL at 10:08

## 2021-04-16 RX ADMIN — TAMSULOSIN HYDROCHLORIDE 0.8 MG: 0.4 CAPSULE ORAL at 10:08

## 2021-04-16 RX ADMIN — METOPROLOL TARTRATE 25 MG: 25 TABLET, FILM COATED ORAL at 10:08

## 2021-04-16 RX ADMIN — LOSARTAN POTASSIUM 100 MG: 100 TABLET, FILM COATED ORAL at 10:08

## 2021-04-16 RX ADMIN — SODIUM CHLORIDE, PRESERVATIVE FREE 10 ML: 5 INJECTION INTRAVENOUS at 20:58

## 2021-04-16 RX ADMIN — ATORVASTATIN CALCIUM 20 MG: 20 TABLET, FILM COATED ORAL at 10:08

## 2021-04-16 NOTE — PROGRESS NOTES
Speech Language Pathology  Facility/Department: [facility] SURG SERVICES  Speech/Language/Cognitive/Swallow Therapy    NAME: Ruby Franco  : 1965   MRN: [id number]  ADMISSION DATE: 2021  ADMITTING DIAGNOSIS: has Shortness of breath; HTN (hypertension); Hyperlipidemia; Fatigue; Hyponatremia; Type 2 diabetes mellitus with diabetic polyneuropathy, with long-term current use of insulin ([address] Class 2 obesity due to excess calories in adult; Diabetic ulcer of right midfoot associated with type 2 diabetes mellitus, with fat layer exposed ([address] Displaced comminuted supracondylar fracture without intercondylar fracture of left humerus, initial encounter for closed fracture; Left supracondylar humerus fracture, closed, initial encounter; Charcot foot due to diabetes mellitus ([address] Personal history of noncompliance with medical treatment and regimen; Permanent atrial fibrillation ([address] (HFpEF) heart failure with preserved ejection fraction ([address] Stroke-like symptoms; Supratherapeutic INR; Left-sided weakness; and Hypoglycemic encephalopathy on their problem list.    Date of Treat: 2021   Evaluating Therapist: NUNO Cobb    Assessment:  Re-assessed patient's speech/language/cognitive functioning. Patient continued to exhibit slow, decreased lingual movements during verbalizations. SLP ranked functional intelligibility for unfamiliar listeners at % in utterances with background noise present. Patient still exhibited slow processing, delayed and decreased auditory comprehension, delayed telegraphic utterances with mild perseveration of previously spoken information noted, decreased orientation, impaired short-term memory, and decreased reasoning/problem solving. Unaware of baseline cognitive-linguistic functioning.     Also re-evaluated patient's swallowing function. Patient exhibited sluggish, inconsistently mildly decreased laryngeal elevation for swallow airway protection.  No outward S/S penetration/aspiration was observed with any soft and bite sized consistency presentation, moderately thick/honey thick liquid presentation, or thin H2O trial presented during treatment session this date. Patient did appear impulsive and took large bites and large consecutive drinks.     At this time, would trial mildly thick/nectar thick liquids. Continue soft and bite sized consistency. Recommend meds crushed in pudding/applesauce. If patient receives mouth care prior to intake, okay for ice chips and small sips thin H2O IN BETWEEN MEALS for comfort. Will continue to follow.     Goals:  Short-term Goals  Timeframe for Short-term Goals: 1-2x/day for 3 days  Goal 1: Patient will tolerate soft and bite sized consistency and mildly thick/nectar thick liquids with min S/S penetration/aspiration during PO intake. Goal 2: Patient staff will follow swallow safety recommendations to decreased risk of penetration/aspiration during PO intake. Goal 3: Re-assessment of swallow function for potential diet upgrade. Goal 4: Monitor [id number] functioning.     Subjective:  General  Chart Reviewed: Yes  Patient assessed for rehabilitation services?: Yes  Family / Caregiver Present: No     Objective: Auditory Comprehension  Comprehension:  (While delayed, patient demonstrated ability to answer simple yes/no questions regarding immediate environment and current state of being at independent level. While delayed, patient demonstrated ability to follow simple 1 step commands independently.  Delays were noted and break down was observed during yes/no questions of increased complexity and during complex 1 and simple 2 step commands.)     Expression  Primary Mode of Expression:  (Confrontation naming, structured responsive speech, and responses in natural conversation were all considered to be delayed and telegraphic with mild perseveration of previously spoken information noted.)     Motor Speech:  (Patient exhibited slowed, decreased lingual movements during verbalizations. SLP ranked functional intelligibility for unfamiliar listeners at % in utterances with background noise present.)     Overall Orientation Status:  (Patient demonstrated ability to verbalize name, birthday, address, city, state, and hospital at independent level. Patient did not verbalize phone number, month, ABIODUN, date, or year independently.)     Memory:  (Patient demonstrated increased immediate memory with sequences of unrelated numbers/words set up to 5 items with repetitions provided. Patient demonstrated decreased short-term/working memory with less than 5 minute delay+distractions present.)     Problem Solving:  (Patient did not verbalize appropriate simple solutions to situations that could occur during activities of daily living at independent level.)     Additional Assessment:   Re-assessed patient's swallowing function. With soft and bite sized consistency presentations administered independently, patient exhibited decreased rotary jaw movement during oral prep. Oral transit of soft and bite sized consistency primarily measured 1-2 seconds in length and min oral cavity residue was noted post swallows; residue cleared from the mouth with additional dry swallows. Oral transit honey thick liquid presentations and thin H2O trials, all presented independently via cup, primarily measured 1 second in length. Laryngeal elevation during swallow initiation was considered to be sluggish and inconsistently mildly decreased for swallow airway protection. No outward S/S penetration/aspiration was observed with any soft and bite sized consistency presentation, moderately thick/honey thick liquid presentation, or thin H2O trial presented during treatment session this date. Patient did however appear impulsive and took large bites and large consecutive drinks.     At this time, would trial mildly thick/nectar thick liquids.  Continue soft and bite sized consistency. Recommend meds crushed in pudding/applesauce. If patient receives mouth care prior to intake, okay for ice chips and small sips thin H2O IN BETWEEN MEALS for comfort. Will continue to follow.     Electronically signed by NUNO Joel on 4/16/2021 at 12:48 PM

## 2021-04-16 NOTE — CARE COORDINATION
Pt has been accepted to [facility], can dc on Monday, 4/19. Pt will need a COVID test prior to dc. Accepting Physician at facility, Chelsea Vazquez, requesting to speak with pt's attending MD. Dr Vaughn Price aware.   Chelsea Vazquez cell: [phone]    Electronically signed by Carlos Espinoza on 4/16/2021 at 2:16 PM

## 2021-04-16 NOTE — PROGRESS NOTES
has a past surgical history that includes Tonsillectomy; malignant skin lesion excision; Cardioversion (03/2017); Colonoscopy (02/24/2020); and Humerus fracture surgery (Left, 9/17/2020). Restrictions  Restrictions/Precautions  Restrictions/Precautions: Swallowing - Thickened Liquids, Fall Risk(PCA supplied in room; pt recently had been pulling on cath/lines.)  Required Braces or Orthoses?: No  Vision/Hearing        Subjective  General  Patient assessed for rehabilitation services?: Yes  Additional Pertinent Hx: Charcot joint R foot  Diagnosis: Stroke-like symptoms  Follows Commands: Within Functional Limits  Subjective  Subjective: Pt agreeable to working with therapy this am.  Pain Screening  Patient Currently in Pain: Denies(Catheter pain w movement - PCA notified RN)  Vital Signs  Patient Currently in Pain: Denies(Catheter pain w movement - PCA notified RN)       Orientation  Orientation  Orientation Level: Disoriented to situation(Pt repeatedly asked what happened throughout the session)  Social/Functional History  Social/Functional History  Lives With: Alone  Home Equipment: Rolling walker  ADL Assistance: Independent  Homemaking Assistance: Independent  Ambulation Assistance: Independent  Transfer Assistance: Independent  Additional Comments: Pt is going to SNF per mother so PLOF limited. Cognition   Cognition  Overall Cognitive Status: Exceptions  Following Commands: Inconsistently follows commands; Follows one step commands with increased time  Attention Span: Attends with cues to redirect; Difficulty attending to directions  Memory: Decreased recall of recent events;Decreased short term memory;Decreased recall of precautions  Safety Judgement: Decreased awareness of need for assistance;Decreased awareness of need for safety  Problem Solving: Decreased awareness of errors;Assistance required to identify errors made;Assistance required to generate solutions;Assistance required to implement A       Therapy Time   Individual Concurrent Group Co-treatment   Time In           Time Out           Minutes                   Jazmin Julio       Electronically signed by Jazmin Julio on 4/16/2021 at 10:35 AM

## 2021-04-16 NOTE — PROGRESS NOTES
[address], [address]  [facility], [facility]  Phone [phone]     Neurology Progress Note  2021 1:34 PM  Information:   Patient Name: Orlan Bamberger  :   1965  Age:   60 y.o. MRN:   [id number]  Account #:  [de-identified]  Admit Date:   2021  Today:  21     ADMIT DX:   Stroke-like symptoms    Subjective:     Adam Buitrago a 60 y. o. year old man with a history of chronic atrial fibrillation and anticoagulation who presented with altered mental status and hypoglycemia.     Interval History:   He is better today. His speech is more fluent. He is still disoriented though. No weakness, no headache.     Objective:     Past Medical History:  Past Medical History:   Diagnosis Date    Acalculous cholecystitis 2015    Allergic rhinitis, cause unspecified     Atrial fibrillation, chronic (HCC)     [facility]ology    Charcot's joint of right foot     Chicken pox     Closed supracondylar fracture of elbow     fall    History of MRSA infection     scalp/facial and on back    HTN (hypertension)     Hyperlipidemia     Impotence of organic origin     MDRO (multiple drug resistant organisms) resistance     Other dyspnea and respiratory abnormality     Other specified erythematous condition(135.89)     Personal history of other infectious and parasitic disease     Salmonella     Type II or unspecified type diabetes mellitus without mention of complication, uncontrolled        Past Surgical History:   Procedure Laterality Date    CARDIOVERSION  2017    COLONOSCOPY  2020    Dr Coty Fraire-Melanosis coli throughout the entire colon-AP, 5 yr recall    HUMERUS FRACTURE SURGERY Left 2020    OPEN REDUCTION INTERNAL FIXATION LEFT SUPRACONDYLAR FRACTURE performed [facility]    MALIGNANT SKIN LESION EXCISION      X 2    TONSILLECTOMY         Family History   Problem Relation Age of Onset    Stroke Maternal Grandmother     Cancer Maternal Grandmother     Stroke Paternal Grandmother     Diabetes Paternal Grandmother     Cancer Father     Heart Disease Father     Lung Cancer Father     Asthma Paternal Grandfather     Heart Disease Paternal Grandfather     Heart Disease Maternal Grandfather     Colon Cancer Neg Hx     Colon Polyps Neg Hx     Esophageal Cancer Neg Hx     Liver Cancer Neg Hx     Liver Disease Neg Hx     Rectal Cancer Neg Hx     Stomach Cancer Neg Hx        Social History     Socioeconomic History    Marital status:      Spouse name: Not on file    Number of children: Not on file    Years of education: Not on file    Highest education level: Not on file   Occupational History    Not on file   Social Needs    Financial resource strain: Hard    Food insecurity     Worry: Sometimes true     Inability: Sometimes true    Transportation needs     Medical: No     Non-medical: No   Tobacco Use    Smoking status: Never Smoker    Smokeless tobacco: Never Used   Substance and Sexual Activity    Alcohol use: No    Drug use: No    Sexual activity: Not on file   Lifestyle    Physical activity     Days per week: 0 days     Minutes per session: 0 min    Stress:  To some extent   Relationships    Social connections     Talks on phone: Not on file     Gets together: Not on file     Attends Buddhism service: Not on file     Active member of club or organization: Not on file     Attends meetings of clubs or organizations: Not on file     Relationship status: Not on file    Intimate partner violence     Fear of current or ex partner: Not on file     Emotionally abused: Not on file     Physically abused: Not on file     Forced sexual activity: Not on file   Other Topics Concern    Not on file   Social History Narrative    Referred to  for assistance with Meals on Wheels       Medications:   dextrose      [Held by provider] dextrose 50 mL/hr at 04/16/21 0548    sodium chloride        docusate sodium  100 mg Oral BID    tamsulosin  0.8 mg Oral Daily    atorvastatin  20 mg Oral Daily    Vitamin D  1,000 Units Oral Daily    metoprolol tartrate  25 mg Oral BID    losartan  100 mg Oral Daily    sodium chloride flush  10 mL Intravenous 2 times per day    aspirin  81 mg Oral Daily    Or    aspirin  300 mg Rectal Daily    warfarin (COUMADIN) daily dosing (placeholder)   Other RX Placeholder       Diagnostic Studies:  Reviewed all labs/diagnositcs since last 24hrs:  Recent Results (from the past 24 hour(s))   POCT Glucose    Collection Time: 04/15/21  2:56 PM   Result Value Ref Range    POC Glucose 121 (H) 70 - 99 mg/dl    Performed on AccuChek    POCT Glucose    Collection Time: 04/15/21  5:51 PM   Result Value Ref Range    POC Glucose 120 (H) 70 - 99 mg/dl    Performed on [employer]uChek    POCT Glucose    Collection Time: 04/15/21  8:11 PM   Result Value Ref Range    POC Glucose 113 (H) 70 - 99 mg/dl    Performed on AccuChek    Troponin    Collection Time: 04/15/21  9:36 PM   Result Value Ref Range    Troponin 0.02 0.00 - 0.03 ng/mL   POCT Glucose    Collection Time: 04/15/21 10:55 PM   Result Value Ref Range    POC Glucose 150 (H) 70 - 99 mg/dl    Performed on [employer]uChek    POCT Glucose    Collection Time: 04/16/21  2:56 AM   Result Value Ref Range    POC Glucose 170 (H) 70 - 99 mg/dl    Performed on AccuChek    Troponin    Collection Time: 04/16/21  4:28 AM   Result Value Ref Range    Troponin 0.03 0.00 - 0.03 ng/mL   Comprehensive Metabolic Panel    Collection Time: 04/16/21  4:28 AM   Result Value Ref Range    Sodium 136 136 - 145 mmol/L    Potassium 4.0 3.5 - 5.0 mmol/L    Chloride 99 98 - 111 mmol/L    CO2 27 22 - 29 mmol/L    Anion Gap 10 7 - 19 mmol/L    Glucose 192 (H) 74 - 109 mg/dL    BUN 17 6 - 20 mg/dL    CREATININE 1.0 0.5 - 1.2 mg/dL    GFR Non-African American >60 >60    GFR African American >59 >59    Calcium 8.6 8.6 - 10.0 mg/dL    Total Protein 6.1 (L) 6.6 - 8.7 g/dL    Albumin 3.3 (L) 3.5 - 5.2 g/dL    Total Bilirubin 1.4 (H) 0.2 - 1.2 mg/dL    Alkaline Phosphatase 249 (H) 40 - 130 U/L    ALT 41 5 - 41 U/L    AST 39 5 - 40 U/L   PROTIME-INR    Collection Time: 04/16/21  4:28 AM   Result Value Ref Range    Protime 37.7 (H) 12.0 - 14.6 sec    INR 3.67 (H) 0.88 - 1.18   POCT Glucose    Collection Time: 04/16/21  5:01 AM   Result Value Ref Range    POC Glucose 177 (H) 70 - 99 mg/dl    Performed on AccuChek    POCT Glucose    Collection Time: 04/16/21  7:05 AM   Result Value Ref Range    POC Glucose 160 (H) 70 - 99 mg/dl    Performed on AccuChek    POCT Glucose    Collection Time: 04/16/21  8:00 AM   Result Value Ref Range    POC Glucose 153 (H) 70 - 99 mg/dl    Performed on AccuChek    POCT Glucose    Collection Time: 04/16/21 12:05 PM   Result Value Ref Range    POC Glucose 173 (H) 70 - 99 mg/dl    Performed on AccuChek      Narrative & Impression     Transthoracic Echocardiography Report (TTE)      Demographics      Patient Name  Jacquelyn Castillo Date of Study          04/15/2021      MRN           [id number]        Gender                 Male      Date of Birth 1965    Room Number            [id number]      Age           60 year(s)      Height:       72 inches     Referring Physician    Jemma Pradhan      Weight:       278.01 pounds Sonographer            [facility]      BSA:          2.45 m^2      Interpreting Physician Jignesh Chavez MD      BMI:          37.7 kg/m^2     Procedure     Type of Study      TTE procedure:ECHO 2D W/DOPPLER/COLOR/CONTRAST. Study Location: Echo Lab  Technical Quality: Adequate visualization     Patient Status: Inpatient     Contrast Medium: Definity.  Amount - 8 ml     BP: 155/86 mmHg     Indications:CVA.      Conclusions      Summary   Normal left ventricular size and systolic function EF 26-45%   Moderate concentric left ventricular hypertrophy with transmitral Doppler   suggesting severe/restrictive [grade 3] diastolic dysfunction   Moderate left atrial bowel sounds normal; no masses,  no organomegaly  Extremities: moderate peripheral edema, chronic venous stasis dermatitis.   Neurologic:  Alert. No dysarthria. Speech is fluent. He is able to answer questions and follow commands. He can name and repeat OK. EOMI, PERRL, VFF. No facial weakness. Limb strength is normal.  TATIANNA and FTN were normal.  No pronator drift. Assessment:   1.         Encephalopathy, no evidence for stroke. ?from hypoglycemia or unwitnessed seizure, improving. I expect continued improvement. 2.         Hypertension  3.         Hyperlipidemia  4.         Diabetes  5.         Chronic atrial fibrillation    Plan:   1. Continue coumadin, statin  2. PT/OT/ST  3. OK with me to go to SNF for rehab     Discharge planning:   Skilled nursing facility    Reviewed treatment plans with the patient and/or family. 25 minutes spent in face to face interaction and coordination of care.      Electronically signed by Jose Mosley MD on 4/16/2021 at 1:34 PM

## 2021-04-16 NOTE — CARE COORDINATION
Left message for pt's mother requesting call back to discuss SNF placement. Electronically signed by Michelle Whelan on 4/16/2021 at 8:30 AM    Spoke with pt's son on phone as well as mother in the room. They are agreeable to referral to [facility].    [facility]  Ph [phone]  Fax [phone]  Electronically signed by Michelle Whelan on 4/16/2021 at 9:40 AM

## 2021-04-16 NOTE — PROGRESS NOTES
Occupational Therapy   Occupational Therapy Initial Assessment  Date: 2021   Patient Name: Frantz Laboy  MRN: [id number]     : 1965    Date of Service: 2021    Discharge Recommendations:  [address], Patient would benefit from continued therapy after discharge(Current D/C plan for SNF.)       Assessment   Performance deficits / Impairments: Decreased safe awareness;Decreased functional mobility ; Decreased ADL status; Decreased cognition;Decreased endurance;Decreased high-level IADLs;Decreased strength;Decreased fine motor control;Decreased coordination  Assessment: Pt's current cognitive and physical performance indicates need for further skilled tx. Pt may benefit from continued therapy to address these deficits. Currently, pt is unsafe to D/C home without full  support. OT Education: Family Education;Orientation;Transfer Training  Patient Education: Pt was unable to demo understanding of education. His mother was receptive to all info. Barriers to Learning: Current AMS/encephalopathy. REQUIRES OT FOLLOW UP: Yes  Activity Tolerance  Activity Tolerance: Treatment limited secondary to decreased cognition;Treatment limited secondary to medical complications (free text)  Activity Tolerance: Medical complication = lab values, therapy withheld higher physical demands. Safety Devices  Safety Devices in place: Yes  Type of devices: Bed alarm in place;Call light within reach; Left in bed;Nurse notified(PCA currently in room for pt safety)           Patient Diagnosis(es): The primary encounter diagnosis was Stroke-like symptoms. A diagnosis of Altered mental status, unspecified altered mental status type was also pertinent to this visit.      has a past medical history of Acalculous cholecystitis, Allergic rhinitis, cause unspecified, Atrial fibrillation, chronic ([address] Charcot's joint of right foot, Chicken pox, Closed supracondylar fracture of elbow, History of MRSA infection, HTN perform tasks. Functional Mobility  Functional Mobility Comments: Not attempted due to current Lab/INR values. Toilet Transfers  Toilet Transfers Comments: Did not attempt ambulation/standing balance due to INR values; pt has penn cath. ADL  Feeding: Minimal assistance  Grooming: Minimal assistance; Moderate assistance  UE Bathing: Moderate assistance  LE Bathing: Maximum assistance  UE Dressing: Minimal assistance; Moderate assistance  LE Dressing: Maximum assistance  Toileting: Maximum assistance  Additional Comments: Pt's ADL scoring not fully indicative of physical capability. Major limiting factor is cognition. Coordination  Movements Are Fluid And Coordinated: No  Coordination and Movement description: Fine motor impairments;Decreased speed;Decreased accuracy; Right UE;Left UE     Bed mobility  Rolling to Left: Stand by assistance  Rolling to Right: Stand by assistance  Supine to Sit: Minimal assistance  Sit to Supine: Stand by assistance  Scooting: Maximal assistance;2 Person assistance           04/16/21 1029   Cognition   Overall Cognitive Status Exceptions   Arousal/Alertness Inconsistent responses to stimuli   Following Commands Inconsistently follows commands; Follows one step commands with increased time   Attention Span Attends with cues to redirect; Difficulty attending to directions   Memory Decreased recall of recent events;Decreased short term memory;Decreased recall of precautions   Safety Judgement Decreased awareness of need for assistance;Decreased awareness of need for safety   Problem Solving Decreased awareness of errors;Assistance required to identify errors made;Assistance required to generate solutions;Assistance required to implement solutions;Assistance required to correct errors made   Insights Decreased awareness of deficits   Initiation Requires cues for all   Sequencing Requires cues for all   Cognition Comment Did perseverate on sexually inappropriate content and required re-direct from mother/therapy. Sensation  Overall Sensation Status: WFL(For BUE.)        LUE AROM (degrees)  LUE AROM : WFL  RUE AROM (degrees)  RUE AROM : WFL  LUE Strength  L Hand General: 5/5  LUE Strength Comment: Grossly 4/5  RUE Strength  R Hand General: 5/5  RUE Strength Comment: Grossly 4/5              Tx Initiated   OT provided re-orientation consistently throughout tx. Physical assist provided during bed mobility and therapeutic activities. Majority of time spent on re-directing pt from perseverative comments/behavior (total time: 30 min). Plan   Plan  Times per week: 3-5  Current Treatment Recommendations: Pain Management, Positioning, Safety Education & Training, Balance Training, Patient/Caregiver Education & Training, Self-Care / ADL, Cognitive/Perceptual Training, Functional Mobility Training, Equipment Evaluation, Education, & procurement, Home Management Training, Endurance Training, Cognitive Reorientation    G-Code     OutComes Score                                                  AM-PAC Score             Goals  Short term goals  Time Frame for Short term goals: 1-2 weeks  Short term goal 1: Complete bed mobility with supervision for bedlevel ADLs. Short term goal 2: Execute 90% of commands during tx sessions with min A. Short term goal 3: Complete UE/LE dressing with min A and PRN AE. Short term goal 4: Complete toileting skills with min A and PRN AE. Long term goals  Long term goal 1: Upgrade as tolerated.        Therapy Time   Individual Concurrent Group Co-treatment   Time In           Time Out           Minutes                   MADISON Lobato Electronically signed by MADISON Lobato on 4/16/2021 at 10:46 AM

## 2021-04-16 NOTE — PROGRESS NOTES
Clinical Pharmacy Note    Warfarin consult follow-up    Recent Labs     04/16/21  0428   INR 3.67*     Recent Labs     04/14/21  1504 04/15/21  0451   HGB 14.3 13.2*   HCT 43.6 40.9*    262       Significant Drug-Drug Interactions:  New warfarin drug-drug interactions: none  Discontinued drug-drug interactions: none    Date INR Warfarin Dose   04/14/21 3.67 Hold   04/15/21   4.59 Hold     04/16/21  3.67 Held                                      Notes:                   Continue to hold warfarin  Daily PT/INR until stable within therapeutic range.      Electronically signed by Dorita Eisenmenger, [address] [place] [address] on 4/16/2021 at 1:22 PM Detail Level: Detailed Quality 110: Preventive Care And Screening: Influenza Immunization: Influenza Immunization not Administered because Patient Refused.

## 2021-04-16 NOTE — PROGRESS NOTES
[place], [place], [address]    DEPARTMENT OF HOSPITALIST MEDICINE      PROGRESS  NOTE:    NOTE: Portions of this note have been copied forward, however, changed to reflect the most current clinical status of this patient. Patient:  Eryn Reed  YOB: 1965  Date of Service: 4/15/2021  MRN: [id number]   Acct: [de-identified]   Primary Care Physician: Sri Rogers MD  Advance Directive: Full Code  Admit Date: 4/14/2021       Hospital Day: 0      CHIEF COMPLAINT:  Chief Complaint   Patient presents with    Altered Mental Status       SUBJECTIVE:  Patient feels a little better today. He is still confused but more interactive with the staff as per family. CUMULATIVE  HOSPITAL  COURSE:    4/15/2021  I saw the patient and spoke with the patient's son and mom at the bedside in detail. He is still confused, but interacting progressively better with the staff and yesterday, but he is not back to his baseline as per family. Blood sugar levels were still persistently low hence he was started on D10 IV fluids yesterday which helped maintain his blood sugar levels above 100. His INR level has jumped up to 4.59 even after holding Coumadin. Spoke with the family in detail that patient needs close monitoring of his meds especially insulin and Coumadin, upon discharge and recommended skilled nursing facility which they are agreeable with.    4/14/2021  HISTORY OF PRESENT ILLNESS:  Eryn Reed is an 60 y.o. male. He is a very pleasantly confused, unfortunate obese male with multiple chronic medical issues who lives alone, his mother lives near by and they talk multiple times a day. She called him at 54 yesterday and he was in his normal state of mind. They talked again at around 8 AM and ana appeared to be very confused. When the mother went to his house, patient she found him passed out in the house. EMT was called who came and evaluated the patient and his blood sugar was 38!   He was given 250 cc of D10 and his blood sugar levels were 98 upon evaluation in the ER. He seemed pleasantly confused, was not back to his baseline as per the mother and his left side feeled weaker as per ER physician. He is on Coumadin and his INR was 3.67 in er. There was a consideration of hemorrhagic stroke, hence CAT scan of the head ordered which ruled out any infarction or hemorrhage. Neurology was consulted and we ordered an MRI of the brain with and without contrast.  I saw and examined the patient at the bedside and spot check blood sugar was 87. He is on high doses of insulin and p.o. diabetic meds at home which I am going to stop. He also complains of urinary retention and was seen in the ER couple days ago and responded well to in and out catheterization which has been ordered today as well. He is being admitted for medical management, close monitoring, neurochecks, neurology evaluation and work-up as per stroke/TIA protocol. REVIEW  OF  SYSTEMS:    Unable to be obtained . .. Patient is pleasantly confused!     PAST MEDICAL HISTORY:  Past Medical History:   Diagnosis Date    Acalculous cholecystitis 12/29/2015    Allergic rhinitis, cause unspecified     Atrial fibrillation, chronic ([facility] [address]     se[facility]ology    Charcot's joint of right foot     Chicken pox     Closed supracondylar fracture of elbow     fall    History of MRSA infection     scalp/facial and on back    HTN (hypertension)     Hyperlipidemia     Impotence of organic origin     MDRO (multiple drug resistant organisms) resistance     Other dyspnea and respiratory abnormality     Other specified erythematous condition(275.89)     Personal history of other infectious and parasitic disease     Salmonella     Type II or unspecified type diabetes mellitus without mention of complication, uncontrolled 1994         PAST SURGICAL HISTORY:  Past Surgical History:   Procedure Laterality Date    CARDIOVERSION  03/2017   [facility] COLONOSCOPY  02/24/2020    Dr Christo Fraire-Melanosis coli throughout the entire colon-AP, 5 yr recall    HUMERUS FRACTURE SURGERY Left 9/17/2020    OPEN REDUCTION INTERNAL FIXATION LEFT SUPRACONDYLAR FRACTURE performed by Kesha Joseph MD at [address]      X 2    TONSILLECTOMY          FAMILY HISTORY:  Family History   Problem Relation Age of Onset    Stroke Maternal Grandmother     Cancer Maternal Grandmother     Stroke Paternal Grandmother     Diabetes Paternal Grandmother     Cancer Father     Heart Disease Father     Lung Cancer Father     Asthma Paternal Grandfather     Heart Disease Paternal Grandfather     Heart Disease Maternal Grandfather     Colon Cancer Neg Hx     Colon Polyps Neg Hx     Esophageal Cancer Neg Hx     Liver Cancer Neg Hx     Liver Disease Neg Hx     Rectal Cancer Neg Hx     Stomach Cancer Neg Hx            OBJECTIVE:  BP (!) 169/95   Pulse 85   Temp 97.9 °F (36.6 °C) (Temporal)   Resp 17   Ht 6' (1.829 m)   Wt 278 lb (126.1 kg)   SpO2 91%   BMI 37.70 kg/m²   I/O this shift: In: 726.7 [I.V.:726.7]  Out: 1200 [Urine:1200]    PHYSICAL  EXAMINATION:    MATTY:   Patient is pleasantly confused, appears tired and fatigued   Head/Eyes:  Normocephalic, atraumatic, EOMI and PERRLA bilaterally   Respiratory:   Bilateral decreased air entry in both lung fields, mild B/L crackles, symmetric expansion of chest   Cardiovascular:  Regular rate and rhythm, S1+S2+0, no murmurs/rubs   Abdomen:   Soft, non-tender, bowel sounds +ve, no organomegaly   Extremities: Moves all, decreased range of motion, no edema   Neurologic:  Unable to be obtained . .. Patient is pleasantly confused! Psychiatric:  Unable to be obtained . .. Patient is pleasantly confused!        CURRENT MEDICATIONS:  Scheduled:   docusate sodium  100 mg Oral BID    tamsulosin  0.8 mg Oral Daily    atorvastatin  20 mg Oral Daily    Vitamin D  1,000 Units Oral Daily    metoprolol tartrate 25 mg Oral BID    losartan  100 mg Oral Daily    sodium chloride flush  10 mL Intravenous 2 times per day    aspirin  81 mg Oral Daily    Or    aspirin  300 mg Rectal Daily    warfarin (COUMADIN) daily dosing (placeholder)   Other RX Placeholder        PRN:  glucose, 15 g, PRN  dextrose, 12.5 g, PRN  glucagon (rDNA), 1 mg, PRN  dextrose, 100 mL/hr, PRN  diphenhydrAMINE, 25 mg, Q6H PRN  sodium chloride flush, 10 mL, PRN  sodium chloride, 25 mL, PRN  acetaminophen, 650 mg, Q4H PRN    Or  acetaminophen, 650 mg, Q4H PRN  promethazine, 12.5 mg, Q6H PRN    Or  ondansetron, 4 mg, Q6H PRN  polyethylene glycol, 17 g, Daily PRN        Infusions:   dextrose      dextrose 100 mL/hr at 04/15/21 1829    sodium chloride         Laboratory Data:  Recent Labs     04/14/21  1504 04/15/21  0451   WBC 10.7 8.7   HGB 14.3 13.2*    262     Recent Labs     04/14/21  1504 04/15/21  0451    136   K 4.5 4.2   CL 99 100   CO2 26 27   BUN 14 18   CREATININE 0.8 1.0   GLUCOSE 88 85     Recent Labs     04/14/21  1504 04/15/21  0451   AST 52* 47*   ALT 49* 44*   BILITOT 1.6* 1.2   ALKPHOS 285* 267*     Troponin T:   Recent Labs     04/14/21  1504   TROPONINI 0.02     Pro-BNP: No results for input(s): BNP in the last 72 hours. INR:   Recent Labs     04/14/21  1504 04/15/21  1324   INR 3.67* 4.59*     UA:  Recent Labs     04/14/21  1523   COLORU YELLOW   PHUR 7.0   WBCUA 1   RBCUA 25*   BACTERIA NEGATIVE*   CLARITYU Clear   SPECGRAV 1.014   LEUKOCYTESUR Negative   UROBILINOGEN 1.0   BILIRUBINUR Negative   BLOODU MODERATE*   GLUCOSEU Negative     A1C:   Recent Labs     04/15/21  0451   LABA1C 9.0*     ABG:No results for input(s): PHART, PBC4OIZ, PO2ART, GUL8WEL, BEART, HGBAE, S3SIFFJM, CARBOXHGBART in the last 72 hours. Imaging:    Ct Head Wo Contrast    Result Date: 4/14/2021  No acute intracranial abnormality. Chronic ischemic and atrophic changes.  Signed by Dr Sofiya Bird on 4/14/2021 3:02 PM    Xr Chest Portable    Result Date: 4/14/2021  1. Hypoventilation with vascular crowding. 2. No focal infiltrate or effusion. Signed by Dr Harper Calderon on 4/14/2021 3:29 PM    Cta Neck W Contrast    Result Date: 4/14/2021  1. Minimal plaque in the carotid bifurcations bilaterally. No stenosis. 2. Vertebral arteries patent with no plaque or stenosis. 3. Possible lymphadenopathy in the mediastinum. 4. The study is degraded by motion artifact. Results called to Dr. Benny Carrillo at 4:01 PM. Signed by Dr Harper Calderon on 4/14/2021 4:04 PM    Cta Head W Contrast    Addendum Date: 4/14/2021    ADDENDUM: After further discussion with Dr. Benny Carrillo, there may be relative paucity of right middle cerebral artery branches in the sylvian fissure region although a direct occlusion is not visualized. MRI is suggested to further evaluate for right hemispheric stroke. Signed by Dr Harper Calderon on 4/14/2021 4:06 PM    Result Date: 4/14/2021  1. No major branch occlusion or aneurysm. 2. Minimal plaque in the cavernous carotid on the left that does not cause any flow limiting stenosis. Results called to Dr. Benny Carrillo at 3:54 PM. Signed by Dr Harper Calderon on 4/14/2021 3:56 PM ... ADDENDUM #1 . .. Mri Brain W Wo Contrast    Result Date: 4/14/2021  Impression: 1. There is no evidence of restriction of diffusion to suggest acute ischemia or infarct. 2. Mild atrophy. Moderate small vessel ischemic changes are noted involving the corona radiata and centrum semiovale bilaterally. 3. There is no mass effect or shift of the midline. No evidence of acute infarct or hemorrhage. No foci of abnormal contrast enhancement are demonstrated.  Signed by Dr Torie Govea on 4/14/2021 7:45 PM       ASSESSMENT & PLAN:    Principal Problem:    Stroke-like symptoms  Active Problems:    HTN (hypertension)    Hyperlipidemia    Fatigue    Type 2 diabetes mellitus with diabetic polyneuropathy, with long-term current use of insulin (HCC)    Class 2 obesity due to excess calories in any questions regarding the content within the note please do not hesitate to contact me. .. Thanks.

## 2021-04-16 NOTE — PROGRESS NOTES
[place], [place], [address]    DEPARTMENT OF HOSPITALIST MEDICINE      PROGRESS  NOTE:    NOTE: Portions of this note have been copied forward, however, changed to reflect the most current clinical status of this patient. Patient:  Benita Mohamud  YOB: 1965  Date of Service: 4/16/2021  MRN: [id number]   Acct: [de-identified]   Primary Care Physician: Tasha Lopez MD  Advance Directive: Full Code  Admit Date: 4/14/2021       Hospital Day: 0      CHIEF COMPLAINT:  Chief Complaint   Patient presents with    Altered Mental Status       SUBJECTIVE:  Patient is continuing to feel better today. He is interacting well with staff    [address]:    4/16/2021  Patient's mentation is slowly improving. Neurology evaluated the patient and ruled out acute stroke. Patient altered mental status may be secondary to hypoglycemia and/or seizure disorder. He is being monitored closely and his blood sugar level is improving. D10 infusion was held as blood sugar level was 160 this morning. His INR level is still elevated at 3.67. I spoke with the medical director, Dr. Joanna Ribeiro, at [facility] who accepted the patient and requested to transfer the patient to their facility on Monday, 4/19/2021.    4/15/2021  I saw the patient and spoke with the patient's son and mom at the bedside in detail. He is still confused, but interacting progressively better with the staff and yesterday, but he is not back to his baseline as per family. Blood sugar levels were still persistently low hence he was started on D10 IV fluids yesterday which helped maintain his blood sugar levels above 100. His INR level has jumped up to 4.59 even after holding Coumadin.   Spoke with the family in detail that patient needs close monitoring of his meds especially insulin and Coumadin, upon discharge and recommended skilled nursing facility which they are agreeable with.    4/14/2021  HISTORY OF PRESENT ILLNESS:  Adilson Whelan is an 60 y.o. male. He is a very pleasantly confused, unfortunate obese male with multiple chronic medical issues who lives alone, his mother lives near by and they talk multiple times a day. She called him at 54 yesterday and he was in his normal state of mind. They talked again at around 8 AM and ana appeared to be very confused. When the mother went to his house, patient she found him passed out in the house. EMT was called who came and evaluated the patient and his blood sugar was 38! He was given 250 cc of D10 and his blood sugar levels were 98 upon evaluation in the ER. He seemed pleasantly confused, was not back to his baseline as per the mother and his left side feeled weaker as per ER physician. He is on Coumadin and his INR was 3.67 in er. There was a consideration of hemorrhagic stroke, hence CAT scan of the head ordered which ruled out any infarction or hemorrhage. Neurology was consulted and we ordered an MRI of the brain with and without contrast.  I saw and examined the patient at the bedside and spot check blood sugar was 87. He is on high doses of insulin and p.o. diabetic meds at home which I am going to stop. He also complains of urinary retention and was seen in the ER couple days ago and responded well to in and out catheterization which has been ordered today as well. He is being admitted for medical management, close monitoring, neurochecks, neurology evaluation and work-up as per stroke/TIA protocol. REVIEW  OF  SYSTEMS:    Unable to be obtained . .. Patient is pleasantly confused!     PAST MEDICAL HISTORY:  Past Medical History:   Diagnosis Date    Acalculous cholecystitis 12/29/2015    Allergic rhinitis, cause unspecified     Atrial fibrillation, chronic ([facility] [address]     sees [facility]ology    Charcot's joint of right foot     Chicken pox     Closed supracondylar fracture of elbow     fall    History of MRSA infection     scalp/facial and on back    HTN (hypertension)     Hyperlipidemia     Impotence of organic origin     MDRO (multiple drug resistant organisms) resistance     Other dyspnea and respiratory abnormality     Other specified erythematous condition(695.89)     Personal history of other infectious and parasitic disease     Salmonella     Type II or unspecified type diabetes mellitus without mention of complication, uncontrolled 1994         PAST SURGICAL HISTORY:  Past Surgical History:   Procedure Laterality Date    CARDIOVERSION  03/2017    COLONOSCOPY  02/24/2020    Dr Jayme Fraire-Melanosis coli throughout the entire colon-AP, 5 yr recall    HUMERUS FRACTURE SURGERY Left 9/17/2020    OPEN REDUCTION INTERNAL FIXATION LEFT SUPRACONDYLAR FRACTURE performed by Lida Gonzalez MD at [address] [place] [address] MALIGNANT SKIN LESION EXCISION      X 2    TONSILLECTOMY          FAMILY HISTORY:  Family History   Problem Relation Age of Onset    Stroke Maternal Grandmother     Cancer Maternal Grandmother     Stroke Paternal Grandmother     Diabetes Paternal Grandmother     Cancer Father     Heart Disease Father     Lung Cancer Father     Asthma Paternal Grandfather     Heart Disease Paternal Grandfather     Heart Disease Maternal Grandfather     Colon Cancer Neg Hx     Colon Polyps Neg Hx     Esophageal Cancer Neg Hx     Liver Cancer Neg Hx     Liver Disease Neg Hx     Rectal Cancer Neg Hx     Stomach Cancer Neg Hx            OBJECTIVE:  BP (!) 155/97   Pulse 74   Temp 96.2 °F (35.7 °C)   Resp 16   Ht 6' (1.829 m)   Wt 278 lb (126.1 kg)   SpO2 97%   BMI 37.70 kg/m²   I/O this shift:   In: 0   Out: 500 [Urine:500]    PHYSICAL  EXAMINATION:    MATTY:   Patient is pleasantly confused, appears tired and fatigued   Head/Eyes:  Normocephalic, atraumatic, EOMI and PERRLA bilaterally   Respiratory:   Bilateral decreased air entry in both lung fields, mild B/L crackles, symmetric expansion of chest   Cardiovascular:  Regular rate and INR 3.67* 4.59* 3.67*     UA:  Recent Labs     04/14/21  1523   COLORU YELLOW   PHUR 7.0   WBCUA 1   RBCUA 25*   BACTERIA NEGATIVE*   CLARITYU Clear   SPECGRAV 1.014   LEUKOCYTESUR Negative   UROBILINOGEN 1.0   BILIRUBINUR Negative   BLOODU MODERATE*   GLUCOSEU Negative     A1C:   Recent Labs     04/15/21  0451   LABA1C 9.0*     ABG:No results for input(s): PHART, MHH9UZB, PO2ART, MYG4PLN, BEART, HGBAE, D3UTARTL, CARBOXHGBART in the last 72 hours. Imaging:    Ct Head Wo Contrast    Result Date: 4/14/2021  No acute intracranial abnormality. Chronic ischemic and atrophic changes. Signed by Dr Vito Cronin on 4/14/2021 3:02 PM    Xr Chest Portable    Result Date: 4/14/2021  1. Hypoventilation with vascular crowding. 2. No focal infiltrate or effusion. Signed by Dr Briseida Ricci on 4/14/2021 3:29 PM    Cta Neck W Contrast    Result Date: 4/14/2021  1. Minimal plaque in the carotid bifurcations bilaterally. No stenosis. 2. Vertebral arteries patent with no plaque or stenosis. 3. Possible lymphadenopathy in the mediastinum. 4. The study is degraded by motion artifact. Results called to Dr. Kristine Correa at 4:01 PM. Signed by Dr Briseida Ricci on 4/14/2021 4:04 PM    Cta Head W Contrast    Addendum Date: 4/14/2021    ADDENDUM: After further discussion with Dr. Kristine Correa, there may be relative paucity of right middle cerebral artery branches in the sylvian fissure region although a direct occlusion is not visualized. MRI is suggested to further evaluate for right hemispheric stroke. Signed by Dr Briseida Ricci on 4/14/2021 4:06 PM    Result Date: 4/14/2021  1. No major branch occlusion or aneurysm. 2. Minimal plaque in the cavernous carotid on the left that does not cause any flow limiting stenosis. Results called to Dr. Kristine Correa at 3:54 PM. Signed by Dr Briseida Ricci on 4/14/2021 3:56 PM ... ADDENDUM #1 . .. Mri Brain W Wo Contrast    Result Date: 4/14/2021  Impression: 1.  There is no evidence of restriction of diffusion to suggest acute ischemia or infarct. 2. Mild atrophy. Moderate small vessel ischemic changes are noted involving the corona radiata and centrum semiovale bilaterally. 3. There is no mass effect or shift of the midline. No evidence of acute infarct or hemorrhage. No foci of abnormal contrast enhancement are demonstrated. Signed by Dr Nadine Zamorano on 4/14/2021 7:45 PM       ASSESSMENT & PLAN:    Principal Problem:    Stroke-like symptoms  Active Problems:    HTN (hypertension)    Hyperlipidemia    Fatigue    Type 2 diabetes mellitus with diabetic polyneuropathy, with long-term current use of insulin (HCC)    Class 2 obesity due to excess calories in adult    Diabetic ulcer of right midfoot associated with type 2 diabetes mellitus, with fat layer exposed ([address]    Charcot foot due to diabetes mellitus ([address]    Personal history of noncompliance with medical treatment and regimen    Permanent atrial fibrillation (HCC)    (HFpEF) heart failure with preserved ejection fraction (HCC)    Supratherapeutic INR    Left-sided weakness    Hypoglycemic encephalopathy    Altered mental status  Resolved Problems:    * No resolved hospital problems.  *      Continue with current medications  Patient blood sugar level this morning was 160  Hold off on IV D10 infusion  Continue to hold off on insulin and diabetic meds  Continue with Accu-Cheks before every meal and nightly  Patient's INR is still supratherapeutic today at 3.67  Continue to hold off on Coumadin  Recheck INR daily  Daily weights  Strict I's and O's  2D echo was done which shows EF of 45 to 50%  Neurology consult and recommendations reviewed, appreciated and agreed  Patient underwent complete neurological work-up which rules out any stroke  Neurology believes patient may have altered mental status secondary to hypoglycemia and/or seizure disorder  Continue to monitor patient closely on the floor  Seizure precautions ordered  Continue with PT/OT/ST evaluation and recommendations  Spoke with Dr. Amos Mack at [facility] and rehab with accepted the patient to be transferred to their facility on Monday, 4/19/2021  Patient is being advanced to full inpatient status      Repeat labs in a.m. Electrolyte replacement as per protocol. Patient will be monitored very closely on the floor. Further recommendations as per the hospital course. Discharge Plan: Skilled nursing facility on Monday, 4/19/2021      Patient  is on DVT prophylaxis  Current medications reviewed  Lab work reviewed  Radiology/Chest x-ray films reviewed  Treatment recommendations from suspecialities reviewed, appreciated and agreed with  Discussed with the nurse and addressed all questions/concerns  Discussed with Patient and/or Family at the bedside in detail . .. they understand and agree with the management plan. Zurdo Kapadia MD  4/16/2021 2:44 PM      DISCLAIMER: This note was created with electronic voice recognition which does have occasional errors. If you have any questions regarding the content within the note please do not hesitate to contact me. .. Thanks.

## 2021-04-17 LAB
ALBUMIN SERPL-MCNC: 3.3 G/DL (ref 3.5–5.2)
ALP BLD-CCNC: 272 U/L (ref 40–130)
ALT SERPL-CCNC: 40 U/L (ref 5–41)
ANION GAP SERPL CALCULATED.3IONS-SCNC: 9 MMOL/L (ref 7–19)
AST SERPL-CCNC: 33 U/L (ref 5–40)
BILIRUB SERPL-MCNC: 1.5 MG/DL (ref 0.2–1.2)
BUN BLDV-MCNC: 21 MG/DL (ref 6–20)
CALCIUM SERPL-MCNC: 8.6 MG/DL (ref 8.6–10)
CHLORIDE BLD-SCNC: 99 MMOL/L (ref 98–111)
CO2: 26 MMOL/L (ref 22–29)
CREAT SERPL-MCNC: 1 MG/DL (ref 0.5–1.2)
GFR AFRICAN AMERICAN: >59
GFR NON-AFRICAN AMERICAN: >60
GLUCOSE BLD-MCNC: 146 MG/DL (ref 70–99)
GLUCOSE BLD-MCNC: 171 MG/DL (ref 70–99)
GLUCOSE BLD-MCNC: 201 MG/DL (ref 74–109)
GLUCOSE BLD-MCNC: 205 MG/DL (ref 70–99)
INR BLD: 2.12 (ref 0.88–1.18)
PERFORMED ON: ABNORMAL
POTASSIUM SERPL-SCNC: 4.5 MMOL/L (ref 3.5–5)
PROTHROMBIN TIME: 24.1 SEC (ref 12–14.6)
SODIUM BLD-SCNC: 134 MMOL/L (ref 136–145)
TOTAL PROTEIN: 6.3 G/DL (ref 6.6–8.7)

## 2021-04-17 PROCEDURE — 36415 COLL VENOUS BLD VENIPUNCTURE: CPT

## 2021-04-17 PROCEDURE — 82947 ASSAY GLUCOSE BLOOD QUANT: CPT

## 2021-04-17 PROCEDURE — 85610 PROTHROMBIN TIME: CPT

## 2021-04-17 PROCEDURE — 97110 THERAPEUTIC EXERCISES: CPT

## 2021-04-17 PROCEDURE — 80053 COMPREHEN METABOLIC PANEL: CPT

## 2021-04-17 PROCEDURE — 97530 THERAPEUTIC ACTIVITIES: CPT

## 2021-04-17 PROCEDURE — 1210000000 HC MED SURG R&B

## 2021-04-17 PROCEDURE — 6370000000 HC RX 637 (ALT 250 FOR IP): Performed by: HOSPITALIST

## 2021-04-17 PROCEDURE — 2580000003 HC RX 258: Performed by: HOSPITALIST

## 2021-04-17 RX ORDER — MODAFINIL 100 MG/1
50 TABLET ORAL 2 TIMES DAILY
Status: DISCONTINUED | OUTPATIENT
Start: 2021-04-17 | End: 2021-04-19 | Stop reason: HOSPADM

## 2021-04-17 RX ORDER — WARFARIN SODIUM 5 MG/1
5 TABLET ORAL
Status: COMPLETED | OUTPATIENT
Start: 2021-04-17 | End: 2021-04-17

## 2021-04-17 RX ADMIN — METFORMIN HYDROCHLORIDE 500 MG: 500 TABLET ORAL at 18:11

## 2021-04-17 RX ADMIN — ATORVASTATIN CALCIUM 20 MG: 20 TABLET, FILM COATED ORAL at 11:07

## 2021-04-17 RX ADMIN — WARFARIN SODIUM 5 MG: 5 TABLET ORAL at 18:11

## 2021-04-17 RX ADMIN — Medication 1000 UNITS: at 11:06

## 2021-04-17 RX ADMIN — DOCUSATE SODIUM 100 MG: 100 CAPSULE, LIQUID FILLED ORAL at 11:06

## 2021-04-17 RX ADMIN — METOPROLOL TARTRATE 25 MG: 25 TABLET, FILM COATED ORAL at 11:06

## 2021-04-17 RX ADMIN — SODIUM CHLORIDE, PRESERVATIVE FREE 10 ML: 5 INJECTION INTRAVENOUS at 20:48

## 2021-04-17 RX ADMIN — SODIUM CHLORIDE, PRESERVATIVE FREE 10 ML: 5 INJECTION INTRAVENOUS at 11:11

## 2021-04-17 RX ADMIN — ASPIRIN 81 MG: 81 TABLET, COATED ORAL at 11:06

## 2021-04-17 RX ADMIN — METOPROLOL TARTRATE 25 MG: 25 TABLET, FILM COATED ORAL at 20:48

## 2021-04-17 RX ADMIN — TAMSULOSIN HYDROCHLORIDE 0.8 MG: 0.4 CAPSULE ORAL at 11:07

## 2021-04-17 RX ADMIN — LOSARTAN POTASSIUM 100 MG: 100 TABLET, FILM COATED ORAL at 11:07

## 2021-04-17 RX ADMIN — METFORMIN HYDROCHLORIDE 500 MG: 500 TABLET ORAL at 11:07

## 2021-04-17 RX ADMIN — DOCUSATE SODIUM 100 MG: 100 CAPSULE, LIQUID FILLED ORAL at 20:48

## 2021-04-17 NOTE — PROGRESS NOTES
04/17/21 1236   Restrictions/Precautions   Restrictions/Precautions Swallowing - Thickened Liquids; Fall Risk   Required Braces or Orthoses? No   General   Additional Pertinent Hx Charcot joint R foot   Subjective   Subjective Patient in bed agrees to therapy   Pain Screening   Patient Currently in Pain No   Oxygen Therapy   O2 Device None (Room air)   Patient Observation   Observations Patients speech slow and deliberate   Pre Treatment Pain Screening   Pain at present 0   Scale Used Numeric Score   Intervention List Patient able to continue with treatment   Orientation   Overall Orientation Status X   Orientation Level Oriented to person;Oriented to place; Disoriented to time   Bed Mobility   Supine to Sit Stand by assistance   Transfers   Sit to Stand Minimal Assistance   Stand to sit Contact guard assistance   Bed to Chair Contact guard assistance   Comment Patient took small steps to chair with RW   Exercises   Straight Leg Raise 10   Heelslides 10   Hip Flexion 10   Knee Long Arc Quad 10   Knee Active Range of Motion yes   Ankle Pumps 10   Comments EX AROM in sitting and supine   Other Activities   Comment Patient side steped BS about 3' CGA/MIN   Activity Tolerance   Activity Tolerance Patient Tolerated treatment well   Safety Devices   Type of devices Left in chair;Chair alarm in place;Call light within reach   Physical Therapy    Electronically signed by Lulu Camara PTA on 4/17/2021 at 12:44 PM

## 2021-04-17 NOTE — PROGRESS NOTES
[place], [place], [address]    DEPARTMENT OF HOSPITALIST MEDICINE      PROGRESS  NOTE:    NOTE: Portions of this note have been copied forward, however, changed to reflect the most current clinical status of this patient. Patient:  Orlan Bamberger  YOB: 1965  Date of Service: 4/17/2021  MRN: [id number]   Acct: [de-identified]   Primary Care Physician: Kamran Duenas MD  Advance Directive: Full Code  Admit Date: 4/14/2021       Hospital Day: 1      CHIEF COMPLAINT:  Chief Complaint   Patient presents with    Altered Mental Status       SUBJECTIVE:  Patient is interacting well with his son and the staff. Still appears pleasantly confused. I spoke with the son outside the room and answered all the questions that he had. [address]  COURSE:    4/17/2021  Patient is sugar levels finally crossed 200. Started patient on Metformin 500 mg p.o. twice daily, instead of starting his Lantus. NR is therapeutic at 2.12 today. Patient given 5 mg p.o. Coumadin by pharmacy. Spoke with the son in detail about discharge planning. Patient likely has obstructive sleep apnea and needs to sleep studies done as an outpatient upon discharge. I requested respiratory to evaluate him for CPAP tonight. Obstructive sleep apnea may be the main reason why patient is having episodes of daytime sleepiness and altered mentation. 4/16/2021  Patient's mentation is slowly improving. Neurology evaluated the patient and ruled out acute stroke. Patient altered mental status may be secondary to hypoglycemia and/or seizure disorder. He is being monitored closely and his blood sugar level is improving. D10 infusion was held as blood sugar level was 160 this morning. His INR level is still elevated at 3.67.   I spoke with the medical director, Dr. Adam Evans, at [facility] who accepted the patient and requested to transfer the patient to their facility on Monday, 4/19/2021.    4/15/2021  I saw the patient and spoke with the patient's son and mom at the bedside in detail. He is still confused, but interacting progressively better with the staff and yesterday, but he is not back to his baseline as per family. Blood sugar levels were still persistently low hence he was started on D10 IV fluids yesterday which helped maintain his blood sugar levels above 100. His INR level has jumped up to 4.59 even after holding Coumadin. Spoke with the family in detail that patient needs close monitoring of his meds especially insulin and Coumadin, upon discharge and recommended skilled nursing facility which they are agreeable with.    4/14/2021  HISTORY OF PRESENT ILLNESS:  Saul Velazco is an 60 y.o. male. He is a very pleasantly confused, unfortunate obese male with multiple chronic medical issues who lives alone, his mother lives near by and they talk multiple times a day. She called him at 54 yesterday and he was in his normal state of mind. They talked again at around 8 AM and ana appeared to be very confused. When the mother went to his house, patient she found him passed out in the house. EMT was called who came and evaluated the patient and his blood sugar was 38! He was given 250 cc of D10 and his blood sugar levels were 98 upon evaluation in the ER. He seemed pleasantly confused, was not back to his baseline as per the mother and his left side feeled weaker as per ER physician. He is on Coumadin and his INR was 3.67 in er. There was a consideration of hemorrhagic stroke, hence CAT scan of the head ordered which ruled out any infarction or hemorrhage. Neurology was consulted and we ordered an MRI of the brain with and without contrast.  I saw and examined the patient at the bedside and spot check blood sugar was 87. He is on high doses of insulin and p.o. diabetic meds at home which I am going to stop.   He also complains of urinary retention and was seen in the ER couple days ago and responded well to in and out catheterization which has been ordered today as well. He is being admitted for medical management, close monitoring, neurochecks, neurology evaluation and work-up as per stroke/TIA protocol. REVIEW  OF  SYSTEMS:    Unable to be obtained . .. Patient is pleasantly confused!     PAST MEDICAL HISTORY:  Past Medical History:   Diagnosis Date    Acalculous cholecystitis 12/29/2015    Allergic rhinitis, cause unspecified     Atrial fibrillation, chronic ([address]     sees [facility] cardiology    Charcot's joint of right foot     Chicken pox     Closed supracondylar fracture of elbow     fall    History of MRSA infection     scalp/facial and on back    HTN (hypertension)     Hyperlipidemia     Impotence of organic origin     MDRO (multiple drug resistant organisms) resistance     Other dyspnea and respiratory abnormality     Other specified erythematous condition(695.89)     Personal history of other infectious and parasitic disease     Salmonella     Type II or unspecified type diabetes mellitus without mention of complication, uncontrolled 1994         PAST SURGICAL HISTORY:  Past Surgical History:   Procedure Laterality Date    CARDIOVERSION  03/2017    COLONOSCOPY  02/24/2020    Dr Keri Fraire-Melanosis coli throughout the entire colon-AP, 5 yr recall    HUMERUS FRACTURE SURGERY Left 9/17/2020    OPEN REDUCTION INTERNAL FIXATION LEFT SUPRACONDYLAR FRACTURE performed by Verna Valenzuela [facility] at [facility] OR    MALIGNANT SKIN LESION EXCISION      X 2    TONSILLECTOMY          FAMILY HISTORY:  Family History   Problem Relation Age of Onset    Stroke Maternal Grandmother     Cancer Maternal Grandmother     Stroke Paternal Grandmother     Diabetes Paternal Grandmother     Cancer Father     Heart Disease Father     Lung Cancer Father     Asthma Paternal Grandfather     Heart Disease Paternal Grandfather     Heart Disease Maternal Grandfather     Colon Cancer Patient started on Metformin 500 mg p.o. twice daily  Continue with Accu-Cheks before every meal and nightly  Patient's INR is therapeutic today at 2.12  Patient restarted on Coumadin 5 mg p.o. today by pharmacy  Recheck INR daily  Daily weights  Strict I's and O's  2D echo was done which shows EF of 45 to 50%  Neurology consult and recommendations reviewed, appreciated and agreed  Patient underwent complete neurological work-up which rules out any stroke  Neurology believes patient may have altered mental status secondary to hypoglycemia and/or seizure disorder  In speaking with the son, it appears patient has obstructive sleep apnea and daytime sleepiness  Recommended son to take the patient to outpatient pulmonary for sleep studies when he is discharged from the hospital  CPAP ordered through respiratory for the patient at night to help with his sleep and CLAUDE  Provigil 50 mg p.o. twice daily ordered to help with daytime sleepiness  Continue to monitor patient closely on the floor  Seizure precautions ordered  Continue with PT/OT/ST evaluation and recommendations  Spoke with Dr. Rylee Randall at [facility] and rehab with accepted the patient to be transferred to their facility on Monday, 4/19/2021  Patient is being advanced to full inpatient status      Repeat labs in a.m. Electrolyte replacement as per protocol. Patient will be monitored very closely on the floor. Further recommendations as per the hospital course. Discharge Plan: Skilled nursing facility on Monday, 4/19/2021      Patient  is on DVT prophylaxis  Current medications reviewed  Lab work reviewed  Radiology/Chest x-ray films reviewed  Treatment recommendations from suspecialities reviewed, appreciated and agreed with  Discussed with the nurse and addressed all questions/concerns  Discussed with Patient and/or Family at the bedside in detail . .. they understand and agree with the management plan.       Roda Kanner, MD  4/17/2021 6:44 PM      DISCLAIMER: This note was created with electronic voice recognition which does have occasional errors. If you have any questions regarding the content within the note please do not hesitate to contact me. .. Thanks.

## 2021-04-18 LAB
ALBUMIN SERPL-MCNC: 3.5 G/DL (ref 3.5–5.2)
ALP BLD-CCNC: 280 U/L (ref 40–130)
ALT SERPL-CCNC: 37 U/L (ref 5–41)
ANION GAP SERPL CALCULATED.3IONS-SCNC: 10 MMOL/L (ref 7–19)
AST SERPL-CCNC: 28 U/L (ref 5–40)
BILIRUB SERPL-MCNC: 1.4 MG/DL (ref 0.2–1.2)
BUN BLDV-MCNC: 22 MG/DL (ref 6–20)
CALCIUM SERPL-MCNC: 9 MG/DL (ref 8.6–10)
CHLORIDE BLD-SCNC: 100 MMOL/L (ref 98–111)
CO2: 26 MMOL/L (ref 22–29)
CREAT SERPL-MCNC: 1 MG/DL (ref 0.5–1.2)
GFR AFRICAN AMERICAN: >59
GFR NON-AFRICAN AMERICAN: >60
GLUCOSE BLD-MCNC: 167 MG/DL (ref 70–99)
GLUCOSE BLD-MCNC: 197 MG/DL (ref 70–99)
GLUCOSE BLD-MCNC: 205 MG/DL (ref 74–109)
GLUCOSE BLD-MCNC: 207 MG/DL (ref 70–99)
INR BLD: 1.45 (ref 0.88–1.18)
PERFORMED ON: ABNORMAL
POTASSIUM SERPL-SCNC: 4.5 MMOL/L (ref 3.5–5)
PROTHROMBIN TIME: 17.8 SEC (ref 12–14.6)
SODIUM BLD-SCNC: 136 MMOL/L (ref 136–145)
TOTAL PROTEIN: 6.4 G/DL (ref 6.6–8.7)

## 2021-04-18 PROCEDURE — 80053 COMPREHEN METABOLIC PANEL: CPT

## 2021-04-18 PROCEDURE — 85610 PROTHROMBIN TIME: CPT

## 2021-04-18 PROCEDURE — 6370000000 HC RX 637 (ALT 250 FOR IP): Performed by: HOSPITALIST

## 2021-04-18 PROCEDURE — 1210000000 HC MED SURG R&B

## 2021-04-18 PROCEDURE — 94660 CPAP INITIATION&MGMT: CPT

## 2021-04-18 PROCEDURE — 2580000003 HC RX 258: Performed by: HOSPITALIST

## 2021-04-18 PROCEDURE — 51798 US URINE CAPACITY MEASURE: CPT

## 2021-04-18 PROCEDURE — 36415 COLL VENOUS BLD VENIPUNCTURE: CPT

## 2021-04-18 PROCEDURE — 82947 ASSAY GLUCOSE BLOOD QUANT: CPT

## 2021-04-18 PROCEDURE — 51701 INSERT BLADDER CATHETER: CPT

## 2021-04-18 RX ORDER — VITAMIN B COMPLEX
2000 TABLET ORAL DAILY
Status: DISCONTINUED | OUTPATIENT
Start: 2021-04-19 | End: 2021-04-19 | Stop reason: HOSPADM

## 2021-04-18 RX ORDER — GLIPIZIDE 5 MG/1
5 TABLET ORAL
Status: DISCONTINUED | OUTPATIENT
Start: 2021-04-18 | End: 2021-04-19 | Stop reason: HOSPADM

## 2021-04-18 RX ORDER — ERGOCALCIFEROL 1.25 MG/1
50000 CAPSULE ORAL ONCE
Status: COMPLETED | OUTPATIENT
Start: 2021-04-18 | End: 2021-04-18

## 2021-04-18 RX ADMIN — DOCUSATE SODIUM 100 MG: 100 CAPSULE, LIQUID FILLED ORAL at 22:14

## 2021-04-18 RX ADMIN — METFORMIN HYDROCHLORIDE 500 MG: 500 TABLET ORAL at 17:54

## 2021-04-18 RX ADMIN — Medication 1000 UNITS: at 10:34

## 2021-04-18 RX ADMIN — METOPROLOL TARTRATE 25 MG: 25 TABLET, FILM COATED ORAL at 22:14

## 2021-04-18 RX ADMIN — ATORVASTATIN CALCIUM 20 MG: 20 TABLET, FILM COATED ORAL at 10:33

## 2021-04-18 RX ADMIN — POLYETHYLENE GLYCOL 3350 17 G: 17 POWDER, FOR SOLUTION ORAL at 10:34

## 2021-04-18 RX ADMIN — WARFARIN SODIUM 7.5 MG: 2.5 TABLET ORAL at 17:54

## 2021-04-18 RX ADMIN — DOCUSATE SODIUM 100 MG: 100 CAPSULE, LIQUID FILLED ORAL at 10:33

## 2021-04-18 RX ADMIN — MODAFINIL 50 MG: 100 TABLET ORAL at 22:14

## 2021-04-18 RX ADMIN — ERGOCALCIFEROL 50000 UNITS: 1.25 CAPSULE ORAL at 10:33

## 2021-04-18 RX ADMIN — METFORMIN HYDROCHLORIDE 500 MG: 500 TABLET ORAL at 10:33

## 2021-04-18 RX ADMIN — METOPROLOL TARTRATE 25 MG: 25 TABLET, FILM COATED ORAL at 10:33

## 2021-04-18 RX ADMIN — SODIUM CHLORIDE, PRESERVATIVE FREE 10 ML: 5 INJECTION INTRAVENOUS at 10:34

## 2021-04-18 RX ADMIN — LOSARTAN POTASSIUM 100 MG: 100 TABLET, FILM COATED ORAL at 10:33

## 2021-04-18 RX ADMIN — MODAFINIL 50 MG: 100 TABLET ORAL at 10:34

## 2021-04-18 RX ADMIN — GLIPIZIDE 5 MG: 5 TABLET ORAL at 17:54

## 2021-04-18 RX ADMIN — ASPIRIN 81 MG: 81 TABLET, COATED ORAL at 10:33

## 2021-04-18 RX ADMIN — TAMSULOSIN HYDROCHLORIDE 0.8 MG: 0.4 CAPSULE ORAL at 10:33

## 2021-04-18 NOTE — PROGRESS NOTES
Clinical Pharmacy Note    Warfarin consult follow-up    Recent Labs     04/18/21  0427   INR 1.45*     No results for input(s): HGB, HCT, PLT in the last 72 hours. Significant Drug-Drug Interactions:  Current warfarin drug-drug interactions: Aspirin / Metformin  Discontinued drug-drug interactions: None    Date INR Warfarin Dose   04/14/21 3.67 Hold   04/15/21  4.59 Hold    04/16/21 3.67 Held    04/17/21  2.12  5 mg   04/18/21 1.45 7.5 mg                       Notes:  Give Warfarin 7.5 mg po x 1                     Daily PT/INR until stable within therapeutic range.      Electronically signed by Jamila Mackenzie, [address] [place] [address] on 4/18/2021 at 11:36 AM

## 2021-04-18 NOTE — PROGRESS NOTES
[place], [place], [address]    DEPARTMENT OF HOSPITALIST MEDICINE      PROGRESS  NOTE:    NOTE: Portions of this note have been copied forward, however, changed to reflect the most current clinical status of this patient. Patient:  Benita Mohamud  YOB: 1965  Date of Service: 4/18/2021  MRN: [id number]   Acct: [de-identified]   Primary Care Physician: Tasha Lopez MD  Advance Directive: Full Code  Admit Date: 4/14/2021       Hospital Day: 2      CHIEF COMPLAINT:  Chief Complaint   Patient presents with    Altered Mental Status       SUBJECTIVE:  Spoke with the son in detail at the bedside again. He still concerned about his dad short-term memory. Patient refused CPAP yesterday. CUMULATIVE  HOSPITAL  COURSE:    4/18/2021  Patient blood sugar levels are staying above 200. I plan to add glipizide 5 mg p.o. twice daily to Metformin. Hemoglobin A1c is 9%. Advised nurse to remove the Coyle and give him a trial of void. INR is subtherapeutic at 1.45 and pharmacy will give Coumadin 7.5 mg p.o. x1 dose today. 4/17/2021  Patient is sugar levels finally crossed 200. Started patient on Metformin 500 mg p.o. twice daily, instead of starting his Lantus. NR is therapeutic at 2.12 today. Patient given 5 mg p.o. Coumadin by pharmacy. Spoke with the son in detail about discharge planning. Patient likely has obstructive sleep apnea and needs to sleep studies done as an outpatient upon discharge. I requested respiratory to evaluate him for CPAP tonight. Obstructive sleep apnea may be the main reason why patient is having episodes of daytime sleepiness and altered mentation. 4/16/2021  Patient's mentation is slowly improving. Neurology evaluated the patient and ruled out acute stroke. Patient altered mental status may be secondary to hypoglycemia and/or seizure disorder. He is being monitored closely and his blood sugar level is improving.   D10 infusion was held as blood sugar level was 160 this morning. His INR level is still elevated at 3.67. I spoke with the medical director, Dr. Gilma Smith, at [facility] and rehab who accepted the patient and requested to transfer the patient to their facility on Monday, 4/19/2021.    4/15/2021  I saw the patient and spoke with the patient's son and mom at the bedside in detail. He is still confused, but interacting progressively better with the staff and yesterday, but he is not back to his baseline as per family. Blood sugar levels were still persistently low hence he was started on D10 IV fluids yesterday which helped maintain his blood sugar levels above 100. His INR level has jumped up to 4.59 even after holding Coumadin. Spoke with the family in detail that patient needs close monitoring of his meds especially insulin and Coumadin, upon discharge and recommended skilled nursing facility which they are agreeable with.    4/14/2021  HISTORY OF PRESENT ILLNESS:  Saul Velazco is an 60 y.o. male. He is a very pleasantly confused, unfortunate obese male with multiple chronic medical issues who lives alone, his mother lives near by and they talk multiple times a day. She called him at 54 yesterday and he was in his normal state of mind. They talked again at around 8 AM and ana appeared to be very confused. When the mother went to his house, patient she found him passed out in the house. EMT was called who came and evaluated the patient and his blood sugar was 38! He was given 250 cc of D10 and his blood sugar levels were 98 upon evaluation in the ER. He seemed pleasantly confused, was not back to his baseline as per the mother and his left side feeled weaker as per ER physician. He is on Coumadin and his INR was 3.67 in er. There was a consideration of hemorrhagic stroke, hence CAT scan of the head ordered which ruled out any infarction or hemorrhage.   Neurology was consulted and we ordered an MRI of the brain with and without contrast.  I saw and examined the patient at the bedside and spot check blood sugar was 87. He is on high doses of insulin and p.o. diabetic meds at home which I am going to stop. He also complains of urinary retention and was seen in the ER couple days ago and responded well to in and out catheterization which has been ordered today as well. He is being admitted for medical management, close monitoring, neurochecks, neurology evaluation and work-up as per stroke/TIA protocol. REVIEW  OF  SYSTEMS:    Unable to be obtained . .. Patient is pleasantly confused!     PAST MEDICAL HISTORY:  Past Medical History:   Diagnosis Date    Acalculous cholecystitis 12/29/2015    Allergic rhinitis, cause unspecified     Atrial fibrillation, chronic (HCC)     sees [facility] cardiology    Charcot's joint of right foot     Chicken pox     Closed supracondylar fracture of elbow     fall    History of MRSA infection     scalp/facial and on back    HTN (hypertension)     Hyperlipidemia     Impotence of organic origin     MDRO (multiple drug resistant organisms) resistance     Other dyspnea and respiratory abnormality     Other specified erythematous condition(695.89)     Personal history of other infectious and parasitic disease     Salmonella     Type II or unspecified type diabetes mellitus without mention of complication, uncontrolled 1994         PAST SURGICAL HISTORY:  Past Surgical History:   Procedure Laterality Date    CARDIOVERSION  03/2017    COLONOSCOPY  02/24/2020    Dr Simón Fraire-Melanosis coli throughout the entire colon-AP, 5 yr recall    HUMERUS FRACTURE SURGERY Left 9/17/2020    OPEN REDUCTION INTERNAL FIXATION LEFT SUPRACONDYLAR FRACTURE performed by Tg Elam [facility] at [facility] OR    MALIGNANT SKIN LESION EXCISION      X 2    TONSILLECTOMY          FAMILY HISTORY:  Family History   Problem Relation Age of Onset    Stroke Maternal Grandmother     Cancer Maternal Grandmother     Stroke Paternal Q6H PRN  sodium chloride flush, 10 mL, PRN  sodium chloride, 25 mL, PRN  acetaminophen, 650 mg, Q4H PRN    Or  acetaminophen, 650 mg, Q4H PRN  promethazine, 12.5 mg, Q6H PRN    Or  ondansetron, 4 mg, Q6H PRN  polyethylene glycol, 17 g, Daily PRN        Infusions:   dextrose      sodium chloride         Laboratory Data:  No results for input(s): WBC, HGB, PLT in the last 72 hours. Recent Labs     04/16/21 0428 04/17/21 0343 04/18/21 0427    134* 136   K 4.0 4.5 4.5   CL 99 99 100   CO2 27 26 26   BUN 17 21* 22*   CREATININE 1.0 1.0 1.0   GLUCOSE 192* 201* 205*     Recent Labs     04/16/21 0428 04/17/21 0343 04/18/21 0427   AST 39 33 28   ALT 41 40 37   BILITOT 1.4* 1.5* 1.4*   ALKPHOS 249* 272* 280*     Troponin T:   Recent Labs     04/15/21  2136 04/16/21  0428   TROPONINI 0.02 0.03     Pro-BNP: No results for input(s): BNP in the last 72 hours. INR:   Recent Labs     04/16/21 0428 04/17/21 0343 04/18/21 0427   INR 3.67* 2.12* 1.45*     UA:  No results for input(s): NITRITE, COLORU, PHUR, LABCAST, WBCUA, RBCUA, MUCUS, TRICHOMONAS, YEAST, BACTERIA, CLARITYU, SPECGRAV, LEUKOCYTESUR, UROBILINOGEN, BILIRUBINUR, BLOODU, GLUCOSEU, AMORPHOUS in the last 72 hours. Invalid input(s): Mickey Torres  A1C:   No results for input(s): LABA1C in the last 72 hours. ABG:No results for input(s): PHART, DIR0AHL, PO2ART, CRY6AVF, BEART, HGBAE, T4IVWDIK, CARBOXHGBART in the last 72 hours. Imaging:    Ct Head Wo Contrast    Result Date: 4/14/2021  No acute intracranial abnormality. Chronic ischemic and atrophic changes. Signed by Dr Chanelle Sevilla on 4/14/2021 3:02 PM    Xr Chest Portable    Result Date: 4/14/2021  1. Hypoventilation with vascular crowding. 2. No focal infiltrate or effusion. Signed by Dr Ines Lorenzo on 4/14/2021 3:29 PM    Cta Neck W Contrast    Result Date: 4/14/2021  1. Minimal plaque in the carotid bifurcations bilaterally. No stenosis. 2. Vertebral arteries patent with no plaque or stenosis. 3. Possible lymphadenopathy in the mediastinum. 4. The study is degraded by motion artifact. Results called to Dr. Darion Vieira at 4:01 PM. Signed by Dr Mary Connors on 4/14/2021 4:04 PM    Cta Head W Contrast    Addendum Date: 4/14/2021    ADDENDUM: After further discussion with Dr. Darion Vieira, there may be relative paucity of right middle cerebral artery branches in the sylvian fissure region although a direct occlusion is not visualized. MRI is suggested to further evaluate for right hemispheric stroke. Signed by Dr Mary Connors on 4/14/2021 4:06 PM    Result Date: 4/14/2021  1. No major branch occlusion or aneurysm. 2. Minimal plaque in the cavernous carotid on the left that does not cause any flow limiting stenosis. Results called to Dr. Darion Vieira at 3:54 PM. Signed by Dr Mary Connors on 4/14/2021 3:56 PM ... ADDENDUM #1 . .. Mri Brain W Wo Contrast    Result Date: 4/14/2021  Impression: 1. There is no evidence of restriction of diffusion to suggest acute ischemia or infarct. 2. Mild atrophy. Moderate small vessel ischemic changes are noted involving the corona radiata and centrum semiovale bilaterally. 3. There is no mass effect or shift of the midline. No evidence of acute infarct or hemorrhage. No foci of abnormal contrast enhancement are demonstrated.  Signed by Dr Valdez Whyte on 4/14/2021 7:45 PM       ASSESSMENT & PLAN:    Principal Problem:    Hypoglycemic encephalopathy  Active Problems:    HTN (hypertension)    Hyperlipidemia    Fatigue    Type 2 diabetes mellitus with diabetic polyneuropathy, with long-term current use of insulin (HCC)    Class 2 obesity due to excess calories in adult    Diabetic ulcer of right midfoot associated with type 2 diabetes mellitus, with fat layer exposed ([address]    Charcot foot due to diabetes mellitus ([address]    Personal history of noncompliance with medical treatment and regimen    Permanent atrial fibrillation (HCC)    (HFpEF) heart failure with preserved ejection fraction ([address]

## 2021-04-19 VITALS
RESPIRATION RATE: 16 BRPM | HEART RATE: 67 BPM | OXYGEN SATURATION: 97 % | HEIGHT: 72 IN | WEIGHT: 278 LBS | DIASTOLIC BLOOD PRESSURE: 89 MMHG | BODY MASS INDEX: 37.65 KG/M2 | TEMPERATURE: 97.7 F | SYSTOLIC BLOOD PRESSURE: 150 MMHG

## 2021-04-19 PROBLEM — R79.1 SUBTHERAPEUTIC INTERNATIONAL NORMALIZED RATIO (INR): Status: ACTIVE | Noted: 2021-04-19

## 2021-04-19 PROBLEM — R79.1 SUPRATHERAPEUTIC INR: Status: RESOLVED | Noted: 2021-04-14 | Resolved: 2021-04-19

## 2021-04-19 LAB
ALBUMIN SERPL-MCNC: 3.5 G/DL (ref 3.5–5.2)
ALP BLD-CCNC: 285 U/L (ref 40–130)
ALT SERPL-CCNC: 33 U/L (ref 5–41)
ANION GAP SERPL CALCULATED.3IONS-SCNC: 12 MMOL/L (ref 7–19)
AST SERPL-CCNC: 25 U/L (ref 5–40)
BILIRUB SERPL-MCNC: 1.4 MG/DL (ref 0.2–1.2)
BUN BLDV-MCNC: 23 MG/DL (ref 6–20)
CALCIUM SERPL-MCNC: 9.3 MG/DL (ref 8.6–10)
CHLORIDE BLD-SCNC: 100 MMOL/L (ref 98–111)
CO2: 25 MMOL/L (ref 22–29)
CREAT SERPL-MCNC: 1 MG/DL (ref 0.5–1.2)
GFR AFRICAN AMERICAN: >59
GFR NON-AFRICAN AMERICAN: >60
GLUCOSE BLD-MCNC: 199 MG/DL (ref 70–99)
GLUCOSE BLD-MCNC: 199 MG/DL (ref 74–109)
GLUCOSE BLD-MCNC: 206 MG/DL (ref 70–99)
INR BLD: 1.31 (ref 0.88–1.18)
PERFORMED ON: ABNORMAL
PERFORMED ON: ABNORMAL
POTASSIUM SERPL-SCNC: 4.6 MMOL/L (ref 3.5–5)
PROTHROMBIN TIME: 16.3 SEC (ref 12–14.6)
SARS-COV-2, NAAT: NOT DETECTED
SODIUM BLD-SCNC: 137 MMOL/L (ref 136–145)
TOTAL PROTEIN: 6.4 G/DL (ref 6.6–8.7)

## 2021-04-19 PROCEDURE — 80053 COMPREHEN METABOLIC PANEL: CPT

## 2021-04-19 PROCEDURE — 6370000000 HC RX 637 (ALT 250 FOR IP): Performed by: HOSPITALIST

## 2021-04-19 PROCEDURE — 36415 COLL VENOUS BLD VENIPUNCTURE: CPT

## 2021-04-19 PROCEDURE — 85610 PROTHROMBIN TIME: CPT

## 2021-04-19 PROCEDURE — 87635 SARS-COV-2 COVID-19 AMP PRB: CPT

## 2021-04-19 PROCEDURE — 97110 THERAPEUTIC EXERCISES: CPT

## 2021-04-19 PROCEDURE — 51701 INSERT BLADDER CATHETER: CPT

## 2021-04-19 PROCEDURE — 2580000003 HC RX 258: Performed by: HOSPITALIST

## 2021-04-19 PROCEDURE — 97129 THER IVNTJ 1ST 15 MIN: CPT

## 2021-04-19 PROCEDURE — 92526 ORAL FUNCTION THERAPY: CPT

## 2021-04-19 PROCEDURE — 94660 CPAP INITIATION&MGMT: CPT

## 2021-04-19 PROCEDURE — 97530 THERAPEUTIC ACTIVITIES: CPT

## 2021-04-19 PROCEDURE — 97535 SELF CARE MNGMENT TRAINING: CPT

## 2021-04-19 PROCEDURE — 97116 GAIT TRAINING THERAPY: CPT

## 2021-04-19 PROCEDURE — 51798 US URINE CAPACITY MEASURE: CPT

## 2021-04-19 PROCEDURE — 82947 ASSAY GLUCOSE BLOOD QUANT: CPT

## 2021-04-19 RX ORDER — ASPIRIN 81 MG/1
81 TABLET ORAL DAILY
Qty: 30 TABLET | Refills: 0 | Status: SHIPPED | OUTPATIENT
Start: 2021-04-20 | End: 2021-05-11 | Stop reason: ALTCHOICE

## 2021-04-19 RX ORDER — WARFARIN SODIUM 7.5 MG/1
7.5 TABLET ORAL DAILY
Qty: 3 TABLET | Refills: 0 | Status: SHIPPED | OUTPATIENT
Start: 2021-04-20 | End: 2021-07-09

## 2021-04-19 RX ORDER — TAMSULOSIN HYDROCHLORIDE 0.4 MG/1
0.8 CAPSULE ORAL DAILY
Qty: 30 CAPSULE | Refills: 3 | Status: SHIPPED | OUTPATIENT
Start: 2021-04-20 | End: 2021-09-15 | Stop reason: SDUPTHER

## 2021-04-19 RX ORDER — WARFARIN SODIUM 5 MG/1
10 TABLET ORAL
Status: DISCONTINUED | OUTPATIENT
Start: 2021-04-19 | End: 2021-04-19

## 2021-04-19 RX ORDER — GLIPIZIDE 5 MG/1
5 TABLET ORAL
Qty: 60 TABLET | Refills: 0 | Status: SHIPPED | OUTPATIENT
Start: 2021-04-19 | End: 2021-05-11 | Stop reason: ALTCHOICE

## 2021-04-19 RX ADMIN — ATORVASTATIN CALCIUM 20 MG: 20 TABLET, FILM COATED ORAL at 08:35

## 2021-04-19 RX ADMIN — SODIUM CHLORIDE, PRESERVATIVE FREE 10 ML: 5 INJECTION INTRAVENOUS at 08:43

## 2021-04-19 RX ADMIN — GLIPIZIDE 5 MG: 5 TABLET ORAL at 05:42

## 2021-04-19 RX ADMIN — DOCUSATE SODIUM 100 MG: 100 CAPSULE, LIQUID FILLED ORAL at 08:35

## 2021-04-19 RX ADMIN — WARFARIN SODIUM 7.5 MG: 2.5 TABLET ORAL at 14:36

## 2021-04-19 RX ADMIN — MODAFINIL 50 MG: 100 TABLET ORAL at 08:36

## 2021-04-19 RX ADMIN — TAMSULOSIN HYDROCHLORIDE 0.8 MG: 0.4 CAPSULE ORAL at 08:35

## 2021-04-19 RX ADMIN — LOSARTAN POTASSIUM 100 MG: 100 TABLET, FILM COATED ORAL at 08:35

## 2021-04-19 RX ADMIN — METFORMIN HYDROCHLORIDE 500 MG: 500 TABLET ORAL at 08:35

## 2021-04-19 RX ADMIN — METOPROLOL TARTRATE 25 MG: 25 TABLET, FILM COATED ORAL at 08:35

## 2021-04-19 RX ADMIN — ASPIRIN 81 MG: 81 TABLET, COATED ORAL at 08:35

## 2021-04-19 RX ADMIN — Medication 2000 UNITS: at 08:35

## 2021-04-19 NOTE — DISCHARGE SUMMARY
[facility] [place], [address] [place] [address] MEDICINE      DISCHARGE SUMMARY:      PATIENT NAME:  Eryn Reed  :  1965  MRN:  [id number]    Admission Date:   2021  2:39 PM Attending: Eric Hernandez MD   Discharge Date:   2021              PCP: Sri Rogers MD  Length of Stay: 3 days     Chief Complaint on Admission:   Chief Complaint   Patient presents with    Altered Mental Status       Consultants:     IP CONSULT TO PHARMACY  PHARMACY TO CHANGE BASE FLUIDS  IP CONSULT TO NEUROLOGY  IP CONSULT TO PHARMACY  IP CONSULT TO CASE MANAGEMENT       Discharge Problem List:   Principal Problem:    Hypoglycemic encephalopathy  Active Problems:    HTN (hypertension)    Hyperlipidemia    Fatigue    Type 2 diabetes mellitus with diabetic polyneuropathy, with long-term current use of insulin (HCC)    Class 2 obesity due to excess calories in adult    Diabetic ulcer of right midfoot associated with type 2 diabetes mellitus, with fat layer exposed ([address]    Charcot foot due to diabetes mellitus ([address]    Personal history of noncompliance with medical treatment and regimen    Permanent atrial fibrillation (HCC)    (HFpEF) heart failure with preserved ejection fraction (HCC)    Left-sided weakness    Altered mental status    Subtherapeutic international normalized ratio (INR)  Resolved Problems:    Stroke-like symptoms    Supratherapeutic INR    CUMULATIVE  HOSPITAL  COURSE  AND  TREATMENT:    2021  Patient is feeling better today. He is still pleasantly confused but interactive with the staff. His blood sugar levels are staying around 200. I will increase his Metformin to 1000 mg p.o. twice daily and continue with his glipizide. He can be restarted on his Lantus at skilled nursing facility as needed. His INR is still subtherapeutic at 1.31.   I will give him Coumadin 10 mg p.o. x1 dose now continue with 7.5 mg p.o. daily at skilled nursing facility with request to the attending physician to do PT/INR 2-3 times a week and adjust Coumadin dose to maintain patient with target INR of 2-3. He is to follow-up closely with neurology as an outpatient and is to establish with pulmonary to get sleep studies done and be fitted with CPAP. I gave him a few dose of Provigil to help with his daytime sleepiness while admitted. I doubled his Flomax to 0.8 mg p.o. daily and gave him a trial of void yesterday which he passed, he is urinating on his own and his Coyle is now discontinued. 4/18/2021  Patient blood sugar levels are staying above 200. I plan to add glipizide 5 mg p.o. twice daily to Metformin. Hemoglobin A1c is 9%. Advised nurse to remove the Coyle and give him a trial of void. INR is subtherapeutic at 1.45 and pharmacy will give Coumadin 7.5 mg p.o. x1 dose today.     4/17/2021  Patient is sugar levels finally crossed 200. Started patient on Metformin 500 mg p.o. twice daily, instead of starting his Lantus. NR is therapeutic at 2.12 today. Patient given 5 mg p.o. Coumadin by pharmacy. Spoke with the son in detail about discharge planning. Patient likely has obstructive sleep apnea and needs to sleep studies done as an outpatient upon discharge. I requested respiratory to evaluate him for CPAP tonight. Obstructive sleep apnea may be the main reason why patient is having episodes of daytime sleepiness and altered mentation.     4/16/2021  Patient's mentation is slowly improving. Neurology evaluated the patient and ruled out acute stroke. Patient altered mental status may be secondary to hypoglycemia and/or seizure disorder. He is being monitored closely and his blood sugar level is improving. D10 infusion was held as blood sugar level was 160 this morning. His INR level is still elevated at 3.67.   I spoke with the medical director, Dr. Sofia Luna, at [facility] and rehab who accepted the patient and requested to transfer the patient to their facility on Monday, couple days ago and responded well to in and out catheterization which has been ordered today as well. He is being admitted for medical management, close monitoring, neurochecks, neurology evaluation and work-up as per stroke/TIA protocol. OBJECTIVE:  BP (!) 160/94   Pulse 66   Temp 97.5 °F (36.4 °C)   Resp 16   Ht 6' (1.829 m)   Wt 278 lb (126.1 kg)   SpO2 98%   BMI 37.70 kg/m²       Heart: RRR   Lungs: Bilateral fair air entry   Abdomen: Soft, non-tender   Extremities: No edema   Neurologic:  Patient is pleasantly confused   Skin: Warm and dry          Laboratory Data:  No results for input(s): WBC, HGB, PLT in the last 72 hours. Recent Labs     04/17/21 0343 04/18/21 0427 04/19/21  0254   * 136 137   K 4.5 4.5 4.6   CL 99 100 100   CO2 26 26 25   BUN 21* 22* 23*   CREATININE 1.0 1.0 1.0   GLUCOSE 201* 205* 199*     Recent Labs     04/17/21 0343 04/18/21 0427 04/19/21  0254   AST 33 28 25   ALT 40 37 33   BILITOT 1.5* 1.4* 1.4*   ALKPHOS 272* 280* 285*     Troponin T: No results for input(s): TROPONINI in the last 72 hours. Pro-BNP: No results for input(s): BNP in the last 72 hours. INR:   Recent Labs     04/17/21 0343 04/18/21 0427 04/19/21  0254   INR 2.12* 1.45* 1.31*     UA:No results for input(s): NITRITE, COLORU, PHUR, LABCAST, WBCUA, RBCUA, MUCUS, TRICHOMONAS, YEAST, BACTERIA, CLARITYU, SPECGRAV, LEUKOCYTESUR, UROBILINOGEN, BILIRUBINUR, BLOODU, GLUCOSEU, AMORPHOUS in the last 72 hours. Invalid input(s): Browntripp Matt  A1C: No results for input(s): LABA1C in the last 72 hours. ABG:No results for input(s): PHART, APD1KZO, PO2ART, TIT8XZZ, BEART, HGBAE, P1VLPNVY, CARBOXHGBART in the last 72 hours. Impressions of imaging performed in 48 hours before discharge:    No results found.       [place], [address] Medication Instructions PAD:[id number]    Printed on:04/19/21 1341   Medication Information                      aspirin 81 MG EC tablet  Take 1 tablet by mouth daily atorvastatin (LIPITOR) 20 MG tablet  TAKE ONE TABLET DAILY             blood glucose test strips (CONTOUR NEXT TEST) strip  1 each by In Vitro route 4 times daily As needed. diphenhydrAMINE (BENADRYL) 25 MG tablet  Take 25 mg by mouth every 6 hours as needed for Itching             docusate sodium (COLACE) 250 MG capsule  Take 1 capsule by mouth 2 times daily             furosemide (LASIX) 20 MG tablet  TAKE ONE TABLET DAILY AS NEEDED             glipiZIDE (GLUCOTROL) 5 MG tablet  Take 1 tablet by mouth 2 times daily (before meals)             losartan (COZAAR) 100 MG tablet  TAKE ONE TABLET DAILY             metFORMIN (GLUCOPHAGE) 1000 MG tablet  Take 1 tablet by mouth 2 times daily (with meals)             metoprolol tartrate (LOPRESSOR) 25 MG tablet  TAKE ONE TABLET TWICE DAILY             tamsulosin (FLOMAX) 0.4 MG capsule  Take 2 capsules by mouth daily             vitamin D (ERGOCALCIFEROL) 1.25 MG (32588 UT) CAPS capsule  Take 50,000 Units by mouth once a week             warfarin (COUMADIN) 7.5 MG tablet  Take 1 tablet by mouth daily for 3 days                   ISSUES TO BE ADDRESSED AT HOSPITAL FOLLOW-UP VISIT:    Advised patient to follow-up closely with PCP upon discharge for management of chronic medical issues  Please see the detailed discharge directions delivered from earlier today! Condition on Discharge: gradually improving  Discharge Disposition: Skilled Facility    Recommended Follow Up: Devi Bland MD  [address] [facility] [address]    On 5/10/2021  @330    Tasha Lopez MD  [address] [address]  [phone]    In 1 week  [facility] Follow up with PCP upon D/C or when needed.     Followup Appointments Scheduled at Time of Discharge:  Future Appointments   Date Time Provider Chris Camarena   5/10/2021  3:30 PM Devi Bland MD N [facility]   6/1/2021 10:30 AM Mendel Bang MD Maria Alejandra [facility] [place]   8/26/2021 11:30 AM Nataly Dale DO N [facility] [place]   9/15/2021  2:00  [address] , APRN - CNP N [facility] [place]        Discharge Instructions:   Please see the discharge paperwork. Patient was seen at bedside today, and the examination shows improvement since yesterday. Detailed discharge directions delivered to the patient by myself and our nursing staff, who verbalizes understanding and is very happy and satisfied with the plan. Patient has been advised to continue all medications as prescribed and advised, and f/u with PCP within 1 week. Patient is stable from medical standpoint to be discharged. Total time spent during patient evaluation and assessment, discussion with the nurse/family, addressing discharge medications/scripts and coordination of care for safe discharge was in excess of 40 minutes.       Signed Electronically:    Silvia Ley MD  1:41 PM 4/19/2021

## 2021-04-19 NOTE — DISCHARGE INSTR - DIET

## 2021-04-19 NOTE — PROGRESS NOTES
Speech Language Pathology  Facility/Department: [facility] SURG SERVICES  Speech/Language/Cognitive/Swallow Therapy   NAME: Aleta Gupta  : 1965   MRN: [id number]  ADMISSION DATE: 2021  ADMITTING DIAGNOSIS: has Shortness of breath; HTN (hypertension); Hyperlipidemia; Fatigue; Hyponatremia; Type 2 diabetes mellitus with diabetic polyneuropathy, with long-term current use of insulin ([address] Class 2 obesity due to excess calories in adult; Diabetic ulcer of right midfoot associated with type 2 diabetes mellitus, with fat layer exposed ([address] Displaced comminuted supracondylar fracture without intercondylar fracture of left humerus, initial encounter for closed fracture; Left supracondylar humerus fracture, closed, initial encounter; Charcot foot due to diabetes mellitus ([address] Personal history of noncompliance with medical treatment and regimen; Permanent atrial fibrillation ([address] (HFpEF) heart failure with preserved ejection fraction ([address] Supratherapeutic INR; Left-sided weakness; Hypoglycemic encephalopathy; and Altered mental status on their problem list.    Date of Treat: 2021   Evaluating Therapist: NUNO Clayton    Assessment:  Re-assessed patient's speech/language/cognitive functioning. Patient continued to exhibit slow, decreased lingual movements during verbalizations. SLP still ranked functional intelligibility for unfamiliar listeners at 100% in utterances with background noise present. Patient continued to exhibit slow processing, delayed auditory comprehension, delayed utterances with mild perseveration of previously spoken information noted, decreased orientation, impaired short-term memory, and decreased reasoning/problem solving.     Also re-evaluated patient's swallowing function.  Patient exhibited sluggish, inconsistently mildly decreased laryngeal elevation for swallow airway protection. Even so, no outward S/S penetration/aspiration was observed with any regular solid consistency trial or thin H2O trial presented during treatment session this date.     At this time, would trial regular solid consistency and thin liquids. Self-feed. Subjective:  General  Chart Reviewed: Yes  Patient assessed for rehabilitation services?: Yes  Family / Caregiver Present: No     Objective: Auditory Comprehension  Comprehension:  (While delayed, patient demonstrated ability to answer simple yes/no questions regarding immediate environment and current state of being at independent level. While delayed, patient demonstrated ability to follow simple 1 step commands independently. While delayed, patient demonstrated ability to follow simple 2 step commands at independent level.)     Expression  Primary Mode of Expression:  (Confrontation naming, structured responsive speech, and responses in natural conversation were all considered to be delayed with mild perseveration of previously spoken information noted.)     Motor Speech:  (Patient exhibited slowed, decreased lingual movements during verbalizations. SLP still ranked functional intelligibility for unfamiliar listeners at 100% in utterances with background noise present.)     Overall Orientation Status:  (Patient demonstrated ability to verbalize name, birthday, city, state, hospital, and month at independent level. Patient did not verbalize age, address, phone number, ABIODUN, date, or year independently.)     Memory:  (Patient demonstrated increased immediate memory with sequences of unrelated numbers/words set up to 5 items with repetitions provided. Patient demonstrated decreased short-term/working memory with less than 5 minute delay+distractions present.)     Problem Solving:  (Patient exhibited difficulty verbalizing appropriate full solutions to situations that could occur during activities of daily living at independent level.)     Additional Assessment:   Re-assessed patient's swallowing function.  With regular solid consistency trials presented independently, patient exhibited adequate oral prep. Oral transit of regular solid consistency primarily measured 1-2 seconds in length and min oral cavity residue was noted post swallows; residue cleared from the mouth with additional dry swallows. Oral transit of thin H2O trials, presented independently via cup, primarily measured 1 second in length. Laryngeal elevation during swallow initiation was considered to be sluggish and inconsistently mildly decreased for swallow airway protection. Even so, no outward S/S penetration/aspiration was observed with any regular solid consistency trial or thin H2O trial presented during treatment session this date.     At this time, would trial regular solid consistency and thin liquids. Self-feed.     Electronically signed by NUNO Garcia on 4/19/2021 at 1:42 PM

## 2021-04-19 NOTE — PROGRESS NOTES
Clinical Pharmacy Note    Warfarin consult follow-up    Recent Labs     04/19/21  0254   INR 1.31*     No results for input(s): HGB, HCT, PLT in the last 72 hours. Current warfarin drug-drug interactions:   Aspirin: Patients receiving vitamin K antagonists should not take salicylate-containing medicines on an as-needed basis. Nonacetylated salicylates might be safer than aspirin. Acetaminophen (<1.3 g/day for <1 week) may be an acceptable antipyretic and analgesic choice for patients taking vitamin K antagonists; caution appears warranted with higher acetaminophen doses. Aspirin (80 to 325 mg/day) and warfarin are used together, in selected cases and with careful monitoring, for the prevention of cardiovascular events. Metformin: MetFORMIN may diminish the anticoagulant effect of Vitamin K Antagonists. Vitamin K Antagonists may enhance the hypoglycemic effect of MetFORMIN. Date INR Warfarin Dose   04/14/21 3.67 Hold   04/15/21  4.59 Hold    04/16/21 3.67 Held    04/17/21  2.12  5 mg   04/18/21 1.45 7.5 mg   04/19/21  1.31  7.5 mg              Notes:                   Give coumadin 7.5mg x 1 dose tonight. Daily PT/INR until stable within therapeutic range.      LILLIAN BRO, PHARM D, 4/19/2021, 1:04 PM

## 2021-04-19 NOTE — PROGRESS NOTES
Occupational Therapy  Facility/Department: [facility] SURG SERVICES  Daily Treatment Note  NAME: Jorge Alberto Zacarias  : 1965  MRN: [id number]    Date of Service: 2021    Discharge Recommendations:  [address], Patient would benefit from continued therapy after discharge       Assessment   Performance deficits / Impairments: Decreased safe awareness;Decreased functional mobility ; Decreased ADL status; Decreased cognition;Decreased endurance;Decreased high-level IADLs;Decreased strength;Decreased fine motor control;Decreased coordination  Assessment: Pt tolerated tx well. Pt ambulated from bed to door twice with rolling walker. Pt able to don and doff socks with SBA. Pt would benefit from skilled OT services. REQUIRES OT FOLLOW UP: Yes  Activity Tolerance  Activity Tolerance: Patient Tolerated treatment well         Patient Diagnosis(es): The primary encounter diagnosis was Stroke-like symptoms. A diagnosis of Altered mental status, unspecified altered mental status type was also pertinent to this visit. has a past medical history of Acalculous cholecystitis, Allergic rhinitis, cause unspecified, Atrial fibrillation, chronic ([address] Charcot's joint of right foot, Chicken pox, Closed supracondylar fracture of elbow, History of MRSA infection, HTN (hypertension), Hyperlipidemia, Impotence of organic origin, MDRO (multiple drug resistant organisms) resistance, Other dyspnea and respiratory abnormality, Other specified erythematous condition(695.89), Personal history of other infectious and parasitic disease, Salmonella, and Type II or unspecified type diabetes mellitus without mention of complication, uncontrolled. has a past surgical history that includes Tonsillectomy; malignant skin lesion excision; Cardioversion (2017); Colonoscopy (2020); and Humerus fracture surgery (Left, 2020).     Restrictions  Restrictions/Precautions  Restrictions/Precautions: Swallowing - Thickened Liquids, Fall Risk  Required Braces or Orthoses?: No  Subjective   General  Chart Reviewed: Yes  Patient assessed for rehabilitation services?: Yes  Additional Pertinent Hx: DM; HTN; supracondylar elbow fx; charcot's joint in R foot; Afib; chicken pox; MDRO; salmonella; cardioversion; (L) humerus fx ORIF. Family / Caregiver Present: No  Diagnosis: \"stroke-like symptoms;\" encephalopathy; hypoglycemia; AMS. Vital Signs  Patient Currently in Pain: No   Orientation     Objective    ADL  LE Dressing: Stand by assistance(Pt was able to put gripper socks by himself with some encouragement.)  Additional Comments: Pt needed few VC to stay on task to put socks on. Balance  Sitting Balance: Stand by assistance  Standing Balance: Contact guard assistance  Functional Mobility  Functional - Mobility Device: Rolling Walker  Functional Mobility Comments: Pt able to go from bed to door twice with PT. Bed mobility  Supine to Sit: Stand by assistance  Sit to Supine: Stand by assistance  Scooting: Contact guard assistance  Transfers  Sit to stand: Contact guard assistance  Stand to sit: Contact guard assistance                                                                 Plan   Plan  Times per week: 3-5  Current Treatment Recommendations: Pain Management, Positioning, Safety Education & Training, Balance Training, Patient/Caregiver Education & Training, Self-Care / ADL, Cognitive/Perceptual Training, Functional Mobility Training, Equipment Evaluation, Education, & procurement, Home Management Training, Endurance Training, Cognitive Reorientation  G-Code     OutComes Score                                                  AM-PAC Score             Goals  Short term goals  Time Frame for Short term goals: 1-2 weeks  Short term goal 1: Complete bed mobility with supervision for bedlevel ADLs. Short term goal 2: Execute 90% of commands during tx sessions with min A.   Short term goal 3: Complete UE/LE dressing with min A and PRN AE.  Short term goal 4: Complete toileting skills with min A and PRN AE. Long term goals  Long term goal 1: Upgrade as tolerated.        Therapy Time   Individual Concurrent Group Co-treatment   Time In           Time Out           Minutes                   Dannie Santana Electronically signed by Dannie Santana on 4/19/2021 at 9:51 AM

## 2021-04-19 NOTE — PROGRESS NOTES
Patient discharged to [facility]. Paperwork faxed and hard copy given to patient's son April Monae. Patient transported via private vehicle.

## 2021-04-19 NOTE — PROGRESS NOTES
standing LE ther ex. Pt. tolerated mobility well and should attempt to increase gait distance in next session. Would benefit from cont. PT. Prognosis Fair   Decision Making Medium Complexity   Barriers to Learning Cognition   REQUIRES PT FOLLOW UP Yes   Treatment Initiated  bed mobility, ther ex, gait   Discharge Recommendations Continue to assess pending progress; Patient would benefit from continued therapy after discharge   Activity Tolerance   Activity Tolerance Patient Tolerated treatment well   Plan   Times per week 5-7x   Times per day Daily   Plan weeks 2 weeks   Specific instructions for Next Treatment increase gait distance   Current Treatment Recommendations Strengthening;Transfer Training;Balance Training;Gait Training;Functional Mobility Training; Safety Education & Training   Plan Comment cont. PT per POC.    Safety Devices   Type of devices Left in chair;Chair alarm in place;Call light within reach   PT Whiteboard Notes   Therapy Whiteboard RE 4/30     Electronically signed by Harvey Hicks PT on 4/19/2021 at 10:47 AM

## 2021-04-20 ENCOUNTER — TELEPHONE (OUTPATIENT)
Dept: INTERNAL MEDICINE | Age: 56
End: 2021-04-20

## 2021-04-20 NOTE — TELEPHONE ENCOUNTER
SKILLED NURSING FACILITY:   INITIAL CALL POST-HOSPITAL DISCHARGE    SNF: [facility]     PHONE NUMBER: [phone]    THERAPY: PT/OT     ANTICIPATED LENGTH OF STAY: unknown    Per Amadeo Mills, nursing staff at [facility] and rehab, Mr. Goldstein was admitted yesterday for short term rehab. She states he is very pleasant in disposition but is not talking at all. She states he is eating good and seems to be adjusting well. She states she did not get much information on him and wanted to know if he had a stroke. I dug though his chart and neurology ruled out evidence of a stoke. There are no plans for discharge. I will follow Mr. Goldstein for TCM during his admission.

## 2021-04-23 ENCOUNTER — TELEPHONE (OUTPATIENT)
Dept: FAMILY MEDICINE CLINIC | Facility: CLINIC | Age: 56
End: 2021-04-23

## 2021-04-23 ENCOUNTER — ANTICOAGULATION VISIT (OUTPATIENT)
Dept: FAMILY MEDICINE CLINIC | Facility: CLINIC | Age: 56
End: 2021-04-23

## 2021-04-23 LAB — INR PPP: 1.6

## 2021-04-23 NOTE — TELEPHONE ENCOUNTER
EBONIE FROM [facility] CALLED TO RELAY PT-INR RESULTS.    AS FOLLOW:  PT: 19.7  INR: 1.6    HAS GOTTEN warfarin (COUMADIN) 7.5 MG tablet DAILY FOR THE LAST THREE DAYS.     NURSE REQUESTING CALLBACK: [phone]

## 2021-04-27 ENCOUNTER — NURSING HOME (OUTPATIENT)
Dept: FAMILY MEDICINE CLINIC | Facility: CLINIC | Age: 56
End: 2021-04-27

## 2021-04-27 ENCOUNTER — TELEPHONE (OUTPATIENT)
Dept: INTERNAL MEDICINE | Age: 56
End: 2021-04-27

## 2021-04-27 ENCOUNTER — TELEPHONE (OUTPATIENT)
Dept: FAMILY MEDICINE CLINIC | Facility: CLINIC | Age: 56
End: 2021-04-27

## 2021-04-27 ENCOUNTER — ANTICOAGULATION VISIT (OUTPATIENT)
Dept: FAMILY MEDICINE CLINIC | Facility: CLINIC | Age: 56
End: 2021-04-27

## 2021-04-27 DIAGNOSIS — E78.5 HYPERLIPIDEMIA, UNSPECIFIED HYPERLIPIDEMIA TYPE: Primary | ICD-10-CM

## 2021-04-27 DIAGNOSIS — Z79.4 TYPE 2 DIABETES MELLITUS WITH OTHER SPECIFIED COMPLICATION, WITH LONG-TERM CURRENT USE OF INSULIN (HCC): ICD-10-CM

## 2021-04-27 DIAGNOSIS — E11.69 TYPE 2 DIABETES MELLITUS WITH OTHER SPECIFIED COMPLICATION, WITH LONG-TERM CURRENT USE OF INSULIN (HCC): ICD-10-CM

## 2021-04-27 DIAGNOSIS — I48.21 PERMANENT ATRIAL FIBRILLATION (HCC): ICD-10-CM

## 2021-04-27 DIAGNOSIS — I10 ESSENTIAL HYPERTENSION: ICD-10-CM

## 2021-04-27 DIAGNOSIS — Z79.01 ANTICOAGULATED: ICD-10-CM

## 2021-04-27 PROBLEM — E66.09 CLASS 2 OBESITY DUE TO EXCESS CALORIES IN ADULT: Status: ACTIVE | Noted: 2020-06-12

## 2021-04-27 PROBLEM — E11.621 DIABETIC ULCER OF RIGHT MIDFOOT ASSOCIATED WITH TYPE 2 DIABETES MELLITUS, WITH FAT LAYER EXPOSED (HCC): Status: ACTIVE | Noted: 2020-09-14

## 2021-04-27 PROBLEM — E55.9 VITAMIN D DEFICIENCY: Status: ACTIVE | Noted: 2021-01-26

## 2021-04-27 PROBLEM — E11.9 TYPE 2 DIABETES MELLITUS (HCC): Status: ACTIVE | Noted: 2019-03-21

## 2021-04-27 PROBLEM — R26.9 GAIT DIFFICULTY: Status: ACTIVE | Noted: 2021-04-27

## 2021-04-27 PROBLEM — E11.610 CHARCOT FOOT DUE TO DIABETES MELLITUS (HCC): Status: ACTIVE | Noted: 2020-06-26

## 2021-04-27 PROBLEM — Z91.199 PERSONAL HISTORY OF NONCOMPLIANCE WITH MEDICAL TREATMENT AND REGIMEN: Status: ACTIVE | Noted: 2020-06-25

## 2021-04-27 PROBLEM — Z87.39 HISTORY OF WEAKNESS OF EXTREMITY: Status: ACTIVE | Noted: 2021-04-27

## 2021-04-27 PROBLEM — G62.9 PERIPHERAL NEUROPATHY: Status: ACTIVE | Noted: 2021-04-27

## 2021-04-27 PROBLEM — L97.412 DIABETIC ULCER OF RIGHT MIDFOOT ASSOCIATED WITH TYPE 2 DIABETES MELLITUS, WITH FAT LAYER EXPOSED (HCC): Status: ACTIVE | Noted: 2020-09-14

## 2021-04-27 LAB
INR PPP: 1.74
INR PPP: 17.9

## 2021-04-27 PROCEDURE — 99305 1ST NF CARE MODERATE MDM 35: CPT | Performed by: NURSE PRACTITIONER

## 2021-04-27 NOTE — TELEPHONE ENCOUNTER
[facility] [phone]    Per Javid Ballesteros, nursing staff at [facility] and rehab, patient is doing well. Patient continues to participate in therapy. There are no know plans for discharge at this time.

## 2021-04-28 ENCOUNTER — ANTICOAGULATION VISIT (OUTPATIENT)
Dept: FAMILY MEDICINE CLINIC | Facility: CLINIC | Age: 56
End: 2021-04-28

## 2021-04-28 LAB — INR PPP: 1.47

## 2021-04-29 ENCOUNTER — OFFICE VISIT (OUTPATIENT)
Dept: FAMILY MEDICINE CLINIC | Facility: CLINIC | Age: 56
End: 2021-04-29

## 2021-04-29 ENCOUNTER — ANTICOAGULATION VISIT (OUTPATIENT)
Dept: FAMILY MEDICINE CLINIC | Facility: CLINIC | Age: 56
End: 2021-04-29

## 2021-04-29 VITALS
TEMPERATURE: 98 F | DIASTOLIC BLOOD PRESSURE: 90 MMHG | OXYGEN SATURATION: 99 % | HEIGHT: 70 IN | SYSTOLIC BLOOD PRESSURE: 136 MMHG | BODY MASS INDEX: 36.51 KG/M2 | RESPIRATION RATE: 16 BRPM | WEIGHT: 255 LBS | HEART RATE: 81 BPM

## 2021-04-29 DIAGNOSIS — E11.69 TYPE 2 DIABETES MELLITUS WITH OTHER SPECIFIED COMPLICATION, WITH LONG-TERM CURRENT USE OF INSULIN (HCC): ICD-10-CM

## 2021-04-29 DIAGNOSIS — Z87.39 HISTORY OF WEAKNESS OF EXTREMITY: ICD-10-CM

## 2021-04-29 DIAGNOSIS — R26.9 GAIT DIFFICULTY: ICD-10-CM

## 2021-04-29 DIAGNOSIS — E11.621 DIABETIC ULCER OF RIGHT MIDFOOT ASSOCIATED WITH TYPE 2 DIABETES MELLITUS, WITH FAT LAYER EXPOSED (HCC): ICD-10-CM

## 2021-04-29 DIAGNOSIS — G62.9 PERIPHERAL POLYNEUROPATHY: ICD-10-CM

## 2021-04-29 DIAGNOSIS — Z79.01 ANTICOAGULATED: ICD-10-CM

## 2021-04-29 DIAGNOSIS — L97.412 DIABETIC ULCER OF RIGHT MIDFOOT ASSOCIATED WITH TYPE 2 DIABETES MELLITUS, WITH FAT LAYER EXPOSED (HCC): ICD-10-CM

## 2021-04-29 DIAGNOSIS — I48.21 PERMANENT ATRIAL FIBRILLATION (HCC): ICD-10-CM

## 2021-04-29 DIAGNOSIS — I10 ESSENTIAL HYPERTENSION: ICD-10-CM

## 2021-04-29 DIAGNOSIS — E78.2 MIXED HYPERLIPIDEMIA: ICD-10-CM

## 2021-04-29 DIAGNOSIS — Z91.199 PERSONAL HISTORY OF NONCOMPLIANCE WITH MEDICAL TREATMENT AND REGIMEN: ICD-10-CM

## 2021-04-29 DIAGNOSIS — Z79.4 TYPE 2 DIABETES MELLITUS WITH OTHER SPECIFIED COMPLICATION, WITH LONG-TERM CURRENT USE OF INSULIN (HCC): ICD-10-CM

## 2021-04-29 LAB — INR PPP: 1.45

## 2021-04-29 PROCEDURE — 99214 OFFICE O/P EST MOD 30 MIN: CPT | Performed by: FAMILY MEDICINE

## 2021-04-29 RX ORDER — FUROSEMIDE 20 MG/1
TABLET ORAL
COMMUNITY
Start: 2021-04-03

## 2021-04-29 RX ORDER — GLIMEPIRIDE 4 MG/1
TABLET ORAL
COMMUNITY
Start: 2021-04-06 | End: 2021-04-29

## 2021-04-29 RX ORDER — ASPIRIN 81 MG/1
81 TABLET ORAL
COMMUNITY
Start: 2021-04-20 | End: 2021-05-20

## 2021-04-29 RX ORDER — INSULIN DEGLUDEC 200 U/ML
INJECTION, SOLUTION SUBCUTANEOUS
COMMUNITY
Start: 2021-03-18 | End: 2021-04-29

## 2021-04-29 RX ORDER — INSULIN LISPRO 200 [IU]/ML
INJECTION, SOLUTION SUBCUTANEOUS
COMMUNITY
Start: 2021-01-26

## 2021-04-29 RX ORDER — ATORVASTATIN CALCIUM 20 MG/1
TABLET, FILM COATED ORAL
COMMUNITY
Start: 2021-02-22

## 2021-04-29 RX ORDER — TAMSULOSIN HYDROCHLORIDE 0.4 MG/1
CAPSULE ORAL
COMMUNITY
Start: 2021-04-06

## 2021-04-29 RX ORDER — GLIPIZIDE 5 MG/1
TABLET ORAL
COMMUNITY
Start: 2021-04-19

## 2021-04-29 RX ORDER — ERGOCALCIFEROL 1.25 MG/1
50000 CAPSULE ORAL
COMMUNITY
Start: 2021-01-26

## 2021-04-29 NOTE — PROGRESS NOTES
Progress Note      Silvino Mcwilliams, APRN  [address] [address]  Phone: [phone]  Fax: [phone]      PATIENT NAME: Monico Anne                                                                          YOB: 1965            DATE OF SERVICE: 04/27/2021  FACILITY: [facility] [place]     CHIEF COMPLAINT:  Nursing Facility Visit: First nursing facility visit      HISTORY OF PRESENT ILLNESS:   This 55 y.o. male was seen at the nursing facility today for routine rounding review of acute and chronic problems.      He was admitted to the facility on 4/19/2021 after qualifying hospital stay.  At [facility].  Care everywhere notes reviewed.  He was evidently admitted with mental status change found to be hypoglycemic.  He does have underlying essential hypertension, hyperlipidemia, type 2 diabetes with insulin use and he is anticoagulated chronically with Coumadin.  He also has underlying CHF and A. fib.    PAST MEDICAL & SURGICAL HISTORY:   Past Medical History:   Diagnosis Date   • Atrial fibrillation (CMS/HCC)    • Diabetes mellitus (CMS/HCC)    • Diabetic ketoacidosis (CMS/HCC)    • Facial abscess    • Hypertension    • Inadequate anticoagulation       No past surgical history on file.     MEDICATIONS:  Full medication list from facility reviewed this visit.    ALLERGIES:  Allergies   Allergen Reactions   • Penicillins      Childhood allergy     • Neosporin [Neomycin-Bacitracin Zn-Polymyx] Rash       SOCIAL HISTORY:  Social History     Socioeconomic History   • Marital status: Single     Spouse name: Not on file   • Number of children: Not on file   • Years of education: Not on file   • Highest education level: Not on file   Tobacco Use   • Smoking status: Never Smoker   • Smokeless tobacco: Never Used   Substance and Sexual Activity   • Alcohol use: No     FAMILY HISTORY:  No family history on file.  REVIEW OF SYSTEMS:  Review of Systems   Constitutional:  Negative for chills and fever.   HENT: Negative for congestion, sinus pressure and sore throat.    Eyes: Negative for blurred vision, pain and redness.   Respiratory: Negative for cough, chest tightness, shortness of breath and wheezing.    Cardiovascular: Negative for chest pain and palpitations.   Gastrointestinal: Negative for abdominal distention and abdominal pain.   Endocrine: Negative for cold intolerance and heat intolerance.   Genitourinary: Negative for dysuria, flank pain, hematuria and urgency.   Musculoskeletal: Positive for gait problem (weakness). Negative for arthralgias and myalgias.   Skin: Negative for rash, skin lesions and bruise.   Allergic/Immunologic: Negative for environmental allergies and food allergies.   Neurological: Negative for dizziness, speech difficulty and numbness.   Hematological: Negative for adenopathy. Does not bruise/bleed easily.   Psychiatric/Behavioral: Negative for behavioral problems and stress. The patient is not nervous/anxious.      PHYSICAL EXAMINATION:   VITAL SIGNS: Temperature 98.6 pulse 70 respiratory rate 18 blood pressure 124/04-Dpkk-Yljps reviewed  Physical Exam  Vitals and nursing note reviewed.   Constitutional:       General: He is not in acute distress.     Appearance: He is not diaphoretic.   HENT:      Head: Normocephalic and atraumatic.      Nose: Nose normal.   Eyes:      Conjunctiva/sclera: Conjunctivae normal.      Pupils: Pupils are equal, round, and reactive to light.   Neck:      Thyroid: No thyromegaly.      Vascular: No JVD.      Trachea: No tracheal deviation.   Cardiovascular:      Rate and Rhythm: Normal rate and regular rhythm.      Heart sounds: Normal heart sounds. No murmur heard.   No friction rub. No gallop.    Pulmonary:      Effort: Pulmonary effort is normal. No respiratory distress.      Breath sounds: Normal breath sounds. No wheezing.   Abdominal:      General: Bowel sounds are normal.      Palpations: Abdomen is soft.       Tenderness: There is no abdominal tenderness. There is no guarding.   Musculoskeletal:         General: No tenderness or deformity. Normal range of motion.      Cervical back: Normal range of motion and neck supple.      Comments: Using wheelchair for mobility, lower extremity weakness-need for therapy.   Lymphadenopathy:      Cervical: No cervical adenopathy.   Skin:     General: Skin is warm and dry.      Capillary Refill: Capillary refill takes less than 2 seconds.   Neurological:      Mental Status: He is alert and oriented to person, place, and time.   Psychiatric:         Behavior: Behavior normal.         Thought Content: Thought content normal.         Judgment: Judgment normal.       RECORDS REVIEW:   I have reviewed and interpreted any labs, xrays, and diagnostic tests available today.    ASSESSMENT   Diagnoses and all orders for this visit:    1. Hyperlipidemia, unspecified hyperlipidemia type (Primary)    2. Permanent atrial fibrillation (CMS/[facility])    3. Anticoagulated: a fib/coumadin    4. Essential hypertension    5. Type 2 diabetes mellitus with other specified complication, with long-term current use of insulin (CMS/[facility])    6. Nursing home resident    PLAN  1.  Discussed Patient in detail with nursing/staff, addressed all needs today.     2.  Plan of Care Reviewed   3.  PT/OT/SLP Reviewed  4.  Order Changes   -He reports he is not going to stay along so I will forego ordering chronic primary care labs this visit.  I will reevaluate if he is still here next month when I make rounds.  Protimes per Dr. Merrill currently to get him therapeutic.  PT OT speech to evaluate and treat as indicated.  5.  Discharge Plans Reviewed - No immediate plan to discharge.  6.  Advance Directive on file with nursing facility.   7.  POA on file with nursing facility.   8.  Code Status listed: [x]  Full Code   []  DNR     9.  Review labs, xrays, diagnostics in realtime as office receives results.    Note e-Signed by Silvino LANIER  ARLIN Mcwilliams on 04/27/2021 at 13:20 CDT

## 2021-04-30 ENCOUNTER — ANTICOAGULATION VISIT (OUTPATIENT)
Dept: FAMILY MEDICINE CLINIC | Facility: CLINIC | Age: 56
End: 2021-04-30

## 2021-04-30 LAB — INR PPP: 1.45

## 2021-05-06 ENCOUNTER — TELEPHONE (OUTPATIENT)
Dept: FAMILY MEDICINE CLINIC | Facility: CLINIC | Age: 56
End: 2021-05-06

## 2021-05-06 ENCOUNTER — TELEPHONE (OUTPATIENT)
Dept: INTERNAL MEDICINE | Age: 56
End: 2021-05-06

## 2021-05-06 NOTE — TELEPHONE ENCOUNTER
[facility] [phone]    I made multiple attempts to reach nursing staff and was not able to get an update on patient. I will reach out again next week.

## 2021-05-06 NOTE — TELEPHONE ENCOUNTER
[facility] AND WANTED TO LET DR TAFOYA KNOW THAT THE PATIENT IS READY FOR DISCHARGE ON 5/9/21. HE WILL HAVE OT AND PT WITH [facility], SHE IS NEEDING AN ORDER FOR THAT FAXED [phone]. ALSO SHE WANTS TO MAKE SURE THAT MICHELET HAS REVIEWED THE PT'S PT INR SENT OVER THIS AM. :)

## 2021-05-10 ENCOUNTER — OFFICE VISIT (OUTPATIENT)
Dept: NEUROLOGY | Age: 56
End: 2021-05-10
Payer: MEDICARE

## 2021-05-10 VITALS
DIASTOLIC BLOOD PRESSURE: 88 MMHG | HEART RATE: 84 BPM | RESPIRATION RATE: 18 BRPM | BODY MASS INDEX: 36.25 KG/M2 | SYSTOLIC BLOOD PRESSURE: 138 MMHG | WEIGHT: 253.2 LBS | HEIGHT: 70 IN

## 2021-05-10 DIAGNOSIS — E16.2 HYPOGLYCEMIC ENCEPHALOPATHY: Primary | ICD-10-CM

## 2021-05-10 DIAGNOSIS — G47.33 SLEEP APNEA, OBSTRUCTIVE: ICD-10-CM

## 2021-05-10 PROCEDURE — 99213 OFFICE O/P EST LOW 20 MIN: CPT | Performed by: PSYCHIATRY & NEUROLOGY

## 2021-05-10 PROCEDURE — 1111F DSCHRG MED/CURRENT MED MERGE: CPT | Performed by: PSYCHIATRY & NEUROLOGY

## 2021-05-10 PROCEDURE — 3017F COLORECTAL CA SCREEN DOC REV: CPT | Performed by: PSYCHIATRY & NEUROLOGY

## 2021-05-10 PROCEDURE — G8427 DOCREV CUR MEDS BY ELIG CLIN: HCPCS | Performed by: PSYCHIATRY & NEUROLOGY

## 2021-05-10 PROCEDURE — G8417 CALC BMI ABV UP PARAM F/U: HCPCS | Performed by: PSYCHIATRY & NEUROLOGY

## 2021-05-10 PROCEDURE — 1036F TOBACCO NON-USER: CPT | Performed by: PSYCHIATRY & NEUROLOGY

## 2021-05-10 RX ORDER — INSULIN GLARGINE 100 [IU]/ML
12 INJECTION, SOLUTION SUBCUTANEOUS NIGHTLY
COMMUNITY
End: 2021-05-11 | Stop reason: ALTCHOICE

## 2021-05-10 RX ORDER — INSULIN LISPRO 200 [IU]/ML
INJECTION, SOLUTION SUBCUTANEOUS
COMMUNITY
Start: 2021-01-26 | End: 2021-05-11 | Stop reason: SDUPTHER

## 2021-05-10 NOTE — PROGRESS NOTES
[id number] [facility]  [address] [place] [address] [address], [address]  [place], [address]  Phone [phone]  Fax [phone] [address] [facility] Follow Up Encounter  5/10/21 3:57 PM CDT    Information:   Patient Name: yMra Laureano  :   1965  Age:   60 y.o. MRN:   [id number]  Account #:  [de-identified]  Today:  5/10/21    Provider: Angelina Parham M.D. Chief Complaint:   Chief Complaint   Patient presents with    Follow-Up from Hospital       Subjective:   Myra Laureano is a 60 y.o. man with a history of diabetes, chronic atrial fibrillation, and encephalopathy after hypoglycemia who is following up. On  he was found down. His blood sugar was 38. EMS gave him D50 and he did come to but was confused and weak on the left side and had an odd telegraphic speech pattern. I saw him in the ER. He gradually improved. CT head, CTA head and neck, MRI head showed no acute stroke. EEG showed mild slowing. He was noted to possibly have sleep apnea and put on CPAP. He was discharged to SNF for rehab. He just returned home with his mother yesterday. He says he was on CPAP during his stay on rehab. He is just about back to baseline. He has had visual deterioration and has retinal hemorrhages per his ophthalmologist.  He has an appointment with a retinal specialist.  His memory is about back to his baseline. He has no difficulties initiating or maintaining sleep. He is not aware of snoring. He denies daytime drowsiness and daytime sleep.         Objective:     Past Medical History:  Past Medical History:   Diagnosis Date    Acalculous cholecystitis 2015    Allergic rhinitis, cause unspecified     Atrial fibrillation, chronic ([address]     sees [facility] cardiology    Charcot's joint of right foot     Chicken pox     Closed supracondylar fracture of elbow     fall    History of MRSA infection     scalp/facial and on back    HTN (hypertension)     Hyperlipidemia     Impotence of organic origin     MDRO (multiple drug resistant organisms) resistance     Other dyspnea and respiratory abnormality     Other specified erythematous condition(265.89)     Personal history of other infectious and parasitic disease     Salmonella     Type II or unspecified type diabetes mellitus without mention of complication, uncontrolled 1994       Past Surgical History:   Procedure Laterality Date    CARDIOVERSION  03/2017    COLONOSCOPY  02/24/2020    Dr Keri Fraire-Melanosis coli throughout the entire colon-AP, 5 yr recall    HUMERUS FRACTURE SURGERY Left 9/17/2020    OPEN REDUCTION INTERNAL FIXATION LEFT SUPRACONDYLAR FRACTURE performed by Verna Valenzuela MD at [address]      X 2    TONSILLECTOMY         Recent Hospitalizations  · None    Significant Injuries  · None    Habits  Pacheco Goldstein reports that he has never smoked. He has never used smokeless tobacco. He reports that he does not drink alcohol or use drugs. Family History   Problem Relation Age of Onset    Stroke Maternal Grandmother     Cancer Maternal Grandmother     Stroke Paternal Grandmother     Diabetes Paternal Grandmother     Cancer Father     Heart Disease Father     Lung Cancer Father     Asthma Paternal Grandfather     Heart Disease Paternal Grandfather     Heart Disease Maternal Grandfather     Colon Cancer Neg Hx     Colon Polyps Neg Hx     Esophageal Cancer Neg Hx     Liver Cancer Neg Hx     Liver Disease Neg Hx     Rectal Cancer Neg Hx     Stomach Cancer Neg Hx        Social History  Ulysses Duverney is , lives in [place], [place], and is disabled.     Medications:  Current Outpatient Medications   Medication Sig Dispense Refill    insulin lispro (HUMALOG KWIKPEN) 200 UNIT/ML SOPN pen       insulin glargine (LANTUS) 100 UNIT/ML injection vial Inject 12 Units into the skin nightly      aspirin 81 MG EC tablet Take 1 tablet by mouth daily 30 tablet 0    glipiZIDE (GLUCOTROL) 5 MG tablet Take 1 tablet by mouth 2 times daily (before meals) 60 tablet 0    metFORMIN (GLUCOPHAGE) 1000 MG tablet Take 1 tablet by mouth 2 times daily (with meals) 60 tablet 0    tamsulosin (FLOMAX) 0.4 MG capsule Take 2 capsules by mouth daily 30 capsule 3    warfarin (COUMADIN) 7.5 MG tablet Take 1 tablet by mouth daily for 3 days 3 tablet 0    vitamin D (ERGOCALCIFEROL) 1.25 MG (96670 UT) CAPS capsule Take 50,000 Units by mouth once a week      furosemide (LASIX) 20 MG tablet TAKE ONE TABLET DAILY AS NEEDED 30 tablet 1    losartan (COZAAR) 100 MG tablet TAKE ONE TABLET DAILY 90 tablet 3    metoprolol tartrate (LOPRESSOR) 25 MG tablet TAKE ONE TABLET TWICE DAILY 60 tablet 2    docusate sodium (COLACE) 250 MG capsule Take 1 capsule by mouth 2 times daily 60 capsule 0    atorvastatin (LIPITOR) 20 MG tablet TAKE ONE TABLET DAILY (Patient taking differently: Take 20 mg by mouth daily ) 90 tablet 3    diphenhydrAMINE (BENADRYL) 25 MG tablet Take 25 mg by mouth every 6 hours as needed for Itching      blood glucose test strips (CONTOUR NEXT TEST) strip 1 each by In Vitro route 4 times daily As needed. 200 each 3     No current facility-administered medications for this visit. Allergies:   Allergies as of 05/10/2021 - Review Complete 05/10/2021   Allergen Reaction Noted    Pcn [penicillins]  12/14/2015    Neomycin-bacitracin zn-polymyx Rash 12/19/2017    Neosporin [neomycin-polymyxin-gramicidin] Rash 12/15/2014       Review of Systems:  Constitutional: negative for - chills and fever  Eyes:  negative for - visual disturbance and photophobia  HENMT: negative for - headaches and sinus pain  Respiratory: negative for - cough, hemoptysis, and shortness of breath  Cardiovascular: negative for - chest pain and palpitations  Gastrointestinal: negative for - blood in stools, constipation, diarrhea, nausea, and vomiting  Genito-Urinary: negative for - hematuria, urinary frequency, urinary urgency, and urinary retention Normal strength in both upper and lower extremities. Normal muscle tone and bulk. Deep tendon reflexes are absent. Yanez's signs are absent bilaterally. There is no ankle clonus on either side. Sensation:  Sensation is reduced to light touch, temperature, and vibration in distal lower extremities. Coordination:  Rapid alternating movements are normal in both upper extremities. Finger to nose testing is unimpaired bilaterally. Gait:  Normal station and gait. Pertinent Diagnostic Studies:  Hospital notes and tests    Assessment:       ICD-10-CM    1. 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 Sleep apnea, obstructive  G47.33 Baseline Diagnostic Sleep Study     Ambulatory referral to Sleep Medicine   I discussed the above with him. Plan:   1. Orders Placed This Encounter   Procedures    Ambulatory referral to Sleep Medicine     Referral Priority:   Routine     Referral Type:   Consult for Advice and Opinion     Referral Reason:   Specialty Services Required     Number of Visits Requested:   1    Baseline Diagnostic Sleep Study     Standing Status:   Future     Standing Expiration Date:   5/10/2022     Order Specific Question:   Adult or Pediatric     Answer:   Adult Study (>7 Years)     2.  FU per sleep protocol    Electronically signed by Grace Christie MD on 5/10/21

## 2021-05-11 ENCOUNTER — TELEPHONE (OUTPATIENT)
Dept: INTERNAL MEDICINE | Age: 56
End: 2021-05-11

## 2021-05-11 ENCOUNTER — OFFICE VISIT (OUTPATIENT)
Dept: INTERNAL MEDICINE | Age: 56
End: 2021-05-11
Payer: MEDICARE

## 2021-05-11 VITALS
BODY MASS INDEX: 37.16 KG/M2 | HEART RATE: 77 BPM | WEIGHT: 259.6 LBS | DIASTOLIC BLOOD PRESSURE: 80 MMHG | OXYGEN SATURATION: 98 % | HEIGHT: 70 IN | SYSTOLIC BLOOD PRESSURE: 118 MMHG

## 2021-05-11 DIAGNOSIS — R79.1 SUBTHERAPEUTIC INTERNATIONAL NORMALIZED RATIO (INR): ICD-10-CM

## 2021-05-11 DIAGNOSIS — I48.21 PERMANENT ATRIAL FIBRILLATION (HCC): ICD-10-CM

## 2021-05-11 DIAGNOSIS — I10 ESSENTIAL HYPERTENSION: ICD-10-CM

## 2021-05-11 DIAGNOSIS — E78.2 MIXED HYPERLIPIDEMIA: ICD-10-CM

## 2021-05-11 DIAGNOSIS — E11.610 CHARCOT FOOT DUE TO DIABETES MELLITUS (HCC): ICD-10-CM

## 2021-05-11 DIAGNOSIS — Z79.4 TYPE 2 DIABETES MELLITUS WITH DIABETIC POLYNEUROPATHY, WITH LONG-TERM CURRENT USE OF INSULIN (HCC): Chronic | ICD-10-CM

## 2021-05-11 DIAGNOSIS — Z79.4 TYPE 2 DIABETES MELLITUS WITH DIABETIC POLYNEUROPATHY, WITH LONG-TERM CURRENT USE OF INSULIN (HCC): Primary | Chronic | ICD-10-CM

## 2021-05-11 DIAGNOSIS — E11.42 TYPE 2 DIABETES MELLITUS WITH DIABETIC POLYNEUROPATHY, WITH LONG-TERM CURRENT USE OF INSULIN (HCC): Primary | Chronic | ICD-10-CM

## 2021-05-11 DIAGNOSIS — E11.42 TYPE 2 DIABETES MELLITUS WITH DIABETIC POLYNEUROPATHY, WITH LONG-TERM CURRENT USE OF INSULIN (HCC): Chronic | ICD-10-CM

## 2021-05-11 DIAGNOSIS — I50.30 HEART FAILURE WITH PRESERVED EJECTION FRACTION, UNSPECIFIED HF CHRONICITY (HCC): ICD-10-CM

## 2021-05-11 PROBLEM — S42.422A: Status: RESOLVED | Noted: 2020-09-16 | Resolved: 2021-05-11

## 2021-05-11 PROBLEM — S42.412A LEFT SUPRACONDYLAR HUMERUS FRACTURE, CLOSED, INITIAL ENCOUNTER: Status: RESOLVED | Noted: 2020-09-17 | Resolved: 2021-05-11

## 2021-05-11 PROCEDURE — 1111F DSCHRG MED/CURRENT MED MERGE: CPT | Performed by: INTERNAL MEDICINE

## 2021-05-11 PROCEDURE — 99496 TRANSJ CARE MGMT HIGH F2F 7D: CPT | Performed by: INTERNAL MEDICINE

## 2021-05-11 RX ORDER — GLIMEPIRIDE 4 MG/1
4 TABLET ORAL
COMMUNITY
End: 2021-05-11 | Stop reason: ALTCHOICE

## 2021-05-11 RX ORDER — WARFARIN SODIUM 7.5 MG/1
7.5 TABLET ORAL DAILY
COMMUNITY
End: 2021-07-15

## 2021-05-11 RX ORDER — INSULIN DEGLUDEC 200 U/ML
40 INJECTION, SOLUTION SUBCUTANEOUS DAILY
Qty: 5 PEN | Refills: 1 | Status: SHIPPED | OUTPATIENT
Start: 2021-05-11 | End: 2021-06-01

## 2021-05-11 RX ORDER — WARFARIN SODIUM 10 MG/1
10 TABLET ORAL
COMMUNITY
End: 2021-06-09

## 2021-05-11 RX ORDER — INSULIN GLARGINE 100 [IU]/ML
12 INJECTION, SOLUTION SUBCUTANEOUS NIGHTLY
Qty: 1 VIAL | Refills: 1 | Status: CANCELLED | OUTPATIENT
Start: 2021-05-11

## 2021-05-11 RX ORDER — INSULIN LISPRO 200 [IU]/ML
INJECTION, SOLUTION SUBCUTANEOUS
Qty: 3 PEN | Refills: 5 | Status: SHIPPED | OUTPATIENT
Start: 2021-05-11

## 2021-05-11 RX ORDER — INSULIN DEGLUDEC 200 U/ML
40 INJECTION, SOLUTION SUBCUTANEOUS DAILY
Qty: 5 PEN | Refills: 1 | Status: SHIPPED | OUTPATIENT
Start: 2021-05-11 | End: 2021-05-11 | Stop reason: SDUPTHER

## 2021-05-11 ASSESSMENT — ENCOUNTER SYMPTOMS
DIARRHEA: 0
COLOR CHANGE: 0
TROUBLE SWALLOWING: 0
CHEST TIGHTNESS: 0
CONSTIPATION: 0
BACK PAIN: 0
COUGH: 0
SORE THROAT: 0
BLOOD IN STOOL: 0
ABDOMINAL PAIN: 0
SHORTNESS OF BREATH: 0
WHEEZING: 0
STRIDOR: 0

## 2021-05-11 NOTE — TELEPHONE ENCOUNTER
I spoke with patient for TCM follow up, home health was there at the time and come onto the line to let me know his blood sugar is currently greater than 600. She states he is feeling ok right now. I have added him on to see Dr. Carly Westfall for this afternoon, but please advise what to do in the mean time.

## 2021-05-11 NOTE — PROGRESS NOTES
Post-Discharge Transitional Care Management Services or Hospital Follow Up      Petty Mcneil   YOB: 1965    Date of Office Visit:  5/11/2021  Date of Hospital Admission: 4/14/21  Date of Hospital Discharge: 4/19/21  Readmission Risk Score(high >=14%.  Medium >=10%):Readmission Risk Score: 17      Care management risk score Rising risk (score 2-5) and Complex Care (Scores >=6): 10     Non face to face  following discharge, date last encounter closed (first attempt may have been earlier): 5/11/2021 11:10 AM 5/11/2021 11:10 AM    Call initiated 2 business days of discharge: Yes     Patient Active Problem List   Diagnosis    Shortness of breath    HTN (hypertension)    Hyperlipidemia    Fatigue    Hyponatremia    Type 2 diabetes mellitus with diabetic polyneuropathy, with long-term current use of insulin ([facility])    Class 2 obesity due to excess calories in adult    Diabetic ulcer of right midfoot associated with type 2 diabetes mellitus, with fat layer exposed ([address]    Displaced comminuted supracondylar fracture without intercondylar fracture of left humerus, initial encounter for closed fracture    Left supracondylar humerus fracture, closed, initial encounter    Charcot foot due to diabetes mellitus ([address]    Personal history of noncompliance with medical treatment and regimen    Permanent atrial fibrillation ([address]    (HFpEF) heart failure with preserved ejection fraction (HCC)    Left-sided weakness    Hypoglycemic encephalopathy    Altered mental status    Subtherapeutic international normalized ratio (INR)       Allergies   Allergen Reactions    Pcn [Penicillins]     Neomycin-Bacitracin Zn-Polymyx Rash    Neosporin [Neomycin-Polymyxin-Gramicidin] Rash       Medications listed as ordered at the time of discharge from hospital   Silver Goldstein   Home Medication Instructions JESSICA:    Printed on:05/11/21 9659   Medication Information                      aspirin 81 MG EC tablet  Take 1 tablet by mouth daily             atorvastatin (LIPITOR) 20 MG tablet  TAKE ONE TABLET DAILY             blood glucose test strips (CONTOUR NEXT TEST) strip  1 each by In Vitro route 4 times daily As needed.              diphenhydrAMINE (BENADRYL) 25 MG tablet  Take 25 mg by mouth every 6 hours as needed for Itching             docusate sodium (COLACE) 250 MG capsule  Take 1 capsule by mouth 2 times daily             furosemide (LASIX) 20 MG tablet  TAKE ONE TABLET DAILY AS NEEDED             glimepiride (AMARYL) 4 MG tablet  Take 4 mg by mouth every morning (before breakfast)             glipiZIDE (GLUCOTROL) 5 MG tablet  Take 1 tablet by mouth 2 times daily (before meals)             Insulin Degludec (TRESIBA FLEXTOUCH) 200 UNIT/ML SOPN  Inject 40 Units into the skin daily             insulin glargine (LANTUS) 100 UNIT/ML injection vial  Inject 12 Units into the skin nightly             insulin lispro (HUMALOG KWIKPEN) 200 UNIT/ML SOPN pen  Sliding scale 160-179 2 units  180-199 3 units  200-219 4 units  220-239 5 units  240-259 6 units   260-279 7 units  280-299 8 units  300-399 9 units  340-379 10 units  380-420 11 units             Insulin Pen Needle 32G X 4 MM MISC  1 each by Does not apply route daily             losartan (COZAAR) 100 MG tablet  TAKE ONE TABLET DAILY             metFORMIN (GLUCOPHAGE) 1000 MG tablet  Take 1 tablet by mouth 2 times daily (with meals)             metoprolol tartrate (LOPRESSOR) 25 MG tablet  TAKE ONE TABLET TWICE DAILY             tamsulosin (FLOMAX) 0.4 MG capsule  Take 2 capsules by mouth daily             vitamin D (ERGOCALCIFEROL) 1.25 MG (08281 UT) CAPS capsule  Take 50,000 Units by mouth once a week             warfarin (COUMADIN) 10 MG tablet  Take 10 mg by mouth             warfarin (COUMADIN) 7.5 MG tablet  Take 1 tablet by mouth daily for 3 days             warfarin (COUMADIN) 7.5 MG tablet  Take 7.5 mg by mouth daily                   Medications marked \"taking\" at this time  Outpatient Medications Marked as Taking for the 5/11/21 encounter (Office Visit) with Duke Mathias MD   Medication Sig Dispense Refill    glimepiride (AMARYL) 4 MG tablet Take 4 mg by mouth every morning (before breakfast)      warfarin (COUMADIN) 7.5 MG tablet Take 7.5 mg by mouth daily      warfarin (COUMADIN) 10 MG tablet Take 10 mg by mouth      insulin lispro (HUMALOG KWIKPEN) 200 UNIT/ML SOPN pen Sliding scale 160-179 2 units  180-199 3 units  200-219 4 units  220-239 5 units  240-259 6 units   260-279 7 units  280-299 8 units  300-399 9 units  340-379 10 units  380-420 11 units 3 pen 5    Insulin Pen Needle 32G X 4 MM MISC 1 each by Does not apply route daily 100 each 3    [DISCONTINUED] Insulin Degludec (TRESIBA FLEXTOUCH) 200 UNIT/ML SOPN Inject 40 Units into the skin daily 5 pen 1    aspirin 81 MG EC tablet Take 1 tablet by mouth daily 30 tablet 0    glipiZIDE (GLUCOTROL) 5 MG tablet Take 1 tablet by mouth 2 times daily (before meals) 60 tablet 0    metFORMIN (GLUCOPHAGE) 1000 MG tablet Take 1 tablet by mouth 2 times daily (with meals) 60 tablet 0    tamsulosin (FLOMAX) 0.4 MG capsule Take 2 capsules by mouth daily 30 capsule 3    warfarin (COUMADIN) 7.5 MG tablet Take 1 tablet by mouth daily for 3 days (Patient taking differently: Take 5 mg by mouth daily ) 3 tablet 0    vitamin D (ERGOCALCIFEROL) 1.25 MG (30409 UT) CAPS capsule Take 50,000 Units by mouth once a week      furosemide (LASIX) 20 MG tablet TAKE ONE TABLET DAILY AS NEEDED 30 tablet 1    losartan (COZAAR) 100 MG tablet TAKE ONE TABLET DAILY 90 tablet 3    metoprolol tartrate (LOPRESSOR) 25 MG tablet TAKE ONE TABLET TWICE DAILY 60 tablet 2    atorvastatin (LIPITOR) 20 MG tablet TAKE ONE TABLET DAILY (Patient taking differently: Take 20 mg by mouth daily ) 90 tablet 3    diphenhydrAMINE (BENADRYL) 25 MG tablet Take 25 mg by mouth every 6 hours as needed for Itching      blood glucose test strips (CONTOUR NEXT TEST) strip 1 each by In Vitro route 4 times daily As needed. 200 each 3        Medications patient taking as of now reconciled against medications ordered at time of hospital discharge: Yes    Chief Complaint   Patient presents with    Other     follow up from nursing home       HPI  57-year-old male who presents TCM  Disabled male who lives alone  and has financial issues sometimes not able to eat 3 meals a day, recently having retinal bleed impairing his vision  History of a calculus cholecystitis  History of a fall with injury to the left elbow and doing well at this time chronic atrial fibrillation on anticoagulation and rate controlled managed by cardiology denies any palpitations or loss of consciousness chest pain  Diabetes poorly controlled ( ) after he was discharged from the NH with poor instructions on Diabetes, he cannot with draw the insulin and he lives with a mother that has no knowledge of Insulin administration. patient is not compliant to insulin and diet he does not get enough money to get insulin and he does not cover with short acting insulin  Hyperlipidemia compliant with atorvastatin no myalgias  Due for diabetic eye exam but states he cannot afford the ophthalmology bill, seen in [place] has retinal bleed with blurred vision  Currently managed by [facility] wound clinic due to a chronic ulcer in the base of his right foot, this healed but patient has been ambulating too much and and it reopened patient has Charcot foot of both feet due to chronic diabetic neuropathy  Recently admitted for Hypoglycemia, and D/C to Rehab for PT    Inpatient course: Discharge summary reviewed- see chart.     Interval history/Current status:   Patient is presenting today with mother patient says that his vision is blurred I am concerned about the fact that this is an unsafe discharge due to the fact that the mother does not know how to withdraw insulin or administer insulin and Thiago's vision is 04/14/21 278 lb (126.1 kg)     BP Readings from Last 3 Encounters:   05/11/21 118/80   05/10/21 138/88   04/19/21 (!) 150/89       Physical Exam  Vitals signs and nursing note reviewed. Constitutional:       General: He is not in acute distress. Appearance: He is well-developed. HENT:      Head: Normocephalic and atraumatic. Right Ear: External ear normal.      Left Ear: External ear normal.      Nose: Nose normal.   Eyes:      General: No scleral icterus. Conjunctiva/sclera: Conjunctivae normal.      Pupils: Pupils are equal, round, and reactive to light. Neck:      Musculoskeletal: Normal range of motion and neck supple. Thyroid: No thyromegaly. Cardiovascular:      Rate and Rhythm: Normal rate and regular rhythm. Heart sounds: Normal heart sounds. No murmur. Pulmonary:      Effort: Pulmonary effort is normal.      Breath sounds: Normal breath sounds. No wheezing or rales. Abdominal:      General: Bowel sounds are normal. There is no distension. Palpations: Abdomen is soft. There is no mass. Tenderness: There is no abdominal tenderness. There is no rebound. Musculoskeletal: Normal range of motion. General: No tenderness. Lymphadenopathy:      Cervical: No cervical adenopathy. Skin:     General: Skin is warm and dry. Findings: No rash. Neurological:      Mental Status: He is alert and oriented to person, place, and time. Mental status is at baseline. Cranial Nerves: No cranial nerve deficit. Deep Tendon Reflexes: Reflexes normal.   Psychiatric:         Mood and Affect: Mood is depressed. Speech: Speech is delayed. Behavior: Behavior is slowed and withdrawn. Thought Content:  Thought content normal.         Judgment: Judgment normal.             Assessment/Plan:  Problem List        Circulatory    (HFpEF) heart failure with preserved ejection fraction ([facility]      Monitored by specialist- no acute findings meriting change in the plan         HTN (hypertension)      Well-controlled,          Relevant Medications    metoprolol tartrate (LOPRESSOR) 25 MG tablet    Permanent atrial fibrillation (HCC)      Monitored by specialist- no acute findings meriting change in the plan            Endocrine    Charcot foot due to diabetes mellitus ([facility]      Monitored by specialist- no acute findings meriting change in the plan         Relevant Medications    metFORMIN (GLUCOPHAGE) 1000 MG tablet    insulin lispro (HUMALOG KWIKPEN) 200 UNIT/ML SOPN pen    Insulin Degludec (TRESIBA FLEXTOUCH) 200 UNIT/ML SOPN    Type 2 diabetes mellitus with diabetic polyneuropathy, with long-term current use of insulin (HCC) - Primary (Chronic)      Uncontrolled, continue current medications and see below plans     Patient's medications were clarified patient will remain on fingerstick premeal 3 times a day with Humalog coverage as per sliding scale in the nursing home  Jamaal Verona was started at 40 units at nighttime mother and son will communicate to me as far as blood sugar results this is a very risk discharge she due to the patient's failure to see and that mother not being knowledgeable about insulin injections         Relevant Medications    blood glucose test strips (CONTOUR NEXT TEST) strip    metFORMIN (GLUCOPHAGE) 1000 MG tablet    insulin lispro (HUMALOG KWIKPEN) 200 UNIT/ML SOPN pen    Insulin Pen Needle 32G X 4 MM MISC    Insulin Degludec (TRESIBA FLEXTOUCH) 200 UNIT/ML SOPN       Other    Hyperlipidemia      Unclear control, continue current medications, medication adherence emphasized and lifestyle modifications recommended         Relevant Medications    atorvastatin (LIPITOR) 20 MG tablet    metoprolol tartrate (LOPRESSOR) 25 MG tablet    losartan (COZAAR) 100 MG tablet    furosemide (LASIX) 20 MG tablet    warfarin (COUMADIN) 7.5 MG tablet    warfarin (COUMADIN) 7.5 MG tablet    warfarin (COUMADIN) 10 MG tablet    Subtherapeutic international normalized ratio (INR)      Monitored by specialist- no acute findings meriting change in the plan               1. Heart failure with preserved ejection fraction, unspecified HF chronicity ([address]      2. Essential hypertension      3. Type 2 diabetes mellitus with diabetic polyneuropathy, with long-term current use of insulin (HCC)    - insulin lispro (HUMALOG KWIKPEN) 200 UNIT/ML SOPN pen; Sliding scale 160-179 2 units  180-199 3 units  200-219 4 units  220-239 5 units  240-259 6 units   260-279 7 units  280-299 8 units  300-399 9 units  340-379 10 units  380-420 11 units  Dispense: 3 pen; Refill: 5  - Insulin Pen Needle 32G X 4 MM MISC; 1 each by Does not apply route daily  Dispense: 100 each;  Refill: 3        Medical Decision Making: high complexity

## 2021-05-11 NOTE — TELEPHONE ENCOUNTER
[address] [facility] Transitions Initial Follow Up Call    Outreach made within 2 business days of discharge: Yes    Patient: Clement Silva   Patient : 1965  MRN: [id number]    Reason for Admission: Admitted 21 for altered mental status  Discharge Date: 21 from [facility] then admitted to [facility] for short term rehab/therapy. Discharged 21  Discharge Problem List:   Principal Problem:    Hypoglycemic encephalopathy  Active Problems:    HTN (hypertension)    Hyperlipidemia    Fatigue    Type 2 diabetes mellitus with diabetic polyneuropathy, with long-term current use of insulin (HCC)    Class 2 obesity due to excess calories in adult    Diabetic ulcer of right midfoot associated with type 2 diabetes mellitus, with fat layer exposed ([address]    Charcot foot due to diabetes mellitus ([address]    Personal history of noncompliance with medical treatment and regimen    Permanent atrial fibrillation (HCC)    (HFpEF) heart failure with preserved ejection fraction (HCC)    Left-sided weakness    Altered mental status    Subtherapeutic international normalized ratio (INR)  Resolved Problems:    Stroke-like symptoms    Supratherapeutic INR     Spoke with: Shara Mendoza, patient's mother     Discharge department/facility: [facility]    TCM Interactive Patient Contact:  Was patient able to fill all prescriptions: no  Was patient instructed to bring all medications to the follow-up visit: Yes  Is patient taking all medications as directed in the discharge summary? No  Does patient understand their discharge instructions: No  Does patient have questions or concerns that need addressed prior to 7-14 day follow up office visit: no    I spoke with patient's mother, she states he is home and feeling better. She states home health is there now and handed the phone to the home health nurse. The home health nurse states she just checked his blood sugar and it is over 600 now.  I have added him on to see Dr. Josephine Robles this afternoon and went on and sent a message to see what she would like for him to do right now about this. He is feeling ok right now. There are many discrepancies on his medication. He does not have any lantus. He does not have a discharge medication list from [facility] and states he is unsure what he should be taking. He does have humalog and tresiba. The [place] is not on his hospital discharge list at all. I have advised him I am reach out to Dr. Josephine Robles.      Scheduled appointment with PCP within 7-14 days    Follow Up  Future Appointments   Date Time Provider Chris Camarena   5/11/2021  2:30 PM MD ABDULLAHI Buchanan [facility]   6/1/2021 10:30 AM MD ABDULLAHI Buchanan [facility]   8/26/2021 11:30 AM DO MAGNUS Almonte Cardio [facility]   9/15/2021  2:00 PM NOMI Gray - CNP N ABDULLAHI URO [facility]       Cris Pathak MA

## 2021-05-11 NOTE — TELEPHONE ENCOUNTER
This should have been provided to him at discharge from the nursing home. I am unsure how he left so confused and without his medications. I have him scheduled to see you this afternoon. His blood sugar is currently 600. I just wanted to make sure you didn't want him to go on to the ED. I have pended the lantus to be refilled.

## 2021-05-11 NOTE — TELEPHONE ENCOUNTER
I spoke with Valentino Amos at [facility] and have updated his medication list. He should be taking Humalog sliding scale and lantus. He states he does not have Lantus at all.

## 2021-05-11 NOTE — ASSESSMENT & PLAN NOTE
Unclear control, continue current medications, medication adherence emphasized and lifestyle modifications recommended

## 2021-05-11 NOTE — TELEPHONE ENCOUNTER
Patient should be checking FS 3X/day before meals and cover with short acting, he had a sliding scale on discharge  He was discharged with Lantus 12 units nightly    What is my seeing him going to do if he has not gotten his Insulin?

## 2021-05-12 ENCOUNTER — ANTI-COAG VISIT (OUTPATIENT)
Dept: CARDIOLOGY CLINIC | Age: 56
End: 2021-05-12

## 2021-05-12 ENCOUNTER — TELEPHONE (OUTPATIENT)
Dept: INTERNAL MEDICINE | Age: 56
End: 2021-05-12

## 2021-05-12 DIAGNOSIS — Z79.4 TYPE 2 DIABETES MELLITUS WITH DIABETIC POLYNEUROPATHY, WITH LONG-TERM CURRENT USE OF INSULIN (HCC): Primary | ICD-10-CM

## 2021-05-12 DIAGNOSIS — E11.42 TYPE 2 DIABETES MELLITUS WITH DIABETIC POLYNEUROPATHY, WITH LONG-TERM CURRENT USE OF INSULIN (HCC): Primary | ICD-10-CM

## 2021-05-12 RX ORDER — GLUCOSAMINE HCL/CHONDROITIN SU 500-400 MG
CAPSULE ORAL
Qty: 200 STRIP | Refills: 3 | Status: SHIPPED | OUTPATIENT
Start: 2021-05-12 | End: 2021-08-01 | Stop reason: SDUPTHER

## 2021-05-12 RX ORDER — LANCETS 30 GAUGE
1 EACH MISCELLANEOUS
Qty: 300 EACH | Refills: 3 | Status: SHIPPED | OUTPATIENT
Start: 2021-05-12

## 2021-05-12 NOTE — TELEPHONE ENCOUNTER
Spoke to Patients mother Bari Barragan, I called to ask this morninfs blood sugar, her response was he is not up yet.

## 2021-05-12 NOTE — TELEPHONE ENCOUNTER
Rob called requesting a refill of the below medication which has been pended for you:     Requested Prescriptions     Pending Prescriptions Disp Refills    blood glucose monitor kit and supplies 1 kit 0     Sig: Dispense sufficient amount for indicated testing frequency plus additional to accommodate PRN testing needs.  Lancets MISC 300 each 3     Si each by Does not apply route 3 times daily (with meals)    blood glucose monitor strips 200 strip 3     Sig: Test 3 times a day & as needed for symptoms of irregular blood glucose. Dispense sufficient amount for indicated testing frequency plus additional to accommodate PRN testing needs.        Last Appointment Date: 2021  Next Appointment Date: 2021    Allergies   Allergen Reactions    Pcn [Penicillins]     Neomycin-Bacitracin Zn-Polymyx Rash    Neosporin [Neomycin-Polymyxin-Gramicidin] Rash

## 2021-05-12 NOTE — TELEPHONE ENCOUNTER
PT is scheduled with Dr Betzaida Reeves office in [address] [place] [address] in June he has not yet gone to the eye doctor.

## 2021-05-12 NOTE — TELEPHONE ENCOUNTER
----- Message from Faheem Ambriz MD sent at 5/11/2021  5:42 PM CDT -----  Pls call and get the notes from his Diabetic eye exam

## 2021-05-18 ENCOUNTER — OFFICE VISIT (OUTPATIENT)
Dept: INTERNAL MEDICINE | Age: 56
End: 2021-05-18
Payer: MEDICARE

## 2021-05-18 ENCOUNTER — ANTI-COAG VISIT (OUTPATIENT)
Dept: CARDIOLOGY CLINIC | Age: 56
End: 2021-05-18
Payer: MEDICARE

## 2021-05-18 ENCOUNTER — TELEPHONE (OUTPATIENT)
Dept: INTERNAL MEDICINE | Age: 56
End: 2021-05-18

## 2021-05-18 VITALS
HEIGHT: 70 IN | SYSTOLIC BLOOD PRESSURE: 110 MMHG | BODY MASS INDEX: 35.93 KG/M2 | WEIGHT: 251 LBS | OXYGEN SATURATION: 97 % | DIASTOLIC BLOOD PRESSURE: 70 MMHG | HEART RATE: 54 BPM

## 2021-05-18 DIAGNOSIS — E11.42 TYPE 2 DIABETES MELLITUS WITH DIABETIC POLYNEUROPATHY, WITH LONG-TERM CURRENT USE OF INSULIN (HCC): Chronic | ICD-10-CM

## 2021-05-18 DIAGNOSIS — Z79.4 TYPE 2 DIABETES MELLITUS WITH DIABETIC POLYNEUROPATHY, WITH LONG-TERM CURRENT USE OF INSULIN (HCC): Chronic | ICD-10-CM

## 2021-05-18 DIAGNOSIS — I48.21 PERMANENT ATRIAL FIBRILLATION (HCC): Primary | ICD-10-CM

## 2021-05-18 LAB
INTERNATIONAL NORMALIZATION RATIO, POC: 2
PROTHROMBIN TIME, POC: NORMAL

## 2021-05-18 PROCEDURE — 3052F HG A1C>EQUAL 8.0%<EQUAL 9.0%: CPT | Performed by: INTERNAL MEDICINE

## 2021-05-18 PROCEDURE — 3017F COLORECTAL CA SCREEN DOC REV: CPT | Performed by: INTERNAL MEDICINE

## 2021-05-18 PROCEDURE — 85610 PROTHROMBIN TIME: CPT | Performed by: NURSE PRACTITIONER

## 2021-05-18 PROCEDURE — 1111F DSCHRG MED/CURRENT MED MERGE: CPT | Performed by: INTERNAL MEDICINE

## 2021-05-18 PROCEDURE — G8417 CALC BMI ABV UP PARAM F/U: HCPCS | Performed by: INTERNAL MEDICINE

## 2021-05-18 PROCEDURE — G8427 DOCREV CUR MEDS BY ELIG CLIN: HCPCS | Performed by: INTERNAL MEDICINE

## 2021-05-18 PROCEDURE — 1036F TOBACCO NON-USER: CPT | Performed by: INTERNAL MEDICINE

## 2021-05-18 PROCEDURE — 99213 OFFICE O/P EST LOW 20 MIN: CPT | Performed by: INTERNAL MEDICINE

## 2021-05-18 PROCEDURE — 2022F DILAT RTA XM EVC RTNOPTHY: CPT | Performed by: INTERNAL MEDICINE

## 2021-05-18 ASSESSMENT — PATIENT HEALTH QUESTIONNAIRE - PHQ9
2. FEELING DOWN, DEPRESSED OR HOPELESS: 0
SUM OF ALL RESPONSES TO PHQ QUESTIONS 1-9: 0
SUM OF ALL RESPONSES TO PHQ9 QUESTIONS 1 & 2: 0
SUM OF ALL RESPONSES TO PHQ QUESTIONS 1-9: 0

## 2021-05-18 ASSESSMENT — ENCOUNTER SYMPTOMS
BACK PAIN: 0
COUGH: 0
WHEEZING: 0
TROUBLE SWALLOWING: 0
SORE THROAT: 0
SHORTNESS OF BREATH: 0
COLOR CHANGE: 0
CONSTIPATION: 0
STRIDOR: 0
ABDOMINAL PAIN: 0
BLOOD IN STOOL: 0
CHEST TIGHTNESS: 0
DIARRHEA: 0

## 2021-05-18 NOTE — ASSESSMENT & PLAN NOTE
Unclear control, continue current medications, medication adherence emphasized, lifestyle modifications recommended and Patient's blood sugar intermittent 200s her blood sugar meter was distracted was not functioning well patient consider continue Tresiba at 40 with fingerstick coverage is consuming better food because the mother is a vegetarian some early morning sugars are around 140 so it is trending down blood sugar log is scanned into the system there is now a safe care at home he is going to see the retina specialist at the end of the month and he will continue his fingerstick coverage he is on Coumadin for his dilated cardiomyopathy he will go to cardiology today for lab work

## 2021-05-18 NOTE — PROGRESS NOTES
- 31.0 pg    MCHC 32.8 (L) 33.0 - 37.0 g/dL    RDW 13.9 11.5 - 14.5 %    Platelets 551 167 - 152 K/uL    MPV 10.8 9.4 - 12.4 fL    Neutrophils % 88.8 (H) 50.0 - 65.0 %    Lymphocytes % 4.7 (L) 20.0 - 40.0 %    Monocytes % 6.0 0.0 - 10.0 %    Eosinophils % 0.0 0.0 - 5.0 %    Basophils % 0.1 0.0 - 1.0 %    Neutrophils Absolute 9.5 (H) 1.5 - 7.5 K/uL    Immature Granulocytes # 0.0 K/uL    Lymphocytes Absolute 0.5 (L) 1.1 - 4.5 K/uL    Monocytes Absolute 0.60 0.00 - 0.90 K/uL    Eosinophils Absolute 0.00 0.00 - 0.60 K/uL    Basophils Absolute 0.00 0.00 - 0.20 K/uL   Comprehensive Metabolic Panel w/ Reflex to MG   Result Value Ref Range    Sodium 136 136 - 145 mmol/L    Potassium reflex Magnesium 4.5 3.5 - 5.0 mmol/L    Chloride 99 98 - 111 mmol/L    CO2 26 22 - 29 mmol/L    Anion Gap 11 7 - 19 mmol/L    Glucose 88 74 - 109 mg/dL    BUN 14 6 - 20 mg/dL    CREATININE 0.8 0.5 - 1.2 mg/dL    GFR Non-African American >60 >60    GFR African American >59 >59    Calcium 8.7 8.6 - 10.0 mg/dL    Total Protein 7.3 6.6 - 8.7 g/dL    Albumin 3.8 3.5 - 5.2 g/dL    Total Bilirubin 1.6 (H) 0.2 - 1.2 mg/dL    Alkaline Phosphatase 285 (H) 40 - 130 U/L    ALT 49 (H) 5 - 41 U/L    AST 52 (H) 5 - 40 U/L   Troponin   Result Value Ref Range    Troponin 0.02 0.00 - 0.03 ng/mL   Protime-INR   Result Value Ref Range    Protime 37.7 (H) 12.0 - 14.6 sec    INR 3.67 (H) 0.88 - 1.18   APTT   Result Value Ref Range    aPTT 43.0 (H) 26.0 - 36.2 sec   Ethanol   Result Value Ref Range    Ethanol Lvl <10 mg/dL   Acetaminophen Level   Result Value Ref Range    Acetaminophen Level <85 ug/mL   Salicylate   Result Value Ref Range    Salicylate, Serum <9.9 3.0 - 10.0 mg/dL   Urinalysis Reflex to Culture    Specimen: Urine, clean catch   Result Value Ref Range    Color, UA YELLOW Straw/Yellow    Clarity, UA Clear Clear    Glucose, Ur Negative Negative mg/dL    Bilirubin Urine Negative Negative    Ketones, Urine TRACE (A) Negative mg/dL    Specific Gravity, UA - 52.0 %    MCV 98.3 (H) 80.0 - 94.0 fL    MCH 31.7 (H) 27.0 - 31.0 pg    MCHC 32.3 (L) 33.0 - 37.0 g/dL    RDW 14.2 11.5 - 14.5 %    Platelets 638 268 - 484 K/uL    MPV 10.8 9.4 - 12.4 fL    Neutrophils % 72.2 (H) 50.0 - 65.0 %    Lymphocytes % 15.2 (L) 20.0 - 40.0 %    Monocytes % 11.5 (H) 0.0 - 10.0 %    Eosinophils % 0.2 0.0 - 5.0 %    Basophils % 0.6 0.0 - 1.0 %    Neutrophils Absolute 6.3 1.5 - 7.5 K/uL    Immature Granulocytes # 0.0 K/uL    Lymphocytes Absolute 1.3 1.1 - 4.5 K/uL    Monocytes Absolute 1.00 (H) 0.00 - 0.90 K/uL    Eosinophils Absolute 0.00 0.00 - 0.60 K/uL    Basophils Absolute 0.10 0.00 - 0.20 K/uL   Magnesium   Result Value Ref Range    Magnesium 2.2 1.6 - 2.6 mg/dL   Phosphorus   Result Value Ref Range    Phosphorus 4.0 2.5 - 4.5 mg/dL   Hemoglobin A1c   Result Value Ref Range    Hemoglobin A1C 9.0 (H) 4.0 - 6.0 %   Lipid panel - fasting   Result Value Ref Range    Cholesterol, Total 132 (L) 160 - 199 mg/dL    Triglycerides 67 0 - 149 mg/dL    HDL 44 (L) 55 - 121 mg/dL    LDL Calculated 75 <100 mg/dL   Troponin   Result Value Ref Range    Troponin 0.02 0.00 - 0.03 ng/mL   PROTIME-INR   Result Value Ref Range    Protime 45.2 (H) 12.0 - 14.6 sec    INR 4.59 (H) 0.88 - 1.18   Troponin   Result Value Ref Range    Troponin 0.03 0.00 - 0.03 ng/mL   Comprehensive Metabolic Panel   Result Value Ref Range    Sodium 136 136 - 145 mmol/L    Potassium 4.0 3.5 - 5.0 mmol/L    Chloride 99 98 - 111 mmol/L    CO2 27 22 - 29 mmol/L    Anion Gap 10 7 - 19 mmol/L    Glucose 192 (H) 74 - 109 mg/dL    BUN 17 6 - 20 mg/dL    CREATININE 1.0 0.5 - 1.2 mg/dL    GFR Non-African American >60 >60    GFR African American >59 >59    Calcium 8.6 8.6 - 10.0 mg/dL    Total Protein 6.1 (L) 6.6 - 8.7 g/dL    Albumin 3.3 (L) 3.5 - 5.2 g/dL    Total Bilirubin 1.4 (H) 0.2 - 1.2 mg/dL    Alkaline Phosphatase 249 (H) 40 - 130 U/L    ALT 41 5 - 41 U/L    AST 39 5 - 40 U/L   PROTIME-INR   Result Value Ref Range    Protime 37.7 (H) 12.0 - 14.6 sec    INR 3.67 (H) 0.88 - 1.18   Comprehensive Metabolic Panel   Result Value Ref Range    Sodium 134 (L) 136 - 145 mmol/L    Potassium 4.5 3.5 - 5.0 mmol/L    Chloride 99 98 - 111 mmol/L    CO2 26 22 - 29 mmol/L    Anion Gap 9 7 - 19 mmol/L    Glucose 201 (H) 74 - 109 mg/dL    BUN 21 (H) 6 - 20 mg/dL    CREATININE 1.0 0.5 - 1.2 mg/dL    GFR Non-African American >60 >60    GFR African American >59 >59    Calcium 8.6 8.6 - 10.0 mg/dL    Total Protein 6.3 (L) 6.6 - 8.7 g/dL    Albumin 3.3 (L) 3.5 - 5.2 g/dL    Total Bilirubin 1.5 (H) 0.2 - 1.2 mg/dL    Alkaline Phosphatase 272 (H) 40 - 130 U/L    ALT 40 5 - 41 U/L    AST 33 5 - 40 U/L   PROTIME-INR   Result Value Ref Range    Protime 24.1 (H) 12.0 - 14.6 sec    INR 2.12 (H) 0.88 - 1.18   Comprehensive Metabolic Panel   Result Value Ref Range    Sodium 136 136 - 145 mmol/L    Potassium 4.5 3.5 - 5.0 mmol/L    Chloride 100 98 - 111 mmol/L    CO2 26 22 - 29 mmol/L    Anion Gap 10 7 - 19 mmol/L    Glucose 205 (H) 74 - 109 mg/dL    BUN 22 (H) 6 - 20 mg/dL    CREATININE 1.0 0.5 - 1.2 mg/dL    GFR Non-African American >60 >60    GFR African American >59 >59    Calcium 9.0 8.6 - 10.0 mg/dL    Total Protein 6.4 (L) 6.6 - 8.7 g/dL    Albumin 3.5 3.5 - 5.2 g/dL    Total Bilirubin 1.4 (H) 0.2 - 1.2 mg/dL    Alkaline Phosphatase 280 (H) 40 - 130 U/L    ALT 37 5 - 41 U/L    AST 28 5 - 40 U/L   PROTIME-INR   Result Value Ref Range    Protime 17.8 (H) 12.0 - 14.6 sec    INR 1.45 (H) 0.88 - 1.18   Comprehensive Metabolic Panel   Result Value Ref Range    Sodium 137 136 - 145 mmol/L    Potassium 4.6 3.5 - 5.0 mmol/L    Chloride 100 98 - 111 mmol/L    CO2 25 22 - 29 mmol/L    Anion Gap 12 7 - 19 mmol/L    Glucose 199 (H) 74 - 109 mg/dL    BUN 23 (H) 6 - 20 mg/dL    CREATININE 1.0 0.5 - 1.2 mg/dL    GFR Non-African American >60 >60    GFR African American >59 >59    Calcium 9.3 8.6 - 10.0 mg/dL    Total Protein 6.4 (L) 6.6 - 8.7 g/dL    Albumin 3.5 3.5 - 5.2 g/dL    Total Bilirubin 1.4 (H) 0.2 - 1.2 mg/dL    Alkaline Phosphatase 285 (H) 40 - 130 U/L    ALT 33 5 - 41 U/L    AST 25 5 - 40 U/L   PROTIME-INR   Result Value Ref Range    Protime 16.3 (H) 12.0 - 14.6 sec    INR 1.31 (H) 0.88 - 1.18   POCT Glucose   Result Value Ref Range    POC Glucose 98 70 - 99 mg/dl    Performed on [employer]k    POCT Glucose   Result Value Ref Range    POC Glucose 87 70 - 99 mg/dl    Performed on [employer]    POCT Glucose   Result Value Ref Range    POC Glucose 53 (L) 70 - 99 mg/dl    Performed on [employer]    POCT Glucose   Result Value Ref Range    POC Glucose 89 70 - 99 mg/dl    Performed on [employer]    POCT Glucose   Result Value Ref Range    POC Glucose 35 (LL) 70 - 99 mg/dl    Performed on [employer]    POCT Glucose   Result Value Ref Range    POC Glucose 65 (L) 70 - 99 mg/dl    Performed on [employer]    POCT Glucose   Result Value Ref Range    POC Glucose 57 (L) 70 - 99 mg/dl    Performed on [employer]    POCT Glucose   Result Value Ref Range    POC Glucose 131 (H) 70 - 99 mg/dl    Performed on [employer]    POCT Glucose   Result Value Ref Range    POC Glucose 126 (H) 70 - 99 mg/dl    Performed on [employer]    POCT Glucose   Result Value Ref Range    POC Glucose 110 (H) 70 - 99 mg/dl    Performed on [employer]    POCT Glucose   Result Value Ref Range    POC Glucose 45 (LL) 70 - 99 mg/dl    Performed on [employer]    POCT Glucose   Result Value Ref Range    POC Glucose 66 (L) 70 - 99 mg/dl    Performed on [employer]    POCT Glucose   Result Value Ref Range    POC Glucose 65 (L) 70 - 99 mg/dl    Performed on [employer]k    POCT Glucose   Result Value Ref Range    POC Glucose 55 (L) 70 - 99 mg/dl    Performed on [employer]k    POCT Glucose   Result Value Ref Range    POC Glucose 100 (H) 70 - 99 mg/dl    Performed on [employer]k    POCT Glucose   Result Value Ref Range    POC Glucose 121 (H) 70 - 99 mg/dl    Performed on [employer]uChek    POCT Glucose   Result Value Ref Range    POC Glucose 120 (H) 70 - 99 mg/dl    Performed on Complete 2D W Doppler W Color   Result Value Ref Range    Left Ventricular Ejection Fraction 48     LVEF MODALITY ECHO        Return in about 3 months (around 8/18/2021). HPI     64-year-old male seen for TCM and during that visit was found to have a poor situation where the mother who is providing care did not understand how to withdraw insulin and injected the patient time was spent last week trying to educate mother and son on diabetes diet and injections coordination with home care nurse for medications and today was brought just to review blood glucose logs reviewed some fastings are in the 140 blood sugars not very elevated around 240 maximum continue coverage continue Valerie Sevilla he will see cardiology for his INR check and return to me for follow-up in 3 months in the meantime he will continue to pursue his retinal specialist and continue current diabetes control       Review of Systems   Constitutional: Negative for activity change, appetite change, chills, diaphoresis, fatigue and fever. HENT: Negative for congestion, hearing loss, postnasal drip, sore throat, tinnitus and trouble swallowing. Eyes: Positive for visual disturbance (says he has blurred vision). Respiratory: Negative for cough, chest tightness, shortness of breath, wheezing and stridor. Cardiovascular: Negative for chest pain, palpitations and leg swelling. Gastrointestinal: Negative for abdominal pain, blood in stool, constipation and diarrhea. Endocrine: Negative for cold intolerance. Genitourinary: Negative for frequency. Musculoskeletal: Negative for arthralgias, back pain and joint swelling. Skin: Negative for color change and wound. Allergic/Immunologic: Negative for environmental allergies. Neurological: Negative for dizziness, light-headedness and headaches. Hematological: Negative for adenopathy. Does not bruise/bleed easily. Psychiatric/Behavioral: Positive for dysphoric mood.  Negative for decreased concentration and sleep disturbance. The patient is not nervous/anxious. Physical Exam  Vitals and nursing note reviewed. Constitutional:       General: He is not in acute distress. Appearance: He is well-developed. HENT:      Head: Normocephalic and atraumatic. Right Ear: External ear normal.      Left Ear: External ear normal.      Nose: Nose normal.   Eyes:      General: No scleral icterus. Conjunctiva/sclera: Conjunctivae normal.      Pupils: Pupils are equal, round, and reactive to light. Neck:      Thyroid: No thyromegaly. Cardiovascular:      Rate and Rhythm: Normal rate and regular rhythm. Heart sounds: Normal heart sounds. No murmur heard. Pulmonary:      Effort: Pulmonary effort is normal.      Breath sounds: Normal breath sounds. No wheezing or rales. Abdominal:      General: Bowel sounds are normal. There is no distension. Palpations: Abdomen is soft. There is no mass. Tenderness: There is no abdominal tenderness. There is no rebound. Musculoskeletal:         General: No tenderness. Normal range of motion. Cervical back: Normal range of motion and neck supple. Lymphadenopathy:      Cervical: No cervical adenopathy. Skin:     General: Skin is warm and dry. Findings: No rash. Neurological:      Mental Status: He is alert and oriented to person, place, and time. Mental status is at baseline. Cranial Nerves: No cranial nerve deficit. Deep Tendon Reflexes: Reflexes normal.   Psychiatric:         Mood and Affect: Mood is depressed. Speech: Speech is delayed. Behavior: Behavior is slowed and withdrawn. Thought Content:  Thought content normal.         Judgment: Judgment normal.           (Time Documentation Optional [id number])    An electronic signaturewaas used to authenticate this note  -Gianna Burroughs MD on 5/18/2021 at 2:56 PM

## 2021-06-01 ENCOUNTER — OFFICE VISIT (OUTPATIENT)
Dept: INTERNAL MEDICINE | Age: 56
End: 2021-06-01
Payer: MEDICARE

## 2021-06-01 VITALS
HEART RATE: 75 BPM | WEIGHT: 248 LBS | DIASTOLIC BLOOD PRESSURE: 80 MMHG | OXYGEN SATURATION: 97 % | BODY MASS INDEX: 35.5 KG/M2 | SYSTOLIC BLOOD PRESSURE: 136 MMHG | HEIGHT: 70 IN

## 2021-06-01 DIAGNOSIS — Z79.4 TYPE 2 DIABETES MELLITUS WITH DIABETIC POLYNEUROPATHY, WITH LONG-TERM CURRENT USE OF INSULIN (HCC): Chronic | ICD-10-CM

## 2021-06-01 DIAGNOSIS — E11.42 TYPE 2 DIABETES MELLITUS WITH DIABETIC POLYNEUROPATHY, WITH LONG-TERM CURRENT USE OF INSULIN (HCC): Chronic | ICD-10-CM

## 2021-06-01 PROCEDURE — 99213 OFFICE O/P EST LOW 20 MIN: CPT | Performed by: INTERNAL MEDICINE

## 2021-06-01 PROCEDURE — 1036F TOBACCO NON-USER: CPT | Performed by: INTERNAL MEDICINE

## 2021-06-01 PROCEDURE — 3052F HG A1C>EQUAL 8.0%<EQUAL 9.0%: CPT | Performed by: INTERNAL MEDICINE

## 2021-06-01 PROCEDURE — G8417 CALC BMI ABV UP PARAM F/U: HCPCS | Performed by: INTERNAL MEDICINE

## 2021-06-01 PROCEDURE — G8427 DOCREV CUR MEDS BY ELIG CLIN: HCPCS | Performed by: INTERNAL MEDICINE

## 2021-06-01 PROCEDURE — 2022F DILAT RTA XM EVC RTNOPTHY: CPT | Performed by: INTERNAL MEDICINE

## 2021-06-01 PROCEDURE — 3017F COLORECTAL CA SCREEN DOC REV: CPT | Performed by: INTERNAL MEDICINE

## 2021-06-01 RX ORDER — INSULIN DEGLUDEC 200 U/ML
30 INJECTION, SOLUTION SUBCUTANEOUS DAILY
Qty: 4 PEN | Refills: 3
Start: 2021-06-01 | End: 2022-06-01 | Stop reason: SDUPTHER

## 2021-06-01 ASSESSMENT — PATIENT HEALTH QUESTIONNAIRE - PHQ9
1. LITTLE INTEREST OR PLEASURE IN DOING THINGS: 0
SUM OF ALL RESPONSES TO PHQ9 QUESTIONS 1 & 2: 0
2. FEELING DOWN, DEPRESSED OR HOPELESS: 0
SUM OF ALL RESPONSES TO PHQ QUESTIONS 1-9: 0

## 2021-06-01 ASSESSMENT — ENCOUNTER SYMPTOMS
ABDOMINAL PAIN: 0
BACK PAIN: 0
TROUBLE SWALLOWING: 0
CONSTIPATION: 0
STRIDOR: 0
SORE THROAT: 0
DIARRHEA: 0
WHEEZING: 0
COUGH: 0
CHEST TIGHTNESS: 0
SHORTNESS OF BREATH: 0
COLOR CHANGE: 0
BLOOD IN STOOL: 0

## 2021-06-01 NOTE — ASSESSMENT & PLAN NOTE
Well-controlled,    Lost weight, beginning to be Insulin sensitive  , solme lower glucose number, decrease long acting insulin to30 units nightly

## 2021-06-01 NOTE — PROGRESS NOTES
Beronica Webb ( 1965) is a 60 y.o. male, Established  here for evaluation of the following chief complaint(s). Diabetes (2 week follow up)        ASSESSMENT/PLAN:  Problem List        Endocrine    Type 2 diabetes mellitus with diabetic polyneuropathy, with long-term current use of insulin (HCC) (Chronic)      Well-controlled,    Lost weight, beginning to be Insulin sensitive  , solme lower glucose number, decrease long acting insulin to30 units nightly                                                         Relevant Medications    blood glucose test strips (CONTOUR NEXT TEST) strip    metFORMIN (GLUCOPHAGE) 1000 MG tablet    insulin lispro (HUMALOG KWIKPEN) 200 UNIT/ML SOPN pen    Insulin Pen Needle 32G X 4 MM MISC    blood glucose monitor kit and supplies    Lancets MISC    blood glucose monitor strips    Insulin Degludec (TRESIBA FLEXTOUCH) 200 UNIT/ML SOPN          Results for orders placed or performed in visit on 21   POCT INR   Result Value Ref Range    INR 2.0     Protime         Return in about 3 months (around 2021). HPI  73-year-old male brought for follow-up due to persistent hyperglycemia  Patient is now living with mother eating better checking fingersticks regularly  His blood sugar is so much better with mealtime coverage and he is much happier with results he is noted to have some early morning lows some in the 70s he is decreasing his nighttime insulin to between used to be 40 units now he is going to decrease the [place] to 30 units  Review of Systems   Constitutional: Negative for activity change, appetite change, chills, diaphoresis, fatigue and fever. HENT: Negative for congestion, hearing loss, postnasal drip, sore throat, tinnitus and trouble swallowing. Eyes: Positive for visual disturbance (says he has blurred vision). Respiratory: Negative for cough, chest tightness, shortness of breath, wheezing and stridor.     Cardiovascular: Negative for chest pain, palpitations and leg swelling. Gastrointestinal: Negative for abdominal pain, blood in stool, constipation and diarrhea. Endocrine: Negative for cold intolerance. Genitourinary: Negative for frequency. Musculoskeletal: Negative for arthralgias, back pain and joint swelling. Skin: Negative for color change and wound. Allergic/Immunologic: Negative for environmental allergies. Neurological: Negative for dizziness, light-headedness and headaches. Hematological: Negative for adenopathy. Does not bruise/bleed easily. Psychiatric/Behavioral: Positive for dysphoric mood. Negative for decreased concentration and sleep disturbance. The patient is not nervous/anxious. Physical Exam  Vitals and nursing note reviewed. Constitutional:       General: He is not in acute distress. Appearance: He is well-developed. HENT:      Head: Normocephalic and atraumatic. Right Ear: External ear normal.      Left Ear: External ear normal.      Nose: Nose normal.   Eyes:      General: No scleral icterus. Conjunctiva/sclera: Conjunctivae normal.      Pupils: Pupils are equal, round, and reactive to light. Neck:      Thyroid: No thyromegaly. Cardiovascular:      Rate and Rhythm: Normal rate and regular rhythm. Heart sounds: Normal heart sounds. No murmur heard. Pulmonary:      Effort: Pulmonary effort is normal.      Breath sounds: Normal breath sounds. No wheezing or rales. Abdominal:      General: Bowel sounds are normal. There is no distension. Palpations: Abdomen is soft. There is no mass. Tenderness: There is no abdominal tenderness. There is no rebound. Musculoskeletal:         General: No tenderness. Normal range of motion. Cervical back: Normal range of motion and neck supple. Lymphadenopathy:      Cervical: No cervical adenopathy. Skin:     General: Skin is warm and dry. Findings: No rash.    Neurological:      Mental Status: He is alert and oriented to

## 2021-06-04 ENCOUNTER — ANTI-COAG VISIT (OUTPATIENT)
Dept: CARDIOLOGY CLINIC | Age: 56
End: 2021-06-04
Payer: MEDICARE

## 2021-06-04 DIAGNOSIS — I48.21 PERMANENT ATRIAL FIBRILLATION (HCC): Primary | ICD-10-CM

## 2021-06-04 LAB
INTERNATIONAL NORMALIZATION RATIO, POC: 4.4
PROTHROMBIN TIME, POC: NORMAL

## 2021-06-04 PROCEDURE — 85610 PROTHROMBIN TIME: CPT | Performed by: CLINICAL NURSE SPECIALIST

## 2021-06-09 DIAGNOSIS — I10 ESSENTIAL HYPERTENSION: ICD-10-CM

## 2021-06-09 RX ORDER — WARFARIN SODIUM 10 MG/1
TABLET ORAL
Qty: 45 TABLET | Refills: 2 | Status: SHIPPED | OUTPATIENT
Start: 2021-06-09 | End: 2021-09-20

## 2021-06-11 ENCOUNTER — CARE COORDINATION (OUTPATIENT)
Dept: CARE COORDINATION | Age: 56
End: 2021-06-11

## 2021-06-11 ENCOUNTER — ANTI-COAG VISIT (OUTPATIENT)
Dept: CARDIOLOGY CLINIC | Age: 56
End: 2021-06-11
Payer: MEDICARE

## 2021-06-11 DIAGNOSIS — I48.21 PERMANENT ATRIAL FIBRILLATION (HCC): Primary | ICD-10-CM

## 2021-06-11 LAB
INTERNATIONAL NORMALIZATION RATIO, POC: 2.3
PROTHROMBIN TIME, POC: NORMAL

## 2021-06-11 PROCEDURE — 85610 PROTHROMBIN TIME: CPT | Performed by: CLINICAL NURSE SPECIALIST

## 2021-06-11 NOTE — CARE COORDINATION
Date/Time:  6/11/2021 3:19 PM  Attempted to reach patient by telephone. Left HIPAA compliant message requesting a return call.

## 2021-06-14 ENCOUNTER — TELEPHONE (OUTPATIENT)
Dept: CARDIOLOGY CLINIC | Age: 56
End: 2021-06-14

## 2021-06-14 DIAGNOSIS — Z79.4 TYPE 2 DIABETES MELLITUS WITH DIABETIC POLYNEUROPATHY, WITH LONG-TERM CURRENT USE OF INSULIN (HCC): Chronic | ICD-10-CM

## 2021-06-14 DIAGNOSIS — E11.42 TYPE 2 DIABETES MELLITUS WITH DIABETIC POLYNEUROPATHY, WITH LONG-TERM CURRENT USE OF INSULIN (HCC): Chronic | ICD-10-CM

## 2021-06-14 RX ORDER — FUROSEMIDE 20 MG/1
TABLET ORAL
Qty: 90 TABLET | Refills: 3 | Status: SHIPPED | OUTPATIENT
Start: 2021-06-14 | End: 2021-08-01 | Stop reason: SDUPTHER

## 2021-06-21 ENCOUNTER — ANTI-COAG VISIT (OUTPATIENT)
Dept: CARDIOLOGY CLINIC | Age: 56
End: 2021-06-21

## 2021-06-21 ENCOUNTER — HOSPITAL ENCOUNTER (OUTPATIENT)
Dept: SLEEP CENTER | Age: 56
Discharge: HOME | End: 2021-06-23
Payer: MEDICARE

## 2021-06-21 PROCEDURE — 95810 POLYSOM 6/> YRS 4/> PARAM: CPT

## 2021-06-22 NOTE — PROGRESS NOTES
[place] [id number]  [place], [address]  Phone [phone] Fax [phone]     Sleep Study Technician Review    Patient Name:  Tiffany Iglesias  :   1965  Referring Provider: Faheem Westfall MD    Brief History:  Tiffany Iglesias is a 60 y.o. male with a history of Hypertension, Diabetes and A-fib who has been referred for a sleep study. He says he was on CPAP during his stay on rehab. He was noted to possibly have sleep apnea and put on CPAP. Height: 5'10\"  Weight:  253 lbs  BMI: 36.33  Neck Circ:  18\"  MALLAMPATI: Type 4  ESS: 3/24    Type of Study: PSG  Time Stage Position Snore Hypopnea Obs Apnea Jean Apnea PAP O2   2100 Awake Supine No No No No  RA   2200 Awake Supine No No No No  RA   2300 Awake Right No No No No  RA   2400 2 Supine No Yes No No  RA   0100 2 Supine No No No Yes  RA   0200 2 Left No No No No  RA   0300 1 Supine No No No No  RA   0400 Awake Left No No No No  RA   0410 Awake Supine No No No No  RA     Summary: Pt stated that he has used cpap therapy in the past. Pt stated that he does snore. Pt stated that he has used cpap in the past but not currently. Pt had several events while in Supine and REM. The study was reviewed briefly with Tiffany Iglesias. He will be notified of the formal results and recommendations after the study is scored and interpreted. The report will be sent to his referring provider.     Technician:  Andra Saint, RPSGT

## 2021-06-23 ENCOUNTER — TELEPHONE (OUTPATIENT)
Dept: INTERNAL MEDICINE | Age: 56
End: 2021-06-23

## 2021-06-25 PROCEDURE — 95810 POLYSOM 6/> YRS 4/> PARAM: CPT | Performed by: PSYCHIATRY & NEUROLOGY

## 2021-07-02 ENCOUNTER — TELEPHONE (OUTPATIENT)
Dept: INTERNAL MEDICINE | Age: 56
End: 2021-07-02

## 2021-07-02 DIAGNOSIS — G47.33 SLEEP APNEA, OBSTRUCTIVE: ICD-10-CM

## 2021-07-02 DIAGNOSIS — G47.31 CENTRAL SLEEP APNEA: Primary | ICD-10-CM

## 2021-07-02 NOTE — TELEPHONE ENCOUNTER
----- Message from Anitha Hays MD sent at 7/2/2021  9:31 AM CDT -----  Pls get report of Diabetic eye examination

## 2021-07-06 PROBLEM — E11.3393 MODERATE NONPROLIFERATIVE DIABETIC RETINOPATHY OF BOTH EYES WITHOUT MACULAR EDEMA ASSOCIATED WITH TYPE 2 DIABETES MELLITUS (HCC): Status: ACTIVE | Noted: 2021-07-06

## 2021-07-06 NOTE — TELEPHONE ENCOUNTER
Pt called back he states he went to [facility] [place] in [place]. Called them and they are faxing the notes today.

## 2021-07-09 ENCOUNTER — ANTI-COAG VISIT (OUTPATIENT)
Dept: CARDIOLOGY CLINIC | Age: 56
End: 2021-07-09
Payer: MEDICARE

## 2021-07-09 DIAGNOSIS — I48.21 PERMANENT ATRIAL FIBRILLATION (HCC): Primary | ICD-10-CM

## 2021-07-09 LAB
INTERNATIONAL NORMALIZATION RATIO, POC: 1.6
PROTHROMBIN TIME, POC: NORMAL

## 2021-07-09 PROCEDURE — 85610 PROTHROMBIN TIME: CPT | Performed by: CLINICAL NURSE SPECIALIST

## 2021-07-13 RX ORDER — WARFARIN SODIUM 5 MG/1
5 TABLET ORAL DAILY
Qty: 15 TABLET | Refills: 3 | Status: SHIPPED | OUTPATIENT
Start: 2021-07-13 | End: 2021-07-15 | Stop reason: SDUPTHER

## 2021-07-15 ENCOUNTER — ANTI-COAG VISIT (OUTPATIENT)
Dept: CARDIOLOGY CLINIC | Age: 56
End: 2021-07-15
Payer: MEDICARE

## 2021-07-15 DIAGNOSIS — I48.21 PERMANENT ATRIAL FIBRILLATION (HCC): Primary | ICD-10-CM

## 2021-07-15 PROCEDURE — 85610 PROTHROMBIN TIME: CPT | Performed by: NURSE PRACTITIONER

## 2021-07-15 RX ORDER — WARFARIN SODIUM 5 MG/1
TABLET ORAL
Qty: 30 TABLET | Refills: 5 | Status: ON HOLD | OUTPATIENT
Start: 2021-07-15 | End: 2021-11-02 | Stop reason: HOSPADM

## 2021-07-21 RX ORDER — TAMSULOSIN HYDROCHLORIDE 0.4 MG/1
CAPSULE ORAL
Qty: 60 CAPSULE | Refills: 11 | OUTPATIENT
Start: 2021-07-21

## 2021-07-30 DIAGNOSIS — E11.42 TYPE 2 DIABETES MELLITUS WITH DIABETIC POLYNEUROPATHY, WITH LONG-TERM CURRENT USE OF INSULIN (HCC): ICD-10-CM

## 2021-07-30 DIAGNOSIS — Z79.4 TYPE 2 DIABETES MELLITUS WITH DIABETIC POLYNEUROPATHY, WITH LONG-TERM CURRENT USE OF INSULIN (HCC): ICD-10-CM

## 2021-08-01 RX ORDER — FUROSEMIDE 20 MG/1
TABLET ORAL
Qty: 90 TABLET | Refills: 3 | Status: SHIPPED | OUTPATIENT
Start: 2021-08-01 | End: 2021-11-09 | Stop reason: SDUPTHER

## 2021-08-01 RX ORDER — GLUCOSAMINE HCL/CHONDROITIN SU 500-400 MG
CAPSULE ORAL
Qty: 200 STRIP | Refills: 3 | Status: SHIPPED | OUTPATIENT
Start: 2021-08-01

## 2021-08-02 ENCOUNTER — TELEPHONE (OUTPATIENT)
Dept: INTERNAL MEDICINE | Age: 56
End: 2021-08-02

## 2021-08-02 NOTE — TELEPHONE ENCOUNTER
S/w pt, states [facility] did not have his Ferosemide. He wanted to report his weight is 234 and his glucose levels have been ranging between . He also voiced understanding that he needs to get his A1C checked when he comes to his coumadin appointment.

## 2021-08-02 NOTE — TELEPHONE ENCOUNTER
Spoke to the pharmacy they do have the script and they said its to soon to fill. She said they told him that when he was there today two different times they told him this. Called pt LM for him that the pharmacy does have the medication but that it is to soon to fill.

## 2021-08-02 NOTE — TELEPHONE ENCOUNTER
----- Message from Rocky العراقي MD sent at 8/1/2021  7:28 AM CDT -----  Pls order an A1c, when he comes for his Coumadin check he can go for an A1c, lat A1c was in April should have been repeated in July, pls update me with his response, his last A1c was 9.5

## 2021-08-02 NOTE — TELEPHONE ENCOUNTER
Pt called in stating he as a pt inr on thrursday 08-.  Gilma Noe he will come to the lab once he is done with his pt inr

## 2021-08-02 NOTE — TELEPHONE ENCOUNTER
Left message for pt to call us back and also let him know in my message to get this done the day he is here.

## 2021-08-05 ENCOUNTER — ANTI-COAG VISIT (OUTPATIENT)
Dept: CARDIOLOGY CLINIC | Age: 56
End: 2021-08-05
Payer: MEDICARE

## 2021-08-05 DIAGNOSIS — I48.21 PERMANENT ATRIAL FIBRILLATION (HCC): Primary | ICD-10-CM

## 2021-08-05 LAB
INTERNATIONAL NORMALIZATION RATIO, POC: 3.4
PROTHROMBIN TIME, POC: NORMAL

## 2021-08-05 PROCEDURE — 85610 PROTHROMBIN TIME: CPT | Performed by: CLINICAL NURSE SPECIALIST

## 2021-08-12 ENCOUNTER — ANTICOAGULATION VISIT (OUTPATIENT)
Dept: FAMILY MEDICINE CLINIC | Facility: CLINIC | Age: 56
End: 2021-08-12

## 2021-08-27 ENCOUNTER — TELEPHONE (OUTPATIENT)
Dept: CARDIOLOGY CLINIC | Age: 56
End: 2021-08-27

## 2021-08-27 NOTE — TELEPHONE ENCOUNTER
LVM for Pt, Pt's last anticoag visit he was told to recheck in 1 month which would be 9/5/21. We can schedule an appt for the PT to have his INR check on 9/3/21 the same day he sees his PCP Dr. Yennifer Damon. I instructed for the PT to call our office back to confirm what he would like to do.

## 2021-08-27 NOTE — TELEPHONE ENCOUNTER
Mr. René Albarado, called today and is wanting someone to verify if he should come in prior to his apt with Dr. Paula Latif next Friday to have his PTINR. Patient missed his INR on 08/26/2021. Please call him back ([phone]) with the appointment date and time if he should need the INR prior to apt. He is expecting a call back from our office.

## 2021-09-02 PROBLEM — E11.3593 PROLIFERATIVE DIABETIC RETINOPATHY OF BOTH EYES WITHOUT MACULAR EDEMA ASSOCIATED WITH TYPE 2 DIABETES MELLITUS (HCC): Status: ACTIVE | Noted: 2021-07-06

## 2021-09-02 PROBLEM — R41.82 ALTERED MENTAL STATUS: Status: RESOLVED | Noted: 2021-04-16 | Resolved: 2021-09-02

## 2021-09-02 NOTE — PROGRESS NOTES
Ana Spears ( 1965) is a 60 y.o. male,  Established , here for evaluation of the following chief complaint(s).   Diabetes (gets light headed, weakness, neck pain)        ASSESSMENT/PLAN:  Problem List        Circulatory    (HFpEF) heart failure with preserved ejection fraction (HCC)      Monitored by specialist- no acute findings meriting change in the plan         Relevant Orders    Basic Metabolic Panel    TSH without Reflex    HTN (hypertension)      Well-controlled, continue current medications, medication adherence emphasized and lifestyle modifications recommended         Relevant Medications    metoprolol tartrate (LOPRESSOR) 25 MG tablet    Permanent atrial fibrillation (HCC)      Monitored by specialist- no acute findings meriting change in the plan            Endocrine    Charcot foot due to diabetes mellitus ([address]      Patient did not want to take his shoes off today         Relevant Medications    insulin lispro (HUMALOG KWIKPEN) 200 UNIT/ML SOPN pen    metFORMIN (GLUCOPHAGE) 1000 MG tablet    Diabetic ulcer of right midfoot associated with type 2 diabetes mellitus, with fat layer exposed (HCC) - Primary (Chronic)    Relevant Medications    insulin lispro (HUMALOG KWIKPEN) 200 UNIT/ML SOPN pen    metFORMIN (GLUCOPHAGE) 1000 MG tablet    Other Relevant Orders    CBC    Proliferative diabetic retinopathy of both eyes without macular edema associated with type 2 diabetes mellitus ([address]      Monitored by specialist- no acute findings meriting change in the plan   He is seeing better out of his left eye undergoing surgery and injections in the right eye         Relevant Medications    insulin lispro (HUMALOG KWIKPEN) 200 UNIT/ML SOPN pen    metFORMIN (GLUCOPHAGE) 1000 MG tablet    Type 2 diabetes mellitus with diabetic polyneuropathy, with long-term current use of insulin (HCC) (Chronic)      Borderline controlled, continue current medications and continue current treatment plan   Patient's diabetes control has greatly improved         Relevant Medications    blood glucose test strips (CONTOUR NEXT TEST) strip    insulin lispro (HUMALOG KWIKPEN) 200 UNIT/ML SOPN pen    blood glucose monitor kit and supplies    Lancets MISC    Insulin Pen Needle 32G X 4 MM MISC    metFORMIN (GLUCOPHAGE) 1000 MG tablet    blood glucose monitor strips    Other Relevant Orders    Basic Metabolic Panel    Hemoglobin A1C       Other    Hyperlipidemia      Well-controlled, continue current medications, medication adherence emphasized and lifestyle modifications recommended         Relevant Medications    atorvastatin (LIPITOR) 20 MG tablet    losartan (COZAAR) 100 MG tablet    warfarin (COUMADIN) 10 MG tablet    metoprolol tartrate (LOPRESSOR) 25 MG tablet    warfarin (COUMADIN) 5 MG tablet    furosemide (LASIX) 20 MG tablet    Other Relevant Orders    Lipid Panel    Hepatic Function Panel          Results for orders placed or performed in visit on 08/05/21   Microalbumin / Creatinine Urine Ratio   Result Value Ref Range    Microalbumin, Random Urine 17.40 0.00 - 19.00 mg/dL    Creatinine, Ur 72.6 4.2 - 622.0 mg/dL    Microalbumin Creatinine Ratio 239.7 mg/g   Basic Metabolic Panel   Result Value Ref Range    Sodium 138 136 - 145 mmol/L    Potassium 5.4 (H) 3.5 - 5.0 mmol/L    Chloride 99 98 - 111 mmol/L    CO2 27 22 - 29 mmol/L    Anion Gap 12 7 - 19 mmol/L    Glucose 184 (H) 74 - 109 mg/dL    BUN 27 (H) 6 - 20 mg/dL    CREATININE 0.9 0.5 - 1.2 mg/dL    GFR Non-African American >60 >60    GFR African American >59 >59    Calcium 9.9 8.6 - 10.0 mg/dL   CBC   Result Value Ref Range    WBC 7.0 4.8 - 10.8 K/uL    RBC 3.86 (L) 4.70 - 6.10 M/uL    Hemoglobin 12.2 (L) 14.0 - 18.0 g/dL    Hematocrit 36.2 (L) 42.0 - 52.0 %    MCV 93.8 80.0 - 94.0 fL    MCH 31.6 (H) 27.0 - 31.0 pg    MCHC 33.7 33.0 - 37.0 g/dL    RDW 13.4 11.5 - 14.5 %    Platelets 473 364 - 011 K/uL    MPV 11.3 9.4 - 12.4 fL   Hemoglobin A1C   Result Value Ref Range Hemoglobin A1C 8.4 (H) 4.0 - 6.0 %   Hepatic Function Panel   Result Value Ref Range    Total Protein 7.4 6.6 - 8.7 g/dL    Albumin 4.3 3.5 - 5.2 g/dL    Alkaline Phosphatase 121 40 - 130 U/L    ALT 34 5 - 41 U/L    AST 23 5 - 40 U/L    Total Bilirubin 0.7 0.2 - 1.2 mg/dL    Bilirubin, Direct 0.3 0.0 - 0.3 mg/dL    Bilirubin, Indirect 0.4 0.1 - 1.0 mg/dL   Lipid Panel   Result Value Ref Range    Cholesterol, Total 114 (L) 160 - 199 mg/dL    Triglycerides 95 0 - 149 mg/dL    HDL 38 (L) 55 - 121 mg/dL    LDL Calculated 57 <100 mg/dL       Return in about 4 months (around 1/3/2022). HPI  64-year-old male who presents for follow-up visit  Patient has increased compliance with diabetes with assistance from he has moved back home  Diabetes getting better controlled patient is suffering from complications such as diabetic foot right now stable and he has proliferative retinopathy getting Avastin injections had cataract surgery of his left eye and seeing better and he is getting injections and also will get cataract surgery of the right eye denies any hypoglycemic symptoms  Blood pressure in good control lipids in adhering to diet better and medication  Patient is managed by cardiology for heart failure and paroxysmal atrial fibrillation    Review of Systems   Constitutional: Negative for activity change, appetite change, chills, diaphoresis, fatigue and fever. HENT: Negative for congestion, hearing loss, postnasal drip, sore throat, tinnitus and trouble swallowing. Eyes: Positive for visual disturbance (says he has blurred vision). Respiratory: Negative for cough, chest tightness, shortness of breath, wheezing and stridor. Cardiovascular: Negative for chest pain, palpitations and leg swelling. Gastrointestinal: Negative for abdominal pain, blood in stool, constipation and diarrhea. Endocrine: Negative for cold intolerance. Genitourinary: Negative for frequency.    Musculoskeletal: Negative for arthralgias, back pain and joint swelling. Skin: Negative for color change and wound. Allergic/Immunologic: Negative for environmental allergies. Neurological: Negative for dizziness, light-headedness and headaches. Hematological: Negative for adenopathy. Does not bruise/bleed easily. Psychiatric/Behavioral: Negative for decreased concentration, dysphoric mood and sleep disturbance. The patient is not nervous/anxious. Physical Exam  Vitals and nursing note reviewed. Constitutional:       General: He is not in acute distress. Appearance: He is well-developed. HENT:      Head: Normocephalic and atraumatic. Right Ear: External ear normal.      Left Ear: External ear normal.      Nose: Nose normal.   Eyes:      General: No scleral icterus. Conjunctiva/sclera: Conjunctivae normal.      Pupils: Pupils are equal, round, and reactive to light. Neck:      Thyroid: No thyromegaly. Cardiovascular:      Rate and Rhythm: Normal rate and regular rhythm. Heart sounds: Normal heart sounds. No murmur heard. Pulmonary:      Effort: Pulmonary effort is normal.      Breath sounds: Normal breath sounds. No wheezing or rales. Abdominal:      General: Bowel sounds are normal. There is no distension. Palpations: Abdomen is soft. There is no mass. Tenderness: There is no abdominal tenderness. There is no rebound. Musculoskeletal:         General: No tenderness. Normal range of motion. Cervical back: Normal range of motion and neck supple. Lymphadenopathy:      Cervical: No cervical adenopathy. Skin:     General: Skin is warm and dry. Findings: No rash. Neurological:      Mental Status: He is alert and oriented to person, place, and time. Mental status is at baseline. Cranial Nerves: No cranial nerve deficit. Deep Tendon Reflexes: Reflexes normal.   Psychiatric:         Mood and Affect: Mood is depressed. Speech: Speech is delayed.

## 2021-09-02 NOTE — ASSESSMENT & PLAN NOTE
Borderline controlled, continue current medications and continue current treatment plan   Patient's diabetes control has greatly improved

## 2021-09-03 ENCOUNTER — ANTI-COAG VISIT (OUTPATIENT)
Dept: CARDIOLOGY CLINIC | Age: 56
End: 2021-09-03
Payer: MEDICARE

## 2021-09-03 ENCOUNTER — OFFICE VISIT (OUTPATIENT)
Dept: INTERNAL MEDICINE | Age: 56
End: 2021-09-03
Payer: MEDICARE

## 2021-09-03 VITALS
HEIGHT: 70 IN | BODY MASS INDEX: 33.56 KG/M2 | WEIGHT: 234.4 LBS | DIASTOLIC BLOOD PRESSURE: 90 MMHG | HEART RATE: 70 BPM | SYSTOLIC BLOOD PRESSURE: 142 MMHG | OXYGEN SATURATION: 98 %

## 2021-09-03 DIAGNOSIS — Z13.29 SCREENING FOR THYROID DISORDER: ICD-10-CM

## 2021-09-03 DIAGNOSIS — E11.621 DIABETIC ULCER OF RIGHT MIDFOOT ASSOCIATED WITH TYPE 2 DIABETES MELLITUS, WITH FAT LAYER EXPOSED (HCC): Primary | ICD-10-CM

## 2021-09-03 DIAGNOSIS — Z13.0 SCREENING FOR DEFICIENCY ANEMIA: ICD-10-CM

## 2021-09-03 DIAGNOSIS — I10 ESSENTIAL HYPERTENSION: ICD-10-CM

## 2021-09-03 DIAGNOSIS — L97.412 DIABETIC ULCER OF RIGHT MIDFOOT ASSOCIATED WITH TYPE 2 DIABETES MELLITUS, WITH FAT LAYER EXPOSED (HCC): Primary | ICD-10-CM

## 2021-09-03 DIAGNOSIS — E11.3593 PROLIFERATIVE DIABETIC RETINOPATHY OF BOTH EYES WITHOUT MACULAR EDEMA ASSOCIATED WITH TYPE 2 DIABETES MELLITUS (HCC): ICD-10-CM

## 2021-09-03 DIAGNOSIS — E11.610 CHARCOT FOOT DUE TO DIABETES MELLITUS (HCC): ICD-10-CM

## 2021-09-03 DIAGNOSIS — I48.21 PERMANENT ATRIAL FIBRILLATION (HCC): ICD-10-CM

## 2021-09-03 DIAGNOSIS — Z79.4 TYPE 2 DIABETES MELLITUS WITH DIABETIC POLYNEUROPATHY, WITH LONG-TERM CURRENT USE OF INSULIN (HCC): Chronic | ICD-10-CM

## 2021-09-03 DIAGNOSIS — I48.21 PERMANENT ATRIAL FIBRILLATION (HCC): Primary | ICD-10-CM

## 2021-09-03 DIAGNOSIS — I50.30 HEART FAILURE WITH PRESERVED EJECTION FRACTION, UNSPECIFIED HF CHRONICITY (HCC): ICD-10-CM

## 2021-09-03 DIAGNOSIS — E78.2 MIXED HYPERLIPIDEMIA: ICD-10-CM

## 2021-09-03 DIAGNOSIS — E11.42 TYPE 2 DIABETES MELLITUS WITH DIABETIC POLYNEUROPATHY, WITH LONG-TERM CURRENT USE OF INSULIN (HCC): Chronic | ICD-10-CM

## 2021-09-03 LAB
INTERNATIONAL NORMALIZATION RATIO, POC: 3.1
PROTHROMBIN TIME, POC: NORMAL

## 2021-09-03 PROCEDURE — 1036F TOBACCO NON-USER: CPT | Performed by: INTERNAL MEDICINE

## 2021-09-03 PROCEDURE — 3052F HG A1C>EQUAL 8.0%<EQUAL 9.0%: CPT | Performed by: INTERNAL MEDICINE

## 2021-09-03 PROCEDURE — G8417 CALC BMI ABV UP PARAM F/U: HCPCS | Performed by: INTERNAL MEDICINE

## 2021-09-03 PROCEDURE — 99214 OFFICE O/P EST MOD 30 MIN: CPT | Performed by: INTERNAL MEDICINE

## 2021-09-03 PROCEDURE — 85610 PROTHROMBIN TIME: CPT | Performed by: CLINICAL NURSE SPECIALIST

## 2021-09-03 PROCEDURE — 3017F COLORECTAL CA SCREEN DOC REV: CPT | Performed by: INTERNAL MEDICINE

## 2021-09-03 PROCEDURE — G8427 DOCREV CUR MEDS BY ELIG CLIN: HCPCS | Performed by: INTERNAL MEDICINE

## 2021-09-03 PROCEDURE — 2022F DILAT RTA XM EVC RTNOPTHY: CPT | Performed by: INTERNAL MEDICINE

## 2021-09-03 ASSESSMENT — ENCOUNTER SYMPTOMS
BLOOD IN STOOL: 0
BACK PAIN: 0
COUGH: 0
CHEST TIGHTNESS: 0
STRIDOR: 0
SORE THROAT: 0
DIARRHEA: 0
CONSTIPATION: 0
TROUBLE SWALLOWING: 0
SHORTNESS OF BREATH: 0
WHEEZING: 0
ABDOMINAL PAIN: 0
COLOR CHANGE: 0

## 2021-09-03 ASSESSMENT — PATIENT HEALTH QUESTIONNAIRE - PHQ9
SUM OF ALL RESPONSES TO PHQ9 QUESTIONS 1 & 2: 0
1. LITTLE INTEREST OR PLEASURE IN DOING THINGS: 0
SUM OF ALL RESPONSES TO PHQ QUESTIONS 1-9: 0
2. FEELING DOWN, DEPRESSED OR HOPELESS: 0

## 2021-09-03 NOTE — ASSESSMENT & PLAN NOTE
Monitored by specialist- no acute findings meriting change in the plan   He is seeing better out of his left eye undergoing surgery and injections in the right eye

## 2021-09-15 ENCOUNTER — OFFICE VISIT (OUTPATIENT)
Dept: UROLOGY | Age: 56
End: 2021-09-15
Payer: MEDICARE

## 2021-09-15 ENCOUNTER — CARE COORDINATION (OUTPATIENT)
Dept: CARE COORDINATION | Age: 56
End: 2021-09-15

## 2021-09-15 VITALS
BODY MASS INDEX: 32.34 KG/M2 | HEIGHT: 71 IN | SYSTOLIC BLOOD PRESSURE: 140 MMHG | DIASTOLIC BLOOD PRESSURE: 74 MMHG | WEIGHT: 231 LBS

## 2021-09-15 DIAGNOSIS — N40.1 BENIGN PROSTATIC HYPERPLASIA WITH INCOMPLETE BLADDER EMPTYING: Primary | ICD-10-CM

## 2021-09-15 DIAGNOSIS — R39.14 BENIGN PROSTATIC HYPERPLASIA WITH INCOMPLETE BLADDER EMPTYING: Primary | ICD-10-CM

## 2021-09-15 LAB
APPEARANCE FLUID: CLEAR
BILIRUBIN, POC: NORMAL
BLOOD URINE, POC: NORMAL
CLARITY, POC: CLEAR
COLOR, POC: YELLOW
GLUCOSE URINE, POC: 100
KETONES, POC: NORMAL
LEUKOCYTE EST, POC: NORMAL
NITRITE, POC: NORMAL
PH, POC: 5
PROTEIN, POC: 30
SPECIFIC GRAVITY, POC: 1.02
UROBILINOGEN, POC: 1

## 2021-09-15 PROCEDURE — 51798 US URINE CAPACITY MEASURE: CPT | Performed by: NURSE PRACTITIONER

## 2021-09-15 PROCEDURE — 99213 OFFICE O/P EST LOW 20 MIN: CPT | Performed by: NURSE PRACTITIONER

## 2021-09-15 PROCEDURE — 81002 URINALYSIS NONAUTO W/O SCOPE: CPT | Performed by: NURSE PRACTITIONER

## 2021-09-15 RX ORDER — TAMSULOSIN HYDROCHLORIDE 0.4 MG/1
0.8 CAPSULE ORAL DAILY
Qty: 60 CAPSULE | Refills: 11 | Status: SHIPPED | OUTPATIENT
Start: 2021-09-15 | End: 2022-03-08

## 2021-09-15 ASSESSMENT — ENCOUNTER SYMPTOMS
BACK PAIN: 0
NAUSEA: 0
ABDOMINAL PAIN: 0
ABDOMINAL DISTENTION: 0
VOMITING: 0

## 2021-09-15 NOTE — CARE COORDINATION
Date/Time:  9/16/2021 12:34 PM  Attempted to reach patient by telephone. Unable to reach patient. Date/Time:  9/15/2021 11:22 AM  Attempted to reach patient by telephone. Left HIPAA compliant message requesting a return call.

## 2021-09-15 NOTE — PROGRESS NOTES
Preeti Shaikh is a 60 y.o. male who presents today   Chief Complaint   Patient presents with    Follow-up     UTI       Benign Prostatic Hypertrophy  Patient complains of lower urinary tract symptoms. He reports incomplete emptying, straining, urgency and weak stream. He denies frequency, intermittency and nocturia one time a night. Patient states symptoms are of moderate severity. Onset of symptoms was several years ago and was gradual in onset. His AUA Symptom Score is, 10/35 manifested as irritative symptoms including urgency and obstructive symptoms including incomplete emptying, weak stream, straining. He has no personal history and no family history of prostate cancer. He reports a history of no complicating symptoms. He denies flank pain, gross hematuria, kidney stones and recurrent UTI. Currently maintained on Flomax 0.8 mg daily. History of diabetes and bilateral peripheral neuropathy. Last A1c 8.4. Some complaints of hesitancy. He is currently happy with his voiding symptoms. Occasionally he cannot feel the urge to void.     Past Medical History:   Diagnosis Date    Acalculous cholecystitis 12/29/2015    Allergic rhinitis, cause unspecified     Atrial fibrillation, chronic ([address]     sees [facility] cardiology    Charcot's joint of right foot     Chicken pox     Closed supracondylar fracture of elbow     fall    Diabetic ulcer of right midfoot associated with type 2 diabetes mellitus, with fat layer exposed ([address] 9/14/2020    History of MRSA infection     scalp/facial and on back    HTN (hypertension)     Hyperlipidemia     Impotence of organic origin     MDRO (multiple drug resistant organisms) resistance     Other dyspnea and respiratory abnormality     Other specified erythematous condition(395.89)     Personal history of other infectious and parasitic disease     Salmonella     Type II or unspecified type diabetes mellitus without mention of complication, uncontrolled 1994       Past Surgical History:   Procedure Laterality Date    CARDIOVERSION  03/2017    CATARACT REMOVAL      COLONOSCOPY  02/24/2020    Dr RITIKA Fraire-Melanosis coli throughout the entire colon-AP, 5 yr recall    HUMERUS FRACTURE SURGERY Left 9/17/2020    OPEN REDUCTION INTERNAL FIXATION LEFT SUPRACONDYLAR FRACTURE performed by Earnest Saleem MD at [address]      X 2    TONSILLECTOMY         Current Outpatient Medications   Medication Sig Dispense Refill    tamsulosin (FLOMAX) 0.4 MG capsule Take 2 capsules by mouth daily 60 capsule 11    blood glucose monitor strips Test 3 times a day & as needed for symptoms of irregular blood glucose. Dispense sufficient amount for indicated testing frequency plus additional to accommodate PRN testing needs. 200 strip 3    furosemide (LASIX) 20 MG tablet TAKE ONE TABLET DAILY AS NEEDED 90 tablet 3    warfarin (COUMADIN) 5 MG tablet Take 3 tabs on Thursday's and Sunday's only 30 tablet 5    Insulin Pen Needle 32G X 4 MM MISC 1 each by Does not apply route daily 100 each 3    metoprolol tartrate (LOPRESSOR) 25 MG tablet TAKE ONE TABLET TWICE DAILY 60 tablet 5    warfarin (COUMADIN) 10 MG tablet TAKE ONE TABLET DAILY 45 tablet 2    blood glucose monitor kit and supplies Dispense sufficient amount for indicated testing frequency plus additional to accommodate PRN testing needs.  1 kit 0    Lancets MISC 1 each by Does not apply route 3 times daily (with meals) 300 each 3    insulin lispro (HUMALOG KWIKPEN) 200 UNIT/ML SOPN pen Sliding scale 160-179 2 units  180-199 3 units  200-219 4 units  220-239 5 units  240-259 6 units   260-279 7 units  280-299 8 units  300-399 9 units  340-379 10 units  380-420 11 units 3 pen 5    vitamin D (ERGOCALCIFEROL) 1.25 MG (48481 UT) CAPS capsule Take 50,000 Units by mouth once a week      losartan (COZAAR) 100 MG tablet TAKE ONE TABLET DAILY 90 tablet 3    atorvastatin (LIPITOR) 20 MG tablet TAKE ONE TABLET DAILY (Patient taking differently: Take 20 mg by mouth daily ) 90 tablet 3    diphenhydrAMINE (BENADRYL) 25 MG tablet Take 25 mg by mouth every 6 hours as needed for Itching      blood glucose test strips (CONTOUR NEXT TEST) strip 1 each by In Vitro route 4 times daily As needed. 200 each 3    metFORMIN (GLUCOPHAGE) 1000 MG tablet Take 1 tablet by mouth 2 times daily (with meals) 180 tablet 3     No current facility-administered medications for this visit. Allergies   Allergen Reactions    Pcn [Penicillins]     Neomycin-Bacitracin Zn-Polymyx Rash    Neosporin [Neomycin-Polymyxin-Gramicidin] Rash       Social History     Socioeconomic History    Marital status:      Spouse name: None    Number of children: None    Years of education: None    Highest education level: None   Occupational History    None   Tobacco Use    Smoking status: Never Smoker    Smokeless tobacco: Never Used   Vaping Use    Vaping Use: Never used   Substance and Sexual Activity    Alcohol use: No    Drug use: No    Sexual activity: None   Other Topics Concern    None   Social History Narrative    Referred to  for assistance with Meals on Wheels     Social Determinants of Health     Financial Resource Strain: High Risk    Difficulty of Paying Living Expenses: Hard   Food Insecurity: Food Insecurity Present    Worried About Running Out of Food in the Last Year: Sometimes true    Zoran of Food in the Last Year: Sometimes true   Transportation Needs: No Transportation Needs    Lack of Transportation (Medical): No    Lack of Transportation (Non-Medical):  No   Physical Activity:     Days of Exercise per Week:     Minutes of Exercise per Session:    Stress:     Feeling of Stress :    Social Connections:     Frequency of Communication with Friends and Family:     Frequency of Social Gatherings with Friends and Family:     Attends Anab[employer]ist Services:     Active Member of Clubs or Organizations:     normal.       DATA:    Results for orders placed or performed in visit on 09/15/21   POCT Urinalysis no Micro   Result Value Ref Range    Color, UA yellow     Clarity, UA clear     Glucose, UA      Bilirubin, UA neg     Ketones, UA neg     Spec Grav, UA 1.020     Blood, UA POC neg     pH, UA 5.0     Protein, UA POC 30     Urobilinogen, UA 1.0     Leukocytes, UA neg     Nitrite, UA neg     Appearance, Fluid Clear Clear, Slightly Cloudy     Lab Results   Component Value Date    PSA 0.24 03/15/2021    PSA 0.47 05/09/2019       Bladder Scan interpretation  Estimation of residual urine via abdominal ultrasound  Residual Urine: 55 ml  Indication: bph  Position: Supine  Examination: Incremental scanning of the suprapubic area using 3 MHz transducer using copious amounts of acoustic gel. Findings: An anechoic area was demonstrated which represented the bladder, with measurement of residual urine as noted. 1. Benign prostatic hyperplasia with incomplete bladder emptying  At last visit patient's residual was 170 mL PVR. Today bladder scan is 55 mL PVR. Last CRISTOFER revealed mildly enlarged prostate. Currently maintained on Flomax 0.8 mg daily. Is currently happy with his voiding symptoms and is emptying his bladder per bladder scan today. He may have partially a diabetic bladder that may be contributing to his symptoms. We will continue to monitor this. He will follow-up in 6 months with PSA and will need CRISTOFER at that time. - POCT Urinalysis no Micro  - UT MEASUREMENT,POST-VOID RESIDUAL VOLUME BY US,NON-IMAGING      Orders Placed This Encounter   Procedures    POCT Urinalysis no Micro    UT MEASUREMENT,POST-VOID RESIDUAL VOLUME BY US,NON-IMAGING     55ml        Return in about 6 months (around 3/15/2022) for PSA prior.     All information inputted into the note by the MA to include chief complaint, past medical history, past surgical history, medications, allergies, social and family history and review of systems has been reviewed and updated as needed by me. EMR Dragon/transcription disclaimer: Much of this documentt is electronic  transcription/translation of spoken language to printed text. The  electronic translation of spoken language may be erroneous, or at times,  nonsensical words or phrases may be inadvertently transcribed.  Although I  have reviewed the document for such errors, some may still exist.

## 2021-09-20 RX ORDER — WARFARIN SODIUM 10 MG/1
TABLET ORAL
Qty: 45 TABLET | Refills: 2 | Status: ON HOLD | OUTPATIENT
Start: 2021-09-20 | End: 2021-11-02 | Stop reason: SDUPTHER

## 2021-09-29 DIAGNOSIS — E78.2 MIXED HYPERLIPIDEMIA DUE TO TYPE 2 DIABETES MELLITUS (HCC): Primary | ICD-10-CM

## 2021-09-29 DIAGNOSIS — E11.69 MIXED HYPERLIPIDEMIA DUE TO TYPE 2 DIABETES MELLITUS (HCC): Primary | ICD-10-CM

## 2021-09-29 RX ORDER — ATORVASTATIN CALCIUM 20 MG/1
TABLET, FILM COATED ORAL
Qty: 90 TABLET | Refills: 1 | Status: SHIPPED | OUTPATIENT
Start: 2021-09-29 | End: 2022-04-27

## 2021-09-29 NOTE — TELEPHONE ENCOUNTER
[facility] called requesting a refill of the below medication which has been pended for you:     Requested Prescriptions     Pending Prescriptions Disp Refills    atorvastatin (LIPITOR) 20 MG tablet [Pharmacy Med Name: ATORVASTATIN CALCIUM 20MG TABLET] 90 tablet 1     Sig: TAKE ONE TABLET DAILY       Last Appointment Date: 9/3/2021  Next Appointment Date: 1/3/2022    Allergies   Allergen Reactions    Pcn [Penicillins]     Neomycin-Bacitracin Zn-Polymyx Rash    Neosporin [Neomycin-Polymyxin-Gramicidin] Rash

## 2021-10-05 ENCOUNTER — OFFICE VISIT (OUTPATIENT)
Dept: CARDIOLOGY CLINIC | Age: 56
End: 2021-10-05
Payer: MEDICARE

## 2021-10-05 VITALS
DIASTOLIC BLOOD PRESSURE: 68 MMHG | BODY MASS INDEX: 33.36 KG/M2 | HEART RATE: 59 BPM | OXYGEN SATURATION: 100 % | SYSTOLIC BLOOD PRESSURE: 122 MMHG | HEIGHT: 70 IN | WEIGHT: 233 LBS

## 2021-10-05 DIAGNOSIS — I48.21 PERMANENT ATRIAL FIBRILLATION (HCC): Primary | ICD-10-CM

## 2021-10-05 DIAGNOSIS — E78.2 MIXED HYPERLIPIDEMIA: ICD-10-CM

## 2021-10-05 DIAGNOSIS — R06.09 DYSPNEA ON EXERTION: ICD-10-CM

## 2021-10-05 DIAGNOSIS — M54.2 NECK PAIN: ICD-10-CM

## 2021-10-05 DIAGNOSIS — E11.42 TYPE 2 DIABETES MELLITUS WITH DIABETIC POLYNEUROPATHY, WITH LONG-TERM CURRENT USE OF INSULIN (HCC): ICD-10-CM

## 2021-10-05 DIAGNOSIS — I10 PRIMARY HYPERTENSION: ICD-10-CM

## 2021-10-05 DIAGNOSIS — I50.42 CHRONIC COMBINED SYSTOLIC AND DIASTOLIC HEART FAILURE (HCC): ICD-10-CM

## 2021-10-05 DIAGNOSIS — Z79.4 TYPE 2 DIABETES MELLITUS WITH DIABETIC POLYNEUROPATHY, WITH LONG-TERM CURRENT USE OF INSULIN (HCC): ICD-10-CM

## 2021-10-05 LAB
INTERNATIONAL NORMALIZATION RATIO, POC: 2.9
PROTHROMBIN TIME, POC: 31.6

## 2021-10-05 PROCEDURE — 3044F HG A1C LEVEL LT 7.0%: CPT | Performed by: CLINICAL NURSE SPECIALIST

## 2021-10-05 PROCEDURE — 99214 OFFICE O/P EST MOD 30 MIN: CPT | Performed by: CLINICAL NURSE SPECIALIST

## 2021-10-05 PROCEDURE — 2022F DILAT RTA XM EVC RTNOPTHY: CPT | Performed by: CLINICAL NURSE SPECIALIST

## 2021-10-05 PROCEDURE — 85610 PROTHROMBIN TIME: CPT | Performed by: CLINICAL NURSE SPECIALIST

## 2021-10-05 PROCEDURE — G8417 CALC BMI ABV UP PARAM F/U: HCPCS | Performed by: CLINICAL NURSE SPECIALIST

## 2021-10-05 PROCEDURE — 1036F TOBACCO NON-USER: CPT | Performed by: CLINICAL NURSE SPECIALIST

## 2021-10-05 PROCEDURE — 3017F COLORECTAL CA SCREEN DOC REV: CPT | Performed by: CLINICAL NURSE SPECIALIST

## 2021-10-05 PROCEDURE — G8484 FLU IMMUNIZE NO ADMIN: HCPCS | Performed by: CLINICAL NURSE SPECIALIST

## 2021-10-05 PROCEDURE — G8427 DOCREV CUR MEDS BY ELIG CLIN: HCPCS | Performed by: CLINICAL NURSE SPECIALIST

## 2021-10-05 ASSESSMENT — ENCOUNTER SYMPTOMS
NAUSEA: 0
ABDOMINAL PAIN: 0
EYE REDNESS: 0
CHEST TIGHTNESS: 0
SHORTNESS OF BREATH: 1
FACIAL SWELLING: 0
WHEEZING: 0
VOMITING: 0
COUGH: 0

## 2021-10-05 NOTE — PATIENT INSTRUCTIONS
Return in about 6 months (around 4/5/2022) for Dr Digna Harris. Continue same Coumadin and repeat INR in 4 weeks  [address] Nuclear Stress Test     Date/Time:    [place] [facility] and [address] [facility] located on the first floor of [place] [address] through hospital main entrance and turn immediately to your left. Test Description:  There are two parts to a Lexiscan stress test: the rest portion and the exercise portion. For the rest portion, a radioactive tracer is injected into your arm through the IV. After 30 to 60 minutes, the process of imaging will begin. A nuclear camera will be placed on your chest area and images are taken for the next 15 to 20 minutes. For the exercise portion, a nurse will attach EKG electrodes to your chest to monitor your heart rate. The drug Ibrahima Lorelei is administered to simulate stress on the heart. Your heart rhythm will then be monitored for the next few minutes. Your blood pressure will also be monitored throughout the exercise portion. [place] through the exercise portion, a second round of radioactive tracer is injected into your body. Your heart rate and EKG will be monitored for another few minutes after administering the drug. Test Preparation:     Bring a list of your current medications. Do not take any of your medications the morning of the test, but bring all morning medications with you as you will take them after the stress portion of the test is completed.  No caffeine 12 hours prior to the testing. This includes: coffee, pop/soda, chocolate, cold medications, etc.  Any product that might contain caffeine.  No nicotine or alcohol 12 hours prior to your test.    Nothing to eat or drink 4-6 hours prior to appointment time. It is okay to drink small amounts of water during the four hours prior to the test.   Nitroglycerin patches must be taken off 1 hour before testing.      Wear comfortable clothing.  Please refrain from any strenuous exercise or activities the day before your test, or the day of your test.   The Nuclear Lexiscan Stress test takes about 2 ½ to 3 hours to complete. If for any reason you are unable to keep this appointment, please contact Outpatient Scheduling, [phone], as soon as possible to reschedule.

## 2021-10-05 NOTE — PROGRESS NOTES
04/19/2021    Hypoglycemic encephalopathy     Left-sided weakness 04/14/2021    (HFpEF) heart failure with preserved ejection fraction ([address] 02/21/2021    Permanent atrial fibrillation ([address] 02/18/2021    Diabetic ulcer of right midfoot associated with type 2 diabetes mellitus, with fat layer exposed ([address] 09/14/2020    Charcot foot due to diabetes mellitus ([address] 06/26/2020    Personal history of noncompliance with medical treatment and regimen 06/25/2020    Class 2 obesity due to excess calories in adult 06/12/2020    Hyponatremia 03/21/2019    Type 2 diabetes mellitus with diabetic polyneuropathy, with long-term current use of insulin ([address] 03/21/2019    Shortness of breath 12/18/2014    Fatigue 12/18/2014    HTN (hypertension)     Hyperlipidemia      Past Medical History:   Diagnosis Date    Acalculous cholecystitis 12/29/2015    Allergic rhinitis, cause unspecified     Atrial fibrillation, chronic ([address]     sees [facility]ology    Charcot's joint of right foot     Chicken pox     Closed supracondylar fracture of elbow     fall    Diabetic ulcer of right midfoot associated with type 2 diabetes mellitus, with fat layer exposed ([address] 9/14/2020    History of MRSA infection     scalp/facial and on back    HTN (hypertension)     Hyperlipidemia     Impotence of organic origin     MDRO (multiple drug resistant organisms) resistance     Other dyspnea and respiratory abnormality     Other specified erythematous condition(695.89)     Personal history of other infectious and parasitic disease     Salmonella     Type II or unspecified type diabetes mellitus without mention of complication, uncontrolled 1994     Past Surgical History:   Procedure Laterality Date    CARDIOVERSION  03/2017    CATARACT REMOVAL      COLONOSCOPY  02/24/2020    Dr Elmer Fraire-Melanosis coli throughout the entire colon-AP, 5 yr recall    HUMERUS FRACTURE SURGERY Left 9/17/2020    OPEN REDUCTION INTERNAL FIXATION LEFT SUPRACONDYLAR FRACTURE performed by Mary Jarvis MD at [facility] MALIGNANT SKIN LESION EXCISION      X 2    TONSILLECTOMY       Family History   Problem Relation Age of Onset    Stroke Maternal Grandmother     Cancer Maternal Grandmother     Stroke Paternal Grandmother     Diabetes Paternal Grandmother     Cancer Father     Heart Disease Father     Lung Cancer Father     Asthma Paternal Grandfather     Heart Disease Paternal Grandfather     Heart Disease Maternal Grandfather     Colon Cancer Neg Hx     Colon Polyps Neg Hx     Esophageal Cancer Neg Hx     Liver Cancer Neg Hx     Liver Disease Neg Hx     Rectal Cancer Neg Hx     Stomach Cancer Neg Hx      Social History     Tobacco Use    Smoking status: Never Smoker    Smokeless tobacco: Never Used   Substance Use Topics    Alcohol use: No      Current Outpatient Medications   Medication Sig Dispense Refill    atorvastatin (LIPITOR) 20 MG tablet TAKE ONE TABLET DAILY  90 tablet 1    warfarin (COUMADIN) 10 MG tablet TAKE ONE TABLET DAILY  45 tablet 2    tamsulosin (FLOMAX) 0.4 MG capsule Take 2 capsules by mouth daily 60 capsule 11    blood glucose monitor strips Test 3 times a day & as needed for symptoms of irregular blood glucose. Dispense sufficient amount for indicated testing frequency plus additional to accommodate PRN testing needs.  200 strip 3    furosemide (LASIX) 20 MG tablet TAKE ONE TABLET DAILY AS NEEDED 90 tablet 3    warfarin (COUMADIN) 5 MG tablet Take 3 tabs on Thursday's and Sunday's only 30 tablet 5    Insulin Pen Needle 32G X 4 MM MISC 1 each by Does not apply route daily 100 each 3    metFORMIN (GLUCOPHAGE) 1000 MG tablet Take 1 tablet by mouth 2 times daily (with meals) 180 tablet 3    metoprolol tartrate (LOPRESSOR) 25 MG tablet TAKE ONE TABLET TWICE DAILY 60 tablet 5    blood glucose monitor kit and supplies Dispense sufficient amount for indicated testing frequency plus additional to accommodate PRN testing needs. 1 kit 0    Lancets MISC 1 each by Does not apply route 3 times daily (with meals) 300 each 3    insulin lispro (HUMALOG KWIKPEN) 200 UNIT/ML SOPN pen Sliding scale 160-179 2 units  180-199 3 units  200-219 4 units  220-239 5 units  240-259 6 units   260-279 7 units  280-299 8 units  300-399 9 units  340-379 10 units  380-420 11 units 3 pen 5    vitamin D (ERGOCALCIFEROL) 1.25 MG (30969 UT) CAPS capsule Take 50,000 Units by mouth once a week      losartan (COZAAR) 100 MG tablet TAKE ONE TABLET DAILY 90 tablet 3    diphenhydrAMINE (BENADRYL) 25 MG tablet Take 25 mg by mouth every 6 hours as needed for Itching      blood glucose test strips (CONTOUR NEXT TEST) strip 1 each by In Vitro route 4 times daily As needed. 200 each 3     No current facility-administered medications for this visit. Allergies: Pcn [penicillins], Neomycin-bacitracin zn-polymyx, and Neosporin [neomycin-polymyxin-gramicidin]    Review of Systems  Review of Systems   Constitutional: Negative for activity change, diaphoresis, fatigue, fever and unexpected weight change. HENT: Negative for facial swelling and nosebleeds. Eyes: Negative for redness and visual disturbance. Respiratory: Positive for shortness of breath (exertional, seems worse recently). Negative for cough, chest tightness and wheezing. Cardiovascular: Negative for chest pain, palpitations and leg swelling. Gastrointestinal: Negative for abdominal pain, nausea and vomiting. Endocrine: Negative for cold intolerance and heat intolerance. Genitourinary: Negative for dysuria and hematuria. Musculoskeletal: Positive for neck pain (and weakness). Negative for arthralgias and myalgias. Skin: Negative for pallor and rash. Neurological: Positive for dizziness. Negative for seizures, syncope, weakness and light-headedness. Hematological: Does not bruise/bleed easily. Psychiatric/Behavioral: Negative for agitation.  The patient is not nervous/anxious. Objective  Vital Signs - /68 (Site: Right Upper Arm, Position: Standing)   Pulse 59   Ht 5' 10\" (1.778 m)   Wt 233 lb (105.7 kg)   SpO2 100%   BMI 33.43 kg/m²    Vitals:    10/05/21 1010 10/05/21 1159 10/05/21 1200   BP: 138/70 128/70 122/68   Site:  Right Upper Arm Right Upper Arm   Position:  Sitting Standing   Pulse: 59     SpO2: 100%     Weight: 233 lb (105.7 kg)     Height: 5' 10\" (1.778 m)       Physical Exam  Vitals and nursing note reviewed. Constitutional:       General: He is not in acute distress. Appearance: Normal appearance. He is well-developed. He is not diaphoretic. HENT:      Head: Normocephalic and atraumatic. Right Ear: Hearing and external ear normal.      Left Ear: Hearing and external ear normal.      Nose: Nose normal.   Eyes:      General:         Right eye: No discharge. Left eye: No discharge. Pupils: Pupils are equal, round, and reactive to light. Neck:      Thyroid: No thyromegaly. Vascular: No carotid bruit or JVD. Trachea: No tracheal deviation. Cardiovascular:      Rate and Rhythm: Normal rate. Rhythm irregular. Heart sounds: Normal heart sounds. No murmur heard. No friction rub. No gallop. Pulmonary:      Effort: Pulmonary effort is normal. No respiratory distress. Breath sounds: Normal breath sounds. No wheezing or rales. Abdominal:      Palpations: Abdomen is soft. Tenderness: There is no abdominal tenderness. Musculoskeletal:         General: No swelling or deformity. Cervical back: Neck supple. No muscular tenderness. Right lower leg: Edema (trace) present. Left lower leg: Edema (trace) present. Skin:     General: Skin is warm and dry. Findings: No rash. Neurological:      General: No focal deficit present. Mental Status: He is alert and oriented to person, place, and time. Cranial Nerves: No cranial nerve deficit.    Psychiatric:         Mood and Affect: Mood normal.         Behavior: Behavior normal.         Judgment: Judgment normal.         Data:  Lab Results   Component Value Date    WBC 7.0 09/27/2021    RBC 3.80 09/27/2021    HGB 12.3 09/27/2021    HCT 37.5 09/27/2021     09/27/2021      Lab Results   Component Value Date    CHOL 105 09/27/2021    TRIG 104 09/27/2021    HDL 35 09/27/2021    LDLCALC 49 09/27/2021     Lab Results   Component Value Date     09/27/2021    K 4.2 09/27/2021    K 4.5 04/14/2021     09/27/2021    CO2 22 09/27/2021    GLUCOSE 213 09/27/2021    BUN 18 09/27/2021    CREATININE 0.9 09/27/2021    CALCIUM 9.1 09/27/2021    ALT 29 09/27/2021    AST 24 09/27/2021     Lab Results   Component Value Date    TSH 1.520 09/27/2021     Echo 4/14/21   Conclusions      Summary   Normal left ventricular size and systolic function EF 65-53%   Moderate concentric left ventricular hypertrophy with transmitral Doppler   suggesting severe/restrictive [grade 3] diastolic dysfunction   Moderate left atrial enlargement   Nodular sclerosis of a tricuspid aortic valve with adequate cusp   separation and neither stenosis or insufficiency   Mild thickening but normally mobile mitral valve with mild posteriorly   directed regurgitation   Nonvisualization pulmonic valve   Moderate right atrial enlargement with mild right ventricular enlargement   and preserved systolic function   Mild to moderate tricuspid regurgitation with moderate pulmonary   hypertension RVSP estimate 56 mmHg   Less than 50% inspiratory collapse of the IVC consistent with mild   elevation of right atrial filling pressures of 10-15 mmHg   No significant pericardial effusion   Aortic root and ascending segments measured within normal limits   Injection of agitated saline with or without Valsalva maneuver   demonstrates no intracardiac communication   Definity contrast utilized to better define endocardial borders     DSE 2018  Conclusions      Summary   Dobutamine stress echocardiogram without clinical, electrocardiographic,   or echocardiographic evidence of myocardial ischemia. Test was supervised by Dr. Mary Landeros. Signature      ----------------------------------------------------------------   Electronically signed by Shiloh Lee MD(Interpreting   physician) on 11/07/2018 02:11 PM   ----------------------------------------------------------------    Assessment:     Diagnosis Orders   1. Permanent atrial fibrillation (HCC)  POCT INR   2. Dyspnea on exertion  NM MYOCARDIAL SPECT REST EXERCISE OR RX   3. Neck pain     4. Chronic combined systolic and diastolic heart failure ([address]     5. Primary hypertension     6. Mixed hyperlipidemia     7. Type 2 diabetes mellitus with diabetic polyneuropathy, with long-term current use of insulin (HCC)         Permanent atrial fibrillationrate controlled with metoprolol and anticoagulated with Coumadin. INR is therapeutic today at 2.9. Continue same Coumadin dosing and repeat INR in 4 weeks    Dyspnea on exertion/neck painrule out angina equivalent with a Lexiscan nuclear stress test    Chronic combined systolic and diastolic heart failureEF with some mild depression during hospitalization in April 45 to 50% with grade 3 diastolic dysfunction. Patient is on losartan and metoprolol. He appears euvolemic today. Encouraged monitoring weight daily, reporting sudden weight gain and low-sodium diet. Checking an ischemic study due to complaints of dyspnea on exertion    Hypertensionstable on current regimen with losartan and metoprolol    Hyperlipidemiastable on statin therapy with atorvastatin    Diabeteslast A1c 6.9. He is achieving better control . Hospitalized in April with low blood sugars    Stable cardiovascular status. No evidence of overt heart failure,angina or dysrhythmia.      Plan    Orders Placed This Encounter   Procedures    NM MYOCARDIAL SPECT REST EXERCISE OR RX     With myocardial perfusion study with sestamibi Standing Status:   Future     Standing Expiration Date:   10/5/2022     Order Specific Question:   Reason for Exam?     Answer:   Shortness of breath     Order Specific Question:   Procedure Type     Answer:   Rx     Order Specific Question:   Reason for exam:     Answer:   CAD, known or r/o; No acute coronary syndrome; Angina syndrome; Angina equivalent; No exercise intolerance    POCT INR     Return in about 6 months (around 4/5/2022) for Dr Digna Harris. Continue same Coumadin and repeat INR in 4 weeks  Lexiscan Nuclear Stress Test    Call with any questionsor concerns  Follow up with Nile Araujo MD for non cardiac problems  Report any new problems  Cardiovascular Fitness-Exercise as tolerated. Strive for 15 minutes of exercise most days of the week. Cardiac / HealthyDiet  Continue current medications as directed  Continue plan of treatment  It is always recommended that you bring your medicationsbottles with you to each visit - this is for your safety!        NOMI Desai

## 2021-10-20 ENCOUNTER — HOSPITAL ENCOUNTER (OUTPATIENT)
Dept: NUCLEAR MEDICINE | Age: 56
Discharge: HOME | End: 2021-10-22
Payer: MEDICARE

## 2021-10-20 DIAGNOSIS — R06.09 DYSPNEA ON EXERTION: ICD-10-CM

## 2021-10-20 LAB
LV EF: 53 %
LVEF MODALITY: NORMAL

## 2021-10-20 PROCEDURE — 3430000000 HC RX DIAGNOSTIC RADIOPHARMACEUTICAL: Performed by: CLINICAL NURSE SPECIALIST

## 2021-10-20 PROCEDURE — A9500 TC99M SESTAMIBI: HCPCS | Performed by: CLINICAL NURSE SPECIALIST

## 2021-10-20 PROCEDURE — G1010 CDSM STANSON: HCPCS

## 2021-10-20 PROCEDURE — 6360000002 HC RX W HCPCS: Performed by: CLINICAL NURSE SPECIALIST

## 2021-10-20 RX ADMIN — TETRAKIS(2-METHOXYISOBUTYLISOCYANIDE)COPPER(I) TETRAFLUOROBORATE 30 MILLICURIE: 1 INJECTION, POWDER, LYOPHILIZED, FOR SOLUTION INTRAVENOUS at 14:30

## 2021-10-20 RX ADMIN — REGADENOSON 0.4 MG: 0.08 INJECTION, SOLUTION INTRAVENOUS at 13:07

## 2021-10-20 RX ADMIN — TETRAKIS(2-METHOXYISOBUTYLISOCYANIDE)COPPER(I) TETRAFLUOROBORATE 10 MILLICURIE: 1 INJECTION, POWDER, LYOPHILIZED, FOR SOLUTION INTRAVENOUS at 14:29

## 2021-10-25 ENCOUNTER — APPOINTMENT (OUTPATIENT)
Dept: GENERAL RADIOLOGY | Age: 56
DRG: 580 | End: 2021-10-25
Payer: MEDICARE

## 2021-10-25 ENCOUNTER — NURSE TRIAGE (OUTPATIENT)
Dept: OTHER | Facility: CLINIC | Age: 56
End: 2021-10-25

## 2021-10-25 ENCOUNTER — HOSPITAL ENCOUNTER (INPATIENT)
Age: 56
LOS: 8 days | Discharge: HOME | DRG: 580 | End: 2021-11-02
Attending: EMERGENCY MEDICINE | Admitting: INTERNAL MEDICINE
Payer: MEDICARE

## 2021-10-25 ENCOUNTER — OFFICE VISIT (OUTPATIENT)
Dept: INTERNAL MEDICINE | Age: 56
End: 2021-10-25

## 2021-10-25 VITALS
HEIGHT: 70 IN | OXYGEN SATURATION: 99 % | BODY MASS INDEX: 33.36 KG/M2 | SYSTOLIC BLOOD PRESSURE: 130 MMHG | HEART RATE: 87 BPM | DIASTOLIC BLOOD PRESSURE: 80 MMHG | WEIGHT: 233 LBS

## 2021-10-25 DIAGNOSIS — L03.90 CELLULITIS, UNSPECIFIED CELLULITIS SITE: ICD-10-CM

## 2021-10-25 DIAGNOSIS — Z23 NEED FOR TDAP VACCINATION: ICD-10-CM

## 2021-10-25 DIAGNOSIS — Z23 NEEDS FLU SHOT: Primary | ICD-10-CM

## 2021-10-25 DIAGNOSIS — Z23 NEED FOR TD VACCINE: ICD-10-CM

## 2021-10-25 DIAGNOSIS — T14.8XXA WOUND INFECTION: Primary | ICD-10-CM

## 2021-10-25 DIAGNOSIS — L08.9 WOUND INFECTION: Primary | ICD-10-CM

## 2021-10-25 DIAGNOSIS — S69.92XA INJURY OF LEFT HAND, INITIAL ENCOUNTER: ICD-10-CM

## 2021-10-25 LAB
ALBUMIN SERPL-MCNC: 4.1 G/DL (ref 3.5–5.2)
ALP BLD-CCNC: 136 U/L (ref 40–130)
ALT SERPL-CCNC: 16 U/L (ref 5–41)
ANION GAP SERPL CALCULATED.3IONS-SCNC: 13 MMOL/L (ref 7–19)
APTT: 44.6 SEC (ref 26–36.2)
AST SERPL-CCNC: 13 U/L (ref 5–40)
BASOPHILS ABSOLUTE: 0 K/UL (ref 0–0.2)
BASOPHILS RELATIVE PERCENT: 0.3 % (ref 0–1)
BILIRUB SERPL-MCNC: 0.9 MG/DL (ref 0.2–1.2)
BUN BLDV-MCNC: 22 MG/DL (ref 6–20)
C-REACTIVE PROTEIN: 8.92 MG/DL (ref 0–0.5)
CALCIUM SERPL-MCNC: 8.9 MG/DL (ref 8.6–10)
CHLORIDE BLD-SCNC: 96 MMOL/L (ref 98–111)
CO2: 24 MMOL/L (ref 22–29)
CREAT SERPL-MCNC: 1 MG/DL (ref 0.5–1.2)
EOSINOPHILS ABSOLUTE: 0.1 K/UL (ref 0–0.6)
EOSINOPHILS RELATIVE PERCENT: 0.4 % (ref 0–5)
GFR AFRICAN AMERICAN: >59
GFR NON-AFRICAN AMERICAN: >60
GLUCOSE BLD-MCNC: 247 MG/DL (ref 70–99)
GLUCOSE BLD-MCNC: 256 MG/DL (ref 74–109)
GLUCOSE BLD-MCNC: 277 MG/DL (ref 70–99)
HBA1C MFR BLD: 7.2 % (ref 4–6)
HCT VFR BLD CALC: 34 % (ref 42–52)
HEMOGLOBIN: 11.3 G/DL (ref 14–18)
IMMATURE GRANULOCYTES #: 0.1 K/UL
INR BLD: 1.72 (ref 0.88–1.18)
LACTIC ACID, SEPSIS: 2.2 MG/DL (ref 0.5–1.9)
LACTIC ACID, SEPSIS: 2.2 MG/DL (ref 0.5–1.9)
LYMPHOCYTES ABSOLUTE: 1.1 K/UL (ref 1.1–4.5)
LYMPHOCYTES RELATIVE PERCENT: 8.4 % (ref 20–40)
MCH RBC QN AUTO: 31.9 PG (ref 27–31)
MCHC RBC AUTO-ENTMCNC: 33.2 G/DL (ref 33–37)
MCV RBC AUTO: 96 FL (ref 80–94)
MONOCYTES ABSOLUTE: 1.3 K/UL (ref 0–0.9)
MONOCYTES RELATIVE PERCENT: 10.3 % (ref 0–10)
NEUTROPHILS ABSOLUTE: 10 K/UL (ref 1.5–7.5)
NEUTROPHILS RELATIVE PERCENT: 80.2 % (ref 50–65)
PDW BLD-RTO: 11.9 % (ref 11.5–14.5)
PERFORMED ON: ABNORMAL
PERFORMED ON: ABNORMAL
PLATELET # BLD: 273 K/UL (ref 130–400)
PMV BLD AUTO: 10.9 FL (ref 9.4–12.4)
POTASSIUM REFLEX MAGNESIUM: 4.4 MMOL/L (ref 3.5–5)
PROTHROMBIN TIME: 20.1 SEC (ref 12–14.6)
RBC # BLD: 3.54 M/UL (ref 4.7–6.1)
SARS-COV-2, NAAT: NOT DETECTED
SEDIMENTATION RATE, ERYTHROCYTE: 87 MM/HR (ref 0–15)
SODIUM BLD-SCNC: 133 MMOL/L (ref 136–145)
TOTAL PROTEIN: 6.9 G/DL (ref 6.6–8.7)
WBC # BLD: 12.5 K/UL (ref 4.8–10.8)

## 2021-10-25 PROCEDURE — 6360000002 HC RX W HCPCS: Performed by: PHYSICIAN ASSISTANT

## 2021-10-25 PROCEDURE — 83036 HEMOGLOBIN GLYCOSYLATED A1C: CPT

## 2021-10-25 PROCEDURE — 80053 COMPREHEN METABOLIC PANEL: CPT

## 2021-10-25 PROCEDURE — 86140 C-REACTIVE PROTEIN: CPT

## 2021-10-25 PROCEDURE — 99999 PR OFFICE/OUTPT VISIT,PROCEDURE ONLY: CPT | Performed by: INTERNAL MEDICINE

## 2021-10-25 PROCEDURE — 85730 THROMBOPLASTIN TIME PARTIAL: CPT

## 2021-10-25 PROCEDURE — 86403 PARTICLE AGGLUT ANTBDY SCRN: CPT

## 2021-10-25 PROCEDURE — 87040 BLOOD CULTURE FOR BACTERIA: CPT

## 2021-10-25 PROCEDURE — 87150 DNA/RNA AMPLIFIED PROBE: CPT

## 2021-10-25 PROCEDURE — 82947 ASSAY GLUCOSE BLOOD QUANT: CPT

## 2021-10-25 PROCEDURE — 87205 SMEAR GRAM STAIN: CPT

## 2021-10-25 PROCEDURE — 6370000000 HC RX 637 (ALT 250 FOR IP): Performed by: NURSE PRACTITIONER

## 2021-10-25 PROCEDURE — 83605 ASSAY OF LACTIC ACID: CPT

## 2021-10-25 PROCEDURE — 2580000003 HC RX 258: Performed by: NURSE PRACTITIONER

## 2021-10-25 PROCEDURE — 85652 RBC SED RATE AUTOMATED: CPT

## 2021-10-25 PROCEDURE — 2580000003 HC RX 258: Performed by: PHYSICIAN ASSISTANT

## 2021-10-25 PROCEDURE — 87070 CULTURE OTHR SPECIMN AEROBIC: CPT

## 2021-10-25 PROCEDURE — 87635 SARS-COV-2 COVID-19 AMP PRB: CPT

## 2021-10-25 PROCEDURE — 85610 PROTHROMBIN TIME: CPT

## 2021-10-25 PROCEDURE — 1210000000 HC MED SURG R&B

## 2021-10-25 PROCEDURE — 36415 COLL VENOUS BLD VENIPUNCTURE: CPT

## 2021-10-25 PROCEDURE — 99283 EMERGENCY DEPT VISIT LOW MDM: CPT

## 2021-10-25 PROCEDURE — 87186 SC STD MICRODIL/AGAR DIL: CPT

## 2021-10-25 PROCEDURE — 85025 COMPLETE CBC W/AUTO DIFF WBC: CPT

## 2021-10-25 PROCEDURE — 73140 X-RAY EXAM OF FINGER(S): CPT

## 2021-10-25 RX ORDER — NICOTINE POLACRILEX 4 MG
15 LOZENGE BUCCAL PRN
Status: DISCONTINUED | OUTPATIENT
Start: 2021-10-25 | End: 2021-11-02 | Stop reason: HOSPADM

## 2021-10-25 RX ORDER — ACETAMINOPHEN 325 MG/1
650 TABLET ORAL EVERY 6 HOURS PRN
Status: DISCONTINUED | OUTPATIENT
Start: 2021-10-25 | End: 2021-11-02 | Stop reason: HOSPADM

## 2021-10-25 RX ORDER — WARFARIN SODIUM 5 MG/1
15 TABLET ORAL
Status: COMPLETED | OUTPATIENT
Start: 2021-10-25 | End: 2021-10-25

## 2021-10-25 RX ORDER — SODIUM CHLORIDE 0.9 % (FLUSH) 0.9 %
5-40 SYRINGE (ML) INJECTION PRN
Status: DISCONTINUED | OUTPATIENT
Start: 2021-10-25 | End: 2021-11-02 | Stop reason: HOSPADM

## 2021-10-25 RX ORDER — POLYETHYLENE GLYCOL 3350 17 G/17G
17 POWDER, FOR SOLUTION ORAL DAILY PRN
Status: DISCONTINUED | OUTPATIENT
Start: 2021-10-25 | End: 2021-11-02 | Stop reason: HOSPADM

## 2021-10-25 RX ORDER — DEXTROSE MONOHYDRATE 25 G/50ML
12.5 INJECTION, SOLUTION INTRAVENOUS PRN
Status: DISCONTINUED | OUTPATIENT
Start: 2021-10-25 | End: 2021-11-02 | Stop reason: HOSPADM

## 2021-10-25 RX ORDER — ONDANSETRON 4 MG/1
4 TABLET, ORALLY DISINTEGRATING ORAL EVERY 8 HOURS PRN
Status: DISCONTINUED | OUTPATIENT
Start: 2021-10-25 | End: 2021-11-02 | Stop reason: HOSPADM

## 2021-10-25 RX ORDER — ATORVASTATIN CALCIUM 20 MG/1
20 TABLET, FILM COATED ORAL NIGHTLY
Status: DISCONTINUED | OUTPATIENT
Start: 2021-10-25 | End: 2021-11-02 | Stop reason: HOSPADM

## 2021-10-25 RX ORDER — ONDANSETRON 2 MG/ML
4 INJECTION INTRAMUSCULAR; INTRAVENOUS EVERY 6 HOURS PRN
Status: DISCONTINUED | OUTPATIENT
Start: 2021-10-25 | End: 2021-11-02 | Stop reason: HOSPADM

## 2021-10-25 RX ORDER — INSULIN GLARGINE 100 [IU]/ML
30 INJECTION, SOLUTION SUBCUTANEOUS NIGHTLY
Status: DISCONTINUED | OUTPATIENT
Start: 2021-10-25 | End: 2021-11-02 | Stop reason: HOSPADM

## 2021-10-25 RX ORDER — LOSARTAN POTASSIUM 50 MG/1
100 TABLET ORAL DAILY
Status: DISCONTINUED | OUTPATIENT
Start: 2021-10-25 | End: 2021-11-02 | Stop reason: HOSPADM

## 2021-10-25 RX ORDER — ACETAMINOPHEN 650 MG/1
650 SUPPOSITORY RECTAL EVERY 6 HOURS PRN
Status: DISCONTINUED | OUTPATIENT
Start: 2021-10-25 | End: 2021-11-02 | Stop reason: HOSPADM

## 2021-10-25 RX ORDER — SODIUM CHLORIDE 0.9 % (FLUSH) 0.9 %
5-40 SYRINGE (ML) INJECTION EVERY 12 HOURS SCHEDULED
Status: DISCONTINUED | OUTPATIENT
Start: 2021-10-25 | End: 2021-11-02 | Stop reason: HOSPADM

## 2021-10-25 RX ORDER — SODIUM CHLORIDE 9 MG/ML
25 INJECTION, SOLUTION INTRAVENOUS PRN
Status: DISCONTINUED | OUTPATIENT
Start: 2021-10-25 | End: 2021-11-02 | Stop reason: HOSPADM

## 2021-10-25 RX ORDER — ERGOCALCIFEROL 1.25 MG/1
50000 CAPSULE ORAL WEEKLY
Status: DISCONTINUED | OUTPATIENT
Start: 2021-10-29 | End: 2021-11-02 | Stop reason: HOSPADM

## 2021-10-25 RX ORDER — DEXTROSE MONOHYDRATE 50 MG/ML
100 INJECTION, SOLUTION INTRAVENOUS PRN
Status: DISCONTINUED | OUTPATIENT
Start: 2021-10-25 | End: 2021-11-02 | Stop reason: HOSPADM

## 2021-10-25 RX ORDER — INSULIN DEGLUDEC INJECTION 100 U/ML
INJECTION, SOLUTION SUBCUTANEOUS
COMMUNITY
End: 2022-06-01 | Stop reason: ALTCHOICE

## 2021-10-25 RX ORDER — TAMSULOSIN HYDROCHLORIDE 0.4 MG/1
0.8 CAPSULE ORAL DAILY
Status: DISCONTINUED | OUTPATIENT
Start: 2021-10-25 | End: 2021-11-02 | Stop reason: HOSPADM

## 2021-10-25 RX ADMIN — WARFARIN SODIUM 15 MG: 5 TABLET ORAL at 17:28

## 2021-10-25 RX ADMIN — INSULIN LISPRO 8 UNITS: 100 INJECTION, SOLUTION INTRAVENOUS; SUBCUTANEOUS at 17:18

## 2021-10-25 RX ADMIN — INSULIN GLARGINE 30 UNITS: 100 INJECTION, SOLUTION SUBCUTANEOUS at 20:59

## 2021-10-25 RX ADMIN — VANCOMYCIN HYDROCHLORIDE 2500 MG: 1 INJECTION, POWDER, LYOPHILIZED, FOR SOLUTION INTRAVENOUS at 16:55

## 2021-10-25 RX ADMIN — METOPROLOL TARTRATE 25 MG: 25 TABLET, FILM COATED ORAL at 20:59

## 2021-10-25 RX ADMIN — WATER 2000 MG: 1 INJECTION INTRAMUSCULAR; INTRAVENOUS; SUBCUTANEOUS at 14:49

## 2021-10-25 RX ADMIN — SODIUM CHLORIDE, PRESERVATIVE FREE 10 ML: 5 INJECTION INTRAVENOUS at 21:12

## 2021-10-25 RX ADMIN — INSULIN LISPRO 1 UNITS: 100 INJECTION, SOLUTION INTRAVENOUS; SUBCUTANEOUS at 20:59

## 2021-10-25 RX ADMIN — ATORVASTATIN CALCIUM 20 MG: 20 TABLET, FILM COATED ORAL at 20:59

## 2021-10-25 ASSESSMENT — ENCOUNTER SYMPTOMS
BACK PAIN: 0
TROUBLE SWALLOWING: 0
NAUSEA: 0
SHORTNESS OF BREATH: 0
FACIAL SWELLING: 0
PHOTOPHOBIA: 0
DIARRHEA: 0
COLOR CHANGE: 1
CHEST TIGHTNESS: 0
COUGH: 0
CONSTIPATION: 0
ABDOMINAL PAIN: 0
EYE ITCHING: 0
VOMITING: 0
EYE DISCHARGE: 0
SORE THROAT: 0
APNEA: 0

## 2021-10-25 NOTE — PROGRESS NOTES
4 Eyes Skin Assessment    Scottie Jorge is being assessed upon: Admission    I agree that Roxanne Lloyd, RN, along with Arun Mendoza RN (either 2 RN's or 1 LPN and 1 RN) have performed a thorough Head to Toe Skin Assessment on the patient. ALL assessment sites listed below have been assessed.       Areas assessed by both nurses:     [x]   Head, Face, and Ears   [x]   Shoulders, Back, and Chest  [x]   Arms, Elbows, and Hands   [x]   Coccyx, Sacrum, and Ischium  [x]   Legs, Feet, and Heels    Does the Patient have Skin Breakdown? cellulitis with drainage to left middle finger    Mark Prevention initiated: NA  Wound Care Orders initiated: NA    WOC nurse consulted for Pressure Injury (Stage 3,4, Unstageable, DTI, NWPT, and Complex wounds) and New or Established Ostomies: NA        Primary Nurse eSignature: Carmen Huizar RN on 10/25/2021 at 4:41 PM      Co-Signer eSignature: Electronically signed by Eduard Wong RN on 10/25/21 at 5:41 PM CDT

## 2021-10-25 NOTE — ED PROVIDER NOTES
[facility] 3 TAWNYA/VAS/MED  eMERGENCY dEPARTMENT eNCOUnter      Pt Name: Farida Ortiz  MRN: [id number]  Nancy 1965  Date of evaluation: 10/25/2021  Provider: Woody Barnett MD    [address] [place] [address]       Chief Complaint   Patient presents with    Cellulitis     lef middle finger swelling      Seen independently of PA. Pt with finger swelling, purulent drainage after he cut it on ujlita nail Late Friday night. Given tdap at PCP PTA. PHYSICAL EXAM    (up to 7 for level 4, 8 or more for level 5)     ED Triage Vitals [10/25/21 1125]   BP Temp Temp Source Pulse Resp SpO2 Height Weight   136/77 98.6 °F (37 °C) Oral 86 16 99 % -- --       Physical Exam    DIAGNOSTIC RESULTS     EKG: All E[facility]'s are [facility] who either signs or Co-signs this chart in the absence of a cardiologist.        RADIOLOGY:   Non-plain film images such as CT, Ultrasound and MRI are read by the radiologist. Plain radiographic images are visualized and preliminarily interpreted by the emergencyphysician with the below findings:        Interpretation per the Radiologist below, if available at the time of thisnote:    XR FINGER LEFT (MIN 2 VIEWS)   Final Result   1. Prominent soft tissue swelling with arthritic change. No acute   osseous pathology. No radiographic evidence of osteomyelitis or acute   osseous injury. No radiopaque foreign body seen within the soft   tissues.    Signed by Dr Manuel Linton            ED BEDSIDE ULTRASOUND:   Performed by ED Physician - none    LABS:  Labs Reviewed   CBC WITH AUTO DIFFERENTIAL - Abnormal; Notable for the following components:       Result Value    WBC 12.5 (*)     RBC 3.54 (*)     Hemoglobin 11.3 (*)     Hematocrit 34.0 (*)     MCV 96.0 (*)     MCH 31.9 (*)     Neutrophils % 80.2 (*)     Lymphocytes % 8.4 (*)     Monocytes % 10.3 (*)     Neutrophils Absolute 10.0 (*)     Monocytes Absolute 1.30 (*)     All other components within normal limits    Narrative:     1 COMPREHENSIVE METABOLIC PANEL W/ REFLEX TO MG FOR LOW K - Abnormal; Notable for the following components:    Sodium 133 (*)     Chloride 96 (*)     Glucose 256 (*)     BUN 22 (*)     Alkaline Phosphatase 136 (*)     All other components within normal limits   LACTATE, SEPSIS - Abnormal; Notable for the following components:    Lactic Acid, Sepsis 2.2 (*)     All other components within normal limits    Narrative:     CALL  Armas  KLED tel. ,  Chemistry results called to and read back by demetrio overton. rn kled,  10/25/2021 14:44, by Alissa Yoo   C-REACTIVE PROTEIN - Abnormal; Notable for the following components:    CRP 8.92 (*)     All other components within normal limits   SEDIMENTATION RATE - Abnormal; Notable for the following components:    Sed Rate 87 (*)     All other components within normal limits   PROTIME-INR - Abnormal; Notable for the following components:    Protime 20.1 (*)     INR 1.72 (*)     All other components within normal limits   APTT - Abnormal; Notable for the following components:    aPTT 44.6 (*)     All other components within normal limits   POCT GLUCOSE - Abnormal; Notable for the following components:    POC Glucose 247 (*)     All other components within normal limits   COVID-19, RAPID   CULTURE, WOUND    Narrative:     ORDER#: [id number]                          ORDERED BY: ANA PHILLIPS  SOURCE: Finger left middle finger          COLLECTED:  10/25/21 12:21  ANTIBIOTICS AT DEMI.:                      RECEIVED :  10/25/21 12:32   CULTURE, BLOOD 1   CULTURE, BLOOD 2   LACTATE, SEPSIS   HEMOGLOBIN A1C   POCT GLUCOSE       All other labs were within normal range or not returned as of this dictation.     EMERGENCY DEPARTMENT COURSE and DIFFERENTIAL DIAGNOSIS/MDM:   Vitals:    Vitals:    10/25/21 1125 10/25/21 1400 10/25/21 1445 10/25/21 1600   BP: 136/77 134/68 (!) 142/64 128/84   Pulse: 86 78 68 88   Resp: 16 16 16 16   Temp: 98.6 °F (37 °C)   98.9 °F (37.2 °C)   TempSrc: Oral   Temporal SpO2: 99% 99% 99% 95%       MDM      CONSULTS:  IP CONSULT TO ORTHOPEDIC SURGERY  PHARMACY TO DOSE VANCOMYCIN  PHARMACY TO DOSE VANCOMYCIN  IP CONSULT TO PHARMACY    PROCEDURES:  Unless otherwise noted below, none      Procedures    FINAL IMPRESSION      1. Wound infection    2. Cellulitis, unspecified cellulitis site          DISPOSITION/PLAN   DISPOSITION     PATIENT REFERRED TO:  No follow-up provider specified.     DISCHARGE MEDICATIONS:  Current Discharge Medication List             (Please note that portions of this note were completed with a voice recognition program.  Efforts were made to edit the dictations but occasionally words aremis-transcribed.)    Rajiv Ho MD (electronically signed)  Attending Emergency Physician           Rajiv Ho MD  10/25/21 [phone]

## 2021-10-25 NOTE — PROGRESS NOTES
Pharmacy Note  Vancomycin Consult    Luis Mccartney is a 60 y.o. male started on Vancomycin for Skin and Soft Tissue Infection; consult received from MANOLO Oviedo to manage therapy. Also receiving the following antibiotics: Cefepime. Principal Problem:    Cellulitis  Active Problems:    HTN (hypertension)    Hyperlipidemia    Type 2 diabetes mellitus with diabetic polyneuropathy, with long-term current use of insulin (HCC)    Permanent atrial fibrillation (HCC)    (HFpEF) heart failure with preserved ejection fraction (HCC)    Obstructive sleep apnea  Resolved Problems:    * No resolved hospital problems. *      Allergies:  Pcn [penicillins], Neomycin-bacitracin zn-polymyx, and Neosporin [neomycin-polymyxin-gramicidin]     Temp max: 98.6    Recent Labs     10/25/21  1310   BUN 22*       Recent Labs     10/25/21  1310   CREATININE 1.0       Recent Labs     10/25/21  1310   WBC 12.5*       No intake or output data in the 24 hours ending 10/25/21 1538    Culture Date Source Results   10/25/21 Blood Sent   10/25/21 Wound Moderate Gram positive cocci  in pairs        Ht Readings from Last 1 Encounters:   10/25/21 5' 10\" (1.778 m)        Wt Readings from Last 1 Encounters:   10/25/21 233 lb (105.7 kg)         There is no height or weight on file to calculate BMI. Estimated Creatinine Clearance: 99 mL/min (based on SCr of 1 mg/dL). Assessment/Plan:  Will initiate vancomycin 2500 mg IV once followed by 1000 mg IV every 12 hours. Timing of trough level will be determined based on culture results, renal function, and clinical response. Thank you for the consult. Will continue to follow.     Electronically signed by Nola Tijerina [address] [place] [address] on 10/25/2021 at 3:38 PM

## 2021-10-25 NOTE — PLAN OF CARE
Problem: Falls - Risk of:  Goal: Will remain free from falls  Description: Will remain free from falls  Outcome: Ongoing  Goal: Absence of physical injury  Description: Absence of physical injury  Outcome: Ongoing     Problem: Body Temperature - Imbalanced:  Goal: Ability to maintain a body temperature in the normal range will improve  Description: Ability to maintain a body temperature in the normal range will improve  Outcome: Ongoing     Problem: Discharge Planning:  Goal: Discharged to appropriate level of care  Description: Discharged to appropriate level of care  Outcome: Ongoing     Problem: Mobility - Impaired:  Goal: Mobility will improve to maximum level  Description: Mobility will improve to maximum level  Outcome: Ongoing     Problem: Pain:  Description: Pain management should include both nonpharmacologic and pharmacologic interventions. Goal: Pain level will decrease  Description: Pain level will decrease  Outcome: Ongoing  Goal: Control of acute pain  Description: Control of acute pain  Outcome: Ongoing  Goal: Control of chronic pain  Description: Control of chronic pain  Outcome: Ongoing     Problem: Pain:  Description: Pain management should include both nonpharmacologic and pharmacologic interventions.   Goal: Pain level will decrease  Description: Pain level will decrease  Outcome: Ongoing  Goal: Control of acute pain  Description: Control of acute pain  Outcome: Ongoing  Goal: Control of chronic pain  Description: Control of chronic pain  Outcome: Ongoing     Problem: Skin Integrity - Impaired:  Goal: Will show no infection signs and symptoms  Description: Will show no infection signs and symptoms  Outcome: Ongoing  Goal: Absence of new skin breakdown  Description: Absence of new skin breakdown  Outcome: Ongoing     Problem: Serum Glucose Level - Abnormal:  Goal: Ability to maintain appropriate glucose levels will improve  Description: Ability to maintain appropriate glucose levels will improve  Outcome: Ongoing     Problem: Sensory Perception - Impaired:  Goal: Ability to maintain a stable neurologic state will improve  Description: Ability to maintain a stable neurologic state will improve  Outcome: Ongoing

## 2021-10-25 NOTE — H&P
recall    HUMERUS FRACTURE SURGERY Left 9/17/2020    OPEN REDUCTION INTERNAL FIXATION LEFT SUPRACONDYLAR FRACTURE performed by Abi Jacques MD at [address]      X 2    TONSILLECTOMY         Home Medications:  Prior to Admission medications    Medication Sig Start Date End Date Taking? Authorizing Provider   Insulin Degludec (TRESIBA FLEXTOUCH) 100 UNIT/ML SOPN Inject into the skin    Historical Provider, MD   atorvastatin (LIPITOR) 20 MG tablet TAKE ONE TABLET DAILY  9/29/21   Gabbi Bess MD   warfarin (COUMADIN) 10 MG tablet TAKE ONE TABLET DAILY  9/20/21   NOMI Singh   tamsulosin [facility]) 0.4 MG capsule Take 2 capsules by mouth daily 9/15/21   NOMI Benítez - CNP   blood glucose monitor strips Test 3 times a day & as needed for symptoms of irregular blood glucose. Dispense sufficient amount for indicated testing frequency plus additional to accommodate PRN testing needs. 8/1/21   Gabbi Bess MD   furosemide (LASIX) 20 MG tablet TAKE ONE TABLET DAILY AS NEEDED 8/1/21   Gabbi Bess MD   warfarin (COUMADIN) 5 MG tablet Take 3 tabs on Thursday's and Sunday's only 7/15/21   Aurora Skiff, APRN   Insulin Pen Needle 32G X 4 MM MISC 1 each by Does not apply route daily 6/14/21   Gabbi Bess MD   metFORMIN (GLUCOPHAGE) 1000 MG tablet Take 1 tablet by mouth 2 times daily (with meals) 6/14/21 10/25/21  Gabbi Bess MD   metoprolol tartrate (LOPRESSOR) 25 MG tablet TAKE ONE TABLET TWICE DAILY 6/10/21   NOMI Singh   blood glucose monitor kit and supplies Dispense sufficient amount for indicated testing frequency plus additional to accommodate PRN testing needs.  5/12/21   Gabbi Bess MD   Lancets MISC 1 each by Does not apply route 3 times daily (with meals) 5/12/21   Gabbi Bess MD   insulin lispro (HUMALOG KWIKPEN) 200 UNIT/ML SOPN pen Sliding scale 160-179 2 units  180-199 3 units  200-219 4 units  220-239 5 units  240-259 6 units   260-279 7 units  280-299 8 units  300-399 9 units  340-379 10 units  380-420 11 units 5/11/21   Gabbi Bess MD   vitamin D (ERGOCALCIFEROL) 1.25 MG (45685 UT) CAPS capsule Take 50,000 Units by mouth once a week    Historical Provider, MD   losartan (COZAAR) 100 MG tablet TAKE ONE TABLET DAILY 1/11/21   Cathay Bumpers, APRN   diphenhydrAMINE (BENADRYL) 25 MG tablet Take 25 mg by mouth every 6 hours as needed for Itching    Historical Provider, MD   blood glucose test strips (CONTOUR NEXT TEST) strip 1 each by In Vitro route 4 times daily As needed. 11/21/19   Gabbi Bess MD       Allergies:    Pcn [penicillins], Neomycin-bacitracin zn-polymyx, and Neosporin [neomycin-polymyxin-gramicidin]    Social History:    The patient currently lives home alone  Tobacco:   reports that he has never smoked. He has never used smokeless tobacco.  Alcohol:   reports no history of alcohol use. Illicit Drugs: denies    Family History:      Problem Relation Age of Onset    Stroke Maternal Grandmother     Cancer Maternal Grandmother     Stroke Paternal Grandmother     Diabetes Paternal Grandmother     Cancer Father     Heart Disease Father     Lung Cancer Father     Asthma Paternal Grandfather     Heart Disease Paternal Grandfather     Heart Disease Maternal Grandfather     Colon Cancer Neg Hx     Colon Polyps Neg Hx     Esophageal Cancer Neg Hx     Liver Cancer Neg Hx     Liver Disease Neg Hx     Rectal Cancer Neg Hx     Stomach Cancer Neg Hx        Review of Systems:   Review of Systems   Constitutional: Negative for activity change, chills and fever. HENT: Negative for congestion, facial swelling, sore throat and trouble swallowing. Eyes: Negative for photophobia and visual disturbance. Respiratory: Negative for chest tightness and shortness of breath. Cardiovascular: Negative for chest pain, palpitations and leg swelling.    Gastrointestinal: Negative for abdominal pain, constipation, diarrhea, nausea and vomiting. Genitourinary: Negative for difficulty urinating, dysuria and hematuria. Musculoskeletal: Positive for arthralgias (left middle finger). Negative for gait problem. Skin: Positive for color change and wound. Neurological: Negative for dizziness, speech difficulty and light-headedness. Psychiatric/Behavioral: Negative for agitation and confusion. 14 point review of systems is negative except as specifically addressed above. Physical Examination:  /68   Pulse 78   Temp 98.6 °F (37 °C) (Oral)   Resp 16   SpO2 99%   Physical Exam  Vitals and nursing note reviewed. Constitutional:       Appearance: He is obese. He is not ill-appearing. HENT:      Head: Normocephalic and atraumatic. Nose: Nose normal.      Mouth/Throat:      Mouth: Mucous membranes are moist.      Pharynx: Oropharynx is clear. Eyes:      Extraocular Movements: Extraocular movements intact. Conjunctiva/sclera: Conjunctivae normal.      Pupils: Pupils are equal, round, and reactive to light. Cardiovascular:      Rate and Rhythm: Normal rate. Rhythm irregular. Pulses: Normal pulses. Heart sounds: Normal heart sounds. Pulmonary:      Effort: Pulmonary effort is normal.      Breath sounds: Normal breath sounds. Abdominal:      General: Bowel sounds are normal. There is no distension. Palpations: Abdomen is soft. Tenderness: There is no abdominal tenderness. There is no guarding. Musculoskeletal:         General: Swelling (left middle finger) and tenderness present. Skin:     General: Skin is warm and dry. Capillary Refill: Capillary refill takes less than 2 seconds. Findings: Erythema present. Neurological:      General: No focal deficit present. Mental Status: He is alert and oriented to person, place, and time.           Diagnostic Data:  CBC:  Recent Labs     10/25/21  1310   WBC 12.5*   HGB 11.3*   HCT 34.0*        BMP:  Recent Labs     10/25/21  1310   *   K 4.4   CL 96*   CO2 24   BUN 22*   CREATININE 1.0   CALCIUM 8.9     Recent Labs     10/25/21  1310   AST 13   ALT 16   BILITOT 0.9   ALKPHOS 136*     Coag Panel:   Recent Labs     10/25/21  1310   INR 1.72*   PROTIME 20.1*   APTT 44.6*     Cardiac Enzymes: No results for input(s): CKTOTAL, TROPONINI in the last 72 hours. ABGs:  Lab Results   Component Value Date    PHART 7.290 03/25/2019    PO2ART 79.0 03/25/2019    PRY3CCT 20.0 03/25/2019     Urinalysis:  Lab Results   Component Value Date    NITRU Negative 04/14/2021    WBCUA 1 04/14/2021    BACTERIA NEGATIVE 04/14/2021    RBCUA 25 04/14/2021    BLOODU neg 09/15/2021    BLOODU MODERATE 04/14/2021    SPECGRAV 1.020 09/15/2021    SPECGRAV 1.014 04/14/2021    GLUCOSEU 100 09/15/2021    GLUCOSEU Negative 04/14/2021     A1C: No results for input(s): LABA1C in the last 72 hours. ABG:No results for input(s): PHART, TKW9WRI, PO2ART, KSP4YWS, BEART, HGBAE, D8SIDFLW, CARBOXHGBART in the last 72 hours. XR FINGER LEFT (MIN 2 VIEWS)    Result Date: 10/25/2021  History: Penetrating injury with soft tissue swelling, concern for osteomyelitis Left middle finger: 3 views left middle finger obtained. There is diffuse soft tissue swelling with no radiopaque foreign body seen within the soft tissues. Vascular calcifications. There are arthritic changes of the DIP greater than PIP joints as well as the third MCP joint. No acute fracture or dislocation. No radiographic evidence of osteomyelitis. 1. Prominent soft tissue swelling with arthritic change. No acute osseous pathology. No radiographic evidence of osteomyelitis or acute osseous injury. No radiopaque foreign body seen within the soft tissues.  Signed by Dr Sofia Barfield      Assessment/Plan:  Principal Problem:    Cellulitis   -admit   -ortho surgery consulted from ED   -Pharmacy to dose vancomycin   -wound culture obtained in ED    Active Problems:    HTN (hypertension)    -noted   -monitor closely   -continue home meds      Hyperlipidemia   Continue home statin      Type 2 diabetes mellitus with diabetic polyneuropathy, with long-term current use of insulin (HCC)   -monitor Accu checks closely   -Resume home basal dosing   -hold oral agent at this time   -SSI   -hypoglycemia protocol in place      Permanent atrial fibrillation ([facility] [address]   -noted   -pharmacy to dose coumadin      (HFpEF) heart failure with preserved ejection fraction ([facility] [address]   -noted   -monitor I&O n   -daily weight      Obstructive sleep apnea   -noted, ok for home CPAP    Resolved Problems:    * No resolved hospital problems.  *       Signed:  NOMI Covington CNP, 10/25/2021 2:49 PM

## 2021-10-25 NOTE — TELEPHONE ENCOUNTER
[facility]. This patient lives in [address] [place] [address]. This RN does not have an [place] license. Transferred to Candice Spivey RN. Attention Provider: Thank you for allowing me to participate in the care of your patient. The patient was connected to triage in response to information provided to the [facility]. Please do not respond through this encounter as the response is not directed to a shared pool.

## 2021-10-25 NOTE — PROGRESS NOTES
After obtaining consent, and per orders of Dr. Lauren Wallace, injection of flu given in Right deltoid by Antwan Miller MA. Patient instructed to remain in clinic for 20 minutes afterwards, and to report any adverse reaction to me immediately. After obtaining consent, and per orders of Dr. Lauren Wallace, injection of TD given in Left deltoid by Antwan Miller MA. Patient instructed to remain in clinic for 20 minutes afterwards, and to report any adverse reaction to me immediately.

## 2021-10-25 NOTE — PROGRESS NOTES
Patient stated he had at least $150 in wallet. Patient declined to send to security but agreeable to lock in locker in room. Wallet was securely locked up.  Electronically signed by Roxi Reddy RN on 10/25/2021 at 5:43 PM

## 2021-10-25 NOTE — PROGRESS NOTES
Dat Berry arrived to room # 322. Presented with: cellulitis left middle finger  Mental Status: Patient is oriented, alert, coherent, logical, thought processes intact and able to concentrate and follow conversation. Vitals:    10/25/21 1600   BP: 128/84   Pulse: 88   Resp: 16   Temp: 98.9 °F (37.2 °C)   SpO2: 95%     Patient safety contract and falls prevention contract reviewed with patient Yes. Oriented Patient to room. Call light within reach. Yes.   Needs, issues or concerns expressed at this time: no.      Electronically signed by Amanuel West RN on 10/25/2021 at 4:40 PM

## 2021-10-25 NOTE — PROGRESS NOTES
Clinical Pharmacy Note    Macy Heller is a 60 y.o. male for whom pharmacy has been asked to manage warfarin therapy. Reason for Admission: Cellulitis    Consulting Provider: NOMI Covington - CNP  Warfarin dose prior to admission: 15 mg on Sun & Thurs, 10 mg all other days  Warfarin indication: Permanent atrial fibrillation   Target INR range: 2-3   Outpatient warfarin provider: NOMI Modi    Past Medical History:   Diagnosis Date    Acalculous cholecystitis 12/29/2015    Allergic rhinitis, cause unspecified     Atrial fibrillation, chronic ([facility] [address]     sees [facility] cardiology    Charcot's joint of right foot     Chicken pox     Closed supracondylar fracture of elbow     fall    Diabetic ulcer of right midfoot associated with type 2 diabetes mellitus, with fat layer exposed ([facility] [address] 9/14/2020    History of MRSA infection     scalp/facial and on back    HTN (hypertension)     Hyperlipidemia     Impotence of organic origin     MDRO (multiple drug resistant organisms) resistance     Other dyspnea and respiratory abnormality     Other specified erythematous condition(695.89)     Personal history of other infectious and parasitic disease     Salmonella     Type II or unspecified type diabetes mellitus without mention of complication, uncontrolled 1994                Recent Labs     10/25/21  1310   INR 1.72*     Recent Labs     10/25/21  1310   HGB 11.3*   HCT 34.0*          Current warfarin drug-drug interactions: none        Date INR Warfarin Dose   10/25/21 1.72 15 mg                                   Will give warfarin 15 mg once this evening. Daily PT/INR until stable within therapeutic range. Thank you for the consult.      Electronically signed by Emy Eason [facility] on 10/25/2021 at 4:58 PM

## 2021-10-26 LAB
ACINETOBACTER CALCOAC BAUMANNII COMPLEX BY PCR: NOT DETECTED
ALBUMIN SERPL-MCNC: 3.6 G/DL (ref 3.5–5.2)
ALP BLD-CCNC: 137 U/L (ref 40–130)
ALT SERPL-CCNC: 16 U/L (ref 5–41)
ANION GAP SERPL CALCULATED.3IONS-SCNC: 13 MMOL/L (ref 7–19)
AST SERPL-CCNC: 17 U/L (ref 5–40)
BACTEROIDES FRAGILIS BY PCR: NOT DETECTED
BASOPHILS ABSOLUTE: 0 K/UL (ref 0–0.2)
BASOPHILS RELATIVE PERCENT: 0.4 % (ref 0–1)
BILIRUB SERPL-MCNC: 0.8 MG/DL (ref 0.2–1.2)
BOTTLE TYPE: ABNORMAL
BUN BLDV-MCNC: 23 MG/DL (ref 6–20)
CALCIUM SERPL-MCNC: 8.5 MG/DL (ref 8.6–10)
CANDIDA ALBICANS BY PCR: NOT DETECTED
CANDIDA AURIS BY PCR: NOT DETECTED
CANDIDA GLABRATA BY PCR: NOT DETECTED
CANDIDA KRUSEI BY PCR: NOT DETECTED
CANDIDA PARAPSILOSIS BY PCR: NOT DETECTED
CANDIDA TROPICALIS BY PCR: NOT DETECTED
CHLORIDE BLD-SCNC: 93 MMOL/L (ref 98–111)
CO2: 23 MMOL/L (ref 22–29)
CREAT SERPL-MCNC: 1 MG/DL (ref 0.5–1.2)
CRYPTOCOCCUS NEOFORMANS/GATTII BY PCR: NOT DETECTED
ENTEROBACTER CLOACAE COMPLEX BY PCR: NOT DETECTED
ENTEROBACTERALES BY PCR: NOT DETECTED
ENTEROCOCCUS FAECALIS BY PCR: NOT DETECTED
ENTEROCOCCUS FAECIUM BY PCR: NOT DETECTED
EOSINOPHILS ABSOLUTE: 0.1 K/UL (ref 0–0.6)
EOSINOPHILS RELATIVE PERCENT: 0.5 % (ref 0–5)
ESCHERICHIA COLI BY PCR: NOT DETECTED
GFR AFRICAN AMERICAN: >59
GFR NON-AFRICAN AMERICAN: >60
GLUCOSE BLD-MCNC: 216 MG/DL (ref 70–99)
GLUCOSE BLD-MCNC: 228 MG/DL (ref 70–99)
GLUCOSE BLD-MCNC: 267 MG/DL (ref 70–99)
GLUCOSE BLD-MCNC: 277 MG/DL (ref 70–99)
GLUCOSE BLD-MCNC: 321 MG/DL (ref 70–99)
GLUCOSE BLD-MCNC: 345 MG/DL (ref 74–109)
HAEMOPHILUS INFLUENZAE BY PCR: NOT DETECTED
HCT VFR BLD CALC: 31.3 % (ref 42–52)
HEMOGLOBIN: 10.7 G/DL (ref 14–18)
IMMATURE GRANULOCYTES #: 0.1 K/UL
INR BLD: 2.1 (ref 0.88–1.18)
KLEBSIELLA AEROGENES BY PCR: NOT DETECTED
KLEBSIELLA OXYTOCA BY PCR: NOT DETECTED
KLEBSIELLA PNEUMONIAE GROUP BY PCR: NOT DETECTED
LACTIC ACID: 1.8 MMOL/L (ref 0.5–1.9)
LISTERIA MONOCYTOGENES BY PCR: NOT DETECTED
LYMPHOCYTES ABSOLUTE: 1 K/UL (ref 1.1–4.5)
LYMPHOCYTES RELATIVE PERCENT: 9 % (ref 20–40)
MCH RBC QN AUTO: 32.4 PG (ref 27–31)
MCHC RBC AUTO-ENTMCNC: 34.2 G/DL (ref 33–37)
MCV RBC AUTO: 94.8 FL (ref 80–94)
METHICILLIN RESISTANCE MECA/C AND MREJ BY PCR: DETECTED
MONOCYTES ABSOLUTE: 1.4 K/UL (ref 0–0.9)
MONOCYTES RELATIVE PERCENT: 13.1 % (ref 0–10)
NEISSERIA MENINGITIDIS BY PCR: NOT DETECTED
NEUTROPHILS ABSOLUTE: 8.5 K/UL (ref 1.5–7.5)
NEUTROPHILS RELATIVE PERCENT: 76.5 % (ref 50–65)
PDW BLD-RTO: 11.9 % (ref 11.5–14.5)
PERFORMED ON: ABNORMAL
PLATELET # BLD: 231 K/UL (ref 130–400)
PMV BLD AUTO: 10.8 FL (ref 9.4–12.4)
POTASSIUM REFLEX MAGNESIUM: 4.2 MMOL/L (ref 3.5–5)
PROTEUS SPECIES BY PCR: NOT DETECTED
PROTHROMBIN TIME: 23.3 SEC (ref 12–14.6)
PSEUDOMONAS AERUGINOSA BY PCR: NOT DETECTED
RBC # BLD: 3.3 M/UL (ref 4.7–6.1)
SALMONELLA SPECIES BY PCR: NOT DETECTED
SERRATIA MARCESCENS BY PCR: NOT DETECTED
SODIUM BLD-SCNC: 129 MMOL/L (ref 136–145)
SOURCE OF BLOOD CULTURE: ABNORMAL
STAPHYLOCOCCUS AUREUS BY PCR: DETECTED
STAPHYLOCOCCUS EPIDERMIDIS BY PCR: NOT DETECTED
STAPHYLOCOCCUS LUGDUNENSIS BY PCR: NOT DETECTED
STAPHYLOCOCCUS SPECIES BY PCR: DETECTED
STENOTROPHOMONAS MALTOPHILIA BY PCR: NOT DETECTED
STREPTOCOCCUS AGALACTIAE BY PCR: NOT DETECTED
STREPTOCOCCUS PNEUMONIAE BY PCR: NOT DETECTED
STREPTOCOCCUS PYOGENES  BY PCR: NOT DETECTED
STREPTOCOCCUS SPECIES BY PCR: NOT DETECTED
TOTAL PROTEIN: 6.1 G/DL (ref 6.6–8.7)
TROPONIN: <0.01 NG/ML (ref 0–0.03)
WBC # BLD: 11 K/UL (ref 4.8–10.8)

## 2021-10-26 PROCEDURE — 85610 PROTHROMBIN TIME: CPT

## 2021-10-26 PROCEDURE — 85025 COMPLETE CBC W/AUTO DIFF WBC: CPT

## 2021-10-26 PROCEDURE — 6370000000 HC RX 637 (ALT 250 FOR IP): Performed by: NURSE PRACTITIONER

## 2021-10-26 PROCEDURE — 2580000003 HC RX 258: Performed by: NURSE PRACTITIONER

## 2021-10-26 PROCEDURE — 6360000002 HC RX W HCPCS: Performed by: NURSE PRACTITIONER

## 2021-10-26 PROCEDURE — 36415 COLL VENOUS BLD VENIPUNCTURE: CPT

## 2021-10-26 PROCEDURE — 83605 ASSAY OF LACTIC ACID: CPT

## 2021-10-26 PROCEDURE — 2580000003 HC RX 258: Performed by: PHYSICIAN ASSISTANT

## 2021-10-26 PROCEDURE — 6360000002 HC RX W HCPCS: Performed by: PHYSICIAN ASSISTANT

## 2021-10-26 PROCEDURE — 1210000000 HC MED SURG R&B

## 2021-10-26 PROCEDURE — 84484 ASSAY OF TROPONIN QUANT: CPT

## 2021-10-26 PROCEDURE — 80053 COMPREHEN METABOLIC PANEL: CPT

## 2021-10-26 PROCEDURE — 82947 ASSAY GLUCOSE BLOOD QUANT: CPT

## 2021-10-26 RX ORDER — WARFARIN SODIUM 5 MG/1
10 TABLET ORAL
Status: COMPLETED | OUTPATIENT
Start: 2021-10-26 | End: 2021-10-26

## 2021-10-26 RX ADMIN — WARFARIN SODIUM 10 MG: 5 TABLET ORAL at 17:04

## 2021-10-26 RX ADMIN — INSULIN LISPRO 1 UNITS: 100 INJECTION, SOLUTION INTRAVENOUS; SUBCUTANEOUS at 20:55

## 2021-10-26 RX ADMIN — INSULIN LISPRO 3 UNITS: 100 INJECTION, SOLUTION INTRAVENOUS; SUBCUTANEOUS at 12:17

## 2021-10-26 RX ADMIN — LOSARTAN POTASSIUM 100 MG: 50 TABLET, FILM COATED ORAL at 09:06

## 2021-10-26 RX ADMIN — INSULIN GLARGINE 30 UNITS: 100 INJECTION, SOLUTION SUBCUTANEOUS at 20:55

## 2021-10-26 RX ADMIN — METOPROLOL TARTRATE 25 MG: 25 TABLET, FILM COATED ORAL at 09:06

## 2021-10-26 RX ADMIN — ATORVASTATIN CALCIUM 20 MG: 20 TABLET, FILM COATED ORAL at 20:54

## 2021-10-26 RX ADMIN — INSULIN LISPRO 8 UNITS: 100 INJECTION, SOLUTION INTRAVENOUS; SUBCUTANEOUS at 12:18

## 2021-10-26 RX ADMIN — INSULIN LISPRO 4 UNITS: 100 INJECTION, SOLUTION INTRAVENOUS; SUBCUTANEOUS at 09:05

## 2021-10-26 RX ADMIN — VANCOMYCIN HYDROCHLORIDE 1000 MG: 10 INJECTION, POWDER, LYOPHILIZED, FOR SOLUTION INTRAVENOUS at 03:42

## 2021-10-26 RX ADMIN — METOPROLOL TARTRATE 25 MG: 25 TABLET, FILM COATED ORAL at 20:54

## 2021-10-26 RX ADMIN — INSULIN LISPRO 8 UNITS: 100 INJECTION, SOLUTION INTRAVENOUS; SUBCUTANEOUS at 17:08

## 2021-10-26 RX ADMIN — WATER 2000 MG: 1 INJECTION INTRAMUSCULAR; INTRAVENOUS; SUBCUTANEOUS at 03:42

## 2021-10-26 RX ADMIN — INSULIN LISPRO 2 UNITS: 100 INJECTION, SOLUTION INTRAVENOUS; SUBCUTANEOUS at 17:08

## 2021-10-26 RX ADMIN — SODIUM CHLORIDE, PRESERVATIVE FREE 10 ML: 5 INJECTION INTRAVENOUS at 09:06

## 2021-10-26 RX ADMIN — WATER 2000 MG: 1 INJECTION INTRAMUSCULAR; INTRAVENOUS; SUBCUTANEOUS at 13:27

## 2021-10-26 RX ADMIN — SODIUM CHLORIDE, PRESERVATIVE FREE 10 ML: 5 INJECTION INTRAVENOUS at 20:55

## 2021-10-26 RX ADMIN — INSULIN LISPRO 8 UNITS: 100 INJECTION, SOLUTION INTRAVENOUS; SUBCUTANEOUS at 09:05

## 2021-10-26 RX ADMIN — TAMSULOSIN HYDROCHLORIDE 0.8 MG: 0.4 CAPSULE ORAL at 09:06

## 2021-10-26 RX ADMIN — VANCOMYCIN HYDROCHLORIDE 1000 MG: 1 INJECTION, POWDER, LYOPHILIZED, FOR SOLUTION INTRAVENOUS at 17:04

## 2021-10-26 NOTE — PROGRESS NOTES
[facility] Progress Note    Patient:  Ayaan Dallas  YOB: 1965  Date of Service: 10/26/2021  MRN: [id number]   Acct: [de-identified]   Primary Care Physician: Consuelo Hightower MD  Advance Directive: Full Code  Admit Date: 10/25/2021       Hospital Day: 1        CHIEF COMPLAINT:     Chief Complaint   Patient presents with    Cellulitis     lef middle finger swelling        10/26/2021 8:23 AM  Subjective / Interval History:   10/26/2021  Patient seen and examined this AM.  Doing well. No new complaints. No acute changes or acute overnight event reported. Left middle finger pain fairly controlled. Laying comfortably in bed in no apparent acute distress. Denies any other acute complaints or distress at this time. No chest pain, no shortness of breath, no dizziness, lightheadedness or palpitations. Review of Systems:   Review of Systems  ROS: 14 point review of systems is negative except as specifically addressed above. ADULT DIET;  Regular; 4 carb choices (60 gm/meal)    Intake/Output Summary (Last 24 hours) at 10/26/2021 0823  Last data filed at 10/26/2021 0254  Gross per 24 hour   Intake 740.1 ml   Output --   Net 740.1 ml       Medications:   dextrose      sodium chloride       Current Facility-Administered Medications   Medication Dose Route Frequency Provider Last Rate Last Admin    ceFEPIme (MAXIPIME) 2,000 mg in sterile water 20 mL IV syringe  2,000 mg IntraVENous Q12H MANOLO Moseley   2,000 mg at 10/26/21 0342    atorvastatin (LIPITOR) tablet 20 mg  20 mg Oral Nightly Ulysses Snehash, APRN - CNP   20 mg at 10/25/21 2059    losartan (COZAAR) tablet 100 mg  100 mg Oral Daily Ulysses Lush, APRN - CNP        metoprolol tartrate (LOPRESSOR) tablet 25 mg  25 mg Oral BID Ulysses Snehash, APRN - CNP   25 mg at 10/25/21 2059    tamsulosin (FLOMAX) capsule 0.8 mg  0.8 mg Oral Daily Ulysses Jeremy, APRN - CNP        [START ON 10/29/2021] vitamin D (ERGOCALCIFEROL) capsule 50,000 Units  50,000 Units Oral Weekly NOMI López CNP        glucose (GLUTOSE) 40 % oral gel 15 g  15 g Oral PRN NOMI López CNP        dextrose 50 % IV solution  12.5 g IntraVENous PRN NOMI López CNP        glucagon (rDNA) injection 1 mg  1 mg IntraMUSCular PRN NOMI López CNP        dextrose 5 % solution  100 mL/hr IntraVENous PRN NOMI López - CNP        sodium chloride flush 0.9 % injection 5-40 mL  5-40 mL IntraVENous 2 times per day NOMI López - CNP   10 mL at 10/25/21 2112    sodium chloride flush 0.9 % injection 5-40 mL  5-40 mL IntraVENous PRN NOMI López - CNP        0.9 % sodium chloride infusion  25 mL IntraVENous PRN NOMI López CNP        ondansetron (ZOFRAN-ODT) disintegrating tablet 4 mg  4 mg Oral Q8H PRN NOMI López CNP        Or    ondansetron (ZOFRAN) injection 4 mg  4 mg IntraVENous Q6H PRN NOMI López - PAOLA        polyethylene glycol (GLYCOLAX) packet 17 g  17 g Oral Daily PRN NOMI López CNP        acetaminophen (TYLENOL) tablet 650 mg  650 mg Oral Q6H PRN NOMI López - CNP        Or    acetaminophen (TYLENOL) suppository 650 mg  650 mg Rectal Q6H PRN NOMI López CNP        insulin lispro (HUMALOG) injection vial 8 Units  0.08 Units/kg SubCUTAneous TID  NOMI López CNP   8 Units at 10/25/21 1718    insulin lispro (HUMALOG) injection vial 0-6 Units  0-6 Units SubCUTAneous TID  NOMI Torrez CNP        insulin lispro (HUMALOG) injection vial 0-3 Units  0-3 Units SubCUTAneous Nightly NOMI López CNP   1 Units at 10/25/21 2059    vancomycin (VANCOCIN) intermittent dosing (placeholder)   Other RX Placeholder NOMI López - CNP   Given at 10/25/21 1656    vancomycin (VANCOCIN) 1000 mg in dextrose 5% 250 mL IVPB 1,000 mg IntraVENous Q12H NOMI [place] - CNP   Stopped at 10/26/21 0778    warfarin (COUMADIN) daily dosing (placeholder)   Other RX Placeholder NOMI [place] - CNP        insulin glargine (LANTUS) injection vial 30 Units  30 Units SubCUTAneous Nightly Dagmar Heaton APRN - CNP   30 Units at 10/25/21 2059         dextrose      sodium chloride        cefepime  2,000 mg IntraVENous Q12H    atorvastatin  20 mg Oral Nightly    losartan  100 mg Oral Daily    metoprolol tartrate  25 mg Oral BID    tamsulosin  0.8 mg Oral Daily    [START ON 10/29/2021] vitamin D  50,000 Units Oral Weekly    sodium chloride flush  5-40 mL IntraVENous 2 times per day    insulin lispro  0.08 Units/kg SubCUTAneous TID WC    insulin lispro  0-6 Units SubCUTAneous TID WC    insulin lispro  0-3 Units SubCUTAneous Nightly    vancomycin (VANCOCIN) intermittent dosing (placeholder)   Other RX Placeholder    vancomycin  1,000 mg IntraVENous Q12H    warfarin (COUMADIN) daily dosing (placeholder)   Other RX Placeholder    insulin glargine  30 Units SubCUTAneous Nightly     glucose, dextrose, glucagon (rDNA), dextrose, sodium chloride flush, sodium chloride, ondansetron **OR** ondansetron, polyethylene glycol, acetaminophen **OR** acetaminophen  ADULT DIET;  Regular; 4 carb choices (60 gm/meal)       Labs:   CBC with DIFF:  Recent Labs     10/25/21  1310 10/26/21  0147   WBC 12.5* 11.0*   RBC 3.54* 3.30*   HGB 11.3* 10.7*   HCT 34.0* 31.3*   MCV 96.0* 94.8*   MCH 31.9* 32.4*   MCHC 33.2 34.2   RDW 11.9 11.9    231   MPV 10.9 10.8   NEUTOPHILPCT 80.2* 76.5*   LYMPHOPCT 8.4* 9.0*   MONOPCT 10.3* 13.1*   EOSRELPCT 0.4 0.5   BASOPCT 0.3 0.4   NEUTROABS 10.0* 8.5*   LYMPHSABS 1.1 1.0*   MONOSABS 1.30* 1.40*   EOSABS 0.10 0.10   BASOSABS 0.00 0.00       CMP/BMP:  Recent Labs     10/25/21  1310 10/26/21  0147   * 129*   K 4.4 4.2   CL 96* 93*   CO2 24 23   ANIONGAP 13 13   GLUCOSE 256* 345*   BUN 22* 23*   CREATININE 1.0 1.0   LABGLOM >60 >60   CALCIUM 8.9 8.5*   PROT 6.9 6.1*   LABALBU 4.1 3.6   BILITOT 0.9 0.8   ALKPHOS 136* 137*   ALT 16 16   AST 13 17         CRP:    Recent Labs     10/25/21  1310   CRP 8.92*     Sed Rate:    Recent Labs     10/25/21  1310   SEDRATE 87*         HgBA1c:  No components found for: HGBA1C  FLP:    Lab Results   Component Value Date    TRIG 104 09/27/2021    HDL 35 09/27/2021    LDLCALC 49 09/27/2021     TSH:    Lab Results   Component Value Date    TSH 1.520 09/27/2021     Troponin T: No results for input(s): TROPONINI in the last 72 hours. Pro-BNP: No results for input(s): BNP in the last 72 hours. INR:   Recent Labs     10/25/21  1310 10/26/21  0147   INR 1.72* 2.10*     ABGs:   Lab Results   Component Value Date    PHART 7.290 03/25/2019    PO2ART 79.0 03/25/2019    XRH9NCT 20.0 03/25/2019     UA:No results for input(s): NITRITE, COLORU, PHUR, LABCAST, WBCUA, RBCUA, MUCUS, TRICHOMONAS, YEAST, BACTERIA, CLARITYU, SPECGRAV, LEUKOCYTESUR, UROBILINOGEN, BILIRUBINUR, BLOODU, GLUCOSEU, AMORPHOUS in the last 72 hours. Invalid input(s): Jean Pierre Spikes      Culture Results:    No results for input(s): CXSURG in the last 720 hours. Blood Culture Recent:   Recent Labs     10/25/21  1300   BC Gram stain Aerobic bottle:  Gram positive cocci in clusters  resembling Staphylococcus  Culture in progress  Please notify Physician   Bottle volume = 8 ml  *       Recent Labs     10/25/21  1300   BC Gram stain Aerobic bottle:  Gram positive cocci in clusters  resembling Staphylococcus  Culture in progress  Please notify Physician   Bottle volume = 8 ml  *             Cultures:   No results for input(s): CULTURE in the last 72 hours. Recent Labs     10/25/21  1300   BC Gram stain Aerobic bottle:  Gram positive cocci in clusters  resembling Staphylococcus  Culture in progress  Please notify Physician   Bottle volume = 8 ml  *     No results for input(s): CXSURG in the last 72 hours.       No results for input(s): MG, PHOS in the last 72 hours. Recent Labs     10/25/21  1310 10/26/21  0147   AST 13 17   ALT 16 16   BILITOT 0.9 0.8   ALKPHOS 136* 137*         RAD:   XR FINGER LEFT (MIN 2 VIEWS)    Result Date: 10/25/2021  History: Penetrating injury with soft tissue swelling, concern for osteomyelitis Left middle finger: 3 views left middle finger obtained. There is diffuse soft tissue swelling with no radiopaque foreign body seen within the soft tissues. Vascular calcifications. There are arthritic changes of the DIP greater than PIP joints as well as the third MCP joint. No acute fracture or dislocation. No radiographic evidence of osteomyelitis. 1. Prominent soft tissue swelling with arthritic change. No acute osseous pathology. No radiographic evidence of osteomyelitis or acute osseous injury. No radiopaque foreign body seen within the soft tissues. Signed by Dr Bard Jiang    Result Date: 10/20/2021  [facility] Procedure date: 10/20/21 Indications: shortness of breath Procedure: Stress was performed with injection of 0.4 mg Lexiscan. Vital signs and EKG were monitored. Technetium-99 sestamibi was injected in divided doses, 9.83 mCi and 31.5 mCi respectively for rest and stress imaging. The patient was discharged in stable condition. Results: Patient had symptoms of dyspnea during infusion that resolved in recovery. Baseline EKG showed atrial fibrillation with nonspecific ST/T changes. During stress there were no significant EKG changes or rhythm changes, negative for ischemia. Baseline and peak blood pressures were 170/88, and 171/98 respectively. Baseline and peak heart rates were 78 and  102 respectively. EF:  53% The resting and stress perfusion images were compared and reviewed in both corrected and noncorrected formats.  There is a moderate size, moderately intense reversible defect noted of the anterior wall from the midportion to the apex and including the apex that persists with attenuation correction also. This is considered positive for anterior apical ischemia. Gated LV wall motion demonstrates a matching segmental area of hypokinesis of the anterior apical segment, all other wall segments contract normally, ejection fraction 53%. Conclusions This is an abnormal pharmacologic nuclear stress test, positive for mid to apical and apical ischemia with no scar. A matching wall motion abnormality is noted overall LV systolic function.  Signed by Dr Gabrielle Castano      Echo:    Summary   Normal left ventricular size and systolic function EF 68-91%   Moderate concentric left ventricular hypertrophy with transmitral Doppler   suggesting severe/restrictive [place]is 3] diastolic dysfunction   Moderate left atrial enlargement   Nodular sclerosis of a tricuspid aortic valve with adequate cusp   separation and neither stenosis or insufficiency   Mild thickening but normally mobile mitral valve with mild posteriorly   directed regurgitation   Nonvisualization pulmonic valve   Moderate right atrial enlargement with mild right ventricular enlargement   and preserved systolic function   Mild to moderate tricuspid regurgitation with moderate pulmonary   hypertension RVSP estimate 56 mmHg   Less than 50% inspiratory collapse of the IVC consistent with mild   elevation of right atrial filling pressures of 10-15 mmHg   No significant pericardial effusion   Aortic root and ascending segments measured within normal limits   Injection of agitated saline with or without Valsalva maneuver   demonstrates no intracardiac communication   Definity contrast utilized to better define endocardial borders      Signature    ----------------------------------------------------------------   Electronically signed by Hemalatha Salazar MD(Interpreting physician)   on 04/15/2021 03:59 PM   ----------------------------------------------------------------        Objective: Vitals:   /88   Pulse 98   Temp 100 °F (37.8 °C) (Oral)   Resp 18   Ht 5' 10\" (1.778 m)   Wt 233 lb (105.7 kg)   SpO2 96%   BMI 33.43 kg/m²       Patient Vitals for the past 24 hrs:   BP Temp Temp src Pulse Resp SpO2 Height Weight   10/26/21 0550 127/88 100 °F (37.8 °C) Oral 98 18 96 % -- --   10/26/21 0120 129/70 98.5 °F (36.9 °C) Temporal 93 18 96 % 5' 10\" (1.778 m) 233 lb (105.7 kg)   10/25/21 1600 128/84 98.9 °F (37.2 °C) Temporal 88 16 95 % -- --   10/25/21 1445 (!) 142/64 -- -- 68 16 99 % -- --   10/25/21 1400 134/68 -- -- 78 16 99 % -- --   10/25/21 1125 136/77 98.6 °F (37 °C) Oral 86 16 99 % -- --       24HR INTAKE/OUTPUT:      Intake/Output Summary (Last 24 hours) at 10/26/2021 0823  Last data filed at 10/26/2021 0254  Gross per 24 hour   Intake 740.1 ml   Output --   Net 740.1 ml       Physical Exam  Vitals and nursing note reviewed. Constitutional:       General: He is not in acute distress. Appearance: Normal appearance. He is not ill-appearing, toxic-appearing or diaphoretic. HENT:      Head: Normocephalic and atraumatic. Right Ear: External ear normal.      Left Ear: External ear normal.      Nose: Nose normal. No congestion or rhinorrhea. Mouth/Throat:      Mouth: Mucous membranes are moist.      Pharynx: Oropharynx is clear. No oropharyngeal exudate or posterior oropharyngeal erythema. Eyes:      General: No scleral icterus. Right eye: No discharge. Left eye: No discharge. Extraocular Movements: Extraocular movements intact. Conjunctiva/sclera: Conjunctivae normal.      Pupils: Pupils are equal, round, and reactive to light. Cardiovascular:      Rate and Rhythm: Normal rate. Rhythm irregular. Pulses: Normal pulses. Heart sounds: Normal heart sounds. No murmur heard. No friction rub. No gallop. Pulmonary:      Effort: Pulmonary effort is normal. No respiratory distress. Breath sounds: Normal breath sounds. No stridor.  No wheezing, rhonchi or rales. Chest:      Chest wall: No tenderness. Abdominal:      General: Bowel sounds are normal. There is no distension. Palpations: Abdomen is soft. Tenderness: There is no abdominal tenderness. There is no guarding or rebound. Musculoskeletal:         General: Tenderness ( Left middle finger wound tender to palpation-pictures below) present. No swelling, deformity or signs of injury. Normal range of motion. Cervical back: Normal range of motion and neck supple. No rigidity or tenderness. No muscular tenderness. Right lower leg: No edema. Left lower leg: No edema. Skin:     General: Skin is warm and dry. Capillary Refill: Capillary refill takes less than 2 seconds. Coloration: Skin is not jaundiced or pale. Findings: No bruising, erythema, lesion or rash. Neurological:      General: No focal deficit present. Mental Status: He is alert and oriented to person, place, and time. Cranial Nerves: No cranial nerve deficit. Sensory: No sensory deficit. Motor: No weakness. Coordination: Coordination normal.   Psychiatric:         Mood and Affect: Mood normal.         Behavior: Behavior normal.         Thought Content:  Thought content normal.         Judgment: Judgment normal.               Assessment/plan:       Hospital Problems         Last Modified POA    * (Principal) Cellulitis 10/25/2021 Yes    HTN (hypertension) 10/25/2021 Yes    Hyperlipidemia 10/25/2021 Yes    Type 2 diabetes mellitus with diabetic polyneuropathy, with long-term current use of insulin ([address] (Chronic) 10/25/2021 Yes    Overview Signed 2/21/2021  3:16 PM by Maribell Hughes MD     Lab Results   Component Value Date    LABA1C 11.4 (H) 12/03/2020     No results found for: EAG         Permanent atrial fibrillation ([address] 10/25/2021 Yes    (HFpEF) heart failure with preserved ejection fraction ([address] 10/25/2021 Yes    Overview Signed 2/21/2021  3:12 PM by Jeramy Laughlin MD     Structurally normal mitral valve. Mild mitral regurgitation is present. Mitral annular calcification is present. Structurally normal aortic valve. Tricuspid valve is structurally normal.   Mild tricuspid regurgitation. Normal size left atrium. Normal intact intra-atrial septum was noted with no evidence of   significant intra-atrial communications by color flow Doppler or by   agitated saline study. Normal left ventricular size with preserved LV function and an estimated   ejection fraction of approximately 55-60%. Moderate concentric left ventricular hypertrophy. No regional wall motion abnormalities. no         Obstructive sleep apnea 10/25/2021 Yes          Principal Problem:    Cellulitis  Active Problems:    HTN (hypertension)    Hyperlipidemia    Type 2 diabetes mellitus with diabetic polyneuropathy, with long-term current use of insulin (HCC)    Permanent atrial fibrillation (HCC)    (HFpEF) heart failure with preserved ejection fraction ([address]    Obstructive sleep apnea  Resolved Problems:    * No resolved hospital problems. *        Brief Summary  Mr. Goldstein, a 12-year-old male, history of HTN, HLD, atrial fibrillation, presenting to [facility] ED (10/25/2021), with complaints of right middle finger swelling, with purulent drainage. Patient reportedly cut his finger on a rusted nail on (10/22/2021), while working in his garage. Patient subsequently reportedly noted gradual redness and swelling, and now with purulent drainage from the wound. Patient initially saw his PCP outpatient, who subsequently recommended patient presented to the ED for further work-up and management    Initial work-up significant for;  Leukocytosis with a WBC of 12.5  Elevated lactic acid of 2.2  CRP of 8.92  Elevated ESR: 87 mm/Hr    X-ray left finger: Impression: Prominent soft tissue swelling with arthritic change. No acute osseous pathology.  No radiographic evidence of osteomyelitis or acute osseous injury. No radiopaque foreign body seen within the soft tissues. Orthopedic surgery consulted  Patient admitted (10/25/2021), further work-up and management. Left middle finger wound/cellulitis/infection  Staph bacteremia   · Initial lactic acid of 2.2  · Gentle hydration  · Trend lactic acid level  · Blood cultures positive for Staphylococcus-speciation/sensitivities following  · Follow-up repeat blood cultures  · Vancomycin and cefepime    History of permanent atrial fibrillation  · Currently rate controlled  · Metoprolol tartrate 25 mg p.o. twice daily  · Warfarin pharmacy dosing      History of Diabetes Mellitus II   Hemoglobin A1C: 7.2%   Inpatient Regimens to include;  o - Insulin Glargine (Lantus) 30 units subcu nightly  o - Insulin Lispro (Humalog) 8 units subcu pre-meal 3 times a day  o - Insulin Lispro (Humalog) on a Low dose sliding scale   Monitor POC glucose, and adjust insulin regimen accordingly based on daily insulin requirement. History of HTN  · Stable  · Losartan 100 mg p.o. daily  · Metoprolol tartrate 25 mg p.o. twice daily       History of combined diastolic and systolic HF  Recent abnormal stress test  · Recent NM stress test (10/20/2021): Conclusions This is an abnormal pharmacologic nuclear stress test, positive for mid to apical and apical ischemia with no scar. A matching wall motion abnormality is noted overall LV systolic function. · No active chest pain or shortness of breath at this time. · Serial EKGs for chest pain  · Trend troponin  · Monitor on telemetry  · Planned for outpatient cardiac catheterization, as per patient  · Consider cardiology consult, if symptoms reported. Continue management of other chronic medical conditions - see above and orders. Advance Directive: Full Code    ADULT DIET;  Regular; 4 carb choices (60 gm/meal)         Consults Made:   IP CONSULT TO ORTHOPEDIC SURGERY  PHARMACY TO DOSE VANCOMYCIN  PHARMACY TO DOSE VANCOMYCIN  IP CONSULT TO PHARMACY    DVT prophylaxis: Warfarin-pharmacy to dose        Discharge planning: tbd    Time Spent is 35 mins in the examination, evaluation, counseling and review of medications, assessment and plan.      Electronically signed by   Yoselin Nieves MD, MPH, MD,   Internal Medicine Hospitalist   10/26/2021 8:23 AM

## 2021-10-26 NOTE — PLAN OF CARE
Problem: Falls - Risk of:  Goal: Will remain free from falls  Description: Will remain free from falls  10/26/2021 0302 by Markus Juarez RN  Outcome: Ongoing  10/25/2021 1747 by Heri Pickard RN  Outcome: Ongoing  Goal: Absence of physical injury  Description: Absence of physical injury  10/26/2021 0302 by Markus Juarez RN  Outcome: Ongoing  10/25/2021 1747 by Heri Pickard RN  Outcome: Ongoing     Problem: Discharge Planning:  Goal: Discharged to appropriate level of care  Description: Discharged to appropriate level of care  10/26/2021 0302 by Markus Juarez RN  Outcome: Ongoing  10/25/2021 1747 by Heri Pickard RN  Outcome: Ongoing     Problem: Body Temperature - Imbalanced:  Goal: Ability to maintain a body temperature in the normal range will improve  Description: Ability to maintain a body temperature in the normal range will improve  10/26/2021 0302 by Markus Juarez RN  Outcome: Ongoing  10/25/2021 1747 by Heri Pickard RN  Outcome: Ongoing     Problem: Mobility - Impaired:  Goal: Mobility will improve to maximum level  Description: Mobility will improve to maximum level  10/26/2021 0302 by Markus Juarez RN  Outcome: Ongoing  10/25/2021 1747 by Heri Pickard RN  Outcome: Ongoing     Problem: Pain:  Description: Pain management should include both nonpharmacologic and pharmacologic interventions.   Goal: Pain level will decrease  Description: Pain level will decrease  10/26/2021 0302 by Markus Juarez RN  Outcome: Ongoing  10/25/2021 1747 by Heri Pickard RN  Outcome: Ongoing  Goal: Control of acute pain  Description: Control of acute pain  10/26/2021 0302 by Markus Juarez RN  Outcome: Ongoing  10/25/2021 1747 by Heri Pickard RN  Outcome: Ongoing  Goal: Control of chronic pain  Description: Control of chronic pain  10/26/2021 0302 by Markus Juarez RN  Outcome: Ongoing  10/25/2021 1747 by Heri Pickard RN  Outcome: Ongoing     Problem: Skin Integrity - Impaired:  Goal: Will show no infection signs and symptoms  Description: Will show no infection signs and symptoms  10/26/2021 0302 by Maite Moss RN  Outcome: Ongoing  10/25/2021 1747 by Pedro Stiles RN  Outcome: Ongoing  Goal: Absence of new skin breakdown  Description: Absence of new skin breakdown  10/26/2021 0302 by Maite Moss RN  Outcome: Ongoing  10/25/2021 1747 by Pedro Stiles RN  Outcome: Ongoing     Problem: Serum Glucose Level - Abnormal:  Goal: Ability to maintain appropriate glucose levels will improve  Description: Ability to maintain appropriate glucose levels will improve  10/26/2021 0302 by Maite Moss RN  Outcome: Ongoing  10/25/2021 1747 by Pedro Stiles RN  Outcome: Ongoing     Problem: Sensory Perception - Impaired:  Goal: Ability to maintain a stable neurologic state will improve  Description: Ability to maintain a stable neurologic state will improve  10/26/2021 0302 by Maite Moss RN  Outcome: Ongoing  10/25/2021 1747 by Pedro Stiles RN  Outcome: Ongoing     Problem:  Activity:  Goal: Ability to tolerate increased activity will improve  Description: Ability to tolerate increased activity will improve  Outcome: Ongoing  Goal: Risk for activity intolerance will decrease  Description: Risk for activity intolerance will decrease  Outcome: Ongoing  Goal: Expression of feelings of increased energy will increase  Description: Expression of feelings of increased energy will increase  Outcome: Ongoing  Goal: Amount of time patient spends in regular exercise will increase  Outcome: Ongoing     Problem: Cardiac:  Goal: Hemodynamic stability will improve  Description: Hemodynamic stability will improve  Outcome: Ongoing  Goal: Complications related to the disease process, condition or treatment will be avoided or minimized  Description: Complications related to the disease process, condition or treatment will be avoided or minimized  Outcome: Ongoing  Goal: Cerebral tissue perfusion will improve  Description: Cerebral tissue perfusion will improve  Outcome: Ongoing  Goal: Ability to maintain an adequate cardiac output will improve  Description: Ability to maintain an adequate cardiac output will improve  Outcome: Ongoing     Problem: Coping:  Goal: Ability to identify and develop effective coping behavior will improve  Description: Ability to identify and develop effective coping behavior will improve  Outcome: Ongoing  Goal: Ability to adjust to condition or change in health will improve  Description: Ability to adjust to condition or change in health will improve  Outcome: Ongoing  Goal: Level of anxiety will decrease  Description: Level of anxiety will decrease  Outcome: Ongoing  Goal: General experience of comfort will improve  Outcome: Ongoing     Problem: Health Behavior:  Goal: Identification of resources available to assist in meeting health care needs will improve  Description: Identification of resources available to assist in meeting health care needs will improve  Outcome: Ongoing  Goal: Ability to identify and utilize available resources and services will improve  Description: Ability to identify and utilize available resources and services will improve  Outcome: Ongoing  Goal: Ability to manage health-related needs will improve  Description: Ability to manage health-related needs will improve  Outcome: Ongoing     Problem: Nutritional:  Goal: Ability to identify appropriate dietary choices will improve  Description: Ability to identify appropriate dietary choices will improve  Outcome: Ongoing  Goal: Maintenance of adequate nutrition will improve  Description: Maintenance of adequate nutrition will improve  Outcome: Ongoing  Goal: Progress toward achieving an optimal weight will improve  Description: Progress toward achieving an optimal weight will improve  Outcome: Ongoing  Goal: Adjustment of eating patterns to appropriate times will improve  Description: Adjustment of eating patterns to appropriate times will improve  Outcome: Ongoing  Goal: Ability to make healthy dietary choices will improve  Description: Ability to make healthy dietary choices will improve  Outcome: Ongoing  Goal: Nutritional status will improve  Outcome: Ongoing     Problem: ABCDS Injury Assessment  Goal: Absence of physical injury  Outcome: Ongoing     Problem: Fluid Volume:  Goal: Ability to maintain a balanced intake and output will improve  Description: Ability to maintain a balanced intake and output will improve  Outcome: Ongoing     Problem: Metabolic:  Goal: Ability to maintain appropriate glucose levels will improve  Description: Ability to maintain appropriate glucose levels will improve  Outcome: Ongoing     Problem: Physical Regulation:  Goal: Complications related to the disease process, condition or treatment will be avoided or minimized  Description: Complications related to the disease process, condition or treatment will be avoided or minimized  Outcome: Ongoing  Goal: Diagnostic test results will improve  Description: Diagnostic test results will improve  Outcome: Ongoing  Goal: Prevent transmision of infection  Description: Prevent transmision of infection  Outcome: Ongoing  Goal: Will remain free from infection  Description: Will remain free from infection  Outcome: Ongoing  Goal: Ability to maintain vital signs within normal range will improve  Description: Ability to maintain vital signs within normal range will improve  Outcome: Ongoing     Problem: Skin Integrity:  Goal: Risk for impaired skin integrity will decrease  Description: Risk for impaired skin integrity will decrease  Outcome: Ongoing  Goal: Demonstration of wound healing without infection will improve  Description: Demonstration of wound healing without infection will improve  Outcome: Ongoing  Goal: Complications related to intravenous access or infusion will be avoided or minimized  Description: Complications related to intravenous access or infusion will be avoided or minimized  Outcome: Ongoing     Problem: Tissue Perfusion:  Goal: Adequacy of tissue perfusion will improve  Description: Adequacy of tissue perfusion will improve  Outcome: Ongoing     Problem: Safety:  Goal: Ability to remain free from injury will improve  Description: Ability to remain free from injury will improve  Outcome: Ongoing  Goal: Will show no signs and symptoms of excessive bleeding  Description: Will show no signs and symptoms of excessive bleeding  Outcome: Ongoing     Problem: Infection - Methicillin-Resistant Staphylococcus Aureus Infection:  Goal: Absence of methicillin-resistant Staphylococcus aureus infection  Description: Absence of methicillin-resistant Staphylococcus aureus infection  Outcome: Ongoing     Problem: Self-Concept:  Goal: Ability to verbalize positive feelings about self will improve  Description: Ability to verbalize positive feelings about self will improve  Outcome: Ongoing     Problem: Respiratory:  Goal: Ability to maintain normal respiratory secretions will improve  Description: Ability to maintain normal respiratory secretions will improve  Outcome: Ongoing

## 2021-10-26 NOTE — CONSULTS
[address] [address], [place]  Inpatient Consultation    Riana Rodriguez (1965)  10/26/2021    Reason for Consult: Left long finger cellulitis     Requesting Physician: MANOLO García    CHIEF COMPLAINT:  Left long finger redness, swelling and pain    History Obtained From: patient      HISTORY OF PRESENT ILLNESS:      A 60 y.o. male who presented to the emergency department with complaints \"nicking\" his left middle finger at the dorsal aspect of the DIP joint Saturday night. Mr Linh Braun reports being diabetic. Over the past few days, he notes increased swelling and redness in the long finger. He reports no significant improvement since starting IV antibiotics in the hospital. Patient denies any fever chills or night sweats. Denies any other joint pain. Currently on IV Vancomycin.       Past Medical History:    Past Medical History:   Diagnosis Date    Acalculous cholecystitis 12/29/2015    Allergic rhinitis, cause unspecified     Atrial fibrillation, chronic ([address]     sees [facility] cardiology    Charcot's joint of right foot     Chicken pox     Closed supracondylar fracture of elbow     fall    Diabetic ulcer of right midfoot associated with type 2 diabetes mellitus, with fat layer exposed ([address] 9/14/2020    History of MRSA infection     scalp/facial and on back    HTN (hypertension)     Hyperlipidemia     Impotence of organic origin     MDRO (multiple drug resistant organisms) resistance     Other dyspnea and respiratory abnormality     Other specified erythematous condition(695.89)     Personal history of other infectious and parasitic disease     Salmonella     Type II or unspecified type diabetes mellitus without mention of complication, uncontrolled 1994       Past Surgical History:    Past Surgical History:   Procedure Laterality Date    CARDIOVERSION  03/2017    CATARACT REMOVAL      COLONOSCOPY  02/24/2020    Dr RITIKA Fraire-Melanosis coli throughout the entire colon-AP, 5 yr recall    HUMERUS FRACTURE SURGERY Left 9/17/2020    OPEN REDUCTION INTERNAL FIXATION LEFT SUPRACONDYLAR FRACTURE performed by Cass Rosenthal MD at [address]      X 2    TONSILLECTOMY         Current Medications:     Current Facility-Administered Medications:     vancomycin (VANCOCIN) 1,000 mg in sodium chloride 0.9 % 250 mL IVPB, 1,000 mg, IntraVENous, Q12H, NOMI Narvaez CNP    ceFEPIme (MAXIPIME) 2,000 mg in sterile water 20 mL IV syringe, 2,000 mg, IntraVENous, Q12H, MANOLO Collado, 2,000 mg at 10/26/21 0342    atorvastatin (LIPITOR) tablet 20 mg, 20 mg, Oral, Nightly, NOMI Narvaez CNP, 20 mg at 10/25/21 2059    losartan (COZAAR) tablet 100 mg, 100 mg, Oral, Daily, NOMI Narvaez CNP, 100 mg at 10/26/21 0906    metoprolol tartrate (LOPRESSOR) tablet 25 mg, 25 mg, Oral, BID, NOMI Narvaez - CNP, 25 mg at 10/26/21 0906    tamsulosin (FLOMAX) capsule 0.8 mg, 0.8 mg, Oral, Daily, NOMI Narvaez CNP, 0.8 mg at 10/26/21 0906    [START ON 10/29/2021] vitamin D (ERGOCALCIFEROL) capsule 50,000 Units, 50,000 Units, Oral, Weekly, NOMI Torrez CNP    glucose (GLUTOSE) 40 % oral gel 15 g, 15 g, Oral, PRN, NOMI Narvaez - CNP    dextrose 50 % IV solution, 12.5 g, IntraVENous, PRN, NOMI Narvaez - CNP    glucagon (rDNA) injection 1 mg, 1 mg, IntraMUSCular, PRN, NOMI Narvaez - CNP    dextrose 5 % solution, 100 mL/hr, IntraVENous, PRN, NOMI Narvaez - CNP    sodium chloride flush 0.9 % injection 5-40 mL, 5-40 mL, IntraVENous, 2 times per day, NOMI Narvaez CNP, 10 mL at 10/26/21 0906    sodium chloride flush 0.9 % injection 5-40 mL, 5-40 mL, IntraVENous, PRN, NOMI Torrez - CNP    0.9 % sodium chloride infusion, 25 mL, IntraVENous, PRN, NOMI Narvaez - CNP    ondansetron (ZOFRAN-ODT) disintegrating tablet 4 mg, 4 mg, Oral, Q8H PRN **OR** ondansetron (ZOFRAN) injection 4 mg, 4 mg, IntraVENous, Q6H PRN, Valma Cue, APRN - CNP    polyethylene glycol (GLYCOLAX) packet 17 g, 17 g, Oral, Daily PRN, Valma Cue, APRN - CNP    acetaminophen (TYLENOL) tablet 650 mg, 650 mg, Oral, Q6H PRN **OR** acetaminophen (TYLENOL) suppository 650 mg, 650 mg, Rectal, Q6H PRN, Valma Cue, APRN - CNP    insulin lispro (HUMALOG) injection vial 8 Units, 0.08 Units/kg, SubCUTAneous, TID WC, Vera Vuong, APRN - CNP, 8 Units at 10/26/21 0905    insulin lispro (HUMALOG) injection vial 0-6 Units, 0-6 Units, SubCUTAneous, TID WC, Vera Carolann, APRN - CNP, 4 Units at 10/26/21 0905    insulin lispro (HUMALOG) injection vial 0-3 Units, 0-3 Units, SubCUTAneous, Nightly, Valma Cue, APRN - CNP, 1 Units at 10/25/21 2059    vancomycin (VANCOCIN) intermittent dosing (placeholder), , Other, RX Placeholder, Valma Cue, APRN - CNP, Given at 10/25/21 1656    warfarin (COUMADIN) daily dosing (placeholder), , Other, RX Placeholder, Vera Carolann, APRN - CNP    insulin glargine (LANTUS) injection vial 30 Units, 30 Units, SubCUTAneous, Nightly, Valma Cue, APRN - CNP, 30 Units at 10/25/21 2059    Allergies:  Pcn [penicillins], Neomycin-bacitracin zn-polymyx, and Neosporin [neomycin-polymyxin-gramicidin]    Social History:   Social History     Socioeconomic History    Marital status:      Spouse name: None    Number of children: None    Years of education: None    Highest education level: None   Occupational History    None   Tobacco Use    Smoking status: Never Smoker    Smokeless tobacco: Never Used   Vaping Use    Vaping Use: Never used   Substance and Sexual Activity    Alcohol use: No    Drug use: No    Sexual activity: None   Other Topics Concern    None   Social History Narrative    Referred to  for assistance with Meals on Wheels     Social Determinants of [address] [place] [address] Strain: High Risk    Difficulty of Paying Living Expenses: Hard   Food Insecurity: Food Insecurity Present    Worried About Running Out of Food in the Last Year: Sometimes true    Zoran of Food in the Last Year: Sometimes true   Transportation Needs: No Transportation Needs    Lack of Transportation (Medical): No    Lack of Transportation (Non-Medical): No   Physical Activity:     Days of Exercise per Week:     Minutes of Exercise per Session:    Stress:     Feeling of Stress :    Social Connections:     Frequency of Communication with Friends and Family:     Frequency of Social Gatherings with Friends and Family:     Attends Synagogue Services:     Active Member of Clubs or Organizations:     Attends Club or Organization Meetings:     Marital Status:    Intimate Partner Violence:     Fear of Current or Ex-Partner:     Emotionally Abused:     Physically Abused:     Sexually Abused:        Family History:   Family History   Problem Relation Age of Onset    Stroke Maternal Grandmother     Cancer Maternal Grandmother     Stroke Paternal Grandmother     Diabetes Paternal Grandmother     Cancer Father     Heart Disease Father     Lung Cancer Father     Asthma Paternal Grandfather     Heart Disease Paternal Grandfather     Heart Disease Maternal Grandfather     Colon Cancer Neg Hx     Colon Polyps Neg Hx     Esophageal Cancer Neg Hx     Liver Cancer Neg Hx     Liver Disease Neg Hx     Rectal Cancer Neg Hx     Stomach Cancer Neg Hx        REVIEW OF SYSTEMS:      10 point ROS obtained with complaints of left long finger pain and swelling. PHYSICAL EXAM:        General Appearance:    Alert, cooperative, in no acute distress, considered to be a good historian.     Head:    Normocephalic, without obvious abnormality, atraumatic   Eyes:            Vision intact, EOM intact, no icterus or drainage       Lymph:   Soft, non-tender, trachea midline       Pulmonary:     No rib crepitus, no clear chest trauma, no accessory usage, no audible wheeze    Cardiovascular:    Regular rate, regular rhythm, grossly non-displaced PMI       Abdomen:     Grossly non tender, non-distended   Rectal/:     Deferred   Neurologic:   Sensation intact without focal deficit    Muskuloskeletal:   Erythema, swelling, pain and increased warmth of the left long finger. General stiffness noted but without exquisite DIP or PIP joint pain with motion. Minimal tenderness to palpation along the flexor tendon sheath. Minimal discomfort with active flexion and extension which is limited by stiffness/swelling. Skin:  warm, dry, no cyanosis, turgor. Redness and swelling left long finger. Psychiatric:  A/ox3, mood appropriate, affect appropriate       DATA:    1/2 blood cultures positive for likely Staphylococcus species  WBC 11, improved from 12.5  Lactate 2.2 yesterday evening    Radiology:   Prominent soft tissue swelling with arthritic change. No acute   osseous pathology. No radiographic evidence of osteomyelitis or acute   osseous injury. No radiopaque foreign body seen within the soft   tissues. IMPRESSION:  59 y/o M with L middle finger cellulitis/superficial abscess left long finger versus deep soft tissue infection, possible bacteremia, significant medical history for atrial fibrillation on anticoagulation, type 2 diabetes mellitus on insulin (hemoglobin A1c 7.2), history of prior MRSA infection, hypertension, hyperlipidemia, obesity    RECOMMENDATIONS:  1. Left middle finger infection: Do not appreciate a deep fluid collection which would benefit from formal I&D at this time. Would recommend continued IV antibiotics and initiation of dilute Betadine soaks which I discussed with the nursing staff this afternoon. We will continue to follow clinically. Depending on progress, may consider MRI of the middle finger if symptoms do not improve. No plans for operative intervention at this point.   2. Elevate CRISTOBAL  3. Activity as tolerated  4. Dispo:  Will follow clinical course        Electronically signed by MANOLO Troy11:42 AM10/26/2021

## 2021-10-26 NOTE — PLAN OF CARE
intolerance will decrease  Outcome: Ongoing  Goal: Expression of feelings of increased energy will increase  Description: Expression of feelings of increased energy will increase  Outcome: Ongoing  Goal: Amount of time patient spends in regular exercise will increase  Outcome: Ongoing     Problem: Cardiac:  Goal: Hemodynamic stability will improve  Description: Hemodynamic stability will improve  Outcome: Ongoing  Goal: Complications related to the disease process, condition or treatment will be avoided or minimized  Description: Complications related to the disease process, condition or treatment will be avoided or minimized  Outcome: Ongoing  Goal: Cerebral tissue perfusion will improve  Description: Cerebral tissue perfusion will improve  Outcome: Ongoing  Goal: Ability to maintain an adequate cardiac output will improve  Description: Ability to maintain an adequate cardiac output will improve  Outcome: Ongoing     Problem: Coping:  Goal: Ability to identify and develop effective coping behavior will improve  Description: Ability to identify and develop effective coping behavior will improve  Outcome: Ongoing  Goal: Ability to adjust to condition or change in health will improve  Description: Ability to adjust to condition or change in health will improve  Outcome: Ongoing  Goal: Level of anxiety will decrease  Description: Level of anxiety will decrease  Outcome: Ongoing  Goal: General experience of comfort will improve  Outcome: Ongoing     Problem: Health Behavior:  Goal: Identification of resources available to assist in meeting health care needs will improve  Description: Identification of resources available to assist in meeting health care needs will improve  Outcome: Ongoing  Goal: Ability to identify and utilize available resources and services will improve  Description: Ability to identify and utilize available resources and services will improve  Outcome: Ongoing  Goal: Ability to manage health-related Will remain free from infection  Outcome: Ongoing  Goal: Ability to maintain vital signs within normal range will improve  Description: Ability to maintain vital signs within normal range will improve  Outcome: Ongoing     Problem: Skin Integrity:  Goal: Risk for impaired skin integrity will decrease  Description: Risk for impaired skin integrity will decrease  Outcome: Ongoing  Goal: Demonstration of wound healing without infection will improve  Description: Demonstration of wound healing without infection will improve  Outcome: Ongoing  Goal: Complications related to intravenous access or infusion will be avoided or minimized  Description: Complications related to intravenous access or infusion will be avoided or minimized  Outcome: Ongoing     Problem: Tissue Perfusion:  Goal: Adequacy of tissue perfusion will improve  Description: Adequacy of tissue perfusion will improve  Outcome: Ongoing   Electronically signed by Donta Lopez RN on 10/26/2021 at 6:19 PM

## 2021-10-27 ENCOUNTER — APPOINTMENT (OUTPATIENT)
Dept: MRI IMAGING | Age: 56
DRG: 580 | End: 2021-10-27
Payer: MEDICARE

## 2021-10-27 PROBLEM — B95.62 BACTEREMIA DUE TO METHICILLIN RESISTANT STAPHYLOCOCCUS AUREUS: Status: ACTIVE | Noted: 2021-10-27

## 2021-10-27 PROBLEM — R78.81 BACTEREMIA DUE TO METHICILLIN RESISTANT STAPHYLOCOCCUS AUREUS: Status: ACTIVE | Noted: 2021-10-27

## 2021-10-27 LAB
ALBUMIN SERPL-MCNC: 3.2 G/DL (ref 3.5–5.2)
ALP BLD-CCNC: 155 U/L (ref 40–130)
ALT SERPL-CCNC: 23 U/L (ref 5–41)
ANION GAP SERPL CALCULATED.3IONS-SCNC: 10 MMOL/L (ref 7–19)
AST SERPL-CCNC: 21 U/L (ref 5–40)
BASOPHILS ABSOLUTE: 0.1 K/UL (ref 0–0.2)
BASOPHILS RELATIVE PERCENT: 0.5 % (ref 0–1)
BILIRUB SERPL-MCNC: 0.6 MG/DL (ref 0.2–1.2)
BUN BLDV-MCNC: 24 MG/DL (ref 6–20)
CALCIUM SERPL-MCNC: 8.9 MG/DL (ref 8.6–10)
CHLORIDE BLD-SCNC: 97 MMOL/L (ref 98–111)
CO2: 26 MMOL/L (ref 22–29)
CREAT SERPL-MCNC: 1.1 MG/DL (ref 0.5–1.2)
EOSINOPHILS ABSOLUTE: 0.2 K/UL (ref 0–0.6)
EOSINOPHILS RELATIVE PERCENT: 1.5 % (ref 0–5)
GFR AFRICAN AMERICAN: >59
GFR NON-AFRICAN AMERICAN: >60
GLUCOSE BLD-MCNC: 210 MG/DL (ref 74–109)
GLUCOSE BLD-MCNC: 216 MG/DL (ref 70–99)
GLUCOSE BLD-MCNC: 243 MG/DL (ref 70–99)
GLUCOSE BLD-MCNC: 274 MG/DL (ref 70–99)
GLUCOSE BLD-MCNC: 306 MG/DL (ref 70–99)
GRAM STAIN RESULT: ABNORMAL
HCT VFR BLD CALC: 32.2 % (ref 42–52)
HEMOGLOBIN: 11.1 G/DL (ref 14–18)
IMMATURE GRANULOCYTES #: 0 K/UL
INR BLD: 2.63 (ref 0.88–1.18)
LYMPHOCYTES ABSOLUTE: 1.5 K/UL (ref 1.1–4.5)
LYMPHOCYTES RELATIVE PERCENT: 13.3 % (ref 20–40)
MAGNESIUM: 1.6 MG/DL (ref 1.6–2.6)
MCH RBC QN AUTO: 32.6 PG (ref 27–31)
MCHC RBC AUTO-ENTMCNC: 34.5 G/DL (ref 33–37)
MCV RBC AUTO: 94.4 FL (ref 80–94)
MONOCYTES ABSOLUTE: 1.4 K/UL (ref 0–0.9)
MONOCYTES RELATIVE PERCENT: 12.9 % (ref 0–10)
NEUTROPHILS ABSOLUTE: 7.8 K/UL (ref 1.5–7.5)
NEUTROPHILS RELATIVE PERCENT: 71.4 % (ref 50–65)
ORGANISM: ABNORMAL
ORGANISM: ABNORMAL
PDW BLD-RTO: 11.9 % (ref 11.5–14.5)
PERFORMED ON: ABNORMAL
PLATELET # BLD: 269 K/UL (ref 130–400)
PMV BLD AUTO: 10.7 FL (ref 9.4–12.4)
POTASSIUM REFLEX MAGNESIUM: 3.4 MMOL/L (ref 3.5–5)
PROTHROMBIN TIME: 27.6 SEC (ref 12–14.6)
RBC # BLD: 3.41 M/UL (ref 4.7–6.1)
SODIUM BLD-SCNC: 133 MMOL/L (ref 136–145)
TOTAL PROTEIN: 6.4 G/DL (ref 6.6–8.7)
TROPONIN: <0.01 NG/ML (ref 0–0.03)
TROPONIN: <0.01 NG/ML (ref 0–0.03)
VANCOMYCIN TROUGH: 17.3 UG/ML (ref 10–20)
WBC # BLD: 11 K/UL (ref 4.8–10.8)
WOUND/ABSCESS: ABNORMAL
WOUND/ABSCESS: ABNORMAL

## 2021-10-27 PROCEDURE — 80053 COMPREHEN METABOLIC PANEL: CPT

## 2021-10-27 PROCEDURE — 83735 ASSAY OF MAGNESIUM: CPT

## 2021-10-27 PROCEDURE — 73220 MRI UPPR EXTREMITY W/O&W/DYE: CPT

## 2021-10-27 PROCEDURE — 87040 BLOOD CULTURE FOR BACTERIA: CPT

## 2021-10-27 PROCEDURE — 1210000000 HC MED SURG R&B

## 2021-10-27 PROCEDURE — 6360000002 HC RX W HCPCS: Performed by: PHYSICIAN ASSISTANT

## 2021-10-27 PROCEDURE — 85610 PROTHROMBIN TIME: CPT

## 2021-10-27 PROCEDURE — 6360000002 HC RX W HCPCS: Performed by: NURSE PRACTITIONER

## 2021-10-27 PROCEDURE — 2580000003 HC RX 258: Performed by: PHYSICIAN ASSISTANT

## 2021-10-27 PROCEDURE — 2580000003 HC RX 258: Performed by: NURSE PRACTITIONER

## 2021-10-27 PROCEDURE — 6360000004 HC RX CONTRAST MEDICATION: Performed by: ORTHOPAEDIC SURGERY

## 2021-10-27 PROCEDURE — 84484 ASSAY OF TROPONIN QUANT: CPT

## 2021-10-27 PROCEDURE — A9577 INJ MULTIHANCE: HCPCS | Performed by: ORTHOPAEDIC SURGERY

## 2021-10-27 PROCEDURE — 82947 ASSAY GLUCOSE BLOOD QUANT: CPT

## 2021-10-27 PROCEDURE — 36415 COLL VENOUS BLD VENIPUNCTURE: CPT

## 2021-10-27 PROCEDURE — 6370000000 HC RX 637 (ALT 250 FOR IP): Performed by: NURSE PRACTITIONER

## 2021-10-27 PROCEDURE — 85025 COMPLETE CBC W/AUTO DIFF WBC: CPT

## 2021-10-27 PROCEDURE — 80202 ASSAY OF VANCOMYCIN: CPT

## 2021-10-27 PROCEDURE — 6370000000 HC RX 637 (ALT 250 FOR IP): Performed by: ORTHOPAEDIC SURGERY

## 2021-10-27 RX ORDER — DIPHENHYDRAMINE HCL 25 MG
50 TABLET ORAL ONCE
Status: COMPLETED | OUTPATIENT
Start: 2021-10-28 | End: 2021-10-28

## 2021-10-27 RX ADMIN — WARFARIN SODIUM 7.5 MG: 2.5 TABLET ORAL at 17:25

## 2021-10-27 RX ADMIN — WATER 2000 MG: 1 INJECTION INTRAMUSCULAR; INTRAVENOUS; SUBCUTANEOUS at 05:54

## 2021-10-27 RX ADMIN — INSULIN LISPRO 2 UNITS: 100 INJECTION, SOLUTION INTRAVENOUS; SUBCUTANEOUS at 08:06

## 2021-10-27 RX ADMIN — PREDNISONE 50 MG: 10 TABLET ORAL at 15:15

## 2021-10-27 RX ADMIN — INSULIN LISPRO 8 UNITS: 100 INJECTION, SOLUTION INTRAVENOUS; SUBCUTANEOUS at 08:05

## 2021-10-27 RX ADMIN — INSULIN LISPRO 3 UNITS: 100 INJECTION, SOLUTION INTRAVENOUS; SUBCUTANEOUS at 13:10

## 2021-10-27 RX ADMIN — INSULIN LISPRO 8 UNITS: 100 INJECTION, SOLUTION INTRAVENOUS; SUBCUTANEOUS at 17:25

## 2021-10-27 RX ADMIN — GADOBENATE DIMEGLUMINE 20 ML: 529 INJECTION, SOLUTION INTRAVENOUS at 12:27

## 2021-10-27 RX ADMIN — METOPROLOL TARTRATE 25 MG: 25 TABLET, FILM COATED ORAL at 21:26

## 2021-10-27 RX ADMIN — INSULIN LISPRO 8 UNITS: 100 INJECTION, SOLUTION INTRAVENOUS; SUBCUTANEOUS at 13:10

## 2021-10-27 RX ADMIN — VANCOMYCIN HYDROCHLORIDE 1000 MG: 1 INJECTION, POWDER, LYOPHILIZED, FOR SOLUTION INTRAVENOUS at 17:25

## 2021-10-27 RX ADMIN — LOSARTAN POTASSIUM 100 MG: 50 TABLET, FILM COATED ORAL at 07:58

## 2021-10-27 RX ADMIN — ATORVASTATIN CALCIUM 20 MG: 20 TABLET, FILM COATED ORAL at 21:26

## 2021-10-27 RX ADMIN — INSULIN GLARGINE 30 UNITS: 100 INJECTION, SOLUTION SUBCUTANEOUS at 21:27

## 2021-10-27 RX ADMIN — METOPROLOL TARTRATE 25 MG: 25 TABLET, FILM COATED ORAL at 07:58

## 2021-10-27 RX ADMIN — INSULIN LISPRO 2 UNITS: 100 INJECTION, SOLUTION INTRAVENOUS; SUBCUTANEOUS at 21:26

## 2021-10-27 RX ADMIN — VANCOMYCIN HYDROCHLORIDE 1000 MG: 1 INJECTION, POWDER, LYOPHILIZED, FOR SOLUTION INTRAVENOUS at 06:23

## 2021-10-27 RX ADMIN — WATER 2000 MG: 1 INJECTION INTRAMUSCULAR; INTRAVENOUS; SUBCUTANEOUS at 13:17

## 2021-10-27 RX ADMIN — INSULIN LISPRO 2 UNITS: 100 INJECTION, SOLUTION INTRAVENOUS; SUBCUTANEOUS at 17:26

## 2021-10-27 RX ADMIN — TAMSULOSIN HYDROCHLORIDE 0.8 MG: 0.4 CAPSULE ORAL at 07:59

## 2021-10-27 NOTE — CONSULTS
INFECTIOUS DISEASES CONSULT NOTE    Patient:  Ayaan Dallas 60 y.o. male  ROOM # [unfilled]  YOB: 1965  MRN: [id number]  CSN:  [id number]  Admit date: 10/25/2021   Admitting Physician: Diego Jorge MD  Primary Care Physician: Consuelo Hightower MD  REFERRING PROVIDER: No ref. provider found    Reason for Consultation: MRSA bacteremia    History of Present Illness/Chief Complaint: 17-year-old man. History of diabetes mellitus, hypertension, and hyperlipidemia. He indicates he scraped his left middle finger on some julita metal Friday afternoon while working in his garage. He developed increasing redness, swelling, and discomfort. He presented to his primary care physician's office. He was was then referred to the emergency room. He has been admitted for further management. He has had positive blood cultures for MRSA. Infectious disease asked to evaluate and offer recommendations.     Current Scheduled Medications:    warfarin  7.5 mg Oral Once    predniSONE  50 mg Oral Once    [START ON 10/28/2021] predniSONE  50 mg Oral Once    [START ON 10/28/2021] predniSONE  50 mg Oral Once    [START ON 10/28/2021] diphenhydrAMINE  50 mg Oral Once    vancomycin  1,000 mg IntraVENous Q12H    cefepime  2,000 mg IntraVENous Q12H    atorvastatin  20 mg Oral Nightly    losartan  100 mg Oral Daily    metoprolol tartrate  25 mg Oral BID    tamsulosin  0.8 mg Oral Daily    [START ON 10/29/2021] vitamin D  50,000 Units Oral Weekly    sodium chloride flush  5-40 mL IntraVENous 2 times per day    insulin lispro  0.08 Units/kg SubCUTAneous TID WC    insulin lispro  0-6 Units SubCUTAneous TID WC    insulin lispro  0-3 Units SubCUTAneous Nightly    vancomycin (VANCOCIN) intermittent dosing (placeholder)   Other RX Placeholder    warfarin (COUMADIN) daily dosing (placeholder)   Other RX Placeholder    insulin glargine  30 Units SubCUTAneous Nightly     Current PRN Medications:  glucose, dextrose, glucagon (rDNA), dextrose, sodium chloride flush, sodium chloride, ondansetron **OR** ondansetron, polyethylene glycol, acetaminophen **OR** acetaminophen    Allergies: Allergies   Allergen Reactions    Pcn [Penicillins]     Neomycin-Bacitracin Zn-Polymyx Rash    Neosporin [Neomycin-Polymyxin-Gramicidin] Rash     Past Medical History: Acalculous cholecystitis. Charcot joint. Diabetes mellitus. Hypertension. Hyperlipidemia. Prior history of Salmonella. Atrial fibrillation. Past Surgical History: Cataract removal.  Surgery for humerus fracture. Malignant skin lesion excision. Tonsillectomy. Cardioversion. Social History: Lives home alone. No tobacco, alcohol, or illicit drug use. Family History: Stroke. Cancer. Heart disease. Asthma. Exposure History: No close contacts of been ill    Review of Systems: He has had some chills and sweats. No diarrhea  No other bone or joint complaints  No urinary symptoms    Vital Signs:  /85   Pulse 87   Temp 97.2 °F (36.2 °C)   Resp 18   Ht 5' 10\" (1.778 m)   Wt 233 lb (105.7 kg)   SpO2 98%   BMI 33.43 kg/m²  Temp (24hrs), Av °F (36.7 °C), Min:96.7 °F (35.9 °C), Max:99.7 °F (37.6 °C)    Physical Exam:   Vital signs reviewed. Lungs without crackles  Sclera nonicteric  No conjunctival hemorrhages  No cervical axillary or inguinal adenopathy  Lungs clear without crackles or wheezes  Heart regular rhythm without murmur  Extremities no edema  No splinter hemorrhages or Janeway lesions  Left index finger with erythema, swelling, and tenderness to palpation.   See admission photos of his left index finger below        Lab Results:  CBC:   Recent Labs     10/25/21  1310 10/26/21  0147 10/27/21  0020   WBC 12.5* 11.0* 11.0*   HGB 11.3* 10.7* 11.1*   HCT 34.0* 31.3* 32.2*    231 269   LYMPHOPCT 8.4* 9.0* 13.3*   MONOPCT 10.3* 13.1* 12.9*     CMP:   Recent Labs     10/25/21  1310 10/26/21  0147 10/27/21  0611   * 129* 133*   K 4.4 4.2 3.4*   CL 96* 93* 97*   CO2 24 23 26   BUN 22* 23* 24*   CREATININE 1.0 1.0 1.1   CALCIUM 8.9 8.5* 8.9   BILITOT 0.9 0.8 0.6   ALKPHOS 136* 137* 155*   ALT 16 16 23   AST 13 17 21   GLUCOSE 256* 345* 210*     Culture:   Blood cultures October 25, 2021-MRSA  Blood cultures today pending  Wound cultures October 25, 2021-MRSA and beta strep    Radiology:     Additional Studies Reviewed:     Impression:   1. MRSA bacteremia-likely left middle finger source  2. Cut with cellulitis and possible tenosynovitis involving left middle finger  3. Diabetes mellitus  4.   Diabetes mellitus    Recommendations:    Continue vancomycin  Elevate above heart  Orthopedic surgery following  Continue to follow    Mary Grace Faith MD  10/27/21  4:24 PM

## 2021-10-27 NOTE — PROGRESS NOTES
Clinical Pharmacy Note    Warfarin consult follow-up    Recent Labs     10/27/21  0020   INR 2.63*     Recent Labs     10/25/21  1310 10/26/21  0147 10/27/21  0020   HGB 11.3* 10.7* 11.1*   HCT 34.0* 31.3* 32.2*    231 269       Significant Drug-Drug Interactions:  New warfarin drug-drug interactions: none  Discontinued drug-drug interactions: none    Date INR Warfarin Dose   10/25/21 1.72 15 mg   10/26/21 2.10 10 mg    10/27/21 2.63   7.5 mg                                    Notes: Will give warfarin 7.5 mg once this evening. Daily PT/INR until stable within therapeutic range.      Electronically signed by Taurus Bedoya, [facility] on 10/27/2021 at 10:55 AM

## 2021-10-27 NOTE — PROGRESS NOTES
[facility] Progress Note    Patient:  Germain De Anda  YOB: 1965  Date of Service: 10/27/2021  MRN: [id number]   Acct: [de-identified]   Primary Care Physician: Annabelle Smith MD  Advance Directive: Full Code  Admit Date: 10/25/2021       Hospital Day: 2        CHIEF COMPLAINT:     Chief Complaint   Patient presents with    Cellulitis     lef middle finger swelling        10/27/2021 8:58 AM  Subjective / Interval History:   10/27/2021  Patient seen and examined. Doing well. No new complaints. Left middle finger pain fairly controlled. Denies any fever or chills. Laying comfortably in bed no acute distress. No acute changes or acute overnight events reported. 10/26/2021  Patient seen and examined this AM.  Doing well. No new complaints. No acute changes or acute overnight event reported. Left middle finger pain fairly controlled. Laying comfortably in bed in no apparent acute distress. Denies any other acute complaints or distress at this time. No chest pain, no shortness of breath, no dizziness, lightheadedness or palpitations. Review of Systems:   Review of Systems  ROS: 14 point review of systems is negative except as specifically addressed above. ADULT DIET;  Regular; 4 carb choices (60 gm/meal)    Intake/Output Summary (Last 24 hours) at 10/27/2021 0858  Last data filed at 10/27/2021 0059  Gross per 24 hour   Intake 1700 ml   Output 700 ml   Net 1000 ml       Medications:   dextrose      sodium chloride       Current Facility-Administered Medications   Medication Dose Route Frequency Provider Last Rate Last Admin    vancomycin (VANCOCIN) 1,000 mg in sodium chloride 0.9 % 250 mL IVPB  1,000 mg IntraVENous Q12H NOMI Caba CNP 62.5 mL/hr at 10/27/21 0623 1,000 mg at 10/27/21 0623    ceFEPIme (MAXIPIME) 2,000 mg in sterile water 20 mL IV syringe  2,000 mg IntraVENous Q12H MANLOO Foote   2,000 mg at 10/27/21 0554    atorvastatin (LIPITOR) tablet 20 insulin lispro (HUMALOG) injection vial 0-6 Units  0-6 Units SubCUTAneous TID WC Narvis Cheese, APRN - CNP   2 Units at 10/27/21 0806    insulin lispro (HUMALOG) injection vial 0-3 Units  0-3 Units SubCUTAneous Nightly Narvis Cheese, APRN - CNP   1 Units at 10/26/21 2055    vancomycin (VANCOCIN) intermittent dosing (placeholder)   Other RX Placeholder Narvis Cheese, APRN - CNP   Given at 10/25/21 1656    warfarin (COUMADIN) daily dosing (placeholder)   Other RX Placeholder Narvis Cheese, APRN - CNP        insulin glargine (LANTUS) injection vial 30 Units  30 Units SubCUTAneous Nightly Narvis Cheese, APRN - CNP   30 Units at 10/26/21 2055         dextrose      sodium chloride        vancomycin  1,000 mg IntraVENous Q12H    cefepime  2,000 mg IntraVENous Q12H    atorvastatin  20 mg Oral Nightly    losartan  100 mg Oral Daily    metoprolol tartrate  25 mg Oral BID    tamsulosin  0.8 mg Oral Daily    [START ON 10/29/2021] vitamin D  50,000 Units Oral Weekly    sodium chloride flush  5-40 mL IntraVENous 2 times per day    insulin lispro  0.08 Units/kg SubCUTAneous TID WC    insulin lispro  0-6 Units SubCUTAneous TID WC    insulin lispro  0-3 Units SubCUTAneous Nightly    vancomycin (VANCOCIN) intermittent dosing (placeholder)   Other RX Placeholder    warfarin (COUMADIN) daily dosing (placeholder)   Other RX Placeholder    insulin glargine  30 Units SubCUTAneous Nightly     glucose, dextrose, glucagon (rDNA), dextrose, sodium chloride flush, sodium chloride, ondansetron **OR** ondansetron, polyethylene glycol, acetaminophen **OR** acetaminophen  ADULT DIET;  Regular; 4 carb choices (60 gm/meal)       Labs:   CBC with DIFF:  Recent Labs     10/25/21  1310 10/26/21  0147 10/27/21  0020   WBC 12.5* 11.0* 11.0*   RBC 3.54* 3.30* 3.41*   HGB 11.3* 10.7* 11.1*   HCT 34.0* 31.3* 32.2*   MCV 96.0* 94.8* 94.4*   MCH 31.9* 32.4* 32.6*   MCHC 33.2 34.2 34.5   RDW 11.9 11.9 11.9    231 269   MPV 10.9 10.8 10.7   NEUTOPHILPCT 80.2* 76.5* 71.4*   LYMPHOPCT 8.4* 9.0* 13.3*   MONOPCT 10.3* 13.1* 12.9*   EOSRELPCT 0.4 0.5 1.5   BASOPCT 0.3 0.4 0.5   NEUTROABS 10.0* 8.5* 7.8*   LYMPHSABS 1.1 1.0* 1.5   MONOSABS 1.30* 1.40* 1.40*   EOSABS 0.10 0.10 0.20   BASOSABS 0.00 0.00 0.10       CMP/BMP:  Recent Labs     10/25/21  1310 10/26/21  0147 10/27/21  0611   * 129* 133*   K 4.4 4.2 3.4*   CL 96* 93* 97*   CO2 24 23 26   ANIONGAP 13 13 10   GLUCOSE 256* 345* 210*   BUN 22* 23* 24*   CREATININE 1.0 1.0 1.1   LABGLOM >60 >60 >60   CALCIUM 8.9 8.5* 8.9   PROT 6.9 6.1* 6.4*   LABALBU 4.1 3.6 3.2*   BILITOT 0.9 0.8 0.6   ALKPHOS 136* 137* 155*   ALT 16 16 23   AST 13 17 21         CRP:    Recent Labs     10/25/21  1310   CRP 8.92*     Sed Rate:    Recent Labs     10/25/21  1310   SEDRATE 87*         HgBA1c:  No components found for: HGBA1C  FLP:    Lab Results   Component Value Date    TRIG 104 09/27/2021    HDL 35 09/27/2021    LDLCALC 49 09/27/2021     TSH:    Lab Results   Component Value Date    TSH 1.520 09/27/2021     Troponin T:   Recent Labs     10/26/21  1836 10/27/21  0020 10/27/21  0611   TROPONINI <0.01 <0.01 <0.01     Pro-BNP: No results for input(s): BNP in the last 72 hours. INR:   Recent Labs     10/25/21  1310 10/26/21  0147 10/27/21  0020   INR 1.72* 2.10* 2.63*     ABGs:   Lab Results   Component Value Date    PHART 7.290 03/25/2019    PO2ART 79.0 03/25/2019    EFP0YJL 20.0 03/25/2019     UA:No results for input(s): NITRITE, COLORU, PHUR, LABCAST, WBCUA, RBCUA, MUCUS, TRICHOMONAS, YEAST, BACTERIA, CLARITYU, SPECGRAV, LEUKOCYTESUR, UROBILINOGEN, BILIRUBINUR, BLOODU, GLUCOSEU, AMORPHOUS in the last 72 hours. Invalid input(s): Avanell Kei      Culture Results:    No results for input(s): CXSURG in the last 720 hours.     Blood Culture Recent:   Recent Labs     10/25/21  1300   BC Gram stain Aerobic bottle:  Gram positive cocci in clusters  resembling Staphylococcus  Culture in progress  Please notify Physician   Bottle volume = 8 ml  Gram stain Anaerobic bottle:  Gram positive cocci in clusters  resembling Staphylococcus  Culture in progress  Please notify Physician   Bottle volume = 9 ml  *  Isolated from Aerobic and Anaerobic bottle  Sensitivity to follow  CONTACT PRECAUTIONS INDICATED  *       Recent Labs     10/25/21  1300 10/25/21  1310   BC Gram stain Aerobic bottle:  Gram positive cocci in clusters  resembling Staphylococcus  Culture in progress  Please notify Physician   Bottle volume = 8 ml  Gram stain Anaerobic bottle:  Gram positive cocci in clusters  resembling Staphylococcus  Culture in progress  Please notify Physician   Bottle volume = 9 ml  *  Isolated from Aerobic and Anaerobic bottle  Sensitivity to follow  CONTACT PRECAUTIONS INDICATED  *  --    BLOODCULT2  --  Gram stain Anaerobic bottle:  Gram positive cocci in clusters  resembling Staphylococcus  Culture in progress  Please notify Physician   Bottle volume = 8 ml  *   ORG Staph aureus MRSA*  --              Cultures:   No results for input(s): CULTURE in the last 72 hours.   Recent Labs     10/25/21  1300 10/25/21  1310   BC Gram stain Aerobic bottle:  Gram positive cocci in clusters  resembling Staphylococcus  Culture in progress  Please notify Physician   Bottle volume = 8 ml  Gram stain Anaerobic bottle:  Gram positive cocci in clusters  resembling Staphylococcus  Culture in progress  Please notify Physician   Bottle volume = 9 ml  *  Isolated from Aerobic and Anaerobic bottle  Sensitivity to follow  CONTACT PRECAUTIONS INDICATED  *  --    BLOODCULT2  --  Gram stain Anaerobic bottle:  Gram positive cocci in clusters  resembling Staphylococcus  Culture in progress  Please notify Physician   Bottle volume = 8 ml  *       Left middle finger wound culture is growing staph aureus MRSA and Strep agalactiae (Beta Strep Group B)    Staph aureus mrsa (1)  Antibiotic Interpretation ADRI Status   benzylpenicillin Resistant >=0.5 mcg/mL    clindamycin Sensitive 0.25 mcg/mL    erythromycin Sensitive <=0.25 mcg/mL    inducible clindamycin resistance  Neg mcg/mL    oxacillin Resistant >=4 mcg/mL    tetracycline Sensitive <=1 mcg/mL    trimethoprim-sulfamethoxazole Sensitive <=10 mcg/mL    vancomycin Sensitive 1 mcg/mL          Recent Labs     10/27/21  0611   MG 1.6     Recent Labs     10/25/21  1310 10/26/21  0147 10/27/21  0611   AST 13 17 21   ALT 16 16 23   BILITOT 0.9 0.8 0.6   ALKPHOS 136* 137* 155*         RAD:   XR FINGER LEFT (MIN 2 VIEWS)    Result Date: 10/25/2021  History: Penetrating injury with soft tissue swelling, concern for osteomyelitis Left middle finger: 3 views left middle finger obtained. There is diffuse soft tissue swelling with no radiopaque foreign body seen within the soft tissues. Vascular calcifications. There are arthritic changes of the DIP greater than PIP joints as well as the third MCP joint. No acute fracture or dislocation. No radiographic evidence of osteomyelitis. 1. Prominent soft tissue swelling with arthritic change. No acute osseous pathology. No radiographic evidence of osteomyelitis or acute osseous injury. No radiopaque foreign body seen within the soft tissues. Signed by Dr Antwan Flores    Result Date: 10/20/2021  [place] Nuclear Stress Test Report Procedure date: 10/20/21 Indications: shortness of breath Procedure: Stress was performed with injection of 0.4 mg Lexiscan. Vital signs and EKG were monitored. Technetium-99 sestamibi was injected in divided doses, 9.83 mCi and 31.5 mCi respectively for rest and stress imaging. The patient was discharged in stable condition. Results: Patient had symptoms of dyspnea during infusion that resolved in recovery. Baseline EKG showed atrial fibrillation with nonspecific ST/T changes.  During stress there were no significant EKG changes or rhythm changes, negative for ischemia. Baseline and peak blood pressures were 170/88, and 171/98 respectively. Baseline and peak heart rates were 78 and  102 respectively. EF:  53% The resting and stress perfusion images were compared and reviewed in both corrected and noncorrected formats. There is a moderate size, moderately intense reversible defect noted of the anterior wall from the midportion to the apex and including the apex that persists with attenuation correction also. This is considered positive for anterior apical ischemia. Gated LV wall motion demonstrates a matching segmental area of hypokinesis of the anterior apical segment, all other wall segments contract normally, ejection fraction 53%. Conclusions This is an abnormal pharmacologic nuclear stress test, positive for mid to apical and apical ischemia with no scar. A matching wall motion abnormality is noted overall LV systolic function.  Signed by Dr Baird Score      Echo:    Summary   Normal left ventricular size and systolic function EF 73-10%   Moderate concentric left ventricular hypertrophy with transmitral Doppler   suggesting severe/restrictive Florette Tray 3] diastolic dysfunction   Moderate left atrial enlargement   Nodular sclerosis of a tricuspid aortic valve with adequate cusp   separation and neither stenosis or insufficiency   Mild thickening but normally mobile mitral valve with mild posteriorly   directed regurgitation   Nonvisualization pulmonic valve   Moderate right atrial enlargement with mild right ventricular enlargement   and preserved systolic function   Mild to moderate tricuspid regurgitation with moderate pulmonary   hypertension RVSP estimate 56 mmHg   Less than 50% inspiratory collapse of the IVC consistent with mild   elevation of right atrial filling pressures of 10-15 mmHg   No significant pericardial effusion   Aortic root and ascending segments measured within normal limits   Injection of agitated saline with or without Valsalva maneuver   demonstrates no intracardiac communication   Definity contrast utilized to better define endocardial borders      Signature    ----------------------------------------------------------------   Electronically signed by Hugo Morris MD(Interpreting physician)   on 04/15/2021 03:59 PM   ----------------------------------------------------------------        Objective:   Vitals:   /80   Pulse 85   Temp 98.6 °F (37 °C) (Temporal)   Resp 18   Ht 5' 10\" (1.778 m)   Wt 233 lb (105.7 kg)   SpO2 98%   BMI 33.43 kg/m²       Patient Vitals for the past 24 hrs:   BP Temp Temp src Pulse Resp SpO2   10/27/21 0658 139/80 98.6 °F (37 °C) Temporal 85 18 98 %   10/27/21 0130 131/79 99.7 °F (37.6 °C) Oral 87 16 97 %   10/26/21 1745 (!) 152/85 98 °F (36.7 °C) -- 79 16 96 %   10/26/21 1200 111/64 96.4 °F (35.8 °C) -- 87 18 96 %       24HR INTAKE/OUTPUT:      Intake/Output Summary (Last 24 hours) at 10/27/2021 0858  Last data filed at 10/27/2021 0059  Gross per 24 hour   Intake 1700 ml   Output 700 ml   Net 1000 ml       Physical Exam  Vitals and nursing note reviewed. Constitutional:       General: He is not in acute distress. Appearance: Normal appearance. He is not ill-appearing, toxic-appearing or diaphoretic. HENT:      Head: Normocephalic and atraumatic. Right Ear: External ear normal.      Left Ear: External ear normal.      Nose: Nose normal. No congestion or rhinorrhea. Mouth/Throat:      Mouth: Mucous membranes are moist.      Pharynx: Oropharynx is clear. No oropharyngeal exudate or posterior oropharyngeal erythema. Eyes:      General: No scleral icterus. Right eye: No discharge. Left eye: No discharge. Extraocular Movements: Extraocular movements intact. Conjunctiva/sclera: Conjunctivae normal.      Pupils: Pupils are equal, round, and reactive to light. Cardiovascular:      Rate and Rhythm: Normal rate. Rhythm irregular.       Pulses: Normal pulses. Heart sounds: Normal heart sounds. No murmur heard. No friction rub. No gallop. Pulmonary:      Effort: Pulmonary effort is normal. No respiratory distress. Breath sounds: Normal breath sounds. No stridor. No wheezing, rhonchi or rales. Chest:      Chest wall: No tenderness. Abdominal:      General: Bowel sounds are normal. There is no distension. Palpations: Abdomen is soft. Tenderness: There is no abdominal tenderness. There is no guarding or rebound. Musculoskeletal:         General: Tenderness ( Left middle finger wound tender to palpation-pictures below) present. No swelling, deformity or signs of injury. Normal range of motion. Cervical back: Normal range of motion and neck supple. No rigidity or tenderness. No muscular tenderness. Right lower leg: No edema. Left lower leg: No edema. Skin:     General: Skin is warm and dry. Capillary Refill: Capillary refill takes less than 2 seconds. Coloration: Skin is not jaundiced or pale. Findings: No bruising, erythema, lesion or rash. Neurological:      General: No focal deficit present. Mental Status: He is alert and oriented to person, place, and time. Cranial Nerves: No cranial nerve deficit. Sensory: No sensory deficit. Motor: No weakness. Coordination: Coordination normal.   Psychiatric:         Mood and Affect: Mood normal.         Behavior: Behavior normal.         Thought Content:  Thought content normal.         Judgment: Judgment normal.               Assessment/plan:       Hospital Problems         Last Modified POA    * (Principal) Bacteremia due to methicillin resistant Staphylococcus aureus 10/27/2021 Yes    HTN (hypertension) 10/25/2021 Yes    Hyperlipidemia 10/25/2021 Yes    Type 2 diabetes mellitus with diabetic polyneuropathy, with long-term current use of insulin ([facility] [address] (Chronic) 10/25/2021 Yes    Overview Signed 2/21/2021  3:16 PM by Jaqueline Coates MD     Lab Results   Component Value Date    LABA1C 11.4 (H) 12/03/2020     No results found for: EAG         Permanent atrial fibrillation ([address] 10/25/2021 Yes    (HFpEF) heart failure with preserved ejection fraction ([address] 10/25/2021 Yes    Overview Signed 2/21/2021  3:12 PM by Agapito Billings MD     Structurally normal mitral valve. Mild mitral regurgitation is present. Mitral annular calcification is present. Structurally normal aortic valve. Tricuspid valve is structurally normal.   Mild tricuspid regurgitation. Normal size left atrium. Normal intact intra-atrial septum was noted with no evidence of   significant intra-atrial communications by color flow Doppler or by   agitated saline study. Normal left ventricular size with preserved LV function and an estimated   ejection fraction of approximately 55-60%. Moderate concentric left ventricular hypertrophy. No regional wall motion abnormalities. no         Obstructive sleep apnea 10/25/2021 Yes    Cellulitis 10/27/2021 Yes          Principal Problem:    Bacteremia due to methicillin resistant Staphylococcus aureus  Active Problems:    HTN (hypertension)    Hyperlipidemia    Type 2 diabetes mellitus with diabetic polyneuropathy, with long-term current use of insulin (HCC)    Permanent atrial fibrillation (HCC)    (HFpEF) heart failure with preserved ejection fraction ([address]    Obstructive sleep apnea    Cellulitis  Resolved Problems:    * No resolved hospital problems. *        Brief Summary  Mr. Goldstein, a 78-year-old male, history of HTN, HLD, atrial fibrillation, presenting to [facility] ED (10/25/2021), with complaints of right middle finger swelling, with purulent drainage. Patient reportedly cut his finger on a rusted nail on (10/22/2021), while working in his garage. Patient subsequently reportedly noted gradual redness and swelling, and now with purulent drainage from the wound.     Patient initially saw his PCP outpatient, who subsequently recommended patient presented to the ED for further work-up and management    Initial work-up significant for;  Leukocytosis with a WBC of 12.5  Elevated lactic acid of 2.2  CRP of 8.92  Elevated ESR: 87 mm/Hr    X-ray left finger: Impression: Prominent soft tissue swelling with arthritic change. No acute osseous pathology. No radiographic evidence of osteomyelitis or acute osseous injury. No radiopaque foreign body seen within the soft tissues. Orthopedic surgery consulted  Patient admitted (10/25/2021), further work-up and management. Left middle finger wound/cellulitis/infection  MRSA Bacteremia   · Initial lactic acid of 2.2 --> 1.8 (10/26/2021)  · Gentle hydration  · Blood cultures positive for MRSA  · Follow-up repeat blood cultures  · Left middle finger wound culture is growing staph aureus MRSA and Strep agalactiae (Beta Strep Group B) -sensitivities as noted above  · Vancomycin IV Pharmacy to dose  · Consider ID consult    History of permanent atrial fibrillation  · Currently rate controlled  · Metoprolol tartrate 25 mg p.o. twice daily  · Warfarin pharmacy dosing      History of Diabetes Mellitus II   Hemoglobin A1C: 7.2%   Inpatient Regimens to include;  o - Insulin Glargine (Lantus) 30 units subcu nightly  o - Insulin Lispro (Humalog) 8 units subcu pre-meal 3 times a day  o - Insulin Lispro (Humalog) on a Low dose sliding scale   Monitor POC glucose, and adjust insulin regimen accordingly based on daily insulin requirement. History of HTN  · Stable  · Losartan 100 mg p.o. daily  · Metoprolol tartrate 25 mg p.o. twice daily       History of combined diastolic and systolic HF  Recent abnormal stress test  · Recent NM stress test (10/20/2021): Conclusions This is an abnormal pharmacologic nuclear stress test, positive for mid to apical and apical ischemia with no scar. A matching wall motion abnormality is noted overall LV systolic function.   · No active chest pain or shortness of breath at this time. · Serial EKGs for chest pain  · Trend troponin  · Monitor on telemetry  · Planned for outpatient cardiac catheterization, as per patient  · Consider cardiology consult, if symptoms reported. Continue management of other chronic medical conditions - see above and orders. Advance Directive: Full Code    ADULT DIET; Regular; 4 carb choices (60 gm/meal)         Consults Made:   IP CONSULT TO ORTHOPEDIC SURGERY  PHARMACY TO DOSE VANCOMYCIN  PHARMACY TO DOSE VANCOMYCIN  IP CONSULT TO PHARMACY  IP CONSULT TO INFECTIOUS DISEASES    DVT prophylaxis: Louretta Handsome to dose        Discharge planning: tbd    Time Spent is 35 mins in the examination, evaluation, counseling and review of medications, assessment and plan.      Electronically signed by   Naeem Yao MD, MPH, MD,   Internal Medicine Hospitalist   10/27/2021 8:58 AM

## 2021-10-27 NOTE — PROGRESS NOTES
Physician Progress Note      Nicol Aburto  CSN #:                  [id number]  :                       1965  ADMIT DATE:       10/25/2021 11:26 AM  DISCH DATE:  RESPONDING  PROVIDER #:        Valerie Morgan MD          QUERY TEXT:    Pt admitted with cellulitis to finger. Pt noted to have elevated wbc, elevated   lactic, elevated CRP. If possible, please document in the progress notes and   discharge summary if you are evaluating and /or treating any of the following: The medical record reflects the following:  Risk Factors: Cellulitis to Finger  Clinical Indicators: WBC: 12.5, NA: 133, Lactic 2.2, CRP: 8.92, Sed Rate: 87,   No tachycardia, no tachypnea, no fever  Cultures: Gram+ cocci  Treatment: Vancomycin, Maxipime    Thank you in advance,    Tyrese Bar RN-BSN, [facility]  Clinical   [facility], Rossi Godinez  [email or web address]  Options provided:  -- Sepsis, present on admission  -- Sepsis, present on admission, now resolved  -- Cellulitis without Sepsis  -- Sepsis was ruled out  -- Other - I will add my own diagnosis  -- Disagree - Not applicable / Not valid  -- Disagree - Clinically unable to determine / Unknown  -- Refer to Clinical Documentation Reviewer    PROVIDER RESPONSE TEXT:    This patient has Cellulitis without Sepsis.     Query created by: Damaris De La Torre on 10/27/2021 1:37 PM      Electronically signed by:  Valerie Morgan MD 10/27/2021 6:20 PM

## 2021-10-27 NOTE — PROGRESS NOTES
Pharmacy Vancomycin Consult     Vancomycin Day: 3  Current Dosing: Vancomycin 1000 mg IV every 12 hours    Temp max:  99.7    Recent Labs     10/25/21  1310 10/26/21  0147   BUN 22* 23*       Recent Labs     10/25/21  1310 10/26/21  0147   CREATININE 1.0 1.0       Recent Labs     10/26/21  0147 10/27/21  0020   WBC 11.0* 11.0*         Intake/Output Summary (Last 24 hours) at 10/27/2021 2856  Last data filed at 10/27/2021 0059  Gross per 24 hour   Intake 1700 ml   Output 700 ml   Net 1000 ml     Culture Date Source Results   10/25/21 Blood x 2 Gram positive cocci in clusters resembling Staphylococcus   10/25/21 Wound - finger Staphylococcus aureus / Strep agalactiae (Beta Strep Group B)         Ht Readings from Last 1 Encounters:   10/26/21 5' 10\" (1.778 m)        Wt Readings from Last 1 Encounters:   10/26/21 233 lb (105.7 kg)         Body mass index is 33.43 kg/m². Estimated Creatinine Clearance: 99 mL/min (based on SCr of 1 mg/dL). Trough: 17.3    Assessment/Plan: Therapeutic trough. On the higher end of goal range. Will continue Vancomycin 1000 mg IV every 12 hours for now and continue to monitor.     Electronically signed by Patrick [facility] on 10/27/2021 at 6:07 AM

## 2021-10-27 NOTE — CARE COORDINATION
Date / Time of Evaluation:   10/27/2021    2:49 PM  Assessment Completed by:   Rexie Leventhal, RN      Patient Name:   Altaf Gupta  MRN:   [id number]  YOB: 1965    Patient Admission Status:   Inpatient [101]    Patient Contact Information:    Natalie Gonzales [phone] (home)   Telephone Information:   Mobile [phone]     Above information verified? [x]   Yes  []   No    (Best Practice:   Have patient/caregiver verify above address and phone number by stating out loud their current address and reachable phone number. Initial Assessment Completed at bedside with:      [x]   Patient  []   Family/Caregiver/Guardian   []   Other:      Current PCP:    Payam Mejia MD    PCP verified? [x]   Yes  []   No    Emergency Contacts:    Extended Emergency Contact Information  Primary Emergency Contact: Ray Le [address] Phone: [phone]  Relation: Parent   needed? No  Secondary Emergency Contact: Henry Goldstein  Address: [address] [place] [address] Phone: [phone]  Mobile Phone: [phone]  Relation: Child   needed? No    Advance Directives:    Does Mr. Goldstein have an advance directive in his electronic medical record? []   Yes  [x]   No    Code Status:   Full Code      Have you been vaccinated for COVID-19 (SARS-CoV-2)? [x]   Yes  []   No                   If so, when?     Which :         [x]   [employer]  []   Moderna  []   [employer]  []   Other:       Do you have any of the following unmet social needs that would keep you from returning home safely:    []   Yes  [x]   No                    Unmet Social Needs:           []   Living Situation/Housing  []   Food  []   Stroke Education   []   Utilities  []   Personal Safety  []   Financial Strain  []   Employment  []   Mental Health  []   Substance Abuse  [] Transportation Barriers    Additional Unmet Social Needs Notes:           Financial:    Payor: MEDICARE / Plan: MEDICARE PART A AND B / Product Type: *No Product type* /     Pre-Cert required for SNF:     []   Yes  [x]   No    Have Long Term Care Insurance:      []   Yes  [x]   No      Pharmacy:    [address] [place], [address] [phone]  [address] [employer] [address]  Phone: [phone] Fax: [phone]    Potential assistance purchasing medications? []   Yes  [x]   No      ADLS:       Support System:   Parent, Family Members      Current Home Environment:       Steps:       []   Yes  [x]   No    If yes, how many? Plans to RETURN to current housing:     []   Yes  []   No    Barriers to RETURNING to current housing:        Currently ACTIVE with Home Health CARE:      []   Yes  [x]   No    Home Health Care Agency:        DME Provider:   Marcianne Mohs prior to admission:      []   Yes  [x]   No    Oxygen Company:       Has a pulse oximetry unit at home:     []   Yes  [x]   No    Informed of need to bring portable home O2 tank to hospital on day of DISCHARGE:      []   Yes  []   No    Name of person committed to bringing portable tank at discharge:          Active with HD/PD prior to admission:             []   Yes  [x]   No    Nephrologist:     HD Center:         Transition Plan:        Transportation PLAN for Discharge:  family    Factors facilitating achievement of predicted outcomes:     Barriers to discharge:        Patient Deficits:    []   Yes   [x]   No    If yes:    []   Confusion/Memory  []   Visual  []   Motor/Sensory         []   Right arm         []   Right leg         []   Left arm         []   Left leg  []   Language/Speech         []   Aphasia         []   Dysarthria         []   Swallow         Paxton Coma Scale  Eye Opening: Spontaneous  Best Verbal Response: Oriented  Best Motor Response: Obeys commands  Rossy Coma Scale Score: 15    Patient Deficit Notes: Additional CM/SW Notes: pt recently moved in with his mother and plans same upon dc. Still drives, uses a cane as needed. No needs were noted. Electronically signed by Arlette Mabry RN on 10/27/2021 at 3:27 PM        Micah Fernandez and/or his family were provided with choice of provider:    []   Yes   []   No        []   Stroke education booklet reviewed and given to patient/family/caregiver/guardian. All questions answered all questions answered appropriately and efficiently per family.       Arlette Mabry RN  [facility]  Care Management  Phone:   [phone]          Fax:

## 2021-10-27 NOTE — PLAN OF CARE
symptoms  Description: Will show no infection signs and symptoms  10/27/2021 0110 by Arnold Mendiola RN  Outcome: Ongoing  10/26/2021 1818 by Theodore Haynes RN  Outcome: Ongoing  Goal: Absence of new skin breakdown  Description: Absence of new skin breakdown  10/27/2021 0110 by Arnold Mendiola RN  Outcome: Ongoing  10/26/2021 1818 by Theodore Haynes RN  Outcome: Ongoing     Problem: Serum Glucose Level - Abnormal:  Goal: Ability to maintain appropriate glucose levels will improve  Description: Ability to maintain appropriate glucose levels will improve  10/27/2021 0110 by Arnold Mendiola RN  Outcome: Ongoing  10/26/2021 1818 by Theodore Haynes RN  Outcome: Ongoing     Problem: Sensory Perception - Impaired:  Goal: Ability to maintain a stable neurologic state will improve  Description: Ability to maintain a stable neurologic state will improve  10/27/2021 0110 by Arnold Mendiola RN  Outcome: Ongoing  10/26/2021 1818 by Theodore Haynes RN  Outcome: Ongoing     Problem:  Activity:  Goal: Ability to tolerate increased activity will improve  Description: Ability to tolerate increased activity will improve  10/27/2021 0110 by Aronld Mendiola RN  Outcome: Ongoing  10/26/2021 1818 by Theodore Haynes RN  Outcome: Ongoing  Goal: Risk for activity intolerance will decrease  Description: Risk for activity intolerance will decrease  10/27/2021 0110 by Arnold Mendiola RN  Outcome: Ongoing  10/26/2021 1818 by Theodore Haynes RN  Outcome: Ongoing  Goal: Expression of feelings of increased energy will increase  Description: Expression of feelings of increased energy will increase  10/27/2021 0110 by Arnold Mendiola RN  Outcome: Ongoing  10/26/2021 1818 by Theodore Haynes RN  Outcome: Ongoing  Goal: Amount of time patient spends in regular exercise will increase  10/27/2021 0110 by Arnold Mendiola RN  Outcome: Ongoing  10/26/2021 1818 by Theodore Haynes RN  Outcome: Ongoing     Problem: Cardiac:  Goal: Hemodynamic stability will improve  Description: Hemodynamic stability will improve  10/27/2021 0110 by Katlin Vivas RN  Outcome: Ongoing  10/26/2021 1818 by Lum Fabry, RN  Outcome: Ongoing  Goal: Complications related to the disease process, condition or treatment will be avoided or minimized  Description: Complications related to the disease process, condition or treatment will be avoided or minimized  10/27/2021 0110 by Katlin Vivas RN  Outcome: Ongoing  10/26/2021 1818 by Lum Fabry, RN  Outcome: Ongoing  Goal: Cerebral tissue perfusion will improve  Description: Cerebral tissue perfusion will improve  10/27/2021 0110 by Katlin Vivas RN  Outcome: Ongoing  10/26/2021 1818 by Lum Fabry, RN  Outcome: Ongoing  Goal: Ability to maintain an adequate cardiac output will improve  Description: Ability to maintain an adequate cardiac output will improve  10/27/2021 0110 by Katlin Vivas RN  Outcome: Ongoing  10/26/2021 1818 by Lum Fabry, RN  Outcome: Ongoing     Problem: Coping:  Goal: Ability to identify and develop effective coping behavior will improve  Description: Ability to identify and develop effective coping behavior will improve  10/27/2021 0110 by Katlin Vivas RN  Outcome: Ongoing  10/26/2021 1818 by Lum Fabry, RN  Outcome: Ongoing  Goal: Ability to adjust to condition or change in health will improve  Description: Ability to adjust to condition or change in health will improve  10/27/2021 0110 by Katlin Vivas RN  Outcome: Ongoing  10/26/2021 1818 by Lum Fabry, RN  Outcome: Ongoing  Goal: Level of anxiety will decrease  Description: Level of anxiety will decrease  10/27/2021 0110 by Katlin Vivas RN  Outcome: Ongoing  10/26/2021 1818 by Lum Fabry, RN  Outcome: Ongoing  Goal: General experience of comfort will improve  10/27/2021 0110 by Katlin Vivas RN  Outcome: Ongoing  10/26/2021 1818 by Lum Fabry, RN  Outcome: Ongoing     Problem: Health Behavior:  Goal: Identification of resources available to assist in meeting health care needs will improve  Description: Identification of resources available to assist in meeting health care needs will improve  10/27/2021 0110 by Charo Machado RN  Outcome: Ongoing  10/26/2021 1818 by Jhonathan Menon RN  Outcome: Ongoing  Goal: Ability to identify and utilize available resources and services will improve  Description: Ability to identify and utilize available resources and services will improve  10/27/2021 0110 by Charo Machado RN  Outcome: Ongoing  10/26/2021 1818 by Jhonathan Menon RN  Outcome: Ongoing  Goal: Ability to manage health-related needs will improve  Description: Ability to manage health-related needs will improve  10/27/2021 0110 by Charo Machado RN  Outcome: Ongoing  10/26/2021 1818 by Jhonathan Menon RN  Outcome: Ongoing     Problem: Nutritional:  Goal: Ability to identify appropriate dietary choices will improve  Description: Ability to identify appropriate dietary choices will improve  10/27/2021 0110 by Charo Machado RN  Outcome: Ongoing  10/26/2021 1818 by Jhonathan Menon RN  Outcome: Ongoing  Goal: Maintenance of adequate nutrition will improve  Description: Maintenance of adequate nutrition will improve  10/27/2021 0110 by Charo Machado RN  Outcome: Ongoing  10/26/2021 1818 by Jhonathan Menon RN  Outcome: Ongoing  Goal: Progress toward achieving an optimal weight will improve  Description: Progress toward achieving an optimal weight will improve  10/27/2021 0110 by Charo Machado RN  Outcome: Ongoing  10/26/2021 1818 by Jhonathan Menon RN  Outcome: Ongoing  Goal: Adjustment of eating patterns to appropriate times will improve  Description: Adjustment of eating patterns to appropriate times will improve  10/27/2021 0110 by Charo Machado RN  Outcome: Ongoing  10/26/2021 1818 by Jhonathan Menon RN  Outcome: Ongoing  Goal: Ability to make healthy dietary choices will improve  Description: Ability to make healthy dietary choices will improve  10/27/2021 0110 by Charo Machado RN  Outcome: Ongoing  10/26/2021 1818 by Jhonathan Menon RN  Outcome: Ongoing  Goal: Nutritional status will improve  10/27/2021 0110 by Demetri Wood RN  Outcome: Ongoing  10/26/2021 1818 by Dwayne Gonsalves RN  Outcome: Ongoing     Problem: ABCDS Injury Assessment  Goal: Absence of physical injury  10/27/2021 0110 by Demetri Wood RN  Outcome: Ongoing  10/26/2021 1818 by Dwayne Gonsalves RN  Outcome: Ongoing     Problem: Fluid Volume:  Goal: Ability to maintain a balanced intake and output will improve  Description: Ability to maintain a balanced intake and output will improve  10/27/2021 0110 by Demetri Wood RN  Outcome: Ongoing  10/26/2021 1818 by Dwayne Gonsalves RN  Outcome: Ongoing     Problem: Metabolic:  Goal: Ability to maintain appropriate glucose levels will improve  Description: Ability to maintain appropriate glucose levels will improve  10/27/2021 0110 by Demetri Wood RN  Outcome: Ongoing  10/26/2021 1818 by Dwayne Gonsalves RN  Outcome: Ongoing     Problem: Physical Regulation:  Goal: Complications related to the disease process, condition or treatment will be avoided or minimized  Description: Complications related to the disease process, condition or treatment will be avoided or minimized  10/27/2021 0110 by Demetri Wood RN  Outcome: Ongoing  10/26/2021 1818 by Dwayne Gonsalves RN  Outcome: Ongoing  Goal: Diagnostic test results will improve  Description: Diagnostic test results will improve  10/27/2021 0110 by Demetri Wood RN  Outcome: Ongoing  10/26/2021 1818 by Dwayne Gonsalves RN  Outcome: Ongoing  Goal: Prevent transmision of infection  Description: Prevent transmision of infection  10/27/2021 0110 by Demetri Wood RN  Outcome: Ongoing  10/26/2021 1818 by Dwayne Gonsalves RN  Outcome: Ongoing  Goal: Will remain free from infection  Description: Will remain free from infection  10/27/2021 0110 by Demetri Wood RN  Outcome: Ongoing  10/26/2021 1818 by Dwayne Gonsalves RN  Outcome: Ongoing  Goal: Ability to maintain vital signs within normal range will improve  Description: Ability to maintain vital signs within normal range will improve  10/27/2021 0110 by Chilo Hinton RN  Outcome: Ongoing  10/26/2021 1818 by Wilder Jeans, RN  Outcome: Ongoing     Problem: Skin Integrity:  Goal: Risk for impaired skin integrity will decrease  Description: Risk for impaired skin integrity will decrease  10/27/2021 0110 by Chilo Hinton RN  Outcome: Ongoing  10/26/2021 1818 by Wilder Jeans, RN  Outcome: Ongoing  Goal: Demonstration of wound healing without infection will improve  Description: Demonstration of wound healing without infection will improve  10/27/2021 0110 by Chilo Hinton RN  Outcome: Ongoing  10/26/2021 1818 by Wilder Jeans, RN  Outcome: Ongoing  Goal: Complications related to intravenous access or infusion will be avoided or minimized  Description: Complications related to intravenous access or infusion will be avoided or minimized  10/27/2021 0110 by Chilo Hinton RN  Outcome: Ongoing  10/26/2021 1818 by Wilder Jeans, RN  Outcome: Ongoing     Problem: Tissue Perfusion:  Goal: Adequacy of tissue perfusion will improve  Description: Adequacy of tissue perfusion will improve  10/27/2021 0110 by Chilo Hinton RN  Outcome: Ongoing  10/26/2021 1818 by Wilder Jeans, RN  Outcome: Ongoing     Problem: Safety:  Goal: Ability to remain free from injury will improve  Description: Ability to remain free from injury will improve  Outcome: Ongoing  Goal: Will show no signs and symptoms of excessive bleeding  Description: Will show no signs and symptoms of excessive bleeding  Outcome: Ongoing     Problem: Infection - Methicillin-Resistant Staphylococcus Aureus Infection:  Goal: Absence of methicillin-resistant Staphylococcus aureus infection  Description: Absence of methicillin-resistant Staphylococcus aureus infection  Outcome: Ongoing     Problem: Self-Concept:  Goal: Ability to verbalize positive feelings about self will improve  Description: Ability to verbalize positive feelings about self will improve  Outcome: Ongoing     Problem: Respiratory:  Goal: Ability to maintain normal respiratory secretions will improve  Description: Ability to maintain normal respiratory secretions will improve  Outcome: Ongoing     Problem: SAFETY  Goal: Free from accidental physical injury  Outcome: Ongoing  Goal: Free from intentional harm  Outcome: Ongoing     Problem: DAILY CARE  Goal: Daily care needs are met  Outcome: Ongoing     Problem: PAIN  Goal: Patient's pain/discomfort is manageable  Outcome: Ongoing     Problem: SKIN INTEGRITY  Goal: Skin integrity is maintained or improved  Outcome: Ongoing     Problem: KNOWLEDGE DEFICIT  Goal: Patient/S.O. demonstrates understanding of disease process, treatment plan, medications, and discharge instructions.   Outcome: Ongoing     Problem: DISCHARGE BARRIERS  Goal: Patient's continuum of care needs are met  Outcome: Ongoing     Problem: Bleeding:  Goal: Will show no signs and symptoms of excessive bleeding  Description: Will show no signs and symptoms of excessive bleeding  Outcome: Ongoing

## 2021-10-27 NOTE — PROGRESS NOTES
Orthopedic Surgery Progress Note    Court Angel  10/27/2021      Subjective:     No acute events overnight. Feels left hand is stable. Denies fever or chills. Pain is reasonably controlled. States he performed 2 soaks yesterday. Objective:     Patient Vitals for the past 24 hrs:   BP Temp Temp src Pulse Resp SpO2   10/27/21 0658 139/80 98.6 °F (37 °C) Temporal 85 18 98 %   10/27/21 0130 131/79 99.7 °F (37.6 °C) Oral 87 16 97 %   10/26/21 1745 (!) 152/85 98 °F (36.7 °C) -- 79 16 96 %   10/26/21 1200 111/64 96.4 °F (35.8 °C) -- 87 18 96 %       General: Asleep but arousable  Respiratory: Nonlabored   Cardiovascular: Regular rate  Left hand: Diffuse edema to the left middle finger, improved from yesterday. Erythema less pronounced to the left middle finger today, finger is darker in appearance maceration to the skin overlying the dorsal aspect of the left middle finger DIP joint with ongoing serosanguineous drainage, stable. Questionable increased erythema to palmar aspect of the middle finger and hand at the palmar digital crease. Active finger flexion and extension are intact with minimal discomfort. Gross sensation is intact but decreased to the left middle finger. Capillary refill is sluggish to the left middle finger. Data Review:  Recent Labs     10/26/21  0147 10/27/21  0020   HGB 10.7* 11.1*     Recent Labs     10/27/21  0611   *   K 3.4*   CREATININE 1.1       Assessment:     59 y/o M with L middle finger cellulitis/superficial abscess left long finger versus deep soft tissue infection, bacteremia, significant medical history for atrial fibrillation on anticoagulation, type 2 diabetes mellitus on insulin (hemoglobin A1c 7.2), history of prior MRSA infection, hypertension, hyperlipidemia, obesity    Plan:   1. Left middle finger infection: Some aspects appear improved today, erythema may be worse along the palmar aspect of the middle finger and hand.   Low suspicion for pyogenic flexor tenosynovitis. Clinically, cannot appreciate area that would benefit from formal operative intervention. Will obtain MRI of the left hand/middle finger. No plans for operative intervention today. Continue dilute Betadine soaks and IV antibiotics. 2. Elevate LUE  3. Activity as tolerated  4. Dispo:  Will follow-up MRI.     Electronically signed by Xenia Hernández MD on 10/27/2021 at 7:12 AM

## 2021-10-28 LAB
ALBUMIN SERPL-MCNC: 3.5 G/DL (ref 3.5–5.2)
ALP BLD-CCNC: 195 U/L (ref 40–130)
ALT SERPL-CCNC: 28 U/L (ref 5–41)
ANION GAP SERPL CALCULATED.3IONS-SCNC: 13 MMOL/L (ref 7–19)
AST SERPL-CCNC: 25 U/L (ref 5–40)
BACTERIA: NEGATIVE /HPF
BASOPHILS ABSOLUTE: 0 K/UL (ref 0–0.2)
BASOPHILS RELATIVE PERCENT: 0.3 % (ref 0–1)
BILIRUB SERPL-MCNC: 0.6 MG/DL (ref 0.2–1.2)
BILIRUBIN URINE: NEGATIVE
BLOOD CULTURE, ROUTINE: ABNORMAL
BLOOD CULTURE, ROUTINE: ABNORMAL
BLOOD, URINE: NEGATIVE
BUN BLDV-MCNC: 21 MG/DL (ref 6–20)
CALCIUM SERPL-MCNC: 9 MG/DL (ref 8.6–10)
CHLORIDE BLD-SCNC: 95 MMOL/L (ref 98–111)
CLARITY: CLEAR
CO2: 23 MMOL/L (ref 22–29)
COLOR: YELLOW
CREAT SERPL-MCNC: 1 MG/DL (ref 0.5–1.2)
CRYSTALS, UA: ABNORMAL /HPF
EOSINOPHILS ABSOLUTE: 0 K/UL (ref 0–0.6)
EOSINOPHILS RELATIVE PERCENT: 0 % (ref 0–5)
EPITHELIAL CELLS, UA: 0 /HPF (ref 0–5)
GFR AFRICAN AMERICAN: >59
GFR NON-AFRICAN AMERICAN: >60
GLUCOSE BLD-MCNC: 333 MG/DL (ref 74–109)
GLUCOSE BLD-MCNC: 419 MG/DL (ref 70–99)
GLUCOSE BLD-MCNC: 460 MG/DL (ref 70–99)
GLUCOSE BLD-MCNC: 488 MG/DL (ref 70–99)
GLUCOSE URINE: =>1000 MG/DL
HCT VFR BLD CALC: 33 % (ref 42–52)
HEMOGLOBIN: 11.3 G/DL (ref 14–18)
HYALINE CASTS: 2 /HPF (ref 0–8)
IMMATURE GRANULOCYTES #: 0.1 K/UL
INR BLD: 3.19 (ref 0.88–1.18)
KETONES, URINE: ABNORMAL MG/DL
LEUKOCYTE ESTERASE, URINE: NEGATIVE
LYMPHOCYTES ABSOLUTE: 0.4 K/UL (ref 1.1–4.5)
LYMPHOCYTES RELATIVE PERCENT: 4.7 % (ref 20–40)
MCH RBC QN AUTO: 32.3 PG (ref 27–31)
MCHC RBC AUTO-ENTMCNC: 34.2 G/DL (ref 33–37)
MCV RBC AUTO: 94.3 FL (ref 80–94)
MONOCYTES ABSOLUTE: 0.3 K/UL (ref 0–0.9)
MONOCYTES RELATIVE PERCENT: 3.7 % (ref 0–10)
NEUTROPHILS ABSOLUTE: 8.4 K/UL (ref 1.5–7.5)
NEUTROPHILS RELATIVE PERCENT: 90.8 % (ref 50–65)
NITRITE, URINE: NEGATIVE
ORGANISM: ABNORMAL
PDW BLD-RTO: 11.9 % (ref 11.5–14.5)
PERFORMED ON: ABNORMAL
PH UA: 5.5 (ref 5–8)
PLATELET # BLD: 302 K/UL (ref 130–400)
PMV BLD AUTO: 10.6 FL (ref 9.4–12.4)
POTASSIUM REFLEX MAGNESIUM: 4.7 MMOL/L (ref 3.5–5)
PROTEIN UA: 30 MG/DL
PROTHROMBIN TIME: 31.8 SEC (ref 12–14.6)
RBC # BLD: 3.5 M/UL (ref 4.7–6.1)
RBC UA: 1 /HPF (ref 0–4)
SODIUM BLD-SCNC: 131 MMOL/L (ref 136–145)
SPECIFIC GRAVITY UA: 1.03 (ref 1–1.03)
TOTAL PROTEIN: 6.6 G/DL (ref 6.6–8.7)
UROBILINOGEN, URINE: 1 E.U./DL
WBC # BLD: 9.2 K/UL (ref 4.8–10.8)
WBC UA: 1 /HPF (ref 0–5)

## 2021-10-28 PROCEDURE — 80053 COMPREHEN METABOLIC PANEL: CPT

## 2021-10-28 PROCEDURE — 81001 URINALYSIS AUTO W/SCOPE: CPT

## 2021-10-28 PROCEDURE — 87086 URINE CULTURE/COLONY COUNT: CPT

## 2021-10-28 PROCEDURE — 1210000000 HC MED SURG R&B

## 2021-10-28 PROCEDURE — 6370000000 HC RX 637 (ALT 250 FOR IP): Performed by: ORTHOPAEDIC SURGERY

## 2021-10-28 PROCEDURE — 2580000003 HC RX 258: Performed by: NURSE PRACTITIONER

## 2021-10-28 PROCEDURE — 6360000002 HC RX W HCPCS: Performed by: NURSE PRACTITIONER

## 2021-10-28 PROCEDURE — 6370000000 HC RX 637 (ALT 250 FOR IP): Performed by: INTERNAL MEDICINE

## 2021-10-28 PROCEDURE — 82947 ASSAY GLUCOSE BLOOD QUANT: CPT

## 2021-10-28 PROCEDURE — 85610 PROTHROMBIN TIME: CPT

## 2021-10-28 PROCEDURE — 6370000000 HC RX 637 (ALT 250 FOR IP): Performed by: NURSE PRACTITIONER

## 2021-10-28 PROCEDURE — 85025 COMPLETE CBC W/AUTO DIFF WBC: CPT

## 2021-10-28 PROCEDURE — 36415 COLL VENOUS BLD VENIPUNCTURE: CPT

## 2021-10-28 RX ORDER — WARFARIN SODIUM 5 MG/1
5 TABLET ORAL
Status: COMPLETED | OUTPATIENT
Start: 2021-10-28 | End: 2021-10-28

## 2021-10-28 RX ADMIN — WARFARIN SODIUM 5 MG: 5 TABLET ORAL at 20:53

## 2021-10-28 RX ADMIN — VANCOMYCIN HYDROCHLORIDE 1000 MG: 1 INJECTION, POWDER, LYOPHILIZED, FOR SOLUTION INTRAVENOUS at 07:49

## 2021-10-28 RX ADMIN — TAMSULOSIN HYDROCHLORIDE 0.8 MG: 0.4 CAPSULE ORAL at 09:35

## 2021-10-28 RX ADMIN — PREDNISONE 50 MG: 20 TABLET ORAL at 04:05

## 2021-10-28 RX ADMIN — INSULIN LISPRO 3 UNITS: 100 INJECTION, SOLUTION INTRAVENOUS; SUBCUTANEOUS at 20:54

## 2021-10-28 RX ADMIN — METOPROLOL TARTRATE 25 MG: 25 TABLET, FILM COATED ORAL at 09:35

## 2021-10-28 RX ADMIN — LOSARTAN POTASSIUM 100 MG: 50 TABLET, FILM COATED ORAL at 09:35

## 2021-10-28 RX ADMIN — SODIUM CHLORIDE, PRESERVATIVE FREE 10 ML: 5 INJECTION INTRAVENOUS at 09:36

## 2021-10-28 RX ADMIN — ATORVASTATIN CALCIUM 20 MG: 20 TABLET, FILM COATED ORAL at 20:53

## 2021-10-28 RX ADMIN — PREDNISONE 50 MG: 20 TABLET ORAL at 15:18

## 2021-10-28 RX ADMIN — INSULIN LISPRO 8 UNITS: 100 INJECTION, SOLUTION INTRAVENOUS; SUBCUTANEOUS at 12:09

## 2021-10-28 RX ADMIN — DIPHENHYDRAMINE HYDROCHLORIDE 50 MG: 25 TABLET ORAL at 15:18

## 2021-10-28 RX ADMIN — INSULIN LISPRO 6 UNITS: 100 INJECTION, SOLUTION INTRAVENOUS; SUBCUTANEOUS at 18:17

## 2021-10-28 RX ADMIN — METOPROLOL TARTRATE 25 MG: 25 TABLET, FILM COATED ORAL at 20:53

## 2021-10-28 RX ADMIN — INSULIN LISPRO 8 UNITS: 100 INJECTION, SOLUTION INTRAVENOUS; SUBCUTANEOUS at 18:18

## 2021-10-28 RX ADMIN — INSULIN LISPRO 8 UNITS: 100 INJECTION, SOLUTION INTRAVENOUS; SUBCUTANEOUS at 09:37

## 2021-10-28 RX ADMIN — INSULIN GLARGINE 30 UNITS: 100 INJECTION, SOLUTION SUBCUTANEOUS at 20:54

## 2021-10-28 RX ADMIN — VANCOMYCIN HYDROCHLORIDE 1000 MG: 1 INJECTION, POWDER, LYOPHILIZED, FOR SOLUTION INTRAVENOUS at 20:54

## 2021-10-28 RX ADMIN — INSULIN LISPRO 6 UNITS: 100 INJECTION, SOLUTION INTRAVENOUS; SUBCUTANEOUS at 12:10

## 2021-10-28 NOTE — PROGRESS NOTES
Orthopedic Surgery Progress Note    Dat Berry  10/28/2021      Subjective:     Unable to complete MRI yesterday-MRI was ordered without contrast but for some reason was changed and he was attempted to be given contrast and vomited. Otherwise, feels relatively stable. Denies any subjective fever or chills. Denies active chest pain or shortness of breath. Objective:     Patient Vitals for the past 24 hrs:   BP Temp Temp src Pulse Resp SpO2   10/28/21 0644 (!) 164/102 96.6 °F (35.9 °C) Temporal 89 16 97 %   10/28/21 0121 (!) 175/95 96.7 °F (35.9 °C) Temporal 90 16 96 %   10/27/21 1745 (!) 153/83 96.4 °F (35.8 °C) -- 86 16 97 %   10/27/21 1400 122/85 97.2 °F (36.2 °C) -- 87 18 --   10/27/21 1345 122/83 96.7 °F (35.9 °C) -- 89 16 98 %     General: Asleep but arousable  Respiratory: Nonlabored   Cardiovascular: Regular rate  Left hand: Diffuse edema to the left middle finger, improving. Erythema and discoloration appear improved, maceration to the skin overlying the dorsal aspect of the left middle finger DIP joint, small amount of purulent drainage noted dorsally which I feel is superficial.  Erythema to palmar aspect of the middle finger and hand at the palmar digital crease improved. Active finger flexion and extension are intact with minimal discomfort, improving. Gross sensation is intact but decreased to the left middle finger. Capillary refill is sluggish to the left middle finger.            Data Review:  Recent Labs     10/27/21  0020 10/28/21  0127   HGB 11.1* 11.3*     Recent Labs     10/28/21  0127   *   K 4.7   CREATININE 1.0       Assessment:     59 y/o M with L middle finger cellulitis/superficial abscess left long finger versus deep soft tissue infection, bacteremia, significant medical history for atrial fibrillation on anticoagulation, type 2 diabetes mellitus on insulin (hemoglobin A1c 7.2), history of prior MRSA infection, hypertension, hyperlipidemia, obesity       Plan:   1.  Left middle finger infection:  Clinically, feel finger continues to improve. Low suspicion for pyogenic flexor tenosynovitis at this time. MRI was inconclusive for abscess. Recommend to continue dilute Betadine soaks and IV antibiotics. 2. Elevate LUE  3. Activity as tolerated  4. Dispo:  We will continue to follow clinically, if improving tomorrow, may be stable for discharge from an orthopedic standpoint with close follow-up the next week.          Electronically signed by Illa Spurling, MD on 10/28/2021 at 7:08 AM

## 2021-10-28 NOTE — PROGRESS NOTES
[facility] Progress Note    Patient:  Aditya Lopez  YOB: 1965  Date of Service: 10/28/2021  MRN: [id number]   Acct: [de-identified]   Primary Care Physician: Rosa Elena Adair MD  Advance Directive: Full Code  Admit Date: 10/25/2021       Hospital Day: 3        CHIEF COMPLAINT:     Chief Complaint   Patient presents with    Cellulitis     lef middle finger swelling        10/28/2021 7:52 AM  Subjective / Interval History:   10/28/2021  Patient seen and examined this AM.  Doing well. No new complaints. No acute changes or acute overnight event reported. Left middle finger swelling and redness improved. Patient reports some urinary retention. Denies any acute complaints or distress at this time. 10/27/2021   Patient seen and examined. Doing well. No new complaints. Left middle finger pain fairly controlled. Denies any fever or chills. Laying comfortably in bed no acute distress. No acute changes or acute overnight events reported. 10/26/2021  Patient seen and examined this AM.  Doing well. No new complaints. No acute changes or acute overnight event reported. Left middle finger pain fairly controlled. Laying comfortably in bed in no apparent acute distress. Denies any other acute complaints or distress at this time. No chest pain, no shortness of breath, no dizziness, lightheadedness or palpitations. Review of Systems:   Review of Systems  ROS: 14 point review of systems is negative except as specifically addressed above. ADULT DIET;  Regular; 4 carb choices (60 gm/meal)    Intake/Output Summary (Last 24 hours) at 10/28/2021 0752  Last data filed at 10/28/2021 0121  Gross per 24 hour   Intake 2000 ml   Output --   Net 2000 ml       Medications:   dextrose      sodium chloride       Current Facility-Administered Medications   Medication Dose Route Frequency Provider Last Rate Last Admin    predniSONE (DELTASONE) tablet 50 mg  50 mg Oral Once Lorna Brantley MD  diphenhydrAMINE (BENADRYL) tablet 50 mg  50 mg Oral Once Mandy Evans MD        vancomycin (VANCOCIN) 1,000 mg in sodium chloride 0.9 % 250 mL IVPB  1,000 mg IntraVENous Q12H Ganesh Backers, APRN - CNP 62.5 mL/hr at 10/28/21 0749 1,000 mg at 10/28/21 0749    atorvastatin (LIPITOR) tablet 20 mg  20 mg Oral Nightly Ganesh Backers, APRN - CNP   20 mg at 10/27/21 2126    losartan (COZAAR) tablet 100 mg  100 mg Oral Daily Ganesh Backers, APRN - CNP   100 mg at 10/27/21 0758    metoprolol tartrate (LOPRESSOR) tablet 25 mg  25 mg Oral BID Ganesh Backers, APRN - CNP   25 mg at 10/27/21 2126    tamsulosin (FLOMAX) capsule 0.8 mg  0.8 mg Oral Daily Ganesh Backers, APRN - CNP   0.8 mg at 10/27/21 0759    [START ON 10/29/2021] vitamin D (ERGOCALCIFEROL) capsule 50,000 Units  50,000 Units Oral Weekly Ganesh Backers, APRN - CNP        glucose (GLUTOSE) 40 % oral gel 15 g  15 g Oral PRN Ganesh Backers, APRN - CNP        dextrose 50 % IV solution  12.5 g IntraVENous PRN Ganesh Backers, APRN - CNP        glucagon (rDNA) injection 1 mg  1 mg IntraMUSCular PRN Ganesh Backers, APRN - CNP        dextrose 5 % solution  100 mL/hr IntraVENous PRN Ganesh Backers, APRN - CNP        sodium chloride flush 0.9 % injection 5-40 mL  5-40 mL IntraVENous 2 times per day Ganesh Backers, APRN - CNP   10 mL at 10/26/21 2055    sodium chloride flush 0.9 % injection 5-40 mL  5-40 mL IntraVENous PRN Ganesh Backers, APRN - CNP        0.9 % sodium chloride infusion  25 mL IntraVENous PRN Ganesh Backers, APRN - CNP        ondansetron (ZOFRAN-ODT) disintegrating tablet 4 mg  4 mg Oral Q8H PRN Ganesh Backers, APRN - CNP        Or    ondansetron (ZOFRAN) injection 4 mg  4 mg IntraVENous Q6H PRN Ganesh Backers, APRN - CNP        polyethylene glycol (GLYCOLAX) packet 17 g  17 g Oral Daily PRN Ganesh Backers, APRN - CNP        acetaminophen (TYLENOL) tablet 650 mg  650 mg Oral Q6H PRN [place], APRN - CNP        Or    acetaminophen (TYLENOL) suppository 650 mg  650 mg Rectal Q6H PRN [place], APRN - CNP        insulin lispro (HUMALOG) injection vial 8 Units  0.08 Units/kg SubCUTAneous TID WC [place], APRN - CNP   8 Units at 10/27/21 1725    insulin lispro (HUMALOG) injection vial 0-6 Units  0-6 Units SubCUTAneous TID WC [place], APRN - CNP   2 Units at 10/27/21 1726    insulin lispro (HUMALOG) injection vial 0-3 Units  0-3 Units SubCUTAneous Nightly [place], APRN - CNP   2 Units at 10/27/21 2126    vancomycin (VANCOCIN) intermittent dosing (placeholder)   Other RX Placeholder Rochestermargie Millere, APRN - CNP   Given at 10/25/21 1656    warfarin (COUMADIN) daily dosing (placeholder)   Other RX Placeholder Tiffany Millere, APRN - CNP        insulin glargine (LANTUS) injection vial 30 Units  30 Units SubCUTAneous Nightly [place], APRN - CNP   30 Units at 10/27/21 2127         dextrose      sodium chloride        predniSONE  50 mg Oral Once    diphenhydrAMINE  50 mg Oral Once    vancomycin  1,000 mg IntraVENous Q12H    atorvastatin  20 mg Oral Nightly    losartan  100 mg Oral Daily    metoprolol tartrate  25 mg Oral BID    tamsulosin  0.8 mg Oral Daily    [START ON 10/29/2021] vitamin D  50,000 Units Oral Weekly    sodium chloride flush  5-40 mL IntraVENous 2 times per day    insulin lispro  0.08 Units/kg SubCUTAneous TID     insulin lispro  0-6 Units SubCUTAneous TID     insulin lispro  0-3 Units SubCUTAneous Nightly    vancomycin (VANCOCIN) intermittent dosing (placeholder)   Other RX Placeholder    warfarin (COUMADIN) daily dosing (placeholder)   Other RX Placeholder    insulin glargine  30 Units SubCUTAneous Nightly     glucose, dextrose, glucagon (rDNA), dextrose, sodium chloride flush, sodium chloride, ondansetron **OR** ondansetron, polyethylene glycol, acetaminophen **OR** acetaminophen  ADULT DIET; Regular; 4 carb choices (60 gm/meal)       Labs:   CBC with DIFF:  Recent Labs     10/26/21  0147 10/27/21  0020 10/28/21  0127   WBC 11.0* 11.0* 9.2   RBC 3.30* 3.41* 3.50*   HGB 10.7* 11.1* 11.3*   HCT 31.3* 32.2* 33.0*   MCV 94.8* 94.4* 94.3*   MCH 32.4* 32.6* 32.3*   MCHC 34.2 34.5 34.2   RDW 11.9 11.9 11.9    269 302   MPV 10.8 10.7 10.6   NEUTOPHILPCT 76.5* 71.4* 90.8*   LYMPHOPCT 9.0* 13.3* 4.7*   MONOPCT 13.1* 12.9* 3.7   EOSRELPCT 0.5 1.5 0.0   BASOPCT 0.4 0.5 0.3   NEUTROABS 8.5* 7.8* 8.4*   LYMPHSABS 1.0* 1.5 0.4*   MONOSABS 1.40* 1.40* 0.30   EOSABS 0.10 0.20 0.00   BASOSABS 0.00 0.10 0.00       CMP/BMP:  Recent Labs     10/26/21  0147 10/27/21  0611 10/28/21  0127   * 133* 131*   K 4.2 3.4* 4.7   CL 93* 97* 95*   CO2 23 26 23   ANIONGAP 13 10 13   GLUCOSE 345* 210* 333*   BUN 23* 24* 21*   CREATININE 1.0 1.1 1.0   LABGLOM >60 >60 >60   CALCIUM 8.5* 8.9 9.0   PROT 6.1* 6.4* 6.6   LABALBU 3.6 3.2* 3.5   BILITOT 0.8 0.6 0.6   ALKPHOS 137* 155* 195*   ALT 16 23 28   AST 17 21 25         CRP:    Recent Labs     10/25/21  1310   CRP 8.92*     Sed Rate:    Recent Labs     10/25/21  1310   SEDRATE 87*         HgBA1c:  No components found for: HGBA1C  FLP:    Lab Results   Component Value Date    TRIG 104 09/27/2021    HDL 35 09/27/2021    LDLCALC 49 09/27/2021     TSH:    Lab Results   Component Value Date    TSH 1.520 09/27/2021     Troponin T:   Recent Labs     10/26/21  1836 10/27/21  0020 10/27/21  0611   TROPONINI <0.01 <0.01 <0.01     Pro-BNP: No results for input(s): BNP in the last 72 hours.   INR:   Recent Labs     10/26/21  0147 10/27/21  0020 10/28/21  0127   INR 2.10* 2.63* 3.19*     ABGs:   Lab Results   Component Value Date    PHART 7.290 03/25/2019    PO2ART 79.0 03/25/2019    BXQ8NGK 20.0 03/25/2019     UA:No results for input(s): NITRITE, COLORU, PHUR, LABCAST, WBCUA, RBCUA, MUCUS, TRICHOMONAS, YEAST, BACTERIA, CLARITYU, SPECGRAV, LEUKOCYTESUR, UROBILINOGEN, BILIRUBINUR, BLOODU, GLUCOSEU, AMORPHOUS in the last 72 hours. Invalid input(s): Angelika Angel      Culture Results:    No results for input(s): CXSURG in the last 720 hours. Blood Culture Recent:   Recent Labs     10/25/21  1300   BC  Bottle volume = 8 ml   Bottle volume = 9 ml  *  Isolated from Aerobic and Anaerobic bottle  CONTACT PRECAUTIONS INDICATED  *       Recent Labs     10/25/21  1300 10/25/21  1310   BC  Bottle volume = 8 ml   Bottle volume = 9 ml  *  Isolated from Aerobic and Anaerobic bottle  CONTACT PRECAUTIONS INDICATED  *  --    BLOODCULT2  --  Gram stain Anaerobic bottle:  Gram positive cocci in clusters  resembling Staphylococcus  Culture in progress  Please notify Physician   Bottle volume = 8 ml  *   ORG Staph aureus MRSA*  --              Cultures:   No results for input(s): CULTURE in the last 72 hours.   Recent Labs     10/25/21  1300 10/25/21  1310   BC  Bottle volume = 8 ml   Bottle volume = 9 ml  *  Isolated from Aerobic and Anaerobic bottle  CONTACT PRECAUTIONS INDICATED  *  --    BLOODCULT2  --  Gram stain Anaerobic bottle:  Gram positive cocci in clusters  resembling Staphylococcus  Culture in progress  Please notify Physician   Bottle volume = 8 ml  *       Left middle finger wound culture is growing staph aureus MRSA and Strep agalactiae (Beta Strep Group B)    Staph aureus mrsa (1)  Antibiotic Interpretation ADRI Status   benzylpenicillin Resistant >=0.5 mcg/mL    clindamycin Sensitive 0.25 mcg/mL    erythromycin Sensitive <=0.25 mcg/mL    inducible clindamycin resistance  Neg mcg/mL    oxacillin Resistant >=4 mcg/mL    tetracycline Sensitive <=1 mcg/mL    trimethoprim-sulfamethoxazole Sensitive <=10 mcg/mL    vancomycin Sensitive 1 mcg/mL          Recent Labs     10/27/21  0611   MG 1.6     Recent Labs     10/26/21  0147 10/27/21  0611 10/28/21  0127   AST 17 21 25   ALT 16 23 28   BILITOT 0.8 0.6 0.6   ALKPHOS 137* 155* 195*         RAD:   XR FINGER LEFT (MIN 2 VIEWS)    Result Date: 10/25/2021  History: Penetrating injury with soft tissue swelling, concern for osteomyelitis Left middle finger: 3 views left middle finger obtained. There is diffuse soft tissue swelling with no radiopaque foreign body seen within the soft tissues. Vascular calcifications. There are arthritic changes of the DIP greater than PIP joints as well as the third MCP joint. No acute fracture or dislocation. No radiographic evidence of osteomyelitis. 1. Prominent soft tissue swelling with arthritic change. No acute osseous pathology. No radiographic evidence of osteomyelitis or acute osseous injury. No radiopaque foreign body seen within the soft tissues. Signed by Dr Nydia Alcaraz    Result Date: 10/20/2021  [place] [facility] Stress Test Report Procedure date: 10/20/21 Indications: shortness of breath Procedure: Stress was performed with injection of 0.4 mg Lexiscan. Vital signs and EKG were monitored. Technetium-99 sestamibi was injected in divided doses, 9.83 mCi and 31.5 mCi respectively for rest and stress imaging. The patient was discharged in stable condition. Results: Patient had symptoms of dyspnea during infusion that resolved in recovery. Baseline EKG showed atrial fibrillation with nonspecific ST/T changes. During stress there were no significant EKG changes or rhythm changes, negative for ischemia. Baseline and peak blood pressures were 170/88, and 171/98 respectively. Baseline and peak heart rates were 78 and  102 respectively. EF:  53% The resting and stress perfusion images were compared and reviewed in both corrected and noncorrected formats. There is a moderate size, moderately intense reversible defect noted of the anterior wall from the midportion to the apex and including the apex that persists with attenuation correction also. This is considered positive for anterior apical ischemia.  Gated LV wall motion demonstrates a matching 24 hrs:   BP Temp Temp src Pulse Resp SpO2   10/28/21 0644 (!) 164/102 96.6 °F (35.9 °C) Temporal 89 16 97 %   10/28/21 0121 (!) 175/95 96.7 °F (35.9 °C) Temporal 90 16 96 %   10/27/21 1745 (!) 153/83 96.4 °F (35.8 °C) -- 86 16 97 %   10/27/21 1400 122/85 97.2 °F (36.2 °C) -- 87 18 --   10/27/21 1345 122/83 96.7 °F (35.9 °C) -- 89 16 98 %       24HR INTAKE/OUTPUT:      Intake/Output Summary (Last 24 hours) at 10/28/2021 7807  Last data filed at 10/28/2021 0121  Gross per 24 hour   Intake 2000 ml   Output --   Net 2000 ml       Physical Exam  Vitals and nursing note reviewed. Constitutional:       General: He is not in acute distress. Appearance: Normal appearance. He is not ill-appearing, toxic-appearing or diaphoretic. HENT:      Head: Normocephalic and atraumatic. Right Ear: External ear normal.      Left Ear: External ear normal.      Nose: Nose normal. No congestion or rhinorrhea. Mouth/Throat:      Mouth: Mucous membranes are moist.      Pharynx: Oropharynx is clear. No oropharyngeal exudate or posterior oropharyngeal erythema. Eyes:      General: No scleral icterus. Right eye: No discharge. Left eye: No discharge. Extraocular Movements: Extraocular movements intact. Conjunctiva/sclera: Conjunctivae normal.      Pupils: Pupils are equal, round, and reactive to light. Cardiovascular:      Rate and Rhythm: Normal rate. Rhythm irregular. Pulses: Normal pulses. Heart sounds: Normal heart sounds. No murmur heard. No friction rub. No gallop. Pulmonary:      Effort: Pulmonary effort is normal. No respiratory distress. Breath sounds: Normal breath sounds. No stridor. No wheezing, rhonchi or rales. Chest:      Chest wall: No tenderness. Abdominal:      General: Bowel sounds are normal. There is no distension. Palpations: Abdomen is soft. Tenderness: There is no abdominal tenderness. There is no guarding or rebound.    Musculoskeletal: General: Tenderness ( Left middle finger wound tender to palpation-pictures below) present. No swelling, deformity or signs of injury. Normal range of motion. Cervical back: Normal range of motion and neck supple. No rigidity or tenderness. No muscular tenderness. Right lower leg: No edema. Left lower leg: No edema. Skin:     General: Skin is warm and dry. Capillary Refill: Capillary refill takes less than 2 seconds. Coloration: Skin is not jaundiced or pale. Findings: No bruising, erythema, lesion or rash. Neurological:      General: No focal deficit present. Mental Status: He is alert and oriented to person, place, and time. Cranial Nerves: No cranial nerve deficit. Sensory: No sensory deficit. Motor: No weakness. Coordination: Coordination normal.   Psychiatric:         Mood and Affect: Mood normal.         Behavior: Behavior normal.         Thought Content: Thought content normal.         Judgment: Judgment normal.               Assessment/plan:       Hospital Problems         Last Modified POA    * (Principal) Bacteremia due to methicillin resistant Staphylococcus aureus 10/27/2021 Yes    HTN (hypertension) 10/25/2021 Yes    Hyperlipidemia 10/25/2021 Yes    Type 2 diabetes mellitus with diabetic polyneuropathy, with long-term current use of insulin ([address] (Chronic) 10/25/2021 Yes    Overview Signed 2/21/2021  3:16 PM by Harsha Nieves MD     Lab Results   Component Value Date    LABA1C 11.4 (H) 12/03/2020     No results found for: EAG         Permanent atrial fibrillation ([address] 10/25/2021 Yes    (HFpEF) heart failure with preserved ejection fraction ([address] 10/25/2021 Yes    Overview Signed 2/21/2021  3:12 PM by Harsha Nieves MD     Structurally normal mitral valve. Mild mitral regurgitation is present. Mitral annular calcification is present. Structurally normal aortic valve.    Tricuspid valve is structurally normal.   Mild tricuspid regurgitation. Normal size left atrium. Normal intact intra-atrial septum was noted with no evidence of   significant intra-atrial communications by color flow Doppler or by   agitated saline study. Normal left ventricular size with preserved LV function and an estimated   ejection fraction of approximately 55-60%. Moderate concentric left ventricular hypertrophy. No regional wall motion abnormalities. no         Obstructive sleep apnea 10/25/2021 Yes    Cellulitis 10/27/2021 Yes          Principal Problem:    Bacteremia due to methicillin resistant Staphylococcus aureus  Active Problems:    HTN (hypertension)    Hyperlipidemia    Type 2 diabetes mellitus with diabetic polyneuropathy, with long-term current use of insulin (HCC)    Permanent atrial fibrillation (HCC)    (HFpEF) heart failure with preserved ejection fraction ([address]    Obstructive sleep apnea    Cellulitis  Resolved Problems:    * No resolved hospital problems. *        Brief Summary  Mr. Goldstein, a 47-year-old male, history of HTN, HLD, atrial fibrillation, presenting to [facility] ED (10/25/2021), with complaints of right middle finger swelling, with purulent drainage. Patient reportedly cut his finger on a rusted nail on (10/22/2021), while working in his garage. Patient subsequently reportedly noted gradual redness and swelling, and now with purulent drainage from the wound. Patient initially saw his PCP outpatient, who subsequently recommended patient presented to the ED for further work-up and management    Initial work-up significant for;  Leukocytosis with a WBC of 12.5  Elevated lactic acid of 2.2  CRP of 8.92  Elevated ESR: 87 mm/Hr    X-ray left finger: Impression: Prominent soft tissue swelling with arthritic change. No acute osseous pathology. No radiographic evidence of osteomyelitis or acute osseous injury. No radiopaque foreign body seen within the soft tissues.      Orthopedic surgery consulted  Patient admitted (10/25/2021), SURGERY  PHARMACY TO DOSE VANCOMYCIN  PHARMACY TO DOSE VANCOMYCIN  IP CONSULT TO PHARMACY  IP CONSULT TO INFECTIOUS DISEASES    DVT prophylaxis: Yair Austin to dose        Discharge planning: tbd    Time Spent is 25 mins in the examination, evaluation, counseling and review of medications, assessment and plan.      Electronically signed by   Les Lugo MD, MPH, MD,   Internal Medicine Hospitalist   10/28/2021 7:52 AM

## 2021-10-28 NOTE — PROGRESS NOTES
Clinical Pharmacy Note    Warfarin consult follow-up    Recent Labs     10/28/21  0127   INR 3.19*     Recent Labs     10/26/21  0147 10/27/21  0020 10/28/21  0127   HGB 10.7* 11.1* 11.3*   HCT 31.3* 32.2* 33.0*    269 302       Significant Drug-Drug Interactions:  New warfarin drug-drug interactions: Given 1 X dose of Prednisone 50mg po today  Discontinued drug-drug interactions: Cefepime      Date INR Warfarin Dose   10/25/21 1.72 15 mg    10/26/21 2.10  10 mg     10/27/21 2.63  7.5 mg    10/28/21  3.19  5 mg                                Notes:    Coumadin 5mg po X1 tonight. Daily PT/INR until stable within therapeutic range.      Electronically signed by Boaz Mayorga, [address] [place] [address] on 10/28/2021 at 11:08 AM

## 2021-10-29 ENCOUNTER — TELEPHONE (OUTPATIENT)
Dept: CARDIOLOGY CLINIC | Age: 56
End: 2021-10-29

## 2021-10-29 LAB
ALBUMIN SERPL-MCNC: 3.5 G/DL (ref 3.5–5.2)
ALP BLD-CCNC: 193 U/L (ref 40–130)
ALT SERPL-CCNC: 26 U/L (ref 5–41)
ANION GAP SERPL CALCULATED.3IONS-SCNC: 13 MMOL/L (ref 7–19)
AST SERPL-CCNC: 21 U/L (ref 5–40)
BASOPHILS ABSOLUTE: 0 K/UL (ref 0–0.2)
BASOPHILS RELATIVE PERCENT: 0.2 % (ref 0–1)
BILIRUB SERPL-MCNC: 0.4 MG/DL (ref 0.2–1.2)
BUN BLDV-MCNC: 32 MG/DL (ref 6–20)
CALCIUM SERPL-MCNC: 9 MG/DL (ref 8.6–10)
CHLORIDE BLD-SCNC: 95 MMOL/L (ref 98–111)
CO2: 24 MMOL/L (ref 22–29)
CREAT SERPL-MCNC: 1 MG/DL (ref 0.5–1.2)
CULTURE, BLOOD 2: ABNORMAL
CULTURE, BLOOD 2: ABNORMAL
EOSINOPHILS ABSOLUTE: 0 K/UL (ref 0–0.6)
EOSINOPHILS RELATIVE PERCENT: 0 % (ref 0–5)
GFR AFRICAN AMERICAN: >59
GFR NON-AFRICAN AMERICAN: >60
GLUCOSE BLD-MCNC: 362 MG/DL (ref 70–99)
GLUCOSE BLD-MCNC: 367 MG/DL (ref 70–99)
GLUCOSE BLD-MCNC: 373 MG/DL (ref 70–99)
GLUCOSE BLD-MCNC: 396 MG/DL (ref 70–99)
GLUCOSE BLD-MCNC: 432 MG/DL (ref 74–109)
HCT VFR BLD CALC: 33.7 % (ref 42–52)
HEMOGLOBIN: 11.3 G/DL (ref 14–18)
IMMATURE GRANULOCYTES #: 0.1 K/UL
INR BLD: 3.54 (ref 0.88–1.18)
LV EF: 58 %
LVEF MODALITY: NORMAL
LYMPHOCYTES ABSOLUTE: 0.6 K/UL (ref 1.1–4.5)
LYMPHOCYTES RELATIVE PERCENT: 4.8 % (ref 20–40)
MCH RBC QN AUTO: 32.5 PG (ref 27–31)
MCHC RBC AUTO-ENTMCNC: 33.5 G/DL (ref 33–37)
MCV RBC AUTO: 96.8 FL (ref 80–94)
MONOCYTES ABSOLUTE: 0.5 K/UL (ref 0–0.9)
MONOCYTES RELATIVE PERCENT: 4.2 % (ref 0–10)
NEUTROPHILS ABSOLUTE: 11.7 K/UL (ref 1.5–7.5)
NEUTROPHILS RELATIVE PERCENT: 90.3 % (ref 50–65)
ORGANISM: ABNORMAL
PDW BLD-RTO: 11.8 % (ref 11.5–14.5)
PERFORMED ON: ABNORMAL
PLATELET # BLD: 341 K/UL (ref 130–400)
PMV BLD AUTO: 10.7 FL (ref 9.4–12.4)
POTASSIUM REFLEX MAGNESIUM: 5.3 MMOL/L (ref 3.5–5)
PROTHROMBIN TIME: 34.7 SEC (ref 12–14.6)
RBC # BLD: 3.48 M/UL (ref 4.7–6.1)
SODIUM BLD-SCNC: 132 MMOL/L (ref 136–145)
TOTAL PROTEIN: 6.3 G/DL (ref 6.6–8.7)
WBC # BLD: 12.9 K/UL (ref 4.8–10.8)

## 2021-10-29 PROCEDURE — 36415 COLL VENOUS BLD VENIPUNCTURE: CPT

## 2021-10-29 PROCEDURE — 6370000000 HC RX 637 (ALT 250 FOR IP): Performed by: NURSE PRACTITIONER

## 2021-10-29 PROCEDURE — C8929 TTE W OR WO FOL WCON,DOPPLER: HCPCS

## 2021-10-29 PROCEDURE — 36410 VNPNXR 3YR/> PHY/QHP DX/THER: CPT

## 2021-10-29 PROCEDURE — 85025 COMPLETE CBC W/AUTO DIFF WBC: CPT

## 2021-10-29 PROCEDURE — 05HB33Z INSERTION OF INFUSION DEVICE INTO RIGHT BASILIC VEIN, PERCUTANEOUS APPROACH: ICD-10-PCS | Performed by: INTERNAL MEDICINE

## 2021-10-29 PROCEDURE — 6360000002 HC RX W HCPCS: Performed by: NURSE PRACTITIONER

## 2021-10-29 PROCEDURE — 99223 1ST HOSP IP/OBS HIGH 75: CPT | Performed by: INTERNAL MEDICINE

## 2021-10-29 PROCEDURE — 82947 ASSAY GLUCOSE BLOOD QUANT: CPT

## 2021-10-29 PROCEDURE — C1751 CATH, INF, PER/CENT/MIDLINE: HCPCS

## 2021-10-29 PROCEDURE — 2580000003 HC RX 258: Performed by: NURSE PRACTITIONER

## 2021-10-29 PROCEDURE — 76937 US GUIDE VASCULAR ACCESS: CPT

## 2021-10-29 PROCEDURE — 1210000000 HC MED SURG R&B

## 2021-10-29 PROCEDURE — 85610 PROTHROMBIN TIME: CPT

## 2021-10-29 PROCEDURE — 6360000004 HC RX CONTRAST MEDICATION: Performed by: INTERNAL MEDICINE

## 2021-10-29 PROCEDURE — 80053 COMPREHEN METABOLIC PANEL: CPT

## 2021-10-29 RX ORDER — SODIUM CHLORIDE 0.9 % (FLUSH) 0.9 %
5-40 SYRINGE (ML) INJECTION EVERY 12 HOURS SCHEDULED
Status: DISCONTINUED | OUTPATIENT
Start: 2021-10-29 | End: 2021-11-01 | Stop reason: SDUPTHER

## 2021-10-29 RX ORDER — LIDOCAINE HYDROCHLORIDE 10 MG/ML
5 INJECTION, SOLUTION EPIDURAL; INFILTRATION; INTRACAUDAL; PERINEURAL ONCE
Status: DISCONTINUED | OUTPATIENT
Start: 2021-10-29 | End: 2021-10-31

## 2021-10-29 RX ADMIN — INSULIN LISPRO 8 UNITS: 100 INJECTION, SOLUTION INTRAVENOUS; SUBCUTANEOUS at 14:16

## 2021-10-29 RX ADMIN — INSULIN LISPRO 5 UNITS: 100 INJECTION, SOLUTION INTRAVENOUS; SUBCUTANEOUS at 08:34

## 2021-10-29 RX ADMIN — VANCOMYCIN HYDROCHLORIDE 1000 MG: 1 INJECTION, POWDER, LYOPHILIZED, FOR SOLUTION INTRAVENOUS at 18:25

## 2021-10-29 RX ADMIN — INSULIN LISPRO 5 UNITS: 100 INJECTION, SOLUTION INTRAVENOUS; SUBCUTANEOUS at 17:30

## 2021-10-29 RX ADMIN — METOPROLOL TARTRATE 25 MG: 25 TABLET, FILM COATED ORAL at 21:36

## 2021-10-29 RX ADMIN — INSULIN GLARGINE 30 UNITS: 100 INJECTION, SOLUTION SUBCUTANEOUS at 21:36

## 2021-10-29 RX ADMIN — TAMSULOSIN HYDROCHLORIDE 0.8 MG: 0.4 CAPSULE ORAL at 08:33

## 2021-10-29 RX ADMIN — ATORVASTATIN CALCIUM 20 MG: 20 TABLET, FILM COATED ORAL at 21:36

## 2021-10-29 RX ADMIN — LOSARTAN POTASSIUM 100 MG: 50 TABLET, FILM COATED ORAL at 08:33

## 2021-10-29 RX ADMIN — INSULIN LISPRO 3 UNITS: 100 INJECTION, SOLUTION INTRAVENOUS; SUBCUTANEOUS at 21:36

## 2021-10-29 RX ADMIN — VANCOMYCIN HYDROCHLORIDE 1000 MG: 1 INJECTION, POWDER, LYOPHILIZED, FOR SOLUTION INTRAVENOUS at 06:42

## 2021-10-29 RX ADMIN — ERGOCALCIFEROL 50000 UNITS: 1.25 CAPSULE ORAL at 08:33

## 2021-10-29 RX ADMIN — INSULIN LISPRO 8 UNITS: 100 INJECTION, SOLUTION INTRAVENOUS; SUBCUTANEOUS at 08:34

## 2021-10-29 RX ADMIN — INSULIN LISPRO 8 UNITS: 100 INJECTION, SOLUTION INTRAVENOUS; SUBCUTANEOUS at 17:30

## 2021-10-29 RX ADMIN — PERFLUTREN 1.65 MG: 6.52 INJECTION, SUSPENSION INTRAVENOUS at 14:30

## 2021-10-29 RX ADMIN — SODIUM CHLORIDE, PRESERVATIVE FREE 10 ML: 5 INJECTION INTRAVENOUS at 08:33

## 2021-10-29 RX ADMIN — INSULIN LISPRO 5 UNITS: 100 INJECTION, SOLUTION INTRAVENOUS; SUBCUTANEOUS at 14:16

## 2021-10-29 RX ADMIN — METOPROLOL TARTRATE 25 MG: 25 TABLET, FILM COATED ORAL at 08:33

## 2021-10-29 RX ADMIN — SODIUM CHLORIDE, PRESERVATIVE FREE 10 ML: 5 INJECTION INTRAVENOUS at 21:35

## 2021-10-29 NOTE — CARE COORDINATION
Referral made to [facility] re: home IV ABX    [facility]  [phone]  [phone]    Electronically signed by JERAMY Quevedo on 10/29/2021 at 3:01 PM      10/29/21 1115  Inpatient consult to Social Work ONE TIME    Complete  Discontinue   Comments: Home IV antibiotic orders:   1.  Diagnosis-MRSA bacteremia, MRSA cellulitis/tenosynovitis left middle finger, diabetes mellitus   2.  IV access-midline catheter   3.  Orthopedic surgeon-Dr. Sukh Viveros   4.  Antibiotic order:   Vancomycin 1 g IV every 12 hours   Last dose of vancomycin November 10, 2021 (4 weeks after clearing blood cultures)   5.  Laboratory monitoring:   CMP, CBC, CRP, vancomycin trough every Monday or Tuesday while on antibiotic treatment   6.  Follow-up appointment 10 days after hospital discharge     Ky Nation MD   Provider: (Not yet assigned)   Question: Reason for Consult?  Answer:  evaluation for home IV antibiotic treatment

## 2021-10-29 NOTE — PROCEDURES
[facility] Vascular Access Team:  Midline Insertion Procedure Note    Em[facility] Antoine  Admitted - 10/25/2021 11:26 AM  Admission Diagnosis -   Cellulitis [L03.90]  Wound infection [T14. 8XXA, L08.9]  Cellulitis, unspecified cellulitis site [O31.38]    Attending Physician - Leeanne Chambers MD  Ordering Physician - Connie Wilson MD    Procedure: Insertion of 4.5 [place] Single Lumen Power Midline    Indications:    Home IV therapy    Procedure Details:  Informed consent was obtained for the procedure, including sedation. Risks of hemorrhage and adverse drug reaction were discussed. 4.5 [place] Single Lumen Power Midline inserted to the Right Basilic per hospital protocol. Blood return: yes    Findings: The Right Basilic vessel was visualized using the ultrasound and deemed a suitable vessel. The area was prepped with Chlorhexidine and draped in sterile fashion using full max barrier draping. The area was anesthetized with 3 cc's of 1% Lidocaine. The vessel was accessed using US guidance. The guidewire was advanced through the needle to secure the vessel. The needle was removed and a peel-a-way Sheath was placed over the guidewire. The 4.5 [place] Single Lumen Power Midline was trimmed at 15 cm and inserted into the Sheath. The peel-a-way sheath was removed. Approximately 0 cm exposed. All lumens had brisk blood return and was flushed with 10 cc of NS. The Midline was secured using a securement device and a Biopatch was placed over the insertion site. A Tegaderm was placed over the securement device and insertion site. Dressing was dated and initialed with external measurement marked. Patient did tolerate procedure well. Recommendations:  Midline is ready to be used. Please change tubing prior to using the new Midline. Make sure to label, date and use alcohol caps on new tubing and alcohol caps on unused ports.          Electronically Signed By: Jameel Cárdenas RN on 10/29/2021 at 3:15 PM

## 2021-10-29 NOTE — PROGRESS NOTES
Clinical Pharmacy Note    Warfarin consult follow-up    Recent Labs     10/29/21  0234   INR 3.54*     Recent Labs     10/27/21  0020 10/28/21  0127 10/29/21  0234   HGB 11.1* 11.3* 11.3*   HCT 32.2* 33.0* 33.7*    302 341       Significant Drug-Drug Interactions:  New warfarin drug-drug interactions: none  Discontinued drug-drug interactions: none    Date INR Warfarin Dose   10/25/21 1.72 15 mg    10/26/21 2.10  10 mg     10/27/21 2.63  7.5 mg    10/28/21  3.19  5 mg    10/29/21  3.54   Hold                       Notes: No coumadin today. Daily PT/INR until stable within therapeutic range.      Electronically signed by Elif Alvarez, [facility] on 10/29/2021 at 8:17 AM

## 2021-10-29 NOTE — PROGRESS NOTES
[facility] Progress Note    Patient:  Bull Link  YOB: 1965  Date of Service: 10/29/2021  MRN: [id number]   Acct: [de-identified]   Primary Care Physician: Misti Arambula MD  Advance Directive: Full Code  Admit Date: 10/25/2021       Hospital Day: 4        CHIEF COMPLAINT:     Chief Complaint   Patient presents with    Cellulitis     lef middle finger swelling        10/29/2021 9:35 AM  Subjective / Interval History:   10/29/2021  Patient seen and examined this AM.  Doing well. No new complaints. No acute changes or acute overnight event reported. Laying comfortably in bed in no acute distress. Denies any acute complaints or distress at this time. 10/28/2021  Patient seen and examined this AM.  Doing well. No new complaints. No acute changes or acute overnight event reported. Left middle finger swelling and redness improved. Patient reports some urinary retention. Denies any acute complaints or distress at this time. 10/27/2021   Patient seen and examined. Doing well. No new complaints. Left middle finger pain fairly controlled. Denies any fever or chills. Laying comfortably in bed no acute distress. No acute changes or acute overnight events reported. 10/26/2021  Patient seen and examined this AM.  Doing well. No new complaints. No acute changes or acute overnight event reported. Left middle finger pain fairly controlled. Laying comfortably in bed in no apparent acute distress. Denies any other acute complaints or distress at this time. No chest pain, no shortness of breath, no dizziness, lightheadedness or palpitations. Review of Systems:   Review of Systems  ROS: 14 point review of systems is negative except as specifically addressed above. ADULT DIET;  Regular; 4 carb choices (60 gm/meal)    Intake/Output Summary (Last 24 hours) at 10/29/2021 0935  Last data filed at 10/29/2021 0746  Gross per 24 hour   Intake 1954.84 ml   Output 2150 ml   Net -195.16 ml       Medications:   dextrose      sodium chloride       Current Facility-Administered Medications   Medication Dose Route Frequency Provider Last Rate Last Admin    vancomycin (VANCOCIN) 1,000 mg in sodium chloride 0.9 % 250 mL IVPB  1,000 mg IntraVENous Q12H Daphine Tomlin, APRN - CNP 62.5 mL/hr at 10/29/21 0642 1,000 mg at 10/29/21 9767    atorvastatin (LIPITOR) tablet 20 mg  20 mg Oral Nightly Daphine Tomlin, APRN - CNP   20 mg at 10/28/21 2053    losartan (COZAAR) tablet 100 mg  100 mg Oral Daily Daphine Tomlin, APRN - CNP   100 mg at 10/29/21 4678    metoprolol tartrate (LOPRESSOR) tablet 25 mg  25 mg Oral BID Daphine Tomlin, APRN - CNP   25 mg at 10/29/21 0833    tamsulosin (FLOMAX) capsule 0.8 mg  0.8 mg Oral Daily Daphine Tomlin, APRN - CNP   0.8 mg at 10/29/21 4915    vitamin D (ERGOCALCIFEROL) capsule 50,000 Units  50,000 Units Oral Weekly Daphine Tomlin, APRN - CNP   50,000 Units at 10/29/21 0833    glucose (GLUTOSE) 40 % oral gel 15 g  15 g Oral PRN Daphine Tomlin, APRN - CNP        dextrose 50 % IV solution  12.5 g IntraVENous PRN Daphine Tomlin, APRN - CNP        glucagon (rDNA) injection 1 mg  1 mg IntraMUSCular PRN Daphine Tomlin, APRN - CNP        dextrose 5 % solution  100 mL/hr IntraVENous PRN Daphine Tomlin, APRN - CNP        sodium chloride flush 0.9 % injection 5-40 mL  5-40 mL IntraVENous 2 times per day Daphine Tomlin, APRN - CNP   10 mL at 10/28/21 0936    sodium chloride flush 0.9 % injection 5-40 mL  5-40 mL IntraVENous PRN Daphine Tomlin, APRN - CNP        0.9 % sodium chloride infusion  25 mL IntraVENous PRN Daphine Tomlin, APRN - CNP        ondansetron (ZOFRAN-ODT) disintegrating tablet 4 mg  4 mg Oral Q8H PRN Daphine Tomlin, APRN - CNP        Or    ondansetron (ZOFRAN) injection 4 mg  4 mg IntraVENous Q6H PRN NOMI Torrez - PAOLA        polyethylene glycol (GLYCOLAX) packet 17 g  17 g Oral Daily PRN Lizeth Grate, APRN - CNP        acetaminophen (TYLENOL) tablet 650 mg  650 mg Oral Q6H PRN Lizeth Grate, APRN - CNP        Or    acetaminophen (TYLENOL) suppository 650 mg  650 mg Rectal Q6H PRN Lizeth Grate, APRN - CNP        insulin lispro (HUMALOG) injection vial 8 Units  0.08 Units/kg SubCUTAneous TID WC Lizeth Grate, APRN - CNP   8 Units at 10/28/21 1818    insulin lispro (HUMALOG) injection vial 0-6 Units  0-6 Units SubCUTAneous TID WC Lizeth Grate, APRN - CNP   6 Units at 10/28/21 1817    insulin lispro (HUMALOG) injection vial 0-3 Units  0-3 Units SubCUTAneous Nightly Lizeth Grate, APRN - CNP   3 Units at 10/28/21 2054    vancomycin (VANCOCIN) intermittent dosing (placeholder)   Other RX Placeholder Lizeth Grate, APRN - CNP   Given at 10/25/21 1656    warfarin (COUMADIN) daily dosing (placeholder)   Other RX Placeholder Lizeth Matamoros, APRN - CNP        insulin glargine (LANTUS) injection vial 30 Units  30 Units SubCUTAneous Nightly Lizeth Grate, APRN - CNP   30 Units at 10/28/21 2054         dextrose      sodium chloride        vancomycin  1,000 mg IntraVENous Q12H    atorvastatin  20 mg Oral Nightly    losartan  100 mg Oral Daily    metoprolol tartrate  25 mg Oral BID    tamsulosin  0.8 mg Oral Daily    vitamin D  50,000 Units Oral Weekly    sodium chloride flush  5-40 mL IntraVENous 2 times per day    insulin lispro  0.08 Units/kg SubCUTAneous TID     insulin lispro  0-6 Units SubCUTAneous TID WC    insulin lispro  0-3 Units SubCUTAneous Nightly    vancomycin (VANCOCIN) intermittent dosing (placeholder)   Other RX Placeholder    warfarin (COUMADIN) daily dosing (placeholder)   Other RX Placeholder    insulin glargine  30 Units SubCUTAneous Nightly     glucose, dextrose, glucagon (rDNA), dextrose, sodium chloride flush, sodium chloride, ondansetron **OR** ondansetron, polyethylene glycol, acetaminophen **OR** acetaminophen  ADULT DIET; Regular; 4 carb choices (60 gm/meal)       Labs:   CBC with DIFF:  Recent Labs     10/27/21  0020 10/28/21  0127 10/29/21  0234   WBC 11.0* 9.2 12.9*   RBC 3.41* 3.50* 3.48*   HGB 11.1* 11.3* 11.3*   HCT 32.2* 33.0* 33.7*   MCV 94.4* 94.3* 96.8*   MCH 32.6* 32.3* 32.5*   MCHC 34.5 34.2 33.5   RDW 11.9 11.9 11.8    302 341   MPV 10.7 10.6 10.7   NEUTOPHILPCT 71.4* 90.8* 90.3*   LYMPHOPCT 13.3* 4.7* 4.8*   MONOPCT 12.9* 3.7 4.2   EOSRELPCT 1.5 0.0 0.0   BASOPCT 0.5 0.3 0.2   NEUTROABS 7.8* 8.4* 11.7*   LYMPHSABS 1.5 0.4* 0.6*   MONOSABS 1.40* 0.30 0.50   EOSABS 0.20 0.00 0.00   BASOSABS 0.10 0.00 0.00       CMP/BMP:  Recent Labs     10/27/21  0611 10/28/21  0127 10/29/21  0234   * 131* 132*   K 3.4* 4.7 5.3*   CL 97* 95* 95*   CO2 26 23 24   ANIONGAP 10 13 13   GLUCOSE 210* 333* 432*   BUN 24* 21* 32*   CREATININE 1.1 1.0 1.0   LABGLOM >60 >60 >60   CALCIUM 8.9 9.0 9.0   PROT 6.4* 6.6 6.3*   LABALBU 3.2* 3.5 3.5   BILITOT 0.6 0.6 0.4   ALKPHOS 155* 195* 193*   ALT 23 28 26   AST 21 25 21         CRP:    No results for input(s): CRP in the last 72 hours. Sed Rate:    No results for input(s): SEDRATE in the last 72 hours. HgBA1c:  No components found for: HGBA1C  FLP:    Lab Results   Component Value Date    TRIG 104 09/27/2021    HDL 35 09/27/2021    LDLCALC 49 09/27/2021     TSH:    Lab Results   Component Value Date    TSH 1.520 09/27/2021     Troponin T:   Recent Labs     10/26/21  1836 10/27/21  0020 10/27/21  0611   TROPONINI <0.01 <0.01 <0.01     Pro-BNP: No results for input(s): BNP in the last 72 hours.   INR:   Recent Labs     10/27/21  0020 10/28/21  0127 10/29/21  0234   INR 2.63* 3.19* 3.54*     ABGs:   Lab Results   Component Value Date    PHART 7.290 03/25/2019    PO2ART 79.0 03/25/2019    OHM7RQY 20.0 03/25/2019     UA:  Recent Labs     10/28/21  1425   COLORU YELLOW   PHUR 5.5   WBCUA 1   RBCUA 1   BACTERIA NEGATIVE*   CLARITYU Clear   SPECGRAV 1.028 LEUKOCYTESUR Negative   UROBILINOGEN 1.0   BILIRUBINUR Negative   BLOODU Negative   GLUCOSEU =>1000         Culture Results:    No results for input(s): CXSURG in the last 720 hours. Blood Culture Recent:   Recent Labs     10/27/21  0939 10/25/21  1300   BC No Growth to date. Any change in status will be called. Bottle volume = 8 ml   Bottle volume = 9 ml  *  Isolated from Aerobic and Anaerobic bottle  CONTACT PRECAUTIONS INDICATED  *       Recent Labs     10/27/21  0939 10/27/21  1004   BC No Growth to date. Any change in status will be called. --    BLOODCULT2  --  No Growth to date. Any change in status will be called. Cultures:   No results for input(s): CULTURE in the last 72 hours. Recent Labs     10/27/21  0939 10/27/21  1004   BC No Growth to date. Any change in status will be called. --    BLOODCULT2  --  No Growth to date. Any change in status will be called. Left middle finger wound culture is growing staph aureus MRSA and Strep agalactiae (Beta Strep Group B)    Staph aureus mrsa (1)  Antibiotic Interpretation ADRI Status   benzylpenicillin Resistant >=0.5 mcg/mL    clindamycin Sensitive 0.25 mcg/mL    erythromycin Sensitive <=0.25 mcg/mL    inducible clindamycin resistance  Neg mcg/mL    oxacillin Resistant >=4 mcg/mL    tetracycline Sensitive <=1 mcg/mL    trimethoprim-sulfamethoxazole Sensitive <=10 mcg/mL    vancomycin Sensitive 1 mcg/mL          Recent Labs     10/27/21  0611   MG 1.6     Recent Labs     10/27/21  0611 10/28/21  0127 10/29/21  0234   AST 21 25 21   ALT 23 28 26   BILITOT 0.6 0.6 0.4   ALKPHOS 155* 195* 193*         RAD:   XR FINGER LEFT (MIN 2 VIEWS)    Result Date: 10/25/2021  History: Penetrating injury with soft tissue swelling, concern for osteomyelitis Left middle finger: 3 views left middle finger obtained. There is diffuse soft tissue swelling with no radiopaque foreign body seen within the soft tissues. Vascular calcifications.  There are arthritic changes of the DIP greater than PIP joints as well as the third MCP joint. No acute fracture or dislocation. No radiographic evidence of osteomyelitis. 1. Prominent soft tissue swelling with arthritic change. No acute osseous pathology. No radiographic evidence of osteomyelitis or acute osseous injury. No radiopaque foreign body seen within the soft tissues. Signed by Dr Antwan Flores    Result Date: 10/20/2021  [place] Nuclear Stress Test Report Procedure date: 10/20/21 Indications: shortness of breath Procedure: Stress was performed with injection of 0.4 mg Lexiscan. Vital signs and EKG were monitored. Technetium-99 sestamibi was injected in divided doses, 9.83 mCi and 31.5 mCi respectively for rest and stress imaging. The patient was discharged in stable condition. Results: Patient had symptoms of dyspnea during infusion that resolved in recovery. Baseline EKG showed atrial fibrillation with nonspecific ST/T changes. During stress there were no significant EKG changes or rhythm changes, negative for ischemia. Baseline and peak blood pressures were 170/88, and 171/98 respectively. Baseline and peak heart rates were 78 and  102 respectively. EF:  53% The resting and stress perfusion images were compared and reviewed in both corrected and noncorrected formats. There is a moderate size, moderately intense reversible defect noted of the anterior wall from the midportion to the apex and including the apex that persists with attenuation correction also. This is considered positive for anterior apical ischemia. Gated LV wall motion demonstrates a matching segmental area of hypokinesis of the anterior apical segment, all other wall segments contract normally, ejection fraction 53%. Conclusions This is an abnormal pharmacologic nuclear stress test, positive for mid to apical and apical ischemia with no scar.  A matching wall motion abnormality is noted overall LV systolic function.  Signed by Dr Lieutenant Boston      Echo:    Summary   Normal left ventricular size and systolic function EF 26-69%   Moderate concentric left ventricular hypertrophy with transmitral Doppler   suggesting severe/restrictive Jane Aquino 3] diastolic dysfunction   Moderate left atrial enlargement   Nodular sclerosis of a tricuspid aortic valve with adequate cusp   separation and neither stenosis or insufficiency   Mild thickening but normally mobile mitral valve with mild posteriorly   directed regurgitation   Nonvisualization pulmonic valve   Moderate right atrial enlargement with mild right ventricular enlargement   and preserved systolic function   Mild to moderate tricuspid regurgitation with moderate pulmonary   hypertension RVSP estimate 56 mmHg   Less than 50% inspiratory collapse of the IVC consistent with mild   elevation of right atrial filling pressures of 10-15 mmHg   No significant pericardial effusion   Aortic root and ascending segments measured within normal limits   Injection of agitated saline with or without Valsalva maneuver   demonstrates no intracardiac communication   Definity contrast utilized to better define endocardial borders      Signature    ----------------------------------------------------------------   Electronically signed by Zoila Wilson MD(Interpreting physician)   on 04/15/2021 03:59 PM   ----------------------------------------------------------------        Objective:   Vitals:   BP (!) 150/88   Pulse 78   Temp 96.1 °F (35.6 °C) (Temporal)   Resp 16   Ht 5' 10\" (1.778 m)   Wt 233 lb (105.7 kg)   SpO2 97%   BMI 33.43 kg/m²       Patient Vitals for the past 24 hrs:   BP Temp Temp src Pulse Resp SpO2   10/29/21 0632 (!) 150/88 96.1 °F (35.6 °C) Temporal 78 16 97 %   10/29/21 0031 (!) 154/94 96.2 °F (35.7 °C) Temporal 73 16 96 %   10/28/21 1815 (!) 146/81 97.4 °F (36.3 °C) -- 84 16 97 %   10/28/21 1230 (!) 154/85 97.1 °F (36.2 °C) -- 78 18 99 %       24HR INTAKE/OUTPUT:      Intake/Output Summary (Last 24 hours) at 10/29/2021 0935  Last data filed at 10/29/2021 0746  Gross per 24 hour   Intake 1954.84 ml   Output 2150 ml   Net -195.16 ml       Physical Exam  Vitals and nursing note reviewed. Constitutional:       General: He is not in acute distress. Appearance: Normal appearance. He is not ill-appearing, toxic-appearing or diaphoretic. HENT:      Head: Normocephalic and atraumatic. Right Ear: External ear normal.      Left Ear: External ear normal.      Nose: Nose normal. No congestion or rhinorrhea. Mouth/Throat:      Mouth: Mucous membranes are moist.      Pharynx: Oropharynx is clear. No oropharyngeal exudate or posterior oropharyngeal erythema. Eyes:      General: No scleral icterus. Right eye: No discharge. Left eye: No discharge. Extraocular Movements: Extraocular movements intact. Conjunctiva/sclera: Conjunctivae normal.      Pupils: Pupils are equal, round, and reactive to light. Cardiovascular:      Rate and Rhythm: Normal rate. Rhythm irregular. Pulses: Normal pulses. Heart sounds: Normal heart sounds. No murmur heard. No friction rub. No gallop. Pulmonary:      Effort: Pulmonary effort is normal. No respiratory distress. Breath sounds: Normal breath sounds. No stridor. No wheezing, rhonchi or rales. Chest:      Chest wall: No tenderness. Abdominal:      General: Bowel sounds are normal. There is no distension. Palpations: Abdomen is soft. Tenderness: There is no abdominal tenderness. There is no guarding or rebound. Musculoskeletal:         General: Tenderness ( Left middle finger wound tender to palpation-pictures below) present. No swelling, deformity or signs of injury. Normal range of motion. Cervical back: Normal range of motion and neck supple. No rigidity or tenderness. No muscular tenderness. Right lower leg: No edema. Left lower leg: No edema. Skin:     General: Skin is warm and dry. Capillary Refill: Capillary refill takes less than 2 seconds. Coloration: Skin is not jaundiced or pale. Findings: No bruising, erythema, lesion or rash. Neurological:      General: No focal deficit present. Mental Status: He is alert and oriented to person, place, and time. Cranial Nerves: No cranial nerve deficit. Sensory: No sensory deficit. Motor: No weakness. Coordination: Coordination normal.   Psychiatric:         Mood and Affect: Mood normal.         Behavior: Behavior normal.         Thought Content: Thought content normal.         Judgment: Judgment normal.               Assessment/plan:       Hospital Problems         Last Modified POA    * (Principal) Bacteremia due to methicillin resistant Staphylococcus aureus 10/27/2021 Yes    HTN (hypertension) 10/25/2021 Yes    Hyperlipidemia 10/25/2021 Yes    Type 2 diabetes mellitus with diabetic polyneuropathy, with long-term current use of insulin ([address] (Chronic) 10/25/2021 Yes    Overview Signed 2/21/2021  3:16 PM by Harsha Nieves MD     Lab Results   Component Value Date    LABA1C 11.4 (H) 12/03/2020     No results found for: EAG         Permanent atrial fibrillation ([address] 10/25/2021 Yes    (HFpEF) heart failure with preserved ejection fraction ([address] 10/25/2021 Yes    Overview Signed 2/21/2021  3:12 PM by Harsha Nieves MD     Structurally normal mitral valve. Mild mitral regurgitation is present. Mitral annular calcification is present. Structurally normal aortic valve. Tricuspid valve is structurally normal.   Mild tricuspid regurgitation. Normal size left atrium. Normal intact intra-atrial septum was noted with no evidence of   significant intra-atrial communications by color flow Doppler or by   agitated saline study. Normal left ventricular size with preserved LV function and an estimated   ejection fraction of approximately 55-60%.    Moderate concentric left ventricular hypertrophy. No regional wall motion abnormalities. no         Obstructive sleep apnea 10/25/2021 Yes    Cellulitis 10/27/2021 Yes          Principal Problem:    Bacteremia due to methicillin resistant Staphylococcus aureus  Active Problems:    HTN (hypertension)    Hyperlipidemia    Type 2 diabetes mellitus with diabetic polyneuropathy, with long-term current use of insulin (HCC)    Permanent atrial fibrillation (HCC)    (HFpEF) heart failure with preserved ejection fraction ([address]    Obstructive sleep apnea    Cellulitis  Resolved Problems:    * No resolved hospital problems. *        Brief Summary  Mr. Goldstein, a 73-year-old male, history of HTN, HLD, atrial fibrillation, presenting [facility] ED (10/25/2021), with complaints of right middle finger swelling, with purulent drainage. Patient reportedly cut his finger on a rusted nail on (10/22/2021), while working in his garage. Patient subsequently reportedly noted gradual redness and swelling, and now with purulent drainage from the wound. Patient initially saw his PCP outpatient, who subsequently recommended patient presented to the ED for further work-up and management    Initial work-up significant for;  Leukocytosis with a WBC of 12.5  Elevated lactic acid of 2.2  CRP of 8.92  Elevated ESR: 87 mm/Hr    X-ray left finger: Impression: Prominent soft tissue swelling with arthritic change. No acute osseous pathology. No radiographic evidence of osteomyelitis or acute osseous injury. No radiopaque foreign body seen within the soft tissues. Orthopedic surgery consulted  Patient admitted (10/25/2021), further work-up and management.       Left middle finger wound/cellulitis/infection  MRSA Bacteremia   · Initial lactic acid of 2.2 --> 1.8 (10/26/2021)  · Gentle hydration  · Blood cultures positive for MRSA  · Follow-up repeat blood no growth to date  · Left middle finger wound culture is growing staph aureus MRSA and Strep agalactiae (Beta Strep Group B) -sensitivities as noted above  · Vancomycin IV Pharmacy to dose  · ID on board-appreciate recommendation  · Further antibiotic recommendation as per ID  · PICC line to be placed, in anticipation of possible prolonged IV antibiotic for MRSA bacteremia. History of permanent atrial fibrillation  · Currently rate controlled  · Metoprolol tartrate 25 mg p.o. twice daily  · Warfarin pharmacy dosing      History of Diabetes Mellitus II   Hemoglobin A1C: 7.2%   Inpatient Regimens to include;  o - Insulin Glargine (Lantus) 30 units subcu nightly  o - Insulin Lispro (Humalog) 8 units subcu pre-meal 3 times a day  o - Insulin Lispro (Humalog) on a Low dose sliding scale   Monitor POC glucose, and adjust insulin regimen accordingly based on daily insulin requirement. History of HTN  · Stable  · Losartan 100 mg p.o. daily  · Metoprolol tartrate 25 mg p.o. twice daily       History of combined diastolic and systolic HF  Recent abnormal stress test  · Recent NM stress test (10/20/2021): Conclusions This is an abnormal pharmacologic nuclear stress test, positive for mid to apical and apical ischemia with no scar. A matching wall motion abnormality is noted overall LV systolic function. · No active chest pain or shortness of breath at this time. · Serial EKGs for chest pain  · Troponin < 0.01 x 3 sets  · Monitor on telemetry  · Initially planned for outpatient cardiac catheterization, as per patient  · Cardiology consult        Continue management of other chronic medical conditions - see above and orders. Advance Directive: Full Code    ADULT DIET;  Regular; 4 carb choices (60 gm/meal)         Consults Made:   IP CONSULT TO ORTHOPEDIC SURGERY  PHARMACY TO DOSE VANCOMYCIN  PHARMACY TO DOSE VANCOMYCIN  IP CONSULT TO PHARMACY  IP CONSULT TO INFECTIOUS DISEASES    DVT prophylaxis: Warfarin-pharmacy to dose        Discharge planning: tbd    Time Spent is 35 mins in the examination, evaluation, counseling and review of medications, assessment and plan.      Electronically signed by   Kaley Medel MD, MPH, MD,   Internal Medicine Hospitalist   10/29/2021 9:35 AM

## 2021-10-29 NOTE — CONSULTS
[facility]  Cardiology Consult      Requesting MD:  Daysi Vela MD   Admit Status:  Inpatient [101]       History obtained from:   [] Patient  [] Other (specify):     Patient:  Dat Berry  [id number]     Chief Complaint:   Chief Complaint   Patient presents with    Cellulitis     lef middle finger swelling          HPI: Mr. Napoleon Lieberman is a 60 y.o. male with a history of chronic atrial fibrillation on warfarin complains of gradually worsening exertional dyspnea. Denies anginal chest pain. Admitted 10/25/2021 with left third finger wound had a nick in it the Friday previously. Patient was admitted noted to have increased erythema swelling drainage from the wound. Noted to have elevated white blood count lactic acid increase CRP. Wound culture obtained. Apparently growing MRSA. Seen by orthopedic surgery. PICC line is to be placed. He will need several additional weeks of antibiotics. A stress test had been performed on 10/20/2021 showing EF 53% showing evidence of mid to apical and apical ischemia no scar. Cardiology consulted. Troponin negative x3.     Review of Systems:  Review of Systems    Cardiac Specific Data:  Specialty Problems        Cardiology Problems    HTN (hypertension)        Hyperlipidemia        Permanent atrial fibrillation (HCC)        (HFpEF) heart failure with preserved ejection fraction (HCC)              Past Medical History:  Past Medical History:   Diagnosis Date    Acalculous cholecystitis 12/29/2015    Allergic rhinitis, cause unspecified     Atrial fibrillation, chronic ([address]     sees [facility] cardiology    Charcot's joint of right foot     Chicken pox     Closed supracondylar fracture of elbow     fall    Diabetic ulcer of right midfoot associated with type 2 diabetes mellitus, with fat layer exposed ([address] 9/14/2020    History of MRSA infection     scalp/facial and on back    HTN (hypertension)     Hyperlipidemia     Impotence of organic origin     MDRO (multiple drug resistant organisms) resistance     Other dyspnea and respiratory abnormality     Other specified erythematous condition(695.89)     Personal history of other infectious and parasitic disease     Salmonella     Type II or unspecified type diabetes mellitus without mention of complication, uncontrolled 1994        Past Surgical History:  Past Surgical History:   Procedure Laterality Date    CARDIOVERSION  03/2017    CATARACT REMOVAL      COLONOSCOPY  02/24/2020    Dr RITIKA Fraire-Melanosis coli throughout the entire colon-AP, 5 yr recall    HUMERUS FRACTURE SURGERY Left 9/17/2020    OPEN REDUCTION INTERNAL FIXATION LEFT SUPRACONDYLAR FRACTURE [facility]      X 2    TONSILLECTOMY         Past Family History:  Family History   Problem Relation Age of Onset    Stroke Maternal Grandmother     Cancer Maternal Grandmother     Stroke Paternal Grandmother     Diabetes Paternal Grandmother     Cancer Father     Heart Disease Father     Lung Cancer Father     Asthma Paternal Grandfather     Heart Disease Paternal Grandfather     Heart Disease Maternal Grandfather     Colon Cancer Neg Hx     Colon Polyps Neg Hx     Esophageal Cancer Neg Hx     Liver Cancer Neg Hx     Liver Disease Neg Hx     Rectal Cancer Neg Hx     Stomach Cancer Neg Hx        Past Social History:  Social History     Socioeconomic History    Marital status:      Spouse name: Not on file    Number of children: Not on file    Years of education: Not on file    Highest education level: Not on file   Occupational History    Not on file   Tobacco Use    Smoking status: Never Smoker    Smokeless tobacco: Never Used   Vaping Use    Vaping Use: Never used   Substance and Sexual Activity    Alcohol use: No    Drug use: No    Sexual activity: Not on file   Other Topics Concern    Not on file   Social History Narrative    Referred to  for assistance with Meals on Wheels     Social Determinants of Health     Financial Resource Strain: High Risk    Difficulty of Paying Living Expenses: Hard   Food Insecurity: Food Insecurity Present    Worried About Running Out of Food in the Last Year: Sometimes true    Zoran of Food in the Last Year: Sometimes true   Transportation Needs: No Transportation Needs    Lack of Transportation (Medical): No    Lack of Transportation (Non-Medical): No   Physical Activity:     Days of Exercise per Week:     Minutes of Exercise per Session:    Stress:     Feeling of Stress :    Social Connections:     Frequency of Communication with Friends and Family:     Frequency of Social Gatherings with Friends and Family:     Attends Anabaptism Services:     Active Member of Clubs or Organizations:     Attends Club or Organization Meetings:     Marital Status:    Intimate Partner Violence:     Fear of Current or Ex-Partner:     Emotionally Abused:     Physically Abused:     Sexually Abused: Allergies: Allergies   Allergen Reactions    Pcn [Penicillins]     Neomycin-Bacitracin Zn-Polymyx Rash    Neosporin [Neomycin-Polymyxin-Gramicidin] Rash       Home Meds:  Prior to Admission medications    Medication Sig Start Date End Date Taking?  Authorizing Provider   Insulin Degludec (TRESIBA FLEXTOUCH) 100 UNIT/ML SOPN Inject into the skin Currently out   Yes Historical Provider, MD   atorvastatin (LIPITOR) 20 MG tablet TAKE ONE TABLET DAILY  9/29/21  Yes Gabbi Bess MD   warfarin (COUMADIN) 10 MG tablet TAKE ONE TABLET DAILY  9/20/21  Yes NOMI Latif   tamsulosin (FLOMAX) 0.4 MG capsule Take 2 capsules by mouth daily 9/15/21  Yes NOMI Vega - CNP   furosemide (LASIX) 20 MG tablet TAKE ONE TABLET DAILY AS NEEDED 8/1/21  Yes Gabbi Bess MD   warfarin (COUMADIN) 5 MG tablet Take 3 tabs on Thursday's and Sunday's only 7/15/21  Yes NOMI Brunner metFORMIN (GLUCOPHAGE) 1000 MG tablet Take 1 tablet by mouth 2 times daily (with meals) 6/14/21 10/25/21 Yes Gabbi Bess MD   metoprolol tartrate (LOPRESSOR) 25 MG tablet TAKE ONE TABLET TWICE DAILY 6/10/21  Yes NOMI Wilder   insulin lispro (HUMALOG KWIKPEN) 200 UNIT/ML SOPN pen Sliding scale 160-179 2 units  180-199 3 units  200-219 4 units  220-239 5 units  240-259 6 units   260-279 7 units  280-299 8 units  300-399 9 units  340-379 10 units  380-420 11 units 5/11/21  Yes Gabbi Bess MD   vitamin D (ERGOCALCIFEROL) 1.25 MG (45868 UT) CAPS capsule Take 50,000 Units by mouth once a week   Yes Historical Provider, MD   losartan (COZAAR) 100 MG tablet TAKE ONE TABLET DAILY 1/11/21  Yes NOMI Jacobs   diphenhydrAMINE (BENADRYL) 25 MG tablet Take 25 mg by mouth every 6 hours as needed for Itching   Yes Historical Provider, MD   blood glucose monitor strips Test 3 times a day & as needed for symptoms of irregular blood glucose. Dispense sufficient amount for indicated testing frequency plus additional to accommodate PRN testing needs. 8/1/21   Gabbi Bess MD   Insulin Pen Needle 32G X 4 MM MISC 1 each by Does not apply route daily 6/14/21   Gabbi Bess MD   blood glucose monitor kit and supplies Dispense sufficient amount for indicated testing frequency plus additional to accommodate PRN testing needs. 5/12/21   Gabbi Bess MD   Lancets MISC 1 each by Does not apply route 3 times daily (with meals) 5/12/21   Gabbi Bess MD   blood glucose test strips (CONTOUR NEXT TEST) strip 1 each by In Vitro route 4 times daily As needed.  11/21/19   Gabbi Bess MD       Current Meds:   lidocaine 1 % injection  5 mL IntraDERmal Once    sodium chloride flush  5-40 mL IntraVENous 2 times per day    vancomycin  1,000 mg IntraVENous Q12H    atorvastatin  20 mg Oral Nightly    losartan  100 mg Oral Daily    metoprolol tartrate  25 mg Oral BID    tamsulosin  0.8 mg Oral Daily    vitamin D  50,000 Units Oral Weekly    sodium chloride flush  5-40 mL IntraVENous 2 times per day    insulin lispro  0.08 Units/kg SubCUTAneous TID WC    insulin lispro  0-6 Units SubCUTAneous TID WC    insulin lispro  0-3 Units SubCUTAneous Nightly    vancomycin (VANCOCIN) intermittent dosing (placeholder)   Other RX Placeholder    warfarin (COUMADIN) daily dosing (placeholder)   Other RX Placeholder    insulin glargine  30 Units SubCUTAneous Nightly       Current Infused Meds:   dextrose      sodium chloride         Physical Exam:  Vitals:    10/29/21 0632   BP: (!) 150/88   Pulse: 78   Resp: 16   Temp: 96.1 °F (35.6 °C)   SpO2: 97%       Intake/Output Summary (Last 24 hours) at 10/29/2021 1205  Last data filed at 10/29/2021 1042  Gross per 24 hour   Intake 2143.84 ml   Output 2150 ml   Net -6.16 ml     Estimated body mass index is 33.43 kg/m² as calculated from the following:    Height as of this encounter: 5' 10\" (1.778 m). Weight as of this encounter: 233 lb (105.7 kg). Physical Exam    Labs:  Recent Labs     10/27/21  0020 10/28/21  0127 10/29/21  0234   WBC 11.0* 9.2 12.9*   HGB 11.1* 11.3* 11.3*    302 341       Recent Labs     10/27/21  0611 10/28/21  0127 10/29/21  0234   * 131* 132*   K 3.4* 4.7 5.3*   CL 97* 95* 95*   CO2 26 23 24   BUN 24* 21* 32*   CREATININE 1.1 1.0 1.0   LABGLOM >60 >60 >60   MG 1.6  --   --    CALCIUM 8.9 9.0 9.0       CK, CKMB, Troponin: @LABRCNT (CKTOTAL:3, CKMB:3, TROPONINI:3)@    Last 3 BNP:  No results for input(s): BNP in the last 72 hours. IMAGING:  XR FINGER LEFT (MIN 2 VIEWS)    Result Date: 10/25/2021  History: Penetrating injury with soft tissue swelling, concern for osteomyelitis Left middle finger: 3 views left middle finger obtained. There is diffuse soft tissue swelling with no radiopaque foreign body seen within the soft tissues. Vascular calcifications.  There are arthritic changes of the DIP osteomyelitis involving the distal phalanx of the third finger although complete evaluation of this area is limited. 3. Tenosynovitis involving the flexor tendon of the third finger. There is enhancement within the tendon sheath on the postcontrast images. This could be infectious tenosynovitis. 4. Diffuse edema within the intrinsic muscles of the hand in the metacarpal region. There is also enhancement of the muscles. The findings are consistent with myositis which may be infectious. Signed by Dr Kyra Raya    Result Date: 10/20/2021  [facility] Nuclear Stress Test Report Procedure date: 10/20/21 Indications: shortness of breath Procedure: Stress was performed with injection of 0.4 mg Lexiscan. Vital signs and EKG were monitored. Technetium-99 sestamibi was injected in divided doses, 9.83 mCi and 31.5 mCi respectively for rest and stress imaging. The patient was discharged in stable condition. Results: Patient had symptoms of dyspnea during infusion that resolved in recovery. Baseline EKG showed atrial fibrillation with nonspecific ST/T changes. During stress there were no significant EKG changes or rhythm changes, negative for ischemia. Baseline and peak blood pressures were 170/88, and 171/98 respectively. Baseline and peak heart rates were 78 and  102 respectively. EF:  53% The resting and stress perfusion images were compared and reviewed in both corrected and noncorrected formats. There is a moderate size, moderately intense reversible defect noted of the anterior wall from the midportion to the apex and including the apex that persists with attenuation correction also. This is considered positive for anterior apical ischemia. Gated LV wall motion demonstrates a matching segmental area of hypokinesis of the anterior apical segment, all other wall segments contract normally, ejection fraction 53%.  Conclusions This is an abnormal pharmacologic nuclear stress test, positive for mid to apical and apical ischemia with no scar. A matching wall motion abnormality is noted overall LV systolic function. Signed by Dr Hyatt Justice:  1. Wound left third finger  2. MRSA  3. Chronic atrial fibrillation  4. Chronic anticoagulation with warfarin  5. Complaints of gradually worsening exertional dyspnea no reported chest pain  6. Abnormal stress test EF 53% mid to apical and apical ischemia no scar  7. Echo 4/14/2021 EF 45 to 50% moderate concentric LVH grade 3 diastolic dysfunction aortic valve sclerosis moderate right atrial large with mild RV enlargement mild to moderate TR RVSP 56 no evidence of shunt at the atrial level  8. MRI of the brain 4/14/2021 mild atrophy moderate small vessel ischemic changes no acute abnormalities  9. CTA head and neck 4/14/2021 no major branch occlusion or aneurysm minimal plaque cavernous portion of the left does not cause any flow-limiting stenosis      Recommendations:  1. Recommend consideration for cardiac catheterization as an outpatient in several weeks after adequate treatment of the left third finger infection has occurred. Discussed with patient he is agreeable advise indication alternatives benefits and risks we will make the appropriate arrangements.   I have discussed with the patient regarding indications for the proposed procedure LEFT HEART CATHETERIZATION AND POSSIBLE PERCUTANEOUS INTERVENTION  along with possible alternatives benefits and risks including but not limited to risks of death, myocardial infarction, stroke, contrast induced nephropathy which in some cases may lead to acute kidney failure requiring dialysis, allergic reactions, bleeding requiring blood transfusion,  cardiac arrhthymias, respiratory failure which may require placing the patient on respiratory support such as a ventilator or breathing machine,risk of complications which may require vascular surgery, and if coronary intervention is performed emergency CABG may be required in less than 1% of cases. The patient is awake and alert and understands the issues involved and indicates willingness to proceed as ordered. The patient does not have any contraindications to dual antiplatelet therapy. The patient does not have any known  pending surgical procedures in the next 12 months at this time. The patient is  a reasonable candidate for moderate conscious sedation.     ASA score:  ASA 3 - Patient with moderate systemic disease with functional limitations    Mallampati: I (soft palate, uvula, fauces, tonsillar pillars visible)    Preferred vascular access site will be: right radial artery

## 2021-10-29 NOTE — PROGRESS NOTES
Infectious Diseases Progress Note    Patient:  Berna Hearn  YOB: 1965  MRN: [id number]   Admit date: 10/25/2021   Admitting Physician: Elizabeth Santana MD  Primary Care Physician: Trell Key MD    Chief Complaint/Interval History: He is feeling better. No fever or chills. Left middle finger redness and swelling improving. He has been soaking a couple of times each day. He is tolerating antibiotic treatment without nausea, diarrhea, rash, or skin itching. Talked with him about a PICC line or midline catheter. He is agreeable. Explained with a transient staph bacteremia with a drainable focus of infection that we recommend at least a 2-week course of intravenous antibiotic therapy. He is agreeable to me proceeding with trying to make home IV antibiotic arrangements. In/Out    Intake/Output Summary (Last 24 hours) at 10/29/2021 0855  Last data filed at 10/29/2021 0746  Gross per 24 hour   Intake 1954.84 ml   Output 2150 ml   Net -195.16 ml     Allergies:    Allergies   Allergen Reactions    Pcn [Penicillins]     Neomycin-Bacitracin Zn-Polymyx Rash    Neosporin [Neomycin-Polymyxin-Gramicidin] Rash     Current Meds: vancomycin (VANCOCIN) 1,000 mg in sodium chloride 0.9 % 250 mL IVPB, Q12H  atorvastatin (LIPITOR) tablet 20 mg, Nightly  losartan (COZAAR) tablet 100 mg, Daily  metoprolol tartrate (LOPRESSOR) tablet 25 mg, BID  tamsulosin (FLOMAX) capsule 0.8 mg, Daily  vitamin D (ERGOCALCIFEROL) capsule 50,000 Units, Weekly  glucose (GLUTOSE) 40 % oral gel 15 g, PRN  dextrose 50 % IV solution, PRN  glucagon (rDNA) injection 1 mg, PRN  dextrose 5 % solution, PRN  sodium chloride flush 0.9 % injection 5-40 mL, 2 times per day  sodium chloride flush 0.9 % injection 5-40 mL, PRN  0.9 % sodium chloride infusion, PRN  ondansetron (ZOFRAN-ODT) disintegrating tablet 4 mg, Q8H PRN   Or  ondansetron (ZOFRAN) injection 4 mg, Q6H PRN  polyethylene glycol (GLYCOLAX) packet 17 g, Daily PRN  acetaminophen (TYLENOL) tablet 650 mg, Q6H PRN   Or  acetaminophen (TYLENOL) suppository 650 mg, Q6H PRN  insulin lispro (HUMALOG) injection vial 8 Units, TID WC  insulin lispro (HUMALOG) injection vial 0-6 Units, TID WC  insulin lispro (HUMALOG) injection vial 0-3 Units, Nightly  vancomycin (VANCOCIN) intermittent dosing (placeholder), RX Placeholder  warfarin (COUMADIN) daily dosing (placeholder), RX Placeholder  insulin glargine (LANTUS) injection vial 30 Units, Nightly      Review of Systems see HPI    VitalSigns:  BP (!) 150/88   Pulse 78   Temp 96.1 °F (35.6 °C) (Temporal)   Resp 16   Ht 5' 10\" (1.778 m)   Wt 233 lb (105.7 kg)   SpO2 97%   BMI 33.43 kg/m²      Physical Exam  Line/IV (peripheral) site: No erythema, warmth, induration, or tenderness. Left middle finger with less redness and swelling. There is a little bit of drainage from subcu tissue dorsally. Showing improvement. Skin without drug rash.   Heart without murmur    Lab Results:  CBC:   Recent Labs     10/27/21  0020 10/28/21  0127 10/29/21  0234   WBC 11.0* 9.2 12.9*   HGB 11.1* 11.3* 11.3*    302 341     BMP:  Recent Labs     10/27/21  0611 10/28/21  0127 10/29/21  0234   * 131* 132*   K 3.4* 4.7 5.3*   CL 97* 95* 95*   CO2 26 23 24   BUN 24* 21* 32*   CREATININE 1.1 1.0 1.0   GLUCOSE 210* 333* 432*     Vancomycin trough October 27, 2021: 17.3    CultureResults:  Blood cultures x 2 on 10/27/2021 - no growth  Blood cultures October 25, 2021: MRSA  Staph aureus mrsa (1)    Antibiotic Interpretation ADRI Status    benzylpenicillin Resistant >=0.5 mcg/mL     clindamycin Sensitive 0.25 mcg/mL     erythromycin Sensitive <=0.25 mcg/mL     inducible clindamycin resistance  Neg mcg/mL     oxacillin Resistant >=4 mcg/mL     tetracycline Sensitive <=1 mcg/mL     trimethoprim-sulfamethoxazole Sensitive <=10 mcg/mL     vancomycin Sensitive <=0.5 mcg/mL       Wound culture left middle finger:  Staph aureus mrsa (1)    Antibiotic Interpretation ADRI Status    benzylpenicillin Resistant >=0.5 mcg/mL     clindamycin Sensitive 0.25 mcg/mL     erythromycin Sensitive <=0.25 mcg/mL     inducible clindamycin resistance  Neg mcg/mL     oxacillin Resistant >=4 mcg/mL     tetracycline Sensitive <=1 mcg/mL     trimethoprim-sulfamethoxazole Sensitive <=10 mcg/mL     vancomycin Sensitive 1 mcg/mL       Radiology:   MRI left hand 10/27/2021:  Impression   1. The study is limited, as described above. 2. There is probable osteomyelitis involving the distal phalanx of the   third finger although complete evaluation of this area is limited. 3. Tenosynovitis involving the flexor tendon of the third finger. There is enhancement within the tendon sheath on the postcontrast   images. This could be infectious tenosynovitis. 4. Diffuse edema within the intrinsic muscles of the hand in the   metacarpal region. There is also enhancement of the muscles. The   findings are consistent with myositis which may be infectious. Signed by Dr Jean Siddiqui     Additional Studies Reviewed:  None    Impression:  1. MRSA bacteremia-appears to be transient via left middle finger source. 2.  Tenosynovitis/cellulitis left middle finger  3. Diabetes mellitus    Recommendations:  Continue vancomycin  PICC or midline placement  Plan a 2 weeks of IV antibiotic treatment subsequent to clearing blood cultures  See home IV antibiotic orders below   evaluation to begin process for home IV antibiotic arrangements    Home IV antibiotic orders:  1. Diagnosis-MRSA bacteremia, MRSA cellulitis/tenosynovitis left middle finger, diabetes mellitus  2. IV access-midline catheter  3. Orthopedic surgeon-Dr. Gilma Avery  4. Antibiotic order:  Vancomycin 1 g IV every 12 hours  Last dose of vancomycin November 10, 2021 (4 weeks after clearing blood cultures)  5. Laboratory monitoring:  CMP, CBC, CRP, vancomycin trough every Monday or Tuesday while on antibiotic treatment  6. Follow-up appointment 10 days after hospital discharge    Timo Hong MD

## 2021-10-29 NOTE — PLAN OF CARE
Problem: Falls - Risk of:  Goal: Will remain free from falls  Description: Will remain free from falls  Outcome: Ongoing  Goal: Absence of physical injury  Description: Absence of physical injury  Outcome: Ongoing     Problem: Discharge Planning:  Goal: Discharged to appropriate level of care  Description: Discharged to appropriate level of care  Outcome: Ongoing     Problem: Mobility - Impaired:  Goal: Mobility will improve to maximum level  Description: Mobility will improve to maximum level  Outcome: Ongoing     Problem: Pain:  Description: Pain management should include both nonpharmacologic and pharmacologic interventions. Goal: Pain level will decrease  Description: Pain level will decrease  Outcome: Ongoing  Goal: Control of acute pain  Description: Control of acute pain  Outcome: Ongoing  Goal: Control of chronic pain  Description: Control of chronic pain  Outcome: Ongoing     Problem: Skin Integrity - Impaired:  Goal: Will show no infection signs and symptoms  Description: Will show no infection signs and symptoms  Outcome: Ongoing  Goal: Absence of new skin breakdown  Description: Absence of new skin breakdown  Outcome: Ongoing     Problem: Serum Glucose Level - Abnormal:  Goal: Ability to maintain appropriate glucose levels will improve  Description: Ability to maintain appropriate glucose levels will improve  Outcome: Ongoing     Problem: Sensory Perception - Impaired:  Goal: Ability to maintain a stable neurologic state will improve  Description: Ability to maintain a stable neurologic state will improve  Outcome: Ongoing     Problem:  Activity:  Goal: Ability to tolerate increased activity will improve  Description: Ability to tolerate increased activity will improve  Outcome: Ongoing  Goal: Risk for activity intolerance will decrease  Description: Risk for activity intolerance will decrease  Outcome: Ongoing  Goal: Expression of feelings of increased energy will increase  Description: Expression of feelings of increased energy will increase  Outcome: Ongoing  Goal: Amount of time patient spends in regular exercise will increase  Outcome: Ongoing     Problem: Cardiac:  Goal: Hemodynamic stability will improve  Description: Hemodynamic stability will improve  Outcome: Ongoing  Goal: Complications related to the disease process, condition or treatment will be avoided or minimized  Description: Complications related to the disease process, condition or treatment will be avoided or minimized  Outcome: Ongoing  Goal: Cerebral tissue perfusion will improve  Description: Cerebral tissue perfusion will improve  Outcome: Ongoing  Goal: Ability to maintain an adequate cardiac output will improve  Description: Ability to maintain an adequate cardiac output will improve  Outcome: Ongoing     Problem: Coping:  Goal: Ability to identify and develop effective coping behavior will improve  Description: Ability to identify and develop effective coping behavior will improve  Outcome: Ongoing  Goal: Ability to adjust to condition or change in health will improve  Description: Ability to adjust to condition or change in health will improve  Outcome: Ongoing  Goal: Level of anxiety will decrease  Description: Level of anxiety will decrease  Outcome: Ongoing  Goal: General experience of comfort will improve  Outcome: Ongoing

## 2021-10-29 NOTE — PROGRESS NOTES
Orthopedic Surgery Progress Note    Ludin   10/29/2021      Subjective:     No acute events overnight. Pain stable, reasonably controlled. Denies any fever, chills or additional joint/extremity pain. Denies chest pain or shortness of breath. Objective:     Patient Vitals for the past 24 hrs:   BP Temp Temp src Pulse Resp SpO2   10/29/21 0632 (!) 150/88 96.1 °F (35.6 °C) Temporal 78 16 97 %   10/29/21 0031 (!) 154/94 96.2 °F (35.7 °C) Temporal 73 16 96 %   10/28/21 1815 (!) 146/81 97.4 °F (36.3 °C) -- 84 16 97 %   10/28/21 1230 (!) 154/85 97.1 °F (36.2 °C) -- 78 18 99 %     General: Asleep but arousable  Respiratory: Nonlabored   Cardiovascular: Regular rate  Left hand: Diffuse edema to the left middle finger, stable.  Erythema and discoloration appear stable, maceration to the skin overlying the dorsal aspect of the left middle finger DIP joint, small amount of purulent drainage noted dorsally, unable to express additional drainage on manipulation.  Active finger flexion and extension are intact with minimal discomfort, stable.  Gross sensation is intact but decreased to the left middle finger.  Capillary refill is sluggish to the left middle finger.          Data Review:  Recent Labs     10/28/21  0127 10/29/21  0234   HGB 11.3* 11.3*     Recent Labs     10/29/21  0234   *   K 5.3*   CREATININE 1.0     Repeat blood cultures show no growth to date. Assessment:     59 y/o M with L middle finger cellulitis/superficial abscess left long finger versus deep soft tissue infection, bacteremia, significant medical history for atrial fibrillation on anticoagulation, type 2 diabetes mellitus on insulin (hemoglobin A1c 7.2), history of prior MRSA infection, hypertension, hyperlipidemia, obesity    Plan:   1.  Left middle finger infection:  Clinically, finger appears stable today. MRI completed yesterday with questionable findings suggestive of distal phalangeal osteomyelitis.   Still low suspicion for pyogenic flexor tenosynovitis clinically. Drainage continues to improve. Recommend continued dilute Betadine soaks and antibiotics. 2. Elevate LUE   3. Activity as tolerated  4. Dispo:  Okay for discharge from orthopedic standpoint with close clinical follow-up.   Discussed with him today that he should continue with dilute Betadine soaks at home twice daily and follow-up in my office on Monday of next week.       Electronically signed by Sofiya Castellon MD on 10/29/2021 at 7:29 AM

## 2021-10-30 LAB
ALBUMIN SERPL-MCNC: 3.3 G/DL (ref 3.5–5.2)
ALP BLD-CCNC: 152 U/L (ref 40–130)
ALT SERPL-CCNC: 29 U/L (ref 5–41)
ANION GAP SERPL CALCULATED.3IONS-SCNC: 7 MMOL/L (ref 7–19)
AST SERPL-CCNC: 22 U/L (ref 5–40)
BASOPHILS ABSOLUTE: 0 K/UL (ref 0–0.2)
BASOPHILS RELATIVE PERCENT: 0.4 % (ref 0–1)
BILIRUB SERPL-MCNC: 0.3 MG/DL (ref 0.2–1.2)
BUN BLDV-MCNC: 35 MG/DL (ref 6–20)
CALCIUM SERPL-MCNC: 8.7 MG/DL (ref 8.6–10)
CHLORIDE BLD-SCNC: 99 MMOL/L (ref 98–111)
CO2: 29 MMOL/L (ref 22–29)
CREAT SERPL-MCNC: 0.9 MG/DL (ref 0.5–1.2)
EOSINOPHILS ABSOLUTE: 0.1 K/UL (ref 0–0.6)
EOSINOPHILS RELATIVE PERCENT: 0.6 % (ref 0–5)
GFR AFRICAN AMERICAN: >59
GFR NON-AFRICAN AMERICAN: >60
GLUCOSE BLD-MCNC: 229 MG/DL (ref 70–99)
GLUCOSE BLD-MCNC: 231 MG/DL (ref 70–99)
GLUCOSE BLD-MCNC: 238 MG/DL (ref 70–99)
GLUCOSE BLD-MCNC: 241 MG/DL (ref 70–99)
GLUCOSE BLD-MCNC: 305 MG/DL (ref 74–109)
HCT VFR BLD CALC: 30.8 % (ref 42–52)
HEMOGLOBIN: 10.3 G/DL (ref 14–18)
IMMATURE GRANULOCYTES #: 0.1 K/UL
INR BLD: 4.06 (ref 0.88–1.18)
LYMPHOCYTES ABSOLUTE: 1.9 K/UL (ref 1.1–4.5)
LYMPHOCYTES RELATIVE PERCENT: 17.6 % (ref 20–40)
MCH RBC QN AUTO: 32.5 PG (ref 27–31)
MCHC RBC AUTO-ENTMCNC: 33.4 G/DL (ref 33–37)
MCV RBC AUTO: 97.2 FL (ref 80–94)
MONOCYTES ABSOLUTE: 1 K/UL (ref 0–0.9)
MONOCYTES RELATIVE PERCENT: 9.1 % (ref 0–10)
NEUTROPHILS ABSOLUTE: 7.8 K/UL (ref 1.5–7.5)
NEUTROPHILS RELATIVE PERCENT: 71.7 % (ref 50–65)
PDW BLD-RTO: 11.8 % (ref 11.5–14.5)
PERFORMED ON: ABNORMAL
PLATELET # BLD: 338 K/UL (ref 130–400)
PMV BLD AUTO: 10.4 FL (ref 9.4–12.4)
POTASSIUM REFLEX MAGNESIUM: 4.2 MMOL/L (ref 3.5–5)
PROTHROMBIN TIME: 38.4 SEC (ref 12–14.6)
RBC # BLD: 3.17 M/UL (ref 4.7–6.1)
SODIUM BLD-SCNC: 135 MMOL/L (ref 136–145)
TOTAL PROTEIN: 5.8 G/DL (ref 6.6–8.7)
VANCOMYCIN TROUGH: 18.6 UG/ML (ref 10–20)
WBC # BLD: 10.9 K/UL (ref 4.8–10.8)

## 2021-10-30 PROCEDURE — 6370000000 HC RX 637 (ALT 250 FOR IP): Performed by: INTERNAL MEDICINE

## 2021-10-30 PROCEDURE — 85025 COMPLETE CBC W/AUTO DIFF WBC: CPT

## 2021-10-30 PROCEDURE — 36415 COLL VENOUS BLD VENIPUNCTURE: CPT

## 2021-10-30 PROCEDURE — 80202 ASSAY OF VANCOMYCIN: CPT

## 2021-10-30 PROCEDURE — 2580000003 HC RX 258: Performed by: INTERNAL MEDICINE

## 2021-10-30 PROCEDURE — 1210000000 HC MED SURG R&B

## 2021-10-30 PROCEDURE — 6360000002 HC RX W HCPCS: Performed by: NURSE PRACTITIONER

## 2021-10-30 PROCEDURE — 80053 COMPREHEN METABOLIC PANEL: CPT

## 2021-10-30 PROCEDURE — 6370000000 HC RX 637 (ALT 250 FOR IP): Performed by: NURSE PRACTITIONER

## 2021-10-30 PROCEDURE — 2580000003 HC RX 258: Performed by: NURSE PRACTITIONER

## 2021-10-30 PROCEDURE — 36592 COLLECT BLOOD FROM PICC: CPT

## 2021-10-30 PROCEDURE — 82947 ASSAY GLUCOSE BLOOD QUANT: CPT

## 2021-10-30 PROCEDURE — 85610 PROTHROMBIN TIME: CPT

## 2021-10-30 RX ADMIN — INSULIN LISPRO 9 UNITS: 100 INJECTION, SOLUTION INTRAVENOUS; SUBCUTANEOUS at 13:03

## 2021-10-30 RX ADMIN — METOPROLOL TARTRATE 25 MG: 25 TABLET, FILM COATED ORAL at 08:23

## 2021-10-30 RX ADMIN — LOSARTAN POTASSIUM 100 MG: 50 TABLET, FILM COATED ORAL at 08:23

## 2021-10-30 RX ADMIN — INSULIN LISPRO 2 UNITS: 100 INJECTION, SOLUTION INTRAVENOUS; SUBCUTANEOUS at 08:24

## 2021-10-30 RX ADMIN — INSULIN LISPRO 8 UNITS: 100 INJECTION, SOLUTION INTRAVENOUS; SUBCUTANEOUS at 08:24

## 2021-10-30 RX ADMIN — INSULIN GLARGINE 30 UNITS: 100 INJECTION, SOLUTION SUBCUTANEOUS at 21:31

## 2021-10-30 RX ADMIN — SODIUM CHLORIDE, PRESERVATIVE FREE 10 ML: 5 INJECTION INTRAVENOUS at 21:32

## 2021-10-30 RX ADMIN — INSULIN LISPRO 8 UNITS: 100 INJECTION, SOLUTION INTRAVENOUS; SUBCUTANEOUS at 13:03

## 2021-10-30 RX ADMIN — VANCOMYCIN HYDROCHLORIDE 1000 MG: 1 INJECTION, POWDER, LYOPHILIZED, FOR SOLUTION INTRAVENOUS at 18:33

## 2021-10-30 RX ADMIN — INSULIN LISPRO 9 UNITS: 100 INJECTION, SOLUTION INTRAVENOUS; SUBCUTANEOUS at 18:33

## 2021-10-30 RX ADMIN — SODIUM CHLORIDE, PRESERVATIVE FREE 10 ML: 5 INJECTION INTRAVENOUS at 08:23

## 2021-10-30 RX ADMIN — VANCOMYCIN HYDROCHLORIDE 1000 MG: 1 INJECTION, POWDER, LYOPHILIZED, FOR SOLUTION INTRAVENOUS at 06:57

## 2021-10-30 RX ADMIN — ATORVASTATIN CALCIUM 20 MG: 20 TABLET, FILM COATED ORAL at 21:31

## 2021-10-30 RX ADMIN — TAMSULOSIN HYDROCHLORIDE 0.8 MG: 0.4 CAPSULE ORAL at 08:23

## 2021-10-30 RX ADMIN — INSULIN LISPRO 8 UNITS: 100 INJECTION, SOLUTION INTRAVENOUS; SUBCUTANEOUS at 18:33

## 2021-10-30 RX ADMIN — METOPROLOL TARTRATE 25 MG: 25 TABLET, FILM COATED ORAL at 21:31

## 2021-10-30 RX ADMIN — INSULIN LISPRO 3 UNITS: 100 INJECTION, SOLUTION INTRAVENOUS; SUBCUTANEOUS at 21:31

## 2021-10-30 NOTE — PLAN OF CARE
Problem: Falls - Risk of:  Goal: Will remain free from falls  Description: Will remain free from falls  10/30/2021 0025 by Aubrey Olson RN  Outcome: Ongoing  10/29/2021 1743 by Ubaldo Rodriguez RN  Outcome: Ongoing  Goal: Absence of physical injury  Description: Absence of physical injury  10/30/2021 0025 by Aubrey Olson RN  Outcome: Ongoing  10/29/2021 1743 by Ubaldo Rodriguez RN  Outcome: Ongoing     Problem: Discharge Planning:  Goal: Discharged to appropriate level of care  Description: Discharged to appropriate level of care  10/30/2021 0025 by Aubrey Olson RN  Outcome: Ongoing  10/29/2021 1743 by Ubaldo Rodriguez RN  Outcome: Ongoing     Problem:  Body Temperature - Imbalanced:  Goal: Ability to maintain a body temperature in the normal range will improve  Description: Ability to maintain a body temperature in the normal range will improve  10/30/2021 0025 by Aubrey Olson RN  Outcome: Ongoing  10/29/2021 1743 by Ubaldo Rodriguez RN  Outcome: Ongoing     Problem: Mobility - Impaired:  Goal: Mobility will improve to maximum level  Description: Mobility will improve to maximum level  10/30/2021 0025 by Aubrey Olson RN  Outcome: Ongoing  10/29/2021 1743 by Ubaldo Rodriguez RN  Outcome: Ongoing     Problem: Skin Integrity - Impaired:  Goal: Will show no infection signs and symptoms  Description: Will show no infection signs and symptoms  10/30/2021 0025 by Aubrey Olson RN  Outcome: Ongoing  10/29/2021 1743 by Ubaldo Rodriguez RN  Outcome: Ongoing  Goal: Absence of new skin breakdown  Description: Absence of new skin breakdown  10/30/2021 0025 by Aubrey Olson RN  Outcome: Ongoing  10/29/2021 1743 by Ubaldo Rodriguez RN  Outcome: Ongoing     Problem: Serum Glucose Level - Abnormal:  Goal: Ability to maintain appropriate glucose levels will improve  Description: Ability to maintain appropriate glucose levels will improve  10/30/2021 0025 by Beronica Camacho Declan Obando RN  Outcome: Ongoing  10/29/2021 1743 by Hipolito Castillo RN  Outcome: Ongoing     Problem: Sensory Perception - Impaired:  Goal: Ability to maintain a stable neurologic state will improve  Description: Ability to maintain a stable neurologic state will improve  10/30/2021 0025 by Dian Post RN  Outcome: Ongoing  10/29/2021 1743 by Hipolito Castillo RN  Outcome: Ongoing     Problem:  Activity:  Goal: Ability to tolerate increased activity will improve  Description: Ability to tolerate increased activity will improve  10/30/2021 0025 by Dian Post RN  Outcome: Ongoing  10/29/2021 1743 by Hipolito Castillo RN  Outcome: Ongoing  Goal: Risk for activity intolerance will decrease  Description: Risk for activity intolerance will decrease  10/30/2021 0025 by Dian Post RN  Outcome: Ongoing  10/29/2021 1743 by Hipolito Castillo RN  Outcome: Ongoing  Goal: Expression of feelings of increased energy will increase  Description: Expression of feelings of increased energy will increase  10/30/2021 0025 by Dian Post RN  Outcome: Ongoing  10/29/2021 1743 by Hipolito Castillo RN  Outcome: Ongoing  Goal: Amount of time patient spends in regular exercise will increase  10/30/2021 0025 by Dian Post RN  Outcome: Ongoing  10/29/2021 1743 by Hipolito Castillo RN  Outcome: Ongoing     Problem: Cardiac:  Goal: Hemodynamic stability will improve  Description: Hemodynamic stability will improve  10/30/2021 0025 by Dian Post RN  Outcome: Ongoing  10/29/2021 1743 by Hipolito Castillo RN  Outcome: Ongoing  Goal: Complications related to the disease process, condition or treatment will be avoided or minimized  Description: Complications related to the disease process, condition or treatment will be avoided or minimized  10/30/2021 0025 by Dian Post RN  Outcome: Ongoing  10/29/2021 1743 by Hipolito Castillo RN  Outcome: Ongoing  Goal: Cerebral tissue perfusion will improve  Description: Cerebral tissue perfusion will improve  10/30/2021 0025 by Ginger Chacon RN  Outcome: Ongoing  10/29/2021 1743 by Tameka Rojo RN  Outcome: Ongoing  Goal: Ability to maintain an adequate cardiac output will improve  Description: Ability to maintain an adequate cardiac output will improve  10/30/2021 0025 by Ginger Chacon RN  Outcome: Ongoing  10/29/2021 1743 by Tameka Rojo RN  Outcome: Ongoing     Problem: Coping:  Goal: Ability to identify and develop effective coping behavior will improve  Description: Ability to identify and develop effective coping behavior will improve  10/30/2021 0025 by Ginger Chacon RN  Outcome: Ongoing  10/29/2021 1743 by Tameka Rojo RN  Outcome: Ongoing  Goal: Ability to adjust to condition or change in health will improve  Description: Ability to adjust to condition or change in health will improve  10/30/2021 0025 by Ginger Chacon RN  Outcome: Ongoing  10/29/2021 1743 by Tameak Rojo RN  Outcome: Ongoing  Goal: Level of anxiety will decrease  Description: Level of anxiety will decrease  10/30/2021 0025 by Ginger Chacon RN  Outcome: Ongoing  10/29/2021 1743 by Tameka Rojo RN  Outcome: Ongoing  Goal: General experience of comfort will improve  10/30/2021 0025 by Ginger Chacon RN  Outcome: Ongoing  10/29/2021 1743 by Tameka Rojo RN  Outcome: Ongoing     Problem: Health Behavior:  Goal: Identification of resources available to assist in meeting health care needs will improve  Description: Identification of resources available to assist in meeting health care needs will improve  10/30/2021 0025 by Ginger Chacon RN  Outcome: Ongoing  10/29/2021 1743 by Tameka Rojo RN  Outcome: Ongoing  Goal: Ability to identify and utilize available resources and services will improve  Description: Ability to identify and utilize available resources and services will improve  10/30/2021 0025 by Leonid Lopez RN  Outcome: Ongoing  10/29/2021 1743 by Angela Coates RN  Outcome: Ongoing  Goal: Ability to manage health-related needs will improve  Description: Ability to manage health-related needs will improve  10/30/2021 0025 by Leonid Lopez RN  Outcome: Ongoing  10/29/2021 1743 by Angela Coates RN  Outcome: Ongoing     Problem: Nutritional:  Goal: Ability to identify appropriate dietary choices will improve  Description: Ability to identify appropriate dietary choices will improve  10/30/2021 0025 by Leonid Lopez RN  Outcome: Ongoing  10/29/2021 1743 by Angela Coates RN  Outcome: Ongoing  Goal: Maintenance of adequate nutrition will improve  Description: Maintenance of adequate nutrition will improve  10/30/2021 0025 by Leonid Lopez RN  Outcome: Ongoing  10/29/2021 1743 by Angela Coates RN  Outcome: Ongoing  Goal: Progress toward achieving an optimal weight will improve  Description: Progress toward achieving an optimal weight will improve  10/30/2021 0025 by Leonid Lopez RN  Outcome: Ongoing  10/29/2021 1743 by Angela Coates RN  Outcome: Ongoing  Goal: Adjustment of eating patterns to appropriate times will improve  Description: Adjustment of eating patterns to appropriate times will improve  10/30/2021 0025 by Leonid Lopez RN  Outcome: Ongoing  10/29/2021 1743 by Angela Coates RN  Outcome: Ongoing  Goal: Ability to make healthy dietary choices will improve  Description: Ability to make healthy dietary choices will improve  10/30/2021 0025 by Leonid Lopez RN  Outcome: Ongoing  10/29/2021 1743 by Angela Coates RN  Outcome: Ongoing  Goal: Nutritional status will improve  10/30/2021 0025 by Leonid Lopez RN  Outcome: Ongoing  10/29/2021 1743 by Angela Coates RN  Outcome: Ongoing     Problem: ABCDS Injury Assessment  Goal: Absence of physical injury  10/30/2021 0025 by Gaby Austin JACOB LAWTON  Outcome: Ongoing  10/29/2021 1743 by Albin Ely RN  Outcome: Ongoing     Problem: Fluid Volume:  Goal: Ability to maintain a balanced intake and output will improve  Description: Ability to maintain a balanced intake and output will improve  10/30/2021 0025 by Leida Bob RN  Outcome: Ongoing  10/29/2021 1743 by Albin Ely RN  Outcome: Ongoing     Problem: Metabolic:  Goal: Ability to maintain appropriate glucose levels will improve  Description: Ability to maintain appropriate glucose levels will improve  10/30/2021 0025 by Leida Bob RN  Outcome: Ongoing  10/29/2021 1743 by Albin Ely RN  Outcome: Ongoing     Problem: Physical Regulation:  Goal: Complications related to the disease process, condition or treatment will be avoided or minimized  Description: Complications related to the disease process, condition or treatment will be avoided or minimized  10/30/2021 0025 by Leida Bob RN  Outcome: Ongoing  10/29/2021 1743 by Albin Ely RN  Outcome: Ongoing  Goal: Diagnostic test results will improve  Description: Diagnostic test results will improve  10/30/2021 0025 by Leida Bob RN  Outcome: Ongoing  10/29/2021 1743 by Albin Ely RN  Outcome: Ongoing  Goal: Prevent transmision of infection  Description: Prevent transmision of infection  10/30/2021 0025 by Leida Bob RN  Outcome: Ongoing  10/29/2021 1743 by Albin Ely RN  Outcome: Ongoing  Goal: Will remain free from infection  Description: Will remain free from infection  10/30/2021 0025 by Leida Bob RN  Outcome: Ongoing  10/29/2021 1743 by Albin Ely RN  Outcome: Ongoing  Goal: Ability to maintain vital signs within normal range will improve  Description: Ability to maintain vital signs within normal range will improve  10/30/2021 0025 by Leida Bob RN  Outcome: Ongoing  10/29/2021 1743 by Albin Ely RN  Outcome: will improve  10/29/2021 1743 by Mauri Alvarez RN  Outcome: Ongoing     Problem: Respiratory:  Goal: Ability to maintain normal respiratory secretions will improve  Description: Ability to maintain normal respiratory secretions will improve  10/29/2021 1743 by Mauri Alvarez RN  Outcome: Ongoing     Problem: SAFETY  Goal: Free from accidental physical injury  10/29/2021 1743 by Mauri Alvarez RN  Outcome: Ongoing  Goal: Free from intentional harm  10/29/2021 1743 by Mauri Alvarez RN  Outcome: Ongoing     Problem: DAILY CARE  Goal: Daily care needs are met  10/29/2021 1743 by Mauri Alvarez RN  Outcome: Ongoing     Problem: PAIN  Goal: Patient's pain/discomfort is manageable  10/29/2021 1743 by Mauri Alvarez RN  Outcome: Ongoing     Problem: SKIN INTEGRITY  Goal: Skin integrity is maintained or improved  10/29/2021 1743 by Mauri Alvarez RN  Outcome: Ongoing     Problem: KNOWLEDGE DEFICIT  Goal: Patient/S.O. demonstrates understanding of disease process, treatment plan, medications, and discharge instructions.   10/29/2021 1743 by Mauri Alvarez RN  Outcome: Ongoing     Problem: DISCHARGE BARRIERS  Goal: Patient's continuum of care needs are met  10/29/2021 1743 by Mauri Alvarez RN  Outcome: Ongoing     Problem: Bleeding:  Goal: Will show no signs and symptoms of excessive bleeding  Description: Will show no signs and symptoms of excessive bleeding  10/29/2021 1743 by Mauri Alvarez RN  Outcome: Ongoing     Problem: PAIN  Goal: Patient's pain/discomfort is manageable  10/30/2021 0026 by Hermelinda Da Silva RN  Outcome: Ongoing  10/29/2021 1743 by Mauri Alvarez RN  Outcome: Ongoing     Problem: SKIN INTEGRITY  Goal: Skin integrity is maintained or improved  10/30/2021 0026 by Hermelinda Da Silva RN  Outcome: Ongoing  10/29/2021 1743 by Mauri Alvarez RN  Outcome: Ongoing     Problem: DISCHARGE BARRIERS  Goal: Patient's continuum of care needs are met  10/30/2021 0026 by Lizette Franklin RN  Outcome: Ongoing  10/29/2021 1743 by Melisa Hughes RN  Outcome: Ongoing     Problem: Bleeding:  Goal: Will show no signs and symptoms of excessive bleeding  Description: Will show no signs and symptoms of excessive bleeding  10/30/2021 0026 by Lizette Franklin RN  Outcome: Ongoing  10/29/2021 1743 by Melisa Hughes RN  Outcome: Ongoing     Problem: DISCHARGE BARRIERS  Goal: Patient's continuum of care needs are met  10/30/2021 0026 by Lizette Franklin RN  Outcome: Ongoing  10/29/2021 1743 by Melisa Hughes RN  Outcome: Ongoing

## 2021-10-30 NOTE — PLAN OF CARE
Problem: Falls - Risk of:  Goal: Will remain free from falls  Description: Will remain free from falls  Outcome: Ongoing  Goal: Absence of physical injury  Description: Absence of physical injury  Outcome: Ongoing     Problem: Discharge Planning:  Goal: Discharged to appropriate level of care  Description: Discharged to appropriate level of care  Outcome: Ongoing     Problem: Skin Integrity - Impaired:  Goal: Will show no infection signs and symptoms  Description: Will show no infection signs and symptoms  Outcome: Ongoing  Goal: Absence of new skin breakdown  Description: Absence of new skin breakdown  Outcome: Ongoing     Problem: Nutritional:  Goal: Ability to identify appropriate dietary choices will improve  Description: Ability to identify appropriate dietary choices will improve  Outcome: Ongoing  Goal: Maintenance of adequate nutrition will improve  Description: Maintenance of adequate nutrition will improve  Outcome: Ongoing  Goal: Progress toward achieving an optimal weight will improve  Description: Progress toward achieving an optimal weight will improve  Outcome: Ongoing  Goal: Adjustment of eating patterns to appropriate times will improve  Description: Adjustment of eating patterns to appropriate times will improve  Outcome: Ongoing  Goal: Ability to make healthy dietary choices will improve  Description: Ability to make healthy dietary choices will improve  Outcome: Ongoing  Goal: Nutritional status will improve  Outcome: Ongoing

## 2021-10-30 NOTE — PLAN OF CARE
Problem: PAIN  Goal: Patient's pain/discomfort is manageable  10/30/2021 0026 by Ulysses Jacques RN  Outcome: Ongoing  10/29/2021 1743 by Xiomara Correa RN  Outcome: Ongoing     Problem: KNOWLEDGE DEFICIT  Goal: Patient/S.O. demonstrates understanding of disease process, treatment plan, medications, and discharge instructions.   10/30/2021 0026 by Ulysses Jacques RN  Outcome: Ongoing  10/29/2021 1743 by Xiomara Correa RN  Outcome: Ongoing     Problem: DISCHARGE BARRIERS  Goal: Patient's continuum of care needs are met  10/30/2021 0026 by Ulysses Jacques RN  Outcome: Ongoing  10/29/2021 1743 by Xiomara Corrae RN  Outcome: Ongoing

## 2021-10-30 NOTE — PROGRESS NOTES
Orthopedic Surgery Progress Note    Santi Nash  10/30/2021      Subjective:     No acute events overnight. Feels middle finger is stable. Denies chest pain, shortness of breath, fever, chills. Objective:     Patient Vitals for the past 24 hrs:   BP Temp Temp src Pulse Resp SpO2   10/30/21 0556 (!) 144/92 97.2 °F (36.2 °C) Temporal 62 16 98 %   10/29/21 2340 138/67 96.4 °F (35.8 °C) Temporal 60 16 99 %   10/29/21 1948 (!) 140/86 96.8 °F (36 °C) Temporal 94 18 98 %   10/29/21 1245 (!) 149/59 97.2 °F (36.2 °C) -- 64 18 98 %       General: Asleep but arousable  Respiratory: Nonlabored   Cardiovascular: Regular rate  Left hand: Diffuse edema to the left middle finger, stable.  Erythema appear more bright red today, maceration to the skin overlying the dorsal aspect of the left middle finger DIP joint, small amount of purulent drainage noted, able to express small amount of additional drainage on manipulation.  Appears to be purulence beneath base of nail plate. Active finger flexion and extension are intact with minimal discomfort, stable.  Gross sensation is intact but decreased to the left middle finger. Capillary refill is sluggish to the left middle finger.             Data Review:  Recent Labs     10/29/21  0234 10/30/21  0150   HGB 11.3* 10.3*     Recent Labs     10/30/21  0150   *   K 4.2   CREATININE 0.9       Assessment:     61 y/o M with L middle finger cellulitis/superficial abscess left long finger versus deep soft tissue infection, bacteremia, significant medical history for atrial fibrillation on anticoagulation, type 2 diabetes mellitus on insulin (hemoglobin A1c 7.2), history of prior MRSA infection, hypertension, hyperlipidemia, obesity    Plan:   1.  Left middle finger infection:  Clinically, finger appears worse today with purulence beneath the nail plate. Had lengthy discussion with patient today- finger has remained stable past couple of day but appear worse today.  I discussed options consisting of continued conservative management versus operative intervention. Due to clinical progression, I am recommending operative intervention with excisional debridement, irrigation and possible partial amputation of left middle finger. Risks and benefits were discussed- he has elected to proceed with surgery. Will plan for operative intervention tomorrow morning. 2. Elevate LUE   3. Activity as tolerated  4.  Dispo:  Plan for excisional debridement, irrigation and possible amputation tomorrow.          Electronically signed by Francisca Bird MD on 10/30/2021 at 10:48 AM

## 2021-10-30 NOTE — PROGRESS NOTES
Pharmacy Vancomycin Consult     Vancomycin Day: 6  Current Dosing: Vancomycin 1000 mg IV every 12 hours    Temp max:  97.2    Recent Labs     10/29/21  0234 10/30/21  0150   BUN 32* 35*       Recent Labs     10/29/21  0234 10/30/21  0150   CREATININE 1.0 0.9       Recent Labs     10/29/21  0234 10/30/21  0150   WBC 12.9* 10.9*         Intake/Output Summary (Last 24 hours) at 10/30/2021 0717  Last data filed at 10/30/2021 0600  Gross per 24 hour   Intake 1203.84 ml   Output 2450 ml   Net -1246.16 ml     Culture Date Source Results   10/25/21 Blood x 2 MRSA   10/25/21 Wound - finger MRSA / Strep agalactiae (Beta Strep Group B)   10/27/21 Blood x 2  No growth   10/28/21 Urine Sent         Ht Readings from Last 1 Encounters:   10/26/21 5' 10\" (1.778 m)        Wt Readings from Last 1 Encounters:   10/26/21 233 lb (105.7 kg)         Body mass index is 33.43 kg/m². Estimated Creatinine Clearance: 110 mL/min (based on SCr of 0.9 mg/dL). Trough: 18.6    Assessment/Plan: Trough level drawn a second time this morning (on time) by the nurse. Therapeutic trough. Continue Vancomycin 1000 mg IV every 12 hours.     Electronically signed by Dana Maki [facility] on 10/30/2021 at 7:17 AM

## 2021-10-30 NOTE — PROGRESS NOTES
Clinical Pharmacy Note    Warfarin consult follow-up    Recent Labs     10/30/21  1005   INR 4.06*     Recent Labs     10/28/21  0127 10/29/21  0234 10/30/21  0150   HGB 11.3* 11.3* 10.3*   HCT 33.0* 33.7* 30.8*    341 338       Significant Drug-Drug Interactions:  New warfarin drug-drug interactions: none  Discontinued drug-drug interactions: none    Date INR Warfarin Dose   10/25/21 1.72 15 mg    10/26/21 2.10  10 mg     10/27/21 2.63  7.5 mg    10/28/21  3.19  5 mg    10/29/21  3.54   Hold    10/30/21  4.06  Hold                      Notes:            HOLD Coumadin today  Electronically signed by Justyn Spears [facility] on 10/30/2021 at 11:43 AM           Daily PT/INR until stable within therapeutic range.      Electronically signed by Justyn Spears [facility] on 10/30/2021 at 11:37 AM

## 2021-10-30 NOTE — PROGRESS NOTES
[facility] Progress Note    Patient:  Gustavo Blood  YOB: 1965  Date of Service: 10/30/2021  MRN: [id number]   Acct: [de-identified]   Primary Care Physician: Claudette Dc MD  Advance Directive: Full Code  Admit Date: 10/25/2021       Hospital Day: 5        CHIEF COMPLAINT:     Chief Complaint   Patient presents with    Cellulitis     lef middle finger swelling        10/30/2021 11:30 AM  Subjective / Interval History:   10/30/2021  No acute changes or acute overnight event reported. Patient sitting comfortably in c bed, having breakfast, in no acute distress. Endorses overall improvement. Denies any active chest pain at this time. Left middle finger pain controlled. PICC line placed yesterday, for planned prolonged antibiotic. Patient denies any other acute complaints or distress at this time. 10/29/2021  Patient seen and examined this AM.  Doing well. No new complaints. No acute changes or acute overnight event reported. Laying comfortably in bed in no acute distress. Denies any acute complaints or distress at this time. 10/28/2021  Patient seen and examined this AM.  Doing well. No new complaints. No acute changes or acute overnight event reported. Left middle finger swelling and redness improved. Patient reports some urinary retention. Denies any acute complaints or distress at this time. 10/27/2021   Patient seen and examined. Doing well. No new complaints. Left middle finger pain fairly controlled. Denies any fever or chills. Laying comfortably in bed no acute distress. No acute changes or acute overnight events reported. 10/26/2021  Patient seen and examined this AM.  Doing well. No new complaints. No acute changes or acute overnight event reported. Left middle finger pain fairly controlled. Laying comfortably in bed in no apparent acute distress. Denies any other acute complaints or distress at this time.   No chest pain, no shortness of breath, no dizziness, lightheadedness or palpitations. Review of Systems:   Review of Systems  ROS: 14 point review of systems is negative except as specifically addressed above. ADULT DIET;  Regular; 4 carb choices (60 gm/meal)    Intake/Output Summary (Last 24 hours) at 10/30/2021 1130  Last data filed at 10/30/2021 0746  Gross per 24 hour   Intake 524.36 ml   Output 2350 ml   Net -1825.64 ml       Medications:   dextrose      sodium chloride       Current Facility-Administered Medications   Medication Dose Route Frequency Provider Last Rate Last Admin    insulin lispro (HUMALOG) injection vial 0-18 Units  0-18 Units SubCUTAneous TID WC Rajesh Kennedy MD        insulin lispro (HUMALOG) injection vial 0-9 Units  0-9 Units SubCUTAneous Nightly Rajesh Kennedy MD        lidocaine PF 1 % injection 5 mL  5 mL IntraDERmal Once Rajesh Kennedy MD        sodium chloride flush 0.9 % injection 5-40 mL  5-40 mL IntraVENous 2 times per day Rajesh Kennedy MD   10 mL at 10/30/21 1740    perflutren lipid microspheres (DEFINITY) injection 1.65 mg  1.5 mL IntraVENous ONCE PRN Felicia Tyson MD   1.65 mg at 10/29/21 1430    vancomycin (VANCOCIN) 1,000 mg in sodium chloride 0.9 % 250 mL IVPB  1,000 mg IntraVENous Q12H Lylia [place], APRN - CNP   Stopped at 10/30/21 1057    atorvastatin (LIPITOR) tablet 20 mg  20 mg Oral Nightly Lylia Pato, APRN - CNP   20 mg at 10/29/21 2136    losartan (COZAAR) tablet 100 mg  100 mg Oral Daily Lylia Pato, APRN - CNP   100 mg at 10/30/21 3300    metoprolol tartrate (LOPRESSOR) tablet 25 mg  25 mg Oral BID Lylia [place], APRN - CNP   25 mg at 10/30/21 0823    tamsulosin (FLOMAX) capsule 0.8 mg  0.8 mg Oral Daily Lylia Pato, APRN - CNP   0.8 mg at 10/30/21 7778    vitamin D (ERGOCALCIFEROL) capsule 50,000 Units  50,000 Units Oral Weekly Lylia Pato, APRN - CNP   50,000 Units at 10/29/21 0833    glucose (GLUTOSE) 40 % oral gel 15 g  15 g Oral PRN Nilo Player, APRN - CNP        dextrose 50 % IV solution  12.5 g IntraVENous PRN Nilo Player, APRN - CNP        glucagon (rDNA) injection 1 mg  1 mg IntraMUSCular PRN Nilo Player, APRN - CNP        dextrose 5 % solution  100 mL/hr IntraVENous PRN Nilo Player, APRN - CNP        sodium chloride flush 0.9 % injection 5-40 mL  5-40 mL IntraVENous 2 times per day Nilo Player, APRN - CNP   10 mL at 10/30/21 0823    sodium chloride flush 0.9 % injection 5-40 mL  5-40 mL IntraVENous PRN Nilo Player, APRN - CNP        0.9 % sodium chloride infusion  25 mL IntraVENous PRN Nilo Player, APRN - CNP        ondansetron (ZOFRAN-ODT) disintegrating tablet 4 mg  4 mg Oral Q8H PRN Nilo Player, APRN - CNP        Or    ondansetron (ZOFRAN) injection 4 mg  4 mg IntraVENous Q6H PRN Nilo Player, APRN - CNP        polyethylene glycol (GLYCOLAX) packet 17 g  17 g Oral Daily PRN Nilo Player, APRN - CNP        acetaminophen (TYLENOL) tablet 650 mg  650 mg Oral Q6H PRN Nilo Player, APRN - CNP        Or    acetaminophen (TYLENOL) suppository 650 mg  650 mg Rectal Q6H PRN Nilo Player, APRN - CNP        insulin lispro (HUMALOG) injection vial 8 Units  0.08 Units/kg SubCUTAneous TID  Nilo Hector, APRN - CNP   8 Units at 10/30/21 6153    vancomycin (VANCOCIN) intermittent dosing (placeholder)   Other RX Placeholder Nilo Player, APRN - CNP   Given at 10/25/21 1656    warfarin (COUMADIN) daily dosing (placeholder)   Other RX [facility] Nilo Player, APRN - CNP        insulin glargine (LANTUS) injection vial 30 Units  30 Units SubCUTAneous Nightly Nilo Player, APRN - CNP   30 Units at 10/29/21 2136         dextrose      sodium chloride        insulin lispro  0-18 Units SubCUTAneous TID     insulin lispro  0-9 Units SubCUTAneous Nightly    lidocaine 1 % injection  5 mL IntraDERmal Once    sodium chloride flush  5-40 mL IntraVENous 2 times per day    vancomycin  1,000 mg IntraVENous Q12H    atorvastatin  20 mg Oral Nightly    losartan  100 mg Oral Daily    metoprolol tartrate  25 mg Oral BID    tamsulosin  0.8 mg Oral Daily    vitamin D  50,000 Units Oral Weekly    sodium chloride flush  5-40 mL IntraVENous 2 times per day    insulin lispro  0.08 Units/kg SubCUTAneous TID WC    vancomycin (VANCOCIN) intermittent dosing (placeholder)   Other RX Placeholder    warfarin (COUMADIN) daily dosing (placeholder)   Other RX Placeholder    insulin glargine  30 Units SubCUTAneous Nightly     perflutren lipid microspheres, glucose, dextrose, glucagon (rDNA), dextrose, sodium chloride flush, sodium chloride, ondansetron **OR** ondansetron, polyethylene glycol, acetaminophen **OR** acetaminophen  ADULT DIET; Regular; 4 carb choices (60 gm/meal)       Labs:   CBC with DIFF:  Recent Labs     10/28/21  0127 10/29/21  0234 10/30/21  0150   WBC 9.2 12.9* 10.9*   RBC 3.50* 3.48* 3.17*   HGB 11.3* 11.3* 10.3*   HCT 33.0* 33.7* 30.8*   MCV 94.3* 96.8* 97.2*   MCH 32.3* 32.5* 32.5*   MCHC 34.2 33.5 33.4   RDW 11.9 11.8 11.8    341 338   MPV 10.6 10.7 10.4   NEUTOPHILPCT 90.8* 90.3* 71.7*   LYMPHOPCT 4.7* 4.8* 17.6*   MONOPCT 3.7 4.2 9.1   EOSRELPCT 0.0 0.0 0.6   BASOPCT 0.3 0.2 0.4   NEUTROABS 8.4* 11.7* 7.8*   LYMPHSABS 0.4* 0.6* 1.9   MONOSABS 0.30 0.50 1.00*   EOSABS 0.00 0.00 0.10   BASOSABS 0.00 0.00 0.00       CMP/BMP:  Recent Labs     10/28/21  0127 10/29/21  0234 10/30/21  0150   * 132* 135*   K 4.7 5.3* 4.2   CL 95* 95* 99   CO2 23 24 29   ANIONGAP 13 13 7   GLUCOSE 333* 432* 305*   BUN 21* 32* 35*   CREATININE 1.0 1.0 0.9   LABGLOM >60 >60 >60   CALCIUM 9.0 9.0 8.7   PROT 6.6 6.3* 5.8*   LABALBU 3.5 3.5 3.3*   BILITOT 0.6 0.4 0.3   ALKPHOS 195* 193* 152*   ALT 28 26 29   AST 25 21 22         CRP:    No results for input(s): CRP in the last 72 hours.   Sed Rate:    No results for input(s): SEDRATE in the last 72 hours. HgBA1c:  No components found for: HGBA1C  FLP:    Lab Results   Component Value Date    TRIG 104 09/27/2021    HDL 35 09/27/2021    LDLCALC 49 09/27/2021     TSH:    Lab Results   Component Value Date    TSH 1.520 09/27/2021     Troponin T:   No results for input(s): TROPONINI in the last 72 hours. Pro-BNP: No results for input(s): BNP in the last 72 hours. INR:   Recent Labs     10/28/21  0127 10/29/21  0234 10/30/21  1005   INR 3.19* 3.54* 4.06*     ABGs:   Lab Results   Component Value Date    PHART 7.290 03/25/2019    PO2ART 79.0 03/25/2019    KCF9TDF 20.0 03/25/2019     UA:  Recent Labs     10/28/21  1425   COLORU YELLOW   PHUR 5.5   WBCUA 1   RBCUA 1   BACTERIA NEGATIVE*   CLARITYU Clear   SPECGRAV 1.028   LEUKOCYTESUR Negative   UROBILINOGEN 1.0   BILIRUBINUR Negative   BLOODU Negative   GLUCOSEU =>1000         Culture Results:    No results for input(s): CXSURG in the last 720 hours. Blood Culture Recent:   Recent Labs     10/27/21  0939 10/25/21  1300   BC No Growth to date. Any change in status will be called. Bottle volume = 8 ml   Bottle volume = 9 ml  *  Isolated from Aerobic and Anaerobic bottle  CONTACT PRECAUTIONS INDICATED  *       No results for input(s): BC, BLOODCULT2, ORG in the last 72 hours. Cultures:   No results for input(s): CULTURE in the last 72 hours. No results for input(s): BC, Meena Pozoler in the last 72 hours.     Left middle finger wound culture is growing staph aureus MRSA and Strep agalactiae (Beta Strep Group B)    Staph aureus mrsa (1)  Antibiotic Interpretation ADRI Status   benzylpenicillin Resistant >=0.5 mcg/mL    clindamycin Sensitive 0.25 mcg/mL    erythromycin Sensitive <=0.25 mcg/mL    inducible clindamycin resistance  Neg mcg/mL    oxacillin Resistant >=4 mcg/mL    tetracycline Sensitive <=1 mcg/mL    trimethoprim-sulfamethoxazole Sensitive <=10 mcg/mL    vancomycin Sensitive 1 mcg/mL          No results for input(s): MG, PHOS in the last 72 hours. Recent Labs     10/28/21  0127 10/29/21  0234 10/30/21  0150   AST 25 21 22   ALT 28 26 29   BILITOT 0.6 0.4 0.3   ALKPHOS 195* 193* 152*         RAD:   XR FINGER LEFT (MIN 2 VIEWS)    Result Date: 10/25/2021  History: Penetrating injury with soft tissue swelling, concern for osteomyelitis Left middle finger: 3 views left middle finger obtained. There is diffuse soft tissue swelling with no radiopaque foreign body seen within the soft tissues. Vascular calcifications. There are arthritic changes of the DIP greater than PIP joints as well as the third MCP joint. No acute fracture or dislocation. No radiographic evidence of osteomyelitis. 1. Prominent soft tissue swelling with arthritic change. No acute osseous pathology. No radiographic evidence of osteomyelitis or acute osseous injury. No radiopaque foreign body seen within the soft tissues. Signed by Dr Lawyer Lowe    Result Date: 10/20/2021  Sylvie Prose Nuclear Stress Test Report Procedure date: 10/20/21 Indications: shortness of breath Procedure: Stress was performed with injection of 0.4 mg Lexiscan. Vital signs and EKG were monitored. Technetium-99 sestamibi was injected in divided doses, 9.83 mCi and 31.5 mCi respectively for rest and stress imaging. The patient was discharged in stable condition. Results: Patient had symptoms of dyspnea during infusion that resolved in recovery. Baseline EKG showed atrial fibrillation with nonspecific ST/T changes. During stress there were no significant EKG changes or rhythm changes, negative for ischemia. Baseline and peak blood pressures were 170/88, and 171/98 respectively. Baseline and peak heart rates were 78 and  102 respectively. EF:  53% The resting and stress perfusion images were compared and reviewed in both corrected and noncorrected formats.  There is a moderate size, moderately intense reversible defect noted of the anterior wall from the Vitals:   /80   Pulse 71   Temp 96.4 °F (35.8 °C) (Temporal)   Resp 18   Ht 5' 10\" (1.778 m)   Wt 233 lb (105.7 kg)   SpO2 99%   BMI 33.43 kg/m²       Patient Vitals for the past 24 hrs:   BP Temp Temp src Pulse Resp SpO2   10/30/21 1127 125/80 96.4 °F (35.8 °C) Temporal 71 18 99 %   10/30/21 0556 (!) 144/92 97.2 °F (36.2 °C) Temporal 62 16 98 %   10/29/21 2340 138/67 96.4 °F (35.8 °C) Temporal 60 16 99 %   10/29/21 1948 (!) 140/86 96.8 °F (36 °C) Temporal 94 18 98 %   10/29/21 1245 (!) 149/59 97.2 °F (36.2 °C) -- 64 18 98 %       24HR INTAKE/OUTPUT:      Intake/Output Summary (Last 24 hours) at 10/30/2021 1130  Last data filed at 10/30/2021 0746  Gross per 24 hour   Intake 524.36 ml   Output 2350 ml   Net -1825.64 ml       Physical Exam  Vitals and nursing note reviewed. Constitutional:       General: He is not in acute distress. Appearance: Normal appearance. He is not ill-appearing, toxic-appearing or diaphoretic. HENT:      Head: Normocephalic and atraumatic. Right Ear: External ear normal.      Left Ear: External ear normal.      Nose: Nose normal. No congestion or rhinorrhea. Mouth/Throat:      Mouth: Mucous membranes are moist.      Pharynx: Oropharynx is clear. No oropharyngeal exudate or posterior oropharyngeal erythema. Eyes:      General: No scleral icterus. Right eye: No discharge. Left eye: No discharge. Extraocular Movements: Extraocular movements intact. Conjunctiva/sclera: Conjunctivae normal.      Pupils: Pupils are equal, round, and reactive to light. Cardiovascular:      Rate and Rhythm: Normal rate. Rhythm irregular. Pulses: Normal pulses. Heart sounds: Normal heart sounds. No murmur heard. No friction rub. No gallop. Pulmonary:      Effort: Pulmonary effort is normal. No respiratory distress. Breath sounds: Normal breath sounds. No stridor. No wheezing, rhonchi or rales. Chest:      Chest wall: No tenderness. Abdominal:      General: Bowel sounds are normal. There is no distension. Palpations: Abdomen is soft. Tenderness: There is no abdominal tenderness. There is no guarding or rebound. Musculoskeletal:         General: Tenderness ( Left middle finger wound tender to palpation-pictures below) present. No swelling, deformity or signs of injury. Normal range of motion. Cervical back: Normal range of motion and neck supple. No rigidity or tenderness. No muscular tenderness. Right lower leg: No edema. Left lower leg: No edema. Skin:     General: Skin is warm and dry. Capillary Refill: Capillary refill takes less than 2 seconds. Coloration: Skin is not jaundiced or pale. Findings: No bruising, erythema, lesion or rash. Neurological:      General: No focal deficit present. Mental Status: He is alert and oriented to person, place, and time. Cranial Nerves: No cranial nerve deficit. Sensory: No sensory deficit. Motor: No weakness. Coordination: Coordination normal.   Psychiatric:         Mood and Affect: Mood normal.         Behavior: Behavior normal.         Thought Content:  Thought content normal.         Judgment: Judgment normal.               Assessment/plan:       Hospital Problems         Last Modified POA    * (Principal) Bacteremia due to methicillin resistant Staphylococcus aureus 10/27/2021 Yes    HTN (hypertension) 10/25/2021 Yes    Hyperlipidemia 10/25/2021 Yes    Type 2 diabetes mellitus with diabetic polyneuropathy, with long-term current use of insulin ([address] (Chronic) 10/25/2021 Yes    Overview Signed 2/21/2021  3:16 PM by Nile Araujo MD     Lab Results   Component Value Date    LABA1C 11.4 (H) 12/03/2020     No results found for: EAG         Permanent atrial fibrillation ([address] 10/25/2021 Yes    (HFpEF) heart failure with preserved ejection fraction ([address] 10/25/2021 Yes    Overview Signed 2/21/2021  3:12 PM by Nile Araujo MD     Structurally normal mitral valve. Mild mitral regurgitation is present. Mitral annular calcification is present. Structurally normal aortic valve. Tricuspid valve is structurally normal.   Mild tricuspid regurgitation. Normal size left atrium. Normal intact intra-atrial septum was noted with no evidence of   significant intra-atrial communications by color flow Doppler or by   agitated saline study. Normal left ventricular size with preserved LV function and an estimated   ejection fraction of approximately 55-60%. Moderate concentric left ventricular hypertrophy. No regional wall motion abnormalities. no         Obstructive sleep apnea 10/25/2021 Yes    Cellulitis 10/27/2021 Yes          Principal Problem:    Bacteremia due to methicillin resistant Staphylococcus aureus  Active Problems:    HTN (hypertension)    Hyperlipidemia    Type 2 diabetes mellitus with diabetic polyneuropathy, with long-term current use of insulin (HCC)    Permanent atrial fibrillation (HCC)    (HFpEF) heart failure with preserved ejection fraction ([address]    Obstructive sleep apnea    Cellulitis  Resolved Problems:    * No resolved hospital problems. *        Brief Summary  Mr. Goldstein, a 33-year-old male, history of HTN, HLD, atrial fibrillation, presenting to [facility] ED (10/25/2021), with complaints of right middle finger swelling, with purulent drainage. Patient reportedly cut his finger on a rusted nail on (10/22/2021), while working in his garage. Patient subsequently reportedly noted gradual redness and swelling, and now with purulent drainage from the wound. Patient initially saw his PCP outpatient, who subsequently recommended patient presented to the ED for further work-up and management    Initial work-up significant for;  Leukocytosis with a WBC of 12.5  Elevated lactic acid of 2.2  CRP of 8.92  Elevated ESR: 87 mm/Hr    X-ray left finger: Impression: Prominent soft tissue swelling with arthritic change. No acute osseous pathology. No radiographic evidence of osteomyelitis or acute osseous injury. No radiopaque foreign body seen within the soft tissues. Orthopedic surgery consulted  Patient admitted (10/25/2021), further work-up and management. Hospital course, as per problem list below; Left middle finger wound/cellulitis/infection  MRSA Bacteremia   · Initial lactic acid of 2.2 --> 1.8 (10/26/2021)  · Gentle hydration  · Blood cultures positive for MRSA  · Follow-up repeat blood no growth to date  · Left middle finger wound culture is growing staph aureus MRSA and Strep agalactiae (Beta Strep Group B) -sensitivities as noted above  · Vancomycin IV Pharmacy to dose  · ID on board-appreciate recommendation  · Further antibiotic recommendation as per ID  · Status post PICC line placement (10/29/2021), in anticipation of possible prolonged IV antibiotic for MRSA bacteremia. · Orthopedic surgery on board  · Plan for excisional debridement and irrigation, and possible amputation-tentatively tomorrow, 10/31/2021    Permanent atrial fibrillation  · Currently rate controlled  · Metoprolol tartrate 25 mg p.o. twice daily  · Warfarin pharmacy dosing      Diabetes Mellitus II   Hemoglobin A1C: 7.2%   Inpatient Regimens to include;  o - Insulin Glargine (Lantus) 30 units subcu nightly  o - Insulin Lispro (Humalog) 8 units subcu pre-meal 3 times a day  o - Insulin Lispro (Humalog) on a High dose sliding scale   Monitor POC glucose, and adjust insulin regimen accordingly based on daily insulin requirement. Essential HTN  · Stable  · Losartan 100 mg p.o. daily  · Metoprolol tartrate 25 mg p.o. twice daily       Combined diastolic and systolic HF  Recent abnormal stress test  · Recent NM stress test (10/20/2021): Conclusions This is an abnormal pharmacologic nuclear stress test, positive for mid to apical and apical ischemia with no scar.  A matching wall motion abnormality is noted overall LV systolic function. · No active chest pain or shortness of breath at this time. · Serial EKGs for chest pain  · Troponin < 0.01 x 3 sets  · Monitor on telemetry  · Initially planned for outpatient cardiac catheterization, as per patient  · Seen by Cardiology  · Plan for outpatient cardiac catheterization, after adequate treatment for MRSA bacteremia. Continue management of other chronic medical conditions - see above and orders. Advance Directive: Full Code    ADULT DIET; Regular; 4 carb choices (60 gm/meal)         Consults Made:   IP CONSULT TO ORTHOPEDIC SURGERY  PHARMACY TO DOSE VANCOMYCIN  PHARMACY TO DOSE VANCOMYCIN  IP CONSULT TO PHARMACY  IP CONSULT TO INFECTIOUS DISEASES  IP CONSULT TO CARDIOLOGY  IP CONSULT TO SOCIAL WORK    DVT prophylaxis: Guillen Koffi to dose        Discharge planning:   Continue current management above  Status post PICC line placement (10/29/2021), in anticipation of possible prolonged IV antibiotic for MRSA bacteremia. Orthopedic surgery on board  Plan for excisional debridement and irrigation, and possible amputation-tentatively tomorrow, 10/31/2021    Time Spent is 35 mins in the examination, evaluation, counseling and review of medications, assessment and plan.      Electronically signed by   Jennifer Lucia MD, MPH, MD,   Internal Medicine Hospitalist   10/30/2021 11:30 AM

## 2021-10-30 NOTE — PROGRESS NOTES
Infectious Diseases Progress Note    Patient:  Elijah Huynh  YOB: 1965  MRN: [id number]   Admit date: 10/25/2021   Admitting Physician: Andi Lee MD  Primary Care Physician: Aniya Valiente MD    Chief Complaint/Interval History: Left middle finger erythema and swelling showing some improvement. He is continuing to soak it periodically during the day. Tolerating his IV antibiotic therapy. He is going to have surgical exploration and drainage tomorrow. Patient seen and examined October 29, 2021. Note initiated at that time. Note completed October 30, 2021. In/Out    Intake/Output Summary (Last 24 hours) at 10/30/2021 1623  Last data filed at 10/30/2021 1430  Gross per 24 hour   Intake 1004.36 ml   Output 1450 ml   Net -445.64 ml     Allergies:    Allergies   Allergen Reactions    Pcn [Penicillins]     Neomycin-Bacitracin Zn-Polymyx Rash    Neosporin [Neomycin-Polymyxin-Gramicidin] Rash     Current Meds: insulin lispro (HUMALOG) injection vial 0-18 Units, TID WC  insulin lispro (HUMALOG) injection vial 0-9 Units, Nightly  lidocaine PF 1 % injection 5 mL, Once  sodium chloride flush 0.9 % injection 5-40 mL, 2 times per day  vancomycin (VANCOCIN) 1,000 mg in sodium chloride 0.9 % 250 mL IVPB, Q12H  atorvastatin (LIPITOR) tablet 20 mg, Nightly  losartan (COZAAR) tablet 100 mg, Daily  metoprolol tartrate (LOPRESSOR) tablet 25 mg, BID  tamsulosin (FLOMAX) capsule 0.8 mg, Daily  vitamin D (ERGOCALCIFEROL) capsule 50,000 Units, Weekly  glucose (GLUTOSE) 40 % oral gel 15 g, PRN  dextrose 50 % IV solution, PRN  glucagon (rDNA) injection 1 mg, PRN  dextrose 5 % solution, PRN  sodium chloride flush 0.9 % injection 5-40 mL, 2 times per day  sodium chloride flush 0.9 % injection 5-40 mL, PRN  0.9 % sodium chloride infusion, PRN  ondansetron (ZOFRAN-ODT) disintegrating tablet 4 mg, Q8H PRN   Or  ondansetron (ZOFRAN) injection 4 mg, Q6H PRN  polyethylene glycol (GLYCOLAX) packet 17 g, Daily PRN  acetaminophen (TYLENOL) tablet 650 mg, Q6H PRN   Or  acetaminophen (TYLENOL) suppository 650 mg, Q6H PRN  insulin lispro (HUMALOG) injection vial 8 Units, TID WC  vancomycin (VANCOCIN) intermittent dosing (placeholder), RX Placeholder  warfarin (COUMADIN) daily dosing (placeholder), RX Placeholder  insulin glargine (LANTUS) injection vial 30 Units, Nightly      Review of Systems no diarrhea, fever, or rash. VitalSigns:  /80   Pulse 71   Temp 96.4 °F (35.8 °C) (Temporal)   Resp 18   Ht 5' 10\" (1.778 m)   Wt 233 lb (105.7 kg)   SpO2 99%   BMI 33.43 kg/m²      Physical Exam  Line/IV (peripheral) site: No erythema, warmth, induration, or tenderness. Left middle finger still has some swelling palmar palmar side of hand. There is some drainage on dorsal aspect. Had drainage previously. Lab Results:  CBC:   Recent Labs     10/28/21  0127 10/29/21  0234 10/30/21  0150   WBC 9.2 12.9* 10.9*   HGB 11.3* 11.3* 10.3*    341 338     BMP:  Recent Labs     10/28/21  0127 10/29/21  0234 10/30/21  0150   * 132* 135*   K 4.7 5.3* 4.2   CL 95* 95* 99   CO2 23 24 29   BUN 21* 32* 35*   CREATININE 1.0 1.0 0.9   GLUCOSE 333* 432* 305*     CultureResults:  Culture October 25, 2021:  Staph aureus mrsa (1)    Antibiotic Interpretation ADRI Status    benzylpenicillin Resistant >=0.5 mcg/mL     clindamycin Sensitive 0.25 mcg/mL     erythromycin Sensitive <=0.25 mcg/mL     inducible clindamycin resistance  Neg mcg/mL     oxacillin Resistant >=4 mcg/mL     tetracycline Sensitive <=1 mcg/mL     trimethoprim-sulfamethoxazole Sensitive <=10 mcg/mL     vancomycin Sensitive 1 mcg/mL       Radiology: None    Additional Studies Reviewed:  None    Impression:  1. MRSA bacteremia  2. Tenosynovitis/cellulitis left middle finger  3.   Diabetes mellitus    Recommendations:  Continue vancomycin  Operative exploration and drainage tomorrow   to begin work on home IV antibiotic orders on Monday as outlined below    Home IV antibiotic orders:  1. Diagnosis-MRSA bacteremia, MRSA cellulitis/tenosynovitis left middle finger, diabetes mellitus  2. IV access-midline catheter  3. Orthopedic surgeon-Dr. Amarilis Roth  4. Antibiotic order:  Vancomycin 1 g IV every 12 hours  Last dose of vancomycin November 10, 2021 (4 weeks after clearing blood cultures)  5. Laboratory monitoring:  CMP, CBC, CRP, vancomycin trough every Monday or Tuesday while on antibiotic treatment  6.   Follow-up appointment 10 days after hospital discharge    Moon Logan MD

## 2021-10-31 ENCOUNTER — ANESTHESIA (OUTPATIENT)
Dept: OPERATING ROOM | Age: 56
DRG: 580 | End: 2021-10-31
Payer: MEDICARE

## 2021-10-31 ENCOUNTER — ANESTHESIA EVENT (OUTPATIENT)
Dept: OPERATING ROOM | Age: 56
DRG: 580 | End: 2021-10-31
Payer: MEDICARE

## 2021-10-31 VITALS — DIASTOLIC BLOOD PRESSURE: 62 MMHG | TEMPERATURE: 97.7 F | OXYGEN SATURATION: 96 % | SYSTOLIC BLOOD PRESSURE: 122 MMHG

## 2021-10-31 LAB
ALBUMIN SERPL-MCNC: 3.2 G/DL (ref 3.5–5.2)
ALP BLD-CCNC: 172 U/L (ref 40–130)
ALT SERPL-CCNC: 44 U/L (ref 5–41)
ANION GAP SERPL CALCULATED.3IONS-SCNC: 7 MMOL/L (ref 7–19)
AST SERPL-CCNC: 33 U/L (ref 5–40)
BASOPHILS ABSOLUTE: 0.1 K/UL (ref 0–0.2)
BASOPHILS RELATIVE PERCENT: 0.6 % (ref 0–1)
BILIRUB SERPL-MCNC: 0.4 MG/DL (ref 0.2–1.2)
BUN BLDV-MCNC: 27 MG/DL (ref 6–20)
CALCIUM SERPL-MCNC: 8.6 MG/DL (ref 8.6–10)
CHLORIDE BLD-SCNC: 100 MMOL/L (ref 98–111)
CO2: 29 MMOL/L (ref 22–29)
CREAT SERPL-MCNC: 0.8 MG/DL (ref 0.5–1.2)
EOSINOPHILS ABSOLUTE: 0.3 K/UL (ref 0–0.6)
EOSINOPHILS RELATIVE PERCENT: 3 % (ref 0–5)
GFR AFRICAN AMERICAN: >59
GFR NON-AFRICAN AMERICAN: >60
GLUCOSE BLD-MCNC: 129 MG/DL (ref 70–99)
GLUCOSE BLD-MCNC: 151 MG/DL (ref 74–109)
GLUCOSE BLD-MCNC: 159 MG/DL (ref 70–99)
GLUCOSE BLD-MCNC: 202 MG/DL (ref 70–99)
GLUCOSE BLD-MCNC: 259 MG/DL (ref 70–99)
HCT VFR BLD CALC: 30.5 % (ref 42–52)
HEMOGLOBIN: 10.2 G/DL (ref 14–18)
IMMATURE GRANULOCYTES #: 0.1 K/UL
INR BLD: 2.78 (ref 0.88–1.18)
LYMPHOCYTES ABSOLUTE: 1.8 K/UL (ref 1.1–4.5)
LYMPHOCYTES RELATIVE PERCENT: 15.4 % (ref 20–40)
MCH RBC QN AUTO: 32.5 PG (ref 27–31)
MCHC RBC AUTO-ENTMCNC: 33.4 G/DL (ref 33–37)
MCV RBC AUTO: 97.1 FL (ref 80–94)
MONOCYTES ABSOLUTE: 1.2 K/UL (ref 0–0.9)
MONOCYTES RELATIVE PERCENT: 10.7 % (ref 0–10)
NEUTROPHILS ABSOLUTE: 7.9 K/UL (ref 1.5–7.5)
NEUTROPHILS RELATIVE PERCENT: 69.2 % (ref 50–65)
PDW BLD-RTO: 12 % (ref 11.5–14.5)
PERFORMED ON: ABNORMAL
PLATELET # BLD: 374 K/UL (ref 130–400)
PMV BLD AUTO: 10 FL (ref 9.4–12.4)
POTASSIUM REFLEX MAGNESIUM: 4.2 MMOL/L (ref 3.5–5)
PROTHROMBIN TIME: 28.9 SEC (ref 12–14.6)
RBC # BLD: 3.14 M/UL (ref 4.7–6.1)
SODIUM BLD-SCNC: 136 MMOL/L (ref 136–145)
TOTAL PROTEIN: 6.2 G/DL (ref 6.6–8.7)
URINE CULTURE, ROUTINE: NORMAL
WBC # BLD: 11.5 K/UL (ref 4.8–10.8)

## 2021-10-31 PROCEDURE — 6370000000 HC RX 637 (ALT 250 FOR IP): Performed by: ORTHOPAEDIC SURGERY

## 2021-10-31 PROCEDURE — 2580000003 HC RX 258: Performed by: NURSE PRACTITIONER

## 2021-10-31 PROCEDURE — 2580000003 HC RX 258

## 2021-10-31 PROCEDURE — 6360000002 HC RX W HCPCS: Performed by: NURSE PRACTITIONER

## 2021-10-31 PROCEDURE — 2580000003 HC RX 258: Performed by: ORTHOPAEDIC SURGERY

## 2021-10-31 PROCEDURE — 6370000000 HC RX 637 (ALT 250 FOR IP): Performed by: INTERNAL MEDICINE

## 2021-10-31 PROCEDURE — 6360000002 HC RX W HCPCS

## 2021-10-31 PROCEDURE — 2500000003 HC RX 250 WO HCPCS: Performed by: ORTHOPAEDIC SURGERY

## 2021-10-31 PROCEDURE — 36592 COLLECT BLOOD FROM PICC: CPT

## 2021-10-31 PROCEDURE — 7100000000 HC PACU RECOVERY - FIRST 15 MIN: Performed by: ORTHOPAEDIC SURGERY

## 2021-10-31 PROCEDURE — 3700000001 HC ADD 15 MINUTES (ANESTHESIA): Performed by: ORTHOPAEDIC SURGERY

## 2021-10-31 PROCEDURE — 82947 ASSAY GLUCOSE BLOOD QUANT: CPT

## 2021-10-31 PROCEDURE — 1210000000 HC MED SURG R&B

## 2021-10-31 PROCEDURE — 6360000002 HC RX W HCPCS: Performed by: ORTHOPAEDIC SURGERY

## 2021-10-31 PROCEDURE — 0PBV0ZZ EXCISION OF LEFT FINGER PHALANX, OPEN APPROACH: ICD-10-PCS | Performed by: ORTHOPAEDIC SURGERY

## 2021-10-31 PROCEDURE — 2709999900 HC NON-CHARGEABLE SUPPLY: Performed by: ORTHOPAEDIC SURGERY

## 2021-10-31 PROCEDURE — 3600000003 HC SURGERY LEVEL 3 BASE: Performed by: ORTHOPAEDIC SURGERY

## 2021-10-31 PROCEDURE — 85610 PROTHROMBIN TIME: CPT

## 2021-10-31 PROCEDURE — 2500000003 HC RX 250 WO HCPCS

## 2021-10-31 PROCEDURE — 7100000001 HC PACU RECOVERY - ADDTL 15 MIN: Performed by: ORTHOPAEDIC SURGERY

## 2021-10-31 PROCEDURE — 85025 COMPLETE CBC W/AUTO DIFF WBC: CPT

## 2021-10-31 PROCEDURE — 3600000013 HC SURGERY LEVEL 3 ADDTL 15MIN: Performed by: ORTHOPAEDIC SURGERY

## 2021-10-31 PROCEDURE — 3700000000 HC ANESTHESIA ATTENDED CARE: Performed by: ORTHOPAEDIC SURGERY

## 2021-10-31 PROCEDURE — 0X6R0Z1 DETACHMENT AT LEFT MIDDLE FINGER, HIGH, OPEN APPROACH: ICD-10-PCS | Performed by: ORTHOPAEDIC SURGERY

## 2021-10-31 PROCEDURE — 80053 COMPREHEN METABOLIC PANEL: CPT

## 2021-10-31 RX ORDER — ONDANSETRON 2 MG/ML
4 INJECTION INTRAMUSCULAR; INTRAVENOUS
Status: DISCONTINUED | OUTPATIENT
Start: 2021-10-31 | End: 2021-10-31 | Stop reason: HOSPADM

## 2021-10-31 RX ORDER — PROPOFOL 10 MG/ML
INJECTION, EMULSION INTRAVENOUS PRN
Status: DISCONTINUED | OUTPATIENT
Start: 2021-10-31 | End: 2021-10-31 | Stop reason: SDUPTHER

## 2021-10-31 RX ORDER — DEXMEDETOMIDINE HYDROCHLORIDE 100 UG/ML
INJECTION, SOLUTION INTRAVENOUS PRN
Status: DISCONTINUED | OUTPATIENT
Start: 2021-10-31 | End: 2021-10-31 | Stop reason: SDUPTHER

## 2021-10-31 RX ORDER — HYDROMORPHONE HYDROCHLORIDE 1 MG/ML
0.25 INJECTION, SOLUTION INTRAMUSCULAR; INTRAVENOUS; SUBCUTANEOUS EVERY 5 MIN PRN
Status: DISCONTINUED | OUTPATIENT
Start: 2021-10-31 | End: 2021-10-31 | Stop reason: HOSPADM

## 2021-10-31 RX ORDER — HYDROMORPHONE HYDROCHLORIDE 1 MG/ML
0.5 INJECTION, SOLUTION INTRAMUSCULAR; INTRAVENOUS; SUBCUTANEOUS EVERY 5 MIN PRN
Status: DISCONTINUED | OUTPATIENT
Start: 2021-10-31 | End: 2021-10-31 | Stop reason: HOSPADM

## 2021-10-31 RX ORDER — LIDOCAINE HYDROCHLORIDE 10 MG/ML
INJECTION, SOLUTION EPIDURAL; INFILTRATION; INTRACAUDAL; PERINEURAL PRN
Status: DISCONTINUED | OUTPATIENT
Start: 2021-10-31 | End: 2021-10-31 | Stop reason: SDUPTHER

## 2021-10-31 RX ORDER — FENTANYL CITRATE 50 UG/ML
INJECTION, SOLUTION INTRAMUSCULAR; INTRAVENOUS PRN
Status: DISCONTINUED | OUTPATIENT
Start: 2021-10-31 | End: 2021-10-31 | Stop reason: SDUPTHER

## 2021-10-31 RX ORDER — LIDOCAINE HYDROCHLORIDE 10 MG/ML
INJECTION, SOLUTION EPIDURAL; INFILTRATION; INTRACAUDAL; PERINEURAL PRN
Status: DISCONTINUED | OUTPATIENT
Start: 2021-10-31 | End: 2021-10-31 | Stop reason: HOSPADM

## 2021-10-31 RX ORDER — WARFARIN SODIUM 3 MG/1
3 TABLET ORAL
Status: COMPLETED | OUTPATIENT
Start: 2021-10-31 | End: 2021-10-31

## 2021-10-31 RX ORDER — FENTANYL CITRATE 50 UG/ML
50 INJECTION, SOLUTION INTRAMUSCULAR; INTRAVENOUS EVERY 5 MIN PRN
Status: DISCONTINUED | OUTPATIENT
Start: 2021-10-31 | End: 2021-10-31 | Stop reason: HOSPADM

## 2021-10-31 RX ORDER — ONDANSETRON 2 MG/ML
INJECTION INTRAMUSCULAR; INTRAVENOUS PRN
Status: DISCONTINUED | OUTPATIENT
Start: 2021-10-31 | End: 2021-10-31 | Stop reason: SDUPTHER

## 2021-10-31 RX ORDER — SODIUM CHLORIDE, SODIUM LACTATE, POTASSIUM CHLORIDE, CALCIUM CHLORIDE 600; 310; 30; 20 MG/100ML; MG/100ML; MG/100ML; MG/100ML
INJECTION, SOLUTION INTRAVENOUS CONTINUOUS PRN
Status: DISCONTINUED | OUTPATIENT
Start: 2021-10-31 | End: 2021-10-31 | Stop reason: SDUPTHER

## 2021-10-31 RX ADMIN — PHENYLEPHRINE HYDROCHLORIDE 100 MCG: 10 INJECTION INTRAVENOUS at 08:39

## 2021-10-31 RX ADMIN — FENTANYL CITRATE 50 MCG: 50 INJECTION, SOLUTION INTRAMUSCULAR; INTRAVENOUS at 08:08

## 2021-10-31 RX ADMIN — INSULIN LISPRO 8 UNITS: 100 INJECTION, SOLUTION INTRAVENOUS; SUBCUTANEOUS at 18:22

## 2021-10-31 RX ADMIN — PHENYLEPHRINE HYDROCHLORIDE 100 MCG: 10 INJECTION INTRAVENOUS at 08:14

## 2021-10-31 RX ADMIN — PHENYLEPHRINE HYDROCHLORIDE 50 MCG: 10 INJECTION INTRAVENOUS at 08:18

## 2021-10-31 RX ADMIN — VANCOMYCIN HYDROCHLORIDE 1000 MG: 1 INJECTION, POWDER, LYOPHILIZED, FOR SOLUTION INTRAVENOUS at 18:22

## 2021-10-31 RX ADMIN — PHENYLEPHRINE HYDROCHLORIDE 100 MCG: 10 INJECTION INTRAVENOUS at 08:31

## 2021-10-31 RX ADMIN — PHENYLEPHRINE HYDROCHLORIDE 100 MCG: 10 INJECTION INTRAVENOUS at 08:16

## 2021-10-31 RX ADMIN — DEXMEDETOMIDINE HYDROCHLORIDE 8 MCG: 100 INJECTION, SOLUTION, CONCENTRATE INTRAVENOUS at 08:22

## 2021-10-31 RX ADMIN — PROPOFOL 180 MG: 10 INJECTION, EMULSION INTRAVENOUS at 08:04

## 2021-10-31 RX ADMIN — INSULIN LISPRO 8 UNITS: 100 INJECTION, SOLUTION INTRAVENOUS; SUBCUTANEOUS at 12:21

## 2021-10-31 RX ADMIN — INSULIN LISPRO 5 UNITS: 100 INJECTION, SOLUTION INTRAVENOUS; SUBCUTANEOUS at 23:17

## 2021-10-31 RX ADMIN — VANCOMYCIN HYDROCHLORIDE 1000 MG: 1 INJECTION, POWDER, LYOPHILIZED, FOR SOLUTION INTRAVENOUS at 06:14

## 2021-10-31 RX ADMIN — WARFARIN SODIUM 3 MG: 3 TABLET ORAL at 18:22

## 2021-10-31 RX ADMIN — PHENYLEPHRINE HYDROCHLORIDE 100 MCG: 10 INJECTION INTRAVENOUS at 08:35

## 2021-10-31 RX ADMIN — PHENYLEPHRINE HYDROCHLORIDE 100 MCG: 10 INJECTION INTRAVENOUS at 08:25

## 2021-10-31 RX ADMIN — ONDANSETRON 4 MG: 2 INJECTION INTRAMUSCULAR; INTRAVENOUS at 08:46

## 2021-10-31 RX ADMIN — LIDOCAINE HYDROCHLORIDE 50 MG: 10 INJECTION, SOLUTION EPIDURAL; INFILTRATION; INTRACAUDAL; PERINEURAL at 08:04

## 2021-10-31 RX ADMIN — INSULIN GLARGINE 30 UNITS: 100 INJECTION, SOLUTION SUBCUTANEOUS at 23:17

## 2021-10-31 RX ADMIN — INSULIN LISPRO 6 UNITS: 100 INJECTION, SOLUTION INTRAVENOUS; SUBCUTANEOUS at 18:22

## 2021-10-31 RX ADMIN — INSULIN LISPRO 3 UNITS: 100 INJECTION, SOLUTION INTRAVENOUS; SUBCUTANEOUS at 11:53

## 2021-10-31 RX ADMIN — PROPOFOL 20 MG: 10 INJECTION, EMULSION INTRAVENOUS at 08:05

## 2021-10-31 RX ADMIN — SODIUM CHLORIDE, SODIUM LACTATE, POTASSIUM CHLORIDE, AND CALCIUM CHLORIDE: 600; 310; 30; 20 INJECTION, SOLUTION INTRAVENOUS at 08:00

## 2021-10-31 RX ADMIN — METOPROLOL TARTRATE 25 MG: 25 TABLET, FILM COATED ORAL at 21:54

## 2021-10-31 RX ADMIN — ATORVASTATIN CALCIUM 20 MG: 20 TABLET, FILM COATED ORAL at 21:54

## 2021-10-31 ASSESSMENT — LIFESTYLE VARIABLES: SMOKING_STATUS: 0

## 2021-10-31 ASSESSMENT — PAIN SCALES - GENERAL
PAINLEVEL_OUTOF10: 0
PAINLEVEL_OUTOF10: 0

## 2021-10-31 NOTE — ANESTHESIA POSTPROCEDURE EVALUATION
Department of Anesthesiology  Postprocedure Note    Patient: Rakan Cook  MRN: [id number]  YOB: 1965  Date of evaluation: 10/31/2021  Time:  9:05 AM     Procedure Summary     Date: 10/31/21 Room / Location: [facility] OR [address] [place] [address]    Anesthesia Start: 0800 Anesthesia Stop: 5756    Procedure: HAND INCISION AND DRAINAGE Partial Amputation Middle Finger (Left Hand) Diagnosis: (left middle finger infection)    Surgeons: Galen Curran MD Responsible Provider: NOMI Elias CRNA    Anesthesia Type: general ASA Status: 3          Anesthesia Type: general    Jessica Phase I:      Jessica Phase II:      Last vitals: Reviewed and per EMR flowsheets.        Anesthesia Post Evaluation    Patient location during evaluation: PACU  Patient participation: complete - patient participated  Level of consciousness: awake and alert  Pain score: 0  Airway patency: patent  Nausea & Vomiting: no nausea and no vomiting  Complications: no  Cardiovascular status: blood pressure returned to baseline and hemodynamically stable  Respiratory status: room air and acceptable  Hydration status: euvolemic

## 2021-10-31 NOTE — OP NOTE
Operative Note      Patient: Ricky Silva  YOB: 1965  MRN: [id number]    Date of Procedure: 10/31/2021    Pre-Op Diagnosis:   1. Left middle finger deep infection, possible distal phalangeal osteomyelitis  2. MRSA bacteremia  3. Insulin-dependent type 2 diabetes mellitus with associated diabetic peripheral neuropathy  4. Hypertension  5. Atrial fibrillation  6. Heart failure  7. Obstructive sleep apnea  8. Obesity    Post-Op Diagnosis:   1. Left middle finger deep infection, likely distal phalangeal osteomyelitis  2. MRSA bacteremia  3. Insulin-dependent type 2 diabetes mellitus with associated diabetic peripheral neuropathy  4. Hypertension  5. Atrial fibrillation  6. Heart failure  7. Obstructive sleep apnea  8. Obesity       Procedure:  1. Left middle finger partial amputation through the proximal interphalangeal joint secondary to infection  2. Excisional debridement of skin, subcutaneous tissue, tendon and bone    Surgeon(s):  Karine Garcia MD    Assistant:   * No surgical staff found *    Anesthesia: General    Estimated Blood Loss (mL): Minimal    Complications: None    Specimens: None    Implants: None      Drains: None    Indications: Mr. Yaya Reis is a 57-year-old gentleman admitted on 10/25/2021 with complaints of \"nicking\" his left middle finger this past weekend. Due to progressive swelling, redness and drainage, he presented to the ER. He was admitted and found to have MRSA bacteremia. I evaluated the patient the following day and recommended an attempted conservative management with IV antibiotics and dilute Betadine soaks. He had clinical improvement over the first 24 hours. However, over the next few days, his finger remained relatively stable until yesterday when he developed what appeared to be purulence underlying the base of the nail plate. Otherwise, his finger appeared about the same.   MRI findings were inconclusive but suggestive of possible distal gross purulence. The articular surfaces of the middle and distal phalanges appeared soft. At this point, I felt that amputation was necessary and that this portion of the finger was not salvageable. With further blunt dissection, there was purulence noted at the FDP insertion site. A 15 blade scalpel was then used to create a more proximal incision with an extended palmar flap to be used for closure. Sharp dissection was carried down through the soft tissues. Due to hypertrophied flexor tendons and milky/cloudy fluid within the flexor tendon sheath, I elected to proceed with amputation through the proximal interphalangeal joint where the tendons appeared more healthy with no significant cloudy fluid. Sharp dissection was carried through the subcutaneous tissue. The extensor tendons were incised. The radial and ulnar collateral ligaments were incised off of the proximal phalangeal neck. The volar plate was transected. The flexor tendons were pulled into the wound and cut. They did appear healthy at this point. The distal aspect of the finger was then removed from the field. The radial and ulnar digital nerves were identified and a traction neurectomy was performed. The digital arteries were identified and cauterized. The articular portion of the proximal phalanx appeared relatively healthy with a mild amount of chondromalacia at the ulnar aspect of the phalangeal head. The wound was then irrigated with a total of 6 L of normal saline and a combination of Betadine and hydrogen peroxide. During irrigation, the skin, subcutaneous tissue and bone was debrided with Adson pickups, scissors and a curette. After thorough irrigation and excisional debridement, the wound was reapproximated with 3-0 nylon sutures. For postoperative pain control, approximately 6 cc of 1% lidocaine without epinephrine were utilized to perform a left middle finger digital block.   A soft dressing was applied to the left middle finger. The tourniquet was deflated and adequate capillary refill was retained of the remaining digits. General anesthesia was reversed, he was transferred to the hospital bed and moved to PACU in stable condition. All counts at the end the procedure were correct. He tolerated the procedure well and was without intraoperative complication. Postoperative plan: Return to floor for IV antibiotics and pain control. Follow-up within 7 to 10 days from surgery. Encourage elevate the operative extremity perform gentle finger range of motion. No heavy lifting or high impact activities.     Electronically signed by Ruthann Lauren MD on 10/31/2021 at 9:23 AM

## 2021-10-31 NOTE — PROGRESS NOTES
Clinical Pharmacy Note    Warfarin consult follow-up    Recent Labs     10/31/21  0430   INR 2.78*     Recent Labs     10/29/21  0234 10/30/21  0150 10/31/21  0430   HGB 11.3* 10.3* 10.2*   HCT 33.7* 30.8* 30.5*    338 374       Significant Drug-Drug Interactions:  New warfarin drug-drug interactions: none  Discontinued drug-drug interactions: none    Date INR Warfarin Dose   10/25/21 1.72 15 mg    10/26/21 2.10  10 mg     10/27/21 2.63  7.5 mg    10/28/21  3.19  5 mg    10/29/21  3.54   Hold    10/30/21 4.06  Hold    10/31/21   2.78   3 mg              Notes:    GIve Warfarin 3 mg  x 1                 Daily PT/INR until stable within therapeutic range.      Electronically signed by Mariola Enamorado [facility] on 10/31/2021 at 10:05 AM

## 2021-10-31 NOTE — PROGRESS NOTES
Farida Ortiz ( 1965) is a 60 y.o. male, Established , here for evaluation of the following chief complaint(s). Laceration (infected cut left hand middle finger needs tdap)    In ERROR    ASSESSMENT/PLAN:  Problem List     None          Results for orders placed or performed in visit on 10/05/21   POCT INR   Result Value Ref Range    INR 2.9     Protime 31.6        No follow-ups on file.     HPI    Review of Systems    Physical Exam      (Time Documentation Optional [id number])    An electronic signaturewaas used to authenticate this note  -Agapito Billings MD on 10/31/2021 at 6:06 PM

## 2021-10-31 NOTE — PROGRESS NOTES
Infectious Diseases Progress Note    Patient:  Macy Heller  YOB: 1965  MRN: [id number]   Admit date: 10/25/2021   Admitting Physician: Katrin Esparza MD  Primary Care Physician: Tamara Beck MD    Chief Complaint/Interval History: He had surgery today. Had partial amputation through the proximal phalanx of the left middle finger. He is without fever or chills. No diarrhea or rash. He is somewhat tired post surgery. In/Out    Intake/Output Summary (Last 24 hours) at 10/31/2021 1552  Last data filed at 10/31/2021 1411  Gross per 24 hour   Intake 1222 ml   Output --   Net 1222 ml     Allergies:    Allergies   Allergen Reactions    Pcn [Penicillins]     Neomycin-Bacitracin Zn-Polymyx Rash    Neosporin [Neomycin-Polymyxin-Gramicidin] Rash     Current Meds: warfarin (COUMADIN) tablet 3 mg, Once  insulin lispro (HUMALOG) injection vial 0-18 Units, TID WC  insulin lispro (HUMALOG) injection vial 0-9 Units, Nightly  sodium chloride flush 0.9 % injection 5-40 mL, 2 times per day  vancomycin (VANCOCIN) 1,000 mg in sodium chloride 0.9 % 250 mL IVPB, Q12H  atorvastatin (LIPITOR) tablet 20 mg, Nightly  losartan (COZAAR) tablet 100 mg, Daily  metoprolol tartrate (LOPRESSOR) tablet 25 mg, BID  tamsulosin (FLOMAX) capsule 0.8 mg, Daily  vitamin D (ERGOCALCIFEROL) capsule 50,000 Units, Weekly  glucose (GLUTOSE) 40 % oral gel 15 g, PRN  dextrose 50 % IV solution, PRN  glucagon (rDNA) injection 1 mg, PRN  dextrose 5 % solution, PRN  sodium chloride flush 0.9 % injection 5-40 mL, 2 times per day  sodium chloride flush 0.9 % injection 5-40 mL, PRN  0.9 % sodium chloride infusion, PRN  ondansetron (ZOFRAN-ODT) disintegrating tablet 4 mg, Q8H PRN   Or  ondansetron (ZOFRAN) injection 4 mg, Q6H PRN  polyethylene glycol (GLYCOLAX) packet 17 g, Daily PRN  acetaminophen (TYLENOL) tablet 650 mg, Q6H PRN   Or  acetaminophen (TYLENOL) suppository 650 mg, Q6H PRN  insulin lispro (HUMALOG) injection vial 8 Units, TID WC  vancomycin (VANCOCIN) intermittent dosing (placeholder), RX Placeholder  warfarin (COUMADIN) daily dosing (placeholder), RX Placeholder  insulin glargine (LANTUS) injection vial 30 Units, Nightly      Review of Systems see HPI    VitalSigns:  BP (!) 155/78   Pulse 101   Temp 96.9 °F (36.1 °C) (Temporal)   Resp 16   Ht 5' 10\" (1.778 m)   Wt 233 lb (105.7 kg)   SpO2 97%   BMI 33.43 kg/m²      Physical Exam  Line/IV (right arm PICC) site: No erythema, warmth, induration, or tenderness. Left middle finger operative dressing clean and dry    Lab Results:  CBC:   Recent Labs     10/29/21  0234 10/30/21  0150 10/31/21  0430   WBC 12.9* 10.9* 11.5*   HGB 11.3* 10.3* 10.2*    338 374     BMP:  Recent Labs     10/29/21  0234 10/30/21  0150 10/31/21  0430   * 135* 136   K 5.3* 4.2 4.2   CL 95* 99 100   CO2 24 29 29   BUN 32* 35* 27*   CREATININE 1.0 0.9 0.8   GLUCOSE 432* 305* 151*     CultureResults:  Blood cultures October 25, 2021:  Staph aureus mrsa (1)    Antibiotic Interpretation ADRI Status    benzylpenicillin Resistant >=0.5 mcg/mL     clindamycin Sensitive 0.25 mcg/mL     erythromycin Sensitive <=0.25 mcg/mL     inducible clindamycin resistance  Neg mcg/mL     oxacillin Resistant >=4 mcg/mL     tetracycline Sensitive <=1 mcg/mL     trimethoprim-sulfamethoxazole Sensitive <=10 mcg/mL     vancomycin Sensitive <=0.5 mcg/mL       Radiology: None    Additional Studies Reviewed:  None    Impression:  1. MRSA bacteremia  2. Tenosynovitis/cellulitis left middle finger-underwent partial amputation today  3. Diabetes mellitus    Recommendations:  Continue antibiotic treatment  Home IV antibiotic orders outlined in yesterday's note  Copy of the home IV antibiotic orders written below   evaluation to assist with home IV antibiotic arrangements tomorrow    Home IV antibiotic orders:  1.  Diagnosis-MRSA bacteremia, MRSA cellulitis/tenosynovitis left middle finger, diabetes mellitus  2.  IV access-midline catheter  3.  Orthopedic surgeon-Dr. Juancarlos Luna  4.  Antibiotic order:  Vancomycin 1 g IV every 12 hours  Last dose of vancomycin November 10, 2021 (4 weeks after clearing blood cultures)  5.  Laboratory monitoring:  CMP, CBC, CRP, vancomycin trough every Monday or Tuesday while on antibiotic treatment  6.  Follow-up appointment 10 days after hospital discharge    Rafael Berg MD

## 2021-10-31 NOTE — PROGRESS NOTES
[facility] Progress Note    Patient:  Dat Berry  YOB: 1965  Date of Service: 10/31/2021  MRN: [id number]   Acct: [de-identified]   Primary Care Physician: Jeramy Laughlin MD  Advance Directive: Full Code  Admit Date: 10/25/2021       Hospital Day: 6        CHIEF COMPLAINT:     Chief Complaint   Patient presents with    Cellulitis     lef middle finger swelling        10/31/2021 9:20 AM  Subjective / Interval History:   10/31/2021  Patient seen and examined this AM.  No new complaints. No acute changes or acute overnight event reported. Status post left middle finger amputation this AM.  Laying comfortably in bed in no apparent acute distress. Patient denies any acute complaints or distress at this time. No chest pain, shortness of breath, no dizziness, lightheadedness or palpitations. 10/30/2021  No acute changes or acute overnight event reported. Patient sitting comfortably in c bed, having breakfast, in no acute distress. Endorses overall improvement. Denies any active chest pain at this time. Left middle finger pain controlled. PICC line placed yesterday, for planned prolonged antibiotic. Patient denies any other acute complaints or distress at this time. 10/29/2021  Patient seen and examined this AM.  Doing well. No new complaints. No acute changes or acute overnight event reported. Laying comfortably in bed in no acute distress. Denies any acute complaints or distress at this time. 10/28/2021  Patient seen and examined this AM.  Doing well. No new complaints. No acute changes or acute overnight event reported. Left middle finger swelling and redness improved. Patient reports some urinary retention. Denies any acute complaints or distress at this time. 10/27/2021   Patient seen and examined. Doing well. No new complaints. Left middle finger pain fairly controlled. Denies any fever or chills. Laying comfortably in bed no acute distress.  No acute changes or acute overnight events reported. 10/26/2021  Patient seen and examined this AM.  Doing well. No new complaints. No acute changes or acute overnight event reported. Left middle finger pain fairly controlled. Laying comfortably in bed in no apparent acute distress. Denies any other acute complaints or distress at this time. No chest pain, no shortness of breath, no dizziness, lightheadedness or palpitations. Review of Systems:   Review of Systems  ROS: 14 point review of systems is negative except as specifically addressed above.     Diet NPO    Intake/Output Summary (Last 24 hours) at 10/31/2021 0920  Last data filed at 10/31/2021 3792  Gross per 24 hour   Intake 1222 ml   Output --   Net 1222 ml       Medications:   [MAR Hold] dextrose      [MAR Hold] sodium chloride       Current Facility-Administered Medications   Medication Dose Route Frequency Provider Last Rate Last Admin    [MAR Hold] HYDROmorphone HCl PF (DILAUDID) injection 0.25 mg  0.25 mg IntraVENous Q5 Min PRN Shital Johnson MD        [facility] Hold] HYDROmorphone HCl PF (DILAUDID) injection 0.5 mg  0.5 mg IntraVENous Q5 Min PRN Shital Johnson MD        [facility] Hold] fentaNYL (SUBLIMAZE) injection 50 mcg  50 mcg IntraVENous Q5 Min PRN Shital Johnson MD        [facility] Hold] [facility]ron [facility] 4 mg  4 mg IntraVENous Once PRN Shital Johnson MD        lidocaine PF 1 % injection    PRN Leticia Loo MD   6 mL at 10/31/21 0847    [MAR Hold] insulin lispro (HUMALOG) injection vial 0-18 Units  0-18 Units SubCUTAneous TID WC Elizabeth Santana MD   9 Units at 10/30/21 1833    [MAR Hold] insulin lispro (HUMALOG) injection vial 0-9 Units  0-9 Units SubCUTAneous Nightly Elizabeth Santana MD   3 Units at 10/30/21 2131    [MAR Hold] lidocaine PF 1 % injection 5 mL  5 mL IntraDERmal Once Elizabeth Santana MD        [facility] Hold] sodium chloride flush 0.9 % injection 5-40 mL  5-40 mL IntraVENous 2 times per day [facility] Rylee Mosley MD   10 mL at 10/30/21 2132    [MAR Hold] vancomycin (VANCOCIN) 1,000 mg in sodium chloride 0.9 % 250 mL IVPB  1,000 mg IntraVENous Q12H Brooke Many, APRN - CNP 62.5 mL/hr at 10/31/21 0614 1,000 mg at 10/31/21 0614    [MAR Hold] atorvastatin (LIPITOR) tablet 20 mg  20 mg Oral Nightly Brooke Many, APRN - CNP   20 mg at 10/30/21 2131    [MAR Hold] losartan (COZAAR) tablet 100 mg  100 mg Oral Daily Brooke Many, APRN - CNP   100 mg at 10/30/21 0823    [MAR Hold] metoprolol tartrate (LOPRESSOR) tablet 25 mg  25 mg Oral BID Brooke Many, APRN - CNP   25 mg at 10/30/21 2131    [MAR Hold] tamsulosin (FLOMAX) capsule 0.8 mg  0.8 mg Oral Daily Brooke Many, APRN - CNP   0.8 mg at 10/30/21 0823    [MAR Hold] vitamin D (ERGOCALCIFEROL) capsule 50,000 Units  50,000 Units Oral Weekly Brooke Many, APRN - CNP   50,000 Units at 10/29/21 0833    [MAR Hold] glucose (GLUTOSE) 40 % oral gel 15 g  15 g Oral PRN Brooke Many, APRN - CNP        [MAR Hold] dextrose 50 % IV solution  12.5 g IntraVENous PRN Brooke Many, APRN - CNP        [MAR Hold] glucagon (rDNA) injection 1 mg  1 mg IntraMUSCular PRN Brooke Many, APRN - CNP        [MAR Hold] dextrose 5 % solution  100 mL/hr IntraVENous PRN Brooke Many, APRN - CNP        [MAR Hold] sodium chloride flush 0.9 % injection 5-40 mL  5-40 mL IntraVENous 2 times per day Brooke Many, APRN - CNP   10 mL at 10/30/21 0823    [MAR Hold] sodium chloride flush 0.9 % injection 5-40 mL  5-40 mL IntraVENous PRN Brooke Many, APRN - CNP        [MAR Hold] 0.9 % sodium chloride infusion  25 mL IntraVENous PRN Brooke Many, APRN - CNP        [MAR Hold] ondansetron (ZOFRAN-ODT) disintegrating tablet 4 mg  4 mg Oral Q8H PRN NOMI Narvaez - CNP        Or    [MAR Hold] ondansetron (ZOFRAN) injection 4 mg  4 mg IntraVENous Q6H PRN NOMI Narvaez - PAOLA        [MAR Hold] polyethylene glycol (GLYCOLAX) packet 17 g  17 g Oral Daily PRN Colby Sotelo, APRN - CNP        [MAR Hold] acetaminophen (TYLENOL) tablet 650 mg  650 mg Oral Q6H PRN Colby Can, APRN - CNP        Or    [MAR Hold] acetaminophen (TYLENOL) suppository 650 mg  650 mg Rectal Q6H PRN Colby Can, APRN - CNP        [MAR Hold] insulin lispro (HUMALOG) injection vial 8 Units  0.08 Units/kg SubCUTAneous TID WC Colby Sotelo APRN - CNP   8 Units at 10/30/21 1833    [MAR Hold] vancomycin (VANCOCIN) intermittent dosing (placeholder)   Other RX Placeholder Colby Sotelo, APRN - CNP   Given at 10/25/21 1656    [MAR Hold] warfarin (COUMADIN) daily dosing (placeholder)   Other RX Placeholder Colby Sotelo, APRN - CNP        [MAR Hold] insulin glargine (LANTUS) injection vial 30 Units  30 Units SubCUTAneous Nightly Colby Sotelo, APRN - CNP   30 Units at 10/30/21 2131         [MAR Hold] dextrose      [MAR Hold] sodium chloride        [MAR Hold] insulin lispro  0-18 Units SubCUTAneous TID WC    [MAR Hold] insulin lispro  0-9 Units SubCUTAneous Nightly    [MAR Hold] lidocaine 1 % injection  5 mL IntraDERmal Once    [facility] Hold] sodium chloride flush  5-40 mL IntraVENous 2 times per day    [MAR Hold] vancomycin  1,000 mg IntraVENous Q12H    [MAR Hold] atorvastatin  20 mg Oral Nightly    [MAR Hold] losartan  100 mg Oral Daily    [MAR Hold] metoprolol tartrate  25 mg Oral BID    [MAR Hold] tamsulosin  0.8 mg Oral Daily    [MAR Hold] vitamin D  50,000 Units Oral Weekly    [MAR Hold] sodium chloride flush  5-40 mL IntraVENous 2 times per day    [MAR Hold] insulin lispro  0.08 Units/kg SubCUTAneous TID WC    [MAR Hold] vancomycin (VANCOCIN) intermittent dosing (placeholder)   Other RX Placeholder    [MAR Hold] warfarin (COUMADIN) daily dosing (placeholder)   Other RX Placeholder    [MAR Hold] insulin glargine  30 Units SubCUTAneous Nightly     [MAR Hold] HYDROmorphone, [MAR Hold] HYDROmorphone, [MAR Hold] fentanNYL, [MAR Hold] ondansetron, lidocaine PF, [MAR Hold] glucose, [MAR Hold] dextrose, [MAR Hold] glucagon (rDNA), [MAR Hold] dextrose, [MAR Hold] sodium chloride flush, [MAR Hold] sodium chloride, [MAR Hold] ondansetron **OR** [MAR Hold] ondansetron, [MAR Hold] polyethylene glycol, [MAR Hold] acetaminophen **OR** [MAR Hold] acetaminophen  Diet NPO       Labs:   CBC with DIFF:  Recent Labs     10/29/21  0234 10/30/21  0150 10/31/21  0430   WBC 12.9* 10.9* 11.5*   RBC 3.48* 3.17* 3.14*   HGB 11.3* 10.3* 10.2*   HCT 33.7* 30.8* 30.5*   MCV 96.8* 97.2* 97.1*   MCH 32.5* 32.5* 32.5*   MCHC 33.5 33.4 33.4   RDW 11.8 11.8 12.0    338 374   MPV 10.7 10.4 10.0   NEUTOPHILPCT 90.3* 71.7* 69.2*   LYMPHOPCT 4.8* 17.6* 15.4*   MONOPCT 4.2 9.1 10.7*   EOSRELPCT 0.0 0.6 3.0   BASOPCT 0.2 0.4 0.6   NEUTROABS 11.7* 7.8* 7.9*   LYMPHSABS 0.6* 1.9 1.8   MONOSABS 0.50 1.00* 1.20*   EOSABS 0.00 0.10 0.30   BASOSABS 0.00 0.00 0.10       CMP/BMP:  Recent Labs     10/29/21  0234 10/30/21  0150 10/31/21  0430   * 135* 136   K 5.3* 4.2 4.2   CL 95* 99 100   CO2 24 29 29   ANIONGAP 13 7 7   GLUCOSE 432* 305* 151*   BUN 32* 35* 27*   CREATININE 1.0 0.9 0.8   LABGLOM >60 >60 >60   CALCIUM 9.0 8.7 8.6   PROT 6.3* 5.8* 6.2*   LABALBU 3.5 3.3* 3.2*   BILITOT 0.4 0.3 0.4   ALKPHOS 193* 152* 172*   ALT 26 29 44*   AST 21 22 33         CRP:    No results for input(s): CRP in the last 72 hours. Sed Rate:    No results for input(s): SEDRATE in the last 72 hours. HgBA1c:  No components found for: HGBA1C  FLP:    Lab Results   Component Value Date    TRIG 104 09/27/2021    HDL 35 09/27/2021    LDLCALC 49 09/27/2021     TSH:    Lab Results   Component Value Date    TSH 1.520 09/27/2021     Troponin T:   No results for input(s): TROPONINI in the last 72 hours. Pro-BNP: No results for input(s): BNP in the last 72 hours.   INR:   Recent Labs     10/29/21  0234 10/30/21  1005 10/31/21  0430   INR 3.54* 4.06* 2.78*     ABGs:   Lab Results   Component Value Date    PHART 7.290 03/25/2019    PO2ART 79.0 03/25/2019    KRK2OEJ 20.0 03/25/2019     UA:  Recent Labs     10/28/21  1425   COLORU YELLOW   PHUR 5.5   WBCUA 1   RBCUA 1   BACTERIA NEGATIVE*   CLARITYU Clear   SPECGRAV 1.028   LEUKOCYTESUR Negative   UROBILINOGEN 1.0   BILIRUBINUR Negative   BLOODU Negative   GLUCOSEU =>1000         Culture Results:    No results for input(s): CXSURG in the last 720 hours. Blood Culture Recent:   Recent Labs     10/27/21  0939 10/25/21  1300   BC No Growth to date. Any change in status will be called. Bottle volume = 8 ml   Bottle volume = 9 ml  *  Isolated from Aerobic and Anaerobic bottle  CONTACT PRECAUTIONS INDICATED  *       No results for input(s): BC, BLOODCULT2, ORG in the last 72 hours. Cultures:   No results for input(s): CULTURE in the last 72 hours. No results for input(s): BC, Simba Mcclellanm in the last 72 hours. Left middle finger wound culture is growing staph aureus MRSA and Strep agalactiae (Beta Strep Group B)    Staph aureus mrsa (1)  Antibiotic Interpretation ADRI Status   benzylpenicillin Resistant >=0.5 mcg/mL    clindamycin Sensitive 0.25 mcg/mL    erythromycin Sensitive <=0.25 mcg/mL    inducible clindamycin resistance  Neg mcg/mL    oxacillin Resistant >=4 mcg/mL    tetracycline Sensitive <=1 mcg/mL    trimethoprim-sulfamethoxazole Sensitive <=10 mcg/mL    vancomycin Sensitive 1 mcg/mL          No results for input(s): MG, PHOS in the last 72 hours. Recent Labs     10/29/21  0234 10/30/21  0150 10/31/21  0430   AST 21 22 33   ALT 26 29 44*   BILITOT 0.4 0.3 0.4   ALKPHOS 193* 152* 172*         RAD:   XR FINGER LEFT (MIN 2 VIEWS)    Result Date: 10/25/2021  History: Penetrating injury with soft tissue swelling, concern for osteomyelitis Left middle finger: 3 views left middle finger obtained. There is diffuse soft tissue swelling with no radiopaque foreign body seen within the soft tissues. Vascular calcifications. There are arthritic changes of the DIP greater than PIP joints as well as the third MCP joint. No acute fracture or dislocation. No radiographic evidence of osteomyelitis. 1. Prominent soft tissue swelling with arthritic change. No acute osseous pathology. No radiographic evidence of osteomyelitis or acute osseous injury. No radiopaque foreign body seen within the soft tissues. Signed by Dr Gildardo Cogan    Result Date: 10/20/2021  [place] Nuclear Stress Test Report Procedure date: 10/20/21 Indications: shortness of breath Procedure: Stress was performed with injection of 0.4 mg Lexiscan. Vital signs and EKG were monitored. Technetium-99 sestamibi was injected in divided doses, 9.83 mCi and 31.5 mCi respectively for rest and stress imaging. The patient was discharged in stable condition. Results: Patient had symptoms of dyspnea during infusion that resolved in recovery. Baseline EKG showed atrial fibrillation with nonspecific ST/T changes. During stress there were no significant EKG changes or rhythm changes, negative for ischemia. Baseline and peak blood pressures were 170/88, and 171/98 respectively. Baseline and peak heart rates were 78 and  102 respectively. EF:  53% The resting and stress perfusion images were compared and reviewed in both corrected and noncorrected formats. There is a moderate size, moderately intense reversible defect noted of the anterior wall from the midportion to the apex and including the apex that persists with attenuation correction also. This is considered positive for anterior apical ischemia. Gated LV wall motion demonstrates a matching segmental area of hypokinesis of the anterior apical segment, all other wall segments contract normally, ejection fraction 53%. Conclusions This is an abnormal pharmacologic nuclear stress test, positive for mid to apical and apical ischemia with no scar.  A matching wall motion abnormality is noted overall LV systolic function.  Signed by Dr Judit Garcia      Echo:    Summary   Normal left ventricular size and systolic function EF 06-38%   Moderate concentric left ventricular hypertrophy with transmitral Doppler   suggesting severe/restrictive Arlyss Huguenin 3] diastolic dysfunction   Moderate left atrial enlargement   Nodular sclerosis of a tricuspid aortic valve with adequate cusp   separation and neither stenosis or insufficiency   Mild thickening but normally mobile mitral valve with mild posteriorly   directed regurgitation   Nonvisualization pulmonic valve   Moderate right atrial enlargement with mild right ventricular enlargement   and preserved systolic function   Mild to moderate tricuspid regurgitation with moderate pulmonary   hypertension RVSP estimate 56 mmHg   Less than 50% inspiratory collapse of the IVC consistent with mild   elevation of right atrial filling pressures of 10-15 mmHg   No significant pericardial effusion   Aortic root and ascending segments measured within normal limits   Injection of agitated saline with or without Valsalva maneuver   demonstrates no intracardiac communication   Definity contrast utilized to better define endocardial borders      Signature    ----------------------------------------------------------------   Electronically signed by Torie Hoffman MD(Interpreting physician)   on 04/15/2021 03:59 PM   ----------------------------------------------------------------        Objective:   Vitals:   /78   Pulse 62   Temp 97 °F (36.1 °C) (Temporal)   Resp 12   Ht 5' 10\" (1.778 m)   Wt 233 lb (105.7 kg)   SpO2 96%   BMI 33.43 kg/m²       Patient Vitals for the past 24 hrs:   BP Temp Temp src Pulse Resp SpO2   10/31/21 0915 117/78 -- -- 62 12 96 %   10/31/21 0910 108/75 -- -- 65 15 97 %   10/31/21 0905 106/74 97 °F (36.1 °C) Temporal 61 15 99 %   10/31/21 0904 106/74 97 °F (36.1 °C) -- 74 -- 99 %   10/31/21 0149 (!) 143/82 97.9 °F (36.6 °C) Temporal 85 16 98 % 10/30/21 2100 -- -- -- 112 -- --   10/30/21 1759 (!) 175/94 96.6 °F (35.9 °C) Temporal 112 16 98 %   10/30/21 1127 125/80 96.4 °F (35.8 °C) Temporal 71 18 99 %       24HR INTAKE/OUTPUT:      Intake/Output Summary (Last 24 hours) at 10/31/2021 0920  Last data filed at 10/31/2021 0620  Gross per 24 hour   Intake 1222 ml   Output --   Net 1222 ml       Physical Exam  Vitals and nursing note reviewed. Constitutional:       General: He is not in acute distress. Appearance: Normal appearance. He is obese. He is not ill-appearing, toxic-appearing or diaphoretic. HENT:      Head: Normocephalic and atraumatic. Right Ear: External ear normal.      Left Ear: External ear normal.      Nose: Nose normal. No congestion or rhinorrhea. Mouth/Throat:      Mouth: Mucous membranes are moist.      Pharynx: Oropharynx is clear. No oropharyngeal exudate or posterior oropharyngeal erythema. Eyes:      General: No scleral icterus. Right eye: No discharge. Left eye: No discharge. Extraocular Movements: Extraocular movements intact. Conjunctiva/sclera: Conjunctivae normal.      Pupils: Pupils are equal, round, and reactive to light. Cardiovascular:      Rate and Rhythm: Normal rate. Rhythm irregular. Pulses: Normal pulses. Heart sounds: Normal heart sounds. No murmur heard. No friction rub. No gallop. Pulmonary:      Effort: Pulmonary effort is normal. No respiratory distress. Breath sounds: Normal breath sounds. No stridor. No wheezing, rhonchi or rales. Chest:      Chest wall: No tenderness. Abdominal:      General: Bowel sounds are normal. There is no distension. Palpations: Abdomen is soft. Tenderness: There is no abdominal tenderness. There is no guarding or rebound. Musculoskeletal:         General: No swelling, tenderness, deformity or signs of injury. Normal range of motion. Cervical back: Normal range of motion and neck supple.  No rigidity or tenderness. No muscular tenderness. Right lower leg: No edema. Left lower leg: No edema. Comments: Status post partial amputation of left middle finger  (10/31/2021)   Skin:     General: Skin is warm and dry. Capillary Refill: Capillary refill takes less than 2 seconds. Coloration: Skin is not jaundiced or pale. Findings: No bruising, erythema, lesion or rash. Neurological:      General: No focal deficit present. Mental Status: He is alert and oriented to person, place, and time. Cranial Nerves: No cranial nerve deficit. Sensory: No sensory deficit. Motor: No weakness. Coordination: Coordination normal.   Psychiatric:         Mood and Affect: Mood normal.         Behavior: Behavior normal.         Thought Content: Thought content normal.         Judgment: Judgment normal.         Assessment/plan:       Hospital Problems         Last Modified POA    * (Principal) Bacteremia due to methicillin resistant Staphylococcus aureus 10/27/2021 Yes    HTN (hypertension) 10/25/2021 Yes    Hyperlipidemia 10/25/2021 Yes    Type 2 diabetes mellitus with diabetic polyneuropathy, with long-term current use of insulin ([address] (Chronic) 10/25/2021 Yes    Overview Signed 2/21/2021  3:16 PM by Melida Pelayo MD     Lab Results   Component Value Date    LABA1C 11.4 (H) 12/03/2020     No results found for: EAG         Permanent atrial fibrillation ([address] 10/25/2021 Yes    (HFpEF) heart failure with preserved ejection fraction ([address] 10/25/2021 Yes    Overview Signed 2/21/2021  3:12 PM by Melida Pelayo MD     Structurally normal mitral valve. Mild mitral regurgitation is present. Mitral annular calcification is present. Structurally normal aortic valve. Tricuspid valve is structurally normal.   Mild tricuspid regurgitation. Normal size left atrium.    Normal intact intra-atrial septum was noted with no evidence of   significant intra-atrial communications by color flow Doppler or by   agitated saline study. Normal left ventricular size with preserved LV function and an estimated   ejection fraction of approximately 55-60%. Moderate concentric left ventricular hypertrophy. No regional wall motion abnormalities. no         Obstructive sleep apnea 10/25/2021 Yes    Cellulitis 10/27/2021 Yes          Principal Problem:    Bacteremia due to methicillin resistant Staphylococcus aureus  Active Problems:    HTN (hypertension)    Hyperlipidemia    Type 2 diabetes mellitus with diabetic polyneuropathy, with long-term current use of insulin (HCC)    Permanent atrial fibrillation (HCC)    (HFpEF) heart failure with preserved ejection fraction ([address]    Obstructive sleep apnea    Cellulitis  Resolved Problems:    * No resolved hospital problems. *        Brief Summary  Mr. Goldstein, a 47-year-old male, history of HTN, HLD, atrial fibrillation, presenting to [facility] ED (10/25/2021), with complaints of right middle finger swelling, with purulent drainage. Patient reportedly cut his finger on a rusted nail on (10/22/2021), while working in his garage. Patient subsequently reportedly noted gradual redness and swelling, and now with purulent drainage from the wound. Patient initially saw his PCP outpatient, who subsequently recommended patient presented to the ED for further work-up and management    Initial work-up significant for;  Leukocytosis with a WBC of 12.5  Elevated lactic acid of 2.2  CRP of 8.92  Elevated ESR: 87 mm/Hr    X-ray left finger: Impression: Prominent soft tissue swelling with arthritic change. No acute osseous pathology. No radiographic evidence of osteomyelitis or acute osseous injury. No radiopaque foreign body seen within the soft tissues. Orthopedic surgery consulted  Patient admitted (10/25/2021), further work-up and management. Hospital course, as per problem list below;     Left middle finger wound/cellulitis/infection/Oeteomyelitis  MRSA Bacteremia · Initial lactic acid of 2.2 --> 1.8 (10/26/2021)  · Blood cultures positive for MRSA  · Follow-up repeat blood no growth to date  · Left middle finger wound culture is growing staph aureus MRSA and Strep agalactiae (Beta Strep Group B) -sensitivities as noted above  · Vancomycin IV Pharmacy to dose  · ID on board-appreciate recommendation  · Further antibiotic recommendation as per ID  · Status post PICC line placement (10/29/2021), in anticipation of possible prolonged IV antibiotic for MRSA bacteremia. 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 twice daily  · Warfarin pharmacy dosing      Diabetes Mellitus II   Hemoglobin A1C: 7.2%   Inpatient Regimens to include;  o - Insulin Glargine (Lantus) 30 units subcu nightly  o - Insulin Lispro (Humalog) 8 units subcu pre-meal 3 times a day  o - Insulin Lispro (Humalog) on a High dose sliding scale   Monitor POC glucose, and adjust insulin regimen accordingly based on daily insulin requirement. Essential HTN  · Stable  · Losartan 100 mg p.o. daily  · Metoprolol tartrate 25 mg p.o. twice daily       Combined diastolic and systolic HF  Recent abnormal stress test  · Recent NM stress test (10/20/2021): Conclusions This is an abnormal pharmacologic nuclear stress test, positive for mid to apical and apical ischemia with no scar. A matching wall motion abnormality is noted overall LV systolic function. · No active chest pain or shortness of breath at this time. · Serial EKGs for chest pain  · Troponin < 0.01 x 3 sets  · Monitor on telemetry  · Initially planned for outpatient cardiac catheterization, as per patient  · Seen by Cardiology  · Plan for outpatient cardiac catheterization, after adequate treatment for MRSA bacteremia.         Continue management of other chronic medical conditions - see above and orders. Advance Directive: Full Code    Diet NPO         Consults Made:   IP CONSULT TO ORTHOPEDIC SURGERY  PHARMACY TO DOSE VANCOMYCIN  PHARMACY TO DOSE VANCOMYCIN  IP CONSULT TO PHARMACY  IP CONSULT TO INFECTIOUS DISEASES  IP CONSULT TO CARDIOLOGY  IP CONSULT TO SOCIAL WORK    DVT prophylaxis: Pernell Santillan to dose        Discharge planning:   Continue current management above  Status post PICC line placement (10/29/2021), in anticipation of possible prolonged IV antibiotic for MRSA bacteremia. Orthopedic surgery on board  Status post partial amputation of left middle finger (10/31/2021)    Time Spent is 25 mins in the examination, evaluation, counseling and review of medications, assessment and plan.      Electronically signed by   Rajesh Kennedy MD, MPH, MD,   Internal Medicine Hospitalist   10/31/2021 9:20 AM

## 2021-10-31 NOTE — ANESTHESIA PRE PROCEDURE
Department of Anesthesiology  Preprocedure Note       Name:  Deandra Quintero   Age:  60 y.o.  :  1965                                          MRN:  [id number]         Date:  10/31/2021      Surgeon: Salazar Tapia):  Lauren Vuong MD    Procedure: Procedure(s):  HAND INCISION AND DRAINAGE    Medications prior to admission:   Prior to Admission medications    Medication Sig Start Date End Date Taking?  Authorizing Provider   Insulin Degludec (TRESIBA FLEXTOUCH) 100 UNIT/ML SOPN Inject into the skin Currently out   Yes Historical Provider, MD   atorvastatin (LIPITOR) 20 MG tablet TAKE ONE TABLET DAILY  21  Yes Gabbi Bess MD   warfarin (COUMADIN) 10 MG tablet TAKE ONE TABLET DAILY  21  Yes NOMI Mercedes   tamsulosin (FLOMAX) 0.4 MG capsule Take 2 capsules by mouth daily 9/15/21  Yes Severo Friends, APRN - CNP   furosemide (LASIX) 20 MG tablet TAKE ONE TABLET DAILY AS NEEDED 21  Yes Gabbi Bess MD   warfarin (COUMADIN) 5 MG tablet Take 3 tabs on Thursday's and 's only 7/15/21  Yes NOMI Norman   metFORMIN (GLUCOPHAGE) 1000 MG tablet Take 1 tablet by mouth 2 times daily (with meals) 6/14/21 10/25/21 Yes Gabbi Bess MD   metoprolol tartrate (LOPRESSOR) 25 MG tablet TAKE ONE TABLET TWICE DAILY 6/10/21  Yes NOMI Mercedes   insulin lispro (HUMALOG KWIKPEN) 200 UNIT/ML SOPN pen Sliding scale 160-179 2 units  180-199 3 units  200-219 4 units  220-239 5 units  240-259 6 units   260-279 7 units  280-299 8 units  300-399 9 units  340-379 10 units  380-420 11 units 21  Yes Gabbi Bess MD   vitamin D (ERGOCALCIFEROL) 1.25 MG (59038 UT) CAPS capsule Take 50,000 Units by mouth once a week   Yes Historical Provider, MD   losartan (COZAAR) 100 MG tablet TAKE ONE TABLET DAILY 21  Yes Lucianne Koyanagi, APRN   diphenhydrAMINE (BENADRYL) 25 MG tablet Take 25 mg by mouth every 6 hours as needed for Itching   Yes Historical Provider, MD   blood glucose monitor strips Test 3 times a day & as needed for symptoms of irregular blood glucose. Dispense sufficient amount for indicated testing frequency plus additional to accommodate PRN testing needs. 8/1/21   Gabbi Bess MD   Insulin Pen Needle 32G X 4 MM MISC 1 each by Does not apply route daily 6/14/21   Gabbi Bess MD   blood glucose monitor kit and supplies Dispense sufficient amount for indicated testing frequency plus additional to accommodate PRN testing needs. 5/12/21   Gabbi Bess MD   Lancets MISC 1 each by Does not apply route 3 times daily (with meals) 5/12/21   Gabbi Bess MD   blood glucose test strips (CONTOUR NEXT TEST) strip 1 each by In Vitro route 4 times daily As needed.  11/21/19   Gabbi Bess MD       Current medications:    Current Facility-Administered Medications   Medication Dose Route Frequency Provider Last Rate Last Admin    insulin lispro (HUMALOG) injection vial 0-18 Units  0-18 Units SubCUTAneous TID WC Verna Elise MD   9 Units at 10/30/21 1833    insulin lispro (HUMALOG) injection vial 0-9 Units  0-9 Units SubCUTAneous Nightly Verna Elise MD   3 Units at 10/30/21 2131    lidocaine PF 1 % injection 5 mL  5 mL IntraDERmal Once Verna Elise MD        sodium chloride flush 0.9 % injection 5-40 mL  5-40 mL IntraVENous 2 times per day Verna Elise MD   10 mL at 10/30/21 2132    vancomycin (VANCOCIN) 1,000 mg in sodium chloride 0.9 % 250 mL IVPB  1,000 mg IntraVENous Q12H Makenzie Sanches APRN - CNP 62.5 mL/hr at 10/31/21 0614 1,000 mg at 10/31/21 7310    atorvastatin (LIPITOR) tablet 20 mg  20 mg Oral Nightly Makenzie Sanches APRN - CNP   20 mg at 10/30/21 2131    losartan (COZAAR) tablet 100 mg  100 mg Oral Daily Makenzie Sanches, APRN - CNP   100 mg at 10/30/21 0867    metoprolol tartrate (LOPRESSOR) tablet 25 mg  25 mg Oral BID Makenzie Sanches APRN - CNP   25 mg at 10/30/21 2131    tamsulosin (FLOMAX) capsule 0.8 mg  0.8 mg Oral Daily Daphine Tomlin, APRN - CNP   0.8 mg at 10/30/21 0733    vitamin D (ERGOCALCIFEROL) capsule 50,000 Units  50,000 Units Oral Weekly Daphine Tomlin, APRN - CNP   50,000 Units at 10/29/21 0833    glucose (GLUTOSE) 40 % oral gel 15 g  15 g Oral PRN Daphine Tomlin, APRN - CNP        dextrose 50 % IV solution  12.5 g IntraVENous PRN Daphine Tomlin, APRN - CNP        glucagon (rDNA) injection 1 mg  1 mg IntraMUSCular PRN Daphine Tomlin, APRN - CNP        dextrose 5 % solution  100 mL/hr IntraVENous PRN Daphine Tomlin, APRN - CNP        sodium chloride flush 0.9 % injection 5-40 mL  5-40 mL IntraVENous 2 times per day Daphine Tomlin, APRN - CNP   10 mL at 10/30/21 0823    sodium chloride flush 0.9 % injection 5-40 mL  5-40 mL IntraVENous PRN Daphine Tomlin, APRN - CNP        0.9 % sodium chloride infusion  25 mL IntraVENous PRN Daphine Tomlin, APRN - CNP        ondansetron (ZOFRAN-ODT) disintegrating tablet 4 mg  4 mg Oral Q8H PRN Daphine Tomlin, APRN - CNP        Or    ondansetron (ZOFRAN) injection 4 mg  4 mg IntraVENous Q6H PRN Daphine Tomlin, APRN - CNP        polyethylene glycol (GLYCOLAX) packet 17 g  17 g Oral Daily PRN Daphine Tomlin, APRN - CNP        acetaminophen (TYLENOL) tablet 650 mg  650 mg Oral Q6H PRN Daphine Tomlin, APRN - CNP        Or    acetaminophen (TYLENOL) suppository 650 mg  650 mg Rectal Q6H PRN Daphine Tomlin, APRN - CNP        insulin lispro (HUMALOG) injection vial 8 Units  0.08 Units/kg SubCUTAneous TID WC Daphine Tomlin, APRN - CNP   8 Units at 10/30/21 1833    vancomycin (VANCOCIN) intermittent dosing (placeholder)   Other RX Placeholder Daphine Tomlin, APRN - CNP   Given at 10/25/21 1656    warfarin (COUMADIN) daily dosing (placeholder)   Other RX Placeholder Daphine Tomlin, APRN - CNP        insulin glargine (LANTUS) injection vial 30 Units  30 Units SubCUTAneous Nightly Jin Cardozo, APRN - CNP   30 Units at 10/30/21 2131       Allergies:     Allergies   Allergen Reactions    Pcn [Penicillins]     Neomycin-Bacitracin Zn-Polymyx Rash    Neosporin [Neomycin-Polymyxin-Gramicidin] Rash       Problem List:    Patient Active Problem List   Diagnosis Code    Shortness of breath R06.02    HTN (hypertension) I10    Hyperlipidemia E78.5    Fatigue R53.83    Hyponatremia E87.1    Type 2 diabetes mellitus with diabetic polyneuropathy, with long-term current use of insulin (HCC) E11.42, Z79.4    Class 2 obesity due to excess calories in adult E66.09    Diabetic ulcer of right midfoot associated with type 2 diabetes mellitus, with fat layer exposed ([address] E11.621, L97.412    Charcot foot due to diabetes mellitus ([address] E11.610    Personal history of noncompliance with medical treatment and regimen Z91.19    Permanent atrial fibrillation (HCC) I48.21    (HFpEF) heart failure with preserved ejection fraction (HCC) I50.30    Left-sided weakness R53.1    Hypoglycemic encephalopathy E16.2    Subtherapeutic international normalized ratio (INR) R79.1    Obstructive sleep apnea G47.33    Proliferative diabetic retinopathy of both eyes without macular edema associated with type 2 diabetes mellitus ([address] A48.6192    Cellulitis L03.90    Bacteremia due to methicillin resistant Staphylococcus aureus R78.81, B95.62       Past Medical History:        Diagnosis Date    Acalculous cholecystitis 12/29/2015    Allergic rhinitis, cause unspecified     Atrial fibrillation, chronic ([address]     sees [facility]ology    Charcot's joint of right foot     Chicken pox     Closed supracondylar fracture of elbow     fall    Diabetic ulcer of right midfoot associated with type 2 diabetes mellitus, with fat layer exposed ([address] 9/14/2020    History of MRSA infection     scalp/facial and on back    HTN (hypertension)     Hyperlipidemia     Impotence of organic origin     MDRO (multiple drug resistant organisms) resistance     Other dyspnea and respiratory abnormality     Other specified erythematous condition(695.89)     Personal history of other infectious and parasitic disease     Salmonella     Type II or unspecified type diabetes mellitus without mention of complication, uncontrolled 1994       Past Surgical History:        Procedure Laterality Date    CARDIOVERSION  03/2017    CATARACT REMOVAL      COLONOSCOPY  02/24/2020    Dr Jose Fraire-Melanosis coli throughout the entire colon-AP, 5 yr recall    HUMERUS FRACTURE SURGERY Left 9/17/2020    OPEN REDUCTION INTERNAL FIXATION LEFT SUPRACONDYLAR FRACTURE [facility]      X 2    TONSILLECTOMY         Social History:    Social History     Tobacco Use    Smoking status: Never Smoker    Smokeless tobacco: Never Used   Substance Use Topics    Alcohol use: No                                Counseling given: Not Answered      Vital Signs (Current):   Vitals:    10/30/21 1127 10/30/21 1759 10/30/21 2100 10/31/21 0149   BP: 125/80 (!) 175/94  (!) 143/82   Pulse: 71 112 112 85   Resp: 18 16  16   Temp: 96.4 °F (35.8 °C) 96.6 °F (35.9 °C)  97.9 °F (36.6 °C)   TempSrc: Temporal Temporal  Temporal   SpO2: 99% 98%  98%   Weight:       Height:                                                  BP Readings from Last 3 Encounters:   10/31/21 (!) 143/82   10/25/21 130/80   10/05/21 122/68       NPO Status:                                                                                 BMI:   Wt Readings from Last 3 Encounters:   10/26/21 233 lb (105.7 kg)   10/25/21 233 lb (105.7 kg)   10/05/21 233 lb (105.7 kg)     Body mass index is 33.43 kg/m².     CBC:   Lab Results   Component Value Date    WBC 11.5 10/31/2021    RBC 3.14 10/31/2021    HGB 10.2 10/31/2021    HCT 30.5 10/31/2021    MCV 97.1 10/31/2021    RDW 12.0 10/31/2021     10/31/2021       CMP:   Lab Results Component Value Date     10/31/2021    K 4.2 10/31/2021     10/31/2021    CO2 29 10/31/2021    BUN 27 10/31/2021    CREATININE 0.8 10/31/2021    GFRAA >59 10/31/2021    LABGLOM >60 10/31/2021    GLUCOSE 151 10/31/2021    PROT 6.2 10/31/2021    CALCIUM 8.6 10/31/2021    BILITOT 0.4 10/31/2021    ALKPHOS 172 10/31/2021    AST 33 10/31/2021    ALT 44 10/31/2021       POC Tests:   Recent Labs     10/30/21  2028   POCGLU 231*       Coags:   Lab Results   Component Value Date    PROTIME 28.9 10/31/2021    PROTIME 31.6 10/05/2021    INR 2.78 10/31/2021    APTT 44.6 10/25/2021       HCG (If Applicable): No results found for: PREGTESTUR, PREGSERUM, HCG, HCGQUANT     ABGs:   Lab Results   Component Value Date    PHART 7.290 03/25/2019    PO2ART 79.0 03/25/2019    TJK1BNQ 20.0 03/25/2019    MZI6UDA 9.6 03/25/2019    BEART -14.8 03/25/2019    Z6IEWBQS 95.1 03/25/2019        Type & Screen (If Applicable):  No results found for: [facility]    Drug/Infectious Status (If Applicable):  No results found for: HIV, HEPCAB    COVID-19 Screening (If Applicable):   Lab Results   Component Value Date    COVID19 Not Detected 10/25/2021    COVID19 Not Detected 04/11/2021           Anesthesia Evaluation  Patient summary reviewed no history of anesthetic complications:   Airway: Mallampati: I  TM distance: >3 FB   Neck ROM: full  Mouth opening: > = 3 FB Dental:          Pulmonary:normal exam  breath sounds clear to auscultation  (+) sleep apnea: on noncompliant,      (-) asthma, recent URI and not a current smoker                           Cardiovascular:  Exercise tolerance: good (>4 METS),   (+) hypertension:, dysrhythmias: atrial fibrillation,     (-) pacemaker, past MI, CABG/stent and  angina      Rhythm: regular  Rate: normal  Echocardiogram reviewed         Beta Blocker:  Dose within 24 Hrs         Neuro/Psych:   (+) neuromuscular disease (Neuropathy due to DM):,    (-) seizures, TIA and CVA           GI/Hepatic/Renal: (-) GERD, liver disease and no renal disease       Endo/Other:    (+) DiabetesType II DM, using insulin, .    (-) hypothyroidism, hyperthyroidism               Abdominal:             Vascular: Other Findings:             Anesthesia Plan      general     ASA 3       Induction: intravenous. MIPS: Postoperative opioids intended and Prophylactic antiemetics administered. Anesthetic plan and risks discussed with patient. Use of blood products discussed with patient whom consented to blood products.                    Nick Bowers MD   10/31/2021

## 2021-10-31 NOTE — PLAN OF CARE
Problem: Falls - Risk of:  Goal: Will remain free from falls  Description: Will remain free from falls  10/31/2021 0506 by Briana Leslie RN  Outcome: Ongoing  10/30/2021 1736 by Lindsey Rubi RN  Outcome: Ongoing  Goal: Absence of physical injury  Description: Absence of physical injury  10/31/2021 0506 by Briana Leslie RN  Outcome: Ongoing  10/30/2021 1736 by Lindsey Rubi RN  Outcome: Ongoing     Problem: Body Temperature - Imbalanced:  Goal: Ability to maintain a body temperature in the normal range will improve  Description: Ability to maintain a body temperature in the normal range will improve  10/31/2021 0506 by Briana Leslie RN  Outcome: Ongoing  10/30/2021 1736 by Lindsey Rubi RN  Outcome: Ongoing     Problem: Mobility - Impaired:  Goal: Mobility will improve to maximum level  Description: Mobility will improve to maximum level  10/31/2021 0506 by Briana Leslie RN  Outcome: Ongoing  10/30/2021 1736 by Lindsey Rubi RN  Outcome: Ongoing     Problem: Pain:  Description: Pain management should include both nonpharmacologic and pharmacologic interventions.   Goal: Pain level will decrease  Description: Pain level will decrease  10/31/2021 0506 by Briana Leslie RN  Outcome: Ongoing  10/30/2021 1736 by Lindsey Rubi RN  Outcome: Ongoing  Goal: Control of acute pain  Description: Control of acute pain  10/31/2021 0506 by Briana Leslie RN  Outcome: Ongoing  10/30/2021 1736 by Lindsey Rubi RN  Outcome: Ongoing  Goal: Control of chronic pain  Description: Control of chronic pain  10/31/2021 0506 by Briana Leslie RN  Outcome: Ongoing  10/30/2021 1736 by Lindsey Rubi RN  Outcome: Ongoing     Problem: Skin Integrity - Impaired:  Goal: Will show no infection signs and symptoms  Description: Will show no infection signs and symptoms  10/31/2021 0506 by Briana Leslie RN  Outcome: Ongoing  10/30/2021 1736 by Lindsey Rubi RN  Outcome: Ongoing  Goal: Absence of new skin breakdown  Description: Absence of new skin breakdown  10/31/2021 0506 by Leroy Monte RN  Outcome: Ongoing  10/30/2021 1736 by Chong Toledo RN  Outcome: Ongoing     Problem: Sensory Perception - Impaired:  Goal: Ability to maintain a stable neurologic state will improve  Description: Ability to maintain a stable neurologic state will improve  10/31/2021 0506 by Leroy Monte RN  Outcome: Ongoing  10/30/2021 1736 by Chong Toledo RN  Outcome: Ongoing     Problem: Serum Glucose Level - Abnormal:  Goal: Ability to maintain appropriate glucose levels will improve  Description: Ability to maintain appropriate glucose levels will improve  10/31/2021 0506 by Leroy Monte RN  Outcome: Ongoing  10/30/2021 1736 by Chong Toledo RN  Outcome: Ongoing     Problem:  Activity:  Goal: Ability to tolerate increased activity will improve  Description: Ability to tolerate increased activity will improve  10/31/2021 0506 by Leroy Monte RN  Outcome: Ongoing  10/30/2021 1736 by Chong Toledo RN  Outcome: Ongoing  Goal: Risk for activity intolerance will decrease  Description: Risk for activity intolerance will decrease  10/31/2021 0506 by Leroy Monte RN  Outcome: Ongoing  10/30/2021 1736 by Chong Toledo RN  Outcome: Ongoing  Goal: Expression of feelings of increased energy will increase  Description: Expression of feelings of increased energy will increase  10/31/2021 0506 by Leroy Monte RN  Outcome: Ongoing  10/30/2021 1736 by Chong Toledo RN  Outcome: Ongoing  Goal: Amount of time patient spends in regular exercise will increase  10/31/2021 0506 by Leroy Monte RN  Outcome: Ongoing  10/30/2021 1736 by Chong Toledo RN  Outcome: Ongoing     Problem: Cardiac:  Goal: Hemodynamic stability will improve  Description: Hemodynamic stability will improve  10/31/2021 0506 by Leroy Monte RN  Outcome: Ongoing  10/30/2021 1736 by Pricilla Sanchez RN  Outcome: Ongoing  Goal: Complications related to the disease process, condition or treatment will be avoided or minimized  Description: Complications related to the disease process, condition or treatment will be avoided or minimized  10/31/2021 0506 by Zach Lawler RN  Outcome: Ongoing  10/30/2021 1736 by Pricilla Sanchez RN  Outcome: Ongoing  Goal: Cerebral tissue perfusion will improve  Description: Cerebral tissue perfusion will improve  10/31/2021 0506 by Zach Lawler RN  Outcome: Ongoing  10/30/2021 1736 by Pricilla Sanchez RN  Outcome: Ongoing  Goal: Ability to maintain an adequate cardiac output will improve  Description: Ability to maintain an adequate cardiac output will improve  10/31/2021 0506 by Zach Lawler RN  Outcome: Ongoing  10/30/2021 1736 by Pricilla Sanchez RN  Outcome: Ongoing     Problem: Coping:  Goal: Ability to identify and develop effective coping behavior will improve  Description: Ability to identify and develop effective coping behavior will improve  10/31/2021 0506 by Zach Lawler RN  Outcome: Ongoing  10/30/2021 1736 by Pricilla Sanchez RN  Outcome: Ongoing  Goal: Ability to adjust to condition or change in health will improve  Description: Ability to adjust to condition or change in health will improve  10/31/2021 0506 by Zach Lawler RN  Outcome: Ongoing  10/30/2021 1736 by Pricilla Sanchez RN  Outcome: Ongoing  Goal: Level of anxiety will decrease  Description: Level of anxiety will decrease  10/31/2021 0506 by Zach Lawler RN  Outcome: Ongoing  10/30/2021 1736 by Pricilla Sanchez RN  Outcome: Ongoing  Goal: General experience of comfort will improve  10/31/2021 0506 by Zach Lawler RN  Outcome: Ongoing  10/30/2021 1736 by Pricilla Sanchez RN  Outcome: Ongoing     Problem: Health Behavior:  Goal: Identification of resources available to assist in meeting health care needs will improve  Description: Identification of resources available to assist in meeting health care needs will improve  10/31/2021 0506 by Brian Box RN  Outcome: Ongoing  10/30/2021 1736 by Parminder Nugent RN  Outcome: Ongoing  Goal: Ability to identify and utilize available resources and services will improve  Description: Ability to identify and utilize available resources and services will improve  10/31/2021 0506 by Brian Box RN  Outcome: Ongoing  10/30/2021 1736 by Parminder Nugent RN  Outcome: Ongoing  Goal: Ability to manage health-related needs will improve  Description: Ability to manage health-related needs will improve  10/31/2021 0506 by Brian Box RN  Outcome: Ongoing  10/30/2021 1736 by Parminder Nugent RN  Outcome: Ongoing     Problem: Nutritional:  Goal: Ability to identify appropriate dietary choices will improve  Description: Ability to identify appropriate dietary choices will improve  10/31/2021 0506 by Brian Box RN  Outcome: Ongoing  10/30/2021 1736 by Parminder Nugent RN  Outcome: Ongoing  Goal: Maintenance of adequate nutrition will improve  Description: Maintenance of adequate nutrition will improve  10/31/2021 0506 by Brian Box RN  Outcome: Ongoing  10/30/2021 1736 by Parminder Nugent RN  Outcome: Ongoing  Goal: Progress toward achieving an optimal weight will improve  Description: Progress toward achieving an optimal weight will improve  10/31/2021 0506 by Brian Box RN  Outcome: Ongoing  10/30/2021 1736 by Parminder Nugent RN  Outcome: Ongoing  Goal: Adjustment of eating patterns to appropriate times will improve  Description: Adjustment of eating patterns to appropriate times will improve  10/31/2021 0506 by Brian Box RN  Outcome: Ongoing  10/30/2021 1736 by Parminder Nugent RN  Outcome: Ongoing  Goal: Ability to make healthy dietary choices will improve  Description: Ability to make healthy dietary choices will improve  10/31/2021 0506 by Brian Box RN  Outcome: Ongoing  10/30/2021 1736 by Parminder Nugent RN  Outcome: Ongoing  Goal: Nutritional status will improve  10/31/2021 0506 by Brian Box RN  Outcome: Ongoing  10/30/2021 1736 by Parminder Nugent RN  Outcome: Ongoing     Problem: ABCDS Injury Assessment  Goal: Absence of physical injury  10/31/2021 0506 by Brian Box RN  Outcome: Ongoing  10/30/2021 1736 by Parminder Nugent RN  Outcome: Ongoing     Problem: Fluid Volume:  Goal: Ability to maintain a balanced intake and output will improve  Description: Ability to maintain a balanced intake and output will improve  10/31/2021 0506 by Brian Box RN  Outcome: Ongoing  10/30/2021 1736 by Parminder Nugent RN  Outcome: Ongoing     Problem: Metabolic:  Goal: Ability to maintain appropriate glucose levels will improve  Description: Ability to maintain appropriate glucose levels will improve  10/31/2021 0506 by Brian Box RN  Outcome: Ongoing  10/30/2021 1736 by Parminder Nugent RN  Outcome: Ongoing     Problem: Physical Regulation:  Goal: Complications related to the disease process, condition or treatment will be avoided or minimized  Description: Complications related to the disease process, condition or treatment will be avoided or minimized  10/31/2021 0506 by Brian Box RN  Outcome: Ongoing  10/30/2021 1736 by Parminder Nugent RN  Outcome: Ongoing  Goal: Diagnostic test results will improve  Description: Diagnostic test results will improve  10/31/2021 0506 by Brian Box RN  Outcome: Ongoing  10/30/2021 1736 by Parminder Nugent RN  Outcome: Ongoing  Goal: Prevent transmision of infection  Description: Prevent transmision of infection  10/31/2021 0506 by Brian Box RN  Outcome: Ongoing  10/30/2021 1736 by Parminder Nugent RN  Outcome: Ongoing  Goal: Will remain free from infection  Description: Will remain free from infection  10/31/2021 0506 by Saritha Grider RN  Outcome: Ongoing  10/30/2021 1736 by Lorena Nunez RN  Outcome: Ongoing  Goal: Ability to maintain vital signs within normal range will improve  Description: Ability to maintain vital signs within normal range will improve  10/31/2021 0506 by Saritha Grider RN  Outcome: Ongoing  10/30/2021 1736 by Lorena Nunez RN  Outcome: Ongoing     Problem: Skin Integrity:  Goal: Risk for impaired skin integrity will decrease  Description: Risk for impaired skin integrity will decrease  10/31/2021 0506 by Saritha Grider RN  Outcome: Ongoing  10/30/2021 1736 by Lorena Nunez RN  Outcome: Ongoing  Goal: Demonstration of wound healing without infection will improve  Description: Demonstration of wound healing without infection will improve  10/31/2021 0506 by Saritha Grider RN  Outcome: Ongoing  10/30/2021 1736 by Lorena Nunez RN  Outcome: Ongoing  Goal: Complications related to intravenous access or infusion will be avoided or minimized  Description: Complications related to intravenous access or infusion will be avoided or minimized  10/31/2021 0506 by Saritha Grider RN  Outcome: Ongoing  10/30/2021 1736 by Lorena Nunez RN  Outcome: Ongoing     Problem: Tissue Perfusion:  Goal: Adequacy of tissue perfusion will improve  Description: Adequacy of tissue perfusion will improve  10/31/2021 0506 by Saritha Grider RN  Outcome: Ongoing  10/30/2021 1736 by Lorena Nunez RN  Outcome: Ongoing

## 2021-11-01 LAB
ALBUMIN SERPL-MCNC: 3.2 G/DL (ref 3.5–5.2)
ALP BLD-CCNC: 223 U/L (ref 40–130)
ALT SERPL-CCNC: 66 U/L (ref 5–41)
ANION GAP SERPL CALCULATED.3IONS-SCNC: 7 MMOL/L (ref 7–19)
AST SERPL-CCNC: 50 U/L (ref 5–40)
BASOPHILS ABSOLUTE: 0.1 K/UL (ref 0–0.2)
BASOPHILS RELATIVE PERCENT: 0.6 % (ref 0–1)
BILIRUB SERPL-MCNC: 0.5 MG/DL (ref 0.2–1.2)
BLOOD CULTURE, ROUTINE: NORMAL
BUN BLDV-MCNC: 19 MG/DL (ref 6–20)
CALCIUM SERPL-MCNC: 8.8 MG/DL (ref 8.6–10)
CHLORIDE BLD-SCNC: 100 MMOL/L (ref 98–111)
CO2: 28 MMOL/L (ref 22–29)
CREAT SERPL-MCNC: 0.8 MG/DL (ref 0.5–1.2)
CULTURE, BLOOD 2: NORMAL
EOSINOPHILS ABSOLUTE: 0.3 K/UL (ref 0–0.6)
EOSINOPHILS RELATIVE PERCENT: 3.5 % (ref 0–5)
GFR AFRICAN AMERICAN: >59
GFR NON-AFRICAN AMERICAN: >60
GLUCOSE BLD-MCNC: 162 MG/DL (ref 74–109)
GLUCOSE BLD-MCNC: 218 MG/DL (ref 70–99)
GLUCOSE BLD-MCNC: 229 MG/DL (ref 70–99)
GLUCOSE BLD-MCNC: 309 MG/DL (ref 70–99)
HCT VFR BLD CALC: 32.9 % (ref 42–52)
HEMOGLOBIN: 10.9 G/DL (ref 14–18)
IMMATURE GRANULOCYTES #: 0.2 K/UL
INR BLD: 1.96 (ref 0.88–1.18)
LYMPHOCYTES ABSOLUTE: 1.9 K/UL (ref 1.1–4.5)
LYMPHOCYTES RELATIVE PERCENT: 20 % (ref 20–40)
MCH RBC QN AUTO: 32.4 PG (ref 27–31)
MCHC RBC AUTO-ENTMCNC: 33.1 G/DL (ref 33–37)
MCV RBC AUTO: 97.9 FL (ref 80–94)
MONOCYTES ABSOLUTE: 0.9 K/UL (ref 0–0.9)
MONOCYTES RELATIVE PERCENT: 9.5 % (ref 0–10)
NEUTROPHILS ABSOLUTE: 6 K/UL (ref 1.5–7.5)
NEUTROPHILS RELATIVE PERCENT: 64.8 % (ref 50–65)
PDW BLD-RTO: 12.1 % (ref 11.5–14.5)
PERFORMED ON: ABNORMAL
PLATELET # BLD: 376 K/UL (ref 130–400)
PMV BLD AUTO: 10 FL (ref 9.4–12.4)
POTASSIUM REFLEX MAGNESIUM: 4.5 MMOL/L (ref 3.5–5)
PROTHROMBIN TIME: 22.2 SEC (ref 12–14.6)
RBC # BLD: 3.36 M/UL (ref 4.7–6.1)
SODIUM BLD-SCNC: 135 MMOL/L (ref 136–145)
TOTAL PROTEIN: 6.5 G/DL (ref 6.6–8.7)
WBC # BLD: 9.3 K/UL (ref 4.8–10.8)

## 2021-11-01 PROCEDURE — 6370000000 HC RX 637 (ALT 250 FOR IP): Performed by: ORTHOPAEDIC SURGERY

## 2021-11-01 PROCEDURE — 80053 COMPREHEN METABOLIC PANEL: CPT

## 2021-11-01 PROCEDURE — 85610 PROTHROMBIN TIME: CPT

## 2021-11-01 PROCEDURE — 36592 COLLECT BLOOD FROM PICC: CPT

## 2021-11-01 PROCEDURE — 2580000003 HC RX 258: Performed by: ORTHOPAEDIC SURGERY

## 2021-11-01 PROCEDURE — 1210000000 HC MED SURG R&B

## 2021-11-01 PROCEDURE — 85025 COMPLETE CBC W/AUTO DIFF WBC: CPT

## 2021-11-01 PROCEDURE — 6360000002 HC RX W HCPCS: Performed by: ORTHOPAEDIC SURGERY

## 2021-11-01 PROCEDURE — 6370000000 HC RX 637 (ALT 250 FOR IP): Performed by: NURSE PRACTITIONER

## 2021-11-01 PROCEDURE — 82947 ASSAY GLUCOSE BLOOD QUANT: CPT

## 2021-11-01 PROCEDURE — 36415 COLL VENOUS BLD VENIPUNCTURE: CPT

## 2021-11-01 RX ORDER — WARFARIN SODIUM 5 MG/1
5 TABLET ORAL
Status: COMPLETED | OUTPATIENT
Start: 2021-11-01 | End: 2021-11-01

## 2021-11-01 RX ADMIN — INSULIN LISPRO 6 UNITS: 100 INJECTION, SOLUTION INTRAVENOUS; SUBCUTANEOUS at 11:38

## 2021-11-01 RX ADMIN — VANCOMYCIN HYDROCHLORIDE 1000 MG: 1 INJECTION, POWDER, LYOPHILIZED, FOR SOLUTION INTRAVENOUS at 06:00

## 2021-11-01 RX ADMIN — VANCOMYCIN HYDROCHLORIDE 1000 MG: 1 INJECTION, POWDER, LYOPHILIZED, FOR SOLUTION INTRAVENOUS at 18:40

## 2021-11-01 RX ADMIN — TAMSULOSIN HYDROCHLORIDE 0.8 MG: 0.4 CAPSULE ORAL at 09:50

## 2021-11-01 RX ADMIN — SODIUM CHLORIDE, PRESERVATIVE FREE 10 ML: 5 INJECTION INTRAVENOUS at 09:51

## 2021-11-01 RX ADMIN — METOPROLOL TARTRATE 25 MG: 25 TABLET, FILM COATED ORAL at 22:22

## 2021-11-01 RX ADMIN — WARFARIN SODIUM 5 MG: 5 TABLET ORAL at 18:40

## 2021-11-01 RX ADMIN — INSULIN LISPRO 8 UNITS: 100 INJECTION, SOLUTION INTRAVENOUS; SUBCUTANEOUS at 11:37

## 2021-11-01 RX ADMIN — METOPROLOL TARTRATE 25 MG: 25 TABLET, FILM COATED ORAL at 09:50

## 2021-11-01 RX ADMIN — LOSARTAN POTASSIUM 100 MG: 50 TABLET, FILM COATED ORAL at 09:51

## 2021-11-01 RX ADMIN — INSULIN LISPRO 6 UNITS: 100 INJECTION, SOLUTION INTRAVENOUS; SUBCUTANEOUS at 22:23

## 2021-11-01 RX ADMIN — INSULIN GLARGINE 30 UNITS: 100 INJECTION, SOLUTION SUBCUTANEOUS at 22:23

## 2021-11-01 RX ADMIN — ATORVASTATIN CALCIUM 20 MG: 20 TABLET, FILM COATED ORAL at 22:22

## 2021-11-01 NOTE — PROGRESS NOTES
SUBJECTIVE     Patient alert and well without complaints. Pain well controlled left hand. OBJECTIVE    Physical    VITALS:  BP (!) 162/97   Pulse 77   Temp 97 °F (36.1 °C) (Temporal)   Resp 16   Ht 5' 10\" (1.778 m)   Wt 233 lb (105.7 kg)   SpO2 97%   BMI 33.43 kg/m²   DRAIN/TUBE OUTPUT:    none  MUSCULOSKELETAL:  Dressing is clean dry and intact. Good motion of all digits.     NEUROLOGIC:  Intact left hand    Data    CBC with Differential:    Lab Results   Component Value Date    WBC 9.3 11/01/2021    RBC 3.36 11/01/2021    HGB 10.9 11/01/2021    HCT 32.9 11/01/2021     11/01/2021    MCV 97.9 11/01/2021    MCH 32.4 11/01/2021    MCHC 33.1 11/01/2021    RDW 12.1 11/01/2021    LYMPHOPCT 20.0 11/01/2021    MONOPCT 9.5 11/01/2021    BASOPCT 0.6 11/01/2021    MONOSABS 0.90 11/01/2021    LYMPHSABS 1.9 11/01/2021    EOSABS 0.30 11/01/2021    BASOSABS 0.10 11/01/2021     CMP:    Lab Results   Component Value Date     11/01/2021    K 4.5 11/01/2021     11/01/2021    CO2 28 11/01/2021    BUN 19 11/01/2021    CREATININE 0.8 11/01/2021    GFRAA >59 11/01/2021    LABGLOM >60 11/01/2021    GLUCOSE 162 11/01/2021    PROT 6.5 11/01/2021    CALCIUM 8.8 11/01/2021    BILITOT 0.5 11/01/2021    ALKPHOS 223 11/01/2021    AST 50 11/01/2021    ALT 66 11/01/2021     BMP:    Lab Results   Component Value Date     11/01/2021    K 4.5 11/01/2021     11/01/2021    CO2 28 11/01/2021    BUN 19 11/01/2021    CREATININE 0.8 11/01/2021    CALCIUM 8.8 11/01/2021    GFRAA >59 11/01/2021    LABGLOM >60 11/01/2021    GLUCOSE 162 11/01/2021       I   Current Inpatient Medications    Current Facility-Administered Medications: insulin lispro (HUMALOG) injection vial 0-18 Units, 0-18 Units, SubCUTAneous, TID WC  insulin lispro (HUMALOG) injection vial 0-9 Units, 0-9 Units, SubCUTAneous, Nightly  sodium chloride flush 0.9 % injection 5-40 mL, 5-40 mL, IntraVENous, 2 times per day  vancomycin (VANCOCIN) 1,000 mg in sodium chloride 0.9 % 250 mL IVPB, 1,000 mg, IntraVENous, Q12H  atorvastatin (LIPITOR) tablet 20 mg, 20 mg, Oral, Nightly  losartan (COZAAR) tablet 100 mg, 100 mg, Oral, Daily  metoprolol tartrate (LOPRESSOR) tablet 25 mg, 25 mg, Oral, BID  tamsulosin (FLOMAX) capsule 0.8 mg, 0.8 mg, Oral, Daily  vitamin D (ERGOCALCIFEROL) capsule 50,000 Units, 50,000 Units, Oral, Weekly  glucose (GLUTOSE) 40 % oral gel 15 g, 15 g, Oral, PRN  dextrose 50 % IV solution, 12.5 g, IntraVENous, PRN  glucagon (rDNA) injection 1 mg, 1 mg, IntraMUSCular, PRN  dextrose 5 % solution, 100 mL/hr, IntraVENous, PRN  sodium chloride flush 0.9 % injection 5-40 mL, 5-40 mL, IntraVENous, 2 times per day  sodium chloride flush 0.9 % injection 5-40 mL, 5-40 mL, IntraVENous, PRN  0.9 % sodium chloride infusion, 25 mL, IntraVENous, PRN  ondansetron (ZOFRAN-ODT) disintegrating tablet 4 mg, 4 mg, Oral, Q8H PRN **OR** ondansetron (ZOFRAN) injection 4 mg, 4 mg, IntraVENous, Q6H PRN  polyethylene glycol (GLYCOLAX) packet 17 g, 17 g, Oral, Daily PRN  acetaminophen (TYLENOL) tablet 650 mg, 650 mg, Oral, Q6H PRN **OR** acetaminophen (TYLENOL) suppository 650 mg, 650 mg, Rectal, Q6H PRN  insulin lispro (HUMALOG) injection vial 8 Units, 0.08 Units/kg, SubCUTAneous, TID WC  vancomycin (VANCOCIN) intermittent dosing (placeholder), , Other, RX Placeholder  warfarin (COUMADIN) daily dosing (placeholder), , Other, RX Placeholder  insulin glargine (LANTUS) injection vial 30 Units, 30 Units, SubCUTAneous, Nightly    ASSESSMENT AND PLAN      Patient stable post operative. Follow-up within 7 to 10 days from surgery. Encourage elevate the operative extremity perform gentle finger range of motion. No heavy lifting or high impact activities. Maintain dressing.     Electronically signed by MANOLO Laird on 11/1/21 at 7:25 AM CDT

## 2021-11-01 NOTE — PROGRESS NOTES
Clinical Pharmacy Note    Warfarin consult follow-up    Recent Labs     11/01/21  0624   INR 1.96*     Recent Labs     10/30/21  0150 10/31/21  0430 11/01/21  0623   HGB 10.3* 10.2* 10.9*   HCT 30.8* 30.5* 32.9*    374 376       Significant Drug-Drug Interactions:  New warfarin drug-drug interactions: None  Discontinued drug-drug interactions: None    Date INR Warfarin Dose   10/25/21 1.72 15 mg   10/26/21 2.10  10 mg    10/27/21 2.63  7.5 mg    10/28/21  3.19  5 mg    10/29/21  3.54  Hold   10/30/21 4.06 Hold   10/31/21  2.78  3 mg   11/01/21 1.96 5 mg       Notes:  Give Warfarin 5 mg po x 1 tonight                     Daily PT/INR until stable within therapeutic range.      Electronically signed by LISA Spain [facility] on 11/1/2021 at 10:21 AM

## 2021-11-01 NOTE — PROGRESS NOTES
[facility] Progress Note    Patient:  Dwayne Lawson  YOB: 1965  Date of Service: 11/1/2021  MRN: [id number]   Acct: [de-identified]   Primary Care Physician: Chapin Arias MD  Advance Directive: Full Code  Admit Date: 10/25/2021       Hospital Day: 7        CHIEF COMPLAINT:     Chief Complaint   Patient presents with    Cellulitis     lef middle finger swelling        11/1/2021 7:41 AM  Subjective / Interval History:   11/01/2021  No acute changes or acute overnight event reported. Patient seen and examined this AM.  Doing well. No new complaints. Laying comfortably in bed in no apparent acute distress. Patient denies any acute complaints or distress at this time. Left middle finger postop pain control. 10/31/2021  Patient seen and examined this AM.  No new complaints. No acute changes or acute overnight event reported. Status post left middle finger amputation this AM.  Laying comfortably in bed in no apparent acute distress. Patient denies any acute complaints or distress at this time. No chest pain, shortness of breath, no dizziness, lightheadedness or palpitations. 10/30/2021  No acute changes or acute overnight event reported. Patient sitting comfortably in c bed, having breakfast, in no acute distress. Endorses overall improvement. Denies any active chest pain at this time. Left middle finger pain controlled. PICC line placed yesterday, for planned prolonged antibiotic. Patient denies any other acute complaints or distress at this time. 10/29/2021  Patient seen and examined this AM.  Doing well. No new complaints. No acute changes or acute overnight event reported. Laying comfortably in bed in no acute distress. Denies any acute complaints or distress at this time. 10/28/2021  Patient seen and examined this AM.  Doing well. No new complaints. No acute changes or acute overnight event reported.   Left middle finger swelling and redness improved. Patient reports some urinary retention. Denies any acute complaints or distress at this time. 10/27/2021   Patient seen and examined. Doing well. No new complaints. Left middle finger pain fairly controlled. Denies any fever or chills. Laying comfortably in bed no acute distress. No acute changes or acute overnight events reported. 10/26/2021  Patient seen and examined this AM.  Doing well. No new complaints. No acute changes or acute overnight event reported. Left middle finger pain fairly controlled. Laying comfortably in bed in no apparent acute distress. Denies any other acute complaints or distress at this time. No chest pain, no shortness of breath, no dizziness, lightheadedness or palpitations. Review of Systems:   Review of Systems  ROS: 14 point review of systems is negative except as specifically addressed above. ADULT DIET;  Regular; 4 carb choices (60 gm/meal)    Intake/Output Summary (Last 24 hours) at 11/1/2021 0741  Last data filed at 11/1/2021 0354  Gross per 24 hour   Intake 780 ml   Output 1700 ml   Net -920 ml       Medications:   dextrose      sodium chloride       Current Facility-Administered Medications   Medication Dose Route Frequency Provider Last Rate Last Admin    insulin lispro (HUMALOG) injection vial 0-18 Units  0-18 Units SubCUTAneous TID WC Tam Jordan MD   6 Units at 10/31/21 1822    insulin lispro (HUMALOG) injection vial 0-9 Units  0-9 Units SubCUTAneous Nightly Tam Jordan MD   5 Units at 10/31/21 2317    sodium chloride flush 0.9 % injection 5-40 mL  5-40 mL IntraVENous 2 times per day Tam Jordan MD   10 mL at 10/30/21 2132    vancomycin (VANCOCIN) 1,000 mg in sodium chloride 0.9 % 250 mL IVPB  1,000 mg IntraVENous Q12H Tam Jordan MD 62.5 mL/hr at 11/01/21 0600 1,000 mg at 11/01/21 0600    atorvastatin (LIPITOR) tablet 20 mg  20 mg Oral Nightly Tam Jordan MD   20 mg at 10/31/21 2154    losartan (COZAAR) tablet 100 Kinsey Kasper MD   30 Units at 10/31/21 2317         dextrose      sodium chloride        insulin lispro  0-18 Units SubCUTAneous TID WC    insulin lispro  0-9 Units SubCUTAneous Nightly    sodium chloride flush  5-40 mL IntraVENous 2 times per day    vancomycin  1,000 mg IntraVENous Q12H    atorvastatin  20 mg Oral Nightly    losartan  100 mg Oral Daily    metoprolol tartrate  25 mg Oral BID    tamsulosin  0.8 mg Oral Daily    vitamin D  50,000 Units Oral Weekly    sodium chloride flush  5-40 mL IntraVENous 2 times per day    insulin lispro  0.08 Units/kg SubCUTAneous TID     vancomycin (VANCOCIN) intermittent dosing (placeholder)   Other RX Placeholder    warfarin (COUMADIN) daily dosing (placeholder)   Other RX Placeholder    insulin glargine  30 Units SubCUTAneous Nightly     glucose, dextrose, glucagon (rDNA), dextrose, sodium chloride flush, sodium chloride, ondansetron **OR** ondansetron, polyethylene glycol, acetaminophen **OR** acetaminophen  ADULT DIET;  Regular; 4 carb choices (60 gm/meal)       Labs:   CBC with DIFF:  Recent Labs     10/30/21  0150 10/31/21  0430 11/01/21  0623   WBC 10.9* 11.5* 9.3   RBC 3.17* 3.14* 3.36*   HGB 10.3* 10.2* 10.9*   HCT 30.8* 30.5* 32.9*   MCV 97.2* 97.1* 97.9*   MCH 32.5* 32.5* 32.4*   MCHC 33.4 33.4 33.1   RDW 11.8 12.0 12.1    374 376   MPV 10.4 10.0 10.0   NEUTOPHILPCT 71.7* 69.2* 64.8   LYMPHOPCT 17.6* 15.4* 20.0   MONOPCT 9.1 10.7* 9.5   EOSRELPCT 0.6 3.0 3.5   BASOPCT 0.4 0.6 0.6   NEUTROABS 7.8* 7.9* 6.0   LYMPHSABS 1.9 1.8 1.9   MONOSABS 1.00* 1.20* 0.90   EOSABS 0.10 0.30 0.30   BASOSABS 0.00 0.10 0.10       CMP/BMP:  Recent Labs     10/30/21  0150 10/31/21  0430 11/01/21  0623   * 136 135*   K 4.2 4.2 4.5   CL 99 100 100   CO2 29 29 28   ANIONGAP 7 7 7   GLUCOSE 305* 151* 162*   BUN 35* 27* 19   CREATININE 0.9 0.8 0.8   LABGLOM >60 >60 >60   CALCIUM 8.7 8.6 8.8   PROT 5.8* 6.2* 6.5*   LABALBU 3.3* 3.2* 3.2*   BILITOT 0.3 0.4 0.5 ALKPHOS 152* 172* 223*   ALT 29 44* 66*   AST 22 33 50*         CRP:    No results for input(s): CRP in the last 72 hours. Sed Rate:    No results for input(s): SEDRATE in the last 72 hours. HgBA1c:  No components found for: HGBA1C  FLP:    Lab Results   Component Value Date    TRIG 104 09/27/2021    HDL 35 09/27/2021    LDLCALC 49 09/27/2021     TSH:    Lab Results   Component Value Date    TSH 1.520 09/27/2021     Troponin T:   No results for input(s): TROPONINI in the last 72 hours. Pro-BNP: No results for input(s): BNP in the last 72 hours. INR:   Recent Labs     10/30/21  1005 10/31/21  0430 11/01/21  0624   INR 4.06* 2.78* 1.96*     ABGs:   Lab Results   Component Value Date    PHART 7.290 03/25/2019    PO2ART 79.0 03/25/2019    WPQ2TPB 20.0 03/25/2019     UA:  No results for input(s): NITRITE, COLORU, PHUR, LABCAST, WBCUA, RBCUA, MUCUS, TRICHOMONAS, YEAST, BACTERIA, CLARITYU, SPECGRAV, LEUKOCYTESUR, UROBILINOGEN, BILIRUBINUR, BLOODU, GLUCOSEU, AMORPHOUS in the last 72 hours. Invalid input(s): [address] [facility]      Culture Results:    No results for input(s): CXSURG in the last 720 hours. Blood Culture Recent:   Recent Labs     10/27/21  0939 10/25/21  1300   BC No Growth to date. Any change in status will be called. Bottle volume = 8 ml   Bottle volume = 9 ml  *  Isolated from Aerobic and Anaerobic bottle  CONTACT PRECAUTIONS INDICATED  *       No results for input(s): BC, BLOODCULT2, ORG in the last 72 hours. Cultures:   No results for input(s): CULTURE in the last 72 hours. No results for input(s): BC, Tierney Miller in the last 72 hours.     Left middle finger wound culture is growing staph aureus MRSA and Strep agalactiae (Beta Strep Group B)    Staph aureus mrsa (1)  Antibiotic Interpretation ADRI Status   benzylpenicillin Resistant >=0.5 mcg/mL    clindamycin Sensitive 0.25 mcg/mL    erythromycin Sensitive <=0.25 mcg/mL    inducible clindamycin resistance  Neg mcg/mL    oxacillin Resistant >=4 mcg/mL    tetracycline Sensitive <=1 mcg/mL    trimethoprim-sulfamethoxazole Sensitive <=10 mcg/mL    vancomycin Sensitive 1 mcg/mL          No results for input(s): MG, PHOS in the last 72 hours. Recent Labs     10/30/21  0150 10/31/21  0430 11/01/21  0623   AST 22 33 50*   ALT 29 44* 66*   BILITOT 0.3 0.4 0.5   ALKPHOS 152* 172* 223*         RAD:   XR FINGER LEFT (MIN 2 VIEWS)    Result Date: 10/25/2021  History: Penetrating injury with soft tissue swelling, concern for osteomyelitis Left middle finger: 3 views left middle finger obtained. There is diffuse soft tissue swelling with no radiopaque foreign body seen within the soft tissues. Vascular calcifications. There are arthritic changes of the DIP greater than PIP joints as well as the third MCP joint. No acute fracture or dislocation. No radiographic evidence of osteomyelitis. 1. Prominent soft tissue swelling with arthritic change. No acute osseous pathology. No radiographic evidence of osteomyelitis or acute osseous injury. No radiopaque foreign body seen within the soft tissues. Signed by Dr Cisco Altamirano    Result Date: 10/20/2021  [place] Nuclear Stress Test Report Procedure date: 10/20/21 Indications: shortness of breath Procedure: Stress was performed with injection of 0.4 mg Lexiscan. Vital signs and EKG were monitored. Technetium-99 sestamibi was injected in divided doses, 9.83 mCi and 31.5 mCi respectively for rest and stress imaging. The patient was discharged in stable condition. Results: Patient had symptoms of dyspnea during infusion that resolved in recovery. Baseline EKG showed atrial fibrillation with nonspecific ST/T changes. During stress there were no significant EKG changes or rhythm changes, negative for ischemia. Baseline and peak blood pressures were 170/88, and 171/98 respectively.   Baseline and peak heart rates were 78 and  102 respectively. EF:  53% The resting and stress perfusion images were compared and reviewed in both corrected and noncorrected formats. There is a moderate size, moderately intense reversible defect noted of the anterior wall from the midportion to the apex and including the apex that persists with attenuation correction also. This is considered positive for anterior apical ischemia. Gated LV wall motion demonstrates a matching segmental area of hypokinesis of the anterior apical segment, all other wall segments contract normally, ejection fraction 53%. Conclusions This is an abnormal pharmacologic nuclear stress test, positive for mid to apical and apical ischemia with no scar. A matching wall motion abnormality is noted overall LV systolic function.  Signed by Dr Joyner Smoker      Echo:    Summary   Normal left ventricular size and systolic function EF 71-04%   Moderate concentric left ventricular hypertrophy with transmitral Doppler   suggesting severe/restrictive Curly Floro 3] diastolic dysfunction   Moderate left atrial enlargement   Nodular sclerosis of a tricuspid aortic valve with adequate cusp   separation and neither stenosis or insufficiency   Mild thickening but normally mobile mitral valve with mild posteriorly   directed regurgitation   Nonvisualization pulmonic valve   Moderate right atrial enlargement with mild right ventricular enlargement   and preserved systolic function   Mild to moderate tricuspid regurgitation with moderate pulmonary   hypertension RVSP estimate 56 mmHg   Less than 50% inspiratory collapse of the IVC consistent with mild   elevation of right atrial filling pressures of 10-15 mmHg   No significant pericardial effusion   Aortic root and ascending segments measured within normal limits   Injection of agitated saline with or without Valsalva maneuver   demonstrates no intracardiac communication   Definity contrast utilized to better define endocardial borders      Signature ----------------------------------------------------------------   Electronically signed by Jonathan Samuels MD(Interpreting physician)   on 04/15/2021 03:59 PM   ----------------------------------------------------------------        Objective:   Vitals:   BP (!) 162/97   Pulse 77   Temp 97 °F (36.1 °C) (Temporal)   Resp 16   Ht 5' 10\" (1.778 m)   Wt 233 lb (105.7 kg)   SpO2 97%   BMI 33.43 kg/m²       Patient Vitals for the past 24 hrs:   BP Temp Temp src Pulse Resp SpO2   11/01/21 0617 (!) 162/97 97 °F (36.1 °C) Temporal 77 16 97 %   11/01/21 0038 132/71 97.2 °F (36.2 °C) Temporal 93 16 96 %   10/31/21 2010 (!) 168/100 98 °F (36.7 °C) Temporal 94 18 98 %   10/31/21 1056 (!) 155/78 96.9 °F (36.1 °C) Temporal 101 16 97 %   10/31/21 1000 (!) 166/97 97 °F (36.1 °C) Temporal 104 14 98 %   10/31/21 0935 (!) 140/91 97 °F (36.1 °C) Temporal 64 11 94 %   10/31/21 0930 -- -- -- 63 -- --   10/31/21 0920 120/72 -- -- 75 15 97 %   10/31/21 0915 117/78 -- -- 62 12 96 %   10/31/21 0910 108/75 -- -- 65 15 97 %   10/31/21 0905 106/74 97 °F (36.1 °C) Temporal 61 15 99 %   10/31/21 0904 106/74 97 °F (36.1 °C) -- 74 -- 99 %       24HR INTAKE/OUTPUT:      Intake/Output Summary (Last 24 hours) at 11/1/2021 0741  Last data filed at 11/1/2021 0354  Gross per 24 hour   Intake 780 ml   Output 1700 ml   Net -920 ml       Physical Exam  Vitals and nursing note reviewed. Constitutional:       General: He is not in acute distress. Appearance: Normal appearance. He is obese. He is not ill-appearing, toxic-appearing or diaphoretic. HENT:      Head: Normocephalic and atraumatic. Right Ear: External ear normal.      Left Ear: External ear normal.      Nose: Nose normal. No congestion or rhinorrhea. Mouth/Throat:      Mouth: Mucous membranes are moist.      Pharynx: Oropharynx is clear. No oropharyngeal exudate or posterior oropharyngeal erythema. Eyes:      General: No scleral icterus. Right eye: No discharge. Staphylococcus aureus 10/27/2021 Yes    HTN (hypertension) 10/25/2021 Yes    Hyperlipidemia 10/25/2021 Yes    Type 2 diabetes mellitus with diabetic polyneuropathy, with long-term current use of insulin ([address] (Chronic) 10/25/2021 Yes    Overview Signed 2/21/2021  3:16 PM by Payam Mejia MD     Lab Results   Component Value Date    LABA1C 11.4 (H) 12/03/2020     No results found for: EAG         Permanent atrial fibrillation ([address] 10/25/2021 Yes    (HFpEF) heart failure with preserved ejection fraction ([address] 10/25/2021 Yes    Overview Signed 2/21/2021  3:12 PM by Payam Mejia MD     Structurally normal mitral valve. Mild mitral regurgitation is present. Mitral annular calcification is present. Structurally normal aortic valve. Tricuspid valve is structurally normal.   Mild tricuspid regurgitation. Normal size left atrium. Normal intact intra-atrial septum was noted with no evidence of   significant intra-atrial communications by color flow Doppler or by   agitated saline study. Normal left ventricular size with preserved LV function and an estimated   ejection fraction of approximately 55-60%. Moderate concentric left ventricular hypertrophy. No regional wall motion abnormalities. no         Obstructive sleep apnea 10/25/2021 Yes    Cellulitis 10/27/2021 Yes          Principal Problem:    Bacteremia due to methicillin resistant Staphylococcus aureus  Active Problems:    HTN (hypertension)    Hyperlipidemia    Type 2 diabetes mellitus with diabetic polyneuropathy, with long-term current use of insulin (HCC)    Permanent atrial fibrillation (HCC)    (HFpEF) heart failure with preserved ejection fraction ([address]    Obstructive sleep apnea    Cellulitis  Resolved Problems:    * No resolved hospital problems. *        Brief Summary  Mr. Goldstein, a 61-year-old male, history of HTN, HLD, atrial fibrillation, presenting to [facility] ED (10/25/2021), with complaints of right middle finger swelling, with purulent drainage. Patient reportedly cut his finger on a rusted nail on (10/22/2021), while working in his garage. Patient subsequently reportedly noted gradual redness and swelling, and now with purulent drainage from the wound. Patient initially saw his PCP outpatient, who subsequently recommended patient presented to the ED for further work-up and management    Initial work-up significant for;  Leukocytosis with a WBC of 12.5  Elevated lactic acid of 2.2  CRP of 8.92  Elevated ESR: 87 mm/Hr    X-ray left finger: Impression: Prominent soft tissue swelling with arthritic change. No acute osseous pathology. No radiographic evidence of osteomyelitis or acute osseous injury. No radiopaque foreign body seen within the soft tissues. Orthopedic surgery consulted  Patient admitted (10/25/2021), further work-up and management. Hospital course, as per problem list below; Left middle finger wound/cellulitis/infection/Oeteomyelitis  MRSA Bacteremia   · Initial lactic acid of 2.2 --> 1.8 (10/26/2021)  · Blood cultures positive for MRSA  · Follow-up repeat blood no growth to date  · Left middle finger wound culture is growing staph aureus MRSA and Strep agalactiae (Beta Strep Group B) -sensitivities as noted above  · Vancomycin IV Pharmacy to dose  · ID on board-appreciate recommendation  · Further antibiotic recommendation as per ID  · Status post PICC line placement (10/29/2021), in anticipation of possible prolonged IV antibiotic for MRSA bacteremia.   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 twice daily  · Warfarin pharmacy dosing      Diabetes Mellitus II   Hemoglobin A1C: 7.2%   Inpatient Regimens to include;  o - Insulin Glargine (Lantus) 30 units subcu nightly  o - Insulin Lispro (Humalog) 8 units subcu pre-meal 3 times a day  o - Insulin Lispro (Humalog) on a High dose sliding scale   Monitor POC glucose, and adjust insulin regimen accordingly based on daily insulin requirement. Essential HTN  · Stable  · Losartan 100 mg p.o. daily  · Metoprolol tartrate 25 mg p.o. twice daily       Combined diastolic and systolic HF  Recent abnormal stress test  · Recent NM stress test (10/20/2021): Conclusions This is an abnormal pharmacologic nuclear stress test, positive for mid to apical and apical ischemia with no scar. A matching wall motion abnormality is noted overall LV systolic function. · No active chest pain or shortness of breath at this time. · Serial EKGs for chest pain  · Troponin < 0.01 x 3 sets  · Monitor on telemetry  · Initially planned for outpatient cardiac catheterization, as per patient  · Seen by Cardiology  · Plan for outpatient cardiac catheterization, after adequate treatment for MRSA bacteremia. Continue management of other chronic medical conditions - see above and orders. Advance Directive: Full Code    ADULT DIET; Regular; 4 carb choices (60 gm/meal)         Consults Made:   IP CONSULT TO ORTHOPEDIC SURGERY  PHARMACY TO DOSE VANCOMYCIN  PHARMACY TO DOSE VANCOMYCIN  IP CONSULT TO PHARMACY  IP CONSULT TO INFECTIOUS DISEASES  IP CONSULT TO CARDIOLOGY  IP CONSULT TO SOCIAL WORK  IP CONSULT TO CASE MANAGEMENT    DVT prophylaxis: Mindy Rain to dose        Discharge planning:   Continue current management above  Status post PICC line placement (10/29/2021), in anticipation of possible prolonged IV antibiotic for MRSA bacteremia. Orthopedic surgery on board  Status post partial amputation of left middle finger (10/31/2021)  Discharge as well as home IV antibiotic therapy to approve/complete. Time Spent is 25 mins in the examination, evaluation, counseling and review of medications, assessment and plan.      Electronically signed by   Jennifer Lucia MD, MPH, MD,   Internal Medicine Hospitalist   11/1/2021 7:41 AM

## 2021-11-01 NOTE — CARE COORDINATION
Per Brian, with [facility] [phone] or [phone]; she noted that they will have his IV ABX delivered to his home by the time his morning dose is due tomorrow; Pt will need to receive his dose tonight (approx 6PM) prior to d/c if he leaves today; also, will need HH orders.  Pt's co-pay was only approx 1.30)    Electronically signed by JERAMY Mauro on 11/1/2021 at 11:15 AM

## 2021-11-01 NOTE — PLAN OF CARE
Problem: Falls - Risk of:  Goal: Will remain free from falls  Description: Will remain free from falls  11/1/2021 0418 by Lin Espinal LPN  Outcome: Ongoing  10/31/2021 1841 by Pricilla Sanchez RN  Outcome: Ongoing  Goal: Absence of physical injury  Description: Absence of physical injury  11/1/2021 0418 by Lin Espinal LPN  Outcome: Ongoing  10/31/2021 1841 by Pricilla Sanchez RN  Outcome: Ongoing     Problem: Discharge Planning:  Goal: Discharged to appropriate level of care  Description: Discharged to appropriate level of care  11/1/2021 0418 by Lin Espinal LPN  Outcome: Ongoing  10/31/2021 1841 by Pricilla Sanchez RN  Outcome: Ongoing     Problem:  Body Temperature - Imbalanced:  Goal: Ability to maintain a body temperature in the normal range will improve  Description: Ability to maintain a body temperature in the normal range will improve  11/1/2021 0418 by Lin Espinal LPN  Outcome: Ongoing  10/31/2021 1841 by Pricilla Sanchez RN  Outcome: Ongoing     Problem: Mobility - Impaired:  Goal: Mobility will improve to maximum level  Description: Mobility will improve to maximum level  11/1/2021 0418 by Lin Espinal LPN  Outcome: Ongoing  10/31/2021 1841 by Pricilla Sanchez RN  Outcome: Ongoing     Problem: Pain:  Goal: Pain level will decrease  Description: Pain level will decrease  11/1/2021 0418 by Lin Espinal LPN  Outcome: Ongoing  10/31/2021 1841 by Pricilla Sanchez RN  Outcome: Ongoing  Goal: Control of acute pain  Description: Control of acute pain  11/1/2021 0418 by Lin Espinal LPN  Outcome: Ongoing  10/31/2021 1841 by Pricilla Sanchez RN  Outcome: Ongoing  Goal: Control of chronic pain  Description: Control of chronic pain  11/1/2021 0418 by Lin Espinal LPN  Outcome: Ongoing  10/31/2021 1841 by Pricilla Sanchez RN  Outcome: Ongoing     Problem: Skin Integrity - Impaired:  Goal: Will show no infection signs and symptoms  Description: Will show no improve  Description: Hemodynamic stability will improve  11/1/2021 0418 by Pa Live LPN  Outcome: Ongoing  10/31/2021 1841 by Celine Lazar RN  Outcome: Ongoing  Goal: Complications related to the disease process, condition or treatment will be avoided or minimized  Description: Complications related to the disease process, condition or treatment will be avoided or minimized  11/1/2021 0418 by Pa Live LPN  Outcome: Ongoing  10/31/2021 1841 by Celine Lazar RN  Outcome: Ongoing  Goal: Cerebral tissue perfusion will improve  Description: Cerebral tissue perfusion will improve  11/1/2021 0418 by Pa Live LPN  Outcome: Ongoing  10/31/2021 1841 by Celine Lazar RN  Outcome: Ongoing  Goal: Ability to maintain an adequate cardiac output will improve  Description: Ability to maintain an adequate cardiac output will improve  11/1/2021 0418 by Pa Live LPN  Outcome: Ongoing  10/31/2021 1841 by Celine Lazar RN  Outcome: Ongoing     Problem: Coping:  Goal: Ability to identify and develop effective coping behavior will improve  Description: Ability to identify and develop effective coping behavior will improve  11/1/2021 0418 by Pa Live LPN  Outcome: Ongoing  10/31/2021 1841 by Celine Lazar RN  Outcome: Ongoing  Goal: Ability to adjust to condition or change in health will improve  Description: Ability to adjust to condition or change in health will improve  11/1/2021 0418 by Pa Live LPN  Outcome: Ongoing  10/31/2021 1841 by Celine Lazar RN  Outcome: Ongoing  Goal: Level of anxiety will decrease  Description: Level of anxiety will decrease  11/1/2021 0418 by Pa Live LPN  Outcome: Ongoing  10/31/2021 1841 by Celine Lazar RN  Outcome: Ongoing  Goal: General experience of comfort will improve  11/1/2021 0418 by Pa Live LPN  Outcome: Ongoing  10/31/2021 1841 by Celine Lazar RN  Outcome: Ongoing     Problem: Health Behavior:  Goal: Identification of resources available to assist in meeting health care needs will improve  Description: Identification of resources available to assist in meeting health care needs will improve  11/1/2021 0418 by Yana Cardenas LPN  Outcome: Ongoing  10/31/2021 1841 by Ed Bran RN  Outcome: Ongoing  Goal: Ability to identify and utilize available resources and services will improve  Description: Ability to identify and utilize available resources and services will improve  11/1/2021 0418 by Yana Cardenas LPN  Outcome: Ongoing  10/31/2021 1841 by Ed Bran RN  Outcome: Ongoing  Goal: Ability to manage health-related needs will improve  Description: Ability to manage health-related needs will improve  11/1/2021 0418 by Yana Cardenas LPN  Outcome: Ongoing  10/31/2021 1841 by Ed Bran RN  Outcome: Ongoing     Problem: Nutritional:  Goal: Ability to identify appropriate dietary choices will improve  Description: Ability to identify appropriate dietary choices will improve  11/1/2021 0418 by Yana Cardenas LPN  Outcome: Ongoing  10/31/2021 1841 by Ed Bran RN  Outcome: Ongoing  Goal: Maintenance of adequate nutrition will improve  Description: Maintenance of adequate nutrition will improve  11/1/2021 0418 by Yana Cardenas LPN  Outcome: Ongoing  10/31/2021 1841 by Ed Bran RN  Outcome: Ongoing  Goal: Progress toward achieving an optimal weight will improve  Description: Progress toward achieving an optimal weight will improve  11/1/2021 0418 by Yana Cardenas LPN  Outcome: Ongoing  10/31/2021 1841 by Ed Bran RN  Outcome: Ongoing  Goal: Adjustment of eating patterns to appropriate times will improve  Description: Adjustment of eating patterns to appropriate times will improve  11/1/2021 0418 by Yana Cardenas LPN  Outcome: Ongoing  10/31/2021 1841 by Ed Bran RN  Outcome: Ongoing  Goal: Ability to make healthy dietary choices will improve  Description: Ability to make healthy dietary choices will improve  11/1/2021 0418 by Isabella Bonner LPN  Outcome: Ongoing  10/31/2021 1841 by Dwayne Hill RN  Outcome: Ongoing  Goal: Nutritional status will improve  11/1/2021 0418 by Isabella Bonner LPN  Outcome: Ongoing  10/31/2021 1841 by Dwayne Hill RN  Outcome: Ongoing     Problem: ABCDS Injury Assessment  Goal: Absence of physical injury  11/1/2021 0418 by Isabella Bonner LPN  Outcome: Ongoing  10/31/2021 1841 by Dwayne Hill RN  Outcome: Ongoing     Problem: Fluid Volume:  Goal: Ability to maintain a balanced intake and output will improve  Description: Ability to maintain a balanced intake and output will improve  11/1/2021 0418 by Isabella Bonner LPN  Outcome: Ongoing  10/31/2021 1841 by Dwayne Hill RN  Outcome: Ongoing     Problem: Metabolic:  Goal: Ability to maintain appropriate glucose levels will improve  Description: Ability to maintain appropriate glucose levels will improve  11/1/2021 0418 by Isabella Bonner LPN  Outcome: Ongoing  10/31/2021 1841 by Dwayne Hill RN  Outcome: Ongoing     Problem: Physical Regulation:  Goal: Complications related to the disease process, condition or treatment will be avoided or minimized  Description: Complications related to the disease process, condition or treatment will be avoided or minimized  11/1/2021 0418 by Isabella Bonner LPN  Outcome: Ongoing  10/31/2021 1841 by Dwayne Hill RN  Outcome: Ongoing  Goal: Diagnostic test results will improve  Description: Diagnostic test results will improve  11/1/2021 0418 by Isabella Bonner LPN  Outcome: Ongoing  10/31/2021 1841 by Dwayne Hill RN  Outcome: Ongoing  Goal: Prevent transmision of infection  Description: Prevent transmision of infection  11/1/2021 0418 by Isabella Bonner LPN  Outcome: Ongoing  10/31/2021 1841 by Dwayne Hill RN  Outcome: Ongoing  Goal: Will remain free from infection  Description: Will remain free from infection  11/1/2021 0418 by Naga Estrada LPN  Outcome: Ongoing  10/31/2021 1841 by Lorena Nunez RN  Outcome: Ongoing  Goal: Ability to maintain vital signs within normal range will improve  Description: Ability to maintain vital signs within normal range will improve  11/1/2021 0418 by Naga Estrada LPN  Outcome: Ongoing  10/31/2021 1841 by Lorena Nunez RN  Outcome: Ongoing     Problem: Skin Integrity:  Goal: Risk for impaired skin integrity will decrease  Description: Risk for impaired skin integrity will decrease  11/1/2021 0418 by Naga Estrada LPN  Outcome: Ongoing  10/31/2021 1841 by Lorena Nunez RN  Outcome: Ongoing  Goal: Demonstration of wound healing without infection will improve  Description: Demonstration of wound healing without infection will improve  11/1/2021 0418 by Naga Estrada LPN  Outcome: Ongoing  10/31/2021 1841 by Lorena Nunez RN  Outcome: Ongoing  Goal: Complications related to intravenous access or infusion will be avoided or minimized  Description: Complications related to intravenous access or infusion will be avoided or minimized  11/1/2021 0418 by Naga Estrada LPN  Outcome: Ongoing  10/31/2021 1841 by Lorena Nunez RN  Outcome: Ongoing     Problem: Tissue Perfusion:  Goal: Adequacy of tissue perfusion will improve  Description: Adequacy of tissue perfusion will improve  11/1/2021 0418 by Naga Estrada LPN  Outcome: Ongoing  10/31/2021 1841 by Lorena Nunez RN  Outcome: Ongoing     Problem: Safety:  Goal: Ability to remain free from injury will improve  Description: Ability to remain free from injury will improve  Outcome: Ongoing  Goal: Will show no signs and symptoms of excessive bleeding  Description: Will show no signs and symptoms of excessive bleeding  Outcome: Ongoing     Problem: Infection - Methicillin-Resistant Staphylococcus Aureus Infection:  Goal: Absence of methicillin-resistant Staphylococcus aureus infection  Description: Absence of methicillin-resistant Staphylococcus aureus infection  Outcome: Ongoing     Problem: Self-Concept:  Goal: Ability to verbalize positive feelings about self will improve  Description: Ability to verbalize positive feelings about self will improve  Outcome: Ongoing     Problem: Respiratory:  Goal: Ability to maintain normal respiratory secretions will improve  Description: Ability to maintain normal respiratory secretions will improve  Outcome: Ongoing     Problem: SAFETY  Goal: Free from accidental physical injury  Outcome: Ongoing  Goal: Free from intentional harm  Outcome: Ongoing     Problem: DAILY CARE  Goal: Daily care needs are met  Outcome: Ongoing     Problem: PAIN  Goal: Patient's pain/discomfort is manageable  Outcome: Ongoing     Problem: SKIN INTEGRITY  Goal: Skin integrity is maintained or improved  Outcome: Ongoing     Problem: KNOWLEDGE DEFICIT  Goal: Patient/S.O. demonstrates understanding of disease process, treatment plan, medications, and discharge instructions.   Outcome: Ongoing     Problem: DISCHARGE BARRIERS  Goal: Patient's continuum of care needs are met  Outcome: Ongoing     Problem: Bleeding:  Goal: Will show no signs and symptoms of excessive bleeding  Description: Will show no signs and symptoms of excessive bleeding  Outcome: Ongoing

## 2021-11-01 NOTE — CARE COORDINATION
11/1/21: Per [facility] at [facility] they are good for Pt to receive his dose here tomorrow AM and then they will have his ABX delivered in time for his PM dose at home tomorrow evening.    Electronically signed by JERAMY Khalil on 11/1/2021 at 4:20 PM

## 2021-11-01 NOTE — CARE COORDINATION
HH referral received. Spoke with patient regarding referral.  Patient agreeable and selected [facility]. Referral called and spoke with Nancy Palomo. Referral faxed and accepted.   P. [phone]  F. [phone]  Electronically signed by Belkis Stubbs on 11/1/21 at 1:41 PM CDT

## 2021-11-02 ENCOUNTER — TELEPHONE (OUTPATIENT)
Dept: CARDIOLOGY CLINIC | Age: 56
End: 2021-11-02

## 2021-11-02 VITALS
BODY MASS INDEX: 33.36 KG/M2 | OXYGEN SATURATION: 96 % | DIASTOLIC BLOOD PRESSURE: 88 MMHG | RESPIRATION RATE: 16 BRPM | HEIGHT: 70 IN | HEART RATE: 77 BPM | SYSTOLIC BLOOD PRESSURE: 157 MMHG | WEIGHT: 233 LBS | TEMPERATURE: 97.2 F

## 2021-11-02 LAB
ALBUMIN SERPL-MCNC: 3.2 G/DL (ref 3.5–5.2)
ALP BLD-CCNC: 258 U/L (ref 40–130)
ALT SERPL-CCNC: 72 U/L (ref 5–41)
ANION GAP SERPL CALCULATED.3IONS-SCNC: 8 MMOL/L (ref 7–19)
AST SERPL-CCNC: 44 U/L (ref 5–40)
BASOPHILS ABSOLUTE: 0.1 K/UL (ref 0–0.2)
BASOPHILS RELATIVE PERCENT: 0.8 % (ref 0–1)
BILIRUB SERPL-MCNC: 0.5 MG/DL (ref 0.2–1.2)
BUN BLDV-MCNC: 18 MG/DL (ref 6–20)
CALCIUM SERPL-MCNC: 8.9 MG/DL (ref 8.6–10)
CHLORIDE BLD-SCNC: 101 MMOL/L (ref 98–111)
CO2: 27 MMOL/L (ref 22–29)
CREAT SERPL-MCNC: 0.8 MG/DL (ref 0.5–1.2)
EOSINOPHILS ABSOLUTE: 0.4 K/UL (ref 0–0.6)
EOSINOPHILS RELATIVE PERCENT: 4.4 % (ref 0–5)
GFR AFRICAN AMERICAN: >59
GFR NON-AFRICAN AMERICAN: >60
GLUCOSE BLD-MCNC: 170 MG/DL (ref 70–99)
GLUCOSE BLD-MCNC: 187 MG/DL (ref 74–109)
HCT VFR BLD CALC: 32.4 % (ref 42–52)
HEMOGLOBIN: 10.5 G/DL (ref 14–18)
IMMATURE GRANULOCYTES #: 0.2 K/UL
INR BLD: 1.62 (ref 0.88–1.18)
LYMPHOCYTES ABSOLUTE: 1.7 K/UL (ref 1.1–4.5)
LYMPHOCYTES RELATIVE PERCENT: 21 % (ref 20–40)
MCH RBC QN AUTO: 31.7 PG (ref 27–31)
MCHC RBC AUTO-ENTMCNC: 32.4 G/DL (ref 33–37)
MCV RBC AUTO: 97.9 FL (ref 80–94)
MONOCYTES ABSOLUTE: 0.8 K/UL (ref 0–0.9)
MONOCYTES RELATIVE PERCENT: 9.5 % (ref 0–10)
NEUTROPHILS ABSOLUTE: 5.1 K/UL (ref 1.5–7.5)
NEUTROPHILS RELATIVE PERCENT: 62.2 % (ref 50–65)
PDW BLD-RTO: 11.9 % (ref 11.5–14.5)
PERFORMED ON: ABNORMAL
PLATELET # BLD: 365 K/UL (ref 130–400)
PMV BLD AUTO: 9.9 FL (ref 9.4–12.4)
POTASSIUM REFLEX MAGNESIUM: 4.5 MMOL/L (ref 3.5–5)
PROTHROMBIN TIME: 19.3 SEC (ref 12–14.6)
RBC # BLD: 3.31 M/UL (ref 4.7–6.1)
SODIUM BLD-SCNC: 136 MMOL/L (ref 136–145)
TOTAL PROTEIN: 6.1 G/DL (ref 6.6–8.7)
WBC # BLD: 8.2 K/UL (ref 4.8–10.8)

## 2021-11-02 PROCEDURE — 85610 PROTHROMBIN TIME: CPT

## 2021-11-02 PROCEDURE — 80053 COMPREHEN METABOLIC PANEL: CPT

## 2021-11-02 PROCEDURE — 36592 COLLECT BLOOD FROM PICC: CPT

## 2021-11-02 PROCEDURE — 6360000002 HC RX W HCPCS: Performed by: ORTHOPAEDIC SURGERY

## 2021-11-02 PROCEDURE — 6360000002 HC RX W HCPCS: Performed by: INTERNAL MEDICINE

## 2021-11-02 PROCEDURE — 82947 ASSAY GLUCOSE BLOOD QUANT: CPT

## 2021-11-02 PROCEDURE — 2580000003 HC RX 258: Performed by: ORTHOPAEDIC SURGERY

## 2021-11-02 PROCEDURE — 6370000000 HC RX 637 (ALT 250 FOR IP): Performed by: ORTHOPAEDIC SURGERY

## 2021-11-02 PROCEDURE — 85025 COMPLETE CBC W/AUTO DIFF WBC: CPT

## 2021-11-02 RX ORDER — WARFARIN SODIUM 7.5 MG/1
7.5 TABLET ORAL DAILY
Qty: 30 TABLET | Refills: 0 | Status: ON HOLD | OUTPATIENT
Start: 2021-11-02 | End: 2021-11-23 | Stop reason: HOSPADM

## 2021-11-02 RX ADMIN — SODIUM CHLORIDE, PRESERVATIVE FREE 10 ML: 5 INJECTION INTRAVENOUS at 09:44

## 2021-11-02 RX ADMIN — INSULIN LISPRO 3 UNITS: 100 INJECTION, SOLUTION INTRAVENOUS; SUBCUTANEOUS at 09:45

## 2021-11-02 RX ADMIN — INSULIN LISPRO 8 UNITS: 100 INJECTION, SOLUTION INTRAVENOUS; SUBCUTANEOUS at 09:45

## 2021-11-02 RX ADMIN — LOSARTAN POTASSIUM 100 MG: 50 TABLET, FILM COATED ORAL at 09:44

## 2021-11-02 RX ADMIN — VANCOMYCIN HYDROCHLORIDE 1000 MG: 1 INJECTION, POWDER, LYOPHILIZED, FOR SOLUTION INTRAVENOUS at 06:29

## 2021-11-02 RX ADMIN — ENOXAPARIN SODIUM 105 MG: 120 INJECTION SUBCUTANEOUS at 11:35

## 2021-11-02 RX ADMIN — TAMSULOSIN HYDROCHLORIDE 0.8 MG: 0.4 CAPSULE ORAL at 09:44

## 2021-11-02 RX ADMIN — METOPROLOL TARTRATE 25 MG: 25 TABLET, FILM COATED ORAL at 09:44

## 2021-11-02 NOTE — DISCHARGE SUMMARY
[place], [place], [address]  DEPARTMENT OF HOSPITALIST MEDICINE    DISCHARGE SUMMARY:        Macy Heller  :  1965  MRN:  [id number]    Admit date:  10/25/2021  Discharge date:  2021      Admitting Physician:  Katrin Esparza MD    Advance Directive: Full Code    Consults Made:   IP CONSULT TO ORTHOPEDIC SURGERY  PHARMACY TO DOSE VANCOMYCIN  PHARMACY TO DOSE VANCOMYCIN  IP CONSULT TO PHARMACY  IP CONSULT TO INFECTIOUS DISEASES  IP CONSULT TO CARDIOLOGY  IP CONSULT TO SOCIAL WORK  IP CONSULT TO CASE MANAGEMENT  IP CONSULT TO HOME CARE NEEDS      Primary Care Physician:  Tamara Beck MD    Discharge Diagnoses:  Principal Problem:    Bacteremia due to methicillin resistant Staphylococcus aureus  Active Problems:    HTN (hypertension)    Hyperlipidemia    Type 2 diabetes mellitus with diabetic polyneuropathy, with long-term current use of insulin (HCC)    Permanent atrial fibrillation (HCC)    (HFpEF) heart failure with preserved ejection fraction ([facility] [address]    Obstructive sleep apnea    Cellulitis  Resolved Problems:    * No resolved hospital problems.  *          Pertinent Labs:  CBC with DIFF:  Recent Labs     10/31/21  0430 11/01/21  0623 11/02/21  0508   WBC 11.5* 9.3 8.2   RBC 3.14* 3.36* 3.31*   HGB 10.2* 10.9* 10.5*   HCT 30.5* 32.9* 32.4*   MCV 97.1* 97.9* 97.9*   MCH 32.5* 32.4* 31.7*   MCHC 33.4 33.1 32.4*   RDW 12.0 12.1 11.9    376 365   MPV 10.0 10.0 9.9   NEUTOPHILPCT 69.2* 64.8 62.2   LYMPHOPCT 15.4* 20.0 21.0   MONOPCT 10.7* 9.5 9.5   EOSRELPCT 3.0 3.5 4.4   BASOPCT 0.6 0.6 0.8   NEUTROABS 7.9* 6.0 5.1   LYMPHSABS 1.8 1.9 1.7   MONOSABS 1.20* 0.90 0.80   EOSABS 0.30 0.30 0.40   BASOSABS 0.10 0.10 0.10       CMP/BMP:  Recent Labs     10/31/21  0430 21  0508    135* 136   K 4.2 4.5 4.5    100 101   CO2 29 28 27   ANIONGAP 7 7 8   GLUCOSE 151* 162* 187*   BUN 27* 19 18   CREATININE 0.8 0.8 0.8   LABGLOM >60 >60 >60   CALCIUM 8.6 8.8 8.9 PROT 6.2* 6.5* 6.1*   LABALBU 3.2* 3.2* 3.2*   BILITOT 0.4 0.5 0.5   ALKPHOS 172* 223* 258*   ALT 44* 66* 72*   AST 33 50* 44*         CRP:  No results for input(s): CRP in the last 72 hours. Sed Rate:  No results for input(s): SEDRATE in the last 72 hours. HgBA1c:  No components found for: HGBA1C  FLP:    Lab Results   Component Value Date    TRIG 104 09/27/2021    HDL 35 09/27/2021    LDLCALC 49 09/27/2021     TSH:    Lab Results   Component Value Date    TSH 1.520 09/27/2021     Troponin T: No results for input(s): TROPONINI in the last 72 hours. Pro-BNP: No results for input(s): BNP in the last 72 hours. INR:   Recent Labs     10/31/21  0430 11/01/21  0624 11/02/21  0508   INR 2.78* 1.96* 1.62*     ABGs:   Lab Results   Component Value Date    PHART 7.290 03/25/2019    PO2ART 79.0 03/25/2019    ANB0OLT 20.0 03/25/2019     UA:No results for input(s): NITRITE, COLORU, PHUR, LABCAST, WBCUA, RBCUA, MUCUS, TRICHOMONAS, YEAST, BACTERIA, CLARITYU, SPECGRAV, LEUKOCYTESUR, UROBILINOGEN, BILIRUBINUR, BLOODU, GLUCOSEU, AMORPHOUS in the last 72 hours. Invalid input(s): Briseida [place]ws      Culture Results:    No results for input(s): CXSURG in the last 720 hours. Blood Culture Recent:   Recent Labs     10/27/21  0939 10/25/21  1300   BC No growth after 5 days of incubation. Bottle volume = 8 ml   Bottle volume = 9 ml  *  Isolated from Aerobic and Anaerobic bottle  CONTACT PRECAUTIONS INDICATED  *       Cultures:   No results for input(s): CULTURE in the last 72 hours. No results for input(s): BC, [place] in the last 72 hours. No results for input(s): CXSURG in the last 72 hours. No results for input(s): MG, PHOS in the last 72 hours.   Recent Labs     10/31/21  0430 11/01/21  0623 11/02/21  0508   AST 33 50* 44*   ALT 44* 66* 72*   BILITOT 0.4 0.5 0.5   ALKPHOS 172* 223* 258*        Left middle finger wound culture is growing staph aureus MRSA and Strep agalactiae (Beta Strep Group B)    Staph aureus mrsa (1)  Antibiotic         Interpretation   ADRI      Status    benzylpenicillin            Resistant         >=0.5   mcg/mL                         clindamycin     Sensitive         0.25     mcg/mL                         erythromycin   Sensitive         <=0.25 mcg/mL                         inducible clindamycin resistance                    Neg      mcg/mL                         oxacillin           Resistant         >=4      mcg/mL                         tetracycline      Sensitive         <=1      mcg/mL                         trimethoprim-sulfamethoxazole          Sensitive         <=10    mcg/mL                         vancomycin     Sensitive         1          mcg/mL                                Significant Diagnostic Studies:   XR FINGER LEFT (MIN 2 VIEWS)    Result Date: 10/25/2021  History: Penetrating injury with soft tissue swelling, concern for osteomyelitis Left middle finger: 3 views left middle finger obtained. There is diffuse soft tissue swelling with no radiopaque foreign body seen within the soft tissues. Vascular calcifications. There are arthritic changes of the DIP greater than PIP joints as well as the third MCP joint. No acute fracture or dislocation. No radiographic evidence of osteomyelitis. 1. Prominent soft tissue swelling with arthritic change. No acute osseous pathology. No radiographic evidence of osteomyelitis or acute osseous injury. No radiopaque foreign body seen within the soft tissues.  Signed by Dr Meryle Nay      Echo: 04/15/84149   Summary   Normal left ventricular size and systolic function EF 75-39%   Moderate concentric left ventricular hypertrophy with transmitral Doppler   suggesting severe/restrictive [grade 3] diastolic dysfunction   Moderate left atrial enlargement   Nodular sclerosis of a tricuspid aortic valve with adequate cusp   separation and neither stenosis or insufficiency   Mild thickening but normally mobile mitral valve with mild posteriorly   directed regurgitation   Nonvisualization pulmonic valve   Moderate right atrial enlargement with mild right ventricular enlargement   and preserved systolic function   Mild to moderate tricuspid regurgitation with moderate pulmonary   hypertension RVSP estimate 56 mmHg   Less than 50% inspiratory collapse of the IVC consistent with mild   elevation of right atrial filling pressures of 10-15 mmHg   No significant pericardial effusion   Aortic root and ascending segments measured within normal limits   Injection of agitated saline with or without Valsalva maneuver   demonstrates no intracardiac communication   Definity contrast utilized to better define endocardial borders      Signature    ----------------------------------------------------------------   Electronically signed by Hemalatha Salazar MD(Interpreting physician)  Thuan Parada 04/15/2021 03:59 PM   ----------------------------------------------------------------         Hospital Course:   Mr. Bee Cintron, a 57-year-old male, history of HTN, HLD, atrial fibrillation, presenting to [facility] ED (10/25/2021), with complaints of right middle finger swelling, with purulent drainage.     Patient reportedly cut his finger on a rusted nail on (10/22/2021), while working in his garage. Patient subsequently reportedly noted gradual redness and swelling, and now with purulent drainage from the wound.     Patient initially saw his PCP outpatient, who subsequently recommended patient presented to the ED for further work-up and management     Initial work-up significant for;  Leukocytosis with a WBC of 12.5  Elevated lactic acid of 2.2  CRP of 8.92  Elevated ESR: 87 mm/Hr     X-ray left finger: Impression: Prominent soft tissue swelling with arthritic change. No acute osseous pathology. No radiographic evidence of osteomyelitis or acute osseous injury.  No radiopaque foreign body seen within the soft tissues.      Orthopedic surgery consulted  Patient admitted (10/25/2021), further work-up and management. Further Hospital course, as per problem list below;     Left middle finger MRSA cellulitis/infection/tenosynovitis  MRSA Bacteremia   · Initial lactic acid of 2.2 --> 1.8 (10/26/2021)  · Blood cultures positive for MRSA  · Follow-up repeat blood no growth to date  · Left middle finger wound culture is growing staph aureus MRSA and Strep agalactiae (Beta Strep Group B) -sensitivities as noted above  · Vancomycin IV Pharmacy to dose  · ID on board-appreciate recommendation  · Further antibiotic recommendation as per ID  · Status post PICC line placement (10/29/2021), in anticipation of possible prolonged IV antibiotic for MRSA bacteremia. 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 Last dose scheduled for 11/10/2021  · Laboratory monitoring: CMP, CBC, CRP, Vanco trough every Monday or Tuesday while on antibiotic. · Follow-up with ID outpatient, 10 days after hospital discharge.     Permanent Atrial Fibrillation  · Currently rate controlled  · Metoprolol tartrate 25 mg p.o. twice daily  · Warfarin pharmacy dosing  · Initially supratherapeutic INR, however patient now with Subtherapeutic INR: 1.62 (11/02/2021)  · Therapeutic enoxaparin to be added upon discharge  · Discussed with pharmacy. Recommend 7.5 mg p.o. daily of warfarin upon discharge, while bridging with enoxaparin. · Follow-up with PCP outpatient, to continue monitoring of warfarin and possibly discontinuation of enoxaparin when INR becomes therapeutic.        Diabetes Mellitus II  · Hemoglobin A1C: 7.2%  · Inpatient Regimens to included;  ? - Insulin Glargine (Lantus) 30 units subcu nightly  ? - Insulin Lispro (Humalog) 8 units subcu pre-meal 3 times a day  ?  - Insulin Lispro (Humalog) on a High dose sliding scale  · Monitored POC glucose, and adjusted insulin regimen accordingly based on daily insulin requirement. · Resume home diabetic regimen upon discharge      Essential HTN  · Stable  · Losartan 100 mg p.o. daily  · Metoprolol tartrate 25 mg p.o. twice daily        Combined diastolic and systolic HF  Recent abnormal stress test  · Recent NM stress test (10/20/2021): Conclusions This is an abnormal pharmacologic nuclear stress test, positive for mid to apical and apical ischemia with no scar. A matching wall motion abnormality is noted overall LV systolic function. · No active chest pain or shortness of breath at this time. · Serial EKGs for chest pain  · Troponin < 0.01 x 3 sets  · Monitor on telemetry  · Initially planned for outpatient cardiac catheterization, as per patient  · Seen by Cardiology  · Plan for outpatient cardiac catheterization, after adequate treatment for MRSA bacteremia.           Continued management of other chronic medical conditions         Physical Exam:  Vital Signs: BP (!) 157/88   Pulse 77   Temp 97.2 °F (36.2 °C) (Temporal)   Resp 16   Ht 5' 10\" (1.778 m)   Wt 233 lb (105.7 kg)   SpO2 96%   BMI 33.43 kg/m²   Physical Exam  Vitals and nursing note reviewed. Constitutional:       General: He is not in acute distress. Appearance: Normal appearance. He is obese. He is not ill-appearing, toxic-appearing or diaphoretic. HENT:      Head: Normocephalic and atraumatic. Right Ear: External ear normal.      Left Ear: External ear normal.      Nose: Nose normal. No congestion or rhinorrhea. Mouth/Throat:      Mouth: Mucous membranes are moist.      Pharynx: Oropharynx is clear. No oropharyngeal exudate or posterior oropharyngeal erythema. Eyes:      General: No scleral icterus. Right eye: No discharge. Left eye: No discharge. Extraocular Movements: Extraocular movements intact.       Conjunctiva/sclera: Conjunctivae normal.      Pupils: Pupils are equal, round, and reactive to light. Cardiovascular:      Rate and Rhythm: Normal rate. Rhythm irregular. Pulses: Normal pulses. Heart sounds: Normal heart sounds. No murmur heard. No friction rub. No gallop. Pulmonary:      Effort: Pulmonary effort is normal. No respiratory distress. Breath sounds: Normal breath sounds. No stridor. No wheezing, rhonchi or rales. Chest:      Chest wall: No tenderness. Abdominal:      General: Bowel sounds are normal. There is no distension. Palpations: Abdomen is soft. Tenderness: There is no abdominal tenderness. There is no guarding or rebound. Musculoskeletal:         General: No swelling, tenderness, deformity or signs of injury. Normal range of motion. Cervical back: Normal range of motion and neck supple. No rigidity or tenderness. No muscular tenderness. Right lower leg: No edema. Left lower leg: No edema. Comments: Status post partial amputation of left middle finger  (10/31/2021)    Skin:     General: Skin is warm and dry. Capillary Refill: Capillary refill takes less than 2 seconds. Coloration: Skin is not jaundiced or pale. Findings: No bruising, erythema, lesion or rash. Neurological:      General: No focal deficit present. Mental Status: He is alert and oriented to person, place, and time. Cranial Nerves: No cranial nerve deficit. Sensory: No sensory deficit. Motor: No weakness. Coordination: Coordination normal.   Psychiatric:         Mood and Affect: Mood normal.         Behavior: Behavior normal.         Thought Content:  Thought content normal.         Judgment: Judgment normal.         Discharge Medications:       Oneida Milner   Home Medication Instructions MNS:[id number]    Printed on:11/02/21 0156   Medication Information                      atorvastatin (LIPITOR) 20 MG tablet  TAKE ONE TABLET DAILY              blood glucose monitor kit and supplies  Dispense sufficient amount for indicated testing frequency plus additional to accommodate PRN testing needs. blood glucose monitor strips  Test 3 times a day & as needed for symptoms of irregular blood glucose. Dispense sufficient amount for indicated testing frequency plus additional to accommodate PRN testing needs. blood glucose test strips (CONTOUR NEXT TEST) strip  1 each by In Vitro route 4 times daily As needed. diphenhydrAMINE (BENADRYL) 25 MG tablet  Take 25 mg by mouth every 6 hours as needed for Itching             enoxaparin (LOVENOX) 120 MG/0.8ML injection  Inject 0.73 mLs into the skin 2 times daily for 7 days             furosemide (LASIX) 20 MG tablet  TAKE ONE TABLET DAILY AS NEEDED             Insulin Degludec (TRESIBA FLEXTOUCH) 100 UNIT/ML SOPN  Inject into the skin Currently out             insulin lispro (HUMALOG KWIKPEN) 200 UNIT/ML SOPN pen  Sliding scale 160-179 2 units  180-199 3 units  200-219 4 units  220-239 5 units  240-259 6 units   260-279 7 units  280-299 8 units  300-399 9 units  340-379 10 units  380-420 11 units             Insulin Pen Needle 32G X 4 MM MISC  1 each by Does not apply route daily             Lancets MISC  1 each by Does not apply route 3 times daily (with meals)             losartan (COZAAR) 100 MG tablet  TAKE ONE TABLET DAILY             metFORMIN (GLUCOPHAGE) 1000 MG tablet  Take 1 tablet by mouth 2 times daily (with meals)             metoprolol tartrate (LOPRESSOR) 25 MG tablet  TAKE ONE TABLET TWICE DAILY             tamsulosin (FLOMAX) 0.4 MG capsule  Take 2 capsules by mouth daily             vitamin D (ERGOCALCIFEROL) 1.25 MG (76162 UT) CAPS capsule  Take 50,000 Units by mouth once a week             warfarin (COUMADIN) 7.5 MG tablet  Take 1 tablet by mouth daily               Vancomycin 1 g IV every 12 hours, for 4 weeks after negative blood culture.   Last dose scheduled for 11/10/2021    Discharge Instructions: Follow up with Karen Guadarrama MD in 7 days. Take medications as directed. Resume activity as tolerated. Diet: ADULT DIET; Regular; 4 carb choices (60 gm/meal)        DISCHARGE STATUS:    Condition: Fair  Disposition: Patient is medically stable and will be discharged Home    Extended Emergency Contact Information  Primary Emergency Contact: Ray Juárez [address] Phone: [phone]  Relation: Parent   needed? No  Secondary Emergency Contact: TristonHenry  Address: [address] [place] [address] Phone: [phone]  Mobile Phone: [phone]  Relation: Child   needed? No       Time Spent on discharge is  36 mins in the examination, evaluation, counseling and review of medications and discharge plan. Electronically signed by   Nicole Ro MD, MPH, MD,   Internal Medicine Hospitalist   11/2/2021 11:07 AM      Thank you Karen Guadarrama MD for the opportunity to be involved in this patient's care. If you have any questions or concerns please feel free to contact me at [phone]        EMR Dragon/Transcription disclaimer:   Much of this encounter note is an electronic transcription/translation of spoken language to printed text.  The electronic translation of spoken language may permit erroneous, or at times, nonsensical words or phrases to be inadvertently transcribed; although attempts have made to review the note for such errors, some may still exist.

## 2021-11-02 NOTE — PLAN OF CARE
LPN  Outcome: Ongoing  11/2/2021 0026 by Anel Reins, LPN  Outcome: Ongoing  Goal: Absence of new skin breakdown  Description: Absence of new skin breakdown  11/2/2021 0027 by Anel Reins, LPN  Outcome: Ongoing  11/2/2021 0026 by Anel Reins, LPN  Outcome: Ongoing     Problem: Serum Glucose Level - Abnormal:  Goal: Ability to maintain appropriate glucose levels will improve  Description: Ability to maintain appropriate glucose levels will improve  11/2/2021 0027 by Anel Reins, LPN  Outcome: Ongoing  11/2/2021 0026 by Anel Reins, LPN  Outcome: Ongoing     Problem: Sensory Perception - Impaired:  Goal: Ability to maintain a stable neurologic state will improve  Description: Ability to maintain a stable neurologic state will improve  11/2/2021 0027 by Anel Reins, LPN  Outcome: Ongoing  11/2/2021 0026 by Anel Reins, LPN  Outcome: Ongoing     Problem:  Activity:  Goal: Ability to tolerate increased activity will improve  Description: Ability to tolerate increased activity will improve  11/2/2021 0027 by Anel Reins, LPN  Outcome: Ongoing  11/2/2021 0026 by Anel Reins, LPN  Outcome: Ongoing  Goal: Risk for activity intolerance will decrease  Description: Risk for activity intolerance will decrease  11/2/2021 0027 by Anel Reins, LPN  Outcome: Ongoing  11/2/2021 0026 by Anel Reins, LPN  Outcome: Ongoing  Goal: Expression of feelings of increased energy will increase  Description: Expression of feelings of increased energy will increase  11/2/2021 0027 by Anel Reins, LPN  Outcome: Ongoing  11/2/2021 0026 by Anel Reins, LPN  Outcome: Ongoing  Goal: Amount of time patient spends in regular exercise will increase  11/2/2021 0027 by Anel Reins, LPN  Outcome: Ongoing  11/2/2021 0026 by Anel Reins, LPN  Outcome: Ongoing     Problem: Cardiac:  Goal: Hemodynamic stability will improve  Description: Hemodynamic stability will improve  11/2/2021 0027 by Anel Reins, LPN  Outcome: Ongoing  11/2/2021 0026 by will improve  11/2/2021 0027 by Dara Rubio LPN  Outcome: Ongoing  11/2/2021 0026 by Dara Rubio LPN  Outcome: Ongoing  Goal: Ability to identify and utilize available resources and services will improve  Description: Ability to identify and utilize available resources and services will improve  11/2/2021 0027 by Dara Rubio LPN  Outcome: Ongoing  11/2/2021 0026 by Dara Rubio LPN  Outcome: Ongoing  Goal: Ability to manage health-related needs will improve  Description: Ability to manage health-related needs will improve  11/2/2021 0027 by Dara Rubio LPN  Outcome: Ongoing  11/2/2021 0026 by Dara Rubio LPN  Outcome: Ongoing     Problem: Nutritional:  Goal: Ability to identify appropriate dietary choices will improve  Description: Ability to identify appropriate dietary choices will improve  11/2/2021 0027 by Dara Rubio LPN  Outcome: Ongoing  11/2/2021 0026 by Dara Rubio LPN  Outcome: Ongoing  Goal: Maintenance of adequate nutrition will improve  Description: Maintenance of adequate nutrition will improve  11/2/2021 0027 by Dara Rubio LPN  Outcome: Ongoing  11/2/2021 0026 by Dara Rubio LPN  Outcome: Ongoing  Goal: Progress toward achieving an optimal weight will improve  Description: Progress toward achieving an optimal weight will improve  11/2/2021 0027 by Dara Rubio LPN  Outcome: Ongoing  11/2/2021 0026 by Dara Rubio LPN  Outcome: Ongoing  Goal: Adjustment of eating patterns to appropriate times will improve  Description: Adjustment of eating patterns to appropriate times will improve  11/2/2021 0027 by Dara Rubio LPN  Outcome: Ongoing  11/2/2021 0026 by Dara Rubio LPN  Outcome: Ongoing  Goal: Ability to make healthy dietary choices will improve  Description: Ability to make healthy dietary choices will improve  11/2/2021 0027 by Dara Rubio LPN  Outcome: Ongoing  11/2/2021 0026 by Dara Rubio LPN  Outcome: Ongoing  Goal: Nutritional status will improve  11/2/2021 0027 by Dwight Ariza, LPN  Outcome: Ongoing  11/2/2021 0026 by Dwight Ariza LPN  Outcome: Ongoing     Problem: ABCDS Injury Assessment  Goal: Absence of physical injury  11/2/2021 0027 by Dwight Ariza LPN  Outcome: Ongoing  11/2/2021 0026 by Dwight Ariza LPN  Outcome: Ongoing     Problem: Fluid Volume:  Goal: Ability to maintain a balanced intake and output will improve  Description: Ability to maintain a balanced intake and output will improve  11/2/2021 0027 by Dwight Ariza LPN  Outcome: Ongoing  11/2/2021 0026 by Dwight Ariza LPN  Outcome: Ongoing     Problem: Metabolic:  Goal: Ability to maintain appropriate glucose levels will improve  Description: Ability to maintain appropriate glucose levels will improve  11/2/2021 0027 by Dwight Ariza LPN  Outcome: Ongoing  11/2/2021 0026 by Dwight Ariza LPN  Outcome: Ongoing     Problem: Physical Regulation:  Goal: Complications related to the disease process, condition or treatment will be avoided or minimized  Description: Complications related to the disease process, condition or treatment will be avoided or minimized  11/2/2021 0027 by Dwight Ariza LPN  Outcome: Ongoing  11/2/2021 0026 by Dwight Ariza LPN  Outcome: Ongoing  Goal: Diagnostic test results will improve  Description: Diagnostic test results will improve  11/2/2021 0027 by Dwight Ariza LPN  Outcome: Ongoing  11/2/2021 0026 by Dwight Ariza LPN  Outcome: Ongoing  Goal: Prevent transmision of infection  Description: Prevent transmision of infection  11/2/2021 0027 by Dwight Ariza LPN  Outcome: Ongoing  11/2/2021 0026 by Dwight Ariza LPN  Outcome: Ongoing  Goal: Will remain free from infection  Description: Will remain free from infection  11/2/2021 0027 by Dwight Ariza LPN  Outcome: Ongoing  11/2/2021 0026 by Dwight Ariza LPN  Outcome: Ongoing  Goal: Ability to maintain vital signs within normal range will improve  Description: Ability to maintain vital signs within normal range will improve  11/2/2021

## 2021-11-02 NOTE — PROGRESS NOTES
Infectious Diseases Progress Note    Patient:  Dat Berry  YOB: 1965  MRN: [id number]   Admit date: 10/25/2021   Admitting Physician: Daysi Vela MD  Primary Care Physician: Jeramy Laughlin MD    Chief Complaint/Interval History: He is tired. He is without fever. He has been hemodynamically stable. Has PICC line in place right arm. Per review of chart home IV antibiotic arrangements appear to be in place. He will get a dose of vancomycin at the hospital tomorrow morning and [facility] care will have antibiotics to his home to begin home treatment tomorrow evening. In/Out    Intake/Output Summary (Last 24 hours) at 11/1/2021 1937  Last data filed at 11/1/2021 1848  Gross per 24 hour   Intake 1620 ml   Output 3300 ml   Net -1680 ml     Allergies:    Allergies   Allergen Reactions    Pcn [Penicillins]     Neomycin-Bacitracin Zn-Polymyx Rash    Neosporin [Neomycin-Polymyxin-Gramicidin] Rash     Current Meds: insulin lispro (HUMALOG) injection vial 0-18 Units, TID WC  insulin lispro (HUMALOG) injection vial 0-9 Units, Nightly  vancomycin (VANCOCIN) 1,000 mg in sodium chloride 0.9 % 250 mL IVPB, Q12H  atorvastatin (LIPITOR) tablet 20 mg, Nightly  losartan (COZAAR) tablet 100 mg, Daily  metoprolol tartrate (LOPRESSOR) tablet 25 mg, BID  tamsulosin (FLOMAX) capsule 0.8 mg, Daily  vitamin D (ERGOCALCIFEROL) capsule 50,000 Units, Weekly  glucose (GLUTOSE) 40 % oral gel 15 g, PRN  dextrose 50 % IV solution, PRN  glucagon (rDNA) injection 1 mg, PRN  dextrose 5 % solution, PRN  sodium chloride flush 0.9 % injection 5-40 mL, 2 times per day  sodium chloride flush 0.9 % injection 5-40 mL, PRN  0.9 % sodium chloride infusion, PRN  ondansetron (ZOFRAN-ODT) disintegrating tablet 4 mg, Q8H PRN   Or  ondansetron (ZOFRAN) injection 4 mg, Q6H PRN  polyethylene glycol (GLYCOLAX) packet 17 g, Daily PRN  acetaminophen (TYLENOL) tablet 650 mg, Q6H PRN   Or  acetaminophen (TYLENOL) suppository 650 mg, Q6H PRN  insulin lispro (HUMALOG) injection vial 8 Units, TID WC  vancomycin (VANCOCIN) intermittent dosing (placeholder), RX Placeholder  warfarin (COUMADIN) daily dosing (placeholder), RX Placeholder  insulin glargine (LANTUS) injection vial 30 Units, Nightly      Review of Systems see HPI    VitalSigns:  BP (!) 160/84   Pulse 69   Temp 97.1 °F (36.2 °C)   Resp 16   Ht 5' 10\" (1.778 m)   Wt 233 lb (105.7 kg)   SpO2 99%   BMI 33.43 kg/m²      Physical Exam  Line/IV (right arm PICC) site: No erythema, warmth, induration, or tenderness. Lungs clear to auscultation without crackles  Heart without murmur  Abdomen is soft and nontender  Left middle finger with partial amputation. Dressing clean and dry    Lab Results:  CBC:   Recent Labs     10/30/21  0150 10/31/21  0430 11/01/21  0623   WBC 10.9* 11.5* 9.3   HGB 10.3* 10.2* 10.9*    374 376     BMP:  Recent Labs     10/30/21  0150 10/31/21  0430 11/01/21  0623   * 136 135*   K 4.2 4.2 4.5   CL 99 100 100   CO2 29 29 28   BUN 35* 27* 19   CREATININE 0.9 0.8 0.8   GLUCOSE 305* 151* 162*     CultureResults:  Blood cultures October 25, 2021:  Staph aureus mrsa (1)    Antibiotic Interpretation ADRI Status    benzylpenicillin Resistant >=0.5 mcg/mL     clindamycin Sensitive 0.25 mcg/mL     erythromycin Sensitive <=0.25 mcg/mL     inducible clindamycin resistance  Neg mcg/mL     oxacillin Resistant >=4 mcg/mL     tetracycline Sensitive <=1 mcg/mL     trimethoprim-sulfamethoxazole Sensitive <=10 mcg/mL     vancomycin Sensitive <=0.5 mcg/mL       Radiology:   Impression   1. The study is limited, as described above. 2. There is probable osteomyelitis involving the distal phalanx of the   third finger although complete evaluation of this area is limited. 3. Tenosynovitis involving the flexor tendon of the third finger. There is enhancement within the tendon sheath on the postcontrast   images. This could be infectious tenosynovitis.    4. Diffuse edema within the intrinsic muscles of the hand in the   metacarpal region. There is also enhancement of the muscles. The   findings are consistent with myositis which may be infectious. Signed by Dr Celia Lambert     Additional Studies Reviewed:  None    Impression:  1. MRSA bacteremia  2. Tenosynovitis/cellulitis left middle finger-underwent partial amputation left middle finger on October 31, 2021  3.   Diabetes mellitus    Recommendations:  Okay with me for discharge home on November 2, 2021 if remains stable and ready for discharge per others  Home IV antibiotic orders appear to be in place  Copy of home IV antibiotic orders outlined below    Home IV antibiotic orders:  1.  Diagnosis-MRSA bacteremia, MRSA cellulitis/tenosynovitis left middle finger, diabetes mellitus  2.  IV access-midline catheter  3.  Orthopedic surgeon-Dr. Abdulaziz Vargas  4.  Antibiotic order:  Vancomycin 1 g IV every 12 hours  Last dose of vancomycin November 10, 2021 (4 weeks after clearing blood cultures)  5.  Laboratory monitoring:  CMP, CBC, CRP, vancomycin trough every Monday or Tuesday while on antibiotic treatment  6.  Follow-up appointment 10 days after hospital discharge    Darian Muniz MD

## 2021-11-02 NOTE — TELEPHONE ENCOUNTER
Patient is scheduled for 11/23 at [address] [facility] arrival for 9AM procedure. COVID swab MUST be done at [place] [address] on 11/22 between 8AM and 12PM. You will see a metal building in the front parking lot and that is where you will go for this. You are required to have this done on this specific date regardless of vaccination status or if you were testing for covid recently then required to quarantine until day of procedure. If patient does not go as instructed for covid swab patient will be rescheduled and required to retest again regardless. If quarantine is broke patient will be rescheduled and required to retest. Will only call with results if positive. [place] at [facility] and [address] [facility] located on the first floor of [facility] on 11/23 at [address] through hospital main entrance and turn immediately to your left. Cardiac Catheterization Instructions   · Do not eat or drink anything after midnight on the night before your procedure. You can take your morning medications with a sip of water unless otherwise directed not to. · Bring a list of the names and dosages of all the medications you are taking. · Diabetic medication should be stopped two days prior: Metformin (glucophage)  · Coumadin (warfarin) should be stopped two days prior to this procedure. · You should arrange to have someone take you home rather than drive yourself. · Further plan will depend upon the result of the cardiac catheterization.       If for any reason you are unable to keep this appointment, please contact [facility], [phone], as soon as possible to reschedule.  -------------------------------------------------------------------------------------------------------------------

## 2021-11-03 ENCOUNTER — CARE COORDINATION (OUTPATIENT)
Dept: CASE MANAGEMENT | Age: 56
End: 2021-11-03

## 2021-11-03 ENCOUNTER — TELEPHONE (OUTPATIENT)
Dept: INTERNAL MEDICINE | Age: 56
End: 2021-11-03

## 2021-11-03 RX ORDER — LOSARTAN POTASSIUM 100 MG/1
TABLET ORAL
Qty: 90 TABLET | Refills: 3 | Status: SHIPPED | OUTPATIENT
Start: 2021-11-03 | End: 2021-11-09 | Stop reason: SDUPTHER

## 2021-11-03 NOTE — CARE COORDINATION
[facility] Transitions Initial Follow Up Call    Call within 2 business days of discharge: Yes    Patient: Matthew Kline Patient : 1965   MRN: [id number]  Reason for Admission:   Discharge Date: 21 RARS: Readmission Risk Score: 14.9      Last Discharge [address] [place] [address]       Complaint Diagnosis Description Type Department Provider    10/25/21 Cellulitis Wound infection . .. ED to Hosp-Admission (Discharged) (ADMITTED) L MED SURG Sandra Mohamud MD; Mark How. .. Spoke with: N/A    Facility: [place] [address]    Non-face-to-face services provided:  Reviewed encounter information for continuity of care prior to follow up phone call - chart notes, consults, progress notes, test results, med list, appointments, AVS, other information. Care Transitions 24 Hour Call    Do you have all of your prescriptions and are they filled?: Yes  Patient DME: Straight cane  Do you have support at home?: Child, Alone  Are you an active caregiver in your home?: No  Care Transitions Interventions         Follow Up : Attempted to make contact with Alfredo Martin for an initial follow up call post discharge from the hospital without success. Unable to leave a message regarding intent of call and call back information. Will try again my next business day.      Future Appointments   Date Time Provider Chris Camarena   2021 10:15 AM NOMI Amador N [facility]   2021  9:00 AM [facility] CATH LAB SCHEDULE [facility]   1/3/2022 10:30 AM MD ABDULLAHI Wilkerson [facility]   3/15/2022 11:00 AM NOMI Villanueva - CNP N LPS [facility]   2022 10:15 AM DO MAGNUS Marc [facility]       Sapphire Laird RN

## 2021-11-03 NOTE — TELEPHONE ENCOUNTER
I attempted to reach Mr. Goldstein to schedule a hospital follow up to see Dr. Kathi Tena next week, no answer. I was unable to leave a message with intent of my call. She has an opening Tuesday the 9th at 10:30 am I have held for him. If I am unable to reach him I will cancel the appointment.

## 2021-11-04 ENCOUNTER — ANTI-COAG VISIT (OUTPATIENT)
Dept: CARDIOLOGY CLINIC | Age: 56
End: 2021-11-04
Payer: MEDICARE

## 2021-11-04 ENCOUNTER — CARE COORDINATION (OUTPATIENT)
Dept: CASE MANAGEMENT | Age: 56
End: 2021-11-04

## 2021-11-04 ENCOUNTER — TELEPHONE (OUTPATIENT)
Dept: INTERNAL MEDICINE | Age: 56
End: 2021-11-04

## 2021-11-04 DIAGNOSIS — I48.21 PERMANENT ATRIAL FIBRILLATION (HCC): Primary | ICD-10-CM

## 2021-11-04 LAB
INTERNATIONAL NORMALIZATION RATIO, POC: 1.5
PROTHROMBIN TIME, POC: NORMAL

## 2021-11-04 PROCEDURE — 85610 PROTHROMBIN TIME: CPT | Performed by: NURSE PRACTITIONER

## 2021-11-04 RX ORDER — CLONIDINE HYDROCHLORIDE 0.1 MG/1
0.1 TABLET ORAL 2 TIMES DAILY
Qty: 60 TABLET | Refills: 0 | Status: SHIPPED | OUTPATIENT
Start: 2021-11-04 | End: 2021-11-09 | Stop reason: SDUPTHER

## 2021-11-04 NOTE — TELEPHONE ENCOUNTER
Sent in the clonidiene and LM for pt to call us back to try to get him in tomorrow and with the instructions to take the clonidiene if his BP is higher than 160/100. Will also send to Dr Gem Anna to see if she has any other changes.

## 2021-11-04 NOTE — TELEPHONE ENCOUNTER
Can he get in to see Dr. Saira Brunson tomorrow? Send in Clonidine 0.1 mg po BID prn for BP greater than 160/100.

## 2021-11-04 NOTE — TELEPHONE ENCOUNTER
Home health called and said that the patients BP is 160/100 is he ok to monitor for several readings and let us know?

## 2021-11-04 NOTE — TELEPHONE ENCOUNTER
Called Renu at [facility] for her to call us back. Dr Demetrio Bloom needs to speak to her when she calls back.

## 2021-11-04 NOTE — CARE COORDINATION
[facility] Transitions Initial Follow Up Call    Call within 2 business days of discharge: Yes    Patient: Jb Simms Patient : 1965   MRN: [id number]  Reason for Admission:   Discharge Date: 21 RARS: Readmission Risk Score: 14.9      Last Discharge [facility]       Complaint Diagnosis Description Type Department Provider    10/25/21 Cellulitis Wound infection . .. ED to Hosp-Admission (Discharged) (ADMITTED) L MED SURG Jennifer Lucia MD; Justin Smith. .. Spoke with: ALMA    Facility: [place] [address]    Non-face-to-face services provided:  Reviewed encounter information for continuity of care prior to follow up phone call - chart notes, consults, progress notes, test results, med list, appointments, AVS, other information. Care Transitions 24 Hour Call    Do you have all of your prescriptions and are they filled?: Yes  Patient DME: Straight cane  Do you have support at home?: Child, Alone  Are you an active caregiver in your home?: No  Care Transitions Interventions         Follow Up : Attempt #2 to make contact with Jam Ness for an initial follow up call post discharge from the hospital without success. Unable to leave a message regarding intent of call and call back information. Will discharge from CTN services at this time due to inability to make contact.     Future Appointments   Date Time Provider Chris Camarena   2021  1:15 PM Marialuisa Ng MD [facility]   2021  3:00 PM NOMI Luu [facility]   2021  9:00 AM L CATH LAB SCHEDULE [facility]ATH [facility]   1/3/2022 10:30 AM Gabbi Rangel MD [facility]   3/15/2022 11:00 AM NOMI Meyer - CNP N LPS [facility]   2022 10:15 AM DO MAGNUS Bruner LPS [facility]       Alexandro Dodson RN

## 2021-11-05 ENCOUNTER — TELEPHONE (OUTPATIENT)
Dept: INTERNAL MEDICINE | Age: 56
End: 2021-11-05

## 2021-11-05 NOTE — TELEPHONE ENCOUNTER
Spoke to the pt and he said that his BP has started to run higher and that it had been high in the hospital to but they did not start him on anything. He has already picked up the clonidine. He said that he can not come in today he already had two other apts. Pt does have a way to check his pressure and he is going to keep track of it and bring that with him to the hospital follow up on Monday. Pt has not checked his pressure today and wont be back home til after lunch. Pt understands not to take that medication unless his blood pressure is elevated and to make sure he brings his readings with him so that Dr Julia Banda can see if we need to adjust his medications.

## 2021-11-08 ENCOUNTER — OFFICE VISIT (OUTPATIENT)
Dept: INTERNAL MEDICINE | Age: 56
End: 2021-11-08
Payer: MEDICARE

## 2021-11-08 ENCOUNTER — ANTI-COAG VISIT (OUTPATIENT)
Dept: CARDIOLOGY CLINIC | Age: 56
End: 2021-11-08
Payer: MEDICARE

## 2021-11-08 ENCOUNTER — TELEPHONE (OUTPATIENT)
Dept: INTERNAL MEDICINE | Age: 56
End: 2021-11-08

## 2021-11-08 VITALS
WEIGHT: 238.8 LBS | OXYGEN SATURATION: 98 % | SYSTOLIC BLOOD PRESSURE: 140 MMHG | BODY MASS INDEX: 34.19 KG/M2 | HEART RATE: 63 BPM | HEIGHT: 70 IN | DIASTOLIC BLOOD PRESSURE: 88 MMHG

## 2021-11-08 DIAGNOSIS — I50.30 HEART FAILURE WITH PRESERVED EJECTION FRACTION, UNSPECIFIED HF CHRONICITY (HCC): ICD-10-CM

## 2021-11-08 DIAGNOSIS — E11.42 TYPE 2 DIABETES MELLITUS WITH DIABETIC POLYNEUROPATHY, WITH LONG-TERM CURRENT USE OF INSULIN (HCC): Chronic | ICD-10-CM

## 2021-11-08 DIAGNOSIS — L97.412 DIABETIC ULCER OF RIGHT MIDFOOT ASSOCIATED WITH TYPE 2 DIABETES MELLITUS, WITH FAT LAYER EXPOSED (HCC): ICD-10-CM

## 2021-11-08 DIAGNOSIS — E78.2 MIXED HYPERLIPIDEMIA: ICD-10-CM

## 2021-11-08 DIAGNOSIS — E11.621 DIABETIC ULCER OF RIGHT MIDFOOT ASSOCIATED WITH TYPE 2 DIABETES MELLITUS, WITH FAT LAYER EXPOSED (HCC): ICD-10-CM

## 2021-11-08 DIAGNOSIS — I48.21 PERMANENT ATRIAL FIBRILLATION (HCC): ICD-10-CM

## 2021-11-08 DIAGNOSIS — I10 PRIMARY HYPERTENSION: ICD-10-CM

## 2021-11-08 DIAGNOSIS — S68.113A AMPUTATION OF LEFT MIDDLE FINGER: ICD-10-CM

## 2021-11-08 DIAGNOSIS — Z79.4 TYPE 2 DIABETES MELLITUS WITH DIABETIC POLYNEUROPATHY, WITH LONG-TERM CURRENT USE OF INSULIN (HCC): Chronic | ICD-10-CM

## 2021-11-08 DIAGNOSIS — I10 ESSENTIAL HYPERTENSION: ICD-10-CM

## 2021-11-08 DIAGNOSIS — I48.21 PERMANENT ATRIAL FIBRILLATION (HCC): Primary | ICD-10-CM

## 2021-11-08 LAB
ALBUMIN SERPL-MCNC: 3.8 G/DL (ref 3.5–5.2)
ALP BLD-CCNC: 257 U/L (ref 40–130)
ALT SERPL-CCNC: 58 U/L (ref 5–41)
ANION GAP SERPL CALCULATED.3IONS-SCNC: 12 MMOL/L (ref 7–19)
AST SERPL-CCNC: 33 U/L (ref 5–40)
BILIRUB SERPL-MCNC: 0.5 MG/DL (ref 0.2–1.2)
BILIRUBIN DIRECT: 0.3 MG/DL (ref 0–0.3)
BILIRUBIN, INDIRECT: 0.2 MG/DL (ref 0.1–1)
BUN BLDV-MCNC: 12 MG/DL (ref 6–20)
CALCIUM SERPL-MCNC: 9.4 MG/DL (ref 8.6–10)
CHLORIDE BLD-SCNC: 98 MMOL/L (ref 98–111)
CHOLESTEROL, TOTAL: 111 MG/DL (ref 160–199)
CO2: 26 MMOL/L (ref 22–29)
CREAT SERPL-MCNC: 0.9 MG/DL (ref 0.5–1.2)
GFR AFRICAN AMERICAN: >59
GFR NON-AFRICAN AMERICAN: >60
GLUCOSE BLD-MCNC: 231 MG/DL (ref 74–109)
HBA1C MFR BLD: 8.5 % (ref 4–6)
HCT VFR BLD CALC: 33.7 % (ref 42–52)
HDLC SERPL-MCNC: 32 MG/DL (ref 55–121)
HEMOGLOBIN: 10.9 G/DL (ref 14–18)
INTERNATIONAL NORMALIZATION RATIO, POC: 2.9
LDL CHOLESTEROL CALCULATED: 60 MG/DL
MCH RBC QN AUTO: 32.3 PG (ref 27–31)
MCHC RBC AUTO-ENTMCNC: 32.3 G/DL (ref 33–37)
MCV RBC AUTO: 100 FL (ref 80–94)
PDW BLD-RTO: 12.8 % (ref 11.5–14.5)
PLATELET # BLD: 337 K/UL (ref 130–400)
PMV BLD AUTO: 10.2 FL (ref 9.4–12.4)
POTASSIUM SERPL-SCNC: 4 MMOL/L (ref 3.5–5)
PROTHROMBIN TIME, POC: 32.2
RBC # BLD: 3.37 M/UL (ref 4.7–6.1)
SODIUM BLD-SCNC: 136 MMOL/L (ref 136–145)
TOTAL PROTEIN: 7.5 G/DL (ref 6.6–8.7)
TRIGL SERPL-MCNC: 93 MG/DL (ref 0–149)
TSH SERPL DL<=0.05 MIU/L-ACNC: 1.82 UIU/ML (ref 0.27–4.2)
WBC # BLD: 7.7 K/UL (ref 4.8–10.8)

## 2021-11-08 PROCEDURE — 99495 TRANSJ CARE MGMT MOD F2F 14D: CPT | Performed by: INTERNAL MEDICINE

## 2021-11-08 PROCEDURE — 1111F DSCHRG MED/CURRENT MED MERGE: CPT | Performed by: INTERNAL MEDICINE

## 2021-11-08 PROCEDURE — 85610 PROTHROMBIN TIME: CPT | Performed by: NURSE PRACTITIONER

## 2021-11-08 ASSESSMENT — PATIENT HEALTH QUESTIONNAIRE - PHQ9
SUM OF ALL RESPONSES TO PHQ QUESTIONS 1-9: 0
SUM OF ALL RESPONSES TO PHQ9 QUESTIONS 1 & 2: 0
2. FEELING DOWN, DEPRESSED OR HOPELESS: 0
SUM OF ALL RESPONSES TO PHQ QUESTIONS 1-9: 0
1. LITTLE INTEREST OR PLEASURE IN DOING THINGS: 0
SUM OF ALL RESPONSES TO PHQ QUESTIONS 1-9: 0

## 2021-11-08 NOTE — PROGRESS NOTES
Pt's INR is 2.9, which is in range. Pt will continue his normal dose and recheck in about 4 weeks, when he returns to the office after his heart cath with Dr. Bessy Guerra. Pt voiced his understanding.

## 2021-11-08 NOTE — PATIENT INSTRUCTIONS
Scheduled for 11/23 at [address] [place] [address] arrival for 9AM procedure. COVID swab MUST be done at [place] [address] on 11/22 between 8AM and 12PM. You will see a metal building in the front parking lot and that is where you will go for this. You are required to have this done on this specific date regardless of vaccination status or if you were testing for covid recently then required to quarantine until day of procedure. If you do not go as instructed for covid swab you will be rescheduled and required to retest again regardless. If quarantine is broke you will be rescheduled and required to retest. Will only call with results if positive. [place] [facility] and [address] [facility] located on the first floor of [place] [address] through hospital main entrance and turn immediately to your left. Allergies:  Pcn [penicillins], Neomycin-bacitracin zn-polymyx, and Neosporin [neomycin-polymyxin-gramicidin]   Contact number:  [phone] (home)     Cardiac Catheterization Instructions   · Do not eat or drink anything after midnight on the night before your procedure. You can take your morning medications with a sip of water unless otherwise directed not to. · Bring a list of the names and dosages of all the medications you are taking. · Diabetic medication should be stopped two days prior: Metformin (glucophage)  · Coumadin (warfarin) should be stopped two days prior to this procedure. · You should arrange to have someone take you home rather than drive yourself. · Further plan will depend upon the result of the cardiac catheterization.       If for any reason you are unable to keep this appointment, please contact [facility], [phone], as soon as possible to reschedule.  -------------------------------------------------------------------------------------------

## 2021-11-08 NOTE — TELEPHONE ENCOUNTER
Giovanni Smith from [facility] called she is there this morning and stated that his BP this morning is 188/92. She is also trying to get his labs but has not been able to get them he keeps clotting off. I let her know to just have him come in early to get his labs done before his apt today. She VU.

## 2021-11-08 NOTE — TELEPHONE ENCOUNTER
----- Message from [address] sent at 11/5/2021  4:58 PM CDT -----  Subject: Message to Provider    QUESTIONS  Information for Provider? Serge Murray, called office after hours to let patients   dr know his BP which was 162/90. If you need to contact Serge Murray, her number   is [phone]  ---------------------------------------------------------------------------  --------------  CALL BACK INFO  What is the best way for the office to contact you? OK to leave message on   voicemail  Preferred Call Back Phone Number? [phone]  ---------------------------------------------------------------------------  --------------  SCRIPT ANSWERS  Relationship to Patient? Third Party  Representative Name?  Serge Murray

## 2021-11-09 PROBLEM — S68.113A AMPUTATION OF LEFT MIDDLE FINGER: Status: ACTIVE | Noted: 2021-11-09

## 2021-11-09 RX ORDER — LOSARTAN POTASSIUM 100 MG/1
TABLET ORAL
Qty: 90 TABLET | Refills: 3
Start: 2021-11-09 | End: 2021-12-13

## 2021-11-09 RX ORDER — CLONIDINE HYDROCHLORIDE 0.1 MG/1
0.1 TABLET ORAL 2 TIMES DAILY
Qty: 60 TABLET | Refills: 0 | Status: ON HOLD
Start: 2021-11-09 | End: 2021-12-18 | Stop reason: HOSPADM

## 2021-11-09 RX ORDER — FUROSEMIDE 20 MG/1
TABLET ORAL
Qty: 90 TABLET | Refills: 3
Start: 2021-11-09 | End: 2021-12-13

## 2021-11-09 ASSESSMENT — ENCOUNTER SYMPTOMS
BLOOD IN STOOL: 0
CHEST TIGHTNESS: 0
SORE THROAT: 0
BACK PAIN: 0
WHEEZING: 0
CONSTIPATION: 0
ABDOMINAL PAIN: 0
STRIDOR: 0
DIARRHEA: 0
COLOR CHANGE: 0
COUGH: 0
SHORTNESS OF BREATH: 0
TROUBLE SWALLOWING: 0

## 2021-11-09 NOTE — ASSESSMENT & PLAN NOTE
Well-controlled, continue current medications, medication adherence emphasized and lifestyle modifications recommended   On and IV pic line, managed by ID and Orthopedics  He also has home care nurse attending to him

## 2021-11-09 NOTE — PROGRESS NOTES
(Office Visit) with Tamara Beck MD   Medication Sig Dispense Refill    cloNIDine (CATAPRES) 0.1 MG tablet Take 1 tablet by mouth 2 times daily 60 tablet 0    losartan (COZAAR) 100 MG tablet TAKE ONE TABLET DAILY  90 tablet 3    enoxaparin (LOVENOX) 120 MG/0.8ML injection Inject 0.73 mLs into the skin 2 times daily for 7 days 10.22 mL 0    warfarin (COUMADIN) 7.5 MG tablet Take 1 tablet by mouth daily 30 tablet 0    Insulin Degludec (TRESIBA FLEXTOUCH) 100 UNIT/ML SOPN Inject into the skin Currently out      atorvastatin (LIPITOR) 20 MG tablet TAKE ONE TABLET DAILY  90 tablet 1    tamsulosin (FLOMAX) 0.4 MG capsule Take 2 capsules by mouth daily 60 capsule 11    blood glucose monitor strips Test 3 times a day & as needed for symptoms of irregular blood glucose. Dispense sufficient amount for indicated testing frequency plus additional to accommodate PRN testing needs. 200 strip 3    furosemide (LASIX) 20 MG tablet TAKE ONE TABLET DAILY AS NEEDED 90 tablet 3    Insulin Pen Needle 32G X 4 MM MISC 1 each by Does not apply route daily 100 each 3    metFORMIN (GLUCOPHAGE) 1000 MG tablet Take 1 tablet by mouth 2 times daily (with meals) 180 tablet 3    metoprolol tartrate (LOPRESSOR) 25 MG tablet TAKE ONE TABLET TWICE DAILY 60 tablet 5    blood glucose monitor kit and supplies Dispense sufficient amount for indicated testing frequency plus additional to accommodate PRN testing needs.  1 kit 0    Lancets MISC 1 each by Does not apply route 3 times daily (with meals) 300 each 3    insulin lispro (HUMALOG KWIKPEN) 200 UNIT/ML SOPN pen Sliding scale 160-179 2 units  180-199 3 units  200-219 4 units  220-239 5 units  240-259 6 units   260-279 7 units  280-299 8 units  300-399 9 units  340-379 10 units  380-420 11 units 3 pen 5    vitamin D (ERGOCALCIFEROL) 1.25 MG (72063 UT) CAPS capsule Take 50,000 Units by mouth once a week      diphenhydrAMINE (BENADRYL) 25 MG tablet Take 25 mg by mouth every 6 hours as needed for Itching      blood glucose test strips (CONTOUR NEXT TEST) strip 1 each by In Vitro route 4 times daily As needed. 200 each 3        Medications patient taking as of now reconciled against medications ordered at time of hospital discharge: Yes    Chief Complaint   Patient presents with   [address] from Hospital       HPI  -year-old male presents for follow-up visit  Patient was recently admitted due to left middle finger infection status post partial amputation of left middle finger on IV antibiotics through PICC line managed by home care infectious disease and orthopedics  Since home patient has developed elevated blood pressure and has been started on clonidine 0.1 twice daily  Patient is known to have poorly controlled diabetes had improved somewhat and has complications such as diabetic eye disease Charcot foot history of foot ulcerations and visual impairment now living with mother doing much better  Preserved ejection fraction heart failure with permanent atrial fibrillation managed by cardiology  Hyperlipidemia in good control  Patient is here for follow-up offers no complaints    Inpatient course: Discharge summary reviewed- see chart. Interval history/Current status:     Review of Systems   Constitutional: Negative for activity change, appetite change, chills, diaphoresis, fatigue and fever. HENT: Negative for congestion, hearing loss, postnasal drip, sore throat, tinnitus and trouble swallowing. Eyes: Positive for visual disturbance (says he has blurred vision). Respiratory: Negative for cough, chest tightness, shortness of breath, wheezing and stridor. Cardiovascular: Negative for chest pain, palpitations and leg swelling. Gastrointestinal: Negative for abdominal pain, blood in stool, constipation and diarrhea. Endocrine: Negative for cold intolerance. Genitourinary: Negative for frequency. Musculoskeletal: Negative for arthralgias, back pain and joint swelling. cervical adenopathy. Skin:     General: Skin is warm and dry. Findings: No rash. Neurological:      Mental Status: He is alert and oriented to person, place, and time. Mental status is at baseline. Cranial Nerves: No cranial nerve deficit. Deep Tendon Reflexes: Reflexes normal.   Psychiatric:         Mood and Affect: Mood is depressed. Speech: Speech is delayed. Behavior: Behavior is slowed and withdrawn. Thought Content: Thought content normal.         Judgment: Judgment normal.             Assessment/Plan:  There are no diagnoses linked to this encounter.     Problem List        Circulatory    Permanent atrial fibrillation (HCC)      Monitored by specialist- no acute findings meriting change in the plan         Relevant Medications    enoxaparin (LOVENOX) 120 MG/0.8ML injection    metoprolol tartrate (LOPRESSOR) 25 MG tablet    HTN (hypertension)      Uncontrolled, changes made today: started Clonidine 0.1 mg BID, BP borderline during this visit         Relevant Medications    cloNIDine (CATAPRES) 0.1 MG tablet    furosemide (LASIX) 20 MG tablet    losartan (COZAAR) 100 MG tablet    metoprolol tartrate (LOPRESSOR) 25 MG tablet    (HFpEF) heart failure with preserved ejection fraction (HCC)      Monitored by specialist- no acute findings meriting change in the plan            Endocrine    Type 2 diabetes mellitus with diabetic polyneuropathy, with long-term current use of insulin (HCC) (Chronic)      Borderline controlled, changes made today: recouncilled about checking FS and adhereing to regimen         Relevant Medications    blood glucose test strips (CONTOUR NEXT TEST) strip    insulin lispro (HUMALOG KWIKPEN) 200 UNIT/ML SOPN pen    blood glucose monitor kit and supplies    Lancets MISC    Insulin Pen Needle 32G X 4 MM MISC    blood glucose monitor strips    Insulin Degludec (TRESIBA FLEXTOUCH) 100 UNIT/ML SOPN       Other    Hyperlipidemia      Well-controlled, continue current medications, medication adherence emphasized and lifestyle modifications recommended         Relevant Medications    atorvastatin (LIPITOR) 20 MG tablet    warfarin (COUMADIN) 7.5 MG tablet    cloNIDine (CATAPRES) 0.1 MG tablet    enoxaparin (LOVENOX) 120 MG/0.8ML injection    furosemide (LASIX) 20 MG tablet    losartan (COZAAR) 100 MG tablet    metoprolol tartrate (LOPRESSOR) 25 MG tablet    Amputation of left middle finger      Well-controlled, continue current medications, medication adherence emphasized and lifestyle modifications recommended   On and IV pic line, managed by ID and Orthopedics  He also has home care nurse attending to him             Medical Decision Making: moderate complexity

## 2021-11-12 NOTE — PROGRESS NOTES
Physician Progress Note      PATIENT:               Marc Dominguez  CSN #:                  [id number]  :                       1965  ADMIT DATE:       10/25/2021 11:26 AM  DISCH DATE:        2021 11:49 AM  RESPONDING  PROVIDER #:        Arron Gunter MD          QUERY TEXT:    Pt admitted with Left Middle Finger infection. Noted documentation of \"Left   middle finger deep infection, likely distal phalangeal Osteomyelitis\" on the   OP note and the DC summary states \"no radiographic evidence of osteomyelitis\",   with \"concern for osteomyelitis\" as well. If possible, please document in   progress notes and discharge summary:      The medical record reflects the following:  Risk Factors: Bacteremia, Left Middle Finger Infection  Clinical Indicators: Xray results, noted above, amputation of the finger,   wound culture growing staph aureus MRSA and Strep  Treatment: Vancomycin IV, sent home with PICC line and IV antibiotics,   Vancomycin. Thank you in advance,    Ivon Davis, RN-BSN, [facility]  Clinical   [facility]  Rossi Rincon  [email or web address]  Options provided:  -- Osteomyelitis of the left middle finger confirmed present on admission  -- Osteomyelitis of the left middle finger ruled out  -- Other - I will add my own diagnosis  -- Disagree - Not applicable / Not valid  -- Disagree - Clinically unable to determine / Unknown  -- Refer to Clinical Documentation Reviewer    PROVIDER RESPONSE TEXT:    The diagnosis of Osteomyelitis of the left middle finger was ruled out.     Query created by: Madyson Kellogg on 2021 10:46 AM      Electronically signed by:  Arron Gunter MD 2021 4:24 PM

## 2021-11-18 NOTE — TELEPHONE ENCOUNTER
----- Message from Carvel Meigs, MD sent at 4/27/2021 12:13 PM CDT -----  Was admitted, needs a [address] [place] [address]
Pt is still in the nursing home unknown as to dated of discharge at this time.
DISPLAY PLAN FREE TEXT

## 2021-11-22 ENCOUNTER — OFFICE VISIT (OUTPATIENT)
Age: 56
End: 2021-11-22

## 2021-11-22 DIAGNOSIS — Z11.59 SCREENING FOR VIRAL DISEASE: Primary | ICD-10-CM

## 2021-11-22 LAB — SARS-COV-2, PCR: NOT DETECTED

## 2021-11-22 PROCEDURE — 99999 PR OFFICE/OUTPT VISIT,PROCEDURE ONLY: CPT | Performed by: NURSE PRACTITIONER

## 2021-11-23 ENCOUNTER — APPOINTMENT (OUTPATIENT)
Dept: GENERAL RADIOLOGY | Age: 56
End: 2021-11-23
Attending: INTERNAL MEDICINE
Payer: MEDICARE

## 2021-11-23 ENCOUNTER — HOSPITAL ENCOUNTER (OUTPATIENT)
Dept: CARDIAC CATH/INVASIVE PROCEDURES | Age: 56
Discharge: HOME | End: 2021-11-23
Attending: INTERNAL MEDICINE | Admitting: INTERNAL MEDICINE
Payer: MEDICARE

## 2021-11-23 VITALS
SYSTOLIC BLOOD PRESSURE: 130 MMHG | DIASTOLIC BLOOD PRESSURE: 86 MMHG | BODY MASS INDEX: 32.35 KG/M2 | OXYGEN SATURATION: 98 % | WEIGHT: 226 LBS | HEIGHT: 70 IN | TEMPERATURE: 96.7 F | RESPIRATION RATE: 13 BRPM | HEART RATE: 51 BPM

## 2021-11-23 LAB
ALBUMIN SERPL-MCNC: 4.1 G/DL (ref 3.5–5.2)
ALP BLD-CCNC: 139 U/L (ref 40–130)
ALT SERPL-CCNC: 27 U/L (ref 5–41)
ANION GAP SERPL CALCULATED.3IONS-SCNC: 11 MMOL/L (ref 7–19)
AST SERPL-CCNC: 22 U/L (ref 5–40)
BILIRUB SERPL-MCNC: 0.6 MG/DL (ref 0.2–1.2)
BUN BLDV-MCNC: 26 MG/DL (ref 6–20)
CALCIUM SERPL-MCNC: 9.4 MG/DL (ref 8.6–10)
CHLORIDE BLD-SCNC: 99 MMOL/L (ref 98–111)
CO2: 26 MMOL/L (ref 22–29)
CREAT SERPL-MCNC: 0.9 MG/DL (ref 0.5–1.2)
EKG P AXIS: NORMAL DEGREES
EKG P-R INTERVAL: NORMAL MS
EKG Q-T INTERVAL: 414 MS
EKG QRS DURATION: 98 MS
EKG QTC CALCULATION (BAZETT): 435 MS
EKG T AXIS: 30 DEGREES
GFR AFRICAN AMERICAN: >59
GFR NON-AFRICAN AMERICAN: >60
GLUCOSE BLD-MCNC: 172 MG/DL (ref 74–109)
HCT VFR BLD CALC: 38 % (ref 42–52)
HEMOGLOBIN: 12.6 G/DL (ref 14–18)
INR BLD: 2.69 (ref 0.88–1.18)
MCH RBC QN AUTO: 31.7 PG (ref 27–31)
MCHC RBC AUTO-ENTMCNC: 33.2 G/DL (ref 33–37)
MCV RBC AUTO: 95.7 FL (ref 80–94)
PDW BLD-RTO: 12.6 % (ref 11.5–14.5)
PLATELET # BLD: 281 K/UL (ref 130–400)
PMV BLD AUTO: 10.5 FL (ref 9.4–12.4)
POTASSIUM REFLEX MAGNESIUM: 4.2 MMOL/L (ref 3.5–5)
PROTHROMBIN TIME: 28 SEC (ref 12–14.6)
RBC # BLD: 3.97 M/UL (ref 4.7–6.1)
SODIUM BLD-SCNC: 136 MMOL/L (ref 136–145)
TOTAL PROTEIN: 7.4 G/DL (ref 6.6–8.7)
WBC # BLD: 5.7 K/UL (ref 4.8–10.8)

## 2021-11-23 PROCEDURE — 99024 POSTOP FOLLOW-UP VISIT: CPT | Performed by: INTERNAL MEDICINE

## 2021-11-23 PROCEDURE — 71046 X-RAY EXAM CHEST 2 VIEWS: CPT

## 2021-11-23 PROCEDURE — 80053 COMPREHEN METABOLIC PANEL: CPT

## 2021-11-23 PROCEDURE — 99152 MOD SED SAME PHYS/QHP 5/>YRS: CPT | Performed by: INTERNAL MEDICINE

## 2021-11-23 PROCEDURE — 85610 PROTHROMBIN TIME: CPT

## 2021-11-23 PROCEDURE — 93005 ELECTROCARDIOGRAM TRACING: CPT | Performed by: INTERNAL MEDICINE

## 2021-11-23 PROCEDURE — 99153 MOD SED SAME PHYS/QHP EA: CPT

## 2021-11-23 PROCEDURE — 93458 L HRT ARTERY/VENTRICLE ANGIO: CPT

## 2021-11-23 PROCEDURE — 99152 MOD SED SAME PHYS/QHP 5/>YRS: CPT

## 2021-11-23 PROCEDURE — 93010 ELECTROCARDIOGRAM REPORT: CPT | Performed by: INTERNAL MEDICINE

## 2021-11-23 PROCEDURE — 6360000002 HC RX W HCPCS

## 2021-11-23 PROCEDURE — C1769 GUIDE WIRE: HCPCS

## 2021-11-23 PROCEDURE — 6370000000 HC RX 637 (ALT 250 FOR IP): Performed by: INTERNAL MEDICINE

## 2021-11-23 PROCEDURE — 2580000003 HC RX 258: Performed by: INTERNAL MEDICINE

## 2021-11-23 PROCEDURE — 85027 COMPLETE CBC AUTOMATED: CPT

## 2021-11-23 PROCEDURE — C1894 INTRO/SHEATH, NON-LASER: HCPCS

## 2021-11-23 PROCEDURE — 2500000003 HC RX 250 WO HCPCS

## 2021-11-23 PROCEDURE — 6360000004 HC RX CONTRAST MEDICATION: Performed by: INTERNAL MEDICINE

## 2021-11-23 PROCEDURE — 2709999900 HC NON-CHARGEABLE SUPPLY

## 2021-11-23 PROCEDURE — 93458 L HRT ARTERY/VENTRICLE ANGIO: CPT | Performed by: INTERNAL MEDICINE

## 2021-11-23 PROCEDURE — 36415 COLL VENOUS BLD VENIPUNCTURE: CPT

## 2021-11-23 RX ORDER — ISOSORBIDE MONONITRATE 30 MG/1
30 TABLET, EXTENDED RELEASE ORAL DAILY
Qty: 30 TABLET | Refills: 3 | Status: ON HOLD | OUTPATIENT
Start: 2021-11-23 | End: 2021-12-18 | Stop reason: HOSPADM

## 2021-11-23 RX ORDER — ASPIRIN 81 MG/1
81 TABLET ORAL ONCE
Status: COMPLETED | OUTPATIENT
Start: 2021-11-23 | End: 2021-11-23

## 2021-11-23 RX ORDER — WARFARIN SODIUM 10 MG/1
10 TABLET ORAL SEE ADMIN INSTRUCTIONS
Status: ON HOLD | COMMUNITY
End: 2021-12-31 | Stop reason: SDUPTHER

## 2021-11-23 RX ORDER — ACETAMINOPHEN 325 MG/1
650 TABLET ORAL EVERY 4 HOURS PRN
Status: DISCONTINUED | OUTPATIENT
Start: 2021-11-23 | End: 2021-11-23 | Stop reason: HOSPADM

## 2021-11-23 RX ORDER — CLINDAMYCIN HYDROCHLORIDE 300 MG/1
CAPSULE ORAL
COMMUNITY
Start: 2021-11-12 | End: 2021-12-13 | Stop reason: ALTCHOICE

## 2021-11-23 RX ORDER — ASPIRIN 81 MG/1
81 TABLET ORAL DAILY
Qty: 90 TABLET | Refills: 1 | Status: SHIPPED | OUTPATIENT
Start: 2021-11-23 | End: 2022-04-13

## 2021-11-23 RX ORDER — SODIUM CHLORIDE 9 MG/ML
INJECTION, SOLUTION INTRAVENOUS CONTINUOUS
Status: DISCONTINUED | OUTPATIENT
Start: 2021-11-23 | End: 2021-11-23 | Stop reason: HOSPADM

## 2021-11-23 RX ORDER — HYDRALAZINE HYDROCHLORIDE 20 MG/ML
10 INJECTION INTRAMUSCULAR; INTRAVENOUS EVERY 10 MIN PRN
Status: DISCONTINUED | OUTPATIENT
Start: 2021-11-23 | End: 2021-11-23 | Stop reason: HOSPADM

## 2021-11-23 RX ORDER — ONDANSETRON 2 MG/ML
4 INJECTION INTRAMUSCULAR; INTRAVENOUS EVERY 6 HOURS PRN
Status: DISCONTINUED | OUTPATIENT
Start: 2021-11-23 | End: 2021-11-23 | Stop reason: HOSPADM

## 2021-11-23 RX ORDER — SODIUM CHLORIDE 9 MG/ML
1000 INJECTION, SOLUTION INTRAVENOUS CONTINUOUS
Status: DISCONTINUED | OUTPATIENT
Start: 2021-11-23 | End: 2021-11-23 | Stop reason: HOSPADM

## 2021-11-23 RX ADMIN — IOPAMIDOL 80 ML: 612 INJECTION, SOLUTION INTRAVENOUS at 10:26

## 2021-11-23 RX ADMIN — ASPIRIN 81 MG: 81 TABLET, COATED ORAL at 09:14

## 2021-11-23 RX ADMIN — SODIUM CHLORIDE: 9 INJECTION, SOLUTION INTRAVENOUS at 07:28

## 2021-11-23 ASSESSMENT — PAIN SCALES - GENERAL: PAINLEVEL_OUTOF10: 0

## 2021-11-23 NOTE — H&P
Office Visit    10/5/2021  [facility]    Certified Clinical Nurse Specialist  Permanent atrial fibrillation [facility]) +6 more    Dx  Follow-up   Atrial Fibrillation  Hypertension; Referred by Britni Barillas MD    Reason for Visit       Progress Notes  NOM[facility] Bills (Advanced Practice Nurse) Jane Malone Certified Clinical Nurse Specialist  Expand All Collapse All  [facility], [address] [place] [address], [address] [id number]  Phone: [phone]  Fax: [phone]     OFFICE VISIT:  10/5/2021     [address]: 1965     Reason For Visit:  Rosana Reeves is a 60 y.o. male who is here for Follow-up (Patient c/o lightheadedness occasionally), Atrial Fibrillation, and Hypertension          Diagnosis Orders   1. Permanent atrial fibrillation (HCC)  POCT INR   2. Dyspnea on exertion  NM MYOCARDIAL SPECT REST EXERCISE OR RX   3. Neck pain      4. Chronic combined systolic and diastolic heart failure (HCC)      5. Primary hypertension      6. Mixed hyperlipidemia      7. Type 2 diabetes mellitus with diabetic polyneuropathy, with long-term current use of insulin ([address]               HPI   Patient follows with our office with a history of of permanent atrial fibrillation. [facility] had a negative pharmacologic echo study in November 2018. Other history includes history of uncontrolled diabetes, congestive heart failure, obstructive sleep apnea, hypertension, hyperlipidemia     Patient complains of increasing exertional dyspnea. He often has to stop and rest when he was shopping in the store to catch his breath. He denies any exertional chest pain or discomfort. No complaints of orthopnea, PND or sudden weight gain.     Main complaint today is complaints of neck pain and weakness. He states he has episodes when he has severe neck pain in his neck \"collapses\" and he is unable to hold his head up.   Symptoms are not exertional in nature     He reports he condition(695.89)      Personal history of other infectious and parasitic disease      Salmonella      Type II or unspecified type diabetes mellitus without mention of complication, uncontrolled 1994         Past Surgical History         Past Surgical History:   Procedure Laterality Date    CARDIOVERSION   03/2017    CATARACT REMOVAL        COLONOSCOPY   02/24/2020     Dr Dipak Fraire-Melanosis coli throughout the entire colon-AP, 5 yr recall    HUMERUS FRACTURE SURGERY Left 9/17/2020     OPEN REDUCTION INTERNAL FIXATION LEFT SUPRACONDYLAR FRACTURE performed by Jaime Sears MD at [address]         X 2    TONSILLECTOMY             Family History         Family History   Problem Relation Age of Onset    Stroke Maternal Grandmother      Cancer Maternal Grandmother      Stroke Paternal Grandmother      Diabetes Paternal Grandmother      Cancer Father      Heart Disease Father      Lung Cancer Father      Asthma Paternal Grandfather      Heart Disease Paternal Grandfather      Heart Disease Maternal Grandfather      Colon Cancer Neg Hx      Colon Polyps Neg Hx      Esophageal Cancer Neg Hx      Liver Cancer Neg Hx      Liver Disease Neg Hx      Rectal Cancer Neg Hx      Stomach Cancer Neg Hx           Social History           Tobacco Use    Smoking status: Never Smoker    Smokeless tobacco: Never Used   Substance Use Topics    Alcohol use: No      Current Facility-Administered Medications          Current Outpatient Medications   Medication Sig Dispense Refill    atorvastatin (LIPITOR) 20 MG tablet TAKE ONE TABLET DAILY  90 tablet 1    warfarin (COUMADIN) 10 MG tablet TAKE ONE TABLET DAILY  45 tablet 2    tamsulosin (FLOMAX) 0.4 MG capsule Take 2 capsules by mouth daily 60 capsule 11    blood glucose monitor strips Test 3 times a day & as needed for symptoms of irregular blood glucose.  Dispense sufficient amount for indicated testing frequency plus additional to accommodate PRN testing needs. 200 strip 3    furosemide (LASIX) 20 MG tablet TAKE ONE TABLET DAILY AS NEEDED 90 tablet 3    warfarin (COUMADIN) 5 MG tablet Take 3 tabs on Thursday's and Sunday's only 30 tablet 5    Insulin Pen Needle 32G X 4 MM MISC 1 each by Does not apply route daily 100 each 3    metFORMIN (GLUCOPHAGE) 1000 MG tablet Take 1 tablet by mouth 2 times daily (with meals) 180 tablet 3    metoprolol tartrate (LOPRESSOR) 25 MG tablet TAKE ONE TABLET TWICE DAILY 60 tablet 5    blood glucose monitor kit and supplies Dispense sufficient amount for indicated testing frequency plus additional to accommodate PRN testing needs. 1 kit 0    Lancets MISC 1 each by Does not apply route 3 times daily (with meals) 300 each 3    insulin lispro (HUMALOG KWIKPEN) 200 UNIT/ML SOPN pen Sliding scale 160-179 2 units  180-199 3 units  200-219 4 units  220-239 5 units  240-259 6 units   260-279 7 units  280-299 8 units  300-399 9 units  340-379 10 units  380-420 11 units 3 pen 5    vitamin D (ERGOCALCIFEROL) 1.25 MG (88318 UT) CAPS capsule Take 50,000 Units by mouth once a week        losartan (COZAAR) 100 MG tablet TAKE ONE TABLET DAILY 90 tablet 3    diphenhydrAMINE (BENADRYL) 25 MG tablet Take 25 mg by mouth every 6 hours as needed for Itching        blood glucose test strips (CONTOUR NEXT TEST) strip 1 each by In Vitro route 4 times daily As needed. 200 each 3      No current facility-administered medications for this visit.         Allergies: Pcn [penicillins], Neomycin-bacitracin zn-polymyx, and Neosporin [neomycin-polymyxin-gramicidin]     Review of Systems  Review of Systems   Constitutional: Negative for activity change, diaphoresis, fatigue, fever and unexpected weight change. HENT: Negative for facial swelling and nosebleeds. Eyes: Negative for redness and visual disturbance. Respiratory: Positive for shortness of breath (exertional, seems worse recently).  Negative for cough, chest tightness and wheezing. Cardiovascular: Negative for chest pain, palpitations and leg swelling. Gastrointestinal: Negative for abdominal pain, nausea and vomiting. Endocrine: Negative for cold intolerance and heat intolerance. Genitourinary: Negative for dysuria and hematuria. Musculoskeletal: Positive for neck pain (and weakness). Negative for arthralgias and myalgias. Skin: Negative for pallor and rash. Neurological: Positive for dizziness. Negative for seizures, syncope, weakness and light-headedness. Hematological: Does not bruise/bleed easily. Psychiatric/Behavioral: Negative for agitation. The patient is not nervous/anxious.          Objective  Vital Signs - /68 (Site: Right Upper Arm, Position: Standing)   Pulse 59   Ht 5' 10\" (1.778 m)   Wt 233 lb (105.7 kg)   SpO2 100%   BMI 33.43 kg/m²    Vitals         Vitals:     10/05/21 1010 10/05/21 1159 10/05/21 1200   BP: 138/70 128/70 122/68   Site:   Right Upper Arm Right Upper Arm   Position:   Sitting Standing   Pulse: 59       SpO2: 100%       Weight: 233 lb (105.7 kg)       Height: 5' 10\" (1.778 m)             Physical Exam  Vitals and nursing note reviewed. Constitutional:       General: He is not in acute distress. Appearance: Normal appearance. He is well-developed. He is not diaphoretic. HENT:      Head: Normocephalic and atraumatic. Right Ear: Hearing and external ear normal.      Left Ear: Hearing and external ear normal.      Nose: Nose normal.   Eyes:      General:         Right eye: No discharge. Left eye: No discharge. Pupils: Pupils are equal, round, and reactive to light. Neck:      Thyroid: No thyromegaly. Vascular: No carotid bruit or JVD. Trachea: No tracheal deviation. Cardiovascular:      Rate and Rhythm: Normal rate. Rhythm irregular. Heart sounds: Normal heart sounds. No murmur heard. No friction rub. No gallop.     Pulmonary:      Effort: Pulmonary effort is normal. No respiratory distress. Breath sounds: Normal breath sounds. No wheezing or rales. Abdominal:      Palpations: Abdomen is soft. Tenderness: There is no abdominal tenderness. Musculoskeletal:         General: No swelling or deformity. Cervical back: Neck supple. No muscular tenderness. Right lower leg: Edema (trace) present. Left lower leg: Edema (trace) present. Skin:     General: Skin is warm and dry. Findings: No rash. Neurological:      General: No focal deficit present. Mental Status: He is alert and oriented to person, place, and time. Cranial Nerves: No cranial nerve deficit.    Psychiatric:         Mood and Affect: Mood normal.         Behavior: Behavior normal.         Judgment: Judgment normal.            Data:        Lab Results   Component Value Date     WBC 7.0 09/27/2021     RBC 3.80 09/27/2021     HGB 12.3 09/27/2021     HCT 37.5 09/27/2021      09/27/2021            Lab Results   Component Value Date     CHOL 105 09/27/2021     TRIG 104 09/27/2021     HDL 35 09/27/2021     LDLCALC 49 09/27/2021            Lab Results   Component Value Date      09/27/2021     K 4.2 09/27/2021     K 4.5 04/14/2021      09/27/2021     CO2 22 09/27/2021     GLUCOSE 213 09/27/2021     BUN 18 09/27/2021     CREATININE 0.9 09/27/2021     CALCIUM 9.1 09/27/2021     ALT 29 09/27/2021     AST 24 09/27/2021            Lab Results   Component Value Date     TSH 1.520 09/27/2021      Echo 4/14/21   Conclusions      Summary   Normal left ventricular size and systolic function EF 97-86%   Moderate concentric left ventricular hypertrophy with transmitral Doppler   suggesting severe/restrictive [grade 3] diastolic dysfunction   Moderate left atrial enlargement   Nodular sclerosis of a tricuspid aortic valve with adequate cusp   separation and neither stenosis or insufficiency   Mild thickening but normally mobile mitral valve with mild posteriorly   directed regurgitation   Nonvisualization pulmonic valve   Moderate right atrial enlargement with mild right ventricular enlargement   and preserved systolic function   Mild to moderate tricuspid regurgitation with moderate pulmonary   hypertension RVSP estimate 56 mmHg   Less than 50% inspiratory collapse of the IVC consistent with mild   elevation of right atrial filling pressures of 10-15 mmHg   No significant pericardial effusion   Aortic root and ascending segments measured within normal limits   Injection of agitated saline with or without Valsalva maneuver   demonstrates no intracardiac communication   Definity contrast utilized to better define endocardial borders     DSE 2018  Conclusions      Summary   Dobutamine stress echocardiogram without clinical, electrocardiographic,   or echocardiographic evidence of myocardial ischemia.   Test was supervised by Dr. Francia Nolan----------------------------------------------------------------   Electronically signed by Sean Cohn MD(Interpreting   [id number]) on 11/07/2018 02:11 PM   ----------------------------------------------------------------     Assessment:      Diagnosis Orders   1. Permanent atrial fibrillation (HCC)  POCT INR   2. Dyspnea on exertion  NM MYOCARDIAL SPECT REST EXERCISE OR RX   3. Neck pain      4. Chronic combined systolic and diastolic heart failure (HCC)      5. Primary hypertension      6. Mixed hyperlipidemia      7. Type 2 diabetes mellitus with diabetic polyneuropathy, with long-term current use of insulin (HCC)            Permanent atrial fibrillation-rate controlled with metoprolol and anticoagulated with Coumadin. INR is therapeutic today at 2.9.   Continue same Coumadin dosing and repeat INR in 4 weeks     Dyspnea on exertion/neck pain-rule out angina equivalent with a Lexiscan nuclear stress test     Chronic combined systolic and diastolic heart failure-EF with some mild depression during hospitalization in April 45 to 50% with grade 3 diastolic dysfunction. Patient is on losartan and metoprolol. He appears euvolemic today. Encouraged monitoring weight daily, reporting sudden weight gain and low-sodium diet. Checking an ischemic study due to complaints of dyspnea on exertion     Hypertension-stable on current regimen with losartan and metoprolol     Hyperlipidemia-stable on statin therapy with atorvastatin     Diabetes-last A1c 6.9. He is achieving better control . Hospitalized in April with low blood sugars     Stable cardiovascular status. No evidence of overt heart failure,angina or dysrhythmia.      Plan           Orders Placed This Encounter   Procedures    NM MYOCARDIAL SPECT REST EXERCISE OR RX       With myocardial perfusion study with sestamibi       Standing Status:   Future       Standing Expiration Date:   10/5/2022       Order Specific Question:   Reason for Exam?       Answer:   Shortness of breath       Order Specific Question:   Procedure Type       Answer:   Rx       Order Specific Question:   Reason for exam:       Answer:   CAD, known or r/o; No acute coronary syndrome; Angina syndrome; Angina equivalent; No exercise intolerance    POCT INR      Return in about 6 months (around 4/5/2022) for Dr Shannon Mckay. Continue same Coumadin and repeat INR in 4 weeks  Lexiscan Nuclear Stress Test     Call with any questionsor concerns  Follow up with Christine Layton MD for non cardiac problems  Report any new problems  Cardiovascular Fitness-Exercise as tolerated. Strive for 15 minutes of exercise most days of the week.     Cardiac / HealthyDiet  Continue current medications as directed  Continue plan of treatment  It is always recommended that you bring your medicationsbottles with you to each visit - this is for your safety!         [facility] Carpio             Complaints of increasing shortness of breath and recent neck pain abnormal stress test 10/20/2021 EF 53% suggestion apical ischemia and admitted elective diagnostic cardiac catheterization advise indication alternatives benefits and risk patient agreeable plan today. I have discussed with the patient regarding indications for the proposed procedure LEFT HEART CATHETERIZATION AND POSSIBLE PERCUTANEOUS INTERVENTION  along with possible alternatives benefits and risks including but not limited to risks of death, myocardial infarction, stroke, contrast induced nephropathy which in some cases may lead to acute kidney failure requiring dialysis, allergic reactions, bleeding requiring blood transfusion,  cardiac arrhthymias, respiratory failure which may require placing the patient on respiratory support such as a ventilator or breathing machine,risk of complications which may require vascular surgery, and if coronary intervention is performed emergency CABG may be required in less than 1% of cases. The patient is awake and alert and understands the issues involved and indicates willingness to proceed as ordered. The patient does not have any contraindications to dual antiplatelet therapy. The patient does not have any known  pending surgical procedures in the next 12 months at this time. The patient is  a reasonable candidate for moderate conscious sedation.     ASA score:  ASA 3 - Patient with moderate systemic disease with functional limitations    Mallampati: I (soft palate, uvula, fauces, tonsillar pillars visible)    Preferred vascular access site will be: right radial artery

## 2021-11-23 NOTE — PROGRESS NOTES
Cardiac cath pulmonary note right radial artery access tolerated well left ventricular function normal coronary angiograms 50% plus distal left main stenosis severe three-vessel coronary disease recommend cardiovascular surgical consultation
PT OOB WITH RN @ SIDE. PT AMBULATED TO BATHROOM TO VOID, THEN AMBULATED IN CATH HOLD HALLS WITHOUT S/S BLEEDING NOTED.
weakness

## 2021-11-30 ENCOUNTER — OFFICE VISIT (OUTPATIENT)
Dept: CARDIOTHORACIC SURGERY | Age: 56
End: 2021-11-30
Payer: MEDICARE

## 2021-11-30 VITALS
HEART RATE: 74 BPM | RESPIRATION RATE: 18 BRPM | BODY MASS INDEX: 33.01 KG/M2 | OXYGEN SATURATION: 95 % | HEIGHT: 70 IN | WEIGHT: 230.6 LBS | SYSTOLIC BLOOD PRESSURE: 151 MMHG | DIASTOLIC BLOOD PRESSURE: 91 MMHG

## 2021-11-30 DIAGNOSIS — I25.10 CAD, MULTIPLE VESSEL: Primary | ICD-10-CM

## 2021-11-30 DIAGNOSIS — I48.11 LONGSTANDING PERSISTENT ATRIAL FIBRILLATION (HCC): ICD-10-CM

## 2021-11-30 PROCEDURE — G8427 DOCREV CUR MEDS BY ELIG CLIN: HCPCS | Performed by: THORACIC SURGERY (CARDIOTHORACIC VASCULAR SURGERY)

## 2021-11-30 PROCEDURE — 3017F COLORECTAL CA SCREEN DOC REV: CPT | Performed by: THORACIC SURGERY (CARDIOTHORACIC VASCULAR SURGERY)

## 2021-11-30 PROCEDURE — G8417 CALC BMI ABV UP PARAM F/U: HCPCS | Performed by: THORACIC SURGERY (CARDIOTHORACIC VASCULAR SURGERY)

## 2021-11-30 PROCEDURE — 1036F TOBACCO NON-USER: CPT | Performed by: THORACIC SURGERY (CARDIOTHORACIC VASCULAR SURGERY)

## 2021-11-30 PROCEDURE — 99205 OFFICE O/P NEW HI 60 MIN: CPT | Performed by: THORACIC SURGERY (CARDIOTHORACIC VASCULAR SURGERY)

## 2021-11-30 PROCEDURE — 1111F DSCHRG MED/CURRENT MED MERGE: CPT | Performed by: THORACIC SURGERY (CARDIOTHORACIC VASCULAR SURGERY)

## 2021-11-30 PROCEDURE — G8484 FLU IMMUNIZE NO ADMIN: HCPCS | Performed by: THORACIC SURGERY (CARDIOTHORACIC VASCULAR SURGERY)

## 2021-11-30 NOTE — PROGRESS NOTES
[address] [facility] Cardiothoracic Surgery  [address], [place] [address]  Phone: [phone]  Fax: [phone]    Name: David Ortega  : 1965    DATE: 2021        The patient is a 57-year-old obese poorly controlled type 2 insulin-dependent diabetic who was admitted to the hospital last month with an infected left middle finger with staph sepsis. [place] He has been complaining of intermittent congestive heart failure symptoms in addition to weakness shortness of breath and easy fatigability. He has a history of longstanding persistent atrial fibrillation going back to . Aaron Khan demonstrated evidence of myocardial ischemia therefore cardiac catheterization was performed by Dr. Bay Powers on 2021 this revealed a normal-appearing left ventricle with ejection fraction of 60%. The left main artery had a distal tapering that approached 50% in some views. The LAD had a proximal 80% tandem stenotic lesions with a 90% stenosis of the small first diagonal branch. The second diagonal branch was slightly larger and also had a 90% proximal stenosis. The circumflex system has a tiny intermediate branch which does have a 90% proximal stenosis this vessel may be too small for grafting. The remainder of the circumflex system is very tiny. The RCA is a dominant system there is a 50% mid third stenosis with a 60% distal complex stenosis and an 80% stenosis of the PLM branch. Because the presence of severe multivessel coronary disease with symptoms of shortness of breath weakness the patient was felt to be a candidate for multivessel coronary artery bypass grafting. Because of his history of longstanding persistent atrial fibrillation I felt that he may be a candidate for the full Romero-Maze for radiofrequency and cryoablation as well.     Past Medical History:   Diagnosis Date    Acalculous cholecystitis 2015    Allergic rhinitis, cause unspecified     Atrial fibrillation, chronic (HCC)     sees [facility] cardiology    Bone spur     bilateral elbows    Charcot's joint of right foot     Chicken pox     Closed supracondylar fracture of elbow     fall    Diabetic ulcer of right midfoot associated with type 2 diabetes mellitus, with fat layer exposed ([address] 9/14/2020    History of MRSA infection     scalp/facial and on back    HTN (hypertension)     Hyperlipidemia     Impotence of organic origin     MDRO (multiple drug resistant organisms) resistance     MRSA (methicillin resistant staph aureus) culture positive 11/2021    MRSA LT HAND    Other dyspnea and respiratory abnormality     Other specified erythematous condition(695.89)     Personal history of other infectious and parasitic disease     Salmonella     Type II or unspecified type diabetes mellitus without mention of complication, uncontrolled 1994     Past Surgical History:   Procedure Laterality Date    CARDIOVERSION  03/2017    CATARACT REMOVAL      COLONOSCOPY  02/24/2020    Dr Bassam Fraire-Melanosis coli throughout the entire colon-AP, 5 yr recall    HAND SURGERY Left 10/31/2021    HAND INCISION AND DRAINAGE Partial Amputation Middle Finger performed by Jeanmarie Guthrie MD at [address] [facility] Left 9/17/2020    OPEN REDUCTION INTERNAL FIXATION LEFT SUPRACONDYLAR FRACTURE performed by Viktoria Babinski, MD at [address]      X 2    TONSILLECTOMY       Current Outpatient Medications   Medication Sig Dispense Refill    warfarin (COUMADIN) 10 MG tablet Take 10 mg by mouth See Admin Instructions 10 MG ON Saturday, Monday, Tuesday, Wednesday, Friday  15 MG ON Sunday & THURSDAY      clindamycin (CLEOCIN) 300 MG capsule       metFORMIN (GLUCOPHAGE) 1000 MG tablet Take 1 tablet by mouth 2 times daily (with meals) 180 tablet 3    aspirin EC 81 MG EC tablet Take 1 tablet by mouth daily 90 tablet 1    isosorbide mononitrate (IMDUR) 30 MG extended release tablet Take 1 tablet by mouth daily 30 tablet 3    cloNIDine (CATAPRES) 0.1 MG tablet Take 1 tablet by mouth 2 times daily 60 tablet 0    furosemide (LASIX) 20 MG tablet TAKE ONE TABLET DAILY AS NEEDED 90 tablet 3    losartan (COZAAR) 100 MG tablet TAKE ONE TABLET DAILY 90 tablet 3    metoprolol tartrate (LOPRESSOR) 25 MG tablet TAKE ONE TABLET TWICE DAILY 60 tablet 5    Insulin Degludec (TRESIBA FLEXTOUCH) 100 UNIT/ML SOPN Inject into the skin PT TAKES ACCORDING TO ACCUCHEK. DOES NOT TAKE  AND UNDER AT HS.  atorvastatin (LIPITOR) 20 MG tablet TAKE ONE TABLET DAILY  90 tablet 1    tamsulosin (FLOMAX) 0.4 MG capsule Take 2 capsules by mouth daily 60 capsule 11    blood glucose monitor strips Test 3 times a day & as needed for symptoms of irregular blood glucose. Dispense sufficient amount for indicated testing frequency plus additional to accommodate PRN testing needs. 200 strip 3    Insulin Pen Needle 32G X 4 MM MISC 1 each by Does not apply route daily 100 each 3    blood glucose monitor kit and supplies Dispense sufficient amount for indicated testing frequency plus additional to accommodate PRN testing needs. 1 kit 0    Lancets MISC 1 each by Does not apply route 3 times daily (with meals) 300 each 3    insulin lispro (HUMALOG KWIKPEN) 200 UNIT/ML SOPN pen Sliding scale 160-179 2 units  180-199 3 units  200-219 4 units  220-239 5 units  240-259 6 units   260-279 7 units  280-299 8 units  300-399 9 units  340-379 10 units  380-420 11 units 3 pen 5    vitamin D (ERGOCALCIFEROL) 1.25 MG (05060 UT) CAPS capsule Take 50,000 Units by mouth once a week      diphenhydrAMINE (BENADRYL) 25 MG tablet Take 25 mg by mouth every 6 hours as needed for Itching      blood glucose test strips (CONTOUR NEXT TEST) strip 1 each by In Vitro route 4 times daily As needed. 200 each 3     No current facility-administered medications for this visit.      Allergies Allergen Reactions    Contrast [Iodides]      N & V. PT STATED HE HAD A MRI WITH CONTRAST ON 10/25/2021.  Pcn [Penicillins]     Neomycin-Bacitracin Zn-Polymyx Rash    Neosporin [Neomycin-Polymyxin-Gramicidin] Rash     Family History   Problem Relation Age of Onset    Stroke Maternal Grandmother     Cancer Maternal Grandmother     Stroke Paternal Grandmother     Diabetes Paternal Grandmother     Cancer Father     Heart Disease Father     Lung Cancer Father     Asthma Paternal Grandfather     Heart Disease Paternal Grandfather     Heart Disease Maternal Grandfather     Colon Cancer Neg Hx     Colon Polyps Neg Hx     Esophageal Cancer Neg Hx     Liver Cancer Neg Hx     Liver Disease Neg Hx     Rectal Cancer Neg Hx     Stomach Cancer Neg Hx      Social History     Socioeconomic History    Marital status:      Spouse name: Not on file    Number of children: Not on file    Years of education: Not on file    Highest education level: Not on file   Occupational History    Not on file   Tobacco Use    Smoking status: Never Smoker    Smokeless tobacco: Never Used   Vaping Use    Vaping Use: Never used   Substance and Sexual Activity    Alcohol use: No    Drug use: No    Sexual activity: Not on file   Other Topics Concern    Not on file   Social History Narrative    Referred to  for assistance with Meals on Wheels     Social Determinants of Health     Financial Resource Strain: High Risk    Difficulty of Paying Living Expenses: Hard   Food Insecurity: Food Insecurity Present    Worried About Running Out of Food in the Last Year: Sometimes true    Zoran of Food in the Last Year: Sometimes true   Transportation Needs: No Transportation Needs    Lack of Transportation (Medical): No    Lack of Transportation (Non-Medical):  No   Physical Activity:     Days of Exercise per Week: Not on file    Minutes of Exercise per Session: Not on file   Stress:     Feeling of Stress : Not on file   Social Connections:     Frequency of Communication with Friends and Family: Not on file    Frequency of Social Gatherings with Friends and Family: Not on file    Attends Spiritism Services: Not on file    Active Member of Clubs or Organizations: Not on file    Attends Club or Organization Meetings: Not on file    Marital Status: Not on file   Intimate Partner Violence:     Fear of Current or Ex-Partner: Not on file    Emotionally Abused: Not on file    Physically Abused: Not on file    Sexually Abused: Not on file   Housing Stability:     Unable to Pay for Housing in the Last Year: Not on file    Number of [place] in the Last Year: Not on file    Unstable Housing in the Last Year: Not on file         ROS:   HEENT: denies neck pain, sore throat, blurred vision, diplopia. Respiratory: chronic shortness of breath, much worse in the last 3 months. .  Denies any hemoptysis or wheezes. Cardiovascular: denies angina, palpitations, lower extremity edema. GI: denies abdominal pain, nausea, vomiting, constipation. : denies urinary frequency, or dysuria. Musculoskeletal: No joint pain or swelling. Neurologic: denies diplopia, headache, or ataxia. The other 14 systems have been reviewed and are negative    Physical Exam: BP (!) 151/91 (Site: Left Upper Arm, Position: Sitting, Cuff Size: Medium Adult)   Pulse 74   Resp 18   Ht 5' 10\" (1.778 m)   Wt 230 lb 9.6 oz (104.6 kg)   SpO2 95%   BMI 33.09 kg/m²   Very pleasant well animated middle-age male who appears no acute distress. ENT: Throat is clear., Mucosa clear., Septum intact., No pyorrhea or abscess. Neck: Neck is soft., No cervical masses. , Throat is clear., Neck veins are not distended. , Thyroid is normal size. Respiratory: Clear lung sounds., Normal., No wheezes or rhonchi., No use of accessory muscles. Cardiovascular: Cardiac sounds are clear., No carotid bruits. , Peripheral pulses are intact. , Extremities exhibit no edema or varicosities. ,  There is an irregular rate with irregular rhythm no cardiac murmurs are heard  Abdomen: Abdomen is soft., No masses or tenderness. , Liver and spleen not palpable., Normal bowel sounds.,  Moderately obese. Lymphatic: No lymphadenopathy in the cervical, axillary, or femoral areas. Musculoskeletal: No clubbing or cyanosis. , Normal muscle tone., Normal range of motion upper and lower extremities. , No joint inflammation or swelling. He has surgical absence of the left middle digit secondary to resolving osteomyelitis of the phalanx from staph infection  Skin: No rashes, lesions, or ulcers., No swelling or edema.,  Lower extremity skin changes from chronic infections. Neurologic exam: No lateralizing neurologic findings. , Cranial nerves II-XII are normal., Examination of sensation is normal.  Psychiatric: Patient exhibits normal judgement and is oriented to name, time, and place., No anxiety or depression. This pleasant 52-year-old male has poorly controlled diabetes and suffers from the manifestations of staph infections . History of diabetic wounds of the feet Charcot's joint and peripheral neuropathy. He is otherwise fairly functional and has symptoms of his coronary disease that manifest as shortness of breath weakness and dyspnea on exertion. He has diffuse severe disease of his vessels but I believe there are probably 3 vessels that could be grafted this being the PLM branch the LAD and second diagonal branch is questionable if the intermediate branch is graftable. I would consider this a \"salvage case\" he also has longstanding persistent atrial fibrillation. I believe he would be a candidate for the full Romero-Maze for radiofrequency and cryoablation procedure in an attempt to convert her back to sinus rhythm permanently. In addition I would also exclude his left atrial appendage.   I explained the procedure of magnitude of this operation in detail with the patient including potential risks of bleeding stroke infection reoperation blood transfusion multisystem organ failure respiratory failure and sudden cardiac death. His STS risk score for CABG he is 2% mortality with a 25% major morbidity mortality. He understands and is willing to proceed. I have placed him on the operating schedule for 12/14/2021 with preop testing the day prior. He is to withhold his Coumadin 7 days before operation and his Glucophage 2 days before operation.     Electronically signed by Delilah Montoya MD on 11/30/2021 at 4:12 PM

## 2021-12-01 ENCOUNTER — TELEPHONE (OUTPATIENT)
Dept: CARDIOTHORACIC SURGERY | Age: 56
End: 2021-12-01

## 2021-12-01 NOTE — TELEPHONE ENCOUNTER
Surgery 12/14 will need auth.     Procedure: CABGx3 with sun maze ablation    DX: CAD multi vessel ( I25.10) Long standing persistent atrial fibrillation ( I48.11)

## 2021-12-02 NOTE — ED TRIAGE NOTES
Department of Anesthesiology  Postprocedure Note    Patient: Carolyn Gonzalez  MRN: [id number]  YOB: 1955  Date of evaluation: 12/2/2021  Time:  5:59 PM     Procedure Summary     Date: 12/02/21 Room / Location: [address] [place] [address]    Anesthesia Start: 1247 Anesthesia Stop: 6874    Procedure: ROBOTIC ASSISTED LAPAROSCOPIC RADICAL PROSTECTOMY WITH BILATERAL LYMPH NODE DISSECTION (Bilateral ) Diagnosis: (PROSTATE CANCER)    Surgeons: Sona Hernandez MD Responsible Provider: Sincere Soler MD    Anesthesia Type: general ASA Status: 2          Anesthesia Type: general    Sugar Phase I: Sugar Score: 8    Sugar Phase II:      Last vitals: Reviewed and per EMR flowsheets.        Anesthesia Post Evaluation    Patient location during evaluation: PACU  Patient participation: complete - patient participated  Level of consciousness: awake and alert  Pain score: 4  Airway patency: patent  Nausea & Vomiting: no nausea and no vomiting  Complications: no  Cardiovascular status: hemodynamically stable and blood pressure returned to baseline  Respiratory status: spontaneous ventilation, room air and acceptable  Hydration status: stable Pt reports cp in center of chest \"all day. \" Pt reports tempat home 100.4. Pt denies taking anythig tobring fever down. Pt temp upon arrival 98.7. EKG done in triage. Pt in waiting room, no signs of acute distress noted at this time.

## 2021-12-06 ENCOUNTER — TELEPHONE (OUTPATIENT)
Dept: INTERNAL MEDICINE | Age: 56
End: 2021-12-06

## 2021-12-06 NOTE — TELEPHONE ENCOUNTER
Pt called to let us know that he is having heart surgery next week. He plans on getting the Covid booster but is going to wait until after the surgery to get that done.

## 2021-12-13 ENCOUNTER — TELEPHONE (OUTPATIENT)
Dept: INTERNAL MEDICINE | Age: 56
End: 2021-12-13

## 2021-12-13 ENCOUNTER — HOSPITAL ENCOUNTER (OUTPATIENT)
Dept: PREADMISSION TESTING | Age: 56
Discharge: HOME | DRG: 234 | End: 2021-12-17
Payer: MEDICARE

## 2021-12-13 ENCOUNTER — HOSPITAL ENCOUNTER (OUTPATIENT)
Dept: GENERAL RADIOLOGY | Age: 56
Discharge: HOME | DRG: 234 | End: 2021-12-13
Payer: MEDICARE

## 2021-12-13 ENCOUNTER — PREP FOR PROCEDURE (OUTPATIENT)
Dept: CARDIOTHORACIC SURGERY | Age: 56
End: 2021-12-13

## 2021-12-13 VITALS — HEIGHT: 70 IN | WEIGHT: 228 LBS | BODY MASS INDEX: 32.64 KG/M2

## 2021-12-13 DIAGNOSIS — I25.10 CAD IN NATIVE ARTERY: ICD-10-CM

## 2021-12-13 DIAGNOSIS — I25.10 CAD IN NATIVE ARTERY: Primary | ICD-10-CM

## 2021-12-13 LAB
ABO/RH: NORMAL
ALBUMIN SERPL-MCNC: 4.3 G/DL (ref 3.5–5.2)
ALP BLD-CCNC: 100 U/L (ref 40–130)
ALT SERPL-CCNC: 39 U/L (ref 5–41)
ANION GAP SERPL CALCULATED.3IONS-SCNC: 14 MMOL/L (ref 7–19)
ANTIBODY SCREEN: NORMAL
AST SERPL-CCNC: 29 U/L (ref 5–40)
BACTERIA: NEGATIVE /HPF
BASOPHILS ABSOLUTE: 0.1 K/UL (ref 0–0.2)
BASOPHILS RELATIVE PERCENT: 1.1 % (ref 0–1)
BILIRUB SERPL-MCNC: 0.7 MG/DL (ref 0.2–1.2)
BILIRUBIN URINE: NEGATIVE
BLOOD, URINE: NEGATIVE
BUN BLDV-MCNC: 19 MG/DL (ref 6–20)
CALCIUM SERPL-MCNC: 9.6 MG/DL (ref 8.6–10)
CHLORIDE BLD-SCNC: 98 MMOL/L (ref 98–111)
CLARITY: CLEAR
CO2: 25 MMOL/L (ref 22–29)
COLOR: YELLOW
CREAT SERPL-MCNC: 0.9 MG/DL (ref 0.5–1.2)
CRYSTALS, UA: ABNORMAL /HPF
EKG P AXIS: NORMAL DEGREES
EKG P-R INTERVAL: NORMAL MS
EKG Q-T INTERVAL: 388 MS
EKG QRS DURATION: 94 MS
EKG QTC CALCULATION (BAZETT): 405 MS
EKG T AXIS: 17 DEGREES
EOSINOPHILS ABSOLUTE: 0.2 K/UL (ref 0–0.6)
EOSINOPHILS RELATIVE PERCENT: 2.8 % (ref 0–5)
EPITHELIAL CELLS, UA: 0 /HPF (ref 0–5)
FIBRINOGEN: 353 MG/DL (ref 238–505)
GFR AFRICAN AMERICAN: >59
GFR NON-AFRICAN AMERICAN: >60
GLUCOSE BLD-MCNC: 309 MG/DL (ref 74–109)
GLUCOSE URINE: =>1000 MG/DL
HBA1C MFR BLD: 8 % (ref 4–6)
HCT VFR BLD CALC: 40 % (ref 42–52)
HEMOGLOBIN: 13.3 G/DL (ref 14–18)
HYALINE CASTS: 0 /HPF (ref 0–8)
IMMATURE GRANULOCYTES #: 0 K/UL
INR BLD: 1.08 (ref 0.88–1.18)
KETONES, URINE: NEGATIVE MG/DL
LEUKOCYTE ESTERASE, URINE: NEGATIVE
LYMPHOCYTES ABSOLUTE: 2.1 K/UL (ref 1.1–4.5)
LYMPHOCYTES RELATIVE PERCENT: 27.4 % (ref 20–40)
MAGNESIUM: 1.5 MG/DL (ref 1.6–2.6)
MCH RBC QN AUTO: 31.4 PG (ref 27–31)
MCHC RBC AUTO-ENTMCNC: 33.3 G/DL (ref 33–37)
MCV RBC AUTO: 94.6 FL (ref 80–94)
MONOCYTES ABSOLUTE: 0.6 K/UL (ref 0–0.9)
MONOCYTES RELATIVE PERCENT: 7.9 % (ref 0–10)
MRSA SCREEN RT-PCR: NOT DETECTED
NEUTROPHILS ABSOLUTE: 4.6 K/UL (ref 1.5–7.5)
NEUTROPHILS RELATIVE PERCENT: 60.5 % (ref 50–65)
NITRITE, URINE: NEGATIVE
PDW BLD-RTO: 12.3 % (ref 11.5–14.5)
PH UA: 5 (ref 5–8)
PLATELET # BLD: 303 K/UL (ref 130–400)
PMV BLD AUTO: 11 FL (ref 9.4–12.4)
POTASSIUM SERPL-SCNC: 4.3 MMOL/L (ref 3.5–5)
PROTEIN UA: 300 MG/DL
PROTHROMBIN TIME: 14.2 SEC (ref 12–14.6)
RBC # BLD: 4.23 M/UL (ref 4.7–6.1)
RBC UA: 2 /HPF (ref 0–4)
SODIUM BLD-SCNC: 137 MMOL/L (ref 136–145)
SPECIFIC GRAVITY UA: 1.02 (ref 1–1.03)
TOTAL PROTEIN: 7.4 G/DL (ref 6.6–8.7)
UROBILINOGEN, URINE: 0.2 E.U./DL
WBC # BLD: 7.6 K/UL (ref 4.8–10.8)
WBC UA: 1 /HPF (ref 0–5)

## 2021-12-13 PROCEDURE — 86850 RBC ANTIBODY SCREEN: CPT

## 2021-12-13 PROCEDURE — 99999 PR OFFICE/OUTPT VISIT,PROCEDURE ONLY: CPT | Performed by: INTERNAL MEDICINE

## 2021-12-13 PROCEDURE — 83735 ASSAY OF MAGNESIUM: CPT

## 2021-12-13 PROCEDURE — 83036 HEMOGLOBIN GLYCOSYLATED A1C: CPT

## 2021-12-13 PROCEDURE — 86900 BLOOD TYPING SEROLOGIC ABO: CPT

## 2021-12-13 PROCEDURE — 80053 COMPREHEN METABOLIC PANEL: CPT

## 2021-12-13 PROCEDURE — 71046 X-RAY EXAM CHEST 2 VIEWS: CPT

## 2021-12-13 PROCEDURE — 85610 PROTHROMBIN TIME: CPT

## 2021-12-13 PROCEDURE — 86901 BLOOD TYPING SEROLOGIC RH(D): CPT

## 2021-12-13 PROCEDURE — 6370000000 HC RX 637 (ALT 250 FOR IP): Performed by: THORACIC SURGERY (CARDIOTHORACIC VASCULAR SURGERY)

## 2021-12-13 PROCEDURE — 94375 RESPIRATORY FLOW VOLUME LOOP: CPT

## 2021-12-13 PROCEDURE — 85384 FIBRINOGEN ACTIVITY: CPT

## 2021-12-13 PROCEDURE — 81001 URINALYSIS AUTO W/SCOPE: CPT

## 2021-12-13 PROCEDURE — 93005 ELECTROCARDIOGRAM TRACING: CPT

## 2021-12-13 PROCEDURE — 87641 MR-STAPH DNA AMP PROBE: CPT

## 2021-12-13 PROCEDURE — 85025 COMPLETE CBC W/AUTO DIFF WBC: CPT

## 2021-12-13 RX ORDER — CHLORHEXIDINE GLUCONATE 4 G/100ML
SOLUTION TOPICAL ONCE
Status: CANCELLED | OUTPATIENT
Start: 2021-12-13 | End: 2021-12-13

## 2021-12-13 RX ORDER — LOSARTAN POTASSIUM 100 MG/1
100 TABLET ORAL DAILY
COMMUNITY

## 2021-12-13 RX ORDER — FUROSEMIDE 20 MG/1
20 TABLET ORAL DAILY PRN
Status: ON HOLD | COMMUNITY
End: 2021-12-31 | Stop reason: SDUPTHER

## 2021-12-13 RX ORDER — SODIUM CHLORIDE, SODIUM LACTATE, POTASSIUM CHLORIDE, CALCIUM CHLORIDE 600; 310; 30; 20 MG/100ML; MG/100ML; MG/100ML; MG/100ML
INJECTION, SOLUTION INTRAVENOUS CONTINUOUS
Status: CANCELLED | OUTPATIENT
Start: 2021-12-13

## 2021-12-13 RX ADMIN — MUPIROCIN: 20 OINTMENT TOPICAL at 10:07

## 2021-12-13 RX ADMIN — CHLORHEXIDINE GLUCONATE: 4 LIQUID TOPICAL at 10:05

## 2021-12-13 NOTE — TELEPHONE ENCOUNTER
----- Message from Estevan Gray sent at 12/13/2021  2:19 PM CST -----  Subject: Message to Provider    QUESTIONS  Information for Provider? Pt called in and stated tried to call three   times and never got a call back and stated if the provider could give the   pt a call back. ---------------------------------------------------------------------------  --------------  Isac ZAVALA  What is the best way for the office to contact you? OK to leave message on   voicemail  Preferred Call Back Phone Number? [phone]  ---------------------------------------------------------------------------  --------------  SCRIPT ANSWERS  Relationship to Patient?  Self

## 2021-12-13 NOTE — H&P
[facility] Cardiothoracic Surgery  [address], [place] [address]  Phone: [phone]  Fax: [phone]     Name: Altaf Gupta  : 1965     DATE: 2021           The patient is a 59-year-old obese poorly controlled type 2 insulin-dependent diabetic who was admitted to the hospital last month with an infected left middle finger with staph sepsis. Abilio Ortiz He has been complaining of intermittent congestive heart failure symptoms in addition to weakness shortness of breath and easy fatigability. He has a history of longstanding persistent atrial fibrillation going back to . Cesar Brandon demonstrated evidence of myocardial ischemia therefore cardiac catheterization was performed by Dr. Julia Aaron on 2021 this revealed a normal-appearing left ventricle with ejection fraction of 60%. The left main artery had a distal tapering that approached 50% in some views. The LAD had a proximal 80% tandem stenotic lesions with a 90% stenosis of the small first diagonal branch. The second diagonal branch was slightly larger and also had a 90% proximal stenosis. The circumflex system has a tiny intermediate branch which does have a 90% proximal stenosis this vessel may be too small for grafting. The remainder of the circumflex system is very tiny. The RCA is a dominant system there is a 50% mid third stenosis with a 60% distal complex stenosis and an 80% stenosis of the PLM branch. Because the presence of severe multivessel coronary disease with symptoms of shortness of breath weakness the patient was felt to be a candidate for multivessel coronary artery bypass grafting.   Because of his history of longstanding persistent atrial fibrillation I felt that he may be a candidate for the full Romero-Maze for radiofrequency and cryoablation as well.     Past Medical History        Past Medical History:   Diagnosis Date    Acalculous cholecystitis 12/29/2015    Allergic rhinitis, cause unspecified      Atrial fibrillation, chronic (HCC)       sees [facility] cardiology    Bone spur       bilateral elbows    Charcot's joint of right foot      Chicken pox      Closed supracondylar fracture of elbow       fall    Diabetic ulcer of right midfoot associated with type 2 diabetes mellitus, with fat layer exposed ([facility] [address] 9/14/2020    History of MRSA infection       scalp/facial and on back    HTN (hypertension)      Hyperlipidemia      Impotence of organic origin      MDRO (multiple drug resistant organisms) resistance      MRSA (methicillin resistant staph aureus) culture positive 11/2021     MRSA LT HAND    Other dyspnea and respiratory abnormality      Other specified erythematous condition(695.89)      Personal history of other infectious and parasitic disease      Salmonella      Type II or unspecified type diabetes mellitus without mention of complication, uncontrolled 1994         Past Surgical History         Past Surgical History:   Procedure Laterality Date    CARDIOVERSION   03/2017    CATARACT REMOVAL        COLONOSCOPY   02/24/2020     Dr Marianna Fraire-Melanosis coli throughout the entire colon-AP, 5 yr recall    HAND SURGERY Left 10/31/2021     HAND INCISION AND DRAINAGE Partial Amputation Middle Finger performed by Carlo Caceres MD at [facility] Left 9/17/2020     OPEN REDUCTION INTERNAL FIXATION LEFT SUPRACONDYLAR FRACTURE performed by Cyndy Fischer MD at [address]         X 2    TONSILLECTOMY             Current Facility-Administered Medications          Current Outpatient Medications   Medication Sig Dispense Refill    warfarin (COUMADIN) 10 MG tablet Take 10 mg by mouth See Admin Instructions 10 MG ON Saturday, Monday, Tuesday, Wednesday, Friday  15 MG ON Sunday & THURSDAY        clindamycin (CLEOCIN) 300 MG capsule          metFORMIN (GLUCOPHAGE) 1000 MG tablet Take 1 tablet by mouth 2 times daily (with meals) 180 tablet 3    aspirin EC 81 MG EC tablet Take 1 tablet by mouth daily 90 tablet 1    isosorbide mononitrate (IMDUR) 30 MG extended release tablet Take 1 tablet by mouth daily 30 tablet 3    cloNIDine (CATAPRES) 0.1 MG tablet Take 1 tablet by mouth 2 times daily 60 tablet 0    furosemide (LASIX) 20 MG tablet TAKE ONE TABLET DAILY AS NEEDED 90 tablet 3    losartan (COZAAR) 100 MG tablet TAKE ONE TABLET DAILY 90 tablet 3    metoprolol tartrate (LOPRESSOR) 25 MG tablet TAKE ONE TABLET TWICE DAILY 60 tablet 5    Insulin Degludec (TRESIBA FLEXTOUCH) 100 UNIT/ML SOPN Inject into the skin PT TAKES ACCORDING TO ACCUCHEK. DOES NOT TAKE  AND UNDER AT HS.        atorvastatin (LIPITOR) 20 MG tablet TAKE ONE TABLET DAILY  90 tablet 1    tamsulosin (FLOMAX) 0.4 MG capsule Take 2 capsules by mouth daily 60 capsule 11    blood glucose monitor strips Test 3 times a day & as needed for symptoms of irregular blood glucose. Dispense sufficient amount for indicated testing frequency plus additional to accommodate PRN testing needs. 200 strip 3    Insulin Pen Needle 32G X 4 MM MISC 1 each by Does not apply route daily 100 each 3    blood glucose monitor kit and supplies Dispense sufficient amount for indicated testing frequency plus additional to accommodate PRN testing needs.  1 kit 0    Lancets MISC 1 each by Does not apply route 3 times daily (with meals) 300 each 3    insulin lispro (HUMALOG KWIKPEN) 200 UNIT/ML SOPN pen Sliding scale 160-179 2 units  180-199 3 units  200-219 4 units  220-239 5 units  240-259 6 units   260-279 7 units  280-299 8 units  300-399 9 units  340-379 10 units  380-420 11 units 3 pen 5    vitamin D (ERGOCALCIFEROL) 1.25 MG (87482 UT) CAPS capsule Take 50,000 Units by mouth once a week        diphenhydrAMINE (BENADRYL) 25 MG tablet Take 25 mg by mouth every 6 hours as needed for Itching        blood glucose test strips (CONTOUR NEXT TEST) strip 1 each by In Vitro route 4 times daily As needed. 200 each 3      No current facility-administered medications for this visit.               Allergies   Allergen Reactions    Contrast [Iodides]         N & V. PT STATED HE HAD A MRI WITH CONTRAST ON 10/25/2021.     Pcn [Penicillins]      Neomycin-Bacitracin Zn-Polymyx Rash    Neosporin [Neomycin-Polymyxin-Gramicidin] Rash      Family History         Family History   Problem Relation Age of Onset    Stroke Maternal Grandmother      Cancer Maternal Grandmother      Stroke Paternal Grandmother      Diabetes Paternal Grandmother      Cancer Father      Heart Disease Father      Lung Cancer Father      Asthma Paternal Grandfather      Heart Disease Paternal Grandfather      Heart Disease Maternal Grandfather      Colon Cancer Neg Hx      Colon Polyps Neg Hx      Esophageal Cancer Neg Hx      Liver Cancer Neg Hx      Liver Disease Neg Hx      Rectal Cancer Neg Hx      Stomach Cancer Neg Hx           Social History               Socioeconomic History    Marital status:        Spouse name: Not on file    Number of children: Not on file    Years of education: Not on file    Highest education level: Not on file   Occupational History    Not on file   Tobacco Use    Smoking status: Never Smoker    Smokeless tobacco: Never Used   Vaping Use    Vaping Use: Never used   Substance and Sexual Activity    Alcohol use: No    Drug use: No    Sexual activity: Not on file   Other Topics Concern    Not on file   Social History Narrative     Referred to  for assistance with Meals on Wheels      Social Determinants of Health          Financial Resource Strain: High Risk    Difficulty of Paying Living Expenses: Hard   Food Insecurity: Food Insecurity Present    Worried About Running Out of Food in the Last Year: Sometimes true    Ran Out of Food in the Last Year: Sometimes true   Transportation Needs: No Transportation Needs    Lack of Transportation (Medical): No    Lack of Transportation (Non-Medical): No   Physical Activity:     Days of Exercise per Week: Not on file    Minutes of Exercise per Session: Not on file   Stress:     Feeling of Stress : Not on file   Social Connections:     Frequency of Communication with Friends and Family: Not on file    Frequency of Social Gatherings with Friends and Family: Not on file    Attends Jewish Services: Not on file    Active Member of [address] [place] [address] or Organizations: Not on file    Attends Club or Organization Meetings: Not on file    Marital Status: Not on file   Intimate Partner Violence:     Fear of Current or Ex-Partner: Not on file    Emotionally Abused: Not on file    Physically Abused: Not on file    Sexually Abused: Not on file   Housing Stability:     Unable to Pay for Housing in the Last Year: Not on file    Number of [place] in the Last Year: Not on file    Unstable Housing in the Last Year: Not on file               ROS:   HEENT: denies neck pain, sore throat, blurred vision, diplopia.    Respiratory: chronic shortness of breath, much worse in the last 3 months. Sepideh Greener any hemoptysis or wheezes.    Cardiovascular: denies angina, palpitations, lower extremity edema.    GI: denies abdominal pain, nausea, vomiting, constipation. : denies urinary frequency, or dysuria.    Musculoskeletal: No joint pain or swelling. Neurologic: denies diplopia, headache, or ataxia.    The other 14 systems have been reviewed and are negative     Physical Exam: BP (!) 151/91 (Site: Left Upper Arm, Position: Sitting, Cuff Size: Medium Adult)   Pulse 74   Resp 18   Ht 5' 10\" (1.778 m)   Wt 230 lb 9.6 oz (104.6 kg)   SpO2 95%   BMI 33.09 kg/m²   Very pleasant well animated middle-age male who appears no acute distress. ENT: Throat is clear., Mucosa clear., Septum intact., No pyorrhea or abscess. Neck: Neck is soft., No cervical masses. , Throat is clear. , Neck veins are not distended. , Thyroid is normal size. Respiratory: Clear lung sounds., Normal., No wheezes or rhonchi., No use of accessory muscles. Cardiovascular: Cardiac sounds are clear., No carotid bruits. , Peripheral pulses are intact., Extremities exhibit no edema or varicosities. ,  There is an irregular rate with irregular rhythm no cardiac murmurs are heard  Abdomen: Abdomen is soft., No masses or tenderness. , Liver and spleen not palpable., Normal bowel sounds.,  Moderately obese. Lymphatic: No lymphadenopathy in the cervical, axillary, or femoral areas. Musculoskeletal: No clubbing or cyanosis. , Normal muscle tone., Normal range of motion upper and lower extremities. , No joint inflammation or swelling. He has surgical absence of the left middle digit secondary to resolving osteomyelitis of the phalanx from staph infection  Skin: No rashes, lesions, or ulcers., No swelling or edema.,  Lower extremity skin changes from chronic infections. Neurologic exam: No lateralizing neurologic findings. , Cranial nerves II-XII are normal., Examination of sensation is normal.  Psychiatric: Patient exhibits normal judgement and is oriented to name, time, and place., No anxiety or depression.     This pleasant 53-year-old male has poorly controlled diabetes and suffers from the manifestations of staph infections . History of diabetic wounds of the feet Charcot's joint and peripheral neuropathy. He is otherwise fairly functional and has symptoms of his coronary disease that manifest as shortness of breath weakness and dyspnea on exertion. He has diffuse severe disease of his vessels but I believe there are probably 3 vessels that could be grafted this being the PLM branch the LAD and second diagonal branch is questionable if the intermediate branch is graftable. I would consider this a \"salvage case\" he also has longstanding persistent atrial fibrillation.   I believe he would be a candidate for the full Romero-Maze for radiofrequency and cryoablation procedure in an attempt to convert her back to sinus rhythm permanently. In addition I would also exclude his left atrial appendage. I explained the procedure of magnitude of this operation in detail with the patient including potential risks of bleeding stroke infection reoperation blood transfusion multisystem organ failure respiratory failure and sudden cardiac death. His STS risk score for CABG he is 2% mortality with a 25% major morbidity mortality. He understands and is willing to proceed. I have placed him on the operating schedule for 12/14/2021 with preop testing the day prior. He is to withhold his Coumadin 7 days before operation and his Glucophage 2 days before operation.     Electronically signed by Omar Harrison MD on 11/30/2021 at 4:12 PM             Instructions       Return in 2 weeks (on 12/14/2021) for CABG/ Ablation. Ambulatory After Visit Summary (Automatic SnapShot taken 11/30/2021)      Additional Documentation    Vitals:  /91 Important   (Site: Left Upper Arm, Position: Sitting, Cuff Size: Medium Adult)     Pulse 74     Resp 18     Ht 5' 10\" (1.778 m)     Wt 230 lb 9.6 oz (104.6 kg)     SpO2 95%     BMI 33.09 kg/m²     BSA 2.27 m²     Pain Sc   0 - No pain            More Vitals     Flowsheets:  Vitals Reassessment,     Calorie Assessment        Encounter Info:  Billing Info,     Allergies,     Detailed Report,     History,     Medications,     Questionnaires          Travel Screening   Screenings since 11/29/21 0000  11/30/21 1446      In the last month, have you been in contact with someone who was confirmed or suspected to have Coronavirus / COVID-19? No / [place], [address] [place]   Have you had a COVID-19 viral test in the last 14 days? Yes - Negative result Isabel Rodrigues   Do you have any of the following new or worsening symptoms?   None of these Isabel Rodrigues   Have you traveled internationally or domestically in the last month? No Reema Smith     Travel History   Travel since 11/13/21   No documented travel since 11/13/21    Chart Review Routing History    No routing history on file. Encounter Status    Signed by Domonique Sykes MD on 11/30/21 at 16:22    BestPractice Advisories    Click to view BestPractice Advisory history     Encounter Messages    No messages in this encounter       Orders Placed     None      Outpatient Medications at End of Encounter as of 11/30/2021    warfarin (COUMADIN) 10 MG tablet (Taking) Take 10 mg by mouth See Admin Instructions 10 MG ON Saturday, Monday, Tuesday, Wednesday, Friday   15 MG ON Sunday & THURSDAY   clindamycin (CLEOCIN) 300 MG capsule (Taking)    metFORMIN (GLUCOPHAGE) 1000 MG tablet (Taking) Take 1 tablet by mouth 2 times daily (with meals)   aspirin EC 81 MG EC tablet (Taking) Take 1 tablet by mouth daily   isosorbide mononitrate (IMDUR) 30 MG extended release tablet (Taking) Take 1 tablet by mouth daily   cloNIDine (CATAPRES) 0.1 MG tablet (Taking) Take 1 tablet by mouth 2 times daily   furosemide (LASIX) 20 MG tablet (Taking) TAKE ONE TABLET DAILY AS NEEDED   losartan (COZAAR) 100 MG tablet (Taking) TAKE ONE TABLET DAILY   metoprolol tartrate (LOPRESSOR) 25 MG tablet (Taking) TAKE ONE TABLET TWICE DAILY   Insulin Degludec (TRESIBA FLEXTOUCH) 100 UNIT/ML SOPN (Taking) Inject into the skin PT TAKES ACCORDING TO ACCUCHEK. DOES NOT TAKE  AND UNDER AT HS.   atorvastatin (LIPITOR) 20 MG tablet (Taking) TAKE ONE TABLET DAILY    tamsulosin (FLOMAX) 0.4 MG capsule (Taking) Take 2 capsules by mouth daily   blood glucose monitor strips (Taking) Test 3 times a day & as needed for symptoms of irregular blood glucose. Dispense sufficient amount for indicated testing frequency plus additional to accommodate PRN testing needs.    Insulin Pen Needle 32G X 4 MM MISC (Taking) 1 each by Does not apply route daily   blood glucose monitor kit and supplies (Taking) Dispense sufficient amount for indicated testing frequency plus additional to accommodate PRN testing needs. Lancets MISC (Taking) 1 each by Does not apply route 3 times daily (with meals)   insulin lispro (HUMALOG KWIKPEN) 200 UNIT/ML SOPN pen (Taking) Sliding scale 160-179 2 units   180-199 3 units   200-219 4 units   220-239 5 units   240-259 6 units   260-279 7 units   280-299 8 units   300-399 9 units   340-379 10 units   380-420 11 units   vitamin D (ERGOCALCIFEROL) 1.25 MG (98151 UT) CAPS capsule (Taking) Take 50,000 Units by mouth once a week   diphenhydrAMINE (BENADRYL) 25 MG tablet (Taking) Take 25 mg by mouth every 6 hours as needed for Itching   blood glucose test strips (CONTOUR NEXT TEST) strip (Taking) 1 each by In Vitro route 4 times daily As needed.      Medication Changes         None       Medication List     Visit Diagnoses         CAD, multiple vessel         Longstanding persistent atrial fibrillation ([facility] [address]       Problem List

## 2021-12-13 NOTE — H&P (VIEW-ONLY)
[facility] [facility] Cardiothoracic Surgery  [address], [place] [address], [address] [facility], [place]  Phone: [phone]  Fax: [phone]     Name: Altaf Gupta  : 1965     DATE: 2021           The patient is a 57-year-old obese poorly controlled type 2 insulin-dependent diabetic who was admitted to the hospital last month with an infected left middle finger with staph sepsis. Abilio Ortiz He has been complaining of intermittent congestive heart failure symptoms in addition to weakness shortness of breath and easy fatigability. He has a history of longstanding persistent atrial fibrillation going back to . Cesar Brandon demonstrated evidence of myocardial ischemia therefore cardiac catheterization was performed by Dr. Julia Aaron on 2021 this revealed a normal-appearing left ventricle with ejection fraction of 60%. The left main artery had a distal tapering that approached 50% in some views. The LAD had a proximal 80% tandem stenotic lesions with a 90% stenosis of the small first diagonal branch. The second diagonal branch was slightly larger and also had a 90% proximal stenosis. The circumflex system has a tiny intermediate branch which does have a 90% proximal stenosis this vessel may be too small for grafting. The remainder of the circumflex system is very tiny. The RCA is a dominant system there is a 50% mid third stenosis with a 60% distal complex stenosis and an 80% stenosis of the PLM branch. Because the presence of severe multivessel coronary disease with symptoms of shortness of breath weakness the patient was felt to be a candidate for multivessel coronary artery bypass grafting.   Because of his history of longstanding persistent atrial fibrillation I felt that he may be a candidate for the full Romero-Maze for radiofrequency and cryoablation as well.     Past Medical History        Past Medical History:   Diagnosis Date    Acalculous cholecystitis 12/29/2015    Allergic rhinitis, cause unspecified      Atrial fibrillation, chronic (HCC)       sees [facility] cardiology    Bone spur       bilateral elbows    Charcot's joint of right foot      Chicken pox      Closed supracondylar fracture of elbow       fall    Diabetic ulcer of right midfoot associated with type 2 diabetes mellitus, with fat layer exposed ([facility] [address] 9/14/2020    History of MRSA infection       scalp/facial and on back    HTN (hypertension)      Hyperlipidemia      Impotence of organic origin      MDRO (multiple drug resistant organisms) resistance      MRSA (methicillin resistant staph aureus) culture positive 11/2021     MRSA LT HAND    Other dyspnea and respiratory abnormality      Other specified erythematous condition(695.89)      Personal history of other infectious and parasitic disease      Salmonella      Type II or unspecified type diabetes mellitus without mention of complication, uncontrolled 1994         Past Surgical History         Past Surgical History:   Procedure Laterality Date    CARDIOVERSION   03/2017    CATARACT REMOVAL        COLONOSCOPY   02/24/2020     Dr Valerie Fraire-Melanosis coli throughout the entire colon-AP, 5 yr recall    HAND SURGERY Left 10/31/2021     HAND INCISION AND DRAINAGE Partial Amputation Middle Finger performed by Orly Arreola MD at [address] [facility] Left 9/17/2020     OPEN REDUCTION INTERNAL FIXATION LEFT SUPRACONDYLAR FRACTURE performed by Zachary Chicas MD at [address]         X 2    TONSILLECTOMY             Current Facility-Administered Medications          Current Outpatient Medications   Medication Sig Dispense Refill    warfarin (COUMADIN) 10 MG tablet Take 10 mg by mouth See Admin Instructions 10 MG ON Saturday, Monday, Tuesday, Wednesday, Friday  15 MG ON Sunday & THURSDAY        clindamycin (CLEOCIN) 300 MG capsule          metFORMIN (GLUCOPHAGE) 1000 MG tablet Take 1 tablet by mouth 2 times daily (with meals) 180 tablet 3    aspirin EC 81 MG EC tablet Take 1 tablet by mouth daily 90 tablet 1    isosorbide mononitrate (IMDUR) 30 MG extended release tablet Take 1 tablet by mouth daily 30 tablet 3    cloNIDine (CATAPRES) 0.1 MG tablet Take 1 tablet by mouth 2 times daily 60 tablet 0    furosemide (LASIX) 20 MG tablet TAKE ONE TABLET DAILY AS NEEDED 90 tablet 3    losartan (COZAAR) 100 MG tablet TAKE ONE TABLET DAILY 90 tablet 3    metoprolol tartrate (LOPRESSOR) 25 MG tablet TAKE ONE TABLET TWICE DAILY 60 tablet 5    Insulin Degludec (TRESIBA FLEXTOUCH) 100 UNIT/ML SOPN Inject into the skin PT TAKES ACCORDING TO ACCUCHEK. DOES NOT TAKE  AND UNDER AT HS.        atorvastatin (LIPITOR) 20 MG tablet TAKE ONE TABLET DAILY  90 tablet 1    tamsulosin (FLOMAX) 0.4 MG capsule Take 2 capsules by mouth daily 60 capsule 11    blood glucose monitor strips Test 3 times a day & as needed for symptoms of irregular blood glucose. Dispense sufficient amount for indicated testing frequency plus additional to accommodate PRN testing needs. 200 strip 3    Insulin Pen Needle 32G X 4 MM MISC 1 each by Does not apply route daily 100 each 3    blood glucose monitor kit and supplies Dispense sufficient amount for indicated testing frequency plus additional to accommodate PRN testing needs.  1 kit 0    Lancets MISC 1 each by Does not apply route 3 times daily (with meals) 300 each 3    insulin lispro (HUMALOG KWIKPEN) 200 UNIT/ML SOPN pen Sliding scale 160-179 2 units  180-199 3 units  200-219 4 units  220-239 5 units  240-259 6 units   260-279 7 units  280-299 8 units  300-399 9 units  340-379 10 units  380-420 11 units 3 pen 5    vitamin D (ERGOCALCIFEROL) 1.25 MG (64975 UT) CAPS capsule Take 50,000 Units by mouth once a week        diphenhydrAMINE (BENADRYL) 25 MG tablet Take 25 mg by mouth every 6 hours as needed for Itching        blood glucose test strips (CONTOUR NEXT TEST) strip 1 each by In Vitro route 4 times daily As needed. 200 each 3      No current facility-administered medications for this visit.               Allergies   Allergen Reactions    Contrast [Iodides]         N & V. PT STATED HE HAD A MRI WITH CONTRAST ON 10/25/2021.     Pcn [Penicillins]      Neomycin-Bacitracin Zn-Polymyx Rash    Neosporin [Neomycin-Polymyxin-Gramicidin] Rash      Family History         Family History   Problem Relation Age of Onset    Stroke Maternal Grandmother      Cancer Maternal Grandmother      Stroke Paternal Grandmother      Diabetes Paternal Grandmother      Cancer Father      Heart Disease Father      Lung Cancer Father      Asthma Paternal Grandfather      Heart Disease Paternal Grandfather      Heart Disease Maternal Grandfather      Colon Cancer Neg Hx      Colon Polyps Neg Hx      Esophageal Cancer Neg Hx      Liver Cancer Neg Hx      Liver Disease Neg Hx      Rectal Cancer Neg Hx      Stomach Cancer Neg Hx           Social History               Socioeconomic History    Marital status:        Spouse name: Not on file    Number of children: Not on file    Years of education: Not on file    Highest education level: Not on file   Occupational History    Not on file   Tobacco Use    Smoking status: Never Smoker    Smokeless tobacco: Never Used   Vaping Use    Vaping Use: Never used   Substance and Sexual Activity    Alcohol use: No    Drug use: No    Sexual activity: Not on file   Other Topics Concern    Not on file   Social History Narrative     Referred to  for assistance with Meals on Wheels      Social Determinants of Health          Financial Resource Strain: High Risk    Difficulty of Paying Living Expenses: Hard   Food Insecurity: Food Insecurity Present    Worried About Running Out of Food in the Last Year: Sometimes true    Ran Out of Food in the Last Year: Sometimes true   Transportation Needs: No Transportation Needs    Lack of Transportation (Medical): No    Lack of Transportation (Non-Medical): No   Physical Activity:     Days of Exercise per Week: Not on file    Minutes of Exercise per Session: Not on file   Stress:     Feeling of Stress : Not on file   Social Connections:     Frequency of Communication with Friends and Family: Not on file    Frequency of Social Gatherings with Friends and Family: Not on file    Attends Buddhist Services: Not on file    Active Member of [address] [place] [address] or Organizations: Not on file    Attends Club or Organization Meetings: Not on file    Marital Status: Not on file   Intimate Partner Violence:     Fear of Current or Ex-Partner: Not on file    Emotionally Abused: Not on file    Physically Abused: Not on file    Sexually Abused: Not on file   Housing Stability:     Unable to Pay for Housing in the Last Year: Not on file    Number of [place] in the Last Year: Not on file    Unstable Housing in the Last Year: Not on file               ROS:   HEENT: denies neck pain, sore throat, blurred vision, diplopia.    Respiratory: chronic shortness of breath, much worse in the last 3 months. [place] any hemoptysis or wheezes.    Cardiovascular: denies angina, palpitations, lower extremity edema.    GI: denies abdominal pain, nausea, vomiting, constipation. : denies urinary frequency, or dysuria.    Musculoskeletal: No joint pain or swelling. Neurologic: denies diplopia, headache, or ataxia.    The other 14 systems have been reviewed and are negative     Physical Exam: BP (!) 151/91 (Site: Left Upper Arm, Position: Sitting, Cuff Size: Medium Adult)   Pulse 74   Resp 18   Ht 5' 10\" (1.778 m)   Wt 230 lb 9.6 oz (104.6 kg)   SpO2 95%   BMI 33.09 kg/m²   Very pleasant well animated middle-age male who appears no acute distress. ENT: Throat is clear., Mucosa clear., Septum intact., No pyorrhea or abscess. Neck: Neck is soft., No cervical masses. , Throat is clear. , for radiofrequency and cryoablation procedure in an attempt to convert her back to sinus rhythm permanently. In addition I would also exclude his left atrial appendage. I explained the procedure of magnitude of this operation in detail with the patient including potential risks of bleeding stroke infection reoperation blood transfusion multisystem organ failure respiratory failure and sudden cardiac death. His STS risk score for CABG he is 2% mortality with a 25% major morbidity mortality. He understands and is willing to proceed. I have placed him on the operating schedule for 12/14/2021 with preop testing the day prior. He is to withhold his Coumadin 7 days before operation and his Glucophage 2 days before operation.     Electronically signed by Merlinda Jolly, MD on 11/30/2021 at 4:12 PM             Instructions       Return in 2 weeks (on 12/14/2021) for CABG/ Ablation. Ambulatory After Visit Summary (Automatic SnapShot taken 11/30/2021)      Additional Documentation    Vitals:  /91 Important   (Site: Left Upper Arm, Position: Sitting, Cuff Size: Medium Adult)     Pulse 74     Resp 18     Ht 5' 10\" (1.778 m)     Wt 230 lb 9.6 oz (104.6 kg)     SpO2 95%     BMI 33.09 kg/m²     BSA 2.27 m²     Pain Sc   0 - No pain            More Vitals     Flowsheets:  Vitals Reassessment,     Calorie Assessment        Encounter Info:  Billing Info,     Allergies,     Detailed Report,     History,     Medications,     Questionnaires          Travel Screening   Screenings since 11/29/21 0000  11/30/21 1446      In the last month, have you been in contact with someone who was confirmed or suspected to have Coronavirus / COVID-19? No / [place], [place]   Have you had a COVID-19 viral test in the last 14 days? Yes - Negative result [place], [place]   Do you have any of the following new or worsening symptoms?   None of these [place], [place]   Have you traveled internationally or domestically in the last month? No [place]     Travel History   Travel since 11/13/21   No documented travel since 11/13/21    Chart Review Routing History    No routing history on file. Encounter Status    Signed by Tony Ferguson MD on 11/30/21 at 16:22    BestPractice Advisories    Click to view BestPractice Advisory history     Encounter Messages    No messages in this encounter       Orders Placed     None      Outpatient Medications at End of Encounter as of 11/30/2021    warfarin (COUMADIN) 10 MG tablet (Taking) Take 10 mg by mouth See Admin Instructions 10 MG ON Saturday, Monday, Tuesday, Wednesday, Friday   15 MG ON Sunday & THURSDAY   clindamycin (CLEOCIN) 300 MG capsule (Taking)    metFORMIN (GLUCOPHAGE) 1000 MG tablet (Taking) Take 1 tablet by mouth 2 times daily (with meals)   aspirin EC 81 MG EC tablet (Taking) Take 1 tablet by mouth daily   isosorbide mononitrate (IMDUR) 30 MG extended release tablet (Taking) Take 1 tablet by mouth daily   cloNIDine (CATAPRES) 0.1 MG tablet (Taking) Take 1 tablet by mouth 2 times daily   furosemide (LASIX) 20 MG tablet (Taking) TAKE ONE TABLET DAILY AS NEEDED   losartan (COZAAR) 100 MG tablet (Taking) TAKE ONE TABLET DAILY   metoprolol tartrate (LOPRESSOR) 25 MG tablet (Taking) TAKE ONE TABLET TWICE DAILY   Insulin Degludec (TRESIBA FLEXTOUCH) 100 UNIT/ML SOPN (Taking) Inject into the skin PT TAKES ACCORDING TO ACCUCHEK. DOES NOT TAKE  AND UNDER AT HS.   atorvastatin (LIPITOR) 20 MG tablet (Taking) TAKE ONE TABLET DAILY    tamsulosin (FLOMAX) 0.4 MG capsule (Taking) Take 2 capsules by mouth daily   blood glucose monitor strips (Taking) Test 3 times a day & as needed for symptoms of irregular blood glucose. Dispense sufficient amount for indicated testing frequency plus additional to accommodate PRN testing needs.    Insulin Pen Needle 32G X 4 MM MISC (Taking) 1 each by Does not apply route daily   blood glucose monitor kit and supplies (Taking) Dispense sufficient amount for indicated testing frequency plus additional to accommodate PRN testing needs. Lancets MISC (Taking) 1 each by Does not apply route 3 times daily (with meals)   insulin lispro (HUMALOG KWIKPEN) 200 UNIT/ML SOPN pen (Taking) Sliding scale 160-179 2 units   180-199 3 units   200-219 4 units   220-239 5 units   240-259 6 units   260-279 7 units   280-299 8 units   300-399 9 units   340-379 10 units   380-420 11 units   vitamin D (ERGOCALCIFEROL) 1.25 MG (12497 UT) CAPS capsule (Taking) Take 50,000 Units by mouth once a week   diphenhydrAMINE (BENADRYL) 25 MG tablet (Taking) Take 25 mg by mouth every 6 hours as needed for Itching   blood glucose test strips (CONTOUR NEXT TEST) strip (Taking) 1 each by In Vitro route 4 times daily As needed.      Medication Changes         None       Medication List     Visit Diagnoses         CAD, multiple vessel         Longstanding persistent atrial fibrillation ([facility] [address]       Problem List no

## 2021-12-13 NOTE — LETTER
[facility]  Cardiac Rehab Department  [address] [place] [address] [place]   [phone]  Toll Free [phone]              December 20, 2021    Dear Dwayne Lawson,    Please find this education packet that has been sent to you on coronary artery/valvular disease and the guidelines you should follow concerning your present cardiac condition and immediate recovery. Since you recently underwent CABG x 3, you are now eligible to enroll in Phase II Outpatient Cardiac Rehab after your follow-up office visit with Dr. Trina Lincoln. Once released, you should call [phone] to \"get started\" by making an appointment for your orientation and assessment. A brochure has been included in this mailing to give you a brief overview of the program while details will be discussed and questions answered to your satisfaction during your orientation. You may inquire about a Cardiac Rehab Program at [facility] in   [place], [place]. The telephone number is [phone]. In the meantime, feel free to reach out to us as needed and a member of our staff will be more than pleased to assist you. Thank you. To the betterment of your health,        [facility] Cardiac Rehab Staff    DAYRON Ramos, MA Andreea Tang, JACOB  Registered Nurse  Exercise Physiologist  Registered Nurse

## 2021-12-13 NOTE — PROCEDURES
Pulmonary Function Study    Interpretation:    Non diagnostic study due to the fact that reproducibility criteria not met. Impression:    1. Non diagnostic pulmonary function study due to the fact that the patient could not perform the study to the satisfaction of the American Thoracic Society standards. Consider repeating study if clinically indicated.         Dorian Sosa MD, [facility] [place] [facility]

## 2021-12-14 ENCOUNTER — ANESTHESIA EVENT (OUTPATIENT)
Dept: OPERATING ROOM | Age: 56
DRG: 234 | End: 2021-12-14
Payer: MEDICARE

## 2021-12-14 ENCOUNTER — ANESTHESIA (OUTPATIENT)
Dept: OPERATING ROOM | Age: 56
DRG: 234 | End: 2021-12-14
Payer: MEDICARE

## 2021-12-14 ENCOUNTER — APPOINTMENT (OUTPATIENT)
Dept: GENERAL RADIOLOGY | Age: 56
DRG: 234 | End: 2021-12-14
Attending: THORACIC SURGERY (CARDIOTHORACIC VASCULAR SURGERY)
Payer: MEDICARE

## 2021-12-14 ENCOUNTER — HOSPITAL ENCOUNTER (INPATIENT)
Age: 56
LOS: 4 days | Discharge: HOME | DRG: 234 | End: 2021-12-18
Attending: THORACIC SURGERY (CARDIOTHORACIC VASCULAR SURGERY) | Admitting: THORACIC SURGERY (CARDIOTHORACIC VASCULAR SURGERY)
Payer: MEDICARE

## 2021-12-14 VITALS — SYSTOLIC BLOOD PRESSURE: 87 MMHG | TEMPERATURE: 96.3 F | DIASTOLIC BLOOD PRESSURE: 52 MMHG | OXYGEN SATURATION: 100 %

## 2021-12-14 DIAGNOSIS — Z95.1 STATUS POST CORONARY ARTERY BYPASS GRAFT: Primary | ICD-10-CM

## 2021-12-14 PROBLEM — I25.10 CAD IN NATIVE ARTERY: Status: ACTIVE | Noted: 2021-12-14

## 2021-12-14 LAB
ABO/RH: NORMAL
ANION GAP SERPL CALCULATED.3IONS-SCNC: 11 MMOL/L (ref 7–19)
ANTIBODY SCREEN: NORMAL
APTT: 35.1 SEC (ref 26–36.2)
BASE EXCESS ARTERIAL: -0.8 MMOL/L (ref -2–2)
BASE EXCESS ARTERIAL: -1 (ref -3–3)
BASE EXCESS ARTERIAL: -8.3 MMOL/L (ref -2–2)
BASE EXCESS ARTERIAL: 0 (ref -3–3)
BASE EXCESS ARTERIAL: 0 (ref -3–3)
BASE EXCESS ARTERIAL: 1 (ref -3–3)
BUN BLDV-MCNC: 23 MG/DL (ref 6–20)
CALCIUM IONIZED: 1.06 MMOL/L (ref 1.1–1.3)
CALCIUM IONIZED: 1.09 MMOL/L (ref 1.1–1.3)
CALCIUM IONIZED: 1.1 MMOL/L (ref 1.1–1.3)
CALCIUM IONIZED: 1.14 MMOL/L (ref 1.1–1.3)
CALCIUM IONIZED: 1.15 MMOL/L (ref 1.1–1.3)
CALCIUM IONIZED: 1.26 MMOL/L (ref 1.1–1.3)
CALCIUM SERPL-MCNC: 8 MG/DL (ref 8.6–10)
CARBOXYHEMOGLOBIN ARTERIAL: 1.3 % (ref 0–5)
CARBOXYHEMOGLOBIN ARTERIAL: 1.9 % (ref 0–5)
CHLORIDE BLD-SCNC: 104 MMOL/L (ref 98–111)
CO2: 23 MEQ/L (ref 21–32)
CO2: 23 MEQ/L (ref 21–32)
CO2: 23 MMOL/L (ref 22–29)
CO2: 24 MEQ/L (ref 21–32)
CO2: 25 MEQ/L (ref 21–32)
CO2: 26 MEQ/L (ref 21–32)
CO2: 26 MEQ/L (ref 21–32)
CREAT SERPL-MCNC: 0.9 MG/DL (ref 0.5–1.2)
GFR AFRICAN AMERICAN: >59
GFR AFRICAN AMERICAN: >60
GFR NON-AFRICAN AMERICAN: >60
GLUCOSE BLD-MCNC: 216 MG/DL (ref 70–99)
GLUCOSE BLD-MCNC: 221 MG/DL (ref 70–99)
GLUCOSE BLD-MCNC: 222 MG/DL (ref 70–99)
GLUCOSE BLD-MCNC: 234 MG/DL (ref 70–99)
GLUCOSE BLD-MCNC: 252 MG/DL (ref 70–99)
GLUCOSE BLD-MCNC: 257 MG/DL (ref 70–99)
GLUCOSE BLD-MCNC: 261 MG/DL (ref 70–99)
GLUCOSE BLD-MCNC: 262 MG/DL (ref 70–99)
GLUCOSE BLD-MCNC: 264 MG/DL (ref 70–99)
GLUCOSE BLD-MCNC: 265 MG/DL (ref 70–99)
GLUCOSE BLD-MCNC: 267 MG/DL (ref 70–99)
GLUCOSE BLD-MCNC: 268 MG/DL (ref 70–99)
GLUCOSE BLD-MCNC: 274 MG/DL (ref 70–99)
GLUCOSE BLD-MCNC: 281 MG/DL (ref 70–99)
GLUCOSE BLD-MCNC: 282 MG/DL (ref 70–99)
GLUCOSE BLD-MCNC: 284 MG/DL (ref 74–109)
GLUCOSE BLD-MCNC: 291 MG/DL (ref 70–99)
GLUCOSE BLD-MCNC: 299 MG/DL (ref 70–99)
GLUCOSE BLD-MCNC: 301 MG/DL (ref 70–99)
HCO3 ARTERIAL: 17.1 MMOL/L (ref 22–26)
HCO3 ARTERIAL: 24.3 MMOL/L (ref 22–26)
HCT VFR BLD CALC: 30.2 % (ref 42–52)
HCT VFR BLD CALC: 32.9 % (ref 42–52)
HEMOGLOBIN, ART, EXTENDED: 10.9 G/DL (ref 14–18)
HEMOGLOBIN, ART, EXTENDED: 11.3 G/DL (ref 14–18)
HEMOGLOBIN: 10 GM/DL (ref 12–18)
HEMOGLOBIN: 10.4 G/DL (ref 14–18)
HEMOGLOBIN: 10.8 G/DL (ref 14–18)
HEMOGLOBIN: 11 GM/DL (ref 12–18)
HEMOGLOBIN: 8 GM/DL (ref 12–18)
HEMOGLOBIN: 8.8 GM/DL (ref 12–18)
HEMOGLOBIN: 9.1 GM/DL (ref 12–18)
HEMOGLOBIN: 9.3 GM/DL (ref 12–18)
INR BLD: 1.32 (ref 0.88–1.18)
MAGNESIUM: 1.5 MG/DL (ref 1.6–2.6)
MAGNESIUM: 1.9 MG/DL (ref 1.6–2.6)
MCH RBC QN AUTO: 31.1 PG (ref 27–31)
MCH RBC QN AUTO: 32.2 PG (ref 27–31)
MCHC RBC AUTO-ENTMCNC: 32.8 G/DL (ref 33–37)
MCHC RBC AUTO-ENTMCNC: 34.4 G/DL (ref 33–37)
MCV RBC AUTO: 93.5 FL (ref 80–94)
MCV RBC AUTO: 94.8 FL (ref 80–94)
METHEMOGLOBIN ARTERIAL: 1 %
METHEMOGLOBIN ARTERIAL: 1.6 %
O2 CONTENT ARTERIAL: 15.8 ML/DL
O2 CONTENT ARTERIAL: 15.9 ML/DL
O2 SAT, ARTERIAL: 100 % (ref 93–100)
O2 SAT, ARTERIAL: 96.8 %
O2 SAT, ARTERIAL: 97.6 %
O2 THERAPY: ABNORMAL
O2 THERAPY: ABNORMAL
PCO2 ARTERIAL: 34 MMHG (ref 35–45)
PCO2 ARTERIAL: 36 MM HG (ref 35–48)
PCO2 ARTERIAL: 39 MM HG (ref 35–48)
PCO2 ARTERIAL: 41 MMHG (ref 35–45)
PCO2 ARTERIAL: 44 MM HG (ref 35–48)
PCO2 ARTERIAL: 45 MM HG (ref 35–48)
PDW BLD-RTO: 12.2 % (ref 11.5–14.5)
PDW BLD-RTO: 12.4 % (ref 11.5–14.5)
PERFORMED ON: ABNORMAL
PH ARTERIAL: 7.31 (ref 7.35–7.45)
PH ARTERIAL: 7.36 (ref 7.3–7.5)
PH ARTERIAL: 7.38 (ref 7.35–7.45)
PH ARTERIAL: 7.38 (ref 7.3–7.5)
PH ARTERIAL: 7.42 (ref 7.3–7.5)
PLATELET # BLD: 179 K/UL (ref 130–400)
PLATELET # BLD: 205 K/UL (ref 130–400)
PMV BLD AUTO: 11.2 FL (ref 9.4–12.4)
PMV BLD AUTO: 11.2 FL (ref 9.4–12.4)
PO2 ARTERIAL: 144 MMHG (ref 80–100)
PO2 ARTERIAL: 331 MM HG (ref 83–108)
PO2 ARTERIAL: 402 MMHG (ref 80–100)
PO2 ARTERIAL: 467 MM HG (ref 83–108)
PO2 ARTERIAL: 482 MM HG (ref 83–108)
PO2 ARTERIAL: 517 MM HG (ref 83–108)
PO2 ARTERIAL: 517 MM HG (ref 83–108)
PO2 ARTERIAL: 527 MM HG (ref 83–108)
POC ANION GAP: 10
POC ANION GAP: 11
POC ANION GAP: 11
POC ANION GAP: 12
POC ANION GAP: 12
POC ANION GAP: 13
POC CHLORIDE: 100 MEQ/L (ref 99–110)
POC CHLORIDE: 100 MEQ/L (ref 99–110)
POC CHLORIDE: 98 MEQ/L (ref 99–110)
POC CHLORIDE: 99 MEQ/L (ref 99–110)
POC CREATININE: 0.9 MG/DL (ref 0.3–1.3)
POC CREATININE: 1 MG/DL (ref 0.3–1.3)
POC CREATININE: 1.1 MG/DL (ref 0.3–1.3)
POC CREATININE: 1.1 MG/DL (ref 0.3–1.3)
POC HEMATOCRIT: 24 % (ref 37–52)
POC HEMATOCRIT: 26 % (ref 37–52)
POC HEMATOCRIT: 27 % (ref 37–52)
POC HEMATOCRIT: 27 % (ref 37–52)
POC HEMATOCRIT: 29 % (ref 37–52)
POC HEMATOCRIT: 32 % (ref 37–52)
POC POTASSIUM: 3.7 MEQ/L (ref 3.5–5.1)
POC POTASSIUM: 4.1 MEQ/L (ref 3.5–5.1)
POC POTASSIUM: 4.2 MEQ/L (ref 3.5–5.1)
POC POTASSIUM: 4.3 MEQ/L (ref 3.5–5.1)
POC SAMPLE TYPE: ABNORMAL
POC SODIUM: 133 MEQ/L (ref 136–145)
POC SODIUM: 135 MEQ/L (ref 136–145)
POC SODIUM: 135 MEQ/L (ref 136–145)
POC SODIUM: 136 MEQ/L (ref 136–145)
POTASSIUM SERPL-SCNC: 3.2 MMOL/L (ref 3.5–5)
POTASSIUM SERPL-SCNC: 3.7 MMOL/L (ref 3.5–5)
POTASSIUM, WHOLE BLOOD: 3.1
POTASSIUM, WHOLE BLOOD: 3.6
PROTHROMBIN TIME: 16.5 SEC (ref 12–14.6)
RBC # BLD: 3.23 M/UL (ref 4.7–6.1)
RBC # BLD: 3.47 M/UL (ref 4.7–6.1)
SODIUM BLD-SCNC: 138 MMOL/L (ref 136–145)
TCO2 ARTERIAL: 24 MMOL/L
TCO2 ARTERIAL: 25 MMOL/L
TCO2 ARTERIAL: 25 MMOL/L
TCO2 ARTERIAL: 26 MMOL/L
TCO2 ARTERIAL: 27 MMOL/L
TCO2 ARTERIAL: 27 MMOL/L
WBC # BLD: 12 K/UL (ref 4.8–10.8)
WBC # BLD: 14.9 K/UL (ref 4.8–10.8)

## 2021-12-14 PROCEDURE — 6370000000 HC RX 637 (ALT 250 FOR IP): Performed by: THORACIC SURGERY (CARDIOTHORACIC VASCULAR SURGERY)

## 2021-12-14 PROCEDURE — 5A2204Z RESTORATION OF CARDIAC RHYTHM, SINGLE: ICD-10-PCS | Performed by: THORACIC SURGERY (CARDIOTHORACIC VASCULAR SURGERY)

## 2021-12-14 PROCEDURE — 36415 COLL VENOUS BLD VENIPUNCTURE: CPT

## 2021-12-14 PROCEDURE — 85530 HEPARIN-PROTAMINE TOLERANCE: CPT | Performed by: THORACIC SURGERY (CARDIOTHORACIC VASCULAR SURGERY)

## 2021-12-14 PROCEDURE — 84295 ASSAY OF SERUM SODIUM: CPT

## 2021-12-14 PROCEDURE — L8612 AQUEOUS SHUNT PROSTHESIS: HCPCS | Performed by: THORACIC SURGERY (CARDIOTHORACIC VASCULAR SURGERY)

## 2021-12-14 PROCEDURE — 82803 BLOOD GASES ANY COMBINATION: CPT

## 2021-12-14 PROCEDURE — 37799 UNLISTED PX VASCULAR SURGERY: CPT

## 2021-12-14 PROCEDURE — B246ZZ4 ULTRASONOGRAPHY OF RIGHT AND LEFT HEART, TRANSESOPHAGEAL: ICD-10-PCS | Performed by: THORACIC SURGERY (CARDIOTHORACIC VASCULAR SURGERY)

## 2021-12-14 PROCEDURE — 2580000003 HC RX 258: Performed by: THORACIC SURGERY (CARDIOTHORACIC VASCULAR SURGERY)

## 2021-12-14 PROCEDURE — P9045 ALBUMIN (HUMAN), 5%, 250 ML: HCPCS | Performed by: THORACIC SURGERY (CARDIOTHORACIC VASCULAR SURGERY)

## 2021-12-14 PROCEDURE — C2618 PROBE/NEEDLE, CRYO: HCPCS | Performed by: THORACIC SURGERY (CARDIOTHORACIC VASCULAR SURGERY)

## 2021-12-14 PROCEDURE — 33508 ENDOSCOPIC VEIN HARVEST: CPT | Performed by: THORACIC SURGERY (CARDIOTHORACIC VASCULAR SURGERY)

## 2021-12-14 PROCEDURE — 2580000003 HC RX 258

## 2021-12-14 PROCEDURE — 86850 RBC ANTIBODY SCREEN: CPT

## 2021-12-14 PROCEDURE — 1210000000 HC MED SURG R&B

## 2021-12-14 PROCEDURE — 85347 COAGULATION TIME ACTIVATED: CPT | Performed by: THORACIC SURGERY (CARDIOTHORACIC VASCULAR SURGERY)

## 2021-12-14 PROCEDURE — 82947 ASSAY GLUCOSE BLOOD QUANT: CPT

## 2021-12-14 PROCEDURE — 3600000090 HC PERFUSION PUMP ON: Performed by: THORACIC SURGERY (CARDIOTHORACIC VASCULAR SURGERY)

## 2021-12-14 PROCEDURE — 94002 VENT MGMT INPAT INIT DAY: CPT

## 2021-12-14 PROCEDURE — 85730 THROMBOPLASTIN TIME PARTIAL: CPT

## 2021-12-14 PROCEDURE — 6360000002 HC RX W HCPCS: Performed by: THORACIC SURGERY (CARDIOTHORACIC VASCULAR SURGERY)

## 2021-12-14 PROCEDURE — C1751 CATH, INF, PER/CENT/MIDLINE: HCPCS | Performed by: THORACIC SURGERY (CARDIOTHORACIC VASCULAR SURGERY)

## 2021-12-14 PROCEDURE — 83735 ASSAY OF MAGNESIUM: CPT

## 2021-12-14 PROCEDURE — 82435 ASSAY OF BLOOD CHLORIDE: CPT

## 2021-12-14 PROCEDURE — 82800 BLOOD PH: CPT

## 2021-12-14 PROCEDURE — 71045 X-RAY EXAM CHEST 1 VIEW: CPT

## 2021-12-14 PROCEDURE — 5A1221Z PERFORMANCE OF CARDIAC OUTPUT, CONTINUOUS: ICD-10-PCS | Performed by: THORACIC SURGERY (CARDIOTHORACIC VASCULAR SURGERY)

## 2021-12-14 PROCEDURE — 85014 HEMATOCRIT: CPT

## 2021-12-14 PROCEDURE — 33259 ABLATE ATRIA W/BYPASS ADD-ON: CPT | Performed by: THORACIC SURGERY (CARDIOTHORACIC VASCULAR SURGERY)

## 2021-12-14 PROCEDURE — 02100Z9 BYPASS CORONARY ARTERY, ONE ARTERY FROM LEFT INTERNAL MAMMARY, OPEN APPROACH: ICD-10-PCS | Performed by: THORACIC SURGERY (CARDIOTHORACIC VASCULAR SURGERY)

## 2021-12-14 PROCEDURE — C1729 CATH, DRAINAGE: HCPCS | Performed by: THORACIC SURGERY (CARDIOTHORACIC VASCULAR SURGERY)

## 2021-12-14 PROCEDURE — 3600000008 HC SURGERY OHS BASE: Performed by: THORACIC SURGERY (CARDIOTHORACIC VASCULAR SURGERY)

## 2021-12-14 PROCEDURE — 2709999900 HC NON-CHARGEABLE SUPPLY: Performed by: THORACIC SURGERY (CARDIOTHORACIC VASCULAR SURGERY)

## 2021-12-14 PROCEDURE — 84132 ASSAY OF SERUM POTASSIUM: CPT

## 2021-12-14 PROCEDURE — 2720000010 HC SURG SUPPLY STERILE: Performed by: THORACIC SURGERY (CARDIOTHORACIC VASCULAR SURGERY)

## 2021-12-14 PROCEDURE — 3700000001 HC ADD 15 MINUTES (ANESTHESIA): Performed by: THORACIC SURGERY (CARDIOTHORACIC VASCULAR SURGERY)

## 2021-12-14 PROCEDURE — 94640 AIRWAY INHALATION TREATMENT: CPT

## 2021-12-14 PROCEDURE — 82810 BLOOD GASES O2 SAT ONLY: CPT

## 2021-12-14 PROCEDURE — 33518 CABG ARTERY-VEIN TWO: CPT | Performed by: THORACIC SURGERY (CARDIOTHORACIC VASCULAR SURGERY)

## 2021-12-14 PROCEDURE — 2500000003 HC RX 250 WO HCPCS

## 2021-12-14 PROCEDURE — 82565 ASSAY OF CREATININE: CPT

## 2021-12-14 PROCEDURE — 2700000000 HC OXYGEN THERAPY PER DAY

## 2021-12-14 PROCEDURE — 85027 COMPLETE CBC AUTOMATED: CPT

## 2021-12-14 PROCEDURE — 02580ZZ DESTRUCTION OF CONDUCTION MECHANISM, OPEN APPROACH: ICD-10-PCS | Performed by: THORACIC SURGERY (CARDIOTHORACIC VASCULAR SURGERY)

## 2021-12-14 PROCEDURE — 80048 BASIC METABOLIC PNL TOTAL CA: CPT

## 2021-12-14 PROCEDURE — C1894 INTRO/SHEATH, NON-LASER: HCPCS | Performed by: THORACIC SURGERY (CARDIOTHORACIC VASCULAR SURGERY)

## 2021-12-14 PROCEDURE — 33533 CABG ARTERIAL SINGLE: CPT | Performed by: THORACIC SURGERY (CARDIOTHORACIC VASCULAR SURGERY)

## 2021-12-14 PROCEDURE — 06BP4ZZ EXCISION OF RIGHT SAPHENOUS VEIN, PERCUTANEOUS ENDOSCOPIC APPROACH: ICD-10-PCS | Performed by: THORACIC SURGERY (CARDIOTHORACIC VASCULAR SURGERY)

## 2021-12-14 PROCEDURE — 82374 ASSAY BLOOD CARBON DIOXIDE: CPT

## 2021-12-14 PROCEDURE — 85610 PROTHROMBIN TIME: CPT

## 2021-12-14 PROCEDURE — 3600000018 HC SURGERY OHS ADDTL 15MIN: Performed by: THORACIC SURGERY (CARDIOTHORACIC VASCULAR SURGERY)

## 2021-12-14 PROCEDURE — 021109W BYPASS CORONARY ARTERY, TWO ARTERIES FROM AORTA WITH AUTOLOGOUS VENOUS TISSUE, OPEN APPROACH: ICD-10-PCS | Performed by: THORACIC SURGERY (CARDIOTHORACIC VASCULAR SURGERY)

## 2021-12-14 PROCEDURE — 3700000000 HC ANESTHESIA ATTENDED CARE: Performed by: THORACIC SURGERY (CARDIOTHORACIC VASCULAR SURGERY)

## 2021-12-14 PROCEDURE — 86901 BLOOD TYPING SEROLOGIC RH(D): CPT

## 2021-12-14 PROCEDURE — 6360000002 HC RX W HCPCS

## 2021-12-14 PROCEDURE — 2000000000 HC ICU R&B

## 2021-12-14 PROCEDURE — 86900 BLOOD TYPING SEROLOGIC ABO: CPT

## 2021-12-14 PROCEDURE — 2780000010 HC IMPLANT OTHER: Performed by: THORACIC SURGERY (CARDIOTHORACIC VASCULAR SURGERY)

## 2021-12-14 PROCEDURE — 82330 ASSAY OF CALCIUM: CPT

## 2021-12-14 PROCEDURE — 2500000003 HC RX 250 WO HCPCS: Performed by: THORACIC SURGERY (CARDIOTHORACIC VASCULAR SURGERY)

## 2021-12-14 DEVICE — DEVICE OCCL CLP L35MM PLUNG GRP FLX SHFT FOR GILLINOV: Type: IMPLANTABLE DEVICE | Site: HEART | Status: FUNCTIONAL

## 2021-12-14 DEVICE — DISK-SHAPED STYLE, SILICONE (1 PER STERILE PKG)
Type: IMPLANTABLE DEVICE | Site: AORTA | Status: FUNCTIONAL
Brand: SCANLAN® RADIOMARK® GRAFT MARKERS

## 2021-12-14 DEVICE — CLIP INT SM TI EZ LD LIG SYS WECK HORZ: Type: IMPLANTABLE DEVICE | Site: HEART | Status: FUNCTIONAL

## 2021-12-14 DEVICE — CABLE THRESHOLD DISP FOR PACE ANALZR MERLIN: Type: IMPLANTABLE DEVICE | Site: HEART | Status: FUNCTIONAL

## 2021-12-14 RX ORDER — FENTANYL CITRATE 50 UG/ML
50 INJECTION, SOLUTION INTRAMUSCULAR; INTRAVENOUS
Status: DISCONTINUED | OUTPATIENT
Start: 2021-12-14 | End: 2021-12-14 | Stop reason: HOSPADM

## 2021-12-14 RX ORDER — ASPIRIN 81 MG/1
81 TABLET ORAL DAILY
Status: DISCONTINUED | OUTPATIENT
Start: 2021-12-14 | End: 2021-12-18 | Stop reason: HOSPADM

## 2021-12-14 RX ORDER — MORPHINE SULFATE 2 MG/ML
2 INJECTION, SOLUTION INTRAMUSCULAR; INTRAVENOUS EVERY 5 MIN PRN
Status: DISCONTINUED | OUTPATIENT
Start: 2021-12-14 | End: 2021-12-14 | Stop reason: HOSPADM

## 2021-12-14 RX ORDER — LIDOCAINE HYDROCHLORIDE 10 MG/ML
INJECTION, SOLUTION EPIDURAL; INFILTRATION; INTRACAUDAL; PERINEURAL PRN
Status: DISCONTINUED | OUTPATIENT
Start: 2021-12-14 | End: 2021-12-14 | Stop reason: SDUPTHER

## 2021-12-14 RX ORDER — CEFAZOLIN SODIUM 2 G/100ML
2000 INJECTION, SOLUTION INTRAVENOUS EVERY 8 HOURS
Status: COMPLETED | OUTPATIENT
Start: 2021-12-14 | End: 2021-12-15

## 2021-12-14 RX ORDER — SUCCINYLCHOLINE CHLORIDE 20 MG/ML
INJECTION INTRAMUSCULAR; INTRAVENOUS PRN
Status: DISCONTINUED | OUTPATIENT
Start: 2021-12-14 | End: 2021-12-14 | Stop reason: SDUPTHER

## 2021-12-14 RX ORDER — PROMETHAZINE HYDROCHLORIDE 25 MG/ML
6.25 INJECTION, SOLUTION INTRAMUSCULAR; INTRAVENOUS
Status: DISCONTINUED | OUTPATIENT
Start: 2021-12-14 | End: 2021-12-14 | Stop reason: HOSPADM

## 2021-12-14 RX ORDER — AMINOCAPROIC ACID 250 MG/ML
INJECTION, SOLUTION INTRAVENOUS PRN
Status: DISCONTINUED | OUTPATIENT
Start: 2021-12-14 | End: 2021-12-14 | Stop reason: SDUPTHER

## 2021-12-14 RX ORDER — DEXTROSE MONOHYDRATE 50 MG/ML
100 INJECTION, SOLUTION INTRAVENOUS PRN
Status: DISCONTINUED | OUTPATIENT
Start: 2021-12-14 | End: 2021-12-18 | Stop reason: HOSPADM

## 2021-12-14 RX ORDER — SODIUM CHLORIDE 0.9 % (FLUSH) 0.9 %
5-40 SYRINGE (ML) INJECTION EVERY 12 HOURS SCHEDULED
Status: DISCONTINUED | OUTPATIENT
Start: 2021-12-14 | End: 2021-12-14 | Stop reason: HOSPADM

## 2021-12-14 RX ORDER — MAGNESIUM SULFATE 1 G/100ML
1000 INJECTION INTRAVENOUS PRN
Status: DISCONTINUED | OUTPATIENT
Start: 2021-12-14 | End: 2021-12-16

## 2021-12-14 RX ORDER — LABETALOL HYDROCHLORIDE 5 MG/ML
5 INJECTION, SOLUTION INTRAVENOUS EVERY 10 MIN PRN
Status: DISCONTINUED | OUTPATIENT
Start: 2021-12-14 | End: 2021-12-14 | Stop reason: HOSPADM

## 2021-12-14 RX ORDER — SODIUM CHLORIDE 0.9 % (FLUSH) 0.9 %
5-40 SYRINGE (ML) INJECTION PRN
Status: DISCONTINUED | OUTPATIENT
Start: 2021-12-14 | End: 2021-12-14 | Stop reason: HOSPADM

## 2021-12-14 RX ORDER — SODIUM CHLORIDE 9 MG/ML
25 INJECTION, SOLUTION INTRAVENOUS PRN
Status: DISCONTINUED | OUTPATIENT
Start: 2021-12-14 | End: 2021-12-14 | Stop reason: HOSPADM

## 2021-12-14 RX ORDER — HYDROMORPHONE HYDROCHLORIDE 1 MG/ML
0.25 INJECTION, SOLUTION INTRAMUSCULAR; INTRAVENOUS; SUBCUTANEOUS EVERY 5 MIN PRN
Status: DISCONTINUED | OUTPATIENT
Start: 2021-12-14 | End: 2021-12-14 | Stop reason: HOSPADM

## 2021-12-14 RX ORDER — SODIUM CHLORIDE, SODIUM LACTATE, POTASSIUM CHLORIDE, CALCIUM CHLORIDE 600; 310; 30; 20 MG/100ML; MG/100ML; MG/100ML; MG/100ML
INJECTION, SOLUTION INTRAVENOUS CONTINUOUS
Status: DISCONTINUED | OUTPATIENT
Start: 2021-12-14 | End: 2021-12-14

## 2021-12-14 RX ORDER — METHYLENE BLUE 10 MG/ML
INJECTION INTRAVENOUS PRN
Status: DISCONTINUED | OUTPATIENT
Start: 2021-12-14 | End: 2021-12-14 | Stop reason: HOSPADM

## 2021-12-14 RX ORDER — NICOTINE POLACRILEX 4 MG
15 LOZENGE BUCCAL PRN
Status: DISCONTINUED | OUTPATIENT
Start: 2021-12-14 | End: 2021-12-18 | Stop reason: HOSPADM

## 2021-12-14 RX ORDER — METOCLOPRAMIDE HYDROCHLORIDE 5 MG/ML
10 INJECTION INTRAMUSCULAR; INTRAVENOUS
Status: DISCONTINUED | OUTPATIENT
Start: 2021-12-14 | End: 2021-12-14 | Stop reason: HOSPADM

## 2021-12-14 RX ORDER — EPHEDRINE SULFATE 50 MG/ML
INJECTION, SOLUTION INTRAVENOUS PRN
Status: DISCONTINUED | OUTPATIENT
Start: 2021-12-14 | End: 2021-12-14 | Stop reason: SDUPTHER

## 2021-12-14 RX ORDER — LIDOCAINE HYDROCHLORIDE 10 MG/ML
1 INJECTION, SOLUTION EPIDURAL; INFILTRATION; INTRACAUDAL; PERINEURAL
Status: DISCONTINUED | OUTPATIENT
Start: 2021-12-14 | End: 2021-12-14 | Stop reason: HOSPADM

## 2021-12-14 RX ORDER — OXYCODONE HYDROCHLORIDE 5 MG/1
5 TABLET ORAL EVERY 4 HOURS PRN
Status: DISCONTINUED | OUTPATIENT
Start: 2021-12-14 | End: 2021-12-18 | Stop reason: HOSPADM

## 2021-12-14 RX ORDER — SCOLOPAMINE TRANSDERMAL SYSTEM 1 MG/1
1 PATCH, EXTENDED RELEASE TRANSDERMAL ONCE
Status: DISCONTINUED | OUTPATIENT
Start: 2021-12-14 | End: 2021-12-14 | Stop reason: HOSPADM

## 2021-12-14 RX ORDER — MEPERIDINE HYDROCHLORIDE 25 MG/ML
12.5 INJECTION INTRAMUSCULAR; INTRAVENOUS; SUBCUTANEOUS EVERY 5 MIN PRN
Status: DISCONTINUED | OUTPATIENT
Start: 2021-12-14 | End: 2021-12-14 | Stop reason: HOSPADM

## 2021-12-14 RX ORDER — 0.9 % SODIUM CHLORIDE 0.9 %
500 INTRAVENOUS SOLUTION INTRAVENOUS CONTINUOUS PRN
Status: DISCONTINUED | OUTPATIENT
Start: 2021-12-14 | End: 2021-12-16

## 2021-12-14 RX ORDER — OXYCODONE HYDROCHLORIDE 5 MG/1
10 TABLET ORAL EVERY 4 HOURS PRN
Status: DISCONTINUED | OUTPATIENT
Start: 2021-12-14 | End: 2021-12-16

## 2021-12-14 RX ORDER — HYDROMORPHONE HYDROCHLORIDE 1 MG/ML
0.5 INJECTION, SOLUTION INTRAMUSCULAR; INTRAVENOUS; SUBCUTANEOUS EVERY 5 MIN PRN
Status: DISCONTINUED | OUTPATIENT
Start: 2021-12-14 | End: 2021-12-14 | Stop reason: HOSPADM

## 2021-12-14 RX ORDER — PROPOFOL 10 MG/ML
INJECTION, EMULSION INTRAVENOUS PRN
Status: DISCONTINUED | OUTPATIENT
Start: 2021-12-14 | End: 2021-12-14 | Stop reason: SDUPTHER

## 2021-12-14 RX ORDER — SODIUM CHLORIDE 0.9 % (FLUSH) 0.9 %
5-40 SYRINGE (ML) INJECTION EVERY 12 HOURS SCHEDULED
Status: DISCONTINUED | OUTPATIENT
Start: 2021-12-14 | End: 2021-12-18 | Stop reason: HOSPADM

## 2021-12-14 RX ORDER — MIDAZOLAM HYDROCHLORIDE 1 MG/ML
INJECTION INTRAMUSCULAR; INTRAVENOUS PRN
Status: DISCONTINUED | OUTPATIENT
Start: 2021-12-14 | End: 2021-12-14 | Stop reason: SDUPTHER

## 2021-12-14 RX ORDER — MIDAZOLAM HYDROCHLORIDE 1 MG/ML
2 INJECTION INTRAMUSCULAR; INTRAVENOUS
Status: DISCONTINUED | OUTPATIENT
Start: 2021-12-14 | End: 2021-12-14 | Stop reason: HOSPADM

## 2021-12-14 RX ORDER — SODIUM CHLORIDE 0.9 % (FLUSH) 0.9 %
5-40 SYRINGE (ML) INJECTION PRN
Status: DISCONTINUED | OUTPATIENT
Start: 2021-12-14 | End: 2021-12-18 | Stop reason: HOSPADM

## 2021-12-14 RX ORDER — DIPHENHYDRAMINE HYDROCHLORIDE 50 MG/ML
12.5 INJECTION INTRAMUSCULAR; INTRAVENOUS
Status: DISCONTINUED | OUTPATIENT
Start: 2021-12-14 | End: 2021-12-14 | Stop reason: HOSPADM

## 2021-12-14 RX ORDER — SODIUM CHLORIDE 9 MG/ML
INJECTION, SOLUTION INTRAVENOUS CONTINUOUS
Status: DISCONTINUED | OUTPATIENT
Start: 2021-12-14 | End: 2021-12-17

## 2021-12-14 RX ORDER — INSULIN GLARGINE 100 [IU]/ML
0.15 INJECTION, SOLUTION SUBCUTANEOUS NIGHTLY
Status: DISCONTINUED | OUTPATIENT
Start: 2021-12-15 | End: 2021-12-18 | Stop reason: HOSPADM

## 2021-12-14 RX ORDER — MORPHINE SULFATE 4 MG/ML
4 INJECTION, SOLUTION INTRAMUSCULAR; INTRAVENOUS
Status: DISCONTINUED | OUTPATIENT
Start: 2021-12-14 | End: 2021-12-14 | Stop reason: HOSPADM

## 2021-12-14 RX ORDER — ONDANSETRON 2 MG/ML
4 INJECTION INTRAMUSCULAR; INTRAVENOUS EVERY 8 HOURS PRN
Status: DISCONTINUED | OUTPATIENT
Start: 2021-12-14 | End: 2021-12-18 | Stop reason: HOSPADM

## 2021-12-14 RX ORDER — ZOLPIDEM TARTRATE 5 MG/1
5 TABLET ORAL NIGHTLY PRN
Status: DISCONTINUED | OUTPATIENT
Start: 2021-12-15 | End: 2021-12-18 | Stop reason: HOSPADM

## 2021-12-14 RX ORDER — HYDRALAZINE HYDROCHLORIDE 20 MG/ML
5 INJECTION INTRAMUSCULAR; INTRAVENOUS EVERY 10 MIN PRN
Status: DISCONTINUED | OUTPATIENT
Start: 2021-12-14 | End: 2021-12-14 | Stop reason: HOSPADM

## 2021-12-14 RX ORDER — POTASSIUM CHLORIDE 29.8 MG/ML
10 INJECTION INTRAVENOUS PRN
Status: DISCONTINUED | OUTPATIENT
Start: 2021-12-14 | End: 2021-12-16

## 2021-12-14 RX ORDER — HEPARIN SODIUM 1000 [USP'U]/ML
INJECTION, SOLUTION INTRAVENOUS; SUBCUTANEOUS PRN
Status: DISCONTINUED | OUTPATIENT
Start: 2021-12-14 | End: 2021-12-14 | Stop reason: SDUPTHER

## 2021-12-14 RX ORDER — SODIUM CHLORIDE 9 MG/ML
25 INJECTION, SOLUTION INTRAVENOUS PRN
Status: DISCONTINUED | OUTPATIENT
Start: 2021-12-14 | End: 2021-12-18 | Stop reason: HOSPADM

## 2021-12-14 RX ORDER — IPRATROPIUM BROMIDE AND ALBUTEROL SULFATE 2.5; .5 MG/3ML; MG/3ML
1 SOLUTION RESPIRATORY (INHALATION)
Status: DISCONTINUED | OUTPATIENT
Start: 2021-12-14 | End: 2021-12-18 | Stop reason: HOSPADM

## 2021-12-14 RX ORDER — MORPHINE SULFATE 4 MG/ML
4 INJECTION, SOLUTION INTRAMUSCULAR; INTRAVENOUS EVERY 5 MIN PRN
Status: DISCONTINUED | OUTPATIENT
Start: 2021-12-14 | End: 2021-12-14 | Stop reason: HOSPADM

## 2021-12-14 RX ORDER — PROTAMINE SULFATE 10 MG/ML
50 INJECTION, SOLUTION INTRAVENOUS
Status: ACTIVE | OUTPATIENT
Start: 2021-12-14 | End: 2021-12-14

## 2021-12-14 RX ORDER — SUFENTANIL CITRATE 50 UG/ML
INJECTION EPIDURAL; INTRAVENOUS PRN
Status: DISCONTINUED | OUTPATIENT
Start: 2021-12-14 | End: 2021-12-14 | Stop reason: SDUPTHER

## 2021-12-14 RX ORDER — ATORVASTATIN CALCIUM 20 MG/1
20 TABLET, FILM COATED ORAL NIGHTLY
Status: DISCONTINUED | OUTPATIENT
Start: 2021-12-15 | End: 2021-12-18 | Stop reason: HOSPADM

## 2021-12-14 RX ORDER — MORPHINE SULFATE 4 MG/ML
4 INJECTION, SOLUTION INTRAMUSCULAR; INTRAVENOUS
Status: DISCONTINUED | OUTPATIENT
Start: 2021-12-14 | End: 2021-12-16

## 2021-12-14 RX ORDER — FENTANYL CITRATE 50 UG/ML
INJECTION, SOLUTION INTRAMUSCULAR; INTRAVENOUS PRN
Status: DISCONTINUED | OUTPATIENT
Start: 2021-12-14 | End: 2021-12-14 | Stop reason: SDUPTHER

## 2021-12-14 RX ORDER — MEPERIDINE HYDROCHLORIDE 25 MG/ML
25 INJECTION INTRAMUSCULAR; INTRAVENOUS; SUBCUTANEOUS
Status: ACTIVE | OUTPATIENT
Start: 2021-12-14 | End: 2021-12-14

## 2021-12-14 RX ORDER — MIDAZOLAM HYDROCHLORIDE 1 MG/ML
INJECTION INTRAMUSCULAR; INTRAVENOUS
Status: DISCONTINUED
Start: 2021-12-14 | End: 2021-12-14 | Stop reason: WASHOUT

## 2021-12-14 RX ORDER — PROTAMINE SULFATE 10 MG/ML
INJECTION, SOLUTION INTRAVENOUS PRN
Status: DISCONTINUED | OUTPATIENT
Start: 2021-12-14 | End: 2021-12-14 | Stop reason: SDUPTHER

## 2021-12-14 RX ORDER — DEXTROSE MONOHYDRATE 25 G/50ML
12.5 INJECTION, SOLUTION INTRAVENOUS PRN
Status: DISCONTINUED | OUTPATIENT
Start: 2021-12-14 | End: 2021-12-18 | Stop reason: HOSPADM

## 2021-12-14 RX ORDER — SODIUM CHLORIDE 9 MG/ML
INJECTION, SOLUTION INTRAVENOUS CONTINUOUS PRN
Status: DISCONTINUED | OUTPATIENT
Start: 2021-12-14 | End: 2021-12-14 | Stop reason: SDUPTHER

## 2021-12-14 RX ORDER — ALBUMIN, HUMAN INJ 5% 5 %
25 SOLUTION INTRAVENOUS ONCE
Status: COMPLETED | OUTPATIENT
Start: 2021-12-14 | End: 2021-12-14

## 2021-12-14 RX ORDER — ROCURONIUM BROMIDE 10 MG/ML
INJECTION, SOLUTION INTRAVENOUS PRN
Status: DISCONTINUED | OUTPATIENT
Start: 2021-12-14 | End: 2021-12-14 | Stop reason: SDUPTHER

## 2021-12-14 RX ORDER — ACETAMINOPHEN 325 MG/1
650 TABLET ORAL EVERY 4 HOURS PRN
Status: DISCONTINUED | OUTPATIENT
Start: 2021-12-14 | End: 2021-12-18 | Stop reason: HOSPADM

## 2021-12-14 RX ORDER — MORPHINE SULFATE 2 MG/ML
2 INJECTION, SOLUTION INTRAMUSCULAR; INTRAVENOUS
Status: DISCONTINUED | OUTPATIENT
Start: 2021-12-14 | End: 2021-12-16

## 2021-12-14 RX ADMIN — SODIUM CHLORIDE, POTASSIUM CHLORIDE, SODIUM LACTATE AND CALCIUM CHLORIDE: 600; 310; 30; 20 INJECTION, SOLUTION INTRAVENOUS at 06:28

## 2021-12-14 RX ADMIN — MAGNESIUM SULFATE HEPTAHYDRATE 1000 MG: 1 INJECTION, SOLUTION INTRAVENOUS at 14:02

## 2021-12-14 RX ADMIN — VANCOMYCIN HYDROCHLORIDE 1500 MG: 10 INJECTION, POWDER, LYOPHILIZED, FOR SOLUTION INTRAVENOUS at 06:26

## 2021-12-14 RX ADMIN — PHENYLEPHRINE HYDROCHLORIDE 100 MCG: 10 INJECTION INTRAVENOUS at 08:58

## 2021-12-14 RX ADMIN — LIDOCAINE HYDROCHLORIDE 50 MG: 10 INJECTION, SOLUTION EPIDURAL; INFILTRATION; INTRACAUDAL; PERINEURAL at 07:30

## 2021-12-14 RX ADMIN — SUFENTANIL CITRATE 20 MCG: 50 INJECTION EPIDURAL; INTRAVENOUS at 11:36

## 2021-12-14 RX ADMIN — MIDAZOLAM 1 MG: 1 INJECTION INTRAMUSCULAR; INTRAVENOUS at 09:00

## 2021-12-14 RX ADMIN — IPRATROPIUM BROMIDE AND ALBUTEROL SULFATE 1 AMPULE: .5; 2.5 SOLUTION RESPIRATORY (INHALATION) at 18:35

## 2021-12-14 RX ADMIN — PROTAMINE SULFATE 150 MG: 10 INJECTION, SOLUTION INTRAVENOUS at 11:07

## 2021-12-14 RX ADMIN — AMIODARONE HYDROCHLORIDE 1 MG/MIN: 50 INJECTION, SOLUTION INTRAVENOUS at 17:48

## 2021-12-14 RX ADMIN — ROCURONIUM BROMIDE 100 MG: 10 INJECTION, SOLUTION INTRAVENOUS at 07:55

## 2021-12-14 RX ADMIN — PHENYLEPHRINE HYDROCHLORIDE 100 MCG: 10 INJECTION INTRAVENOUS at 11:16

## 2021-12-14 RX ADMIN — PROPOFOL 160 MG: 10 INJECTION, EMULSION INTRAVENOUS at 07:30

## 2021-12-14 RX ADMIN — MIDAZOLAM 2 MG: 1 INJECTION INTRAMUSCULAR; INTRAVENOUS at 07:11

## 2021-12-14 RX ADMIN — EPHEDRINE SULFATE 10 MG: 50 INJECTION INTRAMUSCULAR; INTRAVENOUS; SUBCUTANEOUS at 07:56

## 2021-12-14 RX ADMIN — MAGNESIUM SULFATE HEPTAHYDRATE 1000 MG: 1 INJECTION, SOLUTION INTRAVENOUS at 19:06

## 2021-12-14 RX ADMIN — ROCURONIUM BROMIDE 100 MG: 10 INJECTION, SOLUTION INTRAVENOUS at 09:30

## 2021-12-14 RX ADMIN — HEPARIN SODIUM 36000 UNITS: 1000 INJECTION, SOLUTION INTRAVENOUS; SUBCUTANEOUS at 08:40

## 2021-12-14 RX ADMIN — MAGNESIUM SULFATE HEPTAHYDRATE 1000 MG: 1 INJECTION, SOLUTION INTRAVENOUS at 15:20

## 2021-12-14 RX ADMIN — SODIUM CHLORIDE 6.93 UNITS/HR: 9 INJECTION, SOLUTION INTRAVENOUS at 14:40

## 2021-12-14 RX ADMIN — POTASSIUM CHLORIDE 10 MEQ: 29.8 INJECTION INTRAVENOUS at 19:00

## 2021-12-14 RX ADMIN — SUCCINYLCHOLINE CHLORIDE 100 MG: 20 INJECTION, SOLUTION INTRAMUSCULAR; INTRAVENOUS at 07:30

## 2021-12-14 RX ADMIN — CEFAZOLIN SODIUM 2000 MG: 2 INJECTION, SOLUTION INTRAVENOUS at 22:32

## 2021-12-14 RX ADMIN — ALBUMIN (HUMAN) 25 G: 12.5 INJECTION, SOLUTION INTRAVENOUS at 12:55

## 2021-12-14 RX ADMIN — FENTANYL CITRATE 100 MCG: 50 INJECTION, SOLUTION INTRAMUSCULAR; INTRAVENOUS at 07:11

## 2021-12-14 RX ADMIN — MORPHINE SULFATE 2 MG: 2 INJECTION, SOLUTION INTRAMUSCULAR; INTRAVENOUS at 19:59

## 2021-12-14 RX ADMIN — SODIUM CHLORIDE 14.6 UNITS/HR: 9 INJECTION, SOLUTION INTRAVENOUS at 22:37

## 2021-12-14 RX ADMIN — CEFAZOLIN SODIUM 2000 MG: 2 INJECTION, SOLUTION INTRAVENOUS at 15:11

## 2021-12-14 RX ADMIN — SUFENTANIL CITRATE 20 MCG: 50 INJECTION EPIDURAL; INTRAVENOUS at 08:37

## 2021-12-14 RX ADMIN — PHENYLEPHRINE HYDROCHLORIDE 100 MCG: 10 INJECTION INTRAVENOUS at 11:32

## 2021-12-14 RX ADMIN — SODIUM CHLORIDE: 9 INJECTION, SOLUTION INTRAVENOUS at 12:53

## 2021-12-14 RX ADMIN — SUFENTANIL CITRATE 20 MCG: 50 INJECTION EPIDURAL; INTRAVENOUS at 08:18

## 2021-12-14 RX ADMIN — FENTANYL CITRATE 50 MCG: 50 INJECTION, SOLUTION INTRAMUSCULAR; INTRAVENOUS at 11:48

## 2021-12-14 RX ADMIN — SUFENTANIL CITRATE 10 MCG: 50 INJECTION EPIDURAL; INTRAVENOUS at 08:12

## 2021-12-14 RX ADMIN — PHENYLEPHRINE HYDROCHLORIDE 100 MCG: 10 INJECTION INTRAVENOUS at 09:07

## 2021-12-14 RX ADMIN — MORPHINE SULFATE 4 MG: 4 INJECTION INTRAVENOUS at 13:22

## 2021-12-14 RX ADMIN — ONDANSETRON 4 MG: 2 INJECTION INTRAMUSCULAR; INTRAVENOUS at 21:24

## 2021-12-14 RX ADMIN — AMINOCAPROIC ACID 10000 MG: 250 INJECTION, SOLUTION INTRAVENOUS at 08:22

## 2021-12-14 RX ADMIN — SUFENTANIL CITRATE 20 MCG: 50 INJECTION EPIDURAL; INTRAVENOUS at 11:08

## 2021-12-14 RX ADMIN — SODIUM CHLORIDE: 9 INJECTION, SOLUTION INTRAVENOUS at 07:20

## 2021-12-14 RX ADMIN — ROCURONIUM BROMIDE 100 MG: 10 INJECTION, SOLUTION INTRAVENOUS at 11:30

## 2021-12-14 RX ADMIN — MIDAZOLAM 2 MG: 1 INJECTION INTRAMUSCULAR; INTRAVENOUS at 10:59

## 2021-12-14 RX ADMIN — SODIUM CHLORIDE 16.3 UNITS/HR: 9 INJECTION, SOLUTION INTRAVENOUS at 21:36

## 2021-12-14 RX ADMIN — PHENYLEPHRINE HYDROCHLORIDE 100 MCG: 10 INJECTION INTRAVENOUS at 11:21

## 2021-12-14 RX ADMIN — AMINOCAPROIC ACID 1.08 G/HR: 250 INJECTION, SOLUTION INTRAVENOUS at 11:02

## 2021-12-14 RX ADMIN — SUFENTANIL CITRATE 10 MCG: 50 INJECTION EPIDURAL; INTRAVENOUS at 08:08

## 2021-12-14 RX ADMIN — DEXTROSE MONOHYDRATE 5 MG/HR: 50 INJECTION, SOLUTION INTRAVENOUS at 13:17

## 2021-12-14 RX ADMIN — FENTANYL CITRATE 100 MCG: 50 INJECTION, SOLUTION INTRAMUSCULAR; INTRAVENOUS at 12:06

## 2021-12-14 RX ADMIN — DEXTROSE MONOHYDRATE 300 MG: 50 INJECTION, SOLUTION INTRAVENOUS at 17:33

## 2021-12-14 ASSESSMENT — PULMONARY FUNCTION TESTS
PIF_VALUE: 16
PIF_VALUE: 16
PIF_VALUE: 11
PIF_VALUE: 16
PIF_VALUE: 18
PIF_VALUE: 16
PIF_VALUE: 18
PIF_VALUE: 11
PIF_VALUE: 16
PIF_VALUE: 13
PIF_VALUE: 16
PIF_VALUE: 17
PIF_VALUE: 11
PIF_VALUE: 17
PIF_VALUE: 10
PIF_VALUE: 18
PIF_VALUE: 17
PIF_VALUE: 13
PIF_VALUE: 16
PIF_VALUE: 11
PIF_VALUE: 17

## 2021-12-14 ASSESSMENT — PAIN SCALES - GENERAL
PAINLEVEL_OUTOF10: 0
PAINLEVEL_OUTOF10: 5
PAINLEVEL_OUTOF10: 0
PAINLEVEL_OUTOF10: 7
PAINLEVEL_OUTOF10: 5

## 2021-12-14 ASSESSMENT — LIFESTYLE VARIABLES: SMOKING_STATUS: 0

## 2021-12-14 ASSESSMENT — ENCOUNTER SYMPTOMS: SHORTNESS OF BREATH: 1

## 2021-12-14 ASSESSMENT — PAIN DESCRIPTION - LOCATION: LOCATION: CHEST

## 2021-12-14 ASSESSMENT — PAIN DESCRIPTION - PAIN TYPE: TYPE: ACUTE PAIN

## 2021-12-14 ASSESSMENT — PAIN - FUNCTIONAL ASSESSMENT: PAIN_FUNCTIONAL_ASSESSMENT: 0-10

## 2021-12-14 NOTE — BRIEF OP NOTE
Brief Postoperative Note      Patient: Bull Link  YOB: 1965  MRN: [id number]    Date of Procedure: 12/14/2021    Pre-Op Diagnosis: . CAD, A-Fib    Post-Op Diagnosis: Same       Procedure(s):  CORONARY ARTERY BYPASS GRAFT X3 WITH LEFT INTERNAL MAMMARY ARTERY WITH ENDOSCOPIC VEIN HARVESTING WITH PERFUSION, TRANSESOPHAGEAL ECHOCARDIOGRAM, BIATRIAL RODRIGUES-MAZE RF AND CRYOABLATIONAND ATRICLIP    Surgeon(s):  Lindalou Mohs, MD    Assistant:  First Assistant: Oleg Covarrubias    Anesthesia: General    Estimated Blood Loss (mL): N/A    Complications: None    Specimens:   * No specimens in log *    Implants:  Implant Name Type Inv.  Item Serial No.  Lot No. LRB No. Used Action   CABLE THRESHOLD DISP FOR PACE [employer] MERLIN  CABLE THRESHOLD DISP FOR PACE ANALZR MERLIN  [place] MED CARDIAC RHYM MGMT-WD  N/A 1 Implanted   MARKER GRFT YUKI DISK LTWT BIOCOMPATIBLE RADPQ DISP Vascular grafts MARKER GRFT YUKI DISK LTWT BIOCOMPATIBLE RADPQ DISP  [employer]-WD [id number] N/A 2 Implanted   DEVICE OCCL CLP L35MM PLUNG GRP FLX SHFT FOR GILLINOV  DEVICE OCCL CLP L35MM PLUNG GRP FLX SHFT FOR GILLINOV  [employer]-WD [id number] N/A 1 Implanted   CLIP INT SM TI EZ LD LIG SYS WECK HORZ  CLIP INT SM TI EZ LD LIG SYS [id number]  N/A 2 Implanted         Drains:   Chest Tube 1 Anterior Mediastinal 19 Iranian (Active)       Chest Tube 2 Anterior Pleural 24 Iranian (Active)       Urethral Catheter Temperature probe 16 fr (Active)       Findings: Diffuse CAD, Cardiomegaly    Electronically signed by Lindalou Mohs, MD on 12/14/2021 at 11:50 AM

## 2021-12-14 NOTE — ANESTHESIA PROCEDURE NOTES
Procedure Performed: RADHA       Start Time:  12/14/2021 9:27 AM       End Time:   12/14/2021 9:27 AM    Preanesthesia Checklist:  Patient identified, IV assessed, risks and benefits discussed, monitors and equipment assessed, procedure being performed at surgeon's request and anesthesia consent obtained. General Procedure Information  Diagnostic Indications for Echo:  assessment of ascending aorta, hemodynamic monitoring and assessment of valve function  Physician Requesting Echo: Barry Lawson MD  Location performed:  OR  Intubated  Bite block placed  Heart visualized  Probe Insertion:  Easy  Probe Type:  3D  Modalities:  2D only, 3D, color flow mapping, continuous wave Doppler and pulse wave Doppler    Echocardiographic and Doppler Measurements    Ventricles    Right Ventricle:  Cavity size dilated. Hypertrophy not present. Thrombus not present. Global function normal.    Left Ventricle:  Cavity size normal.  Hypertrophy not present. Thrombus not present. Global Function mildly impaired. Ejection Fraction 0%. Ventricular Regional Function:  1- Basal Anteroseptal:  normal  2- Basal Anterior:  normal  3- Basal Anterolateral:  normal  4- Basal Inferolateral:  normal  5- Basal Inferior:  normal  6- Basal Inferoseptal:  normal  7- Mid Anteroseptal:  normal  8- Mid Anterior:  normal  9- Mid Anterolateral:  normal  10- Mid Inferolateral:  normal  11- Mid Inferior:  normal  12- Mid Inferoseptal:  normal  13- Apical Anterior:  normal  14- Apical Lateral:  normal  15- Apical Inferior:  normal  16- Apical Septal:  normal  17- Gary:  normal      Valves    Aortic Valve: Annulus normal.  Stenosis not present. Regurgitation none. Leaflets normal.  Leaflet motions normal.      Mitral Valve: Annulus normal and calcified. Stenosis not present. Regurgitation mild. Leaflets normal, calcified and thickened. Leaflet motions normal.      Tricuspid Valve: Annulus normal.  Stenosis not present.   Regurgitation severe. Leaflets normal.  Leaflet motions normal.    Pulmonic Valve: Annulus normal.  Stenosis not present. Other Valve Findings:       Calcified keily annulus,     Aorta    Ascending Aorta:  Size normal.  Dissection not present. Aortic Arch:  Size normal.  Dissection not present. Descending Aorta:  Size normal.  Dissection not present. Atria    Right Atrium:  Size dilated. Spontaneous echo contrast not present. Thrombus not present. Tumor not present. Device present. Left Atrium:  Size dilated. Spontaneous echo contrast not present. Thrombus not present. Tumor not present. Device not present. Left atrial appendage normal.  Other Atria Findings:       5.0 cm RA, 6.0 cm LA, no thrombus in AVTAR, small Avtar, 15 mm velocity     Septa    Atrial Septum:  Intra-atrial septal morphology normal.      Other atrial septal defect findings:       No pfo by cfd    Ventricular Septum:  Intra-ventricular septum morphology normal.      Diastolic Function Measurements:  Diastolic Dysfunction Grade= I (Delayed relaxation)  E= ms  A= ms  E/A Ratio=   DT= ms  S/D=  IVRT=    Other Findings  Pericardium:  normal  Pleural Effusion:  none  Pulmonary Arteries:  normal and dilated  Pulmonary Venous Flow:  normal and blunted (decreased) systolic flow    Anesthesia Information  Performed Personally  Anesthesiologist:  Jimmy Chang MD      Echocardiogram Comments:       Guidewire confirmed in RA prior to introducer placement  PA catheter floated into position via RADHA guidance, catheter in non-wedge position   Post cpb, RV contraction improved. No new lv wall motion changes.    Atria clip demonstrates no flow in area of AVTAR, only acoustic shadowing

## 2021-12-14 NOTE — ANESTHESIA POSTPROCEDURE EVALUATION
Department of Anesthesiology  Postprocedure Note    Patient: Kasey Lyons  MRN: [id number]  YOB: 1965  Date of evaluation: 12/14/2021  Time:  12:10 PM     Procedure Summary     Date: 12/14/21 Room [facility] [address]    Anesthesia Start: 5134 Anesthesia Stop: 8853    Procedure: CORONARY ARTERY BYPASS GRAFT X3 WITH LEFT INTERNAL MAMMARY ARTERY WITH ENDOSCOPIC VEIN HARVESTING WITH PERFUSION, TRANSESOPHAGEAL ECHOCARDIOGRAM, BIATRIAL RODRIGUES-MAZE RF AND CRYOABLATIONAND ATRICLIP (N/A ) Diagnosis: (.CAD, A-Fib)    Surgeons: Ayse Urias MD Responsible Provider: NOMI Maier CRNA    Anesthesia Type: general ASA Status: 4          Anesthesia Type: general    Jessica Phase I:      Jessica Phase II:      Last vitals: Reviewed and per EMR flowsheets.        Anesthesia Post Evaluation    Patient location during evaluation: ICU  Patient participation: complete - patient cannot participate  Level of consciousness: sedated and ventilated  Pain score: 0  Airway patency: patent  Nausea & Vomiting: no nausea and no vomiting  Cardiovascular status: hemodynamically stable  Respiratory status: acceptable, intubated and ventilator  Hydration status: euvolemic

## 2021-12-14 NOTE — PROGRESS NOTES
Altaf Gupta arrived to room # 146. Presented with: S/P CABG x 3   Mental Status: Patient is Intubated. Vitals:    12/14/21 1246   BP:    Pulse: 68   Resp: 12   Temp:    SpO2: 100%     Patient safety contract and falls prevention contract reviewed with patient Yes.       Needs, issues or concerns expressed at this time: no.      Electronically signed by Luis Mares RN on 12/14/2021 at 1:44 PM

## 2021-12-14 NOTE — INTERVAL H&P NOTE
Update History & Physical    The patient's History and Physical of November 30, 2021   was reviewed with the patient and I examined the patient. There was no change. The surgical site was confirmed by the patient and me. Plan: The risks, benefits, expected outcome, and alternative to the recommended procedure have been discussed with the patient. Patient understands and wants to proceed with the procedure.      Electronically signed by Gerardo Christy MD on 12/14/2021 at 6:06 AM

## 2021-12-14 NOTE — PROGRESS NOTES
Pt received from OR . Pt intubated with a #8 ET Tube secured at the 23cm at the lip on the right. Pt placed on vent VC 12, Vt 500, FIO2 100% +% PEEP. ABG's and chest x-ray  to follow.

## 2021-12-14 NOTE — PROGRESS NOTES
Blood gas, arterial [[id number]] (Abnormal) Collected: 12/14/21 1229     Specimen: Blood gases Updated: 12/14/21 1231      pH, Arterial 7.380      pCO2, Arterial 41.0 mmHg       pO2, Arterial 402.0 mmHg       HCO3, Arterial 24.3 mmol/L       Base Excess, Arterial -0.8 mmol/L       Hemoglobin, Art, Extended 10.9 g/dL       O2 Sat, Arterial 96.8 %       Carboxyhgb, Arterial 1.3 %       Methemoglobin, Arterial 1.6 %       O2 Content, Arterial 15.9 mL/dL       O2 Therapy        VC 12, Vt 500, FIO2 100%, +5 PEEP, arterial line

## 2021-12-14 NOTE — PROGRESS NOTES
4 Eyes Skin Assessment    Fiordaliza Becerra is being assessed upon: Admission    I agree that I, Johnny Hernandez RN, along with *** (either 2 RN's or 1 LPN and 1 RN) have performed a thorough Head to Toe Skin Assessment on the patient. ALL assessment sites listed below have been assessed. Areas assessed by both nurses:     [x]   Head, Face, and Ears   [x]   Shoulders, Back, and Chest  [x]   Arms, Elbows, and Hands   [x]   Coccyx, Sacrum, and Ischium  [x]   Legs, Feet, and Heels    Does the Patient have Skin Breakdown?  No    Mark Prevention initiated: NA  Wound Care Orders initiated: NA    WOC nurse consulted for Pressure Injury (Stage 3,4, Unstageable, DTI, NWPT, and Complex wounds) and New or Established Ostomies: NA        Primary Nurse eSignature: Johnny Hernandez RN on 12/14/2021 at 1:43 PM      Co-Signer eSignature: {Esignature:[id number]}

## 2021-12-14 NOTE — ANESTHESIA PROCEDURE NOTES
Central Venous Line:    A central venous line was placed using surface landmarks, in the OR for the following indication(s): central venous access and CVP monitoring. 12/14/2021 9:26 AM12/14/2021 9:26 AM    Sterility preparation included the following: hand hygiene performed prior to procedure, maximum sterile barriers used and sterile technique used to drape from head to toe. The patient was placed in Trendelenburg position. The right internal jugular vein was prepped. The site was prepped with Chloraprep. A 8.5 Fr (size), 10 (length), introducer     During the procedure, the following specific steps were taken: target vein identified, needle advanced into vein and blood aspirated and guidewire advanced into vein. Intravenous verification was obtained by venous blood return and RADHA. Post insertion care included: all ports aspirated, all ports flushed easily, guidewire removed intact, Biopatch applied, line sutured in place and dressing applied. During the procedure the patient experienced: patient tolerated procedure well with no complications. Anesthesia type: generalA(n) non-oximetric, 7.5 (size) Pulmonary Artery Catheter (PAC) was placed through the Introducer CVL in the right internal jugular vein. The PAC placement was confirmed by pressure tracing changes and RADHA. The patient experienced the following events during the procedure: patient tolerated procedure well with no complications. PA Cath placed?: Yes    Additional notes:  Sterile dressing/Tegaderm was applied   Staffing  Performed: Anesthesiologist   Anesthesiologist: Milagros Richardson MD  Preanesthetic Checklist  Completed: patient identified, IV checked, site marked, risks and benefits discussed, surgical consent, monitors and equipment checked, pre-op evaluation, timeout performed, anesthesia consent given, oxygen available and patient being monitored

## 2021-12-15 ENCOUNTER — APPOINTMENT (OUTPATIENT)
Dept: GENERAL RADIOLOGY | Age: 56
DRG: 234 | End: 2021-12-15
Attending: THORACIC SURGERY (CARDIOTHORACIC VASCULAR SURGERY)
Payer: MEDICARE

## 2021-12-15 LAB
ANION GAP SERPL CALCULATED.3IONS-SCNC: 16 MMOL/L (ref 7–19)
BASE EXCESS ARTERIAL: -7.4 MMOL/L (ref -2–2)
BUN BLDV-MCNC: 29 MG/DL (ref 6–20)
CALCIUM SERPL-MCNC: 8.7 MG/DL (ref 8.6–10)
CARBOXYHEMOGLOBIN ARTERIAL: 2.3 % (ref 0–5)
CHLORIDE BLD-SCNC: 105 MMOL/L (ref 98–111)
CO2: 17 MMOL/L (ref 22–29)
CREAT SERPL-MCNC: 1.6 MG/DL (ref 0.5–1.2)
EKG P AXIS: NORMAL DEGREES
EKG P-R INTERVAL: NORMAL MS
EKG Q-T INTERVAL: 396 MS
EKG QRS DURATION: 88 MS
EKG QTC CALCULATION (BAZETT): 445 MS
EKG T AXIS: 14 DEGREES
GFR AFRICAN AMERICAN: 54
GFR NON-AFRICAN AMERICAN: 45
GLUCOSE BLD-MCNC: 102 MG/DL (ref 70–99)
GLUCOSE BLD-MCNC: 114 MG/DL (ref 70–99)
GLUCOSE BLD-MCNC: 125 MG/DL (ref 70–99)
GLUCOSE BLD-MCNC: 141 MG/DL (ref 70–99)
GLUCOSE BLD-MCNC: 141 MG/DL (ref 74–109)
GLUCOSE BLD-MCNC: 147 MG/DL (ref 70–99)
GLUCOSE BLD-MCNC: 163 MG/DL (ref 70–99)
GLUCOSE BLD-MCNC: 185 MG/DL (ref 70–99)
GLUCOSE BLD-MCNC: 212 MG/DL (ref 70–99)
GLUCOSE BLD-MCNC: 297 MG/DL (ref 70–99)
GLUCOSE BLD-MCNC: 301 MG/DL (ref 70–99)
GLUCOSE BLD-MCNC: 94 MG/DL (ref 70–99)
HCO3 ARTERIAL: 18.1 MMOL/L (ref 22–26)
HCT VFR BLD CALC: 34.3 % (ref 42–52)
HCT VFR BLD CALC: 35.5 % (ref 42–52)
HCT VFR BLD CALC: 35.9 % (ref 42–52)
HEMOGLOBIN, ART, EXTENDED: 11.8 G/DL (ref 14–18)
HEMOGLOBIN: 11.7 G/DL (ref 14–18)
HEMOGLOBIN: 11.7 G/DL (ref 14–18)
HEMOGLOBIN: 11.8 G/DL (ref 14–18)
MCH RBC QN AUTO: 31.5 PG (ref 27–31)
MCH RBC QN AUTO: 31.6 PG (ref 27–31)
MCH RBC QN AUTO: 32.7 PG (ref 27–31)
MCHC RBC AUTO-ENTMCNC: 32.6 G/DL (ref 33–37)
MCHC RBC AUTO-ENTMCNC: 33 G/DL (ref 33–37)
MCHC RBC AUTO-ENTMCNC: 34.4 G/DL (ref 33–37)
MCV RBC AUTO: 95 FL (ref 80–94)
MCV RBC AUTO: 95.9 FL (ref 80–94)
MCV RBC AUTO: 96.8 FL (ref 80–94)
METHEMOGLOBIN ARTERIAL: 1.2 %
O2 CONTENT ARTERIAL: 15.7 ML/DL
O2 SAT, ARTERIAL: 94.2 %
O2 THERAPY: ABNORMAL
PCO2 ARTERIAL: 36 MMHG (ref 35–45)
PDW BLD-RTO: 12.6 % (ref 11.5–14.5)
PDW BLD-RTO: 12.7 % (ref 11.5–14.5)
PDW BLD-RTO: 12.8 % (ref 11.5–14.5)
PERFORMED ON: ABNORMAL
PERFORMED ON: NORMAL
PH ARTERIAL: 7.31 (ref 7.35–7.45)
PLATELET # BLD: 217 K/UL (ref 130–400)
PLATELET # BLD: 219 K/UL (ref 130–400)
PLATELET # BLD: 252 K/UL (ref 130–400)
PMV BLD AUTO: 11.4 FL (ref 9.4–12.4)
PMV BLD AUTO: 11.4 FL (ref 9.4–12.4)
PMV BLD AUTO: 11.5 FL (ref 9.4–12.4)
PO2 ARTERIAL: 73 MMHG (ref 80–100)
POTASSIUM SERPL-SCNC: 3.3 MMOL/L (ref 3.5–5)
POTASSIUM SERPL-SCNC: 4 MMOL/L (ref 3.5–5)
POTASSIUM, WHOLE BLOOD: 3.6
RBC # BLD: 3.61 M/UL (ref 4.7–6.1)
RBC # BLD: 3.7 M/UL (ref 4.7–6.1)
RBC # BLD: 3.71 M/UL (ref 4.7–6.1)
SODIUM BLD-SCNC: 138 MMOL/L (ref 136–145)
WBC # BLD: 16.5 K/UL (ref 4.8–10.8)
WBC # BLD: 20 K/UL (ref 4.8–10.8)
WBC # BLD: 20.6 K/UL (ref 4.8–10.8)

## 2021-12-15 PROCEDURE — 85027 COMPLETE CBC AUTOMATED: CPT

## 2021-12-15 PROCEDURE — 84132 ASSAY OF SERUM POTASSIUM: CPT

## 2021-12-15 PROCEDURE — 82803 BLOOD GASES ANY COMBINATION: CPT

## 2021-12-15 PROCEDURE — 93005 ELECTROCARDIOGRAM TRACING: CPT | Performed by: THORACIC SURGERY (CARDIOTHORACIC VASCULAR SURGERY)

## 2021-12-15 PROCEDURE — 6360000002 HC RX W HCPCS: Performed by: THORACIC SURGERY (CARDIOTHORACIC VASCULAR SURGERY)

## 2021-12-15 PROCEDURE — 2000000000 HC ICU R&B

## 2021-12-15 PROCEDURE — 80048 BASIC METABOLIC PNL TOTAL CA: CPT

## 2021-12-15 PROCEDURE — 6370000000 HC RX 637 (ALT 250 FOR IP): Performed by: THORACIC SURGERY (CARDIOTHORACIC VASCULAR SURGERY)

## 2021-12-15 PROCEDURE — 2580000003 HC RX 258: Performed by: THORACIC SURGERY (CARDIOTHORACIC VASCULAR SURGERY)

## 2021-12-15 PROCEDURE — 71045 X-RAY EXAM CHEST 1 VIEW: CPT

## 2021-12-15 PROCEDURE — 82947 ASSAY GLUCOSE BLOOD QUANT: CPT

## 2021-12-15 PROCEDURE — 1210000000 HC MED SURG R&B

## 2021-12-15 PROCEDURE — 94640 AIRWAY INHALATION TREATMENT: CPT

## 2021-12-15 PROCEDURE — 2500000003 HC RX 250 WO HCPCS: Performed by: THORACIC SURGERY (CARDIOTHORACIC VASCULAR SURGERY)

## 2021-12-15 PROCEDURE — 37799 UNLISTED PX VASCULAR SURGERY: CPT

## 2021-12-15 PROCEDURE — 99024 POSTOP FOLLOW-UP VISIT: CPT | Performed by: THORACIC SURGERY (CARDIOTHORACIC VASCULAR SURGERY)

## 2021-12-15 RX ORDER — BUMETANIDE 0.25 MG/ML
1 INJECTION, SOLUTION INTRAMUSCULAR; INTRAVENOUS EVERY 12 HOURS
Status: DISCONTINUED | OUTPATIENT
Start: 2021-12-15 | End: 2021-12-16

## 2021-12-15 RX ADMIN — ASPIRIN 81 MG: 81 TABLET, COATED ORAL at 08:34

## 2021-12-15 RX ADMIN — IPRATROPIUM BROMIDE AND ALBUTEROL SULFATE 1 AMPULE: .5; 2.5 SOLUTION RESPIRATORY (INHALATION) at 14:20

## 2021-12-15 RX ADMIN — INSULIN LISPRO 6 UNITS: 100 INJECTION, SOLUTION INTRAVENOUS; SUBCUTANEOUS at 17:43

## 2021-12-15 RX ADMIN — CEFAZOLIN SODIUM 2000 MG: 2 INJECTION, SOLUTION INTRAVENOUS at 12:16

## 2021-12-15 RX ADMIN — AMIODARONE HYDROCHLORIDE 0.5 MG/MIN: 50 INJECTION, SOLUTION INTRAVENOUS at 00:00

## 2021-12-15 RX ADMIN — CEFAZOLIN SODIUM 2000 MG: 2 INJECTION, SOLUTION INTRAVENOUS at 22:07

## 2021-12-15 RX ADMIN — SODIUM CHLORIDE: 9 INJECTION, SOLUTION INTRAVENOUS at 14:37

## 2021-12-15 RX ADMIN — IPRATROPIUM BROMIDE AND ALBUTEROL SULFATE 1 AMPULE: .5; 2.5 SOLUTION RESPIRATORY (INHALATION) at 18:45

## 2021-12-15 RX ADMIN — ONDANSETRON 4 MG: 2 INJECTION INTRAMUSCULAR; INTRAVENOUS at 10:52

## 2021-12-15 RX ADMIN — METOPROLOL TARTRATE 25 MG: 25 TABLET, FILM COATED ORAL at 20:51

## 2021-12-15 RX ADMIN — BUMETANIDE 1 MG: 0.25 INJECTION INTRAMUSCULAR; INTRAVENOUS at 18:23

## 2021-12-15 RX ADMIN — MORPHINE SULFATE 2 MG: 2 INJECTION, SOLUTION INTRAMUSCULAR; INTRAVENOUS at 03:59

## 2021-12-15 RX ADMIN — OXYCODONE 5 MG: 5 TABLET ORAL at 11:36

## 2021-12-15 RX ADMIN — OXYCODONE 5 MG: 5 TABLET ORAL at 17:43

## 2021-12-15 RX ADMIN — SODIUM CHLORIDE, PRESERVATIVE FREE 10 ML: 5 INJECTION INTRAVENOUS at 08:36

## 2021-12-15 RX ADMIN — METOPROLOL TARTRATE 25 MG: 25 TABLET, FILM COATED ORAL at 08:34

## 2021-12-15 RX ADMIN — INSULIN LISPRO 2 UNITS: 100 INJECTION, SOLUTION INTRAVENOUS; SUBCUTANEOUS at 08:53

## 2021-12-15 RX ADMIN — AMIODARONE HYDROCHLORIDE 0.5 MG/MIN: 50 INJECTION, SOLUTION INTRAVENOUS at 18:17

## 2021-12-15 RX ADMIN — IPRATROPIUM BROMIDE AND ALBUTEROL SULFATE 1 AMPULE: .5; 2.5 SOLUTION RESPIRATORY (INHALATION) at 06:20

## 2021-12-15 RX ADMIN — AMIODARONE HYDROCHLORIDE 0.5 MG/MIN: 50 INJECTION, SOLUTION INTRAVENOUS at 01:10

## 2021-12-15 RX ADMIN — OXYCODONE 5 MG: 5 TABLET ORAL at 08:34

## 2021-12-15 RX ADMIN — IPRATROPIUM BROMIDE AND ALBUTEROL SULFATE 1 AMPULE: .5; 2.5 SOLUTION RESPIRATORY (INHALATION) at 10:18

## 2021-12-15 RX ADMIN — INSULIN GLARGINE 15 UNITS: 100 INJECTION, SOLUTION SUBCUTANEOUS at 20:51

## 2021-12-15 RX ADMIN — INSULIN LISPRO 4 UNITS: 100 INJECTION, SOLUTION INTRAVENOUS; SUBCUTANEOUS at 12:05

## 2021-12-15 RX ADMIN — BUMETANIDE 1 MG: 0.25 INJECTION INTRAMUSCULAR; INTRAVENOUS at 08:35

## 2021-12-15 RX ADMIN — MORPHINE SULFATE 2 MG: 2 INJECTION, SOLUTION INTRAMUSCULAR; INTRAVENOUS at 00:06

## 2021-12-15 RX ADMIN — SODIUM CHLORIDE 500 ML: 9 INJECTION, SOLUTION INTRAVENOUS at 04:04

## 2021-12-15 RX ADMIN — INSULIN LISPRO 4 UNITS: 100 INJECTION, SOLUTION INTRAVENOUS; SUBCUTANEOUS at 20:51

## 2021-12-15 RX ADMIN — POTASSIUM CHLORIDE 10 MEQ: 29.8 INJECTION INTRAVENOUS at 01:09

## 2021-12-15 RX ADMIN — ATORVASTATIN CALCIUM 20 MG: 20 TABLET, FILM COATED ORAL at 20:51

## 2021-12-15 RX ADMIN — POTASSIUM CHLORIDE 10 MEQ: 29.8 INJECTION INTRAVENOUS at 02:09

## 2021-12-15 RX ADMIN — CEFAZOLIN SODIUM 2000 MG: 2 INJECTION, SOLUTION INTRAVENOUS at 06:38

## 2021-12-15 ASSESSMENT — PAIN DESCRIPTION - PAIN TYPE
TYPE: SURGICAL PAIN
TYPE: ACUTE PAIN;SURGICAL PAIN

## 2021-12-15 ASSESSMENT — PAIN DESCRIPTION - DESCRIPTORS
DESCRIPTORS: ACHING;DISCOMFORT
DESCRIPTORS: ACHING;DISCOMFORT
DESCRIPTORS: DISCOMFORT

## 2021-12-15 ASSESSMENT — PAIN SCALES - GENERAL
PAINLEVEL_OUTOF10: 3
PAINLEVEL_OUTOF10: 1
PAINLEVEL_OUTOF10: 4
PAINLEVEL_OUTOF10: 5
PAINLEVEL_OUTOF10: 0
PAINLEVEL_OUTOF10: 0
PAINLEVEL_OUTOF10: 6
PAINLEVEL_OUTOF10: 2
PAINLEVEL_OUTOF10: 0
PAINLEVEL_OUTOF10: 5
PAINLEVEL_OUTOF10: 4
PAINLEVEL_OUTOF10: 5
PAINLEVEL_OUTOF10: 5
PAINLEVEL_OUTOF10: 2

## 2021-12-15 ASSESSMENT — PAIN DESCRIPTION - LOCATION
LOCATION: CHEST
LOCATION: CHEST;STERNUM
LOCATION: CHEST

## 2021-12-15 ASSESSMENT — PAIN DESCRIPTION - ONSET: ONSET: ON-GOING

## 2021-12-15 ASSESSMENT — PAIN DESCRIPTION - FREQUENCY
FREQUENCY: CONTINUOUS
FREQUENCY: INTERMITTENT

## 2021-12-15 ASSESSMENT — PAIN - FUNCTIONAL ASSESSMENT: PAIN_FUNCTIONAL_ASSESSMENT: PREVENTS OR INTERFERES WITH MANY ACTIVE NOT PASSIVE ACTIVITIES

## 2021-12-15 NOTE — OP NOTE
[employer] [facility] [address] [facility]                                OPERATIVE REPORT    PATIENT NAME: Selina Perez                      :        1965  MED REC NO:   [id number]                              ROOM:       [facility]  ACCOUNT NO:   [de-identified]                           ADMIT DATE: 2021  PROVIDER:     Sabrina Xiong MD    DATE OF PROCEDURE:  2021    PREOPERATIVE DIAGNOSES:  1. Severe multivessel coronary artery disease. 2.  Longstanding persistent atrial fibrillation. POSTOPERATIVE DIAGNOSES:  1. Severe multivessel coronary artery disease. 2.  Longstanding persistent atrial fibrillation. 3.  Dilated left atrium. 4.  Severe diffuse coronary atherosclerosis. 5.  Cardiomegaly. 6.  Mild-to-moderate pulmonary hypertension. OPERATIVE PROCEDURES:  1. Three-vessel perfusion-assisted coronary artery bypass grafting  placing the left internal mammary artery to the left anterior descending  artery and saphenous vein bypass  grafts to the second diagonal branch and the posterolateral marginal  branch. 2.  Endoscopic saphenous vein harvest, right leg. 3.  Romero-Maze IV radiofrequency and cryoablation to the left and right  atria. 4.  Exit block testing. 5.  Exclusion of the left atrial appendage with application of a 35 mm  AtriClip. 6.  Intraoperative transesophageal echocardiography. SURGEON:  Sabrina Xiong MD    ASSISTANT:  Lu Kehr, CFA    ANESTHESIA:  General.    HISTORY AND FINDINGS:  The patient is a 53-year-old overweight, poorly  controlled type 2 diabetic who was admitted to the hospital two months  ago with Staph sepsis. He was found to have an abscess on his left  middle finger. This had to be treated with amputation and long-term  antibiotics. During that admission, the patient had congestive heart  failure symptoms. Equilla Ok demonstrated evidence of myocardial  ischemia.   Cardiac catheterization demonstrated the left ventricle which  showed normal function, ejection fraction was estimated at 60%. He did  have mild-to-moderate pulmonary hypertension with PA pressures in the  mid 46s. The left main artery had a distal tapering that approached 50%  in some views. The LAD had a proximal 80% stenotic lesion followed by  an 80% to 90% proximal mid-stenotic lesion at the origin of the second  diagonal branch. The circumflex system had a tiny intermediate branch  that had 90% stenosis, but this appeared to be too small for bypass  grafting. The remainder of the circumflex system was small. The RCA is  a dominant system. There is a 50% mid-third stenosis, a 50% distal  stenosis, a complex 70% to 80% area of stenosis before the PLM branch. At the time of operation, the patient was found to have a moderate  cardiomegaly, although there was good wall motion. The ventricle was  quite thick on echo. The aorta itself was slightly dilated, but free of  any palpable atherosclerotic disease. All the vessels were diffusely  diseased with thick concentric atherosclerosis. The PLM branch coming  off the right system was narrowed down to 2 mm in size. The second  diagonal branch was narrowed down to 1.5 mm in size as was the LAD. The  left atrial appendage was free of any thrombus on echo and on insertion  of the Otto-Kylah catheter, the PA pressures were in the mid to upper  40s. DESCRIPTION OF THE OPERATION:  The patient was taken to the operating  room and placed in the supine position. General anesthesia was then  administered. The patient was then prepped and draped in a sterile  fashion for complex cardiac surgery. Saphenous vein was first explored  in the patient's right leg in anticipation _____ where the vein was  harvested endoscopically up to the groin; however, much of this vein was  not usable. Only one small segment was going to be usable to graft the  second diagonal branch. Therefore, I took saphenous vein opened from  the left ankle to the midcalf area, fortunately this vein was excellent. The chest was then entered through a median sternotomy incision. The  left internal mammary artery was then dissected off the left chest wall  as a pedicle. The patient was then systemically heparinized and was  cannulated in the usual fashion. The mammary artery was then retrieved  from the left chest.  This was an excellent conduit with good length and  the distal lumen measured about 3 mm in size with excellent flows. The  patient was then placed on cardiopulmonary bypass and maintained on  37-degree centigrade using the perfusion-assisted beating heart  technique. I then used the cardiac stabilizer to help dissect out the  distal vessels. Attention was then turned to the ablation portion of  the operation. I then dissected out the interatrial groove as well as  mobilizing the inferior vena cava, opened the oblique sinus with the  transverse sinus. I then performed exit block testing, demonstrating  conduction of all four pulmonary veins. The AtriCure bipolar clamp was  then placed around the right side of the pulmonary veins first.  I then  performed five sequential ablations. Exit block testing demonstrated  silence of the right-sided pulmonary veins. In a similar fashion, the  bipolar clamp was placed around the left-sided pulmonary veins. Five  sequential ablations were then created. An exit block testing now  demonstrated silence of left-sided veins as well. The ligament of  Bill Kim was divided with cautery. I then performed a cryoablation  creating a cryolesion along the roof of the left atrium connecting the  left and right superior pulmonary veins. I then created the floor  lesion connecting the inferior pulmonary veins with the cryoprobe as  well. I then performed a cryolesion from the base of the left atrial  appendage to the left superior pulmonary vein.   I then created the  right-sided lesions creating a lesion set using the cryoprobe from the  inferior to the superior vena cava and then a lesion set connecting this  line to the lateral free wall of the right atrium. I then placed a 35  mm AtriClip on the left atrial appendage. Following ablation, I then  performed a DC cardioversion converting the patient from atrial  fibrillation to sinus rhythm. I then proceeded with the bypass portion  of the operation. The cardiac stabilizer was then used to expose the  PLM branch off the right system. A proximal vessel loop was then  placed. The vessel was open sharply. I was able to insert a 2 mm shunt  and a segment of saphenous vein was then anastomosed end-to-side with  the PLM branch with a running 7-0 Prolene suture. Attention was then  turned to the second diagonal branch. A proximal vessel loop was then  placed. The cardiac stabilizer was then applied. The vessel was opened  sharply. I was unable to pass a 1.5 mm shunt as it was a very heavily  diseased vessel and a second segment of saphenous vein was then  anastomosed end-to-side with the second diagonal branch with a running  7-0 Prolene suture. I then approached the LAD at its midpoint. The  left internal mammary artery was then brought through a pericardial  tunnel and prepared for distal anastomosis. A proximal vessel loop was  then placed. The cardiac stabilizer was then applied. The vessel was  opened sharply. It was quite brittle. I was barely able to squeeze in  a 1.5 mm shunt and the left internal mammary artery was then anastomosed  end-to-side to the LAD with a running 7-0 Prolene suture. The  stabilizer was then removed and hemostasis was secured. A partial clamp  was then placed on the ascending aorta and the two proximal vein graft  anastomoses were then performed with a running 6-0 Prolene suture. The  partial clamp was then removed and the vein grafts were deaired.   All  three distal anastomoses were then checked for hemostasis. Appropriate  repair sutures were then placed. I then placed a 24-Dwight drain into  the left pleural space. The two right atrial and a single bipolar right  ventricular epicardial pacemaker lead were then inserted. The patient  maintained a sinus rhythm in the mid 60s. Ventilation was then resumed. Under echo guidance, the patient was then weaned from cardiopulmonary  bypass which he tolerated well coming off a systemic pressure of 925 to  360 systolic, a cardiac output of 4.5 to 5 liters per minute and PA  pressures in the mid to upper 30s. Protamine was then administered and  the cannula was then removed. As the patient remained stable, the  pericardium was reapproximated. Two Dwight mediastinal chest drains were  then placed. The sternum was then reapproximated with a combination of  single and double twisted stainless steel wires. The fascia was then  closed with a running 0 PDS suture. The subcutaneous tissue and skin  were closed with 2-0 and 4-0 Vicryl with a Prineo dressing. Sponge and  needle counts were correct. There were no apparent complications during  the operation. No blood was transfused. The total cardiopulmonary  bypass time was 71 minutes. The cross-clamp was not used. As always,  blood loss cannot be estimated.         Dilip Bauer MD    D: 12/14/2021 13:02:15      T: 12/14/2021 21:06:31     [email or web address]  Job#: [id number]     Doc#: [id number]    CC:

## 2021-12-15 NOTE — PROGRESS NOTES
: Final result    Component Ref Range & Units 12/15/21 0407   pH, Arterial 7.350 - 7.450 7.310 Low     pCO2, Arterial 35.0 - 45.0 mmHg 36.0    pO2, Arterial 80.0 - 100.0 mmHg 73.0 Low     HCO3, Arterial 22.0 - 26.0 mmol/L 18.1 Low     Base Excess, Arterial -2.0 - 2.0 mmol/L -7.4 Low     Hemoglobin, Art, Extended 14.0 - 18.0 g/dL 11.8 Low     O2 Sat, Arterial >92 % 94.2    Carboxyhgb, Arterial 0.0 - 5.0 % 2.3    Comment:      0.0-1.5   (Smokers 1.5-5.0)    Methemoglobin, Arterial <1.5 % 1.2    O2 Content, Arterial Not Established mL/dL 15.7    O2 Therapy  Unknown    Resulting Agency  [facility]        Specimen Collected: 12/15/21 0407 Last Resulted: 12/15/21 0407 View Other Order Details        ROOM AIR   RR 19  EVAN

## 2021-12-15 NOTE — PROGRESS NOTES
: Final result    Component Ref Range & Units 12/14/21 2058   pH, Arterial 7.350 - 7.450 7.310 Low     pCO2, Arterial 35.0 - 45.0 mmHg 34. 0 Low     pO2, Arterial 80.0 - 100.0 mmHg 144. 0 High     HCO3, Arterial 22.0 - 26.0 mmol/L 17.1 Low     Base Excess, Arterial -2.0 - 2.0 mmol/L -8.3 Low     Hemoglobin, Art, Extended 14.0 - 18.0 g/dL 11.3 Low     O2 Sat, Arterial >92 % 97.6    Carboxyhgb, Arterial 0.0 - 5.0 % 1.9    Comment:      0.0-1.5   (Smokers 1.5-5.0)    Methemoglobin, Arterial <1.5 % 1.0    O2 Content, Arterial Not Established mL/dL 15.8    O2 Therapy  Unknown    Resulting Agency  [facility]        Specimen Collected: 12/14/21 2058 Last Resulted: 12/14/21 2059 View Other Order Details        Cpap , PS 5, PEEP 5, 35%  EVAN

## 2021-12-15 NOTE — PROGRESS NOTES
POST OP CARDIOTHORACIC SURGERY PROGRESS NOTE    Post op day 1    SUBJECTIVE:  Awake and alert. Extubated early last evening. Up in chair. Comfortable. /66   Pulse 90   Temp 98.4 °F (36.9 °C) (Temporal)   Resp 19   Ht 5' 10\" (1.778 m)   Wt 235 lb 11.2 oz (106.9 kg)   SpO2 96%   BMI 33.82 kg/m²   Average, Min, and Max for last 24 hours Vitals:  TEMPERATURE:  Temp  Av.6 °F (35.9 °C)  Min: 94.8 °F (34.9 °C)  Max: 98.4 °F (36.9 °C)  RESPIRATIONS RANGE: Resp  Av.4  Min: 0  Max: 27  PULSE RANGE: Pulse  Av.2  Min: 65  Max: 95  BLOOD PRESSURE RANGE:  Systolic (97OMW), BXS:174 , Min:75 , DKM:169   ; Diastolic (94MYW), GYA:23, Min:46, Max:86    PULSE OXIMETRY RANGE: SpO2  Av.7 %  Min: 88 %  Max: 100 %    I/O last 3 completed shifts: In: 4595.9 [I.V.:3804.3; Blood:600; IV Piggyback:191.5]  Out:  [Urine:1860; Chest Tube:197]    CHEST: lungs clear    CARDIOVASCULAR: regular rhythm, no murmurs. Remains in sinus rhythm. On amiodarone drip. INCISION: no drainage, skin edges intact    DRAINS: output - 360 ml/24 hours; no air leak    LABS:  CBC:   Lab Results   Component Value Date    WBC 20.6 12/15/2021    RBC 3.70 12/15/2021    HGB 11.7 12/15/2021    HCT 35.5 12/15/2021    MCV 95.9 12/15/2021    MCH 31.6 12/15/2021    MCHC 33.0 12/15/2021    RDW 12.7 12/15/2021     12/15/2021    MPV 11.5 12/15/2021     BMP:    Lab Results   Component Value Date     12/15/2021    K 3.6 12/15/2021    K 4.0 12/15/2021    K 4.2 2021     12/15/2021    CO2 17 12/15/2021    BUN 29 12/15/2021    LABALBU 4.3 2021    CREATININE 1.6 12/15/2021    CALCIUM 8.7 12/15/2021    GFRAA 54 12/15/2021    LABGLOM 45 12/15/2021    GLUCOSE 141 12/15/2021       CHEST XRAY: normal postoperative chest xray    ASSESSMENT: normal postoperative course. Mild elevation in creat to 1.6    PLAN: Continue Amiodarone. Remove chest drains. Adjust meds.      Electronically signed by Neno Arcos MD on 12/15/21 at 6:56 AM CST

## 2021-12-15 NOTE — PLAN OF CARE
Problem: Gas Exchange - Impaired:  Goal: Levels of oxygenation will improve  Description: Levels of oxygenation will improve  Outcome: Ongoing  Goal: Ability to maintain adequate ventilation will improve  Description: Ability to maintain adequate ventilation will improve  Outcome: Ongoing     Problem: Cardiac Output - Decreased:  Goal: Hemodynamic stability will improve  Description: Hemodynamic stability will improve  Outcome: Ongoing     Problem: Pain:  Description: Pain management should include both nonpharmacologic and pharmacologic interventions. Goal: Pain level will decrease  Description: Pain level will decrease  Outcome: Ongoing     Problem: Tissue Perfusion - Cardiopulmonary, Altered:  Goal: Hemodynamic stability will improve  Description: Hemodynamic stability will improve  Outcome: Ongoing     Problem:  Activity:  Goal: Risk for activity intolerance will decrease  Description: Risk for activity intolerance will decrease  Outcome: Ongoing  Goal: Will verbalize the importance of balancing activity with adequate rest periods  Description: Will verbalize the importance of balancing activity with adequate rest periods  Outcome: Ongoing     Problem: Cardiac:  Goal: Hemodynamic stability will improve  Description: Hemodynamic stability will improve  Outcome: Ongoing  Goal: Ability to maintain an adequate cardiac output will improve  Description: Ability to maintain an adequate cardiac output will improve  Outcome: Ongoing  Goal: Diagnostic test results will improve  Description: Diagnostic test results will improve  Outcome: Ongoing     Problem: Coping:  Goal: Ability to verbalize feelings about condition will improve  Description: Ability to verbalize feelings about condition will improve  Outcome: Ongoing  Goal: Ability to identify strategies to decrease anxiety will improve  Description: Ability to identify strategies to decrease anxiety will improve  Outcome: Ongoing  Goal: Ability to identify and alter barriers to satisfying sexual function will improve  Description: Ability to identify and alter barriers to satisfying sexual function will improve  Outcome: Ongoing     Problem: Health Behavior:  Goal: Ability to identify changes in lifestyle to reduce recurrence of condition will improve  Description: Ability to identify changes in lifestyle to reduce recurrence of condition will improve  Outcome: Ongoing  Goal: Ability to manage health-related needs will improve  Description: Ability to manage health-related needs will improve  Outcome: Ongoing     Problem: Nutritional:  Goal: Ability to identify appropriate dietary choices will improve  Description: Ability to identify appropriate dietary choices will improve  Outcome: Ongoing     Problem: Respiratory:  Goal: Levels of oxygenation will improve  Description: Levels of oxygenation will improve  Outcome: Ongoing  Goal: Ability to maintain adequate ventilation will improve  Description: Ability to maintain adequate ventilation will improve  Outcome: Ongoing     Problem: Sensory:  Goal: Pain level will decrease  Description: Pain level will decrease  Outcome: Ongoing

## 2021-12-15 NOTE — PROGRESS NOTES
Patient able to move all extremities and lift head off the pillow with commands. When asked to squeeze hands hes unable to squeeze hard. Asked if he has decrease  at usual he shook his head yes.  Order place per protocol to do abg    Electronically signed by Ferderic Solorzano RN on 12/14/2021 at 8:54 PM

## 2021-12-16 ENCOUNTER — APPOINTMENT (OUTPATIENT)
Dept: GENERAL RADIOLOGY | Age: 56
DRG: 234 | End: 2021-12-16
Attending: THORACIC SURGERY (CARDIOTHORACIC VASCULAR SURGERY)
Payer: MEDICARE

## 2021-12-16 LAB
ANION GAP SERPL CALCULATED.3IONS-SCNC: 13 MMOL/L (ref 7–19)
BUN BLDV-MCNC: 29 MG/DL (ref 6–20)
CALCIUM SERPL-MCNC: 8.2 MG/DL (ref 8.6–10)
CHLORIDE BLD-SCNC: 97 MMOL/L (ref 98–111)
CO2: 21 MMOL/L (ref 22–29)
CREAT SERPL-MCNC: 1.6 MG/DL (ref 0.5–1.2)
GFR AFRICAN AMERICAN: 54
GFR NON-AFRICAN AMERICAN: 45
GLUCOSE BLD-MCNC: 209 MG/DL (ref 70–99)
GLUCOSE BLD-MCNC: 236 MG/DL (ref 70–99)
GLUCOSE BLD-MCNC: 238 MG/DL (ref 70–99)
GLUCOSE BLD-MCNC: 269 MG/DL (ref 70–99)
GLUCOSE BLD-MCNC: 275 MG/DL (ref 74–109)
HCT VFR BLD CALC: 33.8 % (ref 42–52)
HEMOGLOBIN: 11.3 G/DL (ref 14–18)
INR BLD: 1.24 (ref 0.88–1.18)
MCH RBC QN AUTO: 32.2 PG (ref 27–31)
MCHC RBC AUTO-ENTMCNC: 33.4 G/DL (ref 33–37)
MCV RBC AUTO: 96.3 FL (ref 80–94)
PDW BLD-RTO: 13.1 % (ref 11.5–14.5)
PERFORMED ON: ABNORMAL
PLATELET # BLD: 199 K/UL (ref 130–400)
PMV BLD AUTO: 12 FL (ref 9.4–12.4)
POTASSIUM SERPL-SCNC: 4.2 MMOL/L (ref 3.5–5)
PROTHROMBIN TIME: 15.8 SEC (ref 12–14.6)
RBC # BLD: 3.51 M/UL (ref 4.7–6.1)
SODIUM BLD-SCNC: 131 MMOL/L (ref 136–145)
WBC # BLD: 16.4 K/UL (ref 4.8–10.8)

## 2021-12-16 PROCEDURE — 85610 PROTHROMBIN TIME: CPT

## 2021-12-16 PROCEDURE — 97116 GAIT TRAINING THERAPY: CPT

## 2021-12-16 PROCEDURE — 82947 ASSAY GLUCOSE BLOOD QUANT: CPT

## 2021-12-16 PROCEDURE — 85027 COMPLETE CBC AUTOMATED: CPT

## 2021-12-16 PROCEDURE — 6370000000 HC RX 637 (ALT 250 FOR IP): Performed by: NURSE PRACTITIONER

## 2021-12-16 PROCEDURE — 97162 PT EVAL MOD COMPLEX 30 MIN: CPT

## 2021-12-16 PROCEDURE — 6370000000 HC RX 637 (ALT 250 FOR IP): Performed by: THORACIC SURGERY (CARDIOTHORACIC VASCULAR SURGERY)

## 2021-12-16 PROCEDURE — 71045 X-RAY EXAM CHEST 1 VIEW: CPT

## 2021-12-16 PROCEDURE — 99024 POSTOP FOLLOW-UP VISIT: CPT | Performed by: THORACIC SURGERY (CARDIOTHORACIC VASCULAR SURGERY)

## 2021-12-16 PROCEDURE — 36415 COLL VENOUS BLD VENIPUNCTURE: CPT

## 2021-12-16 PROCEDURE — 2580000003 HC RX 258: Performed by: THORACIC SURGERY (CARDIOTHORACIC VASCULAR SURGERY)

## 2021-12-16 PROCEDURE — 80048 BASIC METABOLIC PNL TOTAL CA: CPT

## 2021-12-16 PROCEDURE — 2140000000 HC CCU INTERMEDIATE R&B

## 2021-12-16 PROCEDURE — 2500000003 HC RX 250 WO HCPCS: Performed by: THORACIC SURGERY (CARDIOTHORACIC VASCULAR SURGERY)

## 2021-12-16 PROCEDURE — 6360000002 HC RX W HCPCS: Performed by: THORACIC SURGERY (CARDIOTHORACIC VASCULAR SURGERY)

## 2021-12-16 PROCEDURE — 94640 AIRWAY INHALATION TREATMENT: CPT

## 2021-12-16 RX ORDER — BUMETANIDE 0.25 MG/ML
1 INJECTION, SOLUTION INTRAMUSCULAR; INTRAVENOUS DAILY
Status: DISCONTINUED | OUTPATIENT
Start: 2021-12-17 | End: 2021-12-17

## 2021-12-16 RX ORDER — GUAIFENESIN 600 MG/1
600 TABLET, EXTENDED RELEASE ORAL 2 TIMES DAILY
Status: DISCONTINUED | OUTPATIENT
Start: 2021-12-16 | End: 2021-12-18 | Stop reason: HOSPADM

## 2021-12-16 RX ORDER — AMIODARONE HYDROCHLORIDE 200 MG/1
200 TABLET ORAL 2 TIMES DAILY
Status: DISCONTINUED | OUTPATIENT
Start: 2021-12-16 | End: 2021-12-18 | Stop reason: HOSPADM

## 2021-12-16 RX ADMIN — OXYCODONE 5 MG: 5 TABLET ORAL at 09:31

## 2021-12-16 RX ADMIN — GUAIFENESIN 600 MG: 600 TABLET, EXTENDED RELEASE ORAL at 19:36

## 2021-12-16 RX ADMIN — INSULIN LISPRO 4 UNITS: 100 INJECTION, SOLUTION INTRAVENOUS; SUBCUTANEOUS at 07:35

## 2021-12-16 RX ADMIN — OXYCODONE 5 MG: 5 TABLET ORAL at 05:14

## 2021-12-16 RX ADMIN — SODIUM CHLORIDE, PRESERVATIVE FREE 10 ML: 5 INJECTION INTRAVENOUS at 07:34

## 2021-12-16 RX ADMIN — ATORVASTATIN CALCIUM 20 MG: 20 TABLET, FILM COATED ORAL at 19:36

## 2021-12-16 RX ADMIN — AMIODARONE HYDROCHLORIDE 200 MG: 200 TABLET ORAL at 19:36

## 2021-12-16 RX ADMIN — BUMETANIDE 1 MG: 0.25 INJECTION INTRAMUSCULAR; INTRAVENOUS at 06:14

## 2021-12-16 RX ADMIN — INSULIN GLARGINE 15 UNITS: 100 INJECTION, SOLUTION SUBCUTANEOUS at 19:37

## 2021-12-16 RX ADMIN — IPRATROPIUM BROMIDE AND ALBUTEROL SULFATE 1 AMPULE: .5; 2.5 SOLUTION RESPIRATORY (INHALATION) at 10:27

## 2021-12-16 RX ADMIN — IPRATROPIUM BROMIDE AND ALBUTEROL SULFATE 1 AMPULE: .5; 2.5 SOLUTION RESPIRATORY (INHALATION) at 14:24

## 2021-12-16 RX ADMIN — METOPROLOL TARTRATE 25 MG: 25 TABLET, FILM COATED ORAL at 19:36

## 2021-12-16 RX ADMIN — AMIODARONE HYDROCHLORIDE 200 MG: 200 TABLET ORAL at 07:34

## 2021-12-16 RX ADMIN — DEXTROSE MONOHYDRATE 150 MG: 50 INJECTION, SOLUTION INTRAVENOUS at 20:14

## 2021-12-16 RX ADMIN — INSULIN LISPRO 4 UNITS: 100 INJECTION, SOLUTION INTRAVENOUS; SUBCUTANEOUS at 16:49

## 2021-12-16 RX ADMIN — OXYCODONE 5 MG: 5 TABLET ORAL at 16:02

## 2021-12-16 RX ADMIN — GUAIFENESIN 600 MG: 600 TABLET, EXTENDED RELEASE ORAL at 07:34

## 2021-12-16 RX ADMIN — OXYCODONE 5 MG: 5 TABLET ORAL at 00:02

## 2021-12-16 RX ADMIN — BUMETANIDE 1 MG: 0.25 INJECTION INTRAMUSCULAR; INTRAVENOUS at 19:37

## 2021-12-16 RX ADMIN — ASPIRIN 81 MG: 81 TABLET, COATED ORAL at 07:34

## 2021-12-16 RX ADMIN — INSULIN LISPRO 2 UNITS: 100 INJECTION, SOLUTION INTRAVENOUS; SUBCUTANEOUS at 19:37

## 2021-12-16 RX ADMIN — IPRATROPIUM BROMIDE AND ALBUTEROL SULFATE 1 AMPULE: .5; 2.5 SOLUTION RESPIRATORY (INHALATION) at 06:10

## 2021-12-16 RX ADMIN — INSULIN LISPRO 6 UNITS: 100 INJECTION, SOLUTION INTRAVENOUS; SUBCUTANEOUS at 11:34

## 2021-12-16 RX ADMIN — METOPROLOL TARTRATE 25 MG: 25 TABLET, FILM COATED ORAL at 07:34

## 2021-12-16 RX ADMIN — IPRATROPIUM BROMIDE AND ALBUTEROL SULFATE 1 AMPULE: .5; 2.5 SOLUTION RESPIRATORY (INHALATION) at 18:34

## 2021-12-16 ASSESSMENT — PAIN DESCRIPTION - ORIENTATION
ORIENTATION: MID

## 2021-12-16 ASSESSMENT — PAIN SCALES - GENERAL
PAINLEVEL_OUTOF10: 6
PAINLEVEL_OUTOF10: 0
PAINLEVEL_OUTOF10: 1
PAINLEVEL_OUTOF10: 6
PAINLEVEL_OUTOF10: 5
PAINLEVEL_OUTOF10: 0
PAINLEVEL_OUTOF10: 5
PAINLEVEL_OUTOF10: 1
PAINLEVEL_OUTOF10: 0
PAINLEVEL_OUTOF10: 0

## 2021-12-16 ASSESSMENT — PAIN - FUNCTIONAL ASSESSMENT
PAIN_FUNCTIONAL_ASSESSMENT: PREVENTS OR INTERFERES SOME ACTIVE ACTIVITIES AND ADLS

## 2021-12-16 ASSESSMENT — PAIN DESCRIPTION - ONSET
ONSET: ON-GOING

## 2021-12-16 ASSESSMENT — PAIN DESCRIPTION - PAIN TYPE
TYPE: SURGICAL PAIN

## 2021-12-16 ASSESSMENT — PAIN DESCRIPTION - DESCRIPTORS
DESCRIPTORS: ACHING
DESCRIPTORS: ACHING
DESCRIPTORS: ACHING;DISCOMFORT

## 2021-12-16 ASSESSMENT — PAIN DESCRIPTION - FREQUENCY
FREQUENCY: INTERMITTENT

## 2021-12-16 ASSESSMENT — PAIN DESCRIPTION - LOCATION
LOCATION: STERNUM
LOCATION: STERNUM
LOCATION: CHEST
LOCATION: STERNUM
LOCATION: CHEST

## 2021-12-16 NOTE — PROGRESS NOTES
Physical Therapy    Facility/Department: [facility]  Initial Assessment    NAME: Haylee Miller  : 1965  MRN: [id number]    Date of Service: 2021    Discharge Recommendations:  Continue to assess pending progress, 24 hour supervision or assist, Patient would benefit from continued therapy after discharge        Assessment   Body structures, Functions, Activity limitations: Decreased functional mobility ; Decreased ROM; Decreased strength; Decreased endurance; Decreased safe awareness; Decreased balance; Increased pain; Decreased posture  Assessment: Pt. willing to work with therapy. Pt. tolerated amb. in hallway well. Will encourage pt to progress with mobility. Pt. needs to use to RW, gait belt and staff A. Treatment Diagnosis: impaired gait and mobility  Prognosis: Good  Decision Making: Medium Complexity  PT Education: Goals; PT Role; General Safety; Transfer Training; Plan of Care; Gait Training; Functional Mobility Training  Patient Education: sternal prec. Barriers to Learning: a little anxious with mobility and numbers on telemetry screen  REQUIRES PT FOLLOW UP: Yes  Activity Tolerance  Activity Tolerance: Patient limited by endurance; Patient limited by fatigue; Patient limited by pain  Activity Tolerance: 111/76, 98%, HR 84 after amb. Patient Diagnosis(es): There were no encounter diagnoses.      has a past medical history of Acalculous cholecystitis, Allergic rhinitis, cause unspecified, Atrial fibrillation, chronic ([address] Bone spur, CAD (coronary artery disease), Charcot's joint of right foot, Chicken pox, Closed supracondylar fracture of elbow, Diabetic ulcer of right midfoot associated with type 2 diabetes mellitus, with fat layer exposed ([address] History of MRSA infection, HTN (hypertension), Hyperlipidemia, Impotence of organic origin, MDRO (multiple drug resistant organisms) resistance, MRSA (methicillin resistant staph aureus) culture positive, Other dyspnea and respiratory abnormality, Other specified erythematous condition(695.89), Personal history of other infectious and parasitic disease, Salmonella, and Type II or unspecified type diabetes mellitus without mention of complication, uncontrolled. has a past surgical history that includes Tonsillectomy; malignant skin lesion excision; Cardioversion (03/2017); Colonoscopy (02/24/2020); Humerus fracture surgery (Left, 9/17/2020); Cataract removal; Hand surgery (Left, 10/31/2021); eye surgery; and Cardiac catheterization. Restrictions  Restrictions/Precautions  Restrictions/Precautions: Surgical Protocols, Fall Risk, Contact Precautions  Required Braces or Orthoses?: No  Position Activity Restriction  Sternal Precautions: No Pushing, No Pulling, 5# Lifting Restrictions  Other position/activity restrictions: MRSA  Vision/Hearing  Vision: Within Functional Limits  Hearing: Within functional limits     Subjective  General  Chart Reviewed: Yes  Patient assessed for rehabilitation services?: Yes  Response To Previous Treatment: Not applicable  Family / Caregiver Present: No  Referring Practitioner: Hanna Carballo MD  Referral Date : 12/14/21  Diagnosis: MVCAD, persistent afib  Follows Commands: Impaired  Other (Comment): needs simple cues and redirection  General Comment  Comments: [facility] and A with mobility. CABG x 2 12/10/21  Subjective  Subjective: Pt. willing to walk. Pain Screening  Patient Currently in Pain: Yes  Pain Assessment  Pain Assessment: 0-10  Pain Level: 6  Pain Type: Surgical pain  Pain Location: Sternum  Pain Orientation: Mid  Functional Pain Assessment: Prevents or interferes some active activities and ADLs  Non-Pharmaceutical Pain Intervention(s): Repositioned;  Ambulation/Increased Activity  Response to Pain Intervention: Patient Satisfied  Vital Signs  Patient Currently in Pain: Yes  Pre Treatment Pain Screening  Intervention List: Patient able to continue with treatment    Orientation  Orientation  Overall Orientation Status: Within Functional Limits  Social/Functional History  Social/Functional History  Lives With:  (pt's mom)  Type of Home: House  Home Layout: One level  Home Access: Level entry  [facility] Family  ADL Assistance: Independent  Ambulation Assistance: Independent  Transfer Assistance: Independent  Occupation: On disability  Type of occupation: worked maintenance at [employer]  Additional Comments: pt with recent hospital admit, recent L middle finger partial amputation 10/31/21 and MRSA  Cognition   Cognition  Overall Cognitive Status: Exceptions  Following Commands: Follows one step commands with increased time; Follows one step commands with repetition  Memory: Decreased recall of precautions  Safety Judgement: Decreased awareness of need for assistance; Decreased awareness of need for safety  Problem Solving: Assistance required to generate solutions; Assistance required to implement solutions  Insights: Decreased awareness of deficits  Initiation: Requires cues for some  Sequencing: Requires cues for some  Cognition Comment: pt anxious with mobility    Objective     Observation/Palpation  Posture: Good  Observation: telemetry, room air, BP cuff, O2 sat monitor  Edema: L middle finger edematous, scabbed and purple in color-RN aware    AROM RLE (degrees)  RLE AROM: WFL  AROM LLE (degrees)  LLE AROM : WFL  AROM RUE (degrees)  RUE AROM : WFL  AROM LUE (degrees)  LUE AROM : WFL  Strength RLE  Strength RLE: WFL  Comment: 4/5  Strength LLE  Strength LLE: WFL  Comment: 4/5        Bed mobility  Supine to Sit: Unable to assess  Sit to Supine: Unable to assess  Comment: pt up in chair  Transfers  Sit to Stand: Minimal Assistance  Stand to sit: Minimal Assistance  Comment: v. cues to scoot forward in chair. Pt. given v. cues to lean back onto chair and slide hips forward. V cues to lean forward with nose over toes to stand and not grab for w/c.   Ambulation  Ambulation?: Yes  Ambulation 1  Surface: level tile  Device:  (pt pushed W/c)  Assistance: Minimal assistance  Gait Deviations: Slow Carmen; Decreased step length; Decreased step height  Distance: 175'     Balance  Posture: Good  Sitting - Static: Good; +  Sitting - Dynamic: Good; -  Standing - Static: Fair; -  Standing - Dynamic: Poor; +  Exercises  Hip Flexion: x10  Knee Long Arc Quad: x10  Ankle Pumps: x20     Plan   Plan  Times per week: 3-7  Times per day: Daily  Plan weeks: 2  Current Treatment Recommendations: Strengthening, Balance Training, Functional Mobility Training, Transfer Training, Gait Training, Safety Education & Training, Positioning, Equipment Evaluation, Education, & procurement, Patient/Caregiver Education & Training  Plan Comment: cont. PT per POC.   Safety Devices  Type of devices: Gait belt, Patient at risk for falls, Nurse notified, Left in chair, Call light within reach    G-Code       OutComes Score                                                  AM-PAC Score             Goals  Short term goals  Time Frame for Short term goals: 2 wks  Short term goal 1: supine to sit indep  Short term goal 2: sit to stand indep  Short term goal 3: amb. 300' with RW SBA  Patient Goals   Patient goals : go home       Therapy Time   Individual Concurrent Group Co-treatment   Time In           Time Out           Minutes                   George Champion PT    Electronically signed by George Champion PT on 12/16/2021 at 11:22 AM

## 2021-12-16 NOTE — PROGRESS NOTES
POST OP CARDIOTHORACIC SURGERY PROGRESS NOTE    Post op day 2    SUBJECTIVE:  Sitting up in chair. Awake all night, \"too noisy\". /76   Pulse 80   Temp 98 °F (36.7 °C) (Temporal)   Resp 26   Ht 5' 10\" (1.778 m)   Wt 234 lb 6.4 oz (106.3 kg)   SpO2 94%   BMI 33.63 kg/m²   Average, Min, and Max for last 24 hours Vitals:  TEMPERATURE:  Temp  Av.9 °F (36.6 °C)  Min: 97.3 °F (36.3 °C)  Max: 98.8 °F (37.1 °C)  RESPIRATIONS RANGE: Resp  Av.2  Min: 12  Max: 26  PULSE RANGE: Pulse  Av.5  Min: 80  Max: 109  BLOOD PRESSURE RANGE:  Systolic (63FKO), QFF:621 , Min:88 , BHB:896   ; Diastolic (37WLS), YQD:30, Min:57, Max:85    PULSE OXIMETRY RANGE: SpO2  Av.5 %  Min: 92 %  Max: 97 %    I/O last 3 completed shifts: In: 2282.3 [P.O.:540; I.V.:1427.8; IV Piggyback:314.6]  Out:  [Urine:]    CHEST: Clear bilateral breath sounds without wheezes or rhonchi. CARDIOVASCULAR: Normal HT with RRR. No murmurs, gallops or rubs. Telemetry - Paroxysmal Atrial Fibrillation, Currently SR with PACs    INCISION: Sternal incision - open to air. Edges approximated. No drainage or erythema. Right EVH incision - open to air. Edges approximated. No drainage or erythema. LABS:  CBC:   Lab Results   Component Value Date    WBC 16.4 2021    RBC 3.51 2021    HGB 11.3 2021    HCT 33.8 2021    MCV 96.3 2021    MCH 32.2 2021    MCHC 33.4 2021    RDW 13.1 2021     2021    MPV 12.0 2021     BMP:    Lab Results   Component Value Date     2021    K 4.2 2021    CL 97 2021    CO2 21 2021    BUN 29 2021    CREATININE 1.6 2021    CALCIUM 8.2 2021    GFRAA 54 2021    LABGLOM 45 2021    GLUCOSE 275 2021     PT/INR:    Lab Results   Component Value Date    PROTIME 15.8 2021    INR 1.24 2021         CHEST XRAY: Normal postoperative changes.      ASSESSMENT: 1. Severe Multivessel Coronary Artery Disease - S/P Combined 3V PACABG, Romero MAZE Radiofrequency ad Cryoablation, Exclusion of Left Atrial Appendage on 12/14/2021  2. Persistent Atrial Fibrillation - S/P Combined 3V PACABG, Romero MAZE Radiofrequency ad Cryoablation, Exclusion of Left Atrial Appendage on 12/14/2021  3. DM, Type 2  4. Essential HTN  5. Mixed Hyperlipidemia  6. Hx MRSA Infection      PLAN:     1. Change to Oral Amiodarone  2. Restart Home Coumadin  3. Transfer to PCU    Electronically signed by NOMI Kenney on 12/16/21 at 7:05 AM CST    I have seen and examined the patient myself the above was discussed with the nurse practitioner. He has had some episodes of atrial fibrillation during the night but mostly has been in sinus rhythm. This is as expected with someone who has been in longstanding persistent atrial fibrillation for 8 years. His left atrial appendage has been occluded with an atrial clip therefore there is no immediate need for resuming full anticoagulation although per protocol I am going to resume his Coumadin today. He remains hemodynamically stable and doing quite well and therefore the plan is to transfer him to the PCU today to continue his convalescence. He will be switched from IV amiodarone to p.o. Carlos Anderson MD12/16/2021 0700

## 2021-12-16 NOTE — PLAN OF CARE
Problem: Discharge Planning:  Goal: Participates in care planning  Description: Participates in care planning  Outcome: Ongoing  Goal: Discharged to appropriate level of care  Description: Discharged to appropriate level of care  Outcome: Ongoing     Problem: Airway Clearance - Ineffective:  Goal: Ability to maintain a clear airway will improve  Description: Ability to maintain a clear airway will improve  Outcome: Ongoing     Problem: Anxiety/Stress:  Goal: Level of anxiety will decrease  Description: Level of anxiety will decrease  Outcome: Ongoing     Problem: Aspiration:  Goal: Absence of aspiration  Description: Absence of aspiration  Outcome: Ongoing     Problem:  Bowel Function - Altered:  Goal: Bowel elimination is within specified parameters  Description: Bowel elimination is within specified parameters  Outcome: Ongoing     Problem: Cardiac Output - Decreased:  Goal: Hemodynamic stability will improve  Description: Hemodynamic stability will improve  Outcome: Ongoing  Goal: Cardiac output within specified parameters  Description: Cardiac output within specified parameters  Outcome: Ongoing     Problem: Fluid Volume - Imbalance:  Goal: Absence of imbalanced fluid volume signs and symptoms  Description: Absence of imbalanced fluid volume signs and symptoms  Outcome: Ongoing  Goal: Ability to achieve a balanced intake and output will improve  Description: Ability to achieve a balanced intake and output will improve  Outcome: Ongoing  Goal: Chest tube drainage is within specified parameters  Description: Chest tube drainage is within specified parameters  Outcome: Ongoing     Problem: Gas Exchange - Impaired:  Goal: Levels of oxygenation will improve  Description: Levels of oxygenation will improve  Outcome: Ongoing  Goal: Ability to maintain adequate ventilation will improve  Description: Ability to maintain adequate ventilation will improve  Outcome: Ongoing     Problem: Mental Status - Impaired:  Goal: Mental status will be restored to baseline  Description: Mental status will be restored to baseline  Outcome: Ongoing     Problem: Nutrition Deficit:  Goal: Ability to achieve adequate nutritional intake will improve  Description: Ability to achieve adequate nutritional intake will improve  Outcome: Ongoing     Problem: Pain:  Goal: Pain level will decrease  Description: Pain level will decrease  Outcome: Ongoing  Goal: Recognizes and communicates pain  Description: Recognizes and communicates pain  Outcome: Ongoing  Goal: Control of acute pain  Description: Control of acute pain  Outcome: Ongoing  Goal: Control of chronic pain  Description: Control of chronic pain  Outcome: Ongoing     Problem: Serum Glucose Level - Abnormal:  Goal: Ability to maintain appropriate glucose levels will improve to within specified parameters  Description: Ability to maintain appropriate glucose levels will improve to within specified parameters  Outcome: Ongoing     Problem: Skin Integrity - Impaired:  Goal: Will show no infection signs and symptoms  Description: Will show no infection signs and symptoms  Outcome: Ongoing  Goal: Absence of new skin breakdown  Description: Absence of new skin breakdown  Outcome: Ongoing     Problem: Sleep Pattern Disturbance:  Goal: Appears well-rested  Description: Appears well-rested  Outcome: Ongoing     Problem: Tissue Perfusion, Altered:  Goal: Circulatory function within specified parameters  Description: Circulatory function within specified parameters  Outcome: Ongoing     Problem: Tissue Perfusion - Cardiopulmonary, Altered:  Goal: Hemodynamic stability will improve  Description: Hemodynamic stability will improve  Outcome: Ongoing  Goal: Absence of angina  Description: Absence of angina  Outcome: Ongoing  Goal: Hemodynamic stability will improve  Description: Hemodynamic stability will improve  Outcome: Ongoing  Goal: Will show no evidence of cardiac arrhythmias  Description: Will show no evidence of cardiac arrhythmias  Outcome: Ongoing     Problem: Infection - Surgical Site:  Goal: Will show no infection signs and symptoms  Description: Will show no infection signs and symptoms  Outcome: Ongoing     Problem:  Activity Intolerance:  Goal: Able to perform prescribed physical activity  Description: Able to perform prescribed physical activity  Outcome: Ongoing  Goal: Ability to tolerate increased activity will improve  Description: Ability to tolerate increased activity will improve  Outcome: Ongoing     Problem: Anxiety:  Goal: Level of anxiety will decrease  Description: Level of anxiety will decrease  Outcome: Ongoing     Problem: Tobacco Use:  Goal: Will participate in inpatient tobacco-use cessation counseling  Description: Will participate in inpatient tobacco-use cessation counseling  Outcome: Ongoing     Problem: Cardiac:  Goal: Hemodynamic stability will improve  Description: Hemodynamic stability will improve  Outcome: Ongoing     Problem: Respiratory:  Goal: Levels of oxygenation will improve  Description: Levels of oxygenation will improve  Outcome: Ongoing  Goal: Ability to maintain adequate ventilation will improve  Description: Ability to maintain adequate ventilation will improve  Outcome: Ongoing     Problem: Sensory:  Goal: Pain level will decrease  Description: Pain level will decrease  Outcome: Ongoing     Problem: Pain:  Goal: Pain level will decrease  Description: Pain level will decrease  Outcome: Ongoing

## 2021-12-17 DIAGNOSIS — I10 PRIMARY HYPERTENSION: ICD-10-CM

## 2021-12-17 LAB
ANION GAP SERPL CALCULATED.3IONS-SCNC: 12 MMOL/L (ref 7–19)
BUN BLDV-MCNC: 35 MG/DL (ref 6–20)
CALCIUM SERPL-MCNC: 8.3 MG/DL (ref 8.6–10)
CHLORIDE BLD-SCNC: 96 MMOL/L (ref 98–111)
CO2: 23 MMOL/L (ref 22–29)
CREAT SERPL-MCNC: 1.4 MG/DL (ref 0.5–1.2)
GFR AFRICAN AMERICAN: >59
GFR NON-AFRICAN AMERICAN: 52
GLUCOSE BLD-MCNC: 174 MG/DL (ref 70–99)
GLUCOSE BLD-MCNC: 207 MG/DL (ref 74–109)
GLUCOSE BLD-MCNC: 214 MG/DL (ref 70–99)
GLUCOSE BLD-MCNC: 246 MG/DL (ref 70–99)
GLUCOSE BLD-MCNC: 274 MG/DL (ref 70–99)
INR BLD: 1.15 (ref 0.88–1.18)
PERFORMED ON: ABNORMAL
POTASSIUM SERPL-SCNC: 3.9 MMOL/L (ref 3.5–5)
PROTHROMBIN TIME: 14.9 SEC (ref 12–14.6)
SODIUM BLD-SCNC: 131 MMOL/L (ref 136–145)

## 2021-12-17 PROCEDURE — 6370000000 HC RX 637 (ALT 250 FOR IP): Performed by: NURSE PRACTITIONER

## 2021-12-17 PROCEDURE — 82947 ASSAY GLUCOSE BLOOD QUANT: CPT

## 2021-12-17 PROCEDURE — 97530 THERAPEUTIC ACTIVITIES: CPT

## 2021-12-17 PROCEDURE — 99024 POSTOP FOLLOW-UP VISIT: CPT | Performed by: THORACIC SURGERY (CARDIOTHORACIC VASCULAR SURGERY)

## 2021-12-17 PROCEDURE — 2580000003 HC RX 258: Performed by: NURSE PRACTITIONER

## 2021-12-17 PROCEDURE — 36415 COLL VENOUS BLD VENIPUNCTURE: CPT

## 2021-12-17 PROCEDURE — 97116 GAIT TRAINING THERAPY: CPT

## 2021-12-17 PROCEDURE — 85610 PROTHROMBIN TIME: CPT

## 2021-12-17 PROCEDURE — 94640 AIRWAY INHALATION TREATMENT: CPT

## 2021-12-17 PROCEDURE — 80048 BASIC METABOLIC PNL TOTAL CA: CPT

## 2021-12-17 PROCEDURE — 2140000000 HC CCU INTERMEDIATE R&B

## 2021-12-17 RX ORDER — LOSARTAN POTASSIUM 100 MG/1
100 TABLET ORAL DAILY
Status: DISCONTINUED | OUTPATIENT
Start: 2021-12-17 | End: 2021-12-18 | Stop reason: HOSPADM

## 2021-12-17 RX ORDER — WARFARIN SODIUM 5 MG/1
10 TABLET ORAL
Status: DISCONTINUED | OUTPATIENT
Start: 2021-12-17 | End: 2021-12-18 | Stop reason: HOSPADM

## 2021-12-17 RX ORDER — TAMSULOSIN HYDROCHLORIDE 0.4 MG/1
0.8 CAPSULE ORAL DAILY
Status: DISCONTINUED | OUTPATIENT
Start: 2021-12-17 | End: 2021-12-18 | Stop reason: HOSPADM

## 2021-12-17 RX ORDER — BISACODYL 10 MG
10 SUPPOSITORY, RECTAL RECTAL ONCE
Status: DISCONTINUED | OUTPATIENT
Start: 2021-12-17 | End: 2021-12-18 | Stop reason: HOSPADM

## 2021-12-17 RX ORDER — WARFARIN SODIUM 5 MG/1
15 TABLET ORAL
Status: DISCONTINUED | OUTPATIENT
Start: 2021-12-19 | End: 2021-12-18 | Stop reason: HOSPADM

## 2021-12-17 RX ORDER — BUMETANIDE 1 MG/1
2 TABLET ORAL DAILY
Status: DISCONTINUED | OUTPATIENT
Start: 2021-12-17 | End: 2021-12-18 | Stop reason: HOSPADM

## 2021-12-17 RX ORDER — DOCUSATE SODIUM 100 MG/1
100 CAPSULE, LIQUID FILLED ORAL 2 TIMES DAILY
Status: DISCONTINUED | OUTPATIENT
Start: 2021-12-17 | End: 2021-12-18 | Stop reason: HOSPADM

## 2021-12-17 RX ADMIN — INSULIN GLARGINE 15 UNITS: 100 INJECTION, SOLUTION SUBCUTANEOUS at 20:52

## 2021-12-17 RX ADMIN — INSULIN LISPRO 4 UNITS: 100 INJECTION, SOLUTION INTRAVENOUS; SUBCUTANEOUS at 11:36

## 2021-12-17 RX ADMIN — SODIUM CHLORIDE, PRESERVATIVE FREE 10 ML: 5 INJECTION INTRAVENOUS at 23:37

## 2021-12-17 RX ADMIN — INSULIN LISPRO 3 UNITS: 100 INJECTION, SOLUTION INTRAVENOUS; SUBCUTANEOUS at 20:52

## 2021-12-17 RX ADMIN — DOCUSATE SODIUM 100 MG: 100 CAPSULE ORAL at 08:23

## 2021-12-17 RX ADMIN — ATORVASTATIN CALCIUM 20 MG: 20 TABLET, FILM COATED ORAL at 20:52

## 2021-12-17 RX ADMIN — INSULIN LISPRO 2 UNITS: 100 INJECTION, SOLUTION INTRAVENOUS; SUBCUTANEOUS at 17:06

## 2021-12-17 RX ADMIN — INSULIN LISPRO 4 UNITS: 100 INJECTION, SOLUTION INTRAVENOUS; SUBCUTANEOUS at 08:33

## 2021-12-17 RX ADMIN — ASPIRIN 81 MG: 81 TABLET, COATED ORAL at 08:23

## 2021-12-17 RX ADMIN — SODIUM CHLORIDE, PRESERVATIVE FREE 10 ML: 5 INJECTION INTRAVENOUS at 08:23

## 2021-12-17 RX ADMIN — GUAIFENESIN 600 MG: 600 TABLET, EXTENDED RELEASE ORAL at 08:23

## 2021-12-17 RX ADMIN — METOPROLOL TARTRATE 25 MG: 25 TABLET, FILM COATED ORAL at 20:51

## 2021-12-17 RX ADMIN — GUAIFENESIN 600 MG: 600 TABLET, EXTENDED RELEASE ORAL at 20:52

## 2021-12-17 RX ADMIN — AMIODARONE HYDROCHLORIDE 200 MG: 200 TABLET ORAL at 08:23

## 2021-12-17 RX ADMIN — IPRATROPIUM BROMIDE AND ALBUTEROL SULFATE 1 AMPULE: .5; 2.5 SOLUTION RESPIRATORY (INHALATION) at 11:11

## 2021-12-17 RX ADMIN — OXYCODONE 5 MG: 5 TABLET ORAL at 08:33

## 2021-12-17 RX ADMIN — MAGNESIUM HYDROXIDE 30 ML: 400 SUSPENSION ORAL at 05:38

## 2021-12-17 RX ADMIN — OXYCODONE 5 MG: 5 TABLET ORAL at 14:39

## 2021-12-17 RX ADMIN — TAMSULOSIN HYDROCHLORIDE 0.8 MG: 0.4 CAPSULE ORAL at 14:38

## 2021-12-17 RX ADMIN — LOSARTAN POTASSIUM 100 MG: 100 TABLET, FILM COATED ORAL at 14:39

## 2021-12-17 RX ADMIN — AMIODARONE HYDROCHLORIDE 200 MG: 200 TABLET ORAL at 20:51

## 2021-12-17 RX ADMIN — IPRATROPIUM BROMIDE AND ALBUTEROL SULFATE 1 AMPULE: .5; 2.5 SOLUTION RESPIRATORY (INHALATION) at 14:52

## 2021-12-17 RX ADMIN — IPRATROPIUM BROMIDE AND ALBUTEROL SULFATE 1 AMPULE: .5; 2.5 SOLUTION RESPIRATORY (INHALATION) at 06:35

## 2021-12-17 RX ADMIN — DOCUSATE SODIUM 100 MG: 100 CAPSULE ORAL at 20:51

## 2021-12-17 RX ADMIN — BUMETANIDE 2 MG: 1 TABLET ORAL at 08:23

## 2021-12-17 RX ADMIN — METOPROLOL TARTRATE 25 MG: 25 TABLET, FILM COATED ORAL at 08:23

## 2021-12-17 RX ADMIN — WARFARIN SODIUM 10 MG: 5 TABLET ORAL at 17:06

## 2021-12-17 RX ADMIN — IPRATROPIUM BROMIDE AND ALBUTEROL SULFATE 1 AMPULE: .5; 2.5 SOLUTION RESPIRATORY (INHALATION) at 18:43

## 2021-12-17 ASSESSMENT — PAIN SCALES - GENERAL
PAINLEVEL_OUTOF10: 0
PAINLEVEL_OUTOF10: 3
PAINLEVEL_OUTOF10: 6
PAINLEVEL_OUTOF10: 0
PAINLEVEL_OUTOF10: 4

## 2021-12-17 NOTE — TELEPHONE ENCOUNTER
David Jordan called to request a refill on his medication.       Last office visit : 11/8/2021   Next office visit : 12/20/2021     Requested Prescriptions     Pending Prescriptions Disp Refills    cloNIDine (CATAPRES) 0.1 MG tablet 60 tablet 0     Sig: Take 1 tablet by mouth 2 times daily            Reema Rico

## 2021-12-17 NOTE — PROGRESS NOTES
[facility] transferred to 726 from [facility] via wheelchair. Reason for transfer: no longer needing ICU level of care   Explained reason for transfer to Patient. Belongings: cell phone with patient at bedside . Soft chart transferred with patient: Yes. Telemetry box number 903 transferred with patient: yes. Report given to: [facility], via telephone.       Electronically signed by Belle Garcia RN on 12/16/2021 at 9:23 PM

## 2021-12-17 NOTE — PROGRESS NOTES
Physical Therapy  Matthew Kline  [id number]     12/17/21 1053   Subjective   Subjective Attempt, patient states he has walked in the room several times and just returned to bed after taking shower. Will cont to follow.    Electronically signed by Varsha Lucio PTA on 12/17/2021 at 10:55 AM

## 2021-12-17 NOTE — PLAN OF CARE
Problem: Discharge Planning:  Goal: Participates in care planning  Description: Participates in care planning  12/17/2021 0146 by Rosy Carbone RN  Outcome: Ongoing  12/16/2021 1429 by Neo Vásquez RN  Outcome: Ongoing  Goal: Discharged to appropriate level of care  Description: Discharged to appropriate level of care  12/17/2021 0146 by Rosy Carbone RN  Outcome: Ongoing  12/16/2021 1429 by Neo Vásquez RN  Outcome: Ongoing     Problem: Airway Clearance - Ineffective:  Goal: Ability to maintain a clear airway will improve  Description: Ability to maintain a clear airway will improve  12/17/2021 0146 by Rosy Carbone RN  Outcome: Ongoing  12/16/2021 1429 by Neo Vásquez RN  Outcome: Ongoing     Problem: Anxiety/Stress:  Goal: Level of anxiety will decrease  Description: Level of anxiety will decrease  12/17/2021 0146 by Rosy Carbone RN  Outcome: Ongoing  12/16/2021 1429 by Neo Vásquez RN  Outcome: Ongoing     Problem: Aspiration:  Goal: Absence of aspiration  Description: Absence of aspiration  12/17/2021 0146 by Rosy Carbone RN  Outcome: Ongoing  12/16/2021 1429 by Neo Vásquez RN  Outcome: Ongoing     Problem:  Bowel Function - Altered:  Goal: Bowel elimination is within specified parameters  Description: Bowel elimination is within specified parameters  12/17/2021 0146 by Rosy Carbone RN  Outcome: Ongoing  12/16/2021 1429 by Neo Vásquez RN  Outcome: Ongoing     Problem: Cardiac Output - Decreased:  Goal: Hemodynamic stability will improve  Description: Hemodynamic stability will improve  12/17/2021 0146 by Rosy Carbone RN  Outcome: Ongoing  12/16/2021 1429 by Neo Vásquez RN  Outcome: Ongoing  Goal: Cardiac output within specified parameters  Description: Cardiac output within specified parameters  12/17/2021 0146 by Rosy Carbone RN  Outcome: Ongoing  12/16/2021 1429 by Neo Vásquez RN  Outcome: Ongoing     Problem: Fluid Volume - Imbalance:  Goal: Absence of imbalanced fluid volume signs and symptoms  Description: Absence of imbalanced fluid volume signs and symptoms  12/17/2021 0146 by Zoraida Wild RN  Outcome: Ongoing  12/16/2021 1429 by Tameka Bonilla RN  Outcome: Ongoing  Goal: Ability to achieve a balanced intake and output will improve  Description: Ability to achieve a balanced intake and output will improve  12/17/2021 0146 by Zoraida Wild RN  Outcome: Ongoing  12/16/2021 1429 by Tameka Bonilla RN  Outcome: Ongoing  Goal: Chest tube drainage is within specified parameters  Description: Chest tube drainage is within specified parameters  12/17/2021 0146 by Zoraida Wild RN  Outcome: Ongoing  12/16/2021 1429 by Tameka Bonilla RN  Outcome: Ongoing     Problem: Gas Exchange - Impaired:  Goal: Levels of oxygenation will improve  Description: Levels of oxygenation will improve  12/17/2021 0146 by Zoraida Wild RN  Outcome: Ongoing  12/16/2021 1429 by Tameka Bonilla RN  Outcome: Ongoing  Goal: Ability to maintain adequate ventilation will improve  Description: Ability to maintain adequate ventilation will improve  12/17/2021 0146 by Zoraida Wild RN  Outcome: Ongoing  12/16/2021 1429 by Tameka Bonilla RN  Outcome: Ongoing     Problem: Mental Status - Impaired:  Goal: Mental status will be restored to baseline  Description: Mental status will be restored to baseline  12/17/2021 0146 by Zoraida Wild RN  Outcome: Ongoing  12/16/2021 1429 by Tameka Bonilla RN  Outcome: Ongoing     Problem: Nutrition Deficit:  Goal: Ability to achieve adequate nutritional intake will improve  Description: Ability to achieve adequate nutritional intake will improve  12/17/2021 0146 by Zoraida Wild RN  Outcome: Ongoing  12/16/2021 1429 by Tameka Bonilla RN  Outcome: Ongoing     Problem: Pain:  Goal: Pain level will decrease  Description: Pain level will decrease  12/17/2021 0146 by Zoraida Wild RN  Outcome: Ongoing  12/16/2021 1429 by Tameka Bonilla RN  Outcome: Ongoing  Goal: Recognizes and communicates pain  Description: Recognizes and communicates pain  12/17/2021 0146 by Adama Nieves RN  Outcome: Ongoing  12/16/2021 1429 by Conrado Dominguez RN  Outcome: Ongoing  Goal: Control of acute pain  Description: Control of acute pain  12/17/2021 0146 by Adama Nieves RN  Outcome: Ongoing  12/16/2021 1429 by Conrado Dominguez RN  Outcome: Ongoing  Goal: Control of chronic pain  Description: Control of chronic pain  12/17/2021 0146 by Adama Nieves RN  Outcome: Ongoing  12/16/2021 1429 by Conrado Dominguez RN  Outcome: Ongoing     Problem: Serum Glucose Level - Abnormal:  Goal: Ability to maintain appropriate glucose levels will improve to within specified parameters  Description: Ability to maintain appropriate glucose levels will improve to within specified parameters  12/17/2021 0146 by Adama Nieves RN  Outcome: Ongoing  12/16/2021 1429 by Conrado Dominguez RN  Outcome: Ongoing     Problem: Skin Integrity - Impaired:  Goal: Will show no infection signs and symptoms  Description: Will show no infection signs and symptoms  12/17/2021 0146 by Adama Nieves RN  Outcome: Ongoing  12/16/2021 1429 by Conrado Dominguez RN  Outcome: Ongoing  Goal: Absence of new skin breakdown  Description: Absence of new skin breakdown  12/17/2021 0146 by Adama Nieves RN  Outcome: Ongoing  12/16/2021 1429 by Conrado Dominguez RN  Outcome: Ongoing

## 2021-12-17 NOTE — PROGRESS NOTES
SUBJECTIVE   This is a 61-year-old gentleman status post left long finger partial amputation procedure was 10/31/2021. Patient underwent triple bypass surgery. He has noted some increased discoloration around the distal tip of the residual long finger. There has been some concern of a reoccurrence of infection. Patient denies any drainage or increasing pain. OBJECTIVE    Physical    VITALS:  /86   Pulse 95   Temp 99.1 °F (37.3 °C) (Oral)   Resp 18   Ht 5' 10\" (1.778 m)   Wt 234 lb 6.4 oz (106.3 kg)   SpO2 96%   BMI 33.63 kg/m²   DRAIN/TUBE OUTPUT:     MUSCULOSKELETAL: Examination of the residual left long finger today the incision sites well closed and healing appropriately. There is no drainage. No fluctuance palpable. No pain with palpation. Area discoloration blanches and then refills appropriately.        Data    CBC with Differential:    Lab Results   Component Value Date    WBC 16.4 12/16/2021    RBC 3.51 12/16/2021    HGB 11.3 12/16/2021    HCT 33.8 12/16/2021     12/16/2021    MCV 96.3 12/16/2021    MCH 32.2 12/16/2021    MCHC 33.4 12/16/2021    RDW 13.1 12/16/2021    LYMPHOPCT 27.4 12/13/2021    MONOPCT 7.9 12/13/2021    BASOPCT 1.1 12/13/2021    MONOSABS 0.60 12/13/2021    LYMPHSABS 2.1 12/13/2021    EOSABS 0.20 12/13/2021    BASOSABS 0.10 12/13/2021     CMP:    Lab Results   Component Value Date     12/17/2021    K 3.9 12/17/2021    K 4.2 11/23/2021    CL 96 12/17/2021    CO2 23 12/17/2021    BUN 35 12/17/2021    CREATININE 1.4 12/17/2021    GFRAA >59 12/17/2021    LABGLOM 52 12/17/2021    GLUCOSE 207 12/17/2021    PROT 7.4 12/13/2021    CALCIUM 8.3 12/17/2021    BILITOT 0.7 12/13/2021    ALKPHOS 100 12/13/2021    AST 29 12/13/2021    ALT 39 12/13/2021     BMP:    Lab Results   Component Value Date     12/17/2021    K 3.9 12/17/2021    K 4.2 11/23/2021    CL 96 12/17/2021    CO2 23 12/17/2021    BUN 35 12/17/2021    CREATININE 1.4 12/17/2021    CALCIUM 8.3 12/17/2021    GFRAA >59 12/17/2021    LABGLOM 52 12/17/2021    GLUCOSE 207 12/17/2021     I   Current Inpatient Medications    Current Facility-Administered Medications: warfarin (COUMADIN) tablet 10 mg, 10 mg, Oral, Once per day on Mon Tue Wed Fri Sat  [START ON 12/19/2021] warfarin (COUMADIN) tablet 15 mg, 15 mg, Oral, Once per day on Sun Thu  warfarin (COUMADIN) daily dosing (placeholder), , Other, RX Placeholder  bumetanide (BUMEX) tablet 2 mg, 2 mg, Oral, Daily  bisacodyl (DULCOLAX) suppository 10 mg, 10 mg, Rectal, Once  docusate sodium (COLACE) capsule 100 mg, 100 mg, Oral, BID  losartan (COZAAR) tablet 100 mg, 100 mg, Oral, Daily  tamsulosin (FLOMAX) capsule 0.8 mg, 0.8 mg, Oral, Daily  amiodarone (CORDARONE) tablet 200 mg, 200 mg, Oral, BID  magnesium hydroxide (MILK OF MAGNESIA) 400 MG/5ML suspension 30 mL, 30 mL, Oral, Daily PRN  guaiFENesin (MUCINEX) extended release tablet 600 mg, 600 mg, Oral, BID  metoprolol tartrate (LOPRESSOR) tablet 25 mg, 25 mg, Oral, BID  sodium chloride flush 0.9 % injection 5-40 mL, 5-40 mL, IntraVENous, 2 times per day  sodium chloride flush 0.9 % injection 5-40 mL, 5-40 mL, IntraVENous, PRN  0.9 % sodium chloride infusion, 25 mL, IntraVENous, PRN  ondansetron (ZOFRAN) injection 4 mg, 4 mg, IntraVENous, Q8H PRN  acetaminophen (TYLENOL) tablet 650 mg, 650 mg, Oral, Q4H PRN  oxyCODONE (ROXICODONE) immediate release tablet 5 mg, 5 mg, Oral, Q4H PRN **OR** [DISCONTINUED] oxyCODONE (ROXICODONE) immediate release tablet 10 mg, 10 mg, Oral, Q4H PRN  zolpidem (AMBIEN) tablet 5 mg, 5 mg, Oral, Nightly PRN  atorvastatin (LIPITOR) tablet 20 mg, 20 mg, Oral, Nightly  ipratropium-albuterol (DUONEB) nebulizer solution 1 ampule, 1 ampule, Inhalation, Q4H WA  insulin glargine (LANTUS) injection vial 15 Units, 0.15 Units/kg, SubCUTAneous, Nightly  glucose (GLUTOSE) 40 % oral gel 15 g, 15 g, Oral, PRN  dextrose 50 % IV solution, 12.5 g, IntraVENous, PRN  glucagon (rDNA) injection 1 mg, 1 mg, IntraMUSCular, PRN  dextrose 5 % solution, 100 mL/hr, IntraVENous, PRN  aspirin EC tablet 81 mg, 81 mg, Oral, Daily  insulin lispro (HUMALOG) injection vial 0-12 Units, 0-12 Units, SubCUTAneous, TID WC  insulin lispro (HUMALOG) injection vial 0-6 Units, 0-6 Units, SubCUTAneous, Nightly    ASSESSMENT AND PLAN      No particular concern for infection at this time. Discoloration of the residual portion of the left long finger does not suggest infection or necrosis. Minimal concern for reoccurrence of infection at this time. Suggest Mr. Goldstein returns to Dr. Loida Jennings office once he is discharged from the hospital.     Electronically signed by MANOLO Encinas on 12/17/21 at 2:42 PM CST

## 2021-12-17 NOTE — CARE COORDINATION
Mental Health  []   Substance Abuse  []   Transportation Barriers    Additional Unmet Social Needs Notes:   None identified at this time. Financial:    Payor: Cmrizwanzeynep Juarez / Plan: MEDICARE PART A AND B / Product Type: *No Product type* /     Pre-Cert required for SNF:     []   Yes  [x]   No    Have Long Term Care Insurance:      [x]   Yes  [place] Medicaid  []   No      Pharmacy:    [facility] [place], [address] [phone]  [address] [address]  Phone: [phone] Fax: [phone]    Potential assistance purchasing medications? Reports affordable copays     []   Yes  [x]   No      ADLS:       Support System:   Parent, Family Members, Children      Current Home Environment:       Steps:    [x]   Yes  []   No    If yes, how many? 4 steps to enter/exit home is one-level    Plans to RETURN to current housing:     [x]   Yes  []   No    Barriers to RETURNING to current housing:  None identified at this time    Currently ACTIVE with [address]:      [x]   Yes  []   No    [facility]:  Pt reports had [place] services established prior dc but unable to recall the [place] agency. Pt's chart documents previous dc established services through [address] [place] [address]. [phone]  F. [phone]    DME Provider:   Reports having cane, RW and bathroom safety      Had HOME OXYGEN prior to admission:      []   Yes  [x]   No    Oxygen Company:   On RA currently  Has a pulse oximetry unit at home:     []   Yes  [x]   No        Active with HD/PD prior to admission:             []   Yes  [x]   No    Nephrologist:     HD Center:         Transition Plan:  s/p cabg #3 12/17- FOLLOW for dc needs; pta home w/family  Intends to return home with mother    Transportation PLAN for Discharge: Mother to transport    Factors facilitating achievement of predicted outcomes: n/a    Barriers to discharge:  SHERRY for [place] services upon dc.  SW to follow for further dc needs identified. Patient Deficits:    []   Yes   [x]   No    If yes:    []   Confusion/Memory  []   Visual  []   Motor/Sensory         []   Right arm         []   Right leg         []   Left arm         []   Left leg  []   Language/Speech         []   Aphasia         []   Dysarthria         []   Swallow         [place] Coma Scale  Eye Opening: Spontaneous  Best Verbal Response: Oriented  Best Motor Response: Obeys commands  [place] Coma Scale Score: 15    Patient Deficit Notes: Additional CM/SW Notes: Pt lying in bed with heart pillow on his chest resting. Pt reports intentions to return to his mother's home and states she can manage his care needs. SW discussed [place] services and pt was unable to recall agency. SW verified established services with  [facility]  P. [phone]  F. [phone] previous dc and will require a SHERRY for dc. Pt reports having all necessary DME. Pt states was notified by his pharmacy one of his meds could not be refilled through his PCP Dr Andi Carvajal but could not recall med name. TYLER contacted [facility] listed as pt pharmacy and they confirmed stating need refill for clonidine 0.1mg and confirmed the fax number for PCP office. TYLER also contacted RN for Dr Andi Carvajal and notified pt's pharmacy to be faxing refill request.     Jairo Lew and/or his family were provided with choice of provider:    []   Yes   [x]   No  Not at this time. Following for dc disposition & pt intends to return home with [place] services. []   Stroke education booklet reviewed and given to patient/family/caregiver/guardian. All questions answered all questions answered appropriately and efficiently per family.       [address]  Care Management  Phone:   [phone]          Fax:   [phone]

## 2021-12-17 NOTE — PROGRESS NOTES
Woody Tapia arrived to room # 726. Presented with: post cabg   Mental Status: Patient is oriented, alert, coherent, logical, thought processes intact and able to concentrate and follow conversation. Vitals:    12/17/21 0133   BP: 135/75   Pulse: 86   Resp: 18   Temp: 97.3 °F (36.3 °C)   SpO2: 94%     Patient safety contract and falls prevention contract reviewed with patient Yes. Oriented Patient to room. Call light within reach. Yes.   Needs, issues or concerns expressed at this time: no.      Electronically signed by Abhilash Linn RN on 12/17/2021 at 4:04 AM

## 2021-12-17 NOTE — PROGRESS NOTES
POST OP CARDIOTHORACIC SURGERY PROGRESS NOTE    Post op day 3    SUBJECTIVE:  Sitting up in chair. Rested much better last night. Still no BM. Complaining of left middle finger pain. At the site of partial amputation. BP (!) 141/94   Pulse 84   Temp 97.6 °F (36.4 °C) (Temporal)   Resp 18   Ht 5' 10\" (1.778 m)   Wt 234 lb 6.4 oz (106.3 kg)   SpO2 97%   BMI 33.63 kg/m²   Average, Min, and Max for last 24 hours Vitals:  TEMPERATURE:  Temp  Av.5 °F (36.4 °C)  Min: 96.5 °F (35.8 °C)  Max: 98 °F (36.7 °C)  RESPIRATIONS RANGE: Resp  Av.3  Min: 15  Max: 30  PULSE RANGE: Pulse  Av.8  Min: 82  Max: 120  BLOOD PRESSURE RANGE:  Systolic (43ZMZ), CFI:204 , Min:82 , M   ; Diastolic (43LNW), UNL:09, Min:59, Max:94    PULSE OXIMETRY RANGE: SpO2  Av.2 %  Min: 90 %  Max: 97 %    I/O last 3 completed shifts: In: 1788.9 [P.O.:1200; I.V.:490.1; IV Piggyback:98.8]  Out:  [Urine:]    CHEST: Clear bilateral breath sounds without wheezes or rhonchi.      CARDIOVASCULAR: Normal HT with RRR. No murmurs, gallops or rubs. He had another short run of atrial fibrillation last night this mostly is staying in sinus rhythm at this time. EXT: Left middle finger with edema, with bruising at tip of amputation. Cool to touch. INCISION: Sternal incision - open to air. Edges approximated. No drainage or erythema. Right EVH incision - open to air. Edges approximated. No drainage or erythema. LABS:  BMP:    Lab Results   Component Value Date     2021    K 3.9 2021    CL 96 2021    CO2 23 2021    BUN 35 2021    CREATININE 1.4 2021    CALCIUM 8.3 2021    GFRAA >59 2021    LABGLOM 52 2021    GLUCOSE 207 2021     PT/INR:    Lab Results   Component Value Date    PROTIME 14.9 2021    INR 1.15 2021         ASSESSMENT:     1.          Severe Multivessel Coronary Artery Disease - S/P Combined 3V PACABG, Romero MAZE Radiofrequency ad Cryoablation, Exclusion of Left Atrial Appendage on 12/14/2021  2. Persistent Atrial Fibrillation - S/P Combined 3V PACABG, Romero MAZE Radiofrequency ad Cryoablation, Exclusion of Left Atrial Appendage on 12/14/2021  3. DM, Type 2  4. Essential HTN  5. Mixed Hyperlipidemia  6. Hx MRSA Infection    PLAN:     1. Bowel Regimen  2. D/C Pacing Wires  3. Change Bumex to Oral  4. Consult Dr. Kimberly Cortes, Orthopedics  5. Labs and CXR in AM  6. Anticipate D/C Tomorrow      Electronically signed by NOMI Crowell on 12/17/21 at 6:40 AM CST  I have seen and examined the patient myself and the above was discussed with the nurse practitioner. Overall, the patient is progressing very nicely. Considering that he was longstanding persistent atrial fibrillation he remains mostly in sinus rhythm at this time with only occasional breakthrough atrial fibrillation. He is otherwise hemodynamically stable. He is having some changes at the amputation site of his left middle finger and I will ask his surgeon to examine this to give his opinion.   The plan is for him to undergo chest x-ray and routine lab studies tomorrow if all looks good he could probably to be discharged home tomorrow  Kasey Carrasco MD12/17/2021 [id number]

## 2021-12-17 NOTE — PROGRESS NOTES
Physician Progress Note      Nando Tijerina  [facility] #:                  [id number]  :                       1965  ADMIT DATE:       2021 6:02 AM  DISCH DATE:  RESPONDING  PROVIDER #:        Renee MOSHER          QUERY TEXT:    Patient admitted with CAD and CABG, noted to have elevated creatinine from 0.9   to 1.6. If possible, please document in progress notes and discharge summary   if you are evaluating and/or treating any of the following: The medical record reflects the following:  Risk Factors: CAD, HTN, DMII  Clinical Indicators: creatinine 0.9 to 1.6 GFR 45- 52  Treatment: labs, monitor   ml bolus, IVF 75 ml/hr    Thank you    Vignesh Metz RN, BSN, [facility]  [phone]  Options provided:  -- LIAN:  This patient has LIAN. -- CKD Stage 3a GFR 45-59  -- CKD Stage 3b GFR 30-44  -- CKD Stage 4 GFR 15-29  -- CKD Stage 5 GFR<15  -- Other - I will add my own diagnosis  -- Disagree - Not applicable / Not valid  -- Disagree - Clinically unable to determine / Unknown  -- Refer to Clinical Documentation Reviewer    PROVIDER RESPONSE TEXT:    LIAN: This patient has LIAN.     Query created by: Nadia Bush on 2021 11:31 AM      Electronically signed by:  Lauryn MOSHER 2021 12:30 PM

## 2021-12-17 NOTE — PROGRESS NOTES
Physical Therapy  Elijah Huynh  [id number]     12/17/21 1508   Subjective   Subjective Agrees to work with therapy. Bed Mobility   Supine to Sit Minimal assistance   Sit to Supine Minimal assistance   Transfers   Sit to Stand Minimal Assistance   Stand to sit Minimal Assistance   Ambulation   Ambulation? Yes   Ambulation 1   Surface level tile   Device Rolling Walker   Other Apparatus   (IV)   Assistance Contact guard assistance   Distance 150'   Other Activities   Comment Patient did well with therapy. Patient still requires Min assist for supine to sit/sit to supine transfers. Patient requested to return to bed after treatment vs sitting in recliner. Patient positioned for comfort with all needs in reach. Short term goals   Time Frame for Short term goals 2 wks   Short term goal 1 supine to sit indep   Short term goal 2 sit to stand indep   Short term goal 3 amb. 300' with RW SBA   Activity Tolerance   Activity Tolerance Patient Tolerated treatment well; Patient limited by endurance   Safety Devices   Type of devices Bed alarm in place; Call light within reach;  Left in bed   Electronically signed by Arthur Redman PTA on 12/17/2021 at 3:12 PM

## 2021-12-18 ENCOUNTER — APPOINTMENT (OUTPATIENT)
Dept: GENERAL RADIOLOGY | Age: 56
DRG: 234 | End: 2021-12-18
Attending: THORACIC SURGERY (CARDIOTHORACIC VASCULAR SURGERY)
Payer: MEDICARE

## 2021-12-18 VITALS
WEIGHT: 240.4 LBS | HEART RATE: 78 BPM | TEMPERATURE: 97.2 F | HEIGHT: 70 IN | DIASTOLIC BLOOD PRESSURE: 82 MMHG | BODY MASS INDEX: 34.41 KG/M2 | SYSTOLIC BLOOD PRESSURE: 131 MMHG | OXYGEN SATURATION: 95 % | RESPIRATION RATE: 18 BRPM

## 2021-12-18 LAB
ALBUMIN SERPL-MCNC: 3.5 G/DL (ref 3.5–5.2)
ALP BLD-CCNC: 181 U/L (ref 40–130)
ALT SERPL-CCNC: 24 U/L (ref 5–41)
ANION GAP SERPL CALCULATED.3IONS-SCNC: 13 MMOL/L (ref 7–19)
AST SERPL-CCNC: 30 U/L (ref 5–40)
BILIRUB SERPL-MCNC: 0.9 MG/DL (ref 0.2–1.2)
BUN BLDV-MCNC: 39 MG/DL (ref 6–20)
CALCIUM SERPL-MCNC: 8.4 MG/DL (ref 8.6–10)
CHLORIDE BLD-SCNC: 95 MMOL/L (ref 98–111)
CO2: 22 MMOL/L (ref 22–29)
CREAT SERPL-MCNC: 1.2 MG/DL (ref 0.5–1.2)
GFR AFRICAN AMERICAN: >59
GFR NON-AFRICAN AMERICAN: >60
GLUCOSE BLD-MCNC: 240 MG/DL (ref 74–109)
GLUCOSE BLD-MCNC: 243 MG/DL (ref 70–99)
HCT VFR BLD CALC: 32.5 % (ref 42–52)
HEMOGLOBIN: 10.9 G/DL (ref 14–18)
INR BLD: 1.15 (ref 0.88–1.18)
MAGNESIUM: 2 MG/DL (ref 1.6–2.6)
MCH RBC QN AUTO: 31.8 PG (ref 27–31)
MCHC RBC AUTO-ENTMCNC: 33.5 G/DL (ref 33–37)
MCV RBC AUTO: 94.8 FL (ref 80–94)
PDW BLD-RTO: 12.9 % (ref 11.5–14.5)
PERFORMED ON: ABNORMAL
PLATELET # BLD: 185 K/UL (ref 130–400)
PMV BLD AUTO: 12.2 FL (ref 9.4–12.4)
POTASSIUM SERPL-SCNC: 4.4 MMOL/L (ref 3.5–5)
PROTHROMBIN TIME: 14.9 SEC (ref 12–14.6)
RBC # BLD: 3.43 M/UL (ref 4.7–6.1)
SODIUM BLD-SCNC: 130 MMOL/L (ref 136–145)
TOTAL PROTEIN: 5.7 G/DL (ref 6.6–8.7)
WBC # BLD: 9.8 K/UL (ref 4.8–10.8)

## 2021-12-18 PROCEDURE — 6370000000 HC RX 637 (ALT 250 FOR IP): Performed by: NURSE PRACTITIONER

## 2021-12-18 PROCEDURE — 36415 COLL VENOUS BLD VENIPUNCTURE: CPT

## 2021-12-18 PROCEDURE — 94640 AIRWAY INHALATION TREATMENT: CPT

## 2021-12-18 PROCEDURE — 2580000003 HC RX 258: Performed by: NURSE PRACTITIONER

## 2021-12-18 PROCEDURE — 85027 COMPLETE CBC AUTOMATED: CPT

## 2021-12-18 PROCEDURE — 71046 X-RAY EXAM CHEST 2 VIEWS: CPT

## 2021-12-18 PROCEDURE — 99024 POSTOP FOLLOW-UP VISIT: CPT | Performed by: THORACIC SURGERY (CARDIOTHORACIC VASCULAR SURGERY)

## 2021-12-18 PROCEDURE — 97116 GAIT TRAINING THERAPY: CPT

## 2021-12-18 PROCEDURE — 83735 ASSAY OF MAGNESIUM: CPT

## 2021-12-18 PROCEDURE — 85610 PROTHROMBIN TIME: CPT

## 2021-12-18 PROCEDURE — 82947 ASSAY GLUCOSE BLOOD QUANT: CPT

## 2021-12-18 PROCEDURE — 80053 COMPREHEN METABOLIC PANEL: CPT

## 2021-12-18 PROCEDURE — 97110 THERAPEUTIC EXERCISES: CPT

## 2021-12-18 RX ORDER — OXYCODONE HYDROCHLORIDE 5 MG/1
5 TABLET ORAL EVERY 6 HOURS PRN
Qty: 40 TABLET | Refills: 0 | Status: SHIPPED | OUTPATIENT
Start: 2021-12-18 | End: 2022-01-01

## 2021-12-18 RX ORDER — AMIODARONE HYDROCHLORIDE 200 MG/1
200 TABLET ORAL 2 TIMES DAILY
Qty: 60 TABLET | Refills: 1 | Status: SHIPPED | OUTPATIENT
Start: 2021-12-18 | End: 2022-02-28

## 2021-12-18 RX ORDER — GUAIFENESIN 600 MG/1
600 TABLET, EXTENDED RELEASE ORAL 2 TIMES DAILY
Qty: 60 TABLET | Refills: 0 | Status: SHIPPED | OUTPATIENT
Start: 2021-12-18 | End: 2022-03-08 | Stop reason: ALTCHOICE

## 2021-12-18 RX ADMIN — IPRATROPIUM BROMIDE AND ALBUTEROL SULFATE 1 AMPULE: .5; 2.5 SOLUTION RESPIRATORY (INHALATION) at 09:56

## 2021-12-18 RX ADMIN — TAMSULOSIN HYDROCHLORIDE 0.8 MG: 0.4 CAPSULE ORAL at 09:03

## 2021-12-18 RX ADMIN — DOCUSATE SODIUM 100 MG: 100 CAPSULE ORAL at 09:03

## 2021-12-18 RX ADMIN — IPRATROPIUM BROMIDE AND ALBUTEROL SULFATE 1 AMPULE: .5; 2.5 SOLUTION RESPIRATORY (INHALATION) at 06:23

## 2021-12-18 RX ADMIN — AMIODARONE HYDROCHLORIDE 200 MG: 200 TABLET ORAL at 09:03

## 2021-12-18 RX ADMIN — INSULIN LISPRO 4 UNITS: 100 INJECTION, SOLUTION INTRAVENOUS; SUBCUTANEOUS at 08:59

## 2021-12-18 RX ADMIN — SODIUM CHLORIDE, PRESERVATIVE FREE 10 ML: 5 INJECTION INTRAVENOUS at 09:04

## 2021-12-18 RX ADMIN — METOPROLOL TARTRATE 25 MG: 25 TABLET, FILM COATED ORAL at 09:03

## 2021-12-18 RX ADMIN — BUMETANIDE 2 MG: 1 TABLET ORAL at 09:03

## 2021-12-18 RX ADMIN — OXYCODONE 5 MG: 5 TABLET ORAL at 06:25

## 2021-12-18 RX ADMIN — ASPIRIN 81 MG: 81 TABLET, COATED ORAL at 09:03

## 2021-12-18 RX ADMIN — LOSARTAN POTASSIUM 100 MG: 100 TABLET, FILM COATED ORAL at 09:03

## 2021-12-18 RX ADMIN — GUAIFENESIN 600 MG: 600 TABLET, EXTENDED RELEASE ORAL at 09:04

## 2021-12-18 ASSESSMENT — PAIN SCALES - GENERAL
PAINLEVEL_OUTOF10: 0
PAINLEVEL_OUTOF10: 6

## 2021-12-18 NOTE — PLAN OF CARE
Problem: Discharge Planning:  Goal: Participates in care planning  Description: Participates in care planning  Outcome: Ongoing  Goal: Discharged to appropriate level of care  Description: Discharged to appropriate level of care  Outcome: Ongoing     Problem: Airway Clearance - Ineffective:  Goal: Ability to maintain a clear airway will improve  Description: Ability to maintain a clear airway will improve  Outcome: Ongoing     Problem: Anxiety/Stress:  Goal: Level of anxiety will decrease  Description: Level of anxiety will decrease  Outcome: Ongoing     Problem: Aspiration:  Goal: Absence of aspiration  Description: Absence of aspiration  Outcome: Ongoing

## 2021-12-18 NOTE — PROGRESS NOTES
POST OP CARDIOTHORACIC SURGERY PROGRESS NOTE    Post op day 4    SUBJECTIVE:  Alert. Up in chair, Feels well. Had BM    /82   Pulse 78   Temp 97.2 °F (36.2 °C) (Temporal)   Resp 18   Ht 5' 10\" (1.778 m)   Wt 240 lb 6.4 oz (109 kg)   SpO2 95%   BMI 34.49 kg/m²   Average, Min, and Max for last 24 hours Vitals:  TEMPERATURE:  Temp  Av.7 °F (36.5 °C)  Min: 97 °F (36.1 °C)  Max: 99.1 °F (37.3 °C)  RESPIRATIONS RANGE: Resp  Av  Min: 18  Max: 18  PULSE RANGE: Pulse  Av.9  Min: 74  Max: 115  BLOOD PRESSURE RANGE:  Systolic (06LQY), JVD:860 , Min:104 , OZZ:943   ; Diastolic (68RRE), OYI:94, Min:68, Max:86    PULSE OXIMETRY RANGE: SpO2  Av.5 %  Min: 95 %  Max: 98 %    I/O last 3 completed shifts: In: 1380 [P.O.:1380]  Out: 9472 [Urine:1150; Stool:1]    CHEST: lungs clear    CARDIOVASCULAR: regular rhythm, no murmurs. Few short runs of A-Fib, but mostly sinus    INCISION: no drainage, skin edges intact    DRAINS:     LABS:  CBC:   Lab Results   Component Value Date    WBC 9.8 2021    RBC 3.43 2021    HGB 10.9 2021    HCT 32.5 2021    MCV 94.8 2021    MCH 31.8 2021    MCHC 33.5 2021    RDW 12.9 2021     2021    MPV 12.2 2021     CMP:    Lab Results   Component Value Date     2021    K 4.4 2021    K 4.2 2021    CL 95 2021    CO2 22 2021    BUN 39 2021    CREATININE 1.2 2021    GFRAA >59 2021    LABGLOM >60 2021    GLUCOSE 240 2021    PROT 5.7 2021    LABALBU 3.5 2021    CALCIUM 8.4 2021    BILITOT 0.9 2021    ALKPHOS 181 2021    AST 30 2021    ALT 24 2021       CHEST XRAY: normal postoperative chest xray    ASSESSMENT: normal postoperative course    PLAN: OK to send home today.  F/U in office 4 weeks    Electronically signed by Hanna Carballo MD on 21 at 9:12 AM CST

## 2021-12-18 NOTE — PROGRESS NOTES
12/18/21 1030   Restrictions/Precautions   Restrictions/Precautions Surgical Protocols; Fall Risk;Contact Precautions   Required Braces or Orthoses? No   Position Activity Restriction   Sternal Precautions No Pushing; No Pulling;5# Lifting Restrictions   Sternal Precautions Yes   Other position/activity restrictions MRSA   General   Chart Reviewed Yes   Subjective   Subjective Patient up in chair agrees to walk. \" I am a little wobbly but I am always wobbly \"   Pain Screening   Patient Currently in Pain No   Intervention List Patient able to continue with treatment   Oxygen Therapy   O2 Device None (Room air)   Transfers   Sit to Stand Stand by assistance   Stand to sit Stand by assistance   Ambulation   Ambulation? Yes   Ambulation 1   Device No Device   Assistance Stand by assistance   Quality of Gait Slightly unsteady probably baseline   Gait Deviations Slow Carmen;Decreased step length   Distance 225'   Comments Patient in recliner post gait.    Exercises   Comments Cardiac x 20 reps   Activity Tolerance   Activity Tolerance Patient Tolerated treatment well   Safety Devices   Type of devices Left in chair;Call light within reach  (Family present)   Physical Therapy    Electronically signed by Jaimee Richmond PTA on 12/18/2021 at 10:46 AM

## 2021-12-18 NOTE — PROGRESS NOTES
Notified by telemetry room patient appeared to convert from a-fib to normal sinus at 2000. EKG obtained and placed in soft chart.  Electronically signed by Cassie Neumann RN on 12/18/2021 at 6:08 AM

## 2021-12-18 NOTE — PLAN OF CARE
Problem: Discharge Planning:  Goal: Participates in care planning  12/18/2021 0808 by Ellen Valero RN  Outcome: Ongoing  12/17/2021 2313 by Jaqueline Stiles RN  Outcome: Ongoing  Goal: Discharged to appropriate level of care  12/18/2021 0808 by Ellen Valero RN  Outcome: Ongoing  12/17/2021 2313 by Jaqueline Stiles RN  Outcome: Ongoing

## 2021-12-19 RX ORDER — CLONIDINE HYDROCHLORIDE 0.1 MG/1
0.1 TABLET ORAL 2 TIMES DAILY
Qty: 60 TABLET | Refills: 0 | OUTPATIENT
Start: 2021-12-19

## 2021-12-19 NOTE — PROGRESS NOTES
Contrast [Iodides]      N & V. PT STATED HE HAD A MRI WITH CONTRAST ON 10/25/2021.  Neomycin-Bacitracin Zn-Polymyx Rash    Neosporin [Neomycin-Polymyxin-Gramicidin] Rash    Pcn [Penicillins] Rash       Medications listed as ordered at the time of discharge from hospital     Medication List          Accurate as of December 20, 2021  1:34 PM. If you have any questions, ask your nurse or doctor. CHANGE how you take these medications    atorvastatin 20 MG tablet  Commonly known as: LIPITOR  TAKE ONE TABLET DAILY  What changed: See the new instructions. CONTINUE taking these medications    amiodarone 200 MG tablet  Commonly known as: CORDARONE  Take 1 tablet by mouth 2 times daily     aspirin EC 81 MG EC tablet  Take 1 tablet by mouth daily     blood glucose monitor kit and supplies  Dispense sufficient amount for indicated testing frequency plus additional to accommodate PRN testing needs. * blood glucose test strips strip  Commonly known as: Contour Next Test  1 each by In Vitro route 4 times daily As needed. * blood glucose test strips  Test 3 times a day & as needed for symptoms of irregular blood glucose. Dispense sufficient amount for indicated testing frequency plus additional to accommodate PRN testing needs.      diphenhydrAMINE 25 MG tablet  Commonly known as: BENADRYL     furosemide 20 MG tablet  Commonly known as: LASIX     guaiFENesin 600 MG extended release tablet  Commonly known as: MUCINEX  Take 1 tablet by mouth 2 times daily     HumaLOG KwikPen 200 UNIT/ML Sopn pen  Generic drug: insulin lispro  Sliding scale 160-179 2 units  180-199 3 units  200-219 4 units  220-239 5 units  240-259 6 units   260-279 7 units  280-299 8 units  300-399 9 units  340-379 10 units  380-420 11 units     Insulin Pen Needle 32G X 4 MM Misc  1 each by Does not apply route daily     Lancets Misc  1 each by Does not apply route 3 times daily (with meals)     losartan 100 MG tablet  Commonly known as: COZAAR     metFORMIN 1000 MG tablet  Commonly known as: GLUCOPHAGE  Take 1 tablet by mouth 2 times daily (with meals)     metoprolol tartrate 25 MG tablet  Commonly known as: LOPRESSOR     oxyCODONE 5 MG immediate release tablet  Commonly known as: ROXICODONE  Take 1 tablet by mouth every 6 hours as needed for Pain for up to 14 days. tamsulosin 0.4 MG capsule  Commonly known as: FLOMAX  Take 2 capsules by mouth daily     Tresiba FlexTouch 100 UNIT/ML Sopn  Generic drug: Insulin Degludec     vitamin D 1.25 MG (44721 UT) Caps capsule  Commonly known as: ERGOCALCIFEROL     warfarin 10 MG tablet  Commonly known as: COUMADIN  Take as directed by the anticoagulation clinic. If you are unsure how to take this medication, talk to your nurse or doctor. * This list has 2 medication(s) that are the same as other medications prescribed for you. Read the directions carefully, and ask your doctor or other care provider to review them with you. Medications marked \"taking\" at this time  Outpatient Medications Marked as Taking for the 12/20/21 encounter (Office Visit) with Сергей Molina MD   Medication Sig Dispense Refill    oxyCODONE (ROXICODONE) 5 MG immediate release tablet Take 1 tablet by mouth every 6 hours as needed for Pain for up to 14 days.  40 tablet 0    amiodarone (CORDARONE) 200 MG tablet Take 1 tablet by mouth 2 times daily 60 tablet 1    guaiFENesin (MUCINEX) 600 MG extended release tablet Take 1 tablet by mouth 2 times daily 60 tablet 0    metoprolol tartrate (LOPRESSOR) 25 MG tablet Take 25 mg by mouth 2 times daily Indications: High Blood Pressure Disorder      losartan (COZAAR) 100 MG tablet Take 100 mg by mouth daily Indications: High Blood Pressure Disorder      furosemide (LASIX) 20 MG tablet Take 20 mg by mouth daily as needed      warfarin (COUMADIN) 10 MG tablet Take 10 mg by mouth See Admin Instructions 10 MG ON Saturday, Monday, Tuesday, Wednesday, Friday  15 MG ON Sunday & THURSDAY      metFORMIN (GLUCOPHAGE) 1000 MG tablet Take 1 tablet by mouth 2 times daily (with meals) 180 tablet 3    aspirin EC 81 MG EC tablet Take 1 tablet by mouth daily 90 tablet 1    Insulin Degludec (TRESIBA FLEXTOUCH) 100 UNIT/ML SOPN Inject into the skin PT TAKES ACCORDING TO ACCUCHEK. DOES NOT TAKE  AND UNDER AT HS.  atorvastatin (LIPITOR) 20 MG tablet TAKE ONE TABLET DAILY  (Patient taking differently: Take 20 mg by mouth daily ) 90 tablet 1    tamsulosin (FLOMAX) 0.4 MG capsule Take 2 capsules by mouth daily 60 capsule 11    blood glucose monitor strips Test 3 times a day & as needed for symptoms of irregular blood glucose. Dispense sufficient amount for indicated testing frequency plus additional to accommodate PRN testing needs. 200 strip 3    Insulin Pen Needle 32G X 4 MM MISC 1 each by Does not apply route daily 100 each 3    blood glucose monitor kit and supplies Dispense sufficient amount for indicated testing frequency plus additional to accommodate PRN testing needs. 1 kit 0    Lancets MISC 1 each by Does not apply route 3 times daily (with meals) 300 each 3    insulin lispro (HUMALOG KWIKPEN) 200 UNIT/ML SOPN pen Sliding scale 160-179 2 units  180-199 3 units  200-219 4 units  220-239 5 units  240-259 6 units   260-279 7 units  280-299 8 units  300-399 9 units  340-379 10 units  380-420 11 units 3 pen 5    vitamin D (ERGOCALCIFEROL) 1.25 MG (26018 UT) CAPS capsule Take 50,000 Units by mouth once a week      diphenhydrAMINE (BENADRYL) 25 MG tablet Take 25 mg by mouth every 6 hours as needed for Itching      blood glucose test strips (CONTOUR NEXT TEST) strip 1 each by In Vitro route 4 times daily As needed.  200 each 3        Medications patient taking as of now reconciled against medications ordered at time of hospital discharge: Yes    Chief Complaint   Patient presents with    Follow-Up from Hospital     staples in both legs       HPI  60year old male   TCM 12/20/21 110/80   12/18/21 131/82   12/14/21 (!) 87/52       Physical Exam  Vitals and nursing note reviewed. Constitutional:       General: He is not in acute distress. Appearance: He is well-developed and normal weight. HENT:      Head: Normocephalic and atraumatic. Right Ear: External ear normal.      Left Ear: External ear normal.      Nose: Nose normal.      Mouth/Throat:      Mouth: Mucous membranes are moist.   Eyes:      General: No scleral icterus. Right eye: No discharge. Left eye: No discharge. Conjunctiva/sclera: Conjunctivae normal.      Pupils: Pupils are equal, round, and reactive to light. Neck:      Thyroid: No thyromegaly. Vascular: No JVD. Cardiovascular:      Rate and Rhythm: Normal rate and regular rhythm. Heart sounds: Normal heart sounds, S1 normal and S2 normal. No murmur heard. Pulmonary:      Effort: Pulmonary effort is normal.      Breath sounds: Normal breath sounds. No wheezing or rales. Abdominal:      General: Bowel sounds are normal. There is no distension. Palpations: Abdomen is soft. There is no mass. Tenderness: There is no abdominal tenderness. There is no rebound. Musculoskeletal:         General: No tenderness. Normal range of motion. Cervical back: Normal range of motion and neck supple. Right lower leg: No edema. Left lower leg: No edema. Comments: L middle finger amputation   Lymphadenopathy:      Cervical: No cervical adenopathy. Skin:     General: Skin is warm and dry. Findings: No rash. Comments: midkine sternotomy scar  Staples LLE   Neurological:      General: No focal deficit present. Mental Status: He is alert and oriented to person, place, and time. Mental status is at baseline. Cranial Nerves: Cranial nerves are intact. No cranial nerve deficit. Motor: Weakness present. Coordination: Coordination is intact.       Deep Tendon Reflexes: Reflexes normal.   Psychiatric:         Mood and Affect: Mood is depressed. Speech: Speech is delayed. Behavior: Behavior is slowed and withdrawn. Thought Content:  Thought content normal.         Judgment: Judgment normal.             Assessment/Plan:  Problem List        Circulatory    HTN (hypertension)      Well-controlled, continue current medications, medication adherence emphasized and lifestyle modifications recommended         CAD in native artery      Asymptomatic, continue current medications, medication adherence emphasized and lifestyle modifications recommended         Relevant Medications    atorvastatin (LIPITOR) 20 MG tablet    warfarin (COUMADIN) 10 MG tablet    aspirin EC 81 MG EC tablet    metoprolol tartrate (LOPRESSOR) 25 MG tablet    losartan (COZAAR) 100 MG tablet    furosemide (LASIX) 20 MG tablet    amiodarone (CORDARONE) 200 MG tablet       Endocrine    Type 2 diabetes mellitus with diabetic polyneuropathy, with long-term current use of insulin (HCC) (Chronic)      Unclear control, continue current medications and will go for lab work next visit         Relevant Medications    blood glucose test strips (CONTOUR NEXT TEST) strip    insulin lispro (HUMALOG KWIKPEN) 200 UNIT/ML SOPN pen    blood glucose monitor kit and supplies    Lancets MISC    Insulin Pen Needle 32G X 4 MM MISC    blood glucose monitor strips    Insulin Degludec (TRESIBA FLEXTOUCH) 100 UNIT/ML SOPN    metFORMIN (GLUCOPHAGE) 1000 MG tablet              Medical Decision Making: moderate complexity

## 2021-12-20 ENCOUNTER — OFFICE VISIT (OUTPATIENT)
Dept: INTERNAL MEDICINE | Age: 56
End: 2021-12-20
Payer: MEDICARE

## 2021-12-20 ENCOUNTER — CARE COORDINATION (OUTPATIENT)
Dept: CASE MANAGEMENT | Age: 56
End: 2021-12-20

## 2021-12-20 VITALS
SYSTOLIC BLOOD PRESSURE: 110 MMHG | BODY MASS INDEX: 35.53 KG/M2 | HEART RATE: 82 BPM | OXYGEN SATURATION: 98 % | DIASTOLIC BLOOD PRESSURE: 80 MMHG | WEIGHT: 248.2 LBS | HEIGHT: 70 IN

## 2021-12-20 DIAGNOSIS — I10 PRIMARY HYPERTENSION: ICD-10-CM

## 2021-12-20 DIAGNOSIS — Z79.4 TYPE 2 DIABETES MELLITUS WITH DIABETIC POLYNEUROPATHY, WITH LONG-TERM CURRENT USE OF INSULIN (HCC): Chronic | ICD-10-CM

## 2021-12-20 DIAGNOSIS — I25.10 CAD IN NATIVE ARTERY: ICD-10-CM

## 2021-12-20 DIAGNOSIS — E11.42 TYPE 2 DIABETES MELLITUS WITH DIABETIC POLYNEUROPATHY, WITH LONG-TERM CURRENT USE OF INSULIN (HCC): Chronic | ICD-10-CM

## 2021-12-20 DIAGNOSIS — E66.01 SEVERE OBESITY (BMI 35.0-35.9 WITH COMORBIDITY) (HCC): ICD-10-CM

## 2021-12-20 PROCEDURE — 99495 TRANSJ CARE MGMT MOD F2F 14D: CPT | Performed by: INTERNAL MEDICINE

## 2021-12-20 PROCEDURE — 1111F DSCHRG MED/CURRENT MED MERGE: CPT | Performed by: INTERNAL MEDICINE

## 2021-12-20 SDOH — ECONOMIC STABILITY: FOOD INSECURITY: WITHIN THE PAST 12 MONTHS, THE FOOD YOU BOUGHT JUST DIDN'T LAST AND YOU DIDN'T HAVE MONEY TO GET MORE.: NEVER TRUE

## 2021-12-20 SDOH — ECONOMIC STABILITY: FOOD INSECURITY: WITHIN THE PAST 12 MONTHS, YOU WORRIED THAT YOUR FOOD WOULD RUN OUT BEFORE YOU GOT MONEY TO BUY MORE.: NEVER TRUE

## 2021-12-20 ASSESSMENT — ENCOUNTER SYMPTOMS
SHORTNESS OF BREATH: 0
COLOR CHANGE: 0
ABDOMINAL PAIN: 0
CONSTIPATION: 0
WHEEZING: 0
SORE THROAT: 0
CHEST TIGHTNESS: 0
DIARRHEA: 0
BACK PAIN: 0
STRIDOR: 0
BLOOD IN STOOL: 0
TROUBLE SWALLOWING: 0
COUGH: 0

## 2021-12-20 ASSESSMENT — PATIENT HEALTH QUESTIONNAIRE - PHQ9
2. FEELING DOWN, DEPRESSED OR HOPELESS: 0
SUM OF ALL RESPONSES TO PHQ9 QUESTIONS 1 & 2: 0
SUM OF ALL RESPONSES TO PHQ QUESTIONS 1-9: 0
1. LITTLE INTEREST OR PLEASURE IN DOING THINGS: 0
SUM OF ALL RESPONSES TO PHQ QUESTIONS 1-9: 0
SUM OF ALL RESPONSES TO PHQ QUESTIONS 1-9: 0

## 2021-12-20 ASSESSMENT — SOCIAL DETERMINANTS OF HEALTH (SDOH): HOW HARD IS IT FOR YOU TO PAY FOR THE VERY BASICS LIKE FOOD, HOUSING, MEDICAL CARE, AND HEATING?: NOT HARD AT ALL

## 2021-12-20 NOTE — CARE COORDINATION
[facility] Transitions Initial Follow Up Call    Call within 2 business days of discharge: Yes    Patient: Dwayne Lawson Patient : 1965   MRN: [id number]  Reason for Admission: CABG  Discharge Date: 21 RARS: Readmission Risk Score: 22.5 ( )      Last Discharge [facility]       Complaint Diagnosis Description Type Department Provider    21  Status post coronary artery bypass graft Admission (Discharged) Loreto Gauthier MD           Spoke with: N/A    Facility: [address] [place] [address]    Non-face-to-face services provided:  Reviewed encounter information for continuity of care prior to follow up Care Transitions phone call - chart notes, consults, progress notes, test results, med list, appointments, AVS, other information. Care Transitions 24 Hour Call    Do you have all of your prescriptions and are they filled?: Yes  Patient DME: Straight cane  Do you have support at home?: Child, Alone  Are you an active caregiver in your home?: No  Care Transitions Interventions       Attempted to make contact with patient/caregiver for an initial transitions of care follow up call post discharge without success. Unable to leave a message regarding intent of call and call back information. Call was forwarded to an unidentifiable voice messaging system (mobile voice mail or telephone answering machine). CTN will follow up again at a later time. Any previously scheduled hospital follow up appointments noted below.       Follow Up  Future Appointments   Date Time Provider Chris Camarena   1/3/2022 10:30 AM Chapin Arias MD [facility]   2022  1:00 PM Diann Neves MD [facility]   2022 10:00 AM NOMI Thornton NP N [facility]   3/15/2022 11:00 AM NOMI Hankins Ra, CNP N [facility]   2022 10:15 AM DO MAGNUS Goins [facility]       Olga Lau RN

## 2021-12-20 NOTE — CONSULTS
Post-op Cardiac Rehab education packet was sent to the patient's address on record. Handouts included were titled; \"Home Instructions Following a Cardiac Event\", \"Cardiac Home Exercise Program - Phase I\", \"Risk Factors for Heart Disease and Stroke\" and \"Cardiac Diet/Low Cholesterol\". Patient was instructed to contact [facility] or the hospital nearest their residence for the opportunity to enroll in Phase II Outpatient Cardiac Rehab.

## 2021-12-21 ENCOUNTER — CARE COORDINATION (OUTPATIENT)
Dept: CASE MANAGEMENT | Age: 56
End: 2021-12-21

## 2021-12-21 NOTE — CARE COORDINATION
[facility] Transitions Initial Follow Up Call    Call within 2 business days of discharge: Yes    Patient: Deandra Quintero Patient : 1965   MRN: [id number]  Reason for Admission: CABG  Discharge Date: 21 RARS: Readmission Risk Score: 22.5 ( )      Last Discharge [facility]       Complaint Diagnosis Description Type Department Provider    21  Status post coronary artery bypass graft Admission (Discharged) Mario Alberto Steinberg MD           Spoke with: N/A    Facility: [address] [place] [address]    Non-face-to-face services provided:  Reviewed encounter information for continuity of care prior to follow up Care Transitions phone call - chart notes, consults, progress notes, test results, med list, appointments, AVS, other information. Care Transitions 24 Hour Call    Do you have all of your prescriptions and are they filled?: Yes  Patient DME: Straight cane  Do you have support at home?: Child, Alone  Are you an active caregiver in your home?: No  Care Transitions Interventions       Attempted to make contact with patient/caregiver for an initial transitions of care follow up call post discharge without success. Unable to leave a message regarding intent of call and call back information. Call was forwarded to an unidentifiable voice messaging system (mobile voice mail or telephone answering machine). As this repeated attempt to make contact was unsuccessful, will disengage at this time. Any previously scheduled hospital follow up appointments noted below.       Follow Up  Future Appointments   Date Time Provider Chris Camarena   1/3/2022 10:30 AM Shirley Bustamante MD [facility]   2022  1:00 PM Rubin Bryant MD [facility]   2022 10:00 AM NOMI Ventura - NP N [facility]   3/15/2022 11:00 AM Severo Friends, APRN - CNP N [facility]   2022 10:15 AM DO MAGNUS Florence [facility]       Genoveva rCaft RN

## 2021-12-22 ENCOUNTER — TELEPHONE (OUTPATIENT)
Dept: CARDIOTHORACIC SURGERY | Age: 56
End: 2021-12-22

## 2021-12-22 ENCOUNTER — TELEPHONE (OUTPATIENT)
Dept: INTERNAL MEDICINE | Age: 56
End: 2021-12-22

## 2021-12-22 DIAGNOSIS — I10 PRIMARY HYPERTENSION: ICD-10-CM

## 2021-12-22 NOTE — TELEPHONE ENCOUNTER
Marisela Horton from home health called and said that the patients BP is running high she said it was 180/62.     [phone]

## 2021-12-22 NOTE — TELEPHONE ENCOUNTER
[facility] nurse calling that pt bp is running 182/104 . Pt took his meds this morning and did take pain medication at noon. Spoke with Kalyan MOSHER she will call.  Pt home [phone]  [facility] nurse cell [phone] (she is not sure it will work where they live)

## 2021-12-22 NOTE — DISCHARGE SUMMARY
[facility]  Discharge Summary    Patient ID: Macy Heller      Patient's PCP: Tamara Beck MD    Admit Date: 12/14/2021     Discharge Date: 12/18/2021     Admitting Physician: Delilah Montoya MD     Discharge Physician: Dr. Karen Mendez     Discharge Diagnoses:     Primary:   1.         Severe Multivessel Coronary Artery Disease      Secondary:   2.         Persistent Atrial Fibrillation   3.         DM, Type 2  4.         Essential HTN  5.         CKD, Stage III  6. Mixed Hyperlipidemia  7.         Hx MRSA Infection    Procedures This Admit:   1. On 12/14/2021, 3-Vessel Perfusion-Assisted Coronary Artery Bypass Grafting, Placing LIMA - LAD, SVG - 2nd Diagonal Branch and SVG - PLM Branch; Romero-MAZE IV Radiofrequency and Cryoablation to the Left and Right Atria with Exit Block Testing; Exclusion of the Left Atrial Appendage with Application of a 35 mm AtriClip by Dr. Karen Mendez    Consults:   Orthopedic Surgery      Complications:   None    The patient was seen and examined on day of discharge and this discharge summary is in conjunction with any daily progress note from day of discharge. History of Present Illness and Hospital Course:   Mr. Rodriguez Garcia is a 60year old overweight, poorly controlled type 2 diabetic who was admitted in October of this year with Staph sepsis. He was found to have an abscess on his left middle finger. This had to be treated with amputation and long-term antibiotics. During that admission, the patient had congestive heart  failure symptoms. Breana Caleb demonstrated evidence of myocardial ischemia. Cardiac catheterization on 11/23/2021 demonstrated the left ventricle which showed normal function, ejection fraction was estimated at 60%. He did have mild-to-moderate pulmonary hypertension with PA pressures in the mid 46s. The left main artery had a distal tapering that approached 50% in some views.  The LAD had a proximal 80% stenotic lesion followed by an 80% to 90% proximal mid-stenotic lesion at the origin of the second diagonal branch. The circumflex system had a tiny intermediate branch that had 90% stenosis, but this appeared to be too small for bypass grafting. The remainder of the circumflex system was small. The RCA was the dominant system. There was a 50% mid-third stenosis, a 50% distal stenosis, a complex 70% to 80% area of stenosis before the PLM branch. Because the presence of severe multivessel coronary disease with symptoms of shortness of breath weakness the patient was felt to be a candidate for multivessel coronary artery bypass grafting, therefore, he was referred to Dr. Irene Garcia for further evaluation. He was seen in the office on 11/30/2021. He was felt to be a suitable candidate for bypass grafting, along with Romero-Maze for radiofrequency and cryoablation as well with his history of longstanding persistent atrial fibrillation. The operation was explained and discussed in detail, including the risks and benefits, with the patient. He voiced understanding and was agreeable to proceed. Patient presented on 12/14/2021, taken to the OR and underwent combined 3V PACABG, placing LIMA - LAD, SVG - 2nd diagonal and SVG - PLM, Romero-MAZE IVradiofrequency and cryoablation to the left and right atria with exit block testing and exclusion of left atrial appendage with application of a 35 mm AtriClip by Dr. Irene Garcia. He tolerated the operation without complication and was admitted to the ICU for recovery. During the postoperative course, he was extubated the evening of surgery. He was sitting up in the chair, hemodynamically stable on POD 1. He was noted to be in SR on amiodarone drip. Noted to have mild elevated of CR. MCT and left pleural CT were removed. Labs and CXR were stable on POD 2. He was felt stable for transfer to PCU for ongoing monitoring and therapy. He was restarted on home Coumadin. On POD 3, he was noted to have runs of PAF.  Labs and CXR were repeated on POD 4, stable. Patient was ambulating in the halls without difficulty, tolerating his diet and pain well controlled. He was felt stable for discharge home with resumption of home health. Disposition:  Home     Follow-up:    1. Hospital Follow-up with Dr. Christine Layton on 12/20/2021 at 1 pm.   2. Appointment with Dr. Sajan Santos on 01/17/2021 at 1 pm for Routine Postoperative Follow-up.    3. Cardiology Follow-up with NOMI Cortez on 01/26/2021 at 10 am.       Discharge Medications:     See Discharge Medication Reconciliation Page

## 2021-12-23 ENCOUNTER — ANTI-COAG VISIT (OUTPATIENT)
Dept: CARDIOLOGY CLINIC | Age: 56
End: 2021-12-23

## 2021-12-23 RX ORDER — CLONIDINE HYDROCHLORIDE 0.1 MG/1
0.1 TABLET ORAL 2 TIMES DAILY
Qty: 60 TABLET | Refills: 0 | Status: SHIPPED | OUTPATIENT
Start: 2021-12-23 | End: 2022-02-07 | Stop reason: SDUPTHER

## 2021-12-23 NOTE — TELEPHONE ENCOUNTER
Spoke to pts mom she VU for him to take he 50mg Metoprolol. Also called [facility] and let her know the medication change she said that she had also spoken to Dr Estevan Castro office and they wanted him to use clonidine and not Metoprolol. No new script for clonidine was sent to the pharmacy from Dr Estevan Castro on this. She also said that she had a level 2 drug interaction on the amiodarone and the warfarin. I let her know I would send this to Dr Shruthi Lao and that she would review and let us know what if any changes need to be made. I also let her know that Dr Shruthi Lao is out of the office until Monday and Tobey Proper and was ok with this.

## 2021-12-23 NOTE — TELEPHONE ENCOUNTER
Spoke to Dr Mercedes Crum order to send in the clonidine and to let Angelica Mayen call Dr Claudette Ramírez office about the drug interactions. Spoke to Angelica potter VU.

## 2021-12-27 ENCOUNTER — TELEPHONE (OUTPATIENT)
Dept: INTERNAL MEDICINE | Age: 56
End: 2021-12-27

## 2021-12-27 DIAGNOSIS — I25.10 CAD, MULTIPLE VESSEL: Primary | ICD-10-CM

## 2021-12-27 LAB — INR BLD: 7.5

## 2021-12-27 PROCEDURE — 85610 PROTHROMBIN TIME: CPT | Performed by: NURSE PRACTITIONER

## 2021-12-27 NOTE — TELEPHONE ENCOUNTER
Home Health called this patient is post CABG and has gained 7 lbs since Friday. Patient is feeling very fatigued.

## 2021-12-28 ENCOUNTER — ANTI-COAG VISIT (OUTPATIENT)
Dept: CARDIOLOGY CLINIC | Age: 56
End: 2021-12-28

## 2021-12-28 ENCOUNTER — APPOINTMENT (OUTPATIENT)
Dept: GENERAL RADIOLOGY | Age: 56
DRG: 291 | End: 2021-12-28
Payer: MEDICARE

## 2021-12-28 ENCOUNTER — HOSPITAL ENCOUNTER (INPATIENT)
Age: 56
LOS: 1 days | Discharge: HOME HEALTH | DRG: 291 | End: 2021-12-31
Attending: EMERGENCY MEDICINE | Admitting: INTERNAL MEDICINE
Payer: MEDICARE

## 2021-12-28 ENCOUNTER — TELEPHONE (OUTPATIENT)
Dept: CARDIOLOGY CLINIC | Age: 56
End: 2021-12-28

## 2021-12-28 ENCOUNTER — TELEPHONE (OUTPATIENT)
Dept: CARDIOTHORACIC SURGERY | Age: 56
End: 2021-12-28

## 2021-12-28 DIAGNOSIS — I50.9 ACUTE CONGESTIVE HEART FAILURE, UNSPECIFIED HEART FAILURE TYPE (HCC): Primary | ICD-10-CM

## 2021-12-28 PROBLEM — E87.70 FLUID OVERLOAD: Status: ACTIVE | Noted: 2021-12-28

## 2021-12-28 LAB
ALBUMIN SERPL-MCNC: 3.6 G/DL (ref 3.5–5.2)
ALP BLD-CCNC: 472 U/L (ref 40–130)
ALT SERPL-CCNC: 37 U/L (ref 5–41)
ANION GAP SERPL CALCULATED.3IONS-SCNC: 14 MMOL/L (ref 7–19)
APTT: 67.6 SEC (ref 26–36.2)
AST SERPL-CCNC: 23 U/L (ref 5–40)
BASOPHILS ABSOLUTE: 0.1 K/UL (ref 0–0.2)
BASOPHILS RELATIVE PERCENT: 0.8 % (ref 0–1)
BILIRUB SERPL-MCNC: 0.5 MG/DL (ref 0.2–1.2)
BILIRUBIN URINE: NEGATIVE
BLOOD, URINE: NEGATIVE
BUN BLDV-MCNC: 42 MG/DL (ref 6–20)
CALCIUM SERPL-MCNC: 8.9 MG/DL (ref 8.6–10)
CHLORIDE BLD-SCNC: 94 MMOL/L (ref 98–111)
CLARITY: CLEAR
CO2: 23 MMOL/L (ref 22–29)
COLOR: YELLOW
CREAT SERPL-MCNC: 1.9 MG/DL (ref 0.5–1.2)
EOSINOPHILS ABSOLUTE: 0.1 K/UL (ref 0–0.6)
EOSINOPHILS RELATIVE PERCENT: 1.3 % (ref 0–5)
FERRITIN: 451.1 NG/ML (ref 30–400)
FOLATE: 9 NG/ML (ref 4.5–32.2)
GFR AFRICAN AMERICAN: 44
GFR NON-AFRICAN AMERICAN: 37
GLUCOSE BLD-MCNC: 123 MG/DL (ref 74–109)
GLUCOSE URINE: NEGATIVE MG/DL
HCT VFR BLD CALC: 33 % (ref 42–52)
HEMOGLOBIN: 11 G/DL (ref 14–18)
IMMATURE GRANULOCYTES #: 0.1 K/UL
INR BLD: 5.44 (ref 0.88–1.18)
IRON SATURATION: 14 % (ref 14–50)
IRON: 34 UG/DL (ref 59–158)
KETONES, URINE: NEGATIVE MG/DL
LEUKOCYTE ESTERASE, URINE: NEGATIVE
LYMPHOCYTES ABSOLUTE: 1.5 K/UL (ref 1.1–4.5)
LYMPHOCYTES RELATIVE PERCENT: 13.8 % (ref 20–40)
MAGNESIUM: 1.6 MG/DL (ref 1.6–2.6)
MCH RBC QN AUTO: 32.1 PG (ref 27–31)
MCHC RBC AUTO-ENTMCNC: 33.3 G/DL (ref 33–37)
MCV RBC AUTO: 96.2 FL (ref 80–94)
MONOCYTES ABSOLUTE: 0.7 K/UL (ref 0–0.9)
MONOCYTES RELATIVE PERCENT: 6.1 % (ref 0–10)
NEUTROPHILS ABSOLUTE: 8.3 K/UL (ref 1.5–7.5)
NEUTROPHILS RELATIVE PERCENT: 77.3 % (ref 50–65)
NITRITE, URINE: NEGATIVE
PDW BLD-RTO: 13.1 % (ref 11.5–14.5)
PH UA: 5 (ref 5–8)
PLATELET # BLD: 478 K/UL (ref 130–400)
PMV BLD AUTO: 10.1 FL (ref 9.4–12.4)
POTASSIUM REFLEX MAGNESIUM: 5.2 MMOL/L (ref 3.5–5)
PRO-BNP: 2807 PG/ML (ref 0–900)
PROTEIN UA: NEGATIVE MG/DL
PROTHROMBIN TIME: 47.9 SEC (ref 12–14.6)
RBC # BLD: 3.43 M/UL (ref 4.7–6.1)
SARS-COV-2, NAAT: NOT DETECTED
SODIUM BLD-SCNC: 131 MMOL/L (ref 136–145)
SPECIFIC GRAVITY UA: 1.01 (ref 1–1.03)
TOTAL IRON BINDING CAPACITY: 251 UG/DL (ref 250–400)
TOTAL PROTEIN: 6.8 G/DL (ref 6.6–8.7)
TROPONIN: 0.11 NG/ML (ref 0–0.03)
TSH SERPL DL<=0.05 MIU/L-ACNC: 6.1 UIU/ML (ref 0.27–4.2)
UROBILINOGEN, URINE: 0.2 E.U./DL
VITAMIN B-12: 512 PG/ML (ref 211–946)
VITAMIN D 25-HYDROXY: 33 NG/ML
WBC # BLD: 10.8 K/UL (ref 4.8–10.8)

## 2021-12-28 PROCEDURE — 83550 IRON BINDING TEST: CPT

## 2021-12-28 PROCEDURE — 36415 COLL VENOUS BLD VENIPUNCTURE: CPT

## 2021-12-28 PROCEDURE — 82306 VITAMIN D 25 HYDROXY: CPT

## 2021-12-28 PROCEDURE — 82607 VITAMIN B-12: CPT

## 2021-12-28 PROCEDURE — 83540 ASSAY OF IRON: CPT

## 2021-12-28 PROCEDURE — 81003 URINALYSIS AUTO W/O SCOPE: CPT

## 2021-12-28 PROCEDURE — 85025 COMPLETE CBC W/AUTO DIFF WBC: CPT

## 2021-12-28 PROCEDURE — 80053 COMPREHEN METABOLIC PANEL: CPT

## 2021-12-28 PROCEDURE — 83880 ASSAY OF NATRIURETIC PEPTIDE: CPT

## 2021-12-28 PROCEDURE — G0378 HOSPITAL OBSERVATION PER HR: HCPCS

## 2021-12-28 PROCEDURE — 84484 ASSAY OF TROPONIN QUANT: CPT

## 2021-12-28 PROCEDURE — 71045 X-RAY EXAM CHEST 1 VIEW: CPT

## 2021-12-28 PROCEDURE — 82728 ASSAY OF FERRITIN: CPT

## 2021-12-28 PROCEDURE — 83735 ASSAY OF MAGNESIUM: CPT

## 2021-12-28 PROCEDURE — 87635 SARS-COV-2 COVID-19 AMP PRB: CPT

## 2021-12-28 PROCEDURE — 85730 THROMBOPLASTIN TIME PARTIAL: CPT

## 2021-12-28 PROCEDURE — 93005 ELECTROCARDIOGRAM TRACING: CPT | Performed by: EMERGENCY MEDICINE

## 2021-12-28 PROCEDURE — 85610 PROTHROMBIN TIME: CPT

## 2021-12-28 PROCEDURE — 82746 ASSAY OF FOLIC ACID SERUM: CPT

## 2021-12-28 PROCEDURE — 84443 ASSAY THYROID STIM HORMONE: CPT

## 2021-12-28 PROCEDURE — 99284 EMERGENCY DEPT VISIT MOD MDM: CPT

## 2021-12-28 RX ORDER — SODIUM CHLORIDE 0.9 % (FLUSH) 0.9 %
5-40 SYRINGE (ML) INJECTION PRN
Status: DISCONTINUED | OUTPATIENT
Start: 2021-12-28 | End: 2021-12-31 | Stop reason: HOSPADM

## 2021-12-28 RX ORDER — ONDANSETRON 4 MG/1
4 TABLET, ORALLY DISINTEGRATING ORAL EVERY 8 HOURS PRN
Status: DISCONTINUED | OUTPATIENT
Start: 2021-12-28 | End: 2021-12-31 | Stop reason: HOSPADM

## 2021-12-28 RX ORDER — HYDRALAZINE HYDROCHLORIDE 20 MG/ML
5 INJECTION INTRAMUSCULAR; INTRAVENOUS EVERY 4 HOURS PRN
Status: DISCONTINUED | OUTPATIENT
Start: 2021-12-28 | End: 2021-12-31 | Stop reason: HOSPADM

## 2021-12-28 RX ORDER — FERROUS SULFATE 325(65) MG
325 TABLET ORAL
Status: DISCONTINUED | OUTPATIENT
Start: 2021-12-28 | End: 2021-12-31 | Stop reason: HOSPADM

## 2021-12-28 RX ORDER — ACETAMINOPHEN 650 MG/1
650 SUPPOSITORY RECTAL EVERY 6 HOURS PRN
Status: DISCONTINUED | OUTPATIENT
Start: 2021-12-28 | End: 2021-12-31 | Stop reason: HOSPADM

## 2021-12-28 RX ORDER — GUAIFENESIN 600 MG/1
600 TABLET, EXTENDED RELEASE ORAL 2 TIMES DAILY
Status: DISCONTINUED | OUTPATIENT
Start: 2021-12-28 | End: 2021-12-28

## 2021-12-28 RX ORDER — ACETAMINOPHEN 325 MG/1
650 TABLET ORAL EVERY 6 HOURS PRN
Status: DISCONTINUED | OUTPATIENT
Start: 2021-12-28 | End: 2021-12-31 | Stop reason: HOSPADM

## 2021-12-28 RX ORDER — FUROSEMIDE 10 MG/ML
40 INJECTION INTRAMUSCULAR; INTRAVENOUS ONCE
Status: DISCONTINUED | OUTPATIENT
Start: 2021-12-28 | End: 2021-12-28

## 2021-12-28 RX ORDER — BUMETANIDE 0.25 MG/ML
1 INJECTION, SOLUTION INTRAMUSCULAR; INTRAVENOUS 2 TIMES DAILY
Status: DISCONTINUED | OUTPATIENT
Start: 2021-12-29 | End: 2021-12-31 | Stop reason: HOSPADM

## 2021-12-28 RX ORDER — LISINOPRIL 10 MG/1
5 TABLET ORAL DAILY
Status: DISCONTINUED | OUTPATIENT
Start: 2021-12-29 | End: 2021-12-28

## 2021-12-28 RX ORDER — GUAIFENESIN 600 MG/1
600 TABLET, EXTENDED RELEASE ORAL 2 TIMES DAILY PRN
Status: DISCONTINUED | OUTPATIENT
Start: 2021-12-28 | End: 2021-12-31 | Stop reason: HOSPADM

## 2021-12-28 RX ORDER — POLYETHYLENE GLYCOL 3350 17 G/17G
17 POWDER, FOR SOLUTION ORAL DAILY PRN
Status: DISCONTINUED | OUTPATIENT
Start: 2021-12-28 | End: 2021-12-31 | Stop reason: HOSPADM

## 2021-12-28 RX ORDER — ASPIRIN 81 MG/1
81 TABLET ORAL DAILY
Status: DISCONTINUED | OUTPATIENT
Start: 2021-12-29 | End: 2021-12-31 | Stop reason: HOSPADM

## 2021-12-28 RX ORDER — ATORVASTATIN CALCIUM 40 MG/1
20 TABLET, FILM COATED ORAL DAILY
Status: DISCONTINUED | OUTPATIENT
Start: 2021-12-29 | End: 2021-12-31 | Stop reason: HOSPADM

## 2021-12-28 RX ORDER — AMIODARONE HYDROCHLORIDE 200 MG/1
200 TABLET ORAL 2 TIMES DAILY
Status: DISCONTINUED | OUTPATIENT
Start: 2021-12-28 | End: 2021-12-31 | Stop reason: HOSPADM

## 2021-12-28 RX ORDER — SODIUM CHLORIDE 0.9 % (FLUSH) 0.9 %
5-40 SYRINGE (ML) INJECTION EVERY 12 HOURS SCHEDULED
Status: DISCONTINUED | OUTPATIENT
Start: 2021-12-28 | End: 2021-12-31 | Stop reason: HOSPADM

## 2021-12-28 RX ORDER — SODIUM CHLORIDE 9 MG/ML
25 INJECTION, SOLUTION INTRAVENOUS PRN
Status: DISCONTINUED | OUTPATIENT
Start: 2021-12-28 | End: 2021-12-31 | Stop reason: HOSPADM

## 2021-12-28 RX ORDER — TAMSULOSIN HYDROCHLORIDE 0.4 MG/1
0.8 CAPSULE ORAL DAILY
Status: DISCONTINUED | OUTPATIENT
Start: 2021-12-29 | End: 2021-12-31 | Stop reason: HOSPADM

## 2021-12-28 RX ORDER — BUMETANIDE 0.25 MG/ML
0.5 INJECTION, SOLUTION INTRAMUSCULAR; INTRAVENOUS DAILY
Status: DISCONTINUED | OUTPATIENT
Start: 2021-12-29 | End: 2021-12-28

## 2021-12-28 RX ORDER — ONDANSETRON 2 MG/ML
4 INJECTION INTRAMUSCULAR; INTRAVENOUS EVERY 6 HOURS PRN
Status: DISCONTINUED | OUTPATIENT
Start: 2021-12-28 | End: 2021-12-31 | Stop reason: HOSPADM

## 2021-12-28 ASSESSMENT — ENCOUNTER SYMPTOMS
CHEST TIGHTNESS: 0
WHEEZING: 0
SPUTUM PRODUCTION: 0
COUGH: 1
ABDOMINAL PAIN: 0
SHORTNESS OF BREATH: 1

## 2021-12-28 NOTE — TELEPHONE ENCOUNTER
Received INR results from Frankie Olguin at [facility] [phone]. The family was trying to get in touch with us yesterday but I was never told about this until today after receiving INR result of 7.5. Called and talked with Frankie Olguin at [facility] she said yesterday she was told by Sean YOST this patient was discussed with someone in our office and that everything was taken care of but that is not what I am seeing. HH is very confused on why our office was not involved in this since we manage INR's and not Dr. Latonia Valenzuela. I explained it's under control now and give her my direct number so she can call me back today with INR result. She is not sure if she is even able to get out to the patient's house but she is going to try and that she relayed this to [place] office as well. The 7.5 reading was yesterday and CT surgery did dose patient through home health so there is nothing for me to do today with this except wait for the new INR result if she is able to get there today since patient was told he could not come to the office due to vomiting. I did advise [facility] that if it is AFTER 4:30 today when she gets INR result she needs to call our office at [phone] where she gets the on call cardiologist if it is still critical. She voiced understanding.

## 2021-12-28 NOTE — TELEPHONE ENCOUNTER
[facility] with [facility] at [phone]. Given orders for staple removal and Labs, CBC, BMP, PT/INR. PTs mother called this morning because pt had an appt. Today but is vomiting. Dr Demar Duong does not want pt to increase Lasix with kidney disease and vomiting. Due to vomiting can not come into office. Mother states his bps have been normal. He gets tired easily and she said he has not been doing his exercises. Encouraged him to move everyday off and on. Do not just sit in the chair.

## 2021-12-28 NOTE — TELEPHONE ENCOUNTER
[facility] called and said she went through all her strips to check his INR and the machine will not read. I told her that I was going to talk with a provider to see what needs to be done. Explained patient is vomiting, 7lb weight gain, SOB and fatigue with critical INR from yesterday and unknown what it is today and HH thinks he needs the ED as well. Instructed to tell patient to report to the ED ASAP. Home Health nurse advised patient as well.

## 2021-12-29 PROBLEM — N28.9 RENAL INSUFFICIENCY: Status: ACTIVE | Noted: 2021-12-29

## 2021-12-29 LAB
GLUCOSE BLD-MCNC: 159 MG/DL (ref 70–99)
GLUCOSE BLD-MCNC: 188 MG/DL (ref 70–99)
GLUCOSE BLD-MCNC: 220 MG/DL (ref 70–99)
GLUCOSE BLD-MCNC: 240 MG/DL (ref 70–99)
GLUCOSE BLD-MCNC: 261 MG/DL (ref 70–99)
INR BLD: 5.44 (ref 0.88–1.18)
LV EF: 58 %
LVEF MODALITY: NORMAL
MAGNESIUM: 1.7 MG/DL (ref 1.6–2.6)
PERFORMED ON: ABNORMAL
PROTHROMBIN TIME: 47.9 SEC (ref 12–14.6)
TROPONIN: 0.09 NG/ML (ref 0–0.03)
TROPONIN: 0.1 NG/ML (ref 0–0.03)
TROPONIN: 0.1 NG/ML (ref 0–0.03)

## 2021-12-29 PROCEDURE — 96375 TX/PRO/DX INJ NEW DRUG ADDON: CPT

## 2021-12-29 PROCEDURE — 36415 COLL VENOUS BLD VENIPUNCTURE: CPT

## 2021-12-29 PROCEDURE — 2580000003 HC RX 258: Performed by: HOSPITALIST

## 2021-12-29 PROCEDURE — 83735 ASSAY OF MAGNESIUM: CPT

## 2021-12-29 PROCEDURE — 84484 ASSAY OF TROPONIN QUANT: CPT

## 2021-12-29 PROCEDURE — 85610 PROTHROMBIN TIME: CPT

## 2021-12-29 PROCEDURE — 6370000000 HC RX 637 (ALT 250 FOR IP): Performed by: HOSPITALIST

## 2021-12-29 PROCEDURE — G0378 HOSPITAL OBSERVATION PER HR: HCPCS

## 2021-12-29 PROCEDURE — 82947 ASSAY GLUCOSE BLOOD QUANT: CPT

## 2021-12-29 PROCEDURE — 2500000003 HC RX 250 WO HCPCS: Performed by: HOSPITALIST

## 2021-12-29 PROCEDURE — 6370000000 HC RX 637 (ALT 250 FOR IP): Performed by: NURSE PRACTITIONER

## 2021-12-29 PROCEDURE — 93005 ELECTROCARDIOGRAM TRACING: CPT | Performed by: NURSE PRACTITIONER

## 2021-12-29 PROCEDURE — 96376 TX/PRO/DX INJ SAME DRUG ADON: CPT

## 2021-12-29 PROCEDURE — C8929 TTE W OR WO FOL WCON,DOPPLER: HCPCS

## 2021-12-29 PROCEDURE — 6360000002 HC RX W HCPCS: Performed by: HOSPITALIST

## 2021-12-29 PROCEDURE — 6360000004 HC RX CONTRAST MEDICATION: Performed by: INTERNAL MEDICINE

## 2021-12-29 RX ORDER — NICOTINE POLACRILEX 4 MG
15 LOZENGE BUCCAL PRN
Status: DISCONTINUED | OUTPATIENT
Start: 2021-12-29 | End: 2021-12-31 | Stop reason: HOSPADM

## 2021-12-29 RX ORDER — DEXTROSE MONOHYDRATE 25 G/50ML
12.5 INJECTION, SOLUTION INTRAVENOUS PRN
Status: DISCONTINUED | OUTPATIENT
Start: 2021-12-29 | End: 2021-12-31 | Stop reason: HOSPADM

## 2021-12-29 RX ORDER — INSULIN GLARGINE 100 [IU]/ML
10 INJECTION, SOLUTION SUBCUTANEOUS NIGHTLY
Status: DISCONTINUED | OUTPATIENT
Start: 2021-12-29 | End: 2021-12-31 | Stop reason: HOSPADM

## 2021-12-29 RX ORDER — OXYCODONE HYDROCHLORIDE 5 MG/1
5 TABLET ORAL EVERY 6 HOURS PRN
Status: DISCONTINUED | OUTPATIENT
Start: 2021-12-29 | End: 2021-12-31 | Stop reason: HOSPADM

## 2021-12-29 RX ORDER — DEXTROSE MONOHYDRATE 50 MG/ML
100 INJECTION, SOLUTION INTRAVENOUS PRN
Status: DISCONTINUED | OUTPATIENT
Start: 2021-12-29 | End: 2021-12-31 | Stop reason: HOSPADM

## 2021-12-29 RX ADMIN — METOPROLOL TARTRATE 25 MG: 25 TABLET, FILM COATED ORAL at 09:21

## 2021-12-29 RX ADMIN — PERFLUTREN 1.65 MG: 6.52 INJECTION, SUSPENSION INTRAVENOUS at 08:45

## 2021-12-29 RX ADMIN — TAMSULOSIN HYDROCHLORIDE 0.8 MG: 0.4 CAPSULE ORAL at 09:21

## 2021-12-29 RX ADMIN — BUMETANIDE 1 MG: 0.25 INJECTION INTRAMUSCULAR; INTRAVENOUS at 20:19

## 2021-12-29 RX ADMIN — METOPROLOL TARTRATE 25 MG: 25 TABLET, FILM COATED ORAL at 20:20

## 2021-12-29 RX ADMIN — HYDRALAZINE HYDROCHLORIDE 5 MG: 20 INJECTION INTRAMUSCULAR; INTRAVENOUS at 09:22

## 2021-12-29 RX ADMIN — INSULIN LISPRO 2 UNITS: 100 INJECTION, SOLUTION INTRAVENOUS; SUBCUTANEOUS at 17:18

## 2021-12-29 RX ADMIN — OXYCODONE 5 MG: 5 TABLET ORAL at 17:18

## 2021-12-29 RX ADMIN — SODIUM CHLORIDE, PRESERVATIVE FREE 10 ML: 5 INJECTION INTRAVENOUS at 20:21

## 2021-12-29 RX ADMIN — INSULIN GLARGINE 10 UNITS: 100 INJECTION, SOLUTION SUBCUTANEOUS at 21:42

## 2021-12-29 RX ADMIN — AMIODARONE HYDROCHLORIDE 200 MG: 200 TABLET ORAL at 20:21

## 2021-12-29 RX ADMIN — ASPIRIN 81 MG: 81 TABLET, COATED ORAL at 09:20

## 2021-12-29 RX ADMIN — INSULIN LISPRO 1 UNITS: 100 INJECTION, SOLUTION INTRAVENOUS; SUBCUTANEOUS at 21:43

## 2021-12-29 RX ADMIN — INSULIN LISPRO 5 UNITS: 100 INJECTION, SOLUTION INTRAVENOUS; SUBCUTANEOUS at 17:22

## 2021-12-29 RX ADMIN — INSULIN LISPRO 4 UNITS: 100 INJECTION, SOLUTION INTRAVENOUS; SUBCUTANEOUS at 10:03

## 2021-12-29 RX ADMIN — BUMETANIDE 1 MG: 0.25 INJECTION INTRAMUSCULAR; INTRAVENOUS at 09:24

## 2021-12-29 RX ADMIN — ATORVASTATIN CALCIUM 20 MG: 40 TABLET, FILM COATED ORAL at 09:20

## 2021-12-29 RX ADMIN — INSULIN LISPRO 11 UNITS: 100 INJECTION, SOLUTION INTRAVENOUS; SUBCUTANEOUS at 12:21

## 2021-12-29 RX ADMIN — FERROUS SULFATE TAB 325 MG (65 MG ELEMENTAL FE) 325 MG: 325 (65 FE) TAB at 16:59

## 2021-12-29 ASSESSMENT — ENCOUNTER SYMPTOMS
ABDOMINAL DISTENTION: 0
ABDOMINAL PAIN: 0
WHEEZING: 0
VOMITING: 0
DIARRHEA: 0
CONSTIPATION: 0
SHORTNESS OF BREATH: 0
COLOR CHANGE: 0
BLOOD IN STOOL: 0
NAUSEA: 0
COUGH: 0

## 2021-12-29 ASSESSMENT — PAIN SCALES - GENERAL
PAINLEVEL_OUTOF10: 3
PAINLEVEL_OUTOF10: 5
PAINLEVEL_OUTOF10: 5

## 2021-12-29 ASSESSMENT — PAIN DESCRIPTION - PAIN TYPE
TYPE: SURGICAL PAIN

## 2021-12-29 ASSESSMENT — PAIN DESCRIPTION - LOCATION: LOCATION: STERNUM

## 2021-12-29 NOTE — PROGRESS NOTES
Clinical Pharmacy Note    Radha Coronel is a 60 y.o. male for whom pharmacy has been asked to manage warfarin therapy. Reason for Admission: Fluid overload    Consulting Physician: Dr. Mai Brown  Warfarin dose prior to admission: SuTh 15mg; MoTuWeFrSa 10mg  Warfarin indication: A. fib  Target INR range: 2-3   Outpatient warfarin provider: [facility] cardiology    Past Medical History:   Diagnosis Date    Acalculous cholecystitis 12/29/2015    Allergic rhinitis, cause unspecified     Atrial fibrillation, chronic (HCC)     sees [facility] cardiology    Bone spur     bilateral elbows    CAD (coronary artery disease)     Charcot's joint of right foot     Chicken pox     Closed supracondylar fracture of elbow     fall    Diabetic ulcer of right midfoot associated with type 2 diabetes mellitus, with fat layer exposed ([address] 9/14/2020    History of MRSA infection     scalp/facial and on back    HTN (hypertension)     Hyperlipidemia     Impotence of organic origin     MDRO (multiple drug resistant organisms) resistance     MRSA (methicillin resistant staph aureus) culture positive 11/2021    MRSA LT HAND    Other dyspnea and respiratory abnormality     Other specified erythematous condition(695.89)     Personal history of other infectious and parasitic disease     Salmonella     Type II or unspecified type diabetes mellitus without mention of complication, uncontrolled 1994                Recent Labs     12/29/21  1538   INR 5.44*     Recent Labs     12/28/21  1929   HGB 11.0*   HCT 33.0*   *       Current warfarin drug-drug interactions:   Amiodarone: Amiodarone may enhance the anticoagulant effect of Vitamin K Antagonists. Amiodarone may increase the serum concentration of Vitamin K Antagonists. Aspirin: Patients receiving vitamin K antagonists should not take salicylate-containing medicines on an as-needed basis.  Aspirin (80 to 325 mg/day) and warfarin are used together, in selected cases and with careful monitoring, for the prevention of cardiovascular events. Date INR Warfarin Dose   12/2721 7.50 Held   12/28/21 5.44 Held   12/29/21 5.44 Held                           Daily PT/INR until stable within therapeutic range. Thank you for the consult.      Lydia Harper, PHARM D, 12/29/2021, 5:43 PM

## 2021-12-29 NOTE — PROGRESS NOTES
[facility]      [facility] Note    Patient:  Oliver Alanis  YOB: 1965  Date of Service: 12/29/2021  MRN: [id number]   Acct: [de-identified]   Primary Care Physician: Michelle Grijalva MD  Advance Directive: Full Code  Admit Date: 12/28/2021       Hospital Day: 0    Portions of this note have been copied forward, however, updated to reflect the most current clinical status of this patient. CHIEF COMPLAINT swollen legs, weight gain     SUBJECTIVE:  Mr. Soham Millard was seen in ED this am, awaiting PCU bed. Reported feeling slightly better this am. Denies SOB, chest pain, nausea, or vomiting at this time. Reported weight gain, and generalized swelling. CUMULATIVE HOSPITAL COURSE:   The patient is a 60 y.o. male with past medical history of recent CABG on 12/14/2021, CAD, DM, HTN, HLD with complaints of weight gain and swollen legs. Mr. Soham Millard reported having gradual increase in his weight since his CABG. Also reported worsening swelling to BLE. Denies dyspnea or chest pain at this time. Reported taking Lasix 20 mg at home but says it doesn't work. Workup in ED revealed K+ 5.2, Cr 1.9, GFR 37, BNP 2807, troponin 0.11, Alk Phos 472, TSH 6.1, hgb 11.0, INR 5.44, CXray cardiomegaly with pulmonary venous hypertension with early interstitial edema. Patient was admitted to hospital medicine for fluid overload. Review of Systems   Constitutional: Negative for chills, diaphoresis, fatigue and fever. HENT: Negative for congestion and ear pain. Eyes: Negative for visual disturbance. Respiratory: Negative for cough, shortness of breath and wheezing. Denies SOB   Cardiovascular: Positive for leg swelling. Negative for chest pain and palpitations. Denies chest pain    Gastrointestinal: Negative for abdominal distention, abdominal pain, blood in stool, constipation, diarrhea, nausea and vomiting. Denies n,v   Endocrine: Negative for cold intolerance and heat intolerance. Genitourinary: Negative for difficulty urinating, flank pain, frequency and urgency. Musculoskeletal: Negative for arthralgias and myalgias. Skin: Negative for color change and wound. Neurological: Negative for dizziness, syncope, weakness, light-headedness, numbness and headaches. Hematological: Does not bruise/bleed easily. Psychiatric/Behavioral: Negative for agitation, confusion and dysphoric mood. Objective:   VITALS:  BP (!) 156/85   Pulse 70   Temp 97.2 °F (36.2 °C) (Temporal)   Resp 18   Ht 5' 10\" (1.778 m)   Wt 252 lb (114.3 kg)   SpO2 97%   BMI 36.16 kg/m²   24HR INTAKE/OUTPUT:  No intake or output data in the 24 hours ending 12/29/21 1701        Physical Exam  Constitutional:       General: He is not in acute distress. Appearance: Normal appearance. He is obese. He is not ill-appearing. HENT:      Head: Normocephalic and atraumatic. Right Ear: External ear normal.      Left Ear: External ear normal.      Nose: Nose normal.      Mouth/Throat:      Mouth: Mucous membranes are moist.   Eyes:      Extraocular Movements: Extraocular movements intact. Conjunctiva/sclera: Conjunctivae normal.      Pupils: Pupils are equal, round, and reactive to light. Cardiovascular:      Rate and Rhythm: Normal rate. Rhythm irregular. Pulses: Normal pulses. Heart sounds: Normal heart sounds. Pulmonary:      Effort: Pulmonary effort is normal. No respiratory distress. Breath sounds: Normal breath sounds. No wheezing, rhonchi or rales. Abdominal:      General: Bowel sounds are normal. There is no distension. Palpations: Abdomen is soft. Tenderness: There is no abdominal tenderness. Musculoskeletal:         General: No swelling, tenderness or deformity. Normal range of motion. Cervical back: Normal range of motion and neck supple. No muscular tenderness. Right lower leg: Edema present. Left lower leg: Edema present.    Skin:     General: Skin is warm and dry. Findings: No bruising or lesion. Neurological:      Mental Status: He is alert and oriented to person, place, and time. Psychiatric:         Mood and Affect: Mood normal.         Behavior: Behavior normal.         Thought Content: Thought content normal.            Medications:      dextrose      sodium chloride        insulin lispro  5 Units SubCUTAneous TID WC    warfarin (COUMADIN) daily dosing (placeholder)   Other RX Placeholder    insulin glargine  10 Units SubCUTAneous Nightly    bumetanide  1 mg IntraVENous BID    sodium chloride flush  5-40 mL IntraVENous 2 times per day    amiodarone  200 mg Oral BID    aspirin EC  81 mg Oral Daily    atorvastatin  20 mg Oral Daily    metoprolol tartrate  25 mg Oral BID    tamsulosin  0.8 mg Oral Daily    insulin lispro  0-12 Units SubCUTAneous TID WC    insulin lispro  0-6 Units SubCUTAneous Nightly    ferrous sulfate  325 mg Oral TID WC     perflutren lipid microspheres, oxyCODONE, glucose, dextrose, glucagon (rDNA), dextrose, sodium chloride flush, sodium chloride, ondansetron **OR** ondansetron, polyethylene glycol, acetaminophen **OR** acetaminophen, guaiFENesin, hydrALAZINE  ADULT DIET; Regular; 3 carb choices (45 gm/meal);  Low Fat/Low Chol/High Fiber/PACHECO; 1000 ml     Lab and other Data:     Recent Labs     12/28/21 1929   WBC 10.8   HGB 11.0*   *     Recent Labs     12/28/21 1929   *   K 5.2*   CL 94*   CO2 23   BUN 42*   CREATININE 1.9*   GLUCOSE 123*     Recent Labs     12/28/21 1929   AST 23   ALT 37   BILITOT 0.5   ALKPHOS 472*     Troponin T:   Recent Labs     12/28/21 1929 12/29/21  0931   TROPONINI 0.11* 0.10*     INR:   Recent Labs     12/27/21  0000 12/28/21 1929   INR 7.50 5.44*     UA:  Recent Labs     12/28/21 2051   COLORU YELLOW   PHUR 5.0   CLARITYU Clear   SPECGRAV 1.010   LEUKOCYTESUR Negative   UROBILINOGEN 0.2   BILIRUBINUR Negative   BLOODU Negative   GLUCOSEU Negative         RAD: XR CHEST PORTABLE  Result Date: 12/28/2021    1. . Cardiomegaly with pulmonary venous hypertension with early interstitial edema. 2. There is some elevation of the left diaphragm as well as development of a left-sided effusion with left basilar atelectasis. Signed by Dr Luis Angel Garcia:    Chemo Marino- negative       Assessment/Plan   Principal Problem:    Fluid overload  Active Problems:    HTN (hypertension)    Hyperlipidemia    Type 2 diabetes mellitus with diabetic polyneuropathy, with long-term current use of insulin (HCC)    Permanent atrial fibrillation (HCC)    Supratherapeutic INR    Renal insufficiency  Resolved Problems:    * No resolved hospital problems. *    Principal Problem:    Fluid overload-    - Recent ECHO on 10/2021 showed no CHF    - patient is s/p CABG 12/14/2021, repeat ECHO    - Continue IV Bumex    - Fluid restriction    - Monitor I's and O's closely    - Monitor labs closely         Active Problems:    Renal insufficiency-   - IV Bumex               - Monitor I's and O's closely              - Monitor labs closely              - Avoid hypotension              - Avoid nephrotoxic agents       Supratherapeutic INR- hold home Coumadin, monitor daily INR, pharmacy to dose Coumadin as appropriate       Permanent atrial fibrillation ([facility] [address]- noted, continue current medications       HTN (hypertension)- elevated at times, continue current medications, monitor BP closely       Hyperlipidemia- noted, continue home medications       Type 2 diabetes mellitus with diabetic polyneuropathy, with long-term current use of insulin (HCC)- Lantus, SSI, Accu-Chek, hypoglycemia treatment protocol in place          DVT Prophylaxis: SCDs, Coumadin on hold due to supra-therapeutic INR      Discharge planning: TBD        Further Orders per Clinical course/attending.      Electronically signed by NOMI Deshpande CNP on 12/29/2021 at 5:01 PM       EMR Dragon/Transcription disclaimer:   Much of this encounter note is an electronic transcription/translation of spoken language to printed text.  The electronic translation of spoken language may permit erroneous, or at times, nonsensical words or phrases to be inadvertently transcribed; although attempts have made to review the note for such errors, some may still exist.

## 2021-12-29 NOTE — ED NOTES
Patient given food tray and advised on plan of care and wait for PCU bed.       Stacy Thornton RN  12/29/21 0082

## 2021-12-29 NOTE — ED PROVIDER NOTES
[facility] EMERGENCY DEPT  EMERGENCY DEPARTMENT ENCOUNTER      Pt Name: Giovanni Jones  MRN: [id number]  [place] 1965  Date of evaluation: 12/28/2021  Provider: Aaron Pierre MD    [address] [place] [address]       Chief Complaint   Patient presents with    Abnormal Lab     PT/INR    Weight Gain     30lb since Dec 10, CABG recently         HISTORY OF PRESENT ILLNESS   (Location/Symptom, Timing/Onset, Context/Setting, Quality, Duration, Modifying Factors, Severity)  Note limiting factors. Giovanni Jones is a 60 y.o. male who presents to the emergency department        Shortness of Breath  Severity:  Moderate  Onset quality:  Gradual  Timing:  Constant  Progression:  Worsening  Chronicity:  New  Context comment:  Recent CABG  Relieved by:  Nothing  Worsened by:  Exertion  Ineffective treatments:  None tried  Associated symptoms: cough    Associated symptoms: no abdominal pain, no chest pain, no fever, no sputum production and no wheezing        Nursing Notes were reviewed. REVIEW OF SYSTEMS    (2-9 systems for level 4, 10 or more for level 5)     Review of Systems   Constitutional: Negative for fever. HENT: Negative for congestion. Respiratory: Positive for cough and shortness of breath. Negative for sputum production, chest tightness and wheezing. Cardiovascular: Positive for leg swelling. Negative for chest pain. Gastrointestinal: Negative for abdominal pain. All other systems reviewed and are negative. Except as noted above the remainder of the review of systems was reviewed and negative.        PAST MEDICAL HISTORY     Past Medical History:   Diagnosis Date    Acalculous cholecystitis 12/29/2015    Allergic rhinitis, cause unspecified     Atrial fibrillation, chronic (HCC)     sees [facility] cardiology    Bone spur     bilateral elbows    CAD (coronary artery disease)     Charcot's joint of right foot     Chicken pox     Closed supracondylar fracture of elbow     fall    Diabetic ulcer of right midfoot associated with type 2 diabetes mellitus, with fat layer exposed ([address] 9/14/2020    History of MRSA infection     scalp/facial and on back    HTN (hypertension)     Hyperlipidemia     Impotence of organic origin     MDRO (multiple drug resistant organisms) resistance     MRSA (methicillin resistant staph aureus) culture positive 11/2021    MRSA LT HAND    Other dyspnea and respiratory abnormality     Other specified erythematous condition(695.89)     Personal history of other infectious and parasitic disease     Salmonella     Type II or unspecified type diabetes mellitus without mention of complication, uncontrolled 1994         SURGICAL HISTORY       Past Surgical History:   Procedure Laterality Date    CARDIAC CATHETERIZATION      CARDIOVERSION  03/2017    CATARACT REMOVAL      COLONOSCOPY  02/24/2020    Dr Lex Fraire-Melanosis coli throughout the entire colon-AP, 5 yr recall    CORONARY ARTERY BYPASS GRAFT MAZE PROCEDURE N/A 12/14/2021    CORONARY ARTERY BYPASS GRAFT X3 WITH LEFT INTERNAL MAMMARY ARTERY WITH ENDOSCOPIC VEIN HARVESTING WITH PERFUSION, TRANSESOPHAGEAL ECHOCARDIOGRAM, BIATRIAL RODRIGUES-MAZE RF AND CRYOABLATIONAND ATRICLIP performed by Corrinne Makua, MD at [address]      HAND SURGERY Left 10/31/2021    HAND INCISION AND DRAINAGE Partial Amputation Middle Finger performed by Manuela Avila MD at [address] [facility] Left 9/17/2020    OPEN REDUCTION INTERNAL FIXATION LEFT SUPRACONDYLAR FRACTURE performed by Christophe Jackson MD at [facility] OR    MALIGNANT SKIN LESION EXCISION      X 2    TONSILLECTOMY           CURRENT MEDICATIONS       Previous Medications    AMIODARONE (CORDARONE) 200 MG TABLET    Take 1 tablet by mouth 2 times daily    ASPIRIN EC 81 MG EC TABLET    Take 1 tablet by mouth daily    ATORVASTATIN (LIPITOR) 20 MG TABLET    TAKE ONE TABLET DAILY     BLOOD GLUCOSE MONITOR KIT AND SUPPLIES    Dispense sufficient amount for indicated testing frequency plus additional to accommodate PRN testing needs. BLOOD GLUCOSE MONITOR STRIPS    Test 3 times a day & as needed for symptoms of irregular blood glucose. Dispense sufficient amount for indicated testing frequency plus additional to accommodate PRN testing needs. BLOOD GLUCOSE TEST STRIPS (CONTOUR NEXT TEST) STRIP    1 each by In Vitro route 4 times daily As needed. CLONIDINE (CATAPRES) 0.1 MG TABLET    Take 1 tablet by mouth 2 times daily    DIPHENHYDRAMINE (BENADRYL) 25 MG TABLET    Take 25 mg by mouth every 6 hours as needed for Itching    FUROSEMIDE (LASIX) 20 MG TABLET    Take 20 mg by mouth daily as needed    GUAIFENESIN (MUCINEX) 600 MG EXTENDED RELEASE TABLET    Take 1 tablet by mouth 2 times daily    INSULIN DEGLUDEC (TRESIBA FLEXTOUCH) 100 UNIT/ML SOPN    Inject into the skin PT TAKES ACCORDING TO ACCUCHEK. DOES NOT TAKE  AND UNDER AT HS. INSULIN LISPRO (HUMALOG KWIKPEN) 200 UNIT/ML SOPN PEN    Sliding scale 160-179 2 units  180-199 3 units  200-219 4 units  220-239 5 units  240-259 6 units   260-279 7 units  280-299 8 units  300-399 9 units  340-379 10 units  380-420 11 units    INSULIN PEN NEEDLE 32G X 4 MM MISC    1 each by Does not apply route daily    LANCETS MISC    1 each by Does not apply route 3 times daily (with meals)    LOSARTAN (COZAAR) 100 MG TABLET    Take 100 mg by mouth daily Indications: High Blood Pressure Disorder    METFORMIN (GLUCOPHAGE) 1000 MG TABLET    Take 1 tablet by mouth 2 times daily (with meals)    METOPROLOL TARTRATE (LOPRESSOR) 25 MG TABLET    TAKE ONE TABLET TWICE DAILY    OXYCODONE (ROXICODONE) 5 MG IMMEDIATE RELEASE TABLET    Take 1 tablet by mouth every 6 hours as needed for Pain for up to 14 days.     TAMSULOSIN (FLOMAX) 0.4 MG CAPSULE    Take 2 capsules by mouth daily    VITAMIN D (ERGOCALCIFEROL) 1.25 MG (92285 UT) CAPS CAPSULE    Take 50,000 Units by mouth once a week    WARFARIN (COUMADIN) 10 MG TABLET    Take 10 mg by mouth See Admin Instructions 10 MG ON Saturday, Monday, Tuesday, Wednesday, Friday  15 MG ON Sunday & THURSDAY       ALLERGIES     Contrast [iodides], Neomycin-bacitracin zn-polymyx, Neosporin [neomycin-polymyxin-gramicidin], and Pcn [penicillins]    FAMILY HISTORY       Family History   Problem Relation Age of Onset    Stroke Maternal Grandmother     Cancer Maternal Grandmother     Stroke Paternal Grandmother     Diabetes Paternal Grandmother     Cancer Father     Heart Disease Father     Lung Cancer Father     Asthma Paternal Grandfather     Heart Disease Paternal Grandfather     Heart Disease Maternal Grandfather     Colon Cancer Neg Hx     Colon Polyps Neg Hx     Esophageal Cancer Neg Hx     Liver Cancer Neg Hx     Liver Disease Neg Hx     Rectal Cancer Neg Hx     Stomach Cancer Neg Hx           SOCIAL HISTORY       Social History     Socioeconomic History    Marital status:      Spouse name: None    Number of children: None    Years of education: None    Highest education level: None   Occupational History    None   Tobacco Use    Smoking status: Never Smoker    Smokeless tobacco: Never Used   Vaping Use    Vaping Use: Never used   Substance and Sexual Activity    Alcohol use: No    Drug use: No    Sexual activity: None   Other Topics Concern    None   Social History Narrative    Referred to  for assistance with Meals on Wheels     Social Determinants of Health     Financial Resource Strain: Low Risk     Difficulty of Paying Living Expenses: Not hard at all   Food Insecurity: No Food Insecurity    Worried About Running Out of Food in the Last Year: Never true    Zoran of Food in the Last Year: Never true   Transportation Needs:     Lack of Transportation (Medical): Not on file    Lack of Transportation (Non-Medical):  Not on file   Physical Activity:     Days of Exercise per Week: Not on file    Minutes of Exercise per Session: Not on file   Stress:     Feeling of Stress : Not on file   Social Connections:     Frequency of Communication with Friends and Family: Not on file    Frequency of Social Gatherings with Friends and Family: Not on file    Attends Buddhism Services: Not on file    Active Member of Clubs or Organizations: Not on file    Attends Club or Organization Meetings: Not on file    Marital Status: Not on file   Intimate Partner Violence:     Fear of Current or Ex-Partner: Not on file    Emotionally Abused: Not on file    Physically Abused: Not on file    Sexually Abused: Not on file   Housing Stability:     Unable to Pay for Housing in the Last Year: Not on file    Number of [place] in the Last Year: Not on file    Unstable Housing in the Last Year: Not on file       SCREENINGS                               CIWA Assessment  BP: (!) 143/75  Pulse: 62                 PHYSICAL EXAM    (up to 7 for level 4, 8 or more for level 5)     ED Triage Vitals [12/28/21 1838]   BP Temp Temp Source Pulse Resp SpO2 Height Weight   (!) 143/75 97.8 °F (36.6 °C) Temporal 62 17 93 % -- 259 lb (117.5 kg)       Physical Exam  Vitals reviewed. Constitutional:       General: He is not in acute distress. Appearance: He is obese. He is not ill-appearing. HENT:      Head: Normocephalic and atraumatic. Nose: Nose normal. No congestion. Mouth/Throat:      Mouth: Mucous membranes are moist.   Eyes:      Extraocular Movements: Extraocular movements intact. Conjunctiva/sclera: Conjunctivae normal.      Pupils: Pupils are equal, round, and reactive to light. Cardiovascular:      Rate and Rhythm: Normal rate and regular rhythm. Pulses: Normal pulses. Pulmonary:      Effort: Pulmonary effort is normal. No respiratory distress. Abdominal:      Palpations: Abdomen is soft. Musculoskeletal:         General: Swelling present. No tenderness. Cervical back: Normal range of motion and neck supple. No rigidity.       Right lower leg: Edema present. Left lower leg: Edema present. Skin:     General: Skin is warm. Capillary Refill: Capillary refill takes less than 2 seconds. Neurological:      General: No focal deficit present. Mental Status: He is alert and oriented to person, place, and time. Cranial Nerves: No cranial nerve deficit. Sensory: No sensory deficit. Psychiatric:         Mood and Affect: Mood normal.         DIAGNOSTIC RESULTS     EKG: All EKG's are interpreted by the Emergency Department Physician who either signs or Co-signs this chart in the absence of a cardiologist.    Rate 71. Irregular rhythm. Normal axis. Normal QTc interval.  No acute ischemic changes. RADIOLOGY:   Non-plain film images such as CT, Ultrasound and MRI are read by the radiologist. Plain radiographic images are visualized and preliminarily interpreted by the emergency physician with the below findings:      Left-sided pleural effusion.     Interpretation per the Radiologist below, if available at the time of this note:    XR CHEST PORTABLE    (Results Pending)         ED BEDSIDE ULTRASOUND:   Performed by ED Physician - none    LABS:  Labs Reviewed   CBC WITH AUTO DIFFERENTIAL - Abnormal; Notable for the following components:       Result Value    RBC 3.43 (*)     Hemoglobin 11.0 (*)     Hematocrit 33.0 (*)     MCV 96.2 (*)     MCH 32.1 (*)     Platelets 959 (*)     Neutrophils % 77.3 (*)     Lymphocytes % 13.8 (*)     Neutrophils Absolute 8.3 (*)     All other components within normal limits   COMPREHENSIVE METABOLIC PANEL W/ REFLEX TO MG FOR LOW K - Abnormal; Notable for the following components:    Sodium 131 (*)     Potassium reflex Magnesium 5.2 (*)     Chloride 94 (*)     Glucose 123 (*)     BUN 42 (*)     CREATININE 1.9 (*)     GFR Non- 37 (*)     GFR African American 44 (*)     Alkaline Phosphatase 472 (*)     All other components within normal limits   TROPONIN - Abnormal; Notable for the following components:    Troponin 0.11 (*)     All other components within normal limits    Narrative:     CALL  [place]  [facility] tel. ,  Chemistry results called to and read back by Ozzy Tyler in ED, 12/28/2021  20:56, by [address] [place] [address] - Abnormal; Notable for the following components:    Pro-BNP 2,807 (*)     All other components within normal limits   PROTIME-INR - Abnormal; Notable for the following components:    Protime 47.9 (*)     INR 5.44 (*)     All other components within normal limits    Narrative:     CALL  [place]  [facility] tel. ,  Coag results called to and read back by rosette/diane er, 12/28/2021 20:56, by  [facility]   APTT - Abnormal; Notable for the following components:    aPTT 67.6 (*)     All other components within normal limits    Narrative:     CALL  [place]  [facility] tel. ,  Coag results called to and read back by rosette/diane er, 12/28/2021 20:56, by  [facility]   COVID-19, RAPID   URINE RT REFLEX TO CULTURE   BASIC METABOLIC PANEL   MAGNESIUM       All other labs were within normal range or not returned as of this dictation. EMERGENCY DEPARTMENT COURSE and DIFFERENTIAL DIAGNOSIS/MDM:   Vitals:    Vitals:    12/28/21 1838 12/28/21 1918   BP: (!) 143/75    Pulse: 62 62   Resp: 17    Temp: 97.8 °F (36.6 °C)    TempSrc: Temporal    SpO2: 93%    Weight: 259 lb (117.5 kg)            MDM  Number of Diagnoses or Management Options  Acute congestive heart failure, unspecified heart failure type [facility])  Diagnosis management comments: Patient is a 57-year-old gentleman who presents with concerns of increased weight gain and shortness of breath. Recent CABG. No chest pain. He has gained over 25 pounds over the past couple weeks. BNP is elevated. Chest x-ray shows left pleural effusion. Exam is indicative of volume overload. We will admit for further evaluation and diuresis.        Amount and/or Complexity of Data Reviewed  Clinical lab tests: ordered and reviewed  Tests in the radiology section of CPT®: ordered and reviewed    Patient Progress  Patient progress: stable        REASSESSMENT          CRITICAL CARE TIME   Total Critical Care time was 0 minutes, excluding separately reportable procedures. There was a high probability of clinically significant/life threatening deterioration in the patient's condition which required my urgent intervention. CONSULTS:  IP CONSULT TO HEART FAILURE NURSE/COORDINATOR  IP CONSULT TO DIETITIAN    PROCEDURES:  Unless otherwise noted below, none     Procedures        FINAL IMPRESSION      1. Acute congestive heart failure, unspecified heart failure type [facility])          DISPOSITION/PLAN   DISPOSITION Admitted 12/28/2021 09:30:51 PM      PATIENT REFERRED TO:  No follow-up provider specified. DISCHARGE MEDICATIONS:  New Prescriptions    No medications on file     Controlled Substances Monitoring:     No flowsheet data found.     (Please note that portions of this note were completed with a voice recognition program.  Efforts were made to edit the dictations but occasionally words are mis-transcribed.)    Kulwant Gutierrez MD (electronically signed)  Attending Emergency Physician            Kulwant Gutierrez MD  12/28/21 2750

## 2021-12-29 NOTE — H&P
History and Physical    Patient Name:  Eryn Reed    :  1965    Chief Complaint:   Swollen legs, weight gain    History of Present Illness:   Eryn Reed presents to [facility] with 30 lb weight gain since his CABG 12/10. Pt complains of abdominal distention as well. Denies dyspnea but complains of dry cough. No chest pain, no palpitations, no N/V/D, no abdominal pain, no urinary issues, no fever or chills. Pt takes lasix 20 mg at home but says it doesn't work. Past Medical History:   has a past medical history of Acalculous cholecystitis, Allergic rhinitis, cause unspecified, Atrial fibrillation, chronic ([address] Bone spur, CAD (coronary artery disease), Charcot's joint of right foot, Chicken pox, Closed supracondylar fracture of elbow, Diabetic ulcer of right midfoot associated with type 2 diabetes mellitus, with fat layer exposed ([address] History of MRSA infection, HTN (hypertension), Hyperlipidemia, Impotence of organic origin, MDRO (multiple drug resistant organisms) resistance, MRSA (methicillin resistant staph aureus) culture positive, Other dyspnea and respiratory abnormality, Other specified erythematous condition(695.89), Personal history of other infectious and parasitic disease, Salmonella, and Type II or unspecified type diabetes mellitus without mention of complication, uncontrolled. Surgical History:   has a past surgical history that includes Tonsillectomy; malignant skin lesion excision; Cardioversion (2017); Colonoscopy (2020); Humerus fracture surgery (Left, 2020); Cataract removal; Hand surgery (Left, 10/31/2021); eye surgery; Cardiac catheterization; and Coronary Artery Bypass Graft Maze Procedure (N/A, 2021). Social History:   reports that he has never smoked. He has never used smokeless tobacco. He reports that he does not drink alcohol and does not use drugs.      Family History:  family history includes Asthma in his paternal grandfather; Cancer in his father and maternal grandmother; Diabetes in his paternal grandmother; Heart Disease in his father, maternal grandfather, and paternal grandfather; Prudence Brace in his father; Stroke in his maternal grandmother and paternal grandmother. Medications:  Prior to Admission medications    Medication Sig Start Date End Date Taking? Authorizing Provider   cloNIDine (CATAPRES) 0.1 MG tablet Take 1 tablet by mouth 2 times daily 12/23/21   Gabbi Bess MD   metoprolol tartrate (LOPRESSOR) 25 MG tablet TAKE ONE TABLET TWICE DAILY  Patient taking differently: 50 mg 2 times daily  12/21/21   NOMI Palafox   oxyCODONE (ROXICODONE) 5 MG immediate release tablet Take 1 tablet by mouth every 6 hours as needed for Pain for up to 14 days. 12/18/21 1/1/22  Chele Cardoso MD   amiodarone (CORDARONE) 200 MG tablet Take 1 tablet by mouth 2 times daily 12/18/21   Chele Cardoso MD   guaiFENesin [facility]) 600 MG extended release tablet Take 1 tablet by mouth 2 times daily 12/18/21   Chele Cardoso MD   losartan (COZAAR) 100 MG tablet Take 100 mg by mouth daily Indications: High Blood Pressure Disorder    Historical Provider, MD   furosemide (LASIX) 20 MG tablet Take 20 mg by mouth daily as needed    Historical Provider, MD   warfarin (COUMADIN) 10 MG tablet Take 10 mg by mouth See Admin Instructions 10 MG ON Saturday, Monday, Tuesday, Wednesday, Friday  15 MG ON Sunday & THURSDAY    Historical Provider, MD   metFORMIN (GLUCOPHAGE) 1000 MG tablet Take 1 tablet by mouth 2 times daily (with meals) 11/26/21 12/26/21  Himanshu Sullivan MD   aspirin EC 81 MG EC tablet Take 1 tablet by mouth daily 11/23/21   Himanshu Sullivan MD   Insulin Degludec (TRESIBA FLEXTOUCH) 100 UNIT/ML SOPN Inject into the skin PT TAKES ACCORDING TO ACCUCHEK. DOES NOT TAKE  AND UNDER AT HS.     Historical Provider, MD   atorvastatin (LIPITOR) 20 MG tablet TAKE ONE TABLET DAILY   Patient taking differently: Take 20 mg by mouth daily  9/29/21   Gabbi Bess MD   tamsulosin (FLOMAX) 0.4 MG capsule Take 2 capsules by mouth daily 9/15/21   NOMI Chowdary - CNP   blood glucose monitor strips Test 3 times a day & as needed for symptoms of irregular blood glucose. Dispense sufficient amount for indicated testing frequency plus additional to accommodate PRN testing needs. 8/1/21   Gabbi Bess MD   Insulin Pen Needle 32G X 4 MM MISC 1 each by Does not apply route daily 6/14/21   Gabbi Bess MD   blood glucose monitor kit and supplies Dispense sufficient amount for indicated testing frequency plus additional to accommodate PRN testing needs. 5/12/21   Gabbi Bess MD   Lancets MISC 1 each by Does not apply route 3 times daily (with meals) 5/12/21   Gabbi Bess MD   insulin lispro (HUMALOG KWIKPEN) 200 UNIT/ML SOPN pen Sliding scale 160-179 2 units  180-199 3 units  200-219 4 units  220-239 5 units  240-259 6 units   260-279 7 units  280-299 8 units  300-399 9 units  340-379 10 units  380-420 11 units 5/11/21   Gabbi Bess MD   vitamin D (ERGOCALCIFEROL) 1.25 MG (68690 UT) CAPS capsule Take 50,000 Units by mouth once a week    Historical Provider, MD   diphenhydrAMINE (BENADRYL) 25 MG tablet Take 25 mg by mouth every 6 hours as needed for Itching    Historical Provider, MD   blood glucose test strips (CONTOUR NEXT TEST) strip 1 each by In Vitro route 4 times daily As needed. 11/21/19   Gabbi Bess MD       Allergies:  Contrast [iodides], Neomycin-bacitracin zn-polymyx, Neosporin [neomycin-polymyxin-gramicidin], and Pcn [penicillins]     Review of Systems:   · Constitutional: there has been unanticipated weight gain. There's been no change in energy level, sleep pattern, or activity level. · Eyes: No visual changes or diplopia. No scleral icterus. · ENT: No Headaches, hearing loss or vertigo. No mouth sores or sore throat.   · Cardiovascular: No chest pain, palpitations or loss of consciousness. No pleuritic pain or phlebitis. No orthopnea, PND, has severe peripheral edema. · Respiratory: No cough or wheezing, no sputum production. No hemoptysis. No dyspnea     · Gastrointestinal: No abdominal pain, appetite loss, blood in stools. No change in bowel habits. No N/V. No blood per rectum. · Genitourinary: No dysuria, trouble voiding, or hematuria. · Musculoskeletal:  No gait disturbance, weakness or joint complaints. · Integumentary: No rash or pruritis. · Neurological: No headache, diplopia, change in muscle strength, numbness or tingling. No change in gait, balance, coordination. · Psychiatric: No anxiety, or depression. · Endocrine: No temperature intolerance. No excessive thirst, fluid intake, or urination. No tremor. · Hematologic/Lymphatic: No abnormal bruising or bleeding, blood clots or swollen lymph nodes. · Allergic/Immunologic: No nasal congestion or hives. Physical Examination:    Vital Signs: BP (!) 148/81   Pulse 61   Temp 97.8 °F (36.6 °C) (Temporal)   Resp 17   Wt 259 lb (117.5 kg)   SpO2 93%   BMI 37.16 kg/m²   General appearance: Well preserved, mesomorphic body habitus, alert, no distress. Skin: Skin color, texture, turgor normal. No rashes or lesions. No induration or tightening palpated. Head: Normocephalic. No masses, lesions, tenderness or abnormalities  Eyes: conjunctivae/corneas clear. EOM's intact. Sclera non icteric. Ears: External ears normal.  Hearing normal to finger rub. Nose/Sinuses: Nares normal. Septum midline. Mucosa normal. No drainage or sinus tenderness. Oropharynx: Lips, mucosa, and tongue normal. Oropharynx clear with no exudate seen. Neck: Neck supple, and symmetric. No adenopathy. Trachea is midline. Carotids brisk in upstroke without bruits, No abnormal JVP noted at 45°. Lungs: Lungs clear to auscultation bilaterally. No retractions or use of accessory muscles. No vocal fremitus.  No ronchi, crackle or rale.  Heart:  S1 > S2. Regular rate and rhythm. No gallop, murmur, rub, palpable thrill or heave noted. Abdomen: Abdomen soft, non-tender, distended. BS normal. No masses, organomegaly. No hernia noted. Extremities: Extremities normal. Pitting edema BL LEs. No cyanosis or clubbing noted to the nails. Peripheral pulses 4/4. Musculoskeletal: Spine ROM normal. Muscular strength intact. Neuro: Cranial nerves intact. Motor: Strength 5/5 in all extremities. No focal weakness. Sensory: grossly normal to touch. Pertinent Labs:  CBC:   Recent Labs     12/28/21 1929   WBC 10.8   RBC 3.43*   HGB 11.0*   HCT 33.0*   MCV 96.2*   MCH 32.1*   MCHC 33.3   RDW 13.1   *   MPV 10.1     BMP:   Recent Labs     12/28/21 1929   *   K 5.2*   CL 94*   CO2 23   BUN 42*   CREATININE 1.9*   GLUCOSE 123*   CALCIUM 8.9     ABGs: No results for input(s): PO2, PCO2, PH, HCO3, BE, O2SAT in the last 72 hours.   INR:   Recent Labs     12/27/21  0000 12/28/21 1929   INR 7.50 5.44*   PROTIME  --  47.9*     BNP:  No results found for: BNP  TSH:   Lab Results   Component Value Date    TSH 6.100 (H) 12/28/2021     Cardiac Injury Profile:   Lab Results   Component Value Date    TROPONINI 0.11 ([facility]) 12/28/2021     Lipid Profile: No components found for: CHLPL  Lab Results   Component Value Date    TRIG 93 11/08/2021    TRIG 104 09/27/2021    TRIG 95 08/05/2021     Lab Results   Component Value Date    HDL 32 (L) 11/08/2021    HDL 35 (L) 09/27/2021    HDL 38 (L) 08/05/2021     Lab Results   Component Value Date    LDLCALC 60 11/08/2021    LDLCALC 49 09/27/2021    LDLCALC 57 08/05/2021     No results found for: LABVLDL  Hemoglobin A1C:   Lab Results   Component Value Date    LABA1C 8.0 (H) 12/13/2021     No results found for: EAG      Assessment/Plan:  · Volume overload with interstitial edema on CXR- echo 10/21 shows no CHF- pt had CABG in 12/21- repeat echo now- IV diuresis, fluid restriction   · Renal insuffiencey rule out retention and uti    · Chronic anticoagulation - supra therapeutic INR- hold warfarin - INR daily   Patient Active Problem List:     HTN (hypertension)- home meds     Hyperlipidemia- home meds     Type 2 diabetes mellitus with diabetic polyneuropathy, with long-term current use of insulin- ISS     Permanent [facility] fibrillation ([facility]- home meds     Obstructive sleep apnea     Proliferative diabetic retinopathy of both eyes without macular edema associated with type 2 diabetes mellitus ([facility]     Amputation of left middle finger     CAD in native artery                 I have reviewed my findings and recommendations in detail with Domonique Yang MD

## 2021-12-29 NOTE — ED NOTES
Patient POC-220. Patient given breakfast tray, and diet 7up. Patient reports not being \"checked on\" in 7 hours, and states that he did not have his blood sugar taken or vitals taken.  I explained to the patient that we are severely understaffed, patient understood and appreciated my explanation      Casa Nj RN  12/29/21 0538 Recurrent boils axillae and inguinal for a few weeks  Notes bs's running on the 400 range  Hx htn on meds as well  Painful occ draing lesion r scalp for 3 months  On once daily lantus and metformin    RN's note was reviewed.  Past Medical History:   Diagnosis Date   • Abdominal wall hernia 1999    reducible umbilical, reducible incisional, incarcerated incisional    • Anxiety    • Bite of other animal except arthropod 05/15/2007    infected, lateral mid-forearm   • Centrilobular emphysema (CMS/HCC) 2014    Noted on CT 10/23/14;   • DDD lumbar spine     L5-S1,    • Depression    • Lumbago     chronic   • Other and unspecified hyperlipidemia 2009   • Pancreatitis 3/6/2012   • Type II or unspecified type diabetes mellitus without mention of complication, not stated as uncontrolled    • Unspecified essential hypertension    • Unspecified vitamin D deficiency 2009       Past Surgical History:   Procedure Laterality Date   • ABDOMINAL HERNIA REPAIR  1999    reducible umbilical, reducible incisional, incarcerated incisional    • CARDIAC DRUG STRESS TEST  5/11/10    Dr Julio Davis - NORMAL stress thallium   •  DELIVERY ONLY      4 c-sections   • COLONOSCOPY REMOVE LESION BY SNARE      2 polyps removed  [facility]   • COLONOSCOPY REMOVE LESION BY SNARE  2006    2 polyps (3-4mm), hyperplastic   • COLONOSCOPY W BIOPSY  2012    adenomatous poilyps - next due in 2 years due to poor prep - Dr chambers   • COLONOSCOPY W BIOPSY  2015    Tubular adenoma polyps - 1 year f/u colon recommended - Dr Chambers   • COLONOSCOPY W BIOPSY  2016    tubular adenoma -- repeat colonoscopy recommened due to poor prep - Dr Chambers   • COLONOSCOPY W BIOPSY  2016    tubular adenoma and hyperplastic polyps - next exam due in 3 years - Dr Chambers   • ESOPHAGOGASTRODUODENOSCOPY TRANSORAL FLEX W/BX SINGLE OR MULT  2010    egd - Dr Chambers   • GYNECOLOGIC CRYOSURGERY     • HERNIA REPAIR   2/2015   • LAP REPAIR RECUR INC HERNIA INCARC / SAMMY  12/4/2014    Dr. Alexandro Bowles - laparoscopic assisted ventral hernia repair   • REPAIR ING HERNIA,5+Y/O,REDUCIBL  2001   • TOTAL ABDOM HYSTERECTOMY  2002    fibroids, b/l salpingo-oophorectomy     meds reviewed  O: hidradenitis axillae and inguinal  doxycyoine for 2 weeks  Get bs's uner control better  See pmd within 2 weeks  htn  Controlled    Type 2 diabetes, not well controlled  Advised to contact pmd about possible more intense insulin therapy  See her within 2 weeks    Alexandr  See jaylene corley for  6 weeks  See pmd in 2 weeks  To re-assess

## 2021-12-30 LAB
ALBUMIN SERPL-MCNC: 3.8 G/DL (ref 3.5–5.2)
ALP BLD-CCNC: 430 U/L (ref 40–130)
ALT SERPL-CCNC: 29 U/L (ref 5–41)
ANION GAP SERPL CALCULATED.3IONS-SCNC: 16 MMOL/L (ref 7–19)
AST SERPL-CCNC: 17 U/L (ref 5–40)
BASOPHILS ABSOLUTE: 0.1 K/UL (ref 0–0.2)
BASOPHILS RELATIVE PERCENT: 0.8 % (ref 0–1)
BILIRUB SERPL-MCNC: 0.6 MG/DL (ref 0.2–1.2)
BUN BLDV-MCNC: 34 MG/DL (ref 6–20)
CALCIUM SERPL-MCNC: 9.4 MG/DL (ref 8.6–10)
CHLORIDE BLD-SCNC: 95 MMOL/L (ref 98–111)
CO2: 25 MMOL/L (ref 22–29)
CREAT SERPL-MCNC: 2 MG/DL (ref 0.5–1.2)
EKG P AXIS: NORMAL DEGREES
EKG P AXIS: NORMAL DEGREES
EKG P-R INTERVAL: NORMAL MS
EKG P-R INTERVAL: NORMAL MS
EKG Q-T INTERVAL: 392 MS
EKG Q-T INTERVAL: 406 MS
EKG QRS DURATION: 100 MS
EKG QRS DURATION: 104 MS
EKG QTC CALCULATION (BAZETT): 421 MS
EKG QTC CALCULATION (BAZETT): 435 MS
EKG T AXIS: -170 DEGREES
EKG T AXIS: 48 DEGREES
EOSINOPHILS ABSOLUTE: 0.2 K/UL (ref 0–0.6)
EOSINOPHILS RELATIVE PERCENT: 1.6 % (ref 0–5)
GFR AFRICAN AMERICAN: 42
GFR NON-AFRICAN AMERICAN: 34
GLUCOSE BLD-MCNC: 116 MG/DL (ref 70–99)
GLUCOSE BLD-MCNC: 165 MG/DL (ref 74–109)
GLUCOSE BLD-MCNC: 180 MG/DL (ref 70–99)
GLUCOSE BLD-MCNC: 189 MG/DL (ref 70–99)
GLUCOSE BLD-MCNC: 263 MG/DL (ref 70–99)
HCT VFR BLD CALC: 32.2 % (ref 42–52)
HEMOGLOBIN: 10.2 G/DL (ref 14–18)
IMMATURE GRANULOCYTES #: 0 K/UL
INR BLD: 4.23 (ref 0.88–1.18)
LYMPHOCYTES ABSOLUTE: 1.5 K/UL (ref 1.1–4.5)
LYMPHOCYTES RELATIVE PERCENT: 16 % (ref 20–40)
MAGNESIUM: 1.5 MG/DL (ref 1.6–2.6)
MCH RBC QN AUTO: 30.8 PG (ref 27–31)
MCHC RBC AUTO-ENTMCNC: 31.7 G/DL (ref 33–37)
MCV RBC AUTO: 97.3 FL (ref 80–94)
MONOCYTES ABSOLUTE: 0.7 K/UL (ref 0–0.9)
MONOCYTES RELATIVE PERCENT: 7.1 % (ref 0–10)
NEUTROPHILS ABSOLUTE: 7.1 K/UL (ref 1.5–7.5)
NEUTROPHILS RELATIVE PERCENT: 74.1 % (ref 50–65)
PDW BLD-RTO: 12.8 % (ref 11.5–14.5)
PERFORMED ON: ABNORMAL
PLATELET # BLD: 513 K/UL (ref 130–400)
PMV BLD AUTO: 9.6 FL (ref 9.4–12.4)
POTASSIUM REFLEX MAGNESIUM: 4.4 MMOL/L (ref 3.5–5)
PROTHROMBIN TIME: 39.6 SEC (ref 12–14.6)
RBC # BLD: 3.31 M/UL (ref 4.7–6.1)
SODIUM BLD-SCNC: 136 MMOL/L (ref 136–145)
TOTAL PROTEIN: 6.6 G/DL (ref 6.6–8.7)
TROPONIN: 0.08 NG/ML (ref 0–0.03)
WBC # BLD: 9.6 K/UL (ref 4.8–10.8)

## 2021-12-30 PROCEDURE — 85025 COMPLETE CBC W/AUTO DIFF WBC: CPT

## 2021-12-30 PROCEDURE — 6360000002 HC RX W HCPCS: Performed by: HOSPITALIST

## 2021-12-30 PROCEDURE — 6370000000 HC RX 637 (ALT 250 FOR IP): Performed by: NURSE PRACTITIONER

## 2021-12-30 PROCEDURE — 99024 POSTOP FOLLOW-UP VISIT: CPT | Performed by: NURSE PRACTITIONER

## 2021-12-30 PROCEDURE — 2580000003 HC RX 258: Performed by: HOSPITALIST

## 2021-12-30 PROCEDURE — 96376 TX/PRO/DX INJ SAME DRUG ADON: CPT

## 2021-12-30 PROCEDURE — 6370000000 HC RX 637 (ALT 250 FOR IP): Performed by: HOSPITALIST

## 2021-12-30 PROCEDURE — 2500000003 HC RX 250 WO HCPCS: Performed by: HOSPITALIST

## 2021-12-30 PROCEDURE — 36415 COLL VENOUS BLD VENIPUNCTURE: CPT

## 2021-12-30 PROCEDURE — 84484 ASSAY OF TROPONIN QUANT: CPT

## 2021-12-30 PROCEDURE — 99223 1ST HOSP IP/OBS HIGH 75: CPT | Performed by: INTERNAL MEDICINE

## 2021-12-30 PROCEDURE — 2140000000 HC CCU INTERMEDIATE R&B

## 2021-12-30 PROCEDURE — 93010 ELECTROCARDIOGRAM REPORT: CPT | Performed by: INTERNAL MEDICINE

## 2021-12-30 PROCEDURE — 85610 PROTHROMBIN TIME: CPT

## 2021-12-30 PROCEDURE — 96365 THER/PROPH/DIAG IV INF INIT: CPT

## 2021-12-30 PROCEDURE — 96366 THER/PROPH/DIAG IV INF ADDON: CPT

## 2021-12-30 PROCEDURE — 83735 ASSAY OF MAGNESIUM: CPT

## 2021-12-30 PROCEDURE — 80053 COMPREHEN METABOLIC PANEL: CPT

## 2021-12-30 PROCEDURE — 82947 ASSAY GLUCOSE BLOOD QUANT: CPT

## 2021-12-30 RX ORDER — MAGNESIUM SULFATE 1 G/100ML
1000 INJECTION INTRAVENOUS PRN
Status: DISCONTINUED | OUTPATIENT
Start: 2021-12-30 | End: 2021-12-31 | Stop reason: HOSPADM

## 2021-12-30 RX ADMIN — MAGNESIUM SULFATE HEPTAHYDRATE 1000 MG: 1 INJECTION, SOLUTION INTRAVENOUS at 01:06

## 2021-12-30 RX ADMIN — INSULIN LISPRO 2 UNITS: 100 INJECTION, SOLUTION INTRAVENOUS; SUBCUTANEOUS at 17:34

## 2021-12-30 RX ADMIN — FERROUS SULFATE TAB 325 MG (65 MG ELEMENTAL FE) 325 MG: 325 (65 FE) TAB at 14:21

## 2021-12-30 RX ADMIN — FERROUS SULFATE TAB 325 MG (65 MG ELEMENTAL FE) 325 MG: 325 (65 FE) TAB at 10:38

## 2021-12-30 RX ADMIN — MAGNESIUM SULFATE HEPTAHYDRATE 1000 MG: 1 INJECTION, SOLUTION INTRAVENOUS at 04:39

## 2021-12-30 RX ADMIN — BUMETANIDE 1 MG: 0.25 INJECTION INTRAMUSCULAR; INTRAVENOUS at 10:37

## 2021-12-30 RX ADMIN — INSULIN LISPRO 5 UNITS: 100 INJECTION, SOLUTION INTRAVENOUS; SUBCUTANEOUS at 17:33

## 2021-12-30 RX ADMIN — METOPROLOL TARTRATE 25 MG: 25 TABLET, FILM COATED ORAL at 10:38

## 2021-12-30 RX ADMIN — AMIODARONE HYDROCHLORIDE 200 MG: 200 TABLET ORAL at 21:07

## 2021-12-30 RX ADMIN — ATORVASTATIN CALCIUM 20 MG: 40 TABLET, FILM COATED ORAL at 10:38

## 2021-12-30 RX ADMIN — BUMETANIDE 1 MG: 0.25 INJECTION INTRAMUSCULAR; INTRAVENOUS at 21:07

## 2021-12-30 RX ADMIN — FERROUS SULFATE TAB 325 MG (65 MG ELEMENTAL FE) 325 MG: 325 (65 FE) TAB at 17:33

## 2021-12-30 RX ADMIN — INSULIN LISPRO 2 UNITS: 100 INJECTION, SOLUTION INTRAVENOUS; SUBCUTANEOUS at 10:37

## 2021-12-30 RX ADMIN — SODIUM CHLORIDE, PRESERVATIVE FREE 10 ML: 5 INJECTION INTRAVENOUS at 21:07

## 2021-12-30 RX ADMIN — SODIUM CHLORIDE, PRESERVATIVE FREE 10 ML: 5 INJECTION INTRAVENOUS at 10:39

## 2021-12-30 RX ADMIN — INSULIN GLARGINE 10 UNITS: 100 INJECTION, SOLUTION SUBCUTANEOUS at 21:07

## 2021-12-30 RX ADMIN — AMIODARONE HYDROCHLORIDE 200 MG: 200 TABLET ORAL at 10:38

## 2021-12-30 RX ADMIN — TAMSULOSIN HYDROCHLORIDE 0.8 MG: 0.4 CAPSULE ORAL at 10:37

## 2021-12-30 RX ADMIN — INSULIN LISPRO 6 UNITS: 100 INJECTION, SOLUTION INTRAVENOUS; SUBCUTANEOUS at 14:21

## 2021-12-30 RX ADMIN — INSULIN LISPRO 5 UNITS: 100 INJECTION, SOLUTION INTRAVENOUS; SUBCUTANEOUS at 14:24

## 2021-12-30 RX ADMIN — ASPIRIN 81 MG: 81 TABLET, COATED ORAL at 10:38

## 2021-12-30 RX ADMIN — METOPROLOL TARTRATE 25 MG: 25 TABLET, FILM COATED ORAL at 21:07

## 2021-12-30 ASSESSMENT — ENCOUNTER SYMPTOMS
COUGH: 0
ABDOMINAL PAIN: 0
WHEEZING: 0
CONSTIPATION: 0
DIARRHEA: 0
GASTROINTESTINAL NEGATIVE: 1
SHORTNESS OF BREATH: 0
BLOOD IN STOOL: 0
EYES NEGATIVE: 1
VOMITING: 0
NAUSEA: 0
COLOR CHANGE: 0
RESPIRATORY NEGATIVE: 1
ABDOMINAL DISTENTION: 0

## 2021-12-30 NOTE — PROGRESS NOTES
POST OP CARDIOTHORACIC SURGERY PROGRESS NOTE  COURTESY VISIT    Post op: On 2021, 3-Vessel Perfusion-Assisted Coronary Artery Bypass Grafting, Placing LIMA - LAD, SVG - 2nd Diagonal Branch and SVG - PLM Branch; Romero-MAZE IV Radiofrequency and Cryoablation to the Left and Right Atria with Exit Block Testing; Exclusion of the Left Atrial Appendage with Application of a 35 mm AtriClip by Dr. Ap Oliva:  Sitting up in chair. Asking when he will be able to go home. Patient presented to ER yesterday due to worsening shortness of breath and BLE edema. He was given IV diuretic. proBNP was elevated, along with troponin - likely 2' to recent CABG, persistent atrial fibrillation. CXR demonstrated mild fluid overload. He has had excellent diureses. CR is slightly elevated, but stable. /74   Pulse 79   Temp 98 °F (36.7 °C) (Temporal)   Resp 18   Ht 5' 10\" (1.778 m)   Wt 251 lb 12.8 oz (114.2 kg)   SpO2 99%   BMI 36.13 kg/m²   Average, Min, and Max for last 24 hours Vitals:  TEMPERATURE:  Temp  Av.3 °F (36.3 °C)  Min: 96.8 °F (36 °C)  Max: 98 °F (36.7 °C)  RESPIRATIONS RANGE: Resp  Av.5  Min: 18  Max: 20  PULSE RANGE: Pulse  Av.3  Min: 70  Max: 90  BLOOD PRESSURE RANGE:  Systolic (24EAH), HTL:255 , Min:123 , SDN:502   ; Diastolic (69ALC), XAY:20, Min:69, Max:92    PULSE OXIMETRY RANGE: SpO2  Av.8 %  Min: 97 %  Max: 99 %    I/O last 3 completed shifts:  In: -   Out: 2800 [Urine:2800]    CHEST: Clear, diminished on left. No wheezes or rhonchi. CARDIOVASCULAR: Normal HT with irregular rate and rhythm. No murmurs, gallops or rubs. Telemetry - Atrial Fibrillation 70s. INCISION: Sternal incision - open to air with small amount of dark drainage on distal incision. Edges well approximated. Sternum appears to be stable. Right EVH incision - open to air. Edges well approximated. LLE incision - open to air with staples intact. Edges well approximated. LABS:  CBC:   Lab Results   Component Value Date    WBC 9.6 12/30/2021    RBC 3.31 12/30/2021    HGB 10.2 12/30/2021    HCT 32.2 12/30/2021    MCV 97.3 12/30/2021    MCH 30.8 12/30/2021    MCHC 31.7 12/30/2021    RDW 12.8 12/30/2021     12/30/2021    MPV 9.6 12/30/2021     CMP:    Lab Results   Component Value Date     12/30/2021    K 4.4 12/30/2021    CL 95 12/30/2021    CO2 25 12/30/2021    BUN 34 12/30/2021    CREATININE 2.0 12/30/2021    GFRAA 42 12/30/2021    LABGLOM 34 12/30/2021    GLUCOSE 165 12/30/2021    PROT 6.6 12/30/2021    LABALBU 3.8 12/30/2021    CALCIUM 9.4 12/30/2021    BILITOT 0.6 12/30/2021    ALKPHOS 430 12/30/2021    AST 17 12/30/2021    ALT 29 12/30/2021     PT/INR:    Lab Results   Component Value Date    PROTIME 39.6 12/30/2021    INR 4.23 12/30/2021       CHEST XRAY (12/28/2021):   1. Cardiomegaly with pulmonary venous hypertension with early interstitial edema. 2. There is some elevation of the left diaphragm as well as development of a left-sided effusion with left basilar atelectasis. ASSESSMENT:     1. Fluid Overload with Recent Combined 3V PACABG, Ablation. 2.         Persistent Atrial Fibrillation - Anticoagulated on Coumadin with Supratherapeutic INR  3.         DM, Type 2  4.         Essential HTN  5.         CKD, Stage III  6. Mixed Hyperlipidemia  7.         Hx MRSA Infection     PLAN:     1. Remove Staples from LLE  2. Patient is stable from CT surgery standpoint. Patient's proBNP and troponin elevation 2' to recent surgery. No acute exacerbation of CHF. 3. Patient encouraged to follow postoperative instructions with walking 10-15 minutes BID, daily showers. 4. Keep scheduled postoperative follow-up. 5. Patient is current with home health - would recommend daily visits with INR checks. May resume Coumadin with INR < 2.8 at 10 mg every evening.          Electronically signed by NOMI Mahajan on 12/30/21 at 7:41 [place] CST

## 2021-12-30 NOTE — PROGRESS NOTES
Clinical Pharmacy Note    Warfarin consult follow-up    Recent Labs     12/30/21  0223   INR 4.23*     Recent Labs     12/28/21  1929 12/30/21 0223   HGB 11.0* 10.2*   HCT 33.0* 32.2*   * 513*     Current warfarin drug-drug interactions:   Amiodarone: Amiodarone may enhance the anticoagulant effect of Vitamin K Antagonists. Amiodarone may increase the serum concentration of Vitamin K Antagonists. Aspirin: Patients receiving vitamin K antagonists should not take salicylate-containing medicines on an as-needed basis. Aspirin (80 to 325 mg/day) and warfarin are used together, in selected cases and with careful monitoring, for the prevention of cardiovascular events. Date INR Warfarin Dose   12/2721 7.50 Held   12/28/21 5.44 Held   12/29/21 5.44 Held   12/30/21   4.23  HELD     Notes:                 Hold Warfarin today due to supratherapeutic INR     Daily PT/INR until stable within therapeutic range.      Electronically signed by Mayra Noe, [facility] on 12/30/2021 at 10:29 AM

## 2021-12-30 NOTE — CARE COORDINATION
Date / Time of Evaluation:   12/30/2021    5:20 PM  Assessment Completed by:   Vincent Bartlett RN      Patient Name:   Eryn Reed  MRN:   [id number]  YOB: 1965    Patient Admission Status:   Inpatient [609]    Patient Contact Information:    [facility]  [address] [address] [phone] (home)   Telephone Information:   Mobile [phone]     Above information verified? [x]   Yes  []   No    (Best Practice:   Have patient/caregiver verify above address and phone number by stating out loud their current address and reachable phone number. Initial Assessment Completed at bedside with:      [x]   Patient  []   Family/Caregiver/Guardian   []   Other:      Current PCP:    Sri Rogers MD    PCP verified? [x]   Yes  []   No    Emergency Contacts:    Extended Emergency Contact Information  Primary Emergency Contact: Ursula Claude, [address] [employer] [address] of [address] Phone: [phone]  Mobile Phone: [phone]  Relation: Parent   needed? No  Secondary Emergency Contact: Henry Goldstein  Address: [address] [place] [address] [employer] [address] of [address] Phone: [phone]  Mobile Phone: [phone]  Relation: Child   needed? No    Advance Directives:    Does Mr. Goldstein have an advance directive in his electronic medical record? [x]   Yes not seen on chart  []   No    Code Status:   Full Code      Have you been vaccinated for COVID-19 (SARS-CoV-2)? [x]   Yes  []   No                   If so, when?     Which :         []   Pfizer-[employer]  [x]   Moderna x 2  []   [employer]  []   Other:       Do you have any of the following unmet social needs that would keep you from returning home safely:    [x]   Yes  []   No                    Financial:    Payor: MEDICARE / Plan: MEDICARE PART A AND B / Product Type: *No Product type* /     Pre-Cert required for SNF:     []   Yes  [x]   No    Have Long Term Care Insurance:      []   Yes  [x]   No      Pharmacy:    [address] Justino Richter, Nataliya Salcido [facility] [phone] [address] [address]  Phone: [phone] Fax: [phone]    Potential assistance purchasing medications? []   Yes  [x]   No      ADLS:   Pt reports, initially when he went home he as doing fine. Baseline, he is independent with ADL's/IADL's    Support System:   Spouse/Significant Other,Parent      Current Home Environment:       Steps:       [x]   Yes  []   No    If yes, how many? Managing    Plans to RETURN to current housing:     [x]   Yes  []   No    Barriers to RETURNING to current housing:  None stated    Currently ACTIVE with Home Health CARE:      [x]   Yes  []   No    Home Health Care Agency:  [address] [place] [address] Provider:   Does not use cane/walker      Had HOME OXYGEN prior to admission:      []   Yes  [x]   No      Active with HD/PD prior to admission:             []   Yes  [x]   No    Transition Plan:  home w/HC and family support    Transportation PLAN for Discharge:  Private car    Factors facilitating achievement of predicted outcomes: medically stable    Barriers to discharge:  CABG 12/14/2021, SOA, ABD distention, DM, Wt gain      Patient Deficits:    []   Yes   [x]   No    Additional CM Notes:  Pt feels he was doing well initially at home, then \"gained 25# in a short time, HC told me to come into hospital\" Pt reports, he has a scale at home [employer]" himself. When CM asked patient what happened such that he needed to come back to hospital, states \"maybe medication and diet, needed stronger water pill\"   Pt has home care and should be re-ordered.          Shaggy Branch and/or his family were provided with choice of provider:    [x]   Yes   []   No        Saman Guerra RN  [address]  Phone:  [phone]  Electronically signed by Samna Guerra RN on 12/30/2021

## 2021-12-30 NOTE — CARE COORDINATION
Patient is current with [facility]  Electronically signed by Alana Michael on 12/30/21 at 4:32 PM CST

## 2021-12-30 NOTE — CONSULTS
[facility]  Cardiology Consult      Requesting MD:  Kylah Lackey MD   Admit Status:  Observation [104]       History obtained from:   [] Patient  [] Other (specify):     Patient:  Aleta Gupta  [id number]     Chief Complaint:   Chief Complaint   Patient presents with    Abnormal Lab     PT/INR    Weight Gain     30lb since Dec 10, CABG recently         HPI: Mr. Edna Rae is a 60 y.o. male with a history of multivessel coronary artery disease cardiac catheterization by me on 11/23/2021 and subsequently underwent CABG 12/14/2021. Now presents with weight gain increasing edema shortness of breath in recent weeks. Denies anginal chest pain per se. Some abdominal distention as well complains of dry cough. Reportedly takes Lasix 20 mg at home but says it does not work. Troponins 0.08, .10, .09 also 0.10 and 0.11. No BNP level on the chart at this time. Review of Systems:  Review of Systems   Constitutional: Negative. Negative for chills, fever and unexpected weight change. HENT: Negative. Eyes: Negative. Respiratory: Negative. Negative for shortness of breath. Cardiovascular: Negative. Negative for chest pain. Gastrointestinal: Negative. Negative for diarrhea, nausea and vomiting. Endocrine: Negative. Genitourinary: Negative. Musculoskeletal: Negative. Skin: Negative. Neurological: Negative. All other systems reviewed and are negative.       Cardiac Specific Data:  Specialty Problems        Cardiology Problems    HTN (hypertension)        Hyperlipidemia        Permanent atrial fibrillation (HCC)        (HFpEF) heart failure with preserved ejection fraction (HCC)        CAD in native artery        Longstanding persistent atrial fibrillation [facility])              Past Medical History:  Past Medical History:   Diagnosis Date    Acalculous cholecystitis 12/29/2015    Allergic rhinitis, cause unspecified     Atrial fibrillation, chronic (HCC)     sees [facility] cardiology    Bone spur     bilateral elbows    CAD (coronary artery disease)     Charcot's joint of right foot     Chicken pox     Closed supracondylar fracture of elbow     fall    Diabetic ulcer of right midfoot associated with type 2 diabetes mellitus, with fat layer exposed ([address] 9/14/2020    History of MRSA infection     scalp/facial and on back    HTN (hypertension)     Hyperlipidemia     Impotence of organic origin     MDRO (multiple drug resistant organisms) resistance     MRSA (methicillin resistant staph aureus) culture positive 11/2021    MRSA LT HAND    Other dyspnea and respiratory abnormality     Other specified erythematous condition(695.89)     Personal history of other infectious and parasitic disease     Salmonella     Type II or unspecified type diabetes mellitus without mention of complication, uncontrolled 1994        Past Surgical History:  Past Surgical History:   Procedure Laterality Date    CARDIAC CATHETERIZATION      CARDIOVERSION  03/2017    CATARACT REMOVAL      COLONOSCOPY  02/24/2020    Dr Helio Fraire-Melanosis coli throughout the entire colon-AP, 5 yr recall    CORONARY ARTERY BYPASS GRAFT MAZE PROCEDURE N/A 12/14/2021    CORONARY ARTERY BYPASS GRAFT X3 WITH LEFT INTERNAL MAMMARY ARTERY WITH ENDOSCOPIC VEIN HARVESTING WITH PERFUSION, TRANSESOPHAGEAL ECHOCARDIOGRAM, BIATRIAL RODRIGUES-MAZE RF AND CRYOABLATIONAND ATRICLIP performed by Stacia Be MD at [facility] [address]      HAND SURGERY Left 10/31/2021    HAND INCISION AND DRAINAGE Partial Amputation Middle Finger performed by Gladis Batista MD at [address] [facility] Left 9/17/2020    OPEN REDUCTION INTERNAL FIXATION LEFT SUPRACONDYLAR FRACTURE performed by Mónica Dacosta MD at [facility] OR    MALIGNANT SKIN LESION EXCISION      X 2    TONSILLECTOMY         Past Family History:  Family History   Problem Relation Age of Onset    Stroke Maternal Grandmother     Cancer Maternal Grandmother  Stroke Paternal Grandmother     Diabetes Paternal Grandmother     Cancer Father     Heart Disease Father     Lung Cancer Father     Asthma Paternal Grandfather     Heart Disease Paternal Grandfather     Heart Disease Maternal Grandfather     Colon Cancer Neg Hx     Colon Polyps Neg Hx     Esophageal Cancer Neg Hx     Liver Cancer Neg Hx     Liver Disease Neg Hx     Rectal Cancer Neg Hx     Stomach Cancer Neg Hx        Past Social History:  Social History     Socioeconomic History    Marital status:      Spouse name: Not on file    Number of children: Not on file    Years of education: Not on file    Highest education level: Not on file   Occupational History    Not on file   Tobacco Use    Smoking status: Never Smoker    Smokeless tobacco: Never Used   Vaping Use    Vaping Use: Never used   Substance and Sexual Activity    Alcohol use: No    Drug use: No    Sexual activity: Not on file   Other Topics Concern    Not on file   Social History Narrative    Referred to  for assistance with Meals on Wheels     Social Determinants of Health     Financial Resource Strain: Low Risk     Difficulty of Paying Living Expenses: Not hard at all   Food Insecurity: No Food Insecurity    Worried About Running Out of Food in the Last Year: Never true    [address] in the Last Year: Never true   Transportation Needs:     Lack of Transportation (Medical): Not on file    Lack of Transportation (Non-Medical):  Not on file   Physical Activity:     Days of Exercise per Week: Not on file    Minutes of Exercise per Session: Not on file   Stress:     Feeling of Stress : Not on file   Social Connections:     Frequency of Communication with Friends and Family: Not on file    Frequency of Social Gatherings with Friends and Family: Not on file    Attends Nondenominational Services: Not on file    Active Member of Clubs or Organizations: Not on file    Attends Club or Organization Meetings: Not on file    Marital Status: Not on file   Intimate Partner Violence:     Fear of Current or Ex-Partner: Not on file    Emotionally Abused: Not on file    Physically Abused: Not on file    Sexually Abused: Not on file   Housing Stability:     Unable to Pay for Housing in the Last Year: Not on file    Number of [place] in the Last Year: Not on file    Unstable Housing in the Last Year: Not on file       Allergies: Allergies   Allergen Reactions    Contrast [Iodides]      N & V. PT STATED HE HAD A MRI WITH CONTRAST ON 10/25/2021.  Neomycin-Bacitracin Zn-Polymyx Rash    Neosporin [Neomycin-Polymyxin-Gramicidin] Rash    Pcn [Penicillins] Rash       Home Meds:  Prior to Admission medications    Medication Sig Start Date End Date Taking? Authorizing Provider   cloNIDine (CATAPRES) 0.1 MG tablet Take 1 tablet by mouth 2 times daily 12/23/21   Gabbi Bess MD   metoprolol tartrate (LOPRESSOR) 25 MG tablet TAKE ONE TABLET TWICE DAILY  Patient taking differently: 50 mg 2 times daily  12/21/21   NOMI Welch   oxyCODONE (ROXICODONE) 5 MG immediate release tablet Take 1 tablet by mouth every 6 hours as needed for Pain for up to 14 days.  12/18/21 1/1/22  Monica Alanis MD   amiodarone (CORDARONE) 200 MG tablet Take 1 tablet by mouth 2 times daily 12/18/21   Monica Alanis MD   guaiFENesin [facility]) 600 MG extended release tablet Take 1 tablet by mouth 2 times daily 12/18/21   Monica Alanis MD   losartan (COZAAR) 100 MG tablet Take 100 mg by mouth daily Indications: High Blood Pressure Disorder    Historical Provider, MD   furosemide (LASIX) 20 MG tablet Take 20 mg by mouth daily as needed    Historical Provider, MD   warfarin (COUMADIN) 10 MG tablet Take 10 mg by mouth See Admin Instructions 10 MG ON Saturday, Monday, Tuesday, Wednesday, Friday  15 MG ON Sunday & THURSDAY    Historical Provider, MD   metFORMIN (GLUCOPHAGE) 1000 MG tablet Take 1 tablet by mouth 2 times daily (with meals) 11/26/21 12/26/21  Chasity Khanna MD   aspirin EC 81 MG EC tablet Take 1 tablet by mouth daily 11/23/21   Chaisty Khanna MD   Insulin Degludec (TRESIBA FLEXTOUCH) 100 UNIT/ML SOPN Inject into the skin PT TAKES ACCORDING TO ACCUCHEK. DOES NOT TAKE  AND UNDER AT HS. Historical Provider, MD   atorvastatin (LIPITOR) 20 MG tablet TAKE ONE TABLET DAILY   Patient taking differently: Take 20 mg by mouth daily  9/29/21   Shonna Flores MD   tamsulosin (FLOMAX) 0.4 MG capsule Take 2 capsules by mouth daily 9/15/21   NOMI London - CNP   blood glucose monitor strips Test 3 times a day & as needed for symptoms of irregular blood glucose. Dispense sufficient amount for indicated testing frequency plus additional to accommodate PRN testing needs. 8/1/21   Gabbi Bess MD   Insulin Pen Needle 32G X 4 MM MISC 1 each by Does not apply route daily 6/14/21   Gabbi Bess MD   blood glucose monitor kit and supplies Dispense sufficient amount for indicated testing frequency plus additional to accommodate PRN testing needs. 5/12/21   Gabbi Bess MD   Lancets MISC 1 each by Does not apply route 3 times daily (with meals) 5/12/21   Gabbi Bess MD   insulin lispro (HUMALOG KWIKPEN) 200 UNIT/ML SOPN pen Sliding scale 160-179 2 units  180-199 3 units  200-219 4 units  220-239 5 units  240-259 6 units   260-279 7 units  280-299 8 units  300-399 9 units  340-379 10 units  380-420 11 units 5/11/21   Gabbi Bess MD   vitamin D (ERGOCALCIFEROL) 1.25 MG (68911 UT) CAPS capsule Take 50,000 Units by mouth once a week Takes on Friday    Historical Provider, MD   diphenhydrAMINE (BENADRYL) 25 MG tablet Take 25 mg by mouth every 6 hours as needed for Itching    Historical Provider, MD   blood glucose test strips (CONTOUR NEXT TEST) strip 1 each by In Vitro route 4 times daily As needed.  11/21/19   Shonna Flores MD       Current Meds:   insulin lispro  5 Units SubCUTAneous TID     warfarin (COUMADIN) daily dosing (placeholder)   Other RX Placeholder    insulin glargine  10 Units SubCUTAneous Nightly    bumetanide  1 mg IntraVENous BID    sodium chloride flush  5-40 mL IntraVENous 2 times per day    amiodarone  200 mg Oral BID    aspirin EC  81 mg Oral Daily    atorvastatin  20 mg Oral Daily    metoprolol tartrate  25 mg Oral BID    tamsulosin  0.8 mg Oral Daily    insulin lispro  0-12 Units SubCUTAneous TID WC    insulin lispro  0-6 Units SubCUTAneous Nightly    ferrous sulfate  325 mg Oral TID WC       Current Infused Meds:   dextrose      sodium chloride         Physical Exam:  Vitals:    12/30/21 0610   BP: 137/74   Pulse: 79   Resp: 18   Temp: 98 °F (36.7 °C)   SpO2: 99%       Intake/Output Summary (Last 24 hours) at 12/30/2021 9813  Last data filed at 12/30/2021 0442  Gross per 24 hour   Intake    Output 2800 ml   Net -2800 ml     Estimated body mass index is 36.13 kg/m² as calculated from the following:    Height as of this encounter: 5' 10\" (1.778 m). Weight as of this encounter: 251 lb 12.8 oz (114.2 kg). Physical Exam  Vitals reviewed. Constitutional:       General: He is not in acute distress. Appearance: He is well-developed. He is obese. He is not ill-appearing, toxic-appearing or diaphoretic. HENT:      Head: Normocephalic and atraumatic. Nose: Nose normal.      Mouth/Throat:      Mouth: Mucous membranes are moist.      Pharynx: Oropharynx is clear. Eyes:      General: No scleral icterus. Extraocular Movements: Extraocular movements intact. Pupils: Pupils are equal, round, and reactive to light. Neck:      Vascular: No carotid bruit or JVD. Cardiovascular:      Rate and Rhythm: Normal rate and regular rhythm. Heart sounds: Normal heart sounds. No murmur heard. No friction rub. No gallop. Pulmonary:      Effort: Pulmonary effort is normal. No respiratory distress.       Breath sounds: Normal breath sounds. No stridor. No wheezing, rhonchi or rales. Chest:      Chest wall: No tenderness. Abdominal:      General: Abdomen is flat. Bowel sounds are normal. There is no distension. Palpations: Abdomen is soft. There is no mass. Tenderness: There is no abdominal tenderness. There is no right CVA tenderness, left CVA tenderness, guarding or rebound. Hernia: No hernia is present. Musculoskeletal:         General: Swelling present. No tenderness, deformity or signs of injury. Cervical back: Normal range of motion and neck supple. No rigidity or tenderness. Right lower leg: Edema present. Left lower leg: Edema present. Lymphadenopathy:      Cervical: No cervical adenopathy. Skin:     General: Skin is warm and dry. Neurological:      General: No focal deficit present. Mental Status: He is alert and oriented to person, place, and time. Mental status is at baseline. Cranial Nerves: No cranial nerve deficit. Sensory: No sensory deficit. Motor: No weakness. Coordination: Coordination normal.   Psychiatric:         Mood and Affect: Mood normal.         Behavior: Behavior normal.         Thought Content: Thought content normal.         Judgment: Judgment normal.         Labs:  Recent Labs     12/28/21 1929 12/30/21 0223   WBC 10.8 9.6   HGB 11.0* 10.2*   * 513*       Recent Labs     12/28/21 1929 12/29/21 0931 12/30/21 0223   *  --  136   K 5.2*  --  4.4   CL 94*  --  95*   CO2 23  --  25   BUN 42*  --  34*   CREATININE 1.9*  --  2.0*   LABGLOM 37*  --  34*   MG 1.6 1.7 1.5*   CALCIUM 8.9  --  9.4       CK, CKMB, Troponin: @LABRCNT (CKTOTAL:3, CKMB:3, TROPONINI:3)@    Last 3 BNP:  No results for input(s): BNP in the last 72 hours. IMAGING:  XR CHEST (2 VW)    Result Date: 12/18/2021  EXAM: XR CHEST (2 VW) INDICATION: Postop CABG COMPARISON: 12/16/2021 FINDINGS: Cardiac silhouette is prominent but stable.  Prior median sternotomy and CABG noted. Atrial appendage device noted. No pneumothorax. Decrease in trace LEFT pleural effusion and atelectasis. No new airspace opacity No acute osseous finding. 1.  No unexpected findings. 2.  Decrease in trace LEFT pleural effusion and atelectasis. Signed by Dr Nani Gonzalez    XR CHEST (2 VW)    Result Date: 12/13/2021  EXAM: XR CHEST (2 VW) INDICATION: Preop evaluation, CABG COMPARISON: 11/23/2021 FINDINGS: Cardiac silhouette is normal size. No pleural effusion or visible pneumothorax. No focal consolidation. No acute osseous finding. No acute findings. Signed by Dr Natalie Mejia    Result Date: 12/28/2021  EXAMINATION: Chest one view 12/28/2021 HISTORY: Dyspnea FINDINGS: Today's exam is compared to previous study of 12/18/2021. There is cardiomegaly with mild pulmonary vascular congestion. There is some elevation of the left hemidiaphragm as well as development of a left-sided effusion with left-sided atelectasis. There is no free air beneath the hemidiaphragms. There is moderate cardiomegaly. 1.. Cardiomegaly with pulmonary venous hypertension with early interstitial edema. 2. There is some elevation of the left diaphragm as well as development of a left-sided effusion with left basilar atelectasis. Signed by Dr Nahun Manning    XR CHEST PORTABLE    Result Date: 12/16/2021  Examination. XR CHEST PORTABLE 12/16/2021 4:36 AM History: Postop CABG. A frontal portable upright view of the chest is compared with the previous study dated 12/15/2021. The lungs are poorly expanded. There is small left lower lobar consolidation and a small left basal pleural effusion. This was not seen in the previous study. There is no pulmonary congestion or pneumothorax. There is persistent moderate cardiomegaly. There is evidence of a previous cardiac surgery. The left atrial appendages clamp is in place. Right internal jugular vascular sheath is in place.     1. A small left basal pleural effusion and left lower lung consolidation/atelectasis. 2. Moderate cardiomegaly. Signed by Dr Balta Champion    Result Date: 12/15/2021  Examination. XR CHEST PORTABLE 12/15/2021 4:00 AM History: Postop CABG. There is a frontal portable upright view of the chest is compared with the previous study dated 12/14/2021. The lungs are poorly inflated. There are atelectatic changes in the lower lung bilaterally. There is no pleural effusion, pulmonary congestion or pneumothorax. There is moderate cardiomegaly. There is evidence of previous cardiac surgery. The left atrial appendages clamp is in place. Endotracheal tube and Farwell-Kylah catheter have been removed in the interval. A right internal jugular vascular sheath is in place. No acute bony abnormality. 1. The poor lung expansion but no active cardiopulmonary disease. Signed by Dr Balta Champion    Result Date: 12/14/2021  XR CHEST PORTABLE 12/14/2021 12:29 PM HISTORY:   Postop CABG  Single view. COMPARISONS:  Preop chest radiography dated 12/13/2021 FINDINGS: Changes from CABG identified. Endotracheal tube, Farwell-Kylah catheter, mediastinal drains and left chest tube observed. The level of inspiration is shallow. The bronchovascular fissure markings centrally are prominent, mild atelectasis and mild pulmonary venous hypertension/volume overload considered. There are no pneumothoraces. Postop CABG.  Signed by Dr Sri See    ECHO 2D Joshua Boys    Result Date: 12/30/2021  Transthoracic Echocardiography Report (TTE)  Demographics   Patient Name  Mary Baron Date of Study          12/29/2021   MRN           [id number]        Gender                 Male   Date of Birth 1965    Room Number            WMN-9850   Age           60 year(s)   Height:       70 inches     Referring Physician    [place]   Weight:       259.01 pounds Sonographer            Keyonna Rosario RDCS   BSA: 2.33 m^2      Interpreting Physician Vani Chaidez MD   BMI:          37.16 kg/m^2  Procedure Type of Study   TTE procedure:ECHO 2D W/DOPPLER/COLOR/CONTRAST. Study Location: Echo Lab Technical Quality: Limited visualization due to poor acoustical window. Patient Status: Inpatient Contrast Medium: Definity. Amount - 2 ml Rhythm: Within normal limits BP: 169/72 mmHg Indications:Dyspnea/SOB and S/P CABG. Conclusions   Summary  Mitral valve leaflets are mildly thickened with preserved leaflet  mobility. Mildly thickened aortic valve leaflets with preserved leaflet mobility. Tricuspid valve is structurally normal.  Mild to moderate tricuspid regurgitation with estimated RVSP of 95  suggestive pulmonary HTN mm Hg. Mildly dilated left atrium. Normal left ventricular size with preserved LV function and an estimated  ejection fraction of approximately 55-60%. Moderate concentric left ventricular hypertrophy. Grade III diastolic dysfunction restrictive pattern  No regional wall motion abnormalities. Definity utilized  IVC dilated. Signature   ----------------------------------------------------------------  Electronically signed by Vani Chaidez MD(Interpreting  physician) on 12/30/2021 07:52 AM  ----------------------------------------------------------------   Findings   Mitral Valve  Mitral valve leaflets are mildly thickened with preserved leaflet  mobility. Aortic Valve  Mildly thickened aortic valve leaflets with preserved leaflet mobility. Tricuspid Valve  Tricuspid valve is structurally normal.  Mild to moderate tricuspid regurgitation with estimated RVSP of 95  suggestive pulmonary HTN mm Hg. Pulmonic Valve  The pulmonic valve was not well visualized . Trace pulmonic regurgitation present. Left Atrium  Mildly dilated left atrium. Left Ventricle  Normal left ventricular size with preserved LV function and an estimated  ejection fraction of approximately 55-60%.   Moderate concentric left ventricular hypertrophy. Grade III diastolic dysfunction restrictive pattern  No regional wall motion abnormalities. Right Atrium  Normal right atrial dimension with no evidence of thrombus or mass noted. Right Ventricle  Normal right ventricular size with preserved RV function. Pericardial Effusion  No evidence of significant pericardial effusion is noted. Pleural Effusion  No evidence of pleural effusion. Miscellaneous  Definity utilized  IVC dilated. Allergies   - Neosporin.   - Penicillins.   - Other allergy:(MRI Contrast). - Other allergy:(Neomycin-bacitracin). M-Mode Measurements (cm)   LVIDd: 4.14 cm                       LVIDs: 2.53 cm  IVSd: 1.34 cm  LVPWd: 1.39 cm                       AO Root Dimension: 3.1 cm  % Ejection Fraction: 74 %            LA: 3.9 cm                                       LVOT: 2 cm  Doppler Measurements:   AV Peak Velocity:136 cm/s  AV Peak Gradient: 7.4 mmHg  AV Mean Gradient: 4 mmHg  AV Area (Continuity):2.23 cm^2  TR Velocity:438 cm/s  TR Gradient:76.74 mmHg  Estimated RAP:20 mmHg  RVSP:95 mmHg        Assessment:  1. Complaints of weight gain and edema and volume overload in recent weeks  2. Chronic atrial fibrillation  3. Hypertension  4. Hyperlipidemia  5. Chronic anticoagulation  6. Diabetes mellitus type 2  7. Shortness of breath  8. Obesity  9. Charcot foot due to diabetes  10. Hypoglycemic encephalopathy  11. Obstructive sleep apnea  12. History of diabetic retinopathy proliferative  13. History of bacteremia due to MRSA  14. History of medical noncompliance  15. Abnormal chest x-ray cardiomegaly with pulmonary venous hypertension early interstitial edema elevation left hemidiaphragm as well as left-sided effusion with left basilar atelectasis  16. Echo yesterday mild to moderate TR RVSP 95 dilated left atrium normal ejection fraction 55 to 60% moderate concentric LVH grade 3 diastolic dysfunction restrictive pattern  17.  Cardiac catheterization 11/23/2021 normal LV systolic function severe multivessel coronary disease 55% distal left main  18. Status post CABG twelve 1421 x 3 with LIMA graft      Recommendations:  1.  Diuresis and monitor further comments to follow

## 2021-12-30 NOTE — PROGRESS NOTES
[facility]      Progress Note    Patient:  Martha Downing  YOB: 1965  Date of Service: 12/30/2021  MRN: [id number]   Acct: [de-identified]   Primary Care Physician: Lyndsey Gutierrez MD  Advance Directive: Full Code  Admit Date: 12/28/2021       Hospital Day: 0    Portions of this note have been copied forward, however, updated to reflect the most current clinical status of this patient. CHIEF COMPLAINT swollen legs, weight gain     SUBJECTIVE:  Mr. Juancarlos Ortega was resting comfortably in chair this morning. Denies shortness of breath or chest pain at this time. Denies nausea or vomiting. CUMULATIVE HOSPITAL COURSE:   The patient is a 60 y.o. male with past medical history of recent CABG on 12/14/2021, CAD, DM, HTN, HLD with complaints of weight gain and swollen legs. Mr. Juancarlos Ortega reported having gradual increase in his weight since his CABG. Also reported worsening swelling to BLE. Denies dyspnea or chest pain at this time. Reported taking Lasix 20 mg at home but says it doesn't work. Workup in ED revealed K+ 5.2, Cr 1.9, GFR 37, BNP 2807, troponin 0.11, Alk Phos 472, TSH 6.1, hgb 11.0, INR 5.44, CXray cardiomegaly with pulmonary venous hypertension with early interstitial edema. Patient was admitted to hospital medicine for fluid overload. IV Bumex was initiated. Patient has been diuresing well. Was noted to have hypomagnesemia, replaced. ECHO showed mild to moderate tricuspid regurgitation with estimated RVSP of 95 mmHg suggestive of pulmonary hypertension, mildly dilated LA, normal LV size with preserved LV function with EF of 55 to 60%, moderate concentric LVH, grade 3 diastolic dysfunction restrictive pattern, IVC dilated. Review of Systems   Constitutional: Negative for chills, diaphoresis, fatigue and fever. HENT: Negative for congestion and ear pain. Eyes: Negative for visual disturbance. Respiratory: Negative for cough, shortness of breath and wheezing. rales.   Abdominal:      General: Bowel sounds are normal. There is no distension. Palpations: Abdomen is soft. Tenderness: There is no abdominal tenderness. Musculoskeletal:         General: No swelling, tenderness or deformity. Normal range of motion. Cervical back: Normal range of motion and neck supple. No muscular tenderness. Right lower leg: Edema present. Left lower leg: Edema present. Skin:     General: Skin is warm and dry. Findings: No bruising or lesion. Neurological:      Mental Status: He is alert and oriented to person, place, and time. Psychiatric:         Mood and Affect: Mood normal.         Behavior: Behavior normal.         Thought Content: Thought content normal.            Medications:      dextrose      sodium chloride        insulin lispro  5 Units SubCUTAneous TID WC    warfarin (COUMADIN) daily dosing (placeholder)   Other RX Placeholder    insulin glargine  10 Units SubCUTAneous Nightly    bumetanide  1 mg IntraVENous BID    sodium chloride flush  5-40 mL IntraVENous 2 times per day    amiodarone  200 mg Oral BID    aspirin EC  81 mg Oral Daily    atorvastatin  20 mg Oral Daily    metoprolol tartrate  25 mg Oral BID    tamsulosin  0.8 mg Oral Daily    insulin lispro  0-12 Units SubCUTAneous TID WC    insulin lispro  0-6 Units SubCUTAneous Nightly    ferrous sulfate  325 mg Oral TID WC     magnesium sulfate, oxyCODONE, glucose, dextrose, glucagon (rDNA), dextrose, sodium chloride flush, sodium chloride, ondansetron **OR** ondansetron, polyethylene glycol, acetaminophen **OR** acetaminophen, guaiFENesin, hydrALAZINE  ADULT DIET; Regular; 3 carb choices (45 gm/meal);  Low Fat/Low Chol/High Fiber/PACHECO; 1000 ml     Lab and other Data:     Recent Labs     12/28/21 1929 12/30/21 0223   WBC 10.8 9.6   HGB 11.0* 10.2*   * 513*     Recent Labs     12/28/21 1929 12/30/21 0223   * 136   K 5.2* 4.4   CL 94* 95*   CO2 23 25   BUN 42* 34* CREATININE 1.9* 2.0*   GLUCOSE 123* 165*     Recent Labs     12/28/21  1929 12/30/21  0223   AST 23 17   ALT 37 29   BILITOT 0.5 0.6   ALKPHOS 472* 430*     Troponin T:   Recent Labs     12/29/21  1538 12/29/21  1734 12/29/21  2302   TROPONINI 0.09* 0.10* 0.08*     INR:   Recent Labs     12/28/21  1929 12/29/21  1538 12/30/21  0223   INR 5.44* 5.44* 4.23*     UA:  Recent Labs     12/28/21 2051   COLORU YELLOW   PHUR 5.0   CLARITYU Clear   SPECGRAV 1.010   LEUKOCYTESUR Negative   UROBILINOGEN 0.2   BILIRUBINUR Negative   BLOODU Negative   GLUCOSEU Negative         RAD:     XR CHEST PORTABLE  Result Date: 12/28/2021    1. . Cardiomegaly with pulmonary venous hypertension with early interstitial edema. 2. There is some elevation of the left diaphragm as well as development of a left-sided effusion with left basilar atelectasis. Signed by Dr Huber Rodriguez:    Papo Capone- negative       Assessment/Plan   Principal Problem:    Fluid overload  Active Problems:    HTN (hypertension)    Hyperlipidemia    Type 2 diabetes mellitus with diabetic polyneuropathy, with long-term current use of insulin (HCC)    Permanent atrial fibrillation (HCC)    Supratherapeutic INR    Renal insufficiency    Status post coronary artery bypass graft  Resolved Problems:    * No resolved hospital problems. *    Principal Problem:    Fluid overload-    - Recent ECHO on 10/2021 showed no CHF    - patient is s/p CABG 12/14/2021, repeat ECHO 12/29/2021 showed mild to moderate tricuspid regurgitation with estimated RVSP of 95 mmHg suggestive of pulmonary hypertension, mildly dilated LA, normal LV size with preserved LV function with EF of 55 to 60%, moderate concentric LVH, grade 3 diastolic dysfunction restrictive pattern, IVC dilated.     - Continue IV Bumex    - Fluid restriction    - Monitor I's and O's closely    - Monitor labs closely         Active Problems:    Renal insufficiency-   - continue IV Bumex               - Monitor I's

## 2021-12-30 NOTE — CONSULTS
Comprehensive Nutrition Assessment    Type and Reason for Visit:  Initial,Consult    Nutrition Recommendations/Plan: continue current POC    Nutrition Assessment:  Pt is well nourished AEB fat and muscle mass. Surgical incisions are improving. Per Medical staff, proBNP elevated 2' to recent CABG. Is already avoiding the salt shaker    Malnutrition Assessment:  Malnutrition Status:  No malnutrition    Context:  Acute Illness     Findings of the 6 clinical characteristics of malnutrition:  Energy Intake:  No significant decrease in energy intake  Weight Loss:  No significant weight loss     Body Fat Loss:  No significant body fat loss     Muscle Mass Loss:  No significant muscle mass loss    Fluid Accumulation:  7 - Moderate to Severe Generalized   Strength:  Not Performed    Nutrition Related Findings:  +1 edema      Wounds:  None,Surgical Incision       Current Nutrition Therapies:    ADULT DIET; Regular; 3 carb choices (45 gm/meal); Low Fat/Low Chol/High Fiber/PACHECO; 1000 ml    Anthropometric Measures:  · Height: 5' 10\" (177.8 cm)  · Current Body Weight: 251 lb 12 oz (114.2 kg)   · Admission Body Weight: 228 lb (103.4 kg) (stated)    · Usual Body Weight:       · Ideal Body Weight: 166 lbs; % Ideal Body Weight 151.7 %   · BMI: 36.1  · Adjusted Body Weight:  ; No Adjustment    · BMI Categories: Obese Class 2 (BMI 35.0 -39.9)       Nutrition Diagnosis:   · Increased nutrient needs,Altered nutrition-related lab values related to increase demand for energy/nutrients,cardiac dysfunction as evidenced by wounds,lab values      Nutrition Interventions:   Food and/or Nutrient Delivery:  Continue Current Diet  Nutrition Education/Counseling:  Education not indicated   Coordination of Nutrition Care:  Continue to monitor while inpatient    Goals:  po intake 50% or greater.   proBNP decreeased prior to discharge       Nutrition Monitoring and Evaluation:   Behavioral-Environmental Outcomes:  None Identified Food/Nutrient Intake Outcomes:  Food and Nutrient Intake  Physical Signs/Symptoms Outcomes:  Biochemical Data,Weight,Skin,Nutrition Focused Physical Findings,Fluid Status or Edema     Discharge Planning:    Continue current diet     Electronically signed by Roger Augustine MS, RD, LD on 12/30/21 at 12:35 PM CST    Contact: [phone]

## 2021-12-31 VITALS
SYSTOLIC BLOOD PRESSURE: 129 MMHG | TEMPERATURE: 97.4 F | RESPIRATION RATE: 16 BRPM | HEART RATE: 77 BPM | OXYGEN SATURATION: 97 % | WEIGHT: 242.6 LBS | DIASTOLIC BLOOD PRESSURE: 78 MMHG | HEIGHT: 70 IN | BODY MASS INDEX: 34.73 KG/M2

## 2021-12-31 PROBLEM — I50.33 ACUTE ON CHRONIC HEART FAILURE WITH PRESERVED EJECTION FRACTION (HCC): Status: ACTIVE | Noted: 2021-02-21

## 2021-12-31 LAB
ALBUMIN SERPL-MCNC: 3.5 G/DL (ref 3.5–5.2)
ALP BLD-CCNC: 361 U/L (ref 40–130)
ALT SERPL-CCNC: 23 U/L (ref 5–41)
ANION GAP SERPL CALCULATED.3IONS-SCNC: 14 MMOL/L (ref 7–19)
AST SERPL-CCNC: 14 U/L (ref 5–40)
BASOPHILS ABSOLUTE: 0.1 K/UL (ref 0–0.2)
BASOPHILS RELATIVE PERCENT: 0.9 % (ref 0–1)
BILIRUB SERPL-MCNC: 0.5 MG/DL (ref 0.2–1.2)
BUN BLDV-MCNC: 29 MG/DL (ref 6–20)
CALCIUM SERPL-MCNC: 9 MG/DL (ref 8.6–10)
CHLORIDE BLD-SCNC: 94 MMOL/L (ref 98–111)
CO2: 26 MMOL/L (ref 22–29)
CREAT SERPL-MCNC: 1.7 MG/DL (ref 0.5–1.2)
EOSINOPHILS ABSOLUTE: 0.2 K/UL (ref 0–0.6)
EOSINOPHILS RELATIVE PERCENT: 2.2 % (ref 0–5)
GFR AFRICAN AMERICAN: 50
GFR NON-AFRICAN AMERICAN: 42
GLUCOSE BLD-MCNC: 179 MG/DL (ref 70–99)
GLUCOSE BLD-MCNC: 184 MG/DL (ref 70–99)
GLUCOSE BLD-MCNC: 200 MG/DL (ref 74–109)
HCT VFR BLD CALC: 31.3 % (ref 42–52)
HEMOGLOBIN: 9.9 G/DL (ref 14–18)
IMMATURE GRANULOCYTES #: 0 K/UL
INR BLD: 3.52 (ref 0.88–1.18)
LYMPHOCYTES ABSOLUTE: 1.8 K/UL (ref 1.1–4.5)
LYMPHOCYTES RELATIVE PERCENT: 19.7 % (ref 20–40)
MAGNESIUM: 1.6 MG/DL (ref 1.6–2.6)
MCH RBC QN AUTO: 30.8 PG (ref 27–31)
MCHC RBC AUTO-ENTMCNC: 31.6 G/DL (ref 33–37)
MCV RBC AUTO: 97.5 FL (ref 80–94)
MONOCYTES ABSOLUTE: 0.8 K/UL (ref 0–0.9)
MONOCYTES RELATIVE PERCENT: 8.8 % (ref 0–10)
NEUTROPHILS ABSOLUTE: 6.3 K/UL (ref 1.5–7.5)
NEUTROPHILS RELATIVE PERCENT: 68 % (ref 50–65)
PDW BLD-RTO: 12.9 % (ref 11.5–14.5)
PERFORMED ON: ABNORMAL
PERFORMED ON: ABNORMAL
PLATELET # BLD: 490 K/UL (ref 130–400)
PMV BLD AUTO: 9.7 FL (ref 9.4–12.4)
POTASSIUM REFLEX MAGNESIUM: 4.5 MMOL/L (ref 3.5–5)
PRO-BNP: 2614 PG/ML (ref 0–900)
PROTHROMBIN TIME: 34.3 SEC (ref 12–14.6)
RBC # BLD: 3.21 M/UL (ref 4.7–6.1)
SODIUM BLD-SCNC: 134 MMOL/L (ref 136–145)
TOTAL PROTEIN: 6.1 G/DL (ref 6.6–8.7)
WBC # BLD: 9.2 K/UL (ref 4.8–10.8)

## 2021-12-31 PROCEDURE — 85025 COMPLETE CBC W/AUTO DIFF WBC: CPT

## 2021-12-31 PROCEDURE — 82947 ASSAY GLUCOSE BLOOD QUANT: CPT

## 2021-12-31 PROCEDURE — 36415 COLL VENOUS BLD VENIPUNCTURE: CPT

## 2021-12-31 PROCEDURE — 99232 SBSQ HOSP IP/OBS MODERATE 35: CPT | Performed by: INTERNAL MEDICINE

## 2021-12-31 PROCEDURE — 2580000003 HC RX 258: Performed by: HOSPITALIST

## 2021-12-31 PROCEDURE — 80053 COMPREHEN METABOLIC PANEL: CPT

## 2021-12-31 PROCEDURE — 6370000000 HC RX 637 (ALT 250 FOR IP): Performed by: HOSPITALIST

## 2021-12-31 PROCEDURE — 85610 PROTHROMBIN TIME: CPT

## 2021-12-31 PROCEDURE — 2500000003 HC RX 250 WO HCPCS: Performed by: HOSPITALIST

## 2021-12-31 PROCEDURE — 83880 ASSAY OF NATRIURETIC PEPTIDE: CPT

## 2021-12-31 PROCEDURE — 83735 ASSAY OF MAGNESIUM: CPT

## 2021-12-31 RX ORDER — WARFARIN SODIUM 10 MG/1
10 TABLET ORAL SEE ADMIN INSTRUCTIONS
Qty: 30 TABLET | Refills: 3 | Status: SHIPPED | OUTPATIENT
Start: 2021-12-31 | End: 2022-04-07 | Stop reason: DRUGHIGH

## 2021-12-31 RX ORDER — FUROSEMIDE 40 MG/1
40 TABLET ORAL DAILY
Qty: 60 TABLET | Refills: 0 | Status: SHIPPED | OUTPATIENT
Start: 2021-12-31 | End: 2022-03-29 | Stop reason: SDUPTHER

## 2021-12-31 RX ORDER — WARFARIN SODIUM 10 MG/1
10 TABLET ORAL SEE ADMIN INSTRUCTIONS
Qty: 30 TABLET | Refills: 3 | Status: SHIPPED | OUTPATIENT
Start: 2021-12-31 | End: 2021-12-31 | Stop reason: SDUPTHER

## 2021-12-31 RX ADMIN — INSULIN LISPRO 5 UNITS: 100 INJECTION, SOLUTION INTRAVENOUS; SUBCUTANEOUS at 13:38

## 2021-12-31 RX ADMIN — METOPROLOL TARTRATE 25 MG: 25 TABLET, FILM COATED ORAL at 09:21

## 2021-12-31 RX ADMIN — BUMETANIDE 1 MG: 0.25 INJECTION INTRAMUSCULAR; INTRAVENOUS at 07:56

## 2021-12-31 RX ADMIN — ATORVASTATIN CALCIUM 20 MG: 40 TABLET, FILM COATED ORAL at 07:57

## 2021-12-31 RX ADMIN — ASPIRIN 81 MG: 81 TABLET, COATED ORAL at 07:57

## 2021-12-31 RX ADMIN — TAMSULOSIN HYDROCHLORIDE 0.8 MG: 0.4 CAPSULE ORAL at 07:57

## 2021-12-31 RX ADMIN — SODIUM CHLORIDE, PRESERVATIVE FREE 10 ML: 5 INJECTION INTRAVENOUS at 07:57

## 2021-12-31 RX ADMIN — INSULIN LISPRO 2 UNITS: 100 INJECTION, SOLUTION INTRAVENOUS; SUBCUTANEOUS at 07:59

## 2021-12-31 RX ADMIN — AMIODARONE HYDROCHLORIDE 200 MG: 200 TABLET ORAL at 07:57

## 2021-12-31 RX ADMIN — FERROUS SULFATE TAB 325 MG (65 MG ELEMENTAL FE) 325 MG: 325 (65 FE) TAB at 13:38

## 2021-12-31 RX ADMIN — FERROUS SULFATE TAB 325 MG (65 MG ELEMENTAL FE) 325 MG: 325 (65 FE) TAB at 07:57

## 2021-12-31 NOTE — PROGRESS NOTES
Clinical Pharmacy Note    Warfarin consult follow-up    Recent Labs     12/31/21  0329   INR 3.52*     Recent Labs     12/28/21  1929 12/30/21  0223 12/31/21 0329   HGB 11.0* 10.2* 9.9*   HCT 33.0* 32.2* 31.3*   * 513* 490*       Current warfarin drug-drug interactions:   Amiodarone: Amiodarone may enhance the anticoagulant effect of Vitamin K Antagonists. Amiodarone may increase the serum concentration of Vitamin K Antagonists. Aspirin: Patients receiving vitamin K antagonists should not take salicylate-containing medicines on an as-needed basis. Aspirin (80 to 325 mg/day) and warfarin are used together, in selected cases and with careful monitoring, for the prevention of cardiovascular events. Date INR Warfarin Dose   12/2721 7.50 Held   12/28/21 5.44 Held   12/29/21 5.44 Held   12/30/21   4.23  HELD   12/31/21 3.52 Held                        Notes:                   Hold coumadin today. Daily PT/INR until stable within therapeutic range.      LILLIAN BRO PHARM D, 12/31/2021, 10:51 AM

## 2021-12-31 NOTE — PROGRESS NOTES
Pt was in shower , called to staff that his scrotum was bleeding. Pt had small, pin size area that was dripping blood steadily. After pt showered and dried, I had pt sit in chair,  placed a cotton ball and ABD pad over area on scrotum that was bleeding and had pt put slight pressure in area. Will notify next shift and RN will follow up on night shift.  Electronically signed by Rickey Ortiz RN on 12/30/2021 at 8:30 PM

## 2021-12-31 NOTE — PROGRESS NOTES
Pt has been up in chair since this AM. Only complaint has been that he feels cold. Pt has been afebrile. BLE continue with 3+ edema. No c/o SOA or CP. Pt remains in afib rates 70's-90's, lungs CTA diminished in bases. Chest incision approx well, LLE harvest site edges well approx , no drng noted. RLE site healing. All previous wire and chest tube sites healing.  Electronically signed by Edgar Espinal RN on 12/30/2021 at 8:35 PM

## 2021-12-31 NOTE — DISCHARGE SUMMARY
[facility]    Discharge Summary      Natalya De La O  :  1965  MRN:  [id number]    Admit date:  2021  Discharge date:  2021    Discharging Physician:  Dr. Kassie Carmona Directive: Full Code    Consults: cardiology    Primary Care Physician:  Romero Geller MD    Discharge Diagnoses:  Principal Problem:    Fluid overload  Active Problems:    HTN (hypertension)    Hyperlipidemia    LIAN (acute kidney injury) ([facility] [address]    Type 2 diabetes mellitus with diabetic polyneuropathy, with long-term current use of insulin (HCC)    Permanent atrial fibrillation (HCC)    Acute on chronic heart failure with preserved ejection fraction (HCC)    Supratherapeutic INR    CAD in native artery    Longstanding persistent atrial fibrillation ([facility] [address]    Renal insufficiency    Status post coronary artery bypass graft  Resolved Problems:    * No resolved hospital problems. *      Portions of this note have been copied forward, however, changed to reflect the most current clinical status of this patient. Hospital Course: The patient is a 60 y.o. male with past medical history of recent CABG on 2021, CAD, DM, HTN, HLD with complaints of weight gain and swollen legs. Mr. Isaiah Hall reported having gradual increase in his weight since his CABG. Also reported worsening swelling to BLE. Denies dyspnea or chest pain at this time. Reported taking Lasix 20 mg PRN at home but says it doesn't work. Workup in ED revealed K+ 5.2, Cr 1.9, GFR 37, BNP 2807, troponin 0.11, Alk Phos 472, TSH 6.1, hgb 11.0, INR 5.44, CXray cardiomegaly with pulmonary venous hypertension with early interstitial edema. Patient was admitted to hospital medicine for fluid overload and supratherapeutic INR. Coumadin on hold. IV Bumex was initiated. Patient has been diuresing well. Was noted to have hypomagnesemia, replaced.  ECHO showed mild to moderate tricuspid regurgitation with estimated RVSP of 95 mmHg suggestive of pulmonary hypertension, mildly dilated LA, normal LV size with preserved LV function with EF of 55 to 60%, moderate concentric LVH, grade 3 diastolic dysfunction restrictive pattern, IVC dilated. Patient diuresed well with IV Bumex. Patient is being discharged on Lasix 40 mg once daily. He has been instructed to follow-up with PCP and /or cardiology office for repeat INR on Monday and resumption of Coumadin as appropriate. He has been instructed to follow-up with PCP routinely and his cardiology as recommended. Patient is currently in stable condition to be discharged home. Significant Diagnostic Studies:     XR CHEST PORTABLE  Result Date: 12/28/2021    1. . Cardiomegaly with pulmonary venous hypertension with early interstitial edema. 2. There is some elevation of the left diaphragm as well as development of a left-sided effusion with left basilar atelectasis. Signed by Dr Radha Mata      ECHO 2D [facility]  Result Date: 12/30/2021    Summary  Mitral valve leaflets are mildly thickened with preserved leaflet  mobility. Mildly thickened aortic valve leaflets with preserved leaflet mobility. Tricuspid valve is structurally normal.  Mild to moderate tricuspid regurgitation with estimated RVSP of 95  suggestive pulmonary HTN mm Hg. Mildly dilated left atrium. Normal left ventricular size with preserved LV function and an estimated  ejection fraction of approximately 55-60%. Moderate concentric left ventricular hypertrophy. Grade III diastolic dysfunction restrictive pattern  No regional wall motion abnormalities. Definity utilized  IVC dilated.    Signature   ----------------------------------------------------------------  Electronically signed by Jimmy Pabon MD(Interpreting  physician) on 12/30/2021 07:52 AM        Pertinent Labs:   CBC:   Recent Labs     12/28/21  1929 12/30/21  0223 12/31/21  0329   WBC 10.8 9.6 9.2   HGB 11.0* 10.2* 9.9*   * 513* 490*     BMP:    Recent Labs 12/28/21 1929 12/30/21 0223 12/31/21 0329   * 136 134*   K 5.2* 4.4 4.5   CL 94* 95* 94*   CO2 23 25 26   BUN 42* 34* 29*   CREATININE 1.9* 2.0* 1.7*   GLUCOSE 123* 165* 200*     INR:   Recent Labs     12/29/21  1538 12/30/21 0223 12/31/21  0329   INR 5.44* 4.23* 3.52*       Physical Exam:   Vital Signs: BP (!) 149/85   Pulse 80   Temp 96.6 °F (35.9 °C) (Temporal)   Resp 14   Ht 5' 10\" (1.778 m)   Wt 242 lb 9.6 oz (110 kg)   SpO2 95%   BMI 34.81 kg/m²   General appearance:. Alert and Cooperative   HEENT: Normocephalic. Chest: Lung sounds clear bilaterally without wheezes or rhonchi. Cardiac: Irregular, S1, S2 normal. No murmurs, gallops, or rubs auscultated. Abdomen: soft, non-tender; non-distended normal bowel sounds no masses, no organomegaly. Extremities: No clubbing or cyanosis. No peripheral edema. Peripheral pulses palpable. BLE edema improving   Neurologic: Grossly intact. Discharge Medications:          Medication List      CHANGE how you take these medications    atorvastatin 20 MG tablet  Commonly known as: LIPITOR  TAKE ONE TABLET DAILY  What changed: See the new instructions. furosemide 40 MG tablet  Commonly known as: LASIX  Take 1 tablet by mouth daily  What changed:   · medication strength  · how much to take  · when to take this  · reasons to take this     metoprolol tartrate 25 MG tablet  Commonly known as: LOPRESSOR  TAKE ONE TABLET TWICE DAILY  What changed: See the new instructions. warfarin 10 MG tablet  Commonly known as: COUMADIN  Take as directed. If you are unsure how to take this medication, talk to your nurse or doctor. Original instructions: Take 1 tablet by mouth See Admin Instructions 10 MG ON Saturday, Monday, Tuesday, Wednesday, Friday 15 MG ON Sunday & Thursday. .. Stop taking Coumadin for now until repeat INR Monday with PCP or cardiology office.   What changed: additional instructions        CONTINUE taking these medications    amiodarone 200 MG tablet  Commonly known as: CORDARONE  Take 1 tablet by mouth 2 times daily     aspirin EC 81 MG EC tablet  Take 1 tablet by mouth daily     blood glucose monitor kit and supplies  Dispense sufficient amount for indicated testing frequency plus additional to accommodate PRN testing needs. * blood glucose test strips strip  Commonly known as: Contour Next Test  1 each by In Vitro route 4 times daily As needed. * blood glucose test strips  Test 3 times a day & as needed for symptoms of irregular blood glucose. Dispense sufficient amount for indicated testing frequency plus additional to accommodate PRN testing needs. cloNIDine 0.1 MG tablet  Commonly known as: CATAPRES  Take 1 tablet by mouth 2 times daily     diphenhydrAMINE 25 MG tablet  Commonly known as: BENADRYL     guaiFENesin 600 MG extended release tablet  Commonly known as: MUCINEX  Take 1 tablet by mouth 2 times daily     HumaLOG KwikPen 200 UNIT/ML Sopn pen  Generic drug: insulin lispro  Sliding scale 160-179 2 units  180-199 3 units  200-219 4 units  220-239 5 units  240-259 6 units   260-279 7 units  280-299 8 units  300-399 9 units  340-379 10 units  380-420 11 units     Insulin Pen Needle 32G X 4 MM Misc  1 each by Does not apply route daily     Lancets Misc  1 each by Does not apply route 3 times daily (with meals)     losartan 100 MG tablet  Commonly known as: COZAAR     metFORMIN 1000 MG tablet  Commonly known as: GLUCOPHAGE  Take 1 tablet by mouth 2 times daily (with meals)     oxyCODONE 5 MG immediate release tablet  Commonly known as: ROXICODONE  Take 1 tablet by mouth every 6 hours as needed for Pain for up to 14 days.      tamsulosin 0.4 MG capsule  Commonly known as: FLOMAX  Take 2 capsules by mouth daily     Tresiba FlexTouch 100 UNIT/ML Sopn  Generic drug: Insulin Degludec     vitamin D 1.25 MG (81086 UT) Caps capsule  Commonly known as: ERGOCALCIFEROL         * This list has 2 medication(s) that are the same as other medications prescribed for you. Read the directions carefully, and ask your doctor or other care provider to review them with you. Where to Get Your Medications      These medications were sent to [address] - [facility], [address] [facility] [phone]  Navdeep Seay    Phone: [phone]   · furosemide 40 MG tablet     You can get these medications from any pharmacy    Bring a paper prescription for each of these medications  · warfarin 10 MG tablet            Discharge Instructions: Follow up with Anmol Jefferson MD or cardiology office on Monday for repeat INR and resumption of Coumadin. Follow-up with PCP and cardiology as recommended. Take medications as directed. Resume activity as tolerated. Diet: ADULT DIET; Regular; 3 carb choices (45 gm/meal); Low Fat/Low Chol/High Fiber/PACHECO; 1000 ml     Disposition: Patient is medically stable and will be discharged home. Time spent on discharge 38 minutes spent in assessing patient, reviewing medications, discussion with nursing, confirming safe discharge plan and preparation of discharge summary. Signed:  Electronically signed by NOMI Baker CNP on 12/31/21 at 1:25 PM CST         EMR Dragon/Transcription disclaimer:   Much of this encounter note is an electronic transcription/translation of spoken language to printed text.  The electronic translation of spoken language may permit erroneous, or at times, nonsensical words or phrases to be inadvertently transcribed; although attempts have made to review the note for such errors, some may still exist.

## 2021-12-31 NOTE — PROGRESS NOTES
Cardiology Daily Note Demetria Miles MD      Patient:  Aleta Gupta  [id number]    Patient Active Problem List    Diagnosis Date Noted    Abnormal stress test     Status post coronary artery bypass graft     Renal insufficiency 12/29/2021    Fluid overload 12/28/2021    CAD in native artery 12/14/2021    Longstanding persistent atrial fibrillation ([address]     Amputation of left middle finger 11/09/2021    Bacteremia due to methicillin resistant Staphylococcus aureus 10/27/2021    Cellulitis 10/25/2021    Proliferative diabetic retinopathy of both eyes without macular edema associated with type 2 diabetes mellitus ([address] 07/06/2021    Obstructive sleep apnea 07/02/2021    Subtherapeutic international normalized ratio (INR) 04/19/2021    Hypoglycemic encephalopathy     Supratherapeutic INR 04/14/2021    Left-sided weakness 04/14/2021    Acute on chronic heart failure with preserved ejection fraction ([address] 02/21/2021    Permanent atrial fibrillation ([address] 02/18/2021    Diabetic ulcer of right midfoot associated with type 2 diabetes mellitus, with fat layer exposed ([address] 09/14/2020    Charcot foot due to diabetes mellitus ([address] 06/26/2020    Personal history of noncompliance with medical treatment and regimen 06/25/2020    Class 2 obesity due to excess calories in adult 06/12/2020    Hyponatremia 03/21/2019    LIAN (acute kidney injury) ([address] 03/21/2019    Type 2 diabetes mellitus with diabetic polyneuropathy, with long-term current use of insulin ([address] 03/21/2019    Shortness of breath 12/18/2014    Fatigue 12/18/2014    HTN (hypertension)     Hyperlipidemia        Admit Date:  12/28/2021    Admission Problem List: Present on Admission:   Fluid overload   Type 2 diabetes mellitus with diabetic polyneuropathy, with long-term current use of insulin (HCC)   HTN (hypertension)   Hyperlipidemia   Renal insufficiency   Supratherapeutic INR   Permanent atrial fibrillation ([address]   Status post coronary artery bypass graft   Acute on chronic heart failure with preserved ejection fraction (HCC)   Longstanding persistent atrial fibrillation (HCC)   LIAN (acute kidney injury) [facility])      Cardiac Specific Data:  Specialty Problems        Cardiology Problems    HTN (hypertension)        Hyperlipidemia        Permanent atrial fibrillation (HCC)        Acute on chronic heart failure with preserved ejection fraction (HCC)        CAD in native artery        Longstanding persistent atrial fibrillation (HCC)              Subjective:  Mr. Saritha Fairbanks seen today reportedly had some atrial fibrillation in and out on oral amiodarone resting comfortably asymptomatic. Dyspnea improved edema improved. Still has 1-2+ edema. Blood pressure 149/85 heart 80. Objective:   BP (!) 149/85   Pulse 80   Temp 96.6 °F (35.9 °C) (Temporal)   Resp 14   Ht 5' 10\" (1.778 m)   Wt 242 lb 9.6 oz (110 kg)   SpO2 95%   BMI 34.81 kg/m²       Intake/Output Summary (Last 24 hours) at 12/31/2021 0920  Last data filed at 12/31/2021 0776  Gross per 24 hour   Intake 250 ml   Output 3100 ml   Net -2850 ml       Prior to Admission medications    Medication Sig Start Date End Date Taking? Authorizing Provider   cloNIDine (CATAPRES) 0.1 MG tablet Take 1 tablet by mouth 2 times daily 12/23/21   Gabbi Bess MD   metoprolol tartrate (LOPRESSOR) 25 MG tablet TAKE ONE TABLET TWICE DAILY  Patient taking differently: 50 mg 2 times daily  12/21/21   NOMI Huynh   oxyCODONE (ROXICODONE) 5 MG immediate release tablet Take 1 tablet by mouth every 6 hours as needed for Pain for up to 14 days.  12/18/21 1/1/22  Jacob Callejas MD   amiodarone (CORDARONE) 200 MG tablet Take 1 tablet by mouth 2 times daily 12/18/21   Jacob Callejas MD   guaiFENesin [facility]) 600 MG extended release tablet Take 1 tablet by mouth 2 times daily 12/18/21   Jacob Callejas MD   losartan (COZAAR) 100 MG tablet Take 100 mg by mouth daily Indications: High Blood Pressure Disorder    Historical Provider, MD   furosemide (LASIX) 20 MG tablet Take 20 mg by mouth daily as needed    Historical Provider, MD   warfarin (COUMADIN) 10 MG tablet Take 10 mg by mouth See Admin Instructions 10 MG ON Saturday, Monday, Tuesday, Wednesday, Friday  15 MG ON Sunday & THURSDAY    Historical Provider, MD   metFORMIN (GLUCOPHAGE) 1000 MG tablet Take 1 tablet by mouth 2 times daily (with meals) 11/26/21 12/26/21  Nolberto Bernal MD   aspirin EC 81 MG EC tablet Take 1 tablet by mouth daily 11/23/21   Nolberto Bernal MD   Insulin Degludec (TRESIBA FLEXTOUCH) 100 UNIT/ML SOPN Inject into the skin PT TAKES ACCORDING TO ACCUCHEK. DOES NOT TAKE  AND UNDER AT HS. Historical Provider, MD   atorvastatin (LIPITOR) 20 MG tablet TAKE ONE TABLET DAILY   Patient taking differently: Take 20 mg by mouth daily  9/29/21   Jennifer Smith MD   tamsulosin (FLOMAX) 0.4 MG capsule Take 2 capsules by mouth daily 9/15/21   NOMI Goldman - CNP   blood glucose monitor strips Test 3 times a day & as needed for symptoms of irregular blood glucose. Dispense sufficient amount for indicated testing frequency plus additional to accommodate PRN testing needs. 8/1/21   Gabbi Bess MD   Insulin Pen Needle 32G X 4 MM MISC 1 each by Does not apply route daily 6/14/21   Gabbi Bess MD   blood glucose monitor kit and supplies Dispense sufficient amount for indicated testing frequency plus additional to accommodate PRN testing needs.  5/12/21   Gabbi Bess MD   Lancets MISC 1 each by Does not apply route 3 times daily (with meals) 5/12/21   Gabbi Bess MD   insulin lispro (HUMALOG KWIKPEN) 200 UNIT/ML SOPN pen Sliding scale 160-179 2 units  180-199 3 units  200-219 4 units  220-239 5 units  240-259 6 units   260-279 7 units  280-299 8 units  300-399 9 units  340-379 10 units  380-420 11 units 5/11/21   Gabbi Bess MD   vitamin D (ERGOCALCIFEROL) 1.25 MG (67887 UT) CAPS capsule Take 50,000 Units by mouth once a week Takes on Friday    Historical Provider, MD   diphenhydrAMINE (BENADRYL) 25 MG tablet Take 25 mg by mouth every 6 hours as needed for Itching    Historical Provider, MD   blood glucose test strips (CONTOUR NEXT TEST) strip 1 each by In Vitro route 4 times daily As needed.  11/21/19   Gabbi Bess MD        insulin lispro  5 Units SubCUTAneous TID WC    warfarin (COUMADIN) daily dosing (placeholder)   Other RX Placeholder    insulin glargine  10 Units SubCUTAneous Nightly    bumetanide  1 mg IntraVENous BID    sodium chloride flush  5-40 mL IntraVENous 2 times per day    amiodarone  200 mg Oral BID    aspirin EC  81 mg Oral Daily    atorvastatin  20 mg Oral Daily    metoprolol tartrate  25 mg Oral BID    tamsulosin  0.8 mg Oral Daily    insulin lispro  0-12 Units SubCUTAneous TID WC    insulin lispro  0-6 Units SubCUTAneous Nightly    ferrous sulfate  325 mg Oral TID WC       TELEMETRY: Sinus     Physical Exam:      Physical Exam      General:  Awake, alert, NAD  Skin:  Warm and dry  Neck:  no jvd , no carotid bruits  Chest:  Clear to auscultation, no wheezing or rales  Cardiovascular:  RRR V9M1 no murmurs, clicks, gallups, or rubs  Abdomen:  Soft nontender, nondistended, bowel sounds present  Extremities:  Edema: 1+        Lab Data:  CBC:   Recent Labs     12/28/21 1929 12/30/21 0223 12/31/21 0329   WBC 10.8 9.6 9.2   HGB 11.0* 10.2* 9.9*   HCT 33.0* 32.2* 31.3*   MCV 96.2* 97.3* 97.5*   * 513* 490*     BMP:   Recent Labs     12/28/21 1929 12/30/21 0223 12/31/21  0329   * 136 134*   K 5.2* 4.4 4.5   CL 94* 95* 94*   CO2 23 25 26   BUN 42* 34* 29*   CREATININE 1.9* 2.0* 1.7*     LIVER PROFILE:   Recent Labs     12/28/21 1929 12/30/21 0223 12/31/21  0329   AST 23 17 14   ALT 37 29 23   BILITOT 0.5 0.6 0.5   ALKPHOS 472* 430* 361*     PT/INR:   Recent Labs     12/29/21  1538 12/30/21  0223 12/31/21  0329   PROTIME 47.9* 39.6* 34.3*   INR 5.44* 4.23* 3.52*     APTT:   Recent Labs     12/28/21 1929   APTT 67.6*     BNP:  No results for input(s): BNP in the last 72 hours. CK, CKMB, Troponin: @LABRCNT (CKTOTAL:3, CKMB:3, TROPONINI:3)@    IMAGING:  XR CHEST (2 VW)    Result Date: 12/18/2021  EXAM: XR CHEST (2 VW) INDICATION: Postop CABG COMPARISON: 12/16/2021 FINDINGS: Cardiac silhouette is prominent but stable. Prior median sternotomy and CABG noted. Atrial appendage device noted. No pneumothorax. Decrease in trace LEFT pleural effusion and atelectasis. No new airspace opacity No acute osseous finding. 1.  No unexpected findings. 2.  Decrease in trace LEFT pleural effusion and atelectasis. Signed by Dr Misty Worley    XR CHEST (2 VW)    Result Date: 12/13/2021  EXAM: XR CHEST (2 VW) INDICATION: Preop evaluation, CABG COMPARISON: 11/23/2021 FINDINGS: Cardiac silhouette is normal size. No pleural effusion or visible pneumothorax. No focal consolidation. No acute osseous finding. No acute findings. Signed by Dr Zohreh Garrett    Result Date: 12/28/2021  EXAMINATION: Chest one view 12/28/2021 HISTORY: Dyspnea FINDINGS: Today's exam is compared to previous study of 12/18/2021. There is cardiomegaly with mild pulmonary vascular congestion. There is some elevation of the left hemidiaphragm as well as development of a left-sided effusion with left-sided atelectasis. There is no free air beneath the hemidiaphragms. There is moderate cardiomegaly. 1.. Cardiomegaly with pulmonary venous hypertension with early interstitial edema. 2. There is some elevation of the left diaphragm as well as development of a left-sided effusion with left basilar atelectasis. Signed by Dr Luis Conrad    XR CHEST PORTABLE    Result Date: 12/16/2021  Examination. XR CHEST PORTABLE 12/16/2021 4:36 AM History: Postop CABG. A frontal portable upright view of the chest is compared with the previous study dated 12/15/2021.  The lungs are poorly expanded. There is small left lower lobar consolidation and a small left basal pleural effusion. This was not seen in the previous study. There is no pulmonary congestion or pneumothorax. There is persistent moderate cardiomegaly. There is evidence of a previous cardiac surgery. The left atrial appendages clamp is in place. Right internal jugular vascular sheath is in place. 1. A small left basal pleural effusion and left lower lung consolidation/atelectasis. 2. Moderate cardiomegaly. Signed by Dr Linda Lee    Result Date: 12/15/2021  Examination. XR CHEST PORTABLE 12/15/2021 4:00 AM History: Postop CABG. There is a frontal portable upright view of the chest is compared with the previous study dated 12/14/2021. The lungs are poorly inflated. There are atelectatic changes in the lower lung bilaterally. There is no pleural effusion, pulmonary congestion or pneumothorax. There is moderate cardiomegaly. There is evidence of previous cardiac surgery. The left atrial appendages clamp is in place. Endotracheal tube and Drummonds-Kylah catheter have been removed in the interval. A right internal jugular vascular sheath is in place. No acute bony abnormality. 1. The poor lung expansion but no active cardiopulmonary disease. Signed by Dr Linda Lee    Result Date: 12/14/2021  XR CHEST PORTABLE 12/14/2021 12:29 PM HISTORY:   Postop CABG  Single view. COMPARISONS:  Preop chest radiography dated 12/13/2021 FINDINGS: Changes from CABG identified. Endotracheal tube, Drummonds-Kylah catheter, mediastinal drains and left chest tube observed. The level of inspiration is shallow. The bronchovascular fissure markings centrally are prominent, mild atelectasis and mild pulmonary venous hypertension/volume overload considered. There are no pneumothoraces. Postop CABG.  Signed by Dr Brantley Bone    ECHO 2D Jas Nash    Result Date: 12/30/2021  Transthoracic Echocardiography Report (TTE)  Demographics   Patient Name  Isabel Saab Date of Study          12/29/2021   MRN           [id number]        Gender                 Male   Date of Birth 1965    Room Number            VYX-3570   Age           60 year(s)   Height:       70 inches     Referring Physician    Holger Sims   Weight:       259.01 pounds Sonographer            Newby Holmes [facility]   BSA:          2.33 m^2      Interpreting Physician Randon Saint MD   BMI:          37.16 kg/m^2  Procedure Type of Study   TTE procedure:ECHO 2D W/DOPPLER/COLOR/CONTRAST. Study Location: Echo Lab Technical Quality: Limited visualization due to poor acoustical window. Patient Status: Inpatient Contrast Medium: Definity. Amount - 2 ml Rhythm: Within normal limits BP: 169/72 mmHg Indications:Dyspnea/SOB and S/P CABG. Conclusions   Summary  Mitral valve leaflets are mildly thickened with preserved leaflet  mobility. Mildly thickened aortic valve leaflets with preserved leaflet mobility. Tricuspid valve is structurally normal.  Mild to moderate tricuspid regurgitation with estimated RVSP of 95  suggestive pulmonary HTN mm Hg. Mildly dilated left atrium. Normal left ventricular size with preserved LV function and an estimated  ejection fraction of approximately 55-60%. Moderate concentric left ventricular hypertrophy. Grade III diastolic dysfunction restrictive pattern  No regional wall motion abnormalities. Definity utilized  IVC dilated. Signature   ----------------------------------------------------------------  Electronically signed by Randon Saint MD(Interpreting  physician) on 12/30/2021 07:52 AM  ----------------------------------------------------------------   Findings   Mitral Valve  Mitral valve leaflets are mildly thickened with preserved leaflet  mobility. Aortic Valve  Mildly thickened aortic valve leaflets with preserved leaflet mobility.    Tricuspid Valve  Tricuspid valve is structurally normal. Mild to moderate tricuspid regurgitation with estimated RVSP of 95  suggestive pulmonary HTN mm Hg. Pulmonic Valve  The pulmonic valve was not well visualized . Trace pulmonic regurgitation present. Left Atrium  Mildly dilated left atrium. Left Ventricle  Normal left ventricular size with preserved LV function and an estimated  ejection fraction of approximately 55-60%. Moderate concentric left ventricular hypertrophy. Grade III diastolic dysfunction restrictive pattern  No regional wall motion abnormalities. Right Atrium  Normal right atrial dimension with no evidence of thrombus or mass noted. Right Ventricle  Normal right ventricular size with preserved RV function. Pericardial Effusion  No evidence of significant pericardial effusion is noted. Pleural Effusion  No evidence of pleural effusion. Miscellaneous  Definity utilized  IVC dilated. Allergies   - Neosporin.   - Penicillins.   - Other allergy:(MRI Contrast). - Other allergy:(Neomycin-bacitracin). M-Mode Measurements (cm)   LVIDd: 4.14 cm                       LVIDs: 2.53 cm  IVSd: 1.34 cm  LVPWd: 1.39 cm                       AO Root Dimension: 3.1 cm  % Ejection Fraction: 74 %            LA: 3.9 cm                                       LVOT: 2 cm  Doppler Measurements:   AV Peak Velocity:136 cm/s  AV Peak Gradient: 7.4 mmHg  AV Mean Gradient: 4 mmHg  AV Area (Continuity):2.23 cm^2  TR Velocity:438 cm/s  TR Gradient:76.74 mmHg  Estimated RAP:20 mmHg  RVSP:95 mmHg          Assessment:  1. Complaints of weight gain and edema and volume overload in recent weeks  2. Chronic atrial fibrillation  3. Hypertension  4. Hyperlipidemia  5. Chronic anticoagulation  6. Diabetes mellitus type 2  7. Shortness of breath  8. Obesity  9. Charcot foot due to diabetes  10. Hypoglycemic encephalopathy  11. Obstructive sleep apnea  12. History of diabetic retinopathy proliferative  13. History of bacteremia due to MRSA  14.  History of medical noncompliance  15. Abnormal chest x-ray cardiomegaly with pulmonary venous hypertension early interstitial edema elevation left hemidiaphragm as well as left-sided effusion with left basilar atelectasis  16. Echo yesterday mild to moderate TR RVSP 95 dilated left atrium normal ejection fraction 55 to 60% moderate concentric LVH grade 3 diastolic dysfunction restrictive pattern  17. Cardiac catheterization 86/17/0867 normal LV systolic function severe multivessel coronary disease 55% distal left main  18. Status post CABG twelve 1421 x 3 with LIMA graft  19. Paroxysmal atrial fibrillation    Plan:  1.  Continue diuresis continue other medical therapy stable at this time I will be gone over the weekend coverage available    Keaton Roberts MD, MD 12/31/2021 9:20 AM

## 2022-01-02 DIAGNOSIS — I50.32 CHRONIC HEART FAILURE WITH PRESERVED EJECTION FRACTION (HCC): Primary | ICD-10-CM

## 2022-01-02 PROBLEM — N18.9 CKD (CHRONIC KIDNEY DISEASE): Status: ACTIVE | Noted: 2021-12-29

## 2022-01-02 PROBLEM — N18.30 STAGE 3 CHRONIC KIDNEY DISEASE (HCC): Status: ACTIVE | Noted: 2021-12-29

## 2022-01-02 PROBLEM — I48.21 PERMANENT ATRIAL FIBRILLATION (HCC): Status: RESOLVED | Noted: 2021-02-18 | Resolved: 2022-01-02

## 2022-01-02 NOTE — PROGRESS NOTES
Post-Discharge Transitional Care Management Services or Hospital Follow Up      Dwayne Biggs   YOB: 1965    Date of Office Visit:  1/3/2022  Date of Hospital Admission: 12/28/21  Date of Hospital Discharge: 12/31/21  Readmission Risk Score(high >=14%. Medium >=10%):Readmission Risk Score: 24.2 ( )      Care management risk score Rising risk (score 2-5) and Complex Care (Scores >=6): 11     Non face to face  following discharge, date last encounter closed (first attempt may have been earlier): 1/3/2022  8:37 AM 1/3/2022  8:37 AM    Call initiated 2 business days of discharge: Yes     Patient Active Problem List   Diagnosis    Shortness of breath    HTN (hypertension)    Hyperlipidemia    Fatigue    Hyponatremia    Type 2 diabetes mellitus with diabetic polyneuropathy, with long-term current use of insulin ([facility])    Class 2 obesity due to excess calories in adult    Diabetic ulcer of right midfoot associated with type 2 diabetes mellitus, with fat layer exposed ([address]    Charcot foot due to diabetes mellitus ([address]    Personal history of noncompliance with medical treatment and regimen    Chronic heart failure with preserved ejection fraction (HCC)    Obstructive sleep apnea    Proliferative diabetic retinopathy of both eyes without macular edema associated with type 2 diabetes mellitus ([address]    Cellulitis    Amputation of left middle finger    Abnormal stress test    CAD in native artery    Longstanding persistent atrial fibrillation (HCC)    Fluid overload    Stage 3 chronic kidney disease ([address]    Status post coronary artery bypass graft       Allergies   Allergen Reactions    Contrast [Iodides]      N & V. PT STATED HE HAD A MRI WITH CONTRAST ON 10/25/2021.     Neomycin-Bacitracin Zn-Polymyx Rash    Neosporin [Neomycin-Polymyxin-Gramicidin] Rash    Pcn [Penicillins] Rash       Medications listed as ordered at the time of discharge from hospital     Medication List Accurate as of January 3, 2022  4:31 PM. If you have any questions, ask your nurse or doctor. CHANGE how you take these medications    atorvastatin 20 MG tablet  Commonly known as: LIPITOR  TAKE ONE TABLET DAILY  What changed: See the new instructions. metoprolol tartrate 50 MG tablet  Commonly known as: LOPRESSOR  Take 1 tablet by mouth 2 times daily  What changed:   · medication strength  · See the new instructions. Changed by: Malathi Jordan MD        CONTINUE taking these medications    amiodarone 200 MG tablet  Commonly known as: CORDARONE  Take 1 tablet by mouth 2 times daily     aspirin EC 81 MG EC tablet  Take 1 tablet by mouth daily     blood glucose monitor kit and supplies  Dispense sufficient amount for indicated testing frequency plus additional to accommodate PRN testing needs. * blood glucose test strips strip  Commonly known as: Contour Next Test  1 each by In Vitro route 4 times daily As needed. * blood glucose test strips  Test 3 times a day & as needed for symptoms of irregular blood glucose. Dispense sufficient amount for indicated testing frequency plus additional to accommodate PRN testing needs.      cloNIDine 0.1 MG tablet  Commonly known as: CATAPRES  Take 1 tablet by mouth 2 times daily     diphenhydrAMINE 25 MG tablet  Commonly known as: BENADRYL     furosemide 40 MG tablet  Commonly known as: LASIX  Take 1 tablet by mouth daily     guaiFENesin 600 MG extended release tablet  Commonly known as: MUCINEX  Take 1 tablet by mouth 2 times daily     HumaLOG KwikPen 200 UNIT/ML Sopn pen  Generic drug: insulin lispro  Sliding scale 160-179 2 units  180-199 3 units  200-219 4 units  220-239 5 units  240-259 6 units   260-279 7 units  280-299 8 units  300-399 9 units  340-379 10 units  380-420 11 units     Insulin Pen Needle 32G X 4 MM Misc  1 each by Does not apply route daily     Lancets Misc  1 each by Does not apply route 3 times daily (with meals)     losartan 100 MG tablet  Commonly known as: COZAAR     metFORMIN 1000 MG tablet  Commonly known as: GLUCOPHAGE  Take 1 tablet by mouth 2 times daily (with meals)     tamsulosin 0.4 MG capsule  Commonly known as: FLOMAX  Take 2 capsules by mouth daily     Tresiba FlexTouch 100 UNIT/ML Sopn  Generic drug: Insulin Degludec     vitamin D 1.25 MG (08949 UT) Caps capsule  Commonly known as: ERGOCALCIFEROL     warfarin 10 MG tablet  Commonly known as: COUMADIN  Take as directed by the anticoagulation clinic. If you are unsure how to take this medication, talk to your nurse or doctor. Original instructions: Take 1 tablet by mouth See Admin Instructions 10 MG ON Saturday, Monday, Tuesday, Wednesday, Friday 15 MG ON Sunday & Thursday. .. Stop taking Coumadin for now until repeat INR Monday with PCP or cardiology office. * This list has 2 medication(s) that are the same as other medications prescribed for you. Read the directions carefully, and ask your doctor or other care provider to review them with you. Where to Get Your Medications      These medications were sent to [address] [facility], [address] [place] [address] [phone]  [address] [id number]    Phone: [phone]   · metoprolol tartrate 50 MG tablet           Medications marked \"taking\" at this time  Outpatient Medications Marked as Taking for the 1/3/22 encounter (Office Visit) with Raudel Robles MD   Medication Sig Dispense Refill    metoprolol tartrate (LOPRESSOR) 50 MG tablet Take 1 tablet by mouth 2 times daily 180 tablet 1    furosemide (LASIX) 40 MG tablet Take 1 tablet by mouth daily 60 tablet 0    warfarin (COUMADIN) 10 MG tablet Take 1 tablet by mouth See Admin Instructions 10 MG ON Saturday, Monday, Tuesday, Wednesday, Friday 15 MG ON Sunday & Thursday. .. Stop taking Coumadin for now until repeat INR Monday with PCP or cardiology office.  30 tablet 3    cloNIDine (CATAPRES) 0.1 MG tablet Take 1 tablet by mouth 2 times daily 60 tablet 0    amiodarone (CORDARONE) 200 MG tablet Take 1 tablet by mouth 2 times daily 60 tablet 1    guaiFENesin (MUCINEX) 600 MG extended release tablet Take 1 tablet by mouth 2 times daily 60 tablet 0    losartan (COZAAR) 100 MG tablet Take 100 mg by mouth daily Indications: High Blood Pressure Disorder      metFORMIN (GLUCOPHAGE) 1000 MG tablet Take 1 tablet by mouth 2 times daily (with meals) 180 tablet 3    aspirin EC 81 MG EC tablet Take 1 tablet by mouth daily 90 tablet 1    Insulin Degludec (TRESIBA FLEXTOUCH) 100 UNIT/ML SOPN Inject into the skin PT TAKES ACCORDING TO ACCUCHEK. DOES NOT TAKE  AND UNDER AT HS.  atorvastatin (LIPITOR) 20 MG tablet TAKE ONE TABLET DAILY  (Patient taking differently: Take 20 mg by mouth daily ) 90 tablet 1    tamsulosin (FLOMAX) 0.4 MG capsule Take 2 capsules by mouth daily 60 capsule 11    blood glucose monitor strips Test 3 times a day & as needed for symptoms of irregular blood glucose. Dispense sufficient amount for indicated testing frequency plus additional to accommodate PRN testing needs. 200 strip 3    Insulin Pen Needle 32G X 4 MM MISC 1 each by Does not apply route daily 100 each 3    blood glucose monitor kit and supplies Dispense sufficient amount for indicated testing frequency plus additional to accommodate PRN testing needs.  1 kit 0    Lancets MISC 1 each by Does not apply route 3 times daily (with meals) 300 each 3    insulin lispro (HUMALOG KWIKPEN) 200 UNIT/ML SOPN pen Sliding scale 160-179 2 units  180-199 3 units  200-219 4 units  220-239 5 units  240-259 6 units   260-279 7 units  280-299 8 units  300-399 9 units  340-379 10 units  380-420 11 units 3 pen 5    vitamin D (ERGOCALCIFEROL) 1.25 MG (31829 UT) CAPS capsule Take 50,000 Units by mouth once a week Takes on Friday      diphenhydrAMINE (BENADRYL) 25 MG tablet Take 25 mg by mouth every 6 hours as needed for Itching      blood glucose test strips (CONTOUR NEXT TEST) strip 1 each by In Vitro route 4 times daily As needed. 200 each 3        Medications patient taking as of now reconciled against medications ordered at time of hospital discharge: Yes    Chief Complaint   Patient presents with    Diabetes    Follow-Up from Hospital       HPI  58-year-old male who presents for follow-up visit after recent admission for acute on chronic heart failure  Patient is managed by cardiology  History of persistent atrial fibrillation despite of maze procedure  And chronic anticoagulation therapy  History of poorly controlled diabetes with complications to peripheral neuropathy of the hands status post amputation left middle finger, he has peripheral neuropathy with recurrent foot ulcers which have healed  Patient is now having chronic kidney disease stage III  Hypertension which was poorly uncontrolled prior to his admission  Hyperlipidemia    Inpatient course: Discharge summary reviewed- see chart. Interval history/Current status:   Is admitted for fluid overload acute congestive heart failure  Patient had had poorly controlled hypertension which is better controlled he was started on diuretics which she is taking every day  He had missed his suture removal so suture was removed today    Review of Systems   Constitutional: Negative for activity change, appetite change, chills, diaphoresis, fatigue and fever. HENT: Negative for congestion, hearing loss, postnasal drip, sore throat, tinnitus and trouble swallowing. Eyes: Negative for visual disturbance (says he has blurred vision). Respiratory: Negative for cough, chest tightness, shortness of breath, wheezing and stridor. Cardiovascular: Negative for chest pain, palpitations and leg swelling. Gastrointestinal: Negative for abdominal pain, blood in stool, constipation and diarrhea. Endocrine: Negative for cold intolerance. Genitourinary: Negative for frequency.    Musculoskeletal: Negative for arthralgias, back pain and joint swelling. Skin: Positive for wound. Negative for color change. Allergic/Immunologic: Negative for environmental allergies. Neurological: Positive for numbness. Negative for dizziness, light-headedness and headaches. Hematological: Negative for adenopathy. Does not bruise/bleed easily. Psychiatric/Behavioral: Negative for decreased concentration, dysphoric mood and sleep disturbance. The patient is not nervous/anxious. Vitals:    01/03/22 0959 01/03/22 1018   BP: (!) 142/100 120/80   Site: Right Upper Arm    Position: Sitting    Cuff Size: Large Adult    Pulse: 91    SpO2: 98%    Weight: 242 lb 12.8 oz (110.1 kg)    Height: 5' 10\" (1.778 m)      Body mass index is 34.84 kg/m². Wt Readings from Last 3 Encounters:   01/03/22 242 lb 12.8 oz (110.1 kg)   12/31/21 242 lb 9.6 oz (110 kg)   12/20/21 248 lb 3.2 oz (112.6 kg)     BP Readings from Last 3 Encounters:   01/03/22 120/80   12/31/21 129/78   12/20/21 110/80       Physical Exam  Vitals and nursing note reviewed. Constitutional:       General: He is not in acute distress. Appearance: He is well-developed and normal weight. HENT:      Head: Normocephalic and atraumatic. Right Ear: External ear normal.      Left Ear: External ear normal.      Nose: Nose normal.      Mouth/Throat:      Mouth: Mucous membranes are moist.   Eyes:      General: No scleral icterus. Right eye: No discharge. Left eye: No discharge. Conjunctiva/sclera: Conjunctivae normal.      Pupils: Pupils are equal, round, and reactive to light. Neck:      Thyroid: No thyromegaly. Vascular: No JVD. Cardiovascular:      Rate and Rhythm: Normal rate and regular rhythm. Heart sounds: Normal heart sounds, S1 normal and S2 normal. No murmur heard. Pulmonary:      Effort: Pulmonary effort is normal.      Breath sounds: Normal breath sounds. No wheezing or rales.    Abdominal:      General: Bowel sounds are normal. There is no distension. Palpations: Abdomen is soft. There is no mass. Tenderness: There is no abdominal tenderness. There is no rebound. Musculoskeletal:         General: No tenderness. Normal range of motion. Cervical back: Normal range of motion and neck supple. Right lower leg: No edema. Left lower leg: No edema. Comments: L middle finger amputation   Lymphadenopathy:      Cervical: No cervical adenopathy. Skin:     General: Skin is warm and dry. Findings: No rash. Comments: midkine sternotomy scar  [place] LLE, removed today   Neurological:      General: No focal deficit present. Mental Status: He is alert and oriented to person, place, and time. Mental status is at baseline. Cranial Nerves: Cranial nerves are intact. No cranial nerve deficit. Motor: Weakness present. Coordination: Coordination is intact. Deep Tendon Reflexes: Reflexes normal.   Psychiatric:         Mood and Affect: Mood is depressed. Speech: Speech is delayed. Behavior: Behavior is slowed and withdrawn. Thought Content: Thought content normal.         Judgment: Judgment normal.       Visual inspection:  Deformity/amputation: present - Charcot foot  Skin lesions/pre-ulcerative calluses: absent  Edema: right- 1+, left- trace    Sensory exam:  Monofilament sensation: abnormal - absent  (minimum of 5 random plantar locations tested, avoiding callused areas - > 1 area with absence of sensation is + for neuropathy)    Plus at least one of the following:  Pulses: abnormal - diminished,   Pinprick: Impaired  Proprioception: Impaired  Vibration (128 Hz):  Impaired      Assessment/Plan:  Problem List        Circulatory    RESOLVED: Permanent atrial fibrillation (HCC)    Relevant Orders    [place] DISCHARGE MEDS RECONCILED W/ CURRENT OUTPATIENT MED LIST (Completed)    Longstanding persistent atrial fibrillation ([address]      Monitored by specialist- no acute findings meriting change in the plan         HTN (hypertension)      Well-controlled, continue current medications, medication adherence emphasized and lifestyle modifications recommended         Relevant Medications    cloNIDine (CATAPRES) 0.1 MG tablet    Chronic heart failure with preserved ejection fraction (HCC)      Monitored by specialist- no acute findings meriting change in the plan         CAD in native artery      Monitored by specialist- no acute findings meriting change in the plan         Relevant Medications    atorvastatin (LIPITOR) 20 MG tablet    aspirin EC 81 MG EC tablet    losartan (COZAAR) 100 MG tablet    amiodarone (CORDARONE) 200 MG tablet    cloNIDine (CATAPRES) 0.1 MG tablet    furosemide (LASIX) 40 MG tablet    warfarin (COUMADIN) 10 MG tablet    metoprolol tartrate (LOPRESSOR) 50 MG tablet       Endocrine    Type 2 diabetes mellitus with diabetic polyneuropathy, with long-term current use of insulin (HCC) (Chronic)      Borderline controlled, continue current medications, medication adherence emphasized and lifestyle modifications recommended         Relevant Medications    blood glucose test strips (CONTOUR NEXT TEST) strip    insulin lispro (HUMALOG KWIKPEN) 200 UNIT/ML SOPN pen    blood glucose monitor kit and supplies    Lancets MISC    Insulin Pen Needle 32G X 4 MM MISC    blood glucose monitor strips    Insulin Degludec (TRESIBA FLEXTOUCH) 100 UNIT/ML SOPN    metFORMIN (GLUCOPHAGE) 1000 MG tablet    Other Relevant Orders    [place] DISCHARGE MEDS RECONCILED W/ CURRENT OUTPATIENT MED LIST (Completed)    Diabetic ulcer of right midfoot associated with type 2 diabetes mellitus, with fat layer exposed (HCC) (Chronic)    Relevant Medications    insulin lispro (HUMALOG KWIKPEN) 200 UNIT/ML SOPN pen    Insulin Degludec (TRESIBA FLEXTOUCH) 100 UNIT/ML SOPN    metFORMIN (GLUCOPHAGE) 1000 MG tablet    Other Relevant Orders    [place] DISCHARGE MEDS RECONCILED W/ CURRENT OUTPATIENT MED LIST (Completed)    Proliferative diabetic retinopathy of both eyes without macular edema associated with type 2 diabetes mellitus ([facility]      Monitored by specialist- no acute findings meriting change in the plan         Relevant Medications    insulin lispro (HUMALOG KWIKPEN) 200 UNIT/ML SOPN pen    Insulin Degludec (TRESIBA FLEXTOUCH) 100 UNIT/ML SOPN    metFORMIN (GLUCOPHAGE) 1000 MG tablet    Other Relevant Orders    [place] DISCHARGE MEDS RECONCILED W/ CURRENT OUTPATIENT MED LIST (Completed)    Charcot foot due to diabetes mellitus ([facility]      Borderline controlled, lifestyle modifications recommended and no ulcers         Relevant Medications    insulin lispro (HUMALOG KWIKPEN) 200 UNIT/ML SOPN pen    Insulin Degludec (TRESIBA FLEXTOUCH) 100 UNIT/ML SOPN    metFORMIN (GLUCOPHAGE) 1000 MG tablet    aspirin EC 81 MG EC tablet       Genitourinary    Stage 3 chronic kidney disease (HCC)      Borderline controlled, continue current medications, medication adherence emphasized and lifestyle modifications recommended   Important to adhere to medications, daily weights  Echocardiogram shows Normal EF, diastollic dysfunction withMR         Relevant Orders    [place] DISCHARGE MEDS RECONCILED W/ CURRENT OUTPATIENT MED LIST (Completed)       Other    Hyperlipidemia      Well-controlled, continue current medications, medication adherence emphasized and lifestyle modifications recommended         Relevant Medications    atorvastatin (LIPITOR) 20 MG tablet    aspirin EC 81 MG EC tablet    losartan (COZAAR) 100 MG tablet    amiodarone (CORDARONE) 200 MG tablet    cloNIDine (CATAPRES) 0.1 MG tablet    furosemide (LASIX) 40 MG tablet    warfarin (COUMADIN) 10 MG tablet    metoprolol tartrate (LOPRESSOR) 50 MG tablet            Medical Decision Making: high complexity        Suture Removal: Patient here for suture removal. he from CABG surgery LEFT lower leg several minutes ago. He was seen at [facility] He had several staples (s). Mechanism of injury: surgery

## 2022-01-02 NOTE — ASSESSMENT & PLAN NOTE
Borderline controlled, continue current medications, medication adherence emphasized and lifestyle modifications recommended   Important to adhere to medications, daily weights  Echocardiogram shows Normal EF, diastollic dysfunction withMR

## 2022-01-03 ENCOUNTER — CARE COORDINATION (OUTPATIENT)
Dept: CASE MANAGEMENT | Age: 57
End: 2022-01-03

## 2022-01-03 ENCOUNTER — OFFICE VISIT (OUTPATIENT)
Dept: INTERNAL MEDICINE | Age: 57
End: 2022-01-03
Payer: MEDICARE

## 2022-01-03 VITALS
BODY MASS INDEX: 34.76 KG/M2 | DIASTOLIC BLOOD PRESSURE: 80 MMHG | OXYGEN SATURATION: 98 % | WEIGHT: 242.8 LBS | HEIGHT: 70 IN | SYSTOLIC BLOOD PRESSURE: 120 MMHG | HEART RATE: 91 BPM

## 2022-01-03 DIAGNOSIS — E11.3593 PROLIFERATIVE DIABETIC RETINOPATHY OF BOTH EYES WITHOUT MACULAR EDEMA ASSOCIATED WITH TYPE 2 DIABETES MELLITUS (HCC): ICD-10-CM

## 2022-01-03 DIAGNOSIS — E11.621 DIABETIC ULCER OF RIGHT MIDFOOT ASSOCIATED WITH TYPE 2 DIABETES MELLITUS, WITH FAT LAYER EXPOSED (HCC): ICD-10-CM

## 2022-01-03 DIAGNOSIS — I10 PRIMARY HYPERTENSION: ICD-10-CM

## 2022-01-03 DIAGNOSIS — I25.10 CAD IN NATIVE ARTERY: ICD-10-CM

## 2022-01-03 DIAGNOSIS — E11.42 TYPE 2 DIABETES MELLITUS WITH DIABETIC POLYNEUROPATHY, WITH LONG-TERM CURRENT USE OF INSULIN (HCC): ICD-10-CM

## 2022-01-03 DIAGNOSIS — L97.412 DIABETIC ULCER OF RIGHT MIDFOOT ASSOCIATED WITH TYPE 2 DIABETES MELLITUS, WITH FAT LAYER EXPOSED (HCC): ICD-10-CM

## 2022-01-03 DIAGNOSIS — N18.32 STAGE 3B CHRONIC KIDNEY DISEASE (HCC): ICD-10-CM

## 2022-01-03 DIAGNOSIS — I48.11 LONGSTANDING PERSISTENT ATRIAL FIBRILLATION (HCC): ICD-10-CM

## 2022-01-03 DIAGNOSIS — E78.2 MIXED HYPERLIPIDEMIA: ICD-10-CM

## 2022-01-03 DIAGNOSIS — Z79.4 TYPE 2 DIABETES MELLITUS WITH DIABETIC POLYNEUROPATHY, WITH LONG-TERM CURRENT USE OF INSULIN (HCC): ICD-10-CM

## 2022-01-03 DIAGNOSIS — E11.610 CHARCOT FOOT DUE TO DIABETES MELLITUS (HCC): ICD-10-CM

## 2022-01-03 DIAGNOSIS — I50.32 CHRONIC HEART FAILURE WITH PRESERVED EJECTION FRACTION (HCC): ICD-10-CM

## 2022-01-03 DIAGNOSIS — I48.21 PERMANENT ATRIAL FIBRILLATION (HCC): ICD-10-CM

## 2022-01-03 DIAGNOSIS — I50.9 ACUTE CONGESTIVE HEART FAILURE, UNSPECIFIED HEART FAILURE TYPE (HCC): ICD-10-CM

## 2022-01-03 PROBLEM — R79.1 SUBTHERAPEUTIC INTERNATIONAL NORMALIZED RATIO (INR): Status: RESOLVED | Noted: 2021-04-19 | Resolved: 2022-01-03

## 2022-01-03 PROBLEM — R53.1 LEFT-SIDED WEAKNESS: Status: RESOLVED | Noted: 2021-04-14 | Resolved: 2022-01-03

## 2022-01-03 PROBLEM — R78.81 BACTEREMIA DUE TO METHICILLIN RESISTANT STAPHYLOCOCCUS AUREUS: Status: RESOLVED | Noted: 2021-10-27 | Resolved: 2022-01-03

## 2022-01-03 PROBLEM — R79.1 SUPRATHERAPEUTIC INR: Status: RESOLVED | Noted: 2021-04-14 | Resolved: 2022-01-03

## 2022-01-03 PROBLEM — N17.9 AKI (ACUTE KIDNEY INJURY) (HCC): Status: RESOLVED | Noted: 2019-03-21 | Resolved: 2022-01-03

## 2022-01-03 PROBLEM — B95.62 BACTEREMIA DUE TO METHICILLIN RESISTANT STAPHYLOCOCCUS AUREUS: Status: RESOLVED | Noted: 2021-10-27 | Resolved: 2022-01-03

## 2022-01-03 PROCEDURE — 1111F DSCHRG MED/CURRENT MED MERGE: CPT | Performed by: INTERNAL MEDICINE

## 2022-01-03 PROCEDURE — 99496 TRANSJ CARE MGMT HIGH F2F 7D: CPT | Performed by: INTERNAL MEDICINE

## 2022-01-03 RX ORDER — METOPROLOL TARTRATE 50 MG/1
50 TABLET, FILM COATED ORAL 2 TIMES DAILY
Qty: 180 TABLET | Refills: 1 | Status: SHIPPED | OUTPATIENT
Start: 2022-01-03 | End: 2022-04-13

## 2022-01-03 ASSESSMENT — ENCOUNTER SYMPTOMS
ABDOMINAL PAIN: 0
CONSTIPATION: 0
COUGH: 0
SHORTNESS OF BREATH: 0
BACK PAIN: 0
BLOOD IN STOOL: 0
COLOR CHANGE: 0
WHEEZING: 0
DIARRHEA: 0
TROUBLE SWALLOWING: 0
SORE THROAT: 0
STRIDOR: 0
CHEST TIGHTNESS: 0

## 2022-01-03 ASSESSMENT — PATIENT HEALTH QUESTIONNAIRE - PHQ9
SUM OF ALL RESPONSES TO PHQ QUESTIONS 1-9: 0
1. LITTLE INTEREST OR PLEASURE IN DOING THINGS: 0
SUM OF ALL RESPONSES TO PHQ QUESTIONS 1-9: 0
2. FEELING DOWN, DEPRESSED OR HOPELESS: 0
SUM OF ALL RESPONSES TO PHQ QUESTIONS 1-9: 0
SUM OF ALL RESPONSES TO PHQ9 QUESTIONS 1 & 2: 0
SUM OF ALL RESPONSES TO PHQ QUESTIONS 1-9: 0

## 2022-01-03 NOTE — CARE COORDINATION
[facility] Transitions Initial Follow Up Call    Call within 2 business days of discharge: Yes    Patient: Marissa Trujillo Patient : 1965   MRN: [id number]  Reason for Admission: Fluid Overload, et al  Discharge Date: 21 RARS: Readmission Risk Score: 24.2 ( )      Last Discharge [facility]       Complaint Diagnosis Description Type Department Provider    21 Abnormal Lab; Weight Gain Acute congestive heart failure, unspecified heart failure type [facility]) ED to Hosp-Admission (Discharged) (ADMITTED) L TERESA Kate MD; Sp Veras... Spoke with: [address]: [address] [facility] [employer]      Non-face-to-face services provided:  Reviewed encounter information for continuity of care prior to follow up Care Transitions phone call - chart notes, consults, progress notes, test results, med list, appointments, AVS, other information. Care Transitions 24 Hour Call    Do you have any ongoing symptoms?: No  Do you have a copy of your discharge instructions?: Yes  Do you have all of your prescriptions and are they filled?: Yes  Have you been contacted by a [address]?: No  Have you scheduled your follow up appointment?: Yes  How are you going to get to your appointment?: Car - drive self  Were you discharged with any Home Care or Post Acute Services: Yes  Post Acute Services: Home Health  Patient DME: Straight cane  Do you have support at home?: Child, Alone  Do you feel like you have everything you need to keep you well at home?: Yes  Are you an active caregiver in your home?: No  Care Transitions Interventions       Placed a call to the number listed for patient for an initial follow up call for Care Transitions Coordination following discharge from the hospital. Spoke with patient regarding hospitalization, discharge and status thus far. He reported that he is doing ok so far. He said that he has not had any signs of fluid overload as yet.   He said he has not checked his weight since getting home. He did go to do that while on the phone with me. He said he weighed 241 lbs. Encouraged him to get a dry weight each day when he first gets out of bed, wearing the same type of clothes, etc.  Informed of the purpose and why it is important in catching signs of fluid gain, etc.  He voiced understanding. He said he was not able to take his insulin yesterday. Did not say why. He said his glucometer reading this morning was 277. Encouraged to not miss any doses if possible. He is aware of the appointment with Dr. Dina Mitchell today at 10:30. He aware of the need for an INR check. He has many other follow up appointments in the coming couple of weeks as well. He said he has a list.  He has not heard from home health as yet. He said it is usually a little later in the morning when they call about coming out. He said his surgical incision in progressing well, still not completely healed. Denied any redness, inflammation, drainage, etc.  He is eating well, trying to stay on a heart healthy diet he said. He is drinking well. He is up and about, tolerating activity well. He is sleeping well. He is not aware of any other symptoms or issues. Discussed COVID 19 information, risks, signs, quarantine, infection control, vaccines, etc.  Patient aware and voices understanding. He has been vaccinated. Informed/ reminded of CTC process, purpose, future calls, etc and is agreeable with appropriate follow up. Ensured to have CTN contact information to be used as needed. Encouraged to call as needed for new issues, questions, etc.  Given the opportunity to ask questions and answered appropriately. CTN to follow up as indicated. Transitions of Care Initial Call    Was this an external facility discharge?  No    Challenges to be reviewed by the provider   Additional needs identified to be addressed with provider: No       Method of communication with provider : none      Advance Care Planning:   Does patient have an Advance Directive: not on file; education provided. Advance Care Planning   Healthcare Decision Maker:    Primary Decision Maker: Latosha Badillo Parent - [phone]    Secondary Decision Maker: Brittany Guevara - [phone]    Was this a readmission? Yes  Patient stated reason for admission: \"I swelled up like a balloon. \"  Patients top risk factors for readmission: functional physical ability  medical condition-CHF, DM, A Fib, et al  medication management    Care Transition Nurse (CTN) contacted the patient by telephone to perform post hospital discharge assessment. Verified name and  with patient as identifiers. Provided introduction to self, and explanation of the CTN role. CTN reviewed discharge instructions, medical action plan and red flags with patient who verbalized understanding. Patient given an opportunity to ask questions and does not have any further questions or concerns at this time. Were discharge instructions available to patient? Yes. Reviewed appropriate site of care based on symptoms and resources available to patient including: PCP, Specialist, Urgent care clinics, [facility], [employer], When to call [email or web address] and [facility]. The patient agrees to contact the PCP office for questions related to their healthcare. Medication reconciliation was not performed with patient, declined, getting ready for hospital follow up appointment. He verbalizes understanding of administration of home medications. Advised obtaining a 90-day supply of all daily and as-needed medications. Covid Risk Education     Educated patient about risk for severe COVID-19 due to risk factors according to CDC guidelines. CTN reviewed discharge instructions, medical action plan and red flag symptoms with the patient who verbalized understanding. Discussed COVID vaccination status: Yes.  Education provided on COVID-19 vaccination as appropriate. Discussed exposure protocols and quarantine with CDC Guidelines. Patient was given an opportunity to verbalize any questions and concerns and agrees to contact CTN or health care provider for questions related to their healthcare. CTN provided contact information. Plan for follow-up call in 3-5 days based on severity of symptoms and risk factors.   Plan for next call: - Plan for next call - assess overall status, multiple body systems status, pain, appetite, sleep patterns, abnormal signs and symptoms, availability and effectiveness of medications, activity level and tolerance, falls/other unexpected events, findings from follow up appointments, medication/treatment changes, any additional teaching needs, etc.  Education regarding self care mgmt - disease process mgmt, symptom mgmt, diet/hydration, pain control needs, medication mgmt, activity level, home safety needs, infection control, fall precautions, seeking medical attention, who/when to call prn any needs, etc.    Follow Up  Future Appointments   Date Time Provider Chris Camarena   1/3/2022 10:30 AM Shonna Flores MD [facility]   1/17/2022  1:00 PM Enrico Cosme MD [facility]   1/26/2022 10:00 AM NOMI Swan - NP N [facility]   3/15/2022 11:00 AM NOMI London - CNP N [facility]   4/5/2022 10:15 AM Nenita Alamo DO N [facility]       Carin Fabian RN

## 2022-01-04 ENCOUNTER — TELEPHONE (OUTPATIENT)
Dept: CARDIOLOGY CLINIC | Age: 57
End: 2022-01-04

## 2022-01-04 DIAGNOSIS — I48.21 PERMANENT ATRIAL FIBRILLATION (HCC): Primary | ICD-10-CM

## 2022-01-04 NOTE — TELEPHONE ENCOUNTER
Patient needs INR checked. He is not with [facility] anymore so patient needs to either come into the office or go to local hospital to get it checked today. Tried to call patient no answer no voicemail box. Reached out to other number was able to speak to patient. Said he was supposed to get labs yesterday but something else come up and he didn't go. Will add in INR to be done along with the labs his PCP ordered yesterday. He said he will try to go today to get it drawn. Estela Walker he was instructed to not take any coumadin from 31st until whenever he was told to start it back. Chances are he is 1.0 now but before telling him to start coumadin back I want to make sure his INR is okay.

## 2022-01-05 DIAGNOSIS — L97.412 DIABETIC ULCER OF RIGHT MIDFOOT ASSOCIATED WITH TYPE 2 DIABETES MELLITUS, WITH FAT LAYER EXPOSED (HCC): ICD-10-CM

## 2022-01-05 DIAGNOSIS — I48.21 PERMANENT ATRIAL FIBRILLATION (HCC): ICD-10-CM

## 2022-01-05 DIAGNOSIS — E78.2 MIXED HYPERLIPIDEMIA: ICD-10-CM

## 2022-01-05 DIAGNOSIS — E11.621 DIABETIC ULCER OF RIGHT MIDFOOT ASSOCIATED WITH TYPE 2 DIABETES MELLITUS, WITH FAT LAYER EXPOSED (HCC): ICD-10-CM

## 2022-01-05 DIAGNOSIS — I50.9 ACUTE CONGESTIVE HEART FAILURE, UNSPECIFIED HEART FAILURE TYPE (HCC): ICD-10-CM

## 2022-01-05 DIAGNOSIS — I50.30 HEART FAILURE WITH PRESERVED EJECTION FRACTION, UNSPECIFIED HF CHRONICITY (HCC): ICD-10-CM

## 2022-01-05 DIAGNOSIS — E11.42 TYPE 2 DIABETES MELLITUS WITH DIABETIC POLYNEUROPATHY, WITH LONG-TERM CURRENT USE OF INSULIN (HCC): Chronic | ICD-10-CM

## 2022-01-05 DIAGNOSIS — I50.32 CHRONIC HEART FAILURE WITH PRESERVED EJECTION FRACTION (HCC): ICD-10-CM

## 2022-01-05 DIAGNOSIS — Z79.4 TYPE 2 DIABETES MELLITUS WITH DIABETIC POLYNEUROPATHY, WITH LONG-TERM CURRENT USE OF INSULIN (HCC): Chronic | ICD-10-CM

## 2022-01-05 LAB
ALBUMIN SERPL-MCNC: 4.1 G/DL (ref 3.5–5.2)
ALP BLD-CCNC: 312 U/L (ref 40–130)
ALT SERPL-CCNC: 26 U/L (ref 5–41)
ANION GAP SERPL CALCULATED.3IONS-SCNC: 17 MMOL/L (ref 7–19)
AST SERPL-CCNC: 23 U/L (ref 5–40)
BILIRUB SERPL-MCNC: 0.8 MG/DL (ref 0.2–1.2)
BILIRUBIN DIRECT: 0.4 MG/DL (ref 0–0.3)
BILIRUBIN, INDIRECT: 0.4 MG/DL (ref 0.1–1)
BUN BLDV-MCNC: 18 MG/DL (ref 6–20)
CALCIUM SERPL-MCNC: 9.4 MG/DL (ref 8.6–10)
CHLORIDE BLD-SCNC: 94 MMOL/L (ref 98–111)
CHOLESTEROL, TOTAL: 123 MG/DL (ref 160–199)
CO2: 25 MMOL/L (ref 22–29)
CREAT SERPL-MCNC: 1.4 MG/DL (ref 0.5–1.2)
GFR AFRICAN AMERICAN: >59
GFR NON-AFRICAN AMERICAN: 52
GLUCOSE BLD-MCNC: 229 MG/DL (ref 74–109)
HBA1C MFR BLD: 8.7 % (ref 4–6)
HCT VFR BLD CALC: 35.2 % (ref 42–52)
HDLC SERPL-MCNC: 37 MG/DL (ref 55–121)
HEMOGLOBIN: 11.2 G/DL (ref 14–18)
INR BLD: 1.31 (ref 0.88–1.18)
LDL CHOLESTEROL CALCULATED: 63 MG/DL
MCH RBC QN AUTO: 31.2 PG (ref 27–31)
MCHC RBC AUTO-ENTMCNC: 31.8 G/DL (ref 33–37)
MCV RBC AUTO: 98.1 FL (ref 80–94)
PDW BLD-RTO: 12.8 % (ref 11.5–14.5)
PLATELET # BLD: 526 K/UL (ref 130–400)
PMV BLD AUTO: 9.9 FL (ref 9.4–12.4)
POTASSIUM SERPL-SCNC: 4.1 MMOL/L (ref 3.5–5)
PRO-BNP: 4370 PG/ML (ref 0–900)
PROTHROMBIN TIME: 16.4 SEC (ref 12–14.6)
RBC # BLD: 3.59 M/UL (ref 4.7–6.1)
SODIUM BLD-SCNC: 136 MMOL/L (ref 136–145)
TOTAL PROTEIN: 7.3 G/DL (ref 6.6–8.7)
TRIGL SERPL-MCNC: 114 MG/DL (ref 0–149)
TSH SERPL DL<=0.05 MIU/L-ACNC: 5.32 UIU/ML (ref 0.27–4.2)
WBC # BLD: 8.1 K/UL (ref 4.8–10.8)

## 2022-01-06 ENCOUNTER — TELEPHONE (OUTPATIENT)
Dept: INTERNAL MEDICINE | Age: 57
End: 2022-01-06

## 2022-01-06 ENCOUNTER — CARE COORDINATION (OUTPATIENT)
Dept: CASE MANAGEMENT | Age: 57
End: 2022-01-06

## 2022-01-06 ENCOUNTER — TELEPHONE (OUTPATIENT)
Dept: CARDIOLOGY CLINIC | Age: 57
End: 2022-01-06

## 2022-01-06 ENCOUNTER — ANTI-COAG VISIT (OUTPATIENT)
Dept: CARDIOLOGY CLINIC | Age: 57
End: 2022-01-06

## 2022-01-06 DIAGNOSIS — E11.42 TYPE 2 DIABETES MELLITUS WITH DIABETIC POLYNEUROPATHY, WITH LONG-TERM CURRENT USE OF INSULIN (HCC): ICD-10-CM

## 2022-01-06 DIAGNOSIS — N18.32 STAGE 3B CHRONIC KIDNEY DISEASE (HCC): Primary | ICD-10-CM

## 2022-01-06 DIAGNOSIS — I50.9 ACUTE CONGESTIVE HEART FAILURE, UNSPECIFIED HEART FAILURE TYPE (HCC): ICD-10-CM

## 2022-01-06 DIAGNOSIS — Z79.4 TYPE 2 DIABETES MELLITUS WITH DIABETIC POLYNEUROPATHY, WITH LONG-TERM CURRENT USE OF INSULIN (HCC): ICD-10-CM

## 2022-01-06 DIAGNOSIS — E11.621 DIABETIC ULCER OF RIGHT MIDFOOT ASSOCIATED WITH TYPE 2 DIABETES MELLITUS, WITH FAT LAYER EXPOSED (HCC): ICD-10-CM

## 2022-01-06 DIAGNOSIS — L97.412 DIABETIC ULCER OF RIGHT MIDFOOT ASSOCIATED WITH TYPE 2 DIABETES MELLITUS, WITH FAT LAYER EXPOSED (HCC): ICD-10-CM

## 2022-01-06 NOTE — TELEPHONE ENCOUNTER
[address] [facility] is calling to see if he can have a new ref for home health.     Fax number is [phone]

## 2022-01-06 NOTE — CARE COORDINATION
[facility] Transitions Follow Up Call    2022    Patient: Frantz Laboy  Patient : 1965   MRN: [id number]  Discharge Date: 21 RARS: Readmission Risk Score: 24.2 ( )         Spoke with: Mother    Care Transitions Subsequent and Final Call    Subsequent and Final Calls  Are you currently active with any services?: Home Health  Care Transitions Interventions  Other Interventions:         Placed a call to the number listed for patient and spoke with his mother. She said he is not in right now as he had to go to the pharmacy. I asked her to tell him that I would check in with him at another time. She said she would.      Follow Up  Future Appointments   Date Time Provider Chris Camarena   2022 10:45 AM NOMI Brand N [facility]   2022 10:45 AM SCHEDU[facility]DULL[facility] CARDIOLOGY PRO TIME N [facility]   2022  1:00 PM Janay Sevilla MD [facility]   2022 10:00 AM NOMI Smith - NP N [facility]   2022 10:00 AM [facility]   3/15/2022 11:00 AM NOMI Bernstein CNP N [facility]   2022 10:15 AM DO MAGNUS Kirkpatrick [facility]       Devora Badillo RN

## 2022-01-07 ENCOUNTER — OFFICE VISIT (OUTPATIENT)
Dept: CARDIOLOGY CLINIC | Age: 57
End: 2022-01-07
Payer: MEDICARE

## 2022-01-07 VITALS
WEIGHT: 237 LBS | HEART RATE: 67 BPM | HEIGHT: 70 IN | BODY MASS INDEX: 33.93 KG/M2 | DIASTOLIC BLOOD PRESSURE: 60 MMHG | SYSTOLIC BLOOD PRESSURE: 118 MMHG

## 2022-01-07 DIAGNOSIS — I25.10 CORONARY ARTERY DISEASE INVOLVING NATIVE CORONARY ARTERY OF NATIVE HEART WITHOUT ANGINA PECTORIS: ICD-10-CM

## 2022-01-07 DIAGNOSIS — I10 PRIMARY HYPERTENSION: ICD-10-CM

## 2022-01-07 DIAGNOSIS — I50.32 CHRONIC HEART FAILURE WITH PRESERVED EJECTION FRACTION (HCC): Primary | ICD-10-CM

## 2022-01-07 DIAGNOSIS — I48.11 LONGSTANDING PERSISTENT ATRIAL FIBRILLATION (HCC): ICD-10-CM

## 2022-01-07 PROCEDURE — 3017F COLORECTAL CA SCREEN DOC REV: CPT | Performed by: CLINICAL NURSE SPECIALIST

## 2022-01-07 PROCEDURE — 1036F TOBACCO NON-USER: CPT | Performed by: CLINICAL NURSE SPECIALIST

## 2022-01-07 PROCEDURE — 1111F DSCHRG MED/CURRENT MED MERGE: CPT | Performed by: CLINICAL NURSE SPECIALIST

## 2022-01-07 PROCEDURE — 99214 OFFICE O/P EST MOD 30 MIN: CPT | Performed by: CLINICAL NURSE SPECIALIST

## 2022-01-07 PROCEDURE — G8417 CALC BMI ABV UP PARAM F/U: HCPCS | Performed by: CLINICAL NURSE SPECIALIST

## 2022-01-07 PROCEDURE — G8484 FLU IMMUNIZE NO ADMIN: HCPCS | Performed by: CLINICAL NURSE SPECIALIST

## 2022-01-07 PROCEDURE — G8427 DOCREV CUR MEDS BY ELIG CLIN: HCPCS | Performed by: CLINICAL NURSE SPECIALIST

## 2022-01-07 NOTE — PROGRESS NOTES
[facility] Cardiology  [facility]  [address]  Phone: [phone]  Fax: [phone]    OFFICE VISIT:  1/7/2022    [address] [place]: 1965    Reason For Visit:  Mayelin Spears is a 60 y.o. male who is here for Follow-up (BNP elevated), Atrial Fibrillation (chronic), and Hypertension  Extending history of persistent atrial fibrillation, diabetes, hypertension, CKD stage III, hyperlipidemia and coronary disease. 12/14/2021 patient underwent CABG x3 and radiofrequency and cryoablation to the left and right atria for atrial fibrillation. Exclusion of AVTAR appendage with atrial clip  Patient was discharged on amiodarone, beta-blocker and ARB. Continued anticoagulation with his Coumadin      12/28/2021 patient was admitted with significant fluid retention. She was diuresed greased with IV Bumex. 2D echo showed normal LV size and function with EF 55 to 60%. Mild concentric LVH and grade 3 diastolic dysfunction. Mild to moderate TR with elevated RVSP at 95 mmHg. Mildly dilated atria. INR was supratherapeutic. Coumadin was adjusted  He was back in afib as well. Patient saw primary care in follow-up. Elevated BNP  Here today in folow up states he still has some exertional fatigue and soreness but no pain  He is down 13 lbs and is now weighing daily. States his blood sugars been up since he was in the hospital.  He felt pretty good right after surgery but after the swelling he has been very fatigued. He was told his heart was out of rhythm in the hospital.      Wilhemenia Fuel denies exertional chest pain, resting shortness of breath, orthopnea, paroxysmal nocturnal dyspnea, syncope, presyncope, arrhythmia, edema . The patient denies numbness or weakness to suggest cerebrovascular accident or transient ischemic attack. Michelle Grijalva MD is PCP and follows labs including lipids.   Oliver Zeke has the following history as recorded in [facility]:    Patient Active Problem List Diagnosis Date Noted    Abnormal stress test     Status post coronary artery bypass graft     Stage 3 chronic kidney disease ([address] 12/29/2021    Fluid overload 12/28/2021    CAD in native artery 12/14/2021    Longstanding persistent atrial fibrillation ([address]     Amputation of left middle finger 11/09/2021    Cellulitis 10/25/2021    Proliferative diabetic retinopathy of both eyes without macular edema associated with type 2 diabetes mellitus ([address] 07/06/2021    Obstructive sleep apnea 07/02/2021    Chronic heart failure with preserved ejection fraction ([address] 02/21/2021    Diabetic ulcer of right midfoot associated with type 2 diabetes mellitus, with fat layer exposed ([address] 09/14/2020    Charcot foot due to diabetes mellitus ([address] 06/26/2020    Personal history of noncompliance with medical treatment and regimen 06/25/2020    Class 2 obesity due to excess calories in adult 06/12/2020    Hyponatremia 03/21/2019    Type 2 diabetes mellitus with diabetic polyneuropathy, with long-term current use of insulin ([address] 03/21/2019    Shortness of breath 12/18/2014    Fatigue 12/18/2014    HTN (hypertension)     Hyperlipidemia      Past Medical History:   Diagnosis Date    Acalculous cholecystitis 12/29/2015    LIAN (acute kidney injury) ([address] 3/21/2019    Allergic rhinitis, cause unspecified     Atrial fibrillation, chronic ([address]     sees [facility]    Bacteremia due to methicillin resistant Staphylococcus aureus 10/27/2021    Bone spur     bilateral elbows    CAD (coronary artery disease)     Charcot's joint of right foot     Chicken pox     Closed supracondylar fracture of elbow     fall    Diabetic ulcer of right midfoot associated with type 2 diabetes mellitus, with fat layer exposed ([address] 9/14/2020    Diabetic ulcer of right midfoot associated with type 2 diabetes mellitus, with fat layer exposed ([address] 9/14/2020    History of MRSA infection     scalp/facial and on back    HTN (hypertension)     Hyperlipidemia     Hypoglycemic encephalopathy     Impotence of organic origin     MDRO (multiple drug resistant organisms) resistance     MRSA (methicillin resistant staph aureus) culture positive 11/2021    MRSA LT HAND    Other dyspnea and respiratory abnormality     Other specified erythematous condition(695.89)     Permanent atrial fibrillation ([address] 2/18/2021    Personal history of other infectious and parasitic disease     Salmonella     Subtherapeutic international normalized ratio (INR) 4/19/2021    Supratherapeutic INR 4/14/2021    Type II or unspecified type diabetes mellitus without mention of complication, uncontrolled 1994     Past Surgical History:   Procedure Laterality Date    AMPUTATION  11/01/2021    CARDIAC CATHETERIZATION      CARDIOVERSION  03/2017    CATARACT REMOVAL  10/2021    COLONOSCOPY  02/24/2020    Dr RITIKA Fraire-Melanosis coli throughout the entire colon-AP, 5 yr recall    CORONARY ARTERY BYPASS GRAFT MAZE PROCEDURE N/A 12/14/2021    CORONARY ARTERY BYPASS GRAFT X3 WITH LEFT INTERNAL MAMMARY ARTERY WITH ENDOSCOPIC VEIN HARVESTING WITH PERFUSION, TRANSESOPHAGEAL ECHOCARDIOGRAM, BIATRIAL RODRIGUES-MAZE RF AND CRYOABLATIONAND ATRICLIP performed by Pauly Oh MD at [address]      HAND SURGERY Left 10/31/2021    HAND INCISION AND DRAINAGE Partial Amputation Middle Finger performed by Manisha Quiros MD at [address] [facility] Left 09/17/2020    OPEN REDUCTION INTERNAL FIXATION LEFT SUPRACONDYLAR FRACTURE performed by Ean Cervantes MD at [address] [facility] MALIGNANT SKIN LESION EXCISION      X 2    TONSILLECTOMY       Family History   Problem Relation Age of Onset    Stroke Maternal Grandmother     Cancer Maternal Grandmother     Stroke Paternal Grandmother     Diabetes Paternal Grandmother     Cancer Father     Heart Disease Father     Lung Cancer Father     Asthma Paternal Grandfather     Heart Disease Paternal Grandfather     Heart Disease Maternal Grandfather     Colon Cancer Neg Hx     Colon Polyps Neg Hx     Esophageal Cancer Neg Hx     Liver Cancer Neg Hx     Liver Disease Neg Hx     Rectal Cancer Neg Hx     Stomach Cancer Neg Hx      Social History     Tobacco Use    Smoking status: Never Smoker    Smokeless tobacco: Never Used   Substance Use Topics    Alcohol use: No      Current Outpatient Medications   Medication Sig Dispense Refill    metoprolol tartrate (LOPRESSOR) 50 MG tablet Take 1 tablet by mouth 2 times daily 180 tablet 1    furosemide (LASIX) 40 MG tablet Take 1 tablet by mouth daily 60 tablet 0    warfarin (COUMADIN) 10 MG tablet Take 1 tablet by mouth See Admin Instructions 10 MG ON Saturday, Monday, Tuesday, Wednesday, Friday 15 MG ON Sunday & Thursday. .. Stop taking Coumadin for now until repeat INR Monday with PCP or cardiology office. 30 tablet 3    cloNIDine (CATAPRES) 0.1 MG tablet Take 1 tablet by mouth 2 times daily 60 tablet 0    amiodarone (CORDARONE) 200 MG tablet Take 1 tablet by mouth 2 times daily 60 tablet 1    guaiFENesin (MUCINEX) 600 MG extended release tablet Take 1 tablet by mouth 2 times daily 60 tablet 0    losartan (COZAAR) 100 MG tablet Take 100 mg by mouth daily Indications: High Blood Pressure Disorder      metFORMIN (GLUCOPHAGE) 1000 MG tablet Take 1 tablet by mouth 2 times daily (with meals) 180 tablet 3    aspirin EC 81 MG EC tablet Take 1 tablet by mouth daily 90 tablet 1    Insulin Degludec (TRESIBA FLEXTOUCH) 100 UNIT/ML SOPN Inject into the skin PT TAKES ACCORDING TO ACCUCHEK. DOES NOT TAKE  AND UNDER AT HS.  atorvastatin (LIPITOR) 20 MG tablet TAKE ONE TABLET DAILY  (Patient taking differently: Take 20 mg by mouth daily ) 90 tablet 1    tamsulosin (FLOMAX) 0.4 MG capsule Take 2 capsules by mouth daily 60 capsule 11    blood glucose monitor strips Test 3 times a day & as needed for symptoms of irregular blood glucose.  Dispense sufficient amount for indicated testing frequency plus additional to accommodate PRN testing needs. 200 strip 3    Insulin Pen Needle 32G X 4 MM MISC 1 each by Does not apply route daily 100 each 3    blood glucose monitor kit and supplies Dispense sufficient amount for indicated testing frequency plus additional to accommodate PRN testing needs. 1 kit 0    Lancets MISC 1 each by Does not apply route 3 times daily (with meals) 300 each 3    insulin lispro (HUMALOG KWIKPEN) 200 UNIT/ML SOPN pen Sliding scale 160-179 2 units  180-199 3 units  200-219 4 units  220-239 5 units  240-259 6 units   260-279 7 units  280-299 8 units  300-399 9 units  340-379 10 units  380-420 11 units 3 pen 5    vitamin D (ERGOCALCIFEROL) 1.25 MG (98964 UT) CAPS capsule Take 50,000 Units by mouth once a week Takes on Friday      diphenhydrAMINE (BENADRYL) 25 MG tablet Take 25 mg by mouth every 6 hours as needed for Itching      blood glucose test strips (CONTOUR NEXT TEST) strip 1 each by In Vitro route 4 times daily As needed. 200 each 3     No current facility-administered medications for this visit. Allergies: Contrast [iodides], Neomycin-bacitracin zn-polymyx, Neosporin [neomycin-polymyxin-gramicidin], and Pcn [penicillins]    Review of Systems  Constitutional  no significant activity change, appetite change, or unexpected weight change. No fever, chills or diaphoresis. + fatigue. HEENT  no significant rhinorrhea or epistaxis. No tinnitus or significant hearing loss. Eyes  no sudden vision change or amaurosis. Respiratory  no significant wheezing, stridor, apnea or cough. No dyspnea on exertion or shortness of breath. Cardiovascular  no exertional chest pain, orthopnea or PND. No sensation of arrhythmia or slow heart rate. No claudication + leg edema. Gastrointestinal  no abdominal swelling or pain. No blood in stool. No severe constipation, diarrhea, nausea, or vomiting.    Genitourinary  no difficulty urinating, dysuria, frequency, or urgency. No flank pain or hematuria. Musculoskeletal  no back pain, gait disturbance, or myalgia. Skin  no color change or rash. No pallor. No new surgical incision. Neurologic  no speech difficulty, facial asymmetry or lateralizing weakness. No seizures, presyncope, syncope, or significant dizziness. Hematologic  no easy bruising or excessive bleeding. Psychiatric  no severe anxiety or insomnia. No confusion. All other review of systems are negative. Objective  Vital Signs - /60   Pulse 67   Ht 5' 10\" (1.778 m)   Wt 237 lb (107.5 kg)   BMI 34.01 kg/m²   General - Christopher Rodriguez is alert, cooperative, and pleasant. Well groomed. No acute distress. Body habitus is overweight. HEENT  The head is normocephalic. No circumoral cyanosis. Dentition is normal.   EYES -  No Xanthelasma, no arcus senilis, no conjunctival hemorrhages or discharge. Neck - Supple, without increased jugular venous pressures. No carotid bruits. No mass. Respiratory - Lungs are clear bilaterally. No wheezes or rales. Normal effort without use of accessory muscles. Cardiovascular  Heart has regular rhythm and rate. No murmurs, rubs or gallops. + pedal pulses and no varicosities. Abdominal -  Soft, nontender, nondistended. Bowel sounds are intact. Extremities - No clubbing, cyanosis, or  edema. Musculoskeletal -  No clubbing . No Osler's nodes. Gait normal .  No kyphosis or scoliosis. Skin -  no statis ulcers or dermatitis. Neurological - No focal signs are identified. Oriented to person, place and time. Psychiatric -  Appropriate affect and mood. Assessment:     Diagnosis Orders   1. Chronic heart failure with preserved ejection fraction ([address]     2. Primary hypertension     3. Longstanding persistent atrial fibrillation ([address]     4.  Coronary artery disease involving native coronary artery of native heart without angina pectoris       Data:  BP Readings from Last 3 Encounters:   01/07/22 118/60   01/03/22 120/80   12/31/21 129/78    Pulse Readings from Last 3 Encounters:   01/07/22 67   01/03/22 91   12/31/21 77        Wt Readings from Last 3 Encounters:   01/07/22 237 lb (107.5 kg)   01/03/22 242 lb 12.8 oz (110.1 kg)   12/31/21 242 lb 9.6 oz (110 kg)      Reviewed recent notes   Reviewed recent labs  Lab Results   Component Value Date    INR 1.31 (H) 01/05/2022    INR 3.52 (H) 12/31/2021    INR 4.23 (H) 12/30/2021    PROTIME 16.4 (H) 01/05/2022    PROTIME 34.3 (H) 12/31/2021    PROTIME 39.6 (H) 12/30/2021   INR subtherapeutic yesterday- took 15mg last night. Instructed to resume regular Coumadin dose and recheck in 1 week    He has been waiting since he has been home. Lost 13 lbs in a week. We discussed the importance of daily weights. We discussed when and how to take extra diuretic if he gains 2 to 3 pounds overnight or 5 to 6 pounds in a week. He is adhering to a low-sodium diet. States he has been eating pretty healthy. Over the past year he has had significant weight loss. Also told him to watch his glucose as his appetite may be less postsurgery    Ongoing exertional fatigue and some HSU is probably multifactorial.  He is only 3 weeks out from his bypass surgery. Instructed to take it slow and easy. He will keep his follow-up in a couple weeks with CT surgery. At that time though repeat EKG to check for arrhythmia. In the emergency room he had some A. fib but today he is well controlled and regular rate    We also discussed CKD and the role it plays with fluid retention.   He states he was never told he had kidney problems    Lab Results   Component Value Date    CHOL 123 (L) 01/05/2022    CHOL 111 (L) 11/08/2021    CHOL 105 (L) 09/27/2021     Lab Results   Component Value Date    TRIG 114 01/05/2022    TRIG 93 11/08/2021    TRIG 104 09/27/2021     Lab Results   Component Value Date    HDL 37 (L) 01/05/2022    HDL 32 (L) 11/08/2021    HDL 35 (L) 09/27/2021     Lab Results   Component Value Date    LDLCALC 63 01/05/2022    LDLCALC 60 11/08/2021    LDLCALC 49 09/27/2021     Lab Results   Component Value Date    VLDL 27 01/22/2021     Lab Results   Component Value Date    CHOLHDLRATIO 4.33 01/22/2021       Lab Results   Component Value Date     01/05/2022    K 4.1 01/05/2022    K 4.5 12/31/2021    CL 94 01/05/2022    CO2 25 01/05/2022    BUN 18 01/05/2022    CREATININE 1.4 01/05/2022    GLUCOSE 229 01/05/2022    CALCIUM 9.4 01/05/2022      Last BNP on 1/5/2022 was 4370 with previous 1 being 2614  Again we discussed treating symptoms and fluid weight as this may be falsely elevated with his renal dysfunction      States taking medications as prescribed  Stable cardiovascular status. No evidence of overt heart failure, angina or dysrhythmia. 30minutes were spent preparing, reviewing and seeing patient. All questions answered    Plan    Daily weights-  If you gain 2-3 lbs over night or 5-6 lbs in a week then double the lasix for a day or 2   Follow up with DR Abebe Ritchie as planned   Follow up next week for your INR  Call with any questions or concerns  Follow up with Neisha Marr MD for non cardiac problems  Report any new problems  Cardiovascular Fitness-Exercise as tolerated. Cardiac / Healthy Diet  Continue current medications as directed  Continue plan of treatment  It is always recommended that you bring your medications bottles with you to each visit - this is for your safety! NOMI Barrientos    EMR dragon/transcription disclaimer: Much of this encounter note is electronic transcription/translation of spoken language to printed tach. Electronic translation of spoken language may be erroneous, or at times, nonsensical words or phrases may be inadvertently transcribed.  Although, I have reviewed the note for such errors, some may still exist.

## 2022-01-07 NOTE — PATIENT INSTRUCTIONS
Daily weights-  If you gain 2-3 lbs over night or 5-6 lbs in a week then double the lasix for a day or 2   Follow up with DR Jessica Lowe as planned   Follow up next week for your INR  Call with any questions or concerns  Follow up with Pratik Masterson MD for non cardiac problems  Report any new problems  Cardiovascular Fitness-Exercise as tolerated. Cardiac / Healthy Diet  Continue current medications as directed  Continue plan of treatment  It is always recommended that you bring your medications bottles with you to each visit - this is for your safety!

## 2022-01-11 ENCOUNTER — TELEPHONE (OUTPATIENT)
Dept: INTERNAL MEDICINE | Age: 57
End: 2022-01-11

## 2022-01-11 NOTE — TELEPHONE ENCOUNTER
Called LM for Bela Cueva to contact the cardiology office about the medications since they put him on these.

## 2022-01-11 NOTE — TELEPHONE ENCOUNTER
Patient's clonidine was prescribed by CT surgery and metoprolol was prescribed for his A. fib please have them forward this to cardiology

## 2022-01-11 NOTE — TELEPHONE ENCOUNTER
Home health called they have started seeing him and said he has a level 2 drug interaction with the Metoprolol and and clonidine. no

## 2022-01-11 NOTE — TELEPHONE ENCOUNTER
Pt has [id number] [facility]  - you will need to call them on that

## 2022-01-13 ENCOUNTER — ANTI-COAG VISIT (OUTPATIENT)
Dept: CARDIOLOGY CLINIC | Age: 57
End: 2022-01-13
Payer: MEDICARE

## 2022-01-13 ENCOUNTER — CARE COORDINATION (OUTPATIENT)
Dept: CASE MANAGEMENT | Age: 57
End: 2022-01-13

## 2022-01-13 DIAGNOSIS — I50.32 CHRONIC HEART FAILURE WITH PRESERVED EJECTION FRACTION (HCC): Primary | ICD-10-CM

## 2022-01-13 LAB
INTERNATIONAL NORMALIZATION RATIO, POC: 3.2
PROTHROMBIN TIME, POC: NORMAL

## 2022-01-13 PROCEDURE — 85610 PROTHROMBIN TIME: CPT | Performed by: CLINICAL NURSE SPECIALIST

## 2022-01-13 NOTE — CARE COORDINATION
[facility] Transitions Follow Up Call    2022    Patient: India Dale  Patient : 1965   MRN: [id number]    Discharge Date: 21 RARS: Readmission Risk Score: 24.2 ( )         Spoke with: N/A    Care Transitions Subsequent and Final Call    Subsequent and Final Calls  Are you currently active with any services?: Home Health  Care Transitions Interventions  Other Interventions:         Attempted to make contact with patient/caregiver for a routine follow up call without success. Unable to leave a HIPAA compliant message regarding intent of call and call back information. Multiple rings, no answer. As this is a second attempt to make contact with patient, without success, will sign off from CTC episode at this time.     Follow Up  Future Appointments   Date Time Provider Chris Camarena   2022  1:00 PM Mik Layton MD [facility]   2022  2:30 PM NOMI Wen NP N [facility]   2022 11:00 AM SCHEDULE, [facility] CARDIOLOGY PRO TIME N [facility]   2022 10:00 AM Gabbi Joseph MD [facility]   3/15/2022 11:00 AM NOMI Loyd - CNP N [facility]   2022 10:15 AM Alie Peterson DO N [facility]       Vivien Dance, RN

## 2022-01-17 ENCOUNTER — OFFICE VISIT (OUTPATIENT)
Dept: CARDIOTHORACIC SURGERY | Age: 57
End: 2022-01-17

## 2022-01-17 ENCOUNTER — OFFICE VISIT (OUTPATIENT)
Dept: CARDIOLOGY CLINIC | Age: 57
End: 2022-01-17
Payer: MEDICARE

## 2022-01-17 VITALS
HEIGHT: 70 IN | WEIGHT: 234.8 LBS | OXYGEN SATURATION: 95 % | SYSTOLIC BLOOD PRESSURE: 145 MMHG | BODY MASS INDEX: 33.61 KG/M2 | DIASTOLIC BLOOD PRESSURE: 97 MMHG | HEART RATE: 62 BPM | RESPIRATION RATE: 18 BRPM

## 2022-01-17 VITALS
BODY MASS INDEX: 33.5 KG/M2 | OXYGEN SATURATION: 96 % | DIASTOLIC BLOOD PRESSURE: 86 MMHG | HEIGHT: 70 IN | SYSTOLIC BLOOD PRESSURE: 130 MMHG | HEART RATE: 63 BPM | WEIGHT: 234 LBS

## 2022-01-17 DIAGNOSIS — I48.19 PERSISTENT ATRIAL FIBRILLATION (HCC): ICD-10-CM

## 2022-01-17 DIAGNOSIS — Z98.890 H/O CARDIAC RADIOFREQUENCY ABLATION: ICD-10-CM

## 2022-01-17 DIAGNOSIS — E78.5 HYPERLIPIDEMIA, UNSPECIFIED HYPERLIPIDEMIA TYPE: ICD-10-CM

## 2022-01-17 DIAGNOSIS — I25.10 CAD, MULTIPLE VESSEL: ICD-10-CM

## 2022-01-17 DIAGNOSIS — I10 ESSENTIAL HYPERTENSION: ICD-10-CM

## 2022-01-17 DIAGNOSIS — Z95.1 S/P CABG X 3: Primary | ICD-10-CM

## 2022-01-17 DIAGNOSIS — I25.10 CORONARY ARTERY DISEASE INVOLVING NATIVE CORONARY ARTERY OF NATIVE HEART WITHOUT ANGINA PECTORIS: Primary | ICD-10-CM

## 2022-01-17 PROCEDURE — 99024 POSTOP FOLLOW-UP VISIT: CPT | Performed by: THORACIC SURGERY (CARDIOTHORACIC VASCULAR SURGERY)

## 2022-01-17 PROCEDURE — 1036F TOBACCO NON-USER: CPT | Performed by: NURSE PRACTITIONER

## 2022-01-17 PROCEDURE — G8417 CALC BMI ABV UP PARAM F/U: HCPCS | Performed by: NURSE PRACTITIONER

## 2022-01-17 PROCEDURE — G8427 DOCREV CUR MEDS BY ELIG CLIN: HCPCS | Performed by: NURSE PRACTITIONER

## 2022-01-17 PROCEDURE — 3017F COLORECTAL CA SCREEN DOC REV: CPT | Performed by: NURSE PRACTITIONER

## 2022-01-17 PROCEDURE — 1111F DSCHRG MED/CURRENT MED MERGE: CPT | Performed by: NURSE PRACTITIONER

## 2022-01-17 PROCEDURE — 99214 OFFICE O/P EST MOD 30 MIN: CPT | Performed by: NURSE PRACTITIONER

## 2022-01-17 PROCEDURE — G8484 FLU IMMUNIZE NO ADMIN: HCPCS | Performed by: NURSE PRACTITIONER

## 2022-01-17 ASSESSMENT — ENCOUNTER SYMPTOMS
CHEST TIGHTNESS: 0
COUGH: 0
SHORTNESS OF BREATH: 0
WHEEZING: 0
SORE THROAT: 0

## 2022-01-17 NOTE — PROGRESS NOTES
[address] [facility] Cardiology   Established Patient Office Visit  [address] [facility]. [address] [facility]  [phone]        OFFICE VISIT:  2022    Mónica Anglin - : 1965    Reason For Visit:  Claudia Hardin is a 60 y.o. male who is here for Follow-Up from Hospital    1. Coronary artery disease involving native coronary artery of native heart without angina pectoris    2. Hyperlipidemia, unspecified hyperlipidemia type    3. Essential hypertension    4. Persistent atrial fibrillation ([address]        Patient with a history of chronic atrial fib, hypertension, diabetes, chronic kidney disease stage III, hyperlipidemia and coronary artery disease. Patient underwent on 2021 CABG x3 and radiofrequency and Garrett ablation to the left and right atrium for atrial fib. Exclusion of the AVTAR appendage with atrial clip. Patient was seen by CT surgery today. Patient denies any complaints of chest pain, pressure or tightness. There is no shortness of breath, orthopnea or PND. Patient denies any lightheadedness, dizziness or syncope.           Subjective    Mónica Anglin is a 60 y.o. male with the following history as recorded in [facility]:    Patient Active Problem List    Diagnosis Date Noted    Abnormal stress test     Status post coronary artery bypass graft     Stage 3 chronic kidney disease ([address] 2021    Fluid overload 2021    CAD in native artery 2021    Longstanding persistent atrial fibrillation ([address]     Amputation of left middle finger 2021    Cellulitis 10/25/2021    Proliferative diabetic retinopathy of both eyes without macular edema associated with type 2 diabetes mellitus ([address] 2021    Obstructive sleep apnea 2021    Chronic heart failure with preserved ejection fraction ([address] 2021    Diabetic ulcer of right midfoot associated with type 2 diabetes mellitus, with fat layer exposed ([address] 2020    Charcot foot due to diabetes mellitus ([address] 06/26/2020    Personal history of noncompliance with medical treatment and regimen 06/25/2020    Class 2 obesity due to excess calories in adult 06/12/2020    Hyponatremia 03/21/2019    Type 2 diabetes mellitus with diabetic polyneuropathy, with long-term current use of insulin (HCC) 03/21/2019    Shortness of breath 12/18/2014    Fatigue 12/18/2014    HTN (hypertension)     Hyperlipidemia      Current Outpatient Medications   Medication Sig Dispense Refill    metoprolol tartrate (LOPRESSOR) 50 MG tablet Take 1 tablet by mouth 2 times daily 180 tablet 1    furosemide (LASIX) 40 MG tablet Take 1 tablet by mouth daily 60 tablet 0    warfarin (COUMADIN) 10 MG tablet Take 1 tablet by mouth See Admin Instructions 10 MG ON Saturday, Monday, Tuesday, Wednesday, Friday 15 MG ON Sunday & Thursday. .. Stop taking Coumadin for now until repeat INR Monday with PCP or cardiology office. 30 tablet 3    cloNIDine (CATAPRES) 0.1 MG tablet Take 1 tablet by mouth 2 times daily 60 tablet 0    amiodarone (CORDARONE) 200 MG tablet Take 1 tablet by mouth 2 times daily 60 tablet 1    guaiFENesin (MUCINEX) 600 MG extended release tablet Take 1 tablet by mouth 2 times daily 60 tablet 0    losartan (COZAAR) 100 MG tablet Take 100 mg by mouth daily Indications: High Blood Pressure Disorder      aspirin EC 81 MG EC tablet Take 1 tablet by mouth daily 90 tablet 1    Insulin Degludec (TRESIBA FLEXTOUCH) 100 UNIT/ML SOPN Inject into the skin PT TAKES ACCORDING TO ACCUCHEK. DOES NOT TAKE  AND UNDER AT HS.  atorvastatin (LIPITOR) 20 MG tablet TAKE ONE TABLET DAILY  (Patient taking differently: Take 20 mg by mouth daily ) 90 tablet 1    tamsulosin (FLOMAX) 0.4 MG capsule Take 2 capsules by mouth daily 60 capsule 11    blood glucose monitor strips Test 3 times a day & as needed for symptoms of irregular blood glucose.  Dispense sufficient amount for indicated testing frequency plus additional to accommodate PRN testing needs. 200 strip 3    Insulin Pen Needle 32G X 4 MM MISC 1 each by Does not apply route daily 100 each 3    blood glucose monitor kit and supplies Dispense sufficient amount for indicated testing frequency plus additional to accommodate PRN testing needs. 1 kit 0    Lancets MISC 1 each by Does not apply route 3 times daily (with meals) 300 each 3    insulin lispro (HUMALOG KWIKPEN) 200 UNIT/ML SOPN pen Sliding scale 160-179 2 units  180-199 3 units  200-219 4 units  220-239 5 units  240-259 6 units   260-279 7 units  280-299 8 units  300-399 9 units  340-379 10 units  380-420 11 units 3 pen 5    vitamin D (ERGOCALCIFEROL) 1.25 MG (67345 UT) CAPS capsule Take 50,000 Units by mouth once a week Takes on Friday      diphenhydrAMINE (BENADRYL) 25 MG tablet Take 25 mg by mouth every 6 hours as needed for Itching      blood glucose test strips (CONTOUR NEXT TEST) strip 1 each by In Vitro route 4 times daily As needed. 200 each 3    metFORMIN (GLUCOPHAGE) 1000 MG tablet Take 1 tablet by mouth 2 times daily (with meals) 180 tablet 3     No current facility-administered medications for this visit.      Allergies: Contrast [iodides], Neomycin-bacitracin zn-polymyx, Neosporin [neomycin-polymyxin-gramicidin], and Pcn [penicillins]  Past Medical History:   Diagnosis Date    Acalculous cholecystitis 12/29/2015    LIAN (acute kidney injury) ([facility] 3/21/2019    Allergic rhinitis, cause unspecified     Atrial fibrillation, chronic (HCC)     sees [facility] cardiology    Bacteremia due to methicillin resistant Staphylococcus aureus 10/27/2021    Bone spur     bilateral elbows    CAD (coronary artery disease)     Charcot's joint of right foot     Chicken pox     Closed supracondylar fracture of elbow     fall    Diabetic ulcer of right midfoot associated with type 2 diabetes mellitus, with fat layer exposed ([facility] 9/14/2020    Diabetic ulcer of right midfoot associated with type 2 diabetes mellitus, with fat layer exposed ([address] 9/14/2020    History of MRSA infection     scalp/facial and on back    HTN (hypertension)     Hyperlipidemia     Hypoglycemic encephalopathy     Impotence of organic origin     MDRO (multiple drug resistant organisms) resistance     MRSA (methicillin resistant staph aureus) culture positive 11/2021    MRSA LT HAND    Other dyspnea and respiratory abnormality     Other specified erythematous condition(695.89)     Permanent atrial fibrillation ([address] 2/18/2021    Personal history of other infectious and parasitic disease     Salmonella     Subtherapeutic international normalized ratio (INR) 4/19/2021    Supratherapeutic INR 4/14/2021    Type II or unspecified type diabetes mellitus without mention of complication, uncontrolled 1994     Past Surgical History:   Procedure Laterality Date    AMPUTATION  11/01/2021    CARDIAC CATHETERIZATION      CARDIOVERSION  03/2017    CATARACT REMOVAL  10/2021    COLONOSCOPY  02/24/2020    Dr RITIKA Fraire-Melanosis coli throughout the entire colon-AP, 5 yr recall    CORONARY ARTERY BYPASS GRAFT MAZE PROCEDURE N/A 12/14/2021    CORONARY ARTERY BYPASS GRAFT X3 WITH LEFT INTERNAL MAMMARY ARTERY WITH ENDOSCOPIC VEIN HARVESTING WITH PERFUSION, TRANSESOPHAGEAL ECHOCARDIOGRAM, BIATRIAL RODRIGUES-MAZE RF AND CRYOABLATIONAND ATRICLIP performed by Sue Sierra MD at [facility] [address]      HAND SURGERY Left 10/31/2021    HAND INCISION AND DRAINAGE Partial Amputation Middle Finger performed by Kenny Carlos MD at [address] [facility] Left 09/17/2020    OPEN REDUCTION INTERNAL FIXATION LEFT SUPRACONDYLAR FRACTURE performed by Radha Sims MD at [facility] OR    MALIGNANT SKIN LESION EXCISION      X 2    TONSILLECTOMY       Family History   Problem Relation Age of Onset    Stroke Maternal Grandmother     Cancer Maternal Grandmother     Stroke Paternal Grandmother     Diabetes Paternal Grandmother     Cancer Father     Heart Disease Father     Lung CARDIOLOGY PROBLEMS ARE LISTED ABOVE; HOWEVER, THE FOLLOWING SPECIFIC CARDIAC PROBLEMS / CONDITIONS WERE ADDRESSED AND TREATED DURING THE OFFICE VISIT TODAY:                                                                                            MEDICAL DECISION MAKING             Cardiac Specific Problem / Diagnosis  Discussion and Data Reviewed Diagnostic Procedures Ordered Management Options Selected           1.   CAD/CABG  Stable   Review and summation of old records:    Patient to start cardiac rehab. He is on aspirin, Lipitor and Cozaar. No Continue current medications:     Yes           2. Persistent atrial fib  Stable   Patient is on Coumadin. No Continue current medications:    Yes           3. Hypertension Stable  blood pressure in the office today 130/86. O2 sat 96%. Patient is on Cozaar. No Continue current medications: Yes                     No orders of the defined types were placed in this encounter. No orders of the defined types were placed in this encounter. Discussed with patient. Return in about 3 months (around 4/17/2022) for ROutine with me . I greatly appreciate the opportunity to meet Eryn Reed and your confidence in allowing me to participate in his cardiovascular care. NOMI Bowling NP  1/17/2022 2:20 PM CST                    This dictation was generated by voice recognition computer software. Although all attempts are made to edit dictation for accuracy, there may be errors in the transcription that are not intended.

## 2022-01-17 NOTE — PROGRESS NOTES
[address], [address], [facility], [address]  Phone: [phone]  Fax: [phone]      Office Visit:  22    Alejandro Anaya - : 1965    Reason For Visit:  Samantha Dejesus is a 60 y.o. male who is here for Post-Op Check (heart)      History of Present Illness:      I had the pleasure of seeing Alejandro Anaya in the office today. As you know, he is a 60 y.o. obese poorly controlled diabetic male who has a history of longstanding persistent atrial fibrillation who presented with staph sepsis involving his left middle finger in September of this year and at that time he also presented with congestive heart failure and a Lexiscan demonstrated evidence of myocardial ischemia. He was allowed to recover from his sepsis under the elective heart catheterization in November that demonstrated severe multivessel coronary disease. I took the patient the operating room on 2021 where I performed concomitant operation of three-vessel coronary bypass grafting in addition to concomitant Romero-Maze IV  radiofrequency and cryoablation with exclusion of his left atrial appendage. Mr. Zia Fernandez did well during the postoperative course. He suffered no complications and was discharged home on the 4 days after operation in sinus rhythm. .  10 days later, he was readmitted to the hospital with fluid overload. He was aggressively diuresed and discharged home 3 days later. Since his return home, he has continued to increase his ambulation as much as possible, now walking several times per day. His appetite is improving and he otherwise has the normal amount of postoperative discomfort. He has noticed some intermittent episodes of palpitations but overall the patient feels that his heart rhythm has been regular.     ALLERGIES: Contrast [iodides], Neomycin-bacitracin zn-polymyx, Neosporin [neomycin-polymyxin-gramicidin], and Pcn [penicillins]    Current Outpatient Medications   Medication Sig Dispense Refill    metoprolol tartrate (LOPRESSOR) 50 MG tablet Take 1 tablet by mouth 2 times daily 180 tablet 1    furosemide (LASIX) 40 MG tablet Take 1 tablet by mouth daily 60 tablet 0    warfarin (COUMADIN) 10 MG tablet Take 1 tablet by mouth See Admin Instructions 10 MG ON Saturday, Monday, Tuesday, Wednesday, Friday 15 MG ON Sunday & Thursday. .. Stop taking Coumadin for now until repeat INR Monday with PCP or cardiology office. 30 tablet 3    cloNIDine (CATAPRES) 0.1 MG tablet Take 1 tablet by mouth 2 times daily 60 tablet 0    amiodarone (CORDARONE) 200 MG tablet Take 1 tablet by mouth 2 times daily 60 tablet 1    guaiFENesin (MUCINEX) 600 MG extended release tablet Take 1 tablet by mouth 2 times daily 60 tablet 0    losartan (COZAAR) 100 MG tablet Take 100 mg by mouth daily Indications: High Blood Pressure Disorder      aspirin EC 81 MG EC tablet Take 1 tablet by mouth daily 90 tablet 1    Insulin Degludec (TRESIBA FLEXTOUCH) 100 UNIT/ML SOPN Inject into the skin PT TAKES ACCORDING TO ACCUCHEK. DOES NOT TAKE  AND UNDER AT HS.  atorvastatin (LIPITOR) 20 MG tablet TAKE ONE TABLET DAILY  (Patient taking differently: Take 20 mg by mouth daily ) 90 tablet 1    tamsulosin (FLOMAX) 0.4 MG capsule Take 2 capsules by mouth daily 60 capsule 11    blood glucose monitor strips Test 3 times a day & as needed for symptoms of irregular blood glucose. Dispense sufficient amount for indicated testing frequency plus additional to accommodate PRN testing needs. 200 strip 3    Insulin Pen Needle 32G X 4 MM MISC 1 each by Does not apply route daily 100 each 3    blood glucose monitor kit and supplies Dispense sufficient amount for indicated testing frequency plus additional to accommodate PRN testing needs.  1 kit 0    Lancets MISC 1 each by Does not apply route 3 times daily (with meals) 300 each 3    insulin lispro (HUMALOG KWIKPEN) 200 UNIT/ML SOPN pen Sliding scale 160-179 2 units  180-199 3 units  200-219 4 units  220-239 5 units  240-259 6 units   260-279 7 units  280-299 8 units  300-399 9 units  340-379 10 units  380-420 11 units 3 pen 5    vitamin D (ERGOCALCIFEROL) 1.25 MG (34970 UT) CAPS capsule Take 50,000 Units by mouth once a week Takes on Friday      diphenhydrAMINE (BENADRYL) 25 MG tablet Take 25 mg by mouth every 6 hours as needed for Itching      blood glucose test strips (CONTOUR NEXT TEST) strip 1 each by In Vitro route 4 times daily As needed. 200 each 3    metFORMIN (GLUCOPHAGE) 1000 MG tablet Take 1 tablet by mouth 2 times daily (with meals) 180 tablet 3     No current facility-administered medications for this visit. He is to stop the Plavix after 60 days. He is to continue his Cordarone and warfarin for now    Review of Systems:   General: Denies any fever or chills. Energy level and endurance are returning to                              normal.    Respiratory: Denies any cough or hoarseness. Denies any SOB or HSU. Cardiac: Denies any chest pain or pressure. Denies any palpitations. Denies any                             presyncope or syncope. Denies any orthopnea or PND. Physical Examination:   Vital signs: Blood pressure (!) 145/97, pulse 62, resp. rate 18, height 5' 10\" (1.778 m), weight 234 lb 12.8 oz (106.5 kg), SpO2 95 %. Lungs: Both lungs are clear with equal breath sounds. Cardiac: Demonstrates a regular rate and rhythm without murmurs, rubs, or                              gallops. Chest: The sternal incision has healed well, and the sternum appears to be                           stable. Lower Extremities: The right leg incision has healed well. Assessment: At this time, Mr. Nahun Molina is progressing very well following his combined operation of three-vessel coronary bypass grafting and the Romero-Maze IV  ablation procedure.   Attempting to convert him to normal sinus rhythm after 8 to 9 years of longstanding persistent atrial fibrillation        Plan:   He may resume driving and may lift up to 15 pounds over the next 8 weeks, then resume normal activity. He is also advised to begin the cardiac rehab program as soon as possible. I plan to see him back in the office in 2 months for 3-month visit. At that time if he remains in sinus rhythm I will stop his amiodarone obtain a Zio patch in 6 months and perhaps stop his Coumadin as his atrial appendage has been excluded.

## 2022-01-18 DIAGNOSIS — Z95.1 S/P CABG X 3: Primary | ICD-10-CM

## 2022-01-19 ENCOUNTER — TELEPHONE (OUTPATIENT)
Dept: CARDIAC REHAB | Age: 57
End: 2022-01-19

## 2022-01-19 NOTE — PROGRESS NOTES
Spoke to pts mother on the phone concerning Cardiac Rehab. Pt was sleeping at the time of call. Left phone number with her for him to call back.

## 2022-01-25 ENCOUNTER — HOSPITAL ENCOUNTER (OUTPATIENT)
Dept: CARDIAC REHAB | Age: 57
Setting detail: THERAPIES SERIES
Discharge: HOME | End: 2022-01-25
Payer: MEDICARE

## 2022-01-27 ENCOUNTER — ANTI-COAG VISIT (OUTPATIENT)
Dept: CARDIOLOGY CLINIC | Age: 57
End: 2022-01-27
Payer: MEDICARE

## 2022-01-27 DIAGNOSIS — I48.19 PERSISTENT ATRIAL FIBRILLATION (HCC): Primary | ICD-10-CM

## 2022-01-27 LAB
INR BLD: 9.76 (ref 0.88–1.18)
INTERNATIONAL NORMALIZATION RATIO, POC: >8
PROTHROMBIN TIME, POC: NORMAL
PROTHROMBIN TIME: 73.4 SEC (ref 12–14.6)

## 2022-01-27 PROCEDURE — 85610 PROTHROMBIN TIME: CPT | Performed by: CLINICAL NURSE SPECIALIST

## 2022-01-28 ENCOUNTER — HOSPITAL ENCOUNTER (OUTPATIENT)
Dept: CARDIAC REHAB | Age: 57
Setting detail: THERAPIES SERIES
Discharge: HOME | End: 2022-01-28
Payer: MEDICARE

## 2022-01-28 ENCOUNTER — TELEPHONE (OUTPATIENT)
Dept: CARDIOLOGY CLINIC | Age: 57
End: 2022-01-28

## 2022-01-28 PROCEDURE — 93798 PHYS/QHP OP CAR RHAB W/ECG: CPT

## 2022-01-31 ENCOUNTER — HOSPITAL ENCOUNTER (OUTPATIENT)
Dept: CARDIAC REHAB | Age: 57
Setting detail: THERAPIES SERIES
Discharge: HOME | End: 2022-01-31
Payer: MEDICARE

## 2022-01-31 ENCOUNTER — OFFICE VISIT (OUTPATIENT)
Dept: INTERNAL MEDICINE | Age: 57
End: 2022-01-31
Payer: MEDICARE

## 2022-01-31 ENCOUNTER — OFFICE VISIT (OUTPATIENT)
Dept: CARDIOLOGY CLINIC | Age: 57
End: 2022-01-31
Payer: MEDICARE

## 2022-01-31 VITALS
BODY MASS INDEX: 32.5 KG/M2 | OXYGEN SATURATION: 97 % | SYSTOLIC BLOOD PRESSURE: 124 MMHG | HEART RATE: 67 BPM | HEIGHT: 70 IN | DIASTOLIC BLOOD PRESSURE: 72 MMHG | WEIGHT: 227 LBS

## 2022-01-31 DIAGNOSIS — Z79.4 TYPE 2 DIABETES MELLITUS WITH DIABETIC POLYNEUROPATHY, WITH LONG-TERM CURRENT USE OF INSULIN (HCC): Primary | ICD-10-CM

## 2022-01-31 DIAGNOSIS — E11.3593 PROLIFERATIVE DIABETIC RETINOPATHY OF BOTH EYES WITHOUT MACULAR EDEMA ASSOCIATED WITH TYPE 2 DIABETES MELLITUS (HCC): ICD-10-CM

## 2022-01-31 DIAGNOSIS — N18.30 STAGE 3 CHRONIC KIDNEY DISEASE, UNSPECIFIED WHETHER STAGE 3A OR 3B CKD (HCC): ICD-10-CM

## 2022-01-31 DIAGNOSIS — I48.19 PERSISTENT ATRIAL FIBRILLATION (HCC): Primary | ICD-10-CM

## 2022-01-31 DIAGNOSIS — I50.32 CHRONIC HEART FAILURE WITH PRESERVED EJECTION FRACTION (HCC): ICD-10-CM

## 2022-01-31 DIAGNOSIS — E78.2 MIXED HYPERLIPIDEMIA: ICD-10-CM

## 2022-01-31 DIAGNOSIS — R79.89 ELEVATED TSH: ICD-10-CM

## 2022-01-31 DIAGNOSIS — I48.11 LONGSTANDING PERSISTENT ATRIAL FIBRILLATION (HCC): ICD-10-CM

## 2022-01-31 DIAGNOSIS — E11.42 TYPE 2 DIABETES MELLITUS WITH DIABETIC POLYNEUROPATHY, WITH LONG-TERM CURRENT USE OF INSULIN (HCC): Primary | ICD-10-CM

## 2022-01-31 DIAGNOSIS — E11.610 CHARCOT FOOT DUE TO DIABETES MELLITUS (HCC): ICD-10-CM

## 2022-01-31 DIAGNOSIS — E03.8 SUBCLINICAL HYPOTHYROIDISM: ICD-10-CM

## 2022-01-31 DIAGNOSIS — I10 PRIMARY HYPERTENSION: ICD-10-CM

## 2022-01-31 LAB
INTERNATIONAL NORMALIZATION RATIO, POC: 2.6
PROTHROMBIN TIME, POC: NORMAL

## 2022-01-31 PROCEDURE — 3052F HG A1C>EQUAL 8.0%<EQUAL 9.0%: CPT | Performed by: INTERNAL MEDICINE

## 2022-01-31 PROCEDURE — 99213 OFFICE O/P EST LOW 20 MIN: CPT | Performed by: INTERNAL MEDICINE

## 2022-01-31 PROCEDURE — G8417 CALC BMI ABV UP PARAM F/U: HCPCS | Performed by: INTERNAL MEDICINE

## 2022-01-31 PROCEDURE — 93798 PHYS/QHP OP CAR RHAB W/ECG: CPT

## 2022-01-31 PROCEDURE — 3017F COLORECTAL CA SCREEN DOC REV: CPT | Performed by: INTERNAL MEDICINE

## 2022-01-31 PROCEDURE — 1036F TOBACCO NON-USER: CPT | Performed by: INTERNAL MEDICINE

## 2022-01-31 PROCEDURE — G8484 FLU IMMUNIZE NO ADMIN: HCPCS | Performed by: INTERNAL MEDICINE

## 2022-01-31 PROCEDURE — 85610 PROTHROMBIN TIME: CPT | Performed by: NURSE PRACTITIONER

## 2022-01-31 PROCEDURE — 2022F DILAT RTA XM EVC RTNOPTHY: CPT | Performed by: INTERNAL MEDICINE

## 2022-01-31 PROCEDURE — G8427 DOCREV CUR MEDS BY ELIG CLIN: HCPCS | Performed by: INTERNAL MEDICINE

## 2022-01-31 ASSESSMENT — ENCOUNTER SYMPTOMS
COLOR CHANGE: 0
SORE THROAT: 0
ABDOMINAL PAIN: 0
CHEST TIGHTNESS: 0
COUGH: 0
WHEEZING: 0
SHORTNESS OF BREATH: 0
DIARRHEA: 0
TROUBLE SWALLOWING: 0
CONSTIPATION: 1
BLOOD IN STOOL: 0
STRIDOR: 0
BACK PAIN: 0

## 2022-01-31 NOTE — PROGRESS NOTES
Ronny Fernandez ( 1965) is a 60 y.o. male,  Established , here for evaluation of the following chief complaint(s).   Follow-up and Diabetes        ASSESSMENT/PLAN:  Problem List        Circulatory    Longstanding persistent atrial fibrillation (HCC)      now on Amiodarone, an dhe is having Thyroid abnormalities         HTN (hypertension)      Well-controlled, continue current medications, medication adherence emphasized and lifestyle modifications recommended         Relevant Medications    cloNIDine (CATAPRES) 0.1 MG tablet    Chronic heart failure with preserved ejection fraction ([facility]      Monitored by specialist- no acute findings meriting change in the plan            Endocrine    Type 2 diabetes mellitus with diabetic polyneuropathy, with long-term current use of insulin (HCC) - Primary (Chronic)    Relevant Medications    blood glucose test strips (CONTOUR NEXT TEST) strip    insulin lispro (HUMALOG KWIKPEN) 200 UNIT/ML SOPN pen    blood glucose monitor kit and supplies    Lancets MISC    Insulin Pen Needle 32G X 4 MM MISC    blood glucose monitor strips    Insulin Degludec (TRESIBA FLEXTOUCH) 100 UNIT/ML SOPN    Other Relevant Orders     DIABETES FOOT EXAM (Completed)    Proliferative diabetic retinopathy of both eyes without macular edema associated with type 2 diabetes mellitus ([facility]      Monitored by specialist- no acute findings meriting change in the plan         Relevant Medications    insulin lispro (HUMALOG KWIKPEN) 200 UNIT/ML SOPN pen    Insulin Degludec (TRESIBA FLEXTOUCH) 100 UNIT/ML SOPN    Charcot foot due to diabetes mellitus (HCC)      Uncontrolled, continue checking feet         Relevant Medications    insulin lispro (HUMALOG KWIKPEN) 200 UNIT/ML SOPN pen    Insulin Degludec (TRESIBA FLEXTOUCH) 100 UNIT/ML SOPN    aspirin EC 81 MG EC tablet       Genitourinary    Stage 3 chronic kidney disease (HCC)      Borderline controlled, continue current medications, medication adherence emphasized and lifestyle modifications recommended            Other    Hyperlipidemia      Well-controlled, continue current medications, medication adherence emphasized and lifestyle modifications recommended         Relevant Medications    atorvastatin (LIPITOR) 20 MG tablet    aspirin EC 81 MG EC tablet    losartan (COZAAR) 100 MG tablet    amiodarone (CORDARONE) 200 MG tablet    cloNIDine (CATAPRES) 0.1 MG tablet    furosemide (LASIX) 40 MG tablet    warfarin (COUMADIN) 10 MG tablet    metoprolol tartrate (LOPRESSOR) 50 MG tablet          Results for orders placed or performed in visit on 01/27/22   Protime-INR   Result Value Ref Range    Protime 73.4 (HH) 12.0 - 14.6 sec    INR 9.76 (HH) 0.88 - 1.18   POCT INR   Result Value Ref Range    INR >8.0     Protime         Return in about 3 months (around 4/30/2022). HPI    Review of Systems   Constitutional: Negative for activity change, appetite change, chills, diaphoresis, fatigue and fever. HENT: Negative for congestion, hearing loss, postnasal drip, sore throat, tinnitus and trouble swallowing. Eyes: Negative for visual disturbance (says he has blurred vision). Respiratory: Negative for cough, chest tightness, shortness of breath, wheezing and stridor. Cardiovascular: Negative for chest pain, palpitations and leg swelling. Gastrointestinal: Positive for constipation. Negative for abdominal pain, blood in stool and diarrhea. Endocrine: Negative for cold intolerance. Genitourinary: Negative for frequency. Musculoskeletal: Negative for arthralgias, back pain and joint swelling. Skin: Positive for wound. Negative for color change. Allergic/Immunologic: Negative for environmental allergies. Neurological: Positive for numbness. Negative for dizziness, light-headedness and headaches. Hematological: Negative for adenopathy. Does not bruise/bleed easily.    Psychiatric/Behavioral: Negative for decreased concentration, dysphoric mood and sleep disturbance. The patient is not nervous/anxious. Physical Exam  Vitals and nursing note reviewed. Constitutional:       General: He is not in acute distress. Appearance: He is well-developed and normal weight. HENT:      Head: Normocephalic and atraumatic. Right Ear: External ear normal.      Left Ear: External ear normal.      Nose: Nose normal.      Mouth/Throat:      Mouth: Mucous membranes are moist.   Eyes:      General: No scleral icterus. Right eye: No discharge. Left eye: No discharge. Conjunctiva/sclera: Conjunctivae normal.      Pupils: Pupils are equal, round, and reactive to light. Neck:      Thyroid: No thyromegaly. Vascular: No JVD. Cardiovascular:      Rate and Rhythm: Normal rate and regular rhythm. Heart sounds: Normal heart sounds, S1 normal and S2 normal. No murmur heard. Pulmonary:      Effort: Pulmonary effort is normal.      Breath sounds: Normal breath sounds. No wheezing or rales. Abdominal:      General: Bowel sounds are normal. There is no distension. Palpations: Abdomen is soft. There is no mass. Tenderness: There is no abdominal tenderness. There is no rebound. Musculoskeletal:         General: No tenderness. Normal range of motion. Cervical back: Normal range of motion and neck supple. Right lower leg: No edema. Left lower leg: No edema. Comments: L middle finger amputation   Lymphadenopathy:      Cervical: No cervical adenopathy. Skin:     General: Skin is warm and dry. Findings: No rash. Comments: midkine sternotomy scar  Antonino LLE, removed today   Neurological:      General: No focal deficit present. Mental Status: He is alert and oriented to person, place, and time. Mental status is at baseline. Cranial Nerves: Cranial nerves are intact. No cranial nerve deficit. Motor: Weakness present. Coordination: Coordination is intact.       Deep Tendon Reflexes: Reflexes normal.   Psychiatric:         Mood and Affect: Mood is depressed. Speech: Speech is delayed. Behavior: Behavior is slowed and withdrawn. Thought Content:  Thought content normal.         Judgment: Judgment normal.       Visual inspection:  Deformity/amputation: present - Charcot foot  Skin lesions/pre-ulcerative calluses: absent  Edema: right- 1+, left- 1+    Sensory exam:  Monofilament sensation: abnormal - no sensation  (minimum of 5 random plantar locations tested, avoiding callused areas - > 1 area with absence of sensation is + for neuropathy)    Plus at least one of the following:  Pulses: abnormal - diminished  Pinprick: Absent  Proprioception: Absent  Vibration (128 Hz): Absent     (Time Documentation Optional [id number])    An electronic signaturewaas used to authenticate this note  -Madisyn Weir MD on 1/31/2022 at 12:16 PM

## 2022-02-02 ENCOUNTER — TELEPHONE (OUTPATIENT)
Dept: INTERNAL MEDICINE | Age: 57
End: 2022-02-02

## 2022-02-02 ENCOUNTER — HOSPITAL ENCOUNTER (OUTPATIENT)
Dept: CARDIAC REHAB | Age: 57
Setting detail: THERAPIES SERIES
Discharge: HOME | End: 2022-02-02
Payer: MEDICARE

## 2022-02-02 PROCEDURE — 93798 PHYS/QHP OP CAR RHAB W/ECG: CPT

## 2022-02-07 ENCOUNTER — TELEPHONE (OUTPATIENT)
Dept: INTERNAL MEDICINE | Age: 57
End: 2022-02-07

## 2022-02-07 DIAGNOSIS — I10 PRIMARY HYPERTENSION: ICD-10-CM

## 2022-02-07 RX ORDER — CLONIDINE HYDROCHLORIDE 0.1 MG/1
0.1 TABLET ORAL 2 TIMES DAILY
Qty: 180 TABLET | Refills: 1 | Status: SHIPPED | OUTPATIENT
Start: 2022-02-07 | End: 2022-03-29 | Stop reason: SDUPTHER

## 2022-02-07 NOTE — TELEPHONE ENCOUNTER
Kristina Cory called to request a refill on his medication. Last office visit : 1/31/2022   Next office visit : 5/2/2022     Requested Prescriptions     Pending Prescriptions Disp Refills    cloNIDine (CATAPRES) 0.1 MG tablet 60 tablet 1     Sig: Take 1 tablet by mouth 2 times daily     Pts Coumadin comes from Dr Negra Coppola and he was given 3 refills in December.        Bam Doan MA

## 2022-02-09 ENCOUNTER — HOSPITAL ENCOUNTER (OUTPATIENT)
Dept: CARDIAC REHAB | Age: 57
Setting detail: THERAPIES SERIES
Discharge: HOME | End: 2022-02-09
Payer: MEDICARE

## 2022-02-09 PROCEDURE — 93798 PHYS/QHP OP CAR RHAB W/ECG: CPT

## 2022-02-10 ENCOUNTER — ANTI-COAG VISIT (OUTPATIENT)
Dept: CARDIOLOGY CLINIC | Age: 57
End: 2022-02-10
Payer: MEDICARE

## 2022-02-10 DIAGNOSIS — I48.19 PERSISTENT ATRIAL FIBRILLATION (HCC): Primary | ICD-10-CM

## 2022-02-10 LAB
INTERNATIONAL NORMALIZATION RATIO, POC: 5
PROTHROMBIN TIME, POC: NORMAL

## 2022-02-10 PROCEDURE — 85610 PROTHROMBIN TIME: CPT | Performed by: NURSE PRACTITIONER

## 2022-02-11 ENCOUNTER — HOSPITAL ENCOUNTER (OUTPATIENT)
Dept: CARDIAC REHAB | Age: 57
Setting detail: THERAPIES SERIES
Discharge: HOME | End: 2022-02-11
Payer: MEDICARE

## 2022-02-11 PROCEDURE — 93798 PHYS/QHP OP CAR RHAB W/ECG: CPT

## 2022-02-14 ENCOUNTER — ANTI-COAG VISIT (OUTPATIENT)
Dept: CARDIOLOGY CLINIC | Age: 57
End: 2022-02-14
Payer: MEDICARE

## 2022-02-14 ENCOUNTER — HOSPITAL ENCOUNTER (OUTPATIENT)
Dept: CARDIAC REHAB | Age: 57
Setting detail: THERAPIES SERIES
Discharge: HOME | End: 2022-02-14
Payer: MEDICARE

## 2022-02-14 DIAGNOSIS — I48.19 PERSISTENT ATRIAL FIBRILLATION (HCC): Primary | ICD-10-CM

## 2022-02-14 LAB
INTERNATIONAL NORMALIZATION RATIO, POC: 1.8
PROTHROMBIN TIME, POC: NORMAL

## 2022-02-14 PROCEDURE — 85610 PROTHROMBIN TIME: CPT | Performed by: NURSE PRACTITIONER

## 2022-02-14 PROCEDURE — 93798 PHYS/QHP OP CAR RHAB W/ECG: CPT

## 2022-02-16 ENCOUNTER — HOSPITAL ENCOUNTER (OUTPATIENT)
Dept: CARDIAC REHAB | Age: 57
Setting detail: THERAPIES SERIES
Discharge: HOME | End: 2022-02-16
Payer: MEDICARE

## 2022-02-16 PROCEDURE — 93798 PHYS/QHP OP CAR RHAB W/ECG: CPT

## 2022-02-21 ENCOUNTER — HOSPITAL ENCOUNTER (OUTPATIENT)
Dept: CARDIAC REHAB | Age: 57
Setting detail: THERAPIES SERIES
Discharge: HOME | End: 2022-02-21
Payer: MEDICARE

## 2022-02-21 ENCOUNTER — TELEPHONE (OUTPATIENT)
Dept: INTERNAL MEDICINE | Age: 57
End: 2022-02-21

## 2022-02-21 DIAGNOSIS — R42 DIZZINESS: Primary | ICD-10-CM

## 2022-02-21 PROCEDURE — 93798 PHYS/QHP OP CAR RHAB W/ECG: CPT

## 2022-02-21 RX ORDER — MECLIZINE HCL 12.5 MG/1
12.5 TABLET ORAL 3 TIMES DAILY PRN
Qty: 30 TABLET | Refills: 0 | Status: SHIPPED | OUTPATIENT
Start: 2022-02-21 | End: 2022-04-04

## 2022-02-21 NOTE — TELEPHONE ENCOUNTER
Pt states that he his having dizzy spells that started Friday afternoon. He said that when he gets up to walk he can go about 30 to 40 feet and will get very dizzy and have to sit down. Pt states he did take his BP which was 139/79. Pt has checked his sugar and it was 141. Pt states he fell Friday but did not hit his head.  Want him to come in?

## 2022-02-21 NOTE — TELEPHONE ENCOUNTER
Spoke to the pt he Vu to  the medication and he is going to stop by to  the [employer] form due to he has no Internet access.

## 2022-02-21 NOTE — TELEPHONE ENCOUNTER
We could try low dose Meclizine for dizziness, he also should look up [employer] ti try and do this exercise 2X/day

## 2022-02-25 ENCOUNTER — HOSPITAL ENCOUNTER (OUTPATIENT)
Dept: CARDIAC REHAB | Age: 57
Setting detail: THERAPIES SERIES
Discharge: HOME | End: 2022-02-25
Payer: MEDICARE

## 2022-02-25 ENCOUNTER — TELEPHONE (OUTPATIENT)
Dept: CARDIOLOGY CLINIC | Age: 57
End: 2022-02-25

## 2022-02-25 PROCEDURE — 93798 PHYS/QHP OP CAR RHAB W/ECG: CPT

## 2022-02-26 ENCOUNTER — APPOINTMENT (OUTPATIENT)
Dept: CT IMAGING | Age: 57
End: 2022-02-26
Payer: MEDICARE

## 2022-02-26 ENCOUNTER — HOSPITAL ENCOUNTER (EMERGENCY)
Age: 57
Discharge: HOME | End: 2022-02-26
Attending: EMERGENCY MEDICINE
Payer: MEDICARE

## 2022-02-26 ENCOUNTER — APPOINTMENT (OUTPATIENT)
Dept: GENERAL RADIOLOGY | Age: 57
End: 2022-02-26
Payer: MEDICARE

## 2022-02-26 ENCOUNTER — NURSE TRIAGE (OUTPATIENT)
Dept: OTHER | Facility: CLINIC | Age: 57
End: 2022-02-26

## 2022-02-26 VITALS
WEIGHT: 225 LBS | DIASTOLIC BLOOD PRESSURE: 93 MMHG | BODY MASS INDEX: 32.21 KG/M2 | OXYGEN SATURATION: 99 % | HEIGHT: 70 IN | RESPIRATION RATE: 22 BRPM | SYSTOLIC BLOOD PRESSURE: 173 MMHG | TEMPERATURE: 96.5 F | HEART RATE: 57 BPM

## 2022-02-26 DIAGNOSIS — R79.1 ELEVATED INR: ICD-10-CM

## 2022-02-26 DIAGNOSIS — R42 DIZZINESS: Primary | ICD-10-CM

## 2022-02-26 LAB
ALBUMIN SERPL-MCNC: 4.1 G/DL (ref 3.5–5.2)
ALP BLD-CCNC: 148 U/L (ref 40–130)
ALT SERPL-CCNC: 27 U/L (ref 5–41)
ANION GAP SERPL CALCULATED.3IONS-SCNC: 16 MMOL/L (ref 7–19)
APTT: 68.6 SEC (ref 26–36.2)
AST SERPL-CCNC: 20 U/L (ref 5–40)
BACTERIA: NEGATIVE /HPF
BASOPHILS ABSOLUTE: 0.1 K/UL (ref 0–0.2)
BASOPHILS RELATIVE PERCENT: 0.9 % (ref 0–1)
BILIRUB SERPL-MCNC: 0.5 MG/DL (ref 0.2–1.2)
BILIRUBIN URINE: NEGATIVE
BLOOD, URINE: NEGATIVE
BUN BLDV-MCNC: 28 MG/DL (ref 6–20)
CALCIUM SERPL-MCNC: 9.1 MG/DL (ref 8.6–10)
CHLORIDE BLD-SCNC: 94 MMOL/L (ref 98–111)
CLARITY: CLEAR
CO2: 23 MMOL/L (ref 22–29)
COLOR: YELLOW
CREAT SERPL-MCNC: 1.4 MG/DL (ref 0.5–1.2)
CRYSTALS, UA: ABNORMAL /HPF
EOSINOPHILS ABSOLUTE: 0.1 K/UL (ref 0–0.6)
EOSINOPHILS RELATIVE PERCENT: 2.3 % (ref 0–5)
EPITHELIAL CELLS, UA: 0 /HPF (ref 0–5)
GFR AFRICAN AMERICAN: >59
GFR NON-AFRICAN AMERICAN: 52
GLUCOSE BLD-MCNC: 183 MG/DL (ref 74–109)
GLUCOSE URINE: NEGATIVE MG/DL
HCT VFR BLD CALC: 36 % (ref 42–52)
HEMOGLOBIN: 11.7 G/DL (ref 14–18)
HYALINE CASTS: 5 /HPF (ref 0–8)
IMMATURE GRANULOCYTES #: 0 K/UL
INR BLD: 10 (ref 0.88–1.18)
KETONES, URINE: NEGATIVE MG/DL
LEUKOCYTE ESTERASE, URINE: NEGATIVE
LYMPHOCYTES ABSOLUTE: 1.3 K/UL (ref 1.1–4.5)
LYMPHOCYTES RELATIVE PERCENT: 22.4 % (ref 20–40)
MCH RBC QN AUTO: 30.6 PG (ref 27–31)
MCHC RBC AUTO-ENTMCNC: 32.5 G/DL (ref 33–37)
MCV RBC AUTO: 94.2 FL (ref 80–94)
MONOCYTES ABSOLUTE: 0.5 K/UL (ref 0–0.9)
MONOCYTES RELATIVE PERCENT: 8.8 % (ref 0–10)
NEUTROPHILS ABSOLUTE: 3.7 K/UL (ref 1.5–7.5)
NEUTROPHILS RELATIVE PERCENT: 65.2 % (ref 50–65)
NITRITE, URINE: NEGATIVE
PDW BLD-RTO: 13.2 % (ref 11.5–14.5)
PH UA: 5 (ref 5–8)
PLATELET # BLD: 215 K/UL (ref 130–400)
PMV BLD AUTO: 10.5 FL (ref 9.4–12.4)
POTASSIUM SERPL-SCNC: 4.6 MMOL/L (ref 3.5–5)
PROTEIN UA: 30 MG/DL
PROTHROMBIN TIME: 93 SEC (ref 12–14.6)
RBC # BLD: 3.82 M/UL (ref 4.7–6.1)
RBC UA: 1 /HPF (ref 0–4)
SODIUM BLD-SCNC: 133 MMOL/L (ref 136–145)
SPECIFIC GRAVITY UA: 1.01 (ref 1–1.03)
TOTAL PROTEIN: 7.2 G/DL (ref 6.6–8.7)
TROPONIN: 0.02 NG/ML (ref 0–0.03)
UROBILINOGEN, URINE: 1 E.U./DL
WBC # BLD: 5.7 K/UL (ref 4.8–10.8)
WBC UA: 0 /HPF (ref 0–5)

## 2022-02-26 PROCEDURE — 70450 CT HEAD/BRAIN W/O DYE: CPT

## 2022-02-26 PROCEDURE — 85025 COMPLETE CBC W/AUTO DIFF WBC: CPT

## 2022-02-26 PROCEDURE — 99283 EMERGENCY DEPT VISIT LOW MDM: CPT

## 2022-02-26 PROCEDURE — 84484 ASSAY OF TROPONIN QUANT: CPT

## 2022-02-26 PROCEDURE — 85610 PROTHROMBIN TIME: CPT

## 2022-02-26 PROCEDURE — 80053 COMPREHEN METABOLIC PANEL: CPT

## 2022-02-26 PROCEDURE — 85730 THROMBOPLASTIN TIME PARTIAL: CPT

## 2022-02-26 PROCEDURE — 93005 ELECTROCARDIOGRAM TRACING: CPT | Performed by: EMERGENCY MEDICINE

## 2022-02-26 PROCEDURE — 36415 COLL VENOUS BLD VENIPUNCTURE: CPT

## 2022-02-26 PROCEDURE — 71045 X-RAY EXAM CHEST 1 VIEW: CPT

## 2022-02-26 PROCEDURE — 81001 URINALYSIS AUTO W/SCOPE: CPT

## 2022-02-26 PROCEDURE — 6370000000 HC RX 637 (ALT 250 FOR IP): Performed by: EMERGENCY MEDICINE

## 2022-02-26 PROCEDURE — 2580000003 HC RX 258: Performed by: EMERGENCY MEDICINE

## 2022-02-26 RX ORDER — 0.9 % SODIUM CHLORIDE 0.9 %
1000 INTRAVENOUS SOLUTION INTRAVENOUS ONCE
Status: COMPLETED | OUTPATIENT
Start: 2022-02-26 | End: 2022-02-26

## 2022-02-26 RX ORDER — PHYTONADIONE 5 MG/1
5 TABLET ORAL ONCE
Status: COMPLETED | OUTPATIENT
Start: 2022-02-26 | End: 2022-02-26

## 2022-02-26 RX ADMIN — PHYTONADIONE 5 MG: 5 TABLET ORAL at 14:17

## 2022-02-26 RX ADMIN — SODIUM CHLORIDE 1000 ML: 9 INJECTION, SOLUTION INTRAVENOUS at 14:29

## 2022-02-26 ASSESSMENT — ENCOUNTER SYMPTOMS
NAUSEA: 0
VOMITING: 0
ABDOMINAL PAIN: 0
SHORTNESS OF BREATH: 0
COUGH: 0
SORE THROAT: 0
BACK PAIN: 0
DIARRHEA: 0
RHINORRHEA: 0

## 2022-02-26 NOTE — ED PROVIDER NOTES
Gastrointestinal: Negative for abdominal pain, diarrhea, nausea and vomiting. Genitourinary: Negative for dysuria and frequency. Musculoskeletal: Negative for back pain and neck pain. Neurological: Positive for dizziness and light-headedness. Negative for syncope, facial asymmetry, speech difficulty, weakness, numbness and headaches. All other systems reviewed and are negative.            PAST MEDICALHISTORY     Past Medical History:   Diagnosis Date    Acalculous cholecystitis 12/29/2015    LIAN (acute kidney injury) ([address] 3/21/2019    Allergic rhinitis, cause unspecified     Atrial fibrillation, chronic (HCC)     sees [facility] cardiology    Bacteremia due to methicillin resistant Staphylococcus aureus 10/27/2021    Bone spur     bilateral elbows    CAD (coronary artery disease)     Charcot's joint of right foot     Chicken pox     Closed supracondylar fracture of elbow     fall    Diabetic ulcer of right midfoot associated with type 2 diabetes mellitus, with fat layer exposed ([address] 9/14/2020    Diabetic ulcer of right midfoot associated with type 2 diabetes mellitus, with fat layer exposed ([address] 9/14/2020    History of MRSA infection     scalp/facial and on back    HTN (hypertension)     Hyperlipidemia     Hypoglycemic encephalopathy     Impotence of organic origin     MDRO (multiple drug resistant organisms) resistance     MRSA (methicillin resistant staph aureus) culture positive 11/2021    MRSA LT HAND    Other dyspnea and respiratory abnormality     Other specified erythematous condition(695.89)     Permanent atrial fibrillation ([address] 2/18/2021    Personal history of other infectious and parasitic disease     Salmonella     Subtherapeutic international normalized ratio (INR) 4/19/2021    Supratherapeutic INR 4/14/2021    Type II or unspecified type diabetes mellitus without mention of complication, uncontrolled 1994         SURGICAL HISTORY       Past Surgical History: Procedure Laterality Date    AMPUTATION  11/01/2021    CARDIAC CATHETERIZATION      CARDIOVERSION  03/2017    CATARACT REMOVAL  10/2021    COLONOSCOPY  02/24/2020    Dr RITIKA Fraire-Melanosis coli throughout the entire colon-AP, 5 yr recall    CORONARY ARTERY BYPASS GRAFT MAZE PROCEDURE N/A 12/14/2021    CORONARY ARTERY BYPASS GRAFT X3 WITH LEFT INTERNAL MAMMARY ARTERY WITH ENDOSCOPIC VEIN HARVESTING WITH PERFUSION, TRANSESOPHAGEAL ECHOCARDIOGRAM, BIATRIAL RODRIGUES-MAZE RF AND CRYOABLATIONAND ATRICLIP performed [facility] HAND SURGERY Left 10/31/2021    HAND INCISION AND DRAINAGE Partial Amputation Middle Finger performed by Luly Gann MD at [address] [facility] Left 09/17/2020    OPEN REDUCTION INTERNAL FIXATION LEFT SUPRACONDYLAR FRACTURE performed by Liz Huang MD at [facility] OR    MALIGNANT SKIN LESION EXCISION      X 2    TONSILLECTOMY           CURRENT MEDICATIONS     Previous Medications    AMIODARONE (CORDARONE) 200 MG TABLET    Take 1 tablet by mouth 2 times daily    ASPIRIN EC 81 MG EC TABLET    Take 1 tablet by mouth daily    ATORVASTATIN (LIPITOR) 20 MG TABLET    TAKE ONE TABLET DAILY     BLOOD GLUCOSE MONITOR KIT AND SUPPLIES    Dispense sufficient amount for indicated testing frequency plus additional to accommodate PRN testing needs. BLOOD GLUCOSE MONITOR STRIPS    Test 3 times a day & as needed for symptoms of irregular blood glucose. Dispense sufficient amount for indicated testing frequency plus additional to accommodate PRN testing needs. BLOOD GLUCOSE TEST STRIPS (CONTOUR NEXT TEST) STRIP    1 each by In Vitro route 4 times daily As needed.     CLONIDINE (CATAPRES) 0.1 MG TABLET    Take 1 tablet by mouth 2 times daily    DIPHENHYDRAMINE (BENADRYL) 25 MG TABLET    Take 25 mg by mouth every 6 hours as needed for Itching    FUROSEMIDE (LASIX) 40 MG TABLET    Take 1 tablet by mouth daily    GUAIFENESIN (MUCINEX) 600 MG EXTENDED RELEASE TABLET    Take 1 tablet by mouth 2 times daily    INSULIN DEGLUDEC (TRESIBA FLEXTOUCH) 100 UNIT/ML SOPN    Inject into the skin PT TAKES ACCORDING TO ACCUCHEK. DOES NOT TAKE  AND UNDER AT HS. INSULIN LISPRO (HUMALOG KWIKPEN) 200 UNIT/ML SOPN PEN    Sliding scale 160-179 2 units  180-199 3 units  200-219 4 units  220-239 5 units  240-259 6 units   260-279 7 units  280-299 8 units  300-399 9 units  340-379 10 units  380-420 11 units    INSULIN PEN NEEDLE 32G X 4 MM MISC    1 each by Does not apply route daily    LANCETS MISC    1 each by Does not apply route 3 times daily (with meals)    LOSARTAN (COZAAR) 100 MG TABLET    Take 100 mg by mouth daily Indications: High Blood Pressure Disorder    MECLIZINE (ANTIVERT) 12.5 MG TABLET    Take 1 tablet by mouth 3 times daily as needed for Dizziness    METFORMIN (GLUCOPHAGE) 1000 MG TABLET    Take 1 tablet by mouth 2 times daily (with meals)    METOPROLOL TARTRATE (LOPRESSOR) 50 MG TABLET    Take 1 tablet by mouth 2 times daily    TAMSULOSIN (FLOMAX) 0.4 MG CAPSULE    Take 2 capsules by mouth daily    VITAMIN D (ERGOCALCIFEROL) 1.25 MG (85412 UT) CAPS CAPSULE    Take 50,000 Units by mouth once a week Takes on Friday    WARFARIN (COUMADIN) 10 MG TABLET    Take 1 tablet by mouth See Admin Instructions 10 MG ON Saturday, Monday, Tuesday, Wednesday, Friday 15 MG ON Sunday & Thursday. .. Stop taking Coumadin for now until repeat INR Monday with PCP or cardiology office.        ALLERGIES     Contrast [iodides], Neomycin-bacitracin zn-polymyx, Neosporin [neomycin-polymyxin-gramicidin], and Pcn [penicillins]    FAMILY HISTORY       Family History   Problem Relation Age of Onset    Stroke Maternal Grandmother     Cancer Maternal Grandmother     Stroke Paternal Grandmother     Diabetes Paternal Grandmother     Cancer Father     Heart Disease Father     Lung Cancer Father     Asthma Paternal Grandfather     Heart Disease Paternal Grandfather     Heart Disease Maternal Grandfather     Colon Cancer Neg Hx     Colon Polyps Neg Hx     Esophageal Cancer Neg Hx     Liver Cancer Neg Hx     Liver Disease Neg Hx     Rectal Cancer Neg Hx     Stomach Cancer Neg Hx           SOCIAL HISTORY       Social History     Socioeconomic History    Marital status:      Spouse name: None    Number of children: None    Years of education: None    Highest education level: None   Occupational History    None   Tobacco Use    Smoking status: Never Smoker    Smokeless tobacco: Never Used   Vaping Use    Vaping Use: Never used   Substance and Sexual Activity    Alcohol use: No    Drug use: No    Sexual activity: None   Other Topics Concern    None   Social History Narrative    Referred to  for assistance with Meals on Wheels     Social Determinants of Health     Financial Resource Strain: Low Risk     Difficulty of Paying Living Expenses: Not hard at all   Food Insecurity: No Food Insecurity    Worried About Running Out of Food in the Last Year: Never true    Zoran of Food in the Last Year: Never true   Transportation Needs:     Lack of Transportation (Medical): Not on file    Lack of Transportation (Non-Medical):  Not on file   Physical Activity:     Days of Exercise per Week: Not on file    Minutes of Exercise per Session: Not on file   Stress:     Feeling of Stress : Not on file   Social Connections:     Frequency of Communication with Friends and Family: Not on file    Frequency of Social Gatherings with Friends and Family: Not on file    Attends [employer] Services: Not on file    Active Member of Clubs or Organizations: Not on file    Attends Club or Organization Meetings: Not on file    Marital Status: Not on file   Intimate Partner Violence:     Fear of Current or Ex-Partner: Not on file    Emotionally Abused: Not on file    Physically Abused: Not on file    Sexually Abused: Not on file   Housing Stability:     Unable to Pay for Housing in the Last Year: Not on file    Number of Places Lived in the Last Year: Not on file    Unstable Housing in the Last Year: Not on file       SCREENINGS    [place] Coma Scale  Eye Opening: Spontaneous  Best Verbal Response: Oriented  Best Motor Response: Obeys commands  [place] Coma Scale Score: 15        PHYSICAL EXAM    (up to 7 for level 4, 8 or more for level 5)     ED Triage Vitals [02/26/22 1141]   BP Temp Temp Source Pulse Resp SpO2 Height Weight   101/69 96.5 °F (35.8 °C) Temporal 51 17 96 % 5' 10\" (1.778 m) 225 lb (102.1 kg)       Physical Exam  Vitals and nursing note reviewed. Constitutional:       General: He is not in acute distress. Appearance: Normal appearance. He is well-developed. He is not ill-appearing or diaphoretic. Comments: Sitting on the edge of the bed conversive in no obvious distress   HENT:      Head: Normocephalic and atraumatic. Nose:      Comments: Dried blood in left nare no septal hematoma no active bleeding     Mouth/Throat:      Mouth: Mucous membranes are moist.      Pharynx: No oropharyngeal exudate or posterior oropharyngeal erythema. Eyes:      Extraocular Movements: Extraocular movements intact. Conjunctiva/sclera: Conjunctivae normal.      Pupils: Pupils are equal, round, and reactive to light. Neck:      Trachea: No tracheal deviation. Cardiovascular:      Rate and Rhythm: Normal rate and regular rhythm. Pulses: Normal pulses. Heart sounds: Normal heart sounds. No murmur heard. Pulmonary:      Effort: Pulmonary effort is normal.      Breath sounds: Normal breath sounds. No wheezing or rales. Abdominal:      Palpations: Abdomen is soft. There is no mass. Tenderness: There is no abdominal tenderness. Musculoskeletal:         General: Normal range of motion. Cervical back: Normal range of motion and neck supple. Right lower leg: No edema. Left lower leg: No edema.    Skin:     General: Skin is warm and dry.   Neurological:      Mental Status: He is alert and oriented to person, place, and time. GCS: GCS eye subscore is 4. GCS verbal subscore is 5. GCS motor subscore is 6. Cranial Nerves: Cranial nerves are intact. Sensory: Sensation is intact. Motor: No weakness or abnormal muscle tone. Coordination: Coordination is intact. Coordination normal. Finger-Nose-Finger Test normal.      Gait: Gait is intact. DIAGNOSTIC RESULTS     EKG: All EKG's areinterpreted by the Emergency Department Physician who either signs or Co-signs this chart in the absence of a cardiologist.    54 normal sinus rhythm T wave inversion in lead III and aVF, T wave flattening in lateral leads no obvious elevations or depression no prolonged QTC nondiagnostic EKG overall stable from December comparison    RADIOLOGY:  Non-plain film images such as CT, Ultrasound and MRI are read by the radiologist. Plain radiographic images are visualized and preliminarily interpreted bythe emergency physician with the below findings      CT HEAD WO CONTRAST   Final Result   1. No acute intracranial process. Signed by Dr Emre Montoya   Final Result   1. No acute cardiopulmonary process. Mild cardiomegaly. 2. Prior coronary bypass.    Signed by Dr Orlin May:  Labs Reviewed   CBC WITH AUTO DIFFERENTIAL - Abnormal; Notable for the following components:       Result Value    RBC 3.82 (*)     Hemoglobin 11.7 (*)     Hematocrit 36.0 (*)     MCV 94.2 (*)     MCHC 32.5 (*)     Neutrophils % 65.2 (*)     All other components within normal limits   COMPREHENSIVE METABOLIC PANEL - Abnormal; Notable for the following components:    Sodium 133 (*)     Chloride 94 (*)     Glucose 183 (*)     BUN 28 (*)     CREATININE 1.4 (*)     GFR Non-African American 52 (*)     Alkaline Phosphatase 148 (*)     All other components within normal limits   PROTIME-INR - Abnormal; Notable for the following components:    Protime 93.0 (*)     INR 10.00 (*)     All other components within normal limits    Narrative:     Alma Jewell tel. ,  Coag results called to and read back by [facility] RN/ER, 02/26/2022 13:53, by  LAVON   APTT - Abnormal; Notable for the following components:    aPTT 68.6 (*)     All other components within normal limits    Narrative:     Alma Jewell tel. ,  Coag results called to and read back by [facility] RN/ER, 02/26/2022 13:53, by  [facility] - Abnormal; Notable for the following components:    Protein, UA 30 (*)     All other components within normal limits   MICROSCOPIC URINALYSIS - Abnormal; Notable for the following components:    Bacteria, UA NEGATIVE (*)     Crystals, UA NEG (*)     All other components within normal limits   TROPONIN       All other labs were within normal range or not returned as of this dictation.     EMERGENCY DEPARTMENT COURSE and DIFFERENTIAL DIAGNOSIS/MDM:   Vitals:    Vitals:    02/26/22 1141 02/26/22 1215 02/26/22 1430   BP: 101/69 (!) 108/58 (!) 173/93   Pulse: 51 59 57   Resp: 17 22    Temp: 96.5 °F (35.8 °C)     TempSrc: Temporal     SpO2: 96% 98% 99%   Weight: 225 lb (102.1 kg)     Height: 5' 10\" (1.778 m)         MDM  Number of Diagnoses or Management Options     Amount and/or Complexity of Data Reviewed  Clinical lab tests: ordered and reviewed  Tests in the radiology section of CPT®: ordered and reviewed  Independent visualization of images, tracings, or specimens: yes      Pt reports intermittent dizziness after has walked some distance, ?vertigo, gets off balance, not orthostatic here, has meclizine,  no cp etc, pt well appearing here, reassuring labs except for elevated INR, ct head neg, given 5mg vit K here, will have him get rechecked Monday and hold coumadin tomorrow, ambulatory here, stable for dc and outpt follow up      CONSULTS:  None    PROCEDURES:  Unless otherwise noted below, none     Procedures    FINAL

## 2022-02-26 NOTE — TELEPHONE ENCOUNTER
Received call from [facility] at [facility] with Red Flag Complaint. Subjective: Caller states \"Today, I am on blood thinners, Warfarin. In December I had a triple bypass. \"     Current Symptoms: Today has been on and off dizzy since 0700 this AM and is getting more frequent. Having nose bleeds and looks pale. States is feeling extremely out of breath when walking about 40 feet. No SOB at rest.   Has no endurance. Dizziness is not happening right now. Theresa Lassiter last week due to the dizziness. Just started cardiac rehab last night. Nose bleed has stopped. PTT/INR 3 weeks ago was 9.75 and got down to 2.0-2.25. No clots with nosebleed. Onset: 4 hours ago; sudden, intermittent, worsening    Associated Symptoms: reduced activity    Pain Severity: denies pain    Temperature: denies fever     What has been tried: nothing at this time    LMP: NA Pregnant: NA    Recommended disposition: Go to ED Now    Care advice provided, patient verbalizes understanding; denies any other questions or concerns; instructed to call back for any new or worsening symptoms. Patient/caller agrees to proceed to   Emergency Department     Attention Provider: Thank you for allowing me to participate in the care of your patient. The patient was connected to triage in response to information provided to the ECC/PSC. Please do not respond through this encounter as the response is not directed to a shared pool.       Reason for Disposition   Dizziness or lightheadedness    Protocols used: NOSEBLEED-ADULT-

## 2022-02-28 ENCOUNTER — ANTI-COAG VISIT (OUTPATIENT)
Dept: CARDIOLOGY CLINIC | Age: 57
End: 2022-02-28
Payer: MEDICARE

## 2022-02-28 ENCOUNTER — CARE COORDINATION (OUTPATIENT)
Dept: CARE COORDINATION | Age: 57
End: 2022-02-28

## 2022-02-28 ENCOUNTER — HOSPITAL ENCOUNTER (OUTPATIENT)
Dept: CARDIAC REHAB | Age: 57
Setting detail: THERAPIES SERIES
Discharge: HOME | End: 2022-02-28
Payer: MEDICARE

## 2022-02-28 DIAGNOSIS — I48.19 PERSISTENT ATRIAL FIBRILLATION (HCC): Primary | ICD-10-CM

## 2022-02-28 LAB
EKG P AXIS: NORMAL DEGREES
EKG P-R INTERVAL: NORMAL MS
EKG Q-T INTERVAL: 512 MS
EKG QRS DURATION: 104 MS
EKG QTC CALCULATION (BAZETT): 502 MS
EKG T AXIS: -110 DEGREES
INTERNATIONAL NORMALIZATION RATIO, POC: 2.4
PROTHROMBIN TIME, POC: NORMAL

## 2022-02-28 PROCEDURE — 85610 PROTHROMBIN TIME: CPT | Performed by: NURSE PRACTITIONER

## 2022-02-28 PROCEDURE — 93010 ELECTROCARDIOGRAM REPORT: CPT | Performed by: INTERNAL MEDICINE

## 2022-02-28 PROCEDURE — 93798 PHYS/QHP OP CAR RHAB W/ECG: CPT

## 2022-02-28 RX ORDER — AMIODARONE HYDROCHLORIDE 200 MG/1
TABLET ORAL
Qty: 60 TABLET | Refills: 1 | Status: SHIPPED | OUTPATIENT
Start: 2022-02-28 | End: 2022-05-11

## 2022-02-28 NOTE — CARE COORDINATION
ACM attempted contact with patient and unable to reach. Left voicemail with my cell number for call back. Will await return call from patient.     Guru [address], [id number]

## 2022-03-02 ENCOUNTER — HOSPITAL ENCOUNTER (OUTPATIENT)
Dept: CARDIAC REHAB | Age: 57
Setting detail: THERAPIES SERIES
Discharge: HOME | End: 2022-03-02
Payer: MEDICARE

## 2022-03-02 PROCEDURE — 93798 PHYS/QHP OP CAR RHAB W/ECG: CPT

## 2022-03-04 ENCOUNTER — HOSPITAL ENCOUNTER (OUTPATIENT)
Dept: CARDIAC REHAB | Age: 57
Setting detail: THERAPIES SERIES
Discharge: HOME | End: 2022-03-04
Payer: MEDICARE

## 2022-03-04 ENCOUNTER — CARE COORDINATION (OUTPATIENT)
Dept: CARE COORDINATION | Age: 57
End: 2022-03-04

## 2022-03-04 PROCEDURE — 93798 PHYS/QHP OP CAR RHAB W/ECG: CPT

## 2022-03-04 NOTE — CARE COORDINATION
.     Ambulatory Care Coordination Note  3/4/2022  CM Risk Score: 15  Charlson 10 Year Mortality Risk Score: 100%     ACC: Aarti Archibald RN    Summary Note: Ambulatory Care Manager [facility]) spoke with patient gathering information for admission intake for ambulatory care management. Patient reports increased dizziness and frequent falls. Patient verbalized desire to work with ACM to improve Fall risk and sympotm management. ACM with patients assistance identified patients' need for lifestyle changes and self-monitoring  to improve symptom management. Please note initial goals for ambulatory care:    Goals      Reduce Falls       I will reduce my risk of falls by the following: Remove rugs or use non slip rugs  Install grab bars in bathroom  Use walking aids like cane or walker  Continue Cardiac Therapy as ordered by provider     Barriers: lack of motivation, time constraints, and medication side effects  Plan for overcoming my barriers:   Patient will notify provider of falls   Patient will take medication as prescribed by providers notifying providers of adverse symptoms . Confidence: 5/10  Anticipated Goal Completion Date: 2022          Ambulatory Care Nurse contacted the family via telephone to perform admission intake assessment for ambulatory care management. ACM Verified name and  with family as identifiers. Provided introduction to self, and explanation of the ACM role. Patient reports a fall about four days ago without injury. Patient states dizziness occurrs with exertion example (walking over twenty feet). Patient states symptoms subside with rest periods, symptoms began approximately three weeks ago. ACM inquired on symptoms to identify a trend, patient states symptoms are infrequent and do not occur daily. Patient states dizziness affects ability to complete cardiac exercises.     Patient continues to self monitor blood pressure up to three times per days last blood pressure reading 3/4/2022 morning was 138/80. Patient reports taking and continues to take Meclizine 12.5mg TID for vertigo. Needs refill for   Vitamin D 1.25mg on Fridays last dosage taken three weeks prior. Patient continues to monitor glucose TID: Following result from     Blood Glucose Tracker 3/4/2022   Before breakfast blood glucose 83   Before lunch blood glucose 92    t  83- morning   92- Lunch     Ambulatory Care Coordination Assessment    Care Coordination Protocol  Program Enrollment: Complex Care  Referral from Primary Care Provider: No  Week 1 - Initial Assessment     Do you have all of your prescriptions and are they filled?: Yes  Barriers to medication adherence: Other, Does not feel there is a need/No perceived effect, Complexity of regimen  Are you able to afford your medications?: Yes  How often do you have trouble taking your medications the way you have been told to take them?: I always take them as prescribed. Do you have Home O2 Therapy?: No      Ability to seek help/take action for Emergent Urgent situations i.e. fire, crime, inclement weather or health crisis. : Independent  Ability to ambulate to restroom: Independent  Ability handle personal hygeine needs (bathing/dressing/grooming): Independent  Ability to manage Medications: Independent  Ability to prepare Food Preparation: Needs Assistance  Ability to maintain home (clean home, laundry): Needs Assistance  Ability to drive and/or has transportation: Independent  Ability to do shopping: Independent  Ability to manage finances:  Independent  Is patient able to live independently?: Yes     Current Housing: Private Residence           Frequent urination at night?: No  Do you use rails/bars?: Yes  Do you have a non-slip tub mat?: Yes     Are you experiencing loss of meaning?: No  Are you experiencing loss of hope and peace?: No     Suggested Interventions and Community Resources                  Prior to Admission medications    Medication Sig Start Date End Date Taking? Authorizing Provider   amiodarone (CORDARONE) 200 MG tablet TAKE ONE TABLET TWICE DAILY 2/28/22  Yes NOMI Guzman   cloNIDine (CATAPRES) 0.1 MG tablet Take 1 tablet by mouth 2 times daily 2/7/22 5/8/22 Yes Yang Bess MD   metoprolol tartrate (LOPRESSOR) 50 MG tablet Take 1 tablet by mouth 2 times daily 1/3/22 4/3/22 Yes Gabbi Bess MD   furosemide (LASIX) 40 MG tablet Take 1 tablet by mouth daily 12/31/21  Yes NOMI Orellana CNP   warfarin (COUMADIN) 10 MG tablet Take 1 tablet by mouth See Admin Instructions 10 MG ON Saturday, Monday, Tuesday, Wednesday, Friday 15 MG ON Sunday & Thursday. .. Stop taking Coumadin for now until repeat INR Monday with PCP or cardiology office. 12/31/21  Yes NOMI Orellana CNP   losartan (COZAAR) 100 MG tablet Take 100 mg by mouth daily Indications: High Blood Pressure Disorder   Yes Historical Provider, MD   metFORMIN (GLUCOPHAGE) 1000 MG tablet Take 1 tablet by mouth 2 times daily (with meals) 11/26/21 3/4/22 Yes Michael Winters MD   Insulin Degludec (TRESIBA FLEXTOUCH) 100 UNIT/ML SOPN Inject into the skin PT TAKES ACCORDING TO ACCUCHEK. DOES NOT TAKE  AND UNDER AT HS. Yes Historical Provider, MD   atorvastatin (LIPITOR) 20 MG tablet TAKE ONE TABLET DAILY   Patient taking differently: Take 20 mg by mouth daily  9/29/21  Yes Gabbi Bess MD   tamsulosin (FLOMAX) 0.4 MG capsule Take 2 capsules by mouth daily 9/15/21  Yes NOMI King CNP   blood glucose monitor strips Test 3 times a day & as needed for symptoms of irregular blood glucose. Dispense sufficient amount for indicated testing frequency plus additional to accommodate PRN testing needs.  8/1/21  Yes Gabbi Bess MD   Insulin Pen Needle 32G X 4 MM MISC 1 each by Does not apply route daily 6/14/21  Yes Yang Bess MD   blood glucose monitor kit and supplies Dispense sufficient amount for indicated testing frequency plus additional to accommodate PRN testing needs. 5/12/21  Yes Gabbi Bess MD   Lancets MISC 1 each by Does not apply route 3 times daily (with meals) 5/12/21  Yes Gabbi Bess MD   insulin lispro (HUMALOG KWIKPEN) 200 UNIT/ML SOPN pen Sliding scale 160-179 2 units  180-199 3 units  200-219 4 units  220-239 5 units  240-259 6 units   260-279 7 units  280-299 8 units  300-399 9 units  340-379 10 units  380-420 11 units 5/11/21  Yes Gabbi Bess MD   vitamin D (ERGOCALCIFEROL) 1.25 MG (90471 UT) CAPS capsule Take 50,000 Units by mouth once a week Takes on Friday   Yes Historical Provider, MD   diphenhydrAMINE (BENADRYL) 25 MG tablet Take 25 mg by mouth every 6 hours as needed for Itching   Yes Historical Provider, MD   blood glucose test strips (CONTOUR NEXT TEST) strip 1 each by In Vitro route 4 times daily As needed.  11/21/19  Yes Gabbi Bess MD   guaiFENesin (MUCINEX) 600 MG extended release tablet Take 1 tablet by mouth 2 times daily  Patient not taking: Reported on 3/4/2022 12/18/21   Enrico Cosme MD   aspirin EC 81 MG EC tablet Take 1 tablet by mouth daily  Patient not taking: Reported on 3/4/2022 11/23/21   Chasity Khanna MD       Future Appointments   Date Time Provider Chris Camarena   3/4/2022  3:45 PM L CARDIAC REHAB PHASE II PROVIDER CLASS L CARDIAC [facility]   3/7/2022  3:00 PM SCHEDULE, LPS CARDIOLOGY PRO TIME N [facility]   3/7/2022  3:45 PM L CARDIAC REHAB PHASE II PROVIDER CLASS [facility] CARDIAC [facility]   3/9/2022  3:45 PM L CARDIAC REHAB PHASE II PROVIDER CLASS [facility] CARDIAC [facility]   3/11/2022  3:45 PM L CARDIAC REHAB PHASE II PROVIDER CLASS L CARDIAC [facility]   3/14/2022  3:45 PM L CARDIAC REHAB [facility]   3/15/2022 11:00 AM NOMI [place] - CNP N LPS [facility]   3/15/2022  1:45 PM Enrico Cosme MD N Heart&Lung [facility]   3/16/2022  3:45 PM [facility] CARDIAC REHAB PHASE II PROVIDER CLASS [facility] CARDIAC [facility]   3/18/2022  3:45 PM [facility] CARDIAC REHAB PHASE II PROVIDER CLASS [facility] CARDIAC [facility]   3/21/2022  3:45 PM [facility] CARDIAC REHAB PHASE II PROVIDER CLASS [facility] CARDI[facility]   3/23/2022  3:45 PM [facility] CARDIAC REHAB PHASE II PROVIDER CLASS [facility] CARDIAC [facility]   3/25/2022  3:45 PM [facility] CARDIAC REHAB PHASE II PROVIDER CLASS [facility] CARDIAC [facility]   3/28/2022  3:45 PM [facility] CARDIAC REHAB PHASE II PROVIDER CLASS [facility]ARDI[facility]   3/30/2022  3:45 PM [facility] CARDIAC REHAB PHASE II PROVIDER CLASS [facility] CARDIAC [facility]   4/1/2022  3:45 PM [facility] CARDIAC REHAB PHASE II PROVIDER CLASS [facility] CARD[facility]   4/4/2022  3:45 PM [facility] CARDIAC REHAB PHASE II PROVIDER CLASS [facility]   4/5/2022 10:15 AM DO MAGNUS Patton [facility]   4/6/2022  3:45 PM [facility] CARDIAC REHAB PHASE II PROVIDER CLASS [facility] CARDIAC [facility]   4/8/2022  3:45 PM [facility] CARDIAC REHAB PHASE II PROVIDER CLASS [facility] CARDIAC [facility]   4/11/2022  3:45 PM [facility] CARDIAC REHAB PHASE II PROVIDER CLASS [facility] CARD[facility]   4/13/2022  3:45 PM [facility] CARDIAC REHAB PHASE II PROVIDER CLASS [facility] CARD[facility]   4/15/2022  3:45 PM [facility] CARDIAC REHAB PHASE II PROVIDER CLASS [facility] CARDIAC [facility]   4/18/2022 11:15 AM NOMI Scott NP [facility]   4/18/2022  3:45 PM [facility] CARDIAC REHAB PHASE II PROVIDER CLASS [facility] CARDIAC [facility]   4/20/2022  3:45 PM [facility] CARDIAC REHAB PHASE II PROVIDER CLASS [facility]ARD[facility]   4/22/2022  3:45 PM [facility] CARDIAC REHAB PHASE II PROVIDER CLASS [facility]ARD[facility]   4/25/2022  3:45 PM [facility] CARDIAC REHAB PHASE II PROVIDER CLASS [facility] CARDI[facility]   4/27/2022  3:45 PM [facility] CARDIAC REHAB PHASE II PROVIDER CLASS [facility] CARDIAC [facility]   4/29/2022  3:45 PM [facility] CARDIAC REHAB PHASE II PROVIDER CLASS [facility] CARDIAC [facility]   5/2/2022  1:30 PM Gabbi Bess MD [facility]      and   Congestive Heart Failure Assessment    Do you understand a low sodium diet?: No  How many restaurant meals do you eat per week?: 0         Symptoms:  CHF associated dyspnea on exertion: Pos      Symptom course: stable  Weight trend: stable         Diabetes Assessment    Medic Alert ID: No  Meal Planning: None   How often do you test your blood sugar?: Other (Comment: BID)   Do you have barriers with adherence to non-pharmacologic self-management interventions?  (Nutrition/Exercise/Self-Monitoring): No   Have you ever had to go to the ED for symptoms of low blood sugar?: No       Do you have hyperglycemia symptoms?: No   Do you have hypoglycemia symptoms?: Yes   Frequency of Episodes: 7 Per: Day   Last Blood Sugar Value: 83   Blood Sugar Monitoring Regimen: Before Meals, At Bedtime, Morning Fasting   Blood Sugar Trends: Fluctuating          [address] [place]

## 2022-03-07 ENCOUNTER — HOSPITAL ENCOUNTER (OUTPATIENT)
Dept: CARDIAC REHAB | Age: 57
Setting detail: THERAPIES SERIES
Discharge: HOME | End: 2022-03-07
Payer: MEDICARE

## 2022-03-07 ENCOUNTER — ANTI-COAG VISIT (OUTPATIENT)
Dept: CARDIOLOGY CLINIC | Age: 57
End: 2022-03-07
Payer: MEDICARE

## 2022-03-07 ENCOUNTER — TELEPHONE (OUTPATIENT)
Dept: INTERNAL MEDICINE | Age: 57
End: 2022-03-07

## 2022-03-07 DIAGNOSIS — I48.19 PERSISTENT ATRIAL FIBRILLATION (HCC): Primary | ICD-10-CM

## 2022-03-07 LAB
INTERNATIONAL NORMALIZATION RATIO, POC: 2.9
PROTHROMBIN TIME, POC: NORMAL

## 2022-03-07 PROCEDURE — 85610 PROTHROMBIN TIME: CPT | Performed by: NURSE PRACTITIONER

## 2022-03-07 PROCEDURE — 93798 PHYS/QHP OP CAR RHAB W/ECG: CPT

## 2022-03-07 NOTE — TELEPHONE ENCOUNTER
Called pt and Lm with pts mom for him to call us back to let us know if he was a Ent apt or one with our office.

## 2022-03-07 NOTE — TELEPHONE ENCOUNTER
Patient called in and is requesting a call back from dr ESPINAL [facility] office? States he is having a problem with dizziness?     Please call and advise

## 2022-03-07 NOTE — TELEPHONE ENCOUNTER
Called pt he said that his Blood sugar 113 this morning. And BP was 113/80 on Friday. Pt states this has been going on 4 weeks. Pt states that the meclizine is not helping.

## 2022-03-08 ENCOUNTER — OFFICE VISIT (OUTPATIENT)
Dept: INTERNAL MEDICINE | Age: 57
End: 2022-03-08
Payer: MEDICARE

## 2022-03-08 VITALS
OXYGEN SATURATION: 93 % | BODY MASS INDEX: 33.47 KG/M2 | HEART RATE: 62 BPM | HEIGHT: 70 IN | WEIGHT: 233.8 LBS | SYSTOLIC BLOOD PRESSURE: 110 MMHG | DIASTOLIC BLOOD PRESSURE: 80 MMHG

## 2022-03-08 DIAGNOSIS — R42 DIZZINESS: ICD-10-CM

## 2022-03-08 DIAGNOSIS — N40.1 BENIGN PROSTATIC HYPERPLASIA WITH INCOMPLETE BLADDER EMPTYING: Primary | ICD-10-CM

## 2022-03-08 DIAGNOSIS — R39.14 BENIGN PROSTATIC HYPERPLASIA WITH INCOMPLETE BLADDER EMPTYING: Primary | ICD-10-CM

## 2022-03-08 PROCEDURE — 99213 OFFICE O/P EST LOW 20 MIN: CPT | Performed by: INTERNAL MEDICINE

## 2022-03-08 PROCEDURE — G8484 FLU IMMUNIZE NO ADMIN: HCPCS | Performed by: INTERNAL MEDICINE

## 2022-03-08 PROCEDURE — 1036F TOBACCO NON-USER: CPT | Performed by: INTERNAL MEDICINE

## 2022-03-08 PROCEDURE — G8417 CALC BMI ABV UP PARAM F/U: HCPCS | Performed by: INTERNAL MEDICINE

## 2022-03-08 PROCEDURE — 3017F COLORECTAL CA SCREEN DOC REV: CPT | Performed by: INTERNAL MEDICINE

## 2022-03-08 PROCEDURE — G8427 DOCREV CUR MEDS BY ELIG CLIN: HCPCS | Performed by: INTERNAL MEDICINE

## 2022-03-08 RX ORDER — TAMSULOSIN HYDROCHLORIDE 0.4 MG/1
0.4 CAPSULE ORAL NIGHTLY
Qty: 90 CAPSULE | Refills: 1 | Status: SHIPPED | OUTPATIENT
Start: 2022-03-08 | End: 2022-03-24

## 2022-03-08 RX ORDER — TAMSULOSIN HYDROCHLORIDE 0.4 MG/1
0.4 CAPSULE ORAL DAILY
Qty: 90 CAPSULE | Refills: 1 | Status: SHIPPED | OUTPATIENT
Start: 2022-03-08 | End: 2022-03-08

## 2022-03-08 ASSESSMENT — PATIENT HEALTH QUESTIONNAIRE - PHQ9
2. FEELING DOWN, DEPRESSED OR HOPELESS: 0
SUM OF ALL RESPONSES TO PHQ QUESTIONS 1-9: 0
SUM OF ALL RESPONSES TO PHQ QUESTIONS 1-9: 0
SUM OF ALL RESPONSES TO PHQ9 QUESTIONS 1 & 2: 0
SUM OF ALL RESPONSES TO PHQ QUESTIONS 1-9: 0
1. LITTLE INTEREST OR PLEASURE IN DOING THINGS: 0
SUM OF ALL RESPONSES TO PHQ QUESTIONS 1-9: 0

## 2022-03-08 ASSESSMENT — ENCOUNTER SYMPTOMS
STRIDOR: 0
BACK PAIN: 0
DIARRHEA: 0
SHORTNESS OF BREATH: 0
WHEEZING: 0
COUGH: 0
BLOOD IN STOOL: 0
SORE THROAT: 0
CHEST TIGHTNESS: 0
COLOR CHANGE: 0
TROUBLE SWALLOWING: 0
ABDOMINAL PAIN: 0
CONSTIPATION: 0

## 2022-03-08 NOTE — ASSESSMENT & PLAN NOTE
Unclear control, changes made today: stop Meclizine, unlikely to BPV, he ia able to move his head without dizziness, brought on only when he is walking   Patient has no orthostatic hypotension, he had a recent CT of the head which failed to show any infarction or mass plan will be to decrease his tamsulosin dose to 0.4 nightly

## 2022-03-08 NOTE — PROGRESS NOTES
Saul Velazco ( 1965) is a 60 y.o. male, Established , here for evaluation of the following chief complaint(s). Dizziness and Fall        ASSESSMENT/PLAN:  Problem List        Other    Dizziness      Unclear control, changes made today: stop Meclizine, unlikely to BPV, he ia able to move his head without dizziness, brought on only when he is walking   Patient has no orthostatic hypotension, he had a recent CT of the head which failed to show any infarction or mass plan will be to decrease his tamsulosin dose to 0.4 nightly               Results for orders placed or performed in visit on 22   POCT INR   Result Value Ref Range    INR 2.9     Protime         Return in about 5 weeks (around 4/15/2022). HPI  49-year-old male who presents for follow-up visit for persistent dizziness  Patient developed dizziness after his surgery however since his surgery he has had CT of the head which is negative he denies any blurry vision he denies any hearing impairment he denies any gait abnormalities he denies any double vision he states that he only gets dizzy after his walk for several time  He denies getting dizziness when getting up from a chair  He denies weakness of any of his limbs    Review of Systems   Constitutional: Negative for activity change, appetite change, chills, diaphoresis, fatigue and fever. HENT: Negative for congestion, hearing loss, postnasal drip, sore throat, tinnitus and trouble swallowing. Eyes: Negative for visual disturbance (says he has blurred vision). Respiratory: Negative for cough, chest tightness, shortness of breath, wheezing and stridor. Cardiovascular: Positive for leg swelling. Negative for chest pain and palpitations. Gastrointestinal: Negative for abdominal pain, blood in stool, constipation and diarrhea. Endocrine: Negative for cold intolerance. Genitourinary: Negative for frequency. Musculoskeletal: Negative for arthralgias, back pain and joint swelling. Skin: Positive for wound. Negative for color change. Allergic/Immunologic: Negative for environmental allergies. Neurological: Positive for dizziness, light-headedness and numbness. Negative for headaches. Hematological: Negative for adenopathy. Does not bruise/bleed easily. Psychiatric/Behavioral: Negative for decreased concentration, dysphoric mood and sleep disturbance. The patient is not nervous/anxious. Physical Exam  Vitals and nursing note reviewed. Constitutional:       General: He is not in acute distress. Appearance: He is well-developed and normal weight. HENT:      Head: Normocephalic and atraumatic. Right Ear: External ear normal.      Left Ear: External ear normal.      Nose: Nose normal.      Mouth/Throat:      Mouth: Mucous membranes are moist.   Eyes:      General: No scleral icterus. Right eye: No discharge. Left eye: No discharge. Conjunctiva/sclera: Conjunctivae normal.      Pupils: Pupils are equal, round, and reactive to light. Neck:      Thyroid: No thyromegaly. Vascular: No JVD. Cardiovascular:      Rate and Rhythm: Normal rate and regular rhythm. Heart sounds: Normal heart sounds, S1 normal and S2 normal. No murmur heard. Pulmonary:      Effort: Pulmonary effort is normal.      Breath sounds: Normal breath sounds. No wheezing or rales. Abdominal:      General: Bowel sounds are normal. There is no distension. Palpations: Abdomen is soft. There is no mass. Tenderness: There is no abdominal tenderness. There is no rebound. Musculoskeletal:         General: No tenderness. Normal range of motion. Cervical back: Normal range of motion and neck supple. Right lower leg: No edema. Left lower leg: No edema. Comments: L middle finger amputation   Lymphadenopathy:      Cervical: No cervical adenopathy. Skin:     General: Skin is warm and dry. Findings: No rash.              Comments: midkine sternotomy scar  [place] LLE, removed today   Neurological:      General: No focal deficit present. Mental Status: He is alert and oriented to person, place, and time. Mental status is at baseline. Cranial Nerves: Cranial nerves are intact. No cranial nerve deficit. Motor: Weakness present. Coordination: Coordination is intact. Deep Tendon Reflexes: Reflexes normal.   Psychiatric:         Mood and Affect: Mood is depressed. Speech: Speech is delayed. Behavior: Behavior is slowed and withdrawn. Thought Content:  Thought content normal.         Judgment: Judgment normal.           (Time Documentation Optional [id number])    An electronic signaturewaas used to authenticate this note  -Raudel Robles MD on 3/8/2022 at 12:38 PM

## 2022-03-09 ENCOUNTER — HOSPITAL ENCOUNTER (OUTPATIENT)
Dept: CARDIAC REHAB | Age: 57
Setting detail: THERAPIES SERIES
Discharge: HOME | End: 2022-03-09
Payer: MEDICARE

## 2022-03-09 PROCEDURE — 93798 PHYS/QHP OP CAR RHAB W/ECG: CPT

## 2022-03-14 ENCOUNTER — TELEPHONE (OUTPATIENT)
Dept: INTERNAL MEDICINE | Age: 57
End: 2022-03-14

## 2022-03-14 ENCOUNTER — HOSPITAL ENCOUNTER (OUTPATIENT)
Dept: CARDIAC REHAB | Age: 57
Setting detail: THERAPIES SERIES
Discharge: HOME | End: 2022-03-14
Payer: MEDICARE

## 2022-03-14 DIAGNOSIS — R39.14 BENIGN PROSTATIC HYPERPLASIA WITH INCOMPLETE BLADDER EMPTYING: Primary | ICD-10-CM

## 2022-03-14 DIAGNOSIS — N40.1 BENIGN PROSTATIC HYPERPLASIA WITH INCOMPLETE BLADDER EMPTYING: Primary | ICD-10-CM

## 2022-03-14 DIAGNOSIS — R42 DIZZINESS: Primary | ICD-10-CM

## 2022-03-14 PROCEDURE — 93798 PHYS/QHP OP CAR RHAB W/ECG: CPT

## 2022-03-14 RX ORDER — WARFARIN SODIUM 5 MG/1
TABLET ORAL
Qty: 15 TABLET | Refills: 3 | OUTPATIENT
Start: 2022-03-14

## 2022-03-15 ENCOUNTER — OFFICE VISIT (OUTPATIENT)
Dept: CARDIOTHORACIC SURGERY | Age: 57
End: 2022-03-15

## 2022-03-15 VITALS
DIASTOLIC BLOOD PRESSURE: 78 MMHG | BODY MASS INDEX: 33.07 KG/M2 | OXYGEN SATURATION: 96 % | SYSTOLIC BLOOD PRESSURE: 136 MMHG | RESPIRATION RATE: 18 BRPM | HEART RATE: 61 BPM | HEIGHT: 70 IN | WEIGHT: 231 LBS

## 2022-03-15 DIAGNOSIS — Z86.79 STATUS POST ABLATION OF ATRIAL FIBRILLATION: Primary | ICD-10-CM

## 2022-03-15 DIAGNOSIS — I48.11 LONGSTANDING PERSISTENT ATRIAL FIBRILLATION (HCC): ICD-10-CM

## 2022-03-15 DIAGNOSIS — R39.14 BENIGN PROSTATIC HYPERPLASIA WITH INCOMPLETE BLADDER EMPTYING: ICD-10-CM

## 2022-03-15 DIAGNOSIS — N40.1 BENIGN PROSTATIC HYPERPLASIA WITH INCOMPLETE BLADDER EMPTYING: ICD-10-CM

## 2022-03-15 DIAGNOSIS — Z98.890 STATUS POST ABLATION OF ATRIAL FIBRILLATION: Primary | ICD-10-CM

## 2022-03-15 LAB — PROSTATE SPECIFIC ANTIGEN: 0.18 NG/ML (ref 0–4)

## 2022-03-15 PROCEDURE — 99024 POSTOP FOLLOW-UP VISIT: CPT | Performed by: THORACIC SURGERY (CARDIOTHORACIC VASCULAR SURGERY)

## 2022-03-16 ENCOUNTER — HOSPITAL ENCOUNTER (OUTPATIENT)
Dept: CARDIAC REHAB | Age: 57
Setting detail: THERAPIES SERIES
Discharge: HOME | End: 2022-03-16
Payer: MEDICARE

## 2022-03-16 PROCEDURE — 93798 PHYS/QHP OP CAR RHAB W/ECG: CPT

## 2022-03-16 NOTE — PROGRESS NOTES
[facility] [address] [place] [address], [place] [address]  Phone: [phone]  Fax: [phone]      Office Visit:  3/16/22    Eryn Reed - : 1965    Reason For Visit:  Rob is a 60 y.o. male who is here for Follow-up (Rhythm check)      History of Present Illness:      Mr. Saritha Fairbanks comes in today for his 3 months check to examine his cardiac rhythm. He underwent combined three-vessel coronary artery bypass grafting and the Romero-Maze radiofrequency and cryoablation  for longstanding persistent atrial fibrillation on 2021. Now 3 months out from his surgery he continues to have a good  result. He has not had any symptoms of atrial fibrillation over the last 3 months. Today in the office he is in a regular rhythm with a pulse of 68 beats per minute. The 12-lead EKG demonstrates a junctional rhythm as well. ALLERGIES: Contrast [iodides], Neomycin-bacitracin zn-polymyx, Neosporin [neomycin-polymyxin-gramicidin], and Pcn [penicillins]    Current Outpatient Medications   Medication Sig Dispense Refill    tamsulosin (FLOMAX) 0.4 MG capsule Take 1 capsule by mouth at bedtime 90 capsule 1    amiodarone (CORDARONE) 200 MG tablet TAKE ONE TABLET TWICE DAILY 60 tablet 1    cloNIDine (CATAPRES) 0.1 MG tablet Take 1 tablet by mouth 2 times daily 180 tablet 1    metoprolol tartrate (LOPRESSOR) 50 MG tablet Take 1 tablet by mouth 2 times daily 180 tablet 1    furosemide (LASIX) 40 MG tablet Take 1 tablet by mouth daily 60 tablet 0    warfarin (COUMADIN) 10 MG tablet Take 1 tablet by mouth See Admin Instructions 10 MG ON Saturday, Monday, Tuesday, Wednesday, Friday 15 MG ON  & Thursday. .. Stop taking Coumadin for now until repeat INR Monday with PCP or cardiology office.  30 tablet 3    losartan (COZAAR) 100 MG tablet Take 100 mg by mouth daily Indications: High Blood Pressure Disorder      aspirin EC 81 MG EC tablet Take 1 tablet by mouth daily 90 tablet 1    Insulin Degludec (TRESIBA FLEXTOUCH) 100 UNIT/ML SOPN Inject into the skin PT TAKES ACCORDING TO ACCUCHEK. DOES NOT TAKE  AND UNDER AT HS.  atorvastatin (LIPITOR) 20 MG tablet TAKE ONE TABLET DAILY  (Patient taking differently: Take 20 mg by mouth daily ) 90 tablet 1    blood glucose monitor strips Test 3 times a day & as needed for symptoms of irregular blood glucose. Dispense sufficient amount for indicated testing frequency plus additional to accommodate PRN testing needs. 200 strip 3    Insulin Pen Needle 32G X 4 MM MISC 1 each by Does not apply route daily 100 each 3    blood glucose monitor kit and supplies Dispense sufficient amount for indicated testing frequency plus additional to accommodate PRN testing needs. 1 kit 0    Lancets MISC 1 each by Does not apply route 3 times daily (with meals) 300 each 3    insulin lispro (HUMALOG KWIKPEN) 200 UNIT/ML SOPN pen Sliding scale 160-179 2 units  180-199 3 units  200-219 4 units  220-239 5 units  240-259 6 units   260-279 7 units  280-299 8 units  300-399 9 units  340-379 10 units  380-420 11 units 3 pen 5    vitamin D (ERGOCALCIFEROL) 1.25 MG (00968 UT) CAPS capsule Take 50,000 Units by mouth once a week Takes on Friday      diphenhydrAMINE (BENADRYL) 25 MG tablet Take 25 mg by mouth every 6 hours as needed for Itching      blood glucose test strips (CONTOUR NEXT TEST) strip 1 each by In Vitro route 4 times daily As needed. 200 each 3    metFORMIN (GLUCOPHAGE) 1000 MG tablet Take 1 tablet by mouth 2 times daily (with meals) 180 tablet 3     No current facility-administered medications for this visit. Assessment: At this time, Mr. Rolf Zee is progressing very well following his combined operation of multivessel coronary artery bypass grafting and the Romero-Maze IV  ablation procedure, maintaining a junctional rhythm      Plan:  I plan to stop his amiodarone today continue the Coumadin and see him back in the office in 3 months for follow-up examination. If he remains out of atrial fibrillation during that time off the amiodarone I will probably obtain a Zio patch in preparation for discontinuing his Coumadin. Manjinder Archuleta

## 2022-03-18 ENCOUNTER — HOSPITAL ENCOUNTER (OUTPATIENT)
Dept: CARDIAC REHAB | Age: 57
Setting detail: THERAPIES SERIES
Discharge: HOME | End: 2022-03-18
Payer: MEDICARE

## 2022-03-18 ENCOUNTER — TELEPHONE (OUTPATIENT)
Dept: VASCULAR SURGERY | Age: 57
End: 2022-03-18

## 2022-03-18 PROCEDURE — 93798 PHYS/QHP OP CAR RHAB W/ECG: CPT

## 2022-03-21 ENCOUNTER — ANTI-COAG VISIT (OUTPATIENT)
Dept: CARDIOLOGY CLINIC | Age: 57
End: 2022-03-21
Payer: MEDICARE

## 2022-03-21 ENCOUNTER — HOSPITAL ENCOUNTER (OUTPATIENT)
Dept: CARDIAC REHAB | Age: 57
Setting detail: THERAPIES SERIES
Discharge: HOME | End: 2022-03-21
Payer: MEDICARE

## 2022-03-21 DIAGNOSIS — I48.19 PERSISTENT ATRIAL FIBRILLATION (HCC): Primary | ICD-10-CM

## 2022-03-21 LAB
INTERNATIONAL NORMALIZATION RATIO, POC: 2.5
PROTHROMBIN TIME, POC: NORMAL

## 2022-03-21 PROCEDURE — 93798 PHYS/QHP OP CAR RHAB W/ECG: CPT

## 2022-03-21 PROCEDURE — 85610 PROTHROMBIN TIME: CPT | Performed by: NURSE PRACTITIONER

## 2022-03-23 ENCOUNTER — CARE COORDINATION (OUTPATIENT)
Dept: CARE COORDINATION | Age: 57
End: 2022-03-23

## 2022-03-23 ENCOUNTER — HOSPITAL ENCOUNTER (OUTPATIENT)
Dept: CARDIAC REHAB | Age: 57
Setting detail: THERAPIES SERIES
Discharge: HOME | End: 2022-03-23
Payer: MEDICARE

## 2022-03-23 PROCEDURE — 93798 PHYS/QHP OP CAR RHAB W/ECG: CPT

## 2022-03-23 NOTE — CARE COORDINATION
ACM attempted contact with patient and unable to reach. Left voicemail with my cell number for call back. Will await return call from patient.     [address]

## 2022-03-24 ENCOUNTER — OFFICE VISIT (OUTPATIENT)
Dept: UROLOGY | Age: 57
End: 2022-03-24
Payer: MEDICARE

## 2022-03-24 VITALS
HEIGHT: 70 IN | SYSTOLIC BLOOD PRESSURE: 136 MMHG | HEART RATE: 81 BPM | DIASTOLIC BLOOD PRESSURE: 78 MMHG | WEIGHT: 230.4 LBS | TEMPERATURE: 97.4 F | BODY MASS INDEX: 32.99 KG/M2 | OXYGEN SATURATION: 96 %

## 2022-03-24 DIAGNOSIS — N52.9 ERECTILE DYSFUNCTION, UNSPECIFIED ERECTILE DYSFUNCTION TYPE: Primary | ICD-10-CM

## 2022-03-24 DIAGNOSIS — N40.1 BENIGN PROSTATIC HYPERPLASIA WITH INCOMPLETE BLADDER EMPTYING: ICD-10-CM

## 2022-03-24 DIAGNOSIS — R39.14 BENIGN PROSTATIC HYPERPLASIA WITH INCOMPLETE BLADDER EMPTYING: ICD-10-CM

## 2022-03-24 LAB
APPEARANCE FLUID: CLEAR
BILIRUBIN, POC: NORMAL
BLOOD URINE, POC: NORMAL
CLARITY, POC: CLEAR
COLOR, POC: YELLOW
GLUCOSE URINE, POC: 1000
KETONES, POC: NORMAL
LEUKOCYTE EST, POC: NORMAL
NITRITE, POC: NORMAL
PH, POC: 5.5
PROTEIN, POC: 100
SPECIFIC GRAVITY, POC: 1.02
UROBILINOGEN, POC: 2

## 2022-03-24 PROCEDURE — G8484 FLU IMMUNIZE NO ADMIN: HCPCS | Performed by: NURSE PRACTITIONER

## 2022-03-24 PROCEDURE — 1036F TOBACCO NON-USER: CPT | Performed by: NURSE PRACTITIONER

## 2022-03-24 PROCEDURE — 51798 US URINE CAPACITY MEASURE: CPT | Performed by: NURSE PRACTITIONER

## 2022-03-24 PROCEDURE — 99214 OFFICE O/P EST MOD 30 MIN: CPT | Performed by: NURSE PRACTITIONER

## 2022-03-24 PROCEDURE — G8417 CALC BMI ABV UP PARAM F/U: HCPCS | Performed by: NURSE PRACTITIONER

## 2022-03-24 PROCEDURE — 3017F COLORECTAL CA SCREEN DOC REV: CPT | Performed by: NURSE PRACTITIONER

## 2022-03-24 PROCEDURE — 81002 URINALYSIS NONAUTO W/O SCOPE: CPT | Performed by: NURSE PRACTITIONER

## 2022-03-24 PROCEDURE — G8427 DOCREV CUR MEDS BY ELIG CLIN: HCPCS | Performed by: NURSE PRACTITIONER

## 2022-03-24 RX ORDER — TAMSULOSIN HYDROCHLORIDE 0.4 MG/1
0.4 CAPSULE ORAL 2 TIMES DAILY
Qty: 180 CAPSULE | Refills: 3 | Status: SHIPPED | OUTPATIENT
Start: 2022-03-24 | End: 2022-06-01 | Stop reason: SDUPTHER

## 2022-03-24 ASSESSMENT — ENCOUNTER SYMPTOMS
ABDOMINAL PAIN: 0
NAUSEA: 0
ABDOMINAL DISTENTION: 0
BACK PAIN: 0
VOMITING: 0

## 2022-03-24 NOTE — PROGRESS NOTES
Saul Velazco is a 60 y.o. male who presents today   Chief Complaint   Patient presents with    Follow-up     [place] BPH       Benign Prostatic Hypertrophy  Patient complains of lower urinary tract symptoms. He reports incomplete emptying, intermittency, straining, urgency and weak stream. He denies frequency and nocturia one time a night. Patient states symptoms are of moderate severity. Onset of symptoms was several years ago and was gradual in onset. His AUA Symptom Score is, 15/35 manifested as irritative symptoms including urgency and obstructive symptoms including incomplete emptying, intermittency, weak stream, straining. He has no personal history and no family history of prostate cancer. He reports a history of no complicating symptoms. He denies flank pain, gross hematuria, kidney stones and recurrent UTI. Currently maintained on tamsulosin 0.4 mg twice daily. He has been on this for several years. Bladder scan today is 57 mL PVR. He has had some dizziness intermittently for the last 3 months since his CABG. PSA within normal limits 0.18. Patient complains of erectile dysfunction since 2014. Denies any partial or full erections. Denies any a.m. erections. Does have a history of diabetes and neuropathy. Currently maintained on metoprolol. Is inquiring about treatments.     Lab Results   Component Value Date    PSA 0.18 03/15/2022    PSA 0.18 09/27/2021    PSA 0.24 03/15/2021         Past Medical History:   Diagnosis Date    Acalculous cholecystitis 12/29/2015    LIAN (acute kidney injury) ([facility] [address] 3/21/2019    Allergic rhinitis, cause unspecified     Atrial fibrillation, chronic ([facility] [address]     sees [facility] cardiology    Bacteremia due to methicillin resistant Staphylococcus aureus 10/27/2021    Bone spur     bilateral elbows    CAD (coronary artery disease)     Charcot's joint of right foot     Chicken pox     Closed supracondylar fracture of elbow     fall    Diabetic ulcer of right midfoot associated with type 2 diabetes mellitus, with fat layer exposed ([address] 9/14/2020    Diabetic ulcer of right midfoot associated with type 2 diabetes mellitus, with fat layer exposed ([address] 9/14/2020    History of MRSA infection     scalp/facial and on back    HTN (hypertension)     Hyperlipidemia     Hypoglycemic encephalopathy     Impotence of organic origin     MDRO (multiple drug resistant organisms) resistance     MRSA (methicillin resistant staph aureus) culture positive 11/2021    MRSA LT HAND    Other dyspnea and respiratory abnormality     Other specified erythematous condition(695.89)     Permanent atrial fibrillation ([address] 2/18/2021    Personal history of other infectious and parasitic disease     Salmonella     Subtherapeutic international normalized ratio (INR) 4/19/2021    Supratherapeutic INR 4/14/2021    Type II or unspecified type diabetes mellitus without mention of complication, uncontrolled 1994       Past Surgical History:   Procedure Laterality Date    AMPUTATION  11/01/2021    CARDIAC CATHETERIZATION      CARDIOVERSION  03/2017    CATARACT REMOVAL  10/2021    COLONOSCOPY  02/24/2020    Dr RITIKA Fraire-Melanosis coli throughout the entire colon-AP, 5 yr recall    CORONARY ARTERY BYPASS GRAFT MAZE PROCEDURE N/A 12/14/2021    CORONARY ARTERY BYPASS GRAFT X3 WITH LEFT INTERNAL MAMMARY ARTERY WITH ENDOSCOPIC VEIN HARVESTING WITH PERFUSION, TRANSESOPHAGEAL ECHOCARDIOGRAM, BIATRIAL RODRIGUES-MAZE RF AND CRYOABLATIONAND ATRICLIP performed by Shala Xie MD at [address]      HAND SURGERY Left 10/31/2021    HAND INCISION AND DRAINAGE Partial Amputation Middle Finger performed by Jossue Caraballo MD at [address] [facility] Left 09/17/2020    OPEN REDUCTION INTERNAL FIXATION LEFT SUPRACONDYLAR FRACTURE [facility]      X 2    TONSILLECTOMY         Current Outpatient Medications   Medication Sig Dispense Refill    tamsulosin (FLOMAX) 0.4 MG capsule Take 1 capsule by mouth 2 times daily 180 capsule 3    amiodarone (CORDARONE) 200 MG tablet TAKE ONE TABLET TWICE DAILY 60 tablet 1    cloNIDine (CATAPRES) 0.1 MG tablet Take 1 tablet by mouth 2 times daily 180 tablet 1    metoprolol tartrate (LOPRESSOR) 50 MG tablet Take 1 tablet by mouth 2 times daily 180 tablet 1    furosemide (LASIX) 40 MG tablet Take 1 tablet by mouth daily 60 tablet 0    warfarin (COUMADIN) 10 MG tablet Take 1 tablet by mouth See Admin Instructions 10 MG ON Saturday, Monday, Tuesday, Wednesday, Friday 15 MG ON Sunday & Thursday. .. Stop taking Coumadin for now until repeat INR Monday with PCP or cardiology office. 30 tablet 3    losartan (COZAAR) 100 MG tablet Take 100 mg by mouth daily Indications: High Blood Pressure Disorder      aspirin EC 81 MG EC tablet Take 1 tablet by mouth daily 90 tablet 1    Insulin Degludec (TRESIBA FLEXTOUCH) 100 UNIT/ML SOPN Inject into the skin PT TAKES ACCORDING TO ACCUCHEK. DOES NOT TAKE  AND UNDER AT HS.  atorvastatin (LIPITOR) 20 MG tablet TAKE ONE TABLET DAILY  (Patient taking differently: Take 20 mg by mouth daily ) 90 tablet 1    blood glucose monitor strips Test 3 times a day & as needed for symptoms of irregular blood glucose. Dispense sufficient amount for indicated testing frequency plus additional to accommodate PRN testing needs. 200 strip 3    Insulin Pen Needle 32G X 4 MM MISC 1 each by Does not apply route daily 100 each 3    blood glucose monitor kit and supplies Dispense sufficient amount for indicated testing frequency plus additional to accommodate PRN testing needs.  1 kit 0    Lancets MISC 1 each by Does not apply route 3 times daily (with meals) 300 each 3    insulin lispro (HUMALOG KWIKPEN) 200 UNIT/ML SOPN pen Sliding scale 160-179 2 units  180-199 3 units  200-219 4 units  220-239 5 units  240-259 6 units   260-279 7 units  280-299 8 units  300-399 9 Transportation (Non-Medical): Not on file   Physical Activity:     Days of Exercise per Week: Not on file    Minutes of Exercise per Session: Not on file   Stress:     Feeling of Stress : Not on file   Social Connections:     Frequency of Communication with Friends and Family: Not on file    Frequency of Social Gatherings with Friends and Family: Not on file    Attends Nondenominational Services: Not on file    Active Member of Clubs or Organizations: Not on file    Attends Club or Organization Meetings: Not on file    Marital Status: Not on file   Intimate Partner Violence:     Fear of Current or Ex-Partner: Not on file    Emotionally Abused: Not on file    Physically Abused: Not on file    Sexually Abused: Not on file   Housing Stability:     Unable to Pay for Housing in the Last Year: Not on file    Number of Places Lived in the Last Year: Not on file    Unstable Housing in the Last Year: Not on file       Family History   Problem Relation Age of Onset    Stroke Maternal Grandmother     Cancer Maternal Grandmother     Stroke Paternal Grandmother     Diabetes Paternal Grandmother     Cancer Father     Heart Disease Father     Lung Cancer Father     Asthma Paternal Grandfather     Heart Disease Paternal Grandfather     Heart Disease Maternal Grandfather     Colon Cancer Neg Hx     Colon Polyps Neg Hx     Esophageal Cancer Neg Hx     Liver Cancer Neg Hx     Liver Disease Neg Hx     Rectal Cancer Neg Hx     Stomach Cancer Neg Hx        REVIEW OF SYSTEMS:  Review of Systems   Constitutional: Negative for chills and fever. Gastrointestinal: Negative for abdominal distention, abdominal pain, nausea and vomiting. Genitourinary: Positive for urgency. Negative for difficulty urinating, dysuria, flank pain, frequency and hematuria. Musculoskeletal: Positive for gait problem. Negative for back pain. Neurological: Positive for weakness.    Psychiatric/Behavioral: Negative for agitation and confusion. PHYSICAL EXAM:  /78   Pulse 81   Temp 97.4 °F (36.3 °C)   Ht 5' 10\" (1.778 m)   Wt 230 lb 6.4 oz (104.5 kg)   SpO2 96%   BMI 33.06 kg/m²   Physical Exam  Vitals and nursing note reviewed. Constitutional:       General: He is not in acute distress. Appearance: Normal appearance. He is not ill-appearing. Pulmonary:      Effort: Pulmonary effort is normal. No respiratory distress. Abdominal:      General: There is no distension. Tenderness: There is no abdominal tenderness. There is no right CVA tenderness or left CVA tenderness. Genitourinary:     Comments: Deferred CRISTOFER  Neurological:      Mental Status: He is alert and oriented to person, place, and time. Mental status is at baseline. Motor: Weakness present. Gait: Gait abnormal.   Psychiatric:         Mood and Affect: Mood normal.         Behavior: Behavior normal.       DATA:  Results for orders placed or performed in visit on 03/24/22   POCT Urinalysis no Micro   Result Value Ref Range    Color, UA YELLOW     Clarity, UA CLEAR     Glucose, UA POC 1,000     Bilirubin, UA NEG     Ketones, UA NEG     Spec Grav, UA 1.020     Blood, UA POC NEG     pH, UA 5.5     Protein, UA      Urobilinogen, UA 2.0     Leukocytes, UA NEG     Nitrite, UA NEG     Appearance, Fluid Clear Clear, Slightly Cloudy     Lab Results   Component Value Date    PSA 0.18 03/15/2022    PSA 0.18 09/27/2021    PSA 0.24 03/15/2021    PSA 0.47 05/09/2019       IMAGING:  Bladder Scan interpretation  Estimation of residual urine via abdominal ultrasound  Residual Urine: 57 ml  Indication: urgency   Position: Supine  Examination: Incremental scanning of the suprapubic area using 3 MHz transducer using copious amounts of acoustic gel. Findings: An anechoic area was demonstrated which represented the bladder, with measurement of residual urine as noted.     Benign prostatic hyperplasia with incomplete bladder emptying  At last visit patient's residual was 170 mL PVR. Today bladder scan is 55 mL PVR. Last CRISTOFER revealed mildly enlarged prostate. Currently maintained on Flomax 0.8 mg daily. Is currently happy with his voiding symptoms and is emptying his bladder per bladder scan today. He may have partially a diabetic bladder that may be contributing to his symptoms. We will continue to monitor this. He will follow-up in 6 months with PSA and will need CRISTOFER at that time. 1. Benign prostatic hyperplasia with incomplete bladder emptying  Patient currently maintained on tamsulosin 0.4 mg twice daily. Bladder scan reveals he is emptying his bladder well with 57 mL PVR. He is currently satisfied with his voiding symptoms. PSA within normal limits. He did deferred CRISTOFER today. We will have him follow-up in 1 year with PSA prior.    - [place] MEASUREMENT,POST-VOID RESIDUAL VOLUME BY US,NON-IMAGING  - tamsulosin (FLOMAX) 0.4 MG capsule; Take 1 capsule by mouth 2 times daily  Dispense: 180 capsule; Refill: 3  - PSA, Diagnostic; Future    2. Erectile dysfunction  Likely multifactorial confounded by diabetes, antihypertensive medications, CAD. We did discuss PDE 5 inhibitors, urethral gel, urethral suppository, penile injections, penile prosthesis. At this time he would like to hold off on any intervention. Orders Placed This Encounter   Procedures    PSA, Diagnostic     Standing Status:   Future     Standing Expiration Date:   9/24/2023    POCT Urinalysis no Micro    [place] MEASUREMENT,POST-VOID RESIDUAL VOLUME BY US,NON-IMAGING     57 ml        Return in about 1 year (around 3/24/2023) for PSA prior. All information inputted into the note by the MA to include chief complaint, past medical history, past surgical history, medications, allergies, social and family history and review of systems has been reviewed and updated as needed by me.     EMR Dragon/transcription disclaimer: Much of this documentt is electronic  transcription/translation of spoken language

## 2022-03-28 ENCOUNTER — HOSPITAL ENCOUNTER (OUTPATIENT)
Dept: CARDIAC REHAB | Age: 57
Setting detail: THERAPIES SERIES
Discharge: HOME | End: 2022-03-28
Payer: MEDICARE

## 2022-03-28 PROCEDURE — 93798 PHYS/QHP OP CAR RHAB W/ECG: CPT

## 2022-03-29 DIAGNOSIS — I10 PRIMARY HYPERTENSION: ICD-10-CM

## 2022-03-29 RX ORDER — CLONIDINE HYDROCHLORIDE 0.1 MG/1
0.1 TABLET ORAL 2 TIMES DAILY
Qty: 180 TABLET | Refills: 1 | Status: SHIPPED | OUTPATIENT
Start: 2022-03-29 | End: 2022-09-02

## 2022-03-29 RX ORDER — FUROSEMIDE 40 MG/1
40 TABLET ORAL DAILY
Qty: 90 TABLET | Refills: 1 | Status: SHIPPED | OUTPATIENT
Start: 2022-03-29 | End: 2022-10-11

## 2022-03-29 NOTE — TELEPHONE ENCOUNTER
----- Message from Hebert Nichols MA sent at 3/29/2022  8:19 AM CDT -----  Subject: Refill Request    QUESTIONS  Name of Medication? furosemide (LASIX) 40 MG tablet  Patient-reported dosage and instructions? 40mg 1 po daily  How many days do you have left? 0  Preferred Pharmacy? [facility]  Pharmacy phone number (if available)? [phone]  Additional Information for Provider? Pt states the pharmacy was supposed   to send this for a refill approval last week as his last dosage date was   3/23/2022. #LOV 3/8/2022 #NOV 5/2/2022 Pt would like call back once sent   so he can ensure to contact pharmacy to pick these up as he has already   missed a weeks worth of medication and is not feeling well and still   experiencing dizziness due to not having medication.   ---------------------------------------------------------------------------  --------------,  Name of Medication? cloNIDine (CATAPRES) 0.1 MG tablet  Patient-reported dosage and instructions? 0.1mg 1 po BID   How many days do you have left? 0  Preferred Pharmacy? [facility]  Pharmacy phone number (if available)? [phone]  Additional Information for Provider? Pt states last dosage date of   3/23/2022. LOV-3/8/2022 NOV 5/2/2022 Call once sent so pt can arrange pick   up please, he is anxious to get them filled and start taking again due to   fluctuation of BP without medication. Thank you so much!  ---------------------------------------------------------------------------  --------------  CALL BACK INFO  What is the best way for the office to contact you? Do not leave any   message, patient will call back for answer  Preferred Call Back Phone Number?  [phone]

## 2022-03-29 NOTE — TELEPHONE ENCOUNTER
Halie Cardenas called to request a refill on his medication.       Last office visit : 3/8/2022   Next office visit : 5/2/2022     Requested Prescriptions     Pending Prescriptions Disp Refills    furosemide (LASIX) 40 MG tablet 60 tablet 0     Sig: Take 1 tablet by mouth daily    cloNIDine (CATAPRES) 0.1 MG tablet 180 tablet 1     Sig: Take 1 tablet by mouth 2 times daily            Edna Emmer, [place]

## 2022-03-30 ENCOUNTER — HOSPITAL ENCOUNTER (OUTPATIENT)
Dept: CARDIAC REHAB | Age: 57
Setting detail: THERAPIES SERIES
Discharge: HOME | End: 2022-03-30
Payer: MEDICARE

## 2022-03-30 PROCEDURE — 93798 PHYS/QHP OP CAR RHAB W/ECG: CPT

## 2022-03-31 ENCOUNTER — TELEPHONE (OUTPATIENT)
Dept: CARDIOLOGY CLINIC | Age: 57
End: 2022-03-31

## 2022-03-31 NOTE — TELEPHONE ENCOUNTER
I tried to call the PT back x3, each time had a busy signal. If Pt calls back please rsd to a new provider whether it be APRN or MD. Or you can send to the  while we have the PT on the line!

## 2022-04-01 ENCOUNTER — HOSPITAL ENCOUNTER (OUTPATIENT)
Dept: CARDIAC REHAB | Age: 57
Setting detail: THERAPIES SERIES
Discharge: HOME | End: 2022-04-01
Payer: MEDICARE

## 2022-04-01 PROCEDURE — 93798 PHYS/QHP OP CAR RHAB W/ECG: CPT

## 2022-04-04 ENCOUNTER — HOSPITAL ENCOUNTER (OUTPATIENT)
Dept: CARDIAC REHAB | Age: 57
Setting detail: THERAPIES SERIES
Discharge: HOME | End: 2022-04-04
Payer: MEDICARE

## 2022-04-04 DIAGNOSIS — R42 DIZZINESS: ICD-10-CM

## 2022-04-04 PROCEDURE — 93798 PHYS/QHP OP CAR RHAB W/ECG: CPT

## 2022-04-04 RX ORDER — MECLIZINE HCL 12.5 MG/1
TABLET ORAL
Qty: 30 TABLET | Refills: 0 | Status: SHIPPED | OUTPATIENT
Start: 2022-04-04

## 2022-04-04 NOTE — TELEPHONE ENCOUNTER
Last Appointment Date: 3/8/2022  Next Appointment Date: 5/2/2022    Allergies   Allergen Reactions    Contrast [Iodides]      N & V. PT STATED HE HAD A MRI WITH CONTRAST ON 10/25/2021.     Neomycin-Bacitracin Zn-Polymyx Rash    Neosporin [Neomycin-Polymyxin-Gramicidin] Rash    Pcn [Penicillins] Rash       Patient needs refill on   Requested Prescriptions     Pending Prescriptions Disp Refills    meclizine (ANTIVERT) 12.5 MG tablet [Pharmacy Med Name: MECLIZINE HYDROCHLORIDE 12.5MG TABLET] 30 tablet 0     Sig: TAKE ONE TABLET THREE TIMES DAILY AS NEEDED FOR DIZZINESS

## 2022-04-05 ENCOUNTER — CARE COORDINATION (OUTPATIENT)
Dept: CARE COORDINATION | Age: 57
End: 2022-04-05

## 2022-04-05 RX ORDER — WARFARIN SODIUM 5 MG/1
TABLET ORAL
Qty: 15 TABLET | Refills: 3 | OUTPATIENT
Start: 2022-04-05

## 2022-04-05 NOTE — CARE COORDINATION
ACM attempted contact with patient and unable to reach. Left voicemail with my cell number for call back. Will await return call from patient.         [address], [id number]

## 2022-04-06 ENCOUNTER — HOSPITAL ENCOUNTER (OUTPATIENT)
Dept: CARDIAC REHAB | Age: 57
Setting detail: THERAPIES SERIES
Discharge: HOME | End: 2022-04-06
Payer: MEDICARE

## 2022-04-06 ENCOUNTER — OFFICE VISIT (OUTPATIENT)
Dept: CARDIOLOGY CLINIC | Age: 57
End: 2022-04-06
Payer: MEDICARE

## 2022-04-06 VITALS
OXYGEN SATURATION: 94 % | SYSTOLIC BLOOD PRESSURE: 138 MMHG | HEIGHT: 70 IN | DIASTOLIC BLOOD PRESSURE: 80 MMHG | WEIGHT: 233 LBS | BODY MASS INDEX: 33.36 KG/M2 | HEART RATE: 61 BPM

## 2022-04-06 DIAGNOSIS — I25.10 CORONARY ARTERY DISEASE INVOLVING NATIVE CORONARY ARTERY OF NATIVE HEART WITHOUT ANGINA PECTORIS: ICD-10-CM

## 2022-04-06 DIAGNOSIS — R55 SYNCOPE AND COLLAPSE: ICD-10-CM

## 2022-04-06 DIAGNOSIS — I10 ESSENTIAL HYPERTENSION: ICD-10-CM

## 2022-04-06 DIAGNOSIS — E78.5 DYSLIPIDEMIA: ICD-10-CM

## 2022-04-06 DIAGNOSIS — I48.19 PERSISTENT ATRIAL FIBRILLATION (HCC): Primary | ICD-10-CM

## 2022-04-06 LAB
INTERNATIONAL NORMALIZATION RATIO, POC: 1.9
PROTHROMBIN TIME, POC: 22.3

## 2022-04-06 PROCEDURE — G8427 DOCREV CUR MEDS BY ELIG CLIN: HCPCS | Performed by: NURSE PRACTITIONER

## 2022-04-06 PROCEDURE — G8417 CALC BMI ABV UP PARAM F/U: HCPCS | Performed by: NURSE PRACTITIONER

## 2022-04-06 PROCEDURE — 3017F COLORECTAL CA SCREEN DOC REV: CPT | Performed by: NURSE PRACTITIONER

## 2022-04-06 PROCEDURE — 93798 PHYS/QHP OP CAR RHAB W/ECG: CPT

## 2022-04-06 PROCEDURE — 85610 PROTHROMBIN TIME: CPT | Performed by: NURSE PRACTITIONER

## 2022-04-06 PROCEDURE — 93000 ELECTROCARDIOGRAM COMPLETE: CPT | Performed by: NURSE PRACTITIONER

## 2022-04-06 PROCEDURE — 1036F TOBACCO NON-USER: CPT | Performed by: NURSE PRACTITIONER

## 2022-04-06 PROCEDURE — 99214 OFFICE O/P EST MOD 30 MIN: CPT | Performed by: NURSE PRACTITIONER

## 2022-04-06 ASSESSMENT — ENCOUNTER SYMPTOMS
WHEEZING: 0
CHEST TIGHTNESS: 0
SORE THROAT: 0
COUGH: 0
SHORTNESS OF BREATH: 0

## 2022-04-06 NOTE — PROGRESS NOTES
[facility] Cardiology   Established Patient Office Visit   [facility]. [address]  [phone]        OFFICE VISIT:  2022    Konrad Randle - : 1965    Reason For Visit:  Hay Acosta is a 60 y.o. male who is here for 6 Month Follow-Up    1. Persistent atrial fibrillation ([address]    2. Essential hypertension    3. Dyslipidemia    4. Coronary artery disease involving native coronary artery of native heart without angina pectoris    5. Syncope and collapse      Patient with a history of coronary artery disease/CABG x3 with Romero maze radiofrequency and cryoablation for persistent atrial fib on 2021, hypertension and dyslipidemia. Patient was last seen on 3/15/2022 by Dr. Soni Flynn for status post ablation of atrial fibrillation. Twelve-lead EKG showing regular rhythm 68 bpm demonstrating a junctional rhythm as well per clinic note. Patient is due to see again in clinic in 3 months with Dr. Soni Flynn. He is a patient of Dr Daphne Suh       Patient presents to clinic today with no complaints of chest pain, pressure or tightness. There is no shortness of breath, orthopnea or PND. States he has noted dizziness and presyncopal symptoms for the past 2 months. He did discuss this with his PCP who has referred patient to ENT.           Felicia Randle is a 60 y.o. male with the following history as recorded in [facility]:    Patient Active Problem List    Diagnosis Date Noted    Abnormal stress test     Dizziness 2022    Status post coronary artery bypass graft     Stage 3 chronic kidney disease ([address] 2021    Fluid overload 2021    CAD in native artery 2021    Longstanding persistent atrial fibrillation ([address]     Amputation of left middle finger 2021    Cellulitis 10/25/2021    Proliferative diabetic retinopathy of both eyes without macular edema associated with type 2 diabetes mellitus ([address] 2021    Obstructive sleep apnea 2021  Chronic heart failure with preserved ejection fraction ([facility] 02/21/2021    Diabetic ulcer of right midfoot associated with type 2 diabetes mellitus, with fat layer exposed ([facility] 09/14/2020    Charcot foot due to diabetes mellitus ([facility] 06/26/2020    Personal history of noncompliance with medical treatment and regimen 06/25/2020    Class 2 obesity due to excess calories in adult 06/12/2020    Hyponatremia 03/21/2019    Type 2 diabetes mellitus with diabetic polyneuropathy, with long-term current use of insulin (HCC) 03/21/2019    Shortness of breath 12/18/2014    Fatigue 12/18/2014    HTN (hypertension)     Hyperlipidemia      Current Outpatient Medications   Medication Sig Dispense Refill    meclizine (ANTIVERT) 12.5 MG tablet TAKE ONE TABLET THREE TIMES DAILY AS NEEDED FOR DIZZINESS 30 tablet 0    furosemide (LASIX) 40 MG tablet Take 1 tablet by mouth daily 90 tablet 1    cloNIDine (CATAPRES) 0.1 MG tablet Take 1 tablet by mouth 2 times daily 180 tablet 1    tamsulosin (FLOMAX) 0.4 MG capsule Take 1 capsule by mouth 2 times daily 180 capsule 3    amiodarone (CORDARONE) 200 MG tablet TAKE ONE TABLET TWICE DAILY 60 tablet 1    metoprolol tartrate (LOPRESSOR) 50 MG tablet Take 1 tablet by mouth 2 times daily 180 tablet 1    warfarin (COUMADIN) 10 MG tablet Take 1 tablet by mouth See Admin Instructions 10 MG ON Saturday, Monday, Tuesday, Wednesday, Friday 15 MG ON Sunday & Thursday. .. Stop taking Coumadin for now until repeat INR Monday with PCP or cardiology office.  (Patient taking differently: Take 5 mg by mouth See Admin Instructions 5 MG EVERY DAY  Stop taking Coumadin for now until repeat INR Monday with PCP or cardiology office.) 30 tablet 3    losartan (COZAAR) 100 MG tablet Take 100 mg by mouth daily Indications: High Blood Pressure Disorder      metFORMIN (GLUCOPHAGE) 1000 MG tablet Take 1 tablet by mouth 2 times daily (with meals) 180 tablet 3    aspirin EC 81 MG EC tablet Take 1 tablet by mouth daily 90 tablet 1    Insulin Degludec (TRESIBA FLEXTOUCH) 100 UNIT/ML SOPN Inject into the skin PT TAKES ACCORDING TO ACCUCHEK. DOES NOT TAKE  AND UNDER AT HS.  atorvastatin (LIPITOR) 20 MG tablet TAKE ONE TABLET DAILY  (Patient taking differently: Take 20 mg by mouth daily ) 90 tablet 1    blood glucose monitor strips Test 3 times a day & as needed for symptoms of irregular blood glucose. Dispense sufficient amount for indicated testing frequency plus additional to accommodate PRN testing needs. 200 strip 3    Insulin Pen Needle 32G X 4 MM MISC 1 each by Does not apply route daily 100 each 3    blood glucose monitor kit and supplies Dispense sufficient amount for indicated testing frequency plus additional to accommodate PRN testing needs. 1 kit 0    Lancets MISC 1 each by Does not apply route 3 times daily (with meals) 300 each 3    insulin lispro (HUMALOG KWIKPEN) 200 UNIT/ML SOPN pen Sliding scale 160-179 2 units  180-199 3 units  200-219 4 units  220-239 5 units  240-259 6 units   260-279 7 units  280-299 8 units  300-399 9 units  340-379 10 units  380-420 11 units 3 pen 5    vitamin D (ERGOCALCIFEROL) 1.25 MG (70523 UT) CAPS capsule Take 50,000 Units by mouth once a week Takes on Friday      diphenhydrAMINE (BENADRYL) 25 MG tablet Take 25 mg by mouth every 6 hours as needed for Itching      blood glucose test strips (CONTOUR NEXT TEST) strip 1 each by In Vitro route 4 times daily As needed. 200 each 3     No current facility-administered medications for this visit.      Allergies: Contrast [iodides], Neomycin-bacitracin zn-polymyx, Neosporin [neomycin-polymyxin-gramicidin], and Pcn [penicillins]  Past Medical History:   Diagnosis Date    Acalculous cholecystitis 12/29/2015    LIAN (acute kidney injury) ([facility] 3/21/2019    Allergic rhinitis, cause unspecified     Atrial fibrillation, chronic ([facility]     sees [facility] cardiology    Bacteremia due to methicillin resistant Staphylococcus aureus 10/27/2021    Bone spur     bilateral elbows    CAD (coronary artery disease)     Charcot's joint of right foot     Chicken pox     Closed supracondylar fracture of elbow     fall    Diabetic ulcer of right midfoot associated with type 2 diabetes mellitus, with fat layer exposed ([address] 9/14/2020    Diabetic ulcer of right midfoot associated with type 2 diabetes mellitus, with fat layer exposed ([address] 9/14/2020    History of MRSA infection     scalp/facial and on back    HTN (hypertension)     Hyperlipidemia     Hypoglycemic encephalopathy     Impotence of organic origin     MDRO (multiple drug resistant organisms) resistance     MRSA (methicillin resistant staph aureus) culture positive 11/2021    MRSA LT HAND    Other dyspnea and respiratory abnormality     Other specified erythematous condition(695.89)     Permanent atrial fibrillation ([address] 2/18/2021    Personal history of other infectious and parasitic disease     Salmonella     Subtherapeutic international normalized ratio (INR) 4/19/2021    Supratherapeutic INR 4/14/2021    Type II or unspecified type diabetes mellitus without mention of complication, uncontrolled 1994     Past Surgical History:   Procedure Laterality Date    AMPUTATION  11/01/2021    CARDIAC CATHETERIZATION      CARDIOVERSION  03/2017    CATARACT REMOVAL  10/2021    COLONOSCOPY  02/24/2020    Dr RITIKA Fraire-Melanosis coli throughout the entire colon-AP, 5 yr recall    CORONARY ARTERY BYPASS GRAFT MAZE PROCEDURE N/A 12/14/2021    CORONARY ARTERY BYPASS GRAFT X3 WITH LEFT INTERNAL MAMMARY ARTERY WITH ENDOSCOPIC VEIN HARVESTING WITH PERFUSION, TRANSESOPHAGEAL ECHOCARDIOGRAM, BIATRIAL RODRIGUES-MAZE RF AND CRYOABLATIONAND ATRICLIP performed by Lizabeth Soto MD at [facility] 27 HAND SURGERY Left 10/31/2021    HAND INCISION AND DRAINAGE Partial Amputation Middle Finger performed by Nanda Moreno MD at [address] [facility] Left 09/17/2020    OPEN REDUCTION INTERNAL FIXATION LEFT SUPRACONDYLAR FRACTURE performed by Florencio Oh MD at [address] [facility] MALIGNANT SKIN LESION EXCISION      X 2    TONSILLECTOMY       Family History   Problem Relation Age of Onset    Stroke Maternal Grandmother     Cancer Maternal Grandmother     Stroke Paternal Grandmother     Diabetes Paternal Grandmother     Cancer Father     Heart Disease Father     Lung Cancer Father     Asthma Paternal Grandfather     Heart Disease Paternal Grandfather     Heart Disease Maternal Grandfather     Colon Cancer Neg Hx     Colon Polyps Neg Hx     Esophageal Cancer Neg Hx     Liver Cancer Neg Hx     Liver Disease Neg Hx     Rectal Cancer Neg Hx     Stomach Cancer Neg Hx      Social History     Tobacco Use    Smoking status: Never Smoker    Smokeless tobacco: Never Used   Substance Use Topics    Alcohol use: No          Review of Systems:    Review of Systems   Constitutional: Negative for activity change, chills, diaphoresis, fatigue and fever. HENT: Negative for congestion and sore throat. Respiratory: Negative for cough, chest tightness, shortness of breath and wheezing. Cardiovascular: Negative for chest pain, palpitations and leg swelling. Neurological: Positive for dizziness, syncope (pre syncope) and light-headedness. Negative for headaches. Psychiatric/Behavioral: Negative for confusion. The patient is not nervous/anxious. Objective:    /80   Pulse 61   Ht 5' 10\" (1.778 m)   Wt 233 lb (105.7 kg)   SpO2 94%   BMI 33.43 kg/m²     GENERAL - well developed and well nourished, conversant  HEENT   PERRLA, Hearing appears normal, gentleman lids are normal.  External inspection of ears and nose appear normal.  NECK - no thyromegaly, no JVD, trachea is in the midline  CARDIOVASCULAR  PMI is in the mid line clavicular position, Normal S1 and S2 with no systolic murmur. No S3 or S4    PULMONARY  no respiratory distress.   No wheezes or rales. Lungs are clear to ausculation, normal respiratory effort. ABDOMEN   soft, non tender, no rebound  MUSCULOSKELETAL   range of motion of the upper and lower extermites appears normal and equal and is without pain   EXTREMITIES - no significant edema   NEUROLOGIC  gait and station are normal  SKIN - turgor is normal, can warm and dry. PSYCHIATRIC - normal mood and affect, alert and orientated x 3,      ASSESSMENT:    ALL THE CARDIOLOGY PROBLEMS ARE LISTED ABOVE; HOWEVER, THE FOLLOWING SPECIFIC CARDIAC PROBLEMS / CONDITIONS WERE ADDRESSED AND TREATED DURING THE OFFICE VISIT TODAY:                                                                                            MEDICAL DECISION MAKING             Cardiac Specific Problem / Diagnosis  Discussion and Data Reviewed Diagnostic Procedures Ordered Management Options Selected           1. Presyncope  Chief complaint   Review and summation of old records: Will place 30-day event monitor the new ZIO Patch that gives daily reports to our office to address and evaluate for any bradycardia, pauses or atrial fib. Patient encouraged to keep ENT appointment. Yes Continue current medications:     Yes           2. Persistent atrial fib/status post ablation  Stable   EKG in the office today showing sinus rhythm with a heart rate of 61 bpm.  Patient is on amiodarone 200 mg daily. Coumadin 5 mg daily. INR today 1.9. Patient states he did miss a dose. Will get this rechecked. Yes Continue current medications:    Yes           3.   CAD Stable  no chest pain. No Continue current medications:       Yes           4. Hypertension Stable  patient is on Cozaar 100 mg daily.  No Continue current medications:       Yes     Orders Placed This Encounter   Procedures    POCT INR    EKG 12 lead     Order Specific Question:   Reason for Exam?     Answer:   Irregular heart rate    [place] EXTERNAL ECG REC>7D<15D RECORDING     No orders of the defined types were placed in this encounter. Discussed with patient. Return in about 4 weeks (around 5/4/2022) for results with me . I greatly appreciate the opportunity to meet Marielena Curiel and your confidence in allowing me to participate in his cardiovascular care. NOMI Whittaker NP  4/6/2022 11:03 AM CDT                    This dictation was generated by voice recognition computer software. Although all attempts are made to edit dictation for accuracy, there may be errors in the transcription that are not intended.

## 2022-04-06 NOTE — PATIENT INSTRUCTIONS
14-day monitor (ZIO Patch)      ZIO Patch  The ZIO® Patch is a new form of ambulatory cardiac monitoring described as a wearable patch. The Thelbert Picking is unique compared to traditional Holter monitors as the monitoring device has no leads, no wires and no batteries. The Thelbert Picking weighs just a few ounces that is a peel and stick device that is worn for an extended monitoring period of up to 14 days. Even though the device is worn for a longer duration than a Holter monitor, the ZIO Patch is said to be preferred by nearly 80% of patients as compared to a traditional 24 Holter monitor. Please allow 8 to 10 days for results, once you have mailed off your device.     Features of the ZIO Patch:  Arguably the smallest ambulatory cardiac monitor   Light weight   No lead wires   Single channel ECG recording   No batteries   Superior patient compliance with a water resistant   Up to 14 days of continuous ECG recording

## 2022-04-07 RX ORDER — WARFARIN SODIUM 5 MG/1
5 TABLET ORAL DAILY
Qty: 30 TABLET | Refills: 3 | Status: SHIPPED | OUTPATIENT
Start: 2022-04-07

## 2022-04-08 ENCOUNTER — HOSPITAL ENCOUNTER (OUTPATIENT)
Dept: CARDIAC REHAB | Age: 57
Setting detail: THERAPIES SERIES
Discharge: HOME | End: 2022-04-08
Payer: MEDICARE

## 2022-04-08 PROCEDURE — 93798 PHYS/QHP OP CAR RHAB W/ECG: CPT

## 2022-04-11 ENCOUNTER — HOSPITAL ENCOUNTER (OUTPATIENT)
Dept: CARDIAC REHAB | Age: 57
Setting detail: THERAPIES SERIES
Discharge: HOME | End: 2022-04-11
Payer: MEDICARE

## 2022-04-11 PROCEDURE — 93798 PHYS/QHP OP CAR RHAB W/ECG: CPT

## 2022-04-13 DIAGNOSIS — I25.10 CAD IN NATIVE ARTERY: ICD-10-CM

## 2022-04-13 DIAGNOSIS — Z79.4 TYPE 2 DIABETES MELLITUS WITH DIABETIC POLYNEUROPATHY, WITH LONG-TERM CURRENT USE OF INSULIN (HCC): Primary | ICD-10-CM

## 2022-04-13 DIAGNOSIS — I10 PRIMARY HYPERTENSION: ICD-10-CM

## 2022-04-13 DIAGNOSIS — E11.42 TYPE 2 DIABETES MELLITUS WITH DIABETIC POLYNEUROPATHY, WITH LONG-TERM CURRENT USE OF INSULIN (HCC): Primary | ICD-10-CM

## 2022-04-13 DIAGNOSIS — I50.32 CHRONIC HEART FAILURE WITH PRESERVED EJECTION FRACTION (HCC): ICD-10-CM

## 2022-04-13 RX ORDER — METOPROLOL TARTRATE 50 MG/1
TABLET, FILM COATED ORAL
Qty: 180 TABLET | Refills: 0 | Status: SHIPPED | OUTPATIENT
Start: 2022-04-13 | End: 2022-10-11

## 2022-04-13 RX ORDER — ASPIRIN 81 MG/1
TABLET, COATED ORAL
Qty: 90 TABLET | Refills: 1 | Status: SHIPPED | OUTPATIENT
Start: 2022-04-13

## 2022-04-13 NOTE — TELEPHONE ENCOUNTER
Tre Morales called requesting a refill of the below medication which has been pended for you:     Requested Prescriptions     Pending Prescriptions Disp Refills    metoprolol tartrate (LOPRESSOR) 50 MG tablet [Pharmacy Med Name: METOPROLOL TARTRATE 50MG TABLET] 180 tablet 0     Sig: TAKE ONE TABLET  TWO TIMES DAILY    metFORMIN (GLUCOPHAGE) 1000 MG tablet [Pharmacy Med Name: METFORMIN HYDROCHLORIDE 1000MG TABLET] 180 tablet 0     Sig: TAKE ONE TABLET TWICE DAILY WITH MEALS       Last Appointment Date: 3/8/2022  Next Appointment Date: 5/2/2022    Allergies   Allergen Reactions    Contrast [Iodides]      N & V. PT STATED HE HAD A MRI WITH CONTRAST ON 10/25/2021.     Neomycin-Bacitracin Zn-Polymyx Rash    Neosporin [Neomycin-Polymyxin-Gramicidin] Rash    Pcn [Penicillins] Rash

## 2022-04-15 ENCOUNTER — TELEPHONE (OUTPATIENT)
Dept: CARDIOLOGY CLINIC | Age: 57
End: 2022-04-15

## 2022-04-15 ENCOUNTER — HOSPITAL ENCOUNTER (OUTPATIENT)
Dept: CARDIAC REHAB | Age: 57
Setting detail: THERAPIES SERIES
Discharge: HOME | End: 2022-04-15
Payer: MEDICARE

## 2022-04-15 PROCEDURE — 93798 PHYS/QHP OP CAR RHAB W/ECG: CPT

## 2022-04-15 NOTE — TELEPHONE ENCOUNTER
Called patient to let him know he does not need that clifford on 04/18/2022 with Gayla Richards. He will need to just keep the appt for 05/11/22. If patient calls back please cancel 04/18/22 appt.

## 2022-04-18 ENCOUNTER — HOSPITAL ENCOUNTER (OUTPATIENT)
Dept: CARDIAC REHAB | Age: 57
Setting detail: THERAPIES SERIES
Discharge: HOME | End: 2022-04-18
Payer: MEDICARE

## 2022-04-18 PROCEDURE — 93798 PHYS/QHP OP CAR RHAB W/ECG: CPT

## 2022-04-19 ENCOUNTER — PROCEDURE VISIT (OUTPATIENT)
Dept: ENT CLINIC | Age: 57
End: 2022-04-19
Payer: MEDICARE

## 2022-04-19 DIAGNOSIS — H91.8X9 ASYMMETRICAL HEARING LOSS: ICD-10-CM

## 2022-04-19 DIAGNOSIS — H90.3 SENSORINEURAL HEARING LOSS (SNHL) OF BOTH EARS: ICD-10-CM

## 2022-04-19 DIAGNOSIS — R42 DIZZINESS: Primary | ICD-10-CM

## 2022-04-19 PROCEDURE — 92567 TYMPANOMETRY: CPT | Performed by: AUDIOLOGIST

## 2022-04-19 PROCEDURE — 92557 COMPREHENSIVE HEARING TEST: CPT | Performed by: AUDIOLOGIST

## 2022-04-19 NOTE — PROGRESS NOTES
History   Erik Boast is a 60 y.o. male who presented to the clinic this date with complaints of dizziness. He reported brief episodes of light headedness when walking. He noted if he does not hold on to something during these episodes he would fall. He reported onset at the end of January. He denied changes in hearing and aural fullness. He has had bilateral tinnitus since childhood. He recently had bypass surgery. Summary   Tympanometry consistent with reduced TM mobility right and normal TM mobility left. Pure tone testing indicates mild high frequency SNHL right and moderate high frequency SNHL left. Word recognition scores were exellent bilaterally. Results   Otoscopy:    Right: Clear EAC/Normal TM   Left: Clear EAC/Normal TM    Audiometry:    Right: Mild high frequency SNHL   Left: Moderate high frequency SNHL         Tympanometry:     Right: Type As   Left: Type As    Plan   Results of today's testing were discussed with Mr. Goldstein and the following recommendations were made:    1. Follow up with ENT as scheduled. 2. Monitor hearing yearly, sooner with changes. 3. Hearing protection as warranted.       Audiogram and Acoustic Immittance

## 2022-04-20 ENCOUNTER — HOSPITAL ENCOUNTER (OUTPATIENT)
Dept: CARDIAC REHAB | Age: 57
Setting detail: THERAPIES SERIES
Discharge: HOME | End: 2022-04-20
Payer: MEDICARE

## 2022-04-20 PROCEDURE — 93798 PHYS/QHP OP CAR RHAB W/ECG: CPT

## 2022-04-22 ENCOUNTER — HOSPITAL ENCOUNTER (OUTPATIENT)
Dept: CARDIAC REHAB | Age: 57
Setting detail: THERAPIES SERIES
Discharge: HOME | End: 2022-04-22
Payer: MEDICARE

## 2022-04-22 PROCEDURE — 93798 PHYS/QHP OP CAR RHAB W/ECG: CPT

## 2022-04-25 ENCOUNTER — CARE COORDINATION (OUTPATIENT)
Dept: CARE COORDINATION | Age: 57
End: 2022-04-25

## 2022-04-25 ENCOUNTER — HOSPITAL ENCOUNTER (OUTPATIENT)
Dept: CARDIAC REHAB | Age: 57
Setting detail: THERAPIES SERIES
Discharge: HOME | End: 2022-04-25
Payer: MEDICARE

## 2022-04-25 PROCEDURE — 93798 PHYS/QHP OP CAR RHAB W/ECG: CPT

## 2022-04-25 NOTE — CARE COORDINATION
Ambulatory Care Coordination Note  2022  CM Risk Score: 11  Charlson 10 Year Mortality Risk Score: 100%     ACC: [place] Vieyra RN    Summary Note: Ambulatory Care Nurse contacted the family via telephone to perform follow-up call for ambulatory care management. ACM Verified name and  with family as identifiers. Provided introduction to self, and explanation of the ACM role. Patient confirms completing ambulatory cardiac monitoring with Zio patch and returning device on today. Patient has a follow-up appointment with [facility] ENT on tomorrow. ACM encouraged patient to discuss current symptoms with provider. Patient verbalized understanding denying any questions at this time. Dizziness: Patient complains of a 1 month history of lightheadedness which is associated with position change. Symptoms are continuous. Overall, the symptoms have been unchanged over time. Symptoms are exacerbated by rapid head movements rolling over in bed rising from supine position rising from squatting or sitting position bending motion. Associated symptoms:  headache- . Sarahi Sharper He denies any other symptoms. Relevant PMH: N. Recent medication changes:  none. He has been treated with meclizine- PCP. Plan  ACM to follow-up post ENT appt.      Education provided during encounter     ACM encouraged patient to report new or worsening symptoms to PCP    Care Coordination Interventions    Program Enrollment: Complex Care  Referral from Primary Care Provider: No  Suggested Interventions and Community Resources         Goals Addressed                 This Visit's Progress     Reduce Falls    On track     I will reduce my risk of falls by the following: Remove rugs or use non slip rugs  Install grab bars in bathroom  Use walking aids like cane or walker  Continue Cardiac Therapy as ordered by provider     Barriers: lack of motivation, time constraints, and medication side effects  Plan for overcoming my barriers:   Patient will notify provider of falls   Patient will take medication as prescribed by providers notifying providers of adverse symptoms . Confidence: 5/10  Anticipated Goal Completion Date: 6/2022            Prior to Admission medications    Medication Sig Start Date End Date Taking? Authorizing Provider   ASPIRIN LOW DOSE 81 MG EC tablet TAKE ONE TABLET  DAILY 4/13/22  Yes NOMI Sal NP   metoprolol tartrate (LOPRESSOR) 50 MG tablet TAKE ONE TABLET  TWO TIMES DAILY 4/13/22  Yes Ladarius Bess MD   metFORMIN (GLUCOPHAGE) 1000 MG tablet TAKE ONE TABLET TWICE DAILY WITH MEALS 4/13/22  Yes Ladarius Bess MD   warfarin (COUMADIN) 5 MG tablet Take 1 tablet by mouth daily 4/7/22  Yes NOMI Abreu   meclizine (ANTIVERT) 12.5 MG tablet TAKE ONE TABLET THREE TIMES DAILY AS NEEDED FOR DIZZINESS 4/4/22  Yes Gabbi Bess MD   furosemide (LASIX) 40 MG tablet Take 1 tablet by mouth daily 3/29/22 6/27/22 Yes Gabbi Bess MD   cloNIDine (CATAPRES) 0.1 MG tablet Take 1 tablet by mouth 2 times daily 3/29/22 6/27/22 Yes Gabbi Bess MD   tamsulosin (FLOMAX) 0.4 MG capsule Take 1 capsule by mouth 2 times daily 3/24/22 3/24/23 Yes NOMI Boland - CNP   amiodarone (CORDARONE) 200 MG tablet TAKE ONE TABLET TWICE DAILY 2/28/22  Yes NOMI Boswell   losartan (COZAAR) 100 MG tablet Take 100 mg by mouth daily Indications: High Blood Pressure Disorder   Yes Historical Provider, MD   Insulin Degludec (TRESIBA FLEXTOUCH) 100 UNIT/ML SOPN Inject into the skin PT TAKES ACCORDING TO ACCUCHEK. DOES NOT TAKE  AND UNDER AT HS. Yes Historical Provider, MD   atorvastatin (LIPITOR) 20 MG tablet TAKE ONE TABLET DAILY   Patient taking differently: Take 20 mg by mouth daily  9/29/21  Yes Ladarius Bess MD   blood glucose monitor strips Test 3 times a day & as needed for symptoms of irregular blood glucose.  Dispense sufficient amount for indicated testing frequency plus additional to accommodate PRN testing needs. 8/1/21  Yes Gabbi Bess MD   Insulin Pen Needle 32G X 4 MM MISC 1 each by Does not apply route daily 6/14/21  Yes Ching Bess MD   blood glucose monitor kit and supplies Dispense sufficient amount for indicated testing frequency plus additional to accommodate PRN testing needs. 5/12/21  Yes Gabbi Bess MD   Lancets MISC 1 each by Does not apply route 3 times daily (with meals) 5/12/21  Yes Gabbi Bess MD   insulin lispro (HUMALOG KWIKPEN) 200 UNIT/ML SOPN pen Sliding scale 160-179 2 units  180-199 3 units  200-219 4 units  220-239 5 units  240-259 6 units   260-279 7 units  280-299 8 units  300-399 9 units  340-379 10 units  380-420 11 units 5/11/21  Yes Gabbi Bess MD   vitamin D (ERGOCALCIFEROL) 1.25 MG (10865 UT) CAPS capsule Take 50,000 Units by mouth once a week Takes on Friday   Yes Historical Provider, MD   diphenhydrAMINE (BENADRYL) 25 MG tablet Take 25 mg by mouth every 6 hours as needed for Itching   Yes Historical Provider, MD   blood glucose test strips (CONTOUR NEXT TEST) strip 1 each by In Vitro route 4 times daily As needed.  11/21/19  Yes Anitha Hays MD       Future Appointments   Date Time Provider Chris Camarena   4/25/2022  3:45 PM [facility] CARDIAC REHAB PHASE II PROVIDER CLASS [facility]   4/27/2022  3:45 PM [facility] CARDIAC REHAB PHASE II PROVIDER CLASS [facility]   4/28/2022  2:30 PM LOY [place] [facility] E[facility]   4/29/2022  3:45 PM [facility] CARDIAC REHAB PHASE II PROVIDER CLASS [facility]   5/2/2022  1:30 PM Ching Herzog MD LPS [facility]   5/11/2022 10:00 AM NOMI Turner - NP N LPS Cardio [facility]   6/14/2022  1:30 PM Rosemary Rodríguez MD N Heart[facility]   3/24/2023 11:15 AM NOMI Cuevas - CNP N LPS [facility]      and   Diabetes Assessment    Medic Alert ID: No  Meal Planning: None   How often do you test your blood sugar?: Other (Comment: BID)   Do you have barriers with adherence to non-pharmacologic self-management interventions?  (Nutrition/Exercise/Self-Monitoring): No   Have you ever had to go to the ED for symptoms of low blood sugar?: No

## 2022-04-27 ENCOUNTER — HOSPITAL ENCOUNTER (OUTPATIENT)
Dept: CARDIAC REHAB | Age: 57
Setting detail: THERAPIES SERIES
Discharge: HOME | End: 2022-04-27
Payer: MEDICARE

## 2022-04-27 DIAGNOSIS — E11.69 MIXED HYPERLIPIDEMIA DUE TO TYPE 2 DIABETES MELLITUS (HCC): ICD-10-CM

## 2022-04-27 DIAGNOSIS — E78.2 MIXED HYPERLIPIDEMIA DUE TO TYPE 2 DIABETES MELLITUS (HCC): ICD-10-CM

## 2022-04-27 PROCEDURE — 93798 PHYS/QHP OP CAR RHAB W/ECG: CPT

## 2022-04-27 RX ORDER — ATORVASTATIN CALCIUM 20 MG/1
TABLET, FILM COATED ORAL
Qty: 90 TABLET | Refills: 1 | Status: SHIPPED | OUTPATIENT
Start: 2022-04-27 | End: 2022-10-11

## 2022-04-27 NOTE — TELEPHONE ENCOUNTER
Rob called requesting a refill of the below medication which has been pended for you:     Requested Prescriptions     Pending Prescriptions Disp Refills    atorvastatin (LIPITOR) 20 MG tablet [Pharmacy Med Name: ATORVASTATIN CALCIUM 20MG TABLET] 90 tablet 1     Sig: TAKE ONE TABLET DAILY       Last Appointment Date: 3/8/2022  Next Appointment Date: 5/2/2022    Allergies   Allergen Reactions    Contrast [Iodides]      N & V. PT STATED HE HAD A MRI WITH CONTRAST ON 10/25/2021.     Neomycin-Bacitracin Zn-Polymyx Rash    Neosporin [Neomycin-Polymyxin-Gramicidin] Rash    Pcn [Penicillins] Rash

## 2022-04-28 ENCOUNTER — OFFICE VISIT (OUTPATIENT)
Dept: ENT CLINIC | Age: 57
End: 2022-04-28
Payer: MEDICARE

## 2022-04-28 ENCOUNTER — TELEPHONE (OUTPATIENT)
Dept: CARDIOLOGY CLINIC | Age: 57
End: 2022-04-28

## 2022-04-28 VITALS
HEIGHT: 70 IN | DIASTOLIC BLOOD PRESSURE: 78 MMHG | SYSTOLIC BLOOD PRESSURE: 130 MMHG | WEIGHT: 233 LBS | BODY MASS INDEX: 33.36 KG/M2

## 2022-04-28 DIAGNOSIS — H81.13 BENIGN PAROXYSMAL POSITIONAL VERTIGO DUE TO BILATERAL VESTIBULAR DISORDER: Primary | ICD-10-CM

## 2022-04-28 PROCEDURE — 3017F COLORECTAL CA SCREEN DOC REV: CPT | Performed by: PHYSICIAN ASSISTANT

## 2022-04-28 PROCEDURE — G8427 DOCREV CUR MEDS BY ELIG CLIN: HCPCS | Performed by: PHYSICIAN ASSISTANT

## 2022-04-28 PROCEDURE — G8417 CALC BMI ABV UP PARAM F/U: HCPCS | Performed by: PHYSICIAN ASSISTANT

## 2022-04-28 PROCEDURE — 99203 OFFICE O/P NEW LOW 30 MIN: CPT | Performed by: PHYSICIAN ASSISTANT

## 2022-04-28 PROCEDURE — 1036F TOBACCO NON-USER: CPT | Performed by: PHYSICIAN ASSISTANT

## 2022-04-28 NOTE — TELEPHONE ENCOUNTER
Saw that you ordered to check A. fib burden. He is going to cardiac rehab. Sees you on the 11th.   We will continue to monitor

## 2022-04-28 NOTE — PROGRESS NOTES
[facility] OTOLARYNGOLOGY/ENT  Mr. Nury Ariza is a pleasant 52-year-old  male that was referred by Dr. Daisy Rosario due to problems with vertigo. He reports this initially began back in January and has not improved. This is triggered with bending over or turning his head. He also reports this is triggered when he would walk about 30 feet. He has had an extensive cardiac work-up and was found to be unremarkable for any cardiac arrhythmia or any evidence of a bundle branch block. Patient reports that he had open heart bypass in December. He reports that the dizziness is much worse when he was trying to do cardiac rehab. Due to the patient not being able to complete his rehab, he has requesting physical therapy.         Allergies: Contrast [iodides], Neomycin-bacitracin zn-polymyx, Neosporin [neomycin-polymyxin-gramicidin], and Pcn [penicillins]      Current Outpatient Medications   Medication Sig Dispense Refill    atorvastatin (LIPITOR) 20 MG tablet TAKE ONE TABLET DAILY 90 tablet 1    ASPIRIN LOW DOSE 81 MG EC tablet TAKE ONE TABLET  DAILY 90 tablet 1    metoprolol tartrate (LOPRESSOR) 50 MG tablet TAKE ONE TABLET  TWO TIMES DAILY 180 tablet 0    metFORMIN (GLUCOPHAGE) 1000 MG tablet TAKE ONE TABLET TWICE DAILY WITH MEALS 180 tablet 0    warfarin (COUMADIN) 5 MG tablet Take 1 tablet by mouth daily 30 tablet 3    meclizine (ANTIVERT) 12.5 MG tablet TAKE ONE TABLET THREE TIMES DAILY AS NEEDED FOR DIZZINESS 30 tablet 0    furosemide (LASIX) 40 MG tablet Take 1 tablet by mouth daily 90 tablet 1    cloNIDine (CATAPRES) 0.1 MG tablet Take 1 tablet by mouth 2 times daily 180 tablet 1    tamsulosin (FLOMAX) 0.4 MG capsule Take 1 capsule by mouth 2 times daily 180 capsule 3    amiodarone (CORDARONE) 200 MG tablet TAKE ONE TABLET TWICE DAILY 60 tablet 1    losartan (COZAAR) 100 MG tablet Take 100 mg by mouth daily Indications: High Blood Pressure Disorder      Insulin Degludec (TRESIBA FLEXTOUCH) 100 UNIT/ML SOPN Inject into the skin PT TAKES ACCORDING TO ACCUCHEK. DOES NOT TAKE  AND UNDER AT HS.  blood glucose monitor strips Test 3 times a day & as needed for symptoms of irregular blood glucose. Dispense sufficient amount for indicated testing frequency plus additional to accommodate PRN testing needs. 200 strip 3    Insulin Pen Needle 32G X 4 MM MISC 1 each by Does not apply route daily 100 each 3    blood glucose monitor kit and supplies Dispense sufficient amount for indicated testing frequency plus additional to accommodate PRN testing needs. 1 kit 0    Lancets MISC 1 each by Does not apply route 3 times daily (with meals) 300 each 3    insulin lispro (HUMALOG KWIKPEN) 200 UNIT/ML SOPN pen Sliding scale 160-179 2 units  180-199 3 units  200-219 4 units  220-239 5 units  240-259 6 units   260-279 7 units  280-299 8 units  300-399 9 units  340-379 10 units  380-420 11 units 3 pen 5    vitamin D (ERGOCALCIFEROL) 1.25 MG (89450 UT) CAPS capsule Take 50,000 Units by mouth once a week Takes on Friday      diphenhydrAMINE (BENADRYL) 25 MG tablet Take 25 mg by mouth every 6 hours as needed for Itching      blood glucose test strips (CONTOUR NEXT TEST) strip 1 each by In Vitro route 4 times daily As needed. 200 each 3     No current facility-administered medications for this visit.        Past Surgical History:   Procedure Laterality Date    AMPUTATION  11/01/2021    CARDIAC CATHETERIZATION      CARDIOVERSION  03/2017    CATARACT REMOVAL  10/2021    COLONOSCOPY  02/24/2020    Dr RITIKA Fraire-Melanosis coli throughout the entire colon-AP, 5 yr recall    CORONARY ARTERY BYPASS GRAFT MAZE PROCEDURE N/A 12/14/2021    CORONARY ARTERY BYPASS GRAFT X3 WITH LEFT INTERNAL MAMMARY ARTERY WITH ENDOSCOPIC VEIN HARVESTING WITH PERFUSION, TRANSESOPHAGEAL ECHOCARDIOGRAM, BIATRIAL RODRIGUES-MAZE RF AND CRYOABLATIONAND ATRICLIP performed by Rabia Polanco MD at [place] [address] HAND SURGERY Left 10/31/2021    HAND INCISION AND DRAINAGE Partial Amputation Middle Finger performed by Bharath Marcelino MD at [address] Left 09/17/2020    OPEN REDUCTION INTERNAL FIXATION LEFT SUPRACONDYLAR FRACTURE performed by Kenan Parker MD at [facility] MALIGNANT SKIN LESION EXCISION      X 2    TONSILLECTOMY         Past Medical History:   Diagnosis Date    Acalculous cholecystitis 12/29/2015    LIAN (acute kidney injury) ([address] 3/21/2019    Allergic rhinitis, cause unspecified     Atrial fibrillation, chronic (HCC)     [facility] cardiology    Bacteremia due to methicillin resistant Staphylococcus aureus 10/27/2021    Bone spur     bilateral elbows    CAD (coronary artery disease)     Charcot's joint of right foot     Chicken pox     Closed supracondylar fracture of elbow     fall    Diabetic ulcer of right midfoot associated with type 2 diabetes mellitus, with fat layer exposed ([address] 9/14/2020    Diabetic ulcer of right midfoot associated with type 2 diabetes mellitus, with fat layer exposed ([address] 9/14/2020    History of MRSA infection     scalp/facial and on back    HTN (hypertension)     Hyperlipidemia     Hypoglycemic encephalopathy     Impotence of organic origin     MDRO (multiple drug resistant organisms) resistance     MRSA (methicillin resistant staph aureus) culture positive 11/2021    MRSA LT HAND    Other dyspnea and respiratory abnormality     Other specified erythematous condition(695.89)     Permanent atrial fibrillation ([address] 2/18/2021    Personal history of other infectious and parasitic disease     Salmonella     Subtherapeutic international normalized ratio (INR) 4/19/2021    Supratherapeutic INR 4/14/2021    Type II or unspecified type diabetes mellitus without mention of complication, uncontrolled 1994       Family History   Problem Relation Age of Onset    Stroke Maternal Grandmother     Cancer Maternal Grandmother     Stroke Paternal Grandmother     Diabetes Paternal Grandmother     Cancer Father     Heart Disease Father     Lung Cancer Father     Asthma Paternal Grandfather     Heart Disease Paternal Grandfather     Heart Disease Maternal Grandfather     Colon Cancer Neg Hx     Colon Polyps Neg Hx     Esophageal Cancer Neg Hx     Liver Cancer Neg Hx     Liver Disease Neg Hx     Rectal Cancer Neg Hx     Stomach Cancer Neg Hx        Social History     Tobacco Use    Smoking status: Never Smoker    Smokeless tobacco: Never Used   Substance Use Topics    Alcohol use: No           REVIEW OF SYSTEMS:  all other systems reviewed and are negative  Review of Systems   Constitutional: Negative for chills and fever. HENT: Negative for congestion, dental problem, ear discharge, ear pain, facial swelling, hearing loss, postnasal drip, rhinorrhea, sinus pressure, sinus pain, sore throat, tinnitus, trouble swallowing and voice change. Eyes: Negative for photophobia and pain. Neurological: Negative for dizziness and headaches. Comments:     PHYSICAL EXAM:    /78   Ht 5' 10\" (1.778 m)   Wt 233 lb (105.7 kg)   BMI 33.43 kg/m²   Body mass index is 33.43 kg/m². General Appearance: well developed  and well nourished  Head/ Face: normocephalic and atraumatic  Vocal Quality: good/ normal  Ears: Right Ear: External: external ears normal Otoscopy Ear Canal: canal clear Otoscopy TM: TM's normal and TM's mobile Left Ear: External: external ears normal Otoscopy Ear Canal: canal clear Otoscopy TM: TM's normal and TM's mobile  Hearing: grossly intact  Nose: nares normal and septum midline  Neck: supple and adenopathy none palpable  Thyroid: normal and nodules No  Patient was noted to have no evidence of a carotid bruit bilaterally. Cardiac exam demonstrated a regular rhythm and rate with no cardiac murmurs gallops or rubs.       Assessment & Plan:    Problem List Items Addressed This Visit     Benign paroxysmal positional vertigo due to bilateral vestibular disorder - Primary      Vertigo consistent with BPPV  Plan: I will refer the patient to [facility] physical therapy department for vestibular treatment. The patient was advised to call if he has any questions or problems. At this point he is to follow-up with me if that therapy is unsuccessful. Relevant Orders    [facility] Vestibular Treatment          Orders Placed This Encounter   Procedures   [address] [place] [address] Rehab Vestibular Treatment     Referral Priority:   Urgent     Referral Type:   Eval and Treat     Referral Reason:   Specialty Services Required     Requested Specialty:   Physical Therapy     Number of Visits Requested:   1       No orders of the defined types were placed in this encounter. Electronically signed by Cristina Muñiz PA-C on 4/29/22 at 3:17 PM CDT          Please note that this chart was generated using dragon dictation software. Although every effort was made to ensure the accuracy of this automated transcription, some errors in transcription may have occurred.

## 2022-04-28 NOTE — TELEPHONE ENCOUNTER
Medical Social Work    Referral: Legal Hold    Intervention: Legal Hold Paperwork given to SW by [employer] RN Sierra    Legal Hold Initiated: Date: 3/4/21 Time: 2150    Patient’s Insurance Listed on Face Sheet[place]     Referrals sent to: [facility] [place], [facility], [facility] and [facility].     This referral contains the following information:  1) Face sheet __x__  2) Page 1 and Page 2 of Legal Hold _x___  3) Alert Team Assessment/Psych Assessment __x__  4) Head to toe physical exam __x__  5) Urine Drug Screen ___x_  6) Blood Alcohol _x___  7) Vital signs __x__  8) Pregnancy test when applicable _x__  9) Medications list __x__    Plan: Patient will transfer to mental health facility once acceptance is obtained     [employer] called to inform us of a critical zio reading. Patient has had a first documented episode of Afib since Zio monitor was placed.

## 2022-04-29 ENCOUNTER — HOSPITAL ENCOUNTER (OUTPATIENT)
Dept: CARDIAC REHAB | Age: 57
Setting detail: THERAPIES SERIES
Discharge: HOME | End: 2022-04-29
Payer: MEDICARE

## 2022-04-29 PROBLEM — H81.13 BENIGN PAROXYSMAL POSITIONAL VERTIGO DUE TO BILATERAL VESTIBULAR DISORDER: Status: ACTIVE | Noted: 2022-04-29

## 2022-04-29 PROCEDURE — 93798 PHYS/QHP OP CAR RHAB W/ECG: CPT

## 2022-04-29 ASSESSMENT — ENCOUNTER SYMPTOMS
EYE PAIN: 0
SINUS PRESSURE: 0
TROUBLE SWALLOWING: 0
SORE THROAT: 0
FACIAL SWELLING: 0
PHOTOPHOBIA: 0
RHINORRHEA: 0
VOICE CHANGE: 0
SINUS PAIN: 0

## 2022-04-29 NOTE — ASSESSMENT & PLAN NOTE
Vertigo consistent with BPPV  Plan: I will refer the patient to [facility] physical therapy department for vestibular treatment. The patient was advised to call if he has any questions or problems. At this point he is to follow-up with me if that therapy is unsuccessful.

## 2022-05-02 ENCOUNTER — OFFICE VISIT (OUTPATIENT)
Dept: INTERNAL MEDICINE | Age: 57
End: 2022-05-02

## 2022-05-02 ENCOUNTER — HOSPITAL ENCOUNTER (OUTPATIENT)
Dept: CARDIAC REHAB | Age: 57
Setting detail: THERAPIES SERIES
Discharge: HOME | End: 2022-05-02
Payer: MEDICARE

## 2022-05-02 VITALS
WEIGHT: 229 LBS | DIASTOLIC BLOOD PRESSURE: 80 MMHG | OXYGEN SATURATION: 100 % | HEART RATE: 61 BPM | SYSTOLIC BLOOD PRESSURE: 122 MMHG | BODY MASS INDEX: 32.78 KG/M2 | HEIGHT: 70 IN

## 2022-05-02 DIAGNOSIS — Z00.00 MEDICARE ANNUAL WELLNESS VISIT, SUBSEQUENT: Primary | ICD-10-CM

## 2022-05-02 PROCEDURE — G0439 PPPS, SUBSEQ VISIT: HCPCS | Performed by: INTERNAL MEDICINE

## 2022-05-02 PROCEDURE — 3017F COLORECTAL CA SCREEN DOC REV: CPT | Performed by: INTERNAL MEDICINE

## 2022-05-02 PROCEDURE — 93798 PHYS/QHP OP CAR RHAB W/ECG: CPT

## 2022-05-02 RX ORDER — ERGOCALCIFEROL 1.25 MG/1
50000 CAPSULE ORAL WEEKLY
Qty: 5 CAPSULE | Refills: 1 | Status: SHIPPED | OUTPATIENT
Start: 2022-05-02 | End: 2022-06-01 | Stop reason: SDUPTHER

## 2022-05-02 ASSESSMENT — PATIENT HEALTH QUESTIONNAIRE - PHQ9
SUM OF ALL RESPONSES TO PHQ9 QUESTIONS 1 & 2: 0
SUM OF ALL RESPONSES TO PHQ QUESTIONS 1-9: 0
2. FEELING DOWN, DEPRESSED OR HOPELESS: 0
SUM OF ALL RESPONSES TO PHQ QUESTIONS 1-9: 0
1. LITTLE INTEREST OR PLEASURE IN DOING THINGS: 0

## 2022-05-02 ASSESSMENT — LIFESTYLE VARIABLES: HOW OFTEN DO YOU HAVE A DRINK CONTAINING ALCOHOL: NEVER

## 2022-05-02 NOTE — PATIENT INSTRUCTIONS
The medication list included in this document is our record of what you are currently taking, including any changes that were made at today's visit.  If you find any differences when compared to your medications at home, or have any questions that were not answered at your visit, please contact the office. Personalized Preventive Plan for Chris Harris - 5/2/2022  Medicare offers a range of preventive health benefits. Some of the tests and screenings are paid in full while other may be subject to a deductible, co-insurance, and/or copay. Some of these benefits include a comprehensive review of your medical history including lifestyle, illnesses that may run in your family, and various assessments and screenings as appropriate. After reviewing your medical record and screening and assessments performed today your provider may have ordered immunizations, labs, imaging, and/or referrals for you. A list of these orders (if applicable) as well as your Preventive Care list are included within your After Visit Summary for your review. Other Preventive Recommendations:    · A preventive eye exam performed by an eye specialist is recommended every 1-2 years to screen for glaucoma; cataracts, macular degeneration, and other eye disorders. · A preventive dental visit is recommended every 6 months. · Try to get at least 150 minutes of exercise per week or 10,000 steps per day on a pedometer . · Order or download the FREE \"Exercise & Physical Activity: Your Everyday Guide\" from [employer]. Call 8-557.314.5125 or search [employer] online. · You need 8846-3927 mg of calcium and 6257-4104 IU of vitamin D per day. It is possible to meet your calcium requirement with diet alone, but a vitamin D supplement is usually necessary to meet this goal.  · When exposed to the sun, use a sunscreen that protects against both UVA and UVB radiation with an SPF of 30 or greater.  Reapply every 2 to 3 hours or after sweating, drying off with a towel, or swimming. · Always wear a seat belt when traveling in a car. Always wear a helmet when riding a bicycle or motorcycle.

## 2022-05-02 NOTE — PROGRESS NOTES
Medicare Annual Wellness Visit    Antony Marc is here for Medicare AWV    Assessment & Plan   Medicare annual wellness visit, subsequent      Recommendations for Preventive Services Due: see orders and patient instructions/AVS.  Recommended screening schedule for the next 5-10 years is provided to the patient in written form: see Patient Instructions/AVS.     Return in 1 month (on 6/2/2022) for Medicare Annual Wellness Visit in 1 year. Subjective       Patient's complete Health Risk Assessment and screening values have been reviewed and are found in Flowsheets. The following problems were reviewed today and where indicated follow up appointments were made and/or referrals ordered.     Positive Risk Factor Screenings with Interventions:    Fall Risk:  Do you feel unsteady or are you worried about falling? : no  2 or more falls in past year?: (!) yes  Fall with injury in past year?: no  Timed Up and Go Test > 12 seconds?: no  Fall Risk Interventions:    · Home safety tips provided              General Health and ACP:  General  In general, how would you say your health is?: Fair  In the past 7 days, have you experienced any of the following: New or Increased Pain, New or Increased Fatigue, Loneliness, Social Isolation, Stress or Anger?: No  Do you get the social and emotional support that you need?: Yes  Do you have a Living Will?: Yes    Advance Directives     Power of  Living Will ACP-Advance Directive ACP-Power of     Not on File Not on File Not on File Not on File      General Health Risk Interventions:  · Dizziness will be going for vestibular therapy    Health Habits/Nutrition:     Physical Activity: Sufficiently Active    Days of Exercise per Week: 5 days    Minutes of Exercise per Session: 60 min     Have you lost any weight without trying in the past 3 months?: No  Body mass index: (!) 32.85  Have you seen the dentist within the past year?: (!) No    Health Habits/Nutrition Interventions:  · Inadequate physical activity:  patient is not ready to increase his/her physical activity level at this time     Safety:  Do you have working smoke detectors?: Yes  Do you have any tripping hazards - loose or unsecured carpets or rugs?: No  Do you have any tripping hazards - clutter in doorways, halls, or stairs?: No  Do you have either shower bars, grab bars, non-slip mats or non-slip surfaces in your shower or bathtub?: (!) No  Do all of your stairways have a railing or banister?: Yes  Do you always fasten your seatbelt when you are in a car?: Yes    Safety Interventions:  · Home safety tips provided           Objective   Vitals:    05/02/22 1335   BP: 122/80   Pulse: 61   SpO2: 100%   Weight: 229 lb (103.9 kg)   Height: 5' 10\" (1.778 m)      Body mass index is 32.86 kg/m². Allergies   Allergen Reactions    Contrast [Iodides]      N & V. PT STATED HE HAD A MRI WITH CONTRAST ON 10/25/2021.  Neomycin-Bacitracin Zn-Polymyx Rash    Neosporin [Neomycin-Polymyxin-Gramicidin] Rash    Pcn [Penicillins] Rash     Prior to Visit Medications    Medication Sig Taking?  Authorizing Provider   vitamin D (ERGOCALCIFEROL) 1.25 MG (11470 UT) CAPS capsule Take 1 capsule by mouth once a week Takes on Friday Yes Coral Bess MD   atorvastatin (LIPITOR) 20 MG tablet TAKE ONE TABLET DAILY Yes Gabbi Bess MD   ASPIRIN LOW DOSE 81 MG EC tablet TAKE ONE TABLET  DAILY  Patient taking differently: 81 mg Every other day Yes NOMI Coates - NP   metoprolol tartrate (LOPRESSOR) 50 MG tablet TAKE ONE TABLET  TWO TIMES DAILY Yes Gabbi Bess MD   metFORMIN (GLUCOPHAGE) 1000 MG tablet TAKE ONE TABLET TWICE DAILY WITH MEALS Yes Gabbi eBss MD   warfarin (COUMADIN) 5 MG tablet Take 1 tablet by mouth daily Yes NOMI Contreras   meclizine (ANTIVERT) 12.5 MG tablet TAKE ONE TABLET THREE TIMES DAILY AS NEEDED FOR DIZZINESS Yes Gabbi Bess MD   furosemide (LASIX) 40 MG tablet Take 1 tablet by mouth daily Yes Gabbi Bess MD   cloNIDine (CATAPRES) 0.1 MG tablet Take 1 tablet by mouth 2 times daily Yes Gabbi Bess MD   tamsulosin (FLOMAX) 0.4 MG capsule Take 1 capsule by mouth 2 times daily Yes NOMI Antoine - CNP   losartan (COZAAR) 100 MG tablet Take 100 mg by mouth daily Indications: High Blood Pressure Disorder Yes Historical Provider, MD   Insulin Degludec (TRESIBA FLEXTOUCH) 100 UNIT/ML SOPN Inject into the skin PT TAKES ACCORDING TO ACCUCHEK. DOES NOT TAKE  AND UNDER AT HS. Yes Historical Provider, MD   blood glucose monitor strips Test 3 times a day & as needed for symptoms of irregular blood glucose. Dispense sufficient amount for indicated testing frequency plus additional to accommodate PRN testing needs. Yes Gabbi Bess MD   Insulin Pen Needle 32G X 4 MM MISC 1 each by Does not apply route daily Yes Gabbi Bess MD   blood glucose monitor kit and supplies Dispense sufficient amount for indicated testing frequency plus additional to accommodate PRN testing needs. Yes Gabbi Vivas MD   Lancets MISC 1 each by Does not apply route 3 times daily (with meals) Yes Gabbi Bess MD   insulin lispro (HUMALOG KWIKPEN) 200 UNIT/ML SOPN pen Sliding scale 160-179 2 units  180-199 3 units  200-219 4 units  220-239 5 units  240-259 6 units   260-279 7 units  280-299 8 units  300-399 9 units  340-379 10 units  380-420 11 units Yes Gabbi Bess MD   diphenhydrAMINE (BENADRYL) 25 MG tablet Take 25 mg by mouth every 6 hours as needed for Itching Yes Historical Provider, MD   blood glucose test strips (CONTOUR NEXT TEST) strip 1 each by In Vitro route 4 times daily As needed.  Yes Gabbi Bess MD   amiodarone (CORDARONE) 200 MG tablet TAKE ONE TABLET TWICE DAILY  Patient not taking: Reported on 5/2/2022  NOMI Ballesteros (Including outside providers/suppliers regularly involved in providing care):   Patient Care Team:  Ronal Vargas MD as PCP - General (Internal Medicine)  Ronal Vargas MD as PCP - REHABILITATION [facility] Empaneled Provider  Shae Putnam as Referring Physician (Physician Assistant)  NOMI Biswas as Nurse Practitioner (Family Nurse Practitioner)  NOMI Boswell as Nurse Practitioner (Certified Nurse Specialist)  Sima Mejia MD as Consulting Physician (Neurology)  Josephine Virk RN as Ambulatory Care Manager    Reviewed and updated this visit:  Tobacco  Allergies  Meds  Problems  Med Hx  Surg Hx  Soc Hx  Fam Hx

## 2022-05-04 ENCOUNTER — HOSPITAL ENCOUNTER (OUTPATIENT)
Dept: CARDIAC REHAB | Age: 57
Setting detail: THERAPIES SERIES
Discharge: HOME | End: 2022-05-04
Payer: MEDICARE

## 2022-05-04 PROCEDURE — 93798 PHYS/QHP OP CAR RHAB W/ECG: CPT

## 2022-05-05 ENCOUNTER — CARE COORDINATION (OUTPATIENT)
Dept: CARE COORDINATION | Age: 57
End: 2022-05-05

## 2022-05-05 NOTE — CARE COORDINATION
Ambulatory Care Coordination Note  2022  CM Risk Score: 11  Charlson 10 Year Mortality Risk Score: 100%     ACC: Lori Thurston RN    Summary Note:  Ambulatory Care Nurse contacted the family via telephone to perform follow-up call for ambulatory care management. ACM Verified name and  with family as identifiers. Provided introduction to self, and explanation of the ACM role. Vertigo - Dizziness  Patient reports dizziness . The dizziness has been present for since January. The patient describes the symptoms as vertigo and lightheadedness. Symptoms are exacerbated by bending motion The patient also complains of headaches. Patient denies . He has been treated with meclizine (Antivert) with poor improvement. Patient reports symptoms are present when ambulating  More than 30ft, repositioning. Symptoms are relieved by rest. ACM encouraged patient to report worsening symptoms to PCP. ACM reviewed upcoming appointments and noted patient has an appointment with [address] [place] [address] ENT. Education provided during encounter   · Fall risk   · Medication compliance   · Periods of rest   · Maintaining adequate hydration     Plan   · Patient to attend new patient appointment with [address] [place] [address] ENT  · Patient to follow-up with PCP with new or worsening symptoms  Care Coordination Interventions    Program Enrollment: Complex Care  Referral from Primary Care Provider: No  Suggested Interventions and Community Resources         Goals Addressed    None         Prior to Admission medications    Medication Sig Start Date End Date Taking?  Authorizing Provider   vitamin D (ERGOCALCIFEROL) 1.25 MG (08135 UT) CAPS capsule Take 1 capsule by mouth once a week Takes on 22   Sandro Bess MD   atorvastatin (LIPITOR) 20 MG tablet TAKE ONE TABLET DAILY 22   Gabbi Bess MD   ASPIRIN LOW DOSE 81 MG EC tablet TAKE ONE TABLET  DAILY  Patient taking differently: 81 mg Every other day 22   NOMI Ariza - NP   metoprolol tartrate (LOPRESSOR) 50 MG tablet TAKE ONE TABLET  TWO TIMES DAILY 4/13/22   Gabbi Bess MD   metFORMIN (GLUCOPHAGE) 1000 MG tablet TAKE ONE TABLET TWICE DAILY WITH MEALS 4/13/22   Gabbi Bess MD   warfarin (COUMADIN) 5 MG tablet Take 1 tablet by mouth daily 4/7/22   NOMI Fermin   meclizine (ANTIVERT) 12.5 MG tablet TAKE ONE TABLET THREE TIMES DAILY AS NEEDED FOR DIZZINESS 4/4/22   Gabbi Bess MD   furosemide (LASIX) 40 MG tablet Take 1 tablet by mouth daily 3/29/22 6/27/22  Gabbi Bess MD   cloNIDine (CATAPRES) 0.1 MG tablet Take 1 tablet by mouth 2 times daily 3/29/22 6/27/22  Gabbi Bess MD   tamsulosin (FLOMAX) 0.4 MG capsule Take 1 capsule by mouth 2 times daily 3/24/22 3/24/23  NOMI Hager - CNP   amiodarone (CORDARONE) 200 MG tablet TAKE ONE TABLET TWICE DAILY  Patient not taking: Reported on 5/2/2022 2/28/22   NOMI Pereira   losartan (COZAAR) 100 MG tablet Take 100 mg by mouth daily Indications: High Blood Pressure Disorder    Historical Provider, MD   Insulin Degludec (TRESIBA FLEXTOUCH) 100 UNIT/ML SOPN Inject into the skin PT TAKES ACCORDING TO ACCUCHEK. DOES NOT TAKE  AND UNDER AT HS. Historical Provider, MD   blood glucose monitor strips Test 3 times a day & as needed for symptoms of irregular blood glucose. Dispense sufficient amount for indicated testing frequency plus additional to accommodate PRN testing needs. 8/1/21   Gabbi Bess MD   Insulin Pen Needle 32G X 4 MM MISC 1 each by Does not apply route daily 6/14/21   Gabbi Bess MD   blood glucose monitor kit and supplies Dispense sufficient amount for indicated testing frequency plus additional to accommodate PRN testing needs.  5/12/21   Gabbi Bess MD   Lancets MISC 1 each by Does not apply route 3 times daily (with meals) 5/12/21   Gabbi Bess MD   insulin lispro (HUMALOG KWIKPEN) 200 UNIT/ML SOPN pen Sliding scale 160-179 2 units  180-199 3 units  200-219 4 units  220-239 5 units  240-259 6 units   260-279 7 units  280-299 8 units  300-399 9 units  340-379 10 units  380-420 11 units 5/11/21   Gabbi Bess MD   diphenhydrAMINE (BENADRYL) 25 MG tablet Take 25 mg by mouth every 6 hours as needed for Itching    Historical Provider, MD   blood glucose test strips (CONTOUR NEXT TEST) strip 1 each by In Vitro route 4 times daily As needed.  11/21/19   Malcolm Dow MD       Future Appointments   Date Time Provider Chris Camarena   5/6/2022  3:45 PM L CARDIAC REHAB PHASE II PROVIDER CLASS [facility] CARDIAC [facility]   5/11/2022 10:00 AM NOMI Lyn NP LPS [facility]   6/1/2022  2:15 PM Malcolm Dow MD LPS [facility]   6/14/2022  1:30 PM Julio Russo MD N Heart[facility]   3/24/2023 11:15 AM NOMI Diaz CNP N LPS [address] [place] [address]

## 2022-05-06 ENCOUNTER — HOSPITAL ENCOUNTER (OUTPATIENT)
Dept: CARDIAC REHAB | Age: 57
Setting detail: THERAPIES SERIES
Discharge: HOME | End: 2022-05-06
Payer: MEDICARE

## 2022-05-06 PROCEDURE — 93798 PHYS/QHP OP CAR RHAB W/ECG: CPT

## 2022-05-10 DIAGNOSIS — R55 SYNCOPE AND COLLAPSE: Primary | ICD-10-CM

## 2022-05-11 ENCOUNTER — OFFICE VISIT (OUTPATIENT)
Dept: CARDIOLOGY CLINIC | Age: 57
End: 2022-05-11
Payer: MEDICARE

## 2022-05-11 VITALS
OXYGEN SATURATION: 100 % | WEIGHT: 221 LBS | HEART RATE: 63 BPM | SYSTOLIC BLOOD PRESSURE: 122 MMHG | DIASTOLIC BLOOD PRESSURE: 68 MMHG | BODY MASS INDEX: 31.64 KG/M2 | HEIGHT: 70 IN

## 2022-05-11 DIAGNOSIS — I10 ESSENTIAL HYPERTENSION: ICD-10-CM

## 2022-05-11 DIAGNOSIS — I25.10 CORONARY ARTERY DISEASE INVOLVING NATIVE CORONARY ARTERY OF NATIVE HEART WITHOUT ANGINA PECTORIS: Primary | ICD-10-CM

## 2022-05-11 DIAGNOSIS — I48.19 PERSISTENT ATRIAL FIBRILLATION (HCC): ICD-10-CM

## 2022-05-11 DIAGNOSIS — E78.5 DYSLIPIDEMIA: ICD-10-CM

## 2022-05-11 PROCEDURE — 99214 OFFICE O/P EST MOD 30 MIN: CPT | Performed by: NURSE PRACTITIONER

## 2022-05-11 PROCEDURE — G8417 CALC BMI ABV UP PARAM F/U: HCPCS | Performed by: NURSE PRACTITIONER

## 2022-05-11 PROCEDURE — 1036F TOBACCO NON-USER: CPT | Performed by: NURSE PRACTITIONER

## 2022-05-11 PROCEDURE — 3017F COLORECTAL CA SCREEN DOC REV: CPT | Performed by: NURSE PRACTITIONER

## 2022-05-11 PROCEDURE — G8427 DOCREV CUR MEDS BY ELIG CLIN: HCPCS | Performed by: NURSE PRACTITIONER

## 2022-05-11 ASSESSMENT — ENCOUNTER SYMPTOMS
WHEEZING: 0
COUGH: 0
SORE THROAT: 0
CHEST TIGHTNESS: 0
SHORTNESS OF BREATH: 0

## 2022-05-11 NOTE — PROGRESS NOTES
[facility] Cardiology   Established Patient Office Visit   [facility]  [address] [address] [facility]  [phone]        OFFICE VISIT:  2022    Mari Caceres - : 1965    Reason For Visit:  Fara Rankin is a 60 y.o. male who is here for Follow-up    1. Coronary artery disease involving native coronary artery of native heart without angina pectoris    2. Essential hypertension    3. Dyslipidemia    4. Persistent atrial fibrillation [facility])        Patient with a history of coronary artery disease/CABG x3 with Romero maze radiofrequency and cryoablation for persistent atrial fib on 2021, hypertension and dyslipidemia.     Patient was last seen on 3/15/2022 by Dr. Liliana Robles for status post ablation of atrial fibrillation. Twelve-lead EKG showing regular rhythm 68 bpm demonstrating a junctional rhythm as well per clinic note. Patient is due to see again in clinic in 3 months with Dr. Liliana Robles.     He is a patient of Dr Molly Mckeon to clinic on 2022 with complaints of dizziness and presyncopal symptoms for about 2 months. A 14-day ZIO patch was placed. This showed:  1. A flutter burden 100% on Eliquis. Rates well controlled averaging 61 bpm.    Patient presents to clinic today for follow-up of the aforementioned test.  Patient continues to have periodic episodes throughout the week of lightheadedness and dizziness. There is no syncope.           Subjective    Mari Caceres is a 60 y.o. male with the following history as recorded in [facility]:    Patient Active Problem List    Diagnosis Date Noted    Abnormal stress test     Benign paroxysmal positional vertigo due to bilateral vestibular disorder 2022    Dizziness 2022    Status post coronary artery bypass graft     Stage 3 chronic kidney disease ([facility] [address] 2021    Fluid overload 2021    CAD in native artery 2021    Longstanding persistent atrial fibrillation (HCC)     Amputation of left middle finger 11/09/2021    Cellulitis 10/25/2021    Proliferative diabetic retinopathy of both eyes without macular edema associated with type 2 diabetes mellitus ([facility] 07/06/2021    Obstructive sleep apnea 07/02/2021    Chronic heart failure with preserved ejection fraction ([facility] 02/21/2021    Diabetic ulcer of right midfoot associated with type 2 diabetes mellitus, with fat layer exposed ([facility] 09/14/2020    Charcot foot due to diabetes mellitus ([facility] 06/26/2020    Personal history of noncompliance with medical treatment and regimen 06/25/2020    Class 2 obesity due to excess calories in adult 06/12/2020    Hyponatremia 03/21/2019    Type 2 diabetes mellitus with diabetic polyneuropathy, with long-term current use of insulin ([facility]) 03/21/2019    Shortness of breath 12/18/2014    Fatigue 12/18/2014    HTN (hypertension)     Hyperlipidemia      Current Outpatient Medications   Medication Sig Dispense Refill    vitamin D (ERGOCALCIFEROL) 1.25 MG (15073 UT) CAPS capsule Take 1 capsule by mouth once a week Takes on Friday 5 capsule 1    atorvastatin (LIPITOR) 20 MG tablet TAKE ONE TABLET DAILY 90 tablet 1    ASPIRIN LOW DOSE 81 MG EC tablet TAKE ONE TABLET  DAILY (Patient taking differently: 81 mg Every other day) 90 tablet 1    metoprolol tartrate (LOPRESSOR) 50 MG tablet TAKE ONE TABLET  TWO TIMES DAILY 180 tablet 0    metFORMIN (GLUCOPHAGE) 1000 MG tablet TAKE ONE TABLET TWICE DAILY WITH MEALS 180 tablet 0    warfarin (COUMADIN) 5 MG tablet Take 1 tablet by mouth daily 30 tablet 3    meclizine (ANTIVERT) 12.5 MG tablet TAKE ONE TABLET THREE TIMES DAILY AS NEEDED FOR DIZZINESS 30 tablet 0    furosemide (LASIX) 40 MG tablet Take 1 tablet by mouth daily 90 tablet 1    cloNIDine (CATAPRES) 0.1 MG tablet Take 1 tablet by mouth 2 times daily 180 tablet 1    tamsulosin (FLOMAX) 0.4 MG capsule Take 1 capsule by mouth 2 times daily 180 capsule 3    losartan (COZAAR) 100 MG tablet Take 100 mg by mouth daily Indications: High Blood Pressure Disorder      Insulin Degludec (TRESIBA FLEXTOUCH) 100 UNIT/ML SOPN Inject into the skin PT TAKES ACCORDING TO ACCUCHEK. DOES NOT TAKE  AND UNDER AT HS.  blood glucose monitor strips Test 3 times a day & as needed for symptoms of irregular blood glucose. Dispense sufficient amount for indicated testing frequency plus additional to accommodate PRN testing needs. 200 strip 3    Insulin Pen Needle 32G X 4 MM MISC 1 each by Does not apply route daily 100 each 3    blood glucose monitor kit and supplies Dispense sufficient amount for indicated testing frequency plus additional to accommodate PRN testing needs. 1 kit 0    Lancets MISC 1 each by Does not apply route 3 times daily (with meals) 300 each 3    insulin lispro (HUMALOG KWIKPEN) 200 UNIT/ML SOPN pen Sliding scale 160-179 2 units  180-199 3 units  200-219 4 units  220-239 5 units  240-259 6 units   260-279 7 units  280-299 8 units  300-399 9 units  340-379 10 units  380-420 11 units 3 pen 5    diphenhydrAMINE (BENADRYL) 25 MG tablet Take 25 mg by mouth every 6 hours as needed for Itching      blood glucose test strips (CONTOUR NEXT TEST) strip 1 each by In Vitro route 4 times daily As needed. 200 each 3     No current facility-administered medications for this visit.      Allergies: Contrast [iodides], Neomycin-bacitracin zn-polymyx, Neosporin [neomycin-polymyxin-gramicidin], and Pcn [penicillins]  Past Medical History:   Diagnosis Date    Acalculous cholecystitis 12/29/2015    LIAN (acute kidney injury) ([facility] [address] 3/21/2019    Allergic rhinitis, cause unspecified     Atrial fibrillation, chronic (HCC)     sees [facility] cardiology    Bacteremia due to methicillin resistant Staphylococcus aureus 10/27/2021    Bone spur     bilateral elbows    CAD (coronary artery disease)     Charcot's joint of right foot     Chicken pox     Closed supracondylar fracture of elbow     fall    Diabetic ulcer of right midfoot associated with type 2 diabetes mellitus, with fat layer exposed ([address] 9/14/2020    Diabetic ulcer of right midfoot associated with type 2 diabetes mellitus, with fat layer exposed ([address] 9/14/2020    History of MRSA infection     scalp/facial and on back    HTN (hypertension)     Hyperlipidemia     Hypoglycemic encephalopathy     Impotence of organic origin     MDRO (multiple drug resistant organisms) resistance     MRSA (methicillin resistant staph aureus) culture positive 11/2021    MRSA LT HAND    Other dyspnea and respiratory abnormality     Other specified erythematous condition(695.89)     Permanent atrial fibrillation ([address] 2/18/2021    Personal history of other infectious and parasitic disease     Salmonella     Subtherapeutic international normalized ratio (INR) 4/19/2021    Supratherapeutic INR 4/14/2021    Type II or unspecified type diabetes mellitus without mention of complication, uncontrolled 1994     Past Surgical History:   Procedure Laterality Date    AMPUTATION  11/01/2021    CARDIAC CATHETERIZATION      CARDIOVERSION  03/2017    CATARACT REMOVAL  10/2021    COLONOSCOPY  02/24/2020    Dr RITIKA Fraire-Melanosis coli throughout the entire colon-AP, 5 yr recall    CORONARY ARTERY BYPASS GRAFT MAZE PROCEDURE N/A 12/14/2021    CORONARY ARTERY BYPASS GRAFT X3 WITH LEFT INTERNAL MAMMARY ARTERY WITH ENDOSCOPIC VEIN HARVESTING WITH PERFUSION, TRANSESOPHAGEAL ECHOCARDIOGRAM, BIATRIAL RODRIGUES-MAZE RF AND CRYOABLATIONAND ATRICLIP performed by Doris Waldron MD at [facility] [address]      HAND SURGERY Left 10/31/2021    HAND INCISION AND DRAINAGE Partial Amputation Middle Finger performed by Alok Castro MD at [address] [facility] Left 09/17/2020    OPEN REDUCTION INTERNAL FIXATION LEFT SUPRACONDYLAR FRACTURE performed by Mame Jara MD at [facility] OR    MALIGNANT SKIN LESION EXCISION      X 2    TONSILLECTOMY       Family History   Problem Relation Age of Onset    Stroke Maternal Grandmother     Cancer Maternal Grandmother     Stroke Paternal Grandmother     Diabetes Paternal Grandmother     Cancer Father     Heart Disease Father     Lung Cancer Father     Asthma Paternal Grandfather     Heart Disease Paternal Grandfather     Heart Disease Maternal Grandfather     Colon Cancer Neg Hx     Colon Polyps Neg Hx     Esophageal Cancer Neg Hx     Liver Cancer Neg Hx     Liver Disease Neg Hx     Rectal Cancer Neg Hx     Stomach Cancer Neg Hx      Social History     Tobacco Use    Smoking status: Never Smoker    Smokeless tobacco: Never Used   Substance Use Topics    Alcohol use: No          Review of Systems:    Review of Systems   Constitutional: Positive for fatigue. Negative for activity change, chills, diaphoresis and fever. HENT: Negative for congestion and sore throat. Respiratory: Negative for cough, chest tightness, shortness of breath and wheezing. Cardiovascular: Negative for chest pain, palpitations and leg swelling. Neurological: Positive for dizziness and light-headedness. Negative for syncope and headaches. Psychiatric/Behavioral: Negative for confusion. The patient is not nervous/anxious. Objective:    /68   Pulse 63   Ht 5' 10\" (1.778 m)   Wt 221 lb (100.2 kg)   SpO2 100%   BMI 31.71 kg/m²     GENERAL - well developed and well nourished, conversant  HEENT   PERRLA, Hearing appears normal, gentleman lids are normal.  External inspection of ears and nose appear normal.  NECK - no thyromegaly, no JVD, trachea is in the midline  CARDIOVASCULAR  PMI is in the mid line clavicular position, Normal S1 and S2 with no systolic murmur. No S3 or S4    PULMONARY  no respiratory distress. No wheezes or rales. Lungs are clear to ausculation, normal respiratory effort.   ABDOMEN   soft, non tender, no rebound  MUSCULOSKELETAL   range of motion of the upper and lower extermites appears normal and equal and is without pain EXTREMITIES - no significant edema   NEUROLOGIC  gait and station are normal  SKIN - turgor is normal, can warm and dry. PSYCHIATRIC - normal mood and affect, alert and orientated x 3,      ASSESSMENT:    ALL THE CARDIOLOGY PROBLEMS ARE LISTED ABOVE; HOWEVER, THE FOLLOWING SPECIFIC CARDIAC PROBLEMS / CONDITIONS WERE ADDRESSED AND TREATED DURING THE OFFICE VISIT TODAY:                                                                                            MEDICAL DECISION MAKING             Cardiac Specific Problem / Diagnosis  Discussion and Data Reviewed Diagnostic Procedures Ordered Management Options Selected           1. Persistent atrial fibs  Shows no change   Review and summation of old records: And results of ZIO patch showing 100% a flutter burden. Will schedule patient with Dr. Candice Gaming electrophysiologist for further evaluation and treatment. Patient is on Lopressor 50 mg twice daily. Coumadin 5 mg daily for stroke prevention. No Continue current medications:     Yes           2. CAD  Stable   Chest pain. Patient is on aspirin, Lipitor and Lopressor. No Continue current medications:    Yes           3. Hypertension Well Controlled  blood pressure in the office today 122/68. O2 sat 100%. Patient is on losartan 100 mg daily. No Continue current medications: Yes                     No orders of the defined types were placed in this encounter. No orders of the defined types were placed in this encounter. Discussed with patient. Return in about 1 week (around 5/18/2022) for Dr Candice Gaming (EP) . I greatly appreciate the opportunity to meet Joseph Vazquez and your confidence in allowing me to participate in his cardiovascular care. NOMI Davenport NP  5/11/2022 10:00 AM CDT                    This dictation was generated by voice recognition computer software.  Although all attempts are made to edit dictation for accuracy, there may be errors in the transcription that are not intended.

## 2022-05-19 ENCOUNTER — OFFICE VISIT (OUTPATIENT)
Dept: CARDIOLOGY CLINIC | Age: 57
End: 2022-05-19
Payer: MEDICARE

## 2022-05-19 ENCOUNTER — ANTI-COAG VISIT (OUTPATIENT)
Dept: CARDIOLOGY CLINIC | Age: 57
End: 2022-05-19
Payer: MEDICARE

## 2022-05-19 VITALS
BODY MASS INDEX: 31.07 KG/M2 | WEIGHT: 217 LBS | DIASTOLIC BLOOD PRESSURE: 68 MMHG | SYSTOLIC BLOOD PRESSURE: 116 MMHG | HEIGHT: 70 IN | OXYGEN SATURATION: 99 % | HEART RATE: 61 BPM

## 2022-05-19 DIAGNOSIS — I48.19 PERSISTENT ATRIAL FIBRILLATION (HCC): Primary | ICD-10-CM

## 2022-05-19 LAB
INTERNATIONAL NORMALIZATION RATIO, POC: 1.6
PROTHROMBIN TIME, POC: NORMAL

## 2022-05-19 PROCEDURE — 85610 PROTHROMBIN TIME: CPT | Performed by: CLINICAL NURSE SPECIALIST

## 2022-05-19 PROCEDURE — 93793 ANTICOAG MGMT PT WARFARIN: CPT | Performed by: CLINICAL NURSE SPECIALIST

## 2022-05-19 PROCEDURE — 3017F COLORECTAL CA SCREEN DOC REV: CPT | Performed by: INTERNAL MEDICINE

## 2022-05-19 PROCEDURE — 1036F TOBACCO NON-USER: CPT | Performed by: INTERNAL MEDICINE

## 2022-05-19 PROCEDURE — G8427 DOCREV CUR MEDS BY ELIG CLIN: HCPCS | Performed by: INTERNAL MEDICINE

## 2022-05-19 PROCEDURE — 93000 ELECTROCARDIOGRAM COMPLETE: CPT | Performed by: INTERNAL MEDICINE

## 2022-05-19 PROCEDURE — 99215 OFFICE O/P EST HI 40 MIN: CPT | Performed by: INTERNAL MEDICINE

## 2022-05-19 PROCEDURE — G8417 CALC BMI ABV UP PARAM F/U: HCPCS | Performed by: INTERNAL MEDICINE

## 2022-05-19 NOTE — PROGRESS NOTES
Corinne Noun is a 60 y.o. male presents with atrial flutter and history of atrial fibrillation. The patient had a rough past year involving coronary artery bypass grafting in December as well as a MRSA infection inducing a loss of the finger in January and diabetic coma. He does not clearly have any symptoms associated with the arrhythmias. He has attempted cardioversion twice and was referred to me for further work-up by our nurse practitioner. He has some dizziness when he walks but this sounds more like orthostasis. He denies any chest pain shortness of breath or exercise intolerance since the rhythm was diagnosed. Is any palpitations. Review of Systems   Constitutional: Negative for fever, chills, diaphoresis, activity change, appetite change, fatigue and unexpected weight change. Eyes: Negative for photophobia, pain, redness and visual disturbance. Respiratory: Negative for apnea, cough, chest tightness, shortness of breath, wheezing and stridor. Cardiovascular: Negative for chest pain, palpitations and leg swelling. Gastrointestinal: Negative for abdominal distention. Genitourinary: Negative for dysuria, urgency and frequency. Musculoskeletal: Negative for myalgias, arthralgias and gait problem. Skin: Negative for color change, pallor, rash and wound. Neurological: Negative for dizziness, tremors, speech difficulty, weakness and numbness. Hematological: Does not bruise/bleed easily. Psychiatric/Behavioral: Negative.         Past Medical History:   Diagnosis Date    Acalculous cholecystitis 12/29/2015    LIAN (acute kidney injury) ([facility] [address] 3/21/2019    Allergic rhinitis, cause unspecified     Atrial fibrillation, chronic (HCC)     sees [facility] cardiology    Bacteremia due to methicillin resistant Staphylococcus aureus 10/27/2021    Bone spur     bilateral elbows    CAD (coronary artery disease)     Charcot's joint of right foot     Chicken pox     Closed supracondylar fracture of elbow     fall    Diabetic ulcer of right midfoot associated with type 2 diabetes mellitus, with fat layer exposed ([facility] [address] 9/14/2020    Diabetic ulcer of right midfoot associated with type 2 diabetes mellitus, with fat layer exposed ([address] 9/14/2020    History of MRSA infection     scalp/facial and on back    HTN (hypertension)     Hyperlipidemia     Hypoglycemic encephalopathy     Impotence of organic origin     MDRO (multiple drug resistant organisms) resistance     MRSA (methicillin resistant staph aureus) culture positive 11/2021    MRSA LT HAND    Other dyspnea and respiratory abnormality     Other specified erythematous condition(695.89)     Permanent atrial fibrillation ([address] 2/18/2021    Personal history of other infectious and parasitic disease     Salmonella     Subtherapeutic international normalized ratio (INR) 4/19/2021    Supratherapeutic INR 4/14/2021    Type II or unspecified type diabetes mellitus without mention of complication, uncontrolled 1994       Past Surgical History:   Procedure Laterality Date    AMPUTATION  11/01/2021    CARDIAC CATHETERIZATION      CARDIOVERSION  03/2017    CATARACT REMOVAL  10/2021    COLONOSCOPY  02/24/2020    Dr RITIKA Fraire-Melanosis coli throughout the entire colon-AP, 5 yr recall    CORONARY ARTERY BYPASS GRAFT MAZE PROCEDURE N/A 12/14/2021    CORONARY ARTERY BYPASS GRAFT X3 WITH LEFT INTERNAL MAMMARY ARTERY WITH ENDOSCOPIC VEIN HARVESTING WITH PERFUSION, TRANSESOPHAGEAL ECHOCARDIOGRAM, BIATRIAL RODRIGUES-MAZE RF AND CRYOABLATIONAND ATRICLIP performed by Shanti Harris MD at [facility] [address]      HAND SURGERY Left 10/31/2021    HAND INCISION AND DRAINAGE Partial Amputation Middle Finger performed by Jimmy Saleem MD at [address] [facility] Left 09/17/2020    OPEN REDUCTION INTERNAL FIXATION LEFT SUPRACONDYLAR FRACTURE performed by Phyllis Zuniga MD at [address]      X 2    TONSILLECTOMY         Family History   Problem Relation Age of Onset    Stroke Maternal Grandmother     Cancer Maternal Grandmother     Stroke Paternal Grandmother     Diabetes Paternal Grandmother     Cancer Father     Heart Disease Father     Lung Cancer Father     Asthma Paternal Grandfather     Heart Disease Paternal Grandfather     Heart Disease Maternal Grandfather     Colon Cancer Neg Hx     Colon Polyps Neg Hx     Esophageal Cancer Neg Hx     Liver Cancer Neg Hx     Liver Disease Neg Hx     Rectal Cancer Neg Hx     Stomach Cancer Neg Hx        Social History     Socioeconomic History    Marital status:      Spouse name: Not on file    Number of children: Not on file    Years of education: Not on file    Highest education level: Not on file   Occupational History    Not on file   Tobacco Use    Smoking status: Never Smoker    Smokeless tobacco: Never Used   Vaping Use    Vaping Use: Never used   Substance and Sexual Activity    Alcohol use: No    Drug use: No    Sexual activity: Not on file   Other Topics Concern    Not on file   Social History Narrative        Referred to  for assistance with Meals on Wheels        CODE- Full [address] 44 (Parent) [phone]     DOMICILE- Single family home within city limits     DME-     TRANSPORTATION- patient has dependable transportation      Social Determinants of Health     Financial Resource Strain: Low Risk     Difficulty of Paying Living Expenses: Not hard at all   Food Insecurity: No Food Insecurity    Worried About Running Out of Food in the Last Year: Never true    [address] in the Last Year: Never true   Transportation Needs:     Lack of Transportation (Medical): Not on file    Lack of Transportation (Non-Medical):  Not on file   Physical Activity: Sufficiently Active    Days of Exercise per Week: 5 days    Minutes of Exercise per Session: 60 min   Stress:     Feeling of Stress : Not on file   Social Connections:     Frequency of Communication with Friends and Family: Not on file    Frequency of Social Gatherings with Friends and Family: Not on file    Attends Synagogue Services: Not on file    Active Member of Clubs or Organizations: Not on file    Attends Club or Organization Meetings: Not on file    Marital Status: Not on file   Intimate Partner Violence:     Fear of Current or Ex-Partner: Not on file    Emotionally Abused: Not on file    Physically Abused: Not on file    Sexually Abused: Not on file   Housing Stability:     Unable to Pay for Housing in the Last Year: Not on file    Number of [place] in the Last Year: Not on file    Unstable Housing in the Last Year: Not on file       Allergies   Allergen Reactions    Contrast [Iodides]      N & V. PT STATED HE HAD A MRI WITH CONTRAST ON 10/25/2021.     Neomycin-Bacitracin Zn-Polymyx Rash    Neosporin [Neomycin-Polymyxin-Gramicidin] Rash    Pcn [Penicillins] Rash         Current Outpatient Medications:     vitamin D (ERGOCALCIFEROL) 1.25 MG (34030 UT) CAPS capsule, Take 1 capsule by mouth once a week Takes on Friday, Disp: 5 capsule, Rfl: 1    atorvastatin (LIPITOR) 20 MG tablet, TAKE ONE TABLET DAILY, Disp: 90 tablet, Rfl: 1    ASPIRIN LOW DOSE 81 MG EC tablet, TAKE ONE TABLET  DAILY (Patient taking differently: 81 mg Every other day), Disp: 90 tablet, Rfl: 1    metoprolol tartrate (LOPRESSOR) 50 MG tablet, TAKE ONE TABLET  TWO TIMES DAILY, Disp: 180 tablet, Rfl: 0    metFORMIN (GLUCOPHAGE) 1000 MG tablet, TAKE ONE TABLET TWICE DAILY WITH MEALS, Disp: 180 tablet, Rfl: 0    warfarin (COUMADIN) 5 MG tablet, Take 1 tablet by mouth daily, Disp: 30 tablet, Rfl: 3    meclizine (ANTIVERT) 12.5 MG tablet, TAKE ONE TABLET THREE TIMES DAILY AS NEEDED FOR DIZZINESS, Disp: 30 tablet, Rfl: 0    furosemide (LASIX) 40 MG tablet, Take 1 tablet by mouth daily, Disp: 90 tablet, Rfl: 1    cloNIDine (CATAPRES) 0.1 MG tablet, Take 1 tablet by mouth 2 times daily, Disp: 180 tablet, Rfl: 1    tamsulosin (FLOMAX) 0.4 MG capsule, Take 1 capsule by mouth 2 times daily, Disp: 180 capsule, Rfl: 3    losartan (COZAAR) 100 MG tablet, Take 100 mg by mouth daily Indications: High Blood Pressure Disorder, Disp: , Rfl:     Insulin Degludec (TRESIBA FLEXTOUCH) 100 UNIT/ML SOPN, Inject into the skin PT TAKES ACCORDING TO ACCUCHEK. DOES NOT TAKE  AND UNDER AT HS., Disp: , Rfl:     blood glucose monitor strips, Test 3 times a day & as needed for symptoms of irregular blood glucose. Dispense sufficient amount for indicated testing frequency plus additional to accommodate PRN testing needs. , Disp: 200 strip, Rfl: 3    Insulin Pen Needle 32G X 4 MM MISC, 1 each by Does not apply route daily, Disp: 100 each, Rfl: 3    blood glucose monitor kit and supplies, Dispense sufficient amount for indicated testing frequency plus additional to accommodate PRN testing needs. , Disp: 1 kit, Rfl: 0    Lancets MISC, 1 each by Does not apply route 3 times daily (with meals), Disp: 300 each, Rfl: 3    insulin lispro (HUMALOG KWIKPEN) 200 UNIT/ML SOPN pen, Sliding scale 160-179 2 units 180-199 3 units 200-219 4 units 220-239 5 units 240-259 6 units  260-279 7 units 280-299 8 units 300-399 9 units 340-379 10 units 380-420 11 units, Disp: 3 pen, Rfl: 5    diphenhydrAMINE (BENADRYL) 25 MG tablet, Take 25 mg by mouth every 6 hours as needed for Itching, Disp: , Rfl:     blood glucose test strips (CONTOUR NEXT TEST) strip, 1 each by In Vitro route 4 times daily As needed. , Disp: 200 each, Rfl: 3    PE:  Vitals:    05/19/22 1110   BP: 116/68   Pulse: 61   SpO2: 99%   Weight: 217 lb (98.4 kg)   Height: 5' 10\" (1.778 m)       Estimated body mass index is 31.14 kg/m² as calculated from the following:    Height as of this encounter: 5' 10\" (1.778 m). Weight as of this encounter: 217 lb (98.4 kg). Constitutional: He is oriented to person, place, and time. closure with an atrial clip and a Romero maze procedure during his bypass surgery. If repeat transesophageal echocardiogram in 6 months reveals complete closure of the left atrial appendage we might consider being off the anticoagulation at that time. Disposition - RTC in 12 months or sooner if needed      Please do not hesitate to contact me for any questions or concerns. Dr. Levi Watters  Electrophysiology and Cardiology  [facility] [facility] [place], Cardiology  [phone]

## 2022-05-20 ENCOUNTER — CARE COORDINATION (OUTPATIENT)
Dept: CARE COORDINATION | Age: 57
End: 2022-05-20

## 2022-05-20 NOTE — CARE COORDINATION
ACM attempted contact with patient and unable to reach. Left voicemail with my cell number for call back. Will await return call from patient.     [address], [address] [place]

## 2022-05-25 ENCOUNTER — CARE COORDINATION (OUTPATIENT)
Dept: CARE COORDINATION | Age: 57
End: 2022-05-25

## 2022-05-25 NOTE — CARE COORDINATION
Ambulatory Care Coordination Note  2022  CM Risk Score: 11  Charlson 10 Year Mortality Risk Score: 100%     ACC: Fawn Callahan RN    Summary Note:    Ambulatory Care Nurse contacted the family via telephone to perform follow-up call for ambulatory care management. ACM Verified name and  with family as identifiers. Provided introduction to self, and explanation of the ACM role.       Vertigo - Dizziness  Patient reports dizziness . The dizziness has been present for since January. The patient describes the symptoms as vertigo and lightheadedness. Symptoms are exacerbated by bending motion The patient also complains of headaches. Patient denies . He has been treated with meclizine (Antivert) with poor improvement. Patient reports symptoms are present when ambulating  More than 30ft, repositioning. Symptoms are relieved by rest. ACM encouraged patient to report worsening symptoms to PCP. Patient reports symptoms are slightly improving but have not resolved.      Education provided during encounter   · Fall risk   · Medication compliance   · Periods of rest   · Maintaining adequate hydration      Plan   · Patient to follow-up with specialist as needed   · Patient to follow-up with PCP with new or worsening symptoms      Patient verbalized understanding of teaching.      Care Coordination Interventions    Program Enrollment: Complex Care  Referral from Primary Care Provider: No  Suggested Interventions and Community Resources  Fall Risk Prevention: Completed  Disease Specific Clinic: In Process         Goals Addressed                 This Visit's Progress     Reduce Falls    Improving     I will reduce my risk of falls by the following: Remove rugs or use non slip rugs  Install grab bars in bathroom  Use walking aids like cane or walker  Continue Cardiac Therapy as ordered by provider     Barriers: lack of motivation, time constraints, and medication side effects  Plan for overcoming my barriers: Patient will notify provider of falls   Patient will take medication as prescribed by providers notifying providers of adverse symptoms . Confidence: 5/10  Anticipated Goal Completion Date: 6/2022            Prior to Admission medications    Medication Sig Start Date End Date Taking? Authorizing Provider   vitamin D (ERGOCALCIFEROL) 1.25 MG (28991 UT) CAPS capsule Take 1 capsule by mouth once a week Takes on Friday 5/2/22  Yes Adriana Bess MD   atorvastatin (LIPITOR) 20 MG tablet TAKE ONE TABLET DAILY 4/27/22  Yes Gabbi Bess MD   ASPIRIN LOW DOSE 81 MG EC tablet TAKE ONE TABLET  DAILY  Patient taking differently: 81 mg Every other day 4/13/22  Yes NOMI Dickinson - NP   metoprolol tartrate (LOPRESSOR) 50 MG tablet TAKE ONE TABLET  TWO TIMES DAILY 4/13/22  Yes Adriana Bess MD   metFORMIN (GLUCOPHAGE) 1000 MG tablet TAKE ONE TABLET TWICE DAILY WITH MEALS 4/13/22  Yes Adriana Bess MD   warfarin (COUMADIN) 5 MG tablet Take 1 tablet by mouth daily 4/7/22  Yes NOMI Wise   meclizine (ANTIVERT) 12.5 MG tablet TAKE ONE TABLET THREE TIMES DAILY AS NEEDED FOR DIZZINESS 4/4/22  Yes Adriana Bess MD   furosemide (LASIX) 40 MG tablet Take 1 tablet by mouth daily 3/29/22 6/27/22 Yes Gabbi Bess MD   cloNIDine (CATAPRES) 0.1 MG tablet Take 1 tablet by mouth 2 times daily 3/29/22 6/27/22 Yes Gabbi Bess MD   tamsulosin (FLOMAX) 0.4 MG capsule Take 1 capsule by mouth 2 times daily 3/24/22 3/24/23 Yes NOMI Julian - CNP   losartan (COZAAR) 100 MG tablet Take 100 mg by mouth daily Indications: High Blood Pressure Disorder   Yes Historical Provider, MD   blood glucose monitor strips Test 3 times a day & as needed for symptoms of irregular blood glucose. Dispense sufficient amount for indicated testing frequency plus additional to accommodate PRN testing needs.  8/1/21  Yes Gabbi Bess MD   Insulin Pen Needle 32G X 4 MM MISC 1 each by Does not apply route daily 6/14/21  Yes Gabbi Bess MD   blood glucose monitor kit and supplies Dispense sufficient amount for indicated testing frequency plus additional to accommodate PRN testing needs. 5/12/21  Yes Gabbi Bess MD   Lancets MISC 1 each by Does not apply route 3 times daily (with meals) 5/12/21  Yes Gabbi Bess MD   insulin lispro (HUMALOG KWIKPEN) 200 UNIT/ML SOPN pen Sliding scale 160-179 2 units  180-199 3 units  200-219 4 units  220-239 5 units  240-259 6 units   260-279 7 units  280-299 8 units  300-399 9 units  340-379 10 units  380-420 11 units 5/11/21  Yes Gabbi Bess MD   diphenhydrAMINE (BENADRYL) 25 MG tablet Take 25 mg by mouth every 6 hours as needed for Itching   Yes Historical Provider, MD   blood glucose test strips (CONTOUR NEXT TEST) strip 1 each by In Vitro route 4 times daily As needed. 11/21/19  Yes Gabbi Bess MD   Insulin Degludec (TRESIBA FLEXTOUCH) 100 UNIT/ML SOPN Inject into the skin PT TAKES ACCORDING TO ACCUCHEK. DOES NOT TAKE  AND UNDER AT HS.     Historical Provider, MD       Future Appointments   Date Time Provider Chris Camarena   6/1/2022  2:15 PM Marisol Broussard MD [facility]   6/6/2022  2:15 PM SCHEDULE, [facility]   6/14/2022  1:30 PM Kirk Beyer MD [facility]   3/24/2023 11:15 AM NOMI Cox - CNP N [facility]   5/23/2023 11:00 AM An Enrique MD N [facility]

## 2022-05-26 ENCOUNTER — CARE COORDINATION (OUTPATIENT)
Dept: CARE COORDINATION | Age: 57
End: 2022-05-26

## 2022-05-26 NOTE — CARE COORDINATION
ACC: Erick Jimenez RN CM Risk Score: 11  Charlson 10 Year Mortality Risk Score: 100%     Care Coordination Interventions    Program Enrollment: Complex Care  Referral from Primary Care Provider: No  Suggested Interventions and Community Resources  Fall Risk Prevention: Completed  Disease Specific Clinic: In Process         Ambulatory Care Nurse contacted the family via telephone to perform follow-up call for ambulatory care management. ACM Left voicemail with my cell number for call back. ACM left HIPAA compliant voicemail notifying patient labs orders are available for patient to complete required labs for next weeks appointment. Will await return call from patient.

## 2022-05-26 NOTE — TELEPHONE ENCOUNTER
Thank you I will notify the patient the orders are available, Have a great day.        [address], [address] [place]

## 2022-05-31 DIAGNOSIS — L97.412 DIABETIC ULCER OF RIGHT MIDFOOT ASSOCIATED WITH TYPE 2 DIABETES MELLITUS, WITH FAT LAYER EXPOSED (HCC): ICD-10-CM

## 2022-05-31 DIAGNOSIS — E11.621 DIABETIC ULCER OF RIGHT MIDFOOT ASSOCIATED WITH TYPE 2 DIABETES MELLITUS, WITH FAT LAYER EXPOSED (HCC): ICD-10-CM

## 2022-05-31 DIAGNOSIS — I50.30 HEART FAILURE WITH PRESERVED EJECTION FRACTION, UNSPECIFIED HF CHRONICITY (HCC): ICD-10-CM

## 2022-05-31 DIAGNOSIS — E78.2 MIXED HYPERLIPIDEMIA: ICD-10-CM

## 2022-05-31 DIAGNOSIS — Z79.4 TYPE 2 DIABETES MELLITUS WITH DIABETIC POLYNEUROPATHY, WITH LONG-TERM CURRENT USE OF INSULIN (HCC): Chronic | ICD-10-CM

## 2022-05-31 DIAGNOSIS — E11.42 TYPE 2 DIABETES MELLITUS WITH DIABETIC POLYNEUROPATHY, WITH LONG-TERM CURRENT USE OF INSULIN (HCC): Chronic | ICD-10-CM

## 2022-05-31 LAB
ALBUMIN SERPL-MCNC: 4.5 G/DL (ref 3.5–5.2)
ALP BLD-CCNC: 137 U/L (ref 40–130)
ALT SERPL-CCNC: 21 U/L (ref 5–41)
ANION GAP SERPL CALCULATED.3IONS-SCNC: 10 MMOL/L (ref 7–19)
AST SERPL-CCNC: 19 U/L (ref 5–40)
BILIRUB SERPL-MCNC: 0.8 MG/DL (ref 0.2–1.2)
BILIRUBIN DIRECT: 0.4 MG/DL (ref 0–0.3)
BILIRUBIN, INDIRECT: 0.4 MG/DL (ref 0.1–1)
BUN BLDV-MCNC: 21 MG/DL (ref 6–20)
CALCIUM SERPL-MCNC: 9.7 MG/DL (ref 8.6–10)
CHLORIDE BLD-SCNC: 97 MMOL/L (ref 98–111)
CHOLESTEROL, TOTAL: 114 MG/DL (ref 160–199)
CO2: 32 MMOL/L (ref 22–29)
CREAT SERPL-MCNC: 1.1 MG/DL (ref 0.5–1.2)
GFR AFRICAN AMERICAN: >59
GFR NON-AFRICAN AMERICAN: >60
GLUCOSE BLD-MCNC: 151 MG/DL (ref 74–109)
HBA1C MFR BLD: 9.9 % (ref 4–6)
HCT VFR BLD CALC: 40.6 % (ref 42–52)
HDLC SERPL-MCNC: 44 MG/DL (ref 55–121)
HEMOGLOBIN: 13.2 G/DL (ref 14–18)
LDL CHOLESTEROL CALCULATED: 59 MG/DL
MCH RBC QN AUTO: 31.1 PG (ref 27–31)
MCHC RBC AUTO-ENTMCNC: 32.5 G/DL (ref 33–37)
MCV RBC AUTO: 95.8 FL (ref 80–94)
PDW BLD-RTO: 13.6 % (ref 11.5–14.5)
PLATELET # BLD: 217 K/UL (ref 130–400)
PMV BLD AUTO: 11.3 FL (ref 9.4–12.4)
POTASSIUM SERPL-SCNC: 3.6 MMOL/L (ref 3.5–5)
RBC # BLD: 4.24 M/UL (ref 4.7–6.1)
SODIUM BLD-SCNC: 139 MMOL/L (ref 136–145)
TOTAL PROTEIN: 7.7 G/DL (ref 6.6–8.7)
TRIGL SERPL-MCNC: 55 MG/DL (ref 0–149)
WBC # BLD: 5.7 K/UL (ref 4.8–10.8)

## 2022-06-01 ENCOUNTER — OFFICE VISIT (OUTPATIENT)
Dept: INTERNAL MEDICINE | Age: 57
End: 2022-06-01
Payer: MEDICARE

## 2022-06-01 VITALS
HEART RATE: 62 BPM | SYSTOLIC BLOOD PRESSURE: 138 MMHG | OXYGEN SATURATION: 97 % | HEIGHT: 70 IN | BODY MASS INDEX: 31.64 KG/M2 | WEIGHT: 221 LBS | DIASTOLIC BLOOD PRESSURE: 70 MMHG

## 2022-06-01 DIAGNOSIS — N18.30 STAGE 3 CHRONIC KIDNEY DISEASE, UNSPECIFIED WHETHER STAGE 3A OR 3B CKD (HCC): ICD-10-CM

## 2022-06-01 DIAGNOSIS — I50.32 CHRONIC HEART FAILURE WITH PRESERVED EJECTION FRACTION (HCC): ICD-10-CM

## 2022-06-01 DIAGNOSIS — I48.11 LONGSTANDING PERSISTENT ATRIAL FIBRILLATION (HCC): ICD-10-CM

## 2022-06-01 DIAGNOSIS — Z79.4 TYPE 2 DIABETES MELLITUS WITH DIABETIC POLYNEUROPATHY, WITH LONG-TERM CURRENT USE OF INSULIN (HCC): ICD-10-CM

## 2022-06-01 DIAGNOSIS — E78.2 MIXED HYPERLIPIDEMIA: ICD-10-CM

## 2022-06-01 DIAGNOSIS — E11.3593 PROLIFERATIVE DIABETIC RETINOPATHY OF BOTH EYES WITHOUT MACULAR EDEMA ASSOCIATED WITH TYPE 2 DIABETES MELLITUS (HCC): ICD-10-CM

## 2022-06-01 DIAGNOSIS — E11.42 TYPE 2 DIABETES MELLITUS WITH DIABETIC POLYNEUROPATHY, WITH LONG-TERM CURRENT USE OF INSULIN (HCC): ICD-10-CM

## 2022-06-01 DIAGNOSIS — R39.14 BENIGN PROSTATIC HYPERPLASIA WITH INCOMPLETE BLADDER EMPTYING: ICD-10-CM

## 2022-06-01 DIAGNOSIS — N40.1 BENIGN PROSTATIC HYPERPLASIA WITH INCOMPLETE BLADDER EMPTYING: ICD-10-CM

## 2022-06-01 DIAGNOSIS — I10 PRIMARY HYPERTENSION: ICD-10-CM

## 2022-06-01 DIAGNOSIS — E11.610 CHARCOT FOOT DUE TO DIABETES MELLITUS (HCC): ICD-10-CM

## 2022-06-01 DIAGNOSIS — E55.9 HYPOVITAMINOSIS D: Primary | ICD-10-CM

## 2022-06-01 PROCEDURE — 3046F HEMOGLOBIN A1C LEVEL >9.0%: CPT | Performed by: INTERNAL MEDICINE

## 2022-06-01 PROCEDURE — 99214 OFFICE O/P EST MOD 30 MIN: CPT | Performed by: INTERNAL MEDICINE

## 2022-06-01 PROCEDURE — G8417 CALC BMI ABV UP PARAM F/U: HCPCS | Performed by: INTERNAL MEDICINE

## 2022-06-01 PROCEDURE — G8427 DOCREV CUR MEDS BY ELIG CLIN: HCPCS | Performed by: INTERNAL MEDICINE

## 2022-06-01 PROCEDURE — 1036F TOBACCO NON-USER: CPT | Performed by: INTERNAL MEDICINE

## 2022-06-01 PROCEDURE — 2022F DILAT RTA XM EVC RTNOPTHY: CPT | Performed by: INTERNAL MEDICINE

## 2022-06-01 PROCEDURE — 3017F COLORECTAL CA SCREEN DOC REV: CPT | Performed by: INTERNAL MEDICINE

## 2022-06-01 RX ORDER — INSULIN DEGLUDEC 200 U/ML
30 INJECTION, SOLUTION SUBCUTANEOUS DAILY
Qty: 4 PEN | Refills: 3 | Status: SHIPPED | OUTPATIENT
Start: 2022-06-01 | End: 2022-09-02 | Stop reason: SDUPTHER

## 2022-06-01 RX ORDER — LANCETS 30 GAUGE
1 EACH MISCELLANEOUS
Qty: 300 EACH | Refills: 3 | Status: CANCELLED | OUTPATIENT
Start: 2022-06-01

## 2022-06-01 RX ORDER — INSULIN DEGLUDEC INJECTION 100 U/ML
INJECTION, SOLUTION SUBCUTANEOUS
Status: CANCELLED | OUTPATIENT
Start: 2022-06-01

## 2022-06-01 RX ORDER — PERPHENAZINE 16 MG/1
1 TABLET, FILM COATED ORAL 4 TIMES DAILY
Qty: 200 EACH | Refills: 3 | Status: SHIPPED | OUTPATIENT
Start: 2022-06-01

## 2022-06-01 RX ORDER — ERGOCALCIFEROL 1.25 MG/1
50000 CAPSULE ORAL WEEKLY
Qty: 5 CAPSULE | Refills: 1 | Status: SHIPPED | OUTPATIENT
Start: 2022-06-01 | End: 2022-09-02 | Stop reason: SDUPTHER

## 2022-06-01 RX ORDER — TAMSULOSIN HYDROCHLORIDE 0.4 MG/1
0.4 CAPSULE ORAL 2 TIMES DAILY
Qty: 180 CAPSULE | Refills: 3 | Status: SHIPPED | OUTPATIENT
Start: 2022-06-01 | End: 2023-06-01

## 2022-06-01 ASSESSMENT — ENCOUNTER SYMPTOMS
DIARRHEA: 0
COLOR CHANGE: 0
ABDOMINAL PAIN: 0
BACK PAIN: 0
WHEEZING: 0
TROUBLE SWALLOWING: 0
STRIDOR: 0
COUGH: 0
BLOOD IN STOOL: 0
CHEST TIGHTNESS: 0
SHORTNESS OF BREATH: 0
CONSTIPATION: 1
SORE THROAT: 0

## 2022-06-01 NOTE — ASSESSMENT & PLAN NOTE
Uncontrolled, continue current medications   Patient ran out of his Toujeo 200U/ml, was given some samples and advised to check his FS and use sparingly

## 2022-06-01 NOTE — PROGRESS NOTES
Mari Caceres ( 1965) is a 60 y.o. male,  Established , here for evaluation of the following chief complaint(s).   Follow-up (30 days )      Patient was encouraged and advised to be compliant with all  medications leads an active lifestyle and promote maintaining a healthy weight, encouraged not to use cigarettes, laboratory results discussed and reviewed with patient's during this visit   ASSESSMENT/PLAN:  Problem List        High    Type 2 diabetes mellitus with diabetic polyneuropathy, with long-term current use of insulin (HCC) (Chronic)      Uncontrolled, continue current medications   Patient ran out of his Toujeo 200U/ml, was given some samples and advised to check his FS and use sparingly         Relevant Medications    insulin lispro (HUMALOG KWIKPEN) 200 UNIT/ML SOPN pen    blood glucose monitor kit and supplies    Lancets MISC    blood glucose monitor strips    metFORMIN (GLUCOPHAGE) 1000 MG tablet    Insulin Pen Needle 32G X 4 MM MISC    blood glucose test strips (CONTOUR NEXT TEST) strip    Insulin Degludec (TRESIBA FLEXTOUCH) 200 UNIT/ML SOPN    Stage 3 chronic kidney disease (HCC)      Borderline controlled, continue current medications         Proliferative diabetic retinopathy of both eyes without macular edema associated with type 2 diabetes mellitus ([address]      Monitored by specialist- no acute findings meriting change in the plan         Relevant Medications    insulin lispro (HUMALOG KWIKPEN) 200 UNIT/ML SOPN pen    metFORMIN (GLUCOPHAGE) 1000 MG tablet    Insulin Degludec (TRESIBA FLEXTOUCH) 200 UNIT/ML SOPN    Longstanding persistent atrial fibrillation (HCC)      Monitored by specialist- no acute findings meriting change in the plan         Relevant Medications    losartan (COZAAR) 100 MG tablet    furosemide (LASIX) 40 MG tablet    cloNIDine (CATAPRES) 0.1 MG tablet    warfarin (COUMADIN) 5 MG tablet    ASPIRIN LOW DOSE 81 MG EC tablet    metoprolol tartrate (LOPRESSOR) 50 MG tablet atorvastatin (LIPITOR) 20 MG tablet    Hyperlipidemia      Well-controlled, continue current medications, medication adherence emphasized and lifestyle modifications recommended         Relevant Medications    losartan (COZAAR) 100 MG tablet    furosemide (LASIX) 40 MG tablet    cloNIDine (CATAPRES) 0.1 MG tablet    warfarin (COUMADIN) 5 MG tablet    ASPIRIN LOW DOSE 81 MG EC tablet    metoprolol tartrate (LOPRESSOR) 50 MG tablet    atorvastatin (LIPITOR) 20 MG tablet    HTN (hypertension)      Borderline controlled, continue current medications         Relevant Medications    furosemide (LASIX) 40 MG tablet    cloNIDine (CATAPRES) 0.1 MG tablet    metoprolol tartrate (LOPRESSOR) 50 MG tablet    Chronic heart failure with preserved ejection fraction (HCC)      Monitored by specialist- no acute findings meriting change in the plan         Relevant Medications    losartan (COZAAR) 100 MG tablet    furosemide (LASIX) 40 MG tablet    cloNIDine (CATAPRES) 0.1 MG tablet    warfarin (COUMADIN) 5 MG tablet    ASPIRIN LOW DOSE 81 MG EC tablet    metoprolol tartrate (LOPRESSOR) 50 MG tablet    atorvastatin (LIPITOR) 20 MG tablet    Charcot foot due to diabetes mellitus (HCC)      Well-controlled, continue current medications         Relevant Medications    insulin lispro (HUMALOG KWIKPEN) 200 UNIT/ML SOPN pen    ASPIRIN LOW DOSE 81 MG EC tablet    metFORMIN (GLUCOPHAGE) 1000 MG tablet    Insulin Degludec (TRESIBA FLEXTOUCH) 200 UNIT/ML SOPN            No flowsheet data found.     PHQ Scores 5/2/2022 3/8/2022 1/3/2022 12/20/2021 11/8/2021   PHQ2 Score 0 0 0 0 0   PHQ9 Score 0 0 0 0 0       Results for orders placed or performed in visit on 05/31/22   Hepatic Function Panel   Result Value Ref Range    Total Protein 7.7 6.6 - 8.7 g/dL    Albumin 4.5 3.5 - 5.2 g/dL    Alkaline Phosphatase 137 (H) 40 - 130 U/L    ALT 21 5 - 41 U/L    AST 19 5 - 40 U/L    Total Bilirubin 0.8 0.2 - 1.2 mg/dL    Bilirubin, Direct 0.4 (H) 0.0 - 0.3 mg/dL    Bilirubin, Indirect 0.4 0.1 - 1.0 mg/dL   Lipid Panel   Result Value Ref Range    Cholesterol, Total 114 (L) 160 - 199 mg/dL    Triglycerides 55 0 - 149 mg/dL    HDL 44 (L) 55 - 121 mg/dL    LDL Calculated 59 <100 mg/dL   Hemoglobin A1C   Result Value Ref Range    Hemoglobin A1C 9.9 (H) 4.0 - 6.0 %   CBC   Result Value Ref Range    WBC 5.7 4.8 - 10.8 K/uL    RBC 4.24 (L) 4.70 - 6.10 M/uL    Hemoglobin 13.2 (L) 14.0 - 18.0 g/dL    Hematocrit 40.6 (L) 42.0 - 52.0 %    MCV 95.8 (H) 80.0 - 94.0 fL    MCH 31.1 (H) 27.0 - 31.0 pg    MCHC 32.5 (L) 33.0 - 37.0 g/dL    RDW 13.6 11.5 - 14.5 %    Platelets 368 260 - 903 K/uL    MPV 11.3 9.4 - 12.4 fL   Basic Metabolic Panel   Result Value Ref Range    Sodium 139 136 - 145 mmol/L    Potassium 3.6 3.5 - 5.0 mmol/L    Chloride 97 (L) 98 - 111 mmol/L    CO2 32 (H) 22 - 29 mmol/L    Anion Gap 10 7 - 19 mmol/L    Glucose 151 (H) 74 - 109 mg/dL    BUN 21 (H) 6 - 20 mg/dL    CREATININE 1.1 0.5 - 1.2 mg/dL    GFR Non-African American >60 >60    GFR African American >59 >59    Calcium 9.7 8.6 - 10.0 mg/dL       Return in about 3 months (around 9/1/2022). HPI  49-year-old male who presents for follow-up visit for [address]  Patient developed dizziness after his surgery, seen by ENT  DM poorly controlled ran out of [facility], taking care of his MOM  Feet are okay no new wound  HTN slightly elevated  Afib  had 2 CV and was told by cardiology   He denies getting dizziness when getting up from a chair  He denies weakness of any of his limbs    Review of Systems   Constitutional: Negative for activity change, appetite change, chills, diaphoresis, fatigue and fever. HENT: Negative for congestion, hearing loss, postnasal drip, sore throat, tinnitus and trouble swallowing. Eyes: Negative for visual disturbance (says he has blurred vision). Respiratory: Negative for cough, chest tightness, shortness of breath, wheezing and stridor.     Cardiovascular: Negative for chest pain, palpitations and leg swelling. Gastrointestinal: Positive for constipation. Negative for abdominal pain, blood in stool and diarrhea. Endocrine: Negative for cold intolerance. Genitourinary: Negative for frequency. Musculoskeletal: Negative for arthralgias, back pain and joint swelling. Skin: Positive for wound. Negative for color change. Allergic/Immunologic: Negative for environmental allergies. Neurological: Positive for numbness. Negative for dizziness, light-headedness and headaches. Hematological: Negative for adenopathy. Does not bruise/bleed easily. Psychiatric/Behavioral: Negative for decreased concentration, dysphoric mood and sleep disturbance. The patient is not nervous/anxious. Physical Exam  Vitals and nursing note reviewed. Constitutional:       General: He is not in acute distress. Appearance: He is well-developed and normal weight. HENT:      Head: Normocephalic and atraumatic. Right Ear: External ear normal.      Left Ear: External ear normal.      Nose: Nose normal.      Mouth/Throat:      Mouth: Mucous membranes are moist.   Eyes:      General: No scleral icterus. Right eye: No discharge. Left eye: No discharge. Conjunctiva/sclera: Conjunctivae normal.      Pupils: Pupils are equal, round, and reactive to light. Neck:      Thyroid: No thyromegaly. Vascular: No JVD. Cardiovascular:      Rate and Rhythm: Normal rate and regular rhythm. Heart sounds: Normal heart sounds, S1 normal and S2 normal. No murmur heard. Pulmonary:      Effort: Pulmonary effort is normal.      Breath sounds: Normal breath sounds. No wheezing or rales. Abdominal:      General: Bowel sounds are normal. There is no distension. Palpations: Abdomen is soft. There is no mass. Tenderness: There is no abdominal tenderness. There is no rebound. Musculoskeletal:         General: No tenderness. Normal range of motion.       Cervical back: Normal range of motion and neck supple. Right lower leg: No edema. Left lower leg: No edema. Comments: L middle finger amputation   Lymphadenopathy:      Cervical: No cervical adenopathy. Skin:     General: Skin is warm and dry. Findings: No rash. Comments: midkine sternotomy scar  [place] LLE, removed today   Neurological:      General: No focal deficit present. Mental Status: He is alert and oriented to person, place, and time. Mental status is at baseline. Cranial Nerves: Cranial nerves are intact. No cranial nerve deficit. Motor: Weakness present. Coordination: Coordination is intact. Deep Tendon Reflexes: Reflexes normal.   Psychiatric:         Mood and Affect: Mood is depressed. Speech: Speech is delayed. Behavior: Behavior is slowed and withdrawn. Thought Content:  Thought content normal.         Judgment: Judgment normal.           (Time Documentation Optional [id number])    An electronic signaturewaas used to authenticate this note  -Jacklyn Talamantes MD on 6/1/2022 at 5:33 PM

## 2022-06-06 ENCOUNTER — ANTI-COAG VISIT (OUTPATIENT)
Dept: CARDIOLOGY CLINIC | Age: 57
End: 2022-06-06
Payer: MEDICARE

## 2022-06-06 DIAGNOSIS — I48.19 PERSISTENT ATRIAL FIBRILLATION (HCC): Primary | ICD-10-CM

## 2022-06-06 LAB
INTERNATIONAL NORMALIZATION RATIO, POC: 1.6
PROTHROMBIN TIME, POC: NORMAL

## 2022-06-06 PROCEDURE — 85610 PROTHROMBIN TIME: CPT | Performed by: NURSE PRACTITIONER

## 2022-06-13 ENCOUNTER — CARE COORDINATION (OUTPATIENT)
Dept: CARE COORDINATION | Age: 57
End: 2022-06-13

## 2022-06-13 NOTE — CARE COORDINATION
Ambulatory Care Coordination Note  2022  CM Risk Score: 11  Charlson 10 Year Mortality Risk Score: 100%     ACC: Tanja Huizar RN    Summary Note: Ambulatory Care Nurse contacted the family via telephone to perform follow-up  for ambulatory care management. ACM Verified name and  with family as identifiers. Provided introduction to self, and explanation of the ACM role. ACM reviewed patients diabetic medications and dietary recall. Patient states he goes out for lunch daily and checks glucose prior to meals but does not log results. ACM provided diabetic education encouraging patient to begin logging glucose results. Patient verbalized understanding states out medication (humalog), patient goes out to eat for lunch in town. Patient notified ACM of glucose readings:  Glucose this am 72,   Supper time yesterday- 132  On yesterday evening- 227 patient       Education  · Diabetes pathoetiology, difference between type 1 and type 2 diabetes, and progressive nature of Type 2 DM. · Metabolic syndrome: association of diabetes with dyslipidemia, HTN and obesity. · Nutrition as a mainstream of diabetes therapy. [place] about label reading. · Carbs: good carbs and bad carbs, importance of carb counting, incorporation of protein with each meal to reduce Glycemic index, importance of portions, Carb/insulin ratio. · Fats: Good fats and bad fats, meal planning and supplements. · Different diabetic medications. · Managing high and low sugar readings.    · Notify PCP of new or worsening symptoms   · Patient to continue self-monitoring             Care Coordination Interventions    Program Enrollment: Complex Care  Referral from Primary Care Provider: No  Suggested Interventions and Community Resources  Fall Risk Prevention: Completed  Disease Specific Clinic: In Process         Goals Addressed                 This Visit's Progress     Reduce Falls    On track     I will reduce my risk of falls by the following: Remove rugs or use non slip rugs  Install grab bars in bathroom  Use walking aids like cane or walker  Continue Cardiac Therapy as ordered by provider     Barriers: lack of motivation, time constraints, and medication side effects  Plan for overcoming my barriers:   Patient will notify provider of falls   Patient will take medication as prescribed by providers notifying providers of adverse symptoms . Confidence: 5/10  Anticipated Goal Completion Date: 6/2022      Rodriguez Hemant Self Monitoring   Improving     Self-Monitored Blood Glucose - I will check my blood sugar Fasting blood sugar, blood sugars ac & HS, blood sugars ac, blood sugars pc, and random blood sugars  I will notify my provider of any trends of increasing or decreasing blood sugars over a 1 month period. I will notify my provider if I have any blood sugar readings less than 70 more than 2 times a month. Patient Reported Blood Glucose   Blood Glucose Tracker 3/4/2022   Before breakfast blood glucose 83   Before lunch blood glucose 92       Barriers: overwhelmed by complexity of regimen and time constraints  Plan for overcoming my barriers:   Patient will log glucose result during each encounter with providers/ ACM   Confidence: 5/10  Anticipated Goal Completion Date: 7/2022              Prior to Admission medications    Medication Sig Start Date End Date Taking? Authorizing Provider   Insulin Pen Needle 32G X 4 MM MISC 1 each by Does not apply route daily 6/1/22  Yes Jesus Bess MD   blood glucose test strips (CONTOUR NEXT TEST) strip 1 each by In Vitro route 4 times daily As needed.  6/1/22  Yes Gabbi Bess MD   vitamin D (ERGOCALCIFEROL) 1.25 MG (04173 UT) CAPS capsule Take 1 capsule by mouth once a week Takes on Friday 6/1/22  Yes Gabbi Bess MD   tamsulosin (FLOMAX) 0.4 mg capsule Take 1 capsule by mouth 2 times daily 6/1/22 6/1/23 Yes Gabbi Bess MD   Insulin Degludec (TRESIBA FLEXTOUCH) 200 UNIT/ML SOPN Inject 30 Units into the skin daily 6/1/22 7/1/22 Yes Gabbi Bess MD   atorvastatin (LIPITOR) 20 MG tablet TAKE ONE TABLET DAILY 4/27/22  Yes Gabbi Bess MD   ASPIRIN LOW DOSE 81 MG EC tablet TAKE ONE TABLET  DAILY  Patient taking differently: 81 mg Every other day 4/13/22  Yes NOMI Mcintosh - SUZIE   metoprolol tartrate (LOPRESSOR) 50 MG tablet TAKE ONE TABLET  TWO TIMES DAILY 4/13/22  Yes Dang Bess MD   metFORMIN (GLUCOPHAGE) 1000 MG tablet TAKE ONE TABLET TWICE DAILY WITH MEALS 4/13/22  Yes Gabbi Bess MD   warfarin (COUMADIN) 5 MG tablet Take 1 tablet by mouth daily 4/7/22  Yes NOMI Fonseca   meclizine (ANTIVERT) 12.5 MG tablet TAKE ONE TABLET THREE TIMES DAILY AS NEEDED FOR DIZZINESS 4/4/22  Yes Gabbi Bess MD   furosemide (LASIX) 40 MG tablet Take 1 tablet by mouth daily 3/29/22 6/27/22 Yes Gabbi Bess MD   cloNIDine (CATAPRES) 0.1 MG tablet Take 1 tablet by mouth 2 times daily 3/29/22 6/27/22 Yes Gabbi Bess MD   losartan (COZAAR) 100 MG tablet Take 100 mg by mouth daily Indications: High Blood Pressure Disorder   Yes Sanjuana Patel MD   blood glucose monitor strips Test 3 times a day & as needed for symptoms of irregular blood glucose. Dispense sufficient amount for indicated testing frequency plus additional to accommodate PRN testing needs. 8/1/21  Yes Gabbi Bess MD   blood glucose monitor kit and supplies Dispense sufficient amount for indicated testing frequency plus additional to accommodate PRN testing needs.  5/12/21  Yes Gabbi Bess MD   Lancets MISC 1 each by Does not apply route 3 times daily (with meals) 5/12/21  Yes Gabbi Bess MD   insulin lispro (HUMALOG KWIKPEN) 200 UNIT/ML SOPN pen Sliding scale 160-179 2 units  180-199 3 units  200-219 4 units  220-239 5 units  240-259 6 units   260-279 7 units  280-299 8 units  300-399 9 units  340-379 10 units  380-420 11 units 5/11/21  Nanyc Sidhu MD Maria Alejandra   diphenhydrAMINE (BENADRYL) 25 MG tablet Take 25 mg by mouth every 6 hours as needed for Itching   Yes Historical Provider, MD       Future Appointments   Date Time Provider Chris Camarena   6/14/2022  1:30 PM Merlinda Jolly, MD [facility]   6/20/2022  2:30 PM SCHEDULE, [facility] CARDIOLOGY PRO TIME N [facility]   9/2/2022 10:15 AM Gabbi Hood MD [facility]   3/24/2023 11:15 AM Shanice Albarado APRN - CNP N [facility]   5/23/2023 11:00 AM Pedro Naylor MD N [facility]      and   Diabetes Assessment    Medic Alert ID: No  Meal Planning: None   How often do you test your blood sugar?: Other (Comment: BID)   Do you have barriers with adherence to non-pharmacologic self-management interventions?  (Nutrition/Exercise/Self-Monitoring): No   Have you ever had to go to the ED for symptoms of low blood sugar?: No       Do you have hyperglycemia symptoms?: No   Do you have hypoglycemia symptoms?: No   Last Blood Sugar Value: 77   Blood Sugar Monitoring Regimen: Before Meals, At Bedtime, Morning Fasting   Blood Sugar Trends: Fluctuating        [address] [place]

## 2022-06-14 ENCOUNTER — TELEPHONE (OUTPATIENT)
Dept: CARDIOTHORACIC SURGERY | Age: 57
End: 2022-06-14

## 2022-06-14 ENCOUNTER — OFFICE VISIT (OUTPATIENT)
Dept: CARDIOTHORACIC SURGERY | Age: 57
End: 2022-06-14
Payer: MEDICARE

## 2022-06-14 VITALS
HEART RATE: 69 BPM | HEIGHT: 70 IN | DIASTOLIC BLOOD PRESSURE: 86 MMHG | WEIGHT: 223.8 LBS | SYSTOLIC BLOOD PRESSURE: 137 MMHG | BODY MASS INDEX: 32.04 KG/M2 | RESPIRATION RATE: 18 BRPM | OXYGEN SATURATION: 96 %

## 2022-06-14 DIAGNOSIS — Z86.79 STATUS POST ABLATION OF ATRIAL FIBRILLATION: Primary | ICD-10-CM

## 2022-06-14 DIAGNOSIS — Z98.890 STATUS POST ABLATION OF ATRIAL FIBRILLATION: Primary | ICD-10-CM

## 2022-06-14 PROCEDURE — 3017F COLORECTAL CA SCREEN DOC REV: CPT | Performed by: THORACIC SURGERY (CARDIOTHORACIC VASCULAR SURGERY)

## 2022-06-14 PROCEDURE — G8427 DOCREV CUR MEDS BY ELIG CLIN: HCPCS | Performed by: THORACIC SURGERY (CARDIOTHORACIC VASCULAR SURGERY)

## 2022-06-14 PROCEDURE — 99212 OFFICE O/P EST SF 10 MIN: CPT | Performed by: THORACIC SURGERY (CARDIOTHORACIC VASCULAR SURGERY)

## 2022-06-14 PROCEDURE — 1036F TOBACCO NON-USER: CPT | Performed by: THORACIC SURGERY (CARDIOTHORACIC VASCULAR SURGERY)

## 2022-06-14 PROCEDURE — G8417 CALC BMI ABV UP PARAM F/U: HCPCS | Performed by: THORACIC SURGERY (CARDIOTHORACIC VASCULAR SURGERY)

## 2022-06-14 NOTE — TELEPHONE ENCOUNTER
Called as instructed by Dr Rossi Pinto to stop his coumadin. Dr Rossi Pinto spoke with Dr Mckayla Hong and they both agree it can be stopped now. Pt verbalized understanding.

## 2022-06-14 NOTE — PROGRESS NOTES
Silas [address] [place] [address], [address], [facility]  Phone: [phone]  Fax: [phone]      Office Visit:  22    Sashamaria luisa Cortezcontreras - : 1965    Reason For Visit:  Sugey Schmitt is a 60 y.o. male who is here for Follow-up (rhythm check)      History of Present Illness:      Mr. Alanda Saint comes in today for his 6 months check to examine his cardiac rhythm. He underwent combined three-vessel coronary bypass grafting with a concomitant Romero-Maze  IV  radiofrequency and cryoablation on 2021 for treatment of longstanding persistent atrial fibrillation. I saw him in the office 3 months ago. At that time he was in a regular rhythm and his amiodarone was discontinued. .     Now 6 months out from his surgery he continues to have a good  result. He has not had any symptoms of atrial fibrillation over the last 6 months. Today in the office he is in a regular rhythm with a pulse of 67  beats per minute. The 12-lead EKG demonstrates a junctional rhythm as well. ALLERGIES: Contrast [iodides], Neomycin-bacitracin zn-polymyx, Neosporin [neomycin-polymyxin-gramicidin], and Pcn [penicillins]    Current Outpatient Medications   Medication Sig Dispense Refill    Insulin Pen Needle 32G X 4 MM MISC 1 each by Does not apply route daily 100 each 3    blood glucose test strips (CONTOUR NEXT TEST) strip 1 each by In Vitro route 4 times daily As needed.  200 each 3    vitamin D (ERGOCALCIFEROL) 1.25 MG (63608 UT) CAPS capsule Take 1 capsule by mouth once a week Takes on Friday 5 capsule 1    tamsulosin (FLOMAX) 0.4 mg capsule Take 1 capsule by mouth 2 times daily 180 capsule 3    Insulin Degludec (TRESIBA FLEXTOUCH) 200 UNIT/ML SOPN Inject 30 Units into the skin daily 4 pen 3    atorvastatin (LIPITOR) 20 MG tablet TAKE ONE TABLET DAILY 90 tablet 1    ASPIRIN LOW DOSE 81 MG EC tablet TAKE ONE TABLET  DAILY (Patient taking differently: 81 mg Every other day) 90 tablet 1    metoprolol tartrate (LOPRESSOR) 50 MG tablet TAKE ONE TABLET  TWO TIMES DAILY 180 tablet 0    metFORMIN (GLUCOPHAGE) 1000 MG tablet TAKE ONE TABLET TWICE DAILY WITH MEALS 180 tablet 0    warfarin (COUMADIN) 5 MG tablet Take 1 tablet by mouth daily 30 tablet 3    meclizine (ANTIVERT) 12.5 MG tablet TAKE ONE TABLET THREE TIMES DAILY AS NEEDED FOR DIZZINESS 30 tablet 0    furosemide (LASIX) 40 MG tablet Take 1 tablet by mouth daily 90 tablet 1    cloNIDine (CATAPRES) 0.1 MG tablet Take 1 tablet by mouth 2 times daily 180 tablet 1    losartan (COZAAR) 100 MG tablet Take 100 mg by mouth daily Indications: High Blood Pressure Disorder      blood glucose monitor strips Test 3 times a day & as needed for symptoms of irregular blood glucose. Dispense sufficient amount for indicated testing frequency plus additional to accommodate PRN testing needs. 200 strip 3    blood glucose monitor kit and supplies Dispense sufficient amount for indicated testing frequency plus additional to accommodate PRN testing needs. 1 kit 0    Lancets MISC 1 each by Does not apply route 3 times daily (with meals) 300 each 3    insulin lispro (HUMALOG KWIKPEN) 200 UNIT/ML SOPN pen Sliding scale 160-179 2 units  180-199 3 units  200-219 4 units  220-239 5 units  240-259 6 units   260-279 7 units  280-299 8 units  300-399 9 units  340-379 10 units  380-420 11 units 3 pen 5    diphenhydrAMINE (BENADRYL) 25 MG tablet Take 25 mg by mouth every 6 hours as needed for Itching       No current facility-administered medications for this visit. Assessment: At this time, Mr. Carter Pool is progressing very well following his bite operation of three-vessel coronary bypass grafting and the Romero-Maze IV  ablation, maintaining a junctional rhythm      Plan:    A Zio patch cardiac monitor was applied in May. This demonstrates a regular rhythm without evidence of atrial fibrillation. The interpreted rhythm is 100% atrial flutter.   My interpretation of the rhythm is that it appears to be junctional and I do not see atrial flutter waves. As his atrial appendage has been excluded and he remains in a regular rhythm I feel that it is safe to discontinue his Coumadin. .  I will run his rhythm strip by Dr. Brooks Points our electrophysiologist for his opinion. If he agrees I feel that he can discontinue taking his Coumadin. Ksenia Monroy

## 2022-06-20 ENCOUNTER — ANTI-COAG VISIT (OUTPATIENT)
Dept: CARDIOLOGY CLINIC | Age: 57
End: 2022-06-20

## 2022-08-11 ENCOUNTER — HOSPITAL ENCOUNTER (OUTPATIENT)
Dept: PHYSICAL THERAPY | Age: 57
Setting detail: THERAPIES SERIES
Discharge: HOME | End: 2022-08-11
Payer: MEDICARE

## 2022-08-11 PROCEDURE — 97163 PT EVAL HIGH COMPLEX 45 MIN: CPT

## 2022-08-11 NOTE — PROGRESS NOTES
Physical Therapy: Initial Evaluation    Patient: Ashley Guillen (54 y.o. male)   Examination Date:   Plan of Care Certification [id number] to 11/10/22      :  1965 ;    Confirmed: Yes MRN: [id number]  CSN: [id number]   Insurance: Payor: MEDICARE / Plan: MEDICARE PART A AND B / Product Type: *No Product type* /   Insurance ID: [id number] - (Medicare) Secondary Insurance (if applicable):    Referring Physician: Uvaldo Crump   PCP: Adrian Carmona MD Visits to Date/Visits Approved:     No Show/Cancelled Appts:   /       Medical Diagnosis: Benign paroxysmal vertigo, bilateral [H81.13] BPPV H81.13  Treatment Diagnosis: impaired balance     PERTINENT MEDICAL HISTORY   Patient Assessed for Rehabilitation Services: Yes       Medical History: Chart Reviewed: Yes   Past Medical History:   Diagnosis Date    Acalculous cholecystitis 2015    LIAN (acute kidney injury) ([facility] [address] 3/21/2019    Allergic rhinitis, cause unspecified     Atrial fibrillation, chronic ([address]     sees [facility] cardiology    Bacteremia due to methicillin resistant Staphylococcus aureus 10/27/2021    Bone spur     bilateral elbows    CAD (coronary artery disease)     Charcot's joint of right foot     Chicken pox     Closed supracondylar fracture of elbow     fall    Diabetic ulcer of right midfoot associated with type 2 diabetes mellitus, with fat layer exposed ([address] 2020    Diabetic ulcer of right midfoot associated with type 2 diabetes mellitus, with fat layer exposed ([facility] 2020    History of MRSA infection     scalp/facial and on back    HTN (hypertension)     Hyperlipidemia     Hypoglycemic encephalopathy     Impotence of organic origin     MDRO (multiple drug resistant organisms) resistance     MRSA (methicillin resistant staph aureus) culture positive 2021    MRSA LT HAND    Other dyspnea and respiratory abnormality     Other specified erythematous condition(045.89)     Permanent atrial fibrillation ([facility] 2/18/2021    Personal history of other infectious and parasitic disease     Salmonella     Subtherapeutic international normalized ratio (INR) 4/19/2021    Supratherapeutic INR 4/14/2021    Type II or unspecified type diabetes mellitus without mention of complication, uncontrolled 1994     Surgical History:   Past Surgical History:   Procedure Laterality Date    AMPUTATION  11/01/2021    CARDIAC CATHETERIZATION      CARDIOVERSION  03/2017    CATARACT REMOVAL  10/2021    COLONOSCOPY  02/24/2020    Dr RITIKA Fraire-Melanosis coli throughout the entire colon-AP, 5 yr recall    CORONARY ARTERY BYPASS GRAFT MAZE PROCEDURE N/A 12/14/2021    CORONARY ARTERY BYPASS GRAFT X3 WITH LEFT INTERNAL MAMMARY ARTERY WITH ENDOSCOPIC VEIN HARVESTING WITH PERFUSION, TRANSESOPHAGEAL ECHOCARDIOGRAM, BIATRIAL RODRIGUES-MAZE RF AND CRYOABLATIONAND ATRICLIP performed by Tony Ferguson MD at [facility] [place]      HAND SURGERY Left 10/31/2021    HAND INCISION AND DRAINAGE Partial Amputation Middle Finger performed by Cruz Duarte MD at [address] Left 09/17/2020    OPEN REDUCTION INTERNAL FIXATION LEFT SUPRACONDYLAR FRACTURE performed by Del Smith MD at [address]      X 2    TONSILLECTOMY         Medications:   Current Outpatient Medications:     Insulin Pen Needle 32G X 4 MM MISC, 1 each by Does not apply route daily, Disp: 100 each, Rfl: 3    blood glucose test strips (CONTOUR NEXT TEST) strip, 1 each by In Vitro route 4 times daily As needed. , Disp: 200 each, Rfl: 3    vitamin D (ERGOCALCIFEROL) 1.25 MG (38026 UT) CAPS capsule, Take 1 capsule by mouth once a week Takes on Friday, Disp: 5 capsule, Rfl: 1    tamsulosin (FLOMAX) 0.4 mg capsule, Take 1 capsule by mouth 2 times daily, Disp: 180 capsule, Rfl: 3    atorvastatin (LIPITOR) 20 MG tablet, TAKE ONE TABLET DAILY, Disp: 90 tablet, Rfl: 1    ASPIRIN LOW DOSE 81 MG EC tablet, TAKE ONE TABLET  DAILY (Patient taking differently: 81 mg Every other day), Disp: 90 tablet, Rfl: 1    metoprolol tartrate (LOPRESSOR) 50 MG tablet, TAKE ONE TABLET  TWO TIMES DAILY, Disp: 180 tablet, Rfl: 0    metFORMIN (GLUCOPHAGE) 1000 MG tablet, TAKE ONE TABLET TWICE DAILY WITH MEALS, Disp: 180 tablet, Rfl: 0    warfarin (COUMADIN) 5 MG tablet, Take 1 tablet by mouth daily, Disp: 30 tablet, Rfl: 3    meclizine (ANTIVERT) 12.5 MG tablet, TAKE ONE TABLET THREE TIMES DAILY AS NEEDED FOR DIZZINESS, Disp: 30 tablet, Rfl: 0    furosemide (LASIX) 40 MG tablet, Take 1 tablet by mouth daily, Disp: 90 tablet, Rfl: 1    cloNIDine (CATAPRES) 0.1 MG tablet, Take 1 tablet by mouth 2 times daily, Disp: 180 tablet, Rfl: 1    losartan (COZAAR) 100 MG tablet, Take 100 mg by mouth daily Indications: High Blood Pressure Disorder, Disp: , Rfl:     blood glucose monitor strips, Test 3 times a day & as needed for symptoms of irregular blood glucose. Dispense sufficient amount for indicated testing frequency plus additional to accommodate PRN testing needs. , Disp: 200 strip, Rfl: 3    blood glucose monitor kit and supplies, Dispense sufficient amount for indicated testing frequency plus additional to accommodate PRN testing needs. , Disp: 1 kit, Rfl: 0    Lancets MISC, 1 each by Does not apply route 3 times daily (with meals), Disp: 300 each, Rfl: 3    insulin lispro (HUMALOG KWIKPEN) 200 UNIT/ML SOPN pen, Sliding scale 160-179 2 units 180-199 3 units 200-219 4 units 220-239 5 units 240-259 6 units  260-279 7 units 280-299 8 units 300-399 9 units 340-379 10 units 380-420 11 units, Disp: 3 pen, Rfl: 5    diphenhydrAMINE (BENADRYL) 25 MG tablet, Take 25 mg by mouth every 6 hours as needed for Itching, Disp: , Rfl:   Allergies: Contrast [iodides], Neomycin-bacitracin zn-polymyx, Neosporin [neomycin-polymyxin-gramicidin], and Pcn [penicillins]      SUBJECTIVE EXAMINATION     History obtained from[de-identified] Chart Review, Patient,      Family/Caregiver Present: No    Subjective History: Onset Date: 08/11/22       Additional History:  Vestibular and Concussion Specific Hx   Description of Symptoms: Vertigo, Dizziness, Anxiety, Off balance, Spinning  Patient experiences spells of vertigo?: Yes  Describe Vertigo: Room Spinning, Loss of Balance  How long do these spells last?: Seconds  Vertigo Is:  (occurs when he walks about 30 feet)  Symptoms worse with: Self motion  Associated hearing/ear symptoms: No (says he had slight impairment in hearing at a certain range, no recommendation for any hearing aids)  Any recent Illness or Infections?: No (none known of per patient)  Any recent accidents or head trauma?: No (only triple bypass in [facility])  Any history of migraines or headaches?: No         Vision History:  History of Cataracts: Yes     Comments: Patient states he had open heart surgery triple bypass in December 2021 and in January he started to have dizziness when he would ambulate about 30 feet. he says that he has history of charcot foot, wound care on his foot from nail infection, and has lost his third digit on his left hand. He says that the dizziness causes him to have to stop and lean against something and within about 15-30 seconds he is back to normal  and has some spinning sensation. He denies any dizziness with turning his head, bending forward, or laying bed. Reports he does have numbness in bilateral feet up to above his ankles from his neuropathy. He says he is diabetic and has history of week long diabetic coma and that he takes insulin for his diabetes and also says he takes bp medication 2 x/day.         Pain Screening   Pain Screening  Patient Currently in Pain: No    Functional Status         Social History:  Social History  Home Layout: One level  Home Access: Stairs to enter with rails         Current Level of Function:         IADL Comments: has to stop frequently in the grocery store about every 30 feet  ADL Assistance:  (has to hold to wall for showering due to increased dizziness/imbalance when closing)  Active : Yes    OBJECTIVE EXAMINATION       Review of Systems:  Follows Commands: Within Functional Limits  Vital Signs  BP:  (94/60 seated 90/60 standing, patient states he is on bp medication 2x/day and took it this morning)  Advised him to start taking bp regularly to determine if staying low  BP Location: Right upper arm           Ortho Screen  Neuro Screen:   Sensation      Decreased sensation to bilateral feet and just above ankles with numbness reported on feet and pressure sensation above ankles with light touch, L1-3 dermatones normal to light touch       Cervical Assessment     AROM Cervical Spine   Cervical Spine AROM : WFL           Left AROM  Right AROM       wfl wfl           Left Strength  Right Strength              Strength RLE  Comment: bilateral DF 4/5, all others bilaterally 5/5       Vision/Vestibular Assessment  Visual/Occulomotor:   Visual/Occulomotor Assessed: Yes  Spontaneous Nystagmus:  (normal)  Gaze Holding Nystagmus: No  Smooth pursuits horizontal: Able to track stimulus in all quadrants without difficulty  Smooth pursuits vertical: Able to track stimulus in all quadrants without difficulty  Saccades horizontal:  Within Defined Limits    Frenzel/Infrared Goggles   Spontaneous Nystagmus:  (normal)  Gaze Holding Nystagmus: No    VOR Tests:   Horizontal Option: Intact  Cancellation Horizontal Option: Intact    Positional Tests:    Modified Vertebral Artery Test: Negative  Right Jose Alberto-Hallpike Test:  (negative)  Left Jose Alberto-Hallpike Test:  (negative)  Right Horizontal Roll Test:  (negative)  Left Horizontal Roll Test:  (negative)    Balance Screen:   Balance  Posture: Good  Sitting - Static: Good  Sitting - Dynamic: Good  Standing - Static: Fair (min sway noted eyes open and mod sway eyes closed)  Standing - Dynamic: Fair (occassional need for external support)  Balance Screen  Sitting - Static: Good  Sitting - Dynamic: Good  Standing - Static: Fair (min sway noted eyes open and mod sway eyes closed)  Standing - Dynamic: Fair (occassional need for external support)                  Outcome Measure Used: Total Score: Other Activities  Comment: bilateral le numbness reported in feet and slightly above bilaterl ankles, no sensation in feet with light touch, report of pressure sensation at lower leg and normal in L-L3 regional dermatones, DGI score 2324, DHI 36% impairment         ASSESSMENT     Impression: Assessment: Patient has decreased bilateral le ankle strength, impaired le sensation, decreased balance and functional limitations reported due to symptoms. He also has low bp today and MD will be notified, I also recommended he start taking his bp regularly and notify MD if stays low to determine if it is staying low. He did not have any nystagmus or symptoms with all positional maneuvers suggesting this is not BPPV related. He does appear to have some sensitivity to motion with impaired balance and recommended he follow up for 6 visits to help habituate motion sensetivitiy and improve overall balance. Body Structures, Functions, Activity Limitations Requiring Skilled Therapeutic Intervention: Decreased functional mobility , Decreased strength, Decreased high-level IADLs, Decreased balance, Decreased sensation, Decreased endurance    Statement of Medical Necessity: Physical Therapy is both indicated and medically necessary as outlined in the POC to increase the likelihood of meeting the functionally related goals stated below. Patient's rehabilitation potential/prognosis is considered to be:  [address]          GOALS     Patient Goal(s): be able to walk without having dizzy episodes  Short Term Goals Completed by 6 Weeks Goal Status   Patient to have no sway with static stance on level tile     Patient to have bilateral df 4+/5     Patient to be independent with HEP                                                   Long Term Goals Completed by   Goal Status   Patient to perform high level balance activities with no lob     Patient to have decreased report of dizziness with ambulation by 50%     Patient to have improved function demo'ed by scoring <= 15% impairment on [address]                                                    TREATMENT PLAN           Pt. actively involved in establishing Plan of Care and Goals: Yes  Patient/ Caregiver education and instruction:      7 visits total        Treatment may include any combination of the following:       Frequency / Duration:  Patient to be seen 2x/week for 3 weeks weeks      Eval Complexity:    Decision Making: High Complexity  History: Personal Factors and/or Comorbidities Impacting POC: High  History: open heart surgery, peripheral neuropathy, high bp hx  Examination of body system(s) including body structures and functions, activity limitations, and/or participation restrictions: High  Exam: low bp observed, impaired bilateral le sensation, weakness bilateral df, impaired balance  Clinical Presentation: High    PT Treatment Completed:  Exercises:      Treatment Reasoning    Exercise 1: 6 MWT *gait belt for all activities, has bilateral le neuropathy    *check BP at start of session and after ambulation or if becomes dizzy check  Exercise 2: figure 8's,cone weaves  Exercise 3: box step cw/ccw  Exercise 4: sit to stands with 360 degree turns  Exercise 5: side stepping, stepping over objects  Exercise 6: ambulation with horizontal and vertical head movements  Exercise 7: static stance on level tile and foam pads  Exercise 8: sls  Exercise 9: 4 inch step tapping  Exercise 10: tandemn stance  Exercise 11: bilatera le step ups  Exercise 12: marching on foam or trampoline  Exercise 13: seated df bilaterally with t-band                          Therapy Time  Individual Time In:    1025      Individual Time Out:  1130   Minutes:  65          Therapist Signature: Pedro Monte PT    Date: 7/70/8881     I certify that the above Therapy Services are being furnished while the patient is under my care. I agree with the treatment plan and certify that this therapy is necessary. [de-identified] Signature:  ___________________________   Date:_______                                                                   Tova Phoenix, PA*        Physician Comments: _______________________________________________    Please sign and return to [facility]YSICAL THERAP[facility]. Please fax to the location listed below.  Doreen Whittaker for this referral!    [address] [facility] PHYSICAL THERAPY  [address] [place] [address]  Dept: [phone]  Dept Fax: Sabrina Nixon: [phone]       POC NOTE

## 2022-08-16 ENCOUNTER — HOSPITAL ENCOUNTER (OUTPATIENT)
Dept: PHYSICAL THERAPY | Age: 57
Setting detail: THERAPIES SERIES
Discharge: HOME | End: 2022-08-16
Payer: MEDICARE

## 2022-08-16 PROCEDURE — 97112 NEUROMUSCULAR REEDUCATION: CPT

## 2022-08-16 PROCEDURE — 97110 THERAPEUTIC EXERCISES: CPT

## 2022-08-16 NOTE — PROGRESS NOTES
Physical Therapy: Daily Note   Patient: Ayaan Dallas (51 y.o. male)   Examination Date:   Plan of Care/Certification Expiration Date: 11/10/22    No data recorded   :  1965 # of Visits since [facility]:   2   MRN: [id number]  CSN: [id number] Start of Care Date:   2022   Insurance: Payor: MEDICARE / Plan: MEDICARE PART A AND B / Product Type: *No Product type* /   Insurance ID: [id number] - (Medicare) Secondary Insurance (if applicable): Sydnee Martinez   Referring Physician: Alban Spencer   PCP: Consuelo Hightower MD Visits to Date/Visits Approved:     No Show/Cancelled Appts:   /       Medical Diagnosis: Benign paroxysmal vertigo, bilateral [H81.13]    Treatment Diagnosis: impaired balance        SUBJECTIVE EXAMINATION       Patient Comments: Subjective: Patient states he wwas tired after his evaluation and then over the weekend he has felt more dizzy and says some spinning sensation. OBJECTIVE EXAMINATION   Restrictions:  Restrictions/Precautions: Surgical Protocols;  Fall Risk; Contact Precautions   Required Braces or Orthoses?: No   No data recorded        TREATMENT     Exercises:      Treatment Reasoning    Exercise 1: 6  feet *gait belt for all activities, has bilateral le neuropathy BP pre tx 101/94, post walk 117/70, after tx 99/65   *check BP at start of session and after ambulation or if becomes dizzy check  Exercise 2: figure 8's  1 x 10,    cone weaves 1 x 4  Exercise 3: box step cw/ccw 5/5  Exercise 4: sit to stands with 360 degree turns 1 x 3 ea  Exercise 5: side stepping 1 x 2, forward/backward black line x 1, stepping over objects 1 x 2  Exercise 6: ambulation with horizontal and vertical head movements x 120 feet  Exercise 7: static stance on level tile eo x 30 sec, ec x 30 sec, and foam pads eo x 30 sec, ec x 30 sec  Exercise 8: sls x 15 sec ea  Exercise 9: 4 inch step tapping 1 x 10 ea no ue support  Exercise 10: tandemn stance- not today  Exercise 11: bilatera le step ups 6 in 1 x 10 ea  Exercise 12: marching on trampoline x 1 min  Exercise 13: seated df bilaterally with t-band red 1 x 10 ea  Exercise 14: gaze stabilization horizontal and vertical 1 x 10 ea, advanced gaze stabilization horizontal and vertical 1 x 10 ea                          Pt Education: Plan Comment: 7 visits total       ASSESSMENT     Assessment: Assessment: Patient did well with tx today with report of occassionally dizziness during ex's and slight imbalance at times, but he was able to self correct and required cga for safety. He tolerated all ex's well with mod v.c. and demo for proper tech and required appropriate amount of rest breaks. he reported decreased dizziness after session. He did report left hip pain with ex's but was able to complete all attempted ex's.   Body Structures, Functions, Activity Limitations Requiring Skilled Therapeutic Intervention: Decreased functional mobility , Decreased strength, Decreased high-level IADLs, Decreased balance, Decreased sensation, Decreased endurance      Activity Tolerance: Patient Tolerated treatment well    Therapy Prognosis: Fair       GOALS   Patient goals : be able to walk without having dizzy episodes  Short Term Goals Completed by 6 Weeks Current Status Goal Status   Patient to have no sway with static stance on level tile       Patient to have bilateral df 4+/5       Patient to be independent with HEP                                                                   Long Term Goals Completed by   Current Status Goal Status   Patient to perform high level balance activities with no lob       Patient to have decreased report of dizziness with ambulation by 50%       Patient to have improved function demo'ed by scoring <= 15% impairment on DHI                                                                    TREATMENT PLAN   Plan Frequency: 2x/week  Plan weeks: 3 weeks   Plan Comment: 7 visits total       Therapy Time  Individual Time In: 4533       Individual Time Out: 0291  Minutes: 58  Timed Code Treatment Minutes: 58 Minutes     Electronically signed by Alvin Huerta PT  on 8/16/2022 at 8:57 AM   POC NOTE

## 2022-08-17 ENCOUNTER — APPOINTMENT (OUTPATIENT)
Dept: PHYSICAL THERAPY | Age: 57
End: 2022-08-17
Payer: MEDICARE

## 2022-08-19 ENCOUNTER — HOSPITAL ENCOUNTER (OUTPATIENT)
Dept: PHYSICAL THERAPY | Age: 57
Setting detail: THERAPIES SERIES
Discharge: HOME | End: 2022-08-19
Payer: MEDICARE

## 2022-08-19 PROCEDURE — 97530 THERAPEUTIC ACTIVITIES: CPT

## 2022-08-19 PROCEDURE — 97112 NEUROMUSCULAR REEDUCATION: CPT

## 2022-08-19 NOTE — PROGRESS NOTES
Physical Therapy  Daily Treatment Note  Date: 2022  Patient Name: Ludin Tirado  MRN: [id number]     :   1965    Referring Physician: MANOLO Mclean* Fili Arce   PCP: Сергей Molina MD    Medical Diagnosis: Benign paroxysmal vertigo, bilateral [H81.13]    Treatment Diagnosis: impaired balance      Insurance: Payor: Kenji Emmanuel / Plan: MEDICARE PART A AND B / Product Type: *No Product type* /   Insurance ID: [id number] - (Medicare)    Subjective:   General  Referring Provider (secondary): Fili Arce  PT Visit Information  Onset Date: 22  Total # of Visits Approved: 7  Total # of Visits to Date: 3  Plan of Care/Certification Expiration Date: 11/10/22  Progress Note Due Date: 09/10/22  Subjective  Subjective: I have dizziness most days. Yesterday was pretty bad.   Pain Screening  Patient Currently in Pain: Denies       Treatment Activities:  Exercises:      Treatment Reasoning    Exercise 1: 6  feet *gait belt for all activities, has bilateral le neuropathy BP pre tx 101/94, post walk 132/79, after tx 123/82   *check BP at start of session and after ambulation or if becomes dizzy check  Exercise 2: figure 8's  1 x 10,    cone weaves 1 x 4  Exercise 3: box step cw/ccw 5/5  Exercise 4: sit to stands with 360 degree turns 1 x 3 ea  Exercise 5: side stepping 1 x 2, forward/backward black line x 2, stepping over objects 1 x 2 (cones lying on there sides)  Exercise 6: ambulation with horizontal and vertical head movements x 120 feet  Exercise 7: static stance on level tile eo x 30 sec, ec x 30 sec, and foam pads eo x 30 sec, ec x 30 sec  Exercise 8: sls x 15 sec ea  Exercise 9: 4 inch step tapping 1 x 10 ea no ue support  Exercise 10: tandemn stance x 15 sec each  Exercise 11: bilatera le step ups 6 in 1 x 10 ea  Exercise 12: marching on trampoline x 1 min  Exercise 13: seated df bilaterally with t-band red 1 x 10 ea  Exercise 14: gaze stabilization horizontal and vertical 1 x 10 ea, advanced gaze stabilization horizontal and vertical 1 x 10 ea                           Assessment:   Conditions Requiring Skilled Therapeutic Intervention  Body Structures, Functions, Activity Limitations Requiring Skilled Therapeutic Intervention: Decreased functional mobility ; Decreased strength;Decreased high-level IADLs;Decreased balance;Decreased sensation;Decreased endurance  Assessment: Patient did well with treatment today. He had occ LOB episodes, some he recovered independently, some requiring cga x 1 of therapist.   He required mod cueing for proper execution of exercise. Vitals assessed throughout session with normal ranges noted. No pain pre or post session. He reported dizziness a bit less overall today and more so after session.   Treatment Diagnosis: impaired balance        Goals:  Short Term Goals  Time Frame for Short term goals: 6 Weeks  Short term goal 1: Patient to have no sway with static stance on level tile  Short term goal 2: Patient to have bilateral df 4+/5  Short term goal 3: Patient to be independent with HEP  Long Term Goals  Long term goal 1: Patient to perform high level balance activities with no lob  Long term goal 2: Patient to have decreased report of dizziness with ambulation by 50%  Long term goal 3: Patient to have improved function [facility] by scoring <= 15% impairment on [facility] [address]  Patient Goals   Patient goals : be able to walk without having dizzy episodes    Plan:    Plan  Plan weeks: 3 weeks  Plan Comment: 7 visits total  Timed Code Treatment Minutes: 58 Minutes     Therapy Time:   Individual Concurrent Group Co-treatment   Time In 0900         Time Out 0958         Minutes 58         Timed Code Treatment Minutes: Meera Yoo PTA     Electronically signed by Jacques Sadler PTA on 8/19/2022 at 11:26 AM

## 2022-08-22 ENCOUNTER — HOSPITAL ENCOUNTER (OUTPATIENT)
Dept: PHYSICAL THERAPY | Age: 57
Setting detail: THERAPIES SERIES
Discharge: HOME | End: 2022-08-22
Payer: MEDICARE

## 2022-08-22 PROCEDURE — 97110 THERAPEUTIC EXERCISES: CPT

## 2022-08-22 PROCEDURE — 97112 NEUROMUSCULAR REEDUCATION: CPT

## 2022-08-22 NOTE — PROGRESS NOTES
Physical Therapy  Daily Treatment Note  Date: 2022  Patient Name: Aditya Lopez  MRN: [id number]     :   1965    Referring Physician: MANOLO Grimaldo* Carol Olguin   PCP: Rosa Elena Adair MD    Medical Diagnosis: Benign paroxysmal vertigo, bilateral [H81.13] BPPV H81.13  Treatment Diagnosis: impaired balance      Insurance: Payor: Tyrese Joshua / Plan: MEDICARE PART A AND B / Product Type: *No Product type* /   Insurance ID: [id number] - (Medicare)    Subjective:   General  Diagnosis: BPPV H81.13  Referring Provider (secondary):  Carlo Olguin  PT Visit Information  Onset Date: 22  PT Insurance Information: Medicare  Total # of Visits Approved: 7  Total # of Visits to Date: 4  Plan of Care/Certification Expiration Date: 11/10/22  Progress Note Due Date: 09/10/22  Subjective: some dizziness but i feel a little off  Patient Currently in Pain: No     Treatment Activities:  Exercises:      Treatment Reasoning    Exercise 1: 6  feet *gait belt for all activities, has bilateral le neuropathy BP pre tx 114/78, post walk 115/77, after tx 146/94   *check BP at start of session and after ambulation or if becomes dizzy check  Exercise 2: figure 8's  1 x 10,    cone weaves 1 x 4  Exercise 3: box step cw/ccw 5/5  Exercise 4: sit to stands with 360 degree turns 1 x 3 ea  Exercise 5: side stepping 1 x 2, forward/backward black line x 2, stepping over objects 1 x 2 (cones lying on there sides)  Exercise 6: ambulation with horizontal and vertical head movements x 120 feet  Exercise 7: static stance on level tile eo x 30 sec, ec x 30 sec, and foam pads eo x 30 sec, ec x 30 sec  Exercise 8: sls x 15 sec ea  Exercise 9: 4 inch step tapping 1 x 10 ea no ue support  Exercise 10: tandemn stance x 15 sec each  Exercise 11: bilatera le step ups 6 in 1 x 10 ea  Exercise 12: marching on trampoline x 1 min  Exercise 13: seated df bilaterally with t-band red 1 x 10 ea  Exercise 14: gaze stabilization horizontal and vertical 1 x 10 ea, advanced gaze stabilization horizontal and vertical 1 x 10 ea                           Assessment:   Conditions Requiring Skilled Therapeutic Intervention  Body Structures, Functions, Activity Limitations Requiring Skilled Therapeutic Intervention: Decreased functional mobility ; Decreased strength;Decreased high-level IADLs;Decreased balance;Decreased sensation;Decreased endurance  Assessment: B/P was taken in LUE today, required a standing rest break for about 1 min during 6 MWT due to hip pain. LOB x 1 performing sidestepping and has increased sway performing standing balance with EC.   Treatment Diagnosis: impaired balance  Requires PT Follow-Up: Yes        Goals:  Short Term Goals  Time Frame for Short term goals: 6 Weeks  Short term goal 1: Patient to have no sway with static stance on level tile  Short term goal 2: Patient to have bilateral df 4+/5  Short term goal 3: Patient to be independent with HEP  Long Term Goals  Long term goal 1: Patient to perform high level balance activities with no lob  Long term goal 2: Patient to have decreased report of dizziness with ambulation by 50%  Long term goal 3: Patient to have improved function demo'ed by scoring <= 15% impairment on [address]  Patient Goals   Patient goals : be able to walk without having dizzy episodes    Plan:    Plan weeks: 3 weeks  Plan Comment: 7 visits total      Therapy Time:   Individual Concurrent Group Co-treatment   Time In 1101         Time Out 1157         Minutes 64              Kirill Arredondo PTA   Electronically signed by Kirill Arredondo PTA on 8/22/2022 at 3:24 PM

## 2022-08-25 ENCOUNTER — HOSPITAL ENCOUNTER (OUTPATIENT)
Dept: PHYSICAL THERAPY | Age: 57
Setting detail: THERAPIES SERIES
Discharge: HOME | End: 2022-08-25
Payer: MEDICARE

## 2022-08-25 PROCEDURE — 97110 THERAPEUTIC EXERCISES: CPT

## 2022-08-25 PROCEDURE — 97112 NEUROMUSCULAR REEDUCATION: CPT

## 2022-08-25 NOTE — PROGRESS NOTES
Physical Therapy  Daily Treatment Note  Date: 2022  Patient Name: Altaf Gupta  MRN: [id number]     :   1965    Referring Physician: MANOLO Chan* Yady Lombardo   PCP: Payam Mejia MD    Medical Diagnosis: Benign paroxysmal vertigo, bilateral [H81.13] BPPV H81.13  Treatment Diagnosis: impaired balance      Insurance: Payor: Shemar Varma / Plan: MEDICARE PART A AND B / Product Type: *No Product type* /   Insurance ID: [id number] - (Medicare)    Subjective:   General  Diagnosis: BPPV H81.13  Referring Provider (secondary):  Yady Lombardo  Onset Date: 22  PT Insurance Information: Medicare  Total # of Visits Approved: 7  Total # of Visits to Date: 5  Plan of Care/Certification Expiration Date: 11/10/22  Progress Note Due Date: 09/10/22  Subjective: no pain or dizziness at the moment  Patient Currently in Pain: No       Treatment Activities:  Exercises:      Treatment Reasoning    Exercise 1: 6  feet  in3' 12\"  *gait belt for all activities, has bilateral le neuropathy BP pre tx 100/67, post walk 97/66, after tx 111/73   *check BP at start of session and after ambulation or if becomes dizzy check  Exercise 2: figure 8's  1 x 10,    cone weaves 1 x 4  Exercise 3: box step cw/ccw 5/5  Exercise 4: sit to stands with 360 degree turns 1 x 3 ea  Exercise 5: side stepping 1 x 2, forward/backward black line x 3, stepping over objects 1 x 2 (cones lying on there sides)  Exercise 6: ambulation with horizontal and vertical head movements x 120 feet  Exercise 7: static stance on level tile eo x 30 sec, ec x 30 sec, and foam pads eo x 30 sec, ec x 30 sec  Exercise 8: sls x 15 sec ea  Exercise 9: 4 inch step tapping 1 x 10 ea no ue support  Exercise 10: tandemn stance x 15 sec each  Exercise 11: bilatera le step ups 6 in 1 x 10 ea  Exercise 12: marching on trampoline x 1 min  Exercise 13: seated df bilaterally with t-band red 1 x 10 ea  Exercise 14: gaze stabilization horizontal and vertical 1 x 10 ea, advanced gaze stabilization horizontal and vertical 1 x 10 ea                           Assessment:   Conditions Requiring Skilled Therapeutic Intervention  Body Structures, Functions, Activity Limitations Requiring Skilled Therapeutic Intervention: Decreased functional mobility ; Decreased strength;Decreased high-level IADLs;Decreased balance;Decreased sensation;Decreased endurance  Assessment: Patient began having L hip pain early performing 6 MWT and was stopped at 3' 12\" rating pain at 7/10 with one standing rest break. Occasional LOB during therapy session and tandem standing and SLS continue to be rather difficult. Sway increased during static standing with EC vs EO.   Treatment Diagnosis: impaired balance  Requires PT Follow-Up: Yes        Goals:  Short Term Goals  Time Frame for Short term goals: 6 Weeks  Short term goal 1: Patient to have no sway with static stance on level tile  Short term goal 2: Patient to have bilateral df 4+/5  Short term goal 3: Patient to be independent with HEP  Long Term Goals  Long term goal 1: Patient to perform high level balance activities with no lob  Long term goal 2: Patient to have decreased report of dizziness with ambulation by 50%  Long term goal 3: Patient to have improved function demo'ed by scoring <= 15% impairment on [address]  Patient Goals   Patient goals : be able to walk without having dizzy episodes    Plan:    Plan weeks: 3 weeks  Plan Comment: 7 visits total        Therapy Time:   Individual Concurrent Group Co-treatment   Time In 0958         Time Out 1052         Minutes 47              Kirill Arredondo PTA   Electronically signed by Kirill Arredondo PTA on 8/25/2022 at 1:35 PM

## 2022-08-29 ENCOUNTER — HOSPITAL ENCOUNTER (OUTPATIENT)
Dept: PHYSICAL THERAPY | Age: 57
Setting detail: THERAPIES SERIES
Discharge: HOME | End: 2022-08-29
Payer: MEDICARE

## 2022-08-29 PROCEDURE — 97112 NEUROMUSCULAR REEDUCATION: CPT

## 2022-08-29 NOTE — PROGRESS NOTES
Physical Therapy  Daily Treatment Note  Date: 2022  Patient Name: Dat Berry  MRN: [id number]     :   1965    Referring Physician: MANOLO Quinn* Johann Ross   PCP: Jeramy Laughlin MD    Medical Diagnosis: Benign paroxysmal vertigo, bilateral [H81.13] BPPV H81.13  Treatment Diagnosis: impaired balance      Insurance: Payor: [employer] [place] / Plan: MEDICARE PART A AND B / Product Type: *No Product type* /   Insurance ID: [id number] - (Medicare)    Subjective:   General  Diagnosis: BPPV H81.13  Referring Provider (secondary):  Johann Ross  PT Visit Information  Onset Date: 22  PT Insurance Information: Medicare  Total # of Visits Approved: 7  Total # of Visits to Date: 6  Plan of Care/Certification Expiration Date: 11/10/22  Progress Note Due Date: 09/10/22  Subjective  Subjective: Feeling a bit dizzy this morning  Pain Screening  Patient Currently in Pain: No       Treatment Activities:  Exercises:      Treatment Reasoning    Exercise 1: 6  feet  in 4.5 mins-limited by left hip pain  *gait belt for all activities, has bilateral le neuropathy BP pre tx 111/71 , post walk 122/75, after tx 1127/84   *check BP at start of session and after ambulation or if becomes dizzy check  Exercise 2: figure 8's  1 x 10,    cone weaves 1 x 5  Exercise 3: box step cw/ccw 5/5  Exercise 4: sit to stands with 360 degree turns 1 x 3 ea  Exercise 5: side stepping 1 x 2, forward/backward black line x 3, stepping over objects 1 x 2 (cones lying on their sides)  Exercise 6: ambulation with horizontal and vertical head movements x 120 feet  Exercise 7: static stance on level tile eo x 30 sec, ec x 30 sec, and foam pads eo x 30 sec, ec x 30 sec  Exercise 8: sls x 15 sec ea  Exercise 9: 4 inch step tapping 1 x 10 ea no ue support  Exercise 10: tandemn stance x 15 sec each  Exercise 11: bilatera le step ups 6 in 1 x 10 ea  Exercise 12: marching on trampoline x 1 min  Exercise 13: seated df bilaterally with t-band red 1 x 10 ea  Exercise 14: gaze stabilization horizontal and vertical 1 x 10 ea, advanced gaze stabilization horizontal and vertical 1 x 10 ea                           Assessment:   Conditions Requiring Skilled Therapeutic Intervention  Body Structures, Functions, Activity Limitations Requiring Skilled Therapeutic Intervention: Decreased functional mobility ; Decreased strength;Decreased high-level IADLs;Decreased balance;Decreased sensation;Decreased endurance  Assessment: Patient did fair with session. He is limited with ambulation due to left hip pain. He also relates he has neuropathy in both feet/legs which affects his balance. Several episodes of LOB today during session. Assist of therapist needed to recover. Patient reports no rhyme or reason to his dizzy spells. Also reports he cant tell therapy has improved his dizziness. He has follow up appt with Dr. Clare Lucia on September 2.   Treatment Diagnosis: impaired balance        Goals:  Short Term Goals  Time Frame for Short term goals: 6 Weeks  Short term goal 1: Patient to have no sway with static stance on level tile  Short term goal 2: Patient to have bilateral df 4+/5  Short term goal 3: Patient to be independent with HEP  Long Term Goals  Long term goal 1: Patient to perform high level balance activities with no lob  Long term goal 2: Patient to have decreased report of dizziness with ambulation by 50%  Long term goal 3: Patient to have improved function dem'ed by scoring <= 15% impairment on [facility] [address]  Patient Goals   Patient goals : be able to walk without having dizzy episodes    Plan:    Plan  Plan weeks: 3 weeks  Plan Comment: 7 visits total  Timed Code Treatment Minutes: 54 Minutes     Therapy Time:   Individual Concurrent Group Co-treatment   Time In 1000         Time Out 1054         Minutes 54         Timed Code Treatment Minutes: [address], PTA     Electronically signed by Alphonse Vargas PTA on 8/29/2022 at 1:25 PM

## 2022-08-31 ENCOUNTER — HOSPITAL ENCOUNTER (OUTPATIENT)
Dept: PHYSICAL THERAPY | Age: 57
Setting detail: THERAPIES SERIES
Discharge: HOME | End: 2022-08-31
Payer: MEDICARE

## 2022-08-31 PROCEDURE — 97112 NEUROMUSCULAR REEDUCATION: CPT

## 2022-08-31 PROCEDURE — 97110 THERAPEUTIC EXERCISES: CPT

## 2022-08-31 NOTE — PROGRESS NOTES
[facility]  OUTPATIENT PHYSICAL THERAPY  DISCHARGE SUMMARY    Date: 2022  Patient Name: Ludin Tirado        MRN: [id number]    ACCOUNT #: [de-identified]  : 1965  (60 y.o.)  Gender: male            Treatment Diagnosis: impaired balance    Total # of Visits Approved: 7  Total # of Visits to Date: 7    Subjective   Subjective: Patient states he is still getting dizzy episodes that are brief and occur with walking and seem to be more often, but he does feel he is coping with the dizziness better. He says he is also about to start working out at [employer] to improve his hip strength. Objective  Treatments received include:ther-ex, neur re-ed  See objective/subjective data in goals    Assessment  Assessment: Patient did fair with session today, but continues to have report of dizziness intermittently with upright standing activity. He did have increased safety awareness and was able to self correct with lob more today compared to during eval but still has lob and required cga to min assist for safety with high level balance activities. Overall he had min to no improved with balance and no improvement self reported with dizziness. He is agreeable with making today his last visit and following up with MD. He had good understanding of seated ex's to perform at home for le strengthening.            GOALS   Patient goals : be able to walk without having dizzy episodes  Short Term Goals Completed by 6 Weeks Current Status Goal Status   Patient to have no sway with static stance on level tile 2022:  Patient is able to perform static stance on level tile but he still has slight sway but it able to self correct Not Met   Patient to have bilateral df 4+/5 2022: bilaterl df 4+/5 Met   Patient to be independent with HEP 2022: Patient has not been issued HEP at this time due to safety concerns with performing high level balance activities at home, he is planning to join [employer] and start working out and I provided him with formal HEP for seated ex's today for strengthening of le's In progress                                                               Long Term Goals Completed by   Current Status Goal Status   Patient to perform high level balance activities with no lob 08/31/2022: Patient performed high level balance activities with lob about 25% of time and required cga to min assist for safety Not Met   Patient to have decreased report of dizziness with ambulation by 50% 08/31/2022: Patient states he is doing better with coping with his dizziness since starting therapy but he feels like he is becoming dizzy more often and has not seen any decrease Not Met   Patient to have improved function demo'ed by scoring <= 15% impairment on [facility] 08/31/2022: Patient scored 30% impairment on [facility] Not Met                                                                TREATMENT PLAN       Plan Comment: D/C with independent seated ex HEP       Electronically signed by Briana Romero, PT on 8/31/2022 at 12:18 PM

## 2022-08-31 NOTE — PROGRESS NOTES
Physical Therapy: Daily Note   Patient: Ayaan Dallas (89 y.o. male)   Examination Date:   Plan of Care/Certification Expiration Date: 11/10/22    No data recorded   :  1965 # of Visits since [facility]:   7   MRN: [id number]  CSN: [id number] Start of Care Date:   2022   Insurance: Payor: MEDICARE / Plan: MEDICARE PART A AND B / Product Type: *No Product type* /   Insurance ID: [id number] - (Medicare) Secondary Insurance (if applicable): MEDICAID [place]   Referring Physician: Alban Spencer   PCP: Consuelo Hightower MD Visits to Date/Visits Approved:     No Show/Cancelled Appts:   /       Medical Diagnosis: Benign paroxysmal vertigo, bilateral [H81.13]    Treatment Diagnosis: impaired balance        SUBJECTIVE EXAMINATION       Patient Comments: Subjective: Patient states he is still getting dizzy episodes that are brief and occur with walking and seem to be more often, but he does feel he is coping with the dizziness better. He says he is also about to start working out at [employer] to improve his hip strength. OBJECTIVE EXAMINATION   Restrictions:  Restrictions/Precautions: Surgical Protocols;  Fall Risk; Contact Precautions   Required Braces or Orthoses?: No   No data recorded        TREATMENT     Exercises:      Treatment Reasoning    Exercise 1: 6  feet  in 4.5 mins-limited by left hip pain- no walk today   *gait belt for all activities, has bilateral le neuropathy BP pre tx 122/76 , standing 112/66 *check BP at start of session and after ambulation or if becomes dizzy check  Exercise 2: figure 8's  1 x 10,    cone weaves 1 x 4  Exercise 3: box step cw/ccw 5/5  Exercise 4: sit to stands with 360 degree turns 1 x 3 ea  Exercise 5: side stepping 1 x 2, forward/backward black line x 3, stepping over objects 1 x 2 (cones lying on their sides)  Exercise 6: ambulation with horizontal and vertical head movements x 120 feet  Exercise 7: static stance on level tile eo x 30 sec, ec x 30 sec, and foam pads eo x 30 sec, ec x 30 sec  Exercise 8: sls x 15 sec ea  Exercise 9: 4 inch step tapping 1 x 10 ea no ue support  Exercise 10: tandemn stance x 15 sec each  Exercise 11: bilatera le step ups 6 in 1 x 10 ea  Exercise 12: marching on trampoline x 1 min- not today  Exercise 13: seated df bilaterally with t-band red 1 x 10 ea- not today           08/31/22 HEP issued for seated ex's  Exercise 14: gaze stabilization horizontal and vertical 1 x 10 ea, advanced gaze stabilization horizontal and vertical 1 x 10 ea                          Pt Education: Plan Comment: D/C with independent seated ex HEP       ASSESSMENT     Assessment: Assessment: Patient did fair with session today, but continues to have report of dizziness intermittently with upright standing activity. He did have increased safety awareness and was able to self correct with lob more today compared to during eval but still has lob and required cga to min assist for safety with high level balance activities. Overall he had min to no improved with balance and no improvement self reported with dizziness. He is agreeable with making today his last visit and following up with MD. He had good understanding of seated ex's to perform at home for le strengthening.   Body Structures, Functions, Activity Limitations Requiring Skilled Therapeutic Intervention: Decreased functional mobility , Decreased strength, Decreased high-level IADLs, Decreased balance, Decreased sensation, Decreased endurance      Activity Tolerance: Patient Tolerated treatment well    Therapy Prognosis: [address]       GOALS   Patient goals : be able to walk without having dizzy episodes  Short Term Goals Completed by 6 Weeks Current Status Goal Status   Patient to have no sway with static stance on level tile 08/31/2022:  Patient is able to perform static stance on level tile but he still has slight sway but it able to self correct Not Met   Patient to have bilateral df 4+/5 08/31/2022: bilaterl df 4+/5 Met   Patient to be independent with HEP 08/31/2022: Patient has not been issued HEP at this time due to safety concerns with performing high level balance activities at home, he is planning to join [employer] and start working out and I provided him with formal HEP for seated ex's today for strengthening of le's In progress                                                               Long Term Goals Completed by   Current Status Goal Status   Patient to perform high level balance activities with no lob 08/31/2022: Patient performed high level balance activities with lob about 25% of time and required cga to min assist for safety Not Met   Patient to have decreased report of dizziness with ambulation by 50% 08/31/2022: Patient states he is doing better with coping with his dizziness since starting therapy but he feels like he is becoming dizzy more often and has not seen any decrease Not Met   Patient to have improved function demo'ed by scoring <= 15% impairment on [facility] 08/31/2022: Patient scored 30% impairment on [facility] Not Met                                                                TREATMENT PLAN       Plan Comment: D/C with independent seated ex HEP       Therapy Time  Individual Time In: 0959       Individual Time Out: 1055  Minutes: 56  Timed Code Treatment Minutes: 56 Minutes     Electronically signed by Jose Parikh PT  on 8/31/2022 at 12:16 PM   POC NOTE

## 2022-09-02 ENCOUNTER — OFFICE VISIT (OUTPATIENT)
Dept: PRIMARY CARE CLINIC | Age: 57
End: 2022-09-02
Payer: MEDICARE

## 2022-09-02 VITALS
DIASTOLIC BLOOD PRESSURE: 80 MMHG | OXYGEN SATURATION: 98 % | SYSTOLIC BLOOD PRESSURE: 120 MMHG | BODY MASS INDEX: 32.07 KG/M2 | WEIGHT: 224 LBS | HEIGHT: 70 IN | HEART RATE: 77 BPM

## 2022-09-02 DIAGNOSIS — R79.89 ELEVATED TSH: ICD-10-CM

## 2022-09-02 DIAGNOSIS — E55.9 HYPOVITAMINOSIS D: ICD-10-CM

## 2022-09-02 DIAGNOSIS — E03.8 SUBCLINICAL HYPOTHYROIDISM: ICD-10-CM

## 2022-09-02 DIAGNOSIS — E11.3593 PROLIFERATIVE DIABETIC RETINOPATHY OF BOTH EYES WITHOUT MACULAR EDEMA ASSOCIATED WITH TYPE 2 DIABETES MELLITUS (HCC): ICD-10-CM

## 2022-09-02 DIAGNOSIS — H81.13 BENIGN PAROXYSMAL POSITIONAL VERTIGO DUE TO BILATERAL VESTIBULAR DISORDER: ICD-10-CM

## 2022-09-02 DIAGNOSIS — G47.33 OBSTRUCTIVE SLEEP APNEA: ICD-10-CM

## 2022-09-02 DIAGNOSIS — Z79.4 TYPE 2 DIABETES MELLITUS WITH DIABETIC POLYNEUROPATHY, WITH LONG-TERM CURRENT USE OF INSULIN (HCC): Primary | ICD-10-CM

## 2022-09-02 DIAGNOSIS — I50.32 CHRONIC HEART FAILURE WITH PRESERVED EJECTION FRACTION (HCC): ICD-10-CM

## 2022-09-02 DIAGNOSIS — Z12.5 PROSTATE CANCER SCREENING: ICD-10-CM

## 2022-09-02 DIAGNOSIS — Z13.0 SCREENING FOR DEFICIENCY ANEMIA: ICD-10-CM

## 2022-09-02 DIAGNOSIS — Z13.29 SCREENING FOR THYROID DISORDER: ICD-10-CM

## 2022-09-02 DIAGNOSIS — E11.42 TYPE 2 DIABETES MELLITUS WITH DIABETIC POLYNEUROPATHY, WITH LONG-TERM CURRENT USE OF INSULIN (HCC): Primary | ICD-10-CM

## 2022-09-02 DIAGNOSIS — N18.30 STAGE 3 CHRONIC KIDNEY DISEASE, UNSPECIFIED WHETHER STAGE 3A OR 3B CKD (HCC): ICD-10-CM

## 2022-09-02 LAB — TSH REFLEX FT4: 2.81 UIU/ML (ref 0.35–5.5)

## 2022-09-02 PROCEDURE — 3046F HEMOGLOBIN A1C LEVEL >9.0%: CPT | Performed by: INTERNAL MEDICINE

## 2022-09-02 PROCEDURE — 2022F DILAT RTA XM EVC RTNOPTHY: CPT | Performed by: INTERNAL MEDICINE

## 2022-09-02 PROCEDURE — G8417 CALC BMI ABV UP PARAM F/U: HCPCS | Performed by: INTERNAL MEDICINE

## 2022-09-02 PROCEDURE — 99214 OFFICE O/P EST MOD 30 MIN: CPT | Performed by: INTERNAL MEDICINE

## 2022-09-02 PROCEDURE — 1036F TOBACCO NON-USER: CPT | Performed by: INTERNAL MEDICINE

## 2022-09-02 PROCEDURE — G8427 DOCREV CUR MEDS BY ELIG CLIN: HCPCS | Performed by: INTERNAL MEDICINE

## 2022-09-02 PROCEDURE — 3017F COLORECTAL CA SCREEN DOC REV: CPT | Performed by: INTERNAL MEDICINE

## 2022-09-02 RX ORDER — ERGOCALCIFEROL 1.25 MG/1
50000 CAPSULE ORAL WEEKLY
Qty: 5 CAPSULE | Refills: 1 | Status: SHIPPED | OUTPATIENT
Start: 2022-09-02

## 2022-09-02 RX ORDER — INSULIN DEGLUDEC 200 U/ML
30 INJECTION, SOLUTION SUBCUTANEOUS DAILY
Qty: 4 ADJUSTABLE DOSE PRE-FILLED PEN SYRINGE | Refills: 5 | Status: SHIPPED | OUTPATIENT
Start: 2022-09-02 | End: 2022-10-02

## 2022-09-02 ASSESSMENT — ENCOUNTER SYMPTOMS
STRIDOR: 0
BLOOD IN STOOL: 0
CHEST TIGHTNESS: 0
ABDOMINAL PAIN: 0
SHORTNESS OF BREATH: 0
CONSTIPATION: 1
DIARRHEA: 0
SORE THROAT: 0
COLOR CHANGE: 0
COUGH: 0
WHEEZING: 0
TROUBLE SWALLOWING: 0
BACK PAIN: 0

## 2022-09-02 NOTE — PROGRESS NOTES
Gustavo Blood ( 1965) is a 60 y.o. male,  Established , here for evaluation of the following chief complaint(s).   Follow-up (3 month ) and Dizziness (On going )      Patient was encouraged and advised to be compliant with all  medications leads an active lifestyle and promote maintaining a healthy weight, encouraged not to use cigarettes, laboratory results discussed and reviewed with patient's during this visit   ASSESSMENT/PLAN:  Problem List          Circulatory    Chronic heart failure with preserved ejection fraction ([facility] [address]      Monitored by specialist- no acute findings meriting change in the plan         Relevant Medications    losartan (COZAAR) 100 MG tablet    furosemide (LASIX) 40 MG tablet    cloNIDine (CATAPRES) 0.1 MG tablet    warfarin (COUMADIN) 5 MG tablet    ASPIRIN LOW DOSE 81 MG EC tablet    metoprolol tartrate (LOPRESSOR) 50 MG tablet    atorvastatin (LIPITOR) 20 MG tablet    Other Relevant Orders    Comprehensive Metabolic Panel       Respiratory    Obstructive sleep apnea      Well-controlled, continue current medications            Endocrine    Type 2 diabetes mellitus with diabetic polyneuropathy, with long-term current use of insulin (HCC) - Primary (Chronic)      Unclear control, continue current medications   No recent A1c         Relevant Medications    insulin lispro (HUMALOG KWIKPEN) 200 UNIT/ML SOPN pen    blood glucose monitor kit and supplies    Lancets MISC    blood glucose monitor strips    metFORMIN (GLUCOPHAGE) 1000 MG tablet    Insulin Pen Needle 32G X 4 MM MISC    blood glucose test strips (CONTOUR NEXT TEST) strip    Insulin Degludec (TRESIBA FLEXTOUCH) 200 UNIT/ML SOPN    Other Relevant Orders    Comprehensive Metabolic Panel    Hemoglobin A1C    Lipid Panel    Urinalysis    Proliferative diabetic retinopathy of both eyes without macular edema associated with type 2 diabetes mellitus ([facility]      Monitored by specialist- no acute findings meriting change in the plan Relevant Medications    insulin lispro (HUMALOG KWIKPEN) 200 UNIT/ML SOPN pen    metFORMIN (GLUCOPHAGE) 1000 MG tablet    Insulin Degludec (TRESIBA FLEXTOUCH) 200 UNIT/ML SOPN       Nervous and Auditory    Benign paroxysmal positional vertigo due to bilateral vestibular disorder      Uncontrolled, Multiple evaluations by specialist for dizziness with unclear etiology            Genitourinary    Stage 3 chronic kidney disease ([facility] [address]      Unclear control, continue current medications   Patient did not go for blood work advised lab work         Relevant Orders    Comprehensive Metabolic Panel       No flowsheet data found. PHQ Scores 5/2/2022 3/8/2022 1/3/2022 12/20/2021 11/8/2021   PHQ2 Score 0 0 0 0 0   PHQ9 Score 0 0 0 0 0       Results for orders placed or performed in visit on 06/06/22   POCT INR   Result Value Ref Range    INR,(POC) 1.6     Prothrombin time,(POC)         No follow-ups on file. HPI  58-year-old male who presents for follow-up visit for [address]  Patient developed dizziness after his surgery, seen by ENT multiple evaluations unclear etiology  DM poorly controlled ran out of [facility], taking care of his MOM  Feet are okay no new wound  HTN slightly elevated  Afib  had 2 CV and was told by cardiology to discontinue his blood thinner and cardiology nurse practitioner has signed off  He denies getting dizziness when getting up from a chair  He denies weakness of any of his limbs    Review of Systems   Constitutional:  Negative for activity change, appetite change, chills, diaphoresis, fatigue and fever. HENT:  Negative for congestion, hearing loss, postnasal drip, sore throat, tinnitus and trouble swallowing. Eyes:  Negative for visual disturbance (says he has blurred vision). Respiratory:  Negative for cough, chest tightness, shortness of breath, wheezing and stridor. Cardiovascular:  Negative for chest pain, palpitations and leg swelling. Gastrointestinal:  Positive for constipation. Negative for abdominal pain, blood in stool and diarrhea. Endocrine: Negative for cold intolerance. Genitourinary:  Negative for frequency. Musculoskeletal:  Negative for arthralgias, back pain and joint swelling. Skin:  Positive for wound. Negative for color change. Allergic/Immunologic: Negative for environmental allergies. Neurological:  Positive for numbness. Negative for dizziness, light-headedness and headaches. Hematological:  Negative for adenopathy. Does not bruise/bleed easily. Psychiatric/Behavioral:  Negative for decreased concentration, dysphoric mood and sleep disturbance. The patient is not nervous/anxious. Physical Exam  Vitals and nursing note reviewed. Constitutional:       General: He is not in acute distress. Appearance: He is well-developed and normal weight. HENT:      Head: Normocephalic and atraumatic. Right Ear: External ear normal.      Left Ear: External ear normal.      Nose: Nose normal.      Mouth/Throat:      Mouth: Mucous membranes are moist.   Eyes:      General: No scleral icterus. Right eye: No discharge. Left eye: No discharge. Conjunctiva/sclera: Conjunctivae normal.      Pupils: Pupils are equal, round, and reactive to light. Neck:      Thyroid: No thyromegaly. Vascular: No JVD. Cardiovascular:      Rate and Rhythm: Normal rate and regular rhythm. Heart sounds: Normal heart sounds, S1 normal and S2 normal. No murmur heard. Pulmonary:      Effort: Pulmonary effort is normal.      Breath sounds: Normal breath sounds. No wheezing or rales. Abdominal:      General: Bowel sounds are normal. There is no distension. Palpations: Abdomen is soft. There is no mass. Tenderness: There is no abdominal tenderness. There is no rebound. Musculoskeletal:         General: No tenderness. Normal range of motion. Cervical back: Normal range of motion and neck supple. Right lower leg: No edema.       Left lower leg: No edema. Comments: L middle finger amputation   Lymphadenopathy:      Cervical: No cervical adenopathy. Skin:     General: Skin is warm and dry. Findings: No rash. Comments: midkine sternotomy scar  [place] LLE, removed today   Neurological:      General: No focal deficit present. Mental Status: He is alert and oriented to person, place, and time. Mental status is at baseline. Cranial Nerves: Cranial nerves are intact. No cranial nerve deficit. Motor: Weakness present. Coordination: Coordination is intact. Deep Tendon Reflexes: Reflexes normal.   Psychiatric:         Mood and Affect: Mood is depressed. Speech: Speech is delayed. Behavior: Behavior is slowed and withdrawn. Thought Content:  Thought content normal.         Judgment: Judgment normal.         (Time Documentation Optional [id number])    An electronic signaturewaas used to authenticate this note  -Ruthie Marie MD on 9/2/2022 at 2:21 PM

## 2022-10-11 DIAGNOSIS — I25.10 CAD IN NATIVE ARTERY: ICD-10-CM

## 2022-10-11 DIAGNOSIS — E11.69 MIXED HYPERLIPIDEMIA DUE TO TYPE 2 DIABETES MELLITUS (HCC): ICD-10-CM

## 2022-10-11 DIAGNOSIS — Z79.4 TYPE 2 DIABETES MELLITUS WITH DIABETIC POLYNEUROPATHY, WITH LONG-TERM CURRENT USE OF INSULIN (HCC): ICD-10-CM

## 2022-10-11 DIAGNOSIS — E78.2 MIXED HYPERLIPIDEMIA DUE TO TYPE 2 DIABETES MELLITUS (HCC): ICD-10-CM

## 2022-10-11 DIAGNOSIS — E11.42 TYPE 2 DIABETES MELLITUS WITH DIABETIC POLYNEUROPATHY, WITH LONG-TERM CURRENT USE OF INSULIN (HCC): ICD-10-CM

## 2022-10-11 DIAGNOSIS — I50.32 CHRONIC HEART FAILURE WITH PRESERVED EJECTION FRACTION (HCC): ICD-10-CM

## 2022-10-11 DIAGNOSIS — I10 PRIMARY HYPERTENSION: ICD-10-CM

## 2022-10-11 RX ORDER — METOPROLOL TARTRATE 50 MG/1
TABLET, FILM COATED ORAL
Qty: 180 TABLET | Refills: 3 | Status: SHIPPED | OUTPATIENT
Start: 2022-10-11

## 2022-10-11 RX ORDER — ATORVASTATIN CALCIUM 20 MG/1
TABLET, FILM COATED ORAL
Qty: 90 TABLET | Refills: 3 | Status: SHIPPED | OUTPATIENT
Start: 2022-10-11

## 2022-10-11 RX ORDER — FUROSEMIDE 40 MG/1
TABLET ORAL
Qty: 90 TABLET | Refills: 3 | Status: SHIPPED | OUTPATIENT
Start: 2022-10-11

## 2022-10-11 NOTE — TELEPHONE ENCOUNTER
Juan Kauffman called to request a refill on his medication.       Last office visit : 6/1/2022   Next office visit : 12/02/2022    Requested Prescriptions     Pending Prescriptions Disp Refills    metoprolol tartrate (LOPRESSOR) 50 MG tablet [Pharmacy Med Name: METOPROLOL TARTRATE 50MG TABLET] 180 tablet 0     Sig: TAKE ONE TABLET TWICE DAILY    metFORMIN (GLUCOPHAGE) 1000 MG tablet [Pharmacy Med Name: METFORMIN HYDROCHLORIDE 1000MG TABLET] 180 tablet 0     Sig: TAKE ONE TABLET TWICE DAILY WITH MEALS    atorvastatin (LIPITOR) 20 MG tablet [Pharmacy Med Name: ATORVASTATIN CALCIUM 20MG TABLET] 90 tablet 1     Sig: TAKE ONE TABLET DAILY    furosemide (LASIX) 40 MG tablet [Pharmacy Med Name: FUROSEMIDE 40MG TABLET] 90 tablet 1     Sig: TAKE ONE TABLET DAILY            Yudy Hernandez MA

## 2022-11-14 DIAGNOSIS — I10 PRIMARY HYPERTENSION: ICD-10-CM

## 2022-11-14 RX ORDER — CLONIDINE HYDROCHLORIDE 0.1 MG/1
TABLET ORAL
Qty: 180 TABLET | Refills: 1 | Status: SHIPPED | OUTPATIENT
Start: 2022-11-14

## 2022-11-28 RX ORDER — LOSARTAN POTASSIUM 100 MG/1
TABLET ORAL
Qty: 90 TABLET | Refills: 3 | Status: SHIPPED | OUTPATIENT
Start: 2022-11-28

## 2022-11-30 ENCOUNTER — TELEPHONE (OUTPATIENT)
Dept: PRIMARY CARE CLINIC | Age: 57
End: 2022-11-30

## 2022-11-30 DIAGNOSIS — N18.30 STAGE 3 CHRONIC KIDNEY DISEASE, UNSPECIFIED WHETHER STAGE 3A OR 3B CKD (HCC): ICD-10-CM

## 2022-11-30 DIAGNOSIS — Z12.5 PROSTATE CANCER SCREENING: ICD-10-CM

## 2022-11-30 DIAGNOSIS — Z13.29 SCREENING FOR THYROID DISORDER: ICD-10-CM

## 2022-11-30 DIAGNOSIS — E11.42 TYPE 2 DIABETES MELLITUS WITH DIABETIC POLYNEUROPATHY, WITH LONG-TERM CURRENT USE OF INSULIN (HCC): ICD-10-CM

## 2022-11-30 DIAGNOSIS — I50.32 CHRONIC HEART FAILURE WITH PRESERVED EJECTION FRACTION (HCC): ICD-10-CM

## 2022-11-30 DIAGNOSIS — Z79.4 TYPE 2 DIABETES MELLITUS WITH DIABETIC POLYNEUROPATHY, WITH LONG-TERM CURRENT USE OF INSULIN (HCC): ICD-10-CM

## 2022-11-30 DIAGNOSIS — Z13.0 SCREENING FOR DEFICIENCY ANEMIA: ICD-10-CM

## 2022-11-30 LAB
ALBUMIN SERPL-MCNC: 4.5 G/DL (ref 3.5–5.2)
ALP BLD-CCNC: 115 U/L (ref 40–130)
ALT SERPL-CCNC: 16 U/L (ref 5–41)
ANION GAP SERPL CALCULATED.3IONS-SCNC: 12 MMOL/L (ref 7–19)
AST SERPL-CCNC: 15 U/L (ref 5–40)
BASOPHILS ABSOLUTE: 0.1 K/UL (ref 0–0.2)
BASOPHILS RELATIVE PERCENT: 1 % (ref 0–1)
BILIRUB SERPL-MCNC: 0.6 MG/DL (ref 0.2–1.2)
BILIRUBIN URINE: NEGATIVE
BLOOD, URINE: NEGATIVE
BUN BLDV-MCNC: 19 MG/DL (ref 6–20)
CALCIUM SERPL-MCNC: 10.1 MG/DL (ref 8.6–10)
CHLORIDE BLD-SCNC: 97 MMOL/L (ref 98–111)
CHOLESTEROL, TOTAL: 106 MG/DL (ref 160–199)
CLARITY: CLEAR
CO2: 29 MMOL/L (ref 22–29)
COLOR: YELLOW
CREAT SERPL-MCNC: 1 MG/DL (ref 0.5–1.2)
EOSINOPHILS ABSOLUTE: 0.1 K/UL (ref 0–0.6)
EOSINOPHILS RELATIVE PERCENT: 2.1 % (ref 0–5)
GFR SERPL CREATININE-BSD FRML MDRD: >60 ML/MIN/{1.73_M2}
GLUCOSE BLD-MCNC: 156 MG/DL (ref 74–109)
GLUCOSE URINE: 500 MG/DL
HBA1C MFR BLD: 9.3 % (ref 4–6)
HCT VFR BLD CALC: 37.6 % (ref 42–52)
HDLC SERPL-MCNC: 38 MG/DL (ref 55–121)
HEMOGLOBIN: 12.5 G/DL (ref 14–18)
IMMATURE GRANULOCYTES #: 0 K/UL
KETONES, URINE: NEGATIVE MG/DL
LDL CHOLESTEROL CALCULATED: 55 MG/DL
LEUKOCYTE ESTERASE, URINE: NEGATIVE
LYMPHOCYTES ABSOLUTE: 1.4 K/UL (ref 1.1–4.5)
LYMPHOCYTES RELATIVE PERCENT: 24.2 % (ref 20–40)
MCH RBC QN AUTO: 32 PG (ref 27–31)
MCHC RBC AUTO-ENTMCNC: 33.2 G/DL (ref 33–37)
MCV RBC AUTO: 96.2 FL (ref 80–94)
MONOCYTES ABSOLUTE: 0.5 K/UL (ref 0–0.9)
MONOCYTES RELATIVE PERCENT: 9.2 % (ref 0–10)
NEUTROPHILS ABSOLUTE: 3.7 K/UL (ref 1.5–7.5)
NEUTROPHILS RELATIVE PERCENT: 63.3 % (ref 50–65)
NITRITE, URINE: NEGATIVE
PDW BLD-RTO: 12.6 % (ref 11.5–14.5)
PH UA: 7 (ref 5–8)
PLATELET # BLD: 233 K/UL (ref 130–400)
PMV BLD AUTO: 11.1 FL (ref 9.4–12.4)
POTASSIUM SERPL-SCNC: 4.6 MMOL/L (ref 3.5–5)
PROSTATE SPECIFIC ANTIGEN: 0.18 NG/ML (ref 0–4)
PROTEIN UA: ABNORMAL MG/DL
RBC # BLD: 3.91 M/UL (ref 4.7–6.1)
SODIUM BLD-SCNC: 138 MMOL/L (ref 136–145)
SPECIFIC GRAVITY UA: 1.01 (ref 1–1.03)
TOTAL PROTEIN: 7.1 G/DL (ref 6.6–8.7)
TRIGL SERPL-MCNC: 63 MG/DL (ref 0–149)
TSH SERPL DL<=0.05 MIU/L-ACNC: 3.46 UIU/ML (ref 0.27–4.2)
UROBILINOGEN, URINE: 1 E.U./DL
WBC # BLD: 5.8 K/UL (ref 4.8–10.8)

## 2022-11-30 NOTE — TELEPHONE ENCOUNTER
Patient notified of abnormal and elevated A!C. Pt is requesting to be placed back on Tresiba and states \"this new medication they have me on is not working\".

## 2022-12-01 ENCOUNTER — TELEPHONE (OUTPATIENT)
Dept: PRIMARY CARE CLINIC | Age: 57
End: 2022-12-01

## 2022-12-01 ASSESSMENT — ENCOUNTER SYMPTOMS
CONSTIPATION: 1
BACK PAIN: 0
SORE THROAT: 0
SHORTNESS OF BREATH: 0
TROUBLE SWALLOWING: 0
WHEEZING: 0
STRIDOR: 0
CHEST TIGHTNESS: 0
COLOR CHANGE: 0
BLOOD IN STOOL: 0
DIARRHEA: 0
ABDOMINAL PAIN: 0
COUGH: 0

## 2022-12-01 NOTE — PROGRESS NOTES
Enrico Hood ( 1965) is a 59 y.o. male,  Established , here for evaluation of the following chief complaint(s).   Diabetes      Patient was encouraged and advised to be compliant with all  medications leads an active lifestyle and promote maintaining a healthy weight, encouraged not to use cigarettes, laboratory results discussed and reviewed with patient's during this visit   ASSESSMENT/PLAN:  Problem List          Circulatory    Longstanding persistent atrial fibrillation ([address]      Monitored by specialist- no acute findings meriting change in the plan         Relevant Medications    warfarin (COUMADIN) 5 MG tablet    ASPIRIN LOW DOSE 81 MG EC tablet    metoprolol tartrate (LOPRESSOR) 50 MG tablet    atorvastatin (LIPITOR) 20 MG tablet    furosemide (LASIX) 40 MG tablet    cloNIDine (CATAPRES) 0.1 MG tablet    losartan (COZAAR) 100 MG tablet    HTN (hypertension)      Well-controlled, continue current medications         Relevant Medications    metoprolol tartrate (LOPRESSOR) 50 MG tablet    furosemide (LASIX) 40 MG tablet    cloNIDine (CATAPRES) 0.1 MG tablet    Chronic heart failure with preserved ejection fraction (HCC)      Monitored by specialist- no acute findings meriting change in the plan         Relevant Medications    warfarin (COUMADIN) 5 MG tablet    ASPIRIN LOW DOSE 81 MG EC tablet    metoprolol tartrate (LOPRESSOR) 50 MG tablet    atorvastatin (LIPITOR) 20 MG tablet    furosemide (LASIX) 40 MG tablet    cloNIDine (CATAPRES) 0.1 MG tablet    losartan (COZAAR) 100 MG tablet    CAD in native artery      Monitored by specialist- no acute findings meriting change in the plan         Relevant Medications    warfarin (COUMADIN) 5 MG tablet    ASPIRIN LOW DOSE 81 MG EC tablet    metoprolol tartrate (LOPRESSOR) 50 MG tablet    atorvastatin (LIPITOR) 20 MG tablet    furosemide (LASIX) 40 MG tablet    cloNIDine (CATAPRES) 0.1 MG tablet    losartan (COZAAR) 100 MG tablet       Respiratory    Obstructive sleep apnea      Well-controlled, continue current medications            Endocrine    Type 2 diabetes mellitus with diabetic polyneuropathy, with long-term current use of insulin (HCC) - Primary (Chronic)    Relevant Medications    blood glucose monitor kit and supplies    Lancets MISC    blood glucose monitor strips    Insulin Pen Needle 32G X 4 MM MISC    blood glucose test strips (CONTOUR NEXT TEST) strip    metFORMIN (GLUCOPHAGE) 1000 MG tablet    insulin lispro (HUMALOG KWIKPEN) 200 UNIT/ML SOPN pen    Insulin Degludec (TRESIBA FLEXTOUCH) 100 UNIT/ML SOPN    Other Relevant Orders    Amb External Referral To Ophthalmology    Charcot foot due to diabetes mellitus ([facility] [address]      Borderline controlled, no new cuts         Relevant Medications    ASPIRIN LOW DOSE 81 MG EC tablet    metFORMIN (GLUCOPHAGE) 1000 MG tablet    insulin lispro (HUMALOG KWIKPEN) 200 UNIT/ML SOPN pen    Insulin Degludec (TRESIBA FLEXTOUCH) 100 UNIT/ML SOPN       Genitourinary    Stage 3 chronic kidney disease (HCC)      Monitored by specialist- no acute findings meriting change in the plan         Relevant Orders    External Referral To Nephrology       Other    Personal history of noncompliance with medical treatment and regimen      he has not been using short acting insulin properly he has been giving himself postmeal patient was advised to check his sugar inject and eat reiterated this several times         Hyperlipidemia      Borderline controlled, continue current medications         Relevant Medications    warfarin (COUMADIN) 5 MG tablet    ASPIRIN LOW DOSE 81 MG EC tablet    metoprolol tartrate (LOPRESSOR) 50 MG tablet    atorvastatin (LIPITOR) 20 MG tablet    furosemide (LASIX) 40 MG tablet    cloNIDine (CATAPRES) 0.1 MG tablet    losartan (COZAAR) 100 MG tablet     No flowsheet data found.     PHQ Scores 5/2/2022 3/8/2022 1/3/2022 12/20/2021 11/8/2021   PHQ2 Score 0 0 0 0 0   PHQ9 Score 0 0 0 0 0       Results for orders placed or performed in visit on 11/30/22   PSA Screening   Result Value Ref Range    PSA 0.18 0.00 - 4.00 ng/mL   Urinalysis   Result Value Ref Range    Color, UA YELLOW Straw/Yellow    Clarity, UA Clear Clear    Glucose, Ur 500 (A) Negative mg/dL    Bilirubin Urine Negative Negative    Ketones, Urine Negative Negative mg/dL    Specific Gravity, UA 1.010 1.005 - 1.030    Blood, Urine Negative Negative    pH, UA 7.0 5.0 - 8.0    Protein, UA TRACE (A) Negative mg/dL    Urobilinogen, Urine 1.0 <2.0 E.U./dL    Nitrite, Urine Negative Negative    Leukocyte Esterase, Urine Negative Negative   TSH   Result Value Ref Range    TSH 3.460 0.270 - 4.200 uIU/mL   Lipid Panel   Result Value Ref Range    Cholesterol, Total 106 (L) 160 - 199 mg/dL    Triglycerides 63 0 - 149 mg/dL    HDL 38 (L) 55 - 121 mg/dL    LDL Calculated 55 <100 mg/dL   Hemoglobin A1C   Result Value Ref Range    Hemoglobin A1C 9.3 (H) 4.0 - 6.0 %   Comprehensive Metabolic Panel   Result Value Ref Range    Sodium 138 136 - 145 mmol/L    Potassium 4.6 3.5 - 5.0 mmol/L    Chloride 97 (L) 98 - 111 mmol/L    CO2 29 22 - 29 mmol/L    Anion Gap 12 7 - 19 mmol/L    Glucose 156 (H) 74 - 109 mg/dL    BUN 19 6 - 20 mg/dL    Creatinine 1.0 0.5 - 1.2 mg/dL    Est, Glom Filt Rate >60 >60    Calcium 10.1 (H) 8.6 - 10.0 mg/dL    Total Protein 7.1 6.6 - 8.7 g/dL    Albumin 4.5 3.5 - 5.2 g/dL    Total Bilirubin 0.6 0.2 - 1.2 mg/dL    Alkaline Phosphatase 115 40 - 130 U/L    ALT 16 5 - 41 U/L    AST 15 5 - 40 U/L   CBC with Auto Differential   Result Value Ref Range    WBC 5.8 4.8 - 10.8 K/uL    RBC 3.91 (L) 4.70 - 6.10 M/uL    Hemoglobin 12.5 (L) 14.0 - 18.0 g/dL    Hematocrit 37.6 (L) 42.0 - 52.0 %    MCV 96.2 (H) 80.0 - 94.0 fL    MCH 32.0 (H) 27.0 - 31.0 pg    MCHC 33.2 33.0 - 37.0 g/dL    RDW 12.6 11.5 - 14.5 %    Platelets 975 993 - 399 K/uL    MPV 11.1 9.4 - 12.4 fL    Neutrophils % 63.3 50.0 - 65.0 %    Lymphocytes % 24.2 20.0 - 40.0 %    Monocytes % 9.2 0.0 - 10.0 % Eosinophils % 2.1 0.0 - 5.0 %    Basophils % 1.0 0.0 - 1.0 %    Neutrophils Absolute 3.7 1.5 - 7.5 K/uL    Immature Granulocytes # 0.0 K/uL    Lymphocytes Absolute 1.4 1.1 - 4.5 K/uL    Monocytes Absolute 0.50 0.00 - 0.90 K/uL    Eosinophils Absolute 0.10 0.00 - 0.60 K/uL    Basophils Absolute 0.10 0.00 - 0.20 K/uL       Return in about 4 months (around 4/2/2023). HPI  57-year-old male who presents for follow-up visit for FUV  DM poorly controlled [facility], taking care of his MOM, he is not covering properly premeals, he stopped seeing his Endo  Feet are okay no new wound  HTN well controlled  Afib  had 2 CV and was told by cardiology to discontinue his blood thinner and cardiology nurse practitioner has signed off      Review of Systems   Constitutional:  Negative for activity change, appetite change, chills, diaphoresis, fatigue and fever. HENT:  Negative for congestion, hearing loss, postnasal drip, sore throat, tinnitus and trouble swallowing. Eyes:  Negative for visual disturbance (says he has blurred vision). Respiratory:  Negative for cough, chest tightness, shortness of breath, wheezing and stridor. Cardiovascular:  Negative for chest pain, palpitations and leg swelling. Gastrointestinal:  Positive for constipation. Negative for abdominal pain, blood in stool and diarrhea. Endocrine: Negative for cold intolerance. Genitourinary:  Negative for frequency. Musculoskeletal:  Negative for arthralgias, back pain and joint swelling. Skin:  Positive for wound. Negative for color change. Allergic/Immunologic: Negative for environmental allergies. Neurological:  Positive for numbness. Negative for dizziness, light-headedness and headaches. Hematological:  Negative for adenopathy. Does not bruise/bleed easily. Psychiatric/Behavioral:  Negative for decreased concentration, dysphoric mood and sleep disturbance. The patient is not nervous/anxious.       Physical Exam  Vitals and nursing note reviewed. Constitutional:       General: He is not in acute distress. Appearance: He is well-developed and normal weight. HENT:      Head: Normocephalic and atraumatic. Right Ear: External ear normal.      Left Ear: External ear normal.      Nose: Nose normal.      Mouth/Throat:      Mouth: Mucous membranes are moist.   Eyes:      General: No scleral icterus. Right eye: No discharge. Left eye: No discharge. Conjunctiva/sclera: Conjunctivae normal.      Pupils: Pupils are equal, round, and reactive to light. Neck:      Thyroid: No thyromegaly. Vascular: No JVD. Cardiovascular:      Rate and Rhythm: Normal rate and regular rhythm. Heart sounds: Normal heart sounds, S1 normal and S2 normal. No murmur heard. Pulmonary:      Effort: Pulmonary effort is normal.      Breath sounds: Normal breath sounds. No wheezing or rales. Abdominal:      General: Bowel sounds are normal. There is no distension. Palpations: Abdomen is soft. There is no mass. Tenderness: There is no abdominal tenderness. There is no rebound. Musculoskeletal:         General: No tenderness. Normal range of motion. Cervical back: Normal range of motion and neck supple. Right lower leg: No edema. Left lower leg: No edema. Comments: L middle finger amputation   Lymphadenopathy:      Cervical: No cervical adenopathy. Skin:     General: Skin is warm and dry. Findings: No rash. Comments: midkine sternotomy scar  [place] LLE, removed today   Neurological:      General: No focal deficit present. Mental Status: He is alert and oriented to person, place, and time. Mental status is at baseline. Cranial Nerves: No cranial nerve deficit. Motor: No weakness. Coordination: Coordination is intact. Deep Tendon Reflexes: Reflexes normal.   Psychiatric:         Mood and Affect: Affect normal. Mood is not depressed. Speech: Speech normal. Speech is not delayed. Behavior: Behavior normal. Behavior is not slowed or withdrawn. Behavior is cooperative. Thought Content:  Thought content normal.         Judgment: Judgment normal.         (Time Documentation Optional [id number])    An electronic signaturewaas used to authenticate this note  -Aida Adler MD on 12/4/2022 at 5:06 PM

## 2022-12-01 NOTE — TELEPHONE ENCOUNTER
Called patient and instructed him to bring blood sugar logs to appt with Dr Sofía Prieto tomorrow. Patient verbalized understanding.

## 2022-12-01 NOTE — TELEPHONE ENCOUNTER
----- Message from Matt Ferraro MD sent at 11/30/2022  2:54 PM CST -----  Pls call patient to bring blood glucose logs, his blood sugar is out of control again

## 2022-12-02 ENCOUNTER — OFFICE VISIT (OUTPATIENT)
Dept: PRIMARY CARE CLINIC | Age: 57
End: 2022-12-02

## 2022-12-02 VITALS
HEART RATE: 61 BPM | BODY MASS INDEX: 33.36 KG/M2 | SYSTOLIC BLOOD PRESSURE: 130 MMHG | HEIGHT: 70 IN | RESPIRATION RATE: 20 BRPM | OXYGEN SATURATION: 98 % | WEIGHT: 233 LBS | DIASTOLIC BLOOD PRESSURE: 70 MMHG | TEMPERATURE: 97.1 F

## 2022-12-02 DIAGNOSIS — I25.10 CAD IN NATIVE ARTERY: ICD-10-CM

## 2022-12-02 DIAGNOSIS — I10 PRIMARY HYPERTENSION: ICD-10-CM

## 2022-12-02 DIAGNOSIS — N18.30 STAGE 3 CHRONIC KIDNEY DISEASE, UNSPECIFIED WHETHER STAGE 3A OR 3B CKD (HCC): ICD-10-CM

## 2022-12-02 DIAGNOSIS — E78.2 MIXED HYPERLIPIDEMIA: ICD-10-CM

## 2022-12-02 DIAGNOSIS — E11.42 TYPE 2 DIABETES MELLITUS WITH DIABETIC POLYNEUROPATHY, WITH LONG-TERM CURRENT USE OF INSULIN (HCC): Primary | ICD-10-CM

## 2022-12-02 DIAGNOSIS — G47.33 OBSTRUCTIVE SLEEP APNEA: ICD-10-CM

## 2022-12-02 DIAGNOSIS — E11.610 CHARCOT FOOT DUE TO DIABETES MELLITUS (HCC): ICD-10-CM

## 2022-12-02 DIAGNOSIS — I48.11 LONGSTANDING PERSISTENT ATRIAL FIBRILLATION (HCC): ICD-10-CM

## 2022-12-02 DIAGNOSIS — Z91.199 PERSONAL HISTORY OF NONCOMPLIANCE WITH MEDICAL TREATMENT AND REGIMEN: ICD-10-CM

## 2022-12-02 DIAGNOSIS — I50.32 CHRONIC HEART FAILURE WITH PRESERVED EJECTION FRACTION (HCC): ICD-10-CM

## 2022-12-02 DIAGNOSIS — Z79.4 TYPE 2 DIABETES MELLITUS WITH DIABETIC POLYNEUROPATHY, WITH LONG-TERM CURRENT USE OF INSULIN (HCC): Primary | ICD-10-CM

## 2022-12-02 RX ORDER — INSULIN LISPRO 200 [IU]/ML
INJECTION, SOLUTION SUBCUTANEOUS
Qty: 3 ADJUSTABLE DOSE PRE-FILLED PEN SYRINGE | Refills: 3 | Status: SHIPPED | OUTPATIENT
Start: 2022-12-02

## 2022-12-02 RX ORDER — INSULIN DEGLUDEC INJECTION 100 U/ML
35 INJECTION, SOLUTION SUBCUTANEOUS NIGHTLY
COMMUNITY

## 2022-12-04 NOTE — ASSESSMENT & PLAN NOTE
he has not been using short acting insulin properly he has been giving himself postmeal patient was advised to check his sugar inject and eat reiterated this several times

## 2022-12-05 ENCOUNTER — TELEPHONE (OUTPATIENT)
Dept: PHARMACY | Facility: CLINIC | Age: 57
End: 2022-12-05

## 2022-12-05 NOTE — TELEPHONE ENCOUNTER
Received a referral:  from Provider to review patients medications. Not compliant to premeal glucose coverage, cannot afford Insulin    Called patient to schedule a time to speak with a pharmacist over the telephone. Spoke to patient and advised them of the above message. Patient verified understanding and scheduled their appointment: tomorrow at 4PM EST    Patient expressed his lack of education with pasta, potatoes and breads effecting BS. Patient is located in [address] [place] [address]. CST is 1hr behind us. Please schedule with Binu Trujillo for licensing purposes. Jessie Hodge [facility].    [facility] free: [address] in place:  No  Recommendation Provided To: Patient/Caregiver: 1 via Telephone  Intervention Detail: Scheduled Appointment  Gap Closed?: Yes   Intervention Accepted By: Patient/Caregiver: 1  Time Spent (min): 15

## 2022-12-06 ENCOUNTER — TELEPHONE (OUTPATIENT)
Dept: PHARMACY | Facility: CLINIC | Age: 57
End: 2022-12-06

## 2022-12-06 NOTE — TELEPHONE ENCOUNTER
Clay Garcia MD, medication review completed with patient:  Please send an order for Humalog U100 pens to his pharmacy. The instructions can stay the same, but make sure to provide them a max daily dose. The last prescription was for U200 and not covered, but we suspect the u100 will be covered. I spoke with the pharmacy and the Clarence Mis is actually covered with a reasonable price. I will let the patient know, but he should be fine to continue on this. We had a lengthy discussion on insulin timing as well as his readings/a1c. I am going to followup with him and perform a more in-depth med review once the insulin coverage is figured out    See note below for complete details. Please let me know if you have any questions. Thank you,  RA Campbell, PharmD, [address] [place] [address], toll free: [phone]      =======================================================    CLINICAL PHARMACY NOTE - Medication Review  Patient outreach to review medications - Spoke with patient. SUBJECTIVE/OBJECTIVE:   Migue Jones is a 59 y.o. male referred to a clinical pharmacy specialist by provider and/or given their history of uncontrolled diabetes.     Medications:  Current Outpatient Medications   Medication Sig Dispense Refill    insulin lispro (HUMALOG KWIKPEN) 200 UNIT/ML SOPN pen Sliding scale 160-179 2 units 180-199 3 units 200-219 4 units 220-239 5 units 240-259 6 units 260-279 7 units 280-299 8 units 300-399 9 units 340-379 10 units 380-420 11 units 3 Adjustable Dose Pre-filled Pen Syringe 3    Insulin Degludec (TRESIBA FLEXTOUCH) 100 UNIT/ML SOPN Inject 35 Units into the skin at bedtime Samples - 1 pen dispensed      losartan (COZAAR) 100 MG tablet TAKE ONE TABLET DAILY 90 tablet 3    cloNIDine (CATAPRES) 0.1 MG tablet TAKE ONE TABLET TWO TIMES DAILY 180 tablet 1    metoprolol tartrate (LOPRESSOR) 50 MG tablet TAKE ONE TABLET TWICE DAILY 180 tablet 3    metFORMIN (GLUCOPHAGE) 1000 MG tablet TAKE ONE TABLET TWICE DAILY WITH MEALS 180 tablet 3    atorvastatin (LIPITOR) 20 MG tablet TAKE ONE TABLET DAILY 90 tablet 3    furosemide (LASIX) 40 MG tablet TAKE ONE TABLET DAILY 90 tablet 3    vitamin D (ERGOCALCIFEROL) 1.25 MG (69612 UT) CAPS capsule Take 1 capsule by mouth once a week Takes on Friday 5 capsule 1    Insulin Pen Needle 32G X 4 MM MISC 1 each by Does not apply route daily 100 each 3    blood glucose test strips (CONTOUR NEXT TEST) strip 1 each by In Vitro route 4 times daily As needed. 200 each 3    tamsulosin (FLOMAX) 0.4 mg capsule Take 1 capsule by mouth 2 times daily 180 capsule 3    ASPIRIN LOW DOSE 81 MG EC tablet TAKE ONE TABLET  DAILY (Patient taking differently: 81 mg Every other day) 90 tablet 1    warfarin (COUMADIN) 5 MG tablet Take 1 tablet by mouth daily 30 tablet 3    meclizine (ANTIVERT) 12.5 MG tablet TAKE ONE TABLET THREE TIMES DAILY AS NEEDED FOR DIZZINESS 30 tablet 0    blood glucose monitor strips Test 3 times a day & as needed for symptoms of irregular blood glucose. Dispense sufficient amount for indicated testing frequency plus additional to accommodate PRN testing needs. 200 strip 3    blood glucose monitor kit and supplies Dispense sufficient amount for indicated testing frequency plus additional to accommodate PRN testing needs. 1 kit 0    Lancets MISC 1 each by Does not apply route 3 times daily (with meals) 300 each 3    diphenhydrAMINE (BENADRYL) 25 MG tablet Take 25 mg by mouth every 6 hours as needed for Itching       No current facility-administered medications for this visit. Allergies: Allergies   Allergen Reactions    Contrast [Iodides]      N & V. PT STATED HE HAD A MRI WITH CONTRAST ON 10/25/2021.     Neomycin-Bacitracin Zn-Polymyx Rash    Neosporin [Neomycin-Polymyxin-Gramicidin] Rash    Pcn [Penicillins] Rash       Pertinent Labs/Vitals:  BP Readings from Last 3 Encounters: 12/02/22 130/70   09/02/22 120/80   06/14/22 137/86     Lab Results   Component Value Date    LABMICR 17.40 08/05/2021     Lab Results   Component Value Date    LABA1C 9.3 (H) 11/30/2022    LABA1C 9.9 (H) 05/31/2022    LABA1C 8.7 (H) 01/05/2022     Lab Results   Component Value Date    CHOL 106 (L) 11/30/2022    TRIG 63 11/30/2022    HDL 38 (L) 11/30/2022    LDLCALC 55 11/30/2022     ALT   Date Value Ref Range Status   11/30/2022 16 5 - 41 U/L Final     AST   Date Value Ref Range Status   11/30/2022 15 5 - 40 U/L Final     The ASCVD Risk score (Alcides DK, et al., 2019) failed to calculate for the following reasons: The valid total cholesterol range is 130 to 320 mg/dL     Lab Results   Component Value Date    CREATININE 1.0 11/30/2022       Estimated Creatinine Clearance: 97 mL/min (based on SCr of 1 mg/dL). Social History:   Social History     Tobacco Use    Smoking status: Never    Smokeless tobacco: Never   Substance Use Topics    Alcohol use: No       Immunizations:   Most Recent Immunizations   Administered Date(s) Administered    COVID-19, PFIZER PURPLE top, DILUTE for use, (age 15 y+), 30mcg/0.3mL 02/19/2021    Hepatitis A/Hepatitis B (Twinrix) 06/01/2012    Hepatitis B Adult (Engerix-B) 12/02/2022    Influenza Virus Vaccine 10/25/2021    Influenza Whole 10/14/2015    Influenza, FLUAD, (age 72 y+), Adjuvanted, 0.5mL 09/23/2020    Influenza, FLUCELVAX, (age 10 mo+), MDCK, MDV, 0.5mL 09/24/2019    Influenza, FLUCELVAX, (age 10 mo+), MDCK, PF, 0.5mL 12/02/2022    Influenza, High Dose (Fluzone 65 yrs and older) 09/23/2020    Influenza, Quadv, 6 mo and older, IM, PF (Flulaval, Fluarix) 03/22/2019    Pneumococcal conjugate PCV20, PF (Prevnar 20) 12/02/2022    Td (Adult), 2 Lf Tetanus Toxoid, Pf (Td, Absorbed) 10/25/2021       ASSESSMENT/PLAN:   - General Assessment:   Did NOT complete a comprehensive medication review with patient.  He was focused more on cost. We did complete a review of his insulin therapy today as well. Adherence: This seems to be an issue for the patient. Will address more once we complete a more comprehensive review. Cost: Unclear what the issue is. Patient said some meds are expensive, but I did verify he has a Medicare pt D plan and likely has a low income subssidy making his meds very low cost if covered. Current pharmacy/pharmacies:   Drug interactions: No clinically significant interactions identified via [employer] Interaction Analysis as category D or higher. Renal dosing: No renal adjustments necessary. Discussed Insulin and general DM considerations:  Timing of insulin. Pt was using his fast acting insulin incorrectly. Reiterated that it must be used 15min before a meal in order for it to be most effective. He verbalized understanding and wrote this down. Stated he would make the change. A1c- Discussed what this number means. Explained that his post meal spikes are likely a big reason for his elevated a1c  Brayden  Reported that his current dose was \"usually\" 32 units once daily. He does say he reduces dose if his BG is lower. This is not recommended unless he is having clinical lows. Advised him to try to use the 32 unit dose as much as possible and if he has signs of a low to make his PCP aware. Currently has sample of this. Spoke with his pharmacy who informed me that the prescription was ready for only 4$ through insurance. He thought the cost was much higher, unclear where the confusion was  Humalog  This medication was sent in recently as Humalog U200. It looks as if this is not covered by his insurance. Will message PCP for a Humalog U100 script    Will continue to followup with patient. Attempted to reach today.  LVM for return call, but also advised that I would reach back out once I got the new orders from his pcp.      - Upcoming appointments:   Future Appointments   Date Time Provider Chris Camarena   3/24/2023 11:15 AM NOMI Rod - [facility] N [facility]   4/3/2023  1:00 PM Gabbi Hale MD [facility]   5/23/2023 11:00 AM MD MAGNUS [facility] Cardio Kim Davila  92., PharmD, [address] [facility], toll free: [phone]      For Pharmacy Admin Tracking Only    CPA in place:  No  Recommendation Provided To: Provider: 2 via Note to Provider, Patient/Caregiver: 3 via Telephone, and Pharmacy: 2  Intervention Detail: Device Training, New Rx: 1, reason: Cost/Formulary Change, and Refill(s) Provided  Gap Closed?: Yes   Intervention Accepted By: Provider: 2, Patient/Caregiver: 3, and Pharmacy: 2  Time Spent (min):  90

## 2022-12-07 DIAGNOSIS — Z79.4 TYPE 2 DIABETES MELLITUS WITH DIABETIC POLYNEUROPATHY, WITH LONG-TERM CURRENT USE OF INSULIN (HCC): Primary | ICD-10-CM

## 2022-12-07 DIAGNOSIS — E11.42 TYPE 2 DIABETES MELLITUS WITH DIABETIC POLYNEUROPATHY, WITH LONG-TERM CURRENT USE OF INSULIN (HCC): Primary | ICD-10-CM

## 2022-12-07 RX ORDER — INSULIN LISPRO 100 [IU]/ML
INJECTION, SOLUTION INTRAVENOUS; SUBCUTANEOUS
Qty: 3 EACH | Refills: 3 | Status: SHIPPED | OUTPATIENT
Start: 2022-12-07

## 2022-12-08 ENCOUNTER — TELEPHONE (OUTPATIENT)
Dept: PRIMARY CARE CLINIC | Age: 57
End: 2022-12-08

## 2022-12-08 DIAGNOSIS — Z79.4 TYPE 2 DIABETES MELLITUS WITH DIABETIC POLYNEUROPATHY, WITH LONG-TERM CURRENT USE OF INSULIN (HCC): Primary | ICD-10-CM

## 2022-12-08 DIAGNOSIS — E11.42 TYPE 2 DIABETES MELLITUS WITH DIABETIC POLYNEUROPATHY, WITH LONG-TERM CURRENT USE OF INSULIN (HCC): Primary | ICD-10-CM

## 2022-12-08 RX ORDER — INSULIN LISPRO 100 [IU]/ML
INJECTION, SOLUTION INTRAVENOUS; SUBCUTANEOUS
Qty: 3 ADJUSTABLE DOSE PRE-FILLED PEN SYRINGE | Refills: 3 | Status: SHIPPED | OUTPATIENT
Start: 2022-12-08

## 2022-12-08 NOTE — TELEPHONE ENCOUNTER
Sue Agee MD    We now have everything straightened out. Patient is to  both prescriptions for his insulin today. Hopefully with better adherence and proper administration he will start to see some improvement. I will plan to check back in after 1/1 to make sure he does not have any deductible issues when refilling    Let me know if you have any questions. Thanks! George Wheeler PharmJAIME, [address] [place] [address], toll free: [phone]    =======================================================      Patient returned call late today 12/8.    - Education on other meds provided, but patient did not have a lot of time so coversation was pretty brief. - Reiterated the importance of taking insulin correctly and went over again how to do so. - veralized understanding.  - Spoke with [facility]- Filling both Tresiba and Humalog u100 tonight both with a 4 copay. Pt to  tomorrow. Will sign off on encounter and followup in 4-8 weeks to check on patient.     George Wheeler PharmD, [address] [place] [address], toll free: [phone]

## 2022-12-08 NOTE — TELEPHONE ENCOUNTER
[facility] called to clarify the Humalog prescription sent earlier today. Patient previously had the pens but the prescription today was for the cartridges. Please advise.

## 2022-12-08 NOTE — TELEPHONE ENCOUNTER
Spoke with pharmacist at [facility]. Humalog prescription was sent in for U100 cartridges instead of Kwikpens. They had a message in to provider to make sure they actually meant the Kwikpens. I advised them that they did mean for the kwikpens. [facility] ran order for Humalog U100 Kwikpens and the copay was 4$. Also re-ran the [place] order and verified that it was also a 4$ copay. For now they went ahead and profiled both orders since I believe patient does not need right away. Will callback and request refill if he wants sooner. Attempted to call patient and update him. [facility] requesting return call to [phone] option 1. I plan to provide update and will also attempt to perform a more thorough medication review.     Justin Nguyen, PharmD, [address] [place] [address], toll free: [phone]

## 2023-01-10 DIAGNOSIS — E55.9 HYPOVITAMINOSIS D: ICD-10-CM

## 2023-01-10 RX ORDER — ERGOCALCIFEROL 1.25 MG/1
CAPSULE ORAL
Qty: 3 CAPSULE | Refills: 1 | Status: SHIPPED | OUTPATIENT
Start: 2023-01-10

## 2023-01-10 RX ORDER — ERGOCALCIFEROL 1.25 MG/1
CAPSULE ORAL
Qty: 5 CAPSULE | Refills: 1 | Status: SHIPPED | OUTPATIENT
Start: 2023-01-10 | End: 2023-01-10 | Stop reason: SDUPTHER

## 2023-01-10 NOTE — TELEPHONE ENCOUNTER
Marti Likes called to request a refill on his medication.       Last office visit : 6/1/2022   Next office visit : Visit date not found     Requested Prescriptions     Pending Prescriptions Disp Refills    vitamin D (ERGOCALCIFEROL) 1.25 MG (50043 UT) CAPS capsule [Pharmacy Med Name: VITAMIN D 1.25MG CAPSULE] 5 capsule 1     Sig: TAKE ONE CAPSULE ONCE A WEEK TAKES ON Pedrito Bonilla LPN

## 2023-01-18 ENCOUNTER — TELEPHONE (OUTPATIENT)
Dept: PRIMARY CARE CLINIC | Age: 58
End: 2023-01-18

## 2023-01-18 NOTE — TELEPHONE ENCOUNTER
Left voicemail with [facility] phone number for patient to call for appt details and instructions to call the office back with any questions.

## 2023-01-18 NOTE — TELEPHONE ENCOUNTER
----- Message from [employer] sent at 1/18/2023  8:12 AM CST -----  Subject: Message to Provider    QUESTIONS  Information for Provider? Patient is returning a call regarding a kidney   appointment.  Please contact pt at ([phone]).  ---------------------------------------------------------------------------  --------------  [facility]  [phone]; OK to leave message on voicemail  ---------------------------------------------------------------------------  --------------  SCRIPT ANSWERS  undefined

## 2023-02-13 DIAGNOSIS — E55.9 HYPOVITAMINOSIS D: ICD-10-CM

## 2023-02-15 RX ORDER — ERGOCALCIFEROL 1.25 MG/1
CAPSULE ORAL
Qty: 5 CAPSULE | Refills: 0 | Status: SHIPPED | OUTPATIENT
Start: 2023-02-15

## 2023-02-15 NOTE — TELEPHONE ENCOUNTER
Daya Anderson called requesting a refill of the below medication which has been pended for you:     Requested Prescriptions     Pending Prescriptions Disp Refills    vitamin D (ERGOCALCIFEROL) 1.25 MG (21672 UT) CAPS capsule [Pharmacy Med Name: VITAMIN D 1.25MG CAPSULE] 5 capsule 1     Sig: TAKE ONE CAPSULE ONCE A WEEK TAKES ON [address] [place] [address]       Last Appointment Date: 6/1/2022  Next Appointment Date: Visit date not found    Allergies   Allergen Reactions    Contrast [Iodides]      N & V. PT STATED HE HAD A MRI WITH CONTRAST ON 10/25/2021.     Neomycin-Bacitracin Zn-Polymyx Rash    Neosporin [Neomycin-Polymyxin-Gramicidin] Rash    Pcn [Penicillins] Rash

## 2023-02-20 ENCOUNTER — HOSPITAL ENCOUNTER (OUTPATIENT)
Dept: ULTRASOUND IMAGING | Age: 58
Discharge: HOME | End: 2023-02-20
Payer: MEDICARE

## 2023-02-20 DIAGNOSIS — N18.2 CHRONIC RENAL DISEASE, STAGE II: ICD-10-CM

## 2023-02-20 PROCEDURE — 76770 US EXAM ABDO BACK WALL COMP: CPT | Performed by: RADIOLOGY

## 2023-02-20 PROCEDURE — 76770 US EXAM ABDO BACK WALL COMP: CPT

## 2023-02-27 DIAGNOSIS — R39.14 BENIGN PROSTATIC HYPERPLASIA WITH INCOMPLETE BLADDER EMPTYING: ICD-10-CM

## 2023-02-27 DIAGNOSIS — N40.1 BENIGN PROSTATIC HYPERPLASIA WITH INCOMPLETE BLADDER EMPTYING: ICD-10-CM

## 2023-02-27 RX ORDER — TAMSULOSIN HYDROCHLORIDE 0.4 MG/1
CAPSULE ORAL
Qty: 180 CAPSULE | Refills: 3 | Status: SHIPPED | OUTPATIENT
Start: 2023-02-27

## 2023-03-23 ENCOUNTER — TELEPHONE (OUTPATIENT)
Dept: UROLOGY | Age: 58
End: 2023-03-23

## 2023-03-23 DIAGNOSIS — R39.14 BENIGN PROSTATIC HYPERPLASIA WITH INCOMPLETE BLADDER EMPTYING: ICD-10-CM

## 2023-03-23 DIAGNOSIS — N40.1 BENIGN PROSTATIC HYPERPLASIA WITH INCOMPLETE BLADDER EMPTYING: ICD-10-CM

## 2023-03-23 LAB — PSA SERPL-MCNC: 0.2 NG/ML (ref 0–4)

## 2023-03-23 NOTE — TELEPHONE ENCOUNTER
Called patient to remind him he would need his labs drawn prior to his appointment tomorrow 2/24/23. He will need a new PSA level drawn. I was unable to get in touch with his, so I left a voicemail with all the information he needs, along with our number to call back with any questions.

## 2023-03-24 ENCOUNTER — OFFICE VISIT (OUTPATIENT)
Dept: UROLOGY | Age: 58
End: 2023-03-24

## 2023-03-24 VITALS — TEMPERATURE: 97.5 F | HEIGHT: 70 IN | WEIGHT: 243.8 LBS | BODY MASS INDEX: 34.9 KG/M2

## 2023-03-24 DIAGNOSIS — R39.14 BENIGN PROSTATIC HYPERPLASIA WITH INCOMPLETE BLADDER EMPTYING: Primary | ICD-10-CM

## 2023-03-24 DIAGNOSIS — N40.1 BENIGN PROSTATIC HYPERPLASIA WITH INCOMPLETE BLADDER EMPTYING: Primary | ICD-10-CM

## 2023-03-24 LAB
APPEARANCE FLUID: CLEAR
BILIRUBIN, POC: NORMAL
BLOOD URINE, POC: NORMAL
CLARITY, POC: CLEAR
COLOR, POC: YELLOW
GLUCOSE URINE, POC: NORMAL
KETONES, POC: NORMAL
LEUKOCYTE EST, POC: NORMAL
NITRITE, POC: NORMAL
PH, POC: 5.5
PROTEIN, POC: NORMAL
SPECIFIC GRAVITY, POC: 1.01
UROBILINOGEN, POC: 0.2

## 2023-03-24 RX ORDER — DAPAGLIFLOZIN 10 MG/1
TABLET, FILM COATED ORAL
COMMUNITY
Start: 2023-02-07

## 2023-03-24 RX ORDER — DUTASTERIDE 0.5 MG/1
0.5 CAPSULE, LIQUID FILLED ORAL DAILY
Qty: 30 CAPSULE | Refills: 2 | Status: SHIPPED | OUTPATIENT
Start: 2023-03-24

## 2023-03-24 ASSESSMENT — ENCOUNTER SYMPTOMS
BACK PAIN: 0
ABDOMINAL PAIN: 0
VOMITING: 0
ABDOMINAL DISTENTION: 0
NAUSEA: 0

## 2023-03-24 NOTE — PROGRESS NOTES
Hx     Liver Disease Neg Hx     Rectal Cancer Neg Hx     Stomach Cancer Neg Hx        REVIEW OF SYSTEMS:  Review of Systems   Constitutional:  Negative for chills and fever. Gastrointestinal:  Negative for abdominal distention, abdominal pain, nausea and vomiting. Genitourinary:  Positive for frequency. Negative for difficulty urinating, dysuria, flank pain, hematuria and urgency. Musculoskeletal:  Negative for back pain and gait problem. Neurological:  Positive for numbness. Psychiatric/Behavioral:  Negative for agitation and confusion. PHYSICAL EXAM:  Temp 97.5 °F (36.4 °C) (Temporal)   Ht 5' 10\" (1.778 m)   Wt 243 lb 12.8 oz (110.6 kg)   BMI 34.98 kg/m²   Physical Exam  Vitals and nursing note reviewed. Constitutional:       General: He is not in acute distress. Appearance: Normal appearance. He is not ill-appearing. Pulmonary:      Effort: Pulmonary effort is normal. No respiratory distress. Abdominal:      General: There is no distension. Tenderness: There is no abdominal tenderness. There is no right CVA tenderness or left CVA tenderness. Genitourinary:     Prostate: Enlarged (35g smooth). Not tender and no nodules present. Neurological:      Mental Status: He is alert and oriented to person, place, and time. Mental status is at baseline.    Psychiatric:         Mood and Affect: Mood normal.         Behavior: Behavior normal.     DATA:    Results for orders placed or performed in visit on 03/24/23   POCT Urinalysis no Micro   Result Value Ref Range    Color, UA YELLOW     Clarity, UA CLEAR     Glucose, UA POC 1000mg/dL     Bilirubin, UA NEG     Ketones, UA NEG     Spec Grav, UA 1.010     Blood, UA POC NEG     pH, UA 5.5     Protein, UA POC NEG     Urobilinogen, UA 0.2     Leukocytes, UA NEG     Nitrite, UA NEG     Appearance, Fluid Clear Clear, Slightly Cloudy     Lab Results   Component Value Date    PSA 0.20 03/23/2023    PSA 0.18 11/30/2022    PSA 0.18 03/15/2022    PSA

## 2023-04-02 DIAGNOSIS — Z79.4 TYPE 2 DIABETES MELLITUS WITH DIABETIC POLYNEUROPATHY, WITH LONG-TERM CURRENT USE OF INSULIN (HCC): Primary | Chronic | ICD-10-CM

## 2023-04-02 DIAGNOSIS — E11.42 TYPE 2 DIABETES MELLITUS WITH DIABETIC POLYNEUROPATHY, WITH LONG-TERM CURRENT USE OF INSULIN (HCC): Primary | Chronic | ICD-10-CM

## 2023-04-02 ASSESSMENT — ENCOUNTER SYMPTOMS
CONSTIPATION: 1
STRIDOR: 0
CHEST TIGHTNESS: 0
WHEEZING: 0
TROUBLE SWALLOWING: 0
COLOR CHANGE: 0
SORE THROAT: 0
ABDOMINAL PAIN: 0
DIARRHEA: 0
BACK PAIN: 0
COUGH: 0
BLOOD IN STOOL: 0
SHORTNESS OF BREATH: 0

## 2023-04-02 NOTE — PROGRESS NOTES
mallory needs to CHEK2 hours postprandial and log he needs to do better with pre and post meals as his sugars are not controlled he has not brought his sugars numbers this time he has been instructed to cover before meals and after meals but he does not seem to be adherent to his regimen as his A1c continues to be very elevated         Relevant Medications    blood glucose monitor kit and supplies    Lancets MISC    blood glucose monitor strips    Insulin Pen Needle 32G X 4 MM MISC    blood glucose test strips (CONTOUR NEXT TEST) strip    metFORMIN (GLUCOPHAGE) 1000 MG tablet    Insulin Degludec (TRESIBA FLEXTOUCH) 100 UNIT/ML SOPN    insulin lispro (HUMALOG) 100 UNIT/ML injection cartridge    insulin lispro, 1 Unit Dial, (HUMALOG KWIKPEN) 100 UNIT/ML SOPN    FARXIGA 10 MG tablet    Continuous Blood Gluc  (FREESTYLE MALLORY 2 READER) MARIA DOLORES    Continuous Blood Gluc Sensor (FREESTYLE MALLORY 14 DAY SENSOR) MISC    Other Relevant Orders     DIABETES FOOT EXAM (Completed)    Amb External Referral To Ophthalmology    Proliferative diabetic retinopathy of both eyes without macular edema associated with type 2 diabetes mellitus (HCC)      Monitored by specialist- no acute findings meriting change in the plan         Relevant Medications    metFORMIN (GLUCOPHAGE) 1000 MG tablet    Insulin Degludec (TRESIBA FLEXTOUCH) 100 UNIT/ML SOPN    insulin lispro (HUMALOG) 100 UNIT/ML injection cartridge    insulin lispro, 1 Unit Dial, (HUMALOG KWIKPEN) 100 UNIT/ML SOPN    FARXIGA 10 MG tablet    Diabetic ulcer of right midfoot associated with type 2 diabetes mellitus, with fat layer exposed (HCC) (Chronic)    Relevant Medications    metFORMIN (GLUCOPHAGE) 1000 MG tablet    Insulin Degludec (TRESIBA FLEXTOUCH) 100 UNIT/ML SOPN    insulin lispro (HUMALOG) 100 UNIT/ML injection cartridge    insulin lispro, 1 Unit Dial, (HUMALOG KWIKPEN) 100 UNIT/ML SOPN    FARXIGA 10 MG tablet    Other Relevant Orders     DIABETES FOOT EXAM

## 2023-04-03 ENCOUNTER — OFFICE VISIT (OUTPATIENT)
Dept: PRIMARY CARE CLINIC | Age: 58
End: 2023-04-03
Payer: MEDICARE

## 2023-04-03 VITALS
BODY MASS INDEX: 33.86 KG/M2 | HEART RATE: 62 BPM | WEIGHT: 236 LBS | DIASTOLIC BLOOD PRESSURE: 78 MMHG | TEMPERATURE: 97.3 F | SYSTOLIC BLOOD PRESSURE: 128 MMHG | OXYGEN SATURATION: 98 %

## 2023-04-03 DIAGNOSIS — N18.30 STAGE 3 CHRONIC KIDNEY DISEASE, UNSPECIFIED WHETHER STAGE 3A OR 3B CKD (HCC): ICD-10-CM

## 2023-04-03 DIAGNOSIS — Z79.4 TYPE 2 DIABETES MELLITUS WITH DIABETIC POLYNEUROPATHY, WITH LONG-TERM CURRENT USE OF INSULIN (HCC): Primary | Chronic | ICD-10-CM

## 2023-04-03 DIAGNOSIS — I48.11 LONGSTANDING PERSISTENT ATRIAL FIBRILLATION (HCC): ICD-10-CM

## 2023-04-03 DIAGNOSIS — B35.3 TINEA PEDIS OF BOTH FEET: ICD-10-CM

## 2023-04-03 DIAGNOSIS — I50.32 CHRONIC HEART FAILURE WITH PRESERVED EJECTION FRACTION (HCC): ICD-10-CM

## 2023-04-03 DIAGNOSIS — I10 PRIMARY HYPERTENSION: ICD-10-CM

## 2023-04-03 DIAGNOSIS — E78.2 MIXED HYPERLIPIDEMIA: ICD-10-CM

## 2023-04-03 DIAGNOSIS — E11.3593 PROLIFERATIVE DIABETIC RETINOPATHY OF BOTH EYES WITHOUT MACULAR EDEMA ASSOCIATED WITH TYPE 2 DIABETES MELLITUS (HCC): ICD-10-CM

## 2023-04-03 DIAGNOSIS — Z79.4 TYPE 2 DIABETES MELLITUS WITH DIABETIC POLYNEUROPATHY, WITH LONG-TERM CURRENT USE OF INSULIN (HCC): Chronic | ICD-10-CM

## 2023-04-03 DIAGNOSIS — Z13.0 SCREENING FOR DEFICIENCY ANEMIA: ICD-10-CM

## 2023-04-03 DIAGNOSIS — E11.610 CHARCOT FOOT DUE TO DIABETES MELLITUS (HCC): ICD-10-CM

## 2023-04-03 DIAGNOSIS — L97.412 DIABETIC ULCER OF RIGHT MIDFOOT ASSOCIATED WITH TYPE 2 DIABETES MELLITUS, WITH FAT LAYER EXPOSED (HCC): ICD-10-CM

## 2023-04-03 DIAGNOSIS — E11.621 DIABETIC ULCER OF RIGHT MIDFOOT ASSOCIATED WITH TYPE 2 DIABETES MELLITUS, WITH FAT LAYER EXPOSED (HCC): ICD-10-CM

## 2023-04-03 DIAGNOSIS — E11.42 TYPE 2 DIABETES MELLITUS WITH DIABETIC POLYNEUROPATHY, WITH LONG-TERM CURRENT USE OF INSULIN (HCC): Primary | Chronic | ICD-10-CM

## 2023-04-03 DIAGNOSIS — E11.42 TYPE 2 DIABETES MELLITUS WITH DIABETIC POLYNEUROPATHY, WITH LONG-TERM CURRENT USE OF INSULIN (HCC): Chronic | ICD-10-CM

## 2023-04-03 LAB
ALBUMIN SERPL-MCNC: 4.3 G/DL (ref 3.5–5.2)
ALP SERPL-CCNC: 108 U/L (ref 40–130)
ALT SERPL-CCNC: 25 U/L (ref 5–41)
ANION GAP SERPL CALCULATED.3IONS-SCNC: 13 MMOL/L (ref 7–19)
AST SERPL-CCNC: 21 U/L (ref 5–40)
BASOPHILS # BLD: 0.1 K/UL (ref 0–0.2)
BASOPHILS NFR BLD: 1.2 % (ref 0–1)
BILIRUB SERPL-MCNC: 0.6 MG/DL (ref 0.2–1.2)
BUN SERPL-MCNC: 20 MG/DL (ref 6–20)
CALCIUM SERPL-MCNC: 10 MG/DL (ref 8.6–10)
CHLORIDE SERPL-SCNC: 100 MMOL/L (ref 98–111)
CHOLEST SERPL-MCNC: 112 MG/DL (ref 160–199)
CO2 SERPL-SCNC: 26 MMOL/L (ref 22–29)
CREAT SERPL-MCNC: 1.2 MG/DL (ref 0.5–1.2)
CREAT UR-MCNC: 65.4 MG/DL (ref 4.2–622)
EOSINOPHIL # BLD: 0.2 K/UL (ref 0–0.6)
EOSINOPHIL NFR BLD: 2.5 % (ref 0–5)
ERYTHROCYTE [DISTWIDTH] IN BLOOD BY AUTOMATED COUNT: 13.2 % (ref 11.5–14.5)
GLUCOSE SERPL-MCNC: 178 MG/DL (ref 74–109)
HBA1C MFR BLD: 9.5 % (ref 4–6)
HCT VFR BLD AUTO: 42.4 % (ref 42–52)
HDLC SERPL-MCNC: 38 MG/DL (ref 55–121)
HGB BLD-MCNC: 13.9 G/DL (ref 14–18)
IMM GRANULOCYTES # BLD: 0 K/UL
LDLC SERPL CALC-MCNC: 55 MG/DL
LYMPHOCYTES # BLD: 1.6 K/UL (ref 1.1–4.5)
LYMPHOCYTES NFR BLD: 27.4 % (ref 20–40)
MCH RBC QN AUTO: 31.6 PG (ref 27–31)
MCHC RBC AUTO-ENTMCNC: 32.8 G/DL (ref 33–37)
MCV RBC AUTO: 96.4 FL (ref 80–94)
MICROALBUMIN UR-MCNC: 14.8 MG/DL (ref 0–19)
MICROALBUMIN/CREAT UR-RTO: 226.3 MG/G
MONOCYTES # BLD: 0.5 K/UL (ref 0–0.9)
MONOCYTES NFR BLD: 8.2 % (ref 0–10)
NEUTROPHILS # BLD: 3.6 K/UL (ref 1.5–7.5)
NEUTS SEG NFR BLD: 60.5 % (ref 50–65)
PLATELET # BLD AUTO: 274 K/UL (ref 130–400)
PMV BLD AUTO: 10.8 FL (ref 9.4–12.4)
POTASSIUM SERPL-SCNC: 4.5 MMOL/L (ref 3.5–5)
PROT SERPL-MCNC: 7.5 G/DL (ref 6.6–8.7)
RBC # BLD AUTO: 4.4 M/UL (ref 4.7–6.1)
SODIUM SERPL-SCNC: 139 MMOL/L (ref 136–145)
TRIGL SERPL-MCNC: 93 MG/DL (ref 0–149)
WBC # BLD AUTO: 5.9 K/UL (ref 4.8–10.8)

## 2023-04-03 PROCEDURE — 2022F DILAT RTA XM EVC RTNOPTHY: CPT | Performed by: INTERNAL MEDICINE

## 2023-04-03 PROCEDURE — 3046F HEMOGLOBIN A1C LEVEL >9.0%: CPT | Performed by: INTERNAL MEDICINE

## 2023-04-03 PROCEDURE — 3074F SYST BP LT 130 MM HG: CPT | Performed by: INTERNAL MEDICINE

## 2023-04-03 PROCEDURE — G8417 CALC BMI ABV UP PARAM F/U: HCPCS | Performed by: INTERNAL MEDICINE

## 2023-04-03 PROCEDURE — 99214 OFFICE O/P EST MOD 30 MIN: CPT | Performed by: INTERNAL MEDICINE

## 2023-04-03 PROCEDURE — G8427 DOCREV CUR MEDS BY ELIG CLIN: HCPCS | Performed by: INTERNAL MEDICINE

## 2023-04-03 PROCEDURE — 1036F TOBACCO NON-USER: CPT | Performed by: INTERNAL MEDICINE

## 2023-04-03 PROCEDURE — 3078F DIAST BP <80 MM HG: CPT | Performed by: INTERNAL MEDICINE

## 2023-04-03 PROCEDURE — 3017F COLORECTAL CA SCREEN DOC REV: CPT | Performed by: INTERNAL MEDICINE

## 2023-04-03 RX ORDER — CLOTRIMAZOLE 1 %
CREAM (GRAM) TOPICAL
Qty: 45 G | Refills: 5 | Status: SHIPPED | OUTPATIENT
Start: 2023-04-03 | End: 2023-04-10

## 2023-04-03 RX ORDER — FLASH GLUCOSE SENSOR
1 KIT MISCELLANEOUS
Qty: 6 EACH | Refills: 3 | Status: SHIPPED | OUTPATIENT
Start: 2023-04-03 | End: 2023-07-02

## 2023-04-03 SDOH — ECONOMIC STABILITY: HOUSING INSECURITY
IN THE LAST 12 MONTHS, WAS THERE A TIME WHEN YOU DID NOT HAVE A STEADY PLACE TO SLEEP OR SLEPT IN A SHELTER (INCLUDING NOW)?: NO

## 2023-04-03 SDOH — ECONOMIC STABILITY: INCOME INSECURITY: HOW HARD IS IT FOR YOU TO PAY FOR THE VERY BASICS LIKE FOOD, HOUSING, MEDICAL CARE, AND HEATING?: NOT HARD AT ALL

## 2023-04-03 SDOH — ECONOMIC STABILITY: FOOD INSECURITY: WITHIN THE PAST 12 MONTHS, YOU WORRIED THAT YOUR FOOD WOULD RUN OUT BEFORE YOU GOT MONEY TO BUY MORE.: NEVER TRUE

## 2023-04-03 SDOH — ECONOMIC STABILITY: FOOD INSECURITY: WITHIN THE PAST 12 MONTHS, THE FOOD YOU BOUGHT JUST DIDN'T LAST AND YOU DIDN'T HAVE MONEY TO GET MORE.: NEVER TRUE

## 2023-04-03 ASSESSMENT — PATIENT HEALTH QUESTIONNAIRE - PHQ9
SUM OF ALL RESPONSES TO PHQ QUESTIONS 1-9: 0
SUM OF ALL RESPONSES TO PHQ QUESTIONS 1-9: 0
1. LITTLE INTEREST OR PLEASURE IN DOING THINGS: 0
SUM OF ALL RESPONSES TO PHQ9 QUESTIONS 1 & 2: 0
SUM OF ALL RESPONSES TO PHQ QUESTIONS 1-9: 0
SUM OF ALL RESPONSES TO PHQ QUESTIONS 1-9: 0
2. FEELING DOWN, DEPRESSED OR HOPELESS: 0

## 2023-04-03 NOTE — ASSESSMENT & PLAN NOTE
Monitored by specialist- no acute findings meriting change in the plan patient again counseled about the need to follow-up with his specialist pertaining to his heart

## 2023-04-03 NOTE — ASSESSMENT & PLAN NOTE
Monitored by specialist- no acute findings meriting change in the plan   Is off the service of CT surgery no longer on anticoagulation thinks that he is not needing to see cardiology has a low ejection fraction advised to continue following cardiology

## 2023-04-03 NOTE — ASSESSMENT & PLAN NOTE
Uncontrolled, changes made today: Patient will be asked to use a arslan needs to CHEK2 hours postprandial and log he needs to do better with pre and post meals as his sugars are not controlled he has not brought his sugars numbers this time he has been instructed to cover before meals and after meals but he does not seem to be adherent to his regimen as his A1c continues to be very elevated

## 2023-04-17 DIAGNOSIS — Z79.4 TYPE 2 DIABETES MELLITUS WITH DIABETIC POLYNEUROPATHY, WITH LONG-TERM CURRENT USE OF INSULIN (HCC): ICD-10-CM

## 2023-04-17 DIAGNOSIS — E11.42 TYPE 2 DIABETES MELLITUS WITH DIABETIC POLYNEUROPATHY, WITH LONG-TERM CURRENT USE OF INSULIN (HCC): ICD-10-CM

## 2023-04-17 NOTE — TELEPHONE ENCOUNTER
The patient also reported that he needs a bigger size tube of clotrimazole 1% cream. The patient is unable to fill this until 4/24/23.

## 2023-04-17 NOTE — TELEPHONE ENCOUNTER
Luiszeynep Danilo called to request a refill on his medication.       Last office visit : 4/3/2023   Next office visit : 7/3/2023     Requested Prescriptions     Pending Prescriptions Disp Refills    Insulin Pen Needle 32G X 4 MM MISC 100 each 3     Si each by Does not apply route daily            Jeppie Simmonds, MA

## 2023-04-22 DIAGNOSIS — E55.9 HYPOVITAMINOSIS D: ICD-10-CM

## 2023-04-24 DIAGNOSIS — E55.9 HYPOVITAMINOSIS D: ICD-10-CM

## 2023-04-24 RX ORDER — ERGOCALCIFEROL 1.25 MG/1
50000 CAPSULE ORAL
Qty: 3 CAPSULE | Refills: 1 | Status: SHIPPED | OUTPATIENT
Start: 2023-04-24 | End: 2023-07-23

## 2023-04-24 RX ORDER — ERGOCALCIFEROL 1.25 MG/1
CAPSULE ORAL
Qty: 5 CAPSULE | Refills: 0 | OUTPATIENT
Start: 2023-04-24

## 2023-04-24 NOTE — TELEPHONE ENCOUNTER
Last OV 6/1/2022  Next OV Visit date not found      Requested Prescriptions     Pending Prescriptions Disp Refills    vitamin D (ERGOCALCIFEROL) 1.25 MG (00562 UT) CAPS capsule [Pharmacy Med Name: VITAMIN D 1.25MG CAPSULE] 5 capsule 0     Sig: TAKE ONE CAPSULE ONCE A WEEK TAKES ON José Antonio Koenig

## 2023-05-15 DIAGNOSIS — E11.69 MIXED HYPERLIPIDEMIA DUE TO TYPE 2 DIABETES MELLITUS (HCC): ICD-10-CM

## 2023-05-15 DIAGNOSIS — I10 PRIMARY HYPERTENSION: ICD-10-CM

## 2023-05-15 DIAGNOSIS — E78.2 MIXED HYPERLIPIDEMIA DUE TO TYPE 2 DIABETES MELLITUS (HCC): ICD-10-CM

## 2023-05-15 RX ORDER — ATORVASTATIN CALCIUM 20 MG/1
TABLET, FILM COATED ORAL
Qty: 90 TABLET | Refills: 3 | Status: SHIPPED | OUTPATIENT
Start: 2023-05-15

## 2023-05-15 RX ORDER — CLONIDINE HYDROCHLORIDE 0.1 MG/1
TABLET ORAL
Qty: 180 TABLET | Refills: 1 | Status: SHIPPED | OUTPATIENT
Start: 2023-05-15

## 2023-05-15 NOTE — TELEPHONE ENCOUNTER
Ada Tejeda called to request a refill on his medication.       Last office visit : 6/1/2022   Next office visit : Visit date not found     Requested Prescriptions     Pending Prescriptions Disp Refills    atorvastatin (LIPITOR) 20 MG tablet [Pharmacy Med Name: ATORVASTATIN CALCIUM 20MG TABLET] 90 tablet 3     Sig: TAKE ONE TABLET DAILY    cloNIDine (CATAPRES) 0.1 MG tablet [Pharmacy Med Name: CLONIDINE HYDROCHLORIDE 0.1MG TABLET] 180 tablet 1     Sig: TAKE ONE TABLET TWO TIMES DAILY            Jose Luis Rae LPN

## 2023-05-20 NOTE — PROGRESS NOTES
ADMITTED: 21MAR19    PCP:  Dorcas Moss DO    CODE STATUS: Full Code        SUBJECTIVE:  Pt seen and examined  Doing well in no acute distress, BS seems to better controlled. . Denies CP or SOB Diarreahea improved. \"febrile\"  [id number]: (copied from Dr. Neal Sanderson note)  \"52 year old white male presents to the emergency department with complaint of fatigue, weakness, shaking chills for about 3 days, worse today. Patient is a diabetic dependent on insulin but states he has been unable to afford his insulin \"for several weeks. \" States he generally felt well prior to onset of symptoms without any localizing symptoms. Complains of chronic burning in his feet due to neuropathy and of polyuria and polydipsia for the past few days. The patient has atrial fibrillation, is on warfarin. INR is supratherapeutic. Patient denies any visible bleeding, nausea or vomiting, fever, cough, or congestion. \"    Allergies   Allergen Reactions    Pcn [Penicillins]     Neomycin-Bacitracin Zn-Polymyx Rash    Neosporin [Neomycin-Polymyxin-Gramicidin] Rash     Current Facility-Administered Medications   Medication Dose Route Frequency Provider Last Rate Last Dose    bumetanide (BUMEX) tablet 1 mg  1 mg Oral BID Claudia Galan MD   1 mg at 03/30/19 2036    ciprofloxacin (CIPRO) IVPB 400 mg  400 mg Intravenous Q12H Carla Womack MD   Stopped at 03/30/19 2136    calcium carbonate (TUMS) chewable tablet 500 mg  500 mg Oral TID PRN Bennie Monday, MD   500 mg at 03/27/19 1910    insulin glargine (LANTUS) injection vial 30 Units  30 Units Subcutaneous Nightly Eric Izaguirre MD   30 Units at 03/30/19 2037    insulin lispro (HUMALOG) injection vial 0-12 Units  0-12 Units Subcutaneous TID  Eric Izaguirre MD   4 Units at 03/30/19 1709    insulin lispro (HUMALOG) injection vial 0-6 Units  0-6 Units Subcutaneous Nightly Eric Izaguirre MD   3 Units at 03/30/19 2037    potassium chloride (KLOR-CON M) extended release tablet 40 mEq  40 mEq Oral PRN Nataly Harper MD        Or    potassium bicarb-citric acid (EFFER-K) effervescent tablet 40 mEq  40 mEq Oral PRN Nataly Harper MD        Or    potassium chloride 10 mEq/100 mL IVPB (Peripheral Line)  10 mEq Intravenous PRN Nataly Harper MD        ipratropium-albuterol (DUONEB) nebulizer solution 1 ampule  1 ampule Inhalation Q4H PRN Sorin Tsai MD        loperamide (IMODIUM) capsule 2 mg  2 mg Oral 4x Daily PRN Nataly Harper MD   2 mg at 03/27/19 1910    benzonatate (TESSALON) capsule 100 mg  100 mg Oral TID PRN Nataly Harper MD   100 mg at 03/30/19 1258    lidocaine PF 1 % injection 5 mL  5 mL Intradermal Once Nataly Harper MD        sodium chloride flush 0.9 % injection 10 mL  10 mL Intravenous 2 times per day Nataly Harper MD   10 mL at 03/30/19 2038    sodium chloride flush 0.9 % injection 10 mL  10 mL Intravenous PRN Nataly Harper MD        metoprolol tartrate (LOPRESSOR) tablet 25 mg  25 mg Oral BID Nataly Harper MD   25 mg at 03/30/19 2036    famotidine (PEPCID) injection 20 mg  20 mg Intravenous BID Nataly Harper MD   20 mg at 03/30/19 2036    magnesium sulfate 1 g in dextrose 5% 100 mL IVPB  1 g Intravenous PRN Nataly Harper MD   Stopped at 03/25/19 1920    sodium phosphate 10 mmol in dextrose 5 % 250 mL IVPB  10 mmol Intravenous PRN Nataly Harper MD   Stopped at 03/25/19 0786    Or    sodium phosphate 15 mmol in dextrose 5 % 250 mL IVPB  15 mmol Intravenous PRN Nataly Harper MD        Or    sodium phosphate 20 mmol in dextrose 5 % 500 mL IVPB  20 mmol Intravenous PRN Nataly Harper MD        acetaminophen (TYLENOL) tablet 650 mg  650 mg Oral Q6H PRN Nataly Harper MD   650 mg at 03/24/19 0835    melatonin tablet 3 mg  3 mg Oral Nightly PRN Nataly Harper MD   3 mg at 03/23/19 2306    cetirizine (ZYRTEC) tablet 10 mg  10 mg Oral Nightly Nataly Harper MD   10 mg at 03/30/19 2036    glucose (GLUTOSE) 40 % oral gel 15 g  15 g Oral PRN Nataly Harper MD        dextrose 50 function improves  2. Otherwise negative bilateral renal ultrasound. Diarrhea: On cipro and flagyl empirically since [id number]  C Diff was ruled out  Stool cx + salmonella- will continue with cipro  ID consulted reccomends c/w cipro and + fluconazole x 14 days  D#2     A Fib rate controlled:  continue coumadin  daily INR    Diabetic ketoacidosis without coma, in underlying DM type 2: (DKA RESOLVED)  On SQ insulin  Starting back on ISS medium dose  Long acting as ordered from tonight  Needs long acting dose of insulin before leaving for floor  hypoglycemia protocol  a1c : 16        Hyperkalemia: (RESOLVED)     [place] metabolic acidosis (RESOLVED)    DISPO: SW consulted to help setup insulin for discharge; C/w  ID reccomendations. , Likely tomorrow      Patient Active Problem List   Diagnosis    Shortness of breath    HTN (hypertension)    Hyperlipidemia    Fatigue    Atrial fibrillation (HCC)    Paroxysmal atrial fibrillation (HCC)    Acalculous cholecystitis    Essential hypertension    Diabetic ketoacidosis without coma associated with type 2 diabetes mellitus (HCC)    Hyponatremia    Hypotension due to hypovolemia    Acute kidney injury ([address]    Type 2 diabetes mellitus with diabetic polyneuropathy, with long-term current use of insulin (HCC)    Elevated troponin    Coagulopathy (HCC) ESRD 2/2 polycystic kidney disease on HD TThSat via catheter in L upper chest. Catheter site appears clean/dry. Last HD session tolerated well on Thursday 5/18.     - Renal dialysis fellow consulted, recommendations appreciated  - Continue home Sevelamer 1600mg Q8 with meals  - Continue home Nephro-Natasha Q24  - Continue home folic acid 1mg Q24

## 2023-05-22 DIAGNOSIS — R39.14 BENIGN PROSTATIC HYPERPLASIA WITH INCOMPLETE BLADDER EMPTYING: ICD-10-CM

## 2023-05-22 DIAGNOSIS — N40.1 BENIGN PROSTATIC HYPERPLASIA WITH INCOMPLETE BLADDER EMPTYING: ICD-10-CM

## 2023-05-22 RX ORDER — DUTASTERIDE 0.5 MG/1
CAPSULE, LIQUID FILLED ORAL
Qty: 30 CAPSULE | Refills: 2 | Status: SHIPPED | OUTPATIENT
Start: 2023-05-22 | End: 2023-05-24 | Stop reason: SDUPTHER

## 2023-05-23 ENCOUNTER — OFFICE VISIT (OUTPATIENT)
Dept: CARDIOLOGY CLINIC | Age: 58
End: 2023-05-23
Payer: MEDICARE

## 2023-05-23 VITALS
SYSTOLIC BLOOD PRESSURE: 126 MMHG | WEIGHT: 232 LBS | HEART RATE: 60 BPM | BODY MASS INDEX: 33.29 KG/M2 | DIASTOLIC BLOOD PRESSURE: 74 MMHG

## 2023-05-23 DIAGNOSIS — I48.19 PERSISTENT ATRIAL FIBRILLATION (HCC): Primary | ICD-10-CM

## 2023-05-23 PROCEDURE — 93000 ELECTROCARDIOGRAM COMPLETE: CPT | Performed by: INTERNAL MEDICINE

## 2023-05-23 PROCEDURE — G8417 CALC BMI ABV UP PARAM F/U: HCPCS | Performed by: INTERNAL MEDICINE

## 2023-05-23 PROCEDURE — 1036F TOBACCO NON-USER: CPT | Performed by: INTERNAL MEDICINE

## 2023-05-23 PROCEDURE — 3074F SYST BP LT 130 MM HG: CPT | Performed by: INTERNAL MEDICINE

## 2023-05-23 PROCEDURE — 3017F COLORECTAL CA SCREEN DOC REV: CPT | Performed by: INTERNAL MEDICINE

## 2023-05-23 PROCEDURE — G8427 DOCREV CUR MEDS BY ELIG CLIN: HCPCS | Performed by: INTERNAL MEDICINE

## 2023-05-23 PROCEDURE — 3078F DIAST BP <80 MM HG: CPT | Performed by: INTERNAL MEDICINE

## 2023-05-23 PROCEDURE — 99214 OFFICE O/P EST MOD 30 MIN: CPT | Performed by: INTERNAL MEDICINE

## 2023-05-23 NOTE — PATIENT INSTRUCTIONS
RADHA with :  [place] at the [address] [place] [address] and [address] [facility] ([facility]) located on the first floor of [place] [address] through hospital main entrance and turn immediately to your left. Procedure Date: 05/31/23  Procedure Arrival Time: 11:00AM  Procedure Start Time: 1:00PM  ( Times are subject to change)     Please have your lab work ( CBC and BMP) done before your procedure Date. Procedure Instructions:   1) You will need to not have anything to eat or drink the morning before the procedure. 2) You may stay overnight for observation. 3) You can still take all of your morning medication with a sip of water   4) You will need a  for the procedure.

## 2023-05-23 NOTE — PROGRESS NOTES
Patricio Mijares is a 59 y.o. male presents with history of atrial fibrillation status post maze procedure and closure of the left atrial appendage. At some point the patient was taken off of anticoagulation but he never underwent transesophageal echo to confirm that the closure was complete. Overall he feels well and says that he has not had any atrial fibrillation. Review of Systems   Constitutional: Negative for fever, chills, diaphoresis, activity change, appetite change, fatigue and unexpected weight change. Eyes: Negative for photophobia, pain, redness and visual disturbance. Respiratory: Negative for apnea, cough, chest tightness, shortness of breath, wheezing and stridor. Cardiovascular: Negative for chest pain, palpitations and leg swelling. Gastrointestinal: Negative for abdominal distention. Genitourinary: Negative for dysuria, urgency and frequency. Musculoskeletal: Negative for myalgias, arthralgias and gait problem. Skin: Negative for color change, pallor, rash and wound. Neurological: Negative for dizziness, tremors, speech difficulty, weakness and numbness. Hematological: Does not bruise/bleed easily. Psychiatric/Behavioral: Negative.           Social History     Socioeconomic History    Marital status:      Spouse name: Not on file    Number of children: Not on file    Years of education: Not on file    Highest education level: Not on file   Occupational History    Not on file   Tobacco Use    Smoking status: Never    Smokeless tobacco: Never   Vaping Use    Vaping Use: Never used   Substance and Sexual Activity    Alcohol use: No    Drug use: No    Sexual activity: Not on file   Other Topics Concern    Not on file   Social History Narrative        Referred to  for assistance with Meals on Wheels        CODE- Full Matthew 46 (Parent) [phone]     DOMICILE- Single family home within city limits     DME-     TRANSPORTATION-

## 2023-05-24 ENCOUNTER — OFFICE VISIT (OUTPATIENT)
Dept: UROLOGY | Age: 58
End: 2023-05-24
Payer: MEDICARE

## 2023-05-24 VITALS — WEIGHT: 243.8 LBS | TEMPERATURE: 97.9 F | HEIGHT: 70 IN | BODY MASS INDEX: 34.9 KG/M2

## 2023-05-24 DIAGNOSIS — N40.1 BENIGN PROSTATIC HYPERPLASIA WITH INCOMPLETE BLADDER EMPTYING: Primary | ICD-10-CM

## 2023-05-24 DIAGNOSIS — R39.14 BENIGN PROSTATIC HYPERPLASIA WITH INCOMPLETE BLADDER EMPTYING: Primary | ICD-10-CM

## 2023-05-24 LAB
APPEARANCE FLUID: CLEAR
BILIRUBIN, POC: NORMAL
BLOOD URINE, POC: NORMAL
CLARITY, POC: CLEAR
COLOR, POC: YELLOW
GLUCOSE URINE, POC: NORMAL
KETONES, POC: NORMAL
LEUKOCYTE EST, POC: NORMAL
NITRITE, POC: NORMAL
PH, POC: 5.5
POST VOID RESIDUAL (PVR): 85 ML
PROTEIN, POC: NORMAL
SPECIFIC GRAVITY, POC: 1.01
UROBILINOGEN, POC: 1

## 2023-05-24 PROCEDURE — 1036F TOBACCO NON-USER: CPT | Performed by: NURSE PRACTITIONER

## 2023-05-24 PROCEDURE — 81002 URINALYSIS NONAUTO W/O SCOPE: CPT | Performed by: NURSE PRACTITIONER

## 2023-05-24 PROCEDURE — G8417 CALC BMI ABV UP PARAM F/U: HCPCS | Performed by: NURSE PRACTITIONER

## 2023-05-24 PROCEDURE — 3017F COLORECTAL CA SCREEN DOC REV: CPT | Performed by: NURSE PRACTITIONER

## 2023-05-24 PROCEDURE — 51798 US URINE CAPACITY MEASURE: CPT | Performed by: NURSE PRACTITIONER

## 2023-05-24 PROCEDURE — 99214 OFFICE O/P EST MOD 30 MIN: CPT | Performed by: NURSE PRACTITIONER

## 2023-05-24 PROCEDURE — G8427 DOCREV CUR MEDS BY ELIG CLIN: HCPCS | Performed by: NURSE PRACTITIONER

## 2023-05-24 RX ORDER — DUTASTERIDE 0.5 MG/1
0.5 CAPSULE, LIQUID FILLED ORAL DAILY
Qty: 90 CAPSULE | Refills: 3 | Status: SHIPPED | OUTPATIENT
Start: 2023-05-24

## 2023-05-24 RX ORDER — TAMSULOSIN HYDROCHLORIDE 0.4 MG/1
0.4 CAPSULE ORAL DAILY
Qty: 180 CAPSULE | Refills: 3 | Status: SHIPPED | OUTPATIENT
Start: 2023-05-24

## 2023-05-24 ASSESSMENT — ENCOUNTER SYMPTOMS
ABDOMINAL DISTENTION: 0
VOMITING: 0
BACK PAIN: 0
NAUSEA: 0
ABDOMINAL PAIN: 0

## 2023-05-24 NOTE — PROGRESS NOTES
Aurelio Kimball is a 59 y.o. male who presents today   Chief Complaint   Patient presents with    Follow-up     I am here today for my 2 mo BPH fu        Benign Prostatic Hypertrophy: Patient complains of lower urinary tract symptoms. Patient reports moderate symptoms of BPH. Onset of symptoms was several years ago and was gradual in onset. His AUA Symptom Score is, 15/35 manifested as irritative symptoms including frequency, urgency and obstructive symptoms including incomplete emptying, intermittency, weak stream, straining. He has no personal history and no family history of prostate cancer. He reports frequency, incomplete emptying, intermittency, straining, urgency, and weak stream.  He denies nocturia one time a night. He reports a  history of no complicating symptoms. He denies flank pain, gross hematuria, kidney stones, and recurrent UTI. Pleased with his quality of life. Previous CRISTOFER revealed a 40 g nonsuspicious prostate. I went ahead and place him on Avodart due to incomplete bladder emptying. He is now on tamsulosin and Avodart. His bladder scan did improve from 218 now 85 mL PVR. Lab Results   Component Value Date    PSA 0.20 03/23/2023    PSA 0.18 11/30/2022    PSA 0.18 03/15/2022       Likely more neurogenic bladder rather than obstructing prostate. He does have severe bilateral neuropathy, history of uncontrolled diabetes. Patient also complains of erectile dysfunction since 2015. Denies any a.m. erections. Denies any low libido. Has not tried any interventions. Is questioning if he is healthy enough for activity. He does see a cardiologist.  History of a CABG in 2021.       Past Medical History:   Diagnosis Date    Acalculous cholecystitis 12/29/2015    LIAN (acute kidney [facility]) ([facility] 3/21/2019    Allergic rhinitis, cause unspecified     Atrial fibrillation, chronic ([address]     sees [facility] cardiology    Bacteremia due to methicillin resistant Staphylococcus aureus 10/27/2021    Bone

## 2023-05-31 ENCOUNTER — HOSPITAL ENCOUNTER (OUTPATIENT)
Dept: CARDIAC CATH/INVASIVE PROCEDURES | Age: 58
Discharge: HOME | End: 2023-05-31
Attending: INTERNAL MEDICINE | Admitting: INTERNAL MEDICINE
Payer: MEDICARE

## 2023-05-31 ENCOUNTER — ANESTHESIA (OUTPATIENT)
Dept: CARDIAC CATH/INVASIVE PROCEDURES | Age: 58
End: 2023-05-31
Payer: MEDICARE

## 2023-05-31 ENCOUNTER — ANESTHESIA EVENT (OUTPATIENT)
Dept: CARDIAC CATH/INVASIVE PROCEDURES | Age: 58
End: 2023-05-31
Payer: MEDICARE

## 2023-05-31 VITALS
RESPIRATION RATE: 20 BRPM | HEART RATE: 73 BPM | OXYGEN SATURATION: 99 % | HEIGHT: 70 IN | WEIGHT: 243 LBS | DIASTOLIC BLOOD PRESSURE: 88 MMHG | SYSTOLIC BLOOD PRESSURE: 166 MMHG | BODY MASS INDEX: 34.79 KG/M2 | TEMPERATURE: 97 F

## 2023-05-31 LAB
ANION GAP SERPL CALCULATED.3IONS-SCNC: 11 MMOL/L (ref 7–19)
BUN SERPL-MCNC: 16 MG/DL (ref 6–20)
CALCIUM SERPL-MCNC: 9.4 MG/DL (ref 8.6–10)
CHLORIDE SERPL-SCNC: 102 MMOL/L (ref 98–111)
CO2 SERPL-SCNC: 26 MMOL/L (ref 22–29)
CREAT SERPL-MCNC: 1 MG/DL (ref 0.5–1.2)
ERYTHROCYTE [DISTWIDTH] IN BLOOD BY AUTOMATED COUNT: 12.4 % (ref 11.5–14.5)
GLUCOSE SERPL-MCNC: 97 MG/DL (ref 74–109)
HCT VFR BLD AUTO: 40.4 % (ref 42–52)
HGB BLD-MCNC: 13.7 G/DL (ref 14–18)
LV EF: 58 %
LVEF MODALITY: NORMAL
MCH RBC QN AUTO: 31.6 PG (ref 27–31)
MCHC RBC AUTO-ENTMCNC: 33.9 G/DL (ref 33–37)
MCV RBC AUTO: 93.1 FL (ref 80–94)
PLATELET # BLD AUTO: 239 K/UL (ref 130–400)
PMV BLD AUTO: 10.1 FL (ref 9.4–12.4)
POTASSIUM SERPL-SCNC: 4 MMOL/L (ref 3.5–5)
RBC # BLD AUTO: 4.34 M/UL (ref 4.7–6.1)
SODIUM SERPL-SCNC: 139 MMOL/L (ref 136–145)
WBC # BLD AUTO: 5.8 K/UL (ref 4.8–10.8)

## 2023-05-31 PROCEDURE — 93325 DOPPLER ECHO COLOR FLOW MAPG: CPT

## 2023-05-31 PROCEDURE — 2500000003 HC RX 250 WO HCPCS

## 2023-05-31 PROCEDURE — 36415 COLL VENOUS BLD VENIPUNCTURE: CPT

## 2023-05-31 PROCEDURE — 3700000000 HC ANESTHESIA ATTENDED CARE

## 2023-05-31 PROCEDURE — 6360000002 HC RX W HCPCS

## 2023-05-31 PROCEDURE — 3700000001 HC ADD 15 MINUTES (ANESTHESIA)

## 2023-05-31 PROCEDURE — 93321 DOPPLER ECHO F-UP/LMTD STD: CPT

## 2023-05-31 PROCEDURE — 2580000003 HC RX 258: Performed by: INTERNAL MEDICINE

## 2023-05-31 PROCEDURE — 93312 ECHO TRANSESOPHAGEAL: CPT

## 2023-05-31 PROCEDURE — 6370000000 HC RX 637 (ALT 250 FOR IP)

## 2023-05-31 PROCEDURE — 80048 BASIC METABOLIC PNL TOTAL CA: CPT

## 2023-05-31 PROCEDURE — 85027 COMPLETE CBC AUTOMATED: CPT

## 2023-05-31 RX ORDER — PROPOFOL 10 MG/ML
INJECTION, EMULSION INTRAVENOUS PRN
Status: DISCONTINUED | OUTPATIENT
Start: 2023-05-31 | End: 2023-05-31 | Stop reason: SDUPTHER

## 2023-05-31 RX ORDER — SODIUM CHLORIDE 9 MG/ML
INJECTION, SOLUTION INTRAVENOUS PRN
Status: DISCONTINUED | OUTPATIENT
Start: 2023-05-31 | End: 2023-05-31 | Stop reason: HOSPADM

## 2023-05-31 RX ORDER — SODIUM CHLORIDE 0.9 % (FLUSH) 0.9 %
5-40 SYRINGE (ML) INJECTION PRN
Status: DISCONTINUED | OUTPATIENT
Start: 2023-05-31 | End: 2023-05-31 | Stop reason: HOSPADM

## 2023-05-31 RX ORDER — SODIUM CHLORIDE 0.9 % (FLUSH) 0.9 %
5-40 SYRINGE (ML) INJECTION EVERY 12 HOURS SCHEDULED
Status: DISCONTINUED | OUTPATIENT
Start: 2023-05-31 | End: 2023-05-31 | Stop reason: HOSPADM

## 2023-05-31 RX ORDER — SODIUM CHLORIDE, SODIUM LACTATE, POTASSIUM CHLORIDE, CALCIUM CHLORIDE 600; 310; 30; 20 MG/100ML; MG/100ML; MG/100ML; MG/100ML
INJECTION, SOLUTION INTRAVENOUS CONTINUOUS
Status: DISCONTINUED | OUTPATIENT
Start: 2023-05-31 | End: 2023-05-31 | Stop reason: HOSPADM

## 2023-05-31 RX ORDER — ONDANSETRON 2 MG/ML
4 INJECTION INTRAMUSCULAR; INTRAVENOUS
Status: DISCONTINUED | OUTPATIENT
Start: 2023-05-31 | End: 2023-05-31 | Stop reason: HOSPADM

## 2023-05-31 RX ORDER — HYDROMORPHONE HYDROCHLORIDE 1 MG/ML
0.25 INJECTION, SOLUTION INTRAMUSCULAR; INTRAVENOUS; SUBCUTANEOUS EVERY 5 MIN PRN
Status: DISCONTINUED | OUTPATIENT
Start: 2023-05-31 | End: 2023-05-31 | Stop reason: HOSPADM

## 2023-05-31 RX ORDER — HYDROMORPHONE HYDROCHLORIDE 1 MG/ML
0.5 INJECTION, SOLUTION INTRAMUSCULAR; INTRAVENOUS; SUBCUTANEOUS EVERY 5 MIN PRN
Status: DISCONTINUED | OUTPATIENT
Start: 2023-05-31 | End: 2023-05-31 | Stop reason: HOSPADM

## 2023-05-31 RX ORDER — LIDOCAINE HYDROCHLORIDE 10 MG/ML
INJECTION, SOLUTION EPIDURAL; INFILTRATION; INTRACAUDAL; PERINEURAL PRN
Status: DISCONTINUED | OUTPATIENT
Start: 2023-05-31 | End: 2023-05-31 | Stop reason: SDUPTHER

## 2023-05-31 RX ADMIN — PROPOFOL 120 MCG/KG/MIN: 10 INJECTION, EMULSION INTRAVENOUS at 13:37

## 2023-05-31 RX ADMIN — LIDOCAINE HYDROCHLORIDE 5 ML: 10 INJECTION, SOLUTION EPIDURAL; INFILTRATION; INTRACAUDAL; PERINEURAL at 13:36

## 2023-05-31 RX ADMIN — PROPOFOL 100 MG: 10 INJECTION, EMULSION INTRAVENOUS at 13:36

## 2023-05-31 RX ADMIN — SODIUM CHLORIDE: 9 INJECTION, SOLUTION INTRAVENOUS at 11:36

## 2023-05-31 ASSESSMENT — LIFESTYLE VARIABLES: SMOKING_STATUS: 0

## 2023-05-31 NOTE — PROGRESS NOTES
Dr Rachelle Jimenez at bedside to talk to pt about nichelle results. Dr Rachelle Jimenez notified that pt is no longer taking blood thinners. Ok per Dr Rachelle Jimenez for pt to keep his 1 year f/u with Dr Natale Severs and that pt may continue his home medicines without any changes.  Electronically signed by Camila Wadsworth RN on 5/31/2023 at 3:00 PM

## 2023-05-31 NOTE — PROGRESS NOTES
Pt tolerated PO liquids without complication.  Electronically signed by Sushil Bryant RN on 5/31/2023 at 3:34 PM

## 2023-05-31 NOTE — PROGRESS NOTES
Discharge instructions reviewed with patient and patient states understanding.  Patient discharged from cath holding with all belongings and AVS.  Electronically signed by Aletha Richey RN on 5/31/2023 at 3:40 PM

## 2023-05-31 NOTE — ANESTHESIA POSTPROCEDURE EVALUATION
Department of Anesthesiology  Postprocedure Note    Patient: Bailee Barragan  MRN: [id number]  YOB: 1965  Date of evaluation: 5/31/2023      Procedure Summary     Date: 05/31/23 Room / Location: [facility] CATH LAB    Anesthesia Start: 9890 Anesthesia Stop:     Procedure: RADHA W ANESTHESIA Diagnosis: Other specified postprocedural states    Scheduled Providers:  Responsible Provider:     Anesthesia Type: MAC ASA Status: 3          Anesthesia Type: No value filed.     Jessica Phase I:      Jessica Phase II:        Anesthesia Post Evaluation    Patient location during evaluation: bedside  Patient participation: complete - patient participated  Level of consciousness: awake and alert  Pain score: 0  Airway patency: patent  Nausea & Vomiting: no nausea and no vomiting  Complications: no  Cardiovascular status: blood pressure returned to baseline  Respiratory status: acceptable and room air  Hydration status: euvolemic

## 2023-05-31 NOTE — ANESTHESIA PRE PROCEDURE
Department of Anesthesiology  Preprocedure Note       Name:  Goldie Mclaughlin   Age:  59 y.o.  :  1965                                          MRN:  [id number]         Date:  2023      Surgeon: * Surgery not found *    Procedure:     Medications prior to admission:   Prior to Admission medications    Medication Sig Start Date End Date Taking?  Authorizing Provider   dutasteride (AVODART) 0.5 MG capsule Take 1 capsule by mouth daily 23   NOMI Miller CNP   tamsulosin (FLOMAX) 0.4 MG capsule Take 1 capsule by mouth daily 23   NOMI Miller CNP   atorvastatin (LIPITOR) 20 MG tablet TAKE ONE TABLET DAILY 5/15/23   Gabbi Bess MD   cloNIDine (CATAPRES) 0.1 MG tablet TAKE ONE TABLET TWO TIMES DAILY 5/15/23   Jenny Curran MD   vitamin D (ERGOCALCIFEROL) 1.25 MG (58060 UT) CAPS capsule Take 1 capsule by mouth every 30 days 23  Jenny Curran MD   Insulin Pen Needle 32G X 4 MM MISC 1 each by Does not apply route daily 23   Jenny Curran MD   Continuous Blood Gluc  (FREESTYLE MALLORY 2 READER) MARIA DOLORES As directed 4/3/23   Jenny Curran MD   Continuous Blood Gluc Sensor (FREESTYLE MALLORY 14 DAY SENSOR) MISC 1 Units by Does not apply route every 14 days 4/3/23 7/2/23  Jenny Curran MD   FARXIGA 10 MG tablet  23   Historical Provider, MD   insulin lispro, 1 Unit Dial, (HUMALOG KWIKPEN) 100 UNIT/ML SOPN Sliding scale 160-179 2 units 180-199 3 units 200-219 4 units 220-239 5 units 240-259 6 units 260-279 7 units 280-299 8 units 300-399 9 units 340-379 10 units 380-420 11 units, max daily dose: 33, Disp-3 each, R-3 22   Gabbi Bess MD   insulin lispro (HUMALOG) 100 UNIT/ML injection cartridge Sliding scale 160-179 2 units 180-199 3 units 200-219 4 units 220-239 5 units 240-259 6 units 260-279 7 units 280-299 8 units 300-399 9 units 340-379 10 units 380-420 11 units, max daily dose: 33 22   Gabbi

## 2023-05-31 NOTE — PROCEDURES
Patient Name: Debbie Richter    Medical Record Number: [id number]  Date: 5/31/2023     Performing Cardiologist: Dr. Chrystal Womack  Procedure Start Time: 5764      Procedure End Time: 4938       RADHA Procedure Nurse Note    Indications:  Post surgical closure of Left Atrial Appendage    Consent: The patient provided verbal consent for this procedure. The patient was brought to [facility] Room: 11. Time Out Completed: All team members present. Correct patient, correct procedure, correct procedure site, and correct position verified. Allergies reviewed. Pertinent diagnostic results reviewed. Pre-Medication: See Anesthesia Record    Procedure:  Debbie Richter was turned on left side. A bite block was inserted to facilitate passage of the RADHA probe. The probe was inserted without difficulty and images acquired. Saline Bubble study was performed. Vital signs monitored throughout procedure See Anesthesia Record. Probe and bite block removed post procedure. The patient tolerated the procedure well. Complications: None    Post procedure: Patient taken to  [facility] Holding Room # 1  in stable condition and report given to receiving RN.

## 2023-06-07 ASSESSMENT — ENCOUNTER SYMPTOMS
EYE PAIN: 0
EYE REDNESS: 0
APNEA: 0
SORE THROAT: 0
FACIAL SWELLING: 0
WHEEZING: 0
ABDOMINAL PAIN: 0
NAUSEA: 0
CHEST TIGHTNESS: 0
BLOOD IN STOOL: 0
ABDOMINAL DISTENTION: 0
EYE DISCHARGE: 0
SHORTNESS OF BREATH: 0
DIARRHEA: 0
CONSTIPATION: 0
COUGH: 0
VOMITING: 0

## 2023-06-29 ASSESSMENT — ENCOUNTER SYMPTOMS
SHORTNESS OF BREATH: 0
SORE THROAT: 0
BLOOD IN STOOL: 0
WHEEZING: 0
CHEST TIGHTNESS: 0
CONSTIPATION: 1
BACK PAIN: 0
TROUBLE SWALLOWING: 0
COUGH: 0
COLOR CHANGE: 0
ABDOMINAL PAIN: 0
STRIDOR: 0
DIARRHEA: 0

## 2023-06-30 DIAGNOSIS — Z79.4 TYPE 2 DIABETES MELLITUS WITH DIABETIC POLYNEUROPATHY, WITH LONG-TERM CURRENT USE OF INSULIN (HCC): Chronic | ICD-10-CM

## 2023-06-30 DIAGNOSIS — N18.30 STAGE 3 CHRONIC KIDNEY DISEASE, UNSPECIFIED WHETHER STAGE 3A OR 3B CKD (HCC): ICD-10-CM

## 2023-06-30 DIAGNOSIS — E55.9 HYPOVITAMINOSIS D: ICD-10-CM

## 2023-06-30 DIAGNOSIS — I50.32 CHRONIC HEART FAILURE WITH PRESERVED EJECTION FRACTION (HCC): ICD-10-CM

## 2023-06-30 DIAGNOSIS — E11.42 TYPE 2 DIABETES MELLITUS WITH DIABETIC POLYNEUROPATHY, WITH LONG-TERM CURRENT USE OF INSULIN (HCC): Chronic | ICD-10-CM

## 2023-06-30 LAB
25(OH)D3 SERPL-MCNC: 49.5 NG/ML
ALBUMIN SERPL-MCNC: 4.5 G/DL (ref 3.5–5.2)
ALP SERPL-CCNC: 114 U/L (ref 40–130)
ALT SERPL-CCNC: 18 U/L (ref 5–41)
ANION GAP SERPL CALCULATED.3IONS-SCNC: 14 MMOL/L (ref 7–19)
AST SERPL-CCNC: 19 U/L (ref 5–40)
BILIRUB SERPL-MCNC: 0.7 MG/DL (ref 0.2–1.2)
BUN SERPL-MCNC: 16 MG/DL (ref 6–20)
CALCIUM SERPL-MCNC: 9.6 MG/DL (ref 8.6–10)
CHLORIDE SERPL-SCNC: 97 MMOL/L (ref 98–111)
CHOLEST SERPL-MCNC: 119 MG/DL (ref 160–199)
CO2 SERPL-SCNC: 27 MMOL/L (ref 22–29)
CREAT SERPL-MCNC: 1.2 MG/DL (ref 0.5–1.2)
GLUCOSE SERPL-MCNC: 185 MG/DL (ref 74–109)
HBA1C MFR BLD: 9.2 % (ref 4–6)
HDLC SERPL-MCNC: 37 MG/DL (ref 55–121)
LDLC SERPL CALC-MCNC: 61 MG/DL
POTASSIUM SERPL-SCNC: 4.3 MMOL/L (ref 3.5–5)
PROT SERPL-MCNC: 7.6 G/DL (ref 6.6–8.7)
SODIUM SERPL-SCNC: 138 MMOL/L (ref 136–145)
TRIGL SERPL-MCNC: 103 MG/DL (ref 0–149)

## 2023-07-02 RX ORDER — INSULIN DEGLUDEC INJECTION 100 U/ML
50 INJECTION, SOLUTION SUBCUTANEOUS NIGHTLY
Status: CANCELLED | OUTPATIENT
Start: 2023-07-02 | End: 2023-08-01

## 2023-07-02 RX ORDER — INSULIN LISPRO 100 [IU]/ML
15 INJECTION, SOLUTION INTRAVENOUS; SUBCUTANEOUS
Qty: 3 EACH | Refills: 1 | Status: CANCELLED | OUTPATIENT
Start: 2023-07-02 | End: 2023-09-30

## 2023-07-03 ENCOUNTER — OFFICE VISIT (OUTPATIENT)
Dept: PRIMARY CARE CLINIC | Age: 58
End: 2023-07-03

## 2023-07-03 VITALS
WEIGHT: 236 LBS | OXYGEN SATURATION: 96 % | TEMPERATURE: 98.2 F | DIASTOLIC BLOOD PRESSURE: 86 MMHG | BODY MASS INDEX: 33.79 KG/M2 | HEIGHT: 70 IN | RESPIRATION RATE: 16 BRPM | SYSTOLIC BLOOD PRESSURE: 138 MMHG | HEART RATE: 70 BPM

## 2023-07-03 DIAGNOSIS — Z79.4 TYPE 2 DIABETES MELLITUS WITH DIABETIC POLYNEUROPATHY, WITH LONG-TERM CURRENT USE OF INSULIN (HCC): ICD-10-CM

## 2023-07-03 DIAGNOSIS — Z00.00 MEDICARE ANNUAL WELLNESS VISIT, SUBSEQUENT: Primary | ICD-10-CM

## 2023-07-03 DIAGNOSIS — E11.42 TYPE 2 DIABETES MELLITUS WITH DIABETIC POLYNEUROPATHY, WITH LONG-TERM CURRENT USE OF INSULIN (HCC): ICD-10-CM

## 2023-07-03 DIAGNOSIS — N18.30 STAGE 3 CHRONIC KIDNEY DISEASE, UNSPECIFIED WHETHER STAGE 3A OR 3B CKD (HCC): ICD-10-CM

## 2023-07-03 DIAGNOSIS — I25.10 CAD IN NATIVE ARTERY: ICD-10-CM

## 2023-07-03 DIAGNOSIS — Z95.1 STATUS POST CORONARY ARTERY BYPASS GRAFT: ICD-10-CM

## 2023-07-03 PROCEDURE — 3079F DIAST BP 80-89 MM HG: CPT | Performed by: INTERNAL MEDICINE

## 2023-07-03 PROCEDURE — 3017F COLORECTAL CA SCREEN DOC REV: CPT | Performed by: INTERNAL MEDICINE

## 2023-07-03 PROCEDURE — G0439 PPPS, SUBSEQ VISIT: HCPCS | Performed by: INTERNAL MEDICINE

## 2023-07-03 PROCEDURE — 3046F HEMOGLOBIN A1C LEVEL >9.0%: CPT | Performed by: INTERNAL MEDICINE

## 2023-07-03 PROCEDURE — 3075F SYST BP GE 130 - 139MM HG: CPT | Performed by: INTERNAL MEDICINE

## 2023-07-03 RX ORDER — INSULIN LISPRO 100 [IU]/ML
15 INJECTION, SOLUTION INTRAVENOUS; SUBCUTANEOUS
Qty: 3 ADJUSTABLE DOSE PRE-FILLED PEN SYRINGE | Refills: 3 | Status: SHIPPED | OUTPATIENT
Start: 2023-07-03 | End: 2023-10-01

## 2023-07-03 RX ORDER — INSULIN DEGLUDEC INJECTION 100 U/ML
32 INJECTION, SOLUTION SUBCUTANEOUS NIGHTLY
Qty: 3 ADJUSTABLE DOSE PRE-FILLED PEN SYRINGE | Refills: 5 | Status: SHIPPED | OUTPATIENT
Start: 2023-07-03

## 2023-07-03 ASSESSMENT — PATIENT HEALTH QUESTIONNAIRE - PHQ9
SUM OF ALL RESPONSES TO PHQ QUESTIONS 1-9: 0
1. LITTLE INTEREST OR PLEASURE IN DOING THINGS: 0
SUM OF ALL RESPONSES TO PHQ QUESTIONS 1-9: 0
2. FEELING DOWN, DEPRESSED OR HOPELESS: 0
SUM OF ALL RESPONSES TO PHQ9 QUESTIONS 1 & 2: 0

## 2023-07-03 ASSESSMENT — LIFESTYLE VARIABLES
HOW MANY STANDARD DRINKS CONTAINING ALCOHOL DO YOU HAVE ON A TYPICAL DAY: PATIENT DOES NOT DRINK
HOW OFTEN DO YOU HAVE A DRINK CONTAINING ALCOHOL: NEVER

## 2023-07-03 NOTE — ASSESSMENT & PLAN NOTE
Patient's blood sugar continues to be uncontrolled varies no indication for the patient to be seen severe or engage in better control of his blood sugar   Patient does not answer his phone will be very difficult for any diabetes educator referral service or the nurses to reach him as he refuses to answer his phone the mother and him do not respond to any of the phone calls on the landline he does not check his blood sugar despite of several years of counseling the patient on diabetes control he continues to be not compliant not to engage and this time will be sent a warning letter about being discharged from my service  Patient was advised not to skip the Touje[place]  He will give himself his Humalog 15 units before breakfast and 15 units before dinner and a Dexcom will be provided to him so he can check his sugars regularly and bring his log during the follow-up visit

## 2023-07-05 ENCOUNTER — TELEPHONE (OUTPATIENT)
Dept: PHARMACY | Facility: CLINIC | Age: 58
End: 2023-07-05

## 2023-07-10 NOTE — TELEPHONE ENCOUNTER
2nd Attempt Documentation:  2nd attempt to contact this patient regarding the previous message  CLINICAL PHARMACY: REFERRAL  Patient unavailable at the time of call. Left following message on home TAD: please call back at toll-free [phone] to retrieve previous message. Letter mailed to patient.     For Pharmacy Admin Tracking Only    Program: Jhonny in place:  No  Gap Closed?: No   Time Spent (min): 15

## 2023-07-10 NOTE — TELEPHONE ENCOUNTER
Pt returned general referral call. Anne Marie Cote Spoke to pt and scheduled his general referral call for 7.12.23 @ 3 pm. Pt is located in [place], [place]. CST is 1hr behind us.      For Pharmacy Admin Tracking Only    Program: Jhonny in place:  No  Gap Closed?: Yes   Time Spent (min): 5

## 2023-07-11 DIAGNOSIS — I50.32 CHRONIC HEART FAILURE WITH PRESERVED EJECTION FRACTION (HCC): ICD-10-CM

## 2023-07-11 DIAGNOSIS — I25.10 CAD IN NATIVE ARTERY: ICD-10-CM

## 2023-07-11 DIAGNOSIS — I10 PRIMARY HYPERTENSION: ICD-10-CM

## 2023-07-11 RX ORDER — METOPROLOL TARTRATE 50 MG/1
TABLET, FILM COATED ORAL
Qty: 180 TABLET | Refills: 3 | OUTPATIENT
Start: 2023-07-11

## 2023-07-11 NOTE — TELEPHONE ENCOUNTER
Christian Sandoval called to request a refill on his medication.       Last office visit : 6/1/2022   Next office visit : Visit date not found     Requested Prescriptions     Pending Prescriptions Disp Refills    metoprolol tartrate (LOPRESSOR) 50 MG tablet [Pharmacy Med Name: METOPROLOL TARTRATE 50MG TABLET] 180 tablet 3     Sig: TAKE ONE TABLET TWICE DAILY            Carlos Lee LPN

## 2023-07-12 ENCOUNTER — TELEPHONE (OUTPATIENT)
Dept: PHARMACY | Facility: CLINIC | Age: 58
End: 2023-07-12

## 2023-07-12 NOTE — TELEPHONE ENCOUNTER
Monica Hobbs MD, medication review completed with patient:  Patient did not want to engage in any kind of DM/diet education. It does seem that diet is the issue. Based in his refill history, he looks to be taking all of his medication on a regular basis, even the insulin  I was able to discuss CGM options. I do believe it would be helpful if covered. He has medicare part b and clearly meets [place] criteria. He requested that I mail him some info on the distributors that can process his insurance. He will then need to call one and set up an account. I will do my best to make sure he actually follows through with it. Once this happens, the company (whoever he chooses) will reach out to you for orders and all the required medicare documentation. I would suggest Dexcom G7    See note below for complete details. Please let me know if you have any questions    Thank you,  RA Bartholomew, PharmD,  [facility]  Department, toll free: [phone]      =======================================================    CLINICAL PHARMACY NOTE - Medication Review  Patient outreach to review medications - Spoke with patient. SUBJECTIVE/OBJECTIVE:   Christian Sandoval is a 59 y.o. male referred to a clinical pharmacy specialist by provider and/or given their history of uncontrolled type 2 diabetes. Patient not very engaged with me today. He did schedule and answer the phone, but gave generally vague answers. Did not want to engage in any DM or nutrition education. He claims that he has gone through dietary education before, then he started rambling about swelling up because of CHF and just stated \"my body is falling apart\". He thought I was calling to give him a CGM. Explained that I can assist with trying to figure this out, but I am not the one sending it.   After several explanations he verbalized

## 2023-07-13 DIAGNOSIS — E11.42 TYPE 2 DIABETES MELLITUS WITH DIABETIC POLYNEUROPATHY, WITH LONG-TERM CURRENT USE OF INSULIN (HCC): ICD-10-CM

## 2023-07-13 DIAGNOSIS — Z79.4 TYPE 2 DIABETES MELLITUS WITH DIABETIC POLYNEUROPATHY, WITH LONG-TERM CURRENT USE OF INSULIN (HCC): ICD-10-CM

## 2023-07-13 NOTE — TELEPHONE ENCOUNTER
Kiran Kramer called to request a refill on his medication.       Last office visit : 6/1/2022   Next office visit : Visit date not found     Requested Prescriptions     Pending Prescriptions Disp Refills    metFORMIN (GLUCOPHAGE) 1000 MG tablet [Pharmacy Med Name: METFORMIN HYDROCHLORIDE 1000MG TABLET] 180 tablet 3     Sig: TAKE ONE TABLET TWICE DAILY WITH MEALS            Marinus Homans, LPN

## 2023-07-15 DIAGNOSIS — E55.9 HYPOVITAMINOSIS D: ICD-10-CM

## 2023-07-17 ENCOUNTER — NURSE ONLY (OUTPATIENT)
Dept: PRIMARY CARE CLINIC | Age: 58
End: 2023-07-17

## 2023-07-17 DIAGNOSIS — E11.42 TYPE 2 DIABETES MELLITUS WITH DIABETIC POLYNEUROPATHY, WITH LONG-TERM CURRENT USE OF INSULIN (HCC): Primary | Chronic | ICD-10-CM

## 2023-07-17 DIAGNOSIS — Z79.4 TYPE 2 DIABETES MELLITUS WITH DIABETIC POLYNEUROPATHY, WITH LONG-TERM CURRENT USE OF INSULIN (HCC): Primary | Chronic | ICD-10-CM

## 2023-07-17 PROCEDURE — 99999 PR OFFICE/OUTPT VISIT,PROCEDURE ONLY: CPT | Performed by: INTERNAL MEDICINE

## 2023-07-17 RX ORDER — ERGOCALCIFEROL 1.25 MG/1
CAPSULE ORAL
Qty: 3 CAPSULE | Refills: 1 | OUTPATIENT
Start: 2023-07-17

## 2023-07-17 RX ORDER — BLOOD-GLUCOSE,RECEIVER,CONT
1 EACH MISCELLANEOUS DAILY
COMMUNITY

## 2023-07-17 RX ORDER — BLOOD-GLUCOSE SENSOR
1 EACH MISCELLANEOUS
COMMUNITY

## 2023-07-17 NOTE — PROGRESS NOTES
Patient came in to the office to get help with initiating Dexcom G7 continuous glucose monitor system. Dexcom G7 sensor applied to upper right arm and instructions reviewed with patient for applying and removing the device. Patient had the  with him but the device was not charged and was unable to be initiated this visit. Instructed patient to charge the device and carefully read the included instructions, but to return to the office if he is unable to set up the device himself. Patient verbalized understanding.

## 2023-07-17 NOTE — TELEPHONE ENCOUNTER
Lola Villanueva called to request a refill on his medication.       Last office visit : 6/1/2022   Next office visit : Visit date not found     Requested Prescriptions     Pending Prescriptions Disp Refills    vitamin D (ERGOCALCIFEROL) 1.25 MG (81738 UT) CAPS capsule [Pharmacy Med Name: VITAMIN D 1.25MG CAPSULE] 3 capsule 1     Sig: TAKE ONE CAPSULE EVERY MONTH            Barbara Marrero LPN

## 2023-07-19 NOTE — TELEPHONE ENCOUNTER
Reached out to patient to check in and see if he received mail from last week. Chart review shows that he was in the providers office this Monday and had a Dexcom G7 sensor placed. Unclear if it was a sample or if he was able to already get from a mail order company. Attempted to reach, but phone may be off hook. Will try again later.     Joshua Viera, PharmD,  [facility]  Department, toll free: [phone]

## 2023-07-22 ENCOUNTER — APPOINTMENT (OUTPATIENT)
Dept: GENERAL RADIOLOGY | Age: 58
End: 2023-07-22
Payer: MEDICARE

## 2023-07-22 ENCOUNTER — HOSPITAL ENCOUNTER (EMERGENCY)
Age: 58
Discharge: HOME | End: 2023-07-22
Attending: EMERGENCY MEDICINE
Payer: MEDICARE

## 2023-07-22 VITALS
BODY MASS INDEX: 33.79 KG/M2 | HEART RATE: 59 BPM | OXYGEN SATURATION: 99 % | HEIGHT: 70 IN | WEIGHT: 236 LBS | RESPIRATION RATE: 20 BRPM | DIASTOLIC BLOOD PRESSURE: 81 MMHG | TEMPERATURE: 98.6 F | SYSTOLIC BLOOD PRESSURE: 142 MMHG

## 2023-07-22 DIAGNOSIS — E11.621 DIABETIC ULCER OF TOE OF RIGHT FOOT ASSOCIATED WITH TYPE 2 DIABETES MELLITUS, WITH NECROSIS OF MUSCLE (HCC): Primary | ICD-10-CM

## 2023-07-22 DIAGNOSIS — L97.513 DIABETIC ULCER OF TOE OF RIGHT FOOT ASSOCIATED WITH TYPE 2 DIABETES MELLITUS, WITH NECROSIS OF MUSCLE (HCC): Primary | ICD-10-CM

## 2023-07-22 LAB
ALBUMIN SERPL-MCNC: 3.9 G/DL (ref 3.5–5.2)
ALP SERPL-CCNC: 113 U/L (ref 40–130)
ALT SERPL-CCNC: 15 U/L (ref 5–41)
ANION GAP SERPL CALCULATED.3IONS-SCNC: 13 MMOL/L (ref 7–19)
AST SERPL-CCNC: 14 U/L (ref 5–40)
BASOPHILS # BLD: 0.1 K/UL (ref 0–0.2)
BASOPHILS NFR BLD: 1.3 % (ref 0–1)
BILIRUB SERPL-MCNC: 0.5 MG/DL (ref 0.2–1.2)
BUN SERPL-MCNC: 14 MG/DL (ref 6–20)
CALCIUM SERPL-MCNC: 9.1 MG/DL (ref 8.6–10)
CHLORIDE SERPL-SCNC: 100 MMOL/L (ref 98–111)
CO2 SERPL-SCNC: 24 MMOL/L (ref 22–29)
CREAT SERPL-MCNC: 1 MG/DL (ref 0.5–1.2)
CRP SERPL HS-MCNC: <0.3 MG/DL (ref 0–0.5)
EOSINOPHIL # BLD: 0.2 K/UL (ref 0–0.6)
EOSINOPHIL NFR BLD: 3 % (ref 0–5)
ERYTHROCYTE [DISTWIDTH] IN BLOOD BY AUTOMATED COUNT: 12.9 % (ref 11.5–14.5)
ERYTHROCYTE [SEDIMENTATION RATE] IN BLOOD BY WESTERGREN METHOD: 44 MM/HR (ref 0–15)
GLUCOSE SERPL-MCNC: 174 MG/DL (ref 74–109)
HCT VFR BLD AUTO: 35.7 % (ref 42–52)
HGB BLD-MCNC: 11.8 G/DL (ref 14–18)
IMM GRANULOCYTES # BLD: 0 K/UL
LYMPHOCYTES # BLD: 1.2 K/UL (ref 1.1–4.5)
LYMPHOCYTES NFR BLD: 20.4 % (ref 20–40)
MCH RBC QN AUTO: 31.5 PG (ref 27–31)
MCHC RBC AUTO-ENTMCNC: 33.1 G/DL (ref 33–37)
MCV RBC AUTO: 95.2 FL (ref 80–94)
MONOCYTES # BLD: 0.5 K/UL (ref 0–0.9)
MONOCYTES NFR BLD: 8.5 % (ref 0–10)
NEUTROPHILS # BLD: 4 K/UL (ref 1.5–7.5)
NEUTS SEG NFR BLD: 66.6 % (ref 50–65)
PLATELET # BLD AUTO: 279 K/UL (ref 130–400)
PMV BLD AUTO: 9.9 FL (ref 9.4–12.4)
POTASSIUM SERPL-SCNC: 4.2 MMOL/L (ref 3.5–5)
PROT SERPL-MCNC: 7 G/DL (ref 6.6–8.7)
RBC # BLD AUTO: 3.75 M/UL (ref 4.7–6.1)
SODIUM SERPL-SCNC: 137 MMOL/L (ref 136–145)
WBC # BLD AUTO: 6 K/UL (ref 4.8–10.8)

## 2023-07-22 PROCEDURE — 86140 C-REACTIVE PROTEIN: CPT

## 2023-07-22 PROCEDURE — 85652 RBC SED RATE AUTOMATED: CPT

## 2023-07-22 PROCEDURE — 6360000002 HC RX W HCPCS: Performed by: EMERGENCY MEDICINE

## 2023-07-22 PROCEDURE — 36415 COLL VENOUS BLD VENIPUNCTURE: CPT

## 2023-07-22 PROCEDURE — 99284 EMERGENCY DEPT VISIT MOD MDM: CPT

## 2023-07-22 PROCEDURE — 85025 COMPLETE CBC W/AUTO DIFF WBC: CPT

## 2023-07-22 PROCEDURE — 73660 X-RAY EXAM OF TOE(S): CPT

## 2023-07-22 PROCEDURE — 96365 THER/PROPH/DIAG IV INF INIT: CPT

## 2023-07-22 PROCEDURE — 80053 COMPREHEN METABOLIC PANEL: CPT

## 2023-07-22 RX ORDER — CLINDAMYCIN PHOSPHATE 300 MG/50ML
300 INJECTION INTRAVENOUS ONCE
Status: COMPLETED | OUTPATIENT
Start: 2023-07-22 | End: 2023-07-22

## 2023-07-22 RX ORDER — CLINDAMYCIN HYDROCHLORIDE 300 MG/1
300 CAPSULE ORAL 3 TIMES DAILY
Qty: 30 CAPSULE | Refills: 0 | Status: SHIPPED | OUTPATIENT
Start: 2023-07-22 | End: 2023-08-01

## 2023-07-22 RX ADMIN — CLINDAMYCIN PHOSPHATE 300 MG: 300 INJECTION, SOLUTION INTRAVENOUS at 11:18

## 2023-07-22 ASSESSMENT — ENCOUNTER SYMPTOMS
RESPIRATORY NEGATIVE: 1
EYES NEGATIVE: 1
GASTROINTESTINAL NEGATIVE: 1

## 2023-07-22 NOTE — ED PROVIDER NOTES
TAKE ONE TABLET TWICE DAILY WITH MEALS    METOPROLOL TARTRATE (LOPRESSOR) 50 MG TABLET    TAKE ONE TABLET TWICE DAILY    TAMSULOSIN (FLOMAX) 0.4 MG CAPSULE    Take 1 capsule by mouth daily    VITAMIN D (ERGOCALCIFEROL) 1.25 MG (16287 UT) CAPS CAPSULE    Take 1 capsule by mouth every 30 days       ALLERGIES     Contrast [iodides], Cephalosporins, Neomycin-bacitracin zn-polymyx, Neosporin [neomycin-polymyxin-gramicidin], and Pcn [penicillins]    FAMILY HISTORY       Family History   Problem Relation Age of Onset    Stroke Maternal Grandmother     Cancer Maternal Grandmother     Stroke Paternal Grandmother     Diabetes Paternal Grandmother     Cancer Father     Heart Disease Father     Lung Cancer Father     Asthma Paternal Grandfather     Heart Disease Paternal Grandfather     Heart Disease Maternal Grandfather     Colon Cancer Neg Hx     Colon Polyps Neg Hx     Esophageal Cancer Neg Hx     Liver Cancer Neg Hx     Liver Disease Neg Hx     Rectal Cancer Neg Hx     Stomach Cancer Neg Hx           SOCIAL HISTORY       Social History     Socioeconomic History    Marital status:      Spouse name: None    Number of children: None    Years of education: None    Highest education level: None   Tobacco Use    Smoking status: Never    Smokeless tobacco: Never   Vaping Use    Vaping Use: Never used   Substance and Sexual Activity    Alcohol use: No    Drug use: No   Social History Narrative        Referred to  for assistance with Meals on Wheels        CODE- Full Code     HEALTH [address] (Parent) [phone]     DOMICILE- Single family home within city limits     DME-     TRANSPORTATION- patient has dependable transportation      Social Determinants of Health     Financial Resource Strain: Low Risk     Difficulty of Paying Living Expenses: Not hard at all   Food Insecurity: No Food Insecurity    Worried About Running Out of Food in the Last Year: Never true    Zoran of Food in the Last Year: Department Physician who either signs or Co-signs this chart in the absence of a cardiologist.        RADIOLOGY:   Non-plain film images such as CT, Ultrasound and MRI are read by the radiologist. Plainradiographic images are visualized and preliminarily interpreted by the emergency physician with the below findings:        Interpretation per the Radiologist below, if available at the time of this note:    XR TOE RIGHT (MIN 2 VIEWS)   Final Result   FINDINGS / IMPRESSION:   Chronic degenerative change of the first metatarsal phalangeal joint. There is loss of joint space at the interphalangeal joint of the great toe. Contour abnormality is present at the base of the distal phalanx as well as the    distal aspect of the proximal phalanx. There are new bony fragments at the medial aspect of the interphalangeal joint space with suggestion of soft tissue gas. These findings may be post traumatic and/or degenerative. Infectious process not excluded. There is adjacent soft tissue swelling.                           ______________________________________    Electronically signed by: Tj Carcamo M.D. Date:     07/22/2023   Time:    10:30             ED BEDSIDE ULTRASOUND:   Performed by ED Physician - none    LABS:  Labs Reviewed   CBC WITH AUTO DIFFERENTIAL - Abnormal; Notable for the following components:       Result Value    RBC 3.75 (*)     Hemoglobin 11.8 (*)     Hematocrit 35.7 (*)     MCV 95.2 (*)     MCH 31.5 (*)     Neutrophils % 66.6 (*)     Basophils % 1.3 (*)     All other components within normal limits   COMPREHENSIVE METABOLIC PANEL - Abnormal; Notable for the following components:    Glucose 174 (*)     All other components within normal limits   C-REACTIVE PROTEIN   SEDIMENTATION RATE       All other labs were within normal range or not returned as of this dictation.     Medications   clindamycin (CLEOCIN) 300 mg in dextrose 5% 50 mL IVPB (has no administration in time range)       EMERGENCY

## 2023-07-22 NOTE — ED NOTES
Wound to right great toe with necrotic tissue noted.  Wound photo in chart      Kathryn Odonnell RN  07/22/23 1447

## 2023-07-24 ENCOUNTER — CARE COORDINATION (OUTPATIENT)
Dept: CARE COORDINATION | Age: 58
End: 2023-07-24

## 2023-07-24 NOTE — CARE COORDINATION
ACM attempted contact with patient and unable to reach. Left voicemail with my cell number for call back. Will await return call from patient.       [address] RN  Ambulatory Care Manager   C. [phone]

## 2023-07-24 NOTE — TELEPHONE ENCOUNTER
Able to get the call to go through, but had to leave a VM. Requested call back to [phone] option 1    Attempting to reach patient:  - Did he get our letter about dexcom G7 and DME suppliers? - Had a G7 sensor placed by provider after we spoke. Was that a sample? Or did he get one covered by insurance? RA Ng, PharmD,  [facility], toll free: [phone]

## 2023-07-26 ENCOUNTER — HOSPITAL ENCOUNTER (OUTPATIENT)
Dept: WOUND CARE | Age: 58
Discharge: HOME | End: 2023-07-26
Payer: MEDICARE

## 2023-07-26 VITALS
TEMPERATURE: 97 F | SYSTOLIC BLOOD PRESSURE: 155 MMHG | WEIGHT: 236 LBS | HEIGHT: 70 IN | BODY MASS INDEX: 33.79 KG/M2 | RESPIRATION RATE: 20 BRPM | HEART RATE: 63 BPM | DIASTOLIC BLOOD PRESSURE: 90 MMHG

## 2023-07-26 DIAGNOSIS — L97.512 CHRONIC ULCER OF RIGHT GREAT TOE, WITH FAT LAYER EXPOSED (HCC): Chronic | ICD-10-CM

## 2023-07-26 DIAGNOSIS — L97.412 DIABETIC ULCER OF RIGHT MIDFOOT ASSOCIATED WITH TYPE 2 DIABETES MELLITUS, WITH FAT LAYER EXPOSED (HCC): Chronic | ICD-10-CM

## 2023-07-26 DIAGNOSIS — E11.42 TYPE 2 DIABETES MELLITUS WITH DIABETIC POLYNEUROPATHY, WITH LONG-TERM CURRENT USE OF INSULIN (HCC): Primary | Chronic | ICD-10-CM

## 2023-07-26 DIAGNOSIS — Z79.4 TYPE 2 DIABETES MELLITUS WITH DIABETIC POLYNEUROPATHY, WITH LONG-TERM CURRENT USE OF INSULIN (HCC): Primary | Chronic | ICD-10-CM

## 2023-07-26 DIAGNOSIS — E11.621 DIABETIC ULCER OF RIGHT MIDFOOT ASSOCIATED WITH TYPE 2 DIABETES MELLITUS, WITH FAT LAYER EXPOSED (HCC): Chronic | ICD-10-CM

## 2023-07-26 PROCEDURE — 87070 CULTURE OTHR SPECIMN AEROBIC: CPT

## 2023-07-26 PROCEDURE — 87075 CULTR BACTERIA EXCEPT BLOOD: CPT

## 2023-07-26 PROCEDURE — 11042 DBRDMT SUBQ TIS 1ST 20SQCM/<: CPT | Performed by: SURGERY

## 2023-07-26 PROCEDURE — 99214 OFFICE O/P EST MOD 30 MIN: CPT

## 2023-07-26 PROCEDURE — 11042 DBRDMT SUBQ TIS 1ST 20SQCM/<: CPT

## 2023-07-26 PROCEDURE — 87186 SC STD MICRODIL/AGAR DIL: CPT

## 2023-07-26 PROCEDURE — 87205 SMEAR GRAM STAIN: CPT

## 2023-07-26 PROCEDURE — 99214 OFFICE O/P EST MOD 30 MIN: CPT | Performed by: SURGERY

## 2023-07-26 RX ORDER — SODIUM HYPOCHLORITE 1.25 MG/ML
SOLUTION TOPICAL
Qty: 1000 ML | Refills: 2 | Status: SHIPPED | OUTPATIENT
Start: 2023-07-26

## 2023-07-26 NOTE — WOUND CARE
FREQUENCY OF DRESSING CHANGES:  Twice Daily            ADDITIONAL ITEMS:  [] Gloves Small  [] Gloves Medium [x] Gloves Large [] Gloves XLarge  [] Tape 1\" [x] Tape 2\" [] Tape 3\"  [x] Medipore Tape  [x] Saline  [] Vashe  [] Skin Prep   [] Adhesive Remover   [] Cotton Tip Applicators   [] Other:    Patient Wound(s) Debrided: [] Yes if yes please add date 7/26/23  [] No    Debribement Type: Excisional/Sharp    Is the patient currently on an antibiotic for their Wound(s): [] Yes if yes please add name and dose    [] No    Patient currently being seen by Home Health: [] Yes   [x] No    Duration for needed supplies:  []15  [x]30  []60  []90 Days    Electronically signed by Mukesh Guthrie RN on 7/26/2023 at 3:48 PM     Provider Information:      PROVIDER'S NAME: Dr Marialuisa Brunson     NPI: [id number]

## 2023-07-26 NOTE — DISCHARGE INSTRUCTIONS
[address] and Hyperbaric Oxygen Therapy   Physician Orders and Discharge Instructions  [address], [address] [facility]  Telephone: [phone]    NAME:  Carol Gomez  YOB: 1965  MEDICAL RECORD NUMBER:  [id number]  DATE:  7/26/2023    Discharge condition: Stable    Discharge to: Home    Left via:Private automobile    Accompanied by: Self    ECF/HHA: [facility]     Dressing Orders: Right Great Toe   Dakins moist 2x2 gauze tucked into wound, cover with dry gauze, roll gauze and medipore tape, change daily  Felt and Foam with cutout for the wound, no shoe, May need to get a knee scooter to help keep pressure off area   Take Antibiotic as Prescribed   CÉSAR on 8/3/23 [place] in [address] Test will be done in [address] [place] [address] Appointment to follow test at  1:00    [facility] follow up visit _____________1 week________________  (Please note your next appointment above and if you are unable to keep, kindly give a 24 hour notice. Thank you.)    If you experience any of the following, please call the [facility] [place] during business hours:    * Increase in Pain  * Temperature over 101  * Increase in drainage from your wound  * Drainage with a foul odor  * Bleeding  * Increase in swelling  * Need for compression bandage changes due to slippage, breakthrough drainage. If you need medical attention outside of the business hours of the [address] [place] [address] please contact your PCP or go to the nearest emergency room.

## 2023-07-26 NOTE — PROGRESS NOTES
WOUND CARE HISTORY AND PHYSICAL    Patient Care Team:  Dat Albrecht MD as PCP - General (Internal Medicine)  Dat Al[facility]cht [facility]  Kathleen Alvarenga as Referring Physician (Physician Assistant)  NOMI Gomez as Nurse Practitioner (Family Nurse Practitioner)  NOMI James as Nurse Practitioner (Certified Nurse Specialist)  Mario Alberto London MD as Consulting Physician (Neurology)  Madiha Zarate MD as Consulting Physician (Ophthalmology)  Jim Aggarwal RN as Care Transitions Nurse     CC:     Kasia Pritchard is a 59 y.o. male who presents today for wound evaluation.     Wound Type: diabetic  Wound Location: 1st toe on the right  Modifying factors: diabetes and chronic pressure    Patient Active Problem List   Diagnosis Code    Shortness of breath R06.02    HTN (hypertension) I10    Hyperlipidemia E78.5    Fatigue R53.83    Hyponatremia E87.1    Type 2 diabetes mellitus with diabetic polyneuropathy, with long-term current use of insulin ([facility]) E11.42, Z79.4    Class 2 obesity due to excess calories in adult E66.09    Diabetic ulcer of right midfoot associated with type 2 diabetes mellitus, with fat layer exposed ([address]) E11.621, L97.412    Charcot foot due to diabetes mellitus ([address]) E11.610    Personal history of noncompliance with medical treatment and regimen Z91.199    Chronic heart failure with preserved ejection fraction ([facility]) I50.32    Obstructive sleep apnea G47.33    Proliferative diabetic retinopathy of both eyes without macular edema associated with type 2 diabetes mellitus ([facility]) M33.7765    Cellulitis L03.90    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

## 2023-07-26 NOTE — PLAN OF CARE
Problem: Chronic Conditions and Co-morbidities  Goal: Patient's chronic conditions and co-morbidity symptoms are monitored and maintained or improved  Outcome: Progressing     Problem: Discharge Planning  Goal: Discharge to home or other facility with appropriate resources  Outcome: Progressing     Problem: Cognitive:  Goal: Knowledge of wound care  Description: Knowledge of wound care  Outcome: Progressing  Goal: Understands risk factors for wounds  Description: Understands risk factors for wounds  Outcome: Progressing     Problem: Wound:  Goal: Will show signs of wound healing; wound closure and no evidence of infection  Description: Will show signs of wound healing; wound closure and no evidence of infection  Outcome: Progressing     Problem: Blood Glucose:  Goal: Ability to maintain appropriate glucose levels will improve  Description: Ability to maintain appropriate glucose levels will improve  Outcome: Progressing

## 2023-07-27 NOTE — TELEPHONE ENCOUNTER
Still unable to reach pt. Will message his PCP and make aware    WRoscoe Rodriguez, PharmD,  [facility], toll free: [phone]

## 2023-07-29 LAB
BACTERIA SPEC ANAEROBE CULT: ABNORMAL
BACTERIA SPEC ANAEROBE+AEROBE CULT: ABNORMAL
GRAM STN SPEC: ABNORMAL
ORGANISM: ABNORMAL

## 2023-08-03 ENCOUNTER — HOSPITAL ENCOUNTER (OUTPATIENT)
Dept: WOUND CARE | Age: 58
Discharge: HOME | End: 2023-08-03
Payer: MEDICARE

## 2023-08-03 ENCOUNTER — HOSPITAL ENCOUNTER (OUTPATIENT)
Dept: NON INVASIVE DIAGNOSTICS | Age: 58
Discharge: HOME | End: 2023-08-03
Payer: MEDICARE

## 2023-08-03 VITALS
TEMPERATURE: 98 F | RESPIRATION RATE: 18 BRPM | SYSTOLIC BLOOD PRESSURE: 111 MMHG | DIASTOLIC BLOOD PRESSURE: 71 MMHG | HEART RATE: 58 BPM

## 2023-08-03 DIAGNOSIS — L97.512 CHRONIC ULCER OF RIGHT GREAT TOE, WITH FAT LAYER EXPOSED (HCC): Primary | Chronic | ICD-10-CM

## 2023-08-03 DIAGNOSIS — E11.42 TYPE 2 DIABETES MELLITUS WITH DIABETIC POLYNEUROPATHY, WITH LONG-TERM CURRENT USE OF INSULIN (HCC): Chronic | ICD-10-CM

## 2023-08-03 DIAGNOSIS — L97.412 DIABETIC ULCER OF RIGHT MIDFOOT ASSOCIATED WITH TYPE 2 DIABETES MELLITUS, WITH FAT LAYER EXPOSED (HCC): Chronic | ICD-10-CM

## 2023-08-03 DIAGNOSIS — E11.621 DIABETIC ULCER OF RIGHT MIDFOOT ASSOCIATED WITH TYPE 2 DIABETES MELLITUS, WITH FAT LAYER EXPOSED (HCC): Chronic | ICD-10-CM

## 2023-08-03 DIAGNOSIS — Z79.4 TYPE 2 DIABETES MELLITUS WITH DIABETIC POLYNEUROPATHY, WITH LONG-TERM CURRENT USE OF INSULIN (HCC): Chronic | ICD-10-CM

## 2023-08-03 DIAGNOSIS — L97.512 CHRONIC ULCER OF RIGHT GREAT TOE, WITH FAT LAYER EXPOSED (HCC): Chronic | ICD-10-CM

## 2023-08-03 PROCEDURE — 11042 DBRDMT SUBQ TIS 1ST 20SQCM/<: CPT

## 2023-08-03 PROCEDURE — 11042 DBRDMT SUBQ TIS 1ST 20SQCM/<: CPT | Performed by: SURGERY

## 2023-08-03 PROCEDURE — 93923 UPR/LXTR ART STDY 3+ LVLS: CPT

## 2023-08-03 RX ORDER — LIDOCAINE HYDROCHLORIDE 40 MG/ML
SOLUTION TOPICAL ONCE
OUTPATIENT
Start: 2023-08-03 | End: 2023-08-03

## 2023-08-03 RX ORDER — LIDOCAINE HYDROCHLORIDE 20 MG/ML
JELLY TOPICAL ONCE
OUTPATIENT
Start: 2023-08-03 | End: 2023-08-03

## 2023-08-03 RX ORDER — LIDOCAINE 40 MG/G
CREAM TOPICAL ONCE
OUTPATIENT
Start: 2023-08-03 | End: 2023-08-03

## 2023-08-03 RX ORDER — CLOBETASOL PROPIONATE 0.5 MG/G
OINTMENT TOPICAL ONCE
OUTPATIENT
Start: 2023-08-03 | End: 2023-08-03

## 2023-08-03 RX ORDER — SODIUM CHLOR/HYPOCHLOROUS ACID 0.033 %
SOLUTION, IRRIGATION IRRIGATION ONCE
OUTPATIENT
Start: 2023-08-03 | End: 2023-08-03

## 2023-08-03 RX ORDER — LEVOFLOXACIN 750 MG/1
750 TABLET ORAL DAILY
Qty: 10 TABLET | Refills: 0 | Status: SHIPPED | OUTPATIENT
Start: 2023-08-03 | End: 2023-08-13

## 2023-08-03 RX ORDER — BETAMETHASONE DIPROPIONATE 0.05 %
OINTMENT (GRAM) TOPICAL ONCE
OUTPATIENT
Start: 2023-08-03 | End: 2023-08-03

## 2023-08-03 RX ORDER — LIDOCAINE 50 MG/G
OINTMENT TOPICAL ONCE
OUTPATIENT
Start: 2023-08-03 | End: 2023-08-03

## 2023-08-03 NOTE — PROGRESS NOTES
Allergen Reactions    Contrast [Iodides]      N & V. PT STATED HE HAD A MRI WITH CONTRAST ON 10/25/2021.     Cephalosporins      Other reaction(s): ENCOUNTERDATE:4/4/2014    Neomycin-Bacitracin Zn-Polymyx Rash    Neosporin [Neomycin-Polymyxin-Gramicidin] Rash    Pcn [Penicillins] Rash       MEDICATIONS    Current Outpatient Medications on File Prior to Encounter   Medication Sig Dispense Refill    sodium hypochlorite (DAKINS) 0.125 % SOLN external solution Moisten gauze apply to wound twice daily 1000 mL 2    Continuous Blood Gluc  (DEXCOM G7 ) MARIA DOLORES 1 Device by Does not apply route daily      Continuous Blood Gluc Sensor (DEXCOM G7 SENSOR) MISC 1 Device by Does not apply route every 14 days      insulin lispro, 1 Unit Dial, (HUMALOG KWIKPEN) 100 UNIT/ML SOPN Inject 15 Units into the skin 3 times daily (before meals) 3 Adjustable Dose Pre-filled Pen Syringe 3    Insulin Degludec (TRESIBA FLEXTOUCH) 100 UNIT/ML SOPN Inject 32 Units into the skin at bedtime Samples - 1 pen dispensed 3 Adjustable Dose Pre-filled Pen Syringe 5    dutasteride (AVODART) 0.5 MG capsule Take 1 capsule by mouth daily 90 capsule 3    tamsulosin (FLOMAX) 0.4 MG capsule Take 1 capsule by mouth daily 180 capsule 3    atorvastatin (LIPITOR) 20 MG tablet TAKE ONE TABLET DAILY 90 tablet 3    cloNIDine (CATAPRES) 0.1 MG tablet TAKE ONE TABLET TWO TIMES DAILY 180 tablet 1    vitamin D (ERGOCALCIFEROL) 1.25 MG (90182 UT) CAPS capsule Take 1 capsule by mouth every 30 days 3 capsule 1    Insulin Pen Needle 32G X 4 MM MISC 1 each by Does not apply route daily 100 each 3    FARXIGA 10 MG tablet       losartan (COZAAR) 100 MG tablet TAKE ONE TABLET DAILY 90 tablet 3    metoprolol tartrate (LOPRESSOR) 50 MG tablet TAKE ONE TABLET TWICE DAILY 180 tablet 3    metFORMIN (GLUCOPHAGE) 1000 MG tablet TAKE ONE TABLET TWICE DAILY WITH MEALS 180 tablet 3    furosemide (LASIX) 40 MG tablet TAKE ONE TABLET DAILY 90 tablet 3    blood glucose test strips

## 2023-08-07 DIAGNOSIS — I10 PRIMARY HYPERTENSION: ICD-10-CM

## 2023-08-07 RX ORDER — FUROSEMIDE 40 MG/1
TABLET ORAL
Qty: 90 TABLET | Refills: 3 | Status: SHIPPED | OUTPATIENT
Start: 2023-08-07

## 2023-08-11 ENCOUNTER — HOSPITAL ENCOUNTER (OUTPATIENT)
Dept: WOUND CARE | Age: 58
Discharge: HOME | End: 2023-08-11
Payer: MEDICARE

## 2023-08-11 VITALS
RESPIRATION RATE: 18 BRPM | HEART RATE: 66 BPM | DIASTOLIC BLOOD PRESSURE: 86 MMHG | SYSTOLIC BLOOD PRESSURE: 148 MMHG | HEIGHT: 70 IN | BODY MASS INDEX: 33.79 KG/M2 | WEIGHT: 236 LBS | TEMPERATURE: 96.8 F

## 2023-08-11 DIAGNOSIS — Z79.4 TYPE 2 DIABETES MELLITUS WITH DIABETIC POLYNEUROPATHY, WITH LONG-TERM CURRENT USE OF INSULIN (HCC): Chronic | ICD-10-CM

## 2023-08-11 DIAGNOSIS — L97.512 CHRONIC ULCER OF RIGHT GREAT TOE, WITH FAT LAYER EXPOSED (HCC): Primary | Chronic | ICD-10-CM

## 2023-08-11 DIAGNOSIS — E11.42 TYPE 2 DIABETES MELLITUS WITH DIABETIC POLYNEUROPATHY, WITH LONG-TERM CURRENT USE OF INSULIN (HCC): Chronic | ICD-10-CM

## 2023-08-11 PROCEDURE — 11042 DBRDMT SUBQ TIS 1ST 20SQCM/<: CPT

## 2023-08-11 PROCEDURE — 6370000000 HC RX 637 (ALT 250 FOR IP): Performed by: SURGERY

## 2023-08-11 RX ORDER — LIDOCAINE HYDROCHLORIDE 20 MG/ML
JELLY TOPICAL ONCE
Status: COMPLETED | OUTPATIENT
Start: 2023-08-11 | End: 2023-08-11

## 2023-08-11 RX ORDER — SODIUM CHLOR/HYPOCHLOROUS ACID 0.033 %
SOLUTION, IRRIGATION IRRIGATION ONCE
OUTPATIENT
Start: 2023-08-11 | End: 2023-08-11

## 2023-08-11 RX ORDER — LIDOCAINE 50 MG/G
OINTMENT TOPICAL ONCE
OUTPATIENT
Start: 2023-08-11 | End: 2023-08-11

## 2023-08-11 RX ORDER — LIDOCAINE HYDROCHLORIDE 20 MG/ML
JELLY TOPICAL ONCE
OUTPATIENT
Start: 2023-08-11 | End: 2023-08-11

## 2023-08-11 RX ORDER — LIDOCAINE 40 MG/G
CREAM TOPICAL ONCE
OUTPATIENT
Start: 2023-08-11 | End: 2023-08-11

## 2023-08-11 RX ORDER — LEVOFLOXACIN 750 MG/1
750 TABLET ORAL DAILY
Qty: 14 TABLET | Refills: 0 | Status: SHIPPED | OUTPATIENT
Start: 2023-08-11 | End: 2023-08-25

## 2023-08-11 RX ORDER — CLOBETASOL PROPIONATE 0.5 MG/G
OINTMENT TOPICAL ONCE
OUTPATIENT
Start: 2023-08-11 | End: 2023-08-11

## 2023-08-11 RX ORDER — LIDOCAINE HYDROCHLORIDE 40 MG/ML
SOLUTION TOPICAL ONCE
OUTPATIENT
Start: 2023-08-11 | End: 2023-08-11

## 2023-08-11 RX ORDER — BETAMETHASONE DIPROPIONATE 0.05 %
OINTMENT (GRAM) TOPICAL ONCE
OUTPATIENT
Start: 2023-08-11 | End: 2023-08-11

## 2023-08-11 RX ADMIN — LIDOCAINE HYDROCHLORIDE: 20 JELLY TOPICAL at 09:59

## 2023-08-11 NOTE — DISCHARGE INSTRUCTIONS
[address] and Hyperbaric Oxygen Therapy   Physician Orders and Discharge Instructions  [address] [facility],[address] 11[address] [facility], [address]  Telephone: [phone]    NAME:  Lola Villanueva  YOB: 1965  MEDICAL RECORD NUMBER:  [id number]  DATE:  8/11/2023    Discharge condition: Stable    Discharge to: Home    Left via:Private automobile    Accompanied by: Self     Dressing Orders: Right Great Toe   Dakins moist 2x2 gauze tucked into wound, cover with dry gauze, roll gauze and medipore tape, change daily  Felt and Foam with cutout for the wound  [place] and Faom  Take antibiotic as prescribed    MRI on 10/18 behind the [facility] at 9:15    [facility] follow up visit _____________1 week________________  (Please note your next appointment above and if you are unable to keep, kindly give a 24 hour notice. Thank you.)    If you experience any of the following, please call the [employer] during business hours:    * Increase in Pain  * Temperature over 101  * Increase in drainage from your wound  * Drainage with a foul odor  * Bleeding  * Increase in swelling  * Need for compression bandage changes due to slippage, breakthrough drainage. If you need medical attention outside of the business hours of the [employer] please contact your PCP or go to the nearest emergency room.

## 2023-08-11 NOTE — PROGRESS NOTES
[address] during business hours:    * Increase in Pain  * Temperature over 101  * Increase in drainage from your wound  * Drainage with a foul odor  * Bleeding  * Increase in swelling  * Need for compression bandage changes due to slippage, breakthrough drainage. If you need medical attention outside of the business hours of the [address] [place] [address] please contact your PCP or go to the nearest emergency room.          Electronically signed by Júnior Vásquez MD on 8/11/2023 at 10:48 AM

## 2023-08-15 DIAGNOSIS — I10 PRIMARY HYPERTENSION: ICD-10-CM

## 2023-08-15 RX ORDER — CLONIDINE HYDROCHLORIDE 0.1 MG/1
TABLET ORAL
Qty: 180 TABLET | Refills: 1 | Status: SHIPPED | OUTPATIENT
Start: 2023-08-15

## 2023-08-15 NOTE — TELEPHONE ENCOUNTER
Corey Ferreira called to request a refill on his medication.       Last office visit : 7/3/2023   Next office visit : 10/3/2023     Requested Prescriptions     Pending Prescriptions Disp Refills    cloNIDine (CATAPRES) 0.1 MG tablet [Pharmacy Med Name: CLONIDINE HYDROCHLORIDE 0.1MG TABLET] 180 tablet 1     Sig: TAKE ONE TABLET TWO TIMES DAILY            Jl Jacobo LPN

## 2023-08-17 ENCOUNTER — HOSPITAL ENCOUNTER (OUTPATIENT)
Dept: WOUND CARE | Age: 58
Discharge: HOME | End: 2023-08-17
Payer: MEDICARE

## 2023-08-17 VITALS
SYSTOLIC BLOOD PRESSURE: 130 MMHG | HEART RATE: 67 BPM | RESPIRATION RATE: 20 BRPM | DIASTOLIC BLOOD PRESSURE: 78 MMHG | TEMPERATURE: 98 F

## 2023-08-17 DIAGNOSIS — L97.512 CHRONIC ULCER OF RIGHT GREAT TOE, WITH FAT LAYER EXPOSED (HCC): Primary | Chronic | ICD-10-CM

## 2023-08-17 PROCEDURE — 97597 DBRDMT OPN WND 1ST 20 CM/<: CPT

## 2023-08-17 RX ORDER — LIDOCAINE HYDROCHLORIDE 40 MG/ML
SOLUTION TOPICAL ONCE
OUTPATIENT
Start: 2023-08-17 | End: 2023-08-17

## 2023-08-17 RX ORDER — LIDOCAINE 40 MG/G
CREAM TOPICAL ONCE
OUTPATIENT
Start: 2023-08-17 | End: 2023-08-17

## 2023-08-17 RX ORDER — BETAMETHASONE DIPROPIONATE 0.05 %
OINTMENT (GRAM) TOPICAL ONCE
OUTPATIENT
Start: 2023-08-17 | End: 2023-08-17

## 2023-08-17 RX ORDER — LIDOCAINE HYDROCHLORIDE 20 MG/ML
JELLY TOPICAL ONCE
OUTPATIENT
Start: 2023-08-17 | End: 2023-08-17

## 2023-08-17 RX ORDER — LIDOCAINE 50 MG/G
OINTMENT TOPICAL ONCE
OUTPATIENT
Start: 2023-08-17 | End: 2023-08-17

## 2023-08-17 RX ORDER — SODIUM CHLOR/HYPOCHLOROUS ACID 0.033 %
SOLUTION, IRRIGATION IRRIGATION ONCE
OUTPATIENT
Start: 2023-08-17 | End: 2023-08-17

## 2023-08-17 RX ORDER — CLOBETASOL PROPIONATE 0.5 MG/G
OINTMENT TOPICAL ONCE
OUTPATIENT
Start: 2023-08-17 | End: 2023-08-17

## 2023-08-17 NOTE — DISCHARGE INSTRUCTIONS
[address] and Hyperbaric Oxygen Therapy   Physician Orders and Discharge Instructions  [address], [address] [facility]  Telephone: [phone]    NAME:  Libby Ballesteros  YOB: 1965  MEDICAL RECORD NUMBER:  [id number]  DATE:  8/17/2023    Discharge condition: Stable    Discharge to: Home    Left via:Private automobile    Accompanied by: Self     Dressing Orders: Right Great Toe   Dakins moist 2x2 gauze tucked into wound, cover with dry gauze, roll gauze and medipore tape, change daily  Felt and Foam with cutout for the wound  [place] and Faom  Take antibiotic as prescribed    Go to ER of choice if you experience increase in redness, pain, swelling or other signs in infection   MRI on Wednesday 10/18 at 9:15    [facility] follow up visit _____________1 week________________  (Please note your next appointment above and if you are unable to keep, kindly give a 24 hour notice. Thank you.)    If you experience any of the following, please call the [facility] [place] during business hours:    * Increase in Pain  * Temperature over 101  * Increase in drainage from your wound  * Drainage with a foul odor  * Bleeding  * Increase in swelling  * Need for compression bandage changes due to slippage, breakthrough drainage. If you need medical attention outside of the business hours of the [address] [place] [address] please contact your PCP or go to the nearest emergency room.

## 2023-08-22 ENCOUNTER — HOSPITAL ENCOUNTER (OUTPATIENT)
Dept: MRI IMAGING | Age: 58
Discharge: HOME | End: 2023-08-22
Payer: MEDICARE

## 2023-08-22 PROCEDURE — 73718 MRI LOWER EXTREMITY W/O DYE: CPT

## 2023-08-24 ENCOUNTER — HOSPITAL ENCOUNTER (OUTPATIENT)
Dept: WOUND CARE | Age: 58
Discharge: HOME | End: 2023-08-24
Payer: MEDICARE

## 2023-08-24 VITALS
HEART RATE: 63 BPM | SYSTOLIC BLOOD PRESSURE: 105 MMHG | RESPIRATION RATE: 18 BRPM | DIASTOLIC BLOOD PRESSURE: 71 MMHG | TEMPERATURE: 98 F

## 2023-08-24 DIAGNOSIS — Z79.4 TYPE 2 DIABETES MELLITUS WITH DIABETIC POLYNEUROPATHY, WITH LONG-TERM CURRENT USE OF INSULIN (HCC): Chronic | ICD-10-CM

## 2023-08-24 DIAGNOSIS — L97.512 CHRONIC ULCER OF RIGHT GREAT TOE, WITH FAT LAYER EXPOSED (HCC): Primary | Chronic | ICD-10-CM

## 2023-08-24 DIAGNOSIS — E11.42 TYPE 2 DIABETES MELLITUS WITH DIABETIC POLYNEUROPATHY, WITH LONG-TERM CURRENT USE OF INSULIN (HCC): Chronic | ICD-10-CM

## 2023-08-24 PROCEDURE — 99212 OFFICE O/P EST SF 10 MIN: CPT | Performed by: SURGERY

## 2023-08-24 PROCEDURE — 99213 OFFICE O/P EST LOW 20 MIN: CPT

## 2023-08-24 RX ORDER — LIDOCAINE HYDROCHLORIDE 40 MG/ML
SOLUTION TOPICAL ONCE
OUTPATIENT
Start: 2023-08-24 | End: 2023-08-24

## 2023-08-24 RX ORDER — LIDOCAINE HYDROCHLORIDE 20 MG/ML
JELLY TOPICAL ONCE
OUTPATIENT
Start: 2023-08-24 | End: 2023-08-24

## 2023-08-24 RX ORDER — SODIUM CHLOR/HYPOCHLOROUS ACID 0.033 %
SOLUTION, IRRIGATION IRRIGATION ONCE
OUTPATIENT
Start: 2023-08-24 | End: 2023-08-24

## 2023-08-24 RX ORDER — LIDOCAINE 50 MG/G
OINTMENT TOPICAL ONCE
OUTPATIENT
Start: 2023-08-24 | End: 2023-08-24

## 2023-08-24 RX ORDER — CLOBETASOL PROPIONATE 0.5 MG/G
OINTMENT TOPICAL ONCE
OUTPATIENT
Start: 2023-08-24 | End: 2023-08-24

## 2023-08-24 RX ORDER — LIDOCAINE 40 MG/G
CREAM TOPICAL ONCE
OUTPATIENT
Start: 2023-08-24 | End: 2023-08-24

## 2023-08-24 RX ORDER — BETAMETHASONE DIPROPIONATE 0.05 %
OINTMENT (GRAM) TOPICAL ONCE
OUTPATIENT
Start: 2023-08-24 | End: 2023-08-24

## 2023-08-24 NOTE — DISCHARGE INSTRUCTIONS
[address] and Hyperbaric Oxygen Therapy   Physician Orders and Discharge Instructions  [address], [address] [facility]  Telephone: [phone]    NAME:  Kiran Kramer  YOB: 1965  MEDICAL RECORD NUMBER:  [id number]  DATE:  8/24/2023    Discharge condition: Stable    Discharge to: Home    Left via:Private automobile    Accompanied by: Family     Dressing Orders: Right Great Toe   Dakins moist 2x2 gauze tucked into wound, cover with dry gauze, roll gauze and medipore tape, change daily  Felt and Foam with cutout for the wound  [place] and Faom  Take antibiotic as prescribed    Go to ER of choice if you experience increase in redness, pain, swelling or other signs in infection       [facility] follow up visit ____________1 week with Dr Traci Pederson, 2 weeks with Dr Jessica_________________  (Please note your next appointment above and if you are unable to keep, kindly give a 24 hour notice. Thank you.)    If you experience any of the following, please call the [employer] during business hours:    * Increase in Pain  * Temperature over 101  * Increase in drainage from your wound  * Drainage with a foul odor  * Bleeding  * Increase in swelling  * Need for compression bandage changes due to slippage, breakthrough drainage. If you need medical attention outside of the business hours of the [facility] [employer] please contact your PCP or go to the nearest emergency room.

## 2023-08-24 NOTE — PROGRESS NOTES
[address]   Progress Note and Procedure Note      [facility] RECORD NUMBER:  [id number]  AGE: 59 y.o. GENDER: male  : 1965  EPISODE DATE:  2023    Subjective:     Chief Complaint   Patient presents with    Wound Check         HISTORY of PRESENT ILLNESS HPI     Macie Paul is a 59 y.o. male who presents today for wound/ulcer evaluation.    History of Wound Context: Pt with R great toe osteo here for eval/treat  Wound/Ulcer Pain Timing/Severity: none  Quality of pain: N/A  Severity:  0 / 10   Modifying Factors: None  Associated Signs/Symptoms: none    Ulcer Identification:  Ulcer Type: refractory osteomyelitis  Contributing Factors: edema    Wound:  DM        PAST MEDICAL HISTORY        Diagnosis Date    Acalculous cholecystitis 2015    LIAN (acute kidney injury) ([address]) 3/21/2019    Allergic rhinitis, cause unspecified     Atrial fibrillation, chronic (HCC)     sees [facility] cardiology    Bacteremia due to methicillin resistant Staphylococcus aureus 10/27/2021    Bone spur     bilateral elbows    CAD (coronary artery disease)     Charcot's joint of right foot     Chicken pox     Closed supracondylar fracture of elbow     fall    Diabetic ulcer of right midfoot associated with type 2 diabetes mellitus, with fat layer exposed ([address]) 2020    Diabetic ulcer of right midfoot associated with type 2 diabetes mellitus, with fat layer exposed ([address]) 2020    History of MRSA infection     scalp/facial and on back    HTN (hypertension)     Hyperlipidemia     Hypoglycemic encephalopathy     Impotence of organic origin     MDRO (multiple drug resistant organisms) resistance     MRSA (methicillin resistant staph aureus) culture positive 2021    MRSA LT HAND    Other dyspnea and respiratory abnormality     Other specified erythematous condition(775.89)     Permanent atrial fibrillation ([address]) 2021    Personal history of other infectious and parasitic disease

## 2023-08-29 LAB
25(OH)D3 SERPL-MCNC: 44.8 NG/ML
ALBUMIN SERPL-MCNC: 4.4 G/DL (ref 3.5–5.2)
ALP SERPL-CCNC: 87 U/L (ref 40–130)
ALT SERPL-CCNC: 17 U/L (ref 5–41)
ANION GAP SERPL CALCULATED.3IONS-SCNC: 13 MMOL/L (ref 7–19)
AST SERPL-CCNC: 21 U/L (ref 5–40)
BASOPHILS # BLD: 0.1 K/UL (ref 0–0.2)
BASOPHILS NFR BLD: 0.8 % (ref 0–1)
BILIRUB SERPL-MCNC: 1 MG/DL (ref 0.2–1.2)
BUN SERPL-MCNC: 20 MG/DL (ref 6–20)
CALCIUM SERPL-MCNC: 9.3 MG/DL (ref 8.6–10)
CHLORIDE SERPL-SCNC: 99 MMOL/L (ref 98–111)
CO2 SERPL-SCNC: 27 MMOL/L (ref 22–29)
CREAT SERPL-MCNC: 1.1 MG/DL (ref 0.5–1.2)
EOSINOPHIL # BLD: 0.2 K/UL (ref 0–0.6)
EOSINOPHIL NFR BLD: 2.6 % (ref 0–5)
ERYTHROCYTE [DISTWIDTH] IN BLOOD BY AUTOMATED COUNT: 12.9 % (ref 11.5–14.5)
GLUCOSE SERPL-MCNC: 117 MG/DL (ref 74–109)
HCT VFR BLD AUTO: 41.9 % (ref 42–52)
HGB BLD-MCNC: 14 G/DL (ref 14–18)
IMM GRANULOCYTES # BLD: 0 K/UL
LYMPHOCYTES # BLD: 1.4 K/UL (ref 1.1–4.5)
LYMPHOCYTES NFR BLD: 21.9 % (ref 20–40)
MCH RBC QN AUTO: 31.9 PG (ref 27–31)
MCHC RBC AUTO-ENTMCNC: 33.4 G/DL (ref 33–37)
MCV RBC AUTO: 95.4 FL (ref 80–94)
MONOCYTES # BLD: 0.4 K/UL (ref 0–0.9)
MONOCYTES NFR BLD: 7 % (ref 0–10)
NEUTROPHILS # BLD: 4.2 K/UL (ref 1.5–7.5)
NEUTS SEG NFR BLD: 67.4 % (ref 50–65)
PLATELET # BLD AUTO: 216 K/UL (ref 130–400)
PMV BLD AUTO: 11.5 FL (ref 9.4–12.4)
POTASSIUM SERPL-SCNC: 3.7 MMOL/L (ref 3.5–5)
PROT SERPL-MCNC: 7.4 G/DL (ref 6.6–8.7)
RBC # BLD AUTO: 4.39 M/UL (ref 4.7–6.1)
SODIUM SERPL-SCNC: 139 MMOL/L (ref 136–145)
WBC # BLD AUTO: 6.3 K/UL (ref 4.8–10.8)

## 2023-08-29 RX ORDER — LOSARTAN POTASSIUM 100 MG/1
TABLET ORAL
Qty: 90 TABLET | Refills: 3 | Status: SHIPPED | OUTPATIENT
Start: 2023-08-29

## 2023-08-30 ENCOUNTER — HOSPITAL ENCOUNTER (OUTPATIENT)
Dept: WOUND CARE | Age: 58
Discharge: HOME | End: 2023-08-30
Payer: MEDICARE

## 2023-08-30 VITALS
BODY MASS INDEX: 33.79 KG/M2 | WEIGHT: 236 LBS | DIASTOLIC BLOOD PRESSURE: 71 MMHG | HEIGHT: 70 IN | TEMPERATURE: 97.5 F | HEART RATE: 63 BPM | RESPIRATION RATE: 18 BRPM | SYSTOLIC BLOOD PRESSURE: 115 MMHG

## 2023-08-30 DIAGNOSIS — Z79.4 TYPE 2 DIABETES MELLITUS WITH DIABETIC POLYNEUROPATHY, WITH LONG-TERM CURRENT USE OF INSULIN (HCC): ICD-10-CM

## 2023-08-30 DIAGNOSIS — E11.42 TYPE 2 DIABETES MELLITUS WITH DIABETIC POLYNEUROPATHY, WITH LONG-TERM CURRENT USE OF INSULIN (HCC): Chronic | ICD-10-CM

## 2023-08-30 DIAGNOSIS — L97.512 CHRONIC ULCER OF RIGHT GREAT TOE, WITH FAT LAYER EXPOSED (HCC): Primary | Chronic | ICD-10-CM

## 2023-08-30 DIAGNOSIS — E11.42 TYPE 2 DIABETES MELLITUS WITH DIABETIC POLYNEUROPATHY, WITH LONG-TERM CURRENT USE OF INSULIN (HCC): ICD-10-CM

## 2023-08-30 DIAGNOSIS — Z79.4 TYPE 2 DIABETES MELLITUS WITH DIABETIC POLYNEUROPATHY, WITH LONG-TERM CURRENT USE OF INSULIN (HCC): Chronic | ICD-10-CM

## 2023-08-30 PROCEDURE — 99212 OFFICE O/P EST SF 10 MIN: CPT | Performed by: SURGERY

## 2023-08-30 PROCEDURE — 99213 OFFICE O/P EST LOW 20 MIN: CPT

## 2023-08-30 NOTE — DISCHARGE INSTRUCTIONS
[address] and Hyperbaric Oxygen Therapy   Physician Orders and Discharge Instructions  [address], [address] [facility]  Telephone: [phone]    NAME:  Vanessa Braden  YOB: 1965  MEDICAL RECORD NUMBER:  [id number]  DATE:  8/30/2023    Discharge condition: Stable    Discharge to: Home    Left via:Private automobile    Accompanied by: Self    Dressing Orders: Right Great Toe   Dakins moist 2x2 gauze tucked into wound, cover with dry gauze, roll gauze and medipore tape, change daily  Felt and Foam with cutout for the wound  [place] and Faom  Go to ER of choice if you experience increase in redness, pain, swelling or other signs in infection      [facility] follow up visit ______1 week with Dr Den Ayala _______________________  (Please note your next appointment above and if you are unable to keep, kindly give a 24 hour notice. Thank you.)    If you experience any of the following, please call the [employer] during business hours:    * Increase in Pain  * Temperature over 101  * Increase in drainage from your wound  * Drainage with a foul odor  * Bleeding  * Increase in swelling  * Need for compression bandage changes due to slippage, breakthrough drainage. If you need medical attention outside of the business hours of the [address] [place], [place] [address] please contact your PCP or go to the nearest emergency room.

## 2023-08-30 NOTE — TELEPHONE ENCOUNTER
Yana Sawyer called to request a refill on his medication.       Last office visit : 7/3/2023   Next office visit : 10/3/2023     Requested Prescriptions     Pending Prescriptions Disp Refills    metFORMIN (GLUCOPHAGE) 1000 MG tablet [Pharmacy Med Name: METFORMIN HYDROCHLORIDE 1000MG TABLET] 180 tablet 3     Sig: TAKE ONE TABLET TWICE DAILY WITH MEALS            Suhas Rose LPN

## 2023-08-30 NOTE — PROGRESS NOTES
Wound Thickness Description not for Pressure Injury Full thickness 08/30/23 1507   Number of days: 35             Plan:     Problem List Items Addressed This Visit       Type 2 diabetes mellitus with diabetic polyneuropathy, with long-term current use of insulin (HCC) (Chronic)    * (Principal) Chronic ulcer of right great toe, with fat layer exposed ([address]) - Primary (Chronic)     Pt with F/F awaiting amp from Dr. Chel Escamilla. RTO 1 week for scheduling and eval.      Treatment Note please see attached Discharge Instructions    In my professional opinion this patient would benefit from HBO Therapy: undecided    Written patient dismissal instructions given to patient and signed by patient or POA. Discharge [address] [place] [address] and Hyperbaric Oxygen Therapy   Physician Orders and Discharge Instructions  [address]  Telephone: [phone]    NAME:  Taras Moreland OF BIRTH:  1965  MEDICAL RECORD NUMBER:  [id number]  DATE:  8/30/2023    Discharge condition: Stable    Discharge to: Home    Left via:Private automobile    Accompanied by: Self    Dressing Orders: Right Great Toe   Dakins moist 2x2 gauze tucked into wound, cover with dry gauze, roll gauze and medipore tape, change daily  Felt and Foam with cutout for the wound  [place] and Faom  Go to ER of choice if you experience increase in redness, pain, swelling or other signs in infection      [facility] follow up visit ______1 week with Dr Chel Escamilla _______________________  (Please note your next appointment above and if you are unable to keep, kindly give a 24 hour notice.  Thank you.)    If you experience any of the following, please call the [facility] [address] during business hours:    * Increase in Pain  * Temperature over 101  * Increase in drainage from your wound  * Drainage with a foul odor  * Bleeding  * Increase in swelling  * Need for compression bandage

## 2023-09-07 ENCOUNTER — HOSPITAL ENCOUNTER (OUTPATIENT)
Dept: WOUND CARE | Age: 58
Discharge: HOME | End: 2023-09-07
Payer: MEDICARE

## 2023-09-07 VITALS
DIASTOLIC BLOOD PRESSURE: 78 MMHG | HEART RATE: 63 BPM | BODY MASS INDEX: 33.79 KG/M2 | WEIGHT: 236 LBS | RESPIRATION RATE: 18 BRPM | TEMPERATURE: 97.4 F | HEIGHT: 70 IN | SYSTOLIC BLOOD PRESSURE: 132 MMHG

## 2023-09-07 DIAGNOSIS — L97.512 CHRONIC ULCER OF RIGHT GREAT TOE, WITH FAT LAYER EXPOSED (HCC): Primary | Chronic | ICD-10-CM

## 2023-09-07 PROCEDURE — 99213 OFFICE O/P EST LOW 20 MIN: CPT | Performed by: SURGERY

## 2023-09-07 NOTE — DISCHARGE INSTRUCTIONS
[address] and Hyperbaric Oxygen Therapy   Physician Orders and Discharge Instructions  [address] [facility],[address] [facility] [address]  Telephone: [phone]    NAME:  Jazmine Dietrich  YOB: 1965  MEDICAL RECORD NUMBER:  [id number]  DATE:  9/7/2023    Discharge condition: Stable    Discharge to: Home    Left via:Private automobile    Accompanied by:  self    ECF/HHA:     Dressing Orders:   Right Great Toe:  Dakins moist 2x2 gauze tucked into wound, cover with dry gauze, roll gauze and medipore tape, change daily  Felt and Foam with cutout for the wound  Go to ER of choice if you experience increase in redness, pain, swelling or other signs in infection     Treatment Orders:  Protein rich diet (unless restricted by your physician)  Multivitamin daily  Elevate legs when sitting, avoid standing for long periods of time. We will call you with dates and times. Keep these instructions handy so you can fill in the blanks. Jazmine Dietrich    Surgery Directions    Report to the outpatient registration at [facility] on _________________. The day before your surgery, you will receive a phone call from the surgery nurse, to let you know what time to arrive on the day of surgery. This call will usually be between 2-4 PM. If you do not receive a phone call by 4 PM the day before your surgery, please call [phone] and let them know you have not received an arrival time. If your surgery is on Monday, your call will be on the Friday before your Monday surgery. Nothing to eat or drink after midnight the night before the procedure. Please take all medications as normally scheduled to take, including heart and blood pressure medicines with a sip of water. EXCEPT NO losartan morning of surgery. Do not take Lasix, insulin, or any diabetic medicine the morning of the procedure.  If you take insulin, you may only take 1/2 of any scheduled

## 2023-09-08 ENCOUNTER — TELEPHONE (OUTPATIENT)
Dept: WOUND CARE | Age: 58
End: 2023-09-08

## 2023-09-08 NOTE — TELEPHONE ENCOUNTER
RN called and spoke with patient on 9/8/23 at [phone] regarding pre-op instructions. Notified patient about the date of procedure and the date/time for pre-admission appointment. Reviewed pre-op instructions. Patient verbalized understanding and states he had no further questions.

## 2023-09-11 ENCOUNTER — ANESTHESIA EVENT (OUTPATIENT)
Dept: OPERATING ROOM | Age: 58
End: 2023-09-11
Payer: MEDICARE

## 2023-09-11 ENCOUNTER — HOSPITAL ENCOUNTER (OUTPATIENT)
Dept: PREADMISSION TESTING | Age: 58
Discharge: HOME | End: 2023-09-15
Payer: MEDICARE

## 2023-09-11 LAB
ALBUMIN SERPL-MCNC: 4.3 G/DL (ref 3.5–5.2)
ALP SERPL-CCNC: 94 U/L (ref 40–130)
ALT SERPL-CCNC: 21 U/L (ref 5–41)
ANION GAP SERPL CALCULATED.3IONS-SCNC: 15 MMOL/L (ref 7–19)
AST SERPL-CCNC: 20 U/L (ref 5–40)
BILIRUB SERPL-MCNC: 0.7 MG/DL (ref 0.2–1.2)
BUN SERPL-MCNC: 13 MG/DL (ref 6–20)
CALCIUM SERPL-MCNC: 9.4 MG/DL (ref 8.6–10)
CHLORIDE SERPL-SCNC: 99 MMOL/L (ref 98–111)
CO2 SERPL-SCNC: 27 MMOL/L (ref 22–29)
CREAT SERPL-MCNC: 1 MG/DL (ref 0.5–1.2)
ERYTHROCYTE [DISTWIDTH] IN BLOOD BY AUTOMATED COUNT: 13 % (ref 11.5–14.5)
GLUCOSE SERPL-MCNC: 140 MG/DL (ref 74–109)
HCT VFR BLD AUTO: 38.9 % (ref 42–52)
HGB BLD-MCNC: 12.9 G/DL (ref 14–18)
MCH RBC QN AUTO: 32.1 PG (ref 27–31)
MCHC RBC AUTO-ENTMCNC: 33.2 G/DL (ref 33–37)
MCV RBC AUTO: 96.8 FL (ref 80–94)
MRSA DNA SPEC QL NAA+PROBE: NOT DETECTED
PLATELET # BLD AUTO: 225 K/UL (ref 130–400)
PMV BLD AUTO: 11.3 FL (ref 9.4–12.4)
POTASSIUM SERPL-SCNC: 3.9 MMOL/L (ref 3.5–5)
PROT SERPL-MCNC: 7.6 G/DL (ref 6.6–8.7)
RBC # BLD AUTO: 4.02 M/UL (ref 4.7–6.1)
SODIUM SERPL-SCNC: 141 MMOL/L (ref 136–145)
WBC # BLD AUTO: 6.1 K/UL (ref 4.8–10.8)

## 2023-09-11 PROCEDURE — 87641 MR-STAPH DNA AMP PROBE: CPT

## 2023-09-11 PROCEDURE — 85027 COMPLETE CBC AUTOMATED: CPT

## 2023-09-11 PROCEDURE — 80053 COMPREHEN METABOLIC PANEL: CPT

## 2023-09-11 NOTE — DISCHARGE INSTRUCTIONS
postoperatively, you may have one visitor in the room from        7A-7P. A second visitor may sit in the ICU waiting room. No overnight visitors in         ICU waiting room.

## 2023-09-12 ENCOUNTER — PREP FOR PROCEDURE (OUTPATIENT)
Dept: WOUND CARE | Age: 58
End: 2023-09-12

## 2023-09-12 DIAGNOSIS — Z79.4 TYPE 2 DIABETES MELLITUS WITH DIABETIC POLYNEUROPATHY, WITH LONG-TERM CURRENT USE OF INSULIN (HCC): Primary | Chronic | ICD-10-CM

## 2023-09-12 DIAGNOSIS — E11.42 TYPE 2 DIABETES MELLITUS WITH DIABETIC POLYNEUROPATHY, WITH LONG-TERM CURRENT USE OF INSULIN (HCC): Primary | Chronic | ICD-10-CM

## 2023-09-12 DIAGNOSIS — L97.514 ULCER OF GREAT TOE, RIGHT, WITH NECROSIS OF BONE (HCC): ICD-10-CM

## 2023-09-12 RX ORDER — SODIUM CHLORIDE 0.9 % (FLUSH) 0.9 %
5-40 SYRINGE (ML) INJECTION PRN
Status: CANCELLED | OUTPATIENT
Start: 2023-09-12

## 2023-09-12 RX ORDER — SODIUM CHLORIDE 0.9 % (FLUSH) 0.9 %
5-40 SYRINGE (ML) INJECTION EVERY 12 HOURS SCHEDULED
Status: CANCELLED | OUTPATIENT
Start: 2023-09-12

## 2023-09-12 RX ORDER — SODIUM CHLORIDE 9 MG/ML
INJECTION, SOLUTION INTRAVENOUS PRN
Status: CANCELLED | OUTPATIENT
Start: 2023-09-12

## 2023-09-12 NOTE — H&P
[address]   Progress Note and Procedure Note      [facility] RECORD NUMBER:  [id number]  AGE: 59 y.o. GENDER: male  : 1965  EPISODE DATE:  2023    Subjective:     No chief complaint on file. HISTORY of PRESENT ILLNESS HPI     Christian Sandoval is a 59 y.o. male who presents today for wound/ulcer evaluation.    History of Wound Context: right foot wound follow up/eval and treat    Ulcer Identification:  Ulcer Type: diabetic  Contributing Factors: diabetes, poor glucose control, chronic pressure, and shear force    Wound: N/A        PAST MEDICAL HISTORY        Diagnosis Date    Acalculous cholecystitis 2015    LIAN (acute kidney injury) ([address]) 3/21/2019    Allergic rhinitis, cause unspecified     Atrial fibrillation, chronic (HCC)     sees [facility] cardiology    Bacteremia due to methicillin resistant Staphylococcus aureus 10/27/2021    Bone spur     bilateral elbows    CAD (coronary artery disease)     Charcot's joint of right foot     Chicken pox     Closed supracondylar fracture of elbow     fall    Diabetic ulcer of right midfoot associated with type 2 diabetes mellitus, with fat layer exposed ([address]) 2020    Diabetic ulcer of right midfoot associated with type 2 diabetes mellitus, with fat layer exposed ([address]) 2020    History of MRSA infection     scalp/facial and on back    HTN (hypertension)     Hyperlipidemia     Hypoglycemic encephalopathy     Impotence of organic origin     MDRO (multiple drug resistant organisms) resistance     MRSA (methicillin resistant staph aureus) culture positive 2021    MRSA LT HAND    Other dyspnea and respiratory abnormality     Other specified erythematous condition(535.89)     Permanent atrial fibrillation ([address]) 2021    Personal history of other infectious and parasitic disease     Salmonella     Subtherapeutic international normalized ratio (INR) 2021    Supratherapeutic INR 2021    Type II or

## 2023-09-12 NOTE — H&P (VIEW-ONLY)
[address]   Progress Note and Procedure Note      [facility]  [id number]  AGE: 59 y.o. GENDER: male  : 1965  EPISODE DATE:  2023    Subjective:     No chief complaint on file. HISTORY of PRESENT ILLNESS HPI     Libby Ballesteros is a 59 y.o. male who presents today for wound/ulcer evaluation.    History of Wound Context: right foot wound follow up/eval and treat    Ulcer Identification:  Ulcer Type: diabetic  Contributing Factors: diabetes, poor glucose control, chronic pressure, and shear force    Wound: N/A        PAST MEDICAL HISTORY        Diagnosis Date    Acalculous cholecystitis 2015    LIAN (acute kidney injury) ([address]) 3/21/2019    Allergic rhinitis, cause unspecified     Atrial fibrillation, chronic (HCC)     sees [facility] cardiology    Bacteremia due to methicillin resistant Staphylococcus aureus 10/27/2021    Bone spur     bilateral elbows    CAD (coronary artery disease)     Charcot's joint of right foot     Chicken pox     Closed supracondylar fracture of elbow     fall    Diabetic ulcer of right midfoot associated with type 2 diabetes mellitus, with fat layer exposed ([address]) 2020    Diabetic ulcer of right midfoot associated with type 2 diabetes mellitus, with fat layer exposed ([address]) 2020    History of MRSA infection     scalp/facial and on back    HTN (hypertension)     Hyperlipidemia     Hypoglycemic encephalopathy     Impotence of organic origin     MDRO (multiple drug resistant organisms) resistance     MRSA (methicillin resistant staph aureus) culture positive 2021    MRSA LT HAND    Other dyspnea and respiratory abnormality     Other specified erythematous condition(745.89)     Permanent atrial fibrillation ([address]) 2021    Personal history of other infectious and parasitic disease     Salmonella     Subtherapeutic international normalized ratio (INR) 2021    Supratherapeutic INR 2021    Type II or to person, place and time, well developed and well- nourished, in no acute distress  Skin: warm and dry, no rash or erythema  Head: normocephalic and atraumatic  Eyes: pupils equal, round, and reactive to light, extraocular eye movements intact, conjunctivae normal  ENT: tympanic membrane, external ear and ear canal normal bilaterally, nose without deformity, nasal mucosa and turbinates normal without polyps  Neck: supple and non-tender without mass, no thyromegaly or thyroid nodules, no cervical lymphadenopathy  Pulmonary/Chest: clear to auscultation bilaterally- no wheezes, rales or rhonchi, normal air movement, no respiratory distress  Extremities: no cyanosis, clubbing or edema  Musculoskeletal: normal range of motion, no joint swelling, deformity or tenderness  Neurologic: reflexes normal and symmetric, no cranial nerve deficit, gait, coordination and speech normal      Assessment:      Patient Active Problem List   Diagnosis Code    Shortness of breath R06.02    HTN (hypertension) I10    Hyperlipidemia E78.5    Fatigue R53.83    Hyponatremia E87.1    Type 2 diabetes mellitus with diabetic polyneuropathy, with long-term current use of insulin ([facility]) E11.42, Z79.4    Class 2 obesity due to excess calories in adult E66.09    Diabetic ulcer of right midfoot associated with type 2 diabetes mellitus, with fat layer exposed ([address]) E11.621, L97.412    Charcot foot due to diabetes mellitus ([facility]) E11.610    Personal history of noncompliance with medical treatment and regimen Z91.199    Chronic heart failure with preserved ejection fraction ([facility]) I50.32    Obstructive sleep apnea G47.33    Proliferative diabetic retinopathy of both eyes without macular edema associated with type 2 diabetes mellitus ([facility]) S90.1000    Cellulitis L03.90    Amputation of left middle finger S68.113A    Abnormal stress test R94.39    CAD in native artery I25.10    Longstanding persistent atrial fibrillation ([facility]) I48.11    Fluid overload E87.70

## 2023-09-13 ENCOUNTER — HOSPITAL ENCOUNTER (OUTPATIENT)
Age: 58
Setting detail: OUTPATIENT SURGERY
Discharge: HOME | End: 2023-09-13
Attending: SURGERY | Admitting: SURGERY
Payer: MEDICARE

## 2023-09-13 ENCOUNTER — ANESTHESIA (OUTPATIENT)
Dept: OPERATING ROOM | Age: 58
End: 2023-09-13
Payer: MEDICARE

## 2023-09-13 VITALS
OXYGEN SATURATION: 96 % | TEMPERATURE: 97.2 F | DIASTOLIC BLOOD PRESSURE: 90 MMHG | WEIGHT: 235 LBS | SYSTOLIC BLOOD PRESSURE: 155 MMHG | HEIGHT: 70 IN | HEART RATE: 69 BPM | RESPIRATION RATE: 18 BRPM | BODY MASS INDEX: 33.64 KG/M2

## 2023-09-13 DIAGNOSIS — L97.514 ULCER OF TOE OF RIGHT FOOT, WITH NECROSIS OF BONE (HCC): ICD-10-CM

## 2023-09-13 DIAGNOSIS — L97.514 ULCER OF GREAT TOE, RIGHT, WITH NECROSIS OF BONE (HCC): Primary | ICD-10-CM

## 2023-09-13 DIAGNOSIS — L97.509 DIABETIC FOOT ULCER ASSOCIATED WITH OTHER SPECIFIED DIABETES MELLITUS, UNSPECIFIED LATERALITY, UNSPECIFIED PART OF FOOT, UNSPECIFIED ULCER STAGE (HCC): ICD-10-CM

## 2023-09-13 DIAGNOSIS — E13.621 DIABETIC FOOT ULCER ASSOCIATED WITH OTHER SPECIFIED DIABETES MELLITUS, UNSPECIFIED LATERALITY, UNSPECIFIED PART OF FOOT, UNSPECIFIED ULCER STAGE (HCC): ICD-10-CM

## 2023-09-13 LAB
GLUCOSE BLD-MCNC: 120 MG/DL (ref 70–99)
GLUCOSE BLD-MCNC: 84 MG/DL (ref 70–99)
PERFORMED ON: ABNORMAL
PERFORMED ON: NORMAL

## 2023-09-13 PROCEDURE — 88311 DECALCIFY TISSUE: CPT

## 2023-09-13 PROCEDURE — 3600000003 HC SURGERY LEVEL 3 BASE: Performed by: SURGERY

## 2023-09-13 PROCEDURE — 6360000002 HC RX W HCPCS: Performed by: NURSE PRACTITIONER

## 2023-09-13 PROCEDURE — 6360000002 HC RX W HCPCS: Performed by: ANESTHESIOLOGY

## 2023-09-13 PROCEDURE — 82962 GLUCOSE BLOOD TEST: CPT

## 2023-09-13 PROCEDURE — 2500000003 HC RX 250 WO HCPCS

## 2023-09-13 PROCEDURE — 7100000011 HC PHASE II RECOVERY - ADDTL 15 MIN: Performed by: SURGERY

## 2023-09-13 PROCEDURE — 2500000003 HC RX 250 WO HCPCS: Performed by: ANESTHESIOLOGY

## 2023-09-13 PROCEDURE — 3700000000 HC ANESTHESIA ATTENDED CARE: Performed by: SURGERY

## 2023-09-13 PROCEDURE — 3700000001 HC ADD 15 MINUTES (ANESTHESIA): Performed by: SURGERY

## 2023-09-13 PROCEDURE — 6360000002 HC RX W HCPCS

## 2023-09-13 PROCEDURE — 3600000013 HC SURGERY LEVEL 3 ADDTL 15MIN: Performed by: SURGERY

## 2023-09-13 PROCEDURE — A4216 STERILE WATER/SALINE, 10 ML: HCPCS | Performed by: ANESTHESIOLOGY

## 2023-09-13 PROCEDURE — 2580000003 HC RX 258: Performed by: ANESTHESIOLOGY

## 2023-09-13 PROCEDURE — 88305 TISSUE EXAM BY PATHOLOGIST: CPT

## 2023-09-13 PROCEDURE — 2709999900 HC NON-CHARGEABLE SUPPLY: Performed by: SURGERY

## 2023-09-13 PROCEDURE — 7100000001 HC PACU RECOVERY - ADDTL 15 MIN: Performed by: SURGERY

## 2023-09-13 PROCEDURE — 7100000010 HC PHASE II RECOVERY - FIRST 15 MIN: Performed by: SURGERY

## 2023-09-13 PROCEDURE — 7100000000 HC PACU RECOVERY - FIRST 15 MIN: Performed by: SURGERY

## 2023-09-13 PROCEDURE — 28810 AMPUTATION TOE & METATARSAL: CPT | Performed by: SURGERY

## 2023-09-13 RX ORDER — SODIUM CHLORIDE 0.9 % (FLUSH) 0.9 %
5-40 SYRINGE (ML) INJECTION EVERY 12 HOURS SCHEDULED
Status: DISCONTINUED | OUTPATIENT
Start: 2023-09-13 | End: 2023-09-13 | Stop reason: HOSPADM

## 2023-09-13 RX ORDER — HYDROMORPHONE HYDROCHLORIDE 1 MG/ML
0.5 INJECTION, SOLUTION INTRAMUSCULAR; INTRAVENOUS; SUBCUTANEOUS EVERY 5 MIN PRN
Status: DISCONTINUED | OUTPATIENT
Start: 2023-09-13 | End: 2023-09-13 | Stop reason: HOSPADM

## 2023-09-13 RX ORDER — SODIUM CHLORIDE, SODIUM LACTATE, POTASSIUM CHLORIDE, CALCIUM CHLORIDE 600; 310; 30; 20 MG/100ML; MG/100ML; MG/100ML; MG/100ML
INJECTION, SOLUTION INTRAVENOUS CONTINUOUS
Status: DISCONTINUED | OUTPATIENT
Start: 2023-09-13 | End: 2023-09-13 | Stop reason: HOSPADM

## 2023-09-13 RX ORDER — SODIUM CHLORIDE 0.9 % (FLUSH) 0.9 %
5-40 SYRINGE (ML) INJECTION PRN
Status: DISCONTINUED | OUTPATIENT
Start: 2023-09-13 | End: 2023-09-13 | Stop reason: HOSPADM

## 2023-09-13 RX ORDER — SODIUM CHLORIDE 9 MG/ML
INJECTION, SOLUTION INTRAVENOUS PRN
Status: DISCONTINUED | OUTPATIENT
Start: 2023-09-13 | End: 2023-09-13 | Stop reason: HOSPADM

## 2023-09-13 RX ORDER — ONDANSETRON 2 MG/ML
INJECTION INTRAMUSCULAR; INTRAVENOUS PRN
Status: DISCONTINUED | OUTPATIENT
Start: 2023-09-13 | End: 2023-09-13 | Stop reason: SDUPTHER

## 2023-09-13 RX ORDER — SODIUM CHLORIDE 9 MG/ML
INJECTION, SOLUTION INTRAVENOUS PRN
Status: CANCELLED | OUTPATIENT
Start: 2023-09-13

## 2023-09-13 RX ORDER — SODIUM CHLORIDE 0.9 % (FLUSH) 0.9 %
5-40 SYRINGE (ML) INJECTION EVERY 12 HOURS SCHEDULED
Status: CANCELLED | OUTPATIENT
Start: 2023-09-13

## 2023-09-13 RX ORDER — SODIUM CHLORIDE 0.9 % (FLUSH) 0.9 %
5-40 SYRINGE (ML) INJECTION PRN
Status: CANCELLED | OUTPATIENT
Start: 2023-09-13

## 2023-09-13 RX ORDER — HYDROCODONE BITARTRATE AND ACETAMINOPHEN 5; 325 MG/1; MG/1
1 TABLET ORAL EVERY 6 HOURS PRN
Qty: 20 TABLET | Refills: 0 | Status: SHIPPED | OUTPATIENT
Start: 2023-09-13 | End: 2023-09-18

## 2023-09-13 RX ORDER — ONDANSETRON 2 MG/ML
4 INJECTION INTRAMUSCULAR; INTRAVENOUS
Status: COMPLETED | OUTPATIENT
Start: 2023-09-13 | End: 2023-09-13

## 2023-09-13 RX ORDER — FENTANYL CITRATE 50 UG/ML
INJECTION, SOLUTION INTRAMUSCULAR; INTRAVENOUS PRN
Status: DISCONTINUED | OUTPATIENT
Start: 2023-09-13 | End: 2023-09-13 | Stop reason: SDUPTHER

## 2023-09-13 RX ORDER — HYDROMORPHONE HYDROCHLORIDE 1 MG/ML
0.25 INJECTION, SOLUTION INTRAMUSCULAR; INTRAVENOUS; SUBCUTANEOUS EVERY 5 MIN PRN
Status: DISCONTINUED | OUTPATIENT
Start: 2023-09-13 | End: 2023-09-13 | Stop reason: HOSPADM

## 2023-09-13 RX ORDER — LIDOCAINE HYDROCHLORIDE 10 MG/ML
INJECTION, SOLUTION EPIDURAL; INFILTRATION; INTRACAUDAL; PERINEURAL PRN
Status: DISCONTINUED | OUTPATIENT
Start: 2023-09-13 | End: 2023-09-13 | Stop reason: SDUPTHER

## 2023-09-13 RX ORDER — PROPOFOL 10 MG/ML
INJECTION, EMULSION INTRAVENOUS PRN
Status: DISCONTINUED | OUTPATIENT
Start: 2023-09-13 | End: 2023-09-13 | Stop reason: SDUPTHER

## 2023-09-13 RX ADMIN — SODIUM CHLORIDE, SODIUM LACTATE, POTASSIUM CHLORIDE, AND CALCIUM CHLORIDE: 600; 310; 30; 20 INJECTION, SOLUTION INTRAVENOUS at 11:37

## 2023-09-13 RX ADMIN — LIDOCAINE HYDROCHLORIDE 100 MG: 10 INJECTION, SOLUTION EPIDURAL; INFILTRATION; INTRACAUDAL; PERINEURAL at 17:30

## 2023-09-13 RX ADMIN — Medication 1608 MG: at 17:44

## 2023-09-13 RX ADMIN — ONDANSETRON 4 MG: 2 INJECTION INTRAMUSCULAR; INTRAVENOUS at 18:25

## 2023-09-13 RX ADMIN — FENTANYL CITRATE 50 MCG: 0.05 INJECTION, SOLUTION INTRAMUSCULAR; INTRAVENOUS at 17:30

## 2023-09-13 RX ADMIN — FENTANYL CITRATE 50 MCG: 0.05 INJECTION, SOLUTION INTRAMUSCULAR; INTRAVENOUS at 18:40

## 2023-09-13 RX ADMIN — FAMOTIDINE 20 MG: 10 INJECTION, SOLUTION INTRAVENOUS at 12:11

## 2023-09-13 RX ADMIN — PROPOFOL 200 MG: 10 INJECTION, EMULSION INTRAVENOUS at 17:30

## 2023-09-13 RX ADMIN — SODIUM CHLORIDE, SODIUM LACTATE, POTASSIUM CHLORIDE, AND CALCIUM CHLORIDE: 600; 310; 30; 20 INJECTION, SOLUTION INTRAVENOUS at 17:29

## 2023-09-13 RX ADMIN — PHENYLEPHRINE HYDROCHLORIDE 100 MCG: 10 INJECTION INTRAVENOUS at 17:40

## 2023-09-13 ASSESSMENT — LIFESTYLE VARIABLES: SMOKING_STATUS: 0

## 2023-09-13 NOTE — OP NOTE
Operative Note      Patient: Jaswinder Murray  YOB: 1965  MRN: [id number]    Date of Procedure: 9/13/2023    Pre-Op Diagnosis Codes:     * Diabetic foot ulcer associated with other specified diabetes mellitus, unspecified laterality, unspecified part of foot, unspecified ulcer stage ([address]) [E13.621, L97.509]     * Ulcer of toe of right foot, with necrosis of bone ([address]) [L97.514]    Post-Op Diagnosis: Same       Procedure(s):  RIGHT GREAT TOE AMPUTATION    Surgeon(s):  Danae Carrasquillo DO    Assistant:   * No surgical staff found *    Anesthesia: General    Estimated Blood Loss (mL): less than 50     Complications: None    Specimens:   ID Type Source Tests Collected by Time Destination   A : Right great toe  Tissue Toe 23[address] [facility] Christian Jessica DO 9/13/2023 1801        Implants:  * No implants in log *      Drains: * No LDAs found *      Findings:    1. The patient had pulsatile arterial bleeding at the level of amputation. 2.  There was no sign of infection at the level of amputation. Procedure in detail:    Informed consent was obtained. The patient was given intravenous antibiotics, brought to the operating room, and placed on the operating room table in the supine position. General anesthesia was achieved and the patient's right leg, foot, and toes were prepped and draped in the usual sterile fashion. A timeout was performed. A circumferential incision was made at the base of the toe with knife. The dissection was carried down to the base of the proximal phalynx with electrocautery and sharp dissection. The proximal phalynx and toe were removed and passed off the field as specimen. The incision was then extended in order to expose the distal metatarsal bone which was transected with a bone saw.       The area was irrigated with saline and hemostasis achieved with electrocautery and 4-0 Prolene     The wound was closed with 3-0 PDS sutures and the skin was closed with 3-0

## 2023-09-13 NOTE — ANESTHESIA PRE PROCEDURE
Department of Anesthesiology  Preprocedure Note       Name:  Rob Santos   Age:  59 y.o.  :  1965                                          MRN:  [id number]         Date:  2023      Surgeon: Sylvia Martins):  Agustin Bhakta DO    Procedure:     Medications prior to admission:   Prior to Admission medications    Medication Sig Start Date End Date Taking? Authorizing Provider   mupirocin (BACTROBAN) 2 % ointment For  5 days before surgery swab ointment/cream into both nostrils (with a q-tip) twice daily. Stop after the 5 days. Patient taking differently: Apply 1 each topically 2 times daily For  5 days before surgery swab ointment/cream into both nostrils (with a q-tip) twice daily. Stop after the 5 days.  23   Jo Jessica DO   metFORMIN (GLUCOPHAGE) 1000 MG tablet TAKE ONE TABLET TWICE DAILY WITH MEALS  Patient taking differently: Take 1 tablet by mouth daily (with breakfast) TAKE ONE TABLET TWICE DAILY WITH MEALS 23   Gabbi Bess MD   losartan (COZAAR) 100 MG tablet TAKE ONE TABLET DAILY  Patient taking differently: Take 1 tablet by mouth daily 23   [facility]   cloNIDine (CATAPRES) 0.1 MG tablet TAKE ONE TABLET TWO TIMES DAILY  Patient taking differently: Take 1 tablet by mouth 2 times daily 8/15/23   Gabbi Bess MD   furosemide (LASIX) 40 MG tablet TAKE ONE TABLET DAILY 23   José Miguel Alejandre MD   sodium hypochlorite (DAKINS) 0.125 % SOLN external solution Moisten gauze apply to wound twice daily  Patient taking differently: Apply 10 mLs topically daily Moisten gauze apply to wound twice daily 23   Morris Sibley MD   Continuous Blood Gluc  (DEXCOM G7 ) MARIA DOLORES 1 Device by Does not apply route daily    Historical Provider, MD   Continuous Blood Gluc Sensor (DEXCOM G7 SENSOR) MISC 1 Device by Does not apply route every 14 days    Historical MD Jorge   insulin lispro, 1 Unit Dial, (HUMALOG KWIKPEN) 100 UNIT/ML SOPN Inject

## 2023-09-13 NOTE — INTERVAL H&P NOTE
Update History & Physical    The patient's History and Physical of September 12, 2023 was reviewed with the patient and I examined the patient. There was no change. The surgical site was confirmed by the patient and me. Plan: The risks, benefits, expected outcome, and alternative to the recommended procedure have been discussed with the patient. Patient understands and wants to proceed with the procedure.      Electronically signed by Agustin Bhakta DO on 9/13/2023 at 5:08 PM

## 2023-09-13 NOTE — DISCHARGE INSTRUCTIONS
Keep dressing clean and dry. May clean with soap and water and redress. Elevate foot often to help with swelling and pain. Wear the offloading show when up and about, no weight bearing without the shoe. If you have excessive drainage, redness of incision, or odorous drainage, please call the wound care center at [phone]  If you have excessive bleeding, go to the ER. Ice behind the knee off and on during waking hours, 20 minutes at a time.  (Elevate and ice frequently)  Call Dr Donnell Butt office with questions and concerns

## 2023-09-14 PROBLEM — S98.111A AMPUTATION OF RIGHT GREAT TOE (HCC): Status: ACTIVE | Noted: 2023-09-14

## 2023-09-21 ENCOUNTER — HOSPITAL ENCOUNTER (OUTPATIENT)
Dept: WOUND CARE | Age: 58
Discharge: HOME | End: 2023-09-21
Payer: MEDICARE

## 2023-09-21 VITALS
TEMPERATURE: 98.1 F | WEIGHT: 235 LBS | BODY MASS INDEX: 33.64 KG/M2 | SYSTOLIC BLOOD PRESSURE: 156 MMHG | HEART RATE: 67 BPM | RESPIRATION RATE: 18 BRPM | DIASTOLIC BLOOD PRESSURE: 88 MMHG | HEIGHT: 70 IN

## 2023-09-21 DIAGNOSIS — S98.111A AMPUTATION OF RIGHT GREAT TOE (HCC): ICD-10-CM

## 2023-09-21 PROCEDURE — 99213 OFFICE O/P EST LOW 20 MIN: CPT

## 2023-09-21 PROCEDURE — 99213 OFFICE O/P EST LOW 20 MIN: CPT | Performed by: SURGERY

## 2023-09-21 NOTE — DISCHARGE INSTRUCTIONS
[address] and Hyperbaric Oxygen Therapy   Physician Orders and Discharge Instructions  18[address] [facility],[address] [place] [address] [address]vd, [address]  Telephone: [phone]    NAME:  Joselito Xie  YOB: 1965  MEDICAL RECORD NUMBER:  [id number]  DATE:  9/21/2023    Discharge condition: Stable    Discharge to: Home    Left via:Private automobile    Accompanied by:  self    ECF/HHA:      Dressing Orders:   Right Great Toe Amp:    Soap and water wash, rinse well, pat dry. Apply dry gauze dressing to cover, change daily. No soaking foot. Wear forefoot offload shoe with any steps, no walking without it. Minimal walking. Treatment Orders:  Protein rich diet (unless restricted by your physician)  Multivitamin daily  Elevate legs when sitting, avoid standing for long periods of time. Go to ER if you experience increase in redness, pain, swelling or other signs in infection during non office hours. [facility] follow up visit _____2 weeks _______________________  (Please note your next appointment above and if you are unable to keep, kindly give a 24 hour notice. Thank you.)          If you experience any of the following, please call the [address] [place] [address] during business hours:    * Increase in Pain  * Temperature over 101  * Increase in drainage from your wound  * Drainage with a foul odor  * Bleeding  * Increase in swelling  * Need for compression bandage changes due to slippage, breakthrough drainage. If you need medical attention outside of the business hours of the [address] [place] [address] please contact your PCP or go to the nearest emergency room.

## 2023-09-21 NOTE — PROGRESS NOTES
[address]   Progress Note and Procedure Note      Saul Gutierrez  AGE: 59 y.o. GENDER: male  : 1965  TODAY'S DATE:  2023    Subjective:        HISTORY of PRESENT ILLNESS HPI   Saul Gutierrez is a 59 y.o. male who presents today for wound evaluation. Wound Type:diabetic  Wound Location:right foot  Modifying factors:obesity    Patient Active Problem List   Diagnosis Code    Shortness of breath R06.02    HTN (hypertension) I10    Hyperlipidemia E78.5    Fatigue R53.83    Hyponatremia E87.1    Type 2 diabetes mellitus with diabetic polyneuropathy, with long-term current use of insulin ([facility]) E11.42, Z79.4    Class 2 obesity due to excess calories in adult E66.09    Diabetic foot ulcer ([address]) E11.621, L97.509    Charcot foot due to diabetes mellitus ([address]) E11.610    Personal history of noncompliance with medical treatment and regimen Z91.199    Chronic heart failure with preserved ejection fraction ([facility]) I50.32    Obstructive sleep apnea G47.33    Proliferative diabetic retinopathy of both eyes without macular edema associated with type 2 diabetes mellitus ([address]) K27.1586    Cellulitis L03.90    Amputation of left middle finger S68.113A    Abnormal stress test R94.39    CAD in native artery I25.10    Longstanding persistent atrial fibrillation ([facility]) I48.11    Fluid overload E87.70    Stage 3 chronic kidney disease ([facility]) N18.30    Status post coronary artery bypass graft Z95.1    Dizziness R42    Benign paroxysmal positional vertigo due to bilateral vestibular disorder H81.13    Ulcer of great toe, right, with necrosis of bone ([facility]) L97.514    Amputation of right great toe ([facility]) M39.637A       ALLERGIES    Allergies   Allergen Reactions    Contrast [Iodides] Nausea And Vomiting     PT STATED HE HAD A MRI WITH CONTRAST ON 10/25/2021.   Projectile vomiting    Cephalosporins Hives and Rash     Other reaction(s): ENCOUNTERDATE:2014    Neomycin-Bacitracin Zn-Polymyx Rash

## 2023-09-25 DIAGNOSIS — N40.1 BENIGN PROSTATIC HYPERPLASIA WITH INCOMPLETE BLADDER EMPTYING: ICD-10-CM

## 2023-09-25 DIAGNOSIS — R39.14 BENIGN PROSTATIC HYPERPLASIA WITH INCOMPLETE BLADDER EMPTYING: ICD-10-CM

## 2023-09-25 RX ORDER — DUTASTERIDE 0.5 MG/1
CAPSULE, LIQUID FILLED ORAL
Qty: 30 CAPSULE | Refills: 2 | OUTPATIENT
Start: 2023-09-25

## 2023-09-25 NOTE — TELEPHONE ENCOUNTER
I called the pharmacy because his prescription should be good till 2024 on the Avodart. They stated this request for refill must have been sent on accident because they had a few left on file. I will refuse this request because it is an error.

## 2023-10-02 ASSESSMENT — ENCOUNTER SYMPTOMS
TROUBLE SWALLOWING: 0
COLOR CHANGE: 0
ABDOMINAL PAIN: 0
BLOOD IN STOOL: 0
DIARRHEA: 0
CHEST TIGHTNESS: 0
STRIDOR: 0
COUGH: 0
BACK PAIN: 0
CONSTIPATION: 1
WHEEZING: 0
SHORTNESS OF BREATH: 0
SORE THROAT: 0

## 2023-10-02 NOTE — PROGRESS NOTES
current medications         Relevant Medications    ASPIRIN LOW DOSE 81 MG EC tablet    atorvastatin (LIPITOR) 20 MG tablet    furosemide (LASIX) 40 MG tablet    cloNIDine (CATAPRES) 0.1 MG tablet    losartan (COZAAR) 100 MG tablet    metoprolol tartrate (LOPRESSOR) 50 MG tablet    Amputation of right great toe (HCC)      Monitored by specialist- no acute findings meriting change in the plan               No data to display                    7/3/2023    12:49 PM 4/3/2023     1:09 PM 5/2/2022     1:40 PM 3/8/2022     9:51 AM 1/3/2022    10:03 AM   PHQ Scores   PHQ2 Score 0 0 0 0 0   PHQ9 Score 0 0 0 0 0       Results for orders placed or performed during the hospital encounter of 09/11/23   MRSA DNA Probe, Nasal    Specimen: Nares; Nose   Result Value Ref Range    MRSA SCREEN RT-PCR Not Detected Not Detected   CBC   Result Value Ref Range    WBC 6.1 4.8 - 10.8 K/uL    RBC 4.02 (L) 4.70 - 6.10 M/uL    Hemoglobin 12.9 (L) 14.0 - 18.0 g/dL    Hematocrit 38.9 (L) 42.0 - 52.0 %    MCV 96.8 (H) 80.0 - 94.0 fL    MCH 32.1 (H) 27.0 - 31.0 pg    MCHC 33.2 33.0 - 37.0 g/dL    RDW 13.0 11.5 - 14.5 %    Platelets 249 463 - 906 K/uL    MPV 11.3 9.4 - 12.4 fL   Comprehensive Metabolic Panel   Result Value Ref Range    Sodium 141 136 - 145 mmol/L    Potassium 3.9 3.5 - 5.0 mmol/L    Chloride 99 98 - 111 mmol/L    CO2 27 22 - 29 mmol/L    Anion Gap 15 7 - 19 mmol/L    Glucose 140 (H) 74 - 109 mg/dL    BUN 13 6 - 20 mg/dL    Creatinine 1.0 0.5 - 1.2 mg/dL    Est, Glom Filt Rate >60 >60    Calcium 9.4 8.6 - 10.0 mg/dL    Total Protein 7.6 6.6 - 8.7 g/dL    Albumin 4.3 3.5 - 5.2 g/dL    Total Bilirubin 0.7 0.2 - 1.2 mg/dL    Alkaline Phosphatase 94 40 - 130 U/L    ALT 21 5 - 41 U/L    AST 20 5 - 40 U/L       Return in about 4 months (around 2/3/2024).     HPI  49-year-old male who presents for follow-up visit for FUV  DM poorly controlled , patient displaying improvement in care with better adherence to his insulin regimen as well as

## 2023-10-03 ENCOUNTER — OFFICE VISIT (OUTPATIENT)
Dept: PRIMARY CARE CLINIC | Age: 58
End: 2023-10-03
Payer: MEDICARE

## 2023-10-03 VITALS
SYSTOLIC BLOOD PRESSURE: 128 MMHG | DIASTOLIC BLOOD PRESSURE: 80 MMHG | TEMPERATURE: 98.2 F | HEIGHT: 70 IN | WEIGHT: 228 LBS | BODY MASS INDEX: 32.64 KG/M2 | OXYGEN SATURATION: 98 % | HEART RATE: 58 BPM | RESPIRATION RATE: 18 BRPM

## 2023-10-03 DIAGNOSIS — Z79.4 TYPE 2 DIABETES MELLITUS WITH DIABETIC POLYNEUROPATHY, WITH LONG-TERM CURRENT USE OF INSULIN (HCC): Chronic | ICD-10-CM

## 2023-10-03 DIAGNOSIS — E11.3593 PROLIFERATIVE DIABETIC RETINOPATHY OF BOTH EYES WITHOUT MACULAR EDEMA ASSOCIATED WITH TYPE 2 DIABETES MELLITUS (HCC): ICD-10-CM

## 2023-10-03 DIAGNOSIS — I25.10 CAD IN NATIVE ARTERY: ICD-10-CM

## 2023-10-03 DIAGNOSIS — S98.111A AMPUTATION OF RIGHT GREAT TOE (HCC): ICD-10-CM

## 2023-10-03 DIAGNOSIS — I10 PRIMARY HYPERTENSION: ICD-10-CM

## 2023-10-03 DIAGNOSIS — E78.2 MIXED HYPERLIPIDEMIA: ICD-10-CM

## 2023-10-03 DIAGNOSIS — E11.42 TYPE 2 DIABETES MELLITUS WITH DIABETIC POLYNEUROPATHY, WITH LONG-TERM CURRENT USE OF INSULIN (HCC): Chronic | ICD-10-CM

## 2023-10-03 DIAGNOSIS — L97.514 ULCER OF GREAT TOE, RIGHT, WITH NECROSIS OF BONE (HCC): ICD-10-CM

## 2023-10-03 DIAGNOSIS — N18.30 STAGE 3 CHRONIC KIDNEY DISEASE, UNSPECIFIED WHETHER STAGE 3A OR 3B CKD (HCC): ICD-10-CM

## 2023-10-03 DIAGNOSIS — I48.11 LONGSTANDING PERSISTENT ATRIAL FIBRILLATION (HCC): Primary | ICD-10-CM

## 2023-10-03 DIAGNOSIS — I50.32 CHRONIC HEART FAILURE WITH PRESERVED EJECTION FRACTION (HCC): ICD-10-CM

## 2023-10-03 DIAGNOSIS — Z23 FLU VACCINE NEED: ICD-10-CM

## 2023-10-03 LAB
BASOPHILS # BLD: 0.1 K/UL (ref 0–0.2)
BASOPHILS NFR BLD: 1.1 % (ref 0–1)
EOSINOPHIL # BLD: 0.2 K/UL (ref 0–0.6)
EOSINOPHIL NFR BLD: 2.4 % (ref 0–5)
ERYTHROCYTE [DISTWIDTH] IN BLOOD BY AUTOMATED COUNT: 12.9 % (ref 11.5–14.5)
HBA1C MFR BLD: 7.7 % (ref 4–6)
HCT VFR BLD AUTO: 41 % (ref 42–52)
HGB BLD-MCNC: 13.8 G/DL (ref 14–18)
IMM GRANULOCYTES # BLD: 0 K/UL
LYMPHOCYTES # BLD: 1.8 K/UL (ref 1.1–4.5)
LYMPHOCYTES NFR BLD: 25.4 % (ref 20–40)
MCH RBC QN AUTO: 32.2 PG (ref 27–31)
MCHC RBC AUTO-ENTMCNC: 33.7 G/DL (ref 33–37)
MCV RBC AUTO: 95.6 FL (ref 80–94)
MONOCYTES # BLD: 0.5 K/UL (ref 0–0.9)
MONOCYTES NFR BLD: 6.8 % (ref 0–10)
NEUTROPHILS # BLD: 4.6 K/UL (ref 1.5–7.5)
NEUTS SEG NFR BLD: 63.9 % (ref 50–65)
PLATELET # BLD AUTO: 307 K/UL (ref 130–400)
PMV BLD AUTO: 10.7 FL (ref 9.4–12.4)
RBC # BLD AUTO: 4.29 M/UL (ref 4.7–6.1)
WBC # BLD AUTO: 7.2 K/UL (ref 4.8–10.8)

## 2023-10-03 PROCEDURE — 3017F COLORECTAL CA SCREEN DOC REV: CPT | Performed by: INTERNAL MEDICINE

## 2023-10-03 PROCEDURE — G8484 FLU IMMUNIZE NO ADMIN: HCPCS | Performed by: INTERNAL MEDICINE

## 2023-10-03 PROCEDURE — 3078F DIAST BP <80 MM HG: CPT | Performed by: INTERNAL MEDICINE

## 2023-10-03 PROCEDURE — 3074F SYST BP LT 130 MM HG: CPT | Performed by: INTERNAL MEDICINE

## 2023-10-03 PROCEDURE — 99214 OFFICE O/P EST MOD 30 MIN: CPT | Performed by: INTERNAL MEDICINE

## 2023-10-03 PROCEDURE — 2022F DILAT RTA XM EVC RTNOPTHY: CPT | Performed by: INTERNAL MEDICINE

## 2023-10-03 PROCEDURE — G8417 CALC BMI ABV UP PARAM F/U: HCPCS | Performed by: INTERNAL MEDICINE

## 2023-10-03 PROCEDURE — 1036F TOBACCO NON-USER: CPT | Performed by: INTERNAL MEDICINE

## 2023-10-03 PROCEDURE — 3051F HG A1C>EQUAL 7.0%<8.0%: CPT | Performed by: INTERNAL MEDICINE

## 2023-10-03 PROCEDURE — G8427 DOCREV CUR MEDS BY ELIG CLIN: HCPCS | Performed by: INTERNAL MEDICINE

## 2023-10-03 RX ORDER — METOPROLOL TARTRATE 50 MG/1
50 TABLET, FILM COATED ORAL 2 TIMES DAILY
Qty: 180 TABLET | Refills: 1 | Status: SHIPPED | OUTPATIENT
Start: 2023-10-03 | End: 2024-03-31

## 2023-10-03 RX ORDER — ACYCLOVIR 400 MG/1
1 TABLET ORAL
Qty: 12 EACH | Refills: 3 | Status: SHIPPED | OUTPATIENT
Start: 2023-10-03

## 2023-10-03 NOTE — DISCHARGE INSTRUCTIONS
[address] and Hyperbaric Oxygen Therapy   Physician Orders and Discharge Instructions  1830 [facility],[address] [place] [address] Bl, [address]  Telephone: [phone]    NAME:  Faheem Bach OF BIRTH:  1965  MEDICAL RECORD NUMBER:  [id number]  DATE:  10/5/23    Discharge condition: Stable    Discharge to: Home    Left via:Private automobile    Accompanied by:  self    ECF/HHA:     Sutures removed today in clinic 10/5/23     Dressing Orders:   Right Great Toe Amp:   Soap and water wash, rinse well, pat dry. Apply dry gauze dressing to cover, change daily. No soaking foot. Wear forefoot offload shoe with any steps, no walking without it. Minimal walking. Treatment Orders:  Protein rich diet (unless restricted by your physician)  Multivitamin daily  Elevate legs when sitting, avoid standing for long periods of time. Go to ER if you experience increase in redness, pain, swelling or other signs in infection during non office hours. [facility] follow up visit _____2 weeks _______________________  (Please note your next appointment above and if you are unable to keep, kindly give a 24 hour notice. Thank you.)          If you experience any of the following, please call the [facility] during business hours:    * Increase in Pain  * Temperature over 101  * Increase in drainage from your wound  * Drainage with a foul odor  * Bleeding  * Increase in swelling  * Need for compression bandage changes due to slippage, breakthrough drainage. If you need medical attention outside of the business hours of the [address] [place], [place] [address] please contact your PCP or go to the nearest emergency room.

## 2023-10-04 RX ORDER — PEN NEEDLE, DIABETIC 31 G X1/4"
NEEDLE, DISPOSABLE MISCELLANEOUS
Qty: 300 EACH | Refills: 5 | Status: SHIPPED | OUTPATIENT
Start: 2023-10-04

## 2023-10-04 NOTE — ASSESSMENT & PLAN NOTE
Borderline controlled, continue current medications   Patient has been better adherent with self-monitoring glucose device, blood glucose numbers have improved with better adherence to regimen  Lab Results   Component Value Date    LABA1C 7.7 (H) 10/03/2023    LABA1C 9.2 (H) 06/30/2023    LABA1C 9.5 (H) 04/03/2023     Lab Results   Component Value Date    MALBCR 226.3 04/03/2023    LDLCALC 61 06/30/2023    CREATININE 1.0 09/11/2023

## 2023-10-04 NOTE — TELEPHONE ENCOUNTER
Martina Ritchie called to request a refill on his medication.       Last office visit : 10/3/2023   Next office visit : 2/5/2024     Requested Prescriptions     Pending Prescriptions Disp Refills    TRUEPLUS 5-BEVEL PEN NEEDLES 31G X 6 MM MISC [Pharmacy Med Name: Tabitha Arzola PEN NEEDLES [id number] MISC]  0     Sig: USE AS DIRECTED UP TO [address] SCALE INSULIN            Freddie Vilchis LPN

## 2023-10-04 NOTE — ASSESSMENT & PLAN NOTE
Monitored by specialist- no acute findings meriting change in the plan   S/p R foot partial amputation

## 2023-10-04 NOTE — ASSESSMENT & PLAN NOTE
Monitored by specialist- no acute findings meriting change in the plan   Patient lost to follow-up he is fired followed up at [facility] but continues to be noncompliant to attending his appointments he will be given another referral says that they never called him for his appointment the same time I have made several attempts to call the patient and he does not answer his phone

## 2023-10-05 ENCOUNTER — HOSPITAL ENCOUNTER (OUTPATIENT)
Dept: WOUND CARE | Age: 58
Discharge: HOME | End: 2023-10-05
Payer: MEDICARE

## 2023-10-05 VITALS
SYSTOLIC BLOOD PRESSURE: 118 MMHG | DIASTOLIC BLOOD PRESSURE: 72 MMHG | HEIGHT: 70 IN | TEMPERATURE: 97 F | WEIGHT: 228 LBS | RESPIRATION RATE: 18 BRPM | HEART RATE: 60 BPM | BODY MASS INDEX: 32.64 KG/M2

## 2023-10-05 DIAGNOSIS — L97.514 ULCER OF GREAT TOE, RIGHT, WITH NECROSIS OF BONE (HCC): Primary | ICD-10-CM

## 2023-10-05 DIAGNOSIS — S98.111A AMPUTATED GREAT TOE OF RIGHT FOOT (HCC): ICD-10-CM

## 2023-10-05 PROCEDURE — 99213 OFFICE O/P EST LOW 20 MIN: CPT

## 2023-10-05 PROCEDURE — 99024 POSTOP FOLLOW-UP VISIT: CPT | Performed by: NURSE PRACTITIONER

## 2023-10-05 RX ORDER — LIDOCAINE HYDROCHLORIDE 20 MG/ML
JELLY TOPICAL ONCE
OUTPATIENT
Start: 2023-10-05 | End: 2023-10-05

## 2023-10-05 RX ORDER — BETAMETHASONE DIPROPIONATE 0.05 %
OINTMENT (GRAM) TOPICAL ONCE
OUTPATIENT
Start: 2023-10-05 | End: 2023-10-05

## 2023-10-05 RX ORDER — TRIAMCINOLONE ACETONIDE 1 MG/G
OINTMENT TOPICAL ONCE
OUTPATIENT
Start: 2023-10-05 | End: 2023-10-05

## 2023-10-05 RX ORDER — LIDOCAINE 40 MG/G
CREAM TOPICAL ONCE
OUTPATIENT
Start: 2023-10-05 | End: 2023-10-05

## 2023-10-05 RX ORDER — SODIUM CHLOR/HYPOCHLOROUS ACID 0.033 %
SOLUTION, IRRIGATION IRRIGATION ONCE
OUTPATIENT
Start: 2023-10-05 | End: 2023-10-05

## 2023-10-05 RX ORDER — LIDOCAINE 50 MG/G
OINTMENT TOPICAL ONCE
OUTPATIENT
Start: 2023-10-05 | End: 2023-10-05

## 2023-10-05 RX ORDER — LIDOCAINE HYDROCHLORIDE 40 MG/ML
SOLUTION TOPICAL ONCE
OUTPATIENT
Start: 2023-10-05 | End: 2023-10-05

## 2023-10-05 RX ORDER — CLOBETASOL PROPIONATE 0.5 MG/G
OINTMENT TOPICAL ONCE
OUTPATIENT
Start: 2023-10-05 | End: 2023-10-05

## 2023-10-05 NOTE — PATIENT INSTRUCTIONS
[address] and Hyperbaric Oxygen Therapy   Physician Orders and Discharge Instructions  [address] [facility],[address] [place] [address] Bl, [address]  Telephone: [phone]    NAME:  Juventino Glaser OF BIRTH:  1965  MEDICAL RECORD NUMBER:  [id number]  DATE:  10/5/23    Discharge condition: Stable    Discharge to: Home    Left via:Private automobile    Accompanied by:  self    ECF/HHA:     Sutures removed today in clinic 10/5/23     Dressing Orders:   Right Great Toe Amp:   Soap and water wash, rinse well, pat dry. Apply dry gauze dressing to cover, change daily. No soaking foot. Wear forefoot offload shoe with any steps, no walking without it. Minimal walking. Treatment Orders:  Protein rich diet (unless restricted by your physician)  Multivitamin daily  Elevate legs when sitting, avoid standing for long periods of time. Go to ER if you experience increase in redness, pain, swelling or other signs in infection during non office hours. [address] [place] [address] follow up visit _____2 weeks _______________________  (Please note your next appointment above and if you are unable to keep, kindly give a 24 hour notice. Thank you.)          If you experience any of the following, please call the [facility] during business hours:    * Increase in Pain  * Temperature over 101  * Increase in drainage from your wound  * Drainage with a foul odor  * Bleeding  * Increase in swelling  * Need for compression bandage changes due to slippage, breakthrough drainage. If you need medical attention outside of the business hours of the [address] [place] [address] please contact your PCP or go to the nearest emergency room.

## 2023-10-05 NOTE — PROGRESS NOTES
[address]   Progress Note and Procedure Note      [facility] RECORD NUMBER:  [id number]  AGE: 59 y.o. GENDER: male  : 1965  EPISODE DATE:  10/5/2023    Subjective:     Chief Complaint   Patient presents with    Wound Check     Patient presents today for recheck right foot. 23-Derek Perez  Right great toe amputation    HISTORY of PRESENT ILLNESS HPI     Patria Hodgkin is a 59 y.o. male who presents today for wound/ulcer evaluation.    History of Wound Context: right foot wound follow up/eval and treat    Ulcer Identification:  Ulcer Type: diabetic  Contributing Factors: diabetes, chronic pressure, and shear force    Wound: Surgical incision        PAST MEDICAL HISTORY        Diagnosis Date    Acalculous cholecystitis 2015    LIAN (acute kidney injury) ([address]) 3/21/2019    Allergic rhinitis, cause unspecified     Atrial fibrillation, chronic (HCC)     sees [facility] cardiology    Bacteremia due to methicillin resistant Staphylococcus aureus 10/27/2021    Bone spur     bilateral elbows    CAD (coronary artery disease)     Charcot's joint of right foot     Chicken pox     Closed supracondylar fracture of elbow     fall    Diabetic ulcer of right midfoot associated with type 2 diabetes mellitus, with fat layer exposed ([address]) 2020    Diabetic ulcer of right midfoot associated with type 2 diabetes mellitus, with fat layer exposed ([address]) 2020    History of MRSA infection     scalp/facial and on back    HTN (hypertension)     Hyperlipidemia     Hypoglycemic encephalopathy     Impotence of organic origin     MDRO (multiple drug resistant organisms) resistance     MRSA (methicillin resistant staph aureus) culture positive 2021    MRSA LT HAND    Other dyspnea and respiratory abnormality     Other specified erythematous condition(365.89)     Permanent atrial fibrillation ([address]) 2021    Personal history of other infectious and parasitic disease

## 2023-10-19 ENCOUNTER — HOSPITAL ENCOUNTER (OUTPATIENT)
Dept: WOUND CARE | Age: 58
Discharge: HOME | End: 2023-10-19
Payer: MEDICARE

## 2023-10-19 VITALS
HEART RATE: 64 BPM | WEIGHT: 228 LBS | RESPIRATION RATE: 18 BRPM | TEMPERATURE: 97.5 F | HEIGHT: 70 IN | DIASTOLIC BLOOD PRESSURE: 89 MMHG | BODY MASS INDEX: 32.64 KG/M2 | SYSTOLIC BLOOD PRESSURE: 154 MMHG

## 2023-10-19 DIAGNOSIS — S98.111A AMPUTATED GREAT TOE OF RIGHT FOOT (HCC): Primary | ICD-10-CM

## 2023-10-19 PROCEDURE — 99212 OFFICE O/P EST SF 10 MIN: CPT

## 2023-10-19 PROCEDURE — 99212 OFFICE O/P EST SF 10 MIN: CPT | Performed by: NURSE PRACTITIONER

## 2023-10-19 NOTE — PROGRESS NOTES
hours:    * Increase in Pain  * Temperature over 101  * Increase in drainage from your wound  * Drainage with a foul odor  * Bleeding  * Increase in swelling  * Need for compression bandage changes due to slippage, breakthrough drainage. If you need medical attention outside of the business hours of the [address] [place] [address] please contact your PCP or go to the nearest emergency room.           Electronically signed by NOMI Shay CNP on 10/19/2023 at 3:24 PM

## 2023-10-19 NOTE — PATIENT INSTRUCTIONS
[address] and Hyperbaric Oxygen Therapy   Physician Orders and Discharge Instructions  1830 [facility],[address] [place] [address], [address]  Telephone: [phone]    NAME:  Reinhold Simmonds  YOB: 1965  MEDICAL RECORD NUMBER:  [id number]  DATE:  10/19/2023    Discharge condition: Stable    Discharge to: Home    Left via:Private automobile    Accompanied by:  self    ECF/HHA:     Put cotton balls in the front of the shoe to help pad and keep the foot from moving in the shoe    Diabetic Foot Care    Diabetes can lead to many different types of foot complications, including athlete's foot (a fungal infection), calluses, bunions and other foot deformities, or ulcers that can range from a surface wound to a deep infection. You will need to check your feet twice daily and give them special care and attention. 1) Wash with lukewarm water and a mild soap. Dry well between your toes. Do not soak your feet unless instructed to do so by a physician. Moisturize your feet with a good thick cream like Eucerin Cream, Aquaphor or Cocoa Butter (in a jar) at least twice daily. Do not apply moisturizer between toes. 2)  Protect your feet and never go barefoot. Wear slippers around the house. Treat even minor wounds and skin cracks as an urgent matter when you have diabetes. Remember early treatment is essential to avoid more advanced problems. 3) Check your feet daily or have someone else check them if your eyesight is limited. If you live alone try using a handheld mirror. Watch for a red spot that persists or callus formation. 4)  Look and feel inside your shoes before putting them on. Inspect the soles for nails or screws. 5)  If you form callus or thickened skin on your feet use Flexitol Heel Balm once daily alternating with your regular moisturizer. Dena Both is available at [facility] ($12) and other pharmacies.     6) If you

## 2023-10-19 NOTE — DISCHARGE INSTRUCTIONS
[address] and Hyperbaric Oxygen Therapy   Physician Orders and Discharge Instructions  53[address] [facility],[address] [place] [address], [facility]  Telephone: [phone]    NAME:  Rob Santos  YOB: 1965  MEDICAL RECORD NUMBER:  [id number]  DATE:  10/19/2023    Discharge condition: Stable    Discharge to: Home    Left via:Private automobile    Accompanied by:  self    ECF/HHA:     Put cotton balls in the front of the shoe to help pad and keep the foot from moving in the shoe    Diabetic Foot Care    Diabetes can lead to many different types of foot complications, including athlete's foot (a fungal infection), calluses, bunions and other foot deformities, or ulcers that can range from a surface wound to a deep infection. You will need to check your feet twice daily and give them special care and attention. 1) Wash with lukewarm water and a mild soap. Dry well between your toes. Do not soak your feet unless instructed to do so by a physician. Moisturize your feet with a good thick cream like Eucerin Cream, Aquaphor or Cocoa Butter (in a jar) at least twice daily. Do not apply moisturizer between toes. 2)  Protect your feet and never go barefoot. Wear slippers around the house. Treat even minor wounds and skin cracks as an urgent matter when you have diabetes. Remember early treatment is essential to avoid more advanced problems. 3) Check your feet daily or have someone else check them if your eyesight is limited. If you live alone try using a handheld mirror. Watch for a red spot that persists or callus formation. 4)  Look and feel inside your shoes before putting them on. Inspect the soles for nails or screws. 5)  If you form callus or thickened skin on your feet use Flexitol Heel Balm once daily alternating with your regular moisturizer. Juana Karuna is available at [facility] ($12) and other pharmacies.     6) If you

## 2024-02-02 DIAGNOSIS — I25.10 CAD IN NATIVE ARTERY: ICD-10-CM

## 2024-02-02 DIAGNOSIS — E11.42 TYPE 2 DIABETES MELLITUS WITH DIABETIC POLYNEUROPATHY, WITH LONG-TERM CURRENT USE OF INSULIN (HCC): Chronic | ICD-10-CM

## 2024-02-02 DIAGNOSIS — N18.30 STAGE 3 CHRONIC KIDNEY DISEASE, UNSPECIFIED WHETHER STAGE 3A OR 3B CKD (HCC): ICD-10-CM

## 2024-02-02 DIAGNOSIS — Z79.4 TYPE 2 DIABETES MELLITUS WITH DIABETIC POLYNEUROPATHY, WITH LONG-TERM CURRENT USE OF INSULIN (HCC): Chronic | ICD-10-CM

## 2024-02-02 LAB
ALBUMIN SERPL-MCNC: 4.5 G/DL (ref 3.5–5.2)
ALP SERPL-CCNC: 91 U/L (ref 40–130)
ALT SERPL-CCNC: 21 U/L (ref 5–41)
ANION GAP SERPL CALCULATED.3IONS-SCNC: 13 MMOL/L (ref 7–19)
AST SERPL-CCNC: 15 U/L (ref 5–40)
BASOPHILS # BLD: 0 K/UL (ref 0–0.2)
BASOPHILS NFR BLD: 0.6 % (ref 0–1)
BILIRUB SERPL-MCNC: 0.6 MG/DL (ref 0.2–1.2)
BUN SERPL-MCNC: 13 MG/DL (ref 8–23)
CALCIUM SERPL-MCNC: 9.8 MG/DL (ref 8.8–10.2)
CHLORIDE SERPL-SCNC: 100 MMOL/L (ref 98–111)
CHOLEST SERPL-MCNC: 112 MG/DL (ref 160–199)
CO2 SERPL-SCNC: 27 MMOL/L (ref 22–29)
CREAT SERPL-MCNC: 1 MG/DL (ref 0.5–1.2)
EOSINOPHIL # BLD: 0.1 K/UL (ref 0–0.6)
EOSINOPHIL NFR BLD: 2.1 % (ref 0–5)
ERYTHROCYTE [DISTWIDTH] IN BLOOD BY AUTOMATED COUNT: 12.8 % (ref 11.5–14.5)
GLUCOSE SERPL-MCNC: 201 MG/DL (ref 74–109)
HBA1C MFR BLD: 8.4 % (ref 4–6)
HCT VFR BLD AUTO: 40.6 % (ref 42–52)
HDLC SERPL-MCNC: 44 MG/DL (ref 55–121)
HGB BLD-MCNC: 13.4 G/DL (ref 14–18)
IMM GRANULOCYTES # BLD: 0 K/UL
LDLC SERPL CALC-MCNC: 51 MG/DL
LYMPHOCYTES # BLD: 1.2 K/UL (ref 1.1–4.5)
LYMPHOCYTES NFR BLD: 17.5 % (ref 20–40)
MCH RBC QN AUTO: 31.8 PG (ref 27–31)
MCHC RBC AUTO-ENTMCNC: 33 G/DL (ref 33–37)
MCV RBC AUTO: 96.4 FL (ref 80–94)
MONOCYTES # BLD: 0.5 K/UL (ref 0–0.9)
MONOCYTES NFR BLD: 8.2 % (ref 0–10)
NEUTROPHILS # BLD: 4.7 K/UL (ref 1.5–7.5)
NEUTS SEG NFR BLD: 71.4 % (ref 50–65)
PLATELET # BLD AUTO: 255 K/UL (ref 130–400)
PMV BLD AUTO: 11.4 FL (ref 9.4–12.4)
POTASSIUM SERPL-SCNC: 4.1 MMOL/L (ref 3.5–5)
PROT SERPL-MCNC: 7.2 G/DL (ref 6.6–8.7)
RBC # BLD AUTO: 4.21 M/UL (ref 4.7–6.1)
SODIUM SERPL-SCNC: 140 MMOL/L (ref 136–145)
TRIGL SERPL-MCNC: 84 MG/DL (ref 0–149)
WBC # BLD AUTO: 6.6 K/UL (ref 4.8–10.8)

## 2024-02-13 ENCOUNTER — OFFICE VISIT (OUTPATIENT)
Dept: PRIMARY CARE CLINIC | Age: 59
End: 2024-02-13
Payer: MEDICARE

## 2024-02-13 VITALS
HEIGHT: 70 IN | OXYGEN SATURATION: 97 % | WEIGHT: 241 LBS | RESPIRATION RATE: 18 BRPM | BODY MASS INDEX: 34.5 KG/M2 | SYSTOLIC BLOOD PRESSURE: 138 MMHG | DIASTOLIC BLOOD PRESSURE: 86 MMHG | HEART RATE: 69 BPM | TEMPERATURE: 97.9 F

## 2024-02-13 DIAGNOSIS — E78.2 MIXED HYPERLIPIDEMIA: ICD-10-CM

## 2024-02-13 DIAGNOSIS — N18.30 STAGE 3 CHRONIC KIDNEY DISEASE, UNSPECIFIED WHETHER STAGE 3A OR 3B CKD (HCC): ICD-10-CM

## 2024-02-13 DIAGNOSIS — L97.514 ULCER OF GREAT TOE, RIGHT, WITH NECROSIS OF BONE (HCC): ICD-10-CM

## 2024-02-13 DIAGNOSIS — Z91.199 PERSONAL HISTORY OF NONCOMPLIANCE WITH MEDICAL TREATMENT AND REGIMEN: ICD-10-CM

## 2024-02-13 DIAGNOSIS — Z79.4 TYPE 2 DIABETES MELLITUS WITH DIABETIC POLYNEUROPATHY, WITH LONG-TERM CURRENT USE OF INSULIN (HCC): Primary | Chronic | ICD-10-CM

## 2024-02-13 DIAGNOSIS — Z86.79 HISTORY OF ATRIAL FIBRILLATION: ICD-10-CM

## 2024-02-13 DIAGNOSIS — E11.3593 PROLIFERATIVE DIABETIC RETINOPATHY OF BOTH EYES WITHOUT MACULAR EDEMA ASSOCIATED WITH TYPE 2 DIABETES MELLITUS (HCC): ICD-10-CM

## 2024-02-13 DIAGNOSIS — I50.32 CHRONIC HEART FAILURE WITH PRESERVED EJECTION FRACTION (HCC): ICD-10-CM

## 2024-02-13 DIAGNOSIS — N18.32 STAGE 3B CHRONIC KIDNEY DISEASE (HCC): ICD-10-CM

## 2024-02-13 DIAGNOSIS — E11.42 TYPE 2 DIABETES MELLITUS WITH DIABETIC POLYNEUROPATHY, WITH LONG-TERM CURRENT USE OF INSULIN (HCC): Primary | Chronic | ICD-10-CM

## 2024-02-13 DIAGNOSIS — I10 PRIMARY HYPERTENSION: ICD-10-CM

## 2024-02-13 DIAGNOSIS — I25.10 CAD IN NATIVE ARTERY: ICD-10-CM

## 2024-02-13 PROCEDURE — 3052F HG A1C>EQUAL 8.0%<EQUAL 9.0%: CPT | Performed by: INTERNAL MEDICINE

## 2024-02-13 PROCEDURE — 2022F DILAT RTA XM EVC RTNOPTHY: CPT | Performed by: INTERNAL MEDICINE

## 2024-02-13 PROCEDURE — G8417 CALC BMI ABV UP PARAM F/U: HCPCS | Performed by: INTERNAL MEDICINE

## 2024-02-13 PROCEDURE — 1036F TOBACCO NON-USER: CPT | Performed by: INTERNAL MEDICINE

## 2024-02-13 PROCEDURE — G8484 FLU IMMUNIZE NO ADMIN: HCPCS | Performed by: INTERNAL MEDICINE

## 2024-02-13 PROCEDURE — 3017F COLORECTAL CA SCREEN DOC REV: CPT | Performed by: INTERNAL MEDICINE

## 2024-02-13 PROCEDURE — G8427 DOCREV CUR MEDS BY ELIG CLIN: HCPCS | Performed by: INTERNAL MEDICINE

## 2024-02-13 PROCEDURE — 3079F DIAST BP 80-89 MM HG: CPT | Performed by: INTERNAL MEDICINE

## 2024-02-13 PROCEDURE — 99214 OFFICE O/P EST MOD 30 MIN: CPT | Performed by: INTERNAL MEDICINE

## 2024-02-13 PROCEDURE — 3075F SYST BP GE 130 - 139MM HG: CPT | Performed by: INTERNAL MEDICINE

## 2024-02-13 RX ORDER — INSULIN LISPRO 100 [IU]/ML
20 INJECTION, SOLUTION INTRAVENOUS; SUBCUTANEOUS
Qty: 18 ML | Refills: 2 | Status: CANCELLED | OUTPATIENT
Start: 2024-02-13 | End: 2024-05-13

## 2024-02-13 NOTE — PROGRESS NOTES
Thiago Goldstein ( 1965) is a 58 y.o. male,  Established , here for evaluation of the following chief complaint(s).  Follow-up (4 month follow up)      Patient was encouraged and advised to be compliant with all  medications leads an active lifestyle and promote maintaining a healthy weight, encouraged not to use cigarettes, laboratory results discussed and reviewed with patient's during this visit   ASSESSMENT/PLAN:  Problem List          Circulatory    HTN (hypertension)      Well-controlled, continue current medications         Relevant Medications    furosemide (LASIX) 40 MG tablet    cloNIDine (CATAPRES) 0.1 MG tablet    metoprolol tartrate (LOPRESSOR) 50 MG tablet    Chronic heart failure with preserved ejection fraction (HCC)      Borderline controlled, continue current medications         Relevant Medications    ASPIRIN LOW DOSE 81 MG EC tablet    atorvastatin (LIPITOR) 20 MG tablet    furosemide (LASIX) 40 MG tablet    cloNIDine (CATAPRES) 0.1 MG tablet    losartan (COZAAR) 100 MG tablet    metoprolol tartrate (LOPRESSOR) 50 MG tablet    CAD in native artery      Asymptomatic, continue current medications         Relevant Medications    ASPIRIN LOW DOSE 81 MG EC tablet    atorvastatin (LIPITOR) 20 MG tablet    furosemide (LASIX) 40 MG tablet    cloNIDine (CATAPRES) 0.1 MG tablet    losartan (COZAAR) 100 MG tablet    metoprolol tartrate (LOPRESSOR) 50 MG tablet       Endocrine    Type 2 diabetes mellitus with diabetic polyneuropathy, with long-term current use of insulin ([facility]) - Primary (Chronic)      Unclear control, Unclear control patient does not bring blood glucose logs he did bring some of the morning sugars are currently well-controlled but the evenings are very high patient does not seem to cover with Premeal sugars he was told that his Toujeo will be switched to Soliqua and he only eats twice a day but doubt whether he is using Premeal as the sugars are extremely elevated         Relevant

## 2024-02-13 NOTE — PATIENT INSTRUCTIONS
Patient Education        RSV (Respiratory Syncytial Virus) Vaccine: What You Need to Know  Why get vaccinated?  RSV vaccine can prevent lower respiratory tract disease caused by respiratory syncytial virus (RSV). RSV is a common respiratory virus that usually causes mild, cold-like symptoms.  RSV can cause illness in people of all ages but may be especially serious for infants and older adults.  Infants up to 12 months of age (especially those 6 months and younger) and children who were born prematurely, or who have chronic lung or heart disease or a weakened immune system, are at increased risk of severe RSV disease.  Adults at highest risk for severe RSV disease include older adults, adults with chronic medical conditions such as heart or lung disease, weakened immune systems, or certain other underlying medical conditions, or who live in nursing homes or long-term care facilities.  RSV spreads through direct contact with the virus, such as droplets from another person’s cough or sneeze contacting your eyes, nose, or mouth. It can also be spread by touching a surface that has the virus on it, like a doorknob, and then touching your face before washing your hands.  Symptoms of RSV infection may include runny nose, decrease in appetite, coughing, sneezing, fever, or wheezing. In very young infants, symptoms of RSV may also include irritability (fussiness), decreased activity, or apnea (pauses in breathing for more than 10 seconds).  Most people recover in a week or two, but RSV can be serious, resulting in shortness of breath and low oxygen levels. RSV can cause bronchiolitis (inflammation of the small airways in the lung) and pneumonia (infection of the lungs). RSV can sometimes lead to worsening of other medical conditions such as asthma, chronic obstructive pulmonary disease (a chronic disease of the lungs that makes it hard to breathe), or congestive heart failure (when the heart can’t pump enough blood and

## 2024-02-13 NOTE — ASSESSMENT & PLAN NOTE
Unclear control, Unclear control patient does not bring blood glucose logs he did bring some of the morning sugars are currently well-controlled but the evenings are very high patient does not seem to cover with Premeal sugars he was told that his Toujeo will be switched to Soliqua and he only eats twice a day but doubt whether he is using Premeal as the sugars are extremely elevated

## 2024-02-14 ENCOUNTER — TELEPHONE (OUTPATIENT)
Dept: PRIMARY CARE CLINIC | Age: 59
End: 2024-02-14

## 2024-02-14 DIAGNOSIS — E11.42 TYPE 2 DIABETES MELLITUS WITH DIABETIC POLYNEUROPATHY, WITH LONG-TERM CURRENT USE OF INSULIN (HCC): Primary | Chronic | ICD-10-CM

## 2024-02-14 DIAGNOSIS — Z79.4 TYPE 2 DIABETES MELLITUS WITH DIABETIC POLYNEUROPATHY, WITH LONG-TERM CURRENT USE OF INSULIN (HCC): Primary | Chronic | ICD-10-CM

## 2024-02-14 RX ORDER — INSULIN DEGLUDEC INJECTION 100 U/ML
32 INJECTION, SOLUTION SUBCUTANEOUS NIGHTLY
Qty: 3 ADJUSTABLE DOSE PRE-FILLED PEN SYRINGE | Refills: 5 | Status: SHIPPED | OUTPATIENT
Start: 2024-02-14

## 2024-02-14 NOTE — TELEPHONE ENCOUNTER
Patient called and left a voicemail reporting the pharmacy would not fill one of the medications sent in at yesterday's visit. Called [facility] and was informed they are not able to obtain this medication as their supplier does not offer it. Patient states he would not be comfortable getting medications from 2 different pharmacies and does not want to change pharmacies altogether. Please advise.

## 2024-02-14 NOTE — TELEPHONE ENCOUNTER
Patient is making it difficult for me to help him control his sugar, , I am exhausted in trying to deal with his sugars, he needs to adhere to current premeal insulin , if his sugars do not improve, he may need to find himself another provider who will let him do whatever he wants and not worry about his sugar      I confirmed with the pharmacust, Soliqua is not available in the whole area, will py him back on his old insulin

## 2024-02-19 NOTE — TELEPHONE ENCOUNTER
Left message on voicemail informing patient of the medication change and that [facility] contacted me back regarding his Dexcom and they have shipped supplies out to him.

## 2024-03-07 DIAGNOSIS — N40.1 BENIGN PROSTATIC HYPERPLASIA WITH INCOMPLETE BLADDER EMPTYING: ICD-10-CM

## 2024-03-07 DIAGNOSIS — R39.14 BENIGN PROSTATIC HYPERPLASIA WITH INCOMPLETE BLADDER EMPTYING: ICD-10-CM

## 2024-03-07 LAB — PSA SERPL-MCNC: 0.13 NG/ML (ref 0–4)

## 2024-03-27 ENCOUNTER — OFFICE VISIT (OUTPATIENT)
Dept: UROLOGY | Age: 59
End: 2024-03-27
Payer: MEDICARE

## 2024-03-27 VITALS — HEIGHT: 70 IN | TEMPERATURE: 97.5 F | BODY MASS INDEX: 35.45 KG/M2 | WEIGHT: 247.6 LBS

## 2024-03-27 DIAGNOSIS — R39.14 BENIGN PROSTATIC HYPERPLASIA WITH INCOMPLETE BLADDER EMPTYING: Primary | ICD-10-CM

## 2024-03-27 DIAGNOSIS — N40.1 BENIGN PROSTATIC HYPERPLASIA WITH INCOMPLETE BLADDER EMPTYING: Primary | ICD-10-CM

## 2024-03-27 LAB
APPEARANCE FLUID: CLEAR
BILIRUBIN, POC: NORMAL
BLOOD URINE, POC: NORMAL
CLARITY, POC: CLEAR
COLOR, POC: YELLOW
GLUCOSE URINE, POC: NORMAL
KETONES, POC: NORMAL
LEUKOCYTE EST, POC: NORMAL
NITRITE, POC: NORMAL
PH, POC: 5
POST VOID RESIDUAL (PVR): 162 ML
PROTEIN, POC: NORMAL
SPECIFIC GRAVITY, POC: 1.01
UROBILINOGEN, POC: 0.2

## 2024-03-27 PROCEDURE — 81002 URINALYSIS NONAUTO W/O SCOPE: CPT | Performed by: NURSE PRACTITIONER

## 2024-03-27 PROCEDURE — 99213 OFFICE O/P EST LOW 20 MIN: CPT | Performed by: NURSE PRACTITIONER

## 2024-03-27 PROCEDURE — G8427 DOCREV CUR MEDS BY ELIG CLIN: HCPCS | Performed by: NURSE PRACTITIONER

## 2024-03-27 PROCEDURE — 3017F COLORECTAL CA SCREEN DOC REV: CPT | Performed by: NURSE PRACTITIONER

## 2024-03-27 PROCEDURE — 1036F TOBACCO NON-USER: CPT | Performed by: NURSE PRACTITIONER

## 2024-03-27 PROCEDURE — 51798 US URINE CAPACITY MEASURE: CPT | Performed by: NURSE PRACTITIONER

## 2024-03-27 PROCEDURE — G8484 FLU IMMUNIZE NO ADMIN: HCPCS | Performed by: NURSE PRACTITIONER

## 2024-03-27 PROCEDURE — G8417 CALC BMI ABV UP PARAM F/U: HCPCS | Performed by: NURSE PRACTITIONER

## 2024-03-27 RX ORDER — TAMSULOSIN HYDROCHLORIDE 0.4 MG/1
0.4 CAPSULE ORAL DAILY
Qty: 90 CAPSULE | Refills: 3 | Status: SHIPPED | OUTPATIENT
Start: 2024-03-27

## 2024-03-27 RX ORDER — DAPAGLIFLOZIN 10 MG/1
TABLET, FILM COATED ORAL
COMMUNITY
Start: 2024-02-24

## 2024-03-27 RX ORDER — DUTASTERIDE 0.5 MG/1
0.5 CAPSULE, LIQUID FILLED ORAL DAILY
Qty: 90 CAPSULE | Refills: 3 | Status: SHIPPED | OUTPATIENT
Start: 2024-03-27

## 2024-03-27 ASSESSMENT — ENCOUNTER SYMPTOMS
VOMITING: 0
ABDOMINAL DISTENTION: 0
ABDOMINAL PAIN: 0
NAUSEA: 0

## 2024-03-27 NOTE — PROGRESS NOTES
(4/3/2023)    Overall Financial Resource Strain (CARDIA)     Difficulty of Paying Living Expenses: Not hard at all   Transportation Needs: Unknown (4/3/2023)    PRAPARE - Transportation     Lack of Transportation (Non-Medical): No   Physical Activity: Inactive (7/3/2023)    Exercise Vital Sign     Days of Exercise per Week: 0 days     Minutes of Exercise per Session: 0 min   Stress: Stress Concern Present (5/23/2019)    [employer] [place] [employer] - Occupational Stress Questionnaire     Feeling of Stress : To some extent   Housing Stability: Unknown (4/3/2023)    Housing Stability Vital Sign     Unstable Housing in the Last Year: No       Family History   Problem Relation Age of Onset    Stroke Maternal Grandmother     Cancer Maternal Grandmother     Stroke Paternal Grandmother     Diabetes Paternal Grandmother     Cancer Father     Heart Disease Father     Lung Cancer Father     Asthma Paternal Grandfather     Heart Disease Paternal Grandfather     Heart Disease Maternal Grandfather     Colon Cancer Neg Hx     Colon Polyps Neg Hx     Esophageal Cancer Neg Hx     Liver Cancer Neg Hx     Liver Disease Neg Hx     Rectal Cancer Neg Hx     Stomach Cancer Neg Hx        REVIEW OF SYSTEMS:  Review of Systems   Constitutional:  Negative for chills and fever.   Gastrointestinal:  Negative for abdominal distention, abdominal pain, nausea and vomiting.   Genitourinary:  Positive for frequency and urgency. Negative for difficulty urinating, dysuria, flank pain and hematuria.   Neurological:  Positive for numbness.   Psychiatric/Behavioral:  Negative for agitation and confusion.        PHYSICAL EXAM:  Temp 97.5 °F (36.4 °C) (Temporal)   Ht 1.778 m (5' 10\")   Wt 112.3 kg (247 lb 9.6 oz)   BMI 35.53 kg/m²   Physical Exam  Vitals and nursing note reviewed.   Constitutional:       General: He is not in acute distress.     Appearance: Normal appearance. He is not ill-appearing.   Pulmonary:      Effort: Pulmonary  - per PMD, patient previously saw cardiology for murmur. Echo found small PFO, no need for follow up.  - EKG pending

## 2024-04-04 DIAGNOSIS — I10 PRIMARY HYPERTENSION: ICD-10-CM

## 2024-04-04 DIAGNOSIS — I50.32 CHRONIC HEART FAILURE WITH PRESERVED EJECTION FRACTION (HCC): ICD-10-CM

## 2024-04-04 DIAGNOSIS — I25.10 CAD IN NATIVE ARTERY: ICD-10-CM

## 2024-04-04 RX ORDER — METOPROLOL TARTRATE 50 MG/1
TABLET, FILM COATED ORAL
Qty: 180 TABLET | Refills: 1 | Status: SHIPPED | OUTPATIENT
Start: 2024-04-04

## 2024-05-14 ENCOUNTER — TELEPHONE (OUTPATIENT)
Dept: PRIMARY CARE CLINIC | Age: 59
End: 2024-05-14

## 2024-05-14 NOTE — TELEPHONE ENCOUNTER
Called and requested chart notes for CGM use to send additional/continued supplies. Note requested to be addended on 02/13/2024. Sent request for an addendum to Dr Snowden. Updated note faxed to [phone].

## 2024-05-29 ENCOUNTER — TELEPHONE (OUTPATIENT)
Dept: CARDIOLOGY CLINIC | Age: 59
End: 2024-05-29

## 2024-05-29 NOTE — TELEPHONE ENCOUNTER
Called to get patient r/s and was unable to speak to them or leave a VM due to a busy signal when I call. 5/29/24 AH

## 2024-06-17 PROBLEM — G62.9 PERIPHERAL NEUROPATHY: Status: ACTIVE | Noted: 2021-04-27

## 2024-06-17 NOTE — PROGRESS NOTES
Heart sounds: Normal heart sounds, S1 normal and S2 normal. No murmur heard.  Pulmonary:      Effort: Pulmonary effort is normal.      Breath sounds: Normal breath sounds. No wheezing or rales.   Abdominal:      General: Bowel sounds are normal. There is no distension.      Palpations: Abdomen is soft. There is no mass.      Tenderness: There is no abdominal tenderness. There is no rebound.   Musculoskeletal:         General: No tenderness. Normal range of motion.      Cervical back: Normal range of motion and neck supple.      Right lower leg: No edema.      Left lower leg: No edema.      Comments: L middle finger amputation  Toe amputattions   Feet:      Right foot:      Skin integrity: Skin breakdown, erythema, dry skin and fissure present.      Toenail Condition: Right toenails are abnormally thick.      Left foot:      Skin integrity: Skin breakdown, dry skin and fissure present.      Toenail Condition: Left toenails are abnormally thick.      Comments: PeelR big toe amputation   Lymphadenopathy:      Cervical: No cervical adenopathy.   Skin:     General: Skin is warm and dry.      Findings: No rash.          Neurological:      General: No focal deficit present.      Mental Status: He is alert and oriented to person, place, and time. Mental status is at baseline.      Cranial Nerves: No cranial nerve deficit.      Motor: No weakness.      Coordination: Coordination is intact.      Deep Tendon Reflexes: Reflexes normal.   Psychiatric:         Mood and Affect: Affect normal. Mood is not depressed.         Speech: Speech normal. Speech is not delayed.         Behavior: Behavior normal. Behavior is not slowed or withdrawn. Behavior is cooperative.         Thought Content: Thought content normal.         Judgment: Judgment normal.       @I continue to be the focal point for all needed health care services and/or with medical care services that are part of ongoing care related to Thiago Goldstein    (Time

## 2024-06-18 ENCOUNTER — OFFICE VISIT (OUTPATIENT)
Dept: PRIMARY CARE CLINIC | Age: 59
End: 2024-06-18
Payer: MEDICARE

## 2024-06-18 VITALS
SYSTOLIC BLOOD PRESSURE: 120 MMHG | DIASTOLIC BLOOD PRESSURE: 74 MMHG | BODY MASS INDEX: 35.36 KG/M2 | OXYGEN SATURATION: 98 % | WEIGHT: 247 LBS | RESPIRATION RATE: 18 BRPM | TEMPERATURE: 98 F | HEIGHT: 70 IN | HEART RATE: 67 BPM

## 2024-06-18 DIAGNOSIS — E11.610 CHARCOT FOOT DUE TO DIABETES MELLITUS (HCC): ICD-10-CM

## 2024-06-18 DIAGNOSIS — N18.32 STAGE 3B CHRONIC KIDNEY DISEASE (HCC): ICD-10-CM

## 2024-06-18 DIAGNOSIS — G47.33 OBSTRUCTIVE SLEEP APNEA: ICD-10-CM

## 2024-06-18 DIAGNOSIS — I50.32 CHRONIC HEART FAILURE WITH PRESERVED EJECTION FRACTION (HCC): ICD-10-CM

## 2024-06-18 DIAGNOSIS — Z79.4 TYPE 2 DIABETES MELLITUS WITH DIABETIC POLYNEUROPATHY, WITH LONG-TERM CURRENT USE OF INSULIN (HCC): Primary | Chronic | ICD-10-CM

## 2024-06-18 DIAGNOSIS — I10 PRIMARY HYPERTENSION: ICD-10-CM

## 2024-06-18 DIAGNOSIS — E66.01 SEVERE OBESITY (BMI 35.0-39.9) WITH COMORBIDITY (HCC): ICD-10-CM

## 2024-06-18 DIAGNOSIS — E78.2 MIXED HYPERLIPIDEMIA: ICD-10-CM

## 2024-06-18 DIAGNOSIS — E11.3593 PROLIFERATIVE DIABETIC RETINOPATHY OF BOTH EYES WITHOUT MACULAR EDEMA ASSOCIATED WITH TYPE 2 DIABETES MELLITUS (HCC): ICD-10-CM

## 2024-06-18 DIAGNOSIS — E66.01 CLASS 2 SEVERE OBESITY DUE TO EXCESS CALORIES WITH SERIOUS COMORBIDITY AND BODY MASS INDEX (BMI) OF 35.0 TO 35.9 IN ADULT (HCC): ICD-10-CM

## 2024-06-18 DIAGNOSIS — E11.42 TYPE 2 DIABETES MELLITUS WITH DIABETIC POLYNEUROPATHY, WITH LONG-TERM CURRENT USE OF INSULIN (HCC): Primary | Chronic | ICD-10-CM

## 2024-06-18 DIAGNOSIS — G63 POLYNEUROPATHY ASSOCIATED WITH UNDERLYING DISEASE (HCC): ICD-10-CM

## 2024-06-18 LAB — HBA1C MFR BLD: 8.9 %

## 2024-06-18 PROCEDURE — 3074F SYST BP LT 130 MM HG: CPT | Performed by: INTERNAL MEDICINE

## 2024-06-18 PROCEDURE — 1036F TOBACCO NON-USER: CPT | Performed by: INTERNAL MEDICINE

## 2024-06-18 PROCEDURE — G2211 COMPLEX E/M VISIT ADD ON: HCPCS | Performed by: INTERNAL MEDICINE

## 2024-06-18 PROCEDURE — 3052F HG A1C>EQUAL 8.0%<EQUAL 9.0%: CPT | Performed by: INTERNAL MEDICINE

## 2024-06-18 PROCEDURE — 99214 OFFICE O/P EST MOD 30 MIN: CPT | Performed by: INTERNAL MEDICINE

## 2024-06-18 PROCEDURE — 3078F DIAST BP <80 MM HG: CPT | Performed by: INTERNAL MEDICINE

## 2024-06-18 PROCEDURE — 3017F COLORECTAL CA SCREEN DOC REV: CPT | Performed by: INTERNAL MEDICINE

## 2024-06-18 PROCEDURE — 2022F DILAT RTA XM EVC RTNOPTHY: CPT | Performed by: INTERNAL MEDICINE

## 2024-06-18 PROCEDURE — G8417 CALC BMI ABV UP PARAM F/U: HCPCS | Performed by: INTERNAL MEDICINE

## 2024-06-18 PROCEDURE — G8427 DOCREV CUR MEDS BY ELIG CLIN: HCPCS | Performed by: INTERNAL MEDICINE

## 2024-06-18 PROCEDURE — 83036 HEMOGLOBIN GLYCOSYLATED A1C: CPT | Performed by: INTERNAL MEDICINE

## 2024-06-18 SDOH — ECONOMIC STABILITY: INCOME INSECURITY: HOW HARD IS IT FOR YOU TO PAY FOR THE VERY BASICS LIKE FOOD, HOUSING, MEDICAL CARE, AND HEATING?: NOT HARD AT ALL

## 2024-06-18 SDOH — ECONOMIC STABILITY: FOOD INSECURITY: WITHIN THE PAST 12 MONTHS, THE FOOD YOU BOUGHT JUST DIDN'T LAST AND YOU DIDN'T HAVE MONEY TO GET MORE.: NEVER TRUE

## 2024-06-18 SDOH — ECONOMIC STABILITY: FOOD INSECURITY: WITHIN THE PAST 12 MONTHS, YOU WORRIED THAT YOUR FOOD WOULD RUN OUT BEFORE YOU GOT MONEY TO BUY MORE.: NEVER TRUE

## 2024-06-18 ASSESSMENT — ENCOUNTER SYMPTOMS
SORE THROAT: 0
BLOOD IN STOOL: 0
WHEEZING: 0
SHORTNESS OF BREATH: 0
CHEST TIGHTNESS: 0
DIARRHEA: 0
COUGH: 0
ABDOMINAL PAIN: 0
COLOR CHANGE: 0
STRIDOR: 0
CONSTIPATION: 1
BACK PAIN: 0
TROUBLE SWALLOWING: 0

## 2024-06-18 NOTE — ASSESSMENT & PLAN NOTE
Uncontrolled, continue current medications  Patient has been counseled several times about adherence to diet and medications,

## 2024-07-16 ENCOUNTER — OFFICE VISIT (OUTPATIENT)
Dept: CARDIOLOGY CLINIC | Age: 59
End: 2024-07-16
Payer: MEDICARE

## 2024-07-16 VITALS
HEIGHT: 70 IN | SYSTOLIC BLOOD PRESSURE: 134 MMHG | HEART RATE: 69 BPM | WEIGHT: 244 LBS | DIASTOLIC BLOOD PRESSURE: 70 MMHG | BODY MASS INDEX: 34.93 KG/M2

## 2024-07-16 DIAGNOSIS — I48.19 PERSISTENT ATRIAL FIBRILLATION (HCC): Primary | ICD-10-CM

## 2024-07-16 PROCEDURE — 1036F TOBACCO NON-USER: CPT | Performed by: INTERNAL MEDICINE

## 2024-07-16 PROCEDURE — 3075F SYST BP GE 130 - 139MM HG: CPT | Performed by: INTERNAL MEDICINE

## 2024-07-16 PROCEDURE — 3078F DIAST BP <80 MM HG: CPT | Performed by: INTERNAL MEDICINE

## 2024-07-16 PROCEDURE — G8417 CALC BMI ABV UP PARAM F/U: HCPCS | Performed by: INTERNAL MEDICINE

## 2024-07-16 PROCEDURE — 93000 ELECTROCARDIOGRAM COMPLETE: CPT | Performed by: INTERNAL MEDICINE

## 2024-07-16 PROCEDURE — 99213 OFFICE O/P EST LOW 20 MIN: CPT | Performed by: INTERNAL MEDICINE

## 2024-07-16 PROCEDURE — 3017F COLORECTAL CA SCREEN DOC REV: CPT | Performed by: INTERNAL MEDICINE

## 2024-07-16 PROCEDURE — G8427 DOCREV CUR MEDS BY ELIG CLIN: HCPCS | Performed by: INTERNAL MEDICINE

## 2024-07-16 NOTE — PROGRESS NOTES
block         Assessment, Recommendations, & Plan:  58 y.o. male with atrial fibrillation with heart failure syndrome both under good control.  I will have him follow-up in the nurse practitioner clinic in the next year.          Disposition - RTC in 12 months or sooner if needed      Please do not hesitate to contact me for any questions or concerns.    Dr. Demetrius Watters  Electrophysiology and Cardiology  [facility] [place], Cardiology  [phone]

## 2024-07-18 DIAGNOSIS — E78.2 MIXED HYPERLIPIDEMIA DUE TO TYPE 2 DIABETES MELLITUS (HCC): ICD-10-CM

## 2024-07-18 DIAGNOSIS — E11.69 MIXED HYPERLIPIDEMIA DUE TO TYPE 2 DIABETES MELLITUS (HCC): ICD-10-CM

## 2024-07-18 RX ORDER — ATORVASTATIN CALCIUM 20 MG/1
TABLET, FILM COATED ORAL
Qty: 90 TABLET | Refills: 3 | Status: SHIPPED | OUTPATIENT
Start: 2024-07-18

## 2024-07-18 NOTE — TELEPHONE ENCOUNTER
Thiago Goldstein called to request a refill on his medication.      Last office visit : 6/18/2024   Next office visit : 10/21/2024     Requested Prescriptions     Pending Prescriptions Disp Refills    atorvastatin (LIPITOR) 20 MG tablet [Pharmacy Med Name: ATORVASTATIN CALCIUM 20MG TABLET] 90 tablet 3     Sig: TAKE ONE TABLET DAILY            Sadia Cobb LPN

## 2024-08-09 DIAGNOSIS — I25.10 CAD IN NATIVE ARTERY: ICD-10-CM

## 2024-08-09 DIAGNOSIS — N40.1 BENIGN PROSTATIC HYPERPLASIA WITH INCOMPLETE BLADDER EMPTYING: ICD-10-CM

## 2024-08-09 DIAGNOSIS — Z79.4 TYPE 2 DIABETES MELLITUS WITH DIABETIC POLYNEUROPATHY, WITH LONG-TERM CURRENT USE OF INSULIN (HCC): ICD-10-CM

## 2024-08-09 DIAGNOSIS — R39.14 BENIGN PROSTATIC HYPERPLASIA WITH INCOMPLETE BLADDER EMPTYING: ICD-10-CM

## 2024-08-09 DIAGNOSIS — I10 PRIMARY HYPERTENSION: ICD-10-CM

## 2024-08-09 DIAGNOSIS — E11.42 TYPE 2 DIABETES MELLITUS WITH DIABETIC POLYNEUROPATHY, WITH LONG-TERM CURRENT USE OF INSULIN (HCC): ICD-10-CM

## 2024-08-09 DIAGNOSIS — I50.32 CHRONIC HEART FAILURE WITH PRESERVED EJECTION FRACTION (HCC): ICD-10-CM

## 2024-08-09 RX ORDER — FUROSEMIDE 40 MG/1
TABLET ORAL
Qty: 90 TABLET | Refills: 0 | Status: SHIPPED | OUTPATIENT
Start: 2024-08-09

## 2024-08-09 RX ORDER — DUTASTERIDE 0.5 MG/1
CAPSULE, LIQUID FILLED ORAL
Qty: 90 CAPSULE | Refills: 3 | Status: SHIPPED | OUTPATIENT
Start: 2024-08-09

## 2024-08-09 RX ORDER — METOPROLOL TARTRATE 50 MG/1
TABLET, FILM COATED ORAL
Qty: 180 TABLET | Refills: 0 | Status: SHIPPED | OUTPATIENT
Start: 2024-08-09

## 2024-08-09 RX ORDER — CLONIDINE HYDROCHLORIDE 0.1 MG/1
TABLET ORAL
Qty: 180 TABLET | Refills: 0 | Status: SHIPPED | OUTPATIENT
Start: 2024-08-09

## 2024-08-09 NOTE — TELEPHONE ENCOUNTER
Thiago MICHEL Goldstein called to request a refill on his medication.      Last office visit : 6/18/2024   Next office visit : 10/21/2024     Requested Prescriptions     Pending Prescriptions Disp Refills    cloNIDine (CATAPRES) 0.1 MG tablet [Pharmacy Med Name: CLONIDINE HYDROCHLORIDE 0.1MG TABLET] 180 tablet 1     Sig: TAKE ONE TABLET TWO TIMES DAILY    furosemide (LASIX) 40 MG tablet [Pharmacy Med Name: FUROSEMIDE 40MG TABLET] 90 tablet 0     Sig: TAKE ONE TABLET DAILY    metFORMIN (GLUCOPHAGE) 1000 MG tablet [Pharmacy Med Name: METFORMIN HYDROCHLORIDE 1000MG TABLET] 180 tablet 0     Sig: TAKE ONE TABLET TWICE DAILY WITH MEALS    metoprolol tartrate (LOPRESSOR) 50 MG tablet [Pharmacy Med Name: METOPROLOL TARTRATE 50MG TABLET] 180 tablet 0     Sig: TAKE ONE TABLET TWICE DAILY            Sadia Cobb LPN

## 2024-10-09 DIAGNOSIS — E11.42 TYPE 2 DIABETES MELLITUS WITH DIABETIC POLYNEUROPATHY, WITH LONG-TERM CURRENT USE OF INSULIN (HCC): Chronic | ICD-10-CM

## 2024-10-09 DIAGNOSIS — Z79.4 TYPE 2 DIABETES MELLITUS WITH DIABETIC POLYNEUROPATHY, WITH LONG-TERM CURRENT USE OF INSULIN (HCC): Chronic | ICD-10-CM

## 2024-10-09 RX ORDER — EMPAGLIFLOZIN 25 MG/1
25 TABLET, FILM COATED ORAL DAILY
Qty: 30 TABLET | Refills: 0 | Status: SHIPPED | OUTPATIENT
Start: 2024-10-09

## 2024-10-09 NOTE — TELEPHONE ENCOUNTER
Thiago Goldstein called to request a refill on his medication.      Last office visit : 6/18/2024   Next office visit : 10/21/2024     Requested Prescriptions     Pending Prescriptions Disp Refills    JARDIANCE 25 MG tablet [Pharmacy Med Name: JARDIANCE 25MG TABLET] 90 tablet 1     Sig: TAKE ONE TABLET DAILY            Sadia Cobb LPN

## 2024-10-16 DIAGNOSIS — E11.42 TYPE 2 DIABETES MELLITUS WITH DIABETIC POLYNEUROPATHY, WITH LONG-TERM CURRENT USE OF INSULIN (HCC): Chronic | ICD-10-CM

## 2024-10-16 DIAGNOSIS — I10 PRIMARY HYPERTENSION: ICD-10-CM

## 2024-10-16 DIAGNOSIS — Z79.4 TYPE 2 DIABETES MELLITUS WITH DIABETIC POLYNEUROPATHY, WITH LONG-TERM CURRENT USE OF INSULIN (HCC): Chronic | ICD-10-CM

## 2024-10-16 DIAGNOSIS — I10 PRIMARY HYPERTENSION: Primary | ICD-10-CM

## 2024-10-16 DIAGNOSIS — E78.2 MIXED HYPERLIPIDEMIA: ICD-10-CM

## 2024-10-16 LAB
ALBUMIN SERPL-MCNC: 4.6 G/DL (ref 3.5–5.2)
ALP SERPL-CCNC: 93 U/L (ref 40–129)
ALT SERPL-CCNC: 19 U/L (ref 5–41)
ANION GAP SERPL CALCULATED.3IONS-SCNC: 17 MMOL/L (ref 7–19)
AST SERPL-CCNC: 16 U/L (ref 5–40)
BILIRUB SERPL-MCNC: 0.5 MG/DL (ref 0.2–1.2)
BUN SERPL-MCNC: 14 MG/DL (ref 8–23)
CALCIUM SERPL-MCNC: 9.2 MG/DL (ref 8.8–10.2)
CHLORIDE SERPL-SCNC: 98 MMOL/L (ref 98–111)
CHOLEST SERPL-MCNC: 130 MG/DL (ref 0–199)
CO2 SERPL-SCNC: 25 MMOL/L (ref 22–29)
CREAT SERPL-MCNC: 1 MG/DL (ref 0.7–1.2)
ERYTHROCYTE [DISTWIDTH] IN BLOOD BY AUTOMATED COUNT: 13 % (ref 11.5–14.5)
GLUCOSE SERPL-MCNC: 166 MG/DL (ref 70–99)
HBA1C MFR BLD: 8.7 % (ref 4–5.6)
HCT VFR BLD AUTO: 42.4 % (ref 42–52)
HDLC SERPL-MCNC: 38 MG/DL (ref 40–60)
HGB BLD-MCNC: 13.7 G/DL (ref 14–18)
LDLC SERPL CALC-MCNC: 63 MG/DL
MCH RBC QN AUTO: 30.5 PG (ref 27–31)
MCHC RBC AUTO-ENTMCNC: 32.3 G/DL (ref 33–37)
MCV RBC AUTO: 94.4 FL (ref 80–94)
PLATELET # BLD AUTO: 280 K/UL (ref 130–400)
PMV BLD AUTO: 10.8 FL (ref 9.4–12.4)
POTASSIUM SERPL-SCNC: 4 MMOL/L (ref 3.5–5)
PROT SERPL-MCNC: 7.4 G/DL (ref 6.4–8.3)
RBC # BLD AUTO: 4.49 M/UL (ref 4.7–6.1)
SODIUM SERPL-SCNC: 140 MMOL/L (ref 136–145)
TRIGL SERPL-MCNC: 147 MG/DL (ref 0–149)
WBC # BLD AUTO: 6.8 K/UL (ref 4.8–10.8)

## 2024-10-18 NOTE — PROGRESS NOTES
mg/dL       Return in about 6 months (around 4/21/2025).    HPI  58 y.o. male   Patient Active Problem List   Diagnosis    Shortness of breath    HTN (hypertension)    Hyperlipidemia    Fatigue    Hyponatremia    Type 2 diabetes mellitus with diabetic polyneuropathy, with long-term current use of insulin ([facility])    Class 2 obesity due to excess calories in adult    Diabetic foot ulcer (HCC)    Charcot foot due to diabetes mellitus (HCC)    Personal history of noncompliance with medical treatment and regimen    Chronic heart failure with preserved ejection fraction (HCC)    Obstructive sleep apnea    Proliferative diabetic retinopathy of both eyes without macular edema associated with type 2 diabetes mellitus (HCC)    Cellulitis    Amputation of left middle finger    Abnormal stress test    CAD in native artery    History of atrial fibrillation    Fluid overload    Stage 3b chronic kidney disease ([facility])    Status post coronary artery bypass graft    Dizziness    Benign paroxysmal positional vertigo due to bilateral vestibular disorder    Amputated great toe of right foot (HCC)    Amputation of right great toe ([facility])    Peripheral neuropathy   58-year-old male who presents for follow-up visit for FUV  DM poorly controlled , patient displaying improvement in care with better adherence to his insulin regimen as well as monitoring of blood glucose with adequate Premeal coverage   History of foot and finger amputations history of Charcot joint  HTN well controlled  Hx of Afib  had 2 CV , HFREF and was told by cardiology to discontinue his blood thinner and cardiology nurse practitioner has signed off he is followed by Dr. Watters for his A-fib and advised that his heart failure continues to be present and needs to be managed by cardiology  History of Diabetic Foot Ulcer  CLAUDE, not on CPAP  Diabetic retinopathy , seeing eye specialist i in [place]    Review of Systems   Constitutional:  Negative for activity change, appetite

## 2024-10-21 ENCOUNTER — OFFICE VISIT (OUTPATIENT)
Dept: PRIMARY CARE CLINIC | Age: 59
End: 2024-10-21
Payer: MEDICARE

## 2024-10-21 VITALS
SYSTOLIC BLOOD PRESSURE: 116 MMHG | WEIGHT: 242 LBS | RESPIRATION RATE: 17 BRPM | HEIGHT: 70 IN | BODY MASS INDEX: 34.65 KG/M2 | HEART RATE: 71 BPM | TEMPERATURE: 97.6 F | DIASTOLIC BLOOD PRESSURE: 64 MMHG | OXYGEN SATURATION: 98 %

## 2024-10-21 DIAGNOSIS — Z23 NEED FOR PROPHYLACTIC VACCINATION AND INOCULATION AGAINST INFLUENZA: Primary | ICD-10-CM

## 2024-10-21 DIAGNOSIS — I25.10 CAD IN NATIVE ARTERY: ICD-10-CM

## 2024-10-21 DIAGNOSIS — E11.42 TYPE 2 DIABETES MELLITUS WITH DIABETIC POLYNEUROPATHY, WITH LONG-TERM CURRENT USE OF INSULIN (HCC): Chronic | ICD-10-CM

## 2024-10-21 DIAGNOSIS — E11.3593 PROLIFERATIVE DIABETIC RETINOPATHY OF BOTH EYES WITHOUT MACULAR EDEMA ASSOCIATED WITH TYPE 2 DIABETES MELLITUS (HCC): ICD-10-CM

## 2024-10-21 DIAGNOSIS — N18.32 STAGE 3B CHRONIC KIDNEY DISEASE (HCC): ICD-10-CM

## 2024-10-21 DIAGNOSIS — Z79.4 TYPE 2 DIABETES MELLITUS WITH DIABETIC POLYNEUROPATHY, WITH LONG-TERM CURRENT USE OF INSULIN (HCC): Chronic | ICD-10-CM

## 2024-10-21 DIAGNOSIS — G62.9 PERIPHERAL POLYNEUROPATHY: ICD-10-CM

## 2024-10-21 DIAGNOSIS — I10 HYPERTENSION, UNSPECIFIED TYPE: ICD-10-CM

## 2024-10-21 PROCEDURE — 3052F HG A1C>EQUAL 8.0%<EQUAL 9.0%: CPT | Performed by: INTERNAL MEDICINE

## 2024-10-21 PROCEDURE — G0008 ADMIN INFLUENZA VIRUS VAC: HCPCS | Performed by: INTERNAL MEDICINE

## 2024-10-21 PROCEDURE — 2022F DILAT RTA XM EVC RTNOPTHY: CPT | Performed by: INTERNAL MEDICINE

## 2024-10-21 PROCEDURE — G8417 CALC BMI ABV UP PARAM F/U: HCPCS | Performed by: INTERNAL MEDICINE

## 2024-10-21 PROCEDURE — 90661 CCIIV3 VAC ABX FR 0.5 ML IM: CPT | Performed by: INTERNAL MEDICINE

## 2024-10-21 PROCEDURE — 1036F TOBACCO NON-USER: CPT | Performed by: INTERNAL MEDICINE

## 2024-10-21 PROCEDURE — 3074F SYST BP LT 130 MM HG: CPT | Performed by: INTERNAL MEDICINE

## 2024-10-21 PROCEDURE — 3078F DIAST BP <80 MM HG: CPT | Performed by: INTERNAL MEDICINE

## 2024-10-21 PROCEDURE — 3017F COLORECTAL CA SCREEN DOC REV: CPT | Performed by: INTERNAL MEDICINE

## 2024-10-21 PROCEDURE — G8484 FLU IMMUNIZE NO ADMIN: HCPCS | Performed by: INTERNAL MEDICINE

## 2024-10-21 PROCEDURE — G8427 DOCREV CUR MEDS BY ELIG CLIN: HCPCS | Performed by: INTERNAL MEDICINE

## 2024-10-21 PROCEDURE — 99214 OFFICE O/P EST MOD 30 MIN: CPT | Performed by: INTERNAL MEDICINE

## 2024-10-21 ASSESSMENT — ENCOUNTER SYMPTOMS: CONSTIPATION: 0

## 2024-10-21 ASSESSMENT — PATIENT HEALTH QUESTIONNAIRE - PHQ9
SUM OF ALL RESPONSES TO PHQ QUESTIONS 1-9: 0
1. LITTLE INTEREST OR PLEASURE IN DOING THINGS: NOT AT ALL
SUM OF ALL RESPONSES TO PHQ QUESTIONS 1-9: 0
2. FEELING DOWN, DEPRESSED OR HOPELESS: NOT AT ALL
SUM OF ALL RESPONSES TO PHQ9 QUESTIONS 1 & 2: 0

## 2024-10-21 ASSESSMENT — LIFESTYLE VARIABLES
HOW OFTEN DO YOU HAVE A DRINK CONTAINING ALCOHOL: NEVER
HOW MANY STANDARD DRINKS CONTAINING ALCOHOL DO YOU HAVE ON A TYPICAL DAY: PATIENT DOES NOT DRINK

## 2024-10-21 NOTE — PATIENT INSTRUCTIONS
The medication list included in this document is our record of what you are currently taking, including any changes that were made at today's visit.  If you find any differences when compared to your medications at home, or have any questions that were not answered at your visit, please contact the office.      Please schedule FOLLOW UP VISIT in  6  months  Have lab work 2 weeks before scheduled Follow up Visit

## 2024-10-22 NOTE — PROGRESS NOTES
03/27/19 1225   Restrictions/Precautions   Restrictions/Precautions Fall Risk   General   Chart Reviewed Yes   Additional Pertinent Hx L Charcot foot   Family / Caregiver Present Yes   Subjective   Subjective Patient agrees to walking   Pain Screening   Patient Currently in Pain No   Vital Signs   Level of Consciousness 0   Oxygen Therapy   O2 Device None (Room air)   Bed Mobility   Rolling Independent   Supine to Sit Independent   Sit to Supine Independent   Transfers   Sit to Stand Stand by assistance   Stand to sit Stand by assistance   Ambulation   Ambulation? Yes   WB Status FWB   Ambulation 1   Surface level tile   Device Single point cane   Assistance Stand by assistance   Quality of Gait Wide CHARAN some lateral sway uses SPC and pushed IV   Distance 125   Comments Patient took two stand breaks one about 5 minutes  one about 3 minutes. Patient stated he just did not have any endurance   Short term goals   Time Frame for Short term goals 14 days   Short term goal 1 pt will improve supine<>sit to supervision   Short term goal 2 pt with improve sit<>stands to supervision   Short term goal 3 pt will ambulate 80' with [facility] and supervision   Conditions Requiring Skilled Therapeutic Intervention   Body structures, Functions, Activity limitations Decreased functional mobility ; Decreased endurance;Decreased strength;Decreased balance;Decreased sensation   Activity Tolerance   Activity Tolerance Patient limited by fatigue;Patient limited by endurance   Safety Devices   Type of devices Left in bed;Call light within reach   Physical Therapy  Electronically signed by Fabian Gomez PTA on 3/27/2019 at 12:33 PM normal balance

## 2024-10-30 RX ORDER — LOSARTAN POTASSIUM 100 MG/1
100 TABLET ORAL DAILY
Qty: 90 TABLET | Refills: 3 | Status: SHIPPED | OUTPATIENT
Start: 2024-10-30

## 2024-11-09 DIAGNOSIS — I10 PRIMARY HYPERTENSION: ICD-10-CM

## 2024-11-11 RX ORDER — CLONIDINE HYDROCHLORIDE 0.1 MG/1
TABLET ORAL
Qty: 180 TABLET | Refills: 1 | Status: SHIPPED | OUTPATIENT
Start: 2024-11-11

## 2024-11-11 NOTE — TELEPHONE ENCOUNTER
Thiago Goldstein called to request a refill on his medication.      Last office visit : 10/21/2024   Next office visit : 4/21/2025     Requested Prescriptions     Pending Prescriptions Disp Refills    cloNIDine (CATAPRES) 0.1 MG tablet [Pharmacy Med Name: CLONIDINE HYDROCHLORIDE 0.1MG TABLET] 180 tablet 0     Sig: TAKE ONE TABLET TWICE DAILY            Sadia Cobb LPN

## 2024-11-18 DIAGNOSIS — Z79.4 TYPE 2 DIABETES MELLITUS WITH DIABETIC POLYNEUROPATHY, WITH LONG-TERM CURRENT USE OF INSULIN (HCC): Chronic | ICD-10-CM

## 2024-11-18 DIAGNOSIS — E11.42 TYPE 2 DIABETES MELLITUS WITH DIABETIC POLYNEUROPATHY, WITH LONG-TERM CURRENT USE OF INSULIN (HCC): Chronic | ICD-10-CM

## 2024-11-18 RX ORDER — EMPAGLIFLOZIN 25 MG/1
25 TABLET, FILM COATED ORAL DAILY
Qty: 90 TABLET | Refills: 1 | Status: SHIPPED | OUTPATIENT
Start: 2024-11-18

## 2024-11-18 NOTE — TELEPHONE ENCOUNTER
Thiago Goldstein called to request a refill on his medication.      Last office visit : 10/21/2024   Next office visit : 4/21/2025     Requested Prescriptions     Pending Prescriptions Disp Refills    JARDIANCE 25 MG tablet [Pharmacy Med Name: JARDIANCE 25MG TABLET] 30 tablet 0     Sig: TAKE ONE TABLET DAILY            Sadia Cobb LPN

## 2024-11-27 DIAGNOSIS — Z79.4 TYPE 2 DIABETES MELLITUS WITH DIABETIC POLYNEUROPATHY, WITH LONG-TERM CURRENT USE OF INSULIN (HCC): ICD-10-CM

## 2024-11-27 DIAGNOSIS — E11.42 TYPE 2 DIABETES MELLITUS WITH DIABETIC POLYNEUROPATHY, WITH LONG-TERM CURRENT USE OF INSULIN (HCC): ICD-10-CM

## 2024-12-02 ENCOUNTER — TELEPHONE (OUTPATIENT)
Dept: UROLOGY | Age: 59
End: 2024-12-02

## 2024-12-02 RX ORDER — INSULIN LISPRO 100 [IU]/ML
INJECTION, SOLUTION INTRAVENOUS; SUBCUTANEOUS
Qty: 15 ML | Refills: 3 | Status: SHIPPED | OUTPATIENT
Start: 2024-12-02

## 2024-12-02 NOTE — TELEPHONE ENCOUNTER
Thiago MICHEL Goldstein called to request a refill on his medication.      Last office visit : 10/21/2024   Next office visit : 4/21/2025     Requested Prescriptions     Pending Prescriptions Disp Refills    HUMALOG KWIKPEN 100 UNIT/ML SOPN [Pharmacy Med Name: HUMALOG KWIKPEN 100/ML SOLN PEN-INJ] 15 mL 3     Sig: INJECT 15 UNITS INTO THE SKIN THREE TIMES DAILY (BEFORE MEALS)            Sadia Cobb LPN

## 2024-12-02 NOTE — TELEPHONE ENCOUNTER
Thiago called to schedule an appointment for  follow up . Pt was seen at [facility] today for urinary retention. Has catheter placed, was advised to be seen next day to follow up, has enlarged prostate.  Could npt reach office to advise.  [facility] was unable to accommodate in the time frame needed. Please be advised that the best time to call  after 1:30 .    Thank you.

## 2024-12-02 NOTE — TELEPHONE ENCOUNTER
left vm with patient to contact office. Patient will have to keep catheter in for 1 week per protocol. Patient will need to be scheduled in the am for voiding trial. Patient can contact office to schedule.

## 2024-12-04 ENCOUNTER — TELEPHONE (OUTPATIENT)
Dept: UROLOGY | Age: 59
End: 2024-12-04

## 2024-12-04 NOTE — TELEPHONE ENCOUNTER
Pt called to apologize and ask if there was anything he could do with the intense pain he's having until his appt on 12/10. I informed pt that I wasn't clinical but I could get pt over to office to speak with a nurse. Pt states the cath is still working correctly, draining. However, pt says he wasn't given any pain medication and he may ask for that to help. I transferred pt over to office.

## 2024-12-04 NOTE — ED PROVIDER NOTES
A catheter was exchanged for a (CATHETER 6FR JR4 CLASSIC CRV 100CM RADOPQ BRAID SLCT SUP TRQ) catheter. [facility] 3 TAWNYA/VAS/MED  eMERGENCYdEPARTMENT eNCOUnter      Pt Name: Luis Mccartney  MRN: [id number]  [place] 1965  Date of evaluation: 10/25/2021  Provider:MANOLO Cannon    CHIEF COMPLAINT       Chief Complaint   Patient presents with    Cellulitis     lef middle finger swelling          HISTORY OF PRESENT ILLNESS  (Location/Symptom, Timing/Onset, Context/Setting, Quality, Duration, Modifying Factors, Severity.)   Luis Mccartney is a 60 y.o. male who presents to the emergency department with complaints \"nicking\" his finger L DIP joint long finger Saturday night. Concern for infection today sent here by PCP has erythema and swelling proximally limits his ROM. He denies fever. No tachycardia. The patient is diabetic his tetanus was updated prior to coming in. Osteo in differential. On warfarin for known long standing afib. HPI    Nursing Notes were reviewed and I agree. REVIEW OF SYSTEMS    (2-9 systems for level 4, 10 or more for level 5)     Review of Systems   Constitutional: Negative for activity change, appetite change, chills and fever. HENT: Negative for congestion and dental problem. Eyes: Negative for photophobia, discharge and itching. Respiratory: Negative for apnea, cough and shortness of breath. Cardiovascular: Negative for chest pain. Musculoskeletal: Positive for arthralgias and joint swelling. Negative for back pain, gait problem, myalgias and neck pain. Skin: Positive for color change and wound. Negative for pallor and rash. Neurological: Negative for dizziness, seizures and syncope. Psychiatric/Behavioral: Negative for agitation. The patient is not nervous/anxious. Except as noted above the remainder of the review of systems was reviewed and negative.        PAST MEDICAL HISTORY     Past Medical History:   Diagnosis Date    Acalculous cholecystitis 12/29/2015    Allergic rhinitis, cause unspecified     [facility]     [facility]  Charcot's joint of right foot     Chicken pox     Closed supracondylar fracture of elbow     fall    Diabetic ulcer of right midfoot associated with type 2 diabetes mellitus, with fat layer exposed ([facility] [address] 9/14/2020    History of MRSA infection     scalp/facial and on back    HTN (hypertension)     Hyperlipidemia     Impotence of organic origin     MDRO (multiple drug resistant organisms) resistance     Other dyspnea and respiratory abnormality     Other specified erythematous condition(695.89)     Personal history of other infectious and parasitic disease     Salmonella     Type II or unspecified type diabetes mellitus without mention of complication, uncontrolled 1994         SURGICAL HISTORY       Past Surgical History:   Procedure Laterality Date    CARDIOVERSION  03/2017    CATARACT REMOVAL      COLONOSCOPY  02/24/2020    Dr Duc Fraire-Melanosis coli throughout the entire colon-AP, 5 yr recall    HUMERUS FRACTURE SURGERY Left 9/17/2020    OPEN REDUCTION INTERNAL FIXATION LEFT SUPRACONDYLAR FRACTURE performed by Xiomara Flores MD at [facility]      X 2    TONSILLECTOMY           CURRENT MEDICATIONS       Current Discharge Medication List      CONTINUE these medications which have NOT CHANGED    Details   Insulin Degludec (TRESIBA FLEXTOUCH) 100 UNIT/ML SOPN Inject into the skin Currently out      atorvastatin (LIPITOR) 20 MG tablet TAKE ONE TABLET DAILY   Qty: 90 tablet, Refills: 1    Associated Diagnoses: Mixed hyperlipidemia due to type 2 diabetes mellitus ([facility] [address]      ! ! warfarin (COUMADIN) 10 MG tablet TAKE ONE TABLET DAILY   Qty: 45 tablet, Refills: 2      tamsulosin (FLOMAX) 0.4 MG capsule Take 2 capsules by mouth daily  Qty: 60 capsule, Refills: 11      furosemide (LASIX) 20 MG tablet TAKE ONE TABLET DAILY AS NEEDED  Qty: 90 tablet, Refills: 3      !! warfarin (COUMADIN) 5 MG tablet Take 3 tabs on Thursday's and Sunday's only  Qty: 30 tablet, Refills: 5 metFORMIN (GLUCOPHAGE) 1000 MG tablet Take 1 tablet by mouth 2 times daily (with meals)  Qty: 180 tablet, Refills: 3      metoprolol tartrate (LOPRESSOR) 25 MG tablet TAKE ONE TABLET TWICE DAILY  Qty: 60 tablet, Refills: 5    Associated Diagnoses: Essential hypertension      insulin lispro (HUMALOG KWIKPEN) 200 UNIT/ML SOPN pen Sliding scale 160-179 2 units  180-199 3 units  200-219 4 units  220-239 5 units  240-259 6 units   260-279 7 units  280-299 8 units  300-399 9 units  340-379 10 units  380-420 11 units  Qty: 3 pen, Refills: 5    Associated Diagnoses: Type 2 diabetes mellitus with diabetic polyneuropathy, with long-term current use of insulin ([facility])      vitamin D (ERGOCALCIFEROL) 1.25 MG (20203 UT) CAPS capsule Take 50,000 Units by mouth once a week      losartan (COZAAR) 100 MG tablet TAKE ONE TABLET DAILY  Qty: 90 tablet, Refills: 3      diphenhydrAMINE (BENADRYL) 25 MG tablet Take 25 mg by mouth every 6 hours as needed for Itching      !! blood glucose monitor strips Test 3 times a day & as needed for symptoms of irregular blood glucose. Dispense sufficient amount for indicated testing frequency plus additional to accommodate PRN testing needs. Qty: 200 strip, Refills: 3    Comments: Brand per patient preference. May round up to next available package size. Associated Diagnoses: Type 2 diabetes mellitus with diabetic polyneuropathy, with long-term current use of insulin ([facility])      Insulin Pen Needle 32G X 4 MM MISC 1 each by Does not apply route daily  Qty: 100 each, Refills: 3    Associated Diagnoses: Type 2 diabetes mellitus with diabetic polyneuropathy, with long-term current use of insulin ([facility])      blood glucose monitor kit and supplies Dispense sufficient amount for indicated testing frequency plus additional to accommodate PRN testing needs. Qty: 1 kit, Refills: 0    Comments: Brand per patient preference. May round up to next available package size.   Associated Diagnoses: Type 2 diabetes mellitus with diabetic polyneuropathy, with long-term current use of insulin ([facility])      Lancets MISC 1 each by Does not apply route 3 times daily (with meals)  Qty: 300 each, Refills: 3    Associated Diagnoses: Type 2 diabetes mellitus with diabetic polyneuropathy, with long-term current use of insulin ([address]      ! ! blood glucose test strips (CONTOUR NEXT TEST) strip 1 each by In Vitro route 4 times daily As needed. Qty: 200 each, Refills: 3    Associated Diagnoses: Type 2 diabetes mellitus with diabetic polyneuropathy, with long-term current use of insulin ([address]       ! ! - Potential duplicate medications found. Please discuss with provider.           ALLERGIES     Pcn [penicillins], Neomycin-bacitracin zn-polymyx, and Neosporin [neomycin-polymyxin-gramicidin]    FAMILY HISTORY       Family History   Problem Relation Age of Onset    Stroke Maternal Grandmother     Cancer Maternal Grandmother     Stroke Paternal Grandmother     Diabetes Paternal Grandmother     Cancer Father     Heart Disease Father     Lung Cancer Father     Asthma Paternal Grandfather     Heart Disease Paternal Grandfather     Heart Disease Maternal Grandfather     Colon Cancer Neg Hx     Colon Polyps Neg Hx     Esophageal Cancer Neg Hx     Liver Cancer Neg Hx     Liver Disease Neg Hx     Rectal Cancer Neg Hx     Stomach Cancer Neg Hx           SOCIAL HISTORY       Social History     Socioeconomic History    Marital status:      Spouse name: None    Number of children: None    Years of education: None    Highest education level: None   Occupational History    None   Tobacco Use    Smoking status: Never Smoker    Smokeless tobacco: Never Used   Vaping Use    Vaping Use: Never used   Substance and Sexual Activity    Alcohol use: No    Drug use: No    Sexual activity: None   Other Topics Concern    None   Social History Narrative    Referred to  for assistance with Meals on Wheels     Social Determinants of Health     Financial Resource Strain: High Risk    Difficulty of Paying Living Expenses: Hard   Food Insecurity: Food Insecurity Present    Worried About Running Out of Food in the Last Year: Sometimes true    Zoran of Food in the Last Year: Sometimes true   Transportation Needs: No Transportation Needs    Lack of Transportation (Medical): No    Lack of Transportation (Non-Medical): No   Physical Activity:     Days of Exercise per Week:     Minutes of Exercise per Session:    Stress:     Feeling of Stress :    Social Connections:     Frequency of Communication with Friends and Family:     Frequency of Social Gatherings with Friends and Family:     Attends [employer] Services:     Active Member of Clubs or Organizations:     Attends Club or Organization Meetings:     Marital Status:    Intimate Partner Violence:     Fear of Current or Ex-Partner:     Emotionally Abused:     Physically Abused:     Sexually Abused:        SCREENINGS    Rossy Coma Scale  Eye Opening: Spontaneous  Best Verbal Response: Oriented  Best Motor Response: Obeys commands  Rossy Coma Scale Score: 15      PHYSICAL EXAM    (up to 7 forlevel 4, 8 or more for level 5)     ED Triage Vitals [10/25/21 1125]   BP Temp Temp Source Pulse Resp SpO2 Height Weight   136/77 98.6 °F (37 °C) Oral 86 16 99 % -- --       Physical Exam  Constitutional:       General: He is not in acute distress. Appearance: He is well-developed. He is not diaphoretic. HENT:      Head: Normocephalic and atraumatic. Right Ear: External ear normal.      Left Ear: Tympanic membrane, ear canal and external ear normal.   Eyes:      Pupils: Pupils are equal, round, and reactive to light. Neck:      Trachea: No tracheal deviation. Cardiovascular:      Rate and Rhythm: Normal rate and regular rhythm. Heart sounds: Normal heart sounds. No murmur heard.      Pulmonary:      Effort: Pulmonary effort is normal.      Breath sounds: Normal breath sounds. No stridor. No wheezing. Chest:      Chest wall: No tenderness. Abdominal:      General: Abdomen is flat. Bowel sounds are normal. There is no distension. Palpations: Abdomen is soft. Tenderness: There is no abdominal tenderness. Musculoskeletal:         General: Swelling and tenderness present. Cervical back: Normal range of motion and neck supple. Comments: Limited ROM due to swelling. Skin:     General: Skin is warm and dry. Capillary Refill: Capillary refill takes less than 2 seconds. Findings: Erythema present. Neurological:      Mental Status: He is alert and oriented to person, place, and time. Psychiatric:         Mood and Affect: Mood normal.         Behavior: Behavior normal.         Thought Content: Thought content normal.         Judgment: Judgment normal.           DIAGNOSTIC RESULTS     RADIOLOGY:   Non-plain film images such as CT, Ultrasound and MRI are read by the radiologist. Plain radiographic images are visualized and preliminarilyinterpreted by Saritha Lui MD with the below findings:      Interpretation per the Radiologist below, if available at the time of this note:    XR FINGER LEFT (MIN 2 VIEWS)   Final Result   1. Prominent soft tissue swelling with arthritic change. No acute   osseous pathology. No radiographic evidence of osteomyelitis or acute   osseous injury. No radiopaque foreign body seen within the soft   tissues. Signed by Dr Dell Watson:  Labs Reviewed   CULTURE, WOUND - Abnormal; Notable for the following components:       Result Value    Gram Stain Result   (*)     Value:  Moderate WBC's (Polymorphonuclear)  Moderate Gram positive cocci  in pairs      Organism Staphylococcus aureus (*)     Organism Strep agalactiae (Beta Strep Group B) (*)     All other components within normal limits    Narrative:     ORDER#: [id number]                          ORDERED BY: ANA PHILLIPS  SOURCE: Finger left middle finger          COLLECTED:  10/25/21 12:21  ANTIBIOTICS AT DEMI.:                      RECEIVED :  10/25/21 12:32   CULTURE, BLOOD 1 - Abnormal; Notable for the following components:    Blood Culture, Routine   (*)     Value: Gram stain Aerobic bottle:  Gram positive cocci in clusters  resembling Staphylococcus  Culture in progress  Please notify Physician   Bottle volume = 8 ml      All other components within normal limits    Narrative:     ORDER#: [id number]                          ORDERED BY: ANA PHILLIPS  SOURCE: Blood RAC                          COLLECTED:  10/25/21 13:00  ANTIBIOTICS AT DEMI.:                      RECEIVED :  10/25/21 13:17  CALL  [employer] [place] tel. [phone],  Microbiology results called to and read back by Yocasta Odom RN on 3rd,  10/26/2021 04:40, by [facility]   CBC WITH AUTO DIFFERENTIAL - Abnormal; Notable for the following components:    WBC 12.5 (*)     RBC 3.54 (*)     Hemoglobin 11.3 (*)     Hematocrit 34.0 (*)     MCV 96.0 (*)     MCH 31.9 (*)     Neutrophils % 80.2 (*)     Lymphocytes % 8.4 (*)     Monocytes % 10.3 (*)     Neutrophils Absolute 10.0 (*)     Monocytes Absolute 1.30 (*)     All other components within normal limits    Narrative:     1   COMPREHENSIVE METABOLIC PANEL W/ REFLEX TO MG FOR LOW K - Abnormal; Notable for the following components:    Sodium 133 (*)     Chloride 96 (*)     Glucose 256 (*)     BUN 22 (*)     Alkaline Phosphatase 136 (*)     All other components within normal limits   LACTATE, SEPSIS - Abnormal; Notable for the following components:    Lactic Acid, Sepsis 2.2 (*)     All other components within normal limits    Narrative:     CALL  [place]  [employer] tel. ,  Chemistry results called to and read back by demetrio overton. diane kleviet,  10/25/2021 14:44, by Florence Díaz  13   LACTATE, SEPSIS - Abnormal; Notable for the following components:    Lactic Acid, Sepsis 2.2 (*)     All other components within normal limits    Narrative:     CALL  [facility] tel. GLUCOSE - Abnormal; Notable for the following components:    POC Glucose 321 (*)     All other components within normal limits   COVID-19, RAPID   CULTURE, BLOOD 2   POCT GLUCOSE   POCT GLUCOSE   POCT GLUCOSE   POCT GLUCOSE   POCT GLUCOSE       All other labs were within normal range or notreturned as of this dictation. RE-ASSESSMENT        EMERGENCY DEPARTMENT COURSE and DIFFERENTIAL DIAGNOSIS/MDM:   Vitals:    Vitals:    10/25/21 1445 10/25/21 1600 10/26/21 0120 10/26/21 0550   BP: (!) 142/64 128/84 129/70 127/88   Pulse: 68 88 93 98   Resp: 16 16 18 18   Temp:  98.9 °F (37.2 °C) 98.5 °F (36.9 °C) 100 °F (37.8 °C)   TempSrc:  Temporal Temporal Oral   SpO2: 99% 95% 96% 96%   Weight:   233 lb (105.7 kg)    Height:   5' 10\" (1.778 m)          MDM  I spoke with Dr. Jessy Bee he recommends IV antibiotics he is the hand specialist orthopedics he agreeable to consult we feel that with this patient's risk factors he will need to be updated on his tetanus and being a diabetic there is more guarded prognosis with his neuropathy. We got a wound photo we got a wound culture IV antibiotics are going along with blood cultures pending hospitalist will take over care of this patient at this time. My attending is evaluated the patient separate from myself agreeable to plan of care. PROCEDURES:    Procedures      FINAL IMPRESSION      1. Wound infection    2. Cellulitis, unspecified cellulitis site          DISPOSITION/PLAN   DISPOSITION Admitted 10/25/2021 02:47:47 PM      PATIENT REFERRED TO:  No follow-up provider specified.     DISCHARGE MEDICATIONS:  Current Discharge Medication List          (Please note that portions of this note were completed with a voice recognition program.  Efforts were made to edit the dictations but occasionallywords are mis-transcribed.)    Sean Oviedo [phone], [id number]  10/26/21 1007 Opt out

## 2024-12-04 NOTE — TELEPHONE ENCOUNTER
Called patient back asked what kind of pain he was having he stated it was pain at the tip of his penis. I explained to the patient that is pretty common but to try to alternate tylenol  and ibuprofen and to use a barrier cream like triple antibiotic ointment on the tip of his penis to keep it lubricated.

## 2024-12-04 NOTE — TELEPHONE ENCOUNTER
Patient contacted office requesting to speak with nurse about catheter. He is upset about the 7 day waiting period and in extreme pain. He had cath put in at ER recently with [facility].     He was advised that he would need to keep this for at least a week per last encounter. Please contact patient to discuss.

## 2024-12-04 NOTE — TELEPHONE ENCOUNTER
Patient called to speak with nurse over pain with catheter placement and would like to apologize to nurse he spoke with earlier for his outburst. Please contact patient

## 2024-12-09 NOTE — PROGRESS NOTES
Thiago Goldstein is a 59 y.o., male, Established patient who presents today   Chief Complaint   Patient presents with    Urinary Retention       Benign Prostatic Hypertrophy/Urinary retention  Patient presents after being since seen in [facility] emergency room on 12/2/24 with complaints of abdominal pain and generalized weakness.  He reports his symptoms came on suddenly.  He had no change to behavioral, environmental, or dietary factors and no changes to medications.  He denies constipation.  He was noted to be in retention, so Coyle catheter was placed.  Per patient report, it sounds like about 2 L were drained from his bladder.  This is not found in the ER records.  CT of the abdomen was completed which did not reveal hydronephrosis, but showed a right renal cyst.  I am not able to view the images, but this is from the radiologist report.    Patient is typically seen for BPH, possible neurogenic bladder component secondary to uncontrolled diabetes with bilateral neuropathy.  PVR was mildly elevated at his last visit in March 2024, but did not require intervention at 162 mL.  He is currently maintained on Flomax as well as Avodart which was started in early 2022.  He has never undergone workup with cystoscopy, TRUS, uroflow.  He has no personal history and no family history of prostate cancer.   He denies flank pain, gross hematuria, kidney stones, and recurrent UTI.  Previous CRISTOFER revealed mildly enlarged but nonsuspicious prostate. PSA is corrected to 0.26.      Lab Results   Component Value Date    PSA 0.13 03/07/2024    PSA 0.20 03/23/2023    PSA 0.18 11/30/2022    PSA 0.18 03/15/2022    PSA 0.18 09/27/2021    PSA 0.24 03/15/2021    PSA 0.47 05/09/2019        REVIEW OF SYSTEMS:  Review of Systems   Constitutional:  Negative for chills and fever.   Gastrointestinal:  Negative for nausea and vomiting.   Genitourinary:  Positive for difficulty urinating and penile pain. Negative for flank pain, hematuria, penile

## 2024-12-10 ENCOUNTER — OFFICE VISIT (OUTPATIENT)
Dept: UROLOGY | Age: 59
End: 2024-12-10
Payer: MEDICARE

## 2024-12-10 VITALS — BODY MASS INDEX: 34.65 KG/M2 | TEMPERATURE: 97.2 F | HEIGHT: 70 IN | WEIGHT: 242 LBS

## 2024-12-10 DIAGNOSIS — R33.9 RETENTION OF URINE: ICD-10-CM

## 2024-12-10 DIAGNOSIS — R39.14 BENIGN PROSTATIC HYPERPLASIA WITH INCOMPLETE BLADDER EMPTYING: Primary | ICD-10-CM

## 2024-12-10 DIAGNOSIS — N40.1 BENIGN PROSTATIC HYPERPLASIA WITH INCOMPLETE BLADDER EMPTYING: Primary | ICD-10-CM

## 2024-12-10 PROCEDURE — 1036F TOBACCO NON-USER: CPT | Performed by: NURSE PRACTITIONER

## 2024-12-10 PROCEDURE — 99215 OFFICE O/P EST HI 40 MIN: CPT | Performed by: NURSE PRACTITIONER

## 2024-12-10 PROCEDURE — G8417 CALC BMI ABV UP PARAM F/U: HCPCS | Performed by: NURSE PRACTITIONER

## 2024-12-10 PROCEDURE — G8484 FLU IMMUNIZE NO ADMIN: HCPCS | Performed by: NURSE PRACTITIONER

## 2024-12-10 PROCEDURE — 3017F COLORECTAL CA SCREEN DOC REV: CPT | Performed by: NURSE PRACTITIONER

## 2024-12-10 PROCEDURE — G8427 DOCREV CUR MEDS BY ELIG CLIN: HCPCS | Performed by: NURSE PRACTITIONER

## 2024-12-10 RX ORDER — TAMSULOSIN HYDROCHLORIDE 0.4 MG/1
0.8 CAPSULE ORAL NIGHTLY
Qty: 180 CAPSULE | Refills: 3 | Status: SHIPPED | OUTPATIENT
Start: 2024-12-10

## 2024-12-10 ASSESSMENT — ENCOUNTER SYMPTOMS
VOMITING: 0
NAUSEA: 0

## 2024-12-10 NOTE — PROGRESS NOTES
Patient of NOMI Young presents today for voiding trial post urinary rention. The patient denies any fever, chills or  N&V. After patient had given consent, using the catheter in place, 180cc of sterile water was installed into the bladder with no complications. Patient was able to void 0cc.     NOMI Young was in office at time of procedure.     Patient was advised to drink clear fluids for the next couple hours and urinate. Patient was advised they may experience some blood in the urine and burning with urination for the next couple days. If the patient is unable to urinate or develops fever, chills, N&V or suprapubic pain, they will call office for an appt at clinic or seek medical treatment at nearest ER, PCP or Urgent Care if after hours. Patient verbalized understanding and all questions were answered.Patient advised to call the office with any questions or concerns.     Patient is to follow up as scheduled.

## 2024-12-16 DIAGNOSIS — E55.9 HYPOVITAMINOSIS D: ICD-10-CM

## 2024-12-16 RX ORDER — ERGOCALCIFEROL 1.25 MG/1
CAPSULE, LIQUID FILLED ORAL
Qty: 3 CAPSULE | Refills: 1 | Status: SHIPPED | OUTPATIENT
Start: 2024-12-16

## 2024-12-16 NOTE — TELEPHONE ENCOUNTER
Thiago Goldstein called to request a refill on his medication.      Last office visit : 6/1/2022   Next office visit : Visit date not found     Requested Prescriptions     Pending Prescriptions Disp Refills    vitamin D (ERGOCALCIFEROL) 1.25 MG (01694 UT) CAPS capsule [Pharmacy Med Name: VITAMIN D 1.25MG CAPSULE] 3 capsule 1     Sig: TAKE ONE CAPSULE EVERY MONTH            Sadia Cobb LPN

## 2024-12-30 DIAGNOSIS — I10 PRIMARY HYPERTENSION: ICD-10-CM

## 2024-12-30 DIAGNOSIS — I50.32 CHRONIC HEART FAILURE WITH PRESERVED EJECTION FRACTION (HCC): ICD-10-CM

## 2024-12-30 DIAGNOSIS — Z79.4 TYPE 2 DIABETES MELLITUS WITH DIABETIC POLYNEUROPATHY, WITH LONG-TERM CURRENT USE OF INSULIN (HCC): ICD-10-CM

## 2024-12-30 DIAGNOSIS — E11.42 TYPE 2 DIABETES MELLITUS WITH DIABETIC POLYNEUROPATHY, WITH LONG-TERM CURRENT USE OF INSULIN (HCC): ICD-10-CM

## 2024-12-30 DIAGNOSIS — I25.10 CAD IN NATIVE ARTERY: ICD-10-CM

## 2024-12-30 RX ORDER — METOPROLOL TARTRATE 50 MG
TABLET ORAL
Qty: 180 TABLET | Refills: 1 | Status: SHIPPED | OUTPATIENT
Start: 2024-12-30

## 2024-12-30 NOTE — TELEPHONE ENCOUNTER
Thiago Goldstein called to request a refill on his medication.      Last office visit : 10/21/2024   Next office visit : 4/21/2025     Requested Prescriptions     Pending Prescriptions Disp Refills    metoprolol tartrate (LOPRESSOR) 50 MG tablet [Pharmacy Med Name: METOPROLOL TARTRATE 50MG TABLET] 180 tablet 0     Sig: TAKE ONE TABLET TWICE DAILY    metFORMIN (GLUCOPHAGE) 1000 MG tablet [Pharmacy Med Name: METFORMIN HYDROCHLORIDE 1000MG TABLET] 180 tablet 0     Sig: TAKE ONE TABLET TWICE DAILY WITH MEALS            Sadia Cobb LPN

## 2025-01-28 DIAGNOSIS — I10 PRIMARY HYPERTENSION: ICD-10-CM

## 2025-01-28 RX ORDER — FUROSEMIDE 40 MG/1
TABLET ORAL
Qty: 90 TABLET | Refills: 0 | Status: SHIPPED | OUTPATIENT
Start: 2025-01-28

## 2025-01-28 NOTE — TELEPHONE ENCOUNTER
Thiago Goldstein called to request a refill on his medication.      Last office visit : 10/21/2024   Next office visit : 4/21/2025     Requested Prescriptions     Signed Prescriptions Disp Refills    furosemide (LASIX) 40 MG tablet 90 tablet 0     Sig: TAKE ONE TABLET DAILY     Authorizing Provider: SILVESTRE WAITE     Ordering User: JULIANO MARIE LPN

## 2025-02-27 DIAGNOSIS — Z79.4 TYPE 2 DIABETES MELLITUS WITH DIABETIC POLYNEUROPATHY, WITH LONG-TERM CURRENT USE OF INSULIN (HCC): Chronic | ICD-10-CM

## 2025-02-27 DIAGNOSIS — E11.42 TYPE 2 DIABETES MELLITUS WITH DIABETIC POLYNEUROPATHY, WITH LONG-TERM CURRENT USE OF INSULIN (HCC): Chronic | ICD-10-CM

## 2025-02-27 RX ORDER — INSULIN DEGLUDEC 100 U/ML
32 INJECTION, SOLUTION SUBCUTANEOUS NIGHTLY
Qty: 3 ADJUSTABLE DOSE PRE-FILLED PEN SYRINGE | Refills: 1 | Status: SHIPPED | OUTPATIENT
Start: 2025-02-27

## 2025-02-27 NOTE — TELEPHONE ENCOUNTER
Thiago Goldstein called to request a refill on his medication.      Last office visit : 10/21/2024   Next office visit : 4/21/2025     Requested Prescriptions     Signed Prescriptions Disp Refills    Insulin Degludec (TRESIBA FLEXTOUCH) 100 UNIT/ML SOPN 3 Adjustable Dose Pre-filled Pen Syringe 1     Sig: Inject 32 Units into the skin at bedtime Samples - 1 pen dispensed     Authorizing Provider: SILVESTRE WAITE     Ordering User: JULIANO MARIE LPN

## 2025-03-18 ENCOUNTER — TELEPHONE (OUTPATIENT)
Dept: PRIMARY CARE CLINIC | Age: 60
End: 2025-03-18

## 2025-03-18 DIAGNOSIS — E11.42 TYPE 2 DIABETES MELLITUS WITH DIABETIC POLYNEUROPATHY, WITH LONG-TERM CURRENT USE OF INSULIN (HCC): Chronic | ICD-10-CM

## 2025-03-18 DIAGNOSIS — Z79.4 TYPE 2 DIABETES MELLITUS WITH DIABETIC POLYNEUROPATHY, WITH LONG-TERM CURRENT USE OF INSULIN (HCC): Chronic | ICD-10-CM

## 2025-03-18 DIAGNOSIS — E78.2 MIXED HYPERLIPIDEMIA: ICD-10-CM

## 2025-03-18 DIAGNOSIS — I10 PRIMARY HYPERTENSION: ICD-10-CM

## 2025-03-18 LAB
ALBUMIN SERPL-MCNC: 4.4 G/DL (ref 3.5–5.2)
ALP SERPL-CCNC: 98 U/L (ref 40–129)
ALT SERPL-CCNC: 19 U/L (ref 10–50)
ANION GAP SERPL CALCULATED.3IONS-SCNC: 13 MMOL/L (ref 8–16)
AST SERPL-CCNC: 18 U/L (ref 10–50)
BILIRUB SERPL-MCNC: 0.5 MG/DL (ref 0.2–1.2)
BUN SERPL-MCNC: 17 MG/DL (ref 8–23)
CALCIUM SERPL-MCNC: 9.4 MG/DL (ref 8.8–10.2)
CHLORIDE SERPL-SCNC: 101 MMOL/L (ref 98–107)
CHOLEST SERPL-MCNC: 106 MG/DL (ref 0–199)
CO2 SERPL-SCNC: 26 MMOL/L (ref 22–29)
CREAT SERPL-MCNC: 1 MG/DL (ref 0.7–1.2)
ERYTHROCYTE [DISTWIDTH] IN BLOOD BY AUTOMATED COUNT: 13.5 % (ref 11.5–14.5)
GLUCOSE SERPL-MCNC: 144 MG/DL (ref 70–99)
HBA1C MFR BLD: 8.8 % (ref 4–5.6)
HCT VFR BLD AUTO: 39.6 % (ref 42–52)
HDLC SERPL-MCNC: 39 MG/DL (ref 40–60)
HGB BLD-MCNC: 13 G/DL (ref 14–18)
LDLC SERPL CALC-MCNC: 49 MG/DL
MCH RBC QN AUTO: 31.7 PG (ref 27–31)
MCHC RBC AUTO-ENTMCNC: 32.8 G/DL (ref 33–37)
MCV RBC AUTO: 96.6 FL (ref 80–94)
PLATELET # BLD AUTO: 240 K/UL (ref 130–400)
PMV BLD AUTO: 10.8 FL (ref 9.4–12.4)
POTASSIUM SERPL-SCNC: 3.4 MMOL/L (ref 3.5–5.1)
PROT SERPL-MCNC: 7.4 G/DL (ref 6.4–8.3)
RBC # BLD AUTO: 4.1 M/UL (ref 4.7–6.1)
SODIUM SERPL-SCNC: 140 MMOL/L (ref 136–145)
TRIGL SERPL-MCNC: 90 MG/DL (ref 0–149)
WBC # BLD AUTO: 7.4 K/UL (ref 4.8–10.8)

## 2025-03-18 NOTE — TELEPHONE ENCOUNTER
Lab results faxed to [facility] at [phone] with confirmation received.

## 2025-03-18 NOTE — TELEPHONE ENCOUNTER
Dr car with [facility] is asking that the labs results from today under dr samuel be faxed to his office .        Fax# [phone]  Phone# [phone]

## 2025-03-21 ENCOUNTER — RESULTS FOLLOW-UP (OUTPATIENT)
Dept: PRIMARY CARE CLINIC | Age: 60
End: 2025-03-21

## 2025-03-21 DIAGNOSIS — T50.2X5A DIURETIC-INDUCED HYPOKALEMIA: Primary | ICD-10-CM

## 2025-03-21 DIAGNOSIS — E87.6 DIURETIC-INDUCED HYPOKALEMIA: Primary | ICD-10-CM

## 2025-03-21 RX ORDER — POTASSIUM CHLORIDE 750 MG/1
10 TABLET, EXTENDED RELEASE ORAL 2 TIMES DAILY
Qty: 10 TABLET | Refills: 0 | Status: SHIPPED | OUTPATIENT
Start: 2025-03-21 | End: 2025-04-21 | Stop reason: ALTCHOICE

## 2025-04-20 NOTE — PROGRESS NOTES
Thiago Goldstein ( 1965) is a 59 y.o. male, Established , here for evaluation of the following chief complaint(s).  6 Month Follow-Up      Patient was encouraged and advised to be compliant with all  medications leads an active lifestyle and promote maintaining a healthy weight, encouraged not to use cigarettes, laboratory results discussed and reviewed with patient's during this visit       Assessment & Plan  Chronic heart failure with preserved ejection fraction (HCC)   Monitored by specialist- no acute findings meriting change in the plan         Proliferative diabetic retinopathy of both eyes without macular edema associated with type 2 diabetes mellitus   Monitored by specialist- no acute findings meriting change in the plan         Type 2 diabetes mellitus with diabetic polyneuropathy, with long-term current use of insulin (HCC)   Monitored by specialist- no acute findings meriting change in the plan, advised to give his premeal, stop Farxiga, on;y one SGLT2, will add GLP 1 to decrease weight  Documentation for continuous glucose monitoring referral for Medicare CONTINUOUS GLUCOSE MONITOR  Patient test blood glucose levels 4 or more times per day  Patient frequently adjust insulin based on blood glucose levels multiple times a day     Orders:    Insulin Glargine-Lixisenatide 100-33 UNT-MCG/ML SOPN; Inject 32 Units into the skin every morning (before breakfast)    Albumin/Creatinine Ratio, Urine; Future    Stage 3b chronic kidney disease (HCC)   Monitored by specialist- no acute findings meriting change in the plan         Class 2 severe obesity due to excess calories with serious comorbidity and body mass index (BMI) of 35.0 to 35.9 in adult   Chronic, not at goal (unstable), medication adherence emphasized and lifestyle modifications recommended    Orders:    Insulin Glargine-Lixisenatide 100-33 UNT-MCG/ML SOPN; Inject 32 Units into the skin every morning (before breakfast)    Prostate cancer screening

## 2025-04-20 NOTE — ASSESSMENT & PLAN NOTE
Chronic, not at goal (unstable), medication adherence emphasized and lifestyle modifications recommended    Orders:    Insulin Glargine-Lixisenatide 100-33 UNT-MCG/ML SOPN; Inject 32 Units into the skin every morning (before breakfast)

## 2025-04-20 NOTE — ASSESSMENT & PLAN NOTE
Monitored by specialist- no acute findings meriting change in the plan, advised to give his premeal, stop Farxiga, on;y one SGLT2, will add GLP 1 to decrease weight  Documentation for continuous glucose monitoring referral for Medicare CONTINUOUS GLUCOSE MONITOR  Patient test blood glucose levels 4 or more times per day  Patient frequently adjust insulin based on blood glucose levels multiple times a day     Orders:    Insulin Glargine-Lixisenatide 100-33 UNT-MCG/ML SOPN; Inject 32 Units into the skin every morning (before breakfast)    Albumin/Creatinine Ratio, Urine; Future

## 2025-04-21 ENCOUNTER — OFFICE VISIT (OUTPATIENT)
Dept: PRIMARY CARE CLINIC | Age: 60
End: 2025-04-21
Payer: MEDICARE

## 2025-04-21 VITALS
DIASTOLIC BLOOD PRESSURE: 86 MMHG | RESPIRATION RATE: 17 BRPM | HEART RATE: 72 BPM | WEIGHT: 242 LBS | HEIGHT: 70 IN | SYSTOLIC BLOOD PRESSURE: 136 MMHG | TEMPERATURE: 97.5 F | BODY MASS INDEX: 34.65 KG/M2 | OXYGEN SATURATION: 99 %

## 2025-04-21 DIAGNOSIS — I50.32 CHRONIC HEART FAILURE WITH PRESERVED EJECTION FRACTION (HCC): ICD-10-CM

## 2025-04-21 DIAGNOSIS — E11.42 TYPE 2 DIABETES MELLITUS WITH DIABETIC POLYNEUROPATHY, WITH LONG-TERM CURRENT USE OF INSULIN (HCC): Chronic | ICD-10-CM

## 2025-04-21 DIAGNOSIS — E66.01 CLASS 2 SEVERE OBESITY DUE TO EXCESS CALORIES WITH SERIOUS COMORBIDITY AND BODY MASS INDEX (BMI) OF 35.0 TO 35.9 IN ADULT (HCC): Primary | ICD-10-CM

## 2025-04-21 DIAGNOSIS — N18.32 STAGE 3B CHRONIC KIDNEY DISEASE (HCC): ICD-10-CM

## 2025-04-21 DIAGNOSIS — E66.812 CLASS 2 SEVERE OBESITY DUE TO EXCESS CALORIES WITH SERIOUS COMORBIDITY AND BODY MASS INDEX (BMI) OF 35.0 TO 35.9 IN ADULT (HCC): Primary | ICD-10-CM

## 2025-04-21 DIAGNOSIS — G47.62 NOCTURNAL LEG CRAMPS: ICD-10-CM

## 2025-04-21 DIAGNOSIS — Z79.4 TYPE 2 DIABETES MELLITUS WITH DIABETIC POLYNEUROPATHY, WITH LONG-TERM CURRENT USE OF INSULIN (HCC): ICD-10-CM

## 2025-04-21 DIAGNOSIS — E11.3593 PROLIFERATIVE DIABETIC RETINOPATHY OF BOTH EYES WITHOUT MACULAR EDEMA ASSOCIATED WITH TYPE 2 DIABETES MELLITUS (HCC): ICD-10-CM

## 2025-04-21 DIAGNOSIS — Z12.5 PROSTATE CANCER SCREENING: ICD-10-CM

## 2025-04-21 DIAGNOSIS — E11.42 TYPE 2 DIABETES MELLITUS WITH DIABETIC POLYNEUROPATHY, WITH LONG-TERM CURRENT USE OF INSULIN (HCC): ICD-10-CM

## 2025-04-21 DIAGNOSIS — Z79.4 TYPE 2 DIABETES MELLITUS WITH DIABETIC POLYNEUROPATHY, WITH LONG-TERM CURRENT USE OF INSULIN (HCC): Chronic | ICD-10-CM

## 2025-04-21 PROBLEM — E11.621 DIABETIC FOOT ULCER (HCC): Status: RESOLVED | Noted: 2020-09-14 | Resolved: 2025-04-21

## 2025-04-21 PROBLEM — G47.33 OBSTRUCTIVE SLEEP APNEA: Status: RESOLVED | Noted: 2021-07-02 | Resolved: 2025-04-21

## 2025-04-21 PROBLEM — L97.509 DIABETIC FOOT ULCER (HCC): Status: RESOLVED | Noted: 2020-09-14 | Resolved: 2025-04-21

## 2025-04-21 PROCEDURE — G8417 CALC BMI ABV UP PARAM F/U: HCPCS | Performed by: INTERNAL MEDICINE

## 2025-04-21 PROCEDURE — G8427 DOCREV CUR MEDS BY ELIG CLIN: HCPCS | Performed by: INTERNAL MEDICINE

## 2025-04-21 PROCEDURE — 3075F SYST BP GE 130 - 139MM HG: CPT | Performed by: INTERNAL MEDICINE

## 2025-04-21 PROCEDURE — 3079F DIAST BP 80-89 MM HG: CPT | Performed by: INTERNAL MEDICINE

## 2025-04-21 PROCEDURE — 2022F DILAT RTA XM EVC RTNOPTHY: CPT | Performed by: INTERNAL MEDICINE

## 2025-04-21 PROCEDURE — 3017F COLORECTAL CA SCREEN DOC REV: CPT | Performed by: INTERNAL MEDICINE

## 2025-04-21 PROCEDURE — 99214 OFFICE O/P EST MOD 30 MIN: CPT | Performed by: INTERNAL MEDICINE

## 2025-04-21 PROCEDURE — 1036F TOBACCO NON-USER: CPT | Performed by: INTERNAL MEDICINE

## 2025-04-21 PROCEDURE — 3052F HG A1C>EQUAL 8.0%<EQUAL 9.0%: CPT | Performed by: INTERNAL MEDICINE

## 2025-04-21 PROCEDURE — G2211 COMPLEX E/M VISIT ADD ON: HCPCS | Performed by: INTERNAL MEDICINE

## 2025-04-21 RX ORDER — INSULIN DEGLUDEC 100 U/ML
INJECTION, SOLUTION SUBCUTANEOUS
Refills: 1 | OUTPATIENT
Start: 2025-04-21

## 2025-04-21 RX ORDER — PYRIDOXINE HCL (VITAMIN B6) 50 MG
50 TABLET ORAL DAILY
Qty: 30 TABLET | Refills: 3 | Status: SHIPPED | OUTPATIENT
Start: 2025-04-21 | End: 2026-04-21

## 2025-04-21 SDOH — ECONOMIC STABILITY: FOOD INSECURITY: WITHIN THE PAST 12 MONTHS, YOU WORRIED THAT YOUR FOOD WOULD RUN OUT BEFORE YOU GOT MONEY TO BUY MORE.: NEVER TRUE

## 2025-04-21 SDOH — ECONOMIC STABILITY: FOOD INSECURITY: WITHIN THE PAST 12 MONTHS, THE FOOD YOU BOUGHT JUST DIDN'T LAST AND YOU DIDN'T HAVE MONEY TO GET MORE.: NEVER TRUE

## 2025-04-21 ASSESSMENT — ENCOUNTER SYMPTOMS
STRIDOR: 0
WHEEZING: 0
CHEST TIGHTNESS: 0
COUGH: 0
CONSTIPATION: 0
BACK PAIN: 0
BLOOD IN STOOL: 0
TROUBLE SWALLOWING: 0
COLOR CHANGE: 0
DIARRHEA: 0
ABDOMINAL PAIN: 0
SHORTNESS OF BREATH: 0
SORE THROAT: 0

## 2025-04-21 ASSESSMENT — PATIENT HEALTH QUESTIONNAIRE - PHQ9
SUM OF ALL RESPONSES TO PHQ QUESTIONS 1-9: 0
1. LITTLE INTEREST OR PLEASURE IN DOING THINGS: NOT AT ALL
SUM OF ALL RESPONSES TO PHQ QUESTIONS 1-9: 0
2. FEELING DOWN, DEPRESSED OR HOPELESS: NOT AT ALL
SUM OF ALL RESPONSES TO PHQ QUESTIONS 1-9: 0
SUM OF ALL RESPONSES TO PHQ QUESTIONS 1-9: 0

## 2025-04-21 NOTE — PATIENT INSTRUCTIONS
that were made at today's visit.  If you find any differences when compared to your medications at home, or have any questions that were not answered at your visit, please contact the office.

## 2025-04-30 DIAGNOSIS — I10 PRIMARY HYPERTENSION: ICD-10-CM

## 2025-04-30 RX ORDER — FUROSEMIDE 40 MG/1
40 TABLET ORAL DAILY
Qty: 90 TABLET | Refills: 0 | Status: SHIPPED | OUTPATIENT
Start: 2025-04-30

## 2025-04-30 NOTE — TELEPHONE ENCOUNTER
Thiago Goldstein called to request a refill on his medication.      Last office visit : 4/21/2025   Next office visit : 10/23/2025     Requested Prescriptions     Pending Prescriptions Disp Refills    furosemide (LASIX) 40 MG tablet [Pharmacy Med Name: FUROSEMIDE 40MG TABLET] 90 tablet 0     Sig: TAKE ONE TABLET DAILY            Diana Fontenot LPN

## 2025-05-06 ENCOUNTER — TELEPHONE (OUTPATIENT)
Dept: PRIMARY CARE CLINIC | Age: 60
End: 2025-05-06

## 2025-05-06 DIAGNOSIS — E11.42 TYPE 2 DIABETES MELLITUS WITH DIABETIC POLYNEUROPATHY, WITH LONG-TERM CURRENT USE OF INSULIN (HCC): Primary | Chronic | ICD-10-CM

## 2025-05-06 DIAGNOSIS — Z79.4 TYPE 2 DIABETES MELLITUS WITH DIABETIC POLYNEUROPATHY, WITH LONG-TERM CURRENT USE OF INSULIN (HCC): Primary | Chronic | ICD-10-CM

## 2025-05-06 RX ORDER — INSULIN DEGLUDEC 100 U/ML
32 INJECTION, SOLUTION SUBCUTANEOUS NIGHTLY
Qty: 4 ADJUSTABLE DOSE PRE-FILLED PEN SYRINGE | Refills: 4 | Status: SHIPPED | OUTPATIENT
Start: 2025-05-06 | End: 2025-11-02

## 2025-05-06 RX ORDER — PEN NEEDLE, DIABETIC 31 G X1/4"
NEEDLE, DISPOSABLE MISCELLANEOUS
Qty: 300 EACH | Refills: 5 | Status: SHIPPED | OUTPATIENT
Start: 2025-05-06

## 2025-05-06 NOTE — TELEPHONE ENCOUNTER
Thiago PEARSON Triston called to request a refill on his medication.      Last office visit : 4/21/2025   Next office visit : 10/23/2025     Requested Prescriptions     Signed Prescriptions Disp Refills    Insulin Pen Needle (TRUEPLUS 5-BEVEL PEN NEEDLES) 31G X 6 MM MISC 300 each 5     Sig: USE AS DIRECTED UP TO FOUR TIMES DAILY INCLUDING SLIDING SCALE INSULIN     Authorizing Provider: SILVESTRE WAITE     Ordering User: JULIANO MARIE LPN

## 2025-05-06 NOTE — TELEPHONE ENCOUNTER
Patient was Soliqua and is discontinued as requested  Tresiba 32 units sent to pharmacy patient has been instructed to use it every night

## 2025-05-06 NOTE — TELEPHONE ENCOUNTER
Lorri with [facility] called for a refill of Tresiba Flextouch 100 units/mL. Patient's current medication list shows Soliqua. Lorri states patient has reported to them that he does not like the Soliqua and wants to go back on Tresiba. Please advise.

## 2025-05-09 DIAGNOSIS — I10 PRIMARY HYPERTENSION: ICD-10-CM

## 2025-05-09 RX ORDER — CLONIDINE HYDROCHLORIDE 0.1 MG/1
TABLET ORAL
Qty: 180 TABLET | Refills: 1 | Status: SHIPPED | OUTPATIENT
Start: 2025-05-09

## 2025-05-09 NOTE — TELEPHONE ENCOUNTER
Thiago Goldstein called to request a refill on his medication.      Last office visit : 4/21/2025   Next office visit : 10/23/2025     Requested Prescriptions     Signed Prescriptions Disp Refills    cloNIDine (CATAPRES) 0.1 MG tablet 180 tablet 1     Sig: TAKE ONE TABLET TWICE DAILY     Authorizing Provider: SILVESTRE WAITE     Ordering User: JULIANO MARIE LPN

## 2025-05-21 DIAGNOSIS — I50.32 CHRONIC HEART FAILURE WITH PRESERVED EJECTION FRACTION (HCC): ICD-10-CM

## 2025-05-21 DIAGNOSIS — I25.10 CAD IN NATIVE ARTERY: ICD-10-CM

## 2025-05-21 DIAGNOSIS — I10 PRIMARY HYPERTENSION: ICD-10-CM

## 2025-05-21 RX ORDER — METOPROLOL TARTRATE 50 MG
TABLET ORAL
Qty: 180 TABLET | Refills: 1 | Status: SHIPPED | OUTPATIENT
Start: 2025-05-21

## 2025-05-21 NOTE — TELEPHONE ENCOUNTER
Thiago Goldstein called to request a refill on his medication.      Last office visit : 4/21/2025   Next office visit : 10/23/2025     Requested Prescriptions     Signed Prescriptions Disp Refills    metoprolol tartrate (LOPRESSOR) 50 MG tablet 180 tablet 1     Sig: TAKE ONE TABLET TWICE DAILY     Authorizing Provider: SILVESTRE WAITE     Ordering User: JULIANO AMRIE LPN

## 2025-06-03 DIAGNOSIS — Z79.4 TYPE 2 DIABETES MELLITUS WITH DIABETIC POLYNEUROPATHY, WITH LONG-TERM CURRENT USE OF INSULIN (HCC): Chronic | ICD-10-CM

## 2025-06-03 DIAGNOSIS — E11.42 TYPE 2 DIABETES MELLITUS WITH DIABETIC POLYNEUROPATHY, WITH LONG-TERM CURRENT USE OF INSULIN (HCC): Chronic | ICD-10-CM

## 2025-06-03 RX ORDER — EMPAGLIFLOZIN 25 MG/1
25 TABLET, FILM COATED ORAL DAILY
Qty: 30 TABLET | Refills: 2 | Status: SHIPPED | OUTPATIENT
Start: 2025-06-03

## 2025-06-11 DIAGNOSIS — E11.42 TYPE 2 DIABETES MELLITUS WITH DIABETIC POLYNEUROPATHY, WITH LONG-TERM CURRENT USE OF INSULIN (HCC): ICD-10-CM

## 2025-06-11 DIAGNOSIS — Z79.4 TYPE 2 DIABETES MELLITUS WITH DIABETIC POLYNEUROPATHY, WITH LONG-TERM CURRENT USE OF INSULIN (HCC): ICD-10-CM

## 2025-06-11 NOTE — TELEPHONE ENCOUNTER
Thiago Goldstein called to request a refill on his medication.      Last office visit : 4/21/2025   Next office visit : 10/23/2025     Requested Prescriptions     Signed Prescriptions Disp Refills    metFORMIN (GLUCOPHAGE) 1000 MG tablet 180 tablet 1     Sig: TAKE ONE TABLET TWICE DAILY WITH MEALS     Authorizing Provider: SILVESTRE WAITE     Ordering User: JULIANO MARIE LPN

## 2025-07-16 DIAGNOSIS — I10 PRIMARY HYPERTENSION: ICD-10-CM

## 2025-07-16 DIAGNOSIS — E11.69 MIXED HYPERLIPIDEMIA DUE TO TYPE 2 DIABETES MELLITUS (HCC): ICD-10-CM

## 2025-07-16 DIAGNOSIS — E78.2 MIXED HYPERLIPIDEMIA DUE TO TYPE 2 DIABETES MELLITUS (HCC): ICD-10-CM

## 2025-07-16 RX ORDER — ATORVASTATIN CALCIUM 20 MG/1
20 TABLET, FILM COATED ORAL DAILY
Qty: 90 TABLET | Refills: 1 | Status: SHIPPED | OUTPATIENT
Start: 2025-07-16

## 2025-07-16 RX ORDER — FUROSEMIDE 40 MG/1
40 TABLET ORAL DAILY
Qty: 90 TABLET | Refills: 1 | Status: SHIPPED | OUTPATIENT
Start: 2025-07-16

## 2025-07-16 NOTE — TELEPHONE ENCOUNTER
Thiago Goldstein called to request a refill on his medication.      Last office visit : 4/21/2025   Next office visit : 10/23/2025     Requested Prescriptions     Signed Prescriptions Disp Refills    atorvastatin (LIPITOR) 20 MG tablet 90 tablet 1     Sig: TAKE ONE TABLET DAILY     Authorizing Provider: SILVESTRE WAITE     Ordering User: JULIANO MARIE    furosemide (LASIX) 40 MG tablet 90 tablet 1     Sig: TAKE ONE TABLET DAILY     Authorizing Provider: SILVESTRE WAITE     Ordering User: JULIANO MARIE LPN

## 2025-07-22 ENCOUNTER — OFFICE VISIT (OUTPATIENT)
Dept: CARDIOLOGY CLINIC | Age: 60
End: 2025-07-22
Payer: MEDICARE

## 2025-07-22 VITALS
OXYGEN SATURATION: 98 % | DIASTOLIC BLOOD PRESSURE: 78 MMHG | BODY MASS INDEX: 32.93 KG/M2 | HEIGHT: 70 IN | RESPIRATION RATE: 20 BRPM | WEIGHT: 230 LBS | SYSTOLIC BLOOD PRESSURE: 128 MMHG | HEART RATE: 74 BPM

## 2025-07-22 DIAGNOSIS — I48.19 PERSISTENT ATRIAL FIBRILLATION (HCC): Primary | ICD-10-CM

## 2025-07-22 DIAGNOSIS — I25.10 CORONARY ARTERY DISEASE INVOLVING NATIVE CORONARY ARTERY OF NATIVE HEART WITHOUT ANGINA PECTORIS: ICD-10-CM

## 2025-07-22 DIAGNOSIS — E78.5 DYSLIPIDEMIA: ICD-10-CM

## 2025-07-22 DIAGNOSIS — I10 ESSENTIAL HYPERTENSION: ICD-10-CM

## 2025-07-22 PROCEDURE — G8417 CALC BMI ABV UP PARAM F/U: HCPCS | Performed by: NURSE PRACTITIONER

## 2025-07-22 PROCEDURE — 3074F SYST BP LT 130 MM HG: CPT | Performed by: NURSE PRACTITIONER

## 2025-07-22 PROCEDURE — 1036F TOBACCO NON-USER: CPT | Performed by: NURSE PRACTITIONER

## 2025-07-22 PROCEDURE — 3017F COLORECTAL CA SCREEN DOC REV: CPT | Performed by: NURSE PRACTITIONER

## 2025-07-22 PROCEDURE — 3078F DIAST BP <80 MM HG: CPT | Performed by: NURSE PRACTITIONER

## 2025-07-22 PROCEDURE — G8427 DOCREV CUR MEDS BY ELIG CLIN: HCPCS | Performed by: NURSE PRACTITIONER

## 2025-07-22 PROCEDURE — 99214 OFFICE O/P EST MOD 30 MIN: CPT | Performed by: NURSE PRACTITIONER

## 2025-07-22 PROCEDURE — 93000 ELECTROCARDIOGRAM COMPLETE: CPT | Performed by: NURSE PRACTITIONER

## 2025-07-22 ASSESSMENT — ENCOUNTER SYMPTOMS
SORE THROAT: 0
COUGH: 0
CHEST TIGHTNESS: 0
WHEEZING: 0
SHORTNESS OF BREATH: 0

## 2025-07-22 NOTE — PROGRESS NOTES
[facility]   Established Patient Office Visit  [address]  [address]  [facility] [id number] [phone]        OFFICE VISIT:  2025    Thiago Goldstein - : 1965    Reason For Visit:  Thiago is a 59 y.o. male who is here for Follow-up (Pt adv no complaints at this time)    1. Persistent atrial fibrillation (HCC)    2. Coronary artery disease involving native coronary artery of native heart without angina pectoris    3. Essential hypertension    4. Dyslipidemia        Patient with a history of persistent atrial fib.  He has a history of CABG x 3 with Romero  Maze procedure with closure of the left atrial appendage.  History of hypertension and dyslipidemia.    He has a patient of Dr. Watters.    Patient presents to clinic today for yearly follow-up.  Patient denies any complaints of chest pain, pressure or tightness.  There is no shortness of breath, orthopnea or PND.  Patient denies any lightheadedness, dizziness or syncope        Subjective    Thiago Goldstein is a 59 y.o. male with the following history as recorded in [facility]:    Patient Active Problem List    Diagnosis Date Noted    Abnormal stress test     Benign paroxysmal positional vertigo due to bilateral vestibular disorder 2022    Amputation of right great toe 2023    Amputated great toe of right foot 2023    Dizziness 2022    Status post coronary artery bypass graft     Stage 3b chronic kidney disease (HCC) 2021    Fluid overload 2021    CAD in native artery 2021    History of atrial fibrillation     Amputation of left middle finger 2021    Cellulitis 10/25/2021    Proliferative diabetic retinopathy of both eyes without macular edema associated with type 2 diabetes mellitus (HCC) 2021    Peripheral neuropathy 2021    Chronic heart failure with preserved ejection fraction (HCC) 2021    Charcot foot due to diabetes mellitus (HCC) 2020    Personal history of

## 2025-08-18 RX ORDER — LOSARTAN POTASSIUM 100 MG/1
100 TABLET ORAL DAILY
Qty: 90 TABLET | Refills: 3 | Status: SHIPPED | OUTPATIENT
Start: 2025-08-18

## 2025-09-04 DIAGNOSIS — Z79.4 TYPE 2 DIABETES MELLITUS WITH DIABETIC POLYNEUROPATHY, WITH LONG-TERM CURRENT USE OF INSULIN (HCC): Chronic | ICD-10-CM

## 2025-09-04 DIAGNOSIS — E11.42 TYPE 2 DIABETES MELLITUS WITH DIABETIC POLYNEUROPATHY, WITH LONG-TERM CURRENT USE OF INSULIN (HCC): Chronic | ICD-10-CM

## 2025-09-04 RX ORDER — EMPAGLIFLOZIN 25 MG/1
25 TABLET, FILM COATED ORAL DAILY
Qty: 30 TABLET | Refills: 1 | Status: SHIPPED | OUTPATIENT
Start: 2025-09-04

## (undated) DEVICE — AMBU AURA-I U SIZE 4, DISPOSABLE LARYNGEAL MASK: Brand: AURA-I

## (undated) DEVICE — SUTURE PROL SZ 4-0 L36IN NONABSORBABLE BLU L17MM RB-1 1/2 M8557

## (undated) DEVICE — VESSEL SHUNT 2.00MM TAPERED TIP, 12MM SHAFT
Type: IMPLANTABLE DEVICE | Status: NON-FUNCTIONAL
Brand: SOF-FLO ATRAUMATIC CORONARY ARTERY SHUNT
Removed: 2021-12-14

## (undated) DEVICE — CATHETER ABLAT JAW L64MM GLDE PATH TAPE BPLR SYNERGY ACC

## (undated) DEVICE — SS SUTURE, 3 PER SLEEVE: Brand: MYO/WIRE II

## (undated) DEVICE — SUTURE ETHLN SZ 3-0 L18IN NONABSORBABLE BLK FS-1 L24MM 3/8 663H

## (undated) DEVICE — SUTURE PROL SZ 6-0 L30IN NONABSORBABLE BLU L13MM RB-2 1/2 8711H

## (undated) DEVICE — SOLUTION IRRIG 3000ML 0.9% SOD CHL USP UROMATIC PLAS CONT

## (undated) DEVICE — 3M™ STERI-STRIP™ REINFORCED ADHESIVE SKIN CLOSURES, R1546, 1/4 IN X 4 IN (6 MM X 100 MM), 10 STRIPS/ENVELOPE: Brand: 3M™ STERI-STRIP™

## (undated) DEVICE — MEDI-TRACE CADENCE ADULT DEFIBRILLATION ELECTRODE (10 PR/PK) - PHYSIO-CONTROL: Brand: MEDI-TRACE CADENCE

## (undated) DEVICE — SOLUTION IV 500ML 0.9% SOD CHL PH 5 INJ USP VIAFLX PLAS

## (undated) DEVICE — GLOVE SURG SZ 75 L12IN FNGR THK94MIL TRNSLUC YEL LTX

## (undated) DEVICE — ELECTROSURGICAL PENCIL BUTTON SWITCH NON COATED BLADE ELECTRODE 10 FT (3 M) CORD HOLSTER: Brand: MEGADYNE

## (undated) DEVICE — SHEET,DRAPE,53X77,STERILE: Brand: MEDLINE

## (undated) DEVICE — KIT INTRO 8.5FR L4IN PERC POLYUR SHTH RADPQ W/ INTEGR

## (undated) DEVICE — DISSECTOR ARTIC LUMITIP W/TRANS GUIDE

## (undated) DEVICE — SOLUTION IV 1000ML 140MEQ/L SOD 5MEQ/L K 3MEQ/L MG 27MEQ/L

## (undated) DEVICE — PROBE CRYO ABLT 10CM

## (undated) DEVICE — TOWEL,OR,DSP,ST,BLUE,DLX,4/PK,20PK/CS: Brand: MEDLINE

## (undated) DEVICE — BLANKET THER AD W24XL60IN FAB COVERING SUP SFT ULT THN LTWT

## (undated) DEVICE — Device: Brand: RETRACT-O-TAPE 18G X 30.5CM SEMI-POINTED NEEDLE

## (undated) DEVICE — SYSTEM SKIN CLSR 22CM DERMBND PRINEO

## (undated) DEVICE — INTENDED FOR TISSUE SEPARATION, AND OTHER PROCEDURES THAT REQUIRE A SHARP SURGICAL BLADE TO PUNCTURE OR CUT.: Brand: BARD-PARKER ® CARBON RIB-BACK BLADES

## (undated) DEVICE — DRESSING FOAM 0.75IN 1.5MM H DISK CHG IMPREG AEGIS

## (undated) DEVICE — UNDERGLOVE SURG SZ 8 FNGR THK0.21MIL GRN LTX BEAD CUF

## (undated) DEVICE — TUBE ET 7.5MM NSL ORAL BASIC CUF INTMED MURPHY EYE RADPQ

## (undated) DEVICE — LEAD PACE L475MM CHNL A OR V MYOCARDIAL STEROID ELUT SIL

## (undated) DEVICE — GEL US 20GM NONIRRITATING OVERWRAPPED FILE PCH TRNSMIT

## (undated) DEVICE — YANKAUER,POOLE TIP,STERILE,50/CS: Brand: MEDLINE

## (undated) DEVICE — SOLUTION CARDPLG H2O DIL 1000 ML CARD PERF VIAFLX

## (undated) DEVICE — PRECISION THIN (9.0 X 0.38 X 25.0MM)

## (undated) DEVICE — ROYAL SILK SURGICAL GOWN, XXL: Brand: CONVERTORS

## (undated) DEVICE — DRAIN SURG 19FR SIL RND HUBLESS W/ 0.25IN BEND TRCR BLAK

## (undated) DEVICE — PACK,SHOULDER SPLIT: Brand: MEDLINE

## (undated) DEVICE — MEDI-VAC YANK SUCT HNDL W/TPRD BULBOUS TIP: Brand: CARDINAL HEALTH

## (undated) DEVICE — E-Z CLEAN, NON-STICK, PTFE COATED, ELECTROSURGICAL BLADE ELECTRODE, MODIFIED EXTENDED INSULATION, 6.5 INCH (16.5 CM): Brand: MEGADYNE

## (undated) DEVICE — CONNECTOR DRNGE W3/8-0.5XH3/16XL3/16IN 2:1 SIL CARD STR

## (undated) DEVICE — NON-STERILE (14 X 30CM) COVER: Brand: CIV-FLEX™ TRANSDUCER COVER

## (undated) DEVICE — ZINACTIVE USE 2539609 APPLICATOR MEDICATED 10.5 CC SOLUTION HI LT ORNG CHLORAPREP

## (undated) DEVICE — JP CHANNEL DRAIN, 24FR HUBLESS: Brand: CARDINAL HEALTH

## (undated) DEVICE — Z DUP USE 2367328 BLADE SAW 32X6.35 MM STRNM FOR CUT STERNOTOMY

## (undated) DEVICE — Device: Brand: CLEANCUT ROTATING AORTIC PUNCH

## (undated) DEVICE — STERILE LATEX POWDER FREE SURGICAL GLOVES WITH HYDROGEL COATING: Brand: PROTEXIS

## (undated) DEVICE — VESSEL HARVESTING KIT 7 MM ENDOSCP FOR PWR SUPL

## (undated) DEVICE — PENROSE DRAIN 18 X .5" SILICONE: Brand: MEDLINE

## (undated) DEVICE — SURGICAL PROCEDURE PACK LOWER EXTREMITY LOURDES HOSP

## (undated) DEVICE — KIT BLWR MISTER 5P 15L W/ TBNG SET IRRIG MIST TO IMPROVE

## (undated) DEVICE — PENCIL CAUT PUSH BTTN W HOLSTER AND CRD 15FT

## (undated) DEVICE — PACK,UNIVERSAL,NO GOWNS: Brand: MEDLINE

## (undated) DEVICE — 3M™ TEGADERM™ TRANSPARENT FILM DRESSING FRAME STYLE, 1628, 6 IN X 8 IN (15 CM X 20 CM), 10/CT 8CT/CASE: Brand: 3M™ TEGADERM™

## (undated) DEVICE — STERILE POLYISOPRENE POWDER-FREE SURGICAL GLOVES: Brand: PROTEXIS

## (undated) DEVICE — GLOVE ORTHO 8   MSG9480

## (undated) DEVICE — SUTURE VCRL SZ 2-0 L36IN ABSRB UD L36MM CT-1 1/2 CIR J945H

## (undated) DEVICE — UNIVERSAL PACK: Brand: CONVERTORS

## (undated) DEVICE — CAP PROTCT ORIG SET ZONE SPEC

## (undated) DEVICE — BANDAGE GZ W2XL75IN ST RAYON POLY CNFRM STRTCH LTWT

## (undated) DEVICE — Y-TYPE TUR/BLADDER IRRIGATION SET, REGULATING CLAMP

## (undated) DEVICE — COVER TRANSDUCER TELESCOPICALLY FOLDED 3.5X36 IN CIV-FLEX

## (undated) DEVICE — SOLUTION IV 250ML 0.9% SOD CHL PH 5 INJ USP VIAFLX PLAS

## (undated) DEVICE — Z DUP USE 2648250 IMMOBILIZER ORTH L SHLDR BILAT UNISX COT ADJ CLS EL OPN HND

## (undated) DEVICE — DEVICE RESUS AD TB L40IN SELF INFL MASK TEXT BG DBL SWVL EL

## (undated) DEVICE — SUTURE VCRL SZ 0 L27IN ABSRB UD L36MM CT-1 1/2 CIR J260H

## (undated) DEVICE — GAUZE,SPONGE,4"X4",8PLY,STRL,LF,10/TRAY: Brand: MEDLINE

## (undated) DEVICE — 1016 S-DRAPE IRRIG POUCH 10/BOX: Brand: STERI-DRAPE™

## (undated) DEVICE — Z DISCONTINUED USE 2272117 DRAPE SURG 3 QTR N INVASIVE 2 LAYR DISP

## (undated) DEVICE — AEGIS 1" DISK 4MM HOLE, PEEL OPEN: Brand: MEDLINE

## (undated) DEVICE — CURAVIEW LED LARYNSCP BLDE

## (undated) DEVICE — AMBU AURA-I U SIZE 5, DISPOSABLE LARYNGEAL MASK: Brand: AURA-I

## (undated) DEVICE — DRESSING,GAUZE,XEROFORM,CURAD,5"X9",ST: Brand: CURAD

## (undated) DEVICE — LOOP VES VASCO 18 G SIL W/ BLNT NDL

## (undated) DEVICE — SOLUTION IV IRRIG WATER 1000ML POUR BRL 2F7114

## (undated) DEVICE — MINOR CDS: Brand: MEDLINE INDUSTRIES, INC.

## (undated) DEVICE — YANKAUER,TAPERED BULBOUS TIP,W/O VENT: Brand: MEDLINE

## (undated) DEVICE — GLOVE SURG SZ 7 CRM LTX FREE POLYISOPRENE POLYMER BEAD ANTI

## (undated) DEVICE — STABILIZER SURG VAC STD BLADE POD MALL FT ACROBATI

## (undated) DEVICE — C-ARM: Brand: UNBRANDED

## (undated) DEVICE — COVER,TABLE,44X90,STERILE: Brand: MEDLINE

## (undated) DEVICE — DRESSING FOAM W4XL12IN SIL RECT ADH WTRPRF FLM BK W/ BORD

## (undated) DEVICE — SURGICAL PROCEDURE PACK OPN HRT LOURDES HOSP

## (undated) DEVICE — BIT DRL L200MM DIA2.8MM CALIB L100MM FOR 3.5MM VA LCP PROX

## (undated) DEVICE — DRAIN SURG SGL COLL PT TB FOR ATS BG OASIS

## (undated) DEVICE — SUTURE ABSORBABLE MONOFILAMENT 3-0 SH 27 IN UD PDS + PDP416H

## (undated) DEVICE — SOLUTION IV 1000ML 0.9% SOD CHL PH 5 INJ USP VIAFLX PLAS

## (undated) DEVICE — PEN ISOLATOR ELECTRD L7MM BPLR PEN 33CM LNG TRANSPOLAR

## (undated) DEVICE — WAX SURG 2.5GM HEMSTAT BNE BEESWAX PARAFFIN ISO PALMITATE

## (undated) DEVICE — OCCLUSIVE GAUZE STRIP,3% BISMUTH TRIBROMOPHENATE IN PETROLATUM BLEND: Brand: XEROFORM

## (undated) DEVICE — CLIP LIG M BLU TI HRT SHP WIRE HORZ 180 PER BX

## (undated) DEVICE — Z DISCONTINUED PER MEDLINE USE 2718072 DRESSING FOAM W5XL12.5CM SIL ADH THN BORDED CNFRM LO PROF

## (undated) DEVICE — K WIRE FIX L150MM DIA1.25MM S STL TRCR PNT

## (undated) DEVICE — ACCESSORY TOWEL PACK: Brand: MEDLINE INDUSTRIES, INC.

## (undated) DEVICE — GLOVE SURG SZ 8 L11.6IN THK9.8MIL STRW LTX POLYMER BEAD CUF

## (undated) DEVICE — SOLUTION IV IRRIG POUR BRL 0.9% SODIUM CHL 2F7124

## (undated) DEVICE — SHOULDER CDS

## (undated) DEVICE — BLADE SAW W6.35XL32MM STRNM CUT STRNOTMY

## (undated) DEVICE — DRESSING FOAM W4XL5CM THN ABSRB SELF ADH W/ SAFETAC MEPILEX

## (undated) DEVICE — TUBE ET 8MM NSL ORAL BASIC CUF INTMED MURPHY EYE RADPQ MRK

## (undated) DEVICE — CATHETER THRMDIL 7.5FR L110CM PROXIMAL/DISTAL PRT L30CM STD

## (undated) DEVICE — E-Z CLEAN, NON-STICK, PTFE COATED, ELECTROSURGICAL BLADE ELECTRODE, MODIFIED EXTENDED INSULATION, 2.5 INCH (6.35 CM): Brand: MEGADYNE

## (undated) DEVICE — ZIMMER® STERILE DISPOSABLE TOURNIQUET CUFF WITH PLC, DUAL PORT, SINGLE BLADDER, 18 IN. (46 CM)

## (undated) DEVICE — SET CATH 20GA L1.75IN RAD ART POLYUR RADPQ W/ INTEGR

## (undated) DEVICE — GLOVE SURG SZ 8 L12IN FNGR THK79MIL GRN LTX FREE

## (undated) DEVICE — PLEDGET SURG W3.5XL7MM THK1.5MM WHT PTFE RECT SFT TFE

## (undated) DEVICE — BANDAGE COMPR W4INXL15FT BGE E SGL LAYERED CLP CLSR

## (undated) DEVICE — ENDO KIT,LOURDES HOSPITAL: Brand: MEDLINE INDUSTRIES, INC.

## (undated) DEVICE — SUTURE PROL SZ 7-0 L24IN NONABSORBABLE BLU L9.3MM BV-1 3/8 M8702

## (undated) DEVICE — LARYNGOSCOPE BLDE MAC HNDL M SZ 35 ST CURAPLEX CURAVIEW LED

## (undated) DEVICE — VESSEL SHUNT 1.50MM TAPERED TIP, 12MM SHAFT
Type: IMPLANTABLE DEVICE | Site: AORTA | Status: NON-FUNCTIONAL
Brand: SOF-FLO ATRAUMATIC CORONARY ARTERY SHUNT
Removed: 2021-12-14

## (undated) DEVICE — SUTURE PROL SZ 5-0 L36IN NONABSORBABLE BLU L17MM RB-1 1/2 8556H

## (undated) DEVICE — Device

## (undated) DEVICE — BIT DRL L140MM DIA2MM QUIK CPL 3 FLUT CALIB DEPTH MRK W/O

## (undated) DEVICE — SPONGE GZ W6XL6IN COT 6 PLY SUP FLUF EXTRA ABSRB FOR PRE OP

## (undated) DEVICE — SUTURE SURG STL NO 5 K 60 18IN N ABSRB MFIL 297271M6

## (undated) DEVICE — NEEDLE ECHOGENIC 20GA L4IN INSUL W/ 30DEG BVL EXTN SET

## (undated) DEVICE — BIT DRL L110MM DIA2.5MM G QUIK CPL W/O STP REUSE